# Patient Record
Sex: MALE | Race: BLACK OR AFRICAN AMERICAN | Employment: UNEMPLOYED | URBAN - METROPOLITAN AREA
[De-identification: names, ages, dates, MRNs, and addresses within clinical notes are randomized per-mention and may not be internally consistent; named-entity substitution may affect disease eponyms.]

---

## 2020-05-04 ENCOUNTER — VIRTUAL VISIT (OUTPATIENT)
Dept: PRIMARY CARE CLINIC | Age: 60
End: 2020-05-04
Payer: COMMERCIAL

## 2020-05-04 VITALS
DIASTOLIC BLOOD PRESSURE: 72 MMHG | SYSTOLIC BLOOD PRESSURE: 140 MMHG | WEIGHT: 148 LBS | BODY MASS INDEX: 23.23 KG/M2 | HEIGHT: 67 IN

## 2020-05-04 PROCEDURE — 99203 OFFICE O/P NEW LOW 30 MIN: CPT | Performed by: FAMILY MEDICINE

## 2020-05-04 RX ORDER — ASPIRIN 81 MG/1
81 TABLET, CHEWABLE ORAL DAILY
Qty: 100 TABLET | Refills: 0 | Status: SHIPPED | OUTPATIENT
Start: 2020-05-04 | End: 2020-06-02

## 2020-05-04 RX ORDER — ATORVASTATIN CALCIUM 80 MG/1
80 TABLET, FILM COATED ORAL DAILY
Qty: 90 TABLET | Refills: 1 | Status: SHIPPED | OUTPATIENT
Start: 2020-05-04 | End: 2020-12-28 | Stop reason: SDUPTHER

## 2020-05-04 RX ORDER — NIFEDIPINE 60 MG/1
60 TABLET, EXTENDED RELEASE ORAL DAILY
Qty: 90 TABLET | Refills: 1 | Status: SHIPPED | OUTPATIENT
Start: 2020-05-04 | End: 2020-12-28 | Stop reason: SDUPTHER

## 2020-05-04 RX ORDER — LISINOPRIL 10 MG/1
10 TABLET ORAL DAILY
Qty: 90 TABLET | Refills: 1 | Status: SHIPPED | OUTPATIENT
Start: 2020-05-04 | End: 2020-12-28 | Stop reason: SDUPTHER

## 2020-05-04 RX ORDER — ATORVASTATIN CALCIUM 20 MG/1
20 TABLET, FILM COATED ORAL DAILY
Qty: 90 TABLET | Refills: 1 | Status: SHIPPED | OUTPATIENT
Start: 2020-05-04 | End: 2020-05-04 | Stop reason: CLARIF

## 2020-05-04 RX ORDER — TADALAFIL 5 MG/1
5 TABLET ORAL PRN
Qty: 1 TABLET | Refills: 0 | Status: SHIPPED | OUTPATIENT
Start: 2020-05-04 | End: 2020-05-05 | Stop reason: SDUPTHER

## 2020-05-04 RX ORDER — NIFEDIPINE 60 MG/1
60 TABLET, EXTENDED RELEASE ORAL DAILY
COMMUNITY
End: 2020-05-04 | Stop reason: SDUPTHER

## 2020-05-04 ASSESSMENT — ENCOUNTER SYMPTOMS
SHORTNESS OF BREATH: 0
ORTHOPNEA: 0
BLURRED VISION: 0

## 2020-05-04 NOTE — PROGRESS NOTES
Subjective:      Patient ID: Kj Tolliver is a 62 y.o. male. .This is a audio video visit  . I am at my office  Patient is at home  I  performed this clinical encounter by Trupti Schmidt a real-time telehealth video connection between my Office and the patient's tennille. The patient's location was confirmed during this visit. I obtained verbal consent from he patient to perform this clinical encounter utilizing video and prepared the patient by answering any questions they had about the telehealth interaction. Approximately 30    minutes  were spent with the patient of which more than 50% of the time was spent in counseling and/or coordinating care. The  Patient understands and agrees with the plan of care. Hypertension   This is a chronic problem. The current episode started more than 1 year ago. The problem is controlled. Pertinent negatives include no anxiety, blurred vision, chest pain, headaches, malaise/fatigue, neck pain, orthopnea, palpitations, peripheral edema, PND, shortness of breath or sweats. There are no associated agents to hypertension. Risk factors for coronary artery disease include male gender and dyslipidemia. Past treatments include ACE inhibitors and calcium channel blockers. The current treatment provides significant improvement. Hypertensive end-organ damage includes PVD. There is no history of angina, kidney disease, CAD/MI, CVA or retinopathy. There is no history of chronic renal disease, coarctation of the aorta, hyperaldosteronism, hypercortisolism, hyperparathyroidism, a hypertension causing med, pheochromocytoma, renovascular disease, sleep apnea or a thyroid problem. Hyperlipidemia   This is a chronic problem. The current episode started more than 1 year ago. Recent lipid tests were reviewed and are normal. He has no history of chronic renal disease, diabetes, hypothyroidism, liver disease, obesity or nephrotic syndrome.  Pertinent negatives include no chest pain, focal weakness, leg normal.         Thought Content: Thought content normal.         Judgment: Judgment normal.         Assessment:      1. Essential hypertension  BP is at goal <140/90. Will continue current medication and low salt diet. Has had recent renal function testing  At [facility]  - lisinopril (PRINIVIL;ZESTRIL) 10 MG tablet; Take 1 tablet by mouth daily  Dispense: 90 tablet; Refill: 1  - CBC; Future  - LIPID PANEL; Future  - URINALYSIS  - atorvastatin (LIPITOR) 80 MG tablet; Take 1 tablet by mouth daily Cancel atorvastatin 20 mg a day  Dispense: 90 tablet; Refill: 1  - NIFEdipine (PROCARDIA XL) 60 MG extended release tablet; Take 1 tablet by mouth daily  Dispense: 90 tablet; Refill: 1    2. Pure hypercholesterolemia  Recent liver test at [facility]  - CBC; Future  - LIPID PANEL; Future  - atorvastatin (LIPITOR) 80 MG tablet; Take 1 tablet by mouth daily Cancel atorvastatin 20 mg a day  Dispense: 90 tablet; Refill: 1    3. PVD (peripheral vascular disease) ([facility] [address]  S/p fem posterior tibia bypass  He gets refill NOAC elsewhere according to pt, cont asa,statin  - apixaban (ELIQUIS) 5 MG TABS tablet; Take 1 tablet by mouth 2 times daily  Dispense: 1 tablet; Refill: 0  - atorvastatin (LIPITOR) 80 MG tablet; Take 1 tablet by mouth daily Cancel atorvastatin 20 mg a day  Dispense: 90 tablet; Refill: 1  - aspirin (ASPIRIN CHILDRENS) 81 MG chewable tablet; Take 1 tablet by mouth daily  Dispense: 100 tablet; Refill: 0    4. Iron deficiency anemia, unspecified iron deficiency anemia type  Most likely related to bypass fem posterior tibia  2/2020  HGB 12.1 to most recent 10.8 3/2020  CBC today    5. H/O colonoscopy with polypectomy  2017  For dysplastic polyp  Did have partial bowel removed    6. Erectile dysfunction, unspecified erectile dysfunction type  Stable  Takes this occasionally  refill  - tadalafil (CIALIS) 5 MG tablet; Take 1 tablet by mouth as needed for Erectile Dysfunction  Dispense: 1 tablet; Refill: 0    7.  Prostate cancer

## 2020-05-04 NOTE — PATIENT INSTRUCTIONS
We did discuss your medical problems  Updated medication list  You mentioned you were no longer on carvedilol , if this is different  Let me know and I can send a refill on this  You noted you get eliquis from another md  If you need me to refill let me know  Your dose of atorvastatin is 80 mg a day    See me in 3 mos  Be sure I get copy of colonsocopy

## 2020-05-05 ENCOUNTER — TELEPHONE (OUTPATIENT)
Dept: PRIMARY CARE CLINIC | Age: 60
End: 2020-05-05

## 2020-05-05 RX ORDER — TADALAFIL 5 MG/1
5 TABLET ORAL PRN
Qty: 30 TABLET | Refills: 0 | Status: SHIPPED | OUTPATIENT
Start: 2020-05-05 | End: 2020-06-02

## 2020-07-27 RX ORDER — APIXABAN 5 MG/1
TABLET, FILM COATED ORAL
Qty: 60 TABLET | Refills: 3 | Status: SHIPPED | OUTPATIENT
Start: 2020-07-27 | End: 2021-03-20 | Stop reason: SDUPTHER

## 2020-12-13 LAB
AVERAGE GLUCOSE: NORMAL
HBA1C MFR BLD: 6.4 %

## 2021-02-10 ENCOUNTER — NURSE ONLY (OUTPATIENT)
Dept: PRIMARY CARE CLINIC | Age: 61
End: 2021-02-10
Payer: COMMERCIAL

## 2021-02-10 DIAGNOSIS — Z01.818 PREOP EXAMINATION: Primary | ICD-10-CM

## 2021-02-10 PROCEDURE — 99211 OFF/OP EST MAY X REQ PHY/QHP: CPT | Performed by: NURSE PRACTITIONER

## 2021-02-11 LAB — SARS-COV-2, PCR: NOT DETECTED

## 2021-02-12 ENCOUNTER — OFFICE VISIT (OUTPATIENT)
Dept: PRIMARY CARE CLINIC | Age: 61
End: 2021-02-12
Payer: COMMERCIAL

## 2021-02-12 VITALS
HEART RATE: 80 BPM | DIASTOLIC BLOOD PRESSURE: 65 MMHG | BODY MASS INDEX: 27.12 KG/M2 | WEIGHT: 181 LBS | TEMPERATURE: 97 F | OXYGEN SATURATION: 97 % | SYSTOLIC BLOOD PRESSURE: 106 MMHG

## 2021-02-12 DIAGNOSIS — E11.621 CONTROLLED TYPE 2 DIABETES MELLITUS WITH FOOT ULCER, WITHOUT LONG-TERM CURRENT USE OF INSULIN (HCC): ICD-10-CM

## 2021-02-12 DIAGNOSIS — Z01.818 PRE-OP EXAM: ICD-10-CM

## 2021-02-12 DIAGNOSIS — I10 ESSENTIAL HYPERTENSION: ICD-10-CM

## 2021-02-12 DIAGNOSIS — Z01.818 PRE-OP EXAM: Primary | ICD-10-CM

## 2021-02-12 DIAGNOSIS — E78.00 PURE HYPERCHOLESTEROLEMIA: ICD-10-CM

## 2021-02-12 DIAGNOSIS — L97.509 CONTROLLED TYPE 2 DIABETES MELLITUS WITH FOOT ULCER, WITHOUT LONG-TERM CURRENT USE OF INSULIN (HCC): ICD-10-CM

## 2021-02-12 DIAGNOSIS — L97.519 ULCER OF RIGHT FOOT, UNSPECIFIED ULCER STAGE (HCC): ICD-10-CM

## 2021-02-12 DIAGNOSIS — I73.9 PAD (PERIPHERAL ARTERY DISEASE) (HCC): ICD-10-CM

## 2021-02-12 LAB
ANION GAP SERPL CALCULATED.3IONS-SCNC: 10 MMOL/L (ref 3–16)
BUN BLDV-MCNC: 20 MG/DL (ref 7–20)
CALCIUM SERPL-MCNC: 10.2 MG/DL (ref 8.3–10.6)
CHLORIDE BLD-SCNC: 104 MMOL/L (ref 99–110)
CO2: 23 MMOL/L (ref 21–32)
CREAT SERPL-MCNC: 0.9 MG/DL (ref 0.8–1.3)
GFR AFRICAN AMERICAN: >60
GFR NON-AFRICAN AMERICAN: >60
GLUCOSE BLD-MCNC: 112 MG/DL (ref 70–99)
HCT VFR BLD CALC: 36.6 % (ref 40.5–52.5)
HEMOGLOBIN: 12 G/DL (ref 13.5–17.5)
MCH RBC QN AUTO: 29.4 PG (ref 26–34)
MCHC RBC AUTO-ENTMCNC: 32.7 G/DL (ref 31–36)
MCV RBC AUTO: 90.1 FL (ref 80–100)
PDW BLD-RTO: 16 % (ref 12.4–15.4)
PLATELET # BLD: 371 K/UL (ref 135–450)
PMV BLD AUTO: 6.9 FL (ref 5–10.5)
POTASSIUM SERPL-SCNC: 5.9 MMOL/L (ref 3.5–5.1)
RBC # BLD: 4.06 M/UL (ref 4.2–5.9)
SODIUM BLD-SCNC: 137 MMOL/L (ref 136–145)
WBC # BLD: 4.6 K/UL (ref 4–11)

## 2021-02-12 PROCEDURE — 99242 OFF/OP CONSLTJ NEW/EST SF 20: CPT | Performed by: FAMILY MEDICINE

## 2021-02-12 RX ORDER — MULTIVITAMIN,THER AND MINERALS
1 TABLET ORAL DAILY
COMMUNITY

## 2021-02-12 RX ORDER — BECAPLERMIN 0.01 %
1 GEL (GRAM) TOPICAL DAILY
COMMUNITY
Start: 2020-06-03 | End: 2022-02-18

## 2021-02-12 RX ORDER — CARVEDILOL 25 MG/1
25 TABLET ORAL 2 TIMES DAILY WITH MEALS
COMMUNITY
Start: 2021-01-20 | End: 2022-01-10 | Stop reason: SDUPTHER

## 2021-02-12 SDOH — ECONOMIC STABILITY: FOOD INSECURITY: WITHIN THE PAST 12 MONTHS, THE FOOD YOU BOUGHT JUST DIDN'T LAST AND YOU DIDN'T HAVE MONEY TO GET MORE.: NEVER TRUE

## 2021-02-12 SDOH — ECONOMIC STABILITY: TRANSPORTATION INSECURITY
IN THE PAST 12 MONTHS, HAS LACK OF TRANSPORTATION KEPT YOU FROM MEETINGS, WORK, OR FROM GETTING THINGS NEEDED FOR DAILY LIVING?: NO

## 2021-02-12 ASSESSMENT — ENCOUNTER SYMPTOMS
APNEA: 0
NAUSEA: 0
BLOOD IN STOOL: 0
ANAL BLEEDING: 0
VOICE CHANGE: 0
CONSTIPATION: 0
EYE DISCHARGE: 0
PHOTOPHOBIA: 0
RECTAL PAIN: 0
FACIAL SWELLING: 0
SHORTNESS OF BREATH: 0
EYE ITCHING: 0
ABDOMINAL PAIN: 0
EYE PAIN: 0
SINUS PRESSURE: 0
COLOR CHANGE: 0
EYE REDNESS: 0
SORE THROAT: 0
CHOKING: 0
COUGH: 0
VOMITING: 0
CHEST TIGHTNESS: 0
WHEEZING: 0
BACK PAIN: 0
TROUBLE SWALLOWING: 0

## 2021-02-12 ASSESSMENT — PATIENT HEALTH QUESTIONNAIRE - PHQ9
SUM OF ALL RESPONSES TO PHQ QUESTIONS 1-9: 0
2. FEELING DOWN, DEPRESSED OR HOPELESS: 0
SUM OF ALL RESPONSES TO PHQ9 QUESTIONS 1 & 2: 0

## 2021-02-12 NOTE — PROGRESS NOTES
02/12/2020  63 y/o male known hypertension, hyperlipidemia,diabetices type 2, and  PAD with non healing ulcer of the R foot/ankle area. He is scheduled  For skin graft to the ulcer site 02/15/2021 per Dr Panchito Panda. He is s/p Fem Pop bypass 2000 left leg  He is s/p Fem Pop bypass  12/2020 right leg    Echo 11/2020 normal EF  60-65%      Diabetes type 2  Reportedly last AIC 6.4  No polyuria, polydipsia, weight changes or blurred vision    Hypertension  No chest pain, headache, sob, leg edema, stroke, kidney disease     No current claudication sxs    He denies CP or SOB  No stroke , no kidney disease,     No issues with general anesthesia  No bleeding issues in patient or family  No Known Allergies    Past Medical History:   Diagnosis Date    Diabetes mellitus ([facility] [address]     Fibromyalgia     Hyperlipidemia     Hypertension      Social History     Socioeconomic History    Marital status:      Spouse name: Not on file    Number of children: Not on file    Years of education: Not on file    Highest education level: Not on file   Occupational History    Not on file   Social Needs    Financial resource strain: Not hard at all   Polimax-Liza insecurity     Worry: Never true     Inability: Never true    Transportation needs     Medical: No     Non-medical: No   Tobacco Use    Smoking status: Current Every Day Smoker     Packs/day: 0.50     Years: 20.00     Pack years: 10.00     Types: Cigarettes     Start date: 12/28/1977    Smokeless tobacco: Never Used   Substance and Sexual Activity    Alcohol use:  Yes     Alcohol/week: 6.0 standard drinks     Types: 6 Cans of beer per week    Drug use: Never    Sexual activity: Yes     Partners: Female   Lifestyle    Physical activity     Days per week: Not on file     Minutes per session: Not on file    Stress: Not on file   Relationships    Social connections     Talks on phone: Not on file     Gets together: Not on file Negative for arthralgias, back pain, gait problem, joint swelling, myalgias, neck pain and neck stiffness. Skin: Negative for color change, pallor, rash and wound. Neurological: Negative for dizziness, tremors, seizures, syncope, facial asymmetry, speech difficulty, weakness, light-headedness, numbness and headaches. Hematological: Negative for adenopathy. Does not bruise/bleed easily. Psychiatric/Behavioral: Negative for agitation, behavioral problems, confusion, decreased concentration, dysphoric mood, hallucinations, self-injury, sleep disturbance and suicidal ideas. The patient is not nervous/anxious and is not hyperactive. Physical Exam  Constitutional:       General: He is not in acute distress. Appearance: He is well-developed. He is not diaphoretic. HENT:      Head: Normocephalic and atraumatic. Right Ear: External ear normal.      Left Ear: External ear normal.      Nose: Nose normal.      Mouth/Throat:      Pharynx: No oropharyngeal exudate. Eyes:      General: No scleral icterus. Right eye: No discharge. Left eye: No discharge. Conjunctiva/sclera: Conjunctivae normal.      Pupils: Pupils are equal, round, and reactive to light. Neck:      Musculoskeletal: Normal range of motion and neck supple. Thyroid: No thyromegaly. Vascular: No JVD. Trachea: No tracheal deviation. Cardiovascular:      Rate and Rhythm: Normal rate and regular rhythm. Pulses:           Carotid pulses are 2+ on the right side and 2+ on the left side. Radial pulses are 2+ on the right side and 2+ on the left side. Femoral pulses are 2+ on the right side and 2+ on the left side. Popliteal pulses are 2+ on the right side and 2+ on the left side. Dorsalis pedis pulses are 2+ on the right side and 2+ on the left side. Posterior tibial pulses are 2+ on the right side and 2+ on the left side. Heart sounds: Normal heart sounds. No murmur.  No friction rub. Pulmonary:      Effort: Pulmonary effort is normal. No respiratory distress. Breath sounds: Normal breath sounds. No stridor. No wheezing or rales. Chest:      Chest wall: No tenderness. Abdominal:      General: Bowel sounds are normal. There is no distension. Palpations: Abdomen is soft. There is no mass. Tenderness: There is no abdominal tenderness. There is no guarding or rebound. Musculoskeletal: Normal range of motion. General: No tenderness. Comments: Dressing of the right foot/anklee    Minimal stasis derm right leg   Lymphadenopathy:      Cervical: No cervical adenopathy. Skin:     General: Skin is warm and dry. Coloration: Skin is not pale. Findings: No rash. Neurological:      Mental Status: He is oriented to person, place, and time. Cranial Nerves: No cranial nerve deficit. Motor: No abnormal muscle tone. Coordination: Coordination normal.      Deep Tendon Reflexes: Reflexes normal.   Psychiatric:         Behavior: Behavior normal.         Thought Content: Thought content normal.         Judgment: Judgment normal.         1. Pre-op exam  Medically clear for surgery,will review labs  - CBC  - Basic Metabolic Panel; Future    2. Ulcer of right foot, unspecified ulcer stage ([address]      3. PAD (peripheral artery disease) ([address]      4. Essential hypertension  BP is at goal <140/90. Will continue current medication and low salt diet. Has not  had recent renal function testing    5.  Controlled type 2 diabetes mellitus with foot ulcer, without long-term current use of insulin ([address]  poct glucsoe    Consult note sent to surgeon    Copy EKG done at [facility]        02/12/21  5:00 pm  I did review labs    Elevated K 5.9, maybe related to hemolysis   Renal otherwise normal    Medically clear for surgery

## 2021-02-18 NOTE — PROGRESS NOTES
Ciara Mtzutz    Age 62 y.o.    male    1960    N [id number]    2/26/2021  Arrival Time_____________  OR Time____________[address]     Procedure(s):  DEBRIDEMENT OF RIGHT FOOT WOUND WITH GRAFT APPLICATION                      Monitor Anesthesia Care    Surgeon(s):  Samara Abraham, DPM       Phone [phone] ([place])     [address]  Cell Work  _________________________________________________________________  _________________________________________________________________  _________________________________________________________________  _________________________________________________________________  _________________________________________________________________      PCP _____________________________ Phone_________________       H&P__________________Bringing    Chart            Epic  DOS     Called_______  EKG__________________Bringing    Chart            Epic  DOS     Called_______  LAB__________________ Bringing    Chart            Epic  DOS     Called_______  Cardiac Clearance_______Bringing    Chart            Epic      DOS       Called_______    Cardiologist________________________ Phone___________________________      ? Gnosticism concerns / Waiver on Chart            PAT Communications_____________  ? Pre-op Instructions Given [place]          ______________________________  ? Directions to Surgery Center                          ______________________________  ? Transportation Home_______________      _______________________________  ?  Crutches/Walker__________________        _______________________________      ________Pre-op Orders   _______Transcribed    _______Wt.  ________Pharmacy          _______SCD  ______VTE     ______[employer]  ________Consent             ________TED [place] [place]

## 2021-02-19 NOTE — PROGRESS NOTES
Preoperative Screening for Elective Surgery/Invasive Procedures While COVID-19 present in the community     Have you had any of the following symptoms? No  o Fever, chills  o Cough  o Shortness of breath  o Muscle aches/pain  o Diarrhea  o Abdominal pain, nausea, vomiting  o Loss or decrease in taste and / or smell   Risk of Exposure  o Have you recently been hospitalized for COVID-19 or flu-like illness, if so when? No  o Recently diagnosed with COVID-19, if so when? No  o Recently tested for COVID-19, if so when? FEb 2021  o Have you been in close contact with a person or family member who currently has or recently had COVID-23? If yes, when and in what context? No  o Do you live with anybody who in the last 14 days has had fever, chills, shortness of breath, muscle aches, flu-like illness? No  o Do you have any close contacts or family members who are currently in the hospital for COVID-19 or flu-like illness? No  If yes, assess recent close contact with this person. Indicate if the patient has a positive screen by answering yes to one or more of the above questions. Patients who test positive or screen positive prior to surgery or on the day of surgery should be evaluated in conjunction with the surgeon/proceduralist/anesthesiologist to determine the urgency of the procedure.

## 2021-02-19 NOTE — PROGRESS NOTES
Obstructive Sleep Apnea (FRANCI) Screening     Patient:  Kat Cr    YOB: 1960      Medical Record #:  [id number]                     Date:  2/19/2021     1. Are you a loud and/or regular snorer? []  Yes       [x] No    2. Have you been observed to gasp or stop breathing during sleep? []  Yes       [x] No    3. Do you feel tired or groggy upon awakening or do you awaken with a headache?           []  Yes       [] No    4. Are you often tired or fatigued during the wake time hours? []  Yes       [] No    5. Do you fall asleep sitting, reading, watching TV or driving? []  Yes       [] No    6. Do you often have problems with memory or concentration? []  Yes       [] No    **If patient's score is ? 3 they are considered high risk for FRANCI. An Anesthesia provider will evaluate the patient and develop a plan of care the day of surgery. Note:  If the patient's BMI is more than 35 kg m¯² , has neck circumference > 40 cm, and/or high blood pressure the risk is greater (© American Sleep Apnea Association, 2006).

## 2021-02-23 ENCOUNTER — TELEPHONE (OUTPATIENT)
Dept: PRIMARY CARE CLINIC | Age: 61
End: 2021-02-23

## 2021-02-23 DIAGNOSIS — J01.90 ACUTE SINUSITIS, RECURRENCE NOT SPECIFIED, UNSPECIFIED LOCATION: Primary | ICD-10-CM

## 2021-02-23 RX ORDER — AZITHROMYCIN 250 MG/1
TABLET, FILM COATED ORAL
Qty: 1 PACKET | Refills: 0 | Status: SHIPPED | OUTPATIENT
Start: 2021-02-23 | End: 2021-05-10

## 2021-02-23 NOTE — TELEPHONE ENCOUNTER
Pt states he is having nasal congestion in one nostril (feels like burning sensation in the sinus area)  Once in a while it causes him to sneeze. He does not have fever or chills. No cough. Does have a runny nose on one side. No body aches. No headache. He said it feels better today than it did the last couple of days. He is having a skin graft done on his right foot on Friday. He did say he will call the surgeon to get their advice as well. Please advise.

## 2021-02-23 NOTE — TELEPHONE ENCOUNTER
----- Message from Mike Cabrera sent at 2/23/2021  9:06 AM EST -----  Subject: Message to Provider    QUESTIONS  Information for Provider? Pt has had chest cough for 2-3 days----wants to   know what to do about upcoming procedure? Please call back to advise. PT   does not want appt but just advice. ---------------------------------------------------------------------------  --------------  Juliet KAM  What is the best way for the office to contact you? OK to leave message on   voicemail  Preferred Call Back Phone Number? [phone]  ---------------------------------------------------------------------------  --------------  SCRIPT ANSWERS  Relationship to Patient?  Self

## 2021-02-23 NOTE — TELEPHONE ENCOUNTER
Left message on machine per [employer]IP[employer]  Need more info. Any other symptoms fever? Did he contact the provider doing the upcoming procedure?

## 2021-02-23 NOTE — TELEPHONE ENCOUNTER
Scheduled for skin graft surgery   02/26/21, covid testing 02/24/21  Runny nose, congestion, no fever or sob  Cont.  Zyrtec  Add ZPAX  Spoke with patient

## 2021-02-24 ENCOUNTER — OFFICE VISIT (OUTPATIENT)
Dept: PRIMARY CARE CLINIC | Age: 61
End: 2021-02-24
Payer: COMMERCIAL

## 2021-02-24 DIAGNOSIS — Z20.828 EXPOSURE TO SARS-ASSOCIATED CORONAVIRUS: Primary | ICD-10-CM

## 2021-02-24 PROCEDURE — 99211 OFF/OP EST MAY X REQ PHY/QHP: CPT | Performed by: NURSE PRACTITIONER

## 2021-02-24 NOTE — PROGRESS NOTES
Ciara Haddad received a viral test for COVID-19. They were educated on isolation and quarantine as appropriate. For any symptoms, they were directed to seek care from their PCP, given contact information to establish with a doctor, directed to an urgent care or the emergency room.

## 2021-02-25 ENCOUNTER — ANESTHESIA EVENT (OUTPATIENT)
Dept: OPERATING ROOM | Age: 61
End: 2021-02-25
Payer: COMMERCIAL

## 2021-02-25 LAB — SARS-COV-2: NOT DETECTED

## 2021-02-25 NOTE — ANESTHESIA PRE PROCEDURE
Department of Anesthesiology  Preprocedure Note       Name:  Anita Najera   Age:  62 y.o.  :  1960                                          MRN:  [id number]         Date:  2021      Surgeon: Malini Morgan):  Jack Pang DPM    Procedure: Procedure(s):  DEBRIDEMENT OF RIGHT FOOT WOUND WITH GRAFT APPLICATION    Medications prior to admission:   Prior to Admission medications    Medication Sig Start Date End Date Taking? Authorizing Provider   azithromycin (ZITHROMAX) 250 MG tablet Take 2 tabs (500 mg) on Day 1, and take 1 tab (250 mg) on days 2 through 5. 21   Terrance Collins MD   carvedilol (COREG) 25 MG tablet Take 25 mg by mouth 2 times daily (with meals) 21   Historical Provider, MD   becaplermin (REGRANEX) 0.01 % gel Apply 1 Applicatorful topically daily 6/3/20   Historical Provider, MD   Vitamins/Minerals TABS Take 1 tablet by mouth daily    Historical Provider, MD   atorvastatin (LIPITOR) 80 MG tablet Take 1 tablet by mouth daily Cancel atorvastatin 20 mg a day 20   Terrance Collins MD   aspirin 81 MG chewable tablet CHEW AND SWALLOW 1 TABLET BY MOUTH ONE TIME A DAY 20   Terrance Collins MD   lisinopril (PRINIVIL;ZESTRIL) 10 MG tablet Take 1 tablet by mouth daily 20   Terrance Collins MD   NIFEdipine (PROCARDIA XL) 60 MG extended release tablet Take 1 tablet by mouth daily 20   Terrance Collins MD   tadalafil (CIALIS) 5 MG tablet TAKE 1 TABLET BY MOUTH ONE TIME A DAY AS NEEDED FOR ERECTILE DYSFUNCTION 20   Terrance Collins MD   metFORMIN (GLUCOPHAGE) 500 MG tablet Take 1 tablet by mouth 2 times daily (with meals) 20   eTrrance Collins MD   ferrous sulfate (IRON 325) 325 (65 Fe) MG tablet Take 1 tablet by mouth 2 times daily 20   Terrance Collins MD   ELIQUIS 5 MG TABS tablet TAKE 1 TABLET BY MOUTH TWO TIMES A DAY  20   Terrance Collins MD       Current medications:    No current facility-administered medications for this encounter.       Current Outpatient Medications   Medication Sig Dispense Refill    azithromycin (ZITHROMAX) 250 MG tablet Take 2 tabs (500 mg) on Day 1, and take 1 tab (250 mg) on days 2 through 5. 1 packet 0    carvedilol (COREG) 25 MG tablet Take 25 mg by mouth 2 times daily (with meals)      becaplermin (REGRANEX) 0.01 % gel Apply 1 Applicatorful topically daily      Vitamins/Minerals TABS Take 1 tablet by mouth daily      atorvastatin (LIPITOR) 80 MG tablet Take 1 tablet by mouth daily Cancel atorvastatin 20 mg a day 90 tablet 1    aspirin 81 MG chewable tablet CHEW AND SWALLOW 1 TABLET BY MOUTH ONE TIME A  tablet 5    lisinopril (PRINIVIL;ZESTRIL) 10 MG tablet Take 1 tablet by mouth daily 90 tablet 1    NIFEdipine (PROCARDIA XL) 60 MG extended release tablet Take 1 tablet by mouth daily 90 tablet 1    tadalafil (CIALIS) 5 MG tablet TAKE 1 TABLET BY MOUTH ONE TIME A DAY AS NEEDED FOR ERECTILE DYSFUNCTION 30 tablet 5    metFORMIN (GLUCOPHAGE) 500 MG tablet Take 1 tablet by mouth 2 times daily (with meals) 180 tablet 1    ferrous sulfate (IRON 325) 325 (65 Fe) MG tablet Take 1 tablet by mouth 2 times daily 180 tablet 1    ELIQUIS 5 MG TABS tablet TAKE 1 TABLET BY MOUTH TWO TIMES A DAY  60 tablet 3       Allergies:  No Known Allergies    Problem List:  There is no problem list on file for this patient.       Past Medical History:        Diagnosis Date    Diabetes mellitus ([facility] [address]     Fibromyalgia     Hyperlipidemia     Hypertension        Past Surgical History:        Procedure Laterality Date    APPENDECTOMY  2005    COLONOSCOPY  2016    FEMORAL BYPASS Left 02/26/2020    femoral posterior tibial bypass    FEMORAL-TIBIAL BYPASS GRAFT Right 12/11/2020    with femoral endarterectomy and patch angioplasty    FOOT DEBRIDEMENT Left 09/03/2020    OTHER SURGICAL HISTORY Right 12/08/2020    stent leg for PVD       Social History:    Social History     Tobacco Use    Smoking status: Current Every Day Smoker Packs/day: 0.50     Years: 20.00     Pack years: 10.00     Types: Cigarettes     Start date: 12/28/1977    Smokeless tobacco: Never Used   Substance Use Topics    Alcohol use: Yes     Alcohol/week: 6.0 standard drinks     Types: 6 Cans of beer per week                                Ready to quit: Not Answered  Counseling given: Not Answered      Vital Signs (Current):   Vitals:    02/19/21 1113   Weight: 181 lb (82.1 kg)   Height: 5' 8.5\" (1.74 m)                                              BP Readings from Last 3 Encounters:   02/12/21 106/65   12/28/20 132/60   05/04/20 (!) 140/72       NPO Status:                                                                                 BMI:   Wt Readings from Last 3 Encounters:   02/12/21 181 lb (82.1 kg)   12/28/20 182 lb (82.6 kg)   05/04/20 148 lb (67.1 kg)     Body mass index is 27.12 kg/m². CBC:   Lab Results   Component Value Date    WBC 4.6 02/12/2021    RBC 4.06 02/12/2021    HGB 12.0 02/12/2021    HCT 36.6 02/12/2021    MCV 90.1 02/12/2021    RDW 16.0 02/12/2021     02/12/2021       CMP:   Lab Results   Component Value Date     02/12/2021    K 5.9 02/12/2021     02/12/2021    CO2 23 02/12/2021    BUN 20 02/12/2021    CREATININE 0.9 02/12/2021    GFRAA >60 02/12/2021    GFRAA >60 03/29/2010    AGRATIO 1.8 03/29/2010    LABGLOM >60 02/12/2021    GLUCOSE 112 02/12/2021    PROT 8.0 03/29/2010    CALCIUM 10.2 02/12/2021    BILITOT 0.70 03/29/2010    ALKPHOS 69 03/29/2010    AST 28 03/29/2010    ALT 28 03/29/2010       POC Tests: No results for input(s): POCGLU, POCNA, POCK, POCCL, POCBUN, POCHEMO, POCHCT in the last 72 hours.     Coags: No results found for: PROTIME, INR, APTT    HCG (If Applicable): No results found for: PREGTESTUR, PREGSERUM, HCG, HCGQUANT     ABGs: No results found for: PHART, PO2ART, QZN5UWI, EVR5IZV, BEART, C5XQQYUF     Type & Screen (If Applicable):  No results found for: LABABO, LABRH    Drug/Infectious Status (If Applicable):  No results found for: HIV, HEPCAB    COVID-19 Screening (If Applicable):   Lab Results   Component Value Date    COVID19 Not Detected 02/24/2021    COVID19 Not Detected 02/10/2021         Anesthesia Evaluation  Patient summary reviewed and Nursing notes reviewed no history of anesthetic complications:   Airway: Mallampati: II     Neck ROM: full   Dental:          Pulmonary:Negative Pulmonary ROS and normal exam                               Cardiovascular:Negative CV ROS    (+) hypertension:, hyperlipidemia                  Neuro/Psych:   Negative Neuro/Psych ROS  (+) neuromuscular disease (FM):,             GI/Hepatic/Renal: Neg GI/Hepatic/Renal ROS       (-) hiatal hernia and GERD       Endo/Other: Negative Endo/Other ROS   (+) Diabetes, . Abdominal:           Vascular:                                        Anesthesia Plan      general     ASA 3     (I discussed with the patient the risks and benefits of PIV, general anesthesia, IV Narcotics, PACU. All questions were answered the patient agrees with the plan and wishes to proceed.  )  Induction: intravenous. Pre-Operative Diagnosis: Peripheral arterial disease ([facility] [address] [I73.9]; Ulcer of right foot, unspecified ulcer stage ([facility] [address] [L97.519]    62 y.o.   BMI:  Body mass index is 27.12 kg/m². Vitals:    02/19/21 1113 02/26/21 0628   BP:  119/73   Pulse:  93   Resp:  20   Temp:  97.5 °F (36.4 °C)   TempSrc:  Temporal   SpO2:  97%   Weight: 181 lb (82.1 kg) 181 lb (82.1 kg)   Height: 5' 8.5\" (1.74 m) 5' 8.5\" (1.74 m)       No Known Allergies    Social History     Tobacco Use    Smoking status: Current Every Day Smoker     Packs/day: 0.50     Years: 20.00     Pack years: 10.00     Types: Cigarettes     Start date: 12/28/1977    Smokeless tobacco: Never Used    Tobacco comment: 3 cigs a day/ quitting    Substance Use Topics    Alcohol use:  Yes     Alcohol/week: 6.0 standard drinks     Types: 6 Cans of beer per week LABS:    CBC  Lab Results   Component Value Date/Time    WBC 4.6 02/12/2021 11:35 AM    HGB 12.0 (L) 02/12/2021 11:35 AM    HCT 36.6 (L) 02/12/2021 11:35 AM     02/12/2021 11:35 AM     RENAL  Lab Results   Component Value Date/Time     02/12/2021 12:17 PM    K 5.9 (H) 02/12/2021 12:17 PM     02/12/2021 12:17 PM    CO2 23 02/12/2021 12:17 PM    BUN 20 02/12/2021 12:17 PM    CREATININE 0.9 02/12/2021 12:17 PM    GLUCOSE 112 (H) 02/12/2021 12:17 PM     COAGS  No results found for: PROTIME, INR, APTT    Mariola Comer MD   2/25/2021

## 2021-02-26 ENCOUNTER — HOSPITAL ENCOUNTER (OUTPATIENT)
Age: 61
Setting detail: OUTPATIENT SURGERY
Discharge: HOME | End: 2021-02-26
Attending: PODIATRIST | Admitting: PODIATRIST
Payer: COMMERCIAL

## 2021-02-26 ENCOUNTER — ANESTHESIA (OUTPATIENT)
Dept: OPERATING ROOM | Age: 61
End: 2021-02-26
Payer: COMMERCIAL

## 2021-02-26 VITALS
HEART RATE: 77 BPM | SYSTOLIC BLOOD PRESSURE: 115 MMHG | OXYGEN SATURATION: 96 % | WEIGHT: 181 LBS | TEMPERATURE: 97.8 F | HEIGHT: 69 IN | RESPIRATION RATE: 14 BRPM | BODY MASS INDEX: 26.81 KG/M2 | DIASTOLIC BLOOD PRESSURE: 66 MMHG

## 2021-02-26 VITALS
TEMPERATURE: 98.6 F | SYSTOLIC BLOOD PRESSURE: 87 MMHG | OXYGEN SATURATION: 96 % | DIASTOLIC BLOOD PRESSURE: 48 MMHG | RESPIRATION RATE: 23 BRPM

## 2021-02-26 LAB
GLUCOSE BLD-MCNC: 94 MG/DL (ref 70–99)
GLUCOSE BLD-MCNC: 99 MG/DL (ref 70–99)
PERFORMED ON: NORMAL
PERFORMED ON: NORMAL

## 2021-02-26 PROCEDURE — 2709999900 HC NON-CHARGEABLE SUPPLY: Performed by: PODIATRIST

## 2021-02-26 PROCEDURE — 7100000011 HC PHASE II RECOVERY - ADDTL 15 MIN: Performed by: PODIATRIST

## 2021-02-26 PROCEDURE — 2580000003 HC RX 258: Performed by: PODIATRIST

## 2021-02-26 PROCEDURE — 2500000003 HC RX 250 WO HCPCS

## 2021-02-26 PROCEDURE — 3700000001 HC ADD 15 MINUTES (ANESTHESIA): Performed by: PODIATRIST

## 2021-02-26 PROCEDURE — 2500000003 HC RX 250 WO HCPCS: Performed by: PODIATRIST

## 2021-02-26 PROCEDURE — 3600000004 HC SURGERY LEVEL 4 BASE: Performed by: PODIATRIST

## 2021-02-26 PROCEDURE — 7100000010 HC PHASE II RECOVERY - FIRST 15 MIN: Performed by: PODIATRIST

## 2021-02-26 PROCEDURE — 3600000014 HC SURGERY LEVEL 4 ADDTL 15MIN: Performed by: PODIATRIST

## 2021-02-26 PROCEDURE — 7100000001 HC PACU RECOVERY - ADDTL 15 MIN: Performed by: PODIATRIST

## 2021-02-26 PROCEDURE — 6360000002 HC RX W HCPCS: Performed by: PODIATRIST

## 2021-02-26 PROCEDURE — 6360000002 HC RX W HCPCS

## 2021-02-26 PROCEDURE — 7100000000 HC PACU RECOVERY - FIRST 15 MIN: Performed by: PODIATRIST

## 2021-02-26 PROCEDURE — 3700000000 HC ANESTHESIA ATTENDED CARE: Performed by: PODIATRIST

## 2021-02-26 PROCEDURE — 2580000003 HC RX 258: Performed by: ANESTHESIOLOGY

## 2021-02-26 DEVICE — DRESSING WND MICRONIZED PARTIC 1000 MG MATRISTEM MICROMATRIX: Type: IMPLANTABLE DEVICE | Status: FUNCTIONAL

## 2021-02-26 DEVICE — INTEGRA® MESHED BILAYER WOUND MATRIX 4 IN*5 IN (10 CM*12.5 CM)
Type: IMPLANTABLE DEVICE | Status: FUNCTIONAL
Brand: INTEGRA®

## 2021-02-26 RX ORDER — PROPOFOL 10 MG/ML
INJECTION, EMULSION INTRAVENOUS PRN
Status: DISCONTINUED | OUTPATIENT
Start: 2021-02-26 | End: 2021-02-26 | Stop reason: SDUPTHER

## 2021-02-26 RX ORDER — LABETALOL HYDROCHLORIDE 5 MG/ML
5 INJECTION, SOLUTION INTRAVENOUS EVERY 10 MIN PRN
Status: DISCONTINUED | OUTPATIENT
Start: 2021-02-26 | End: 2021-02-26 | Stop reason: HOSPADM

## 2021-02-26 RX ORDER — OXYCODONE HYDROCHLORIDE AND ACETAMINOPHEN 5; 325 MG/1; MG/1
1 TABLET ORAL PRN
Status: DISCONTINUED | OUTPATIENT
Start: 2021-02-26 | End: 2021-02-26 | Stop reason: HOSPADM

## 2021-02-26 RX ORDER — MAGNESIUM HYDROXIDE 1200 MG/15ML
LIQUID ORAL CONTINUOUS PRN
Status: COMPLETED | OUTPATIENT
Start: 2021-02-26 | End: 2021-02-26

## 2021-02-26 RX ORDER — LIDOCAINE HYDROCHLORIDE 20 MG/ML
INJECTION, SOLUTION INFILTRATION; PERINEURAL PRN
Status: DISCONTINUED | OUTPATIENT
Start: 2021-02-26 | End: 2021-02-26 | Stop reason: SDUPTHER

## 2021-02-26 RX ORDER — ONDANSETRON 2 MG/ML
INJECTION INTRAMUSCULAR; INTRAVENOUS PRN
Status: DISCONTINUED | OUTPATIENT
Start: 2021-02-26 | End: 2021-02-26 | Stop reason: SDUPTHER

## 2021-02-26 RX ORDER — OXYCODONE HYDROCHLORIDE AND ACETAMINOPHEN 5; 325 MG/1; MG/1
2 TABLET ORAL PRN
Status: DISCONTINUED | OUTPATIENT
Start: 2021-02-26 | End: 2021-02-26 | Stop reason: HOSPADM

## 2021-02-26 RX ORDER — MORPHINE SULFATE 2 MG/ML
2 INJECTION, SOLUTION INTRAMUSCULAR; INTRAVENOUS EVERY 5 MIN PRN
Status: DISCONTINUED | OUTPATIENT
Start: 2021-02-26 | End: 2021-02-26 | Stop reason: HOSPADM

## 2021-02-26 RX ORDER — ONDANSETRON 2 MG/ML
4 INJECTION INTRAMUSCULAR; INTRAVENOUS PRN
Status: DISCONTINUED | OUTPATIENT
Start: 2021-02-26 | End: 2021-02-26 | Stop reason: HOSPADM

## 2021-02-26 RX ORDER — MORPHINE SULFATE 2 MG/ML
1 INJECTION, SOLUTION INTRAMUSCULAR; INTRAVENOUS EVERY 5 MIN PRN
Status: DISCONTINUED | OUTPATIENT
Start: 2021-02-26 | End: 2021-02-26 | Stop reason: HOSPADM

## 2021-02-26 RX ORDER — MEPERIDINE HYDROCHLORIDE 50 MG/ML
12.5 INJECTION INTRAMUSCULAR; INTRAVENOUS; SUBCUTANEOUS EVERY 5 MIN PRN
Status: DISCONTINUED | OUTPATIENT
Start: 2021-02-26 | End: 2021-02-26 | Stop reason: HOSPADM

## 2021-02-26 RX ORDER — PHENYLEPHRINE HCL IN 0.9% NACL 1 MG/10 ML
SYRINGE (ML) INTRAVENOUS PRN
Status: DISCONTINUED | OUTPATIENT
Start: 2021-02-26 | End: 2021-02-26 | Stop reason: SDUPTHER

## 2021-02-26 RX ORDER — PROMETHAZINE HYDROCHLORIDE 25 MG/ML
6.25 INJECTION, SOLUTION INTRAMUSCULAR; INTRAVENOUS
Status: DISCONTINUED | OUTPATIENT
Start: 2021-02-26 | End: 2021-02-26 | Stop reason: HOSPADM

## 2021-02-26 RX ORDER — FENTANYL CITRATE 50 UG/ML
INJECTION, SOLUTION INTRAMUSCULAR; INTRAVENOUS PRN
Status: DISCONTINUED | OUTPATIENT
Start: 2021-02-26 | End: 2021-02-26 | Stop reason: SDUPTHER

## 2021-02-26 RX ORDER — BUPIVACAINE HYDROCHLORIDE 5 MG/ML
INJECTION, SOLUTION EPIDURAL; INTRACAUDAL PRN
Status: DISCONTINUED | OUTPATIENT
Start: 2021-02-26 | End: 2021-02-26 | Stop reason: ALTCHOICE

## 2021-02-26 RX ORDER — DIPHENHYDRAMINE HYDROCHLORIDE 50 MG/ML
12.5 INJECTION INTRAMUSCULAR; INTRAVENOUS
Status: DISCONTINUED | OUTPATIENT
Start: 2021-02-26 | End: 2021-02-26 | Stop reason: HOSPADM

## 2021-02-26 RX ORDER — SODIUM CHLORIDE, SODIUM LACTATE, POTASSIUM CHLORIDE, CALCIUM CHLORIDE 600; 310; 30; 20 MG/100ML; MG/100ML; MG/100ML; MG/100ML
INJECTION, SOLUTION INTRAVENOUS CONTINUOUS
Status: DISCONTINUED | OUTPATIENT
Start: 2021-02-26 | End: 2021-02-26 | Stop reason: HOSPADM

## 2021-02-26 RX ORDER — HYDRALAZINE HYDROCHLORIDE 20 MG/ML
5 INJECTION INTRAMUSCULAR; INTRAVENOUS EVERY 10 MIN PRN
Status: DISCONTINUED | OUTPATIENT
Start: 2021-02-26 | End: 2021-02-26 | Stop reason: HOSPADM

## 2021-02-26 RX ADMIN — Medication 100 MCG: at 07:51

## 2021-02-26 RX ADMIN — LIDOCAINE HYDROCHLORIDE 100 MG: 20 INJECTION, SOLUTION INFILTRATION; PERINEURAL at 07:23

## 2021-02-26 RX ADMIN — Medication 50 MCG: at 07:40

## 2021-02-26 RX ADMIN — CEFAZOLIN SODIUM 2 G: 10 INJECTION, POWDER, FOR SOLUTION INTRAVENOUS at 07:23

## 2021-02-26 RX ADMIN — ONDANSETRON 4 MG: 2 INJECTION INTRAMUSCULAR; INTRAVENOUS at 07:23

## 2021-02-26 RX ADMIN — SODIUM CHLORIDE, POTASSIUM CHLORIDE, SODIUM LACTATE AND CALCIUM CHLORIDE: 600; 310; 30; 20 INJECTION, SOLUTION INTRAVENOUS at 06:36

## 2021-02-26 RX ADMIN — Medication 50 MCG: at 07:33

## 2021-02-26 RX ADMIN — PROPOFOL 200 MG: 10 INJECTION, EMULSION INTRAVENOUS at 07:23

## 2021-02-26 RX ADMIN — FENTANYL CITRATE 50 MCG: 50 INJECTION INTRAMUSCULAR; INTRAVENOUS at 07:33

## 2021-02-26 RX ADMIN — FENTANYL CITRATE 50 MCG: 50 INJECTION INTRAMUSCULAR; INTRAVENOUS at 07:28

## 2021-02-26 RX ADMIN — Medication 100 MCG: at 07:57

## 2021-02-26 RX ADMIN — Medication 100 MCG: at 07:44

## 2021-02-26 ASSESSMENT — PAIN - FUNCTIONAL ASSESSMENT: PAIN_FUNCTIONAL_ASSESSMENT: 0-10

## 2021-02-26 ASSESSMENT — PULMONARY FUNCTION TESTS
PIF_VALUE: 11
PIF_VALUE: 15
PIF_VALUE: 12
PIF_VALUE: 15
PIF_VALUE: 2
PIF_VALUE: 11
PIF_VALUE: 2
PIF_VALUE: 15
PIF_VALUE: 24
PIF_VALUE: 8
PIF_VALUE: 12
PIF_VALUE: 2
PIF_VALUE: 11
PIF_VALUE: 15
PIF_VALUE: 16
PIF_VALUE: 0
PIF_VALUE: 2
PIF_VALUE: 1

## 2021-02-26 ASSESSMENT — PAIN SCALES - GENERAL: PAINLEVEL_OUTOF10: 0

## 2021-02-26 NOTE — H&P
I have reviewed the patient's H&P. I agree with the findings. Will proceed with surgery.     Jaswinder Dueñas, [address], [address], [address]  Office: [phone]  Mobile: [phone]

## 2021-02-26 NOTE — BRIEF OP NOTE
Brief Postoperative Note      Patient: Harshad Vigil  YOB: 1960  MRN: [id number]    Date of Procedure: 2/26/2021    Pre-Op Diagnosis: ULCER RIGHT LOWER EXTREMITY, PERIPHERAL ARTERIAL DISEASE    Post-Op Diagnosis: Same       Procedure(s):  DEBRIDEMENT OF RIGHT FOOT WOUND WITH GRAFT APPLICATION    Surgeon(s):  Kavya Jimenez DPM    Assistant:  Surgical Assistant: Petra Corrales    Anesthesia: Monitor Anesthesia Care    Estimated Blood Loss (mL): less than 50     Complications: None    Specimens:   * No specimens in log *    Implants:  Implant Name Type Inv.  Item Serial No.  Lot No. LRB No. Used Action   DRESSING WND MICRONIZED PARTIC 1000 MG MATRISTEM MICROMATRIX - [id number]  DRESSING WND MICRONIZED PARTIC 1000 MG MATRISTEM MICROMATRIX [id number] [address] [place] [address] [id number] Right 1 Implanted   DRESSING BIO [id number] BOV TEND CLLGN GLYCOSAMINOGLYCAN  DRESSING BIO [id number] BOV TEND CLLGN GLYCOSAMINOGLYCAN  [employer] [facility] [id number] Right 1 Implanted         Drains: * No LDAs found *    Findings:  Consistent with diagnosis    Electronically signed by Kavya Jimenez DPM on 2/26/2021 at 8:04 AM

## 2021-02-26 NOTE — ANESTHESIA POSTPROCEDURE EVALUATION
Department of Anesthesiology  Postprocedure Note    Patient: Laura Banerjee  MRN: [id number]  YOB: 1960  Date of evaluation: 2/26/2021  Time:  12:17 PM     Procedure Summary     Date: 02/26/21 Room / Location: [address] [place] [address]    Anesthesia Start: 7201 Anesthesia Stop: 0804    Procedure: DEBRIDEMENT OF RIGHT FOOT WOUND WITH GRAFT APPLICATION (Right Foot) Diagnosis:       Peripheral arterial disease ([address]      Ulcer of right foot, unspecified ulcer stage ([address]      (ULCER RIGHT LOWER EXTREMITY, PERIPHERAL ARTERIAL DISEASE)    Surgeons: Lilibeth Bush DPM Responsible Provider: Sean Barnett MD    Anesthesia Type: general ASA Status: 3          Anesthesia Type: general    Andrew Phase I: Andrew Score: 9    Andrew Phase II: Andrew Score: 10    Last vitals: Reviewed and per EMR flowsheets.        Anesthesia Post Evaluation    Comments: Postoperative Anesthesia Note    Name:    Laura Banerjee  MRN:      [id number]    Patient Vitals in the past 12 hrs:  02/26/21 0930, BP:115/66, Pulse:77, Resp:14, SpO2:96 %  02/26/21 0915, BP:115/60, Pulse:78, Resp:13, SpO2:96 %  02/26/21 0900, BP:107/61, Pulse:77, Resp:22, SpO2:94 %  02/26/21 0845, BP:106/65, Temp:97.8 °F (36.6 °C), Temp src:Temporal, Pulse:77, Resp:27, SpO2:96 %  02/26/21 0830, BP:(!) 99/56, Pulse:76, Resp:28, SpO2:93 %  02/26/21 0825, BP:101/60, Pulse:78, Resp:17, SpO2:95 %  02/26/21 0820, BP:(!) 90/53, Pulse:84, Resp:27, SpO2:94 %  02/26/21 0815, BP:(!) 103/42, Pulse:79, Resp:30, SpO2:92 %  02/26/21 0810, BP:(!) 102/51, Pulse:81, Resp:25, SpO2:90 %  02/26/21 0809, BP:(!) 102/51, Temp:97.1 °F (36.2 °C), Temp src:Temporal, Pulse:80, Resp:23, SpO2:90 %  02/26/21 0628, BP:119/73, Temp:97.5 °F (36.4 °C), Temp src:Temporal, Pulse:93, Resp:20, SpO2:97 %, Height:5' 8.5\" (1.74 m), Weight:181 lb (82.1 kg)     LABS:    CBC  Lab Results       Component                Value               Date/Time                  WBC 4.6                 02/12/2021 11:35 AM        HGB                      12.0 (L)            02/12/2021 11:35 AM        HCT                      36.6 (L)            02/12/2021 11:35 AM        PLT                      371                 02/12/2021 11:35 AM   RENAL  Lab Results       Component                Value               Date/Time                  NA                       137                 02/12/2021 12:17 PM        K                        5.9 (H)             02/12/2021 12:17 PM        CL                       104                 02/12/2021 12:17 PM        CO2                      23                  02/12/2021 12:17 PM        BUN                      20                  02/12/2021 12:17 PM        CREATININE               0.9                 02/12/2021 12:17 PM        GLUCOSE                  112 (H)             02/12/2021 12:17 PM   COAGS  No results found for: PROTIME, INR, APTT    Intake & Output:  @22GSOU@    Nausea & Vomiting:  No    Level of Consciousness:  Awake    Pain Assessment:  Adequate analgesia    Anesthesia Complications:  No apparent anesthetic complications    SUMMARY      Vital signs stable  OK to discharge from Stage I post anesthesia care.   Care transferred from Anesthesiology department on discharge from perioperative area

## 2021-02-26 NOTE — PROGRESS NOTES
POCT      Blood Glucose:  94      (Normal Range 70-99)    PT:      (Normal Range 9.8 - 13)    INR:      (Normal Range 0.86-1.14)

## 2021-02-26 NOTE — PROGRESS NOTES
Discharge instructions reviewed with patient/responsible adult and understanding verbalized. Discharge instructions signed and copies given. Patient to be discharged home with belongings. Reviewed with wife on phone. HOB elevated 45 degrees and taking ice chips.

## 2021-02-26 NOTE — PROGRESS NOTES
POCT      Blood Glucose: 99      (Normal Range 70-99)    PT:      (Normal Range 9.8 - 13)    INR:      (Normal Range 0.86-1.14)

## 2021-03-03 NOTE — OP NOTE
[address] [address]                                OPERATIVE REPORT    PATIENT NAME: Rhonda Jefferson                    :        1960  MED REC NO:   [id number]                          ROOM:  ACCOUNT NO:   [de-identified]                           ADMIT DATE: 2021  PROVIDER:     Asia Galindo DPM    DATE OF PROCEDURE:  2021    OUTPATIENT OPERATIVE NOTE    LOCATION:  [place] [address]. PREOPERATIVE DIAGNOSES:  1. Ulcer to fat, right foot. 2.  Diabetes with ulceration. 3.  Peripheral arterial disease. POSTOPERATIVE DIAGNOSES:  1. Ulcer to fat, right foot. 2.  Diabetes with ulceration. 3.  Peripheral arterial disease. PROCEDURE:  Debridement of ulceration with application of allogeneic  skin grafts. SURGEON:  Asia Galindo DPM    ASSISTANT:  .    ANESTHESIA:  MAC and local.  Local was 10 mL of 1:1 0.5% Marcaine plain  and 2.0% lidocaine plain. HEMOSTASIS:  Surgical dissection. ESTIMATED BLOOD LOSS:  Less than 50 mL. MATERIALS:  1.  ACell MicroMatrix. 2.  Integra collagen graft. 3.  4-0 nylon. INJECTABLES:  None. PATHOLOGY:  None. COMPLICATIONS:  None. INDICATIONS FOR THE PROCEDURE:  The patient had signs and symptoms  consistent with the above-mentioned preoperative diagnoses. Due to the  severity of his large wound, a decision was made to bring the patient to  the operating room for application of an allogeneic skin graft. Prior  to the scheduling of surgery, the potential risks, benefits, and  complications were discussed with the patient. His questions were  answered and the consent form was signed out for the procedure which was  debridement of ulceration to fat with application of allogeneic skin  graft.     OPERATIVE PROCEDURE:  The patient was brought from the preoperative area and placed on the operating room table in the supine position. A local  anesthetic block consisting of 10 mL of 1:1 0.5% Marcaine plain and 2.0%  lidocaine plain was injected proximal to the surgical site. The right  lower extremity was then scrubbed, prepped, and draped in the usual  sterile fashion. Attention was directed towards the large full-thickness ulceration at  the dorsal and anterior right foot and ankle. Using a #15 blade, this  ulceration was debrided. The debridement was excisional in nature down  to and including the subcutaneous tissue. Good bleeding was noted. The  site was then irrigated with copious amounts of sterile saline. Following debridement of the ulceration, it measured 8 cm x 10 cm x 0.2  cm. The base was granular. I then applied an ACell MicroMatrix. Over that, I applied an Integra  collagen graft. I sutured that collagen graft in with 4-0 nylon. I  applied a sterile bandage of Xeroform, 4 x 4's, Kerlix, and an Ace wrap. The patient tolerated the procedure and the anesthesia well. He was  transported from the operating room to the PACU with vital signs stable  and vascular status intact to the right foot. Following a period of postoperative monitoring, he will be discharged  home with written and oral instructions per myself.         Allyssa Olivier DPM    D: 03/03/2021 7:21:00       T: 03/03/2021 11:23:15     [email or web address]  Job#: [id number]     Doc#: [id number]    CC:

## 2021-03-08 ENCOUNTER — OFFICE VISIT (OUTPATIENT)
Dept: PRIMARY CARE CLINIC | Age: 61
End: 2021-03-08
Payer: COMMERCIAL

## 2021-03-08 VITALS
SYSTOLIC BLOOD PRESSURE: 104 MMHG | HEART RATE: 73 BPM | BODY MASS INDEX: 27.27 KG/M2 | OXYGEN SATURATION: 93 % | TEMPERATURE: 95.9 F | DIASTOLIC BLOOD PRESSURE: 63 MMHG | WEIGHT: 182 LBS

## 2021-03-08 DIAGNOSIS — Z11.59 NEED FOR HEPATITIS C SCREENING TEST: ICD-10-CM

## 2021-03-08 DIAGNOSIS — Z12.5 PROSTATE CANCER SCREENING: ICD-10-CM

## 2021-03-08 DIAGNOSIS — J30.9 ALLERGIC RHINITIS, UNSPECIFIED SEASONALITY, UNSPECIFIED TRIGGER: ICD-10-CM

## 2021-03-08 DIAGNOSIS — I10 ESSENTIAL HYPERTENSION: ICD-10-CM

## 2021-03-08 DIAGNOSIS — Z11.4 ENCOUNTER FOR SCREENING FOR HIV: ICD-10-CM

## 2021-03-08 DIAGNOSIS — R73.03 PRE-DIABETES: Primary | ICD-10-CM

## 2021-03-08 DIAGNOSIS — Z12.5 SCREENING FOR PROSTATE CANCER: ICD-10-CM

## 2021-03-08 DIAGNOSIS — E78.00 PURE HYPERCHOLESTEROLEMIA: ICD-10-CM

## 2021-03-08 DIAGNOSIS — E78.1 PURE HYPERTRIGLYCERIDEMIA: ICD-10-CM

## 2021-03-08 DIAGNOSIS — M25.50 ARTHRALGIA, UNSPECIFIED JOINT: ICD-10-CM

## 2021-03-08 DIAGNOSIS — Z72.0 TOBACCO ABUSE DISORDER: ICD-10-CM

## 2021-03-08 DIAGNOSIS — R73.03 PRE-DIABETES: ICD-10-CM

## 2021-03-08 LAB
ALT SERPL-CCNC: 11 U/L (ref 10–40)
ANION GAP SERPL CALCULATED.3IONS-SCNC: 12 MMOL/L (ref 3–16)
AST SERPL-CCNC: 15 U/L (ref 15–37)
BUN BLDV-MCNC: 16 MG/DL (ref 7–20)
CALCIUM SERPL-MCNC: 10.3 MG/DL (ref 8.3–10.6)
CHLORIDE BLD-SCNC: 101 MMOL/L (ref 99–110)
CO2: 23 MMOL/L (ref 21–32)
CREAT SERPL-MCNC: 0.9 MG/DL (ref 0.8–1.3)
GFR AFRICAN AMERICAN: >60
GFR NON-AFRICAN AMERICAN: >60
GLUCOSE BLD-MCNC: 105 MG/DL (ref 70–99)
HEPATITIS C ANTIBODY INTERPRETATION: NORMAL
POTASSIUM SERPL-SCNC: 5.5 MMOL/L (ref 3.5–5.1)
PROSTATE SPECIFIC ANTIGEN: 0.33 NG/ML (ref 0–4)
SODIUM BLD-SCNC: 136 MMOL/L (ref 136–145)

## 2021-03-08 PROCEDURE — 99214 OFFICE O/P EST MOD 30 MIN: CPT | Performed by: FAMILY MEDICINE

## 2021-03-08 RX ORDER — NICOTINE 21 MG/24HR
1 PATCH, TRANSDERMAL 24 HOURS TRANSDERMAL EVERY 24 HOURS
Qty: 30 PATCH | Refills: 3 | Status: ON HOLD | OUTPATIENT
Start: 2021-03-08 | End: 2022-01-04 | Stop reason: SDUPTHER

## 2021-03-08 RX ORDER — ACETAMINOPHEN 500 MG
500 TABLET ORAL 4 TIMES DAILY PRN
Qty: 360 TABLET | Refills: 1 | Status: SHIPPED | OUTPATIENT
Start: 2021-03-08 | End: 2021-03-20 | Stop reason: SDUPTHER

## 2021-03-08 RX ORDER — FLUTICASONE PROPIONATE 50 MCG
1 SPRAY, SUSPENSION (ML) NASAL DAILY
Qty: 1 BOTTLE | Refills: 3 | Status: SHIPPED | OUTPATIENT
Start: 2021-03-08 | End: 2022-01-06 | Stop reason: SDUPTHER

## 2021-03-08 NOTE — PROGRESS NOTES
61 y/o male known hypertension, hyperlipidemia,diabetices type 2, and  PAD with non healing ulcer of the R foot/ankle area. Since his last visit  He has had   skin graft to the ulcer site 02/15/2021 per Dr Nick Mccurdy. He is pre diabetic on metformin  No polyuria, polydipsia, weight changes or blurred vision      He is smoker cigarettes  22 pack years  Cannot tolerate chantix due to side effects  Hallucinations, bad dreams    Hypertension  No chest pain, headache, sob, leg edema, stroke, kidney disease     Hyperlipidemia  No muscle aches or muscle weakness or cramps since last visit. Allergic Rhinitis: Lesvia Garibay is here for evaluation of possible allergic rhinitis and conjunctivitis. Patient's symptoms include clear rhinorrhea, cough, itchy nose, itchy palate, sneezing and watery eyes. These symptoms are perennial with seasonal exacerbation. Current triggers include exposure to no known precipitant. The patient has been suffering from these symptoms for approximately 3 years. The patient has tried prescription nasal sprays with good relief of symptoms. Immunotherapy has never been tried. The patient has never had nasal polyps. The patient has no history of asthma. The patient does not suffer from frequent sinopulmonary infections. The patient has not had sinus surgery in the past. The patient has no history of eczema.       Physical Exam  Constitutional:       General: He is not in acute distress. Appearance: He is well-developed. He is not diaphoretic. HENT:      Head: Normocephalic and atraumatic. Right Ear: External ear normal.      Left Ear: External ear normal.      Nose: Nose normal.      Mouth/Throat:      Pharynx: No oropharyngeal exudate. Eyes:      General: No scleral icterus. Right eye: No discharge. Left eye: No discharge.       Conjunctiva/sclera: Conjunctivae normal.      Pupils: Pupils are equal, round, and reactive to Tobacco abuse disorder  The patient is currently a smoker. Risks of smoking and second hand smoking risks discussed  With patient. Products available for smoking cessation have been discussed with patient. Cannot tolerate chantix  Trial of  - nicotine (NICODERM CQ) 21 MG/24HR; Place 1 patch onto the skin every 24 hours  Dispense: 30 patch; Refill: 3    3. Pure hypertriglyceridemia  On statin  Goal LDL < 100  HDL > 45  ck  - Lipid, Fasting; Future  - AST; Future  - ALT; Future    4. Essential hypertension  bp is at goal  Ck labs  - Lipid, Fasting; Future  - Basic Metabolic Panel; Future    5. Screening for prostate cancer    - PSA screening; Future    6. Encounter for screening for HIV  - HIV Screen; Future    7. Need for hepatitis C screening test    - HEPATITIS C ANTIBODY;  Future

## 2021-03-09 LAB
CHOLESTEROL, TOTAL: 134 MG/DL (ref 0–199)
HDLC SERPL-MCNC: 46 MG/DL (ref 40–60)
HIV AG/AB: NORMAL
HIV ANTIGEN: NORMAL
HIV-1 ANTIBODY: NORMAL
HIV-2 AB: NORMAL
LDL CHOLESTEROL CALCULATED: 65 MG/DL
TRIGL SERPL-MCNC: 117 MG/DL (ref 0–150)
VLDLC SERPL CALC-MCNC: 23 MG/DL

## 2021-03-15 ENCOUNTER — IMMUNIZATION (OUTPATIENT)
Dept: PRIMARY CARE CLINIC | Age: 61
End: 2021-03-15
Payer: COMMERCIAL

## 2021-03-15 PROCEDURE — 91300 COVID-19, PFIZER VACCINE 30MCG/0.3ML DOSE: CPT | Performed by: FAMILY MEDICINE

## 2021-03-15 PROCEDURE — 0001A COVID-19, PFIZER VACCINE 30MCG/0.3ML DOSE: CPT | Performed by: FAMILY MEDICINE

## 2021-03-20 DIAGNOSIS — E61.1 IRON DEFICIENCY: ICD-10-CM

## 2021-03-20 DIAGNOSIS — E78.00 PURE HYPERCHOLESTEROLEMIA: ICD-10-CM

## 2021-03-20 DIAGNOSIS — I73.9 PVD (PERIPHERAL VASCULAR DISEASE) (HCC): ICD-10-CM

## 2021-03-20 DIAGNOSIS — M25.50 ARTHRALGIA, UNSPECIFIED JOINT: ICD-10-CM

## 2021-03-20 DIAGNOSIS — I10 ESSENTIAL HYPERTENSION: ICD-10-CM

## 2021-03-22 RX ORDER — ATORVASTATIN CALCIUM 80 MG/1
80 TABLET, FILM COATED ORAL DAILY
Qty: 90 TABLET | Refills: 1 | Status: SHIPPED | OUTPATIENT
Start: 2021-03-22 | End: 2021-08-15 | Stop reason: SDUPTHER

## 2021-03-22 RX ORDER — ACETAMINOPHEN 500 MG
500 TABLET ORAL 4 TIMES DAILY PRN
Qty: 360 TABLET | Refills: 1 | Status: SHIPPED | OUTPATIENT
Start: 2021-03-22 | End: 2022-01-06 | Stop reason: SDUPTHER

## 2021-03-22 RX ORDER — FERROUS SULFATE 325(65) MG
325 TABLET ORAL 2 TIMES DAILY
Qty: 180 TABLET | Refills: 1 | Status: SHIPPED | OUTPATIENT
Start: 2021-03-22 | End: 2021-08-15 | Stop reason: SDUPTHER

## 2021-03-22 RX ORDER — LISINOPRIL 10 MG/1
10 TABLET ORAL DAILY
Qty: 90 TABLET | Refills: 1 | Status: SHIPPED | OUTPATIENT
Start: 2021-03-22 | End: 2021-08-15 | Stop reason: SDUPTHER

## 2021-03-22 NOTE — TELEPHONE ENCOUNTER
Medication:   Requested Prescriptions     Pending Prescriptions Disp Refills    apixaban (ELIQUIS) 5 MG TABS tablet 60 tablet 3    atorvastatin (LIPITOR) 80 MG tablet 90 tablet 1     Sig: Take 1 tablet by mouth daily Cancel atorvastatin 20 mg a day    lisinopril (PRINIVIL;ZESTRIL) 10 MG tablet 90 tablet 1     Sig: Take 1 tablet by mouth daily    ferrous sulfate (IRON 325) 325 (65 Fe) MG tablet 180 tablet 1     Sig: Take 1 tablet by mouth 2 times daily    acetaminophen (TYLENOL) 500 MG tablet 360 tablet 1     Sig: Take 1 tablet by mouth 4 times daily as needed for Pain        Last Filled:      Patient Phone Number: [phone] (home)     Last appt: 3/8/2021   Next appt: Visit date not found    Last OARRS: No flowsheet data found.

## 2021-04-12 ENCOUNTER — IMMUNIZATION (OUTPATIENT)
Dept: PRIMARY CARE CLINIC | Age: 61
End: 2021-04-12
Payer: COMMERCIAL

## 2021-04-12 PROCEDURE — 91300 COVID-19, PFIZER VACCINE 30MCG/0.3ML DOSE: CPT | Performed by: FAMILY MEDICINE

## 2021-04-12 PROCEDURE — 0002A COVID-19, PFIZER VACCINE 30MCG/0.3ML DOSE: CPT | Performed by: FAMILY MEDICINE

## 2021-04-15 ENCOUNTER — TELEPHONE (OUTPATIENT)
Dept: PRIMARY CARE CLINIC | Age: 61
End: 2021-04-15

## 2021-04-15 NOTE — TELEPHONE ENCOUNTER
appointment Needs pre op physical for surgery  04/21/2021 (skin graft to R. ankle at [facility] with Dr. Gavino Hurtado).

## 2021-04-16 ENCOUNTER — OFFICE VISIT (OUTPATIENT)
Dept: PRIMARY CARE CLINIC | Age: 61
End: 2021-04-16
Payer: COMMERCIAL

## 2021-04-16 VITALS
BODY MASS INDEX: 26.67 KG/M2 | TEMPERATURE: 97.2 F | WEIGHT: 176 LBS | HEIGHT: 68 IN | HEART RATE: 73 BPM | SYSTOLIC BLOOD PRESSURE: 102 MMHG | DIASTOLIC BLOOD PRESSURE: 64 MMHG

## 2021-04-16 DIAGNOSIS — Z01.818 PRE-OP EXAM: ICD-10-CM

## 2021-04-16 DIAGNOSIS — L97.519 ULCER OF RIGHT FOOT, UNSPECIFIED ULCER STAGE (HCC): Primary | ICD-10-CM

## 2021-04-16 PROCEDURE — 99214 OFFICE O/P EST MOD 30 MIN: CPT | Performed by: NURSE PRACTITIONER

## 2021-04-16 NOTE — PROGRESS NOTES
Preoperative Consultation      Monaca Hema  YOB: 1960    Date of Service:  4/16/2021    Vitals:    04/16/21 1445   BP: 102/64   Pulse: 73   Temp: 97.2 °F (36.2 °C)   TempSrc: Temporal   Weight: 176 lb (79.8 kg)   Height: 5' 8\" (1.727 m)      Wt Readings from Last 2 Encounters:   04/16/21 176 lb (79.8 kg)   03/08/21 182 lb (82.6 kg)     BP Readings from Last 3 Encounters:   04/16/21 102/64   03/08/21 104/63   02/26/21 (!) 87/48        Chief Complaint   Patient presents with   Sonai Zepeda Pre-op Exam     DEBRIDEMENT OF RIGHT FOOT WOUND WITH APPLICATION OF INTEGRA GRAFT WITH DR. Dino Painter ON 4.21.21 AT [address] [place] [address]. [phone], [phone]     Allergies   Allergen Reactions    Chantix [Varenicline]      Hallucinations       Outpatient Medications Marked as Taking for the 4/16/21 encounter (Office Visit) with HOLGER Crabtree   Medication Sig Dispense Refill    NIFEdipine (ADALAT CC) 60 MG extended release tablet Take 1 tablet by mouth daily 90 tablet 2    apixaban (ELIQUIS) 5 MG TABS tablet Take 1 tablet by mouth 2 times daily TAKE 1 TABLET BY MOUTH TWO TIMES A DAY 60 tablet 5    atorvastatin (LIPITOR) 80 MG tablet Take 1 tablet by mouth daily Cancel atorvastatin 20 mg a day 90 tablet 1    lisinopril (PRINIVIL;ZESTRIL) 10 MG tablet Take 1 tablet by mouth daily 90 tablet 1    ferrous sulfate (IRON 325) 325 (65 Fe) MG tablet Take 1 tablet by mouth 2 times daily 180 tablet 1    acetaminophen (TYLENOL) 500 MG tablet Take 1 tablet by mouth 4 times daily as needed for Pain 360 tablet 1    nicotine (NICODERM CQ) 21 MG/24HR Place 1 patch onto the skin every 24 hours 30 patch 3    fluticasone (FLONASE) 50 MCG/ACT nasal spray 1 spray by Nasal route daily 1 Bottle 3    azithromycin (ZITHROMAX) 250 MG tablet Take 2 tabs (500 mg) on Day 1, and take 1 tab (250 mg) on days 2 through 5. 1 packet 0    carvedilol (COREG) 25 MG tablet Take 25 mg by mouth 2 times daily (with meals)      becaplermin (REGRANEX) 0.01 % gel Apply 1 Applicatorful topically daily      Vitamins/Minerals TABS Take 1 tablet by mouth daily      aspirin 81 MG chewable tablet CHEW AND SWALLOW 1 TABLET BY MOUTH ONE TIME A  tablet 5    tadalafil (CIALIS) 5 MG tablet TAKE 1 TABLET BY MOUTH ONE TIME A DAY AS NEEDED FOR ERECTILE DYSFUNCTION 30 tablet 5    metFORMIN (GLUCOPHAGE) 500 MG tablet Take 1 tablet by mouth 2 times daily (with meals) 180 tablet 1       This patient presents to the office today for a preoperative consultation at the request of surgeon, Dr. Eli Beatty, who plans on performing DEBRIDEMENT OF RIGHT FOOT WOUND WITH APPLICATION OF INTEGRA GRAFT on April 21 at [facility]. Has h/o PAD with non heeling right foot wound. He had debridement 2/2021 as well. Planned anesthesia: MAC   Known anesthesia problems: None   Bleeding risk: No recent or remote history of abnormal bleeding  Patient objection to receiving blood products: No    S/p Fem Pop bypass 2000 left leg and Fem Pop bypass  12/2020 right leg    K 5.5 on 3/8, outside labs from 4/9 showed K 4.7. There is no problem list on file for this patient.       Past Medical History:   Diagnosis Date    Diabetes mellitus ([facility] [address]     Fibromyalgia     Hyperlipidemia     Hypertension      Past Surgical History:   Procedure Laterality Date    APPENDECTOMY  2005    COLONOSCOPY  2016    FEMORAL BYPASS Left 02/26/2020    femoral posterior tibial bypass    FEMORAL-TIBIAL BYPASS GRAFT Right 12/11/2020    with femoral endarterectomy and patch angioplasty    FOOT DEBRIDEMENT Left 09/03/2020    FOOT DEBRIDEMENT Right 2/26/2021    DEBRIDEMENT OF RIGHT FOOT WOUND WITH GRAFT APPLICATION performed by Abena Amanda DPM at [facility] Right 12/08/2020    stent leg for PVD     Family History   Problem Relation Age of Onset    Cancer Mother     Stroke Father      Social History     Socioeconomic History    Marital status:      Spouse name: Not on file    Number of children: Not on file    Years of education: Not on file    Highest education level: Not on file   Occupational History    Not on file   Social Needs    Financial resource strain: Not hard at all    Food insecurity     Worry: Never true     Inability: Never true    Transportation needs     Medical: No     Non-medical: No   Tobacco Use    Smoking status: Current Every Day Smoker     Packs/day: 0.50     Years: 20.00     Pack years: 10.00     Types: Cigarettes     Start date: 12/28/1977    Smokeless tobacco: Never Used    Tobacco comment: 3 cigs a day/ quitting    Substance and Sexual Activity    Alcohol use: Yes     Alcohol/week: 6.0 standard drinks     Types: 6 Cans of beer per week    Drug use: Never    Sexual activity: Yes     Partners: Female   Lifestyle    Physical activity     Days per week: Not on file     Minutes per session: Not on file    Stress: Not on file   Relationships    Social connections     Talks on phone: Not on file     Gets together: Not on file     Attends Hinduism service: Not on file     Active member of club or organization: Not on file     Attends meetings of clubs or organizations: Not on file     Relationship status: Not on file    Intimate partner violence     Fear of current or ex partner: Not on file     Emotionally abused: Not on file     Physically abused: Not on file     Forced sexual activity: Not on file   Other Topics Concern    Not on file   Social History Narrative    Not on file       Review of Systems  A comprehensive review of systems was negative except for: right foot wound, pain  Denies SOB, chest pain. Physical Exam   Constitutional: He is oriented to person, place, and time. He appears well-developed and well-nourished. No distress. HENT:   Head: Normocephalic and atraumatic. Eyes: Conjunctivae and EOM are normal. Pupils are equal, round, and reactive to light. Neck: Trachea normal and normal range of motion.  Neck supple. No JVD present. Carotid bruit is not present. No mass and no thyromegaly present. Cardiovascular: Normal rate, regular rhythm, normal heart sounds and intact distal pulses. Exam reveals no gallop and no friction rub. No murmur heard. Pulmonary/Chest: Effort normal and breath sounds normal. No respiratory distress. He has no wheezes. He has no rales. Abdominal: Soft. bowel sounds are normal. He exhibits no distension and no mass. Musculoskeletal:Right foot orthopedic shoe, dressing in place  Neurological: He is alert and oriented to person, place, and time. He has normal strength. No cranial nerve deficit or sensory deficit. Coordination and gait normal.   Skin: Skin is warm and dry. No rash noted. No erythema. Psychiatric: He has a normal mood and affect. His behavior is normal.     EKG Interpretation:  Had EKG 2/19/2021 at outside hospital without acute changes. Lab Review: outside labs from 4/9 including renal and CBC reviewed. Assessment:       62 y.o. patient with planned surgery as above. Known risk factors for perioperative complications: Peripheral vascular disease- PAD, tobacco abuse, HTN, diabetes  Current medications which may produce withdrawal symptoms if withheld perioperatively: none      Plan:     1. Preoperative workup as follows: reviewed labs - renal and CBC from outside hospital 4/9  2. Change in medication regimen before surgery: stop ASA and Eliquis 2 days pre op as directed by surgeon. No lisinopril day off surgery - last dose 24 hours before surgery.    3. Prophylaxis for cardiac events with perioperative beta-blockers: Currently taking  carvedilol  ACC/AHA indications for pre-operative beta-blocker use:    · Vascular surgery with history of postitive stress test  · Intermediate or high risk surgery with history of CAD   · Intermediate or high risk surgery with multiple clinical predictors of CAD- 2 of the following: history of compensated or prior heart failure, history of cerebrovascular disease, DM, or renal insufficiency    Routine administration of higher-dose, long-acting metoprolol in beta-blocker-naïve patients on the day of surgery, and in the absence of dose titration is associated with an overall increase in mortality. Beta-blockers should be started days to weeks prior to surgery and titrated to pulse < 70.  4. Deep vein thrombosis prophylaxis: regimen to be chosen by surgical team  5. No contraindications to planned surgery    Pre-Operative Risk assessment using 2014 ACC/AHA guidelines     Emergent procedure No  Active Cardiac Condition No (decompensated HF, Arrhythmia, MI <3 weeks, severe valve disease)  Risk Level of Procedure Intermediate Risk (intraperitoneal, intrathoracic, HENT, orthopedic, or carotid endarterectomy, etc.)  Revised Cardiac Risk Index Risk factors: None  Measurement of Exercise Tolerance before Surgery >4 Yes    According to the 2014 ACC/AHA pre-operative risk assessment guidelines Nam Perales is a low risk for major cardiac complications during a intermediate risk procedure and may continue as planned. Specific medication recommendations are listed below. Medications recommended to continue should be taken with a sip of water even when NPO.      Further recommendations from consultants: None

## 2021-04-16 NOTE — PATIENT INSTRUCTIONS
stop aspirin and Eliquis 2 days pre op as directed by surgeon. No lisinopril day off surgery - last dose 24 hours before surgery.    Make sure to take carvedilol day of surgery

## 2021-05-04 ENCOUNTER — TELEPHONE (OUTPATIENT)
Dept: PRIMARY CARE CLINIC | Age: 61
End: 2021-05-04

## 2021-05-05 ENCOUNTER — TELEPHONE (OUTPATIENT)
Dept: PRIMARY CARE CLINIC | Age: 61
End: 2021-05-05

## 2021-05-06 ENCOUNTER — TELEPHONE (OUTPATIENT)
Dept: PRIMARY CARE CLINIC | Age: 61
End: 2021-05-06

## 2021-05-06 NOTE — TELEPHONE ENCOUNTER
Patient would like a medication sent to pharmacy for warfarin instead of Eliquis send to [facility]'s pharmacy

## 2021-05-10 ENCOUNTER — TELEPHONE (OUTPATIENT)
Dept: PRIMARY CARE CLINIC | Age: 61
End: 2021-05-10

## 2021-05-10 DIAGNOSIS — I73.9 PAD (PERIPHERAL ARTERY DISEASE) (HCC): ICD-10-CM

## 2021-05-10 DIAGNOSIS — I73.9 PAD (PERIPHERAL ARTERY DISEASE) (HCC): Primary | ICD-10-CM

## 2021-05-10 LAB
ANION GAP SERPL CALCULATED.3IONS-SCNC: 14 MMOL/L (ref 3–16)
BUN BLDV-MCNC: 21 MG/DL (ref 7–20)
CALCIUM SERPL-MCNC: 9.4 MG/DL (ref 8.3–10.6)
CHLORIDE BLD-SCNC: 104 MMOL/L (ref 99–110)
CO2: 24 MMOL/L (ref 21–32)
CREAT SERPL-MCNC: 1.1 MG/DL (ref 0.8–1.3)
GFR AFRICAN AMERICAN: >60
GFR NON-AFRICAN AMERICAN: >60
GLUCOSE BLD-MCNC: 88 MG/DL (ref 70–99)
HCT VFR BLD CALC: 37.1 % (ref 40.5–52.5)
HEMOGLOBIN: 12.4 G/DL (ref 13.5–17.5)
INR BLD: 1.32 (ref 0.86–1.14)
MCH RBC QN AUTO: 29.2 PG (ref 26–34)
MCHC RBC AUTO-ENTMCNC: 33.4 G/DL (ref 31–36)
MCV RBC AUTO: 87.4 FL (ref 80–100)
PDW BLD-RTO: 16.5 % (ref 12.4–15.4)
PLATELET # BLD: 322 K/UL (ref 135–450)
PMV BLD AUTO: 7.4 FL (ref 5–10.5)
POTASSIUM SERPL-SCNC: 4.6 MMOL/L (ref 3.5–5.1)
PROTHROMBIN TIME: 15.3 SEC (ref 10–13.2)
RBC # BLD: 4.25 M/UL (ref 4.2–5.9)
SODIUM BLD-SCNC: 142 MMOL/L (ref 136–145)
WBC # BLD: 5.3 K/UL (ref 4–11)

## 2021-05-10 NOTE — TELEPHONE ENCOUNTER
Switched from eliquis to warfarin    Tell pt he needs to come to lab every 5 days time 4 after initial visit today    Should make appointment to see me in office also.   He will get call from anticogulation clinic    Who will then follow him

## 2021-05-10 NOTE — TELEPHONE ENCOUNTER
Has pad  Needs to switch  From eliquis to coumadin  Due to cost  Ref antico clinic  Start for coumadin    Monitor inr

## 2021-05-11 DIAGNOSIS — I73.9 PVD (PERIPHERAL VASCULAR DISEASE) (HCC): ICD-10-CM

## 2021-05-11 RX ORDER — WARFARIN SODIUM 5 MG/1
5 TABLET ORAL DAILY
Qty: 30 TABLET | Refills: 3 | Status: SHIPPED | OUTPATIENT
Start: 2021-05-11 | End: 2021-05-28 | Stop reason: SDUPTHER

## 2021-05-17 ENCOUNTER — TELEPHONE (OUTPATIENT)
Dept: PHARMACY | Age: 61
End: 2021-05-17

## 2021-05-17 PROBLEM — I73.9 PERIPHERAL ARTERY DISEASE (HCC): Status: ACTIVE | Noted: 2021-05-17

## 2021-05-17 NOTE — TELEPHONE ENCOUNTER
Spoke with Jasson William and scheduled his initial visit for warfarin management for 5/19/21. He reports he started warfarin 5/15/21. New start to warfarin, transitioning from Eliquis.     Ashanti Patterson, PharmD, [address] [place] [address]  Anticoagulation Service  [phone]

## 2021-05-20 ENCOUNTER — ANTI-COAG VISIT (OUTPATIENT)
Dept: PHARMACY | Age: 61
End: 2021-05-20
Payer: COMMERCIAL

## 2021-05-20 DIAGNOSIS — I73.9 PERIPHERAL ARTERY DISEASE (HCC): Primary | ICD-10-CM

## 2021-05-20 LAB — INTERNATIONAL NORMALIZATION RATIO, POC: 1.3

## 2021-05-20 PROCEDURE — 99211 OFF/OP EST MAY X REQ PHY/QHP: CPT

## 2021-05-20 PROCEDURE — 85610 PROTHROMBIN TIME: CPT

## 2021-05-20 NOTE — PROGRESS NOTES
Mr. Radha Waters is a 62 y.o.  male. Mr. Radha Waters had an INR test today. Results were reviewed and appropriate warfarin management was completed. THIS VISIT WAS PERFORMED AS: AN IN PERSON VISIT. PROTOCOLS WERE FOLLOWED WITH PRECAUTIONS TO REDUCE THE SPREAD OF COVID-19. Patient verifies current dosing regimen: Yes     Warfarin medication reviewed and updated on the patient 's home medication list: Yes   All other medications reviewed and updated on the patient 's home medication list: Yes     Lab Results   Component Value Date    INR 1.3 2021    INR 1.32 (H) 05/10/2021           Anticoagulation Summary  As of 2021    INR goal:  2.0-3.0   TTR:  --   INR used for dosin.3 (2021)   Warfarin maintenance plan:  7.5 mg (5 mg x 1.5) every day   Weekly warfarin total:  52.5 mg   Plan last modified:  Sharyl Paget, [facility] (2021)   Next INR check:     Target end date: Indefinite    Indications    Peripheral artery disease ([facility] [address] [I73.9]             Anticoagulation Episode Summary     INR check location:      Preferred lab:      Send INR reminders to:  [facility]    Comments:  EPIC      Anticoagulation Care Providers     Provider Role Specialty Phone number    Ale Blanco MD Referring Family Medicine [phone]          Warfarin assessment / plan:   Last dose of eliquis was 3 days ago. will increase daily dose by 2.5mg  Qd and see in 4 days    Description    Take 10mg today then  NEW DOSE: 7.5mg warfarin daily. Patient has no eliquis left    Call [phone] with signs or symptoms of bleeding or ANY medication changes (including over-the-counter medications or herbal supplements). If significant bleeding occurs please seek immediate medical attention. Keep the number of servings of vitamin K containing foods (dark green, leafy vegetables) the same each week. Please call if this changes. Limit alcohol intake. Please call if this changes. Immunization History   Administered Date(s) Administered    COVID-19, Pfizer, PF, 30mcg/0.3mL 03/15/2021, 04/12/2021         Reviewed AVS with patient / caregiver.       CLINICAL PHARMACY CONSULT: MED RECONCILIATION/REVIEW ADDENDUM    For Pharmacy [id number] Tracking Only     Intervention Detail: Dose Adjustment: 3: reason: Therapy Optimization   Total # of Interventions Recommended: 3   Total # of Interventions Accepted: 3   Time Spent (min): 15

## 2021-05-24 ENCOUNTER — APPOINTMENT (OUTPATIENT)
Dept: PHARMACY | Age: 61
End: 2021-05-24
Payer: COMMERCIAL

## 2021-05-24 ENCOUNTER — ANTI-COAG VISIT (OUTPATIENT)
Dept: PHARMACY | Age: 61
End: 2021-05-24
Payer: COMMERCIAL

## 2021-05-24 DIAGNOSIS — I73.9 PERIPHERAL ARTERY DISEASE (HCC): Primary | ICD-10-CM

## 2021-05-24 LAB — INTERNATIONAL NORMALIZATION RATIO, POC: 2.1

## 2021-05-24 PROCEDURE — 99211 OFF/OP EST MAY X REQ PHY/QHP: CPT

## 2021-05-24 PROCEDURE — 85610 PROTHROMBIN TIME: CPT

## 2021-05-24 NOTE — PROGRESS NOTES
Mr. Néstor Rabago is a 62 y.o.  male. Mr. Néstor Rabago had an INR test today. Results were reviewed and appropriate warfarin management was completed. THIS VISIT WAS PERFORMED AS: AN IN PERSON VISIT. PROTOCOLS WERE FOLLOWED WITH PRECAUTIONS TO REDUCE THE SPREAD OF COVID-19. Patient verifies current dosing regimen: Yes     Warfarin medication reviewed and updated on the patient 's home medication list: Yes   All other medications reviewed and updated on the patient 's home medication list: No changes reported     Lab Results   Component Value Date    INR 2.1 2021    INR 1.3 2021    INR 1.32 (H) 05/10/2021       Patient Findings     Negatives:  Signs/symptoms of thrombosis, Signs/symptoms of bleeding, Change in health, Missed doses, Extra doses, Change in medications, Change in diet/appetite, Bruising          Anticoagulation Summary  As of 2021    INR goal:  2.0-3.0   TTR:  --   INR used for dosin.1 (2021)   Warfarin maintenance plan:  5 mg (5 mg x 1) every Tue, Thu; 7.5 mg (5 mg x 1.5) all other days   Weekly warfarin total:  47.5 mg   Plan last modified:  Merced Patrick [facility] (2021)   Next INR check:  2021   Target end date: Indefinite    Indications    Peripheral artery disease ([facility] [address] [I73.9]             Anticoagulation Episode Summary     INR check location:      Preferred lab:      Send INR reminders to:  [facility]    Comments:  EPIC      Anticoagulation Care Providers     Provider Role Specialty Phone number    Laurel Boo MD Referring Family Medicine [phone]          Warfarin assessment / plan:   Patient's INR was in range today at 2.1. He just started warfarin 5/15/21 and we are still adjusting his dose. Over the past 7 days he received 47.5mg. We will decrease the weekly warfarin dose from our plan at his last visit to equal this 47.5mg per week. I expect his INR to continue to climb. Will follow up in 4 days.

## 2021-05-28 ENCOUNTER — ANTI-COAG VISIT (OUTPATIENT)
Dept: PHARMACY | Age: 61
End: 2021-05-28
Payer: COMMERCIAL

## 2021-05-28 DIAGNOSIS — I73.9 PERIPHERAL ARTERY DISEASE (HCC): Primary | ICD-10-CM

## 2021-05-28 LAB — INTERNATIONAL NORMALIZATION RATIO, POC: 2

## 2021-05-28 PROCEDURE — 99211 OFF/OP EST MAY X REQ PHY/QHP: CPT

## 2021-05-28 PROCEDURE — 85610 PROTHROMBIN TIME: CPT

## 2021-05-28 RX ORDER — WARFARIN SODIUM 5 MG/1
7.5 TABLET ORAL DAILY
Qty: 45 TABLET | Refills: 1 | Status: SHIPPED | OUTPATIENT
Start: 2021-05-28 | End: 2021-08-08

## 2021-05-28 NOTE — PROGRESS NOTES
Mr. Ghislaine Winslow is a 62 y.o.  male. Mr. Ghislaine Winslow had an INR test today. Results were reviewed and appropriate warfarin management was completed. THIS VISIT WAS PERFORMED AS: An in person visit. Protocols were followed with precautions to reduce the spread of COVID-19. Patient verifies current dosing regimen: Yes     Warfarin medication reviewed and updated on the patient 's home medication list: Yes   All other medications reviewed and updated on the patient 's home medication list: No changes reported     Lab Results   Component Value Date    INR 2.0 2021    INR 2.1 2021    INR 1.3 2021       Patient Findings     Negatives:  Signs/symptoms of thrombosis, Signs/symptoms of bleeding, Change in health, Missed doses, Change in medications, Change in diet/appetite, Bruising          Anticoagulation Summary  As of 2021    INR goal:  2.0-3.0   TTR:  100.0 % (1 d)   INR used for dosin.0 (2021)   Warfarin maintenance plan:  7.5 mg (5 mg x 1.5) every day   Weekly warfarin total:  52.5 mg   Plan last modified:  Lucian Miller RPH (2021)   Next INR check:  2021   Target end date: Indefinite    Indications    [facility] [I73.9]             Anticoagulation Episode Summary     INR check location:      Preferred lab:      Send INR reminders to:  [facility]    Comments:  EPIC      Anticoagulation Care Providers     Provider Role Specialty Phone number    Courtney Ladd MD Referring Family Medicine [phone]          Warfarin assessment / plan: This is Mr. Gibson's 3rd visit. He just started warfarin 5/15/21. At his last visit his INR was 2.1 at a weekly dose of 47.5mg. We kept him at that same weekly dose and I expected his INR to climb slightly. His INR pretty much stayed the same. We will increased the warfarin dose by 10.5%. Patient reports no changes. Will follow up in 1 week.     Description    NEW DOSE: Take Warfarin 7.5 mg (1 &1/2 tablets) daily     Call [phone] with signs or symptoms of bleeding or ANY medication changes (including over-the-counter medications or herbal supplements). If significant bleeding occurs please seek immediate medical attention. Keep the number of servings of vitamin K containing foods (dark green, leafy vegetables) the same each week. Please call if this changes. Limit alcohol intake. Please call if this changes. Immunization History   Administered Date(s) Administered    COVID-19, Pfizer, PF, 30mcg/0.3mL 03/15/2021, 04/12/2021         Reviewed AVS with patient / caregiver.       CLINICAL PHARMACY CONSULT: MED RECONCILIATION/REVIEW ADDENDUM    For Pharmacy Admin Tracking Only     Intervention Detail: Dose Adjustment: 1: reason: Therapy Optimization and Refill(s) Provided   Total # of Interventions Recommended: 1   Total # of Interventions Accepted: 1   Time Spent (min): 15

## 2021-06-04 ENCOUNTER — ANTI-COAG VISIT (OUTPATIENT)
Dept: PHARMACY | Age: 61
End: 2021-06-04

## 2021-06-04 DIAGNOSIS — I73.9 PERIPHERAL ARTERY DISEASE (HCC): Primary | ICD-10-CM

## 2021-06-04 LAB — INTERNATIONAL NORMALIZATION RATIO, POC: 2.7

## 2021-06-04 PROCEDURE — 85610 PROTHROMBIN TIME: CPT

## 2021-06-04 PROCEDURE — 99211 OFF/OP EST MAY X REQ PHY/QHP: CPT

## 2021-06-04 NOTE — PROGRESS NOTES
bleeding or ANY medication changes (including over-the-counter medications or herbal supplements). If significant bleeding occurs please seek immediate medical attention. Keep the number of servings of vitamin K containing foods (dark green, leafy vegetables) the same each week. Please call if this changes. Limit alcohol intake. Please call if this changes. Immunization History   Administered Date(s) Administered    COVID-19, Pfizer, PF, 30mcg/0.3mL 03/15/2021, 04/12/2021         Reviewed AVS with patient / caregiver.       CLINICAL PHARMACY CONSULT: MED RECONCILIATION/REVIEW ADDENDUM    For Pharmacy Admin Tracking Only     Intervention Detail:    Total # of Interventions Recommended: 0   Total # of Interventions Accepted: 0   Time Spent (min): 15

## 2021-06-25 ENCOUNTER — ANTI-COAG VISIT (OUTPATIENT)
Dept: PHARMACY | Age: 61
End: 2021-06-25

## 2021-06-25 DIAGNOSIS — I73.9 PERIPHERAL ARTERY DISEASE (HCC): Primary | ICD-10-CM

## 2021-06-25 LAB — INTERNATIONAL NORMALIZATION RATIO, POC: 3.2

## 2021-06-25 PROCEDURE — 85610 PROTHROMBIN TIME: CPT

## 2021-06-25 PROCEDURE — 99211 OFF/OP EST MAY X REQ PHY/QHP: CPT

## 2021-06-25 NOTE — PROGRESS NOTES
Mr. Ciara Epstein is a 62 y.o.  male. Mr. Ciara Epstein had an INR test today. Results were reviewed and appropriate warfarin management was completed. THIS VISIT WAS PERFORMED AS: An in person visit. Protocols were followed with precautions to reduce the spread of COVID-19. Patient verifies current dosing regimen: Yes     Warfarin medication reviewed and updated on the patient 's home medication list: Yes   All other medications reviewed and updated on the patient 's home medication list: No: no changes     Lab Results   Component Value Date    INR 3.2 06/25/2021    INR 2.7 06/04/2021    INR 2.0 05/28/2021       Patient Findings     Negatives:  Signs/symptoms of bleeding, Change in medications, Change in diet/appetite, Bruising          Anticoagulation Summary  As of 6/25/2021    INR goal:  2.0-3.0   TTR:  71.5 % (4.1 wk)   INR used for dosing:  3.2 (6/25/2021)   Warfarin maintenance plan:  7.5 mg (5 mg x 1.5) every day   Weekly warfarin total:  52.5 mg   Plan last modified:  Leopoldo Wheatley RPH (5/28/2021)   Next INR check:  7/23/2021   Priority:  Maintenance   Target end date: Indefinite    Indications    Peripheral artery disease ([facility] [address] [I73.9]             Anticoagulation Episode Summary     INR check location:  Anticoagulation Clinic    Preferred lab:      Send INR reminders to:  [facility]AFF    Comments:  EPIC      Anticoagulation Care Providers     Provider Role Specialty Phone number    Tayla Kumar MD Referring Family Medicine [phone]          Warfarin assessment / plan:     Supratherapeutic INR assessment:   Denies medication changes including antibiotics, steroids or OTCs  Denies decrease in vitamin K intake such as dietary intake or stopping a multi vitamin, ENSURE, or BOOST. He does report that his diet may change from time to time, but generally the same.    Denies signs and symptoms of bleeding/bruising      Description    Take 2.5mg (half tablet) tomorrow ONLY  THEN CONTINUE: Take Warfarin 7.5 mg (1 &1/2 tablets) daily     Call [phone] with signs or symptoms of bleeding or ANY medication changes (including over-the-counter medications or herbal supplements). If significant bleeding occurs please seek immediate medical attention. Keep the number of servings of vitamin K containing foods (dark green, leafy vegetables) the same each week. Dark green vegetable 2-3 times a week and a mixed green salad ~ 3 times a week. Please call if this changes. Limit alcohol intake. Please call if this changes. Immunization History   Administered Date(s) Administered    COVID-19, Pfizer, PF, 30mcg/0.3mL 03/15/2021, 04/12/2021         Reviewed AVS with patient / caregiver.       CLINICAL PHARMACY CONSULT: MED RECONCILIATION/REVIEW ADDENDUM    For Pharmacy Admin Tracking Only     Intervention Detail: Dose Adjustment: 1, reason: Therapy Optimization   Total # of Interventions Recommended: 1   Total # of Interventions Accepted: 1   Time Spent (min): 15

## 2021-07-23 ENCOUNTER — APPOINTMENT (OUTPATIENT)
Dept: PHARMACY | Age: 61
End: 2021-07-23

## 2021-07-26 ENCOUNTER — ANTI-COAG VISIT (OUTPATIENT)
Dept: PHARMACY | Age: 61
End: 2021-07-26

## 2021-07-26 DIAGNOSIS — I73.9 PERIPHERAL ARTERY DISEASE (HCC): Primary | ICD-10-CM

## 2021-07-26 LAB — INTERNATIONAL NORMALIZATION RATIO, POC: 1.7

## 2021-07-26 PROCEDURE — 99211 OFF/OP EST MAY X REQ PHY/QHP: CPT

## 2021-07-26 PROCEDURE — 85610 PROTHROMBIN TIME: CPT

## 2021-08-08 RX ORDER — WARFARIN SODIUM 5 MG/1
TABLET ORAL
Qty: 45 TABLET | Refills: 0 | Status: SHIPPED | OUTPATIENT
Start: 2021-08-08 | End: 2021-08-15 | Stop reason: SDUPTHER

## 2021-08-15 DIAGNOSIS — I73.9 PVD (PERIPHERAL VASCULAR DISEASE) (HCC): ICD-10-CM

## 2021-08-15 DIAGNOSIS — E78.00 PURE HYPERCHOLESTEROLEMIA: ICD-10-CM

## 2021-08-15 DIAGNOSIS — I10 ESSENTIAL HYPERTENSION: ICD-10-CM

## 2021-08-15 DIAGNOSIS — E11.9 TYPE 2 DIABETES MELLITUS WITHOUT COMPLICATION, WITHOUT LONG-TERM CURRENT USE OF INSULIN (HCC): ICD-10-CM

## 2021-08-15 DIAGNOSIS — N52.9 ERECTILE DYSFUNCTION, UNSPECIFIED ERECTILE DYSFUNCTION TYPE: ICD-10-CM

## 2021-08-15 DIAGNOSIS — E61.1 IRON DEFICIENCY: ICD-10-CM

## 2021-08-16 RX ORDER — ATORVASTATIN CALCIUM 80 MG/1
80 TABLET, FILM COATED ORAL DAILY
Qty: 90 TABLET | Refills: 1 | Status: SHIPPED | OUTPATIENT
Start: 2021-08-16 | End: 2021-09-05 | Stop reason: SDUPTHER

## 2021-08-16 RX ORDER — TADALAFIL 5 MG/1
TABLET ORAL
Qty: 30 TABLET | Refills: 5 | Status: ON HOLD | OUTPATIENT
Start: 2021-08-16 | End: 2022-03-24

## 2021-08-16 RX ORDER — NIFEDIPINE 60 MG/1
60 TABLET, FILM COATED, EXTENDED RELEASE ORAL DAILY
Qty: 90 TABLET | Refills: 2 | Status: SHIPPED | OUTPATIENT
Start: 2021-08-16 | End: 2021-09-05 | Stop reason: SDUPTHER

## 2021-08-16 RX ORDER — LISINOPRIL 10 MG/1
10 TABLET ORAL DAILY
Qty: 90 TABLET | Refills: 1 | Status: SHIPPED | OUTPATIENT
Start: 2021-08-16 | End: 2021-09-05 | Stop reason: SDUPTHER

## 2021-08-16 RX ORDER — FERROUS SULFATE 325(65) MG
325 TABLET ORAL 2 TIMES DAILY
Qty: 180 TABLET | Refills: 1 | Status: SHIPPED | OUTPATIENT
Start: 2021-08-16 | End: 2021-09-05 | Stop reason: SDUPTHER

## 2021-08-16 RX ORDER — WARFARIN SODIUM 5 MG/1
TABLET ORAL
Qty: 45 TABLET | Refills: 0 | Status: SHIPPED | OUTPATIENT
Start: 2021-08-16 | End: 2021-09-05 | Stop reason: SDUPTHER

## 2021-08-27 ENCOUNTER — ANTI-COAG VISIT (OUTPATIENT)
Dept: PHARMACY | Age: 61
End: 2021-08-27

## 2021-08-27 DIAGNOSIS — I73.9 PERIPHERAL ARTERY DISEASE (HCC): Primary | ICD-10-CM

## 2021-08-27 LAB — INR BLD: 2.6

## 2021-08-27 PROCEDURE — 99211 OFF/OP EST MAY X REQ PHY/QHP: CPT

## 2021-08-27 PROCEDURE — 85610 PROTHROMBIN TIME: CPT

## 2021-08-27 NOTE — PROGRESS NOTES
Mr. Paulette Terry is a 62 y.o.  male. Mr. Paulette Terry had an INR test today. Results were reviewed and appropriate warfarin management was completed. This visit was performed as: An in person visit. Protocols were followed with precautions to reduce the spread of COVID-19. Patient verifies current dosing regimen: Yes     Warfarin medication reviewed and updated on the patient 's home medication list: Yes   All other medications reviewed and updated on the patient 's home medication list: No: reviewed medication changes     Lab Results   Component Value Date    INR 2.60 2021    INR 1.7 2021    INR 3.2 2021       Patient Findings     Positives:  Missed doses, Change in medications    Negatives:  Signs/symptoms of bleeding, Change in diet/appetite    Comments:  Started taking iron twice a day - no interaction with warfarin, missed a dose on Saturday - took 10 mg on  to make up for missed dose          Anticoagulation Summary  As of 2021    INR goal:  2.0-3.0   TTR:  68.2 % (3.1 mo)   INR used for dosin.60 (2021)   Warfarin maintenance plan:  7.5 mg (5 mg x 1.5) every day   Weekly warfarin total:  52.5 mg   Plan last modified:  Shae Kaiser, [address] [facility] (2021)   Next INR check:  2021   Priority:  Maintenance   Target end date: Indefinite    Indications    Peripheral artery disease ([facility] [address] [I73.9]             Anticoagulation Episode Summary     INR check location:  Anticoagulation Clinic    Preferred lab:      Send INR reminders to:  [facility]    Comments:  EPIC      Anticoagulation Care Providers     Provider Role Specialty Phone number    Lay Padilla MD Referring Family Medicine [phone]          Warfarin assessment / plan:     Appears well. No changes   No acute findings     No change to warfarin therapy today. Plan to recheck INR in 4 weeks.       Description    CONTINUE: take warfarin 7.5 mg (1 & 1/2 tablets) daily     Call [phone] with signs or symptoms of bleeding or ANY medication changes (including over-the-counter medications or herbal supplements). If significant bleeding occurs please seek immediate medical attention. Keep the number of servings of vitamin K containing foods (dark green, leafy vegetables) the same each week. Dark green vegetable 2-3 times a week and a mixed green salad ~ 3 times a week. Please call if this changes. Limit alcohol intake. Please call if this changes. Immunization History   Administered Date(s) Administered    COVID-19, Pfizer, MIRZA, 30mcg/0.3mL 03/15/2021, 04/12/2021         Reviewed AVS with patient / caregiver.       CLINICAL PHARMACY CONSULT: MED RECONCILIATION/REVIEW ADDENDUM    For Pharmacy Admin Tracking Only     Intervention Detail:    Total # of Interventions Recommended: 0   Total # of Interventions Accepted: 0   Time Spent (min): 15

## 2021-10-01 ENCOUNTER — ANTI-COAG VISIT (OUTPATIENT)
Dept: PHARMACY | Age: 61
End: 2021-10-01

## 2021-10-01 DIAGNOSIS — I73.9 PERIPHERAL ARTERY DISEASE (HCC): Primary | ICD-10-CM

## 2021-10-01 LAB — INTERNATIONAL NORMALIZATION RATIO, POC: 1.4

## 2021-10-01 PROCEDURE — 85610 PROTHROMBIN TIME: CPT

## 2021-10-01 PROCEDURE — 99211 OFF/OP EST MAY X REQ PHY/QHP: CPT

## 2021-10-01 NOTE — PROGRESS NOTES
Mr. Spike Quinn is a 62 y.o.  male. Mr. Spike Quinn had an INR test today. Results were reviewed and appropriate warfarin management was completed. This visit was performed as: An in person visit. Protocols were followed with precautions to reduce the spread of COVID-19. Patient verifies current dosing regimen: Yes     Warfarin medication reviewed and updated on the patient 's home medication list: Yes   All other medications reviewed and updated on the patient 's home medication list: No: no changes     Lab Results   Component Value Date    INR 1.4 10/01/2021    INR 2.60 2021    INR 1.7 2021       Patient Findings     Positives:  Missed doses, Extra doses, Change in diet/appetite    Negatives:  Signs/symptoms of bleeding, Change in medications, Bruising    Comments:  Missed  7.5mg dose  Took 10mg  and  (vs 7.5mg doses)  Ate cooked mixed greens last evening which is unusual for him. Anticoagulation Summary  As of 10/1/2021    INR goal:  2.0-3.0   TTR:  63.2 % (4.2 mo)   INR used for dosin.4 (10/1/2021)   Warfarin maintenance plan:  7.5 mg (5 mg x 1.5) every day   Weekly warfarin total:  52.5 mg   Plan last modified:  [place], [facility] (2021)   Next INR check:  10/15/2021   Priority:  Maintenance   Target end date: Indefinite    Indications    Peripheral artery disease ([facility] [address] [I73.9]             Anticoagulation Episode Summary     INR check location:  Anticoagulation Clinic    Preferred lab:      Send INR reminders to:  [facility]    Comments:  EPIC      Anticoagulation Care Providers     Provider Role Specialty Phone number    Aracelis Obrien MD Referring Family Medicine [phone]          Warfarin assessment / plan:     Appears well. No complaints. He missed a dose as noted and made up for part of that dose on his own. He also had greens high in vitamin K last evening.  These are likely contributing to the subtherapeutic INR.     I will have him take a higher dose for 2 days and then resume his regular dose which usually works well for him. He agrees to hold off on foods high in vitamin K over the weekend. Description    Warfarin 10mg today and tomorrow  THEN CONTINUE: Warfarin 7.5 mg (1 & 1/2 tablets) daily     Call [phone] with signs or symptoms of bleeding or ANY medication changes (including over-the-counter medications or herbal supplements). If significant bleeding occurs please seek immediate medical attention. Keep the number of servings of vitamin K containing foods (dark green, leafy vegetables) the same each week. Dark green vegetable 2-3 times a week and a mixed green salad ~ 3 times a week. Please call if this changes. Limit alcohol intake. Please call if this changes. Immunization History   Administered Date(s) Administered    COVID-19, Pfizer, PF, 30mcg/0.3mL 03/15/2021, 04/12/2021         Reviewed AVS with patient / caregiver.       CLINICAL PHARMACY CONSULT: MED RECONCILIATION/REVIEW ADDENDUM    For Pharmacy Admin Tracking Only     Intervention Detail: Dose Adjustment: 1, reason: Therapy Optimization   Total # of Interventions Recommended: 1   Total # of Interventions Accepted: 1   Time Spent (min): 15

## 2021-10-15 ENCOUNTER — APPOINTMENT (OUTPATIENT)
Dept: PHARMACY | Age: 61
End: 2021-10-15

## 2021-10-18 ENCOUNTER — ANTI-COAG VISIT (OUTPATIENT)
Dept: PHARMACY | Age: 61
End: 2021-10-18

## 2021-10-18 ENCOUNTER — TELEPHONE (OUTPATIENT)
Dept: PHARMACY | Age: 61
End: 2021-10-18

## 2021-10-18 DIAGNOSIS — I73.9 PERIPHERAL ARTERY DISEASE (HCC): Primary | ICD-10-CM

## 2021-10-18 LAB — INTERNATIONAL NORMALIZATION RATIO, POC: 1.6

## 2021-10-18 PROCEDURE — 99211 OFF/OP EST MAY X REQ PHY/QHP: CPT

## 2021-10-18 PROCEDURE — 85610 PROTHROMBIN TIME: CPT

## 2021-10-18 NOTE — PROGRESS NOTES
Mr. Julian Wild is a 62 y.o.  male. Mr. Julian Wild had an INR test today. Results were reviewed and appropriate warfarin management was completed. This visit was performed as: An in person visit. Protocols were followed with precautions to reduce the spread of COVID-19. Patient verifies current dosing regimen: Yes     Warfarin medication reviewed and updated on the patient 's home medication list: Yes   All other medications reviewed and updated on the patient 's home medication list: Yes     Lab Results   Component Value Date    INR 1.6 10/18/2021    INR 1.4 10/01/2021    INR 2.60 2021           Anticoagulation Summary  As of 10/18/2021    INR goal:  2.0-3.0   TTR:  55.8 % (4.8 mo)   INR used for dosin.6 (10/18/2021)   Warfarin maintenance plan:  10 mg (5 mg x 2) every Mon, Wed, Fri; 7.5 mg (5 mg x 1.5) all other days   Weekly warfarin total:  60 mg   Plan last modified:  Tracy Herrera, [address] [place] [address] (10/18/2021)   Next INR check:  2021   Priority:  Maintenance   Target end date: Indefinite    Indications    Per[facility] ([facility] [I73.9]             Anticoagulation Episode Summary     INR check location:  Anticoagulation Clinic    Preferred lab:      Send INR reminders to:  [facility]AFF    Comments:  EPIC      Anticoagulation Care Providers     Provider Role Specialty Phone number    Mahesh Munguia MD Referring Family Medicine [phone]          Warfarin assessment / plan:     Patient diet has changed past month, he now eats a soup with kale,collards spinach and other greens 1-2x per week, he also drinks V8 3 x per week. I will increase his dose since he wants to continue with this. Stressed importance of not stopping since his dose was raised.   Will see again in 2.5 weeks      Description    Take 15mg today then  NEW DOSE: Warfarin 7.5 mg (1 & 1/2 tablets) daily except 10mg [facility]    Call [phone] with signs or symptoms of bleeding or ANY medication changes (including over-the-counter medications or herbal supplements). If significant bleeding occurs please seek immediate medical attention. Keep the number of servings of vitamin K containing foods (dark green, leafy vegetables) the same each week. Dark green vegetable 2-3 times a week and a mixed green salad ~ 3 times a week. Please call if this changes. Limit alcohol intake. Please call if this changes. Immunization History   Administered Date(s) Administered    COVID-19, Pfizer, PF, 30mcg/0.3mL 03/15/2021, 04/12/2021             Reviewed AVS with patient / caregiver.       CLINICAL PHARMACY CONSULT: MED RECONCILIATION/REVIEW ADDENDUM    For Pharmacy Admin Tracking Only     Intervention Detail: Dose Adjustment: 3, reason: Therapy Optimization   Total # of Interventions Recommended: 3   Total # of Interventions Accepted: 2   Time Spent (min): 15

## 2021-11-05 ENCOUNTER — ANTI-COAG VISIT (OUTPATIENT)
Dept: PHARMACY | Age: 61
End: 2021-11-05

## 2021-11-05 DIAGNOSIS — I73.9 PERIPHERAL ARTERY DISEASE (HCC): Primary | ICD-10-CM

## 2021-11-05 LAB — INTERNATIONAL NORMALIZATION RATIO, POC: 4.3

## 2021-11-05 PROCEDURE — 85610 PROTHROMBIN TIME: CPT

## 2021-11-05 PROCEDURE — 99211 OFF/OP EST MAY X REQ PHY/QHP: CPT

## 2021-11-05 NOTE — PROGRESS NOTES
Mr. Preston Mackenzie is a 62 y.o.  male. Mr. Preston Mackenzie had an INR test today. Results were reviewed and appropriate warfarin management was completed. This visit was performed as: An in person visit. Protocols were followed with precautions to reduce the spread of COVID-19. Patient verifies current dosing regimen: Yes     Warfarin medication reviewed and updated on the patient 's home medication list: Yes   All other medications reviewed and updated on the patient 's home medication list: No: no changes     Lab Results   Component Value Date    INR 4.3 2021    INR 1.6 10/18/2021    INR 1.4 10/01/2021       Patient Findings     Positives:  Change in diet/appetite    Negatives:  Signs/symptoms of bleeding, Missed doses, Change in medications, Bruising    Comments:  Cut back on jaswant greens/kale/spinach since last visit  Also was drinking V8 juices every day, now only a few times per week          Anticoagulation Summary  As of 2021    INR goal:  2.0-3.0   TTR:  53.7 % (5.4 mo)   INR used for dosin.3 (2021)   Warfarin maintenance plan:  10 mg (5 mg x 2) every Wed; 7.5 mg (5 mg x 1.5) all other days   Weekly warfarin total:  55 mg   Plan last modified:  Merced Patrick RPH (2021)   Next INR check:  2021   Priority:  Maintenance   Target end date: Indefinite    Indications    [facility] [I73.9]             Anticoagulation Episode Summary     INR check location:  Anticoagulation Clinic    Preferred lab:      Send INR reminders to:  [facility]AFF    Comments:  EPIC      Anticoagulation Care Providers     Provider Role Specialty Phone number    Trev Benítez MD Referring Family Medicine [phone]          Warfarin assessment / plan:     Denies medication changes   Denies extra warfarin doses  Denies change in appetite  Decrease in vitamin K intake    He has significantly cut down in Vitamin K intake since his last visit.  He was previously eating a mixture of jaswant greens, spinach, and kale multiple times per week, he has now cut this down to once or twice per week since last visit. He was also drinking V8 juices every day and cut that back to three times per week. He plans to continue with his current intake rather than increasing it again, therefore decreasing his weekly dose by ~10%. Recheck INR in 2 weeks. Still needs flu shot, but will discuss with his wife because he thinks she schedule them appointments to get theirs. Also is interested in getting his COVID vaccine, but wants to discuss that with his wife as well. Description    TOMORROW ONLY: do not take warfarin then,  NEW DOSE: Warfarin 7.5 mg (1 & 1/2 tablets) daily except 10mg (2 tablets) every Wednesday    Call [phone] with signs or symptoms of bleeding or ANY medication changes (including over-the-counter medications or herbal supplements). If significant bleeding occurs please seek immediate medical attention. Keep the number of servings of vitamin K containing foods (dark green, leafy vegetables) the same each week. Dark green vegetable 2-3 times a week and a mixed green salad ~ 3 times a week. Please call if this changes. Limit alcohol intake. Please call if this changes. Immunization History   Administered Date(s) Administered    COVID-19, Pfizer, PF, 30mcg/0.3mL 03/15/2021, 04/12/2021             Reviewed AVS with patient / caregiver.       CLINICAL PHARMACY CONSULT: MED RECONCILIATION/REVIEW ADDENDUM    For Pharmacy Admin Tracking Only     Intervention Detail: Dose Adjustment: 1, reason: Therapy Optimization and Vaccine Recommended/Administered   Total # of Interventions Recommended: 2   Total # of Interventions Accepted: 1   Time Spent (min): 15

## 2021-11-09 ENCOUNTER — TELEPHONE (OUTPATIENT)
Dept: PRIMARY CARE CLINIC | Age: 61
End: 2021-11-09

## 2021-11-09 NOTE — TELEPHONE ENCOUNTER
----- Message from Candice Carter sent at 11/9/2021 12:14 PM EST -----  Subject: Message to Provider    QUESTIONS  Information for Provider? Patient would like to have the blood orders done   early so the results will be ready on day of appointment please put in lab   orders and call when ready. ---------------------------------------------------------------------------  --------------  Yumi Chavez INFO  What is the best way for the office to contact you? OK to leave message on   voicemail  Preferred Call Back Phone Number? [phone]  ---------------------------------------------------------------------------  --------------  SCRIPT ANSWERS  Relationship to Patient? Other  Representative Name? Esa Morrison  Is the Representative on the appropriate HIPAA document in Epic?  Yes

## 2021-11-29 ENCOUNTER — OFFICE VISIT (OUTPATIENT)
Dept: PRIMARY CARE CLINIC | Age: 61
End: 2021-11-29
Payer: COMMERCIAL

## 2021-11-29 VITALS
OXYGEN SATURATION: 96 % | SYSTOLIC BLOOD PRESSURE: 120 MMHG | BODY MASS INDEX: 26.37 KG/M2 | WEIGHT: 174 LBS | HEART RATE: 100 BPM | DIASTOLIC BLOOD PRESSURE: 84 MMHG | TEMPERATURE: 97.5 F | HEIGHT: 68 IN

## 2021-11-29 DIAGNOSIS — R06.02 SOB (SHORTNESS OF BREATH): Primary | ICD-10-CM

## 2021-11-29 DIAGNOSIS — R06.2 WHEEZES: ICD-10-CM

## 2021-11-29 DIAGNOSIS — I10 ESSENTIAL HYPERTENSION: ICD-10-CM

## 2021-11-29 DIAGNOSIS — I73.9 PVD (PERIPHERAL VASCULAR DISEASE) (HCC): ICD-10-CM

## 2021-11-29 DIAGNOSIS — E11.9 TYPE 2 DIABETES MELLITUS WITHOUT COMPLICATION, WITHOUT LONG-TERM CURRENT USE OF INSULIN (HCC): ICD-10-CM

## 2021-11-29 DIAGNOSIS — Z72.0 TOBACCO ABUSE DISORDER: ICD-10-CM

## 2021-11-29 DIAGNOSIS — I73.9 PAD (PERIPHERAL ARTERY DISEASE) (HCC): ICD-10-CM

## 2021-11-29 DIAGNOSIS — E78.00 PURE HYPERCHOLESTEROLEMIA: ICD-10-CM

## 2021-11-29 LAB — HBA1C MFR BLD: 6 %

## 2021-11-29 PROCEDURE — 93000 ELECTROCARDIOGRAM COMPLETE: CPT | Performed by: FAMILY MEDICINE

## 2021-11-29 PROCEDURE — 99212 OFFICE O/P EST SF 10 MIN: CPT | Performed by: FAMILY MEDICINE

## 2021-11-29 PROCEDURE — 83036 HEMOGLOBIN GLYCOSYLATED A1C: CPT | Performed by: FAMILY MEDICINE

## 2021-11-29 RX ORDER — NIFEDIPINE 60 MG/1
60 TABLET, FILM COATED, EXTENDED RELEASE ORAL DAILY
Qty: 90 TABLET | Refills: 0 | Status: CANCELLED | OUTPATIENT
Start: 2021-11-29

## 2021-11-29 RX ORDER — NIFEDIPINE 60 MG/1
60 TABLET, FILM COATED, EXTENDED RELEASE ORAL DAILY
Qty: 90 TABLET | Refills: 0 | Status: SHIPPED | OUTPATIENT
Start: 2021-11-29 | End: 2022-01-06 | Stop reason: SDUPTHER

## 2021-11-29 RX ORDER — ATORVASTATIN CALCIUM 80 MG/1
80 TABLET, FILM COATED ORAL DAILY
Qty: 90 TABLET | Refills: 0 | Status: SHIPPED | OUTPATIENT
Start: 2021-11-29 | End: 2022-01-06 | Stop reason: SDUPTHER

## 2021-11-29 RX ORDER — CARVEDILOL 25 MG/1
25 TABLET ORAL 2 TIMES DAILY WITH MEALS
Qty: 60 TABLET | Status: CANCELLED | OUTPATIENT
Start: 2021-11-29

## 2021-11-29 RX ORDER — ALBUTEROL SULFATE 90 UG/1
2 AEROSOL, METERED RESPIRATORY (INHALATION) 4 TIMES DAILY PRN
Qty: 54 G | Refills: 1 | Status: SHIPPED | OUTPATIENT
Start: 2021-11-29 | End: 2022-01-31 | Stop reason: SDUPTHER

## 2021-11-29 RX ORDER — FLUTICASONE FUROATE, UMECLIDINIUM BROMIDE AND VILANTEROL TRIFENATATE 100; 62.5; 25 UG/1; UG/1; UG/1
1 POWDER RESPIRATORY (INHALATION) DAILY
Qty: 3 EACH | Refills: 0 | Status: ON HOLD | OUTPATIENT
Start: 2021-11-29 | End: 2022-01-04 | Stop reason: HOSPADM

## 2021-11-29 NOTE — PROGRESS NOTES
CC fu for diabetes  Also some sob  At hs  Some wheezes  Down to 15 cigarettes a day  He has smoked for 39 year      HPI  63 y/o male known htn, T2DM, Tobacco abuse and PAD    He is mainly concerned today with SOB  And some wheezes more so hs. No chest pain. No leg edema. He has been of coreq for several months  Last Echo showed normal EF    He has hypertension  No chest pain, headache, sob, leg edema, stroke, kidney disease       He is a smoker  No smokeless tobacco   Cannot take, nicotine patches, chantix, etc    Down from 2 packs a day to 15 cigs a day    Past Medical History:   Diagnosis Date    Diabetes mellitus ([facility] [address]     Fibromyalgia     Hyperlipidemia     Hypertension        Physical Exam  Constitutional:       General: He is not in acute distress. Appearance: He is well-developed. He is not diaphoretic. HENT:      Head: Normocephalic and atraumatic. Right Ear: External ear normal.      Left Ear: External ear normal.      Nose: Nose normal.      Mouth/Throat:      Pharynx: No oropharyngeal exudate. Eyes:      General: No scleral icterus. Right eye: No discharge. Left eye: No discharge. Conjunctiva/sclera: Conjunctivae normal.      Pupils: Pupils are equal, round, and reactive to light. Neck:      Thyroid: No thyromegaly. Vascular: No JVD. Trachea: No tracheal deviation. Cardiovascular:      Rate and Rhythm: Normal rate and regular rhythm. Pulses:           Carotid pulses are 2+ on the right side and 2+ on the left side. Radial pulses are 2+ on the right side and 2+ on the left side. Femoral pulses are 2+ on the right side and 2+ on the left side. Popliteal pulses are 2+ on the right side and 2+ on the left side. Dorsalis pedis pulses are 2+ on the right side and 2+ on the left side. Posterior tibial pulses are 2+ on the right side and 2+ on the left side. Heart sounds: Normal heart sounds. No murmur heard.   No daily as needed for Wheezing  Dispense: 54 g; Refill: 1  - Full PFT Study With Bronchodilator; Future  - EKG 12 Lead; Future  - fluticasone-umeclidin-vilant (TRELEGY ELLIPTA) 100-62.5-25 MCG/INH AEPB; Inhale 1 puff into the lungs daily  Dispense: 3 each; Refill: 0  - Brain Natriuretic Peptide; Future    3. Essential hypertension  bp at goal on recheck, meds refill  - EKG 12 Lead; Future  - atorvastatin (LIPITOR) 80 MG tablet; Take 1 tablet by mouth daily Cancel atorvastatin 20 mg a day  Dispense: 90 tablet; Refill: 0  - NIFEdipine (ADALAT CC) 60 MG extended release tablet; Take 1 tablet by mouth daily  Dispense: 90 tablet; Refill: 0    4. Tobacco abuse disorder    - XR CHEST STANDARD (2 VW); Future  - albuterol sulfate HFA (VENTOLIN HFA) 108 (90 Base) MCG/ACT inhaler; Inhale 2 puffs into the lungs 4 times daily as needed for Wheezing  Dispense: 54 g; Refill: 1  - Full PFT Study With Bronchodilator; Future  - fluticasone-umeclidin-vilant ([place]) 100-62.5-25 MCG/INH AEPB; Inhale 1 puff into the lungs daily  Dispense: 3 each; Refill: 0    5. Type 2 diabetes mellitus without complication, without long-term current use of insulin (HCC)  AIC 6.0  At goal < 7  - POCT glycosylated hemoglobin (Hb A1C)  - metFORMIN (GLUCOPHAGE) 500 MG tablet; Take 1 tablet by mouth 2 times daily (with meals)  Dispense: 180 tablet; Refill: 0    6. PAD (peripheral artery disease) ([facility] [address]      7. Pure hypercholesterolemia    - atorvastatin (LIPITOR) 80 MG tablet; Take 1 tablet by mouth daily Cancel atorvastatin 20 mg a day  Dispense: 90 tablet; Refill: 0    8. PVD (peripheral vascular disease) ([facility] [address]  RLE arteria findings: Patent graft from the common femoral artero to PT. Elevated velocity in istal anastomosis. Mod plaque thuout anterior tibial and peroneal artter    LLE arterial fingings: patent jean claude from common femoral artery o the PT artery. Extensive plague noted throut anterior tibia artery.  No flow noted in the prox peroneal Artery. Severe plaque in distal peroneal artery. Impression mod occlusive disease of lower R extremity  Occlusion of the R popliteal artery  Occlusion of the riht superficial femoral artery  Occlusion of the left supericial femoral artery,occlusion of th left peroneal artery, patent bilateral lower extremity bypass patch  - atorvastatin (LIPITOR) 80 MG tablet; Take 1 tablet by mouth daily Cancel atorvastatin 20 mg a day  Dispense: 90 tablet;  Refill: 0  He remains on warfarin thur anticoagulation clinic      Social  He does not have INS  Will see us again in January when INS will be inforce

## 2021-11-30 ENCOUNTER — APPOINTMENT (OUTPATIENT)
Dept: PHARMACY | Age: 61
End: 2021-11-30

## 2021-12-02 ENCOUNTER — ANTI-COAG VISIT (OUTPATIENT)
Dept: PHARMACY | Age: 61
End: 2021-12-02

## 2021-12-02 DIAGNOSIS — I73.9 PERIPHERAL ARTERY DISEASE (HCC): Primary | ICD-10-CM

## 2021-12-02 LAB — INTERNATIONAL NORMALIZATION RATIO, POC: 1.8

## 2021-12-02 PROCEDURE — 99211 OFF/OP EST MAY X REQ PHY/QHP: CPT

## 2021-12-02 PROCEDURE — 85610 PROTHROMBIN TIME: CPT

## 2021-12-02 NOTE — PROGRESS NOTES
Fahad Maynard is a 62 y.o. here for warfarin management. Diana Wheeler had an INR test today. Results were reviewed and appropriate warfarin management was completed. This visit was performed as: An in person visit. Protocols were followed with precautions to reduce the spread of COVID-19. Patient verifies current dosing regimen: Yes     Warfarin medication reviewed and updated on the patient 's home medication list: Yes   All other medications reviewed and updated on the patient 's home medication list: No: No changes. Patient reported a new Rosaleen Fabrice but this had already been updated on our list and does not interact with warfarin. Lab Results   Component Value Date    INR 1.8 2021    INR 4.3 2021    INR 1.6 10/18/2021       Patient Findings     Negatives:  Signs/symptoms of bleeding, Missed doses, Change in medications, Change in diet/appetite, Bruising          Anticoagulation Summary  As of 2021    INR goal:  2.0-3.0   TTR:  51.8 % (6.3 mo)   INR used for dosin.8 (2021)   Warfarin maintenance plan:  10 mg (5 mg x 2) every Wed, Sat; 7.5 mg (5 mg x 1.5) all other days   Weekly warfarin total:  57.5 mg   Plan last modified:  Merced Patrick RP (2021)   Next INR check:  2021   Priority:  Maintenance   Target end date: Indefinite    Indications    Peripheral artery disease ([facility] [address] [I73.9]             Anticoagulation Episode Summary     INR check location:  Anticoagulation Clinic    Preferred lab:      Send INR reminders to:  [facility] STAFF    Comments:  EPIC      Anticoagulation Care Providers     Provider Role Specialty Phone number    Gianna Dyson MD Referring Family Medicine [phone]          Warfarin assessment / plan:     Denies missed doses  Denies increased vitamin K intake  Denies medication changes     Patient looks and feels well. He reports he is still following the diet changes reported during his previous visit.  INR is subtherapeutic today and dose was increased to warfarin 7.5 mg (1 and 1 half tablets) daily except 10 mg (2 tablets) Wednesday and Saturday. Next INR check on 12/16/21    Patient was offered COVID booster and flu vaccine but he would like to wait at this time. Description    NEW DOSE: Warfarin 7.5 mg (1 & 1/2 tablets) daily except 10mg (2 tablets) every Wednesday and Saturday    Call [phone] with signs or symptoms of bleeding or ANY medication changes (including over-the-counter medications or herbal supplements). If significant bleeding occurs please seek immediate medical attention. Keep the number of servings of vitamin K containing foods (dark green, leafy vegetables) the same each week. Dark green vegetable 2-3 times a week and a mixed green salad ~ 3 times a week. Please call if this changes. Limit alcohol intake. Please call if this changes. Immunization History   Administered Date(s) Administered    COVID-19, Pfizer, PF, 30mcg/0.3mL 03/15/2021, 04/12/2021             Reviewed AVS with patient / caregiver.       CLINICAL PHARMACY CONSULT: MED RECONCILIATION/REVIEW ADDENDUM    For Pharmacy Admin Tracking Only     Intervention Detail: Dose Adjustment: 1, reason: Therapy Optimization and Vaccine Recommended/Administered   Total # of Interventions Recommended: 2   Total # of Interventions Accepted: 1   Time Spent (min): 15

## 2021-12-16 ENCOUNTER — ANTI-COAG VISIT (OUTPATIENT)
Dept: PHARMACY | Age: 61
End: 2021-12-16

## 2021-12-16 DIAGNOSIS — I73.9 PERIPHERAL ARTERY DISEASE (HCC): Primary | ICD-10-CM

## 2021-12-16 LAB — INTERNATIONAL NORMALIZATION RATIO, POC: 2.8

## 2021-12-16 PROCEDURE — 99211 OFF/OP EST MAY X REQ PHY/QHP: CPT

## 2021-12-16 PROCEDURE — 85610 PROTHROMBIN TIME: CPT

## 2021-12-16 NOTE — PROGRESS NOTES
Viviana Leavitt is a 62 y.o. here for warfarin management. Germaine Masterson had an INR test today. Results were reviewed and appropriate warfarin management was completed. This visit was performed as: An in person visit. Protocols were followed with precautions to reduce the spread of COVID-19. Patient verifies current dosing regimen: Yes     Warfarin medication reviewed and updated on the patient 's home medication list: Yes   All other medications reviewed and updated on the patient 's home medication list: No: no changes     Lab Results   Component Value Date    INR 2.8 2021    INR 1.8 2021    INR 4.3 2021       Patient Findings     Positives:  Signs/symptoms of bleeding, Change in medications    Negatives:  Change in diet/appetite, Bruising    Comments:  Small amount of blood when he blew his nose - resolved right away. Flonase - not expected to affect his INR. Anticoagulation Summary  As of 2021    INR goal:  2.0-3.0   TTR:  53.7 % (6.8 mo)   INR used for dosin.8 (2021)   Warfarin maintenance plan:  10 mg (5 mg x 2) every Wed, Sat; 7.5 mg (5 mg x 1.5) all other days   Weekly warfarin total:  57.5 mg   Plan last modified:  [facility] (2021)   Next INR check:  2021   Priority:  Maintenance   Target end date: Indefinite    Indications    Peripheral artery disease ([facility] [address] [I73.9]             Anticoagulation Episode Summary     INR check location:  Anticoagulation Clinic    Preferred lab:      Send INR reminders to:  [facility]    Comments:  EPIC      Anticoagulation Care Providers     Provider Role Specialty Phone number    Ewelina White MD Referring Family Medicine [phone]          Warfarin assessment / plan:     Appears well. No changes      No change to warfarin therapy today. He did have a little blood in his tissue when he blew his nose this morning, but resolved immediately.  He believes it is due to the dry air.     Description    Warfarin 7.5 mg (1 & 1/2 tablets) daily except 10mg (2 tablets) every Wednesday and Saturday    Call [phone] with signs or symptoms of bleeding or ANY medication changes (including over-the-counter medications or herbal supplements). If significant bleeding occurs please seek immediate medical attention. Keep the number of servings of vitamin K containing foods (dark green, leafy vegetables) the same each week. Dark green vegetable 2-3 times a week and a mixed green salad ~ 3 times a week. Please call if this changes. Limit alcohol intake. Please call if this changes. Immunization History   Administered Date(s) Administered    COVID-19, Pfizer, PF, 30mcg/0.3mL 03/15/2021, 04/12/2021             Reviewed AVS with patient / caregiver.       CLINICAL PHARMACY CONSULT: MED RECONCILIATION/REVIEW ADDENDUM    For Pharmacy Admin Tracking Only     Intervention Detail:    Total # of Interventions Recommended: 0   Total # of Interventions Accepted: 0   Time Spent (min): 15

## 2021-12-23 ENCOUNTER — TELEPHONE (OUTPATIENT)
Dept: PRIMARY CARE CLINIC | Age: 61
End: 2021-12-23

## 2021-12-23 DIAGNOSIS — Z01.812 ENCOUNTER FOR PREPROCEDURE SCREENING LABORATORY TESTING FOR COVID-19: Primary | ICD-10-CM

## 2021-12-23 DIAGNOSIS — Z20.822 ENCOUNTER FOR PREPROCEDURE SCREENING LABORATORY TESTING FOR COVID-19: Primary | ICD-10-CM

## 2021-12-23 NOTE — TELEPHONE ENCOUNTER
----- Message from nAai Berrios sent at 12/23/2021 10:23 AM EST -----  Subject: Message to Provider    QUESTIONS  Information for Provider? Patient needs an order for a covid 19 test   please, he needs this prior to scheduling PFT. Please let him know when   the orders are in, the testing is being preformed at [facility]  ---------------------------------------------------------------------------  --------------  [address]  What is the best way for the office to contact you? OK to leave message on   voicemail  Preferred Call Back Phone Number? [phone]  ---------------------------------------------------------------------------  --------------  SCRIPT ANSWERS  Relationship to Patient?  Self

## 2021-12-28 ENCOUNTER — HOSPITAL ENCOUNTER (OUTPATIENT)
Dept: GENERAL RADIOLOGY | Age: 61
Discharge: HOME | End: 2021-12-28

## 2021-12-28 ENCOUNTER — HOSPITAL ENCOUNTER (OUTPATIENT)
Age: 61
Discharge: HOME | End: 2021-12-28

## 2021-12-28 DIAGNOSIS — Z01.812 ENCOUNTER FOR PREPROCEDURE SCREENING LABORATORY TESTING FOR COVID-19: ICD-10-CM

## 2021-12-28 DIAGNOSIS — Z72.0 TOBACCO ABUSE DISORDER: ICD-10-CM

## 2021-12-28 DIAGNOSIS — R06.2 WHEEZES: ICD-10-CM

## 2021-12-28 DIAGNOSIS — R06.02 SOB (SHORTNESS OF BREATH): ICD-10-CM

## 2021-12-28 DIAGNOSIS — Z20.822 ENCOUNTER FOR PREPROCEDURE SCREENING LABORATORY TESTING FOR COVID-19: ICD-10-CM

## 2021-12-28 LAB
PRO-BNP: 425 PG/ML (ref 0–124)
SARS-COV-2, NAAT: NOT DETECTED

## 2021-12-28 PROCEDURE — 87635 SARS-COV-2 COVID-19 AMP PRB: CPT

## 2021-12-28 PROCEDURE — 36415 COLL VENOUS BLD VENIPUNCTURE: CPT

## 2021-12-28 PROCEDURE — 71046 X-RAY EXAM CHEST 2 VIEWS: CPT

## 2021-12-28 PROCEDURE — 83880 ASSAY OF NATRIURETIC PEPTIDE: CPT

## 2021-12-31 ENCOUNTER — APPOINTMENT (OUTPATIENT)
Dept: CT IMAGING | Age: 61
DRG: 208 | End: 2021-12-31
Payer: COMMERCIAL

## 2021-12-31 ENCOUNTER — ANESTHESIA (OUTPATIENT)
Dept: EMERGENCY DEPT | Age: 61
DRG: 208 | End: 2021-12-31
Payer: COMMERCIAL

## 2021-12-31 ENCOUNTER — ANESTHESIA EVENT (OUTPATIENT)
Dept: EMERGENCY DEPT | Age: 61
DRG: 208 | End: 2021-12-31
Payer: COMMERCIAL

## 2021-12-31 ENCOUNTER — APPOINTMENT (OUTPATIENT)
Dept: GENERAL RADIOLOGY | Age: 61
DRG: 208 | End: 2021-12-31
Payer: COMMERCIAL

## 2021-12-31 ENCOUNTER — HOSPITAL ENCOUNTER (INPATIENT)
Age: 61
LOS: 4 days | Discharge: HOME | DRG: 208 | End: 2022-01-04
Attending: EMERGENCY MEDICINE | Admitting: INTERNAL MEDICINE
Payer: COMMERCIAL

## 2021-12-31 DIAGNOSIS — Z72.0 TOBACCO ABUSE DISORDER: ICD-10-CM

## 2021-12-31 DIAGNOSIS — J96.01 ACUTE RESPIRATORY FAILURE WITH HYPOXIA (HCC): Primary | ICD-10-CM

## 2021-12-31 PROBLEM — J96.00 ACUTE RESPIRATORY FAILURE (HCC): Status: ACTIVE | Noted: 2021-12-31

## 2021-12-31 LAB
A/G RATIO: 1 (ref 1.1–2.2)
ALBUMIN SERPL-MCNC: 4 G/DL (ref 3.4–5)
ALP BLD-CCNC: 121 U/L (ref 40–129)
ALT SERPL-CCNC: 12 U/L (ref 10–40)
ANION GAP SERPL CALCULATED.3IONS-SCNC: 8 MMOL/L (ref 3–16)
AST SERPL-CCNC: 18 U/L (ref 15–37)
BASE EXCESS ARTERIAL: -2.1 MMOL/L (ref -3–3)
BASE EXCESS VENOUS: -6 MMOL/L (ref -3–3)
BASOPHILS ABSOLUTE: 0.1 K/UL (ref 0–0.2)
BASOPHILS RELATIVE PERCENT: 0.8 %
BILIRUB SERPL-MCNC: <0.2 MG/DL (ref 0–1)
BUN BLDV-MCNC: 17 MG/DL (ref 7–20)
CALCIUM SERPL-MCNC: 9.5 MG/DL (ref 8.3–10.6)
CARBOXYHEMOGLOBIN ARTERIAL: 2.2 % (ref 0–1.5)
CARBOXYHEMOGLOBIN: 7.7 % (ref 0–1.5)
CHLORIDE BLD-SCNC: 105 MMOL/L (ref 99–110)
CO2: 29 MMOL/L (ref 21–32)
CREAT SERPL-MCNC: 0.9 MG/DL (ref 0.8–1.3)
D DIMER: 880 NG/ML DDU (ref 0–229)
EKG ATRIAL RATE: 169 BPM
EKG DIAGNOSIS: NORMAL
EKG P-R INTERVAL: 104 MS
EKG Q-T INTERVAL: 278 MS
EKG QRS DURATION: 100 MS
EKG QTC CALCULATION (BAZETT): 466 MS
EKG R AXIS: 42 DEGREES
EKG T AXIS: 49 DEGREES
EKG VENTRICULAR RATE: 169 BPM
EOSINOPHILS ABSOLUTE: 0.1 K/UL (ref 0–0.6)
EOSINOPHILS RELATIVE PERCENT: 1 %
GFR AFRICAN AMERICAN: >60
GFR NON-AFRICAN AMERICAN: >60
GLUCOSE BLD-MCNC: 149 MG/DL (ref 70–99)
HCO3 ARTERIAL: 27.1 MMOL/L (ref 21–29)
HCO3 VENOUS: 24.5 MMOL/L (ref 23–29)
HCT VFR BLD CALC: 43.7 % (ref 40.5–52.5)
HEMOGLOBIN, ART, EXTENDED: 13.3 G/DL (ref 13.5–17.5)
HEMOGLOBIN: 13.5 G/DL (ref 13.5–17.5)
INR BLD: 2.99 (ref 0.88–1.12)
LACTIC ACID, SEPSIS: 1.5 MMOL/L (ref 0.4–1.9)
LACTIC ACID, SEPSIS: 2 MMOL/L (ref 0.4–1.9)
LYMPHOCYTES ABSOLUTE: 1.6 K/UL (ref 1–5.1)
LYMPHOCYTES RELATIVE PERCENT: 16.4 %
MCH RBC QN AUTO: 28.5 PG (ref 26–34)
MCHC RBC AUTO-ENTMCNC: 31 G/DL (ref 31–36)
MCV RBC AUTO: 91.8 FL (ref 80–100)
METHEMOGLOBIN ARTERIAL: 0.4 %
METHEMOGLOBIN VENOUS: 0.7 %
MONOCYTES ABSOLUTE: 0.6 K/UL (ref 0–1.3)
MONOCYTES RELATIVE PERCENT: 6.2 %
NEUTROPHILS ABSOLUTE: 7.4 K/UL (ref 1.7–7.7)
NEUTROPHILS RELATIVE PERCENT: 75.6 %
O2 CONTENT, VEN: 18 VOL %
O2 SAT, ARTERIAL: 95.7 %
O2 SAT, VEN: 98 %
O2 THERAPY: ABNORMAL
O2 THERAPY: ABNORMAL
PCO2 ARTERIAL: 67.3 MMHG (ref 35–45)
PCO2, VEN: 71.5 MMHG (ref 40–50)
PDW BLD-RTO: 15.1 % (ref 12.4–15.4)
PH ARTERIAL: 7.21 (ref 7.35–7.45)
PH VENOUS: 7.14 (ref 7.35–7.45)
PLATELET # BLD: 340 K/UL (ref 135–450)
PMV BLD AUTO: 7.1 FL (ref 5–10.5)
PO2 ARTERIAL: 83.8 MMHG (ref 75–108)
PO2, VEN: 126 MMHG (ref 25–40)
POTASSIUM REFLEX MAGNESIUM: 5.6 MMOL/L (ref 3.5–5.1)
PRO-BNP: 1926 PG/ML (ref 0–124)
PROTHROMBIN TIME: 35.4 SEC (ref 9.9–12.7)
RAPID INFLUENZA  B AGN: NEGATIVE
RAPID INFLUENZA A AGN: NEGATIVE
RBC # BLD: 4.76 M/UL (ref 4.2–5.9)
SARS-COV-2: NOT DETECTED
SODIUM BLD-SCNC: 142 MMOL/L (ref 136–145)
TCO2 ARTERIAL: 65.4 MMOL/L
TCO2 CALC VENOUS: 60 MMOL/L
TOTAL PROTEIN: 8.1 G/DL (ref 6.4–8.2)
TRIGL SERPL-MCNC: 131 MG/DL (ref 0–150)
TROPONIN: <0.01 NG/ML
WBC # BLD: 9.8 K/UL (ref 4–11)

## 2021-12-31 PROCEDURE — 0BH17EZ INSERTION OF ENDOTRACHEAL AIRWAY INTO TRACHEA, VIA NATURAL OR ARTIFICIAL OPENING: ICD-10-PCS | Performed by: EMERGENCY MEDICINE

## 2021-12-31 PROCEDURE — 85610 PROTHROMBIN TIME: CPT

## 2021-12-31 PROCEDURE — C9113 INJ PANTOPRAZOLE SODIUM, VIA: HCPCS | Performed by: INTERNAL MEDICINE

## 2021-12-31 PROCEDURE — 6370000000 HC RX 637 (ALT 250 FOR IP): Performed by: INTERNAL MEDICINE

## 2021-12-31 PROCEDURE — 93005 ELECTROCARDIOGRAM TRACING: CPT | Performed by: EMERGENCY MEDICINE

## 2021-12-31 PROCEDURE — 2000000000 HC ICU R&B

## 2021-12-31 PROCEDURE — 6370000000 HC RX 637 (ALT 250 FOR IP): Performed by: EMERGENCY MEDICINE

## 2021-12-31 PROCEDURE — 6360000002 HC RX W HCPCS: Performed by: INTERNAL MEDICINE

## 2021-12-31 PROCEDURE — 99291 CRITICAL CARE FIRST HOUR: CPT | Performed by: INTERNAL MEDICINE

## 2021-12-31 PROCEDURE — 83880 ASSAY OF NATRIURETIC PEPTIDE: CPT

## 2021-12-31 PROCEDURE — 94761 N-INVAS EAR/PLS OXIMETRY MLT: CPT

## 2021-12-31 PROCEDURE — 6360000002 HC RX W HCPCS: Performed by: EMERGENCY MEDICINE

## 2021-12-31 PROCEDURE — 31500 INSERT EMERGENCY AIRWAY: CPT

## 2021-12-31 PROCEDURE — 87040 BLOOD CULTURE FOR BACTERIA: CPT

## 2021-12-31 PROCEDURE — 87070 CULTURE OTHR SPECIMN AEROBIC: CPT

## 2021-12-31 PROCEDURE — 94002 VENT MGMT INPAT INIT DAY: CPT

## 2021-12-31 PROCEDURE — 87449 NOS EACH ORGANISM AG IA: CPT

## 2021-12-31 PROCEDURE — 71260 CT THORAX DX C+: CPT

## 2021-12-31 PROCEDURE — 2580000003 HC RX 258: Performed by: EMERGENCY MEDICINE

## 2021-12-31 PROCEDURE — 2700000000 HC OXYGEN THERAPY PER DAY

## 2021-12-31 PROCEDURE — U0005 INFEC AGEN DETEC AMPLI PROBE: HCPCS

## 2021-12-31 PROCEDURE — 31500 INSERT EMERGENCY AIRWAY: CPT | Performed by: NURSE ANESTHETIST, CERTIFIED REGISTERED

## 2021-12-31 PROCEDURE — 87804 INFLUENZA ASSAY W/OPTIC: CPT

## 2021-12-31 PROCEDURE — 84478 ASSAY OF TRIGLYCERIDES: CPT

## 2021-12-31 PROCEDURE — 36415 COLL VENOUS BLD VENIPUNCTURE: CPT

## 2021-12-31 PROCEDURE — 87186 SC STD MICRODIL/AGAR DIL: CPT

## 2021-12-31 PROCEDURE — 70450 CT HEAD/BRAIN W/O DYE: CPT

## 2021-12-31 PROCEDURE — 2580000003 HC RX 258: Performed by: INTERNAL MEDICINE

## 2021-12-31 PROCEDURE — 6360000004 HC RX CONTRAST MEDICATION: Performed by: EMERGENCY MEDICINE

## 2021-12-31 PROCEDURE — 93010 ELECTROCARDIOGRAM REPORT: CPT | Performed by: INTERNAL MEDICINE

## 2021-12-31 PROCEDURE — 96375 TX/PRO/DX INJ NEW DRUG ADDON: CPT

## 2021-12-31 PROCEDURE — 82803 BLOOD GASES ANY COMBINATION: CPT

## 2021-12-31 PROCEDURE — 80053 COMPREHEN METABOLIC PANEL: CPT

## 2021-12-31 PROCEDURE — 87205 SMEAR GRAM STAIN: CPT

## 2021-12-31 PROCEDURE — 83605 ASSAY OF LACTIC ACID: CPT

## 2021-12-31 PROCEDURE — 71045 X-RAY EXAM CHEST 1 VIEW: CPT

## 2021-12-31 PROCEDURE — 87077 CULTURE AEROBIC IDENTIFY: CPT

## 2021-12-31 PROCEDURE — 96367 TX/PROPH/DG ADDL SEQ IV INF: CPT

## 2021-12-31 PROCEDURE — 85379 FIBRIN DEGRADATION QUANT: CPT

## 2021-12-31 PROCEDURE — 96365 THER/PROPH/DIAG IV INF INIT: CPT

## 2021-12-31 PROCEDURE — 99291 CRITICAL CARE FIRST HOUR: CPT

## 2021-12-31 PROCEDURE — 5A1945Z RESPIRATORY VENTILATION, 24-96 CONSECUTIVE HOURS: ICD-10-PCS | Performed by: EMERGENCY MEDICINE

## 2021-12-31 PROCEDURE — 6360000002 HC RX W HCPCS

## 2021-12-31 PROCEDURE — U0003 INFECTIOUS AGENT DETECTION BY NUCLEIC ACID (DNA OR RNA); SEVERE ACUTE RESPIRATORY SYNDROME CORONAVIRUS 2 (SARS-COV-2) (CORONAVIRUS DISEASE [COVID-19]), AMPLIFIED PROBE TECHNIQUE, MAKING USE OF HIGH THROUGHPUT TECHNOLOGIES AS DESCRIBED BY CMS-2020-01-R: HCPCS

## 2021-12-31 PROCEDURE — 94640 AIRWAY INHALATION TREATMENT: CPT

## 2021-12-31 PROCEDURE — 84484 ASSAY OF TROPONIN QUANT: CPT

## 2021-12-31 PROCEDURE — 2500000003 HC RX 250 WO HCPCS

## 2021-12-31 PROCEDURE — 1200000000 HC SEMI PRIVATE

## 2021-12-31 PROCEDURE — 85025 COMPLETE CBC W/AUTO DIFF WBC: CPT

## 2021-12-31 RX ORDER — SODIUM CHLORIDE 0.9 % (FLUSH) 0.9 %
5-40 SYRINGE (ML) INJECTION PRN
Status: DISCONTINUED | OUTPATIENT
Start: 2021-12-31 | End: 2022-01-04 | Stop reason: HOSPADM

## 2021-12-31 RX ORDER — PROPOFOL 10 MG/ML
5-50 INJECTION, EMULSION INTRAVENOUS
Status: DISCONTINUED | OUTPATIENT
Start: 2021-12-31 | End: 2022-01-04

## 2021-12-31 RX ORDER — METHYLPREDNISOLONE SODIUM SUCCINATE 40 MG/ML
40 INJECTION, POWDER, LYOPHILIZED, FOR SOLUTION INTRAMUSCULAR; INTRAVENOUS EVERY 12 HOURS
Status: DISCONTINUED | OUTPATIENT
Start: 2021-12-31 | End: 2022-01-03 | Stop reason: DRUGHIGH

## 2021-12-31 RX ORDER — POLYETHYLENE GLYCOL 3350 17 G/17G
17 POWDER, FOR SOLUTION ORAL DAILY PRN
Status: DISCONTINUED | OUTPATIENT
Start: 2021-12-31 | End: 2022-01-04 | Stop reason: HOSPADM

## 2021-12-31 RX ORDER — MAGNESIUM SULFATE 1 G/100ML
1000 INJECTION INTRAVENOUS ONCE
Status: COMPLETED | OUTPATIENT
Start: 2021-12-31 | End: 2021-12-31

## 2021-12-31 RX ORDER — PROPOFOL 10 MG/ML
5-50 INJECTION, EMULSION INTRAVENOUS
Status: DISCONTINUED | OUTPATIENT
Start: 2021-12-31 | End: 2021-12-31 | Stop reason: ALTCHOICE

## 2021-12-31 RX ORDER — AZITHROMYCIN 250 MG/1
250 TABLET, FILM COATED ORAL DAILY
Status: DISCONTINUED | OUTPATIENT
Start: 2021-12-31 | End: 2022-01-02

## 2021-12-31 RX ORDER — SODIUM CHLORIDE 9 MG/ML
25 INJECTION, SOLUTION INTRAVENOUS PRN
Status: DISCONTINUED | OUTPATIENT
Start: 2021-12-31 | End: 2022-01-04 | Stop reason: HOSPADM

## 2021-12-31 RX ORDER — IPRATROPIUM BROMIDE AND ALBUTEROL SULFATE 2.5; .5 MG/3ML; MG/3ML
1 SOLUTION RESPIRATORY (INHALATION) 4 TIMES DAILY
Status: DISCONTINUED | OUTPATIENT
Start: 2021-12-31 | End: 2021-12-31

## 2021-12-31 RX ORDER — SODIUM CHLORIDE 0.9 % (FLUSH) 0.9 %
5-40 SYRINGE (ML) INJECTION EVERY 12 HOURS SCHEDULED
Status: DISCONTINUED | OUTPATIENT
Start: 2021-12-31 | End: 2022-01-04 | Stop reason: HOSPADM

## 2021-12-31 RX ORDER — PANTOPRAZOLE SODIUM 40 MG/10ML
40 INJECTION, POWDER, LYOPHILIZED, FOR SOLUTION INTRAVENOUS DAILY
Status: DISCONTINUED | OUTPATIENT
Start: 2021-12-31 | End: 2022-01-04 | Stop reason: HOSPADM

## 2021-12-31 RX ORDER — HYDROMORPHONE HYDROCHLORIDE 1 MG/ML
1 INJECTION, SOLUTION INTRAMUSCULAR; INTRAVENOUS; SUBCUTANEOUS ONCE
Status: COMPLETED | OUTPATIENT
Start: 2021-12-31 | End: 2021-12-31

## 2021-12-31 RX ORDER — ONDANSETRON 2 MG/ML
4 INJECTION INTRAMUSCULAR; INTRAVENOUS EVERY 6 HOURS PRN
Status: DISCONTINUED | OUTPATIENT
Start: 2021-12-31 | End: 2022-01-04 | Stop reason: HOSPADM

## 2021-12-31 RX ORDER — ACETAMINOPHEN 325 MG/1
650 TABLET ORAL EVERY 6 HOURS PRN
Status: DISCONTINUED | OUTPATIENT
Start: 2021-12-31 | End: 2022-01-04 | Stop reason: HOSPADM

## 2021-12-31 RX ORDER — ONDANSETRON 4 MG/1
4 TABLET, ORALLY DISINTEGRATING ORAL EVERY 8 HOURS PRN
Status: DISCONTINUED | OUTPATIENT
Start: 2021-12-31 | End: 2022-01-04 | Stop reason: HOSPADM

## 2021-12-31 RX ORDER — SODIUM CHLORIDE 0.9 % (FLUSH) 0.9 %
5-40 SYRINGE (ML) INJECTION PRN
Status: DISCONTINUED | OUTPATIENT
Start: 2021-12-31 | End: 2021-12-31 | Stop reason: ALTCHOICE

## 2021-12-31 RX ORDER — METOPROLOL TARTRATE 5 MG/5ML
INJECTION INTRAVENOUS
Status: COMPLETED
Start: 2021-12-31 | End: 2021-12-31

## 2021-12-31 RX ORDER — METHYLPREDNISOLONE SODIUM SUCCINATE 125 MG/2ML
125 INJECTION, POWDER, LYOPHILIZED, FOR SOLUTION INTRAMUSCULAR; INTRAVENOUS ONCE
Status: COMPLETED | OUTPATIENT
Start: 2021-12-31 | End: 2021-12-31

## 2021-12-31 RX ORDER — ASPIRIN 81 MG/1
81 TABLET, CHEWABLE ORAL DAILY
Status: DISCONTINUED | OUTPATIENT
Start: 2022-01-01 | End: 2022-01-04 | Stop reason: HOSPADM

## 2021-12-31 RX ORDER — PROPOFOL 10 MG/ML
INJECTION, EMULSION INTRAVENOUS
Status: DISPENSED
Start: 2021-12-31 | End: 2022-01-01

## 2021-12-31 RX ORDER — ACETAMINOPHEN 650 MG/1
650 SUPPOSITORY RECTAL EVERY 6 HOURS PRN
Status: DISCONTINUED | OUTPATIENT
Start: 2021-12-31 | End: 2022-01-04 | Stop reason: HOSPADM

## 2021-12-31 RX ORDER — LEVOFLOXACIN 5 MG/ML
500 INJECTION, SOLUTION INTRAVENOUS EVERY 24 HOURS
Status: DISCONTINUED | OUTPATIENT
Start: 2021-12-31 | End: 2022-01-01

## 2021-12-31 RX ORDER — IPRATROPIUM BROMIDE AND ALBUTEROL SULFATE 2.5; .5 MG/3ML; MG/3ML
1 SOLUTION RESPIRATORY (INHALATION) EVERY 4 HOURS
Status: DISCONTINUED | OUTPATIENT
Start: 2021-12-31 | End: 2022-01-01

## 2021-12-31 RX ORDER — ALBUTEROL SULFATE 2.5 MG/3ML
2.5 SOLUTION RESPIRATORY (INHALATION) 4 TIMES DAILY
Status: DISCONTINUED | OUTPATIENT
Start: 2021-12-31 | End: 2021-12-31

## 2021-12-31 RX ORDER — IPRATROPIUM BROMIDE AND ALBUTEROL SULFATE 2.5; .5 MG/3ML; MG/3ML
3 SOLUTION RESPIRATORY (INHALATION) ONCE
Status: COMPLETED | OUTPATIENT
Start: 2021-12-31 | End: 2021-12-31

## 2021-12-31 RX ORDER — ATORVASTATIN CALCIUM 80 MG/1
80 TABLET, FILM COATED ORAL DAILY
Status: DISCONTINUED | OUTPATIENT
Start: 2022-01-01 | End: 2022-01-04 | Stop reason: HOSPADM

## 2021-12-31 RX ORDER — SODIUM CHLORIDE 0.9 % (FLUSH) 0.9 %
5-40 SYRINGE (ML) INJECTION EVERY 12 HOURS SCHEDULED
Status: DISCONTINUED | OUTPATIENT
Start: 2021-12-31 | End: 2021-12-31 | Stop reason: ALTCHOICE

## 2021-12-31 RX ORDER — METOPROLOL TARTRATE 5 MG/5ML
5 INJECTION INTRAVENOUS ONCE
Status: COMPLETED | OUTPATIENT
Start: 2021-12-31 | End: 2021-12-31

## 2021-12-31 RX ORDER — SODIUM CHLORIDE 9 MG/ML
25 INJECTION, SOLUTION INTRAVENOUS PRN
Status: DISCONTINUED | OUTPATIENT
Start: 2021-12-31 | End: 2021-12-31 | Stop reason: ALTCHOICE

## 2021-12-31 RX ORDER — CARVEDILOL 25 MG/1
25 TABLET ORAL 2 TIMES DAILY WITH MEALS
Status: DISCONTINUED | OUTPATIENT
Start: 2022-01-01 | End: 2022-01-04 | Stop reason: HOSPADM

## 2021-12-31 RX ADMIN — Medication 100 MCG/HR: at 21:34

## 2021-12-31 RX ADMIN — IPRATROPIUM BROMIDE AND ALBUTEROL SULFATE 1 AMPULE: .5; 3 SOLUTION RESPIRATORY (INHALATION) at 21:00

## 2021-12-31 RX ADMIN — METHYLPREDNISOLONE SODIUM SUCCINATE 125 MG: 125 INJECTION, POWDER, FOR SOLUTION INTRAMUSCULAR; INTRAVENOUS at 09:26

## 2021-12-31 RX ADMIN — PROPOFOL 45 MCG/KG/MIN: 10 INJECTION, EMULSION INTRAVENOUS at 19:36

## 2021-12-31 RX ADMIN — AZITHROMYCIN MONOHYDRATE 250 MG: 250 TABLET ORAL at 15:31

## 2021-12-31 RX ADMIN — SODIUM CHLORIDE, PRESERVATIVE FREE 10 ML: 5 INJECTION INTRAVENOUS at 09:32

## 2021-12-31 RX ADMIN — IPRATROPIUM BROMIDE AND ALBUTEROL SULFATE 1 AMPULE: .5; 3 SOLUTION RESPIRATORY (INHALATION) at 23:23

## 2021-12-31 RX ADMIN — HYDROMORPHONE HYDROCHLORIDE 1 MG: 1 INJECTION, SOLUTION INTRAMUSCULAR; INTRAVENOUS; SUBCUTANEOUS at 08:46

## 2021-12-31 RX ADMIN — IOPAMIDOL 75 ML: 755 INJECTION, SOLUTION INTRAVENOUS at 11:27

## 2021-12-31 RX ADMIN — IPRATROPIUM BROMIDE AND ALBUTEROL SULFATE 3 AMPULE: .5; 3 SOLUTION RESPIRATORY (INHALATION) at 10:57

## 2021-12-31 RX ADMIN — METOPROLOL TARTRATE 5 MG: 1 INJECTION, SOLUTION INTRAVENOUS at 09:25

## 2021-12-31 RX ADMIN — PROPOFOL 30 MCG/KG/MIN: 10 INJECTION, EMULSION INTRAVENOUS at 09:31

## 2021-12-31 RX ADMIN — Medication 25 MCG/HR: at 09:22

## 2021-12-31 RX ADMIN — LEVOFLOXACIN 500 MG: 5 INJECTION, SOLUTION INTRAVENOUS at 18:38

## 2021-12-31 RX ADMIN — SODIUM CHLORIDE 1000 ML: 9 INJECTION, SOLUTION INTRAVENOUS at 18:36

## 2021-12-31 RX ADMIN — PROPOFOL 40 MCG/KG/MIN: 10 INJECTION, EMULSION INTRAVENOUS at 18:30

## 2021-12-31 RX ADMIN — IPRATROPIUM BROMIDE AND ALBUTEROL SULFATE 1 AMPULE: .5; 3 SOLUTION RESPIRATORY (INHALATION) at 17:30

## 2021-12-31 RX ADMIN — PROPOFOL 40 MCG/KG/MIN: 10 INJECTION, EMULSION INTRAVENOUS at 15:26

## 2021-12-31 RX ADMIN — CEFEPIME HYDROCHLORIDE 2000 MG: 2 INJECTION, POWDER, FOR SOLUTION INTRAVENOUS at 09:55

## 2021-12-31 RX ADMIN — METOPROLOL TARTRATE 5 MG: 5 INJECTION INTRAVENOUS at 09:25

## 2021-12-31 RX ADMIN — METHYLPREDNISOLONE SODIUM SUCCINATE 40 MG: 40 INJECTION, POWDER, FOR SOLUTION INTRAMUSCULAR; INTRAVENOUS at 20:06

## 2021-12-31 RX ADMIN — Medication 1000 MG: at 15:31

## 2021-12-31 RX ADMIN — Medication 10 ML: at 20:11

## 2021-12-31 RX ADMIN — Medication 75 MCG/HR: at 18:30

## 2021-12-31 RX ADMIN — VANCOMYCIN HYDROCHLORIDE 1250 MG: 10 INJECTION, POWDER, LYOPHILIZED, FOR SOLUTION INTRAVENOUS at 10:30

## 2021-12-31 RX ADMIN — Medication 75 MCG/HR: at 15:27

## 2021-12-31 RX ADMIN — PANTOPRAZOLE SODIUM 40 MG: 40 INJECTION, POWDER, FOR SOLUTION INTRAVENOUS at 18:38

## 2021-12-31 RX ADMIN — MAGNESIUM SULFATE HEPTAHYDRATE 1000 MG: 1 INJECTION, SOLUTION INTRAVENOUS at 09:41

## 2021-12-31 ASSESSMENT — PAIN SCALES - GENERAL
PAINLEVEL_OUTOF10: 0

## 2021-12-31 ASSESSMENT — PULMONARY FUNCTION TESTS
PIF_VALUE: 28
PIF_VALUE: 18
PIF_VALUE: 17
PIF_VALUE: 20

## 2021-12-31 NOTE — ANESTHESIA PROCEDURE NOTES
Airway  Date/Time: 12/31/2021 8:30 AM  Urgency: emergent    Difficult airway    General Information and Staff    Patient location during procedure: ED    Consent for Airway (if performed for an anesthetic, see related documentation for consents)  Patient identity confirmed: per hospital policy  Consent: The procedure was performed in an emergent situation. Verbal consent not obtained. Written consent not obtained.   Risks and benefits: risks, benefits and alternatives were not discussed      Code status verified:yes  Indications and Patient Condition  Indications for airway management: respiratory failure  Spontaneous Ventilation: absent  Sedation level: deep  Preoxygenated: yes  Patient position: sniffing  MILS maintained throughout  Mask difficulty assessment: vent by bag mask    Final Airway Details  Final airway type: endotracheal airway      Successful airway: ETT  Cuffed: yes   Successful intubation technique: video laryngoscopy  Facilitating devices/methods: Bougie  Endotracheal tube insertion site: oral  Blade: Terry  Blade size: #4  ETT size (mm): 7.5  Cormack-Lehane Classification: grade IIb - view of arytenoids or posterior of glottis only  Placement verified by: chest auscultation and capnometry   Measured from: teeth  ETT to teeth (cm): 24  Number of attempts at approach: 1

## 2021-12-31 NOTE — ED NOTES
7.5 ET tube place by Chano Phan CRNA Anesthesia, 25 at the lip, positive color change, bilateral rise/fall and bilateral lung sounds.       Shawn Barkley, RN  12/31/21 7762

## 2021-12-31 NOTE — ED PROVIDER NOTES
[address] PROVIDER NOTE    Patient Identification  Pt Name: Gorge Jaimes  MRN: [id number]  Marco Atrongfpj 1960  Date of evaluation: 12/31/2021  Provider: Lenka Fletcher MD  PCP: Morris Myers MD    Chief Complaint  Respiratory Distress (Pt in [address] reporting pt was dropped off at [employer] by daughter after he passed out in the car on the way to the hospital today. EMS placed pt on CPAP SpO2 70% still)      HPI  History provided by EMS  This is a 62 y.o. male who presents to the ED for dyspnea. Patient unable to give any history. Per EMS, was saturating 50% on room air and recently diagnosed with COPD.  -------  Per his wife, started becoming confused today. For the past month he has had shortness of breath without any chest discomfort. He has had nasal congestion and cough during this time. ROS  Unable to obtain.     I have reviewed the following nursing documentation:  Allergies: Chantix [varenicline]    Past medical history:   Past Medical History:   Diagnosis Date    Diabetes mellitus ([address]     Fibromyalgia     Hyperlipidemia     Hypertension      Past surgical history:   Past Surgical History:   Procedure Laterality Date    APPENDECTOMY  2005    COLONOSCOPY  2016    FEMORAL BYPASS Left 02/26/2020    femoral posterior tibial bypass    FEMORAL-TIBIAL BYPASS GRAFT Right 12/11/2020    with femoral endarterectomy and patch angioplasty    FOOT DEBRIDEMENT Left 09/03/2020    FOOT DEBRIDEMENT Right 2/26/2021    DEBRIDEMENT OF RIGHT FOOT WOUND WITH GRAFT APPLICATION performed by Abena Amadna DPM at [facility] Right 12/08/2020    stent leg for PVD       Home medications:   Previous Medications    ACETAMINOPHEN (TYLENOL) 500 MG TABLET    Take 1 tablet by mouth 4 times daily as needed for Pain    ALBUTEROL SULFATE HFA (VENTOLIN HFA) 108 (90 BASE) MCG/ACT INHALER    Inhale 2 puffs into the lungs 4 times daily as needed for Wheezing    ASPIRIN 81 MG CHEWABLE TABLET    CHEW AND SWALLOW 1 TABLET BY MOUTH ONE TIME A DAY    ATORVASTATIN (LIPITOR) 80 MG TABLET    Take 1 tablet by mouth daily Cancel atorvastatin 20 mg a day    BECAPLERMIN (REGRANEX) 0.01 % GEL    Apply 1 Applicatorful topically daily    CARVEDILOL (COREG) 25 MG TABLET    Take 25 mg by mouth 2 times daily (with meals)     FERROUS SULFATE (IRON 325) 325 (65 FE) MG TABLET    Take 1 tablet by mouth 2 times daily    FLUTICASONE (FLONASE) 50 MCG/ACT NASAL SPRAY    1 spray by Nasal route daily    FLUTICASONE-UMECLIDIN-VILANT (TRELEGY ELLIPTA) 100-62.5-25 MCG/INH AEPB    Inhale 1 puff into the lungs daily    LISINOPRIL (PRINIVIL;ZESTRIL) 10 MG TABLET    Take 1 tablet by mouth daily    METFORMIN (GLUCOPHAGE) 500 MG TABLET    Take 1 tablet by mouth 2 times daily (with meals)    NICOTINE (NICODERM CQ) 21 MG/24HR    Place 1 patch onto the skin every 24 hours    NIFEDIPINE (ADALAT CC) 60 MG EXTENDED RELEASE TABLET    Take 1 tablet by mouth daily    TADALAFIL (CIALIS) 5 MG TABLET    TAKE 1 TABLET BY MOUTH ONE TIME A DAY AS NEEDED FOR ERECTILE DYSFUNCTION    VITAMINS/MINERALS TABS    Take 1 tablet by mouth daily    WARFARIN (JANTOVEN) 5 MG TABLET    TAKE 1 AND 1/2 TABLETS BY MOUTH ONE TIME A DAY OR AS DIRECTED BY [facility] ([phone])       Social history:  reports that he has been smoking cigarettes. He started smoking about 44 years ago. He has a 10.00 pack-year smoking history. He has never used smokeless tobacco. He reports current alcohol use of about 6.0 standard drinks of alcohol per week. He reports that he does not use drugs.     Family history:    Family History   Problem Relation Age of Onset    Cancer Mother     Stroke Father          Exam  ED Triage Vitals   BP Temp Temp src Pulse Resp SpO2 Height Weight   12/31/21 0820 -- -- 12/31/21 0820 12/31/21 0820 12/31/21 0820 12/31/21 0836 12/31/21 0836   131/78   168 18 (!) 87 % 5' 8\" (1.727 m) 174 lb (78.9 kg) Nursing note and vitals reviewed. Constitutional: ill appearing  HENT:      Head: Normocephalic      Ears: External ears normal.      Nose: Nose normal.     Mouth: Membrane mucosa moist   Eyes: No discharge. Neck: Supple. Trachea midline. Cardiovascular: tachy rate. Warm extremities  Pulmonary/Chest: Effort increased, bilateral wheezes  Abdominal: Soft. No distension. Nontender  : Deferred  Rectal: Deferred   Musculoskeletal: Moves all extremities. No gross deformity. Neurological: Opens eyes to pain. Attempted placing BiPAP and started trying to tear the mask off. Does not open eyes to name. Moving all extremities. Skin: Warm and dry. Psychiatric: cant assess    Procedures      EKG  The Ekg interpreted by me in the absence of a cardiologist shows. Tachy sinus rhythm. P waves present  No specific ST changes appreciated.   No significant change from prior EKG dated other than tachycardia        Radiology  CT CHEST PULMONARY EMBOLISM W CONTRAST   Final Result   Head:      No acute intracranial abnormality. There is minimal atrophy and small-vessel   ischemic change      Paranasal sinus disease with secretions in the nasopharynx. Chest:      No central pulmonary embolus identified. .  Tiny peripheral branches are not   well evaluated secondary to motion. There is mild coronary artery   calcification      Bilateral lower lobe lung consolidation, suspicious for pneumonia. .  There is   moderate underlying emphysema. No significant pleural fluid      RECOMMENDATIONS:   Unavailable         CT HEAD WO CONTRAST   Final Result   Head:      No acute intracranial abnormality. There is minimal atrophy and small-vessel   ischemic change      Paranasal sinus disease with secretions in the nasopharynx. Chest:      No central pulmonary embolus identified. .  Tiny peripheral branches are not   well evaluated secondary to motion.   There is mild coronary artery   calcification      Bilateral lower lobe lung consolidation, suspicious for pneumonia. .  There is   moderate underlying emphysema. No significant pleural fluid      RECOMMENDATIONS:   Unavailable         XR CHEST PORTABLE   Preliminary Result   ET tube and enteric tube. New patchy interstitial and alveolar subtle opacities in both lungs. This   can be seen with COVID-19 pneumonia in the proper clinical setting.    Multifocal pneumonia and other atypical pneumonias are also in the   differential.             Labs  Results for orders placed or performed during the hospital encounter of 12/31/21   Rapid influenza A/B antigens    Specimen: Nasopharyngeal   Result Value Ref Range    Rapid Influenza A Ag Negative Negative    Rapid Influenza B Ag Negative Negative   CBC Auto Differential   Result Value Ref Range    WBC 9.8 4.0 - 11.0 K/uL    RBC 4.76 4.20 - 5.90 M/uL    Hemoglobin 13.5 13.5 - 17.5 g/dL    Hematocrit 43.7 40.5 - 52.5 %    MCV 91.8 80.0 - 100.0 fL    MCH 28.5 26.0 - 34.0 pg    MCHC 31.0 31.0 - 36.0 g/dL    RDW 15.1 12.4 - 15.4 %    Platelets 689 543 - 877 K/uL    MPV 7.1 5.0 - 10.5 fL    Neutrophils % 75.6 %    Lymphocytes % 16.4 %    Monocytes % 6.2 %    Eosinophils % 1.0 %    Basophils % 0.8 %    Neutrophils Absolute 7.4 1.7 - 7.7 K/uL    Lymphocytes Absolute 1.6 1.0 - 5.1 K/uL    Monocytes Absolute 0.6 0.0 - 1.3 K/uL    Eosinophils Absolute 0.1 0.0 - 0.6 K/uL    Basophils Absolute 0.1 0.0 - 0.2 K/uL   Comprehensive Metabolic Panel w/ Reflex to MG   Result Value Ref Range    Sodium 142 136 - 145 mmol/L    Potassium reflex Magnesium 5.6 (H) 3.5 - 5.1 mmol/L    Chloride 105 99 - 110 mmol/L    CO2 29 21 - 32 mmol/L    Anion Gap 8 3 - 16    Glucose 149 (H) 70 - 99 mg/dL    BUN 17 7 - 20 mg/dL    CREATININE 0.9 0.8 - 1.3 mg/dL    GFR Non-African American >60 >60    GFR African American >60 >60    Calcium 9.5 8.3 - 10.6 mg/dL    Total Protein 8.1 6.4 - 8.2 g/dL    Albumin 4.0 3.4 - 5.0 g/dL    Albumin/Globulin Ratio 1.0 (L) 1.1 - 2.2 intubate. Anesthesia was emergently called to bedside and was able to place a 7-1/2 tube. May be secondary to COPD. Giving duo nebs, Solu-Medrol, magnesium. Heart failure, pulmonary embolism, ACS also in differential as well as COVID-19 and pneumonia. Intubation    Date/Time: 12/31/2021 8:41 AM  Performed by: Carmen Goldsmith MD  Authorized by: Carmen Goldsmith MD     Consent:     Consent obtained:  Emergent situation  Pre-procedure details:     Patient status:  Unresponsive    Paralytics:  Rocuronium  Procedure details:     Preoxygenation:  BiPAP    CPR in progress: no      Intubation method:  Oral    Oral intubation technique:  Video-assisted    Tube size (mm):  7.5    Tube type:  Cuffed    Number of attempts:  3 or more    Ventilation between attempts: yes      Cricoid pressure: yes    Comments:      I was unable to intubate the patient despite 5 attempts. By this time, anesthesia was at bedside was able to pass a 7-1/2 tube assisted by bougie. The total critical care time spent while evaluating and treating this patient was at least 32 minutes. This excludes time spent doing separately billable procedures. This includes time at the bedside, data interpretation, medication management, obtaining critical history from collateral sources if the patient is unable to provide it directly, and physician consultation. Specifics of interventions taken and potentially life-threatening diagnostic considerations are listed above in the medical decision making. [unfilled]    Final Impression  1. Acute respiratory failure with hypoxia (HCC)        Blood pressure 104/76, pulse 110, temperature 100.1 °F (37.8 °C), temperature source Rectal, resp. rate 16, height 5' 8\" (1.727 m), weight 174 lb (78.9 kg), SpO2 95 %. Disposition:  DISPOSITION Admitted 12/31/2021 12:36:16 PM      Patient Referrals:  No follow-up provider specified.     Discharge Medications:  New Prescriptions    No medications on file       Discontinued Medications:  Discontinued Medications    No medications on file       This chart was generated using the [employer] [facility] dictation system. I created this record but it may contain dictation errors given the limitations of this technology.         Flacoheather Perez MD  12/31/21 8359

## 2021-12-31 NOTE — RT PROTOCOL NOTE
RT Inhaler-Nebulizer Bronchodilator Protocol Note    There is a bronchodilator order in the chart from a provider indicating to follow the RT Bronchodilator Protocol and there is an Initiate RT Inhaler-Nebulizer Bronchodilator Protocol order as well (see protocol at bottom of note). CXR Findings:  XR CHEST PORTABLE    Result Date: 12/31/2021  ET tube and enteric tube. New patchy interstitial and alveolar subtle opacities in both lungs. This can be seen with COVID-19 pneumonia in the proper clinical setting. Multifocal pneumonia and other atypical pneumonias are also in the differential.       The findings from the last RT Protocol Assessment were as follows:   History Pulmonary Disease: Chronic pulmonary disease  Respiratory Pattern: Dyspnea on exertion or RR 21-25 bpm  Breath Sounds: Inspiratory and expiratory or bilateral wheezing and/or rhonchi  Cough: Unable to generate effective cough  Indication for Bronchodilator Therapy: Decreased or absent breath sounds  Bronchodilator Assessment Score: 14    Aerosolized bronchodilator medication orders have been revised according to the RT Inhaler-Nebulizer Bronchodilator Protocol below. Respiratory Therapist to perform RT Therapy Protocol Assessment initially then follow the protocol. Repeat RT Therapy Protocol Assessment PRN for score 0-3 or on second treatment, BID, and PRN for scores above 3. No Indications - adjust the frequency to every 6 hours PRN wheezing or bronchospasm, if no treatments needed after 48 hours then discontinue using Per Protocol order mode. If indication present, adjust the RT bronchodilator orders based on the Bronchodilator Assessment Score as indicated below.   Use Inhaler orders unless patient has one or more of the following: on home nebulizer, not able to hold breath for 10 seconds, is not alert and oriented, cannot activate and use MDI correctly, or respiratory rate 25 breaths per minute or more, then use the equivalent nebulizer order(s) with same Frequency and PRN reasons based on the score. If a patient is on this medication at home then do not decrease Frequency below that used at home. 0-3 - enter or revise RT bronchodilator order(s) to equivalent RT Bronchodilator order with Frequency of every 4 hours PRN for wheezing or increased work of breathing using Per Protocol order mode. 4-6 - enter or revise RT Bronchodilator order(s) to two equivalent RT bronchodilator orders with one order with BID Frequency and one order with Frequency of every 4 hours PRN wheezing or increased work of breathing using Per Protocol order mode. 7-10 - enter or revise RT Bronchodilator order(s) to two equivalent RT bronchodilator orders with one order with TID Frequency and one order with Frequency of every 4 hours PRN wheezing or increased work of breathing using Per Protocol order mode. 11-13 - enter or revise RT Bronchodilator order(s) to one equivalent RT bronchodilator order with QID Frequency and an Albuterol order with Frequency of every 4 hours PRN wheezing or increased work of breathing using Per Protocol order mode. Greater than 13 - enter or revise RT Bronchodilator order(s) to one equivalent RT bronchodilator order with every 4 hours Frequency and an Albuterol order with Frequency of every 2 hours PRN wheezing or increased work of breathing using Per Protocol order mode. RT to enter RT Home Evaluation for COPD & MDI Assessment order using Per Protocol order mode.     Electronically signed by Stephanie Alvarado RCP on 12/31/2021 at 6:15 PM

## 2021-12-31 NOTE — ED NOTES
Bed: 01  Expected date:   Expected time:   Means of arrival:   Comments:  Bala Davis RN  12/31/21 1662

## 2021-12-31 NOTE — ED NOTES
Report given to Darrion Rae RN. No further questions at this time. Pt transported upstairs with RT and on lifepack.       Kortney Foster RN  12/31/21 6246

## 2021-12-31 NOTE — ED NOTES
7.5 ET tube placement attempt by Dr. Jaki Ellis, but unsuccessful.      Elmo Stein RN  12/31/21 2949

## 2021-12-31 NOTE — PROGRESS NOTES
Admission assessment complete. Patient intubated and sedated. ETT size # 7.5 secured at 25 cm to the lower lip. Vent settings: AC/VC. Rate 16. . PEEP 5. FiO2 60%. OG secured at 65 cm to lower lip to LIS. Peripheral IV dressings unremarkable. Echols patent and draining. Abdomen soft and non-distended. Bowel sounds auscultated in all quadrants. Pedal and radial pulses palpated. Wounds noted to BLE. Dressed with wet to dry saline dressings. Repositioned. Medications administered per MAR. Wife Shabbir Ethan updated regarding plan of care. Education provided. All questions answered.

## 2021-12-31 NOTE — ED NOTES
Pt given Etomidate 20mg IV PUSH, then Rocuronium 100mg IV PUSH by Dr. Reyes Dimas.       Sami Suazo RN  12/31/21 9005

## 2021-12-31 NOTE — CONSULTS
PULMONARY AND CRITICAL CARE MEDICINE CONSULT NOTE      Name: Hayley Monsalev  Sex: male  : 1960  MRN: [id number]  Admission Date: 2021  Admission Diagnosis: Acute respiratory failure ([address] [J96.00]  Date of ICU admission: 2021    Reason for ICU admission: Acute hypercapnic and hypoxic respiratory failure     HPI: Patient is a 62 y.o. male with past medical history significant for COPD, DM, HTN, HLD who presented to ER with with respiratory failure. Pt brought in by [facility] for respiratory distress. Per squad SpO2 upon arrival to the [facility] was in 50's. He failed CPAP. Brought to ER and was unresponsive with agonal breathing. Intubated in ER. Difficult intubation, pt intubated by anesthesia. Now sedated with propofol and fentanyl. CT chest showed b/l LL consolidation and collapse. Rapid flu negative. Mild elevation of LA. Initial ABG showed hypoxic and hypercapnic respiratory failure. 14 point ROS could not be obtained due to patient factors.        Past Medical History:   Diagnosis Date    Diabetes mellitus ([address]     Fibromyalgia     Hyperlipidemia     Hypertension      Past Surgical History:   Procedure Laterality Date    APPENDECTOMY  2005    COLONOSCOPY  2016    FEMORAL BYPASS Left 2020    femoral posterior tibial bypass    FEMORAL-TIBIAL BYPASS GRAFT Right 2020    with femoral endarterectomy and patch angioplasty    FOOT DEBRIDEMENT Left 2020    FOOT DEBRIDEMENT Right 2021    DEBRIDEMENT OF RIGHT FOOT WOUND WITH GRAFT APPLICATION performed by Luis Rolle DPM at [facility] Right 2020    stent leg for PVD     Social History     Socioeconomic History    Marital status:      Spouse name: Not on file    Number of children: Not on file    Years of education: Not on file    Highest education level: Not on file   Occupational History    Not on file   Tobacco Use    Smoking status: Current Every Day Smoker     Packs/day: 0.50     Years: 20.00     Pack years: 10.00     Types: Cigarettes     Start date: 12/28/1977    Smokeless tobacco: Never Used    Tobacco comment: 3 cigs a day/ quitting    Vaping Use    Vaping Use: Never used   Substance and Sexual Activity    Alcohol use: Yes     Alcohol/week: 6.0 standard drinks     Types: 6 Cans of beer per week    Drug use: Never    Sexual activity: Yes     Partners: Female   Other Topics Concern    Not on file   Social History Narrative    Not on file     Social Determinants of Health     Financial Resource Strain: Low Risk     Difficulty of Paying Living Expenses: Not hard at all   Food Insecurity: No Food Insecurity    Worried About [address] in the Last Year: Never true    [facility] [employer] in the Last Year: Never true   Transportation Needs: No Transportation Needs    Lack of Transportation (Medical): No    Lack of Transportation (Non-Medical):  No   Physical Activity:     Days of Exercise per Week: Not on file    Minutes of Exercise per Session: Not on file   Stress:     Feeling of Stress : Not on file   Social Connections:     Frequency of Communication with Friends and Family: Not on file    Frequency of Social Gatherings with Friends and Family: Not on file    Attends [employer] Services: Not on file    Active Member of [address] [place] [address] [employer] or Organizations: Not on file    Attends Club or Organization Meetings: Not on file    Marital Status: Not on file   Intimate Partner Violence:     Fear of Current or Ex-Partner: Not on file    Emotionally Abused: Not on file    Physically Abused: Not on file    Sexually Abused: Not on file   Housing Stability:     Unable to Pay for Housing in the Last Year: Not on file    Number of [place] in the Last Year: Not on file    Unstable Housing in the Last Year: Not on file     Family History   Problem Relation Age of Onset    Cancer Mother     Stroke Father      Allergies   Allergen Reactions    Chantix [Varenicline]      Hallucinations         MEDICATIONS:     Scheduled Meds:     sodium chloride flush  5-40 mL IntraVENous 2 times per day    sodium chloride flush  5-40 mL IntraVENous 2 times per day    enoxaparin  40 mg SubCUTAneous Daily    albuterol  2.5 mg Nebulization 4x daily    ipratropium-albuterol  1 ampule Inhalation 4x daily     Current Infusions:    sodium chloride      propofol 30 mcg/kg/min (12/31/21 0931)    fentaNYL 75 mcg/hr (12/31/21 0951)    sodium chloride         PRN meds:  sodium chloride flush, sodium chloride, sodium chloride flush, sodium chloride, ondansetron **OR** ondansetron, polyethylene glycol, acetaminophen **OR** acetaminophen    PHYSICAL EXAM:    /76   Pulse 110   Temp 100.1 °F (37.8 °C) (Rectal)   Resp 16   Ht 5' 8\" (1.727 m)   Wt 174 lb (78.9 kg)   SpO2 95%   BMI 26.46 kg/m²  No intake/output data recorded. No intake/output data recorded. No intake or output data in the 24 hours ending 12/31/21 1452      CONSTITUTIONAL: He is a 62y.o.-year-old who appears his stated age. He is in no acute distress. CARDIOVASCULAR: S1 S2 RRR. Without murmer  RESPIRATORY & CHEST: Lungs are clear to auscultation and percussion. No wheezing, no crackles. Good air movement  GASTROINTESTINAL & ABDOMEN: Soft, nontender, positive bowel sounds in all quadrants, non-distended, without hepatosplenomegaly. GENITOURINARY: Deferred. MUSCULOSKELETAL: No tenderness to palpation of the axial skeleton. There is no clubbing. No cyanosis. No edema of the lower extremities. SKIN OF BODY: No rash or jaundice. PSYCHIATRIC EVALUATION: could not be assessed. HEMATOLOGIC/LYMPHATIC/ IMMUNOLOGIC: No palpable lymphadenopathy. NEUROLOGIC: Sedated. Groslly non-focal. Motor strength is 5+/5 in all muscle groups. The patient has a normal sensorium globally.      LABS:    Recent Labs     12/31/21  0900   WBC 9.8   RBC 4.76   HGB 13.5   HCT 43.7   MCH 28.5   MCHC 31.0   RDW 15.1    MPV 7.1   NEUTOPHILPCT 75.6   MONOPCT 6.2   BASOPCT 0.8     Recent Labs     12/31/21  0900      K 5.6*      ANIONGAP 8   CO2 29   BUN 17   CREATININE 0.9   CALCIUM 9.5   PROT 8.1   BILITOT <0.2   ALKPHOS 121   ALT 12   AST 18   GLUCOSE 149*       Recent Labs     12/31/21  0900   INR 2.99*       Recent Labs     12/31/21  0900   TROPONINI <0.01       IMAGING:  I reviewed the CT chest and my interpretation is as follows: B/L LL consolidation with collapse. B/L emphysema      IMPRESSION:  Acute hypoxic and hypercapnic respiratory failure  COPD unknown severity  B/L LL pneumonia  COVID 19 r/o  Current every day smoker      PLAN:  Patient is sedated with propofol and fentanyl. Titrate for RASS of -1 to +1    Acute hypoxic and hypercapnic respiratory failure due to b/l LL pneumonia and COPD exacerbation. Continue mechanical ventilation at the current settings. Repeat ABG. Continue duonebs q4. Start solumedrol 40 mg iv q12. Low grade fever with elevated LA. Start rocephin and azithromycin for b/l LL pneumonia. Patient may need bronchoscopy pending covid status. Fully vaccinated with booster dose on 12/22/21 for covid. Obtain sputum cultures, legionella urine Ag, streptococcal Ag. Keep NPO for now. Lovenox for DVT PPx. Protonix fpr stress ulcer PPx. Critical Care Time: 39 Minutes  Total critical care time caring for this patient with life threatening illness, including direct patient contact, management of life support systems, review of data including imaging and labs, discussions with other team members and physicians excluding procedures. Electronically signed by Kacy Ibarra MD on 12/31/21 at 2:52 PM EST     This note was completed using dragon medical speech recognition software. Grammatical errors, random word insertions, pronoun errors and incomplete sentences are occasional consequences of this technology due to software limitations.  If there are questions or concerns about the content of this note of information contained within the body of this dictation, they should be addressed with the provider for clarification.

## 2021-12-31 NOTE — ED NOTES
Open wound noted on left foot. Wife states it used to be \"black\" sees wound care for wound. Open to air at this time. Hospitalist still to evaluate.       Freddy Christine RN  12/31/21 6072

## 2021-12-31 NOTE — PROGRESS NOTES
12/31/21 1814   RT Protocol   History Pulmonary Disease 2   Respiratory pattern 2   Breath sounds 6   Cough 4   Indications for Bronchodilator Therapy Decreased or absent breath sounds   Bronchodilator Assessment Score 14

## 2021-12-31 NOTE — H&P
Hospital Medicine History & Physical      PCP: Tank Cota MD    Date of Admission: 12/31/2021    Date of Service: Pt seen/examined on 12/31/2021and Admitted to Inpatient     Chief Complaint:  SOB/ respiratory distress. History Of Present Illness:  Pt brought in by [facility] for respiratory distress. Per squad SpO2 upon arrival to the [facility] was \"40-50% on RA. \" Pt then placed on CPAP, SpO2 per squad improved to 75%, Duoneb tx given en route. Upon pt arrival, pt agonal breathing and unresponsive to sternal rub. Dr. Carli Brand RN, Froilan Sanchez RN, gita RN, and Irene Jimenez RT at pt bedside. He was actually being driven to the hospital in a car when he passed out and they pulled into the fire station.      Pt squad patient recently dx'd with COPD, and per squad squad pt's wife reports pt, Radha Chadwick not been feeling well over the past few days. \"    He was a difficult airway and was intubated in the ED with the help of Anesthesia. Past Medical History:        Diagnosis Date    Diabetes mellitus ([facility]     Fibromyalgia     Hyperlipidemia     Hypertension        Past Surgical History:        Procedure Laterality Date    APPENDECTOMY  2005    COLONOSCOPY  2016    FEMORAL BYPASS Left 02/26/2020    femoral posterior tibial bypass    FEMORAL-TIBIAL BYPASS GRAFT Right 12/11/2020    with femoral endarterectomy and patch angioplasty    FOOT DEBRIDEMENT Left 09/03/2020    FOOT DEBRIDEMENT Right 2/26/2021    DEBRIDEMENT OF RIGHT FOOT WOUND WITH GRAFT APPLICATION performed by Keely Cristobal DPM at [place] Right 12/08/2020    stent leg for PVD       Medications Prior to Admission:    Prior to Admission medications    Medication Sig Start Date End Date Taking?  Authorizing Provider   albuterol sulfate HFA (VENTOLIN HFA) 108 (90 Base) MCG/ACT inhaler Inhale 2 puffs into the lungs 4 times daily as needed for Wheezing 11/29/21   Jewels León MD   fluticasone-umeclidin-vilant (TRELEGY ELLIPTA) 158-84.2-77 MCG/INH AEPB Inhale 1 puff into the lungs daily 11/29/21   Jewels León MD   atorvastatin (LIPITOR) 80 MG tablet Take 1 tablet by mouth daily Cancel atorvastatin 20 mg a day 11/29/21   Jewels León MD   NIFEdipine (ADALAT CC) 60 MG extended release tablet Take 1 tablet by mouth daily 11/29/21   Jewels León MD   metFORMIN (GLUCOPHAGE) 500 MG tablet Take 1 tablet by mouth 2 times daily (with meals) 11/29/21   Jewels León MD   lisinopril (PRINIVIL;ZESTRIL) 10 MG tablet Take 1 tablet by mouth daily 9/7/21   Jewels León MD   ferrous sulfate (IRON 325) 325 (65 Fe) MG tablet Take 1 tablet by mouth 2 times daily 9/7/21   Jewels León MD   warfarin (JANTOVEN) 5 MG tablet TAKE 1 AND 1/2 TABLETS BY MOUTH ONE TIME A DAY OR AS DIRECTED BY [facility] ([phone])  Patient taking differently: Take 7.5 mg by mouth daily Except 10mg every Wednesday and Saturday or as directed by [facility] [phone] 9/7/21   Jewels León MD   tadalafil (CIALIS) 5 MG tablet TAKE 1 TABLET BY MOUTH ONE TIME A DAY AS NEEDED FOR ERECTILE DYSFUNCTION 8/16/21   Jewels León MD   acetaminophen (TYLENOL) 500 MG tablet Take 1 tablet by mouth 4 times daily as needed for Pain 3/22/21   Jewels León MD   nicotine (NICODERM CQ) 21 MG/24HR Place 1 patch onto the skin every 24 hours 3/8/21   Jewels León MD   fluticasone Sueanne [place]) 50 MCG/ACT nasal spray 1 spray by Nasal route daily 3/8/21 3/8/22  Jewels León MD   carvedilol (COREG) 25 MG tablet Take 25 mg by mouth 2 times daily (with meals)  1/20/21   Historical Provider, MD   becaplermin (REGRANEX) 0.01 % gel Apply 1 Applicatorful topically daily  Patient not taking: Reported on 11/29/2021 6/3/20   Historical Provider, MD   Vitamins/Minerals TABS Take 1 tablet by mouth daily Historical Provider, MD   aspirin 81 MG chewable tablet CHEW AND SWALLOW 1 TABLET BY MOUTH ONE TIME A DAY 12/28/20   Ania Araya MD       Allergies:  Chantix [varenicline]    Social History:  The patient currently lives at home   TOBACCO:   reports that he has been smoking cigarettes. He started smoking about 44 years ago. He has a 10.00 pack-year smoking history. He has never used smokeless tobacco.  ETOH:   reports current alcohol use of about 6.0 standard drinks of alcohol per week. Family History:  Reviewed in detail and negative for DM, Early CAD, Cancer, CVA. Positive as follows:        Problem Relation Age of Onset    Cancer Mother     Stroke Father        REVIEW OF SYSTEMS:    Unable to obtain as he is intubated and sedated. Virginie Sidmagdalena PHYSICAL EXAM:    /77   Pulse (P) 107   Temp 98.7 °F (37.1 °C) (Temporal)   Resp 16   Ht 5' 8\" (1.727 m)   Wt 166 lb (75.3 kg)   SpO2 92%   BMI 25.24 kg/m²     General appearance: No apparent distress appears stated age and cooperative. HEENT Normal cephalic, atraumatic without obvious deformity. Pupils equal, round, and reactive to light. Extra ocular muscles intact. Conjunctivae/corneas clear. Neck: Supple, No jugular venous distention/bruits. Trachea midline without thyromegaly or adenopathy with full range of motion. Lungs: Clear to auscultation, bilaterally without Rales/Wheezes/Rhonchi with good respiratory effort. Heart: Regular rate and rhythm with Normal S1/S2 without murmurs, rubs or gallops, point of maximum impulse non-displaced  Abdomen: Soft, non-tender or non-distended without rigidity or guarding and positive bowel sounds all four quadrants. Extremities: No clubbing, cyanosis, or edema bilaterally. Full range of motion without deformity and normal gait intact. Skin: Skin color, texture, turgor normal.  No rashes or lesions.   Neurologic: Alert and oriented X 3, neurovascularly intact with sensory/motor intact upper extremities/lower extremities, bilaterally. Cranial nerves: II-XII intact, grossly non-focal.  Mental status: Alert, oriented, thought content appropriate. Capillary Refill: Acceptable  < 3 seconds  Peripheral Pulses: +3 Easily felt, not easily obliterated with pressure      CXR:  I have reviewed the CXR with the following interpretation:   New patchy interstitial and alveolar subtle opacities in both lungs.  This   can be seen with COVID-19 pneumonia in the proper clinical setting. Multifocal pneumonia and other atypical pneumonias are also in the   differential.         Ct     No central pulmonary embolus identified. Benay Fryer peripheral branches are not   well evaluated secondary to motion.  There is mild coronary artery   calcification       Bilateral lower lobe lung consolidation, suspicious for pneumonia. Kelsie Fossa is   moderate underlying emphysema.  No significant pleural fluid             EKG:  I have reviewed the EKG with the following interpretation: Supraventricular tachycardiaInferior infarct , age undetermined    CBC   Recent Labs     12/31/21  0900   WBC 9.8   HGB 13.5   HCT 43.7         RENAL  Recent Labs     12/31/21 0900      K 5.6*      CO2 29   BUN 17   CREATININE 0.9     LFT'S  Recent Labs     12/31/21  0900   AST 18   ALT 12   BILITOT <0.2   ALKPHOS 121     COAG  Recent Labs     12/31/21 0900   INR 2.99*     CARDIAC ENZYMES  Recent Labs     12/31/21 0900   TROPONINI <0.01       U/A:  No results found for: NITRITE, COLORU, WBCUA, RBCUA, MUCUS, BACTERIA, CLARITYU, SPECGRAV, LEUKOCYTESUR, BLOODU, GLUCOSEU, AMORPHOUS    ABG    Lab Results   Component Value Date    DMM0VQN 27.1 12/31/2021    BEART -2.1 12/31/2021    G1UMKITD 95.7 12/31/2021    PHART 7.214 12/31/2021    TRR6XVJ 67.3 12/31/2021    PO2ART 83.8 12/31/2021    FQI6KCW 65.4 12/31/2021           Active Hospital Problems    Diagnosis Date Noted    Acute respiratory failure ([facility] [address] [J96.00] 12/31/2021         PHYSICIANS CERTIFICATION:    I certify that Delphine Mireles is expected to be hospitalized for greater than 2 midnights based on the following assessment and plan:      ASSESSMENT/PLAN:    Acute respiratory failure hypoxic and hypercapnic  - intubated and sedated  - pulm consulted for vent management   -COVID swab to rule out. -repeat gas    Bilat pneumonia of both lower lobes  - covered with rocephin and Azithromycin  I had ordered levaquin and they did get a dose of this as well. Also go Maxipime in the ED, as well as a dose of vanc. Continue abx    COPD  - acute exacerbation  - no wheeze at the time of my exam  - patient will be on solumedrol 40 mg IV BID. DVT Prophylaxis: therapeutic INR from warfarin   Diet: Diet NPO  Code Status: Full Code  PT/OT Eval Status: when appropriate    Dispo - patient is in acute respiratory failure and was intubated in the ED. He has bilat pna and is on abx. He remains vented and sedated. He is admitted to the critical care COVID unit. Total critical care time was 35 minutes, excluding separately reportable procedures. Services included in critical care time were chart data review, documentation time, obtaining information from patient, review of nursing notes, and vital sign assessment. There was a high probability of clinically significant life-threatening deterioration in the patient's condition, which required my urgent intervention. Rodney Yadav MD    Thank you Mandeep Miner MD for the opportunity to be involved in this patient's care. If you have any questions or concerns please feel free to contact me at [phone].

## 2022-01-01 ENCOUNTER — APPOINTMENT (OUTPATIENT)
Dept: GENERAL RADIOLOGY | Age: 62
DRG: 208 | End: 2022-01-01
Payer: COMMERCIAL

## 2022-01-01 LAB
ANION GAP SERPL CALCULATED.3IONS-SCNC: 13 MMOL/L (ref 3–16)
ANION GAP SERPL CALCULATED.3IONS-SCNC: 15 MMOL/L (ref 3–16)
BASE EXCESS ARTERIAL: 4 (ref -3–3)
BASOPHILS ABSOLUTE: 0 K/UL (ref 0–0.2)
BASOPHILS RELATIVE PERCENT: 0.5 %
BUN BLDV-MCNC: 21 MG/DL (ref 7–20)
BUN BLDV-MCNC: 31 MG/DL (ref 7–20)
CALCIUM IONIZED: 1.23 MMOL/L (ref 1.12–1.32)
CALCIUM SERPL-MCNC: 9.3 MG/DL (ref 8.3–10.6)
CALCIUM SERPL-MCNC: 9.5 MG/DL (ref 8.3–10.6)
CHLORIDE BLD-SCNC: 100 MMOL/L (ref 99–110)
CHLORIDE BLD-SCNC: 102 MMOL/L (ref 99–110)
CO2: 20 MMOL/L (ref 21–32)
CO2: 24 MMOL/L (ref 21–32)
CREAT SERPL-MCNC: 0.9 MG/DL (ref 0.8–1.3)
CREAT SERPL-MCNC: 1 MG/DL (ref 0.8–1.3)
EOSINOPHILS ABSOLUTE: 0 K/UL (ref 0–0.6)
EOSINOPHILS RELATIVE PERCENT: 0 %
GFR AFRICAN AMERICAN: >60
GFR AFRICAN AMERICAN: >60
GFR NON-AFRICAN AMERICAN: >60
GFR NON-AFRICAN AMERICAN: >60
GLUCOSE BLD-MCNC: 120 MG/DL (ref 70–99)
GLUCOSE BLD-MCNC: 152 MG/DL (ref 70–99)
GLUCOSE BLD-MCNC: 157 MG/DL (ref 70–99)
HCO3 ARTERIAL: 31.3 MMOL/L (ref 21–29)
HCT VFR BLD CALC: 43.5 % (ref 40.5–52.5)
HEMOGLOBIN: 13.5 G/DL (ref 13.5–17.5)
HEMOGLOBIN: 16.1 GM/DL (ref 13.5–17.5)
INR BLD: 2.09 (ref 0.88–1.12)
L. PNEUMOPHILA SEROGP 1 UR AG: NORMAL
LACTATE: 1.15 MMOL/L (ref 0.4–2)
LYMPHOCYTES ABSOLUTE: 0.6 K/UL (ref 1–5.1)
LYMPHOCYTES RELATIVE PERCENT: 12.5 %
MAGNESIUM: 2.3 MG/DL (ref 1.8–2.4)
MCH RBC QN AUTO: 28.4 PG (ref 26–34)
MCHC RBC AUTO-ENTMCNC: 31.1 G/DL (ref 31–36)
MCV RBC AUTO: 91.3 FL (ref 80–100)
MONOCYTES ABSOLUTE: 0.2 K/UL (ref 0–1.3)
MONOCYTES RELATIVE PERCENT: 4.5 %
NEUTROPHILS ABSOLUTE: 3.9 K/UL (ref 1.7–7.7)
NEUTROPHILS RELATIVE PERCENT: 82.5 %
O2 SAT, ARTERIAL: 90 % (ref 93–100)
PCO2 ARTERIAL: 68.6 MM HG (ref 35–45)
PDW BLD-RTO: 15 % (ref 12.4–15.4)
PERFORMED ON: ABNORMAL
PH ARTERIAL: 7.27 (ref 7.35–7.45)
PLATELET # BLD: 262 K/UL (ref 135–450)
PMV BLD AUTO: 7.2 FL (ref 5–10.5)
PO2 ARTERIAL: 69.6 MM HG (ref 75–108)
POC HEMATOCRIT: 47 % (ref 40.5–52.5)
POC POTASSIUM: 5.1 MMOL/L (ref 3.5–5.1)
POC SAMPLE TYPE: ABNORMAL
POC SODIUM: 142 MMOL/L (ref 136–145)
POTASSIUM REFLEX MAGNESIUM: 5.7 MMOL/L (ref 3.5–5.1)
POTASSIUM SERPL-SCNC: 6 MMOL/L (ref 3.5–5.1)
PROCALCITONIN: 0.64 NG/ML (ref 0–0.15)
PROTHROMBIN TIME: 24.4 SEC (ref 9.9–12.7)
RBC # BLD: 4.76 M/UL (ref 4.2–5.9)
SODIUM BLD-SCNC: 137 MMOL/L (ref 136–145)
SODIUM BLD-SCNC: 137 MMOL/L (ref 136–145)
STREP PNEUMONIAE ANTIGEN, URINE: NORMAL
TCO2 ARTERIAL: 33 MMOL/L
TROPONIN: <0.01 NG/ML
WBC # BLD: 4.7 K/UL (ref 4–11)

## 2022-01-01 PROCEDURE — C9113 INJ PANTOPRAZOLE SODIUM, VIA: HCPCS | Performed by: INTERNAL MEDICINE

## 2022-01-01 PROCEDURE — 99291 CRITICAL CARE FIRST HOUR: CPT | Performed by: INTERNAL MEDICINE

## 2022-01-01 PROCEDURE — 2580000003 HC RX 258: Performed by: INTERNAL MEDICINE

## 2022-01-01 PROCEDURE — 83735 ASSAY OF MAGNESIUM: CPT

## 2022-01-01 PROCEDURE — 2700000000 HC OXYGEN THERAPY PER DAY

## 2022-01-01 PROCEDURE — 94761 N-INVAS EAR/PLS OXIMETRY MLT: CPT

## 2022-01-01 PROCEDURE — 82330 ASSAY OF CALCIUM: CPT

## 2022-01-01 PROCEDURE — 0B938ZX DRAINAGE OF RIGHT MAIN BRONCHUS, VIA NATURAL OR ARTIFICIAL OPENING ENDOSCOPIC, DIAGNOSTIC: ICD-10-PCS | Performed by: INTERNAL MEDICINE

## 2022-01-01 PROCEDURE — 6360000002 HC RX W HCPCS: Performed by: EMERGENCY MEDICINE

## 2022-01-01 PROCEDURE — 36415 COLL VENOUS BLD VENIPUNCTURE: CPT

## 2022-01-01 PROCEDURE — 2500000003 HC RX 250 WO HCPCS

## 2022-01-01 PROCEDURE — 6360000002 HC RX W HCPCS: Performed by: INTERNAL MEDICINE

## 2022-01-01 PROCEDURE — 93005 ELECTROCARDIOGRAM TRACING: CPT | Performed by: INTERNAL MEDICINE

## 2022-01-01 PROCEDURE — 2500000003 HC RX 250 WO HCPCS: Performed by: INTERNAL MEDICINE

## 2022-01-01 PROCEDURE — 6370000000 HC RX 637 (ALT 250 FOR IP): Performed by: INTERNAL MEDICINE

## 2022-01-01 PROCEDURE — 2000000000 HC ICU R&B

## 2022-01-01 PROCEDURE — 84478 ASSAY OF TRIGLYCERIDES: CPT

## 2022-01-01 PROCEDURE — 84484 ASSAY OF TROPONIN QUANT: CPT

## 2022-01-01 PROCEDURE — 84132 ASSAY OF SERUM POTASSIUM: CPT

## 2022-01-01 PROCEDURE — 87205 SMEAR GRAM STAIN: CPT

## 2022-01-01 PROCEDURE — 82803 BLOOD GASES ANY COMBINATION: CPT

## 2022-01-01 PROCEDURE — 85610 PROTHROMBIN TIME: CPT

## 2022-01-01 PROCEDURE — 85025 COMPLETE CBC W/AUTO DIFF WBC: CPT

## 2022-01-01 PROCEDURE — 87070 CULTURE OTHR SPECIMN AEROBIC: CPT

## 2022-01-01 PROCEDURE — 85014 HEMATOCRIT: CPT

## 2022-01-01 PROCEDURE — 82947 ASSAY GLUCOSE BLOOD QUANT: CPT

## 2022-01-01 PROCEDURE — 84145 PROCALCITONIN (PCT): CPT

## 2022-01-01 PROCEDURE — 71045 X-RAY EXAM CHEST 1 VIEW: CPT

## 2022-01-01 PROCEDURE — 94640 AIRWAY INHALATION TREATMENT: CPT

## 2022-01-01 PROCEDURE — 84295 ASSAY OF SERUM SODIUM: CPT

## 2022-01-01 PROCEDURE — 31624 DX BRONCHOSCOPE/LAVAGE: CPT | Performed by: INTERNAL MEDICINE

## 2022-01-01 PROCEDURE — 80048 BASIC METABOLIC PNL TOTAL CA: CPT

## 2022-01-01 PROCEDURE — 83605 ASSAY OF LACTIC ACID: CPT

## 2022-01-01 PROCEDURE — 94003 VENT MGMT INPAT SUBQ DAY: CPT

## 2022-01-01 RX ORDER — WARFARIN SODIUM 5 MG/1
5 TABLET ORAL DAILY
Status: DISCONTINUED | OUTPATIENT
Start: 2022-01-01 | End: 2022-01-01

## 2022-01-01 RX ORDER — SCOLOPAMINE TRANSDERMAL SYSTEM 1 MG/1
1 PATCH, EXTENDED RELEASE TRANSDERMAL
Status: DISCONTINUED | OUTPATIENT
Start: 2022-01-01 | End: 2022-01-04 | Stop reason: HOSPADM

## 2022-01-01 RX ORDER — DEXMEDETOMIDINE HYDROCHLORIDE 4 UG/ML
.2-1.4 INJECTION, SOLUTION INTRAVENOUS CONTINUOUS
Status: DISCONTINUED | OUTPATIENT
Start: 2022-01-01 | End: 2022-01-04

## 2022-01-01 RX ORDER — LIDOCAINE HYDROCHLORIDE 10 MG/ML
INJECTION, SOLUTION EPIDURAL; INFILTRATION; INTRACAUDAL; PERINEURAL
Status: COMPLETED
Start: 2022-01-01 | End: 2022-01-01

## 2022-01-01 RX ORDER — SCOLOPAMINE TRANSDERMAL SYSTEM 1 MG/1
1 PATCH, EXTENDED RELEASE TRANSDERMAL
Status: DISCONTINUED | OUTPATIENT
Start: 2022-01-01 | End: 2022-01-01

## 2022-01-01 RX ORDER — NOREPINEPHRINE BIT/0.9 % NACL 16MG/250ML
2-100 INFUSION BOTTLE (ML) INTRAVENOUS CONTINUOUS
Status: DISCONTINUED | OUTPATIENT
Start: 2022-01-01 | End: 2022-01-01

## 2022-01-01 RX ORDER — IPRATROPIUM BROMIDE AND ALBUTEROL SULFATE 2.5; .5 MG/3ML; MG/3ML
1 SOLUTION RESPIRATORY (INHALATION) 4 TIMES DAILY
Status: DISCONTINUED | OUTPATIENT
Start: 2022-01-01 | End: 2022-01-03

## 2022-01-01 RX ADMIN — DEXMEDETOMIDINE HYDROCHLORIDE 0.2 MCG/KG/HR: 4 INJECTION, SOLUTION INTRAVENOUS at 13:18

## 2022-01-01 RX ADMIN — IPRATROPIUM BROMIDE AND ALBUTEROL SULFATE 1 AMPULE: .5; 3 SOLUTION RESPIRATORY (INHALATION) at 13:00

## 2022-01-01 RX ADMIN — Medication 75 MCG/HR: at 17:00

## 2022-01-01 RX ADMIN — Medication 100 MCG/HR: at 12:32

## 2022-01-01 RX ADMIN — METHYLPREDNISOLONE SODIUM SUCCINATE 40 MG: 40 INJECTION, POWDER, FOR SOLUTION INTRAMUSCULAR; INTRAVENOUS at 07:36

## 2022-01-01 RX ADMIN — PROPOFOL 45 MCG/KG/MIN: 10 INJECTION, EMULSION INTRAVENOUS at 17:00

## 2022-01-01 RX ADMIN — IPRATROPIUM BROMIDE AND ALBUTEROL SULFATE 1 AMPULE: .5; 3 SOLUTION RESPIRATORY (INHALATION) at 15:49

## 2022-01-01 RX ADMIN — ASPIRIN 81 MG 81 MG: 81 TABLET ORAL at 07:36

## 2022-01-01 RX ADMIN — Medication 100 MCG/HR: at 03:21

## 2022-01-01 RX ADMIN — WARFARIN SODIUM 7.5 MG: 2.5 TABLET ORAL at 17:33

## 2022-01-01 RX ADMIN — PANTOPRAZOLE SODIUM 40 MG: 40 INJECTION, POWDER, FOR SOLUTION INTRAVENOUS at 07:35

## 2022-01-01 RX ADMIN — PROPOFOL 45 MCG/KG/MIN: 10 INJECTION, EMULSION INTRAVENOUS at 07:25

## 2022-01-01 RX ADMIN — IPRATROPIUM BROMIDE AND ALBUTEROL SULFATE 1 AMPULE: .5; 3 SOLUTION RESPIRATORY (INHALATION) at 04:57

## 2022-01-01 RX ADMIN — AZITHROMYCIN MONOHYDRATE 250 MG: 250 TABLET ORAL at 07:37

## 2022-01-01 RX ADMIN — LIDOCAINE HYDROCHLORIDE: 10 INJECTION, SOLUTION EPIDURAL; INFILTRATION; INTRACAUDAL; PERINEURAL at 11:29

## 2022-01-01 RX ADMIN — Medication 1000 MG: at 16:00

## 2022-01-01 RX ADMIN — Medication 2 MCG/MIN: at 17:56

## 2022-01-01 RX ADMIN — PHENYLEPHRINE HYDROCHLORIDE 30 MCG/MIN: 10 INJECTION INTRAVENOUS at 18:45

## 2022-01-01 RX ADMIN — IPRATROPIUM BROMIDE AND ALBUTEROL SULFATE 1 AMPULE: .5; 3 SOLUTION RESPIRATORY (INHALATION) at 19:49

## 2022-01-01 RX ADMIN — ATORVASTATIN CALCIUM 80 MG: 80 TABLET, FILM COATED ORAL at 07:36

## 2022-01-01 RX ADMIN — PROPOFOL 45 MCG/KG/MIN: 10 INJECTION, EMULSION INTRAVENOUS at 17:45

## 2022-01-01 RX ADMIN — PROPOFOL 45 MCG/KG/MIN: 10 INJECTION, EMULSION INTRAVENOUS at 04:56

## 2022-01-01 RX ADMIN — Medication 10 ML: at 21:00

## 2022-01-01 RX ADMIN — PROPOFOL 45 MCG/KG/MIN: 10 INJECTION, EMULSION INTRAVENOUS at 01:26

## 2022-01-01 RX ADMIN — PROPOFOL 35 MCG/KG/MIN: 10 INJECTION, EMULSION INTRAVENOUS at 20:31

## 2022-01-01 RX ADMIN — Medication 10 ML: at 07:37

## 2022-01-01 RX ADMIN — CARVEDILOL 25 MG: 25 TABLET, FILM COATED ORAL at 07:35

## 2022-01-01 RX ADMIN — METHYLPREDNISOLONE SODIUM SUCCINATE 40 MG: 40 INJECTION, POWDER, FOR SOLUTION INTRAMUSCULAR; INTRAVENOUS at 20:27

## 2022-01-01 ASSESSMENT — PULMONARY FUNCTION TESTS
PIF_VALUE: 19
PIF_VALUE: 25
PIF_VALUE: 22
PIF_VALUE: 19

## 2022-01-01 ASSESSMENT — PAIN SCALES - GENERAL
PAINLEVEL_OUTOF10: 0
PAINLEVEL_OUTOF10: 5
PAINLEVEL_OUTOF10: 0

## 2022-01-01 NOTE — PROGRESS NOTES
Bronchoscopy completed at bedside by Dr. Criss Odonnell. Bronchial washings collected and brought to lab. Patient agitated during procedure. Sedation increased, otherwise patient tolerated well.

## 2022-01-01 NOTE — RT PROTOCOL NOTE
RT Inhaler-Nebulizer Bronchodilator Protocol Note    There is a bronchodilator order in the chart from a provider indicating to follow the RT Bronchodilator Protocol and there is an Initiate RT Inhaler-Nebulizer Bronchodilator Protocol order as well (see protocol at bottom of note). CXR Findings:  XR CHEST PORTABLE    Result Date: 1/1/2022  ET tube and enteric tube. New patchy interstitial and alveolar subtle opacities in both lungs. This can be seen with COVID-19 pneumonia in the proper clinical setting. Multifocal pneumonia and other atypical pneumonias are also in the differential.     XR CHEST PORTABLE    Result Date: 1/1/2022  Stable chest.       The findings from the last RT Protocol Assessment were as follows:   History Pulmonary Disease: Chronic pulmonary disease  Respiratory Pattern: Regular pattern and RR 12-20 bpm  Breath Sounds: Clear breath sounds  Cough: Strong, productive  Indication for Bronchodilator Therapy: Decreased or absent breath sounds  Bronchodilator Assessment Score: 3    Aerosolized bronchodilator medication orders have been revised according to the RT Inhaler-Nebulizer Bronchodilator Protocol below. Respiratory Therapist to perform RT Therapy Protocol Assessment initially then follow the protocol. Repeat RT Therapy Protocol Assessment PRN for score 0-3 or on second treatment, BID, and PRN for scores above 3. No Indications - adjust the frequency to every 6 hours PRN wheezing or bronchospasm, if no treatments needed after 48 hours then discontinue using Per Protocol order mode. If indication present, adjust the RT bronchodilator orders based on the Bronchodilator Assessment Score as indicated below.   Use Inhaler orders unless patient has one or more of the following: on home nebulizer, not able to hold breath for 10 seconds, is not alert and oriented, cannot activate and use MDI correctly, or respiratory rate 25 breaths per minute or more, then use the equivalent nebulizer order(s) with same Frequency and PRN reasons based on the score. If a patient is on this medication at home then do not decrease Frequency below that used at home. 0-3 - enter or revise RT bronchodilator order(s) to equivalent RT Bronchodilator order with Frequency of every 4 hours PRN for wheezing or increased work of breathing using Per Protocol order mode. 4-6 - enter or revise RT Bronchodilator order(s) to two equivalent RT bronchodilator orders with one order with BID Frequency and one order with Frequency of every 4 hours PRN wheezing or increased work of breathing using Per Protocol order mode. 7-10 - enter or revise RT Bronchodilator order(s) to two equivalent RT bronchodilator orders with one order with TID Frequency and one order with Frequency of every 4 hours PRN wheezing or increased work of breathing using Per Protocol order mode. 11-13 - enter or revise RT Bronchodilator order(s) to one equivalent RT bronchodilator order with QID Frequency and an Albuterol order with Frequency of every 4 hours PRN wheezing or increased work of breathing using Per Protocol order mode. Greater than 13 - enter or revise RT Bronchodilator order(s) to one equivalent RT bronchodilator order with every 4 hours Frequency and an Albuterol order with Frequency of every 2 hours PRN wheezing or increased work of breathing using Per Protocol order mode. RT to enter RT Home Evaluation for COPD & MDI Assessment order using Per Protocol order mode.     Electronically signed by Zully Bertrand RCP on 1/1/2022 at 4:20 PM

## 2022-01-01 NOTE — PROCEDURES
Bronchoscopy procedure note    Indications for procedure: Bilateral lower lobe pneumonia/atelectasis  Preprocedure diagnosis: Same  Postprocedure diagnosis: Bilateral lower lobe pneumonia  Anesthesia: Propofol and fentanyl  Procedure:   1. Bronchoscopy with bronchial washing    Complications: None were apparent. Blood loss: <10 mL    Description of procedure: After obtaining informed consent from the patient's family he was set up for this procedure as an inpatient. Bronchoscope was advanced through the existing endotracheal tube. Lidocaine 1% was used to anesthetize airways. Mucoid secretions were noted bilaterally which cleared with suctioning. No purulent or mucous plugs noted. Bronchial washing was obtained from right main bronchus and sent for culture. Patient tolerated procedure without any complications. Trinidad Lundborg, MD  Pulmonary Critical Care and Sleep Medicine  1/1/2022, 12:37 PM    This note was completed using dragon medical speech recognition software. Grammatical errors, random word insertions, pronoun errors and incomplete sentences are occasional consequences of this technology due to software limitations. If there are questions or concerns about the content of this note of information contained within the body of this dictation they should be addressed with the provider for clarification.

## 2022-01-01 NOTE — PROGRESS NOTES
Comprehensive Nutrition Assessment    Type and Reason for Visit:  Initial (RD triggered d/t pt on vent)    Nutrition Recommendations/Plan:   1. OK for EN support to start s/p bronchoscopy per Dr. Camilo Rabago  2. Recommend Jevity 1.5 (standard formula with fiber) at goal rate 55 mL per hour to provide 1320 mL total volume, 1980 calories, 84 grams protein, 1003 mL free water. 3. Recommend water bolus 125 mL q 6 hours. 4. Ensure head of bed is 30 - 45 degrees during continuous or bolus gastric feeding and for one hour after bolus. Turn off the feeding if head of bed is lowered less than 30 degrees. 5. Monitor for tolerance (bowel habits, N/V, cramping, abdominal exam findings: distended, firm, tense, guarded, discomfort). Nutrition Assessment:  Pt seen during IPOC critical care rounds, intubated and sedated. Propofol infusing at 11.8 mL per hour to provide 312 calories from fat daily. OK to start EN s/p bronchoscopy today. Will monitor for tolerance. Malnutrition Assessment:  Malnutrition Status:  Insufficient data      Estimated Daily Nutrient Needs:  Energy (kcal):  9895-4264; Weight Used for Energy Requirements:  Current (75 kg)     Protein (g):   grams; Weight Used for Protein Requirements:  Current (75 kg; 1.2-2 grams per kg)        Fluid (ml/day):   ; Method Used for Fluid Requirements:  1 ml/kcal      Nutrition Related Findings:  No edema noted. Wounds:  None       Current Nutrition Therapies:    Diet NPO    Anthropometric Measures:  · Height: 5' 8\" (172.7 cm)  · Current Body Weight: 166 lb 0.1 oz (75.3 kg)   · Admission Body Weight: 166 lb 0.1 oz (75.3 kg)    · Ideal Body Weight: 154 lbs; % Ideal Body Weight 107.8 %   · BMI: 25.2  · BMI Categories: Overweight (BMI 25.0-29. 9)       Nutrition Diagnosis:   · Inadequate oral intake related to impaired respiratory function as evidenced by intubation      Nutrition Interventions:   Food and/or Nutrient Delivery:  Start Tube Feeding  Nutrition

## 2022-01-01 NOTE — PROGRESS NOTES
PULMONARY AND CRITICAL CARE MEDICINE PROGRESS NOTE    Subjective: No acute events overnight. I performed bedside bronchoscopy that did not show any mucous plugging and showed bilateral mucoid secretions that cleared with suctioning. He is sedated with propofol and fentanyl. ABG from this morning continues to show acute hypercapnia with hypoxia. ROS could not be obtained due to patient factors. MEDICATIONS:     Scheduled Meds:   warfarin  7.5 mg Oral Daily    aspirin  81 mg Oral Daily    atorvastatin  80 mg Oral Daily    carvedilol  25 mg Oral BID WC    sodium chloride flush  5-40 mL IntraVENous 2 times per day    cefTRIAXone (ROCEPHIN) IV  1,000 mg IntraVENous Q24H    azithromycin  250 mg Oral Daily    methylPREDNISolone  40 mg IntraVENous Q12H    pantoprazole  40 mg IntraVENous Daily    ipratropium-albuterol  1 ampule Inhalation Q4H       Current Infusions:    fentaNYL 100 mcg/hr (01/01/22 1232)    sodium chloride 1,000 mL (12/31/21 1836)    propofol 30 mcg/kg/min (01/01/22 1130)       PRN meds:  sodium chloride flush, sodium chloride, ondansetron **OR** ondansetron, polyethylene glycol, acetaminophen **OR** acetaminophen    PHYSICAL EXAM:  /78   Pulse 104   Temp 98.7 °F (37.1 °C) (Temporal)   Resp 20   Ht 5' 8\" (1.727 m)   Wt 166 lb (75.3 kg)   SpO2 95%   BMI 25.24 kg/m²  I/O last 3 completed shifts: In: 1021.8 [I.V.:634.8; IV Piggyback:387]  Out: 850 [Urine:850] No intake/output data recorded. Intake/Output Summary (Last 24 hours) at 1/1/2022 1232  Last data filed at 1/1/2022 0600  Gross per 24 hour   Intake 1021.77 ml   Output 850 ml   Net 171.77 ml       CONSTITUTIONAL: He is a 62y.o.-year-old who appears his stated age. He is in no acute distress. CARDIOVASCULAR: S1 S2 RRR. Without murmer  RESPIRATORY & CHEST: Lungs are clear to auscultation and percussion. No wheezing, no crackles.  Good air movement  GASTROINTESTINAL & ABDOMEN: Soft, nontender, positive bowel sounds in all quadrants, non-distended, without hepatosplenomegaly. GENITOURINARY: Deferred. MUSCULOSKELETAL: No tenderness to palpation of the axial skeleton. There is no clubbing. No cyanosis. No edema of the lower extremities. SKIN OF BODY: No rash or jaundice. PSYCHIATRIC EVALUATION: Could not be assessed. HEMATOLOGIC/LYMPHATIC/ IMMUNOLOGIC: No palpable lymphadenopathy. NEUROLOGIC: Sedated. Groslly non-focal. Motor strength is 5+/5 in all muscle groups. The patient has a normal sensorium globally. LABS:    Recent Labs     12/31/21  0900 01/01/22  0501 01/01/22  0754   WBC 9.8 4.7  --    RBC 4.76 4.76  --    HGB 13.5 13.5 16.1   HCT 43.7 43.5  --    MCH 28.5 28.4  --    MCHC 31.0 31.1  --    RDW 15.1 15.0  --     262  --    MPV 7.1 7.2  --    NEUTOPHILPCT 75.6 82.5  --    MONOPCT 6.2 4.5  --    BASOPCT 0.8 0.5  --      Recent Labs     12/31/21  0900 01/01/22  0501 01/01/22  0744    137  --    K 5.6*  --  6.0*    102  --    ANIONGAP 8 15  --    CO2 29 20*  --    BUN 17 21*  --    CREATININE 0.9 0.9  --    CALCIUM 9.5 9.3  --    PROT 8.1  --   --    BILITOT <0.2  --   --    ALKPHOS 121  --   --    ALT 12  --   --    AST 18  --   --    GLUCOSE 149* 120*  --      Recent Labs     12/31/21  1400 01/01/22  0754   PHART 7.214* 7.267*   WER9VYM 67.3* 68.6*   PO2ART 83.8 69.6*   GPN4SMZ 27.1 31.3*   H1EXMGZV 95.7 90*   BEART -2.1 4*          IMAGING:  I reviewed the CT chest and my interpretation is as follows: B/L LL consolidation with collapse. B/L emphysema        IMPRESSION:  Acute hypoxic and hypercapnic respiratory failure  COPD unknown severity  B/L LL pneumonia  COVID 19 r/o  Current every day smoker        PLAN:  Patient is sedated with propofol and fentanyl. Titrate for RASS of -1 to +1     Acute hypoxic and hypercapnic respiratory failure due to b/l LL pneumonia and COPD exacerbation. ABG from this morning is suggestive of acute hypercapnia and hypoxia.   I have adjusted the ventilator settings. Continue duonebs q4. Continue solumedrol 40 mg iv q12.      Continue rocephin and azithromycin for b/l LL pneumonia. Bedside bronchoscopy did not show any mucous plugging in the lower lobes. Bilateral mucoid secretions were noted which cleared with suctioning. Bronchial washing was sent from right main bronchus. Cultures pending. Fully vaccinated with booster dose on 12/22/21 for covid. Legionella urine Ag, streptococcal Ag pending.      Start tube feeding     Protonix fpr stress ulcer PPx. Patient is on Coumadin. Critical Care Time: 39 Minutes  Total critical care time caring for this patient with life threatening illness, including direct patient contact, management of life support systems, review of data including imaging and labs, discussions with other team members and physicians excluding procedures. Marcela Pruitt MD  Pulmonary Critical Care and Sleep Medicine  1/1/2022, 12:32 PM    This note was completed using dragon medical speech recognition software. Grammatical errors, random word insertions, pronoun errors and incomplete sentences are occasional consequences of this technology due to software limitations. If there are questions or concerns about the content of this note of information contained within the body of this dictation they should be addressed with the provider for clarification.

## 2022-01-01 NOTE — PROGRESS NOTES
Assessment complete. VSS. Follows commands. Responsive to voice. Impulsive. Remains intubated and sedated. ETT size #7.5 secured at 25 cm to the lower lip Current vent settings: AC/VC. Rate 16. . PEEP 5. FiO2 40%. Lung sounds diminished. OG secured at 65 cm to the lower lip to LIWS. SR per monitor. Pedal and radial pulses palpated. Skin warm, dry and intact. Peripheral IV's unremarkable. Abdomen soft and non-distended. Echols patent and draining. Repositioned. Remains in bilateral wrist restraints. Wife updated regarding plan of care and covid negative status.

## 2022-01-01 NOTE — SIGNIFICANT EVENT
Rapid response called for bradycardia. Patient with HR in 30s and 40s. Patient is sedated on vent with Fentanyl and Propofol gtt. Currently on Levophed gtt. EKG appears to be sinus bradycardia, cannot exclude peaked T waves in V4-v6 and TWI in V1-V3. Will change Levophed to Neosynephrine gtt. Will check BMP, Mg, Troponin. Respiratory status appears stable on vent at this time. Will update Dr Gil Blanco who is night MD today. Asked ICU staff to keep Atropine IV ready in case bradycardia is persistent below 30s.     Yolette Tilley MD

## 2022-01-01 NOTE — PROGRESS NOTES
Hospitalist Progress Note      PCP: Roger Painter MD    Date of Admission: 12/31/2021    Chief Complaint: SOB/ respiratory distress. Hospital Course: Pt brought in by [facility] for respiratory distress. Per squad SpO2 upon arrival to the [facility] was \"40-50% on RA. \" Pt then placed on CPAP, SpO2 per squad improved to 75%, Duoneb tx given en route. Upon pt arrival, pt agonal breathing and unresponsive to sternal rub. Dr. Darien Nelson RN, Michelle Ramsey RN, gita RN, and [address] RT at pt bedside. He was actually being driven to the hospital in a car when he passed out and they pulled into the fire station.      Pt squad patient recently dx'd with COPD, and per squad squad pt's wife reports pt, Marcelino Berumenling not been feeling well over the past few days. \"     He was a difficult airway and was intubated in the ED with the help of Anesthesia.        Subjective:  bronched today  Not able to provide as intubated and sedated.       Medications:  Reviewed    Infusion Medications    dexmedetomidine 0.5 mcg/kg/hr (01/01/22 1431)    fentaNYL 100 mcg/hr (01/01/22 1431)    sodium chloride Stopped (01/01/22 0125)    propofol 45 mcg/kg/min (01/01/22 1431)     Scheduled Medications    warfarin  7.5 mg Oral Daily    scopolamine  1 patch TransDERmal Q72H    ipratropium-albuterol  1 ampule Inhalation 4x daily    aspirin  81 mg Oral Daily    atorvastatin  80 mg Oral Daily    carvedilol  25 mg Oral BID WC    sodium chloride flush  5-40 mL IntraVENous 2 times per day    cefTRIAXone (ROCEPHIN) IV  1,000 mg IntraVENous Q24H    azithromycin  250 mg Oral Daily    methylPREDNISolone  40 mg IntraVENous Q12H    pantoprazole  40 mg IntraVENous Daily     PRN Meds: sodium chloride flush, sodium chloride, ondansetron **OR** ondansetron, polyethylene glycol, acetaminophen **OR** acetaminophen      Intake/Output Summary (Last 24 hours) at 1/1/2022 1626  Last data filed at 1/1/2022 1431  Gross per 24 hour   Intake 1292.71 ml   Output 1200 ml   Net 92.71 ml       Physical Exam Performed:    BP 98/69   Pulse 56   Temp 97 °F (36.1 °C) (Bladder)   Resp 20   Ht 5' 8\" (1.727 m)   Wt 166 lb (75.3 kg)   SpO2 97%   BMI 25.24 kg/m²     General appearance:sedated appears comfortable  HEENT: Pupils equal, round, and reactive to light. Conjunctivae/corneas clear. Neck: Supple, with full range of motion. No jugular venous distention. Trachea midline. Respiratory:  Normal respiratory effort. Clear to auscultation, bilaterally without Rales/Wheezes/Rhonchi. Cardiovascular: Regular rate and rhythm with normal S1/S2 without murmurs, rubs or gallops. Abdomen: Soft, non-tender, non-distended with normal bowel sounds. Musculoskeletal: No clubbing, cyanosis or edema bilaterally. Full range of motion without deformity. Skin: Skin color, texture, turgor normal.  No rashes or lesions. Neurologic:  Grossly intact. Psychiatric: sedated  Capillary Refill: Brisk,3 seconds, normal   Peripheral Pulses: +2 palpable, equal bilaterally       Labs:   Recent Labs     12/31/21  0900 01/01/22  0501 01/01/22  0754   WBC 9.8 4.7  --    HGB 13.5 13.5 16.1   HCT 43.7 43.5  --     262  --      Recent Labs     12/31/21  0900 01/01/22  0501 01/01/22  0744    137  --    K 5.6*  --  6.0*    102  --    CO2 29 20*  --    BUN 17 21*  --    CREATININE 0.9 0.9  --    CALCIUM 9.5 9.3  --      Recent Labs     12/31/21 0900   AST 18   ALT 12   BILITOT <0.2   ALKPHOS 121     Recent Labs     12/31/21  0900 01/01/22  0744   INR 2.99* 2.09*     Recent Labs     12/31/21 0900   TROPONINI <0.01       Urinalysis:    No results found for: Lennice Josemanuel, BACTERIA, RBCUA, BLOODU, SPECGRAV, GLUCOSEU    Radiology:  XR CHEST PORTABLE   Final Result   Stable chest.         CT CHEST PULMONARY EMBOLISM W CONTRAST   Final Result   Head:      No acute intracranial abnormality.   There is minimal atrophy and small-vessel   ischemic change      Paranasal sinus disease with secretions in the nasopharynx. Chest:      No central pulmonary embolus identified. .  Tiny peripheral branches are not   well evaluated secondary to motion. There is mild coronary artery   calcification      Bilateral lower lobe lung consolidation, suspicious for pneumonia. .  There is   moderate underlying emphysema. No significant pleural fluid      RECOMMENDATIONS:   Unavailable         CT HEAD WO CONTRAST   Final Result   Head:      No acute intracranial abnormality. There is minimal atrophy and small-vessel   ischemic change      Paranasal sinus disease with secretions in the nasopharynx. Chest:      No central pulmonary embolus identified. .  Tiny peripheral branches are not   well evaluated secondary to motion. There is mild coronary artery   calcification      Bilateral lower lobe lung consolidation, suspicious for pneumonia. .  There is   moderate underlying emphysema. No significant pleural fluid      RECOMMENDATIONS:   Unavailable         XR CHEST PORTABLE   Final Result   ET tube and enteric tube. New patchy interstitial and alveolar subtle opacities in both lungs. This   can be seen with COVID-19 pneumonia in the proper clinical setting. Multifocal pneumonia and other atypical pneumonias are also in the   differential.                 Assessment/Plan:    Active Hospital Problems    Diagnosis     Centrilobular emphysema ([address] [J43.2]     Pneumonia of both lower lobes due to infectious organism [J18.9]     Current every day smoker [F17.200]     Acute respiratory failure ([address] [J96.00]      Acute respiratory failure hypoxic and hypercapnic  - intubated and sedated  - pulm consulted for vent management   -COVID swab was negative   -repeat gas     Bilat pneumonia of both lower lobes  - covered with rocephin and Azithromycin  I had ordered levaquin and they did get a dose of this as well. Also go Maxipime in the ED, as well as a dose of vanc.   Continue abx  He is growing serratia on ET aspirate culture. On abx     COPD  - acute exacerbation  - no wheeze at the time of my exam  - patient will be on solumedrol 40 mg IV BID.     DVT Prophylaxis: therapeutic INR from warfarin   Diet: Diet NPO  Code Status: Full Code  PT/OT Eval Status: when appropriate     Dispo - patient is in acute respiratory failure and was intubated in the ED. He has bilat pna and is on abx. He remains vented and sedated. He is admitted to the critical care COVID unit. DVT Prophylaxis: coumadin  Diet: Diet NPO  ADULT TUBE FEEDING; Orogastric; Standard with Fiber; Continuous; 20; Yes; 25; Q 4 hours; 55; 125; Q 6 hours  Code Status: Full Code    PT/OT Eval Status: eval and treat when appropriate     Dispo - he had bronchoscopy today. He remains vented. Growing serratia     Total critical care time was 35  minutes, excluding separately reportable procedures. Services included in critical care time were chart data review, documentation time, obtaining information from patient, review of nursing notes, and vital sign assessment. There was a high probability of clinically significant life-threatening deterioration in the patient's condition, which required my urgent intervention.          Faith Moore MD

## 2022-01-02 LAB
ANION GAP SERPL CALCULATED.3IONS-SCNC: 11 MMOL/L (ref 3–16)
BASE EXCESS ARTERIAL: 4.6 MMOL/L (ref -3–3)
BASOPHILS ABSOLUTE: 0 K/UL (ref 0–0.2)
BASOPHILS RELATIVE PERCENT: 0.5 %
BUN BLDV-MCNC: 33 MG/DL (ref 7–20)
CALCIUM SERPL-MCNC: 10 MG/DL (ref 8.3–10.6)
CARBOXYHEMOGLOBIN ARTERIAL: 1.5 % (ref 0–1.5)
CHLORIDE BLD-SCNC: 101 MMOL/L (ref 99–110)
CO2: 29 MMOL/L (ref 21–32)
CREAT SERPL-MCNC: 1 MG/DL (ref 0.8–1.3)
CULTURE, RESPIRATORY: ABNORMAL
CULTURE, RESPIRATORY: ABNORMAL
EKG ATRIAL RATE: 40 BPM
EKG DIAGNOSIS: NORMAL
EKG P AXIS: 61 DEGREES
EKG P-R INTERVAL: 166 MS
EKG Q-T INTERVAL: 510 MS
EKG QRS DURATION: 88 MS
EKG QTC CALCULATION (BAZETT): 415 MS
EKG R AXIS: 53 DEGREES
EKG T AXIS: 36 DEGREES
EKG VENTRICULAR RATE: 40 BPM
EOSINOPHILS ABSOLUTE: 0 K/UL (ref 0–0.6)
EOSINOPHILS RELATIVE PERCENT: 0 %
GFR AFRICAN AMERICAN: >60
GFR NON-AFRICAN AMERICAN: >60
GLUCOSE BLD-MCNC: 116 MG/DL (ref 70–99)
GRAM STAIN RESULT: ABNORMAL
HCO3 ARTERIAL: 30.1 MMOL/L (ref 21–29)
HCT VFR BLD CALC: 40.3 % (ref 40.5–52.5)
HEMOGLOBIN, ART, EXTENDED: 13 G/DL (ref 13.5–17.5)
HEMOGLOBIN: 12.9 G/DL (ref 13.5–17.5)
INR BLD: 1.85 (ref 0.88–1.12)
LYMPHOCYTES ABSOLUTE: 0.6 K/UL (ref 1–5.1)
LYMPHOCYTES RELATIVE PERCENT: 6.1 %
MCH RBC QN AUTO: 28.3 PG (ref 26–34)
MCHC RBC AUTO-ENTMCNC: 32.1 G/DL (ref 31–36)
MCV RBC AUTO: 88.4 FL (ref 80–100)
METHEMOGLOBIN ARTERIAL: 0 %
MONOCYTES ABSOLUTE: 0.2 K/UL (ref 0–1.3)
MONOCYTES RELATIVE PERCENT: 1.8 %
NEUTROPHILS ABSOLUTE: 8.7 K/UL (ref 1.7–7.7)
NEUTROPHILS RELATIVE PERCENT: 91.6 %
O2 SAT, ARTERIAL: 97 %
O2 THERAPY: ABNORMAL
ORGANISM: ABNORMAL
PCO2 ARTERIAL: 47.2 MMHG (ref 35–45)
PDW BLD-RTO: 14.8 % (ref 12.4–15.4)
PH ARTERIAL: 7.41 (ref 7.35–7.45)
PLATELET # BLD: 408 K/UL (ref 135–450)
PMV BLD AUTO: 7.2 FL (ref 5–10.5)
PO2 ARTERIAL: 85.7 MMHG (ref 75–108)
POTASSIUM REFLEX MAGNESIUM: 5 MMOL/L (ref 3.5–5.1)
PROTHROMBIN TIME: 21.5 SEC (ref 9.9–12.7)
RBC # BLD: 4.56 M/UL (ref 4.2–5.9)
SODIUM BLD-SCNC: 141 MMOL/L (ref 136–145)
TCO2 ARTERIAL: 70.7 MMOL/L
TRIGL SERPL-MCNC: 167 MG/DL (ref 0–150)
WBC # BLD: 9.5 K/UL (ref 4–11)

## 2022-01-02 PROCEDURE — 2580000003 HC RX 258: Performed by: INTERNAL MEDICINE

## 2022-01-02 PROCEDURE — 6360000002 HC RX W HCPCS: Performed by: INTERNAL MEDICINE

## 2022-01-02 PROCEDURE — 94003 VENT MGMT INPAT SUBQ DAY: CPT

## 2022-01-02 PROCEDURE — 94640 AIRWAY INHALATION TREATMENT: CPT

## 2022-01-02 PROCEDURE — 6370000000 HC RX 637 (ALT 250 FOR IP): Performed by: INTERNAL MEDICINE

## 2022-01-02 PROCEDURE — 82803 BLOOD GASES ANY COMBINATION: CPT

## 2022-01-02 PROCEDURE — 99291 CRITICAL CARE FIRST HOUR: CPT | Performed by: INTERNAL MEDICINE

## 2022-01-02 PROCEDURE — 94761 N-INVAS EAR/PLS OXIMETRY MLT: CPT

## 2022-01-02 PROCEDURE — 85025 COMPLETE CBC W/AUTO DIFF WBC: CPT

## 2022-01-02 PROCEDURE — 36415 COLL VENOUS BLD VENIPUNCTURE: CPT

## 2022-01-02 PROCEDURE — 94660 CPAP INITIATION&MGMT: CPT

## 2022-01-02 PROCEDURE — 80048 BASIC METABOLIC PNL TOTAL CA: CPT

## 2022-01-02 PROCEDURE — 2000000000 HC ICU R&B

## 2022-01-02 PROCEDURE — 85610 PROTHROMBIN TIME: CPT

## 2022-01-02 PROCEDURE — C9113 INJ PANTOPRAZOLE SODIUM, VIA: HCPCS | Performed by: INTERNAL MEDICINE

## 2022-01-02 PROCEDURE — 93010 ELECTROCARDIOGRAM REPORT: CPT | Performed by: INTERNAL MEDICINE

## 2022-01-02 PROCEDURE — 2700000000 HC OXYGEN THERAPY PER DAY

## 2022-01-02 RX ADMIN — IPRATROPIUM BROMIDE AND ALBUTEROL SULFATE 1 AMPULE: .5; 3 SOLUTION RESPIRATORY (INHALATION) at 12:38

## 2022-01-02 RX ADMIN — Medication 10 ML: at 08:50

## 2022-01-02 RX ADMIN — ATORVASTATIN CALCIUM 80 MG: 80 TABLET, FILM COATED ORAL at 08:50

## 2022-01-02 RX ADMIN — WARFARIN SODIUM 7.5 MG: 2.5 TABLET ORAL at 18:01

## 2022-01-02 RX ADMIN — AZITHROMYCIN MONOHYDRATE 250 MG: 250 TABLET ORAL at 08:50

## 2022-01-02 RX ADMIN — PANTOPRAZOLE SODIUM 40 MG: 40 INJECTION, POWDER, FOR SOLUTION INTRAVENOUS at 08:49

## 2022-01-02 RX ADMIN — IPRATROPIUM BROMIDE AND ALBUTEROL SULFATE 1 AMPULE: .5; 3 SOLUTION RESPIRATORY (INHALATION) at 08:19

## 2022-01-02 RX ADMIN — PROPOFOL 45 MCG/KG/MIN: 10 INJECTION, EMULSION INTRAVENOUS at 08:48

## 2022-01-02 RX ADMIN — METHYLPREDNISOLONE SODIUM SUCCINATE 40 MG: 40 INJECTION, POWDER, FOR SOLUTION INTRAMUSCULAR; INTRAVENOUS at 20:10

## 2022-01-02 RX ADMIN — ASPIRIN 81 MG 81 MG: 81 TABLET ORAL at 08:50

## 2022-01-02 RX ADMIN — Medication 10 ML: at 20:11

## 2022-01-02 RX ADMIN — CARVEDILOL 25 MG: 25 TABLET, FILM COATED ORAL at 08:49

## 2022-01-02 RX ADMIN — PROPOFOL 40 MCG/KG/MIN: 10 INJECTION, EMULSION INTRAVENOUS at 00:13

## 2022-01-02 RX ADMIN — Medication 1000 MG: at 18:01

## 2022-01-02 RX ADMIN — CARVEDILOL 25 MG: 25 TABLET, FILM COATED ORAL at 18:01

## 2022-01-02 RX ADMIN — PROPOFOL 45 MCG/KG/MIN: 10 INJECTION, EMULSION INTRAVENOUS at 05:01

## 2022-01-02 RX ADMIN — METHYLPREDNISOLONE SODIUM SUCCINATE 40 MG: 40 INJECTION, POWDER, FOR SOLUTION INTRAMUSCULAR; INTRAVENOUS at 08:49

## 2022-01-02 RX ADMIN — IPRATROPIUM BROMIDE AND ALBUTEROL SULFATE 1 AMPULE: .5; 3 SOLUTION RESPIRATORY (INHALATION) at 22:07

## 2022-01-02 ASSESSMENT — PAIN SCALES - GENERAL
PAINLEVEL_OUTOF10: 0

## 2022-01-02 ASSESSMENT — PULMONARY FUNCTION TESTS
PIF_VALUE: 19
PIF_VALUE: 19
PIF_VALUE: 22

## 2022-01-02 NOTE — PROGRESS NOTES
Pharmacy to Dose Warfarin    Pharmacy consulted to dose warfarin. INR Goal: 2-3    INR today: pending    Assessment/Plan:  - INR pending for today  - Will continue dose of 7.5 mg tonight  - Daily INR pending    Pharmacy will continue to follow.     Miguel Angel Olea, PharmD, [facility]  Clinical Pharmacist  [id number]

## 2022-01-02 NOTE — PROGRESS NOTES
Patient sitting straight up in bed biting tube, coughing, and trying to pull against restraints.  Sedation increased

## 2022-01-02 NOTE — PROGRESS NOTES
PULMONARY AND CRITICAL CARE MEDICINE PROGRESS NOTE    Subjective: Patient had episode of bradycardia overnight due to oversedation. He was had to be initiated on low-dose Levophed. This has been weaned off. He gets very agitated when sedation is weaned down. ROS could not be obtained due to patient factors. MEDICATIONS:     Scheduled Meds:   warfarin  7.5 mg Oral Daily    scopolamine  1 patch TransDERmal Q72H    ipratropium-albuterol  1 ampule Inhalation 4x daily    aspirin  81 mg Oral Daily    atorvastatin  80 mg Oral Daily    carvedilol  25 mg Oral BID WC    sodium chloride flush  5-40 mL IntraVENous 2 times per day    cefTRIAXone (ROCEPHIN) IV  1,000 mg IntraVENous Q24H    methylPREDNISolone  40 mg IntraVENous Q12H    pantoprazole  40 mg IntraVENous Daily       Current Infusions:    dexmedetomidine Stopped (01/01/22 1800)    phenylephrine (JAQUI-SYNEPHRINE) 50mg/250mL infusion Stopped (01/01/22 1851)    fentaNYL Stopped (01/01/22 1842)    sodium chloride Stopped (01/01/22 0125)    propofol Stopped (01/02/22 0920)       PRN meds:  sodium chloride flush, sodium chloride, ondansetron **OR** ondansetron, polyethylene glycol, acetaminophen **OR** acetaminophen    PHYSICAL EXAM:  BP (!) 148/93   Pulse 79   Temp 96.8 °F (36 °C) (Temporal)   Resp 17   Ht 5' 8\" (1.727 m)   Wt 166 lb (75.3 kg)   SpO2 94%   BMI 25.24 kg/m²  I/O last 3 completed shifts: In: 583.6 [I.V.:583.6]  Out: 675 [Urine:675] No intake/output data recorded. Intake/Output Summary (Last 24 hours) at 1/2/2022 1314  Last data filed at 1/2/2022 0600  Gross per 24 hour   Intake 583.55 ml   Output 675 ml   Net -91.45 ml       CONSTITUTIONAL: He is a 62y.o.-year-old who appears his stated age. He is in no acute distress. CARDIOVASCULAR: S1 S2 RRR. Without murmer  RESPIRATORY & CHEST: Lungs are clear to auscultation and percussion. No wheezing, no crackles.  Good air movement  GASTROINTESTINAL & ABDOMEN: Soft, nontender, positive bowel sounds in all quadrants, non-distended, without hepatosplenomegaly. GENITOURINARY: Deferred. MUSCULOSKELETAL: No tenderness to palpation of the axial skeleton. There is no clubbing. No cyanosis. No edema of the lower extremities. SKIN OF BODY: No rash or jaundice. PSYCHIATRIC EVALUATION: Could not be assessed  HEMATOLOGIC/LYMPHATIC/ IMMUNOLOGIC: No palpable lymphadenopathy. NEUROLOGIC: Sedated. Groslly non-focal. Motor strength is 5+/5 in all muscle groups. The patient has a normal sensorium globally. LABS:    Recent Labs     12/31/21  0900 01/01/22  0501 01/01/22  0754   WBC 9.8 4.7  --    RBC 4.76 4.76  --    HGB 13.5 13.5 16.1   HCT 43.7 43.5  --    MCH 28.5 28.4  --    MCHC 31.0 31.1  --    RDW 15.1 15.0  --     262  --    MPV 7.1 7.2  --    NEUTOPHILPCT 75.6 82.5  --    MONOPCT 6.2 4.5  --    BASOPCT 0.8 0.5  --      Recent Labs     12/31/21  0900 01/01/22  0501 01/01/22  0744 01/01/22  1840    137  --  137   K 5.6*  --  6.0* 5.7*    102  --  100   ANIONGAP 8 15  --  13   CO2 29 20*  --  24   BUN 17 21*  --  31*   CREATININE 0.9 0.9  --  1.0   CALCIUM 9.5 9.3  --  9.5   PROT 8.1  --   --   --    BILITOT <0.2  --   --   --    ALKPHOS 121  --   --   --    ALT 12  --   --   --    AST 18  --   --   --    GLUCOSE 149* 120*  --  157*     Recent Labs     12/31/21  1400 01/01/22  0754 01/02/22  0500   PHART 7.214* 7.267* 7.413   NIB5BOH 67.3* 68.6* 47.2*   PO2ART 83.8 69.6* 85.7   GEO9PZM 27.1 31.3* 30.1*   E6VAAKOO 95.7 90* 97.0   BEART -2.1 4* 4.6*          IMAGING:  I reviewed the CT chest and my interpretation is as follows: B/L LL consolidation with collapse. B/L emphysema        IMPRESSION:  Acute hypoxic and hypercapnic respiratory failure  COPD unknown severity  B/L LL pneumonia  COVID 19 r/o  Current every day smoker        PLAN:  Patient is sedated with propofol and fentanyl.   Plan is to discontinue all sedation for vent wean.     Acute hypoxic and hypercapnic respiratory failure due to b/l LL pneumonia and COPD exacerbation. ABG with adequate gas exchange. I have switched him to pressure support ventilation and patient will be extubated to BiPAP as tolerated.       Continue duonebs q4. Continue solumedrol 40 mg iv q12.      Tracheal aspirate growing Serratia. Continue Rocephin. Discontinue Zithromax. Bedside bronchoscopy from 1/1/2022 did not show any mucous plugging in the lower lobes. Bilateral mucoid secretions were noted which cleared with suctioning. Bronchial washing was sent from right main bronchus. Fully vaccinated with booster dose on 12/22/21 for covid.       Speech evaluation post extubation.     Protonix fpr stress ulcer PPx.   Patient is on Coumadin.       Critical Care Time: 39 Minutes  Total critical care time caring for this patient with life threatening illness, including direct patient contact, management of life support systems, review of data including imaging and labs, discussions with other team members and physicians excluding procedures. Gonsalo Vickers MD  Pulmonary Critical Care and Sleep Medicine  1/2/2022, 1:14 PM    This note was completed using dragon medical speech recognition software. Grammatical errors, random word insertions, pronoun errors and incomplete sentences are occasional consequences of this technology due to software limitations. If there are questions or concerns about the content of this note of information contained within the body of this dictation they should be addressed with the provider for clarification.

## 2022-01-02 NOTE — PROGRESS NOTES
01/01/22 1943   [facility] Patient Data   Static Compliance 49.4 mL/cmH2O   Dynamic Compliance 35.29 mL/cmH2O

## 2022-01-02 NOTE — PROGRESS NOTES
Hospitalist Progress Note        PCP: Carito Ye MD     Date of Admission: 12/31/2021     Chief Complaint: SOB/ respiratory distress.     Hospital Course: Pt brought in by [facility] for respiratory distress. Per squad SpO2 upon arrival to the [facility] was \"40-50% on RA. \" Pt then placed on CPAP, SpO2 per squad improved to 75%, Duoneb tx given en route. Upon pt arrival, pt agonal breathing and unresponsive to sternal rub. Dr. Alfonso Garrido RN, Chava Hall RN, gita RN, and Lawyer Ramirez RT at pt bedside.   He was actually being driven to the hospital in a car when he passed out and they pulled into the fire station.      Pt squad patient recently dx'd with COPD, and per squad squad pt's wife reports pt, Lisset Thurman not been feeling well over the past few days. \"     He was a difficult airway and was intubated in the ED with the help of Anesthesia.         Subjective:  bronched today  Not able to provide as intubated and sedated.        Medications:  Reviewed     Infusion Medications   Infusions Meds    dexmedetomidine 0.5 mcg/kg/hr (01/01/22 1431)    fentaNYL 100 mcg/hr (01/01/22 1431)    sodium chloride Stopped (01/01/22 0125)    propofol 45 mcg/kg/min (01/01/22 1431)         Scheduled Medications   Scheduled Medications    warfarin  7.5 mg Oral Daily    scopolamine  1 patch TransDERmal Q72H    ipratropium-albuterol  1 ampule Inhalation 4x daily    aspirin  81 mg Oral Daily    atorvastatin  80 mg Oral Daily    carvedilol  25 mg Oral BID WC    sodium chloride flush  5-40 mL IntraVENous 2 times per day    cefTRIAXone (ROCEPHIN) IV  1,000 mg IntraVENous Q24H    azithromycin  250 mg Oral Daily    methylPREDNISolone  40 mg IntraVENous Q12H    pantoprazole  40 mg IntraVENous Daily         PRN Meds:   PRN Medications   sodium chloride flush, sodium chloride, ondansetron **OR** ondansetron, polyethylene glycol, acetaminophen **OR** acetaminophen           Intake/Output Summary (Last 24 hours) at 1/1/2022 [address] [place] [address] filed at 1/1/2022 1431      Gross per 24 hour   Intake 1292.71 ml   Output 1200 ml   Net 92.71 ml         Physical Exam Performed:     BP 98/69   Pulse 56   Temp 97 °F (36.1 °C) (Bladder)   Resp 20   Ht 5' 8\" (1.727 m)   Wt 166 lb (75.3 kg)   SpO2 97%   BMI 25.24 kg/m²      General appearance:sedated appears comfortable  HEENT: Pupils equal, round, and reactive to light. Conjunctivae/corneas clear. Neck: Supple, with full range of motion. No jugular venous distention. Trachea midline. Respiratory:  Normal respiratory effort. Clear to auscultation, bilaterally without Rales/Wheezes/Rhonchi. Cardiovascular: Regular rate and rhythm with normal S1/S2 without murmurs, rubs or gallops. Abdomen: Soft, non-tender, non-distended with normal bowel sounds. Musculoskeletal: No clubbing, cyanosis or edema bilaterally. Full range of motion without deformity. Skin: Skin color, texture, turgor normal.  No rashes or lesions. Neurologic:  Grossly intact. Psychiatric: sedated  Capillary Refill: Brisk,3 seconds, normal   Peripheral Pulses: +2 palpable, equal bilaterally         Labs:         Recent Labs     12/31/21  0900 01/01/22  0501 01/01/22  0754   WBC 9.8 4.7  --    HGB 13.5 13.5 16.1   HCT 43.7 43.5  --     262  --             Recent Labs     12/31/21  0900 01/01/22  0501 01/01/22  0744    137  --    K 5.6*  --  6.0*    102  --    CO2 29 20*  --    BUN 17 21*  --    CREATININE 0.9 0.9  --    CALCIUM 9.5 9.3  --           Recent Labs     12/31/21  0900   AST 18   ALT 12   BILITOT <0.2   ALKPHOS 121           Recent Labs     12/31/21  0900 01/01/22  0744   INR 2.99* 2.09*          Recent Labs     12/31/21  0900   TROPONINI <0.01         Urinalysis:    No results found for: NITRU, WBCUA, BACTERIA, RBCUA, BLOODU, SPECGRAV, GLUCOSEU     Radiology:  XR CHEST PORTABLE   Final Result   Stable chest.                      Chest:       No central pulmonary embolus identified. Mami Chun peripheral branches are not   well evaluated secondary to motion. There is mild coronary artery   calcification       Bilateral lower lobe lung consolidation, suspicious for pneumonia. .  There is   moderate underlying emphysema. No significant pleural fluid       RECOMMENDATIONS:   Unavailable           XR CHEST PORTABLE   Final Result   ET tube and enteric tube.       New patchy interstitial and alveolar subtle opacities in both lungs. This   can be seen with COVID-19 pneumonia in the proper clinical setting. Multifocal pneumonia and other atypical pneumonias are also in the   differential.                       Assessment/Plan:          Active Hospital Problems     Diagnosis      Centrilobular emphysema (HCC) [J43.2]      Pneumonia of both lower lobes due to infectious organism [J18.9]      Current every day smoker [F17.200]      Acute respiratory failure (HCC) [J96.00]        Acute respiratory failure hypoxic and hypercapnic  - intubated and sedated  - pulm consulted for vent management   -COVID swab was negative   -repeat gas  - possible extubation later today.     Bilat pneumonia of both lower lobes  - covered with rocephin and Azithromycin  I had ordered levaquin and they did get a dose of this as well. Also go Maxipime in the ED, as well as a dose of vanc. Continue abx  He is growing serratia on ET aspirate culture. On abx     COPD  - acute exacerbation  - no wheeze at the time of my exam  - patient will be on solumedrol 40 mg IV BID.     DVT Prophylaxis: therapeutic INR from warfarin   Diet: Diet NPO  Code Status: Full Code  PT/OT Eval Status: when appropriate     Dispo - patient is in acute respiratory failure and was intubated in the ED. He has bilat pna and is on abx. He remains vented and sedated.    He is admitted to the critical care COVID unit.      DVT Prophylaxis: coumadin  Diet: Diet NPO  ADULT TUBE FEEDING; Orogastric; Standard with Fiber; Continuous; 20; Yes; 25; Q 4 hours; 55; 125; Q 6 hours  Code Status: Full Code     PT/OT Eval Status: eval and treat when appropriate      Dispo - he had bronchoscopy today. He remains vented. Growing serratia      Total critical care time was 35  minutes, excluding separately reportable procedures. Services included in critical care time were chart data review, documentation time, obtaining information from patient, review of nursing notes, and vital sign assessment.  There was a high probability of clinically significant life-threatening deterioration in the patient's condition, which required my urgent intervention.           Gilles Retana MD

## 2022-01-03 LAB
ANION GAP SERPL CALCULATED.3IONS-SCNC: 9 MMOL/L (ref 3–16)
BASOPHILS ABSOLUTE: 0 K/UL (ref 0–0.2)
BASOPHILS RELATIVE PERCENT: 0.7 %
BUN BLDV-MCNC: 35 MG/DL (ref 7–20)
CALCIUM SERPL-MCNC: 9.5 MG/DL (ref 8.3–10.6)
CHLORIDE BLD-SCNC: 102 MMOL/L (ref 99–110)
CO2: 26 MMOL/L (ref 21–32)
CREAT SERPL-MCNC: 1.1 MG/DL (ref 0.8–1.3)
CULTURE, RESPIRATORY: NORMAL
EOSINOPHILS ABSOLUTE: 0.1 K/UL (ref 0–0.6)
EOSINOPHILS RELATIVE PERCENT: 0.9 %
GFR AFRICAN AMERICAN: >60
GFR NON-AFRICAN AMERICAN: >60
GLUCOSE BLD-MCNC: 133 MG/DL (ref 70–99)
GRAM STAIN RESULT: NORMAL
HCT VFR BLD CALC: 38.2 % (ref 40.5–52.5)
HEMOGLOBIN: 12.2 G/DL (ref 13.5–17.5)
INR BLD: 1.96 (ref 0.88–1.12)
LYMPHOCYTES ABSOLUTE: 0.7 K/UL (ref 1–5.1)
LYMPHOCYTES RELATIVE PERCENT: 9.9 %
MCH RBC QN AUTO: 28.1 PG (ref 26–34)
MCHC RBC AUTO-ENTMCNC: 32 G/DL (ref 31–36)
MCV RBC AUTO: 87.9 FL (ref 80–100)
MONOCYTES ABSOLUTE: 0.1 K/UL (ref 0–1.3)
MONOCYTES RELATIVE PERCENT: 1.9 %
NEUTROPHILS ABSOLUTE: 6.1 K/UL (ref 1.7–7.7)
NEUTROPHILS RELATIVE PERCENT: 86.6 %
PDW BLD-RTO: 14.6 % (ref 12.4–15.4)
PLATELET # BLD: 326 K/UL (ref 135–450)
PMV BLD AUTO: 7.2 FL (ref 5–10.5)
POTASSIUM REFLEX MAGNESIUM: 4.6 MMOL/L (ref 3.5–5.1)
PROTHROMBIN TIME: 22.8 SEC (ref 9.9–12.7)
RBC # BLD: 4.34 M/UL (ref 4.2–5.9)
SODIUM BLD-SCNC: 137 MMOL/L (ref 136–145)
WBC # BLD: 7.1 K/UL (ref 4–11)

## 2022-01-03 PROCEDURE — 6360000002 HC RX W HCPCS: Performed by: INTERNAL MEDICINE

## 2022-01-03 PROCEDURE — 2700000000 HC OXYGEN THERAPY PER DAY

## 2022-01-03 PROCEDURE — 36415 COLL VENOUS BLD VENIPUNCTURE: CPT

## 2022-01-03 PROCEDURE — 85610 PROTHROMBIN TIME: CPT

## 2022-01-03 PROCEDURE — 6370000000 HC RX 637 (ALT 250 FOR IP): Performed by: INTERNAL MEDICINE

## 2022-01-03 PROCEDURE — 1200000000 HC SEMI PRIVATE

## 2022-01-03 PROCEDURE — 99407 BEHAV CHNG SMOKING > 10 MIN: CPT | Performed by: INTERNAL MEDICINE

## 2022-01-03 PROCEDURE — 99233 SBSQ HOSP IP/OBS HIGH 50: CPT | Performed by: INTERNAL MEDICINE

## 2022-01-03 PROCEDURE — 80048 BASIC METABOLIC PNL TOTAL CA: CPT

## 2022-01-03 PROCEDURE — 94640 AIRWAY INHALATION TREATMENT: CPT

## 2022-01-03 PROCEDURE — 94761 N-INVAS EAR/PLS OXIMETRY MLT: CPT

## 2022-01-03 PROCEDURE — C9113 INJ PANTOPRAZOLE SODIUM, VIA: HCPCS | Performed by: INTERNAL MEDICINE

## 2022-01-03 PROCEDURE — 85025 COMPLETE CBC W/AUTO DIFF WBC: CPT

## 2022-01-03 PROCEDURE — 2580000003 HC RX 258: Performed by: INTERNAL MEDICINE

## 2022-01-03 RX ORDER — BUDESONIDE AND FORMOTEROL FUMARATE DIHYDRATE 160; 4.5 UG/1; UG/1
2 AEROSOL RESPIRATORY (INHALATION) 2 TIMES DAILY
Status: DISCONTINUED | OUTPATIENT
Start: 2022-01-03 | End: 2022-01-04 | Stop reason: HOSPADM

## 2022-01-03 RX ORDER — WARFARIN SODIUM 5 MG/1
10 TABLET ORAL
Status: DISCONTINUED | OUTPATIENT
Start: 2022-01-05 | End: 2022-01-04 | Stop reason: HOSPADM

## 2022-01-03 RX ORDER — PREDNISONE 20 MG/1
40 TABLET ORAL DAILY
Status: DISCONTINUED | OUTPATIENT
Start: 2022-01-04 | End: 2022-01-04 | Stop reason: HOSPADM

## 2022-01-03 RX ORDER — IPRATROPIUM BROMIDE AND ALBUTEROL SULFATE 2.5; .5 MG/3ML; MG/3ML
1 SOLUTION RESPIRATORY (INHALATION) EVERY 6 HOURS PRN
Status: DISCONTINUED | OUTPATIENT
Start: 2022-01-03 | End: 2022-01-04 | Stop reason: HOSPADM

## 2022-01-03 RX ADMIN — ATORVASTATIN CALCIUM 80 MG: 80 TABLET, FILM COATED ORAL at 08:53

## 2022-01-03 RX ADMIN — METHYLPREDNISOLONE SODIUM SUCCINATE 40 MG: 40 INJECTION, POWDER, FOR SOLUTION INTRAMUSCULAR; INTRAVENOUS at 08:54

## 2022-01-03 RX ADMIN — CARVEDILOL 25 MG: 25 TABLET, FILM COATED ORAL at 08:53

## 2022-01-03 RX ADMIN — ASPIRIN 81 MG 81 MG: 81 TABLET ORAL at 08:53

## 2022-01-03 RX ADMIN — Medication 2 PUFF: at 20:18

## 2022-01-03 RX ADMIN — Medication 1000 MG: at 17:09

## 2022-01-03 RX ADMIN — CARVEDILOL 25 MG: 25 TABLET, FILM COATED ORAL at 17:24

## 2022-01-03 RX ADMIN — Medication 10 ML: at 19:46

## 2022-01-03 RX ADMIN — IPRATROPIUM BROMIDE AND ALBUTEROL SULFATE 1 AMPULE: .5; 3 SOLUTION RESPIRATORY (INHALATION) at 09:12

## 2022-01-03 RX ADMIN — WARFARIN SODIUM 7.5 MG: 2.5 TABLET ORAL at 17:23

## 2022-01-03 RX ADMIN — PANTOPRAZOLE SODIUM 40 MG: 40 INJECTION, POWDER, FOR SOLUTION INTRAVENOUS at 08:53

## 2022-01-03 RX ADMIN — Medication 10 ML: at 08:54

## 2022-01-03 RX ADMIN — TIOTROPIUM BROMIDE INHALATION SPRAY 2 PUFF: 3.12 SPRAY, METERED RESPIRATORY (INHALATION) at 12:36

## 2022-01-03 RX ADMIN — Medication 2 PUFF: at 12:36

## 2022-01-03 ASSESSMENT — PAIN SCALES - GENERAL
PAINLEVEL_OUTOF10: 0

## 2022-01-03 NOTE — PROGRESS NOTES
PULMONARY AND CRITICAL CARE MEDICINE PROGRESS NOTE    Subjective: Patient sitting in bed in no apparent respiratory distress. Oxygen requirements down to 4 L/min. Patient reports that his breathing has improved. Hemodynamically stable and off of vasopressor support. Patient is concerned about his bilateral lower extremity wounds. ROS:  8 point review of system is negative except that mentioned in the subjective portion. MEDICATIONS:     Scheduled Meds:   [START ON 1/4/2022] predniSONE  40 mg Oral Daily    [START ON 1/5/2022] warfarin  10 mg Oral Once per day on Wed Sat    And    warfarin  7.5 mg Oral Once per day on Sun Mon Tue Thu Fri    budesonide-formoterol  2 puff Inhalation BID    tiotropium  2 puff Inhalation Daily    scopolamine  1 patch TransDERmal Q72H    aspirin  81 mg Oral Daily    atorvastatin  80 mg Oral Daily    carvedilol  25 mg Oral BID WC    sodium chloride flush  5-40 mL IntraVENous 2 times per day    cefTRIAXone (ROCEPHIN) IV  1,000 mg IntraVENous Q24H    pantoprazole  40 mg IntraVENous Daily       Current Infusions:    dexmedetomidine Stopped (01/01/22 1800)    phenylephrine (JAQUI-SYNEPHRINE) 50mg/250mL infusion Stopped (01/01/22 1851)    fentaNYL Stopped (01/01/22 1842)    sodium chloride Stopped (01/01/22 0125)    propofol Stopped (01/02/22 0920)       PRN meds:  ipratropium-albuterol, sodium chloride flush, sodium chloride, ondansetron **OR** ondansetron, polyethylene glycol, acetaminophen **OR** acetaminophen    PHYSICAL EXAM:  /73   Pulse 82   Temp 97.6 °F (36.4 °C) (Temporal)   Resp 19   Ht 5' 8\" (1.727 m)   Wt 165 lb (74.8 kg)   SpO2 94%   BMI 25.09 kg/m²  I/O last 3 completed shifts:  In: -   Out: 450 [Urine:450] No intake/output data recorded.       Intake/Output Summary (Last 24 hours) at 1/3/2022 1251  Last data filed at 1/2/2022 1500  Gross per 24 hour   Intake --   Output 450 ml   Net -450 ml       CONSTITUTIONAL: He is a 62y.o.-year-old who appears his stated age. He is in no acute distress. CARDIOVASCULAR: S1 S2 RRR. Without murmer  RESPIRATORY & CHEST: Lungs are clear to auscultation and percussion. Bilateral scattered crackles heard. No wheezing heard. GASTROINTESTINAL & ABDOMEN: Soft, nontender, positive bowel sounds in all quadrants, non-distended, without hepatosplenomegaly. GENITOURINARY: Deferred. MUSCULOSKELETAL: No tenderness to palpation of the axial skeleton. There is no clubbing. No cyanosis. No edema of the lower extremities. SKIN OF BODY: No rash or jaundice. PSYCHIATRIC EVALUATION: Could not be assessed  HEMATOLOGIC/LYMPHATIC/ IMMUNOLOGIC: No palpable lymphadenopathy. NEUROLOGIC: Sedated. Groslly non-focal. Motor strength is 5+/5 in all muscle groups. The patient has a normal sensorium globally. LABS:    Recent Labs     01/01/22  0501 01/01/22  0501 01/01/22  0754 01/02/22  1542 01/03/22  0253   WBC 4.7  --   --  9.5 7.1   RBC 4.76  --   --  4.56 4.34   HGB 13.5   < > 16.1 12.9* 12.2*   HCT 43.5  --   --  40.3* 38.2*   MCH 28.4  --   --  28.3 28.1   MCHC 31.1  --   --  32.1 32.0   RDW 15.0  --   --  14.8 14.6     --   --  408 326   MPV 7.2  --   --  7.2 7.2   NEUTOPHILPCT 82.5  --   --  91.6 86.6   MONOPCT 4.5  --   --  1.8 1.9   BASOPCT 0.5  --   --  0.5 0.7    < > = values in this interval not displayed. Recent Labs     01/01/22  1840 01/02/22  1542 01/03/22  0253    141 137   K 5.7* 5.0 4.6    101 102   ANIONGAP 13 11 9   CO2 24 29 26   BUN 31* 33* 35*   CREATININE 1.0 1.0 1.1   CALCIUM 9.5 10.0 9.5   GLUCOSE 157* 116* 133*     Recent Labs     12/31/21  1400 01/01/22  0754 01/02/22  0500   PHART 7.214* 7.267* 7.413   MUL2DTM 67.3* 68.6* 47.2*   PO2ART 83.8 69.6* 85.7   BPQ5TFA 27.1 31.3* 30.1*   E2UAAGZV 95.7 90* 97.0   BEART -2.1 4* 4.6*          IMAGING:    Narrative   EXAMINATION:   ONE XRAY VIEW OF THE CHEST       1/1/2022 8:00 am       FINDINGS:   Cardiomediastinal silhouette is stable.  Similar position of devices. Bibasilar atelectasis.  No pleural effusion or pneumothorax.  No gross bony   abnormality.           Impression   Stable chest.           IMPRESSION:  Acute hypoxic and hypercapnic respiratory failure  COPD unknown severity  B/L LL pneumonia  Current every day smoker        PLAN:  -Patient's respiratory status continues to stabilize.  -Extubated yesterday. Tolerated well. Currently on 4 L/min of oxygen supplementation saturating in mid 90s. -Continue to titrate the flow to maintain SPO2 between 88 to 92%. -Decreased wheezing today. Reduce Solu-Medrol to once a day today. But tomorrow this can be changed to prednisone 40 mg p.o. daily and weaned over the next 7 to 10 days.  -We will add Symbicort 160/4.5 mcg 2 puff twice daily as well as Spiriva Respimat 2.5 mg 2 puff daily to the regimen.  -Can continue to get DuoNeb's as needed. -Tracheal aspirate growing Serratia. Continue Rocephin. Day 4. We will give 1 more day and then stop.  -Bedside bronchoscopy from 1/1/2022 did not show any mucous plugging in the lower lobes. Bilateral mucoid secretions were noted which cleared with suctioning. Bronchial washing was sent from right main bronchus.    -Patient counselled on the dangers of tobacco use and urged to quit. Also discussed the importance of a supportive environment and helped identify them. Discussed possibility of various Nicotine replacement therapies if experiencing prolonged craving or withdrawal symptoms. Discussed the possibility of negative mood or depression after quitting. Reassured that some weight gain after smoking is common and dietary, exercise, or lifestyle changes will be able to control it. Time spent counseling was >10mins.   -Fully vaccinated with booster dose on 12/22/21 for covid. - Protonix fpr stress ulcer PPx.   Patient is on Coumadin.  -Get wound care involved. -Patient can be moved out of ICU today.   -Nothing further to add from critical care standpoint. We will sign off. Augustus Cristobal MD   Pulmonary Critical Care and Sleep Medicine  [facility]   [address] [employer], [address] [place] [address]  12/31/2021, 12:55 PM        This note was completed using dragon medical speech recognition software. Grammatical errors, random word insertions, pronoun errors and incomplete sentences are occasional consequences of this technology due to software limitations. If there are questions or concerns about the content of this note of information contained within the body of this dictation they should be addressed with the provider for clarification.

## 2022-01-03 NOTE — CARE COORDINATION
Discharge Planning Assessment    RN/SW discharge planner met with patient/ (and family member) to discuss reason for admission, current living situation, and potential needs at the time of discharge    Demographics/Insurance verified Yes    Current type of dwelling: house    Patient from ECF/SW confirmed with:  N/A    Living arrangements: With wife    Level of function/Support: Totally independent    PCP:  Dr. Kathryn Beyer    Last Visit to PCP:     11/29/21    DME:  None    Active with any community resources/agencies/skilled home care:   YES - seen at [address] 1x a monthy for open wound on left foot    Medication compliance issues:  NO    Financial issues that could impact healthcare:   NO    Tentative discharge plan:  Home no needs    Discussed and provided facilities of choice if transition to a skilled nursing facility is required at the time of discharge      Discussed with patient and/or family that on the day of discharge home tentative time of discharge will be between 10 AM and noon. Transportation at the time of discharge:   Wife    Spoke with wife Mando Neri on telephone to confirm all information. Currently requiring 4 L O2. Case management will follow progress and assist with discharge planning needs.     Wolfgang De La Rosa MSN, BSN, RN  Case Management   [phone]

## 2022-01-03 NOTE — PROGRESS NOTES
Pharmacy to Dose Warfarin    Pharmacy consulted to dose warfarin for hx DVT. INR Goal: 2-3    INR today: 1.96    Assessment/Plan:  - INR slightly subtherapeutic  - Will continue with home dose tonight - should receive 7.5 mg   - Daily INR ordered    Pharmacy will continue to follow.     Roshan Duron, PharmD, BCPS  Clinical Pharmacist  [id number]

## 2022-01-03 NOTE — PROGRESS NOTES
Hospitalist Progress Note        PCP: Hawa Shaffer MD     Date of Admission: 12/31/2021     Chief Complaint: SOB/ respiratory distress.     Hospital Course: Pt brought in by [facility] for respiratory distress. Per squad SpO2 upon arrival to the [facility] was \"40-50% on RA. \" Pt then placed on CPAP, SpO2 per squad improved to 75%, Duoneb tx given en route. Upon pt arrival, pt agonal breathing and unresponsive to sternal rub. Dr. Jackie Wayne RN, Manny Rodríguez RN, gita RN, and Sneha Jacome RT at pt bedside.   He was actually being driven to the hospital in a car when he passed out and they pulled into the fire station.      Pt squad patient recently dx'd with COPD, and per squad squad pt's wife reports pt, Birgit Mancia not been feeling well over the past few days. \"     He was a difficult airway and was intubated in the ED with the help of Anesthesia.     He was extubated on 1/2/2022 and has tolerated this well.        Subjective:    Is doing well and is on approximately 4 L of nasal cannula oxygen. Tested Covid negative   he does not normally wear oxygen at home.   We are weaning his oxygen and treating him for COPD exacerbation.        Medications:  Reviewed     Infusion Medications   Infusions Meds    dexmedetomidine 0.5 mcg/kg/hr (01/01/22 1431)    fentaNYL 100 mcg/hr (01/01/22 1431)    sodium chloride Stopped (01/01/22 0125)    propofol 45 mcg/kg/min (01/01/22 1431)         Scheduled Medications   Scheduled Medications    warfarin  7.5 mg Oral Daily    scopolamine  1 patch TransDERmal Q72H    ipratropium-albuterol  1 ampule Inhalation 4x daily    aspirin  81 mg Oral Daily    atorvastatin  80 mg Oral Daily    carvedilol  25 mg Oral BID WC    sodium chloride flush  5-40 mL IntraVENous 2 times per day    cefTRIAXone (ROCEPHIN) IV  1,000 mg IntraVENous Q24H    azithromycin  250 mg Oral Daily    methylPREDNISolone  40 mg IntraVENous Q12H    pantoprazole  40 mg IntraVENous Daily         PRN Meds:   PRN Medications   sodium chloride flush, sodium chloride, ondansetron **OR** ondansetron, polyethylene glycol, acetaminophen **OR** acetaminophen           Intake/Output Summary (Last 24 hours) at 1/1/2022 1626  Last data filed at 1/1/2022 1431      Gross per 24 hour   Intake 1292.71 ml   Output 1200 ml   Net 92.71 ml         Physical Exam Performed:     BP 98/69   Pulse 56   Temp 97 °F (36.1 °C) (Bladder)   Resp 20   Ht 5' 8\" (1.727 m)   Wt 166 lb (75.3 kg)   SpO2 97%   BMI 25.24 kg/m²      General appearance: Awake and alert and appears comfortable on nasal cannula oxygen  HEENT: Pupils equal, round, and reactive to light. Conjunctivae/corneas clear. Neck: Supple, with full range of motion. No jugular venous distention. Trachea midline. Respiratory:  Normal respiratory effort. Clear to auscultation, bilaterally without Rales/Wheezes/Rhonchi. Cardiovascular: Regular rate and rhythm with normal S1/S2 without murmurs, rubs or gallops. Abdomen: Soft, non-tender, non-distended with normal bowel sounds. Musculoskeletal: No clubbing, cyanosis or edema bilaterally. Full range of motion without deformity. Skin: Skin color, texture, turgor normal.  No rashes or lesions. Neurologic:  Grossly intact.    Psychiatric:  Awake alert and oriented x3 and very pleasant  Capillary Refill: Brisk,3 seconds, normal   Peripheral Pulses: +2 palpable, equal bilaterally         Labs:         Recent Labs     12/31/21 0900 01/01/22  0501 01/01/22  0754   WBC 9.8 4.7  --    HGB 13.5 13.5 16.1   HCT 43.7 43.5  --     262  --             Recent Labs     12/31/21  0900 01/01/22  0501 01/01/22  0744    137  --    K 5.6*  --  6.0*    102  --    CO2 29 20*  --    BUN 17 21*  --    CREATININE 0.9 0.9  --    CALCIUM 9.5 9.3  --           Recent Labs     12/31/21  0900   AST 18   ALT 12   BILITOT <0.2   ALKPHOS 121           Recent Labs     12/31/21  0900 01/01/22  0744   INR 2.99* 2.09*          Recent Labs 12/31/21  0900   TROPONINI <0.01         Urinalysis:    No results found for: NITRU, WBCUA, BACTERIA, RBCUA, BLOODU, SPECGRAV, GLUCOSEU     Radiology:  XR CHEST PORTABLE   Final Result   Stable chest.                      Chest:       No central pulmonary embolus identified. .  Tiny peripheral branches are not   well evaluated secondary to motion. There is mild coronary artery   calcification       Bilateral lower lobe lung consolidation, suspicious for pneumonia. .  There is   moderate underlying emphysema. No significant pleural fluid       RECOMMENDATIONS:   Unavailable           XR CHEST PORTABLE   Final Result   ET tube and enteric tube.       New patchy interstitial and alveolar subtle opacities in both lungs. This   can be seen with COVID-19 pneumonia in the proper clinical setting. Multifocal pneumonia and other atypical pneumonias are also in the   differential.                       Assessment/Plan:          Active Hospital Problems     Diagnosis      Centrilobular emphysema (HCC) [J43.2]      Pneumonia of both lower lobes due to infectious organism [J18.9]      Current every day smoker [F17.200]      Acute respiratory failure (HCC) [J96.00]        Acute respiratory failure hypoxic and hypercapnic  - intubated and sedated  - pulm consulted for vent management   -COVID swab was negative   -He has since been extubated and is tolerating this well currently on 4 L of nasal cannula oxygen which will be weaned as tolerated.     Bilat pneumonia of both lower lobes  - covered with rocephin and Azithromycin  I had ordered levaquin and they did get a dose of this as well. Also go Maxipime in the ED, as well as a dose of vanc. Continue abx  He is growing serratia on ET aspirate culture. On abx, today is day 4.   Is been on Rocephin and initially did receive a dose of Maxipime also azithromycin.     COPD  - acute exacerbation  - no wheeze at the time of my exam  - patient will be on solumedrol 40 mg IV BID.  -Can take his steroids down to daily and transition to oral prednisone.     DVT Prophylaxis: therapeutic INR from warfarin   Diet: Regular diet  Code Status: Full Code  PT/OT Eval Status:      Dispo -he is doing well and is okay to be downgraded from ICU level of care.   He can likely be discharged home tomorrow on oral steroids he may possibly need some home oxygen.        Tameka Blanco MD

## 2022-01-04 VITALS
DIASTOLIC BLOOD PRESSURE: 89 MMHG | RESPIRATION RATE: 15 BRPM | TEMPERATURE: 96.9 F | BODY MASS INDEX: 24.74 KG/M2 | OXYGEN SATURATION: 92 % | WEIGHT: 163.2 LBS | HEART RATE: 82 BPM | HEIGHT: 68 IN | SYSTOLIC BLOOD PRESSURE: 139 MMHG

## 2022-01-04 LAB
ANION GAP SERPL CALCULATED.3IONS-SCNC: 10 MMOL/L (ref 3–16)
BASOPHILS ABSOLUTE: 0 K/UL (ref 0–0.2)
BASOPHILS RELATIVE PERCENT: 0.2 %
BLOOD CULTURE, ROUTINE: NORMAL
BUN BLDV-MCNC: 22 MG/DL (ref 7–20)
CALCIUM SERPL-MCNC: 9.2 MG/DL (ref 8.3–10.6)
CHLORIDE BLD-SCNC: 102 MMOL/L (ref 99–110)
CO2: 28 MMOL/L (ref 21–32)
CREAT SERPL-MCNC: 0.7 MG/DL (ref 0.8–1.3)
CULTURE, BLOOD 2: NORMAL
EOSINOPHILS ABSOLUTE: 0 K/UL (ref 0–0.6)
EOSINOPHILS RELATIVE PERCENT: 0.1 %
GFR AFRICAN AMERICAN: >60
GFR NON-AFRICAN AMERICAN: >60
GLUCOSE BLD-MCNC: 119 MG/DL (ref 70–99)
HCT VFR BLD CALC: 37.9 % (ref 40.5–52.5)
HEMOGLOBIN: 12.1 G/DL (ref 13.5–17.5)
INR BLD: 2.22 (ref 0.88–1.12)
LYMPHOCYTES ABSOLUTE: 2 K/UL (ref 1–5.1)
LYMPHOCYTES RELATIVE PERCENT: 33.4 %
MCH RBC QN AUTO: 28.3 PG (ref 26–34)
MCHC RBC AUTO-ENTMCNC: 31.9 G/DL (ref 31–36)
MCV RBC AUTO: 88.5 FL (ref 80–100)
MONOCYTES ABSOLUTE: 0.4 K/UL (ref 0–1.3)
MONOCYTES RELATIVE PERCENT: 7.6 %
NEUTROPHILS ABSOLUTE: 3.4 K/UL (ref 1.7–7.7)
NEUTROPHILS RELATIVE PERCENT: 58.7 %
PDW BLD-RTO: 14.8 % (ref 12.4–15.4)
PLATELET # BLD: 321 K/UL (ref 135–450)
PMV BLD AUTO: 7.1 FL (ref 5–10.5)
POTASSIUM REFLEX MAGNESIUM: 4.3 MMOL/L (ref 3.5–5.1)
PROTHROMBIN TIME: 25.9 SEC (ref 9.9–12.7)
RBC # BLD: 4.28 M/UL (ref 4.2–5.9)
SODIUM BLD-SCNC: 140 MMOL/L (ref 136–145)
WBC # BLD: 5.9 K/UL (ref 4–11)

## 2022-01-04 PROCEDURE — 6360000002 HC RX W HCPCS: Performed by: INTERNAL MEDICINE

## 2022-01-04 PROCEDURE — 97530 THERAPEUTIC ACTIVITIES: CPT

## 2022-01-04 PROCEDURE — 94640 AIRWAY INHALATION TREATMENT: CPT

## 2022-01-04 PROCEDURE — C9113 INJ PANTOPRAZOLE SODIUM, VIA: HCPCS | Performed by: INTERNAL MEDICINE

## 2022-01-04 PROCEDURE — 97165 OT EVAL LOW COMPLEX 30 MIN: CPT

## 2022-01-04 PROCEDURE — 94680 O2 UPTK RST&XERS DIR SIMPLE: CPT

## 2022-01-04 PROCEDURE — 2580000003 HC RX 258: Performed by: INTERNAL MEDICINE

## 2022-01-04 PROCEDURE — 6370000000 HC RX 637 (ALT 250 FOR IP): Performed by: INTERNAL MEDICINE

## 2022-01-04 PROCEDURE — 97162 PT EVAL MOD COMPLEX 30 MIN: CPT

## 2022-01-04 PROCEDURE — 85025 COMPLETE CBC W/AUTO DIFF WBC: CPT

## 2022-01-04 PROCEDURE — 2700000000 HC OXYGEN THERAPY PER DAY

## 2022-01-04 PROCEDURE — 85610 PROTHROMBIN TIME: CPT

## 2022-01-04 PROCEDURE — 97535 SELF CARE MNGMENT TRAINING: CPT

## 2022-01-04 PROCEDURE — 36415 COLL VENOUS BLD VENIPUNCTURE: CPT

## 2022-01-04 PROCEDURE — 80048 BASIC METABOLIC PNL TOTAL CA: CPT

## 2022-01-04 PROCEDURE — 94761 N-INVAS EAR/PLS OXIMETRY MLT: CPT

## 2022-01-04 RX ORDER — GLUCOSAMINE HCL/CHONDROITIN SU 500-400 MG
CAPSULE ORAL
Qty: 100 STRIP | Refills: 3 | Status: SHIPPED | OUTPATIENT
Start: 2022-01-04 | End: 2022-05-05 | Stop reason: ALTCHOICE

## 2022-01-04 RX ORDER — PREDNISONE 20 MG/1
40 TABLET ORAL DAILY
Qty: 20 TABLET | Refills: 0 | Status: SHIPPED | OUTPATIENT
Start: 2022-01-05 | End: 2022-01-15

## 2022-01-04 RX ORDER — LANCETS 30 GAUGE
1 EACH MISCELLANEOUS DAILY
Qty: 100 EACH | Refills: 3 | Status: SHIPPED | OUTPATIENT
Start: 2022-01-04 | End: 2022-05-05 | Stop reason: ALTCHOICE

## 2022-01-04 RX ORDER — BUDESONIDE AND FORMOTEROL FUMARATE DIHYDRATE 160; 4.5 UG/1; UG/1
2 AEROSOL RESPIRATORY (INHALATION) 2 TIMES DAILY
Qty: 10.2 G | Refills: 3 | Status: SHIPPED | OUTPATIENT
Start: 2022-01-04 | End: 2022-01-07

## 2022-01-04 RX ORDER — NICOTINE 21 MG/24HR
1 PATCH, TRANSDERMAL 24 HOURS TRANSDERMAL EVERY 24 HOURS
Qty: 30 PATCH | Refills: 2 | Status: ON HOLD | OUTPATIENT
Start: 2022-01-04 | End: 2022-03-13 | Stop reason: HOSPADM

## 2022-01-04 RX ADMIN — ASPIRIN 81 MG 81 MG: 81 TABLET ORAL at 08:38

## 2022-01-04 RX ADMIN — CARVEDILOL 25 MG: 25 TABLET, FILM COATED ORAL at 08:38

## 2022-01-04 RX ADMIN — Medication 2 PUFF: at 10:41

## 2022-01-04 RX ADMIN — WARFARIN SODIUM 7.5 MG: 2.5 TABLET ORAL at 18:06

## 2022-01-04 RX ADMIN — PREDNISONE 40 MG: 20 TABLET ORAL at 08:38

## 2022-01-04 RX ADMIN — PANTOPRAZOLE SODIUM 40 MG: 40 INJECTION, POWDER, FOR SOLUTION INTRAVENOUS at 08:38

## 2022-01-04 RX ADMIN — CARVEDILOL 25 MG: 25 TABLET, FILM COATED ORAL at 18:05

## 2022-01-04 RX ADMIN — Medication 10 ML: at 08:39

## 2022-01-04 RX ADMIN — ATORVASTATIN CALCIUM 80 MG: 80 TABLET, FILM COATED ORAL at 08:38

## 2022-01-04 ASSESSMENT — PAIN SCALES - GENERAL
PAINLEVEL_OUTOF10: 0

## 2022-01-04 NOTE — RT PROTOCOL NOTE
RT Inhaler-Nebulizer Bronchodilator Protocol Note    There is a bronchodilator order in the chart from a provider indicating to follow the RT Bronchodilator Protocol and there is an Initiate RT Inhaler-Nebulizer Bronchodilator Protocol order as well (see protocol at bottom of note). CXR Findings:  No results found. The findings from the last RT Protocol Assessment were as follows:   History Pulmonary Disease: Chronic pulmonary disease  Respiratory Pattern: Regular pattern and RR 12-20 bpm  Breath Sounds: Clear breath sounds  Cough: Strong, spontaneous, non-productive  Indication for Bronchodilator Therapy: Decreased or absent breath sounds  Bronchodilator Assessment Score: 2    Aerosolized bronchodilator medication orders have been revised according to the RT Inhaler-Nebulizer Bronchodilator Protocol below. Respiratory Therapist to perform RT Therapy Protocol Assessment initially then follow the protocol. Repeat RT Therapy Protocol Assessment PRN for score 0-3 or on second treatment, BID, and PRN for scores above 3. No Indications - adjust the frequency to every 6 hours PRN wheezing or bronchospasm, if no treatments needed after 48 hours then discontinue using Per Protocol order mode. If indication present, adjust the RT bronchodilator orders based on the Bronchodilator Assessment Score as indicated below. Use Inhaler orders unless patient has one or more of the following: on home nebulizer, not able to hold breath for 10 seconds, is not alert and oriented, cannot activate and use MDI correctly, or respiratory rate 25 breaths per minute or more, then use the equivalent nebulizer order(s) with same Frequency and PRN reasons based on the score. If a patient is on this medication at home then do not decrease Frequency below that used at home.     0-3 - enter or revise RT bronchodilator order(s) to equivalent RT Bronchodilator order with Frequency of every 4 hours PRN for wheezing or increased work of breathing using Per Protocol order mode. 4-6 - enter or revise RT Bronchodilator order(s) to two equivalent RT bronchodilator orders with one order with BID Frequency and one order with Frequency of every 4 hours PRN wheezing or increased work of breathing using Per Protocol order mode. 7-10 - enter or revise RT Bronchodilator order(s) to two equivalent RT bronchodilator orders with one order with TID Frequency and one order with Frequency of every 4 hours PRN wheezing or increased work of breathing using Per Protocol order mode. 11-13 - enter or revise RT Bronchodilator order(s) to one equivalent RT bronchodilator order with QID Frequency and an Albuterol order with Frequency of every 4 hours PRN wheezing or increased work of breathing using Per Protocol order mode. Greater than 13 - enter or revise RT Bronchodilator order(s) to one equivalent RT bronchodilator order with every 4 hours Frequency and an Albuterol order with Frequency of every 2 hours PRN wheezing or increased work of breathing using Per Protocol order mode. RT to enter RT Home Evaluation for COPD & MDI Assessment order using Per Protocol order mode.     Electronically signed by Araceli Warren RCP on 1/4/2022 at 11:12 AM

## 2022-01-04 NOTE — PROGRESS NOTES
Informed that the patient is pre-diabetic, is on metformin @ home, is prescribed prednisone at discharge, has non-healing wounds and does NOT have blood sugar testing supplies at home. Prescriptions for blood glucose testing supplies obtained per telephone order & sent to the patient's home pharmacy. Paulette Yepez RN, BSN, OMS.

## 2022-01-04 NOTE — PROGRESS NOTES
01/04/22 1101   Resting (Room Air)   SpO2 92   During Walk (Room Air)   SpO2 90   Walk/Assistance Device Walker   After Walk   Does the Patient Qualify for Home O2 No   Does the Patient Need Portable Oxygen Tanks No

## 2022-01-04 NOTE — PLAN OF CARE
Problem: Falls - Risk of:  Goal: Will remain free from falls  Description: Will remain free from falls  1/4/2022 0428 by Latoya Muñoz RN  Outcome: Ongoing     Problem: Falls - Risk of:  Goal: Absence of physical injury  Description: Absence of physical injury  1/4/2022 0428 by Latoya Muñoz RN  Outcome: Ongoing  Patient educated on the importance of calling out before exiting the bed and always waiting for assistance. Bed in lowest position with wheels locked. 3/4 side rails up. Non-skid socks on. Call light within reach. Hourly checks. All requested needs provided. Problem: Skin Integrity:  Goal: Will show no infection signs and symptoms  Description: Will show no infection signs and symptoms  1/4/2022 0428 by Latoya Muñoz RN  Outcome: Ongoing     Problem: Skin Integrity:  Goal: Absence of new skin breakdown  Description: Absence of new skin breakdown  1/4/2022 0428 by Latoya Muñoz RN  Outcome: Ongoing  Able to turn self in bed. Educated on the importance of frequent reposition for the prevention of pressure ulcer formation. Problem: Nutrition  Goal: Optimal nutrition therapy  1/4/2022 0428 by Latoya Muñoz RN  Outcome: Ongoing  Adequate appetite. Problem: Non-Violent Restraints  Goal: Removal from restraints as soon as assessed to be safe  1/4/2022 0428 by Latoya Muñoz RN  Outcome: Completed     Problem: Non-Violent Restraints  Goal: No harm/injury to patient while restraints in use  1/4/2022 0428 by Latoya Muñoz RN  Outcome: Completed     Problem: Non-Violent Restraints  Goal: Patient's dignity will be maintained  1/4/2022 0428 by Latoya Muñoz RN  Outcome: Completed  Restraints removed/no longer necessary.

## 2022-01-04 NOTE — PLAN OF CARE
Problem: Nutrition  Goal: Optimal nutrition therapy  Outcome: Completed     Problem: Skin Integrity:  Goal: Will show no infection signs and symptoms  Description: Will show no infection signs and symptoms  Outcome: Completed  Goal: Absence of new skin breakdown  Description: Absence of new skin breakdown  Outcome: Completed     Problem: Falls - Risk of:  Goal: Will remain free from falls  Description: Will remain free from falls  Outcome: Completed  Goal: Absence of physical injury  Description: Absence of physical injury  Outcome: Completed

## 2022-01-04 NOTE — PROGRESS NOTES
Occupational Therapy   Occupational Therapy Initial Assessment/Discharge Summary  Date: 2022   Patient Name: Murphy Garcia  MRN: [id number]     : 1960    Date of Service: 2022    Discharge Recommendations: Murphy Garcia scored a 22/24 on the AM-PAC ADL Inpatient form. At this time, no further OT is recommended upon discharge due to patient near baseline level of function. Recommend patient returns to prior setting with prior services. OT Equipment Recommendations  Equipment Needed: No    Assessment   Assessment: Patient presents near baseline level of function. Anticipate safe to discharge home with no further OT needs  Prognosis: Good  Decision Making: Low Complexity  Exam: as above  OT Education: OT Role;Plan of Care  Patient Education: eval, discharge--pt v/u  REQUIRES OT FOLLOW UP: No  Activity Tolerance  Activity Tolerance: Patient Tolerated treatment well  Activity Tolerance: On RA throughout, remains in ~90s for SpO2--limited accuracy d/t inconsistent readings from pulse ox. Safety Devices  Safety Devices in place: Yes  Type of devices: All fall risk precautions in place;Nurse notified;Call light within reach; Left in chair;Gait belt (no alarm engaged upon arrival)           Patient Diagnosis(es): The primary encounter diagnosis was Acute respiratory failure with hypoxia ([address] A diagnosis of Tobacco abuse disorder was also pertinent to this visit. has a past medical history of Diabetes mellitus ([address] Fibromyalgia, Hyperlipidemia, and Hypertension. has a past surgical history that includes Colonoscopy (); Appendectomy (); other surgical history (Right, 2020); femoral bypass (Left, 2020); Femoral-tibial Bypass Graft (Right, 2020); Foot Debridement (Left, 2020); and Foot Debridement (Right, 2021).            Restrictions  Restrictions/Precautions  Restrictions/Precautions: Fall Risk (high fall risk)  Required Braces or Orthoses?: No  Position Activity Restriction  Other position/activity restrictions: Patient is a 62 y.o. male with past medical history significant for COPD, DM, HTN, HLD who presented to ER with with respiratory failure. Pt brought in by [facility] for respiratory distress. Per squad SpO2 upon arrival to the [facility] was in 50's. He failed CPAP. Brought to ER and was unresponsive with agonal breathing. Intubated in ER. Difficult intubation, pt intubated by anesthesia. Now sedated with propofol and fentanyl. CT chest showed b/l LL consolidation and collapse. Rapid flu negative. Mild elevation of LA. Initial ABG showed hypoxic and hypercapnic respiratory failure. Subjective   General  Chart Reviewed: Yes  Patient assessed for rehabilitation services?: Yes  Diagnosis: Acute respiratory failure  Subjective  Subjective: Patient supine in bed upon arrival, agreeable to evalution.  Denies pain  Patient Currently in Pain: Denies  Pre Treatment Pain Screening  Intervention List: Patient able to continue with treatment;Nurse/Physician notified  Vital Signs  Patient Currently in Pain: Denies     Social/Functional History  Social/Functional History  Lives With: Spouse  Type of Home: House  Home Layout: Two level,Bed/Bath upstairs (5 to basement, 6 to upper level)  Home Access: Stairs to enter with rails  Entrance Stairs - Number of Steps: 4  Entrance Stairs - Rails: Both  Bathroom Shower/Tub: Tub/Shower unit  Home Equipment: Rolling walker,Cane  ADL Assistance: Independent  Homemaking Assistance: Independent  Ambulation Assistance: Independent (uses SPC)  Transfer Assistance: Independent  Active : Yes  Mode of Transportation: Car  Occupation: Full time employment  Type of occupation:   Leisure & Hobbies: spending time with grandchildren       Objective   Vision: Impaired  Vision Exceptions: Wears glasses for reading  Hearing: Within functional limits    Orientation  Overall Orientation Status: Within Functional Limits     Balance  Sitting Balance: Independent  Standing Balance: Modified independent   Standing Balance  Time: 3 min  Activity: stance at sink for grooming  Functional Mobility  Functional - Mobility Device: Rolling Walker  Activity: Other  Assist Level: Modified independent   Functional Mobility Comments: over 300' in hallway, to/from bathroom. supervision for stair negotiation  ADL  Grooming: Stand by assistance (stance at sink to brush teeth, wash face)  Tone RUE  RUE Tone: Normotonic  Tone LUE  LUE Tone: Normotonic  Coordination  Movements Are Fluid And Coordinated: Yes     Bed mobility  Supine to Sit: Independent  Scooting: Independent  Transfers  Sit to stand: Independent  Stand to sit:  Independent     Cognition  Overall Cognitive Status: WFL  Perception  Overall Perceptual Status: WFL     Sensation  Overall Sensation Status: Impaired (tingling in BLE due to neuropathy)        LUE AROM (degrees)  LUE AROM : WFL  RUE AROM (degrees)  RUE AROM : WFL                      Plan   Plan  Times per week: eval/dc    G-Code     OutComes Score                                                  AM-PAC Score        AM-[facility] Inpatient Daily Activity Raw Score: 22 (01/04/22 1556)  AM-PAC Inpatient ADL T-Scale Score : 47.1 (01/04/22 1556)  ADL Inpatient CMS 0-100% Score: 25.8 (01/04/22 1556)  ADL Inpatient CMS G-Code Modifier : CJ (01/04/22 1556)    Goals  Short term goals  Time Frame for Short term goals: eval/dc       Therapy Time   Individual Concurrent Group Co-treatment   Time In 1331         Time Out 1412         Minutes 41            Timed Code Treatment Minutes:   26 minutes    Total Treatment Minutes:  41 minutes    SHANE Evans OTR/L [id number]    Abi Vega OT

## 2022-01-04 NOTE — CARE COORDINATION
[facility] Continence Nurse  Consult Note       NAME:  Hari [facility] RECORD NUMBER:  [id number]  AGE: 62 y.o. GENDER: male  : 1960  TODAY'S DATE:  2022    Subjective   Reason for WOCN Evaluation and Assessment: wounds to feet      Amna Allen is a 62 y.o. male referred by:   [x] Physician  [] Nursing  [] Other:     Wound Identification:  Wound Type: venous, arterial and diabetic  Contributing Factors: venous stasis, diabetes and arterial insufficiency    Wound History: present on admission  Current Wound Care Treatment:  Saline moistened gauze, kerlix. Patient Goal of Care:  [x] Wound Healing  [] Odor Control  [] Palliative Care  [] Pain Control   [] Other:         PAST MEDICAL HISTORY        Diagnosis Date    Diabetes mellitus ([facility]     Fibromyalgia     Hyperlipidemia     Hypertension        PAST SURGICAL HISTORY    Past Surgical History:   Procedure Laterality Date    APPENDECTOMY      COLONOSCOPY      FEMORAL BYPASS Left 2020    femoral posterior tibial bypass    FEMORAL-TIBIAL BYPASS GRAFT Right 2020    with femoral endarterectomy and patch angioplasty    FOOT DEBRIDEMENT Left 2020    FOOT DEBRIDEMENT Right 2021    DEBRIDEMENT OF RIGHT FOOT WOUND WITH GRAFT APPLICATION performed by Toya Martinez DPM at [facility] Right 2020    stent leg for PVD       FAMILY HISTORY    Family History   Problem Relation Age of Onset    Cancer Mother     Stroke Father        SOCIAL HISTORY    Social History     Tobacco Use    Smoking status: Current Every Day Smoker     Packs/day: 0.50     Years: 20.00     Pack years: 10.00     Types: Cigarettes     Start date: 1977    Smokeless tobacco: Never Used    Tobacco comment: 3 cigs a day/ quitting    Vaping Use    Vaping Use: Never used   Substance Use Topics    Alcohol use:  Yes     Alcohol/week: 6.0 standard drinks     Types: 6 Cans of beer per week  Drug use: Never       ALLERGIES    Allergies   Allergen Reactions    Chantix [Varenicline]      Hallucinations         MEDICATIONS    No current facility-administered medications on file prior to encounter.      Current Outpatient Medications on File Prior to Encounter   Medication Sig Dispense Refill    albuterol sulfate HFA (VENTOLIN HFA) 108 (90 Base) MCG/ACT inhaler Inhale 2 puffs into the lungs 4 times daily as needed for Wheezing 54 g 1    atorvastatin (LIPITOR) 80 MG tablet Take 1 tablet by mouth daily Cancel atorvastatin 20 mg a day 90 tablet 0    NIFEdipine (ADALAT CC) 60 MG extended release tablet Take 1 tablet by mouth daily 90 tablet 0    metFORMIN (GLUCOPHAGE) 500 MG tablet Take 1 tablet by mouth 2 times daily (with meals) 180 tablet 0    lisinopril (PRINIVIL;ZESTRIL) 10 MG tablet Take 1 tablet by mouth daily 90 tablet 0    ferrous sulfate (IRON 325) 325 (65 Fe) MG tablet Take 1 tablet by mouth 2 times daily 180 tablet 0    warfarin (JANTOVEN) 5 MG tablet TAKE 1 AND 1/2 TABLETS BY MOUTH ONE TIME A DAY OR AS DIRECTED BY [facility] ([phone]) (Patient taking differently: Take 7.5 mg by mouth daily Except 10mg every Wednesday and Saturday or as directed by [facility] [phone]) 45 tablet 0    tadalafil (CIALIS) 5 MG tablet TAKE 1 TABLET BY MOUTH ONE TIME A DAY AS NEEDED FOR ERECTILE DYSFUNCTION 30 tablet 5    acetaminophen (TYLENOL) 500 MG tablet Take 1 tablet by mouth 4 times daily as needed for Pain 360 tablet 1    fluticasone (FLONASE) 50 MCG/ACT nasal spray 1 spray by Nasal route daily 1 Bottle 3    carvedilol (COREG) 25 MG tablet Take 25 mg by mouth 2 times daily (with meals)       becaplermin (REGRANEX) 0.01 % gel Apply 1 Applicatorful topically daily (Patient not taking: Reported on 11/29/2021)      Vitamins/Minerals TABS Take 1 tablet by mouth daily      aspirin 81 MG chewable tablet CHEW AND SWALLOW 1 TABLET BY MOUTH ONE TIME A  tablet 5       Objective    BP (!) 145/90   Pulse 72   Temp 96.9 °F (36.1 °C) (Temporal)   Resp 17   Ht 5' 8\" (1.727 m)   Wt 163 lb 3.2 oz (74 kg)   SpO2 91%   BMI 24.81 kg/m²     LABS:  WBC:    Lab Results   Component Value Date    WBC 5.9 01/04/2022     H/H:    Lab Results   Component Value Date    HGB 12.1 01/04/2022    HCT 37.9 01/04/2022     PTT:  No results found for: APTT, PTT[APTT}  PT/INR:    Lab Results   Component Value Date    PROTIME 25.9 01/04/2022    INR 2.22 01/04/2022     HgBA1c:    Lab Results   Component Value Date    LABA1C 6.0 11/29/2021       Assessment   Harshil Risk Score: Harshil Scale Score: 18    Patient Active Problem List   Diagnosis Code    Peripheral artery disease (HCC) I73.9    Acute respiratory failure ([facility] J96.00    Centrilobular emphysema ([facility] J43.2    Pneumonia of both lower lobes due to infectious organism J18.9    Current every day smoker F17.200       Measurements:  Wound 12/31/21 Right;Dorsal diabetic ulcer, 6cm x 3cm x 0.2 cm (Active)   Wound Image   01/04/22 1646   Wound Etiology Diabetic 01/04/22 1646   Dressing Status New dressing applied 01/04/22 1646   Wound Cleansed Cleansed with saline 01/04/22 1646   Dressing/Treatment Ace wrap;Gauze dressing/dressing sponge; Hydrofiber Ag;Roll gauze 01/04/22 1646   Dressing Change Due 01/07/22 01/04/22 1646   Wound Length (cm) 6 cm 01/04/22 1646   Wound Width (cm) 3 cm 01/04/22 1646   Wound Depth (cm) 0.2 cm 01/04/22 1646   Wound Surface Area (cm^2) 18 cm^2 01/04/22 1646   Wound Volume (cm^3) 3.6 cm^3 01/04/22 1646   Wound Assessment Pink/red;Devitalized tissue 01/04/22 1646   Drainage Amount None 01/04/22 1646   Jamila-wound Assessment Dry/flaky;Edematous; Hyperkeratosis (callous); Hyperpigmented 01/04/22 1646   Margins Defined edges 01/04/22 1646   Wound Thickness Description not for Pressure Injury Full thickness 01/04/22 1646   Number of days: 4       Wound 12/31/21 Ankle Medial;Left diabetic ulcer, 3 cm x 2.5cm x 0.2 (Active) Wound Image   01/04/22 1646   Wound Etiology Diabetic 01/04/22 1646   Dressing Status New dressing applied 01/04/22 1646   Wound Cleansed Irrigated with saline 01/04/22 1646   Dressing/Treatment Ace wrap;Gauze dressing/dressing sponge; Hydrofiber Ag;Roll gauze 01/04/22 1646   Wound Length (cm) 3 cm 01/04/22 1646   Wound Width (cm) 2.5 cm 01/04/22 1646   Wound Depth (cm) 0.2 cm 01/04/22 1646   Wound Surface Area (cm^2) 7.5 cm^2 01/04/22 1646   Wound Volume (cm^3) 1.5 cm^3 01/04/22 1646   Wound Assessment Pink/red 01/04/22 1646   Drainage Amount None 01/04/22 1646   Smita-wound Assessment Cool;Dry/flaky; Hyperkeratosis (callous); Hyperpigmented 01/04/22 1646   Number of days: 4     Patient seen for wounds to bilateral feet. Patient is diabetic and has had arterial stents to both legs. Patient has not seen podiatry since October 2020. He has been doing wound care from last podiatry visit. At home he does a daily dressing with antibiotic ointment, adaptic and kerlix. Patient still works and his on his feet a lot. Patient agreeable to visit. Old dressings removed. Wounds to both ankles pink, moist, with calloused edges. Also smita wound is dry and flaky. Right leg/ foot is edematous. Discussed wound care with patient. He is interested in coming to our wound center. Spoke to nurse aKty Pryor, who contacted Dr Irma Montelongo for a referral.  Will cleanse wound with saline, apply hydrofiber with silver to wound, wrap with kerliz and lightly wrap with ace bandage. Will also use dermaphor ointment to dry flaky skin on bilateral legs and feet. Patient agreeable to plan. Patient is scheduled to be discharged today. Left foot ulcer    Right leg dry, flaky    Dry flaky skin  Left leg    Response to treatment:  Well tolerated by patient.      Pain Assessment:  Severity:  0 / 10  Quality of pain: N/A  Wound Pain Timing/Severity: none  Premedicated: No    Plan   Plan of Care: Wound 12/31/21 Right;Dorsal diabetic ulcer, 6cm x 3cm x 0.2 cm-Dressing/Treatment: Ace wrap,Gauze dressing/dressing sponge,Hydrofiber Ag,Roll gauze  Wound 12/31/21 Ankle Medial;Left diabetic ulcer, 3 cm x 2.5cm x 0.2-Dressing/Treatment: Ace wrap,Gauze dressing/dressing sponge,Hydrofiber Ag,Roll gauze    Specialty Bed Required : No   [] Low Air Loss   [] Pressure Redistribution  [] Fluid Immersion  [] Bariatric  [] Total Pressure Relief  [] Other:     Current Diet: ADULT DIET;  Regular  Dietician consult:  No    Discharge Plan:  Placement for patient upon discharge: home with support    Patient appropriate for Outpatient [address]: Yes    Referrals:  [x]   [] [address] [facility]  [] Supplies  [] Other    Patient/Caregiver Teaching:  Level of patient/caregiver understanding able to:   [x] Indicates understanding       [x] Needs reinforcement  [] Unsuccessful      [] Verbal Understanding  [] Demonstrated understanding       [] No evidence of learning  [] Refused teaching         [] N/A       Electronically signed by KAREEN Hyman, RN  Wound/Ostomy Care  on 1/4/2022 at 4:57 PM

## 2022-01-04 NOTE — PROGRESS NOTES
Tolerance  Activity Tolerance: Patient Tolerated treatment well  Activity Tolerance: SPO2 above 90% throughout duration of mobility on RA       Patient Diagnosis(es): The primary encounter diagnosis was Acute respiratory failure with hypoxia ([facility] A diagnosis of Tobacco abuse disorder was also pertinent to this visit. has a past medical history of Diabetes mellitus ([facility] [address] Fibromyalgia, Hyperlipidemia, and Hypertension. has a past surgical history that includes Colonoscopy (2016); Appendectomy (2005); other surgical history (Right, 12/08/2020); femoral bypass (Left, 02/26/2020); Femoral-tibial Bypass Graft (Right, 12/11/2020); Foot Debridement (Left, 09/03/2020); and Foot Debridement (Right, 2/26/2021). Restrictions  Restrictions/Precautions  Restrictions/Precautions: Fall Risk (high fall risk)  Required Braces or Orthoses?: No  Position Activity Restriction  Other position/activity restrictions: Patient is a 62 y.o. male with past medical history significant for COPD, DM, HTN, HLD who presented to ER with with respiratory failure. Pt brought in by [facility] [place] for respiratory distress. Per squad SpO2 upon arrival to the [facility] was in 50's. He failed CPAP. Brought to ER and was unresponsive with agonal breathing. Intubated in ER. Difficult intubation, pt intubated by anesthesia. Now sedated with propofol and fentanyl. CT chest showed b/l LL consolidation and collapse. Rapid flu negative. Mild elevation of LA. Initial ABG showed hypoxic and hypercapnic respiratory failure.   Vision/Hearing  Vision: Impaired  Vision Exceptions: Wears glasses for reading  Hearing: Within functional limits     Subjective  General  Chart Reviewed: Yes  Patient assessed for rehabilitation services?: Yes  Response To Previous Treatment: Not applicable  Family / Caregiver Present: No  Diagnosis: Acute Respiratory Failure  Follows Commands: Within Functional Limits  General Comment  Comments: Pt supine in bed upon PT/OT arrival, denies pain and is agreeable to skilled therapy session.   Pain Screening  Patient Currently in Pain: Denies  Vital Signs  Patient Currently in Pain: Denies       Orientation  Orientation  Overall Orientation Status: Within Normal Limits  Social/Functional History  Social/Functional History  Lives With: Spouse  Type of Home: House  Home Layout: Two level,Bed/Bath upstairs (5 to basement, 6 to upper level)  Home Access: Stairs to enter with rails  Entrance Stairs - Number of Steps: 4  Entrance Stairs - Rails: Both  Bathroom Shower/Tub: Tub/Shower unit  Home Equipment: Rolling walker,Cane  ADL Assistance: Independent  Homemaking Assistance: Independent  Ambulation Assistance: Independent (uses SPC)  Transfer Assistance: Independent  Active : Yes  Mode of Transportation: Car  Occupation: Full time employment  Type of occupation:   Leisure & Hobbies: spending time with grandchildren  Cognition        Objective             Strength RLE  Strength RLE: GroupSwim [facility]  Strength LLE  Strength LLE: WFL     Sensation  Overall Sensation Status: Impaired (tingling in BLE due to neuropathy)  Bed mobility  Supine to Sit: Independent  Scooting: Independent  Transfers  Sit to Stand: Modified independent (up to RW)  Stand to sit: Modified independent  Bed to Chair: Modified independent (via stand step with use of RW)  Ambulation  Ambulation?: Yes  Ambulation 1  Surface: level tile  Device: Rolling Walker  Assistance: Modified Independent  Quality of Gait: wide MEGHAN, decreased WB on RLE, knee flexed during stance phase, bilateral decreased toe off  Gait Deviations: Decreased step height;Decreased step length  Distance: 270'  Stairs/Curb  Stairs?: Yes  Stairs  # Steps : 5  Stairs Height: 6\"  Rails: Bilateral  Device: No Device  Assistance: Supervision  Comment: reciprocal pattern     Balance  Posture: Good  Sitting - Static: Good  Sitting - Dynamic: Good  Standing - Static: Good  Standing - Dynamic: Good        Plan Plan  Times per week: 1-2  Times per day: Daily  Current Treatment Recommendations: Strengthening,Transfer Training,Endurance Training,Gait Training,Balance Training,Stair training,Functional Mobility Training,Safety Education & Training  Safety Devices  Type of devices: Call light within reach,Left in chair,Nurse notified (no alarm needed, pt independent)  Restraints  Initially in place: No    AM-PAC Score  AM-PAC Inpatient Mobility Raw Score : 23 (01/04/22 1556)  AM-PAC Inpatient T-Scale Score : 56.93 (01/04/22 1556)  Mobility Inpatient CMS 0-100% Score: 11.2 (01/04/22 1556)  Mobility Inpatient CMS G-Code Modifier : CI (01/04/22 1556)          Goals  Short term goals  Time Frame for Short term goals: to be met by d/c  Short term goal 1: pt will ascend/descend 12 stairs with mod I  Short term goal 2: pt will tolerate formal balance assessment  Short term goal 3: pt will demonstrate appropriate monitoring of SPO2 with pulse oximeter with minimal VC  Patient Goals   Patient goals : to go home       Therapy Time   Individual Concurrent Group Co-treatment   Time In       1331   Time Out       1412   Minutes       41         Timed Code Treatment Minutes:  26 minutes    Total Treatment Minutes:  41 minutes    [address] [address], PT     Bala Ruelas DPT [id number]

## 2022-01-04 NOTE — DISCHARGE INSTR - COC
Continuity of Care Form    Patient Name: Gulshan Alexander   :  1960  MRN:  [id number]    Admit date:  2021  Discharge date:  ***    Code Status Order: Full Code   Advance Directives:      Admitting Physician:  Octavia Savage MD  PCP: Lissette Bledsoe MD    Discharging Nurse: [facility] Unit/Room#: A4H-9639/0571-23  Discharging Unit Phone Number: ***    Emergency Contact:   Extended Emergency Contact Information  Primary Emergency Contact: ArthurAmerica  Address: [id number] [place] [address] Rickey Soares Do [address] Phone: [phone]  Mobile Phone: [phone]  Relation: Spouse    Past Surgical History:  Past Surgical History:   Procedure Laterality Date    APPENDECTOMY      COLONOSCOPY  2016    FEMORAL BYPASS Left 2020    femoral posterior tibial bypass    FEMORAL-TIBIAL BYPASS GRAFT Right 2020    with femoral endarterectomy and patch angioplasty    FOOT DEBRIDEMENT Left 2020    FOOT DEBRIDEMENT Right 2021    DEBRIDEMENT OF RIGHT FOOT WOUND WITH GRAFT APPLICATION performed by Shellie Alvares DPM at [facility] Right 2020    stent leg for PVD       Immunization History:   Immunization History   Administered Date(s) Administered    COVID-19, MIRZA Boyd, 30mcg/0.3mL 03/15/2021, 2021, 2021       Active Problems:  Patient Active Problem List   Diagnosis Code    Peripheral artery disease ([facility] [address] I73.9    Acute respiratory failure ([address] J96.00    Centrilobular emphysema ([address] J43.2    Pneumonia of both lower lobes due to infectious organism J18.9    Current every day smoker F17.200       Isolation/Infection:   Isolation            Airborne, Droplet and Contact           Patient Infection Status       Infection Onset Added Last Indicated Last Indicated By Review Planned Expiration Resolved Resolved By    None active    Resolved    COVID-19 (Rule Out) 21 COVID-19 (Ordered)   21 Rule-Out Test Resulted    COVID-19 (Rule Out) 21 COVID-19, Rapid (Ordered)   21 Rule-Out Test Resulted            Nurse Assessment:  Last Vital Signs: BP (!) 145/90   Pulse 72   Temp 96.9 °F (36.1 °C) (Temporal)   Resp 17   Ht 5' 8\" (1.727 m)   Wt 163 lb 3.2 oz (74 kg)   SpO2 91%   BMI 24.81 kg/m²     Last documented pain score (0-10 scale): Pain Level: 0  Last Weight:   Wt Readings from Last 1 Encounters:   22 163 lb 3.2 oz (74 kg)     Mental Status:  {IP PT MENTAL STATUS:}    IV Access:  { MARNIE IV ACCESS:633747038}    Nursing Mobility/ADLs:  Walking   {CHP DME SRZX:474347544}  Transfer  {CHP DME CLUQ:036336823}  Bathing  {CHP DME EPFK:039743475}  Dressing  {CHP DME TXM}  Toileting  {CHP DME ERZC:227585258}  Feeding  {CHP DME MGIT:773739546}  Med Admin  {CHP DME JSUK:306031432}  Med Delivery   { MARNIE MED Delivery:113238039}    Wound Care Documentation and Therapy:    Diabetic foot ulcers, cleanse with saline, apply hydrofiber with silver to wound bed. Cover with dry dressing, kerlix, secure with ace bandage. Change hydrofiber dressing every 2 days. Dry flaky skin to bilateral legs/ feet. After cleaning legs/ feet, pat skin dry apply dermaphor or Eucerin cream, cover legs with socks or ace wraps. Elimination:  Continence: Bowel: {YES / BY:84624}  Bladder: {YES / F}  Urinary Catheter: {Urinary Catheter:044470899}   Colostomy/Ileostomy/Ileal Conduit: {YES / FH:64668}       Date of Last BM: ***    Intake/Output Summary (Last 24 hours) at 2022 1229  Last data filed at 2022 0839  Gross per 24 hour   Intake 90.94 ml   Output --   Net 90.94 ml     I/O last 3 completed shifts:   In: 80.9 [I.V.:80.9]  Out: -     Safety Concerns:     [address] Safety Concerns:457985624}    Impairments/Disabilities:      [address] Impairments/Disabilities:991276147}    Nutrition Therapy:  Current Nutrition Therapy:   [address] Diet OYC}    Routes of Feeding: {CHP DME Other Feedings:748240576}  Liquids: {Slp liquid thickness:57452}  Daily Fluid Restriction: {CHP DME Yes amt example:778011652}  Last Modified Barium Swallow with Video (Video Swallowing Test): {Done Not Done WBYZ:687065816}    Treatments at the Time of Hospital Discharge:   Respiratory Treatments: ***  Oxygen Therapy:  {Therapy; copd oxygen:77165}  Ventilator:    {MH CC Vent YJQZ:451933943}    Rehab Therapies: {THERAPEUTIC INTERVENTION:6649633423}  Weight Bearing Status/Restrictions: [facility] CC Weight Bearin}  Other Medical Equipment (for information only, NOT a DME order):  {EQUIPMENT:972618982}  Other Treatments: ***    Patient's personal belongings (please select all that are sent with patient):  {CHP DME Belongings:543240021}    RN SIGNATURE:  {Esignature:580800795}    CASE MANAGEMENT/SOCIAL WORK SECTION    Inpatient Status Date: 21    Readmission Risk Assessment Score:  Readmission Risk              Risk of Unplanned Readmission:  19           Discharging to Facility/ Agency   Name:  [facility]  Address:  [address] [place] [address]  Phone:  [phone]      / signature: Electronically signed by MATTHIAS Guzman, REINALDOW on 22 at 3:13 PM EST    PHYSICIAN SECTION    Prognosis: Good    Condition at Discharge: Stable    Rehab Potential (if transferring to Rehab): {Prognosis:4823966992}    Recommended Labs or Other Treatments After Discharge: Follow-up with PCP within 1 week of discharge, continue taking prednisone, follow-up with outpatient lab for INR, follow-up with pulmonary and with patient    Physician Certification: I certify the above information and transfer of Rosanne Martines  is necessary for the continuing treatment of the diagnosis listed and that he requires {Admit to Appropriate Level of Care:99611} for {GREATER/LESS:125801718} 30 days.      Update Admission H&P: No change in H&P    PHYSICIAN SIGNATURE:  Electronically signed by Tere Doan MD on 1/4/22 at 12:30 PM EST

## 2022-01-04 NOTE — PROGRESS NOTES
Pharmacy to Dose Warfarin    Pharmacy consulted to dose warfarin for hx DVT. INR Goal: 2-3    INR today: 2.22    Assessment/Plan:  - Continue warfarin at current dose - 7.5 mg tonight. Pharmacy will continue to follow.     Elidia Saul, PharmD, [facility]

## 2022-01-04 NOTE — DISCHARGE SUMMARY
Hospital Medicine Discharge Summary    Patient ID: Kun Simon      Patient's PCP: Roger Painter MD    Admit Date: 12/31/2021     Discharge Date:   1/4/22    Admitting Provider: Madeline Romero MD     Discharge Provider: Roberto Villarreal MD     Discharge Diagnoses: Active Hospital Problems    Diagnosis     Centrilobular emphysema (HCC) [J43.2]     Pneumonia of both lower lobes due to infectious organism [J18.9]     Current every day smoker [F17.200]     Acute respiratory failure ([address] [J96.00]        The patient was seen and examined on day of discharge and this discharge summary is in conjunction with any daily progress note from day of discharge. Hospital Course:     Pt brought in by [facility] for respiratory distress. Per squad SpO2 upon arrival to the [facility] was \"40-50% on RA. \" Pt then placed on CPAP, SpO2 per squad improved to 75%, Duoneb tx given en route. Upon pt arrival, pt agonal breathing and unresponsive to sternal rub. Dr. Darien Nelson RN, Michelle Ramsey RN, gita RN, and Fabrizio Davis RT at pt bedside.   He was actually being driven to the hospital in a car when he passed out and they pulled into the fire station.      Pt squad patient recently dx'd with COPD, and per squad squad pt's wife reports pt, Marcelino Berumenling not been feeling well over the past few days. \"        Acute respiratory failure hypoxic and hypercapnic   extubated  Has been seen by [facility]   Covid testing negative  -He has since been extubated and is tolerating this well currently on 4 L of nasal cannula oxygen which will be weaned as tolerated.      Bilat pneumonia of both lower lobes  covered with rocephin and Azithromycin  I had ordered levaquin and they did get a dose of this as well. Also go Maxipime in the ED, as well as a dose of vanc. Continue abx  He is growing serratia on ET aspirate culture. On abx, today is day 4.   Is been on Rocephin and initially did receive a dose of Maxipime also azithromycin.     COPD, resolved  acute exacerbation  no wheeze at the time of my exam  patient will be on solumedrol 40 mg IV BID. Can take his steroids down to daily and transition to oral prednisone. Has been discharged on steroids p.o. Physical Exam Performed:     BP (!) 145/90   Pulse 72   Temp 96.9 °F (36.1 °C) (Temporal)   Resp 17   Ht 5' 8\" (1.727 m)   Wt 163 lb 3.2 oz (74 kg)   SpO2 91%   BMI 24.81 kg/m²       General appearance:  No apparent distress, appears stated age and cooperative. HEENT:  Normal cephalic, atraumatic without obvious deformity. Pupils equal, round, and reactive to light. Extra ocular muscles intact. Conjunctivae/corneas clear. Neck: Supple, with full range of motion. No jugular venous distention. Trachea midline. Respiratory:  Normal respiratory effort. Clear to auscultation, bilaterally without Rales/Wheezes/Rhonchi. Cardiovascular:  Regular rate and rhythm with normal S1/S2 without murmurs, rubs or gallops. Abdomen: Soft, non-tender, non-distended with normal bowel sounds. Musculoskeletal:  No clubbing, cyanosis or edema bilaterally. Full range of motion without deformity. Skin: Skin color, texture, turgor normal.  No rashes or lesions. Neurologic:  Neurovascularly intact without any focal sensory/motor deficits. Cranial nerves: II-XII intact, grossly non-focal.  Psychiatric:  Alert and oriented, thought content appropriate, normal insight  Capillary Refill: Brisk,< 3 seconds   Peripheral Pulses: +2 palpable, equal bilaterally       Labs:  For convenience and continuity at follow-up the following most recent labs are provided:      CBC:    Lab Results   Component Value Date    WBC 5.9 01/04/2022    HGB 12.1 01/04/2022    HCT 37.9 01/04/2022     01/04/2022       Renal:    Lab Results   Component Value Date     01/04/2022    K 4.3 01/04/2022     01/04/2022    CO2 28 01/04/2022    BUN 22 01/04/2022    CREATININE 0.7 01/04/2022    CALCIUM 9.2 01/04/2022 Significant Diagnostic Studies    Radiology:   XR CHEST PORTABLE   Final Result   Stable chest.         CT CHEST PULMONARY EMBOLISM W CONTRAST   Final Result   Head:      No acute intracranial abnormality. There is minimal atrophy and small-vessel   ischemic change      Paranasal sinus disease with secretions in the nasopharynx. Chest:      No central pulmonary embolus identified. .  Tiny peripheral branches are not   well evaluated secondary to motion. There is mild coronary artery   calcification      Bilateral lower lobe lung consolidation, suspicious for pneumonia. .  There is   moderate underlying emphysema. No significant pleural fluid      RECOMMENDATIONS:   Unavailable         CT HEAD WO CONTRAST   Final Result   Head:      No acute intracranial abnormality. There is minimal atrophy and small-vessel   ischemic change      Paranasal sinus disease with secretions in the nasopharynx. Chest:      No central pulmonary embolus identified. .  Tiny peripheral branches are not   well evaluated secondary to motion. There is mild coronary artery   calcification      Bilateral lower lobe lung consolidation, suspicious for pneumonia. .  There is   moderate underlying emphysema. No significant pleural fluid      RECOMMENDATIONS:   Unavailable         XR CHEST PORTABLE   Final Result   ET tube and enteric tube. New patchy interstitial and alveolar subtle opacities in both lungs. This   can be seen with COVID-19 pneumonia in the proper clinical setting.    Multifocal pneumonia and other atypical pneumonias are also in the   differential.                Consults:     IP CONSULT TO PULMONOLOGY  IP CONSULT TO PHARMACY    Disposition: Home    Condition at Discharge: Stable    Discharge Instructions/Follow-up: Follow-up with PCP within 1 week of discharge, continue treatment prednisone, patient has been evaluated for home oxygen, did not require any home oxygen upon discharge, follow-up with pulmonary outpatient, counseled for smoking cessation, has been started on nicotine patch, follow-up with INR outpatient PCP, continue Coumadin    Code Status:  Full Code    Activity: activity as tolerated    Diet: cardiac diet      Discharge Medications:     Current Discharge Medication List           Details   tiotropium (SPIRIVA RESPIMAT) 2.5 MCG/ACT AERS inhaler Inhale 2 puffs into the lungs daily  Qty: 1 each, Refills: 0      predniSONE (DELTASONE) 20 MG tablet Take 2 tablets by mouth daily for 10 days  Qty: 20 tablet, Refills: 0      budesonide-formoterol (SYMBICORT) 160-4.5 MCG/ACT AERO Inhale 2 puffs into the lungs 2 times daily  Qty: 10.2 g, Refills: 3              Details   nicotine (NICODERM CQ) 21 MG/24HR Place 1 patch onto the skin every 24 hours  Qty: 30 patch, Refills: 2    Associated Diagnoses: Tobacco abuse disorder              Details   albuterol sulfate HFA (VENTOLIN HFA) 108 (90 Base) MCG/ACT inhaler Inhale 2 puffs into the lungs 4 times daily as needed for Wheezing  Qty: 54 g, Refills: 1    Associated Diagnoses: SOB (shortness of breath); Wheezes;  Tobacco abuse disorder      atorvastatin (LIPITOR) 80 MG tablet Take 1 tablet by mouth daily Cancel atorvastatin 20 mg a day  Qty: 90 tablet, Refills: 0    Associated Diagnoses: Essential hypertension; Pure hypercholesterolemia; PAD (peripheral artery disease) ([facility])      NIFEdipine (ADALAT CC) 60 MG extended release tablet Take 1 tablet by mouth daily  Qty: 90 tablet, Refills: 0    Associated Diagnoses: Essential hypertension      metFORMIN (GLUCOPHAGE) 500 MG tablet Take 1 tablet by mouth 2 times daily (with meals)  Qty: 180 tablet, Refills: 0    Associated Diagnoses: Type 2 diabetes mellitus without complication, without long-term current use of insulin ([facility])      lisinopril (PRINIVIL;ZESTRIL) 10 MG tablet Take 1 tablet by mouth daily  Qty: 90 tablet, Refills: 0    Associated Diagnoses: Essential hypertension      ferrous sulfate (IRON 325) 325 (65 Fe) MG tablet Take 1 tablet by mouth 2 times daily  Qty: 180 tablet, Refills: 0    Associated Diagnoses: Iron deficiency      warfarin (JANTOVEN) 5 MG tablet TAKE 1 AND 1/2 TABLETS BY MOUTH ONE TIME A DAY OR AS DIRECTED BY [facility] ([phone])  Qty: 45 tablet, Refills: 0      tadalafil (CIALIS) 5 MG tablet TAKE 1 TABLET BY MOUTH ONE TIME A DAY AS NEEDED FOR ERECTILE DYSFUNCTION  Qty: 30 tablet, Refills: 5    Associated Diagnoses: Erectile dysfunction, unspecified erectile dysfunction type      acetaminophen (TYLENOL) 500 MG tablet Take 1 tablet by mouth 4 times daily as needed for Pain  Qty: 360 tablet, Refills: 1    Associated Diagnoses: Arthralgia, unspecified joint      fluticasone (FLONASE) 50 MCG/ACT nasal spray 1 spray by Nasal route daily  Qty: 1 Bottle, Refills: 3    Associated Diagnoses: Allergic rhinitis, unspecified seasonality, unspecified trigger      carvedilol (COREG) 25 MG tablet Take 25 mg by mouth 2 times daily (with meals)       becaplermin (REGRANEX) 0.01 % gel Apply 1 Applicatorful topically daily      Vitamins/Minerals TABS Take 1 tablet by mouth daily      aspirin 81 MG chewable tablet CHEW AND SWALLOW 1 TABLET BY MOUTH ONE TIME A DAY  Qty: 100 tablet, Refills: 5    Associated Diagnoses: PVD (peripheral vascular disease) ([facility]             Time Spent on discharge is more than 30 minutes in the examination, evaluation, counseling and review of medications and discharge plan. Signed:    Katia Barnett MD   1/4/2022      Thank you Tyronne Klinefelter, MD for the opportunity to be involved in this patient's care. If you have any questions or concerns please feel free to contact me at [phone].

## 2022-01-04 NOTE — PROGRESS NOTES
Discharge instructions gone over with patient all questions asked and answered, pt verbalized understanding, all pt belongings with pt at time of discharge to son.

## 2022-01-04 NOTE — CARE COORDINATION
SW informed that patient was seen by PT & OT today. PT recommended outpatient physical therapy and OT had no recommendations. No DME equipment recommended. Met with pt who was agreeable to outpatient therapy but reported could be difficulty with his job as pt plans to return to work. Pt requested outpatient therapy's phone number and will contact them to work it out. SW provided the number. No other needs were identified for pt's discharge. Discharge Plan:  Home w/outpatient physical therapy.     Electronically signed by MATTHIAS Dodd LSW on 1/4/2022 at 3:23 PM

## 2022-01-05 ENCOUNTER — TELEPHONE (OUTPATIENT)
Dept: PHARMACY | Age: 62
End: 2022-01-05

## 2022-01-05 ENCOUNTER — TELEPHONE (OUTPATIENT)
Dept: PRIMARY CARE CLINIC | Age: 62
End: 2022-01-05

## 2022-01-05 NOTE — TELEPHONE ENCOUNTER
Recent admission. Discharged w prednisone for 10 days. Significant interaction. Advised to take warfarin 7.5mg today and tomorrow and scheduled for 1/7/21.     Haily Chacon, PharmD,  [facility]  Anticoagulation Service  [phone]

## 2022-01-05 NOTE — TELEPHONE ENCOUNTER
Pt has a follow up scheduled with you on 1-17. He has some questions regarding his Trelegy medication. He was at the hospital and they d/c trelegy. He wants to talk to you prior to this ov. Can you see him sooner or advise if he should be off the trelegy. Thank you!    [place]: [phone]

## 2022-01-06 DIAGNOSIS — I10 ESSENTIAL HYPERTENSION: ICD-10-CM

## 2022-01-06 DIAGNOSIS — M25.50 ARTHRALGIA, UNSPECIFIED JOINT: ICD-10-CM

## 2022-01-06 DIAGNOSIS — I73.9 PAD (PERIPHERAL ARTERY DISEASE) (HCC): ICD-10-CM

## 2022-01-06 DIAGNOSIS — E11.9 TYPE 2 DIABETES MELLITUS WITHOUT COMPLICATION, WITHOUT LONG-TERM CURRENT USE OF INSULIN (HCC): ICD-10-CM

## 2022-01-06 DIAGNOSIS — J30.9 ALLERGIC RHINITIS, UNSPECIFIED SEASONALITY, UNSPECIFIED TRIGGER: ICD-10-CM

## 2022-01-06 DIAGNOSIS — E78.00 PURE HYPERCHOLESTEROLEMIA: ICD-10-CM

## 2022-01-06 RX ORDER — ACETAMINOPHEN 500 MG
500 TABLET ORAL 4 TIMES DAILY PRN
Qty: 360 TABLET | Refills: 1 | Status: SHIPPED | OUTPATIENT
Start: 2022-01-06 | End: 2022-01-08 | Stop reason: SDUPTHER

## 2022-01-06 RX ORDER — FLUTICASONE PROPIONATE 50 MCG
1 SPRAY, SUSPENSION (ML) NASAL DAILY
Qty: 9.9 G | Refills: 5 | Status: SHIPPED | OUTPATIENT
Start: 2022-01-06 | End: 2023-01-06

## 2022-01-06 RX ORDER — NIFEDIPINE 60 MG/1
60 TABLET, FILM COATED, EXTENDED RELEASE ORAL DAILY
Qty: 90 TABLET | Refills: 0 | Status: SHIPPED | OUTPATIENT
Start: 2022-01-06 | End: 2022-01-08 | Stop reason: SDUPTHER

## 2022-01-06 RX ORDER — LISINOPRIL 10 MG/1
10 TABLET ORAL DAILY
Qty: 90 TABLET | Refills: 0 | Status: SHIPPED | OUTPATIENT
Start: 2022-01-06 | End: 2022-01-10 | Stop reason: SDUPTHER

## 2022-01-06 RX ORDER — WARFARIN SODIUM 5 MG/1
TABLET ORAL
Qty: 45 TABLET | Refills: 0 | Status: SHIPPED | OUTPATIENT
Start: 2022-01-06 | End: 2022-01-08 | Stop reason: SDUPTHER

## 2022-01-06 RX ORDER — ATORVASTATIN CALCIUM 80 MG/1
80 TABLET, FILM COATED ORAL DAILY
Qty: 90 TABLET | Refills: 0 | Status: SHIPPED | OUTPATIENT
Start: 2022-01-06 | End: 2022-01-08 | Stop reason: SDUPTHER

## 2022-01-07 ENCOUNTER — HOSPITAL ENCOUNTER (OUTPATIENT)
Dept: WOUND CARE | Age: 62
Discharge: HOME | End: 2022-01-07
Payer: COMMERCIAL

## 2022-01-07 ENCOUNTER — TELEPHONE (OUTPATIENT)
Dept: PHARMACY | Age: 62
End: 2022-01-07

## 2022-01-07 VITALS
DIASTOLIC BLOOD PRESSURE: 97 MMHG | SYSTOLIC BLOOD PRESSURE: 158 MMHG | RESPIRATION RATE: 18 BRPM | HEART RATE: 87 BPM | TEMPERATURE: 97.1 F

## 2022-01-07 DIAGNOSIS — I73.9 PERIPHERAL ARTERY DISEASE (HCC): Primary | ICD-10-CM

## 2022-01-07 PROCEDURE — 11045 DBRDMT SUBQ TISS EACH ADDL: CPT

## 2022-01-07 PROCEDURE — 99213 OFFICE O/P EST LOW 20 MIN: CPT

## 2022-01-07 PROCEDURE — 11042 DBRDMT SUBQ TIS 1ST 20SQCM/<: CPT

## 2022-01-07 PROCEDURE — 11043 DBRDMT MUSC&/FSCA 1ST 20/<: CPT

## 2022-01-07 ASSESSMENT — PAIN - FUNCTIONAL ASSESSMENT: PAIN_FUNCTIONAL_ASSESSMENT: PREVENTS OR INTERFERES SOME ACTIVE ACTIVITIES AND ADLS

## 2022-01-07 ASSESSMENT — PAIN DESCRIPTION - PAIN TYPE: TYPE: CHRONIC PAIN

## 2022-01-07 ASSESSMENT — PAIN DESCRIPTION - ONSET: ONSET: ON-GOING

## 2022-01-07 ASSESSMENT — PAIN DESCRIPTION - DESCRIPTORS: DESCRIPTORS: ACHING;CONSTANT;RADIATING

## 2022-01-07 ASSESSMENT — PAIN DESCRIPTION - FREQUENCY: FREQUENCY: CONTINUOUS

## 2022-01-07 ASSESSMENT — PAIN DESCRIPTION - PROGRESSION: CLINICAL_PROGRESSION: GRADUALLY IMPROVING

## 2022-01-07 ASSESSMENT — PAIN DESCRIPTION - ORIENTATION: ORIENTATION: RIGHT;LEFT

## 2022-01-07 ASSESSMENT — PAIN SCALES - GENERAL: PAINLEVEL_OUTOF10: 6

## 2022-01-07 ASSESSMENT — PAIN DESCRIPTION - LOCATION: LOCATION: FOOT;ANKLE

## 2022-01-07 NOTE — TELEPHONE ENCOUNTER
Canceled his appt because of getting out of another appt late. Reschedule for 1/10/22.     [place], PharmD, [address] [facility]  [phone]

## 2022-01-07 NOTE — PROGRESS NOTES
Iris [facility]  Progress Note and Procedure Note      Lazara Allen  AGE: 62 y.o. GENDER: male  : 1960  TODAY'S DATE:  2022    Subjective:     Chief Complaint   Patient presents with    Wound Check     bilateral lower extremities         HISTORY of PRESENT ILLNESS HPI     Lazara Allen is a 62 y.o. male who presents today for wound evaluation. History of Wound: Admits to having chronic wounds for at least a year on the left leg and 8 to 9 months on the right leg. He states that he has had arterial bypasses on both of his legs in the past.  He was seeing previous wound care and podiatry for these wounds. He states that his insurance had some issues and he has not been seeing anyone for 4 to 6 months. He has been treating the wounds himself with PSO and bandages. 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 He denies current nausea, vomiting, fever, chills, shortness of breath or chest pain. He admits to being recently admitted to the hospital for an acute bronchial issue that required intubation. He admits to being a well-controlled diabetic with an A1c less than 7.   Wound Pain:  intermittent  Severity:  3  10   Wound Type:  venous, arterial and diabetic  Modifying Factors:  edema, venous stasis, lymphedema, diabetes, decreased mobility, arterial insufficiency and decreased tissue oxygenation  Associated Signs/Symptoms:  drainage, numbness and odor        PAST MEDICAL HISTORY        Diagnosis Date    Diabetes mellitus ([address]     Fibromyalgia     Hyperlipidemia     Hypertension        PAST SURGICAL HISTORY    Past Surgical History:   Procedure Laterality Date    APPENDECTOMY  2005    COLONOSCOPY  2016    FEMORAL BYPASS Left 2020    femoral posterior tibial bypass    FEMORAL-TIBIAL BYPASS GRAFT Right 2020    with femoral endarterectomy and patch angioplasty    FOOT DEBRIDEMENT Left 2020    FOOT DEBRIDEMENT Right 2021    DEBRIDEMENT OF RIGHT FOOT WOUND WITH GRAFT APPLICATION performed by Radha Harkins DPM at [facility] Right 2020    stent leg for PVD       FAMILY HISTORY    Family History   Problem Relation Age of Onset    Cancer Mother     Stroke Father        SOCIAL HISTORY    Social History     Tobacco Use    Smoking status: Former Smoker     Packs/day: 0.50     Years: 20.00     Pack years: 10.00     Types: Cigarettes     Start date: 1977     Quit date: 2021     Years since quittin.0    Smokeless tobacco: Never Used   Vaping Use    Vaping Use: Never used   Substance Use Topics    Alcohol use:  Yes     Alcohol/week: 6.0 standard drinks     Types: 6 Cans of beer per week    Drug use: Never       ALLERGIES    Allergies   Allergen Reactions    Chantix [Varenicline]      Hallucinations         MEDICATIONS    Current Outpatient Medications on File Prior to Encounter   Medication Sig Dispense Refill    fluticasone (FLONASE) 50 MCG/ACT nasal spray 1 spray by Nasal route daily 9.9 g 5    acetaminophen (TYLENOL) 500 MG tablet Take 1 tablet by mouth 4 times daily as needed for Pain 360 tablet 1    lisinopril (PRINIVIL;ZESTRIL) 10 MG tablet Take 1 tablet by mouth daily 90 tablet 0    warfarin (JANTOVEN) 5 MG tablet TAKE 1 AND 1/2 TABLETS BY MOUTH ONE TIME A DAY OR AS DIRECTED BY [facility] ([phone]) (Patient taking differently: 7 mg TAKE 1 AND 1/2 TABLETS BY MOUTH ONE TIME A DAY OR AS DIRECTED BY [facility] ([phone])) 45 tablet 0    atorvastatin (LIPITOR) 80 MG tablet Take 1 tablet by mouth daily Cancel atorvastatin 20 mg a day 90 tablet 0    NIFEdipine (ADALAT CC) 60 MG extended release tablet Take 1 tablet by mouth daily 90 tablet 0    metFORMIN (GLUCOPHAGE) 500 MG tablet Take 1 tablet by mouth 2 times daily (with meals) 180 tablet 0    tiotropium (SPIRIVA RESPIMAT) 2.5 MCG/ACT AERS inhaler Inhale 2 puffs into the lungs daily 1 each 0    predniSONE (DELTASONE) 20 MG tablet Take 2 tablets by mouth daily for 10 days 20 tablet 0    nicotine (NICODERM CQ) 21 MG/24HR Place 1 patch onto the skin every 24 hours 30 patch 2    blood glucose monitor kit and supplies Dispense sufficient amount for indicated testing frequency plus additional to accommodate PRN testing needs. Dispense all needed supplies to include: monitor, strips, lancing device, lancets, control solutions, alcohol swabs. 1 kit 0    Lancets MISC 1 each by Does not apply route daily Test 2 times daily while on PREDNISONE then 1 time daily & as needed for symptoms of irregular blood sugar 100 each 3    blood glucose monitor strips Test 2 times a day while on PREDNISONE, then 1 time daily & as needed for symptoms of irregular blood glucose. Dispense sufficient amount for indicated testing frequency plus additional to accommodate PRN testing needs. 100 strip 3    albuterol sulfate HFA (VENTOLIN HFA) 108 (90 Base) MCG/ACT inhaler Inhale 2 puffs into the lungs 4 times daily as needed for Wheezing 54 g 1    ferrous sulfate (IRON 325) 325 (65 Fe) MG tablet Take 1 tablet by mouth 2 times daily 180 tablet 0    tadalafil (CIALIS) 5 MG tablet TAKE 1 TABLET BY MOUTH ONE TIME A DAY AS NEEDED FOR ERECTILE DYSFUNCTION 30 tablet 5    carvedilol (COREG) 25 MG tablet Take 25 mg by mouth 2 times daily (with meals)       becaplermin (REGRANEX) 0.01 % gel Apply 1 Applicatorful topically daily (Patient not taking: Reported on 11/29/2021)      Vitamins/Minerals TABS Take 1 tablet by mouth daily      aspirin 81 MG chewable tablet CHEW AND SWALLOW 1 TABLET BY MOUTH ONE TIME A DAY (Patient taking differently: CHEW AND SWALLOW 1 TABLET BY MOUTH ONE TIME A DAY  1/7/22 NOT TAKING) 100 tablet 5     No current facility-administered medications on file prior to encounter. REVIEW OF SYSTEMS    Pertinent items are noted in HPI.       Objective:      BP (!) 158/97 Pulse 87   Temp 97.1 °F (36.2 °C) (Infrared)   Resp 18     PHYSICAL EXAM    Vascular: Vascular status Impaired  palpable pedal pulses, right DP0/4 and PT1/4, left DP0/4 and PT1/4. CFT 3 seconds digits 1 to 5 bilateral.  Hair growthAbsent  both lower extremities and feet. Skin temperature is warm to warm from pretibial area to distal digits bilateral.  Exam is negative for rubor, pallor, cyanosis or signs of acute vascular compromise bilaterally. Exam is positive for edema bilateral lower extremity. Varicosities Present bilateral lower extremity. Neuro: Neurologic status diminished bilateral with epicritic Absent  , proprioceptive Absent , vibratory sensation Absent  and protopathicPresent. DTRs Absent  bilateral Achilles. There were no reproducible neuritic symptoms on exam bilateral feet/ankles. Derm: Ulceration to bilateral ankles. Ecchymosis Absent  bilateral feet/foot. Musculoskeletal: No pain with debridement of wounds 5/5 muscle strength in/eversion and dorsi/plantarflexion bilateral feet. No gross instability noted. Assessment:     Problem List Items Addressed This Visit     Peripheral artery disease ([facility] [address] - Primary    Relevant Orders    External Referral To Vascular Surgery    VL DUP LOWER EXTREMITY ARTERIES BILATERAL          Procedure Note    Performed by: Gin Min DPM    Consent obtained: Yes    Time out taken:  Yes    Pain Control: Anesthetic  Anesthetic: 4% Lidocaine Cream     Debridement:Excisional Debridement    Using curette the wound was sharply debrided    down through and including the removal of epidermis, dermis, subcutaneous tissue and muscle/fascia.         Devitalized Tissue Debrided:  fibrin, biofilm, slough, necrotic/eschar and exudate    Pre Debridement Measurements:  Are located in the Wound Documentation Flow Sheet    Wound #: 1     Post  Debridement Measurements:  Wound 12/31/21 Ankle Right;Medial #1 (Active)   Wound Image   01/07/22 0834   Wound Etiology Diabetic 01/07/22 0834   Dressing Status New dressing applied 01/04/22 1646   Wound Cleansed Cleansed with saline 01/07/22 0834   Dressing/Treatment Ace wrap;Gauze dressing/dressing sponge; Hydrofiber Ag;Roll gauze 01/04/22 1646   Offloading for Diabetic Foot Ulcers Post op shoe; No offloading required 01/07/22 0834   Dressing Change Due 01/06/22 01/04/22 1646   Wound Length (cm) 7.8 cm 01/07/22 0834   Wound Width (cm) 3.5 cm 01/07/22 0834   Wound Depth (cm) 0.2 cm 01/07/22 0834   Wound Surface Area (cm^2) 27.3 cm^2 01/07/22 0834   Change in Wound Size % (l*w) -51.67 01/07/22 0834   Wound Volume (cm^3) 5.46 cm^3 01/07/22 0834   Wound Healing % -52 01/07/22 0834   Post-Procedure Length (cm) 7.8 cm 01/07/22 0838   Post-Procedure Width (cm) 3.5 cm 01/07/22 0838   Post-Procedure Depth (cm) 0.2 cm 01/07/22 0838   Post-Procedure Surface Area (cm^2) 27.3 cm^2 01/07/22 0838   Post-Procedure Volume (cm^3) 5.46 cm^3 01/07/22 0838   Wound Assessment Bleeding 01/07/22 0838   Drainage Amount Moderate 01/07/22 0834   Drainage Description Serosanguinous 01/07/22 0834   Odor Mild 01/07/22 0834   Jamila-wound Assessment Dry/flaky; Hyperkeratosis (callous) 01/07/22 0834   Margins Defined edges 01/07/22 0834   Wound Thickness Description not for Pressure Injury Full thickness 01/04/22 1646   Number of days: 6       Wound 12/31/21 Ankle Medial;Left #2 (Active)   Wound Image   01/07/22 0838   Wound Etiology Diabetic 01/07/22 0838   Dressing Status New dressing applied 01/04/22 1646   Wound Cleansed Cleansed with saline 01/07/22 6466   Dressing/Treatment Ace wrap;Gauze dressing/dressing sponge; Hydrofiber Ag;Roll gauze 01/04/22 1646   Offloading for Diabetic Foot Ulcers No offloading required 01/07/22 0838   Dressing Change Due 01/06/22 01/04/22 1646   Wound Length (cm) 4.3 cm 01/07/22 0838   Wound Width (cm) 2.2 cm 01/07/22 0838   Wound Depth (cm) 0.2 cm 01/07/22 0838   Wound Surface Area (cm^2) 9.46 cm^2 01/07/22 0838   Change in Wound Size % (l*w) -26.13 01/07/22 0838   Wound Volume (cm^3) 1.892 cm^3 01/07/22 0838   Wound Healing % -26 01/07/22 0838   Post-Procedure Length (cm) 4.3 cm 01/07/22 0906   Post-Procedure Width (cm) 2.2 cm 01/07/22 0906   Post-Procedure Depth (cm) 0.2 cm 01/07/22 0906   Post-Procedure Surface Area (cm^2) 9.46 cm^2 01/07/22 0906   Post-Procedure Volume (cm^3) 1.892 cm^3 01/07/22 0906   Wound Assessment Blood filled blister 01/07/22 0906   Drainage Amount Moderate 01/07/22 0838   Drainage Description Serosanguinous 01/07/22 0838   Odor None 01/07/22 0838   Jamila-wound Assessment Dry/flaky; Hyperkeratosis (callous) 01/07/22 0838   Margins Defined edges 01/07/22 0838   Wound Thickness Description not for Pressure Injury Full thickness 01/07/22 0838   Number of days: 6           Total Surface Area Debrided: 9.46 cm² left leg into the deep fascia and sq cm 27.3 cm² into the subcutaneous tissue of the right foot    Percentage of wound debrided 100%    Bleeding:  Minimal    Hemostasis Achieved:  by pressure    Procedural Pain:  0  / 10     Post Procedural Pain:  0 / 10     Response to treatment:  Well tolerated by patient. Plan:   Patient examined and evaluated   Wounds debrided without incident   Multilayer compression wrap with triad and gentamicin cream   Leave dressings in place for 1 week   Keep legs elevated at all times when not active   the nature of the patient's condition was explained in depth.  The patient was informed that their compliance to the treatment plan is paramount to successful healing and prevention of further ulceration and/or infection     Discharge Treatment  Follow-up 1 week    Written Patient Discharge Instructions Given            Electronically signed by Becky Escalante DPM on 1/7/2022 at 10:30 AM

## 2022-01-07 NOTE — PLAN OF CARE
Multilayer Compression Wrap   Below the Knee    NAME:  Gorge Jaimes  YOB: 1960  MEDICAL RECORD NUMBER:  [id number]  DATE:  1/7/2022    Multilayer compression wrap: Applied moisturizing agent to dry skin as needed. Applied primary and secondary dressing as ordered. Applied multilayered dressing below the knee to right lower leg. Applied multilayered dressing below the knee to left lower leg. Instructed patient/caregiver not to remove dressing and to keep it clean and dry. Instructed patient/caregiver on complications to report to provider, such as pain, numbness in toes, heavy drainage, and slippage of dressing. Instructed patient on purpose of compression dressing and on activity and exercise recommendations.      Applied  2 layer wrap from toes to knee overlapping each time    Electronically signed by Tramaine Cruz RN on 1/7/2022 at 10:08 AM

## 2022-01-08 DIAGNOSIS — I73.9 PAD (PERIPHERAL ARTERY DISEASE) (HCC): ICD-10-CM

## 2022-01-08 DIAGNOSIS — M25.50 ARTHRALGIA, UNSPECIFIED JOINT: ICD-10-CM

## 2022-01-08 DIAGNOSIS — E78.00 PURE HYPERCHOLESTEROLEMIA: ICD-10-CM

## 2022-01-08 DIAGNOSIS — I10 ESSENTIAL HYPERTENSION: ICD-10-CM

## 2022-01-08 DIAGNOSIS — E11.9 TYPE 2 DIABETES MELLITUS WITHOUT COMPLICATION, WITHOUT LONG-TERM CURRENT USE OF INSULIN (HCC): ICD-10-CM

## 2022-01-08 RX ORDER — LISINOPRIL 10 MG/1
10 TABLET ORAL DAILY
Qty: 90 TABLET | Refills: 0 | Status: CANCELLED | OUTPATIENT
Start: 2022-01-08

## 2022-01-10 ENCOUNTER — ANTI-COAG VISIT (OUTPATIENT)
Dept: PHARMACY | Age: 62
End: 2022-01-10
Payer: COMMERCIAL

## 2022-01-10 ENCOUNTER — OFFICE VISIT (OUTPATIENT)
Dept: PRIMARY CARE CLINIC | Age: 62
End: 2022-01-10
Payer: COMMERCIAL

## 2022-01-10 VITALS
HEIGHT: 68 IN | DIASTOLIC BLOOD PRESSURE: 73 MMHG | WEIGHT: 171 LBS | OXYGEN SATURATION: 96 % | SYSTOLIC BLOOD PRESSURE: 114 MMHG | TEMPERATURE: 97.5 F | BODY MASS INDEX: 25.91 KG/M2 | HEART RATE: 93 BPM

## 2022-01-10 DIAGNOSIS — I73.9 PERIPHERAL ARTERY DISEASE (HCC): Primary | ICD-10-CM

## 2022-01-10 DIAGNOSIS — J43.2 CENTRILOBULAR EMPHYSEMA (HCC): Primary | ICD-10-CM

## 2022-01-10 DIAGNOSIS — E11.9 TYPE 2 DIABETES MELLITUS WITHOUT COMPLICATION, WITHOUT LONG-TERM CURRENT USE OF INSULIN (HCC): ICD-10-CM

## 2022-01-10 DIAGNOSIS — Z98.890 S/P COLONOSCOPY: ICD-10-CM

## 2022-01-10 DIAGNOSIS — I10 ESSENTIAL HYPERTENSION: ICD-10-CM

## 2022-01-10 DIAGNOSIS — J96.01 ACUTE RESPIRATORY FAILURE WITH HYPOXIA AND HYPERCAPNIA (HCC): ICD-10-CM

## 2022-01-10 DIAGNOSIS — Z20.822 ENCOUNTER FOR PREOPERATIVE SCREENING LABORATORY TESTING FOR COVID-19 VIRUS: ICD-10-CM

## 2022-01-10 DIAGNOSIS — Z12.5 SCREENING FOR PROSTATE CANCER: ICD-10-CM

## 2022-01-10 DIAGNOSIS — Z90.49 S/P LAPAROSCOPIC-ASSISTED SIGMOIDECTOMY: ICD-10-CM

## 2022-01-10 DIAGNOSIS — J96.02 ACUTE RESPIRATORY FAILURE WITH HYPOXIA AND HYPERCAPNIA (HCC): ICD-10-CM

## 2022-01-10 DIAGNOSIS — Z01.812 ENCOUNTER FOR PREOPERATIVE SCREENING LABORATORY TESTING FOR COVID-19 VIRUS: ICD-10-CM

## 2022-01-10 DIAGNOSIS — Z86.010 HISTORY OF COLON POLYPS: ICD-10-CM

## 2022-01-10 LAB — INTERNATIONAL NORMALIZATION RATIO, POC: 2

## 2022-01-10 PROCEDURE — 1111F DSCHRG MED/CURRENT MED MERGE: CPT | Performed by: FAMILY MEDICINE

## 2022-01-10 PROCEDURE — 99214 OFFICE O/P EST MOD 30 MIN: CPT | Performed by: FAMILY MEDICINE

## 2022-01-10 PROCEDURE — 85610 PROTHROMBIN TIME: CPT

## 2022-01-10 PROCEDURE — 99211 OFF/OP EST MAY X REQ PHY/QHP: CPT

## 2022-01-10 RX ORDER — LISINOPRIL 10 MG/1
10 TABLET ORAL DAILY
Qty: 90 TABLET | Refills: 0 | Status: ON HOLD | OUTPATIENT
Start: 2022-01-10 | End: 2022-02-05 | Stop reason: HOSPADM

## 2022-01-10 RX ORDER — NIFEDIPINE 60 MG/1
60 TABLET, FILM COATED, EXTENDED RELEASE ORAL DAILY
Qty: 90 TABLET | Refills: 0 | Status: SHIPPED | OUTPATIENT
Start: 2022-01-10 | End: 2022-05-05 | Stop reason: ALTCHOICE

## 2022-01-10 RX ORDER — CARVEDILOL 25 MG/1
25 TABLET ORAL 2 TIMES DAILY WITH MEALS
Qty: 60 TABLET | Refills: 3 | Status: SHIPPED | OUTPATIENT
Start: 2022-01-10 | End: 2022-05-05

## 2022-01-10 RX ORDER — ATORVASTATIN CALCIUM 80 MG/1
80 TABLET, FILM COATED ORAL DAILY
Qty: 90 TABLET | Refills: 0 | Status: SHIPPED | OUTPATIENT
Start: 2022-01-10 | End: 2022-02-18 | Stop reason: SDUPTHER

## 2022-01-10 RX ORDER — ACETAMINOPHEN 500 MG
500 TABLET ORAL 4 TIMES DAILY PRN
Qty: 360 TABLET | Refills: 1 | Status: SHIPPED | OUTPATIENT
Start: 2022-01-10 | End: 2022-09-14 | Stop reason: SDUPTHER

## 2022-01-10 RX ORDER — WARFARIN SODIUM 5 MG/1
TABLET ORAL
Qty: 45 TABLET | Refills: 0 | Status: SHIPPED | OUTPATIENT
Start: 2022-01-10 | End: 2022-05-05 | Stop reason: ALTCHOICE

## 2022-01-10 ASSESSMENT — ENCOUNTER SYMPTOMS
SORE THROAT: 0
EYE PAIN: 0
CHEST TIGHTNESS: 0
BLOOD IN STOOL: 0
ABDOMINAL PAIN: 0
SHORTNESS OF BREATH: 0
VOMITING: 0
COLOR CHANGE: 0
RECTAL PAIN: 0
CONSTIPATION: 0
EYE ITCHING: 0
FACIAL SWELLING: 0
ANAL BLEEDING: 0
TROUBLE SWALLOWING: 0
BACK PAIN: 0
WHEEZING: 0
PHOTOPHOBIA: 0
VOICE CHANGE: 0
APNEA: 0
CHOKING: 0
EYE REDNESS: 0
EYE DISCHARGE: 0
NAUSEA: 0
COUGH: 0
SINUS PRESSURE: 0

## 2022-01-10 NOTE — LETTER
[facility]  [id number]  Phone: [phone]  Fax: [phone]    Javed Herrera MD         January 10, 2022     Patient: Amna Allen   YOB: 1960   Date of Visit: 1/10/2022       To Whom It May Concern: It is my medical opinion that Naseem Reilly requires a disability parking placard for the following reasons:  He cannot walk 200 feet without stopping to rest.  Duration of need: 5 years    If you have any questions or concerns, please don't hesitate to call.     Sincerely,        Javed Herrera MD

## 2022-01-10 NOTE — PROGRESS NOTES
Post-Discharge Transitional Care Management Services or Hospital Follow Up      Di Perl   YOB: 1960    Date of Office Visit:  1/10/2022  Date of Hospital Admission: 12/31/21  Date of Hospital Discharge: 1/4/22  Readmission Risk Score(high >=14%. Medium >=10%):Readmission Risk Score: 14.5 ( )      Care management risk score Rising risk (score 2-5) and Complex Care (Scores >=6): 2     Non face to face  following discharge, date last encounter closed (first attempt may have been earlier): *No documented post hospital discharge outreach found in the last 14 days *No documented post hospital discharge outreach found in the last 14 days    Call initiated 2 business days of discharge: *No response recorded in the last 14 days     Patient Active Problem List   Diagnosis    Peripheral artery disease ([facility]    Acute respiratory failure ([address]    Centrilobular emphysema ([facility]    Pneumonia of both lower lobes due to infectious organism    Current every day smoker       Allergies   Allergen Reactions    Chantix [Varenicline]      Hallucinations         Medications listed as ordered at the time of discharge from hospital     Medication List          Accurate as of January 10, 2022  5:08 PM. If you have any questions, ask your nurse or doctor. CHANGE how you take these medications    aspirin 81 MG chewable tablet  CHEW AND SWALLOW 1 TABLET BY MOUTH ONE TIME A DAY  What changed: additional instructions     warfarin 5 MG tablet  Commonly known as: Danyelle Blissdon  Take as directed by the anticoagulation clinic. If you are unsure how to take this medication, talk to your nurse or doctor.   Original instructions: TAKE 1 AND 1/2 TABLETS BY MOUTH ONE TIME A DAY OR AS DIRECTED BY [facility] ([phone])  What changed:   · how much to take  · additional instructions        CONTINUE taking these medications    acetaminophen 500 MG tablet  Commonly known as: TYLENOL  Take 1 tablet by mouth 4 times daily as needed for Pain     albuterol sulfate  (90 Base) MCG/ACT inhaler  Commonly known as: Ventolin HFA  Inhale 2 puffs into the lungs 4 times daily as needed for Wheezing     atorvastatin 80 MG tablet  Commonly known as: LIPITOR  Take 1 tablet by mouth daily Cancel atorvastatin 20 mg a day     blood glucose monitor kit and supplies  Dispense sufficient amount for indicated testing frequency plus additional to accommodate PRN testing needs. Dispense all needed supplies to include: monitor, strips, lancing device, lancets, control solutions, alcohol swabs. blood glucose test strips  Test 2 times a day while on PREDNISONE, then 1 time daily & as needed for symptoms of irregular blood glucose. Dispense sufficient amount for indicated testing frequency plus additional to accommodate PRN testing needs.      carvedilol 25 MG tablet  Commonly known as: COREG  Take 1 tablet by mouth 2 times daily (with meals)     ferrous sulfate 325 (65 Fe) MG tablet  Commonly known as: IRON 325  Take 1 tablet by mouth 2 times daily     fluticasone 50 MCG/ACT nasal spray  Commonly known as: Flonase  1 spray by Nasal route daily     Lancets Misc  1 each by Does not apply route daily Test 2 times daily while on PREDNISONE then 1 time daily & as needed for symptoms of irregular blood sugar     lisinopril 10 MG tablet  Commonly known as: PRINIVIL;ZESTRIL  Take 1 tablet by mouth daily     metFORMIN 500 MG tablet  Commonly known as: GLUCOPHAGE  Take 1 tablet by mouth 2 times daily (with meals)     nicotine 21 MG/24HR  Commonly known as: Nicoderm CQ  Place 1 patch onto the skin every 24 hours     NIFEdipine 60 MG extended release tablet  Commonly known as: ADALAT CC  Take 1 tablet by mouth daily     predniSONE 20 MG tablet  Commonly known as: DELTASONE  Take 2 tablets by mouth daily for 10 days     Regranex 0.01 % gel  Generic drug: becaplermin     tadalafil 5 MG tablet  Commonly known as: CIALIS  TAKE 1 TABLET BY MOUTH ONE TIME A DAY AS NEEDED FOR ERECTILE DYSFUNCTION     tiotropium 2.5 MCG/ACT Aers inhaler  Commonly known as: SPIRIVA RESPIMAT  Inhale 2 puffs into the lungs daily     Vitamins/Minerals Tabs           Where to Get Your Medications      These medications were sent to [address] [facility], [address]  Yomaira Metz [phone] Yasmin Finnegan [phone]  [address] [place] [address]    Phone: [phone]   · carvedilol 25 MG tablet  · lisinopril 10 MG tablet  · metFORMIN 500 MG tablet           Medications marked \"taking\" at this time  Outpatient Medications Marked as Taking for the 1/10/22 encounter (Office Visit) with Darnell Cabrera MD   Medication Sig Dispense Refill    carvedilol (COREG) 25 MG tablet Take 1 tablet by mouth 2 times daily (with meals) 60 tablet 3    metFORMIN (GLUCOPHAGE) 500 MG tablet Take 1 tablet by mouth 2 times daily (with meals) 180 tablet 0    lisinopril (PRINIVIL;ZESTRIL) 10 MG tablet Take 1 tablet by mouth daily 90 tablet 0    fluticasone (FLONASE) 50 MCG/ACT nasal spray 1 spray by Nasal route daily 9.9 g 5    acetaminophen (TYLENOL) 500 MG tablet Take 1 tablet by mouth 4 times daily as needed for Pain 360 tablet 1    warfarin (JANTOVEN) 5 MG tablet TAKE 1 AND 1/2 TABLETS BY MOUTH ONE TIME A DAY OR AS DIRECTED BY [facility] ([phone]) (Patient taking differently: 7.5 mg TAKE 1 AND 1/2 TABLETS BY MOUTH two TIME A DAY OR AS DIRECTED BY [facility] ([phone])) 45 tablet 0    atorvastatin (LIPITOR) 80 MG tablet Take 1 tablet by mouth daily Cancel atorvastatin 20 mg a day 90 tablet 0    NIFEdipine (ADALAT CC) 60 MG extended release tablet Take 1 tablet by mouth daily 90 tablet 0    tiotropium (SPIRIVA RESPIMAT) 2.5 MCG/ACT AERS inhaler Inhale 2 puffs into the lungs daily 1 each 0    predniSONE (DELTASONE) 20 MG tablet Take 2 tablets by mouth daily for 10 days 20 tablet 0    nicotine (NICODERM CQ) 21 MG/24HR Place 1 patch onto the skin every 24 hours 30 patch 2    blood glucose monitor kit and supplies Dispense sufficient amount for indicated testing frequency plus additional to accommodate PRN testing needs. Dispense all needed supplies to include: monitor, strips, lancing device, lancets, control solutions, alcohol swabs. 1 kit 0    Lancets MISC 1 each by Does not apply route daily Test 2 times daily while on PREDNISONE then 1 time daily & as needed for symptoms of irregular blood sugar 100 each 3    blood glucose monitor strips Test 2 times a day while on PREDNISONE, then 1 time daily & as needed for symptoms of irregular blood glucose. Dispense sufficient amount for indicated testing frequency plus additional to accommodate PRN testing needs. 100 strip 3    albuterol sulfate HFA (VENTOLIN HFA) 108 (90 Base) MCG/ACT inhaler Inhale 2 puffs into the lungs 4 times daily as needed for Wheezing 54 g 1    ferrous sulfate (IRON 325) 325 (65 Fe) MG tablet Take 1 tablet by mouth 2 times daily 180 tablet 0    tadalafil (CIALIS) 5 MG tablet TAKE 1 TABLET BY MOUTH ONE TIME A DAY AS NEEDED FOR ERECTILE DYSFUNCTION 30 tablet 5    Vitamins/Minerals TABS Take 1 tablet by mouth daily      aspirin 81 MG chewable tablet CHEW AND SWALLOW 1 TABLET BY MOUTH ONE TIME A DAY (Patient taking differently: CHEW AND SWALLOW 1 TABLET BY MOUTH ONE TIME A DAY  1/7/22 NOT TAKING) 100 tablet 5        Medications patient taking as of now reconciled against medications ordered at time of hospital discharge: Yes    Chief Complaint   Patient presents with    Follow-Up from Hospital       HPI 63 y/o male hosp with sob/anoxia acute resp failure / pneumonia  He did require intubation  His cxr confirmed pneumonia but   Test for covid was negative  O2  sats  Improved, extubated to 4 LPM which he tolerated, off 02 at d/c    Received  Azithromycin, rocephin, Maxipime.  Did grow  Serratia on ET aspirate culture    He has copd  Has stopped smoking since hosp  Tapered steroid po on d/c  On albuterol inhaler and spiriva inhalers  Inpatient course: Discharge summary reviewed- see chart. Interval history/Current status: he is feeling  Better, no real cough or sob at this pain  Off 0x since hosp      Review of Systems   Constitutional: Negative for activity change, appetite change, chills, diaphoresis, fatigue, fever and unexpected weight change. HENT: Negative for congestion, dental problem, drooling, ear discharge, ear pain, facial swelling, hearing loss, mouth sores, nosebleeds, postnasal drip, sinus pressure, sneezing, sore throat, tinnitus, trouble swallowing and voice change. Eyes: Negative for photophobia, pain, discharge, redness, itching and visual disturbance. Respiratory: Negative for apnea, cough, choking, chest tightness, shortness of breath and wheezing. Cardiovascular: Negative for chest pain, palpitations and leg swelling. Gastrointestinal: Negative for abdominal pain, anal bleeding, blood in stool, constipation, nausea, rectal pain and vomiting. Genitourinary: Negative for decreased urine volume, difficulty urinating, dysuria, enuresis, flank pain, frequency, hematuria, penile discharge, penile swelling, scrotal swelling, testicular pain and urgency. Musculoskeletal: Negative for arthralgias, back pain, gait problem, joint swelling, myalgias, neck pain and neck stiffness. Skin: Negative for color change, pallor, rash and wound. Neurological: Negative for dizziness, tremors, seizures, syncope, facial asymmetry, speech difficulty, weakness, light-headedness, numbness and headaches. Hematological: Negative for adenopathy. Does not bruise/bleed easily. Psychiatric/Behavioral: Negative for agitation, behavioral problems, confusion, decreased concentration, dysphoric mood, hallucinations, self-injury, sleep disturbance and suicidal ideas. The patient is not nervous/anxious and is not hyperactive.         Vitals:    01/10/22 1446   BP: 114/73 Pulse: 93   Temp: 97.5 °F (36.4 °C)   TempSrc: Temporal   SpO2: 96%   Weight: 171 lb (77.6 kg)   Height: 5' 8\" (1.727 m)     Body mass index is 26 kg/m². Wt Readings from Last 3 Encounters:   01/10/22 171 lb (77.6 kg)   01/04/22 163 lb 3.2 oz (74 kg)   11/29/21 174 lb (78.9 kg)     BP Readings from Last 3 Encounters:   01/10/22 114/73   01/07/22 (!) 158/97   01/04/22 139/89       Physical Exam  Constitutional:       General: He is not in acute distress. Appearance: He is well-developed. He is not diaphoretic. HENT:      Head: Normocephalic and atraumatic. Right Ear: External ear normal.      Left Ear: External ear normal.      Nose: Nose normal.      Mouth/Throat:      Pharynx: No oropharyngeal exudate. Eyes:      General: No scleral icterus. Right eye: No discharge. Left eye: No discharge. Conjunctiva/sclera: Conjunctivae normal.      Pupils: Pupils are equal, round, and reactive to light. Neck:      Thyroid: No thyromegaly. Vascular: No JVD. Trachea: No tracheal deviation. Cardiovascular:      Rate and Rhythm: Normal rate and regular rhythm. Pulses:           Carotid pulses are 2+ on the right side and 2+ on the left side. Radial pulses are 2+ on the right side and 2+ on the left side. Femoral pulses are 2+ on the right side and 2+ on the left side. Popliteal pulses are 2+ on the right side and 2+ on the left side. Dorsalis pedis pulses are 2+ on the right side and 2+ on the left side. Posterior tibial pulses are 2+ on the right side and 2+ on the left side. Heart sounds: Normal heart sounds. No murmur heard. No friction rub. Pulmonary:      Effort: Pulmonary effort is normal. No respiratory distress. Breath sounds: Normal breath sounds. No stridor. No wheezing or rales. Chest:      Chest wall: No tenderness. Abdominal:      General: Bowel sounds are normal. There is no distension.       Palpations: Abdomen is soft. There is no mass. Tenderness: There is no abdominal tenderness. There is no guarding or rebound. Musculoskeletal:         General: No tenderness. Normal range of motion. Cervical back: Normal range of motion and neck supple. Lymphadenopathy:      Cervical: No cervical adenopathy. Skin:     General: Skin is warm and dry. Coloration: Skin is not pale. Findings: No rash. Neurological:      Mental Status: He is oriented to person, place, and time. Cranial Nerves: No cranial nerve deficit. Motor: No abnormal muscle tone. Coordination: Coordination normal.      Deep Tendon Reflexes: Reflexes normal.   Psychiatric:         Behavior: Behavior normal.         Thought Content: Thought content normal.         Judgment: Judgment normal.             Assessment/Plan:  1. Acute respiratory failure with hypoxia and hypercapnia ([facility])  Pulse OX ok  Breathing ok    2. Centrilobular emphysema ([address]  He has stopped smoking  He is on spriva inhaler, albuterol inhaler  Tapered prednisone  pft's pending  1/18/2022  3. Type 2 diabetes mellitus without complication, without long-term current use of insulin ([facility])  His glucose has been running good at home  - metFORMIN (GLUCOPHAGE) 500 MG tablet; Take 1 tablet by mouth 2 times daily (with meals)  Dispense: 180 tablet; Refill: 0  -  DIABETES FOOT EXAM  - MICROALBUMIN / CREATININE URINE RATIO; Future    4. Essential hypertension  bp is at goal  Recent renal  - carvedilol (COREG) 25 MG tablet; Take 1 tablet by mouth 2 times daily (with meals)  Dispense: 60 tablet; Refill: 3  - lisinopril (PRINIVIL;ZESTRIL) 10 MG tablet; Take 1 tablet by mouth daily  Dispense: 90 tablet; Refill: 0    5. History of colon polyps  2017  Will see gi for follow up    6. S/P laparoscopic-assisted sigmoidectomy  Polyps removal at [place] [id number]    7. S/P colonoscopy      - [facility] - Phil Cheng MD, Gastroenterology, [facility]    8.  Screening for prostate cancer    - PSA screening;  Future          Medical Decision Making: moderate complexity

## 2022-01-10 NOTE — PROGRESS NOTES
Rachid Painter is a 62 y.o. here for warfarin management. Cortney Taylor had an INR test today. Results were reviewed and appropriate warfarin management was completed. This visit was performed as: An in person visit. Protocols were followed with precautions to reduce the spread of COVID-19. Patient verifies current dosing regimen: Yes     Warfarin medication reviewed and updated on the patient 's home medication list: Yes   All other medications reviewed and updated on the patient 's home medication list: Yes     Lab Results   Component Value Date    INR 2 01/10/2022    INR 2.22 (H) 2022    INR 1.96 (H) 2022       Patient Findings     Positives:  Change in medications    Comments:  Prednisone 40mg x 10 days          Anticoagulation Summary  As of 1/10/2022    INR goal:  2.0-3.0   TTR:  56.8 % (7.3 mo)   INR used for dosin (1/10/2022)   Warfarin maintenance plan:  10 mg (5 mg x 2) every Wed, Sat; 7.5 mg (5 mg x 1.5) all other days   Weekly warfarin total:  57.5 mg   Plan last modified:  [place] Patrick NORI (2021)   Next INR check:  2022   Priority:  Maintenance   Target end date: Indefinite    Indications    Peripheral artery disease ([facility] [I73.9]             Anticoagulation Episode Summary     INR check location:  Anticoagulation Clinic    Preferred lab:      Send INR reminders to:  [facility]    Comments:  EPIC      Anticoagulation Care Providers     Provider Role Specialty Phone number    Dennis Liu MD Referring Family Medicine [phone]          Warfarin assessment / plan:       Patient had dose reduced after hospitalization to 7.5mg qd since was sent home on steroids.   Will increase back to previous dose and see in 3 weeks    Description    Resume Warfarin 7.5 mg (1 & 1/2 tablets) daily except 10mg (2 tablets) every Wednesday and Saturday    Call [phone] with signs or symptoms of bleeding or ANY medication changes (including over-the-counter medications or herbal supplements). If significant bleeding occurs please seek immediate medical attention. Keep the number of servings of vitamin K containing foods (dark green, leafy vegetables) the same each week. Dark green vegetable 2-3 times a week and a mixed green salad ~ 3 times a week. Please call if this changes. Limit alcohol intake. Please call if this changes. Immunization History   Administered Date(s) Administered    COVID-19, Pfizer, PF, 30mcg/0.3mL 03/15/2021, 04/12/2021, 12/22/2021             Reviewed AVS with patient / caregiver.       CLINICAL PHARMACY CONSULT: MED RECONCILIATION/REVIEW ADDENDUM    For Pharmacy Admin Tracking Only     Intervention Detail:    Total # of Interventions Recommended: 0   Total # of Interventions Accepted: 0   Time Spent (min): 15

## 2022-01-14 ENCOUNTER — HOSPITAL ENCOUNTER (OUTPATIENT)
Dept: WOUND CARE | Age: 62
Discharge: HOME | End: 2022-01-14
Payer: COMMERCIAL

## 2022-01-14 VITALS
DIASTOLIC BLOOD PRESSURE: 75 MMHG | TEMPERATURE: 97 F | HEART RATE: 103 BPM | RESPIRATION RATE: 18 BRPM | SYSTOLIC BLOOD PRESSURE: 127 MMHG

## 2022-01-14 PROCEDURE — 11045 DBRDMT SUBQ TISS EACH ADDL: CPT

## 2022-01-14 PROCEDURE — 11043 DBRDMT MUSC&/FSCA 1ST 20/<: CPT

## 2022-01-14 PROCEDURE — 11042 DBRDMT SUBQ TIS 1ST 20SQCM/<: CPT

## 2022-01-14 NOTE — PLAN OF CARE
Discharge instructions given. Patient verbalized understanding. Return to [address] [place] [address],[address] in 1 week(s).   Pt to schedule vasc studies

## 2022-01-14 NOTE — PROGRESS NOTES
Iris Rivera  Progress Note and Procedure Note      Randal Anna  AGE: 62 y.o. GENDER: male  : 1960  TODAY'S DATE:  2022    Subjective:     Chief Complaint   Patient presents with    Wound Check     BILATERAL LOWER EXTREMITIES         HISTORY of PRESENT ILLNESS HPI     Randal Anna is a 62 y.o. male who presents today for wound evaluation. History of Wound: Admits to having chronic wounds for at least a year on the left leg and 8 to 9 months on the right leg. He states that he has had arterial bypasses on both of his legs in the past.  He was seeing previous wound care and podiatry for these wounds. He states that his insurance had some issues and he has not been seeing anyone since 2021 he has been treating the wounds himself with PSO and bandages. 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 He has left the dressing intact since his last visit. He denies current nausea, vomiting, fever, chills, shortness of breath or chest pain. He states his blood sugars have been well controlled. He is trying to keep his leg elevated more at work.     Wound Pain:  intermittent  Severity:  3 / 10   Wound Type:  venous, arterial and diabetic  Modifying Factors:  edema, venous stasis, lymphedema, diabetes, decreased mobility, arterial insufficiency and decreased tissue oxygenation  Associated Signs/Symptoms:  drainage, numbness and odor        PAST MEDICAL HISTORY        Diagnosis Date    Diabetes mellitus ([facility] [address]     Fibromyalgia     Hyperlipidemia     Hypertension        PAST SURGICAL HISTORY    Past Surgical History:   Procedure Laterality Date    APPENDECTOMY  2005    COLONOSCOPY  2016    FEMORAL BYPASS Left 2020    femoral posterior tibial bypass    FEMORAL-TIBIAL BYPASS GRAFT Right 2020    with femoral endarterectomy and patch angioplasty    FOOT DEBRIDEMENT Left 2020    FOOT DEBRIDEMENT Right 2021    DEBRIDEMENT OF RIGHT FOOT WOUND WITH GRAFT APPLICATION performed by Lennie Almendarez DPM at [facility] Right 2020    stent leg for PVD       FAMILY HISTORY    Family History   Problem Relation Age of Onset    Cancer Mother     Stroke Father        SOCIAL HISTORY    Social History     Tobacco Use    Smoking status: Former Smoker     Packs/day: 0.50     Years: 20.00     Pack years: 10.00     Types: Cigarettes     Start date: 1977     Quit date: 2021     Years since quittin.0    Smokeless tobacco: Never Used   Vaping Use    Vaping Use: Never used   Substance Use Topics    Alcohol use:  Yes     Alcohol/week: 6.0 standard drinks     Types: 6 Cans of beer per week    Drug use: Never       ALLERGIES    Allergies   Allergen Reactions    Chantix [Varenicline]      Hallucinations         MEDICATIONS    Current Outpatient Medications on File Prior to Encounter   Medication Sig Dispense Refill    acetaminophen (TYLENOL) 500 MG tablet Take 1 tablet by mouth 4 times daily as needed for Pain 360 tablet 1    warfarin (JANTOVEN) 5 MG tablet TAKE 1 AND 1/2 TABLETS BY MOUTH ONE TIME A DAY OR AS DIRECTED BY [facility] ([phone]) 45 tablet 0    atorvastatin (LIPITOR) 80 MG tablet Take 1 tablet by mouth daily Cancel atorvastatin 20 mg a day 90 tablet 0    NIFEdipine (ADALAT CC) 60 MG extended release tablet Take 1 tablet by mouth daily 90 tablet 0    carvedilol (COREG) 25 MG tablet Take 1 tablet by mouth 2 times daily (with meals) 60 tablet 3    metFORMIN (GLUCOPHAGE) 500 MG tablet Take 1 tablet by mouth 2 times daily (with meals) 180 tablet 0    lisinopril (PRINIVIL;ZESTRIL) 10 MG tablet Take 1 tablet by mouth daily 90 tablet 0    fluticasone (FLONASE) 50 MCG/ACT nasal spray 1 spray by Nasal route daily 9.9 g 5    tiotropium (SPIRIVA RESPIMAT) 2.5 MCG/ACT AERS inhaler Inhale 2 puffs into the lungs daily 1 each 0    predniSONE (DELTASONE) 20 MG tablet Take 2 tablets by mouth daily for 10 days 20 tablet 0    nicotine (NICODERM CQ) 21 MG/24HR Place 1 patch onto the skin every 24 hours 30 patch 2    blood glucose monitor kit and supplies Dispense sufficient amount for indicated testing frequency plus additional to accommodate PRN testing needs. Dispense all needed supplies to include: monitor, strips, lancing device, lancets, control solutions, alcohol swabs. 1 kit 0    Lancets MISC 1 each by Does not apply route daily Test 2 times daily while on PREDNISONE then 1 time daily & as needed for symptoms of irregular blood sugar 100 each 3    blood glucose monitor strips Test 2 times a day while on PREDNISONE, then 1 time daily & as needed for symptoms of irregular blood glucose. Dispense sufficient amount for indicated testing frequency plus additional to accommodate PRN testing needs. 100 strip 3    albuterol sulfate HFA (VENTOLIN HFA) 108 (90 Base) MCG/ACT inhaler Inhale 2 puffs into the lungs 4 times daily as needed for Wheezing 54 g 1    ferrous sulfate (IRON 325) 325 (65 Fe) MG tablet Take 1 tablet by mouth 2 times daily 180 tablet 0    tadalafil (CIALIS) 5 MG tablet TAKE 1 TABLET BY MOUTH ONE TIME A DAY AS NEEDED FOR ERECTILE DYSFUNCTION 30 tablet 5    becaplermin (REGRANEX) 0.01 % gel Apply 1 Applicatorful topically daily (Patient not taking: Reported on 11/29/2021)      Vitamins/Minerals TABS Take 1 tablet by mouth daily      aspirin 81 MG chewable tablet CHEW AND SWALLOW 1 TABLET BY MOUTH ONE TIME A DAY (Patient taking differently: CHEW AND SWALLOW 1 TABLET BY MOUTH ONE TIME A DAY  1/7/22 NOT TAKING) 100 tablet 5     No current facility-administered medications on file prior to encounter. REVIEW OF SYSTEMS    Pertinent items are noted in HPI.       Objective:      /75   Pulse 103   Temp 97 °F (36.1 °C) (Infrared)   Resp 18     PHYSICAL EXAM    Vascular: Vascular status Impaired  palpable pedal pulses, right DP0/4 and PT1/4, left DP0/4 and PT1/4. CFT 3 seconds digits 1 to 5 bilateral.  Hair growthAbsent  both lower extremities and feet. Skin temperature is warm to warm from pretibial area to distal digits bilateral.  Exam is negative for rubor, pallor, cyanosis or signs of acute vascular compromise bilaterally. Exam is positive for edema bilateral lower extremity. Varicosities Present bilateral lower extremity. Neuro: Neurologic status diminished bilateral with epicritic Absent  , proprioceptive Absent , vibratory sensation Absent  and protopathicPresent. DTRs Absent  bilateral Achilles. There were no reproducible neuritic symptoms on exam bilateral feet/ankles. Derm: Ulceration to bilateral ankles. Ecchymosis Absent  bilateral feet/foot. Musculoskeletal: No pain with debridement of wounds 5/5 muscle strength in/eversion and dorsi/plantarflexion bilateral feet. No gross instability noted. Assessment:     Problem List Items Addressed This Visit     None          Procedure Note    Performed by: Rashaun Stone DPM    Consent obtained: Yes    Time out taken:  Yes    Pain Control: Anesthetic  Anesthetic: 4% Lidocaine Cream     Debridement:Excisional Debridement    Using curette the wound was sharply debrided    down through and including the removal of epidermis, dermis, subcutaneous tissue and muscle/fascia. Devitalized Tissue Debrided:  fibrin, biofilm, slough, necrotic/eschar and exudate    Pre Debridement Measurements:  Are located in the Wound Documentation Flow Sheet    Wound #: 1     Wound Care Documentation:  Wound 12/31/21 Ankle Right;Medial #1 (Active)   Wound Image   01/07/22 0834   Wound Etiology Diabetic 01/14/22 0801   Dressing Status New dressing applied 01/04/22 1646   Wound Cleansed Cleansed with saline 01/14/22 0801   Dressing/Treatment Ace wrap;Gauze dressing/dressing sponge; Hydrofiber Ag;Roll gauze 01/04/22 1646   Offloading for Diabetic Foot Ulcers Post op shoe; No offloading required 01/14/22 0801   Dressing Change Due 01/06/22 01/04/22 1646   Wound Length (cm) 7.7 cm 01/14/22 0801   Wound Width (cm) 3.2 cm 01/14/22 0801   Wound Depth (cm) 0.2 cm 01/14/22 0801   Wound Surface Area (cm^2) 24.64 cm^2 01/14/22 0801   Change in Wound Size % (l*w) -36.89 01/14/22 0801   Wound Volume (cm^3) 4.928 cm^3 01/14/22 0801   Wound Healing % -37 01/14/22 0801   Post-Procedure Length (cm) 7.7 cm 01/14/22 0828   Post-Procedure Width (cm) 3.2 cm 01/14/22 0828   Post-Procedure Depth (cm) 0.2 cm 01/14/22 0828   Post-Procedure Surface Area (cm^2) 24.64 cm^2 01/14/22 0828   Post-Procedure Volume (cm^3) 4.928 cm^3 01/14/22 0828   Wound Assessment Bleeding 01/14/22 0828   Drainage Amount Large 01/14/22 0801   Drainage Description Serosanguinous;Purulent;Yellow 01/14/22 0801   Odor Mild 01/14/22 0801   Jamila-wound Assessment Dry/flaky;Fragile 01/14/22 0801   Margins Defined edges 01/14/22 0801   Wound Thickness Description not for Pressure Injury Full thickness 01/04/22 1646   Number of days: 13       Wound 12/31/21 Ankle Medial;Left #2 (Active)   Wound Image   01/07/22 0838   Wound Etiology Diabetic 01/14/22 0801   Dressing Status New dressing applied 01/04/22 1646   Wound Cleansed Cleansed with saline 01/14/22 0801   Dressing/Treatment Ace wrap;Gauze dressing/dressing sponge; Hydrofiber Ag;Roll gauze 01/04/22 1646   Offloading for Diabetic Foot Ulcers No offloading required 01/14/22 0801   Dressing Change Due 01/06/22 01/04/22 1646   Wound Length (cm) 4.3 cm 01/14/22 0801   Wound Width (cm) 2.2 cm 01/14/22 0801   Wound Depth (cm) 0.2 cm 01/14/22 0801   Wound Surface Area (cm^2) 9.46 cm^2 01/14/22 0801   Change in Wound Size % (l*w) -26.13 01/14/22 0801   Wound Volume (cm^3) 1.892 cm^3 01/14/22 0801   Wound Healing % -26 01/14/22 0801   Post-Procedure Length (cm) 4.3 cm 01/14/22 0828   Post-Procedure Width (cm) 2.2 cm 01/14/22 0828   Post-Procedure Depth (cm) 0.2 cm 01/14/22 0828   Post-Procedure Surface Area (cm^2) 9.46 cm^2 01/14/22 0828   Post-Procedure Volume (cm^3) 1.892 cm^3 01/14/22 0828   Wound Assessment Bleeding 01/14/22 0828   Drainage Amount Moderate 01/14/22 0801   Drainage Description Serosanguinous 01/14/22 0801   Odor Mild 01/14/22 0801   Jamila-wound Assessment Dry/flaky;Fragile 01/14/22 0801   Margins Defined edges 01/14/22 0801   Wound Thickness Description not for Pressure Injury Full thickness 01/07/22 0838   Number of days: 13         Total Surface Area Debrided: 9.46 cm² left leg into the deep fascia and sq cm 24.64 cm² into the subcutaneous tissue of the right foot    Percentage of wound debrided 100%    Bleeding:  Minimal    Hemostasis Achieved:  by pressure    Procedural Pain:  0  / 10     Post Procedural Pain:  0 / 10     Response to treatment:  Well tolerated by patient. Plan:   Patient examined and evaluated   Wounds debrided without incident   Multilayer compression wrap with triad and gentamicin cream and [place]  Leave dressings in place for 1 week   Keep legs elevated at all times when not active   Vascular studies need to be scheduled  The nature of the patient's condition was explained in depth. The patient was informed that their compliance to the treatment Plan is paramount to successful healing and prevention of further ulceration and/or infection.   Discharge Treatment  Follow-up 1 week    Written Patient Discharge Instructions Given            Electronically signed by Radha Khan DPM on 1/14/2022 at 8:45 AM

## 2022-01-14 NOTE — PROGRESS NOTES
Multilayer Compression Wrap   Below the Knee    NAME:  [address]:  1960  MEDICAL RECORD NUMBER:  [id number]  DATE:  1/14/2022    Multilayer compression wrap: Removed old Multilayer wrap if indicated and wash leg with mild soap/water. Applied moisturizing agent to dry skin as needed. Applied primary and secondary dressing as ordered. Applied multilayered dressing below the knee to right lower leg. Applied multilayered dressing below the knee to left lower leg. Instructed patient/caregiver not to remove dressing and to keep it clean and dry. Instructed patient/caregiver on complications to report to provider, such as pain, numbness in toes, heavy drainage, and slippage of dressing. Instructed patient on purpose of compression dressing and on activity and exercise recommendations.      Applied  [place] wrap from toes to knee overlapping each time  Applied Triad, Gentamicin, drawtex to wounds xtra cast padding around ankle  Electronically signed by Leyda Arrieta RN on 1/14/2022 at 11:58 AM

## 2022-01-21 ENCOUNTER — HOSPITAL ENCOUNTER (OUTPATIENT)
Age: 62
Discharge: HOME | End: 2022-01-21
Payer: COMMERCIAL

## 2022-01-21 ENCOUNTER — HOSPITAL ENCOUNTER (OUTPATIENT)
Dept: WOUND CARE | Age: 62
Discharge: HOME | End: 2022-01-21
Payer: COMMERCIAL

## 2022-01-21 VITALS
SYSTOLIC BLOOD PRESSURE: 108 MMHG | RESPIRATION RATE: 16 BRPM | HEART RATE: 76 BPM | TEMPERATURE: 97.3 F | DIASTOLIC BLOOD PRESSURE: 69 MMHG

## 2022-01-21 PROCEDURE — U0003 INFECTIOUS AGENT DETECTION BY NUCLEIC ACID (DNA OR RNA); SEVERE ACUTE RESPIRATORY SYNDROME CORONAVIRUS 2 (SARS-COV-2) (CORONAVIRUS DISEASE [COVID-19]), AMPLIFIED PROBE TECHNIQUE, MAKING USE OF HIGH THROUGHPUT TECHNOLOGIES AS DESCRIBED BY CMS-2020-01-R: HCPCS

## 2022-01-21 PROCEDURE — 11043 DBRDMT MUSC&/FSCA 1ST 20/<: CPT

## 2022-01-21 PROCEDURE — U0005 INFEC AGEN DETEC AMPLI PROBE: HCPCS

## 2022-01-21 PROCEDURE — 11042 DBRDMT SUBQ TIS 1ST 20SQCM/<: CPT

## 2022-01-21 PROCEDURE — 11045 DBRDMT SUBQ TISS EACH ADDL: CPT

## 2022-01-21 NOTE — PROGRESS NOTES
Iris Quiroz  Progress Note and Procedure Note      Murphy Garcia  AGE: 62 y.o. GENDER: male  : 1960  TODAY'S DATE:  2022    Subjective:     Chief Complaint   Patient presents with    Wound Check     lower extremities         HISTORY of PRESENT ILLNESS HPI     Murphy Garcia is a 62 y.o. male who presents today for wound evaluation. History of Wound: Admits to having chronic wounds for at least a year on the left leg and 8 to 9 months on the right leg. He states that he has had arterial bypasses on both of his legs in the past.  He was seeing previous wound care and podiatry for these wounds. He states that his insurance had some issues and he has not been seeing anyone since 2021 he has been treating the wounds himself with PSO and bandages. 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 He had to rewrap the dressing himself since his last visit. He admits that he still awaiting his vascular studies but they are scheduled. He denies current nausea, vomiting, fever, chills, shortness of breath or chest pain. He states his blood sugars have been well controlled. He is trying to keep his leg elevated at work.     Wound Pain:  intermittent  Severity:  3 / 10   Wound Type:  venous, arterial and diabetic  Modifying Factors:  edema, venous stasis, lymphedema, diabetes, decreased mobility, arterial insufficiency and decreased tissue oxygenation  Associated Signs/Symptoms:  drainage, numbness and odor        PAST MEDICAL HISTORY        Diagnosis Date    Diabetes mellitus ([address]     Fibromyalgia     Hyperlipidemia     Hypertension        PAST SURGICAL HISTORY    Past Surgical History:   Procedure Laterality Date    APPENDECTOMY  2005    COLONOSCOPY  2016    FEMORAL BYPASS Left 2020    femoral posterior tibial bypass    FEMORAL-TIBIAL BYPASS GRAFT Right 2020    with femoral endarterectomy and patch angioplasty  FOOT DEBRIDEMENT Left 2020    FOOT DEBRIDEMENT Right 2021    DEBRIDEMENT OF RIGHT FOOT WOUND WITH GRAFT APPLICATION performed by Loan Hedrick DPM at [facility] Right 2020    stent leg for PVD       FAMILY HISTORY    Family History   Problem Relation Age of Onset    Cancer Mother     Stroke Father        SOCIAL HISTORY    Social History     Tobacco Use    Smoking status: Former Smoker     Packs/day: 0.50     Years: 20.00     Pack years: 10.00     Types: Cigarettes     Start date: 1977     Quit date: 2021     Years since quittin.0    Smokeless tobacco: Never Used   Vaping Use    Vaping Use: Never used   Substance Use Topics    Alcohol use:  Yes     Alcohol/week: 6.0 standard drinks     Types: 6 Cans of beer per week    Drug use: Never       ALLERGIES    Allergies   Allergen Reactions    Chantix [Varenicline]      Hallucinations         MEDICATIONS    Current Outpatient Medications on File Prior to Encounter   Medication Sig Dispense Refill    acetaminophen (TYLENOL) 500 MG tablet Take 1 tablet by mouth 4 times daily as needed for Pain 360 tablet 1    warfarin (JANTOVEN) 5 MG tablet TAKE 1 AND 1/2 TABLETS BY MOUTH ONE TIME A DAY OR AS DIRECTED BY [facility] ([phone]) 45 tablet 0    atorvastatin (LIPITOR) 80 MG tablet Take 1 tablet by mouth daily Cancel atorvastatin 20 mg a day 90 tablet 0    NIFEdipine (ADALAT CC) 60 MG extended release tablet Take 1 tablet by mouth daily 90 tablet 0    carvedilol (COREG) 25 MG tablet Take 1 tablet by mouth 2 times daily (with meals) 60 tablet 3    metFORMIN (GLUCOPHAGE) 500 MG tablet Take 1 tablet by mouth 2 times daily (with meals) 180 tablet 0    lisinopril (PRINIVIL;ZESTRIL) 10 MG tablet Take 1 tablet by mouth daily 90 tablet 0    fluticasone (FLONASE) 50 MCG/ACT nasal spray 1 spray by Nasal route daily 9.9 g 5    tiotropium (SPIRIVA RESPIMAT) 2.5 MCG/ACT AERS inhaler Inhale 2 puffs into the lungs daily 1 each 0    nicotine (NICODERM CQ) 21 MG/24HR Place 1 patch onto the skin every 24 hours 30 patch 2    blood glucose monitor kit and supplies Dispense sufficient amount for indicated testing frequency plus additional to accommodate PRN testing needs. Dispense all needed supplies to include: monitor, strips, lancing device, lancets, control solutions, alcohol swabs. 1 kit 0    Lancets MISC 1 each by Does not apply route daily Test 2 times daily while on PREDNISONE then 1 time daily & as needed for symptoms of irregular blood sugar 100 each 3    blood glucose monitor strips Test 2 times a day while on PREDNISONE, then 1 time daily & as needed for symptoms of irregular blood glucose. Dispense sufficient amount for indicated testing frequency plus additional to accommodate PRN testing needs. 100 strip 3    albuterol sulfate HFA (VENTOLIN HFA) 108 (90 Base) MCG/ACT inhaler Inhale 2 puffs into the lungs 4 times daily as needed for Wheezing 54 g 1    ferrous sulfate (IRON 325) 325 (65 Fe) MG tablet Take 1 tablet by mouth 2 times daily 180 tablet 0    tadalafil (CIALIS) 5 MG tablet TAKE 1 TABLET BY MOUTH ONE TIME A DAY AS NEEDED FOR ERECTILE DYSFUNCTION 30 tablet 5    becaplermin (REGRANEX) 0.01 % gel Apply 1 Applicatorful topically daily (Patient not taking: Reported on 11/29/2021)      Vitamins/Minerals TABS Take 1 tablet by mouth daily      aspirin 81 MG chewable tablet CHEW AND SWALLOW 1 TABLET BY MOUTH ONE TIME A DAY (Patient taking differently: CHEW AND SWALLOW 1 TABLET BY MOUTH ONE TIME A DAY  1/7/22 NOT TAKING) 100 tablet 5     No current facility-administered medications on file prior to encounter. REVIEW OF SYSTEMS    Pertinent items are noted in HPI. Objective:      /69   Pulse 76   Temp 97.3 °F (36.3 °C) (Infrared)   Resp 16     PHYSICAL EXAM    Vascular: Vascular status Impaired  palpable pedal pulses, right DP0/4 and PT1/4, left DP0/4 and PT1/4. CFT 3 seconds digits 1 to 5 bilateral.  Hair growthAbsent  both lower extremities and feet. Skin temperature is warm to warm from pretibial area to distal digits bilateral.  Exam is negative for rubor, pallor, cyanosis or signs of acute vascular compromise bilaterally. Exam is positive for edema bilateral lower extremity. Varicosities Present bilateral lower extremity. Neuro: Neurologic status diminished bilateral with epicritic Absent  , proprioceptive Absent , vibratory sensation Absent  and protopathicPresent. DTRs Absent  bilateral Achilles. There were no reproducible neuritic symptoms on exam bilateral feet/ankles. Derm: Ulceration to bilateral ankles. Ecchymosis Absent  bilateral feet/foot. Musculoskeletal: No pain with debridement of wounds 5/5 muscle strength in/eversion and dorsi/plantarflexion bilateral feet. No gross instability noted. Assessment:     Problem List Items Addressed This Visit     None          Procedure Note    Performed by: Sherri Shone, DPM    Consent obtained: Yes    Time out taken:  Yes    Pain Control: Anesthetic  Anesthetic: 4% Lidocaine Cream     Debridement:Excisional Debridement    Using curette the wound was sharply debrided    down through and including the removal of epidermis, dermis, subcutaneous tissue and muscle/fascia.         Devitalized Tissue Debrided:  fibrin, biofilm, slough, necrotic/eschar and exudate    Pre Debridement Measurements:  Are located in the Wound Documentation Flow Sheet    Wound #: 1     Wound Care Documentation:  Wound 12/31/21 Ankle Right;Medial #1 (Active)   Wound Image   01/07/22 0834   Wound Etiology Diabetic 01/21/22 0759   Dressing Status New dressing applied 01/04/22 1646   Wound Cleansed Cleansed with saline 01/21/22 0759   Dressing/Treatment Antibacterial ointment;Dry dressing;Triad hydro/zinc oxide-based hydrophilic paste 16/81/57 4934   Offloading for Diabetic Foot Ulcers Post op shoe 01/21/22 0759   Dressing Change Due 01/06/22 01/04/22 1646   Wound Length (cm) 7.6 cm 01/21/22 0759   Wound Width (cm) 3.2 cm 01/21/22 0759   Wound Depth (cm) 0.2 cm 01/21/22 0759   Wound Surface Area (cm^2) 24.32 cm^2 01/21/22 0759   Change in Wound Size % (l*w) -35.11 01/21/22 0759   Wound Volume (cm^3) 4.864 cm^3 01/21/22 0759   Wound Healing % -35 01/21/22 0759   Post-Procedure Length (cm) 7.6 cm 01/21/22 0831   Post-Procedure Width (cm) 3.2 cm 01/21/22 0831   Post-Procedure Depth (cm) 0.2 cm 01/21/22 0831   Post-Procedure Surface Area (cm^2) 24.32 cm^2 01/21/22 0831   Post-Procedure Volume (cm^3) 4.864 cm^3 01/21/22 0831   Wound Assessment Bleeding 01/21/22 0831   Drainage Amount Moderate 01/21/22 0759   Drainage Description Serosanguinous;Purulent 01/21/22 0759   Odor Mild 01/21/22 0759   Jamila-wound Assessment Dry/flaky;Fragile 01/21/22 0759   Margins Defined edges 01/21/22 0759   Wound Thickness Description not for Pressure Injury Full thickness 01/04/22 1646   Number of days: 20       Wound 12/31/21 Ankle Medial;Left #2 (Active)   Wound Image   01/07/22 0838   Wound Etiology Diabetic 01/21/22 0759   Dressing Status New dressing applied 01/04/22 1646   Wound Cleansed Cleansed with saline 01/21/22 0759   Dressing/Treatment Antibacterial ointment;Dry dressing;Triad hydro/zinc oxide-based hydrophilic paste 14/59/12 5641   Offloading for Diabetic Foot Ulcers No offloading required 01/21/22 0831   Dressing Change Due 01/06/22 01/04/22 1646   Wound Length (cm) 4.2 cm 01/21/22 0759   Wound Width (cm) 2.2 cm 01/21/22 0759   Wound Depth (cm) 0.2 cm 01/21/22 0759   Wound Surface Area (cm^2) 9.24 cm^2 01/21/22 0759   Change in Wound Size % (l*w) -23.2 01/21/22 0759   Wound Volume (cm^3) 1.848 cm^3 01/21/22 0759   Wound Healing % -23 01/21/22 0759   Post-Procedure Length (cm) 4.2 cm 01/21/22 0831   Post-Procedure Width (cm) 2.2 cm 01/21/22 0831   Post-Procedure Depth (cm) 0.2 cm 01/21/22 0831   Post-Procedure Surface Area (cm^2) 9.24 cm^2

## 2022-01-21 NOTE — PROGRESS NOTES
Multilayer Compression Wrap   Below the Knee    NAME:  [address]:  1960  MEDICAL RECORD NUMBER:  [id number]  DATE:  1/21/2022    Multilayer compression wrap: Removed old Multilayer wrap if indicated and wash leg with mild soap/water. Applied moisturizing agent to dry skin as needed. Applied primary and secondary dressing as ordered. Applied multilayered dressing below the knee to right lower leg. Applied multilayered dressing below the knee to left lower leg. Instructed patient/caregiver not to remove dressing and to keep it clean and dry. Instructed patient/caregiver on complications to report to provider, such as pain, numbness in toes, heavy drainage, and slippage of dressing. Instructed patient on purpose of compression dressing and on activity and exercise recommendations.      Applied  2 layer wrap from toes to knee overlapping each time    Electronically signed by KATHY BOROKS LPN on 8/74/1874 at 0:94 AM

## 2022-01-21 NOTE — PLAN OF CARE
Discharge instructions given. Patient verbalized understanding. Return to [address] [place] [address],[address] in 1 week(s).   Appt is made with Dr Sumaya Butler

## 2022-01-22 LAB — SARS-COV-2: NOT DETECTED

## 2022-01-25 ENCOUNTER — HOSPITAL ENCOUNTER (OUTPATIENT)
Dept: PULMONOLOGY | Age: 62
Discharge: HOME | End: 2022-01-25
Payer: COMMERCIAL

## 2022-01-25 DIAGNOSIS — R06.2 WHEEZES: ICD-10-CM

## 2022-01-25 DIAGNOSIS — R06.02 SOB (SHORTNESS OF BREATH): ICD-10-CM

## 2022-01-25 DIAGNOSIS — Z72.0 TOBACCO ABUSE DISORDER: ICD-10-CM

## 2022-01-25 LAB
DLCO %PRED: 37 %
DLCO PRED: NORMAL
DLCO/VA %PRED: NORMAL
DLCO/VA PRED: NORMAL
DLCO/VA: NORMAL
DLCO: NORMAL
EXPIRATORY TIME-POST: NORMAL
EXPIRATORY TIME: NORMAL
FEF 25-75% %CHNG: NORMAL
FEF 25-75% %PRED-POST: NORMAL
FEF 25-75% %PRED-PRE: NORMAL
FEF 25-75% PRED: NORMAL
FEF 25-75%-POST: NORMAL
FEF 25-75%-PRE: NORMAL
FEV1 %PRED-POST: 63 %
FEV1 %PRED-PRE: 63 %
FEV1 PRED: NORMAL
FEV1-POST: NORMAL
FEV1-PRE: NORMAL
FEV1/FVC %PRED-POST: NORMAL
FEV1/FVC %PRED-PRE: NORMAL
FEV1/FVC PRED: NORMAL
FEV1/FVC-POST: 62 %
FEV1/FVC-PRE: 60 %
FVC %PRED-POST: NORMAL
FVC %PRED-PRE: NORMAL
FVC PRED: NORMAL
FVC-POST: NORMAL
FVC-PRE: NORMAL
GAW %PRED: NORMAL
GAW PRED: NORMAL
GAW: NORMAL
IC %PRED: NORMAL
IC PRED: NORMAL
IC: NORMAL
MEP: NORMAL
MIP: NORMAL
MVV %PRED-PRE: NORMAL
MVV PRED: NORMAL
MVV-PRE: NORMAL
PEF %PRED-POST: NORMAL
PEF %PRED-PRE: NORMAL
PEF PRED: NORMAL
PEF%CHNG: NORMAL
PEF-POST: NORMAL
PEF-PRE: NORMAL
RAW %PRED: NORMAL
RAW PRED: NORMAL
RAW: NORMAL
RV %PRED: NORMAL
RV PRED: NORMAL
RV: NORMAL
SVC %PRED: NORMAL
SVC PRED: NORMAL
SVC: NORMAL
TLC %PRED: 83 %
TLC PRED: NORMAL
TLC: NORMAL
VA %PRED: NORMAL
VA PRED: NORMAL
VA: NORMAL
VTG %PRED: NORMAL
VTG PRED: NORMAL
VTG: NORMAL

## 2022-01-25 PROCEDURE — 94200 LUNG FUNCTION TEST (MBC/MVV): CPT

## 2022-01-25 PROCEDURE — 94729 DIFFUSING CAPACITY: CPT

## 2022-01-25 PROCEDURE — 6370000000 HC RX 637 (ALT 250 FOR IP): Performed by: FAMILY MEDICINE

## 2022-01-25 PROCEDURE — 94060 EVALUATION OF WHEEZING: CPT

## 2022-01-25 PROCEDURE — 94760 N-INVAS EAR/PLS OXIMETRY 1: CPT

## 2022-01-25 PROCEDURE — 94726 PLETHYSMOGRAPHY LUNG VOLUMES: CPT

## 2022-01-25 RX ORDER — ALBUTEROL SULFATE 90 UG/1
2 AEROSOL, METERED RESPIRATORY (INHALATION) ONCE
Status: COMPLETED | OUTPATIENT
Start: 2022-01-25 | End: 2022-01-25

## 2022-01-25 RX ADMIN — Medication 2 PUFF: at 15:28

## 2022-01-25 ASSESSMENT — PULMONARY FUNCTION TESTS
FEV1_PERCENT_PREDICTED_POST: 63
FEV1/FVC_POST: 62
FEV1_PERCENT_PREDICTED_PRE: 63
FEV1/FVC_PRE: 60

## 2022-01-28 ENCOUNTER — HOSPITAL ENCOUNTER (OUTPATIENT)
Dept: WOUND CARE | Age: 62
Discharge: HOME | End: 2022-01-28
Payer: COMMERCIAL

## 2022-01-28 VITALS — TEMPERATURE: 97.3 F | SYSTOLIC BLOOD PRESSURE: 114 MMHG | HEART RATE: 76 BPM | DIASTOLIC BLOOD PRESSURE: 72 MMHG

## 2022-01-28 PROCEDURE — 11045 DBRDMT SUBQ TISS EACH ADDL: CPT

## 2022-01-28 PROCEDURE — 11042 DBRDMT SUBQ TIS 1ST 20SQCM/<: CPT

## 2022-01-28 PROCEDURE — 11043 DBRDMT MUSC&/FSCA 1ST 20/<: CPT

## 2022-01-28 RX ORDER — BACITRACIN ZINC AND POLYMYXIN B SULFATE 500; 1000 [USP'U]/G; [USP'U]/G
OINTMENT TOPICAL ONCE
Status: CANCELLED | OUTPATIENT
Start: 2022-01-28 | End: 2022-01-28

## 2022-01-28 RX ORDER — GINSENG 100 MG
CAPSULE ORAL ONCE
Status: CANCELLED | OUTPATIENT
Start: 2022-01-28 | End: 2022-01-28

## 2022-01-28 RX ORDER — LIDOCAINE 40 MG/G
CREAM TOPICAL ONCE
Status: CANCELLED | OUTPATIENT
Start: 2022-01-28 | End: 2022-01-28

## 2022-01-28 RX ORDER — LIDOCAINE 50 MG/G
OINTMENT TOPICAL ONCE
Status: CANCELLED | OUTPATIENT
Start: 2022-01-28 | End: 2022-01-28

## 2022-01-28 RX ORDER — GENTAMICIN SULFATE 1 MG/G
OINTMENT TOPICAL ONCE
Status: CANCELLED | OUTPATIENT
Start: 2022-01-28 | End: 2022-01-28

## 2022-01-28 RX ORDER — LIDOCAINE HYDROCHLORIDE 20 MG/ML
JELLY TOPICAL ONCE
Status: CANCELLED | OUTPATIENT
Start: 2022-01-28 | End: 2022-01-28

## 2022-01-28 RX ORDER — BETAMETHASONE DIPROPIONATE 0.05 %
OINTMENT (GRAM) TOPICAL ONCE
Status: CANCELLED | OUTPATIENT
Start: 2022-01-28 | End: 2022-01-28

## 2022-01-28 RX ORDER — LIDOCAINE HYDROCHLORIDE 40 MG/ML
SOLUTION TOPICAL ONCE
Status: CANCELLED | OUTPATIENT
Start: 2022-01-28 | End: 2022-01-28

## 2022-01-28 RX ORDER — BACITRACIN, NEOMYCIN, POLYMYXIN B 400; 3.5; 5 [USP'U]/G; MG/G; [USP'U]/G
OINTMENT TOPICAL ONCE
Status: CANCELLED | OUTPATIENT
Start: 2022-01-28 | End: 2022-01-28

## 2022-01-28 RX ORDER — CLOBETASOL PROPIONATE 0.5 MG/G
OINTMENT TOPICAL ONCE
Status: CANCELLED | OUTPATIENT
Start: 2022-01-28 | End: 2022-01-28

## 2022-01-28 ASSESSMENT — PAIN DESCRIPTION - PAIN TYPE: TYPE: CHRONIC PAIN

## 2022-01-28 ASSESSMENT — PAIN DESCRIPTION - LOCATION: LOCATION: LEG

## 2022-01-28 ASSESSMENT — PAIN - FUNCTIONAL ASSESSMENT: PAIN_FUNCTIONAL_ASSESSMENT: PREVENTS OR INTERFERES SOME ACTIVE ACTIVITIES AND ADLS

## 2022-01-28 ASSESSMENT — PAIN DESCRIPTION - DIRECTION: RADIATING_TOWARDS: LOWER LEGS

## 2022-01-28 ASSESSMENT — PAIN DESCRIPTION - FREQUENCY: FREQUENCY: INTERMITTENT

## 2022-01-28 ASSESSMENT — PAIN DESCRIPTION - DESCRIPTORS: DESCRIPTORS: ACHING;DISCOMFORT

## 2022-01-28 ASSESSMENT — PAIN DESCRIPTION - ORIENTATION: ORIENTATION: RIGHT;LEFT

## 2022-01-28 ASSESSMENT — PAIN DESCRIPTION - PROGRESSION: CLINICAL_PROGRESSION: NOT CHANGED

## 2022-01-28 ASSESSMENT — PAIN SCALES - GENERAL: PAINLEVEL_OUTOF10: 6

## 2022-01-28 ASSESSMENT — PAIN DESCRIPTION - ONSET: ONSET: ON-GOING

## 2022-01-28 NOTE — PROCEDURES
Pulmonary Function Testing      Patient name:  Amy Valle     [facility] Unit #:   [id number]   Date of test:  1/25/2022  Date of interpretation:   1/27/2022    Mr. Amy Valle is a 62y.o. year-old non smoker. The spirometry data were acceptable and reproducible. Spirometry:  Flow volume loops were obstructed. The FEV-1/FVC ratio was decreased. The FEV-1 was 1.83 liters (63% of predicted), which was moderately decreased. The FVC was 3.07 liters (82% of predicted), which was normal. Response to inhaled bronchodilators (albuterol) was not significant. Lung volumes:  Lung volumes were tested by plethysmography. The total lung capacity was 5.11 liters (83% of predicted), which was normal. The residual volume was 2.04 liters (91% of predicted), which was normal. The ratio of residual volume to total lung capacity (RV/TLC) was 40, which was normal.     Diffusion capacity was found to be 37% which is Severely decreased. Interpretation:  Moderate obstructive airway disease with no significant bronchodilator reversibility.     Comments:

## 2022-01-28 NOTE — PROGRESS NOTES
[facility]  Progress Note and Procedure Note      Vasquez Burdick  AGE: 62 y.o. GENDER: male  : 1960  TODAY'S DATE:  2022    Subjective:     Chief Complaint   Patient presents with    Wound Check     lower extremities         HISTORY of PRESENT ILLNESS HPI     Vasquez Burdick is a 62 y.o. male who presents today for wound evaluation. History of Wound: Admits to having chronic wounds for at least a year on the left leg and 8 to 9 months on the right leg. He states that he has had arterial bypasses on both of his legs in the past.  He was seeing previous wound care and podiatry for these wounds. He states that his insurance had some issues and he has not been seeing anyone since 2021 he has been treating the wounds himself with PSO and bandages. 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 States he is trying to keep the legs elevated. Admits to it since a day has wraps become discomforting around his ankle. Despite some discomfort he is happy that the wounds are improving.     Wound Pain:  intermittent  Severity:  3 / 10   Wound Type:  venous, arterial and diabetic  Modifying Factors:  edema, venous stasis, lymphedema, diabetes, decreased mobility, arterial insufficiency and decreased tissue oxygenation  Associated Signs/Symptoms:  drainage, numbness and odor        PAST MEDICAL HISTORY        Diagnosis Date    Diabetes mellitus ([address]     Fibromyalgia     Hyperlipidemia     Hypertension        PAST SURGICAL HISTORY    Past Surgical History:   Procedure Laterality Date    APPENDECTOMY      COLONOSCOPY  2016    FEMORAL BYPASS Left 2020    femoral posterior tibial bypass    FEMORAL-TIBIAL BYPASS GRAFT Right 2020    with femoral endarterectomy and patch angioplasty    FOOT DEBRIDEMENT Left 2020    FOOT DEBRIDEMENT Right 2021    DEBRIDEMENT OF RIGHT FOOT WOUND WITH GRAFT APPLICATION performed by Bridget Nascimento DPM at [facility] 2020    stent leg for PVD       FAMILY HISTORY    Family History   Problem Relation Age of Onset    Cancer Mother     Stroke Father        SOCIAL HISTORY    Social History     Tobacco Use    Smoking status: Former Smoker     Packs/day: 0.50     Years: 20.00     Pack years: 10.00     Types: Cigarettes     Start date: 1977     Quit date: 2021     Years since quittin.0    Smokeless tobacco: Never Used   Vaping Use    Vaping Use: Never used   Substance Use Topics    Alcohol use:  Yes     Alcohol/week: 6.0 standard drinks     Types: 6 Cans of beer per week    Drug use: Never       ALLERGIES    Allergies   Allergen Reactions    Chantix [Varenicline]      Hallucinations         MEDICATIONS    Current Outpatient Medications on File Prior to Encounter   Medication Sig Dispense Refill    acetaminophen (TYLENOL) 500 MG tablet Take 1 tablet by mouth 4 times daily as needed for Pain 360 tablet 1    warfarin (JANTOVEN) 5 MG tablet TAKE 1 AND 1/2 TABLETS BY MOUTH ONE TIME A DAY OR AS DIRECTED BY [facility] ([phone]) 45 tablet 0    atorvastatin (LIPITOR) 80 MG tablet Take 1 tablet by mouth daily Cancel atorvastatin 20 mg a day 90 tablet 0    NIFEdipine (ADALAT CC) 60 MG extended release tablet Take 1 tablet by mouth daily 90 tablet 0    carvedilol (COREG) 25 MG tablet Take 1 tablet by mouth 2 times daily (with meals) 60 tablet 3    metFORMIN (GLUCOPHAGE) 500 MG tablet Take 1 tablet by mouth 2 times daily (with meals) 180 tablet 0    lisinopril (PRINIVIL;ZESTRIL) 10 MG tablet Take 1 tablet by mouth daily 90 tablet 0    fluticasone (FLONASE) 50 MCG/ACT nasal spray 1 spray by Nasal route daily 9.9 g 5    tiotropium (SPIRIVA RESPIMAT) 2.5 MCG/ACT AERS inhaler Inhale 2 puffs into the lungs daily 1 each 0    nicotine (NICODERM CQ) 21 MG/24HR Place 1 patch onto the skin every 24 hours 30 patch 2    blood glucose monitor kit and supplies Dispense sufficient amount for indicated testing frequency plus additional to accommodate PRN testing needs. Dispense all needed supplies to include: monitor, strips, lancing device, lancets, control solutions, alcohol swabs. 1 kit 0    Lancets MISC 1 each by Does not apply route daily Test 2 times daily while on PREDNISONE then 1 time daily & as needed for symptoms of irregular blood sugar 100 each 3    blood glucose monitor strips Test 2 times a day while on PREDNISONE, then 1 time daily & as needed for symptoms of irregular blood glucose. Dispense sufficient amount for indicated testing frequency plus additional to accommodate PRN testing needs. 100 strip 3    albuterol sulfate HFA (VENTOLIN HFA) 108 (90 Base) MCG/ACT inhaler Inhale 2 puffs into the lungs 4 times daily as needed for Wheezing 54 g 1    ferrous sulfate (IRON 325) 325 (65 Fe) MG tablet Take 1 tablet by mouth 2 times daily 180 tablet 0    tadalafil (CIALIS) 5 MG tablet TAKE 1 TABLET BY MOUTH ONE TIME A DAY AS NEEDED FOR ERECTILE DYSFUNCTION 30 tablet 5    becaplermin (REGRANEX) 0.01 % gel Apply 1 Applicatorful topically daily (Patient not taking: Reported on 11/29/2021)      Vitamins/Minerals TABS Take 1 tablet by mouth daily      aspirin 81 MG chewable tablet CHEW AND SWALLOW 1 TABLET BY MOUTH ONE TIME A DAY (Patient taking differently: CHEW AND SWALLOW 1 TABLET BY MOUTH ONE TIME A DAY  1/7/22 NOT TAKING) 100 tablet 5     No current facility-administered medications on file prior to encounter. REVIEW OF SYSTEMS    Pertinent items are noted in HPI. Objective:      /72   Pulse 76   Temp 97.3 °F (36.3 °C) (Infrared)     PHYSICAL EXAM    Vascular: Vascular status Impaired  palpable pedal pulses, right DP0/4 and PT1/4, left DP0/4 and PT1/4. CFT 3 seconds digits 1 to 5 bilateral.  Hair growthAbsent  both lower extremities and feet.   Skin temperature is warm to warm from pretibial area to distal digits bilateral.  Exam is negative for rubor, pallor, cyanosis or signs of acute vascular compromise bilaterally. Exam is positive for edema bilateral lower extremity. Varicosities Present bilateral lower extremity. Neuro: Neurologic status diminished bilateral with epicritic Absent  , proprioceptive Absent , vibratory sensation Absent  and protopathicPresent. DTRs Absent  bilateral Achilles. There were no reproducible neuritic symptoms on exam bilateral feet/ankles. Derm: Ulceration to bilateral ankles. Ecchymosis Absent  bilateral feet/foot. Musculoskeletal: No pain with debridement of wounds 5/5 muscle strength in/eversion and dorsi/plantarflexion bilateral feet. No gross instability noted. Assessment:     Problem List Items Addressed This Visit     None          Procedure Note    Performed by: Herlinda Shirley DPM    Consent obtained: Yes    Time out taken:  Yes    Pain Control: Anesthetic  Anesthetic: 4% Lidocaine Cream     Debridement:Excisional Debridement    Using curette the wound was sharply debrided    down through and including the removal of epidermis, dermis, subcutaneous tissue and muscle/fascia.         Devitalized Tissue Debrided:  fibrin, biofilm, slough, necrotic/eschar and exudate    Pre Debridement Measurements:  Are located in the Wound Documentation Flow Sheet    Wound #: 1     Wound Care Documentation:  Wound 12/31/21 Ankle Right;Medial #1 (Active)   Wound Image   01/07/22 0834   Wound Etiology Diabetic 01/28/22 0810   Dressing Status New dressing applied 01/04/22 1646   Wound Cleansed Wound cleanser 01/28/22 0810   Dressing/Treatment Antibacterial ointment;Dry dressing;Triad hydro/zinc oxide-based hydrophilic paste 96/97/46 9999   Offloading for Diabetic Foot Ulcers Post op shoe 01/28/22 0810   Dressing Change Due 01/06/22 01/04/22 1646   Wound Length (cm) 7.5 cm 01/28/22 0810   Wound Width (cm) 2.9 cm 01/28/22 0810   Wound Depth (cm) 0.1 cm 01/28/22 0810   Wound Surface Area (cm^2) 21.75 cm^2 01/28/22 0810   Change in Wound Size % (l*w) -20.83 01/28/22 0810   Wound Volume (cm^3) 2.175 cm^3 01/28/22 0810   Wound Healing % 40 01/28/22 0810   Post-Procedure Length (cm) 7.5 cm 01/28/22 0839   Post-Procedure Width (cm) 2.9 cm 01/28/22 0839   Post-Procedure Depth (cm) 0.1 cm 01/28/22 0839   Post-Procedure Surface Area (cm^2) 21.75 cm^2 01/28/22 0839   Post-Procedure Volume (cm^3) 2.175 cm^3 01/28/22 0839   Wound Assessment Bleeding 01/28/22 0839   Drainage Amount Moderate 01/28/22 0810   Drainage Description Serosanguinous 01/28/22 0810   Odor Mild 01/28/22 0810   Jamila-wound Assessment Dry/flaky;Edematous 01/28/22 0810   Margins Defined edges 01/28/22 0810   Wound Thickness Description not for Pressure Injury Full thickness 01/04/22 1646   Number of days: 27       Wound 12/31/21 Ankle Medial;Left #2 (Active)   Wound Image   01/07/22 0838   Wound Etiology Diabetic 01/28/22 0810   Dressing Status New dressing applied 01/04/22 1646   Wound Cleansed Cleansed with saline 01/21/22 0759   Dressing/Treatment Antibacterial ointment;Dry dressing;Triad hydro/zinc oxide-based hydrophilic paste 91/42/20 7643   Offloading for Diabetic Foot Ulcers Post op shoe 01/28/22 0810   Dressing Change Due 01/06/22 01/04/22 1646   Wound Length (cm) 3.7 cm 01/28/22 0810   Wound Width (cm) 2.2 cm 01/28/22 0810   Wound Depth (cm) 0.2 cm 01/28/22 0810   Wound Surface Area (cm^2) 8.14 cm^2 01/28/22 0810   Change in Wound Size % (l*w) -8.53 01/28/22 0810   Wound Volume (cm^3) 1.628 cm^3 01/28/22 0810   Wound Healing % -9 01/28/22 0810   Post-Procedure Length (cm) 3.7 cm 01/28/22 0839   Post-Procedure Width (cm) 2.2 cm 01/28/22 0839   Post-Procedure Depth (cm) 0.2 cm 01/28/22 0839   Post-Procedure Surface Area (cm^2) 8.14 cm^2 01/28/22 0839   Post-Procedure Volume (cm^3) 1.628 cm^3 01/28/22 0839   Wound Assessment Bleeding 01/28/22 0839   Drainage Amount Moderate 01/28/22 0810   Drainage Description Serosanguinous;Purulent 01/28/22 0810   Odor Mild 01/28/22 0810   Jamila-wound Assessment Dry/flaky;Edematous 01/28/22 0810   Margins Defined edges 01/28/22 0810   Wound Thickness Description not for Pressure Injury Full thickness 01/07/22 0838   Number of days: 27         Total Surface Area Debrided: 8.14 cm² left leg into the deep fascia and sq cm 21.75 cm² into the subcutaneous tissue of the right foot    Percentage of wound debrided 100%    Bleeding:  Minimal    Hemostasis Achieved:  by pressure    Procedural Pain:  0  / 10     Post Procedural Pain:  0 / 10     Response to treatment:  Well tolerated by patient. Plan:   Patient examined and evaluated   Wounds debrided without incident   Multilayer compression wrap with triad and gentamicin cream left leg and gentamicin cream with collagen right leg and Lac-Hydrin  Leave dressings in place for 1 week   Keep legs elevated at all times when not active   Awaiting vascular studies  The nature of the patient's condition was explained in depth. The patient was informed that their compliance to the treatment Plan is paramount to successful healing and prevention of further ulceration and/or infection.   Discharge Treatment  Follow-up 1 week    Written Patient Discharge Instructions Given            Electronically signed by Sheldon Chapin DPM on 1/28/2022 at 9:02 AM

## 2022-01-28 NOTE — PROGRESS NOTES
Multilayer Compression Wrap   Below the Knee    NAME:  Devroa [address]:  1960  MEDICAL RECORD NUMBER:  [id number]  DATE:  1/28/2022    Multilayer compression wrap: Applied primary and secondary dressing as ordered. Applied multilayered dressing below the knee to right lower leg. Applied multilayered dressing below the knee to left lower leg. Instructed patient/caregiver not to remove dressing and to keep it clean and dry. Instructed patient/caregiver on complications to report to provider, such as pain, numbness in toes, heavy drainage, and slippage of dressing. Instructed patient on purpose of compression dressing and on activity and exercise recommendations.      Applied  2 layer wrap from toes to knee overlapping each time    Electronically signed by Carmina Moss RN on 1/28/2022 at 9:06 AM

## 2022-01-28 NOTE — PLAN OF CARE
Discharge instructions given. Patient verbalized understanding. Return to [address] [place] [address],[address] in 1 week(s).

## 2022-01-31 ENCOUNTER — TELEPHONE (OUTPATIENT)
Dept: PRIMARY CARE CLINIC | Age: 62
End: 2022-01-31

## 2022-01-31 DIAGNOSIS — R06.02 SOB (SHORTNESS OF BREATH): ICD-10-CM

## 2022-01-31 DIAGNOSIS — J44.9 CHRONIC OBSTRUCTIVE PULMONARY DISEASE, UNSPECIFIED COPD TYPE (HCC): Primary | ICD-10-CM

## 2022-01-31 DIAGNOSIS — R06.2 WHEEZES: ICD-10-CM

## 2022-01-31 DIAGNOSIS — Z72.0 TOBACCO ABUSE DISORDER: ICD-10-CM

## 2022-01-31 RX ORDER — ALBUTEROL SULFATE 90 UG/1
2 AEROSOL, METERED RESPIRATORY (INHALATION) 4 TIMES DAILY PRN
Qty: 54 G | Refills: 1 | Status: ON HOLD | OUTPATIENT
Start: 2022-01-31 | End: 2022-03-24

## 2022-01-31 NOTE — TELEPHONE ENCOUNTER
Discussed pul function tests with patient   He is having some congest in throat according to pt and wife    He is out of albuterol  But continues with his spiriva inhaler    I am referring him to a pulmonologist for further evaluation

## 2022-01-31 NOTE — TELEPHONE ENCOUNTER
----- Message from Mike Rehman sent at 1/31/2022  2:16 PM EST -----  Subject: Message to Provider    QUESTIONS  Information for Provider? Patient is calling about his PFT (Pulmonary   Function test). Patient would like to have Dr. Graeme Begum or his nurse to   call him back to explain the results  ---------------------------------------------------------------------------  --------------  [address]  What is the best way for the office to contact you? OK to leave message on   voicemail  Preferred Call Back Phone Number? [phone]  ---------------------------------------------------------------------------  --------------  SCRIPT ANSWERS  Relationship to Patient?  Self

## 2022-02-01 ENCOUNTER — HOSPITAL ENCOUNTER (INPATIENT)
Age: 62
LOS: 4 days | Discharge: HOME | DRG: 189 | End: 2022-02-05
Attending: EMERGENCY MEDICINE | Admitting: INTERNAL MEDICINE
Payer: COMMERCIAL

## 2022-02-01 ENCOUNTER — APPOINTMENT (OUTPATIENT)
Dept: GENERAL RADIOLOGY | Age: 62
DRG: 189 | End: 2022-02-01
Payer: COMMERCIAL

## 2022-02-01 DIAGNOSIS — J44.1 CHRONIC OBSTRUCTIVE PULMONARY DISEASE WITH ACUTE EXACERBATION (HCC): ICD-10-CM

## 2022-02-01 DIAGNOSIS — J96.01 ACUTE RESPIRATORY FAILURE WITH HYPOXEMIA (HCC): Primary | ICD-10-CM

## 2022-02-01 DIAGNOSIS — J44.1 COPD EXACERBATION (HCC): ICD-10-CM

## 2022-02-01 DIAGNOSIS — N17.9 AKI (ACUTE KIDNEY INJURY) (HCC): ICD-10-CM

## 2022-02-01 PROBLEM — J44.9 COPD (CHRONIC OBSTRUCTIVE PULMONARY DISEASE) (HCC): Status: ACTIVE | Noted: 2022-02-01

## 2022-02-01 LAB
A/G RATIO: 1.1 (ref 1.1–2.2)
ALBUMIN SERPL-MCNC: 3.9 G/DL (ref 3.4–5)
ALP BLD-CCNC: 113 U/L (ref 40–129)
ALT SERPL-CCNC: 14 U/L (ref 10–40)
ANION GAP SERPL CALCULATED.3IONS-SCNC: 18 MMOL/L (ref 3–16)
APTT: 73.1 SEC (ref 26.2–38.6)
AST SERPL-CCNC: 16 U/L (ref 15–37)
BASE EXCESS VENOUS: -1.5 MMOL/L (ref -3–3)
BASOPHILS ABSOLUTE: 0 K/UL (ref 0–0.2)
BASOPHILS RELATIVE PERCENT: 0.6 %
BILIRUB SERPL-MCNC: <0.2 MG/DL (ref 0–1)
BUN BLDV-MCNC: 47 MG/DL (ref 7–20)
CALCIUM SERPL-MCNC: 9.6 MG/DL (ref 8.3–10.6)
CARBOXYHEMOGLOBIN: 3.3 % (ref 0–1.5)
CHLORIDE BLD-SCNC: 96 MMOL/L (ref 99–110)
CO2: 22 MMOL/L (ref 21–32)
CREAT SERPL-MCNC: 1.8 MG/DL (ref 0.8–1.3)
EOSINOPHILS ABSOLUTE: 0.1 K/UL (ref 0–0.6)
EOSINOPHILS RELATIVE PERCENT: 1 %
GFR AFRICAN AMERICAN: 47
GFR NON-AFRICAN AMERICAN: 39
GLUCOSE BLD-MCNC: 109 MG/DL (ref 70–99)
HCO3 VENOUS: 26.2 MMOL/L (ref 23–29)
HCT VFR BLD CALC: 38.8 % (ref 40.5–52.5)
HEMOGLOBIN, VEN, REDUCED: 3 %
HEMOGLOBIN: 12.6 G/DL (ref 13.5–17.5)
INR BLD: 4.06 (ref 0.88–1.12)
LYMPHOCYTES ABSOLUTE: 1.7 K/UL (ref 1–5.1)
LYMPHOCYTES RELATIVE PERCENT: 31.2 %
MCH RBC QN AUTO: 28.4 PG (ref 26–34)
MCHC RBC AUTO-ENTMCNC: 32.4 G/DL (ref 31–36)
MCV RBC AUTO: 87.6 FL (ref 80–100)
METHEMOGLOBIN VENOUS: 0 %
MONOCYTES ABSOLUTE: 0.7 K/UL (ref 0–1.3)
MONOCYTES RELATIVE PERCENT: 13 %
NEUTROPHILS ABSOLUTE: 2.9 K/UL (ref 1.7–7.7)
NEUTROPHILS RELATIVE PERCENT: 54.2 %
O2 CONTENT, VEN: 17 VOL %
O2 SAT, VEN: 97 %
O2 THERAPY: ABNORMAL
PCO2, VEN: 55.7 MMHG (ref 40–50)
PDW BLD-RTO: 15.5 % (ref 12.4–15.4)
PH VENOUS: 7.28 (ref 7.35–7.45)
PLATELET # BLD: 265 K/UL (ref 135–450)
PMV BLD AUTO: 7.6 FL (ref 5–10.5)
PO2, VEN: 97.1 MMHG (ref 25–40)
POTASSIUM SERPL-SCNC: 4.8 MMOL/L (ref 3.5–5.1)
PRO-BNP: 217 PG/ML (ref 0–124)
PROCALCITONIN: 0.18 NG/ML (ref 0–0.15)
PROTHROMBIN TIME: 48.6 SEC (ref 9.9–12.7)
RBC # BLD: 4.43 M/UL (ref 4.2–5.9)
SODIUM BLD-SCNC: 136 MMOL/L (ref 136–145)
TCO2 CALC VENOUS: 63 MMOL/L
TOTAL PROTEIN: 7.3 G/DL (ref 6.4–8.2)
TROPONIN: <0.01 NG/ML
WBC # BLD: 5.4 K/UL (ref 4–11)

## 2022-02-01 PROCEDURE — 36415 COLL VENOUS BLD VENIPUNCTURE: CPT

## 2022-02-01 PROCEDURE — 82550 ASSAY OF CK (CPK): CPT

## 2022-02-01 PROCEDURE — 85025 COMPLETE CBC W/AUTO DIFF WBC: CPT

## 2022-02-01 PROCEDURE — 2060000000 HC ICU INTERMEDIATE R&B

## 2022-02-01 PROCEDURE — 84145 PROCALCITONIN (PCT): CPT

## 2022-02-01 PROCEDURE — 83880 ASSAY OF NATRIURETIC PEPTIDE: CPT

## 2022-02-01 PROCEDURE — 94640 AIRWAY INHALATION TREATMENT: CPT

## 2022-02-01 PROCEDURE — 6360000002 HC RX W HCPCS: Performed by: EMERGENCY MEDICINE

## 2022-02-01 PROCEDURE — 99284 EMERGENCY DEPT VISIT MOD MDM: CPT

## 2022-02-01 PROCEDURE — 82803 BLOOD GASES ANY COMBINATION: CPT

## 2022-02-01 PROCEDURE — 2000000000 HC ICU R&B

## 2022-02-01 PROCEDURE — 71045 X-RAY EXAM CHEST 1 VIEW: CPT

## 2022-02-01 PROCEDURE — 96374 THER/PROPH/DIAG INJ IV PUSH: CPT

## 2022-02-01 PROCEDURE — 80053 COMPREHEN METABOLIC PANEL: CPT

## 2022-02-01 PROCEDURE — 85610 PROTHROMBIN TIME: CPT

## 2022-02-01 PROCEDURE — 84484 ASSAY OF TROPONIN QUANT: CPT

## 2022-02-01 PROCEDURE — 85730 THROMBOPLASTIN TIME PARTIAL: CPT

## 2022-02-01 PROCEDURE — 93005 ELECTROCARDIOGRAM TRACING: CPT | Performed by: EMERGENCY MEDICINE

## 2022-02-01 PROCEDURE — 6360000002 HC RX W HCPCS: Performed by: PHYSICIAN ASSISTANT

## 2022-02-01 PROCEDURE — 2580000003 HC RX 258: Performed by: PHYSICIAN ASSISTANT

## 2022-02-01 PROCEDURE — 6370000000 HC RX 637 (ALT 250 FOR IP): Performed by: PHYSICIAN ASSISTANT

## 2022-02-01 RX ORDER — METHYLPREDNISOLONE SODIUM SUCCINATE 125 MG/2ML
125 INJECTION, POWDER, LYOPHILIZED, FOR SOLUTION INTRAMUSCULAR; INTRAVENOUS ONCE
Status: COMPLETED | OUTPATIENT
Start: 2022-02-01 | End: 2022-02-01

## 2022-02-01 RX ORDER — ALBUTEROL SULFATE 2.5 MG/3ML
5 SOLUTION RESPIRATORY (INHALATION) ONCE
Status: DISCONTINUED | OUTPATIENT
Start: 2022-02-01 | End: 2022-02-01

## 2022-02-01 RX ORDER — IPRATROPIUM BROMIDE AND ALBUTEROL SULFATE 2.5; .5 MG/3ML; MG/3ML
1 SOLUTION RESPIRATORY (INHALATION) ONCE
Status: COMPLETED | OUTPATIENT
Start: 2022-02-01 | End: 2022-02-01

## 2022-02-01 RX ORDER — MAGNESIUM SULFATE IN WATER 40 MG/ML
2000 INJECTION, SOLUTION INTRAVENOUS ONCE
Status: COMPLETED | OUTPATIENT
Start: 2022-02-01 | End: 2022-02-02

## 2022-02-01 RX ORDER — 0.9 % SODIUM CHLORIDE 0.9 %
1000 INTRAVENOUS SOLUTION INTRAVENOUS ONCE
Status: COMPLETED | OUTPATIENT
Start: 2022-02-01 | End: 2022-02-02

## 2022-02-01 RX ADMIN — SODIUM CHLORIDE 1000 ML: 9 INJECTION, SOLUTION INTRAVENOUS at 23:09

## 2022-02-01 RX ADMIN — METHYLPREDNISOLONE SODIUM SUCCINATE 125 MG: 125 INJECTION, POWDER, FOR SOLUTION INTRAMUSCULAR; INTRAVENOUS at 23:09

## 2022-02-01 RX ADMIN — ALBUTEROL SULFATE 5 MG: 2.5 SOLUTION RESPIRATORY (INHALATION) at 23:13

## 2022-02-01 RX ADMIN — IPRATROPIUM BROMIDE AND ALBUTEROL SULFATE 1 AMPULE: .5; 3 SOLUTION RESPIRATORY (INHALATION) at 23:13

## 2022-02-01 ASSESSMENT — ENCOUNTER SYMPTOMS
RHINORRHEA: 0
VOMITING: 0
COUGH: 1
DIARRHEA: 0
NAUSEA: 0
SHORTNESS OF BREATH: 1
ABDOMINAL PAIN: 0

## 2022-02-02 LAB
A/G RATIO: 1.6 (ref 1.1–2.2)
ALBUMIN SERPL-MCNC: 4.1 G/DL (ref 3.4–5)
ALBUMIN SERPL-MCNC: 4.2 G/DL (ref 3.4–5)
ALP BLD-CCNC: 136 U/L (ref 40–129)
ALT SERPL-CCNC: 17 U/L (ref 10–40)
ANION GAP SERPL CALCULATED.3IONS-SCNC: 10 MMOL/L (ref 3–16)
ANION GAP SERPL CALCULATED.3IONS-SCNC: 15 MMOL/L (ref 3–16)
ANION GAP SERPL CALCULATED.3IONS-SCNC: 8 MMOL/L (ref 3–16)
AST SERPL-CCNC: 23 U/L (ref 15–37)
BASOPHILS ABSOLUTE: 0 K/UL (ref 0–0.2)
BASOPHILS RELATIVE PERCENT: 0.6 %
BILIRUB SERPL-MCNC: <0.2 MG/DL (ref 0–1)
BILIRUBIN URINE: NEGATIVE
BLOOD, URINE: NEGATIVE
BUN BLDV-MCNC: 26 MG/DL (ref 7–20)
BUN BLDV-MCNC: 28 MG/DL (ref 7–20)
BUN BLDV-MCNC: 38 MG/DL (ref 7–20)
CALCIUM SERPL-MCNC: 9 MG/DL (ref 8.3–10.6)
CALCIUM SERPL-MCNC: 9.2 MG/DL (ref 8.3–10.6)
CALCIUM SERPL-MCNC: 9.3 MG/DL (ref 8.3–10.6)
CHLORIDE BLD-SCNC: 100 MMOL/L (ref 99–110)
CHLORIDE BLD-SCNC: 102 MMOL/L (ref 99–110)
CHLORIDE BLD-SCNC: 103 MMOL/L (ref 99–110)
CLARITY: ABNORMAL
CO2: 21 MMOL/L (ref 21–32)
CO2: 29 MMOL/L (ref 21–32)
CO2: 29 MMOL/L (ref 21–32)
COLOR: YELLOW
CREAT SERPL-MCNC: 0.8 MG/DL (ref 0.8–1.3)
CREAT SERPL-MCNC: 0.8 MG/DL (ref 0.8–1.3)
CREAT SERPL-MCNC: 1 MG/DL (ref 0.8–1.3)
EOSINOPHILS ABSOLUTE: 0 K/UL (ref 0–0.6)
EOSINOPHILS RELATIVE PERCENT: 0.2 %
EPITHELIAL CELLS, UA: 0 /HPF (ref 0–5)
GFR AFRICAN AMERICAN: >60
GFR NON-AFRICAN AMERICAN: >60
GLUCOSE BLD-MCNC: 131 MG/DL (ref 70–99)
GLUCOSE BLD-MCNC: 147 MG/DL (ref 70–99)
GLUCOSE BLD-MCNC: 152 MG/DL (ref 70–99)
GLUCOSE BLD-MCNC: 158 MG/DL (ref 70–99)
GLUCOSE BLD-MCNC: 170 MG/DL (ref 70–99)
GLUCOSE BLD-MCNC: 176 MG/DL (ref 70–99)
GLUCOSE BLD-MCNC: 182 MG/DL (ref 70–99)
GLUCOSE BLD-MCNC: 184 MG/DL (ref 70–99)
GLUCOSE BLD-MCNC: 209 MG/DL (ref 70–99)
GLUCOSE URINE: NEGATIVE MG/DL
HCT VFR BLD CALC: 40.6 % (ref 40.5–52.5)
HEMOGLOBIN: 13.1 G/DL (ref 13.5–17.5)
HYALINE CASTS: 6 /LPF (ref 0–8)
INR BLD: 4.12 (ref 0.88–1.12)
KETONES, URINE: NEGATIVE MG/DL
LEUKOCYTE ESTERASE, URINE: NEGATIVE
LYMPHOCYTES ABSOLUTE: 0.5 K/UL (ref 1–5.1)
LYMPHOCYTES RELATIVE PERCENT: 11.7 %
MAGNESIUM: 2.4 MG/DL (ref 1.8–2.4)
MCH RBC QN AUTO: 28.4 PG (ref 26–34)
MCHC RBC AUTO-ENTMCNC: 32.4 G/DL (ref 31–36)
MCV RBC AUTO: 87.6 FL (ref 80–100)
MICROSCOPIC EXAMINATION: YES
MONOCYTES ABSOLUTE: 0 K/UL (ref 0–1.3)
MONOCYTES RELATIVE PERCENT: 1.1 %
NEUTROPHILS ABSOLUTE: 3.6 K/UL (ref 1.7–7.7)
NEUTROPHILS RELATIVE PERCENT: 86.4 %
NITRITE, URINE: NEGATIVE
PDW BLD-RTO: 15.2 % (ref 12.4–15.4)
PERFORMED ON: ABNORMAL
PH UA: 5.5 (ref 5–8)
PHOSPHORUS: 4 MG/DL (ref 2.5–4.9)
PHOSPHORUS: 5 MG/DL (ref 2.5–4.9)
PLATELET # BLD: 258 K/UL (ref 135–450)
PMV BLD AUTO: 7.5 FL (ref 5–10.5)
POTASSIUM SERPL-SCNC: 5.6 MMOL/L (ref 3.5–5.1)
POTASSIUM SERPL-SCNC: 6.2 MMOL/L (ref 3.5–5.1)
POTASSIUM SERPL-SCNC: 6.2 MMOL/L (ref 3.5–5.1)
PROTEIN UA: 30 MG/DL
PROTHROMBIN TIME: 49.4 SEC (ref 9.9–12.7)
RAPID INFLUENZA  B AGN: NEGATIVE
RAPID INFLUENZA A AGN: NEGATIVE
RBC # BLD: 4.63 M/UL (ref 4.2–5.9)
RBC UA: 1 /HPF (ref 0–4)
SODIUM BLD-SCNC: 137 MMOL/L (ref 136–145)
SODIUM BLD-SCNC: 138 MMOL/L (ref 136–145)
SODIUM BLD-SCNC: 142 MMOL/L (ref 136–145)
SPECIFIC GRAVITY UA: 1.02 (ref 1–1.03)
TOTAL CK: 81 U/L (ref 39–308)
TOTAL PROTEIN: 6.9 G/DL (ref 6.4–8.2)
URINE TYPE: ABNORMAL
UROBILINOGEN, URINE: 0.2 E.U./DL
WBC # BLD: 4.2 K/UL (ref 4–11)
WBC UA: 1 /HPF (ref 0–5)

## 2022-02-02 PROCEDURE — 85610 PROTHROMBIN TIME: CPT

## 2022-02-02 PROCEDURE — 83735 ASSAY OF MAGNESIUM: CPT

## 2022-02-02 PROCEDURE — 6360000002 HC RX W HCPCS: Performed by: EMERGENCY MEDICINE

## 2022-02-02 PROCEDURE — 6370000000 HC RX 637 (ALT 250 FOR IP): Performed by: NURSE PRACTITIONER

## 2022-02-02 PROCEDURE — 97116 GAIT TRAINING THERAPY: CPT

## 2022-02-02 PROCEDURE — 94640 AIRWAY INHALATION TREATMENT: CPT

## 2022-02-02 PROCEDURE — 2700000000 HC OXYGEN THERAPY PER DAY

## 2022-02-02 PROCEDURE — 6370000000 HC RX 637 (ALT 250 FOR IP): Performed by: INTERNAL MEDICINE

## 2022-02-02 PROCEDURE — 2580000003 HC RX 258: Performed by: INTERNAL MEDICINE

## 2022-02-02 PROCEDURE — 97161 PT EVAL LOW COMPLEX 20 MIN: CPT

## 2022-02-02 PROCEDURE — 97535 SELF CARE MNGMENT TRAINING: CPT

## 2022-02-02 PROCEDURE — 83036 HEMOGLOBIN GLYCOSYLATED A1C: CPT

## 2022-02-02 PROCEDURE — 97530 THERAPEUTIC ACTIVITIES: CPT

## 2022-02-02 PROCEDURE — 2500000003 HC RX 250 WO HCPCS: Performed by: INTERNAL MEDICINE

## 2022-02-02 PROCEDURE — 6360000002 HC RX W HCPCS: Performed by: INTERNAL MEDICINE

## 2022-02-02 PROCEDURE — U0003 INFECTIOUS AGENT DETECTION BY NUCLEIC ACID (DNA OR RNA); SEVERE ACUTE RESPIRATORY SYNDROME CORONAVIRUS 2 (SARS-COV-2) (CORONAVIRUS DISEASE [COVID-19]), AMPLIFIED PROBE TECHNIQUE, MAKING USE OF HIGH THROUGHPUT TECHNOLOGIES AS DESCRIBED BY CMS-2020-01-R: HCPCS

## 2022-02-02 PROCEDURE — 94761 N-INVAS EAR/PLS OXIMETRY MLT: CPT

## 2022-02-02 PROCEDURE — U0005 INFEC AGEN DETEC AMPLI PROBE: HCPCS

## 2022-02-02 PROCEDURE — 84100 ASSAY OF PHOSPHORUS: CPT

## 2022-02-02 PROCEDURE — 80053 COMPREHEN METABOLIC PANEL: CPT

## 2022-02-02 PROCEDURE — 2060000000 HC ICU INTERMEDIATE R&B

## 2022-02-02 PROCEDURE — 97165 OT EVAL LOW COMPLEX 30 MIN: CPT

## 2022-02-02 PROCEDURE — 6360000002 HC RX W HCPCS: Performed by: NURSE PRACTITIONER

## 2022-02-02 PROCEDURE — 85025 COMPLETE CBC W/AUTO DIFF WBC: CPT

## 2022-02-02 PROCEDURE — 81001 URINALYSIS AUTO W/SCOPE: CPT

## 2022-02-02 PROCEDURE — 87804 INFLUENZA ASSAY W/OPTIC: CPT

## 2022-02-02 PROCEDURE — 99223 1ST HOSP IP/OBS HIGH 75: CPT | Performed by: INTERNAL MEDICINE

## 2022-02-02 PROCEDURE — 36415 COLL VENOUS BLD VENIPUNCTURE: CPT

## 2022-02-02 RX ORDER — INSULIN LISPRO 100 [IU]/ML
0-3 INJECTION, SOLUTION INTRAVENOUS; SUBCUTANEOUS NIGHTLY
Status: DISCONTINUED | OUTPATIENT
Start: 2022-02-02 | End: 2022-02-02

## 2022-02-02 RX ORDER — ASPIRIN 81 MG/1
81 TABLET, CHEWABLE ORAL DAILY
Status: DISCONTINUED | OUTPATIENT
Start: 2022-02-02 | End: 2022-02-02 | Stop reason: ALTCHOICE

## 2022-02-02 RX ORDER — ARFORMOTEROL TARTRATE 15 UG/2ML
15 SOLUTION RESPIRATORY (INHALATION) 2 TIMES DAILY
Status: DISCONTINUED | OUTPATIENT
Start: 2022-02-02 | End: 2022-02-05 | Stop reason: HOSPADM

## 2022-02-02 RX ORDER — INSULIN LISPRO 100 [IU]/ML
0-6 INJECTION, SOLUTION INTRAVENOUS; SUBCUTANEOUS
Status: DISCONTINUED | OUTPATIENT
Start: 2022-02-02 | End: 2022-02-02

## 2022-02-02 RX ORDER — IPRATROPIUM BROMIDE AND ALBUTEROL SULFATE 2.5; .5 MG/3ML; MG/3ML
1 SOLUTION RESPIRATORY (INHALATION) EVERY 4 HOURS
Status: DISCONTINUED | OUTPATIENT
Start: 2022-02-02 | End: 2022-02-03

## 2022-02-02 RX ORDER — ACETAMINOPHEN 650 MG/1
650 SUPPOSITORY RECTAL EVERY 6 HOURS PRN
Status: DISCONTINUED | OUTPATIENT
Start: 2022-02-02 | End: 2022-02-05 | Stop reason: HOSPADM

## 2022-02-02 RX ORDER — GUAIFENESIN/DEXTROMETHORPHAN 100-10MG/5
5 SYRUP ORAL EVERY 4 HOURS PRN
Status: DISCONTINUED | OUTPATIENT
Start: 2022-02-02 | End: 2022-02-02 | Stop reason: ALTCHOICE

## 2022-02-02 RX ORDER — SODIUM CHLORIDE 9 MG/ML
25 INJECTION, SOLUTION INTRAVENOUS PRN
Status: DISCONTINUED | OUTPATIENT
Start: 2022-02-02 | End: 2022-02-05 | Stop reason: HOSPADM

## 2022-02-02 RX ORDER — IPRATROPIUM BROMIDE AND ALBUTEROL SULFATE 2.5; .5 MG/3ML; MG/3ML
1 SOLUTION RESPIRATORY (INHALATION)
Status: DISCONTINUED | OUTPATIENT
Start: 2022-02-02 | End: 2022-02-02

## 2022-02-02 RX ORDER — NICOTINE POLACRILEX 4 MG
15 LOZENGE BUCCAL PRN
Status: DISCONTINUED | OUTPATIENT
Start: 2022-02-02 | End: 2022-02-05 | Stop reason: HOSPADM

## 2022-02-02 RX ORDER — DEXTROSE MONOHYDRATE 25 G/50ML
12.5 INJECTION, SOLUTION INTRAVENOUS PRN
Status: DISCONTINUED | OUTPATIENT
Start: 2022-02-02 | End: 2022-02-05 | Stop reason: HOSPADM

## 2022-02-02 RX ORDER — DEXTROSE MONOHYDRATE 25 G/50ML
12.5 INJECTION, SOLUTION INTRAVENOUS PRN
Status: DISCONTINUED | OUTPATIENT
Start: 2022-02-02 | End: 2022-02-02

## 2022-02-02 RX ORDER — POLYETHYLENE GLYCOL 3350 17 G/17G
17 POWDER, FOR SOLUTION ORAL DAILY PRN
Status: DISCONTINUED | OUTPATIENT
Start: 2022-02-02 | End: 2022-02-05 | Stop reason: HOSPADM

## 2022-02-02 RX ORDER — ALBUTEROL SULFATE 90 UG/1
2 AEROSOL, METERED RESPIRATORY (INHALATION) EVERY 4 HOURS PRN
Status: DISCONTINUED | OUTPATIENT
Start: 2022-02-02 | End: 2022-02-05 | Stop reason: HOSPADM

## 2022-02-02 RX ORDER — ACETAMINOPHEN 325 MG/1
650 TABLET ORAL EVERY 6 HOURS PRN
Status: DISCONTINUED | OUTPATIENT
Start: 2022-02-02 | End: 2022-02-05 | Stop reason: HOSPADM

## 2022-02-02 RX ORDER — NICOTINE POLACRILEX 4 MG
15 LOZENGE BUCCAL PRN
Status: DISCONTINUED | OUTPATIENT
Start: 2022-02-02 | End: 2022-02-02

## 2022-02-02 RX ORDER — INSULIN LISPRO 100 [IU]/ML
0-3 INJECTION, SOLUTION INTRAVENOUS; SUBCUTANEOUS NIGHTLY
Status: DISCONTINUED | OUTPATIENT
Start: 2022-02-02 | End: 2022-02-05 | Stop reason: HOSPADM

## 2022-02-02 RX ORDER — DEXTROSE MONOHYDRATE 50 MG/ML
100 INJECTION, SOLUTION INTRAVENOUS PRN
Status: DISCONTINUED | OUTPATIENT
Start: 2022-02-02 | End: 2022-02-05 | Stop reason: HOSPADM

## 2022-02-02 RX ORDER — GENTAMICIN SULFATE 1 MG/G
CREAM TOPICAL PRN
Status: DISCONTINUED | OUTPATIENT
Start: 2022-02-02 | End: 2022-02-05 | Stop reason: HOSPADM

## 2022-02-02 RX ORDER — SODIUM CHLORIDE 9 MG/ML
INJECTION, SOLUTION INTRAVENOUS CONTINUOUS
Status: ACTIVE | OUTPATIENT
Start: 2022-02-02 | End: 2022-02-03

## 2022-02-02 RX ORDER — CODEINE PHOSPHATE AND GUAIFENESIN 10; 100 MG/5ML; MG/5ML
5 SOLUTION ORAL EVERY 4 HOURS PRN
Status: DISCONTINUED | OUTPATIENT
Start: 2022-02-02 | End: 2022-02-05 | Stop reason: HOSPADM

## 2022-02-02 RX ORDER — FLUTICASONE PROPIONATE 50 MCG
1 SPRAY, SUSPENSION (ML) NASAL DAILY
Status: DISCONTINUED | OUTPATIENT
Start: 2022-02-02 | End: 2022-02-05 | Stop reason: HOSPADM

## 2022-02-02 RX ORDER — AMMONIUM LACTATE 12 G/100G
CREAM TOPICAL PRN
Status: DISCONTINUED | OUTPATIENT
Start: 2022-02-02 | End: 2022-02-05 | Stop reason: HOSPADM

## 2022-02-02 RX ORDER — DEXTROSE MONOHYDRATE 25 G/50ML
25 INJECTION, SOLUTION INTRAVENOUS ONCE
Status: COMPLETED | OUTPATIENT
Start: 2022-02-02 | End: 2022-02-02

## 2022-02-02 RX ORDER — CARVEDILOL 25 MG/1
25 TABLET ORAL 2 TIMES DAILY WITH MEALS
Status: DISCONTINUED | OUTPATIENT
Start: 2022-02-02 | End: 2022-02-05 | Stop reason: HOSPADM

## 2022-02-02 RX ORDER — INSULIN LISPRO 100 [IU]/ML
0-6 INJECTION, SOLUTION INTRAVENOUS; SUBCUTANEOUS
Status: DISCONTINUED | OUTPATIENT
Start: 2022-02-02 | End: 2022-02-05 | Stop reason: HOSPADM

## 2022-02-02 RX ORDER — SODIUM CHLORIDE 0.9 % (FLUSH) 0.9 %
5-40 SYRINGE (ML) INJECTION EVERY 12 HOURS SCHEDULED
Status: DISCONTINUED | OUTPATIENT
Start: 2022-02-02 | End: 2022-02-05 | Stop reason: HOSPADM

## 2022-02-02 RX ORDER — ATORVASTATIN CALCIUM 80 MG/1
80 TABLET, FILM COATED ORAL DAILY
Status: DISCONTINUED | OUTPATIENT
Start: 2022-02-02 | End: 2022-02-05 | Stop reason: HOSPADM

## 2022-02-02 RX ORDER — METHYLPREDNISOLONE SODIUM SUCCINATE 40 MG/ML
40 INJECTION, POWDER, LYOPHILIZED, FOR SOLUTION INTRAMUSCULAR; INTRAVENOUS EVERY 8 HOURS
Status: DISCONTINUED | OUTPATIENT
Start: 2022-02-02 | End: 2022-02-04

## 2022-02-02 RX ORDER — NIFEDIPINE 30 MG/1
60 TABLET, EXTENDED RELEASE ORAL DAILY
Status: DISCONTINUED | OUTPATIENT
Start: 2022-02-02 | End: 2022-02-05 | Stop reason: HOSPADM

## 2022-02-02 RX ORDER — ALBUTEROL SULFATE 2.5 MG/3ML
2.5 SOLUTION RESPIRATORY (INHALATION) EVERY 4 HOURS PRN
Status: DISCONTINUED | OUTPATIENT
Start: 2022-02-02 | End: 2022-02-05 | Stop reason: HOSPADM

## 2022-02-02 RX ORDER — BUDESONIDE 0.5 MG/2ML
0.5 INHALANT ORAL 2 TIMES DAILY
Status: DISCONTINUED | OUTPATIENT
Start: 2022-02-02 | End: 2022-02-05 | Stop reason: HOSPADM

## 2022-02-02 RX ORDER — SODIUM CHLORIDE 0.9 % (FLUSH) 0.9 %
5-40 SYRINGE (ML) INJECTION PRN
Status: DISCONTINUED | OUTPATIENT
Start: 2022-02-02 | End: 2022-02-05 | Stop reason: HOSPADM

## 2022-02-02 RX ORDER — DEXTROSE MONOHYDRATE 50 MG/ML
100 INJECTION, SOLUTION INTRAVENOUS PRN
Status: DISCONTINUED | OUTPATIENT
Start: 2022-02-02 | End: 2022-02-02

## 2022-02-02 RX ORDER — FERROUS SULFATE 325(65) MG
325 TABLET ORAL 2 TIMES DAILY
Status: DISCONTINUED | OUTPATIENT
Start: 2022-02-02 | End: 2022-02-02 | Stop reason: ALTCHOICE

## 2022-02-02 RX ADMIN — MAGNESIUM SULFATE HEPTAHYDRATE 2000 MG: 40 INJECTION, SOLUTION INTRAVENOUS at 00:24

## 2022-02-02 RX ADMIN — Medication 10 ML: at 10:41

## 2022-02-02 RX ADMIN — ARFORMOTEROL TARTRATE 15 MCG: 15 SOLUTION RESPIRATORY (INHALATION) at 22:04

## 2022-02-02 RX ADMIN — GUAIFENESIN AND CODEINE PHOSPHATE 5 ML: 100; 10 SOLUTION ORAL at 18:44

## 2022-02-02 RX ADMIN — Medication 10 ML: at 20:59

## 2022-02-02 RX ADMIN — BUDESONIDE 500 MCG: 0.5 SUSPENSION RESPIRATORY (INHALATION) at 22:04

## 2022-02-02 RX ADMIN — IPRATROPIUM BROMIDE AND ALBUTEROL SULFATE 1 AMPULE: .5; 3 SOLUTION RESPIRATORY (INHALATION) at 22:04

## 2022-02-02 RX ADMIN — DEXTROSE MONOHYDRATE 25 G: 25 INJECTION, SOLUTION INTRAVENOUS at 14:33

## 2022-02-02 RX ADMIN — ATORVASTATIN CALCIUM 80 MG: 80 TABLET, FILM COATED ORAL at 10:37

## 2022-02-02 RX ADMIN — CARVEDILOL 25 MG: 25 TABLET, FILM COATED ORAL at 17:23

## 2022-02-02 RX ADMIN — SODIUM ZIRCONIUM CYCLOSILICATE 5 G: 5 POWDER, FOR SUSPENSION ORAL at 14:32

## 2022-02-02 RX ADMIN — SODIUM CHLORIDE: 9 INJECTION, SOLUTION INTRAVENOUS at 02:35

## 2022-02-02 RX ADMIN — FLUTICASONE PROPIONATE 1 SPRAY: 50 SPRAY, METERED NASAL at 14:29

## 2022-02-02 RX ADMIN — CARVEDILOL 25 MG: 25 TABLET, FILM COATED ORAL at 10:37

## 2022-02-02 RX ADMIN — NIFEDIPINE 60 MG: 30 TABLET, FILM COATED, EXTENDED RELEASE ORAL at 10:37

## 2022-02-02 RX ADMIN — IPRATROPIUM BROMIDE AND ALBUTEROL SULFATE 1 AMPULE: .5; 3 SOLUTION RESPIRATORY (INHALATION) at 09:35

## 2022-02-02 RX ADMIN — METHYLPREDNISOLONE SODIUM SUCCINATE 40 MG: 40 INJECTION, POWDER, FOR SOLUTION INTRAMUSCULAR; INTRAVENOUS at 14:30

## 2022-02-02 RX ADMIN — IPRATROPIUM BROMIDE AND ALBUTEROL SULFATE 1 AMPULE: .5; 3 SOLUTION RESPIRATORY (INHALATION) at 12:51

## 2022-02-02 RX ADMIN — INSULIN HUMAN 10 UNITS: 100 INJECTION, SOLUTION PARENTERAL at 14:36

## 2022-02-02 RX ADMIN — INSULIN LISPRO 1 UNITS: 100 INJECTION, SOLUTION INTRAVENOUS; SUBCUTANEOUS at 21:11

## 2022-02-02 RX ADMIN — IPRATROPIUM BROMIDE AND ALBUTEROL SULFATE 1 AMPULE: .5; 3 SOLUTION RESPIRATORY (INHALATION) at 16:41

## 2022-02-02 RX ADMIN — METHYLPREDNISOLONE SODIUM SUCCINATE 40 MG: 40 INJECTION, POWDER, FOR SOLUTION INTRAMUSCULAR; INTRAVENOUS at 20:58

## 2022-02-02 RX ADMIN — SODIUM ZIRCONIUM CYCLOSILICATE 5 G: 5 POWDER, FOR SUSPENSION ORAL at 20:58

## 2022-02-02 ASSESSMENT — PAIN SCALES - GENERAL
PAINLEVEL_OUTOF10: 0

## 2022-02-02 NOTE — PROGRESS NOTES
Clinical Pharmacy Note: Pharmacy to Dose Warfarin    Pharmacy consulted by Dr. Pavithra Whitehead to dose warfarin. Mark Bailey is a 65 y.o. male  is receiving warfarin for indication: Peripheral artery disease. INR Goal Range: 2.0-3.0  Prior to admission warfarin dosing regimen: 10 mg every Wed, Sat; 7.5 mg all other days  INR today:   Lab Results   Component Value Date    INR 4.06 02/01/2022       Assessment/Plan:  INR is supratherapeutic on prior to admission dosing regimen. Based on today's assessment, HOLD warfarin until INR is back within goal range. Daily INR is ordered. Pharmacy will continue to monitor and make adjustments to regimen as necessary.      Thank you for the consult,     Heather Rosales PharmD

## 2022-02-02 NOTE — RT PROTOCOL NOTE
RT Inhaler-Nebulizer Bronchodilator Protocol Note    There is a bronchodilator order in the chart from a provider indicating to follow the RT Bronchodilator Protocol and there is an Initiate RT Inhaler-Nebulizer Bronchodilator Protocol order as well (see protocol at bottom of note). CXR Findings:  XR CHEST PORTABLE    Result Date: 2/1/2022  Chronic pulmonary change without acute cardiopulmonary process. The findings from the last RT Protocol Assessment were as follows:   History Pulmonary Disease: Chronic pulmonary disease  Respiratory Pattern: Mild dyspnea at rest, irregular pattern, or RR 21-25 bpm  Breath Sounds: Inspiratory and expiratory or bilateral wheezing and/or rhonchi  Cough: Strong, productive  Indication for Bronchodilator Therapy: Wheezing associated with pulm disorder  Bronchodilator Assessment Score: 13    Aerosolized bronchodilator medication orders have been revised according to the RT Inhaler-Nebulizer Bronchodilator Protocol below. Respiratory Therapist to perform RT Therapy Protocol Assessment initially then follow the protocol. Repeat RT Therapy Protocol Assessment PRN for score 0-3 or on second treatment, BID, and PRN for scores above 3. No Indications - adjust the frequency to every 6 hours PRN wheezing or bronchospasm, if no treatments needed after 48 hours then discontinue using Per Protocol order mode. If indication present, adjust the RT bronchodilator orders based on the Bronchodilator Assessment Score as indicated below. Use Inhaler orders unless patient has one or more of the following: on home nebulizer, not able to hold breath for 10 seconds, is not alert and oriented, cannot activate and use MDI correctly, or respiratory rate 25 breaths per minute or more, then use the equivalent nebulizer order(s) with same Frequency and PRN reasons based on the score. If a patient is on this medication at home then do not decrease Frequency below that used at home.     0-3 - enter or revise RT bronchodilator order(s) to equivalent RT Bronchodilator order with Frequency of every 4 hours PRN for wheezing or increased work of breathing using Per Protocol order mode. 4-6 - enter or revise RT Bronchodilator order(s) to two equivalent RT bronchodilator orders with one order with BID Frequency and one order with Frequency of every 4 hours PRN wheezing or increased work of breathing using Per Protocol order mode. 7-10 - enter or revise RT Bronchodilator order(s) to two equivalent RT bronchodilator orders with one order with TID Frequency and one order with Frequency of every 4 hours PRN wheezing or increased work of breathing using Per Protocol order mode. 11-13 - enter or revise RT Bronchodilator order(s) to one equivalent RT bronchodilator order with QID Frequency and an Albuterol order with Frequency of every 4 hours PRN wheezing or increased work of breathing using Per Protocol order mode. Greater than 13 - enter or revise RT Bronchodilator order(s) to one equivalent RT bronchodilator order with every 4 hours Frequency and an Albuterol order with Frequency of every 2 hours PRN wheezing or increased work of breathing using Per Protocol order mode. RT to enter RT Home Evaluation for COPD & MDI Assessment order using Per Protocol order mode.     Electronically signed by HARSHAD ROBERSON RCP on 2/2/2022 at 4:32 AM

## 2022-02-02 NOTE — PROGRESS NOTES
During breathing treatment patient started to cough blood. During coughing patient took off O2 and his SPo2 dropped to 82%. Patient calming down now Spo2 94% on 5lpm O2. RN notified.

## 2022-02-02 NOTE — ED PROVIDER NOTES
nausea, vomiting or diarrhea. He denies any urinary symptoms. He states he is currently on Coumadin due to a known DVT in the right leg. Nursing Notes were all reviewed and agreed with or any disagreements were addressed in the HPI. REVIEW OF SYSTEMS    (2-9 systems for level 4, 10 or more for level 5)     Review of Systems   Constitutional: Negative for activity change, appetite change, chills and fever. HENT: Negative for congestion and rhinorrhea. Respiratory: Positive for cough and shortness of breath. Cardiovascular: Negative for chest pain. Gastrointestinal: Negative for abdominal pain, diarrhea, nausea and vomiting. Genitourinary: Negative for difficulty urinating, dysuria and hematuria. Positives and Pertinent negatives as per HPI. Except as noted above in the ROS, all other systems were reviewed and negative.        PAST MEDICAL HISTORY     Past Medical History:   Diagnosis Date    Diabetes mellitus ([facility] [address]     Fibromyalgia     Hyperlipidemia     Hypertension          SURGICAL HISTORY     Past Surgical History:   Procedure Laterality Date    APPENDECTOMY  2005    COLONOSCOPY  2016    FEMORAL BYPASS Left 02/26/2020    femoral posterior tibial bypass    FEMORAL-TIBIAL BYPASS GRAFT Right 12/11/2020    with femoral endarterectomy and patch angioplasty    FOOT DEBRIDEMENT Left 09/03/2020    FOOT DEBRIDEMENT Right 2/26/2021    DEBRIDEMENT OF RIGHT FOOT WOUND WITH GRAFT APPLICATION performed by Omega England DPM at [facility] Right 12/08/2020    stent leg for PVD         CURRENTMEDICATIONS       Previous Medications    ACETAMINOPHEN (TYLENOL) 500 MG TABLET    Take 1 tablet by mouth 4 times daily as needed for Pain    ALBUTEROL SULFATE HFA (VENTOLIN HFA) 108 (90 BASE) MCG/ACT INHALER    Inhale 2 puffs into the lungs 4 times daily as needed for Wheezing    ASPIRIN 81 MG CHEWABLE TABLET    CHEW AND SWALLOW 1 TABLET BY MOUTH ONE TIME A DAY ATORVASTATIN (LIPITOR) 80 MG TABLET    Take 1 tablet by mouth daily Cancel atorvastatin 20 mg a day    BECAPLERMIN (REGRANEX) 0.01 % GEL    Apply 1 Applicatorful topically daily    BLOOD GLUCOSE MONITOR KIT AND SUPPLIES    Dispense sufficient amount for indicated testing frequency plus additional to accommodate PRN testing needs. Dispense all needed supplies to include: monitor, strips, lancing device, lancets, control solutions, alcohol swabs. BLOOD GLUCOSE MONITOR STRIPS    Test 2 times a day while on PREDNISONE, then 1 time daily & as needed for symptoms of irregular blood glucose. Dispense sufficient amount for indicated testing frequency plus additional to accommodate PRN testing needs.     CARVEDILOL (COREG) 25 MG TABLET    Take 1 tablet by mouth 2 times daily (with meals)    FERROUS SULFATE (IRON 325) 325 (65 FE) MG TABLET    Take 1 tablet by mouth 2 times daily    FLUTICASONE (FLONASE) 50 MCG/ACT NASAL SPRAY    1 spray by Nasal route daily    LANCETS MISC    1 each by Does not apply route daily Test 2 times daily while on PREDNISONE then 1 time daily & as needed for symptoms of irregular blood sugar    LISINOPRIL (PRINIVIL;ZESTRIL) 10 MG TABLET    Take 1 tablet by mouth daily    METFORMIN (GLUCOPHAGE) 500 MG TABLET    Take 1 tablet by mouth 2 times daily (with meals)    NICOTINE (NICODERM CQ) 21 MG/24HR    Place 1 patch onto the skin every 24 hours    NIFEDIPINE (ADALAT CC) 60 MG EXTENDED RELEASE TABLET    Take 1 tablet by mouth daily    TADALAFIL (CIALIS) 5 MG TABLET    TAKE 1 TABLET BY MOUTH ONE TIME A DAY AS NEEDED FOR ERECTILE DYSFUNCTION    TIOTROPIUM (SPIRIVA RESPIMAT) 2.5 MCG/ACT AERS INHALER    Inhale 2 puffs into the lungs daily    VITAMINS/MINERALS TABS    Take 1 tablet by mouth daily    WARFARIN (JANTOVEN) 5 MG TABLET    TAKE 1 AND 1/2 TABLETS BY MOUTH ONE TIME A DAY OR AS DIRECTED BY [facility] ([phone])         ALLERGIES     Chantix [varenicline]    FAMILYHISTORY Family History   Problem Relation Age of Onset    Cancer Mother     Stroke Father           SOCIAL HISTORY       Social History     Tobacco Use    Smoking status: Former Smoker     Packs/day: 0.50     Years: 20.00     Pack years: 10.00     Types: Cigarettes     Start date: 1977     Quit date: 2021     Years since quittin.0    Smokeless tobacco: Never Used   Vaping Use    Vaping Use: Never used   Substance Use Topics    Alcohol use: Yes     Alcohol/week: 6.0 standard drinks     Types: 6 Cans of beer per week    Drug use: Never       SCREENINGS             PHYSICAL EXAM    (up to 7 for level 4, 8 or more for level 5)     ED Triage Vitals [22 2223]   BP Temp Temp Source Pulse Resp SpO2 Height Weight   112/67 98.6 °F (37 °C) Oral 114 22 97 % 5' 8\" (1.727 m) 183 lb (83 kg)       Physical Exam  Vitals and nursing note reviewed. Constitutional:       Appearance: He is well-developed. He is ill-appearing. He is not diaphoretic. HENT:      Head: Normocephalic and atraumatic. Right Ear: External ear normal.      Left Ear: External ear normal.      Nose: Nose normal.   Eyes:      General:         Right eye: No discharge. Left eye: No discharge. Neck:      Trachea: No tracheal deviation. Cardiovascular:      Rate and Rhythm: Regular rhythm. Tachycardia present. Heart sounds: No murmur heard. Pulmonary:      Effort: Pulmonary effort is normal. Tachypnea present. No respiratory distress. Breath sounds: Decreased breath sounds and wheezing present. Comments: Dyspnea with conversation. Diminished aeration throughout lung fields with scattered wheezing  Abdominal:      General: Bowel sounds are normal. There is no distension. Palpations: Abdomen is soft. Tenderness: There is no abdominal tenderness. There is no guarding. Musculoskeletal:         General: Normal range of motion. Cervical back: Normal range of motion and neck supple.    Skin: General: Skin is warm and dry. Neurological:      Mental Status: He is alert and oriented to person, place, and time.    Psychiatric:         Behavior: Behavior normal.         DIAGNOSTIC RESULTS   LABS:    Labs Reviewed   CBC WITH AUTO DIFFERENTIAL - Abnormal; Notable for the following components:       Result Value    Hemoglobin 12.6 (*)     Hematocrit 38.8 (*)     RDW 15.5 (*)     All other components within normal limits    Narrative:     Performed at:  [facility] [address]   Phone [phone]   COMPREHENSIVE METABOLIC PANEL - Abnormal; Notable for the following components:    Chloride 96 (*)     Anion Gap 18 (*)     Glucose 109 (*)     BUN 47 (*)     CREATININE 1.8 (*)     GFR Non- 39 (*)     GFR  47 (*)     All other components within normal limits    Narrative:     Performed at:  [facility] [address]   Phone [phone]   APTT - Abnormal; Notable for the following components:    aPTT 73.1 (*)     All other components within normal limits    Narrative:     Performed at:  [facility] [address]   Phone [phone]   PROTIME-INR - Abnormal; Notable for the following components:    Protime 48.6 (*)     INR 4.06 (*)     All other components within normal limits    Narrative:     Performed at:  [facility] [address]   Phone 21  - Abnormal; Notable for the following components:    Pro- (*)     All other components within normal limits    Narrative:     Performed at:  [facility] [address]   Phone [phone]   PROCALCITONIN - Abnormal; Notable for the following components:    Procalcitonin 0.18 (*)     All other components within normal limits    Narrative:     Performed at:  [facility]  [address]  [place], [address]   Phone [phone]   BLOOD GAS, VENOUS - Abnormal; Notable for the following components:    pH, Mele 7.280 (*)     pCO2, Mele 55.7 (*)     pO2, Mele 97.1 (*)     Carboxyhemoglobin 3.3 (*)     All other components within normal limits    Narrative:     Performed at:  [facility]  [address] [facility],  [place], [address] [place]"   Phone [phone]   TROPONIN    Narrative:     Performed at:  [facility]  [address] [facility]  [place], [address]   Phone [phone]   URINE RT REFLEX TO CULTURE       When ordered only abnormal lab results are displayed. All other labs were within normal range or not returned as of this dictation. EKG: When ordered, EKG's are interpreted by the Emergency Department Physician in the absence of a cardiologist.  Please see their note for interpretation of EKG. RADIOLOGY:   Non-plain film images such as CT, Ultrasound and MRI are read by the radiologist. Plain radiographic images are visualized and preliminarily interpreted by the ED Provider with the below findings:        Interpretation per the Radiologist below, if available at the time of this note:    XR CHEST PORTABLE   Final Result   Chronic pulmonary change without acute cardiopulmonary process. XR CHEST PORTABLE    Result Date: 2/1/2022  EXAMINATION: ONE XRAY VIEW OF THE CHEST 2/1/2022 10:48 pm COMPARISON: Chest radiograph 01/2022. HISTORY: ORDERING SYSTEM PROVIDED HISTORY: sob TECHNOLOGIST PROVIDED HISTORY: Reason for exam:->sob Reason for Exam: sob FINDINGS: There is chronic pulmonary change. There is no acute consolidation effusion. There is no pneumothorax. The mediastinal structures are unremarkable. The upper abdomen unremarkable. The extrathoracic soft tissues are unremarkable.      Chronic pulmonary change without acute cardiopulmonary process. PROCEDURES   Unless otherwise noted below, none     Procedures    CRITICAL CARE TIME   Critical Care  There was a high probability of life-threatening clinical deterioration in the patient's condition requiring my urgent intervention. Total critical care time with the patient was 45 minutes excluding separately reportable procedures. Critical care required due to patients acute respiratory failure with hypoxemia requiring multiple breathing treatments, supplemental oxygen, and admission      CONSULTS:  None      EMERGENCY DEPARTMENT COURSE and DIFFERENTIAL DIAGNOSIS/MDM:   Vitals:    Vitals:    02/01/22 2223 02/01/22 2330 02/01/22 2345   BP: 112/67 124/73 122/74   Pulse: 114 104 105   Resp: 22 20 24   Temp: 98.6 °F (37 °C)     TempSrc: Oral     SpO2: 97% 99% 98%   Weight: 183 lb (83 kg)     Height: 5' 8\" (1.727 m)         Patient was given the following medications:  Medications   0.9 % sodium chloride bolus (1,000 mLs IntraVENous New Bag 2/1/22 2309)   magnesium sulfate 2000 mg in 50 mL IVPB premix (has no administration in time range)   ipratropium-albuterol (DUONEB) nebulizer solution 1 ampule (1 ampule Inhalation Given 2/1/22 2313)   methylPREDNISolone sodium (SOLU-MEDROL) injection 125 mg (125 mg IntraVENous Given 2/1/22 2309)   albuterol (PROVENTIL) nebulizer solution 5 mg (5 mg Nebulization Given 2/1/22 2313)           Briefly, this is a 60-year-old male who presents emergency department today for evaluation for shortness of breath. Patient does have a history of COPD, was actually admitted and intubated for this on 12/31/2021. Was discharged on 1/4/2021 but continues to feel short of breath and cough. Worse over the past week    The patient has the ED, he is 65% on room air, oxygen will be placed. Patient is ill-appearing he is tachycardic, tachypneic, and dyspneic with conversation.   He has diminished aeration throughout all lung fields, with wheezing. Patient will be given breathing treatments, as well as Solu-Medrol. Patient continued to have wheezing although seems to be minimally improving we will add on magnesium. KG will be documented by my attending, please see his note for further details. CBC shows no evidence of leukocytosis, there is a mild anemia. Stable. CMP does show a new DIEGO with a BUN of 47, creatinine of 1.8, fluids will be given. Troponin is negative. Patient is on Coumadin, INR is 4.06. Procalcitonin is normal.    Chest x-ray shows no acute process      To the patient's COPD exacerbation and hypoxemia, I feel that he will need to be admitted for further care and evaluation, hospitalist has been paged for admission, patient and family are updated and agreeable with plan. Stable for admission    FINAL IMPRESSION      1. Acute respiratory failure with hypoxemia (HCC)    2. COPD exacerbation ([address]    3. DIEGO (acute kidney injury) [facility])          DISPOSITION/PLAN   DISPOSITION Decision To Admit 02/01/2022 11:51:08 PM      PATIENT REFERRED TO:  No follow-up provider specified.     DISCHARGE MEDICATIONS:  New Prescriptions    No medications on file       DISCONTINUED MEDICATIONS:  Discontinued Medications    No medications on file              (Please note that portions of this note were completed with a voice recognition program.  Efforts were made to edit the dictations but occasionally words are mis-transcribed.)    Christine Shelton PA-C (electronically signed)              Christine Shelton PA-C  02/01/22 0022

## 2022-02-02 NOTE — PROGRESS NOTES
Physical Therapy    Facility/Department: [facility] ICU  Initial Assessment    NAME: Radha Waters  : 1960  MRN: [id number]    Date of Service: 2022    Discharge Recommendations:      PT Equipment Recommendations  Equipment Needed: No  Other: Needs met at this time (potential need for grab bar in bathroom - will continue to assess)     Radha Waters scored a 18/24 on the AM-PAC short mobility form. At this time, no further PT is recommended upon discharge due to pt functioning near baseline and will have support from family at discharge. Recommend patient returns to prior setting with prior services. If pt discharges before next session this will serve as the discharge summary. Assessment   Body structures, Functions, Activity limitations: Decreased balance;Decreased endurance  Assessment: Pt is a 66 yo male admitted to [facility] for COPD. At this time the patient requires increased supplemental O2 to maintain SpO2 >90% with activity and at rest. The patient requires SBA for safe mobility out of bed and would benefit from continued skilled PT while in the acute setting to safely progress tolerance to activity and indepence with mobility back to baseline. Treatment Diagnosis: Decreased tolerance to activity  Prognosis: Good  Decision Making: Low Complexity  Exam: MMT, ROM, balance, funcitonal mobility  Clinical Presentation: Stable  PT Education: Goals;PT Role;Plan of Care  Patient Education: D/c recs - pt verbalized understanding  Barriers to Learning: None  REQUIRES PT FOLLOW UP: Yes  Activity Tolerance  Activity Tolerance: Patient Tolerated treatment well  Activity Tolerance: Vitals sitting EOB: /83,. SpO2 93% on 4L,  bpm. Maintains SpO2 >90% on 4L with activity, HR in the 110s. Pt's SpO2 did drop to 85% when he removed the NC for 45 sec to wash his face, recovered steadily to >90% once NC replaced.        Patient Diagnosis(es): The primary encounter diagnosis was Acute respiratory failure with hypoxemia ([facility] [address] Diagnoses of COPD exacerbation ([facility] [address] and DIEGO (acute kidney injury) ([facility] [address] were also pertinent to this visit. has a past medical history of Diabetes mellitus ([address] Fibromyalgia, History of DVT (deep vein thrombosis), Hyperlipidemia, and Hypertension. has a past surgical history that includes Colonoscopy (2016); Appendectomy (2005); other surgical history (Right, 12/08/2020); femoral bypass (Left, 02/26/2020); Femoral-tibial Bypass Graft (Right, 12/11/2020); Foot Debridement (Left, 09/03/2020); and Foot Debridement (Right, 2/26/2021). Restrictions  Restrictions/Precautions  Restrictions/Precautions: Fall Risk,Isolation (Medium fall risk; Droplet Plus (COVID r/o))  Position Activity Restriction  Other position/activity restrictions: Per H&P on 2/1 \"62 y.o. male with history of well-controlled diabetes type 2 (most recent hemoglobin A1c was 6.0 in November 2021), fibromyalgia, hyperlipidemia, essential hypertension, COPD, who was recently admitted for management of COPD exacerbation, required endotracheal intubation, who presents to the emergency room with complaints of worsening shortness of breath, cough, ongoing for the past 4 days. He does not have fever or chills, although reports that patient has had occasions where he felt too hot while sleeping at night and had to take the duvet off. Chest x-ray obtained in the emergency room was unremarkable for acute pathology. Patient was requiring 5 L/min supplemental oxygen due to hypoxia the emergency room, with lowest oxygen saturation of 63% on room air during triage. \"     Vision/Hearing  Vision: Impaired  Vision Exceptions: Wears glasses for reading  Hearing: Within functional limits       Subjective  General  Chart Reviewed: Yes  Patient assessed for rehabilitation services?: Yes  Family / Caregiver Present: No  Diagnosis: COPD  Follows Commands: Within Functional Limits  General Comment  Comments: Pt semi-reclined in bed upon therapist arrival.  Subjective  Subjective: Pt asleep initially and difficult to rouse initially, takes a long time to wake up. Pt agreeable to PT/OT eval. Rails heard with breathing. Pt denies pain but endorses lethargy from medications. Pain Screening  Patient Currently in Pain: Denies  Vital Signs  Patient Currently in Pain: No       Orientation  Orientation  Overall Orientation Status: Within Normal Limits     Social/Functional History  Social/Functional History  Lives With: Spouse  Type of Home: House (Bi-level)  Home Layout: Two level,Bed/Bath upstairs (5 to basement, 6 to upper level - unilateral rail for both)  Home Access: Stairs to enter with rails (garage enters to basement then the 5+6 steps up)  Bathroom Shower/Tub: Tub/Shower unit  Bathroom Toilet: Standard  Home Equipment: Cane,Rolling walker  ADL Assistance: Independent (Has been sponge bathing since last hospital admission due to LE wraps)  Homemaking Assistance: Independent  Ambulation Assistance: Independent  Transfer Assistance: Independent  Active : Yes  Mode of Transportation: Car  Occupation: Full time employment  Type of occupation:   Leisure & Hobbies: spending time with grandchildren  IADL Comments: Sleeps in flat bed  Additional Comments: No falls in last 6 months. 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 Cognition   WFL    Objective  AROM RLE (degrees)  RLE AROM: WFL  RLE General AROM: Ankle DF to neutral only due to Ace Wrap  AROM LLE (degrees)  LLE AROM : WFL  LLE General AROM: Ankle DF to neutral only due to Ace Wrap  Strength RLE  Strength RLE: WFL  Comment: Grossly at least 3/5 as observed through functional mobility as most MMTs deferred due to LE wraps and tenderness. 5/5 hip flexion. Strength LLE  Strength LLE: WFL  Comment: Grossly at least 3/5 as observed through functional mobility as most MMTs deferred due to LE wraps and tenderness.  5/5 hip flexion. Sensation  Overall Sensation Status: WNL     Bed mobility  Supine to Sit: Supervision  Scooting: Supervision  Comment: HOB elevated. Pt up in chair at end of session. Transfers  Sit to Stand: Stand by assistance (From EOB x2 trials and toilet with use of grab bar)  Stand to sit: Stand by assistance (To EOB x2 trials and toilet)     Ambulation  Ambulation?: Yes  Ambulation 1  Surface: level tile  Device: No Device  Other Apparatus: O2 (4L)  Assistance: Stand by assistance  Quality of Gait: Decreased anterior tibial translation in stance phase due to Ace wraps (B), decreased step length (B), forward flexed posture at hips (B)  to compensate for decreased ankle DF, decreased knee flexion (B), increased medial/lateral sway, decresaed trunk rotation  Distance: 15 + 15 ft  Comments: Pt reports this feels like his baseline gait since he has been wearing the Ace Wraps. No LOB. Balance  Posture: Good (sitting)  Sitting - Static: Good  Sitting - Dynamic: Good;-  Standing - Static: Fair;+  Standing - Dynamic: Fair;+  Comments: Indep for static sitting at EOB. Supervision for dynamic sitting at EOB with lower body dressing. SBA for static standing x2 minutes for using urinal without UE support, SBA x3 minutes for pericare with unilateral UE support. Other activity  Pt used urinal and commode to void bladder/bowel. See OT note for assist with toileting and lower body dressing. RN notified of pt voiding. Plan   Plan  Times per week: 2-3x  Current Treatment Recommendations: Transfer Training,Endurance Training,Patient/Caregiver Education & Training,Equipment Evaluation, Education, & procurement,Home Exercise Program,Safety Education & Training,Stair training,Gait Training,Balance Training,Functional Mobility Training  Safety Devices  Type of devices:  All fall risk precautions in place,Gait belt,Nurse notified,Call light within reach,Left in chair    AM-PAC Score  AM-PAC Inpatient Mobility Raw Score : 18 (02/02/22 1620)  AM-PAC Inpatient T-Scale Score : 43.63 (02/02/22 1620)  Mobility Inpatient CMS 0-100% Score: 46.58 (02/02/22 1620)  Mobility Inpatient CMS G-Code Modifier : CK (02/02/22 1620)          Goals  Short term goals  Time Frame for Short term goals: Before discharge  Short term goal 1: Pt will complete bed mobility indep  Short term goal 2: Pt will complete sit<>stand from multiple surfaces mod I  Short term goal 3: Pt will ambulate 50 ft without AD indep  Short term goal 4: Pt will ascend/descend 5 + 6 steps with unilateral rail Mod I  Patient Goals   Patient goals : Go home       Therapy Time   Individual Concurrent Group Co-treatment   Time In 2059         Time Out 1631         Minutes 49         Timed Code Treatment Minutes: [address] Edmar Soares, PT, DPT #[id number]

## 2022-02-02 NOTE — H&P
Hospital Medicine History and Physical    2/1/2022    Date of Admission: 2/1/2022    Date of Service: Pt seen/examined on 2/1/2022 and admitted to inpatient. Assessment/plan:  1. COPD exacerbation. Patient has been started on steroid from the emergency room; will continue steroid, breathing treatments. 2. Acute bronchitis. Likely viral etiology. Check viral respiratory panel, screen for COVID-19.  3. Acute respiratory failure with hypoxia and hypercapnia. Likely secondary to COPD exacerbation. Continue supplemental oxygen with plan to wean as tolerated. Monitor pulse oximeter closely. 4. Acute kidney injury. Likely prerenal azotemia in the setting of dehydration. Hold ACE inhibitor. Start gentle intravenous fluid. Monitor renal function closely. 5. History of DVT on chronic anticoagulation with Coumadin. Currently holding Coumadin due to supratherapeutic INR. 6. Supratherapeutic INR. Currently holding Coumadin. Pharmacy consulted to assist with Coumadin dosing. Monitor INR closely. 7. Other comorbidities: history of well-controlled diabetes type 2 (most recent hemoglobin A1c was 6.0 in November 2021), fibromyalgia, hyperlipidemia, essential hypertension, COPD. Activities: Up with assist  Prophylaxis: On Coumadin with supratherapeutic INR  Code status: Full code    ==========================================================  Chief complaint:  Chief Complaint   Patient presents with    Shortness of Breath     Patient in from home, states he has pneumonia, has been feeling SOB for the past couple of days. Pateint was 63% on RA at time of triage, on 5L NC at this time with o2 sat of 96%. History of Presenting Illness:   This is a pleasant 62 y.o. male with history of well-controlled diabetes type 2 (most recent hemoglobin A1c was 6.0 in November 2021), fibromyalgia, hyperlipidemia, essential hypertension, COPD, who was recently admitted for management of COPD exacerbation, required endotracheal intubation, who presents to the emergency room with complaints of worsening shortness of breath, cough, ongoing for the past 4 days. He does not have fever or chills, although reports that patient has had occasions where he felt too hot while sleeping at night and had to take the duvet off. Chest x-ray obtained in the emergency room was unremarkable for acute pathology. Patient was requiring 5 L/min supplemental oxygen due to hypoxia the emergency room, with lowest oxygen saturation of 63% on room air during triage. Past Medical History:      Diagnosis Date    Diabetes mellitus ([address]     Fibromyalgia     Hyperlipidemia     Hypertension        Past Surgical History:      Procedure Laterality Date    APPENDECTOMY  2005    COLONOSCOPY  2016    FEMORAL BYPASS Left 02/26/2020    femoral posterior tibial bypass    FEMORAL-TIBIAL BYPASS GRAFT Right 12/11/2020    with femoral endarterectomy and patch angioplasty    FOOT DEBRIDEMENT Left 09/03/2020    FOOT DEBRIDEMENT Right 2/26/2021    DEBRIDEMENT OF RIGHT FOOT WOUND WITH GRAFT APPLICATION performed by Kellee Beauchamp DPM at [facility] Right 12/08/2020    stent leg for PVD       Medications (prior to admission):  Prior to Admission medications    Medication Sig Start Date End Date Taking?  Authorizing Provider   albuterol sulfate HFA (VENTOLIN HFA) 108 (90 Base) MCG/ACT inhaler Inhale 2 puffs into the lungs 4 times daily as needed for Wheezing 1/31/22   Ciara Mcfadden MD   acetaminophen (TYLENOL) 500 MG tablet Take 1 tablet by mouth 4 times daily as needed for Pain 1/10/22   Ciara Mcfadden MD   warfarin (JANTOVEN) 5 MG tablet TAKE 1 AND 1/2 TABLETS BY MOUTH ONE TIME A DAY OR AS DIRECTED BY [facility] ([phone]) 1/10/22   Ciara Mcfadden MD   atorvastatin (LIPITOR) 80 MG tablet Take 1 tablet by mouth daily Cancel atorvastatin 20 mg a day 1/10/22   Ciara Mcfadden MD   NIFEdipine (ADALAT CC) 60 MG extended release tablet Take 1 tablet by mouth daily 1/10/22   Dileep Carr MD   carvedilol (COREG) 25 MG tablet Take 1 tablet by mouth 2 times daily (with meals) 1/10/22   Dileep Carr MD   metFORMIN (GLUCOPHAGE) 500 MG tablet Take 1 tablet by mouth 2 times daily (with meals) 1/10/22   Dileep Carr MD   lisinopril (PRINIVIL;ZESTRIL) 10 MG tablet Take 1 tablet by mouth daily 1/10/22   Dileep Carr MD   fluticasone [facility]) 50 MCG/ACT nasal spray 1 spray by Nasal route daily 1/6/22 1/6/23  Dileep Carr MD   tiotropium (SPIRIVA RESPIMAT) 2.5 MCG/ACT AERS inhaler Inhale 2 puffs into the lungs daily 1/5/22   Man Petit MD   nicotine (NICODERM CQ) 21 MG/24HR Place 1 patch onto the skin every 24 hours 1/4/22   Man Petit MD   blood glucose monitor kit and supplies Dispense sufficient amount for indicated testing frequency plus additional to accommodate PRN testing needs. Dispense all needed supplies to include: monitor, strips, lancing device, lancets, control solutions, alcohol swabs. 1/4/22   Man Petit MD   Lancets MISC 1 each by Does not apply route daily Test 2 times daily while on PREDNISONE then 1 time daily & as needed for symptoms of irregular blood sugar 1/4/22   Man Petit MD   blood glucose monitor strips Test 2 times a day while on PREDNISONE, then 1 time daily & as needed for symptoms of irregular blood glucose. Dispense sufficient amount for indicated testing frequency plus additional to accommodate PRN testing needs.  1/4/22   Man Petit MD   ferrous sulfate (IRON 325) 325 (65 Fe) MG tablet Take 1 tablet by mouth 2 times daily 9/7/21   Dileep Carr MD   tadalafil (CIALIS) 5 MG tablet TAKE 1 TABLET BY MOUTH ONE TIME A DAY AS NEEDED FOR ERECTILE DYSFUNCTION 8/16/21   Dileep Carr MD   becaplermin (REGRANEX) 0.01 % gel Apply 1 Applicatorful topically daily  Patient not taking: Reported on 11/29/2021 6/3/20   Historical Provider, MD   Vitamins/Minerals TABS Take 1 tablet by mouth daily    Historical Provider, MD   aspirin 81 MG chewable tablet CHEW AND SWALLOW 1 TABLET BY MOUTH ONE TIME A DAY  Patient taking differently: CHEW AND SWALLOW 1 TABLET BY MOUTH ONE TIME A DAY  1/7/22 NOT TAKING 12/28/20   Dennis iLu MD       Allergy(ies):  Chantix [varenicline]    Social History:  TOBACCO:  reports that he quit smoking about 5 weeks ago. His smoking use included cigarettes. He started smoking about 44 years ago. He has a 10.00 pack-year smoking history. He has never used smokeless tobacco.  ETOH:  reports current alcohol use of about 6.0 standard drinks of alcohol per week. Family History:      Problem Relation Age of Onset    Cancer Mother     Stroke Father        Review of Systems:  Pertinent positives are listed in HPI. At least 10-point ROS reviewed and were negative. Vitals and physical examination:  /74   Pulse 105   Temp 98.6 °F (37 °C) (Oral)   Resp 24   Ht 5' 8\" (1.727 m)   Wt 183 lb (83 kg)   SpO2 98%   BMI 27.83 kg/m²   Gen/overall appearance: Not in acute distress. Alert. Oriented X3  Head: Normocephalic, atraumatic  Eyes: EOMI, good acuity  ENT: Oral mucosa moist  Neck: No JVD, thyromegaly  CVS: Nml S1S2, no MRG, RRR  Pulm: Expiratory wheezes present. Slightly tachypneic. No accessory muscle use. Gastrointestinal: Soft, NT/ND, +BS  Musculoskeletal: Bilateral lower extremity lymphedema. Warm  Neuro: No focal deficit. Moves extremity spontaneously. Psychiatry: Appropriate affect. Not agitated. Skin: Warm, dry with normal turgor.  No rash  Capillary refill: Brisk,< 3 seconds   Peripheral Pulses: +2 palpable, equal bilaterally       Labs/imaging/EKG:  CBC:   Recent Labs     02/01/22 2259   WBC 5.4   HGB 12.6*        BMP:    Recent Labs     02/01/22 2259      K 4.8   CL 96*   CO2 22   BUN 47*   CREATININE 1.8*   GLUCOSE 109*     Hepatic:   Recent Labs     02/01/22 2259   AST 16   ALT 14   BILITOT <0.2   ALKPHOS 113       XR CHEST PORTABLE    Result Date: 2/1/2022  EXAMINATION: ONE XRAY VIEW OF THE CHEST 2/1/2022 10:48 pm COMPARISON: Chest radiograph 01/2022. HISTORY: ORDERING SYSTEM PROVIDED HISTORY: sob TECHNOLOGIST PROVIDED HISTORY: Reason for exam:->sob Reason for Exam: sob FINDINGS: There is chronic pulmonary change. There is no acute consolidation effusion. There is no pneumothorax. The mediastinal structures are unremarkable. The upper abdomen unremarkable. The extrathoracic soft tissues are unremarkable. Chronic pulmonary change without acute cardiopulmonary process. EKG: Sinus tachycardia, rate 115 beats per minutes. No acute ST/T changes. Incomplete right bundle branch block, left anterior fascicular block, present. QTc 434. I reviewed EKG. Discussed with ER ISAURO.       Thank you Dave Young MD for the opportunity to be involved in this patient's care.    -----------------------------  Bard Radha MD  [facility]

## 2022-02-02 NOTE — ED NOTES
Bed: 08  Expected date:   Expected time:   Means of arrival:   Comments:  Aixa Galindo, ANTOINETTE  02/01/22 6175

## 2022-02-02 NOTE — ED PROVIDER NOTES
[facility]    Physician Attestation    Pt Name: Divine Brown  MRN: [id number]  Fernando 1960  Date of evaluation: 2/1/22        Physician Note:    I havepersonally performed and/or participated in the history, exam and medical decision making and agree with all pertinent clinical information. I have also reviewed and agree with the past medical, family and social historyunless otherwise noted. I have personally performed a face to face diagnostic evaluation onthis patient. I have reviewed the mid-levels findings and agree. History: Patient was intubated for pneumonia in December and on January the fourth he has had increasing shortness of breath over the past 4 days slightly productive cough with mostly clear no fever chills get more short of breath tonight presented with a sat in the sixties that improved with 5 L states he had Covid pneumonia on a ventilator back in December but states he is gotten worse over the last 3 to 4 days      REVIEW OF SYSTEMS    Constitutional:  Denies fever, chills, or weakness   Eyes:  Denies vision changes  HENT:  Denies sore throat or neck pain   Respiratory:   cough  shortness of breath   Cardiovascular:  Denies chest pain  GI:  Denies abdominal pain, nausea, vomiting, or diarrhea   Musculoskeletal:  Denies back pain   Skin: no rash or vesicles   Neurologic:  no headache weakness focal    Lymphatic:  no swollen  nodes   Psychiatric: no si or hs thoughts     All systems negative except as marked. General Appearance:  Alert, cooperative, no distress, appears stated age. Head:  Normocephalic, without obvious abnormality, atraumatic. Eyes:  conjunctiva/corneas clear, EOM's intact. Sclera anicteric. ENT: Mucous membranes moist.   Neck: Supple, symmetrical, trachea midline, no adenopathy. No jugular venous distention. Lungs:   No Respiratory Distress. no rales some rhonchi rub   Chest Wall:  Nontender  no deformity   Heart:   Tachycardia no murmer gallop    Abdomen:   Soft nontender no organomegally    Extremities:  Full range of motion. no deformity   Pulses: Equal  upper and lower    Skin:  No rashes or lesions to exposed skin. Neurologic: Alert and oriented X 3. Motor grossly normal.  Speech clear. Cr n 2-12 intact   EKG Interpretation    Interpreted by emergency department physician    Rhythm: sinus tachycardia  Rate: 110-120  Axis: normal  Ectopy: none  Conduction: right bundle branch block (incomple left anterior fascicular block  ST Segments: no acute change  T Waves: no acute change  Q Waves: none    Clinical Impression: Sinus tachycardia    Chelsea Smyth MD    Patient requires breathing treatments steroids magnesium supplemental oxygen will be hospitalized for further care    1. Acute respiratory failure with hypoxemia (HCC)    2. COPD exacerbation ([facility] [address]    3. DIEGO (acute kidney injury) ([facility]          DISPOSITION/PLAN  PATIENT REFERRED TO:  No follow-up provider specified.   DISCHARGE MEDICATIONS:  New Prescriptions    No medications on file         MD Chelsea Mclean MD  02/01/22 5974

## 2022-02-02 NOTE — PLAN OF CARE
Nutrition Problem #1: Increased nutrient needs  Intervention: Food and/or Nutrient Delivery: Continue Current Diet  Nutritional Goals: Pt will consume at least 50% of meals

## 2022-02-02 NOTE — PROGRESS NOTES
02/02/22 0431   RT Protocol   History Pulmonary Disease 2   Respiratory pattern 4   Breath sounds 6   Cough 1   Indications for Bronchodilator Therapy Wheezing associated with pulm disorder   Bronchodilator Assessment Score 13

## 2022-02-02 NOTE — PROGRESS NOTES
Pt sitting up in chair. No complaints at this time. Informed him that his cough medicine had been changed. Will continue to monitor.

## 2022-02-02 NOTE — PROGRESS NOTES
Occupational Therapy   Occupational Therapy Initial Assessment  Date: 2022   Patient Name: Myra Caro  MRN: [id number]     : 1960    Date of Service: 2022    Discharge Recommendations:  Myra Caro scored a 21/ on the  [address] form. At this time, no further OT is recommended upon discharge due to pt is expected to be at his baseline level of occupational function by discharge. Recommend patient returns to prior setting independently. If patient discharges prior to next session this note will serve as a discharge summary. Please see below for the latest assessment towards goals. OT Equipment Recommendations  Equipment Needed: Yes (potential need for toilet grab bar; will continue to assess)    Assessment   Performance deficits / Impairments: Decreased functional mobility ; Decreased ADL status; Decreased endurance;Decreased high-level IADLs  Assessment: Pt is below his baseline level of occupational function, based on the above deficits associated wtih COPD. Pt would benefit from continued skilled acute OT services to address these deficits. Treatment Diagnosis: Decreased ADL/IADL status, functional mobility, and endurance associated with COPD  Prognosis: Good  Decision Making: Low Complexity  History: Pt lives w/wife, bilevel home, I PTA, drives, works FT  Exam: As above  Assistance / Modification: SBA for standing ADLs, functional mobility  OT Education: OT Role;Plan of Care  Patient Education: D/C recommendaton. Pt independently verbalized and demonstrated understanding. Barriers to Learning: None  REQUIRES OT FOLLOW UP: Yes  Activity Tolerance  Activity Tolerance: Patient Tolerated treatment well  Activity Tolerance: HR in 110s with activity, SPO2 in 90s on 4L O2. Not on O2 at [facility]. Safety Devices  Safety Devices in place: Yes  Type of devices: All fall risk precautions in place; Left in chair;Call light within reach;Gait belt           Patient Diagnosis(es): The primary encounter diagnosis was Acute respiratory failure with hypoxemia ([facility] [address] Diagnoses of COPD exacerbation ([facility] [address] and DIEGO (acute kidney injury) ([facility] [address] were also pertinent to this visit. has a past medical history of Diabetes mellitus ([facility] [address] Fibromyalgia, History of DVT (deep vein thrombosis), Hyperlipidemia, and Hypertension. has a past surgical history that includes Colonoscopy (2016); Appendectomy (2005); other surgical history (Right, 12/08/2020); femoral bypass (Left, 02/26/2020); Femoral-tibial Bypass Graft (Right, 12/11/2020); Foot Debridement (Left, 09/03/2020); and Foot Debridement (Right, 2/26/2021). Treatment Diagnosis: Decreased ADL/IADL status, functional mobility, and endurance associated with COPD      Restrictions  Restrictions/Precautions  Restrictions/Precautions: Fall Risk,Isolation (Medium fall risk; Droplet Plus (COVID r/o))  Position Activity Restriction  Other position/activity restrictions: Per H&P on 2/1 \"62 y.o. male with history of well-controlled diabetes type 2 (most recent hemoglobin A1c was 6.0 in November 2021), fibromyalgia, hyperlipidemia, essential hypertension, COPD, who was recently admitted for management of COPD exacerbation, required endotracheal intubation, who presents to the emergency room with complaints of worsening shortness of breath, cough, ongoing for the past 4 days. He does not have fever or chills, although reports that patient has had occasions where he felt too hot while sleeping at night and had to take the duvet off. Chest x-ray obtained in the emergency room was unremarkable for acute pathology. Patient was requiring 5 L/min supplemental oxygen due to hypoxia the emergency room, with lowest oxygen saturation of 63% on room air during triage. \"    Subjective   General  Chart Reviewed: Yes  Patient assessed for rehabilitation services?: Yes  Family / Caregiver Present: No  Diagnosis: COPD  Subjective  Subjective: Pt supine in bed, pleasant and agreeable to OT eval. Pt denies pain. Patient Currently in Pain: Denies  Pre Treatment Pain Screening  Intervention List: Patient able to continue with treatment  Vital Signs  Resp: 17  Patient Currently in Pain: Denies  Oxygen Therapy  SpO2: 99 %  Pulse Oximeter Device Mode: Continuous  Pulse Oximeter Device Location: Finger  O2 Device: Nasal cannula  O2 Flow Rate (L/min): 4 L/min  Social/Functional History  Social/Functional History  Lives With: Spouse  Type of Home: House (Bi-level)  Home Layout: Two level,Bed/Bath upstairs (5 to basement, 6 to upper level - unilateral rail for both)  Home Access: Stairs to enter with rails (garage enters to basement then the 5+6 steps up)  Bathroom Shower/Tub: Tub/Shower unit  Bathroom Toilet: Standard  Home Equipment: Cane,Rolling walker  ADL Assistance: Independent (Has been sponge bathing since last hospital admission due to LE wraps)  Homemaking Assistance: Independent  Ambulation Assistance: Independent  Transfer Assistance: Independent  Active : Yes  Mode of Transportation: Car  Occupation: Full time employment  Type of occupation:   Leisure & Hobbies: spending time with grandchildren  IADL Comments: Sleeps in flat bed  Additional Comments: No falls in last 6 months. 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 Objective   Vision: Impaired  Vision Exceptions: Wears glasses for reading  Hearing: Within functional limits    Orientation  Overall Orientation Status: Within Normal Limits     Balance  Sitting Balance: Supervision  Standing Balance: Stand by assistance (no AD)  Standing Balance  Time: 1  min X 2; 3 min  Activity: standing urination; functional mobility to bathroom for toilet transfer; hygiene, clothing mgt, washing hands at sink, functional mobility to recliner  Functional Mobility  Functional - Mobility Device: No device  Activity: To/from bathroom; Other  Assist Level: Stand by assistance  Toilet Transfers  Toilet - Technique: Ambulating  Equipment Used: Standard toilet  Toilet Transfer: Stand by assistance  Toilet Transfers Comments: SBA sit to stand w/grab bar  ADL  Grooming: Supervision (in stance at sink to wash hands, s/u wash face seated on EOB)  LE Dressing: Independent (socks)  Toileting: Stand by assistance (use of urinal in stance; SBA in bathroom for BM)  Tone RUE  RUE Tone: Normotonic  Tone LUE  LUE Tone: Normotonic  Coordination  Movements Are Fluid And Coordinated: Yes     Bed mobility  Supine to Sit: Supervision  Scooting: Supervision  Comment: HOB elevated. Pt up in chair at end of session.   Transfers  Stand Step Transfers: Stand by assistance  Sit to stand: Stand by assistance  Stand to sit: Stand by assistance     Cognition  Overall Cognitive Status: WNL        Sensation  Overall Sensation Status: WNL        LUE AROM (degrees)  LUE AROM : WNL  Left Hand AROM (degrees)  Left Hand AROM: WNL  RUE AROM (degrees)  RUE AROM : WNL  Right Hand AROM (degrees)  Right Hand AROM: WNL  LUE Strength  Gross LUE Strength: WNL (5/5 elbow, shoulder)  L Hand General: 5/5  RUE Strength  Gross RUE Strength: WNL (5/5 elbow, shoulder)  R Hand General: 5/5                   Plan   Plan  Times per week: 3-5  Times per day: Daily  Current Treatment Recommendations: Self-Care / ADL,Endurance Training,Functional Mobility Training,Home Management Training    -[facility] Score        -[facility] Inpatient Daily Activity Raw Score: 21 (02/02/22 1643)  AM-PAC Inpatient ADL T-Scale Score : 44.27 (02/02/22 1643)  ADL Inpatient CMS 0-100% Score: 32.79 (02/02/22 1643)  ADL Inpatient CMS G-Code Modifier : CJ (02/02/22 1643)    Goals  Short term goals  Time Frame for Short term goals: Discharge  Short term goal 1: I for UB ADLs  Short term goal 2: I for LB ADLs  Short term goal 3: Mod I for toileting  Short term goal 4: Mod I for functional transfers to ADL surfaces  Short term goal 5: Pt to tolerate standing 5-10 min for ADL activity/functional mobility       Therapy Time   Individual Concurrent Group Co-treatment   Time In 1547         Time Out 1628         Minutes 41               Timed Code Treatment Minutes:  26 min    Total Treatment Minutes: 41 min     [address], [address], Elisabeth Petit., OTR/L, [id number]

## 2022-02-02 NOTE — PROGRESS NOTES
Comprehensive Nutrition Assessment    Type and Reason for Visit:  Positive Nutrition Screen    Nutrition Recommendations/Plan:   Monitor for adequate PO intake and add oral nutrition supplements as needed    Nutrition Assessment:  Pt is in droplet plus isolation being ruled out for Covid-19. Unable to call into ICU rooms. All information obtained from chart review. Pt triggered positive nursing nutrition screen for pressure ulcer/wounds. Pt with diabetic ulcers to R&L ankles. Pt had just received breakfast tray; unable to assess PO intake at this point. Weight has been stable over the past month. Will monitor for adequate PO intake and add oral nutrition supplements to promote wound healing as needed. Malnutrition Assessment:  Malnutrition Status:  Insufficient data      Estimated Daily Nutrient Needs:  Energy (kcal):  4810-5037; Weight Used for Energy Requirements:  Current (73 kg)     Protein (g):  102-117 grams; Weight Used for Protein Requirements:  Current (73 kg; 1.4-1.6 grams per kg)        Fluid (ml/day):   ; Method Used for Fluid Requirements:  1 ml/kcal      Nutrition Related Findings:  No edema noted. NS at 100 mL/hr. K+ 6.2. Phos 5.0. Wounds:  Multiple,Diabetic Ulcer       Current Nutrition Therapies:    ADULT DIET; Regular; 4 carb choices (60 gm/meal)    Anthropometric Measures:  · Height: 5' 8\" (172.7 cm)  · Current Body Weight: 160 lb 0.9 oz (72.6 kg)   · Ideal Body Weight: 154 lbs; % Ideal Body Weight 103.9 %   · BMI: 24.3  · BMI Categories: Normal Weight (BMI 18.5-24. 9)       Nutrition Diagnosis:   · Increased nutrient needs related to increase demand for energy/nutrients as evidenced by wounds      Nutrition Interventions:   Food and/or Nutrient Delivery:  Continue Current Diet  Nutrition Education/Counseling:  Education not indicated   Coordination of Nutrition Care:  Continue to monitor while inpatient    Goals:  Pt will consume at least 50% of meals       Nutrition Monitoring and Evaluation:   Behavioral-Environmental Outcomes:  None Identified   Food/Nutrient Intake Outcomes:  Food and Nutrient Intake  Physical Signs/Symptoms Outcomes:  Skin,Biochemical Data     Discharge Planning:     Too soon to determine     Electronically signed by Dionicio Lott RD, LD on 2/2/22 at 11:11 AM EST    Contact: [phone]

## 2022-02-02 NOTE — PROGRESS NOTES
Gweneth Latch Gweneth Latch Gweneth Latch ....... 4 Eyes Skin Assessment     NAME:  [address] OF BIRTH:  1960  MEDICAL RECORD NUMBER:  [id number]    The patient is being assess for  Admission    I agree that 2 RN's have performed a thorough Head to Toe Skin Assessment on the patient. ALL assessment sites listed below have been assessed. Areas assessed by both nurses:    Head, Face, Ears, Shoulders, Back, Chest, Arms, Elbows, Hands, Sacrum. Buttock, Coccyx, Ischium and Legs. Feet and Heels        Does the Patient have a Wound? Yes wound(s) were present on assessment. LDA wound assessment was Initiated and completed      Wound on L medial ankle  Wound on R medical ankle were present on admission. His legs are wrapped and pt. refused to let his RN assess. States he gets seen by Dr. Mariela Lopez every Friday.        Harshil Prevention initiated:  Yes   Wound Care Orders initiated:  Yes    Pressure Injury (Stage 3,4, Unstageable, DTI, NWPT, and Complex wounds) if present place consult order under [de-identified] No    New and Established Ostomies if present place consult order under : NA      Nurse 1 eSignature: Electronically signed by Dave Quinteros RN on 2/2/22 at 2:57 AM EST    **SHARE this note so that the co-signing nurse is able to place an eSignature**    Nurse 2 eSignature: Electronically signed by Taylor Lozoya RN on 2/2/22 at 2:59 AM EST

## 2022-02-02 NOTE — CONSULTS
PULMONARY AND CRITICAL CARE MEDICINE CONSULTATION NOTE    CONSULTING PHYSICIAN:  Estrada Allen MD    ADMISSION DATE: 2/1/2022  ADMISSION DIAGNOSIS: COPD (chronic obstructive pulmonary disease) ([facility]) [J44.9]  COPD exacerbation ([facility] [J44.1]  DIEGO (acute kidney injury) ([facility] [N17.9]  Acute respiratory failure with hypoxemia ([facility] [J96.01]    REASON FOR CONSULT:   Chief Complaint   Patient presents with    Shortness of Breath     Patient in from home, states he has pneumonia, has been feeling SOB for the past couple of days. Pateint was 63% on RA at time of triage, on 5L NC at this time with o2 sat of 96%. DATE OF CONSULT: 2/1/2022    HISTORY OF PRESENT ILLNESS: 65y.o. year old male with a past medical history significant for COPD, chronic tobacco abuse, hypertension, hyperlipidemia, history of DVT on chronic anticoagulation, diabetes presented to the hospital with worsening shortness of breath, cough ongoing for the last 4 days. Patient reports that he is having sore throat with hoarseness of voice. He denied any fevers, chills, nausea, vomiting. Has had some night sweats. In the ER patient was found to have oxygen levels in 60s at room air. Started on 5 L/min of oxygen supplementation with which oxygen saturations improved. At the time of interview patient sitting in bed eating his breakfast.  Reports that his breathing is slowly improving. Still continues to have sore throat as well as postnasal drip. Has quit smoking for the last few weeks. Was not using oxygen at home. Was only taking Spiriva Respimat and albuterol at home. Denies any sick contacts or recent travel. REVIEW OF SYSTEMS:     CONSTITUTIONAL SYMPTOMS: The patient denies fever, fatigue, night sweats, weight loss or weight gain. HEENT: No vision changes. No tinnitus, Denies sinus pain. No hoarseness, or dysphagia. NECK: Patient denies swelling in the neck.    CARDIOVASCULAR: Denies chest pain, palpitation, syncope. RESPIRATORY: As per HPI. GASTROINTESTINAL: Denies nausea, abdominal pain or change in bowel function. GENITOURINARY: Denies obstructive symptoms. No history of incontinence. BREASTS: No masses or lumps in the breasts. SKIN: No rashes or itching. MUSCULOSKELETAL: Denies weakness or bone pain. NEUROLOGICAL: No headaches or seizures. PSYCHIATRIC: Denies mood swings or depression. ENDOCRINE: Denies heat or cold intolerance or excessive thirst.  HEMATOLOGIC/LYMPHATIC: Denies easy bruising or lymph node swelling. ALLERGIC/IMMUNOLOGIC: No environmental allergies. PAST MEDICAL HISTORY:   Past Medical History:   Diagnosis Date    Diabetes mellitus ([facility]     Fibromyalgia     History of DVT (deep vein thrombosis)     Hyperlipidemia     Hypertension        PAST SURGICAL HISTORY:   Past Surgical History:   Procedure Laterality Date    APPENDECTOMY      COLONOSCOPY  2016    FEMORAL BYPASS Left 2020    femoral posterior tibial bypass    FEMORAL-TIBIAL BYPASS GRAFT Right 2020    with femoral endarterectomy and patch angioplasty    FOOT DEBRIDEMENT Left 2020    FOOT DEBRIDEMENT Right 2021    DEBRIDEMENT OF RIGHT FOOT WOUND WITH GRAFT APPLICATION performed by Hawa Quach DPM at [id number] Right 2020    stent leg for PVD       SOCIAL HISTORY:   Social History     Tobacco Use    Smoking status: Former Smoker     Packs/day: 0.50     Years: 20.00     Pack years: 10.00     Types: Cigarettes     Start date: 1977     Quit date: 2021     Years since quittin.1    Smokeless tobacco: Never Used   Vaping Use    Vaping Use: Never used   Substance Use Topics    Alcohol use:  Yes     Alcohol/week: 6.0 standard drinks     Types: 6 Cans of beer per week    Drug use: Never       FAMILY HISTORY:   Family History   Problem Relation Age of Onset    Cancer Mother     Stroke Father        MEDICATIONS:     No current facility-administered medications on file prior to encounter. Current Outpatient Medications on File Prior to Encounter   Medication Sig Dispense Refill    albuterol sulfate HFA (VENTOLIN HFA) 108 (90 Base) MCG/ACT inhaler Inhale 2 puffs into the lungs 4 times daily as needed for Wheezing 54 g 1    acetaminophen (TYLENOL) 500 MG tablet Take 1 tablet by mouth 4 times daily as needed for Pain 360 tablet 1    warfarin (JANTOVEN) 5 MG tablet TAKE 1 AND 1/2 TABLETS BY MOUTH ONE TIME A DAY OR AS DIRECTED BY [facility] ([phone]) 45 tablet 0    atorvastatin (LIPITOR) 80 MG tablet Take 1 tablet by mouth daily Cancel atorvastatin 20 mg a day 90 tablet 0    NIFEdipine (ADALAT CC) 60 MG extended release tablet Take 1 tablet by mouth daily 90 tablet 0    carvedilol (COREG) 25 MG tablet Take 1 tablet by mouth 2 times daily (with meals) 60 tablet 3    metFORMIN (GLUCOPHAGE) 500 MG tablet Take 1 tablet by mouth 2 times daily (with meals) 180 tablet 0    lisinopril (PRINIVIL;ZESTRIL) 10 MG tablet Take 1 tablet by mouth daily 90 tablet 0    fluticasone (FLONASE) 50 MCG/ACT nasal spray 1 spray by Nasal route daily 9.9 g 5    tiotropium (SPIRIVA RESPIMAT) 2.5 MCG/ACT AERS inhaler Inhale 2 puffs into the lungs daily 1 each 0    nicotine (NICODERM CQ) 21 MG/24HR Place 1 patch onto the skin every 24 hours 30 patch 2    blood glucose monitor kit and supplies Dispense sufficient amount for indicated testing frequency plus additional to accommodate PRN testing needs. Dispense all needed supplies to include: monitor, strips, lancing device, lancets, control solutions, alcohol swabs.  1 kit 0    Lancets MISC 1 each by Does not apply route daily Test 2 times daily while on PREDNISONE then 1 time daily & as needed for symptoms of irregular blood sugar 100 each 3    tadalafil (CIALIS) 5 MG tablet TAKE 1 TABLET BY MOUTH ONE TIME A DAY AS NEEDED FOR ERECTILE DYSFUNCTION 30 tablet 5    Vitamins/Minerals TABS Take 1 tablet by mouth daily      blood glucose monitor strips Test 2 times a day while on PREDNISONE, then 1 time daily & as needed for symptoms of irregular blood glucose. Dispense sufficient amount for indicated testing frequency plus additional to accommodate PRN testing needs. 100 strip 3    ferrous sulfate (IRON 325) 325 (65 Fe) MG tablet Take 1 tablet by mouth 2 times daily 180 tablet 0    becaplermin (REGRANEX) 0.01 % gel Apply 1 Applicatorful topically daily (Patient not taking: Reported on 11/29/2021)      aspirin 81 MG chewable tablet CHEW AND SWALLOW 1 TABLET BY MOUTH ONE TIME A DAY (Patient taking differently: CHEW AND SWALLOW 1 TABLET BY MOUTH ONE TIME A DAY  1/7/22 NOT TAKING) 100 tablet 5        atorvastatin  80 mg Oral Daily    carvedilol  25 mg Oral BID WC    fluticasone  1 spray Nasal Daily    NIFEdipine  60 mg Oral Daily    sodium chloride flush  5-40 mL IntraVENous 2 times per day    warfarin placeholder: dosing by pharmacy   Other RX Placeholder    ipratropium-albuterol  1 ampule Inhalation Q4H    methylPREDNISolone  40 mg IntraVENous Q8H    insulin lispro  0-6 Units SubCUTAneous TID WC    insulin lispro  0-3 Units SubCUTAneous Nightly    sodium zirconium cyclosilicate  5 g Oral TID    dextrose  25 g IntraVENous Once    insulin regular  10 Units IntraVENous Once      sodium chloride 100 mL/hr at 02/02/22 0235    sodium chloride      dextrose       albuterol sulfate HFA, sodium chloride flush, sodium chloride, polyethylene glycol, acetaminophen **OR** acetaminophen, albuterol, glucose, dextrose, glucagon (rDNA), dextrose, guaiFENesin-dextromethorphan      ALLERGIES:   Allergies as of 02/01/2022 - Fully Reviewed 02/01/2022   Allergen Reaction Noted    Chantix [varenicline]  03/08/2021      OBJECTIVE:   height is 5' 8\" (1.727 m) and weight is 160 lb 1.6 oz (72.6 kg). His temporal temperature is 97.6 °F (36.4 °C). His blood pressure is 121/72 and his pulse is 117.  His respiration is 26 and oxygen saturation is 96%. No intake/output data recorded. PHYSICAL EXAM:    CONSTITUTIONAL: He is a 65y.o.-year-old who appears his stated age. He is alert and oriented x 3 and in no acute distress. HEENT: PERRLA, EOMI. No scleral icterus. No thrush, atraumatic, normocephalic. Oropharyngeal erythema seen. NECK: Supple, without cervical or supraclavicular lymphadenopathy:  CARDIOVASCULAR: S1 S2 RRR. Without murmer  RESPIRATORY & CHEST: Bilateral scattered wheezing heard. GASTROINTESTINAL & ABDOMEN: Soft, nontender, positive bowel sounds in all quadrants, non-distended, without hepatosplenomegaly. GENITOURINARY: Deferred. MUSCULOSKELETAL: No tenderness to palpation of the axial skeleton. There is no clubbing. No cyanosis. No edema of the lower extremities. SKIN OF BODY: No rash or jaundice. PSYCHIATRIC EVALUATION: Normal affect. Patient answers questions appropriately. HEMATOLOGIC/LYMPHATIC/ IMMUNOLOGIC: No palpable lymphadenopathy. NEUROLOGIC: Alert and oriented x 3. Groslly non-focal. Motor strength is 5+/5 in all muscle groups. The patient has a normal sensorium globally. LABS:  Recent Labs     02/01/22 2259 02/02/22  0436   WBC 5.4 4.2   HGB 12.6* 13.1*   HCT 38.8* 40.6    258   ALT 14 17   AST 16 23    138   K 4.8 6.2*   CL 96* 102   CREATININE 1.8* 1.0   BUN 47* 38*   CO2 22 21   INR 4.06* 4.12*       Recent Labs     02/01/22 2259 02/02/22  0436   GLUCOSE 109* 147*   CALCIUM 9.6 9.0    138   K 4.8 6.2*   CO2 22 21   CL 96* 102   BUN 47* 38*   CREATININE 1.8* 1.0       No results for input(s): PHART, PCP0ZGX, PO2ART, ELR0KGS, F5WPCMJK, BEART, I9FDMYED in the last 72 hours.     Lab Results   Component Value Date    INR 4.12 (H) 02/02/2022    INR 4.06 (H) 02/01/2022    INR 2 01/10/2022    PROTIME 49.4 (H) 02/02/2022    PROTIME 48.6 (H) 02/01/2022    PROTIME 25.9 (H) 01/04/2022     No results found for: AMYLASE   Lab Results   Component Value Date LABA1C 6.0 11/29/2021     No results found for: EAG  Lab Results   Component Value Date    TSH 2.45 03/29/2010     Lab Results   Component Value Date    CKTOTAL 81 02/01/2022    TROPONINI <0.01 02/01/2022      No results found for: CRP   No results found for: BNP   Lab Results   Component Value Date    DDIMER 880 (H) 12/31/2021      No results found for: FERRITIN   No results found for: LACTA        IMAGING:    Chest x-ray portable 1 view done on 2/1/2022 was personally viewed by me and my interpretation is: Bilateral lung fields appear within normal limits. No focal consolidation, pleural effusions or pneumothorax seen. Cardiac silhouette is within normal limits. IMPRESSION:     1. Acute COPD exacerbation  2. COPD of unknown severity  3. COVID-19 rule out  4. Previous history of extensive tobacco abuse  5. Acute kidney injury with hyperkalemia  6. Acute hypoxic respiratory failure    RECOMMENDATION:     1. Patient has presented to the hospital again with predominantly upper respiratory tract symptoms. Also reporting increased shortness of breath and found to be hypoxic in the ER. 2. Chest imaging is within normal limits and does not show any acute focal consolidation. 3. I will start him on Pulmicort as well as Brovana verbalization. 4. Continue with DuoNeb nebulization around-the-clock. 5. For his upper respiratory tract symptoms continue with Flonase nasal spray. I will add Robitussin-DM cough syrup. 6. Continue with Solu-Medrol at same dosage and frequency for now. 7. COVID-19 RT-PCR pending. Patient is fully vaccinated and boosted against COVID-19.  8. We will give him 3 doses of Vonzell La as well as insulin with D50 in order to treat his hyperkalemia. Acute kidney injury most likely due to ACE inhibitor's that patient was taking. 9. Titrate oxygen supplementation to maintain SPO2 between 88 to 92%. 10. Incentive spirometry. 11. Out of bed into chair. Thank you for your consultation.  We will continue to follow the patient. Kerwin Colin MD  Pulmonary Critical Care and Sleep Medicine  2/1/2022, 11:47 AM    This note was completed using dragon medical speech recognition software. Grammatical errors, random word insertions, pronoun errors and incomplete sentences are occasional consequences of this technology due to software limitations. If there are questions or concerns about the content of this note of information contained within the body of this dictation they should be addressed with the provider for clarification.

## 2022-02-02 NOTE — CARE COORDINATION
[address] Continence Nurse  Consult Note       NAME:  [facility] RECORD NUMBER:  [id number]  AGE: 65 y.o. GENDER: male  : 1960  TODAY'S DATE:  2022    Subjective   Reason for WOCN Evaluation and Assessment: ble wounds       Myra Caro is a 65 y.o. male referred by:   [x] Physician  [x] Nursing  [] Other:     Wound Identification:  Wound Type: venous  Contributing Factors: venous stasis    Wound History: present on admission. Last seen in wound clinic 22  Current Wound Care Treatment:  Multilayer compression wraps    Patient Goal of Care:  [x] Wound Healing  [] Odor Control  [] Palliative Care  [] Pain Control   [] Other:         PAST MEDICAL HISTORY        Diagnosis Date    Diabetes mellitus ([address]     Fibromyalgia     History of DVT (deep vein thrombosis)     Hyperlipidemia     Hypertension        PAST SURGICAL HISTORY    Past Surgical History:   Procedure Laterality Date    APPENDECTOMY      COLONOSCOPY      FEMORAL BYPASS Left 2020    femoral posterior tibial bypass    FEMORAL-TIBIAL BYPASS GRAFT Right 2020    with femoral endarterectomy and patch angioplasty    FOOT DEBRIDEMENT Left 2020    FOOT DEBRIDEMENT Right 2021    DEBRIDEMENT OF RIGHT FOOT WOUND WITH GRAFT APPLICATION performed by Constantino Rockwell DPM at [facility] Right 2020    stent leg for PVD       FAMILY HISTORY    Family History   Problem Relation Age of Onset    Cancer Mother     Stroke Father        SOCIAL HISTORY    Social History     Tobacco Use    Smoking status: Former Smoker     Packs/day: 0.50     Years: 20.00     Pack years: 10.00     Types: Cigarettes     Start date: 1977     Quit date: 2021     Years since quittin.1    Smokeless tobacco: Never Used   Vaping Use    Vaping Use: Never used   Substance Use Topics    Alcohol use:  Yes     Alcohol/week: 6.0 standard drinks     Types: 6 Cans of Image   01/07/22 0834   Wound Etiology Diabetic 01/28/22 0810   Dressing Status New dressing applied 01/04/22 1646   Wound Cleansed Wound cleanser 01/28/22 0810   Dressing/Treatment Antibacterial ointment;Dry dressing;Triad hydro/zinc oxide-based hydrophilic paste 95/35/07 3664   Offloading for Diabetic Foot Ulcers Post op shoe 01/28/22 0810   Dressing Change Due 01/06/22 01/04/22 1646   Wound Length (cm) 7.5 cm 01/28/22 0810   Wound Width (cm) 2.9 cm 01/28/22 0810   Wound Depth (cm) 0.1 cm 01/28/22 0810   Wound Surface Area (cm^2) 21.75 cm^2 01/28/22 0810   Change in Wound Size % (l*w) -20.83 01/28/22 0810   Wound Volume (cm^3) 2.175 cm^3 01/28/22 0810   Wound Healing % 40 01/28/22 0810   Post-Procedure Length (cm) 7.5 cm 01/28/22 0839   Post-Procedure Width (cm) 2.9 cm 01/28/22 0839   Post-Procedure Depth (cm) 0.1 cm 01/28/22 0839   Post-Procedure Surface Area (cm^2) 21.75 cm^2 01/28/22 0839   Post-Procedure Volume (cm^3) 2.175 cm^3 01/28/22 0839   Wound Assessment Other (Comment) 02/02/22 0307   Drainage Amount Moderate 01/28/22 0810   Drainage Description Serosanguinous 01/28/22 0810   Odor Mild 01/28/22 0810   Jamila-wound Assessment Dry/flaky;Edematous 01/28/22 0810   Margins Defined edges 01/28/22 0810   Wound Thickness Description not for Pressure Injury Full thickness 01/04/22 1646   Number of days: 32       Wound 12/31/21 Ankle Medial;Left #2 (Active)   Wound Image   01/07/22 0838   Wound Etiology Diabetic 01/28/22 0810   Dressing Status New dressing applied 01/04/22 1646   Wound Cleansed Cleansed with saline 01/21/22 0759   Dressing/Treatment Antibacterial ointment;Dry dressing;Triad hydro/zinc oxide-based hydrophilic paste 45/95/35 5384   Offloading for Diabetic Foot Ulcers Post op shoe 01/28/22 0810   Dressing Change Due 01/06/22 01/04/22 1646   Wound Length (cm) 3.7 cm 01/28/22 0810   Wound Width (cm) 2.2 cm 01/28/22 0810   Wound Depth (cm) 0.2 cm 01/28/22 0810   Wound Surface Area (cm^2) 8.14 cm^2 01/28/22 0810   Change in Wound Size % (l*w) -8.53 01/28/22 0810   Wound Volume (cm^3) 1.628 cm^3 01/28/22 0810   Wound Healing % -9 01/28/22 0810   Post-Procedure Length (cm) 3.7 cm 01/28/22 0839   Post-Procedure Width (cm) 2.2 cm 01/28/22 0839   Post-Procedure Depth (cm) 0.2 cm 01/28/22 0839   Post-Procedure Surface Area (cm^2) 8.14 cm^2 01/28/22 0839   Post-Procedure Volume (cm^3) 1.628 cm^3 01/28/22 0839   Wound Assessment Other (Comment) 02/02/22 0307   Drainage Amount Moderate 01/28/22 0810   Drainage Description Serosanguinous;Purulent 01/28/22 0810   Odor Mild 01/28/22 0810   Jamila-wound Assessment Dry/flaky;Edematous 01/28/22 0810   Margins Defined edges 01/28/22 0810   Wound Thickness Description not for Pressure Injury Full thickness 01/07/22 0838   Number of days: 32     Incision 02/26/21 Anterior;Right (Active)   Number of days: 341     Patient in with copd exacerbation and rule out for covid. Patient has venous ulcers to bilateral legs. Patient seen in wound clinic. Dr Gill Buchanan placed multilayer compression wraps on 1.28.22. Dressings look good. Patient has appointment for dressing changes on 2.4.22. Will remove dressings on Friday, 2.4.22  if patient is still here. hospitalist to order gentamicin cream and ammonium lactate cream for legs. Response to treatment:  Well tolerated by patient. Pain Assessment:  Severity:  0 / 10  Quality of pain: N/A  Wound Pain Timing/Severity: none  Premedicated: No    Plan   Plan of Care:    Multilayer compression wrap with triad and gentamicin cream left leg and gentamicin cream with collagen right leg and Lac-Hydrin. Specialty Bed Required : No   [] Low Air Loss   [] Pressure Redistribution  [] Fluid Immersion  [] Bariatric  [] Total Pressure Relief  [] Other:     Current Diet: ADULT DIET;  Regular; 4 carb choices (60 gm/meal)  Dietician consult:  No    Discharge Plan:  Placement for patient upon discharge: home with support    Patient appropriate for Outpatient [address]: Yes    Referrals:  []   [] [facility]  [] Supplies  [] Other    Patient/Caregiver Teaching:  Level of patient/caregiver understanding able to:   [x] Indicates understanding       [x] Needs reinforcement  [] Unsuccessful      [] Verbal Understanding  [] Demonstrated understanding       [] No evidence of learning  [] Refused teaching         [] N/A       Electronically signed by KAREEN Fay, RN  Wound/Ostomy Care on 2/2/2022 at 2:24 PM

## 2022-02-03 LAB
A/G RATIO: 1.5 (ref 1.1–2.2)
ALBUMIN SERPL-MCNC: 4 G/DL (ref 3.4–5)
ALP BLD-CCNC: 95 U/L (ref 40–129)
ALT SERPL-CCNC: 14 U/L (ref 10–40)
ANION GAP SERPL CALCULATED.3IONS-SCNC: 8 MMOL/L (ref 3–16)
AST SERPL-CCNC: 16 U/L (ref 15–37)
BASE EXCESS ARTERIAL: 8 (ref -3–3)
BASOPHILS ABSOLUTE: 0 K/UL (ref 0–0.2)
BASOPHILS RELATIVE PERCENT: 0.4 %
BILIRUB SERPL-MCNC: 0.3 MG/DL (ref 0–1)
BUN BLDV-MCNC: 14 MG/DL (ref 7–20)
CALCIUM SERPL-MCNC: 9 MG/DL (ref 8.3–10.6)
CHLORIDE BLD-SCNC: 101 MMOL/L (ref 99–110)
CO2: 29 MMOL/L (ref 21–32)
CREAT SERPL-MCNC: 0.6 MG/DL (ref 0.8–1.3)
EKG ATRIAL RATE: 115 BPM
EKG DIAGNOSIS: NORMAL
EKG P AXIS: 79 DEGREES
EKG P-R INTERVAL: 164 MS
EKG Q-T INTERVAL: 314 MS
EKG QRS DURATION: 94 MS
EKG QTC CALCULATION (BAZETT): 434 MS
EKG R AXIS: -80 DEGREES
EKG T AXIS: 66 DEGREES
EKG VENTRICULAR RATE: 115 BPM
EOSINOPHILS ABSOLUTE: 0 K/UL (ref 0–0.6)
EOSINOPHILS RELATIVE PERCENT: 0 %
ESTIMATED AVERAGE GLUCOSE: 139.9 MG/DL
GFR AFRICAN AMERICAN: >60
GFR NON-AFRICAN AMERICAN: >60
GLUCOSE BLD-MCNC: 127 MG/DL (ref 70–99)
GLUCOSE BLD-MCNC: 138 MG/DL (ref 70–99)
GLUCOSE BLD-MCNC: 146 MG/DL (ref 70–99)
GLUCOSE BLD-MCNC: 153 MG/DL (ref 70–99)
GLUCOSE BLD-MCNC: 175 MG/DL (ref 70–99)
GLUCOSE BLD-MCNC: 219 MG/DL (ref 70–99)
HBA1C MFR BLD: 6.5 %
HCO3 ARTERIAL: 34.4 MMOL/L (ref 21–29)
HCT VFR BLD CALC: 38.8 % (ref 40.5–52.5)
HEMOGLOBIN: 12.4 G/DL (ref 13.5–17.5)
INR BLD: 2.14 (ref 0.88–1.12)
LYMPHOCYTES ABSOLUTE: 0.6 K/UL (ref 1–5.1)
LYMPHOCYTES RELATIVE PERCENT: 11 %
MAGNESIUM: 1.9 MG/DL (ref 1.8–2.4)
MCH RBC QN AUTO: 28.3 PG (ref 26–34)
MCHC RBC AUTO-ENTMCNC: 32 G/DL (ref 31–36)
MCV RBC AUTO: 88.4 FL (ref 80–100)
MONOCYTES ABSOLUTE: 0.1 K/UL (ref 0–1.3)
MONOCYTES RELATIVE PERCENT: 2.1 %
NEUTROPHILS ABSOLUTE: 4.6 K/UL (ref 1.7–7.7)
NEUTROPHILS RELATIVE PERCENT: 86.5 %
O2 SAT, ARTERIAL: 84 % (ref 93–100)
PCO2 ARTERIAL: 73 MM HG (ref 35–45)
PDW BLD-RTO: 15.2 % (ref 12.4–15.4)
PERFORMED ON: ABNORMAL
PH ARTERIAL: 7.28 (ref 7.35–7.45)
PHOSPHORUS: 3.7 MG/DL (ref 2.5–4.9)
PLATELET # BLD: 283 K/UL (ref 135–450)
PMV BLD AUTO: 6.6 FL (ref 5–10.5)
PO2 ARTERIAL: 57.7 MM HG (ref 75–108)
POC SAMPLE TYPE: ABNORMAL
POTASSIUM SERPL-SCNC: 4.9 MMOL/L (ref 3.5–5.1)
POTASSIUM SERPL-SCNC: 5.8 MMOL/L (ref 3.5–5.1)
PROTHROMBIN TIME: 25 SEC (ref 9.9–12.7)
RBC # BLD: 4.38 M/UL (ref 4.2–5.9)
SARS-COV-2: NOT DETECTED
SODIUM BLD-SCNC: 138 MMOL/L (ref 136–145)
TCO2 ARTERIAL: 37 MMOL/L
TOTAL PROTEIN: 6.7 G/DL (ref 6.4–8.2)
WBC # BLD: 5.3 K/UL (ref 4–11)

## 2022-02-03 PROCEDURE — 6370000000 HC RX 637 (ALT 250 FOR IP): Performed by: INTERNAL MEDICINE

## 2022-02-03 PROCEDURE — 6360000002 HC RX W HCPCS: Performed by: INTERNAL MEDICINE

## 2022-02-03 PROCEDURE — 85025 COMPLETE CBC W/AUTO DIFF WBC: CPT

## 2022-02-03 PROCEDURE — 94761 N-INVAS EAR/PLS OXIMETRY MLT: CPT

## 2022-02-03 PROCEDURE — 99291 CRITICAL CARE FIRST HOUR: CPT | Performed by: INTERNAL MEDICINE

## 2022-02-03 PROCEDURE — 94660 CPAP INITIATION&MGMT: CPT

## 2022-02-03 PROCEDURE — 6360000002 HC RX W HCPCS: Performed by: NURSE PRACTITIONER

## 2022-02-03 PROCEDURE — 6370000000 HC RX 637 (ALT 250 FOR IP): Performed by: NURSE PRACTITIONER

## 2022-02-03 PROCEDURE — 2700000000 HC OXYGEN THERAPY PER DAY

## 2022-02-03 PROCEDURE — 80053 COMPREHEN METABOLIC PANEL: CPT

## 2022-02-03 PROCEDURE — 85610 PROTHROMBIN TIME: CPT

## 2022-02-03 PROCEDURE — 82803 BLOOD GASES ANY COMBINATION: CPT

## 2022-02-03 PROCEDURE — 93010 ELECTROCARDIOGRAM REPORT: CPT | Performed by: INTERNAL MEDICINE

## 2022-02-03 PROCEDURE — 2060000000 HC ICU INTERMEDIATE R&B

## 2022-02-03 PROCEDURE — 83735 ASSAY OF MAGNESIUM: CPT

## 2022-02-03 PROCEDURE — 94640 AIRWAY INHALATION TREATMENT: CPT

## 2022-02-03 PROCEDURE — 84132 ASSAY OF SERUM POTASSIUM: CPT

## 2022-02-03 PROCEDURE — 84100 ASSAY OF PHOSPHORUS: CPT

## 2022-02-03 PROCEDURE — 2580000003 HC RX 258: Performed by: INTERNAL MEDICINE

## 2022-02-03 PROCEDURE — 36415 COLL VENOUS BLD VENIPUNCTURE: CPT

## 2022-02-03 RX ORDER — IPRATROPIUM BROMIDE AND ALBUTEROL SULFATE 2.5; .5 MG/3ML; MG/3ML
1 SOLUTION RESPIRATORY (INHALATION) 3 TIMES DAILY
Status: DISCONTINUED | OUTPATIENT
Start: 2022-02-03 | End: 2022-02-05 | Stop reason: HOSPADM

## 2022-02-03 RX ORDER — WARFARIN SODIUM 5 MG/1
10 TABLET ORAL
Status: DISCONTINUED | OUTPATIENT
Start: 2022-02-05 | End: 2022-02-05 | Stop reason: HOSPADM

## 2022-02-03 RX ORDER — LEVOFLOXACIN 500 MG/1
750 TABLET, FILM COATED ORAL DAILY
Status: DISCONTINUED | OUTPATIENT
Start: 2022-02-03 | End: 2022-02-05 | Stop reason: HOSPADM

## 2022-02-03 RX ADMIN — IPRATROPIUM BROMIDE AND ALBUTEROL SULFATE 1 AMPULE: .5; 3 SOLUTION RESPIRATORY (INHALATION) at 08:50

## 2022-02-03 RX ADMIN — CARVEDILOL 25 MG: 25 TABLET, FILM COATED ORAL at 09:00

## 2022-02-03 RX ADMIN — Medication 10 ML: at 19:54

## 2022-02-03 RX ADMIN — IPRATROPIUM BROMIDE AND ALBUTEROL SULFATE 1 AMPULE: .5; 3 SOLUTION RESPIRATORY (INHALATION) at 01:28

## 2022-02-03 RX ADMIN — LEVOFLOXACIN 750 MG: 500 TABLET, FILM COATED ORAL at 12:21

## 2022-02-03 RX ADMIN — SODIUM ZIRCONIUM CYCLOSILICATE 10 G: 5 POWDER, FOR SUSPENSION ORAL at 09:00

## 2022-02-03 RX ADMIN — INSULIN LISPRO 1 UNITS: 100 INJECTION, SOLUTION INTRAVENOUS; SUBCUTANEOUS at 09:00

## 2022-02-03 RX ADMIN — NIFEDIPINE 60 MG: 30 TABLET, FILM COATED, EXTENDED RELEASE ORAL at 09:00

## 2022-02-03 RX ADMIN — ARFORMOTEROL TARTRATE 15 MCG: 15 SOLUTION RESPIRATORY (INHALATION) at 08:50

## 2022-02-03 RX ADMIN — BUDESONIDE 500 MCG: 0.5 SUSPENSION RESPIRATORY (INHALATION) at 20:00

## 2022-02-03 RX ADMIN — IPRATROPIUM BROMIDE AND ALBUTEROL SULFATE 1 AMPULE: .5; 3 SOLUTION RESPIRATORY (INHALATION) at 19:52

## 2022-02-03 RX ADMIN — BUDESONIDE 500 MCG: 0.5 SUSPENSION RESPIRATORY (INHALATION) at 08:50

## 2022-02-03 RX ADMIN — WARFARIN SODIUM 7.5 MG: 5 TABLET ORAL at 17:47

## 2022-02-03 RX ADMIN — METHYLPREDNISOLONE SODIUM SUCCINATE 40 MG: 40 INJECTION, POWDER, FOR SOLUTION INTRAMUSCULAR; INTRAVENOUS at 12:20

## 2022-02-03 RX ADMIN — METHYLPREDNISOLONE SODIUM SUCCINATE 40 MG: 40 INJECTION, POWDER, FOR SOLUTION INTRAMUSCULAR; INTRAVENOUS at 19:54

## 2022-02-03 RX ADMIN — ATORVASTATIN CALCIUM 80 MG: 80 TABLET, FILM COATED ORAL at 08:59

## 2022-02-03 RX ADMIN — IPRATROPIUM BROMIDE AND ALBUTEROL SULFATE 1 AMPULE: .5; 3 SOLUTION RESPIRATORY (INHALATION) at 16:03

## 2022-02-03 RX ADMIN — IPRATROPIUM BROMIDE AND ALBUTEROL SULFATE 1 AMPULE: .5; 3 SOLUTION RESPIRATORY (INHALATION) at 05:09

## 2022-02-03 RX ADMIN — CARVEDILOL 25 MG: 25 TABLET, FILM COATED ORAL at 17:47

## 2022-02-03 RX ADMIN — METHYLPREDNISOLONE SODIUM SUCCINATE 40 MG: 40 INJECTION, POWDER, FOR SOLUTION INTRAMUSCULAR; INTRAVENOUS at 04:27

## 2022-02-03 RX ADMIN — POLYETHYLENE GLYCOL 3350 17 G: 17 POWDER, FOR SOLUTION ORAL at 20:06

## 2022-02-03 RX ADMIN — FLUTICASONE PROPIONATE 1 SPRAY: 50 SPRAY, METERED NASAL at 09:00

## 2022-02-03 RX ADMIN — ARFORMOTEROL TARTRATE 15 MCG: 15 SOLUTION RESPIRATORY (INHALATION) at 20:05

## 2022-02-03 RX ADMIN — INSULIN LISPRO 1 UNITS: 100 INJECTION, SOLUTION INTRAVENOUS; SUBCUTANEOUS at 19:56

## 2022-02-03 RX ADMIN — Medication 10 ML: at 12:20

## 2022-02-03 ASSESSMENT — PAIN SCALES - GENERAL
PAINLEVEL_OUTOF10: 0
PAINLEVEL_OUTOF10: 0

## 2022-02-03 NOTE — PROGRESS NOTES
Assessment completed and documented. Switched from BiPAP to NC. Denies needs at this time. Will continue to monitor.

## 2022-02-03 NOTE — PROGRESS NOTES
[facility] PROGRESS NOTE    2/3/2022 8:10 AM        Name: Lazara Allen . Admitted: 2/1/2022  Primary Care Provider: Vicente Greene MD (Tel: [phone])      Subjective:  . Had increased lethargy this am and was noted to be hypercapnic with PCO2 73  Was placed on bipap with good results    Seen this am with RN at bedside.   Now fully alert and pleasant  Sats 96 % on 2 liters  Feels better today     Recent hospitalization required intubation      Smoke free for 1 month      Reviewed interval ancillary notes    Current Medications  albuterol sulfate  (90 Base) MCG/ACT inhaler 2 puff, Q4H PRN  atorvastatin (LIPITOR) tablet 80 mg, Daily  carvedilol (COREG) tablet 25 mg, BID WC  fluticasone (FLONASE) 50 MCG/ACT nasal spray 1 spray, Daily  NIFEdipine (PROCARDIA XL) extended release tablet 60 mg, Daily  sodium chloride flush 0.9 % injection 5-40 mL, 2 times per day  sodium chloride flush 0.9 % injection 5-40 mL, PRN  0.9 % sodium chloride infusion, PRN  polyethylene glycol (GLYCOLAX) packet 17 g, Daily PRN  acetaminophen (TYLENOL) tablet 650 mg, Q6H PRN   Or  acetaminophen (TYLENOL) suppository 650 mg, Q6H PRN  albuterol (PROVENTIL) nebulizer solution 2.5 mg, Q4H PRN  warfarin placeholder: dosing by pharmacy, RX Placeholder  ipratropium-albuterol (DUONEB) nebulizer solution 1 ampule, Q4H  methylPREDNISolone sodium (SOLU-MEDROL) injection 40 mg, Q8H  glucose (GLUTOSE) 40 % oral gel 15 g, PRN  dextrose 50 % IV solution, PRN  glucagon (rDNA) injection 1 mg, PRN  dextrose 5 % solution, PRN  insulin lispro (1 Unit Dial) 0-6 Units, TID WC  insulin lispro (1 Unit Dial) 0-3 Units, Nightly  sodium zirconium cyclosilicate (LOKELMA) oral suspension 5 g, TID  Arformoterol Tartrate (BROVANA) nebulizer solution 15 mcg, BID  budesonide (PULMICORT) nebulizer suspension 500 mcg, BID  gentamicin (GARAMYCIN) 0.1 % cream, PRN  ammonium lactate (AMLACTIN) 12 % cream, PRN  guaiFENesin-codeine (GUAIFENESIN AC) 100-10 MG/5ML liquid 5 mL, Q4H PRN        Objective:  BP (!) 140/83   Pulse 96   Temp 98.1 °F (36.7 °C) (Temporal)   Resp 15   Ht 5' 8\" (1.727 m)   Wt 159 lb 9.8 oz (72.4 kg)   SpO2 99%   BMI 24.27 kg/m²     Intake/Output Summary (Last 24 hours) at 2/3/2022 0810  Last data filed at 2/3/2022 0400  Gross per 24 hour   Intake 1950 ml   Output 2290 ml   Net -340 ml      Wt Readings from Last 3 Encounters:   02/03/22 159 lb 9.8 oz (72.4 kg)   01/10/22 171 lb (77.6 kg)   01/04/22 163 lb 3.2 oz (74 kg)       General appearance:  Appears comfortable in the bed. Raspy voice noted but seems better today   Eyes: Sclera clear. Pupils equal.  ENT: Moist oral mucosa. Trachea midline, no adenopathy. Cardiovascular: Regular rhythm, normal S1, S2. No murmur. No edema in lower extremities  Respiratory: Not using accessory muscles. Lungs sound improved today with rare scattered wheezes    GI: Abdomen soft, no tenderness, not distended, normal bowel sounds  Musculoskeletal: No cyanosis in digits, neck supple  Neurology: CN 2-12 grossly intact. No speech or motor deficits  Psych: Normal affect. Alert and oriented in time, place and person  Skin: Warm, dry, normal turgor    Labs and Tests:  CBC:   Recent Labs     02/01/22 2259 02/02/22 0436 02/03/22 0437   WBC 5.4 4.2 5.3   HGB 12.6* 13.1* 12.4*    258 283     BMP:    Recent Labs     02/02/22  1100 02/02/22  1336 02/03/22 0437    142 138   K 6.2* 5.6* 5.8*    103 101   CO2 29 29 29   BUN 28* 26* 14   CREATININE 0.8 0.8 0.6*   GLUCOSE 182* 152* 175*     Hepatic:   Recent Labs     02/01/22 2259 02/02/22  0436 02/03/22 0437   AST 16 23 16   ALT 14 17 14   BILITOT <0.2 <0.2 0.3   ALKPHOS 113 136* 95     INR 2.1     Recent AIC 6%      Chronic pulmonary change without acute cardiopulmonary process.     Problem List  Active Problems:    COPD (chronic obstructive pulmonary disease) ([facility] [address]  Resolved Problems:    * No resolved hospital problems. *       Assessment & Plan:   1. Acute hypoxic with hypercapnia due to COPD exacerbation: Continue currently inhaled therapy and IV solu medrol. Will attempt to wean off oxygen to keep sats 92% or > .   Needs outpatient PFT\"S. Plan discussed with Pulmonary   2. DIEGO:  Resolved with IV hydration  3. Hyperkalemia:  give lokelma and recheck later today  4. Chronic AC with coumadin due to DVT:  INR at 2.1 ,  Will resume   5. Steroid induced hyperglycemia:  Recent AIC 6%,  Add humalog correction and follow closely       Diet: ADULT DIET;  Regular; 4 carb choices (60 gm/meal)  Code:Full Code  DVT PPX      HOLGER Narvaez CNP   2/3/2022 8:10 AM

## 2022-02-03 NOTE — PROGRESS NOTES
Called to room by primary RN for acute change in LOC. Patient stuporous, arousable by sternal rub only, able to state his name but not sustaining arousal. ABG obtained per L radial artery, reveals pH 7.28 pCO2 73 pO2 57 HCO3 34.4. Hospitalist notified, order obtained for BiPap 12/5. CHIEF COMPLAINT: GIB    Interval Events: Received 1u pRBCs for dropping Hg. Started on Precedex gtt as pt overbreathing vent with weaning of ketamine.     REVIEW OF SYSTEMS:  [ x ] Unable to assess ROS because intubation/sedation     OBJECTIVE:  ICU Vital Signs Last 24 Hrs  T(C): 36.9 (24 Jun 2021 06:00), Max: 37.6 (23 Jun 2021 10:00)  T(F): 98.4 (24 Jun 2021 06:00), Max: 99.7 (23 Jun 2021 10:00)  HR: 71 (24 Jun 2021 08:00) (68 - 83)  BP: 97/58 (24 Jun 2021 08:00) (83/54 - 149/81)  BP(mean): 71 (24 Jun 2021 08:00) (63 - 107)  ABP: 106/61 (24 Jun 2021 08:00) (91/54 - 141/69)  ABP(mean): 74 (24 Jun 2021 08:00) (64 - 91)  RR: 35 (24 Jun 2021 08:00) (29 - 44)  SpO2: 98% (24 Jun 2021 08:00) (96% - 100%)    Mode: AC/ CMV (Assist Control/ Continuous Mandatory Ventilation), RR (machine): 25, TV (machine): 500, FiO2: 30, PEEP: 5, ITime: 1, MAP: 11, PIP: 27    06-23 @ 07:01 - 06-24 @ 07:00  --------------------------------------------------------  IN: 4114.8 mL / OUT: 4337 mL / NET: -222.2 mL    06-24 @ 07:01  -  06-24 @ 08:11  --------------------------------------------------------  IN: 66.5 mL / OUT: 129 mL / NET: -62.5 mL      CAPILLARY BLOOD GLUCOSE  88 (24 Jun 2021 05:45)      POCT Blood Glucose.: 88 mg/dL (24 Jun 2021 05:43)      PHYSICAL EXAM:  General: intubated, sedated   HEENT: NC/AT; clear conjunctiva. sluggishly reactive pupils  Respiratory: Rhonchi b/l   Cardiovascular: +S1/S2; RRR  Abdomen: +J tube. soft, NT/ND; hypoactive bowels  Extremities: edema in all extremities  Skin: normal color and turgor; no rash  Neurological: heavily sedated     LINES: L fem evaristo, MARTI vela, [facility] MEDICATIONS:  MEDICATIONS  (STANDING):  chlorhexidine 0.12% Liquid 15 milliLiter(s) Oral Mucosa every 12 hours  chlorhexidine 4% Liquid 1 Application(s) Topical <User Schedule>  CRRT Treatment    <Continuous>  dexMEDEtomidine Infusion 0.5 MICROgram(s)/kG/Hr (11.3 mL/Hr) IV Continuous <Continuous>  insulin lispro (ADMELOG) corrective regimen sliding scale   SubCutaneous every 6 hours  ketamine Infusion. 0.25 mG/kG/Hr (2.25 mL/Hr) IV Continuous <Continuous>  norepinephrine Infusion 0.05 MICROgram(s)/kG/Min (8.45 mL/Hr) IV Continuous <Continuous>  pantoprazole  Injectable 40 milliGRAM(s) IV Push every 12 hours  Phoxillum Filtration BK 4 / 2.5 5000 milliLiter(s) (1200 mL/Hr) CRRT <Continuous>  PrismaSOL Filtration BGK 4 / 2.5 5000 milliLiter(s) (1000 mL/Hr) CRRT <Continuous>  PrismaSOL Filtration BGK 4 / 2.5 5000 milliLiter(s) (200 mL/Hr) CRRT <Continuous>    MEDICATIONS  (PRN):  sodium chloride 0.9% lock flush 10 milliLiter(s) IV Push every 1 hour PRN Pre/post blood products, medications, blood draw, and to maintain line patency      LABS:                        8.2    17.03 )-----------( 41       ( 24 Jun 2021 05:55 )             23.5     06-24    141  |  107  |  22  ----------------------------<  86  3.6   |  15<L>  |  1.49<H>    Ca    7.1<L>      24 Jun 2021 05:55  Phos  2.6     06-24  Mg     2.1     06-24    TPro  4.6<L>  /  Alb  2.0<L>  /  TBili  0.6  /  DBili  x   /  AST  31  /  ALT  14  /  AlkPhos  176<H>  06-24    PT/INR - ( 24 Jun 2021 01:18 )   PT: 15.5 sec;   INR: 1.31 ratio         PTT - ( 24 Jun 2021 01:18 )  PTT:35.3 sec    Arterial Blood Gas:  06-24 @ 05:50  7.49/26/87/20/98/-2.7  ABG lactate: --  Arterial Blood Gas:  06-24 @ 01:13  7.50/26/86/20/98/-2.2  ABG lactate: --  Arterial Blood Gas:  06-23 @ 19:31  7.49/29/107/22/99/-1.0  ABG lactate: --  Arterial Blood Gas:  06-23 @ 08:14  7.47/29/101/21/98/-1.4  ABG lactate: --  Arterial Blood Gas:  06-22 @ 23:47  7.48/28/93/21/98/-2.1  ABG lactate: --    Venous Blood Gas:  06-23 @ 19:03  7.41/37/30/23/57  VBG Lactate: 1.4  Venous Blood Gas:  06-23 @ 14:56  7.40/32/32/20/63  VBG Lactate: 1.4      MICROBIOLOGY:

## 2022-02-03 NOTE — PROGRESS NOTES
02/03/22 1100   RT Protocol   History Pulmonary Disease 2   Respiratory pattern 0   Breath sounds 4   Cough 2   Bronchodilator Assessment Score 8

## 2022-02-03 NOTE — PROGRESS NOTES
Pharmacy to Dose Warfarin    Pharmacy consulted to dose warfarin for PAD. INR Goal: 2-3    INR today: 2.14    Assessment/Plan:  - Resume warfarin today at home dose. - Home dose is 10 mg Wed/Sat, 7.5 mg all other days.   - Pulm initiated Levaquin today. Will monitor and decrease warfarin if needed. Pharmacy will continue to follow.     Azul Farah, PharmD, [facility]

## 2022-02-03 NOTE — PROGRESS NOTES
PULMONARY AND CRITICAL CARE MEDICINE PROGRESS NOTE    Subjective: Patient sitting in bed in no apparent respiratory distress. Reports that his breathing is stable. This morning had an episode of extreme lethargy and found to have acute hypercapnic respiratory failure. Required BiPAP therapy for a few hours after which his clinical status improved. Continues to have wheezing. Not producing discolored expectoration. Remains afebrile. REVIEW OF SYSTEMS:   8 point review of system is negative except that mentioned in the subjective portion. PAST MEDICAL HISTORY:   Past Medical History:   Diagnosis Date    Diabetes mellitus ([address]     Fibromyalgia     History of DVT (deep vein thrombosis)     Hyperlipidemia     Hypertension        PAST SURGICAL HISTORY:   Past Surgical History:   Procedure Laterality Date    APPENDECTOMY  2005    COLONOSCOPY  2016    FEMORAL BYPASS Left 2020    femoral posterior tibial bypass    FEMORAL-TIBIAL BYPASS GRAFT Right 2020    with femoral endarterectomy and patch angioplasty    FOOT DEBRIDEMENT Left 2020    FOOT DEBRIDEMENT Right 2021    DEBRIDEMENT OF RIGHT FOOT WOUND WITH GRAFT APPLICATION performed by Kellee Beauchamp DPM at [facility] Right 2020    stent leg for PVD       SOCIAL HISTORY:   Social History     Tobacco Use    Smoking status: Former Smoker     Packs/day: 0.50     Years: 20.00     Pack years: 10.00     Types: Cigarettes     Start date: 1977     Quit date: 2021     Years since quittin.1    Smokeless tobacco: Never Used   Vaping Use    Vaping Use: Never used   Substance Use Topics    Alcohol use:  Yes     Alcohol/week: 6.0 standard drinks     Types: 6 Cans of beer per week    Drug use: Never       FAMILY HISTORY:   Family History   Problem Relation Age of Onset    Cancer Mother     Stroke Father        MEDICATIONS:     No current facility-administered medications on file prior to encounter. Current Outpatient Medications on File Prior to Encounter   Medication Sig Dispense Refill    albuterol sulfate HFA (VENTOLIN HFA) 108 (90 Base) MCG/ACT inhaler Inhale 2 puffs into the lungs 4 times daily as needed for Wheezing 54 g 1    acetaminophen (TYLENOL) 500 MG tablet Take 1 tablet by mouth 4 times daily as needed for Pain 360 tablet 1    warfarin (JANTOVEN) 5 MG tablet TAKE 1 AND 1/2 TABLETS BY MOUTH ONE TIME A DAY OR AS DIRECTED BY [facility] ([phone]) 45 tablet 0    atorvastatin (LIPITOR) 80 MG tablet Take 1 tablet by mouth daily Cancel atorvastatin 20 mg a day 90 tablet 0    NIFEdipine (ADALAT CC) 60 MG extended release tablet Take 1 tablet by mouth daily 90 tablet 0    carvedilol (COREG) 25 MG tablet Take 1 tablet by mouth 2 times daily (with meals) 60 tablet 3    metFORMIN (GLUCOPHAGE) 500 MG tablet Take 1 tablet by mouth 2 times daily (with meals) 180 tablet 0    lisinopril (PRINIVIL;ZESTRIL) 10 MG tablet Take 1 tablet by mouth daily 90 tablet 0    fluticasone (FLONASE) 50 MCG/ACT nasal spray 1 spray by Nasal route daily 9.9 g 5    tiotropium (SPIRIVA RESPIMAT) 2.5 MCG/ACT AERS inhaler Inhale 2 puffs into the lungs daily 1 each 0    nicotine (NICODERM CQ) 21 MG/24HR Place 1 patch onto the skin every 24 hours 30 patch 2    blood glucose monitor kit and supplies Dispense sufficient amount for indicated testing frequency plus additional to accommodate PRN testing needs. Dispense all needed supplies to include: monitor, strips, lancing device, lancets, control solutions, alcohol swabs.  1 kit 0    Lancets MISC 1 each by Does not apply route daily Test 2 times daily while on PREDNISONE then 1 time daily & as needed for symptoms of irregular blood sugar 100 each 3    tadalafil (CIALIS) 5 MG tablet TAKE 1 TABLET BY MOUTH ONE TIME A DAY AS NEEDED FOR ERECTILE DYSFUNCTION 30 tablet 5    Vitamins/Minerals TABS Take 1 tablet by mouth daily      blood glucose monitor strips Test 2 times a day while on PREDNISONE, then 1 time daily & as needed for symptoms of irregular blood glucose. Dispense sufficient amount for indicated testing frequency plus additional to accommodate PRN testing needs. 100 strip 3    ferrous sulfate (IRON 325) 325 (65 Fe) MG tablet Take 1 tablet by mouth 2 times daily 180 tablet 0    becaplermin (REGRANEX) 0.01 % gel Apply 1 Applicatorful topically daily (Patient not taking: Reported on 11/29/2021)      aspirin 81 MG chewable tablet CHEW AND SWALLOW 1 TABLET BY MOUTH ONE TIME A DAY (Patient taking differently: CHEW AND SWALLOW 1 TABLET BY MOUTH ONE TIME A DAY  1/7/22 NOT TAKING) 100 tablet 5        sodium zirconium cyclosilicate  10 g Oral Daily    [START ON 2/5/2022] warfarin  10 mg Oral Once per day on Wed Sat    And    warfarin  7.5 mg Oral Once per day on Sun Mon Tue Thu Fri    levoFLOXacin  750 mg Oral Daily    ipratropium-albuterol  1 ampule Inhalation TID    atorvastatin  80 mg Oral Daily    carvedilol  25 mg Oral BID WC    fluticasone  1 spray Nasal Daily    NIFEdipine  60 mg Oral Daily    sodium chloride flush  5-40 mL IntraVENous 2 times per day    methylPREDNISolone  40 mg IntraVENous Q8H    insulin lispro  0-6 Units SubCUTAneous TID WC    insulin lispro  0-3 Units SubCUTAneous Nightly    Arformoterol Tartrate  15 mcg Nebulization BID    budesonide  0.5 mg Nebulization BID      sodium chloride      dextrose       albuterol sulfate HFA, sodium chloride flush, sodium chloride, polyethylene glycol, acetaminophen **OR** acetaminophen, albuterol, glucose, dextrose, glucagon (rDNA), dextrose, gentamicin, ammonium lactate, guaiFENesin-codeine      ALLERGIES:   Allergies as of 02/01/2022 - Fully Reviewed 02/01/2022   Allergen Reaction Noted    Chantix [varenicline]  03/08/2021      OBJECTIVE:   height is 5' 8\" (1.727 m) and weight is 159 lb 9.8 oz (72.4 kg). His temporal temperature is 97.5 °F (36.4 °C).  His blood pressure is 154/91 (abnormal) and his pulse is 91. His respiration is 12 and oxygen saturation is 95%. I/O this shift:  In: -   Out: 450 [Urine:450]     PHYSICAL EXAM:    CONSTITUTIONAL: He is a 65y.o.-year-old who appears his stated age. He is alert and oriented x 3 and in no acute distress. HEENT: PERRLA, EOMI. No scleral icterus. No thrush, atraumatic, normocephalic. Oropharyngeal erythema seen. NECK: Supple, without cervical or supraclavicular lymphadenopathy:  CARDIOVASCULAR: S1 S2 RRR. Without murmer  RESPIRATORY & CHEST: Bilateral scattered wheezing heard. GASTROINTESTINAL & ABDOMEN: Soft, nontender, positive bowel sounds in all quadrants, non-distended, without hepatosplenomegaly. GENITOURINARY: Deferred. MUSCULOSKELETAL: No tenderness to palpation of the axial skeleton. There is no clubbing. No cyanosis. No edema of the lower extremities. SKIN OF BODY: No rash or jaundice. PSYCHIATRIC EVALUATION: Normal affect. Patient answers questions appropriately. HEMATOLOGIC/LYMPHATIC/ IMMUNOLOGIC: No palpable lymphadenopathy. NEUROLOGIC: Alert and oriented x 3. Groslly non-focal. Motor strength is 5+/5 in all muscle groups. The patient has a normal sensorium globally.       LABS:  Recent Labs     02/01/22  2259 02/01/22  2259 02/02/22  0436 02/02/22  0436 02/02/22  1100 02/02/22  1336 02/03/22  0437 02/03/22  0438   WBC 5.4  --  4.2  --   --   --  5.3  --    HGB 12.6*  --  13.1*  --   --   --  12.4*  --    HCT 38.8*  --  40.6  --   --   --  38.8*  --      --  258  --   --   --  283  --    ALT 14  --  17  --   --   --  14  --    AST 16  --  23  --   --   --  16  --       < > 138   < > 137 142 138  --    K 4.8   < > 6.2*   < > 6.2* 5.6* 5.8*  --    CL 96*   < > 102   < > 100 103 101  --    CREATININE 1.8*   < > 1.0   < > 0.8 0.8 0.6*  --    BUN 47*   < > 38*   < > 28* 26* 14  --    CO2 22   < > 21   < > 29 29 29  --    INR 4.06*  --  4.12*  --   --   --   --  2.14*    < > = values in this interval not displayed. Recent Labs     02/02/22  1100 02/02/22  1336 02/03/22  0437   GLUCOSE 182* 152* 175*   CALCIUM 9.2 9.3 9.0    142 138   K 6.2* 5.6* 5.8*   CO2 29 29 29    103 101   BUN 28* 26* 14   CREATININE 0.8 0.8 0.6*       Recent Labs     02/03/22  0443   PHART 7.282*   ROZ9GBA 73.0*   PO2ART 57.7*   QTW4KIH 34.4*   P1DABHZA 84*   BEART 8*       Lab Results   Component Value Date    INR 2.14 (H) 02/03/2022    INR 4.12 (H) 02/02/2022    INR 4.06 (H) 02/01/2022    PROTIME 25.0 (H) 02/03/2022    PROTIME 49.4 (H) 02/02/2022    PROTIME 48.6 (H) 02/01/2022     No results found for: AMYLASE   Lab Results   Component Value Date    LABA1C 6.5 02/02/2022     Lab Results   Component Value Date    .9 02/02/2022     Lab Results   Component Value Date    TSH 2.45 03/29/2010     Lab Results   Component Value Date    CKTOTAL 81 02/01/2022    TROPONINI <0.01 02/01/2022      No results found for: CRP   No results found for: BNP   Lab Results   Component Value Date    DDIMER 880 (H) 12/31/2021      No results found for: FERRITIN   No results found for: LACTA        IMAGING:    Chest x-ray portable 1 view done on 2/1/2022 was personally viewed by me and my interpretation is: Bilateral lung fields appear within normal limits. No focal consolidation, pleural effusions or pneumothorax seen. Cardiac silhouette is within normal limits. IMPRESSION:     1. Acute COPD exacerbation  2. COPD of unknown severity  3. Previous history of extensive tobacco abuse  4. Acute kidney injury with hyperkalemia  5. Acute hypoxic respiratory failure    RECOMMENDATION:     1. Patient has presented to the hospital again with predominantly upper respiratory tract symptoms. Also reporting increased shortness of breath and found to be hypoxic in the ER. 2. Chest imaging is within normal limits and does not show any acute focal consolidation. 3. This morning had an episode of acute hypercapnic respiratory failure. Required BiPAP therapy for a few hours after which his clinical status improved. 4. -I will recommend to provide him with BiPAP therapy nightly at IPAP: 12, EPAP: 6 cm H2O with FiO2 of 40%. 5. Patient has several blood gases showing PCO2 of 52 mmHg. This is suggestive of chronic hypercapnic respiratory failure. Patient will benefit from BiPAP therapy nightly. Without this therapy there will be a risk of further worsening of respiratory failure and frequent hospitalizations. 6. Continue with Pulmicort as well as Brovana nebulization and DuoNeb nebulization around-the-clock. 7. For his upper respiratory tract symptoms continue with Flonase nasal spray. Started on guaifenesin codeine cough syrup. 8. Continue with Solu-Medrol at same dosage and frequency for now. 9. Mild blood streaking of mucus is most likely due to nasal dryness. If this does not subside patient may need a CT chest.  10. Also having discolored expectoration so I will give him a 5-day course of levofloxacin 750 mg p.o. daily. 11. COVID-19 RT-PCR is negative. 12.  Acute kidney injury most likely due to ACE inhibitor's that patient was taking. Hyperkalemia is resolving. Serum creatinine within normal limits. 13. Titrate oxygen supplementation to maintain SPO2 between 88 to 92%. Patient may need home O2 evaluation prior to hospital discharge. 14. Incentive spirometry. 15. Out of bed into chair. Total critical care time caring for this patient with life threatening illness, including direct patient contact, management of life support systems, review of data including imaging and labs, discussions with other team members and physicians is at least 32 minutes so far today, excluding procedures. Jim Turner MD   Pulmonary Critical Care and Sleep Medicine  [address] [facility],[address]   [address] [place] [address]  2/1/2022, 1:09 PM      This note was completed using dragon medical speech recognition software.  Grammatical errors, random word insertions, pronoun errors and incomplete sentences are occasional consequences of this technology due to software limitations. If there are questions or concerns about the content of this note of information contained within the body of this dictation they should be addressed with the provider for clarification.

## 2022-02-04 ENCOUNTER — APPOINTMENT (OUTPATIENT)
Dept: PHARMACY | Age: 62
End: 2022-02-04
Payer: COMMERCIAL

## 2022-02-04 LAB
A/G RATIO: 1.6 (ref 1.1–2.2)
ALBUMIN SERPL-MCNC: 3.9 G/DL (ref 3.4–5)
ALP BLD-CCNC: 89 U/L (ref 40–129)
ALT SERPL-CCNC: 20 U/L (ref 10–40)
ANION GAP SERPL CALCULATED.3IONS-SCNC: 10 MMOL/L (ref 3–16)
AST SERPL-CCNC: 19 U/L (ref 15–37)
BASOPHILS ABSOLUTE: 0 K/UL (ref 0–0.2)
BASOPHILS RELATIVE PERCENT: 0.3 %
BILIRUB SERPL-MCNC: 0.3 MG/DL (ref 0–1)
BUN BLDV-MCNC: 18 MG/DL (ref 7–20)
CALCIUM SERPL-MCNC: 9.4 MG/DL (ref 8.3–10.6)
CHLORIDE BLD-SCNC: 97 MMOL/L (ref 99–110)
CO2: 30 MMOL/L (ref 21–32)
CREAT SERPL-MCNC: 0.7 MG/DL (ref 0.8–1.3)
EOSINOPHILS ABSOLUTE: 0 K/UL (ref 0–0.6)
EOSINOPHILS RELATIVE PERCENT: 0 %
GFR AFRICAN AMERICAN: >60
GFR NON-AFRICAN AMERICAN: >60
GLUCOSE BLD-MCNC: 125 MG/DL (ref 70–99)
GLUCOSE BLD-MCNC: 141 MG/DL (ref 70–99)
GLUCOSE BLD-MCNC: 149 MG/DL (ref 70–99)
GLUCOSE BLD-MCNC: 153 MG/DL (ref 70–99)
GLUCOSE BLD-MCNC: 189 MG/DL (ref 70–99)
HCT VFR BLD CALC: 37.3 % (ref 40.5–52.5)
HEMOGLOBIN: 12.1 G/DL (ref 13.5–17.5)
INR BLD: 1.49 (ref 0.88–1.12)
LYMPHOCYTES ABSOLUTE: 0.9 K/UL (ref 1–5.1)
LYMPHOCYTES RELATIVE PERCENT: 17.4 %
MAGNESIUM: 1.8 MG/DL (ref 1.8–2.4)
MCH RBC QN AUTO: 28.4 PG (ref 26–34)
MCHC RBC AUTO-ENTMCNC: 32.3 G/DL (ref 31–36)
MCV RBC AUTO: 87.9 FL (ref 80–100)
MONOCYTES ABSOLUTE: 0.2 K/UL (ref 0–1.3)
MONOCYTES RELATIVE PERCENT: 3.3 %
NEUTROPHILS ABSOLUTE: 3.9 K/UL (ref 1.7–7.7)
NEUTROPHILS RELATIVE PERCENT: 79 %
PDW BLD-RTO: 15.3 % (ref 12.4–15.4)
PERFORMED ON: ABNORMAL
PHOSPHORUS: 2.6 MG/DL (ref 2.5–4.9)
PLATELET # BLD: 276 K/UL (ref 135–450)
PMV BLD AUTO: 7.4 FL (ref 5–10.5)
POTASSIUM SERPL-SCNC: 4.9 MMOL/L (ref 3.5–5.1)
PROTHROMBIN TIME: 17.1 SEC (ref 9.9–12.7)
RBC # BLD: 4.25 M/UL (ref 4.2–5.9)
SODIUM BLD-SCNC: 137 MMOL/L (ref 136–145)
TOTAL PROTEIN: 6.4 G/DL (ref 6.4–8.2)
WBC # BLD: 5 K/UL (ref 4–11)

## 2022-02-04 PROCEDURE — 80053 COMPREHEN METABOLIC PANEL: CPT

## 2022-02-04 PROCEDURE — 99233 SBSQ HOSP IP/OBS HIGH 50: CPT | Performed by: INTERNAL MEDICINE

## 2022-02-04 PROCEDURE — 94150 VITAL CAPACITY TEST: CPT

## 2022-02-04 PROCEDURE — 85025 COMPLETE CBC W/AUTO DIFF WBC: CPT

## 2022-02-04 PROCEDURE — 6370000000 HC RX 637 (ALT 250 FOR IP): Performed by: NURSE PRACTITIONER

## 2022-02-04 PROCEDURE — 6360000002 HC RX W HCPCS: Performed by: NURSE PRACTITIONER

## 2022-02-04 PROCEDURE — 2580000003 HC RX 258: Performed by: INTERNAL MEDICINE

## 2022-02-04 PROCEDURE — 6370000000 HC RX 637 (ALT 250 FOR IP): Performed by: INTERNAL MEDICINE

## 2022-02-04 PROCEDURE — 84100 ASSAY OF PHOSPHORUS: CPT

## 2022-02-04 PROCEDURE — 97530 THERAPEUTIC ACTIVITIES: CPT

## 2022-02-04 PROCEDURE — 94761 N-INVAS EAR/PLS OXIMETRY MLT: CPT

## 2022-02-04 PROCEDURE — 83735 ASSAY OF MAGNESIUM: CPT

## 2022-02-04 PROCEDURE — 94669 MECHANICAL CHEST WALL OSCILL: CPT

## 2022-02-04 PROCEDURE — 97116 GAIT TRAINING THERAPY: CPT

## 2022-02-04 PROCEDURE — 2700000000 HC OXYGEN THERAPY PER DAY

## 2022-02-04 PROCEDURE — 2060000000 HC ICU INTERMEDIATE R&B

## 2022-02-04 PROCEDURE — 85610 PROTHROMBIN TIME: CPT

## 2022-02-04 PROCEDURE — 6360000002 HC RX W HCPCS: Performed by: INTERNAL MEDICINE

## 2022-02-04 PROCEDURE — 94640 AIRWAY INHALATION TREATMENT: CPT

## 2022-02-04 RX ORDER — PREDNISONE 20 MG/1
40 TABLET ORAL DAILY
Status: DISCONTINUED | OUTPATIENT
Start: 2022-02-05 | End: 2022-02-05 | Stop reason: HOSPADM

## 2022-02-04 RX ADMIN — BUDESONIDE 500 MCG: 0.5 SUSPENSION RESPIRATORY (INHALATION) at 09:32

## 2022-02-04 RX ADMIN — INSULIN LISPRO 1 UNITS: 100 INJECTION, SOLUTION INTRAVENOUS; SUBCUTANEOUS at 12:20

## 2022-02-04 RX ADMIN — Medication 10 ML: at 08:48

## 2022-02-04 RX ADMIN — SODIUM ZIRCONIUM CYCLOSILICATE 10 G: 5 POWDER, FOR SUSPENSION ORAL at 08:48

## 2022-02-04 RX ADMIN — WARFARIN SODIUM 7.5 MG: 5 TABLET ORAL at 17:47

## 2022-02-04 RX ADMIN — IPRATROPIUM BROMIDE AND ALBUTEROL SULFATE 1 AMPULE: .5; 3 SOLUTION RESPIRATORY (INHALATION) at 16:26

## 2022-02-04 RX ADMIN — CARVEDILOL 25 MG: 25 TABLET, FILM COATED ORAL at 08:48

## 2022-02-04 RX ADMIN — LEVOFLOXACIN 750 MG: 500 TABLET, FILM COATED ORAL at 08:47

## 2022-02-04 RX ADMIN — Medication 10 ML: at 21:27

## 2022-02-04 RX ADMIN — Medication: at 12:30

## 2022-02-04 RX ADMIN — ARFORMOTEROL TARTRATE 15 MCG: 15 SOLUTION RESPIRATORY (INHALATION) at 09:32

## 2022-02-04 RX ADMIN — ARFORMOTEROL TARTRATE 15 MCG: 15 SOLUTION RESPIRATORY (INHALATION) at 19:50

## 2022-02-04 RX ADMIN — INSULIN LISPRO 1 UNITS: 100 INJECTION, SOLUTION INTRAVENOUS; SUBCUTANEOUS at 21:28

## 2022-02-04 RX ADMIN — IPRATROPIUM BROMIDE AND ALBUTEROL SULFATE 1 AMPULE: .5; 3 SOLUTION RESPIRATORY (INHALATION) at 19:50

## 2022-02-04 RX ADMIN — ATORVASTATIN CALCIUM 80 MG: 80 TABLET, FILM COATED ORAL at 08:48

## 2022-02-04 RX ADMIN — NIFEDIPINE 60 MG: 30 TABLET, FILM COATED, EXTENDED RELEASE ORAL at 08:48

## 2022-02-04 RX ADMIN — CARVEDILOL 25 MG: 25 TABLET, FILM COATED ORAL at 17:48

## 2022-02-04 RX ADMIN — IPRATROPIUM BROMIDE AND ALBUTEROL SULFATE 1 AMPULE: .5; 3 SOLUTION RESPIRATORY (INHALATION) at 09:32

## 2022-02-04 RX ADMIN — INSULIN LISPRO 1 UNITS: 100 INJECTION, SOLUTION INTRAVENOUS; SUBCUTANEOUS at 17:47

## 2022-02-04 RX ADMIN — BUDESONIDE 500 MCG: 0.5 SUSPENSION RESPIRATORY (INHALATION) at 19:50

## 2022-02-04 RX ADMIN — METHYLPREDNISOLONE SODIUM SUCCINATE 40 MG: 40 INJECTION, POWDER, FOR SOLUTION INTRAMUSCULAR; INTRAVENOUS at 12:22

## 2022-02-04 RX ADMIN — GENTAMICIN SULFATE: 1 CREAM TOPICAL at 12:29

## 2022-02-04 RX ADMIN — METHYLPREDNISOLONE SODIUM SUCCINATE 40 MG: 40 INJECTION, POWDER, FOR SOLUTION INTRAMUSCULAR; INTRAVENOUS at 04:28

## 2022-02-04 ASSESSMENT — PAIN SCALES - GENERAL
PAINLEVEL_OUTOF10: 0

## 2022-02-04 NOTE — PROGRESS NOTES
[facility]    2/4/2022 7:45 AM        Name: Amy Valle . Admitted: 2/1/2022  Primary Care Provider: Sil Interiano MD (Tel: [phone])      Subjective:  .     Seen this am   Feels better  sats 92 - 94 at rest on RA     I spoke with his wife Don on the phone and gave detailed information   Needs BIPAP at hs set up prior to his d/c     Recent hospitalization required intubation      Smoke free for 1 month      Reviewed interval ancillary notes    Current Medications  sodium zirconium cyclosilicate (LOKELMA) oral suspension 10 g, Daily  [START ON 2/5/2022] warfarin (COUMADIN) tablet 10 mg, Once per day on Wed Sat   And  warfarin (COUMADIN) tablet 7.5 mg, Once per day on Sun Mon Tue Thu Fri  levoFLOXacin (LEVAQUIN) tablet 750 mg, Daily  ipratropium-albuterol (DUONEB) nebulizer solution 1 ampule, TID  albuterol sulfate  (90 Base) MCG/ACT inhaler 2 puff, Q4H PRN  atorvastatin (LIPITOR) tablet 80 mg, Daily  carvedilol (COREG) tablet 25 mg, BID WC  fluticasone (FLONASE) 50 MCG/ACT nasal spray 1 spray, Daily  NIFEdipine (PROCARDIA XL) extended release tablet 60 mg, Daily  sodium chloride flush 0.9 % injection 5-40 mL, 2 times per day  sodium chloride flush 0.9 % injection 5-40 mL, PRN  0.9 % sodium chloride infusion, PRN  polyethylene glycol (GLYCOLAX) packet 17 g, Daily PRN  acetaminophen (TYLENOL) tablet 650 mg, Q6H PRN   Or  acetaminophen (TYLENOL) suppository 650 mg, Q6H PRN  albuterol (PROVENTIL) nebulizer solution 2.5 mg, Q4H PRN  methylPREDNISolone sodium (SOLU-MEDROL) injection 40 mg, Q8H  glucose (GLUTOSE) 40 % oral gel 15 g, PRN  dextrose 50 % IV solution, PRN  glucagon (rDNA) injection 1 mg, PRN  dextrose 5 % solution, PRN  insulin lispro (1 Unit Dial) 0-6 Units, TID WC  insulin lispro (1 Unit Dial) 0-3 Units, Nightly  Arformoterol Tartrate (BROVANA) nebulizer solution 15 mcg, BID  budesonide (PULMICORT) nebulizer suspension 500 mcg, BID  gentamicin (GARAMYCIN) 0.1 % cream, PRN  ammonium lactate (AMLACTIN) 12 % cream, PRN  guaiFENesin-codeine (GUAIFENESIN AC) 100-10 MG/5ML liquid 5 mL, Q4H PRN        Objective:  BP (!) 150/88   Pulse 77   Temp 97.5 °F (36.4 °C) (Temporal)   Resp 20   Ht 5' 8\" (1.727 m)   Wt 161 lb 6.4 oz (73.2 kg)   SpO2 96%   BMI 24.54 kg/m²     Intake/Output Summary (Last 24 hours) at 2/4/2022 0745  Last data filed at 2/4/2022 0555  Gross per 24 hour   Intake --   Output 1100 ml   Net -1100 ml      Wt Readings from Last 3 Encounters:   02/04/22 161 lb 6.4 oz (73.2 kg)   01/10/22 171 lb (77.6 kg)   01/04/22 163 lb 3.2 oz (74 kg)       General appearance:  Appears comfortable in the bed. Raspy voice noted but seems better today , alert and pleasant  Eyes: Sclera clear. Pupils equal.  ENT: Moist oral mucosa. Trachea midline, no adenopathy. Cardiovascular: Regular rhythm, normal S1, S2. No murmur. No edema in lower extremities  Respiratory: Not using accessory muscles. No current wheezes   GI: Abdomen soft, no tenderness, not distended, normal bowel sounds  Musculoskeletal: No cyanosis in digits, neck supple  Neurology: CN 2-12 grossly intact. No speech or motor deficits  Psych: Normal affect. Alert and oriented in time, place and person  Skin: Warm, dry, normal turgor    Labs and Tests:  CBC:   Recent Labs     02/02/22  0436 02/03/22  0437 02/04/22  0330   WBC 4.2 5.3 5.0   HGB 13.1* 12.4* 12.1*    283 276     BMP:    Recent Labs     02/02/22  1336 02/02/22  1336 02/03/22  0437 02/03/22  1440 02/04/22  0330     --  138  --  137   K 5.6*   < > 5.8* 4.9 4.9     --  101  --  97*   CO2 29  --  29  --  30   BUN 26*  --  14  --  18   CREATININE 0.8  --  0.6*  --  0.7*   GLUCOSE 152*  --  175*  --  149*    < > = values in this interval not displayed.      Hepatic:   Recent Labs     02/02/22  0436 02/03/22  0437 02/04/22  0330   AST 23 16 19   ALT 17 14 20   BILITOT <0.2 0.3 0.3   ALKPHOS 136* 95 89     INR 2.1     Recent AIC 6%      Chronic pulmonary change without acute cardiopulmonary process. Problem List  Active Problems:    COPD (chronic obstructive pulmonary disease) ([address]  Resolved Problems:    * No resolved hospital problems. *       Assessment & Plan:   1. Acute hypoxic with hypercapnia due to COPD exacerbation: Continue currently inhaled therapy, oral levaquin. I have stopped the IV solu medrol, and started daily prednisone. Plan discussed with Pulmonary . Needs BIPAP set up prior his d/c home   2. DIEGO:  Resolved with IV hydration  3. Hyperkalemia: resolved  4. Chronic AC with coumadin due to DVT:  INR at 1.49,  On daily coumadin , follows at [facility]   5. Steroid induced hyperglycemia:  Recent AIC 6%,  Add humalog correction and follow closely     Likely ready for d/c home soon when ok from pulmonary. Needs outpatient follow up with pulmonary       Diet: ADULT DIET;  Regular; 4 carb choices (60 gm/meal)  Code:Full Code  DVT PPX      HOLGER Ames - CNP   2/4/2022 7:45 AM

## 2022-02-04 NOTE — PROGRESS NOTES
Physical Therapy  Facility/Department: [facility] ICU  Daily Treatment Note  NAME: Lazara Allen  : 1960  MRN: [id number]    Date of Service: 2022    Discharge Recommendations:Julio C Eaton scored a 23/24 on the AM-PAC short mobility form. At this time, no further PT is recommended upon discharge due to nearing his baseline function, safe with mobility. Recommend patient returns to prior setting with prior services. PT Equipment Recommendations  Equipment Needed: No    Assessment   Body structures, Functions, Activity limitations: Decreased endurance  Assessment: Pt mod I bed mob, supervision transfers and gait with no AD. O2 sats in acceptible range with activity per resp therapy and does not require suplimental O2 at home. Continue skilled PT services while in hospital.  PT Education: Goals;PT Role;Plan of Care; Functional Mobility Training  Patient Education: D/c recs - pt verbalized understanding  Barriers to Learning: none  REQUIRES PT FOLLOW UP: Yes  Activity Tolerance  Activity Tolerance: Patient Tolerated treatment well  Activity Tolerance: O2 sats dropping with activity but rebounding quickly with seated or standing rest. Per resp therapy, pt remaining in acceptible range and does not need home O2. Patient Diagnosis(es): The primary encounter diagnosis was Acute respiratory failure with hypoxemia ([address] Diagnoses of COPD exacerbation ([address] and DIEGO (acute kidney injury) ([address] were also pertinent to this visit. has a past medical history of Diabetes mellitus ([address] Fibromyalgia, History of DVT (deep vein thrombosis), Hyperlipidemia, and Hypertension. has a past surgical history that includes Colonoscopy (); Appendectomy (); other surgical history (Right, 2020); femoral bypass (Left, 2020); Femoral-tibial Bypass Graft (Right, 2020);  Foot Debridement (Left, 2020); and Foot Debridement (Right, 2/26/2021). Restrictions  Restrictions/Precautions  Restrictions/Precautions: Fall Risk (Medium fall risk)  Position Activity Restriction  Other position/activity restrictions: Per H&P on 2/1 \"62 y.o. male with history of well-controlled diabetes type 2 (most recent hemoglobin A1c was 6.0 in November 2021), fibromyalgia, hyperlipidemia, essential hypertension, COPD, who was recently admitted for management of COPD exacerbation, required endotracheal intubation, who presents to the emergency room with complaints of worsening shortness of breath, cough, ongoing for the past 4 days. He does not have fever or chills, although reports that patient has had occasions where he felt too hot while sleeping at night and had to take the duvet off. Chest x-ray obtained in the emergency room was unremarkable for acute pathology. Patient was requiring 5 L/min supplemental oxygen due to hypoxia the emergency room, with lowest oxygen saturation of 63% on room air during triage. \"  Subjective   General  Chart Reviewed: Yes  Family / Caregiver Present: Yes (nursing at beginning of treatment.)  Subjective  Subjective: Pt denies pain. Agreeable to working with PT. Wanting to get cleaned up. General Comment  Comments: Pt supine in bed upon arrival. Just finishing LE dressing with wound care. Pain Screening  Patient Currently in Pain: Denies  Vital Signs  Patient Currently in Pain: Denies       Orientation  Orientation  Overall Orientation Status: Within Normal Limits  Cognition      Objective   Bed mobility  Supine to Sit: Modified independent  Scooting: Modified independent  Comment: Pt on RA, O2 sats 93% or higher seated EOB.   Transfers  Sit to Stand: Supervision (transfers from EOB, chair 2 times, toilet.)  Stand to sit: Supervision  Ambulation  Ambulation?: Yes  Ambulation 1  Surface: level tile  Device: No Device  Assistance: Supervision  Quality of Gait: slight forward lean of trunk  Gait Deviations: None  Distance: Pt amb with no AD and supervision for 270 ft x 2, then 15 ft x 2. Comments: Pt needing monitoring of O2 sats after having returned to RA. Pt amb in hallway with 1 standing rest break, O2 sats dropping to 86% and rebounded back to 90s with standing rest. Pt sat to rest and back up to 95%. Pt amb again and remained 91% throughout walk. Pt rested then amb to bathroom where he cleaned himself with wipes, assisted with gown change and stood at sink for ADLs. Pt tolerated all activity well and returned to sit in chair. Stairs/Curb  Stairs?: No     Balance  Posture: Good  Sitting - Static: Good  Sitting - Dynamic: Good  Standing - Static: Good  Standing - Dynamic: Good            Comment: close monitoring of O2 sats, bathing, dressing. G-Code     OutComes Score                                                     AM-PAC Score  AM-PAC Inpatient Mobility Raw Score : 23 (02/04/22 1325)  AM-PAC Inpatient T-Scale Score : 56.93 (02/04/22 1325)  Mobility Inpatient CMS 0-100% Score: 11.2 (02/04/22 1325)  Mobility Inpatient CMS G-Code Modifier : CI (02/04/22 1325)          Goals  Short term goals  Time Frame for Short term goals: Before discharge (no goals met in full this date)  Short term goal 1: Pt will complete bed mobility indep  Short term goal 2: Pt will complete sit<>stand from multiple surfaces mod I  Short term goal 3: Pt will ambulate 50 ft without AD indep  Short term goal 4: Pt will ascend/descend 5 + 6 steps with unilateral rail Mod I  Patient Goals   Patient goals : Go home    Plan    Plan  Times per week: 2-3x  Current Treatment Recommendations: Transfer Training,Endurance Training,Patient/Caregiver Education & Training,Equipment Evaluation, Education, & procurement,Home Exercise Program,Safety Education & Training,Stair training,Gait Training,Balance Training,Functional Mobility Training  Safety Devices  Type of devices:  All fall risk precautions in place,Gait belt,Nurse notified,Call light within reach,Left in chair  Restraints  Initially in place: No     Therapy Time   Individual Concurrent Group Co-treatment   Time In 1214         Time Out 1259         Minutes 45         Timed Code Treatment Minutes: [address], [id number]

## 2022-02-04 NOTE — PROGRESS NOTES
PULMONARY AND CRITICAL CARE MEDICINE PROGRESS NOTE    Subjective: Patient sitting in bed in no apparent respiratory distress. Currently off of oxygen while sitting. Reports that his breathing is improved. Cough is also improving. Remains afebrile. REVIEW OF SYSTEMS:   8 point review of system is negative except that mentioned in the subjective portion. PAST MEDICAL HISTORY:   Past Medical History:   Diagnosis Date    Diabetes mellitus ([address]     Fibromyalgia     History of DVT (deep vein thrombosis)     Hyperlipidemia     Hypertension        PAST SURGICAL HISTORY:   Past Surgical History:   Procedure Laterality Date    APPENDECTOMY  2005    COLONOSCOPY  2016    FEMORAL BYPASS Left 2020    femoral posterior tibial bypass    FEMORAL-TIBIAL BYPASS GRAFT Right 2020    with femoral endarterectomy and patch angioplasty    FOOT DEBRIDEMENT Left 2020    FOOT DEBRIDEMENT Right 2021    DEBRIDEMENT OF RIGHT FOOT WOUND WITH GRAFT APPLICATION performed by Aebna Amanda DPM at [facility] Right 2020    stent leg for PVD       SOCIAL HISTORY:   Social History     Tobacco Use    Smoking status: Former Smoker     Packs/day: 0.50     Years: 20.00     Pack years: 10.00     Types: Cigarettes     Start date: 1977     Quit date: 2021     Years since quittin.1    Smokeless tobacco: Never Used   Vaping Use    Vaping Use: Never used   Substance Use Topics    Alcohol use: Yes     Alcohol/week: 6.0 standard drinks     Types: 6 Cans of beer per week    Drug use: Never       FAMILY HISTORY:   Family History   Problem Relation Age of Onset    Cancer Mother     Stroke Father        MEDICATIONS:     No current facility-administered medications on file prior to encounter.      Current Outpatient Medications on File Prior to Encounter   Medication Sig Dispense Refill    albuterol sulfate HFA (VENTOLIN HFA) 108 (90 Base) MCG/ACT inhaler Inhale 2 puffs into the lungs 4 times daily as needed for Wheezing 54 g 1    acetaminophen (TYLENOL) 500 MG tablet Take 1 tablet by mouth 4 times daily as needed for Pain 360 tablet 1    warfarin (JANTOVEN) 5 MG tablet TAKE 1 AND 1/2 TABLETS BY MOUTH ONE TIME A DAY OR AS DIRECTED BY [facility] ([phone]) 45 tablet 0    atorvastatin (LIPITOR) 80 MG tablet Take 1 tablet by mouth daily Cancel atorvastatin 20 mg a day 90 tablet 0    NIFEdipine (ADALAT CC) 60 MG extended release tablet Take 1 tablet by mouth daily 90 tablet 0    carvedilol (COREG) 25 MG tablet Take 1 tablet by mouth 2 times daily (with meals) 60 tablet 3    metFORMIN (GLUCOPHAGE) 500 MG tablet Take 1 tablet by mouth 2 times daily (with meals) 180 tablet 0    lisinopril (PRINIVIL;ZESTRIL) 10 MG tablet Take 1 tablet by mouth daily 90 tablet 0    fluticasone (FLONASE) 50 MCG/ACT nasal spray 1 spray by Nasal route daily 9.9 g 5    tiotropium (SPIRIVA RESPIMAT) 2.5 MCG/ACT AERS inhaler Inhale 2 puffs into the lungs daily 1 each 0    nicotine (NICODERM CQ) 21 MG/24HR Place 1 patch onto the skin every 24 hours 30 patch 2    blood glucose monitor kit and supplies Dispense sufficient amount for indicated testing frequency plus additional to accommodate PRN testing needs. Dispense all needed supplies to include: monitor, strips, lancing device, lancets, control solutions, alcohol swabs. 1 kit 0    Lancets MISC 1 each by Does not apply route daily Test 2 times daily while on PREDNISONE then 1 time daily & as needed for symptoms of irregular blood sugar 100 each 3    tadalafil (CIALIS) 5 MG tablet TAKE 1 TABLET BY MOUTH ONE TIME A DAY AS NEEDED FOR ERECTILE DYSFUNCTION 30 tablet 5    Vitamins/Minerals TABS Take 1 tablet by mouth daily      blood glucose monitor strips Test 2 times a day while on PREDNISONE, then 1 time daily & as needed for symptoms of irregular blood glucose.  Dispense sufficient amount for indicated testing frequency plus additional to accommodate PRN testing needs. 100 strip 3    ferrous sulfate (IRON 325) 325 (65 Fe) MG tablet Take 1 tablet by mouth 2 times daily 180 tablet 0    becaplermin (REGRANEX) 0.01 % gel Apply 1 Applicatorful topically daily (Patient not taking: Reported on 11/29/2021)      aspirin 81 MG chewable tablet CHEW AND SWALLOW 1 TABLET BY MOUTH ONE TIME A DAY (Patient taking differently: CHEW AND SWALLOW 1 TABLET BY MOUTH ONE TIME A DAY  1/7/22 NOT TAKING) 100 tablet 5        [START ON 2/5/2022] predniSONE  40 mg Oral Daily    sodium zirconium cyclosilicate  10 g Oral Daily    [START ON 2/5/2022] warfarin  10 mg Oral Once per day on Wed Sat    And    warfarin  7.5 mg Oral Once per day on Sun Mon Tue Thu Fri    levoFLOXacin  750 mg Oral Daily    ipratropium-albuterol  1 ampule Inhalation TID    atorvastatin  80 mg Oral Daily    carvedilol  25 mg Oral BID WC    fluticasone  1 spray Nasal Daily    NIFEdipine  60 mg Oral Daily    sodium chloride flush  5-40 mL IntraVENous 2 times per day    insulin lispro  0-6 Units SubCUTAneous TID WC    insulin lispro  0-3 Units SubCUTAneous Nightly    Arformoterol Tartrate  15 mcg Nebulization BID    budesonide  0.5 mg Nebulization BID      sodium chloride      dextrose       albuterol sulfate HFA, sodium chloride flush, sodium chloride, polyethylene glycol, acetaminophen **OR** acetaminophen, albuterol, glucose, dextrose, glucagon (rDNA), dextrose, gentamicin, ammonium lactate, guaiFENesin-codeine      ALLERGIES:   Allergies as of 02/01/2022 - Fully Reviewed 02/01/2022   Allergen Reaction Noted    Chantix [varenicline]  03/08/2021      OBJECTIVE:   height is 5' 8\" (1.727 m) and weight is 161 lb 6.4 oz (73.2 kg). His temporal temperature is 97.6 °F (36.4 °C). His blood pressure is 154/94 (abnormal) and his pulse is 83. His respiration is 22 and oxygen saturation is 91%. No intake/output data recorded.      PHYSICAL EXAM:    CONSTITUTIONAL: He is a 65y.o.-year-old who appears his stated age. He is alert and oriented x 3 and in no acute distress. HEENT: PERRLA, EOMI. No scleral icterus. No thrush, atraumatic, normocephalic. Oropharyngeal erythema seen. NECK: Supple, without cervical or supraclavicular lymphadenopathy:  CARDIOVASCULAR: S1 S2 RRR. Without murmer  RESPIRATORY & CHEST: Bilateral scattered wheezing still heard but has improved since yesterday. GASTROINTESTINAL & ABDOMEN: Soft, nontender, positive bowel sounds in all quadrants, non-distended, without hepatosplenomegaly. GENITOURINARY: Deferred. MUSCULOSKELETAL: No tenderness to palpation of the axial skeleton. There is no clubbing. No cyanosis. No edema of the lower extremities. SKIN OF BODY: No rash or jaundice. PSYCHIATRIC EVALUATION: Normal affect. Patient answers questions appropriately. HEMATOLOGIC/LYMPHATIC/ IMMUNOLOGIC: No palpable lymphadenopathy. NEUROLOGIC: Alert and oriented x 3. Groslly non-focal. Motor strength is 5+/5 in all muscle groups. The patient has a normal sensorium globally. LABS:  Recent Labs     02/02/22  0436 02/02/22  1100 02/02/22  1336 02/02/22  1336 02/03/22  0437 02/03/22  0438 02/03/22  1440 02/04/22  0330   WBC 4.2  --   --   --  5.3  --   --  5.0   HGB 13.1*  --   --   --  12.4*  --   --  12.1*   HCT 40.6  --   --   --  38.8*  --   --  37.3*     --   --   --  283  --   --  276   ALT 17  --   --   --  14  --   --  20   AST 23  --   --   --  16  --   --  19      < > 142  --  138  --   --  137   K 6.2*   < > 5.6*   < > 5.8*  --  4.9 4.9      < > 103  --  101  --   --  97*   CREATININE 1.0   < > 0.8  --  0.6*  --   --  0.7*   BUN 38*   < > 26*  --  14  --   --  18   CO2 21   < > 29  --  29  --   --  30   INR 4.12*  --   --   --   --  2.14*  --  1.49*    < > = values in this interval not displayed.        Recent Labs     02/02/22  1336 02/02/22  1336 02/03/22  0437 02/03/22  1440 02/04/22  0330   GLUCOSE 152*  --  175*  --  149* CALCIUM 9.3  --  9.0  --  9.4     --  138  --  137   K 5.6*   < > 5.8* 4.9 4.9   CO2 29  --  29  --  30     --  101  --  97*   BUN 26*  --  14  --  18   CREATININE 0.8  --  0.6*  --  0.7*    < > = values in this interval not displayed. Recent Labs     02/03/22  0443   PHART 7.282*   BAW5CAS 73.0*   PO2ART 57.7*   JRO9AVE 34.4*   E2EUVOJE 84*   BEART 8*       Lab Results   Component Value Date    INR 1.49 (H) 02/04/2022    INR 2.14 (H) 02/03/2022    INR 4.12 (H) 02/02/2022    PROTIME 17.1 (H) 02/04/2022    PROTIME 25.0 (H) 02/03/2022    PROTIME 49.4 (H) 02/02/2022     No results found for: AMYLASE   Lab Results   Component Value Date    LABA1C 6.5 02/02/2022     Lab Results   Component Value Date    .9 02/02/2022     Lab Results   Component Value Date    TSH 2.45 03/29/2010     Lab Results   Component Value Date    CKTOTAL 81 02/01/2022    TROPONINI <0.01 02/01/2022      No results found for: CRP   No results found for: BNP   Lab Results   Component Value Date    DDIMER 880 (H) 12/31/2021      No results found for: FERRITIN   No results found for: LACTA        IMAGING:    Chest x-ray portable 1 view done on 2/1/2022 was personally viewed by me and my interpretation is: Bilateral lung fields appear within normal limits. No focal consolidation, pleural effusions or pneumothorax seen. Cardiac silhouette is within normal limits. Pulmonary Function Testing       Patient name:  Noemi Stratton     [facility] Unit #:   [id number]   Date of test:  1/25/2022  Date of interpretation:   1/27/2022     Mr. Noemi Stratton is a 62y.o. year-old non smoker. The spirometry data were acceptable and reproducible.      Spirometry:  Flow volume loops were obstructed. The FEV-1/FVC ratio was decreased. The FEV-1 was 1.83 liters (63% of predicted), which was moderately decreased.  The FVC was 3.07 liters (82% of predicted), which was normal. Response to inhaled bronchodilators (albuterol) was not significant.     Lung volumes:  Lung volumes were tested by plethysmography. The total lung capacity was 5.11 liters (83% of predicted), which was normal. The residual volume was 2.04 liters (91% of predicted), which was normal. The ratio of residual volume to total lung capacity (RV/TLC) was 40, which was normal.      Diffusion capacity was found to be 37% which is Severely decreased.       Interpretation:  Moderate obstructive airway disease with no significant bronchodilator reversibility.       IMPRESSION:     1. Acute COPD exacerbation, resolving  2. Moderate COPD  3. Previous history of extensive tobacco abuse  4. Acute kidney injury with hyperkalemia  5. Acute hypoxic respiratory failure    RECOMMENDATION:     1. Patient's respiratory status continues to slowly stabilize. Wheezing has decreased. 2. PFT done on 1/27/2022 showed FEV1 of 63% with an insignificant bronchodilator response. 3. My recommendation would be to discharge the patient on Trelegy Ellipta 1 puff twice daily and albuterol as needed. 4. He should get a tapering course of prednisone for a total of 5 to 7 days. 5. Levofloxacin can be continued for a total of 5 days. 6. Patient has chronic hypercapnic respiratory failure. BiPAP therapy has been considered and ruled out. Patient will benefit from noninvasive ventilator to help improve his minute ventilation whenever he is sleeping/napping. Without the use of noninvasive ventilator patient has a potential for further worsening of his respiratory failure, frequent hospitalization and possibly death. 7. COVID-19 RT-PCR is negative. 8.  Acute kidney injury most likely due to ACE inhibitor's that patient was taking. Hyperkalemia is resolving. Serum creatinine within normal limits. 9. Titrate oxygen supplementation to maintain SPO2 between 88 to 92%. Patient may need home O2 evaluation prior to hospital discharge.   10. From pulmonary standpoint patient can be discharged back home today. We will sign off. Jayda Brito MD   Pulmonary Critical Care and Sleep Medicine  General acute hospital   [facility], [place], [address]  2/1/2022, 4:39 PM      This note was completed using dragon medical speech recognition software. Grammatical errors, random word insertions, pronoun errors and incomplete sentences are occasional consequences of this technology due to software limitations. If there are questions or concerns about the content of this note of information contained within the body of this dictation they should be addressed with the provider for clarification.

## 2022-02-04 NOTE — PROGRESS NOTES
This note also relates to the following rows which could not be included:  Resp - Cannot attach notes to unvalidated device data  SpO2 - Cannot attach notes to unvalidated device data       02/04/22 0002   Oxygen Therapy/Pulse Ox   O2 Therapy Oxygen humidified   O2 Device Nasal cannula  (pt. refused bipap)   O2 Flow Rate (L/min) 3 L/min  (decreased to 2 lpm nc)   Pulse Oximeter Device Mode Continuous   Pulse Oximeter Device Location Finger

## 2022-02-04 NOTE — PROGRESS NOTES
02/04/22 0934   Incentive Spirometry Tx   Treatment Tolerance Well   Incentive Spirometry Goal (mL) 737 mL   Incentive Spirometry Achieved (mL) 2000 mL

## 2022-02-04 NOTE — CARE COORDINATION
[facility] Continence Nurse  Consult Note       NAME:  [facility] RECORD NUMBER:  [id number]  AGE: 65 y.o. GENDER: male  : 1960  TODAY'S DATE:  2022    Subjective   Reason for WOCN Evaluation and Assessment: dressing change      Kun Simon is a 65 y.o. male referred by:   [x] Physician  [x] Nursing  [] Other:     Wound Identification:  Wound Type: venous, arterial and non-healing/non-surgical  Contributing Factors: venous stasis and arterial insufficiency    Wound History: present on admisson, patient follows in the wound clinic.    Current Wound Care Treatment:  Unna boot dressing, collagen, gentamicin, lac-hydrin lotion    Patient Goal of Care:  [x] Wound Healing  [] Odor Control  [] Palliative Care  [] Pain Control   [] Other:         PAST MEDICAL HISTORY        Diagnosis Date    Diabetes mellitus ([facility] [address]     Fibromyalgia     History of DVT (deep vein thrombosis)     Hyperlipidemia     Hypertension        PAST SURGICAL HISTORY    Past Surgical History:   Procedure Laterality Date    APPENDECTOMY      COLONOSCOPY  2016    FEMORAL BYPASS Left 2020    femoral posterior tibial bypass    FEMORAL-TIBIAL BYPASS GRAFT Right 2020    with femoral endarterectomy and patch angioplasty    FOOT DEBRIDEMENT Left 2020    FOOT DEBRIDEMENT Right 2021    DEBRIDEMENT OF RIGHT FOOT WOUND WITH GRAFT APPLICATION performed by Bebeto Walters DPM at [facility] Right 2020    stent leg for PVD       FAMILY HISTORY    Family History   Problem Relation Age of Onset    Cancer Mother     Stroke Father        SOCIAL HISTORY    Social History     Tobacco Use    Smoking status: Former Smoker     Packs/day: 0.50     Years: 20.00     Pack years: 10.00     Types: Cigarettes     Start date: 1977     Quit date: 2021     Years since quittin.1    Smokeless tobacco: Never Used   Vaping Use    Vaping Use: Never used Substance Use Topics    Alcohol use: Yes     Alcohol/week: 6.0 standard drinks     Types: 6 Cans of beer per week    Drug use: Never       ALLERGIES    Allergies   Allergen Reactions    Chantix [Varenicline]      Hallucinations         MEDICATIONS    No current facility-administered medications on file prior to encounter. Current Outpatient Medications on File Prior to Encounter   Medication Sig Dispense Refill    albuterol sulfate HFA (VENTOLIN HFA) 108 (90 Base) MCG/ACT inhaler Inhale 2 puffs into the lungs 4 times daily as needed for Wheezing 54 g 1    acetaminophen (TYLENOL) 500 MG tablet Take 1 tablet by mouth 4 times daily as needed for Pain 360 tablet 1    warfarin (JANTOVEN) 5 MG tablet TAKE 1 AND 1/2 TABLETS BY MOUTH ONE TIME A DAY OR AS DIRECTED BY [facility] ([phone]) 45 tablet 0    atorvastatin (LIPITOR) 80 MG tablet Take 1 tablet by mouth daily Cancel atorvastatin 20 mg a day 90 tablet 0    NIFEdipine (ADALAT CC) 60 MG extended release tablet Take 1 tablet by mouth daily 90 tablet 0    carvedilol (COREG) 25 MG tablet Take 1 tablet by mouth 2 times daily (with meals) 60 tablet 3    metFORMIN (GLUCOPHAGE) 500 MG tablet Take 1 tablet by mouth 2 times daily (with meals) 180 tablet 0    lisinopril (PRINIVIL;ZESTRIL) 10 MG tablet Take 1 tablet by mouth daily 90 tablet 0    fluticasone (FLONASE) 50 MCG/ACT nasal spray 1 spray by Nasal route daily 9.9 g 5    tiotropium (SPIRIVA RESPIMAT) 2.5 MCG/ACT AERS inhaler Inhale 2 puffs into the lungs daily 1 each 0    nicotine (NICODERM CQ) 21 MG/24HR Place 1 patch onto the skin every 24 hours 30 patch 2    blood glucose monitor kit and supplies Dispense sufficient amount for indicated testing frequency plus additional to accommodate PRN testing needs. Dispense all needed supplies to include: monitor, strips, lancing device, lancets, control solutions, alcohol swabs.  1 kit 0    Lancets MISC 1 each by Does not apply route daily Test 2 times daily while on PREDNISONE then 1 time daily & as needed for symptoms of irregular blood sugar 100 each 3    tadalafil (CIALIS) 5 MG tablet TAKE 1 TABLET BY MOUTH ONE TIME A DAY AS NEEDED FOR ERECTILE DYSFUNCTION 30 tablet 5    Vitamins/Minerals TABS Take 1 tablet by mouth daily      blood glucose monitor strips Test 2 times a day while on PREDNISONE, then 1 time daily & as needed for symptoms of irregular blood glucose. Dispense sufficient amount for indicated testing frequency plus additional to accommodate PRN testing needs.  100 strip 3    ferrous sulfate (IRON 325) 325 (65 Fe) MG tablet Take 1 tablet by mouth 2 times daily 180 tablet 0    becaplermin (REGRANEX) 0.01 % gel Apply 1 Applicatorful topically daily (Patient not taking: Reported on 11/29/2021)      aspirin 81 MG chewable tablet CHEW AND SWALLOW 1 TABLET BY MOUTH ONE TIME A DAY (Patient taking differently: CHEW AND SWALLOW 1 TABLET BY MOUTH ONE TIME A DAY  1/7/22 NOT TAKING) 100 tablet 5       Objective    BP (!) 147/88   Pulse 89   Temp 97.3 °F (36.3 °C) (Temporal)   Resp 13   Ht 5' 8\" (1.727 m)   Wt 161 lb 6.4 oz (73.2 kg)   SpO2 100%   BMI 24.54 kg/m²     LABS:  WBC:    Lab Results   Component Value Date    WBC 5.0 02/04/2022     H/H:    Lab Results   Component Value Date    HGB 12.1 02/04/2022    HCT 37.3 02/04/2022     PTT:    Lab Results   Component Value Date    APTT 73.1 02/01/2022   [APTT}  PT/INR:    Lab Results   Component Value Date    PROTIME 17.1 02/04/2022    INR 1.49 02/04/2022     HgBA1c:    Lab Results   Component Value Date    LABA1C 6.5 02/02/2022       Assessment   Harshil Risk Score: Harshil Scale Score: 18    Patient Active Problem List   Diagnosis Code    Peripheral artery disease (HCC) I73.9    Acute respiratory failure (HCC) J96.00    Centrilobular emphysema ([facility] [address] J43.2    Pneumonia of both lower lobes due to infectious organism J18.9    Current every day smoker F17.200    COPD (chronic obstructive [facility] disease) ([facility] J44.9       Measurements:  Wound 12/31/21 Ankle Right;Medial #1 (Active)   Wound Image    02/04/22 1224   Wound Etiology Arterial 02/04/22 1224   Dressing Status New dressing applied 02/04/22 1224   Wound Cleansed Other (Comment) 02/04/22 1224   Dressing/Treatment Coban/self-adherent bandages; Other (comment) 02/04/22 1224   Offloading for Diabetic Foot Ulcers Post op shoe 01/28/22 0810   Dressing Change Due 02/11/22 02/04/22 1224   Wound Length (cm) 3.5 cm 02/04/22 1224   Wound Width (cm) 3 cm 02/04/22 1224   Wound Depth (cm) 0.1 cm 02/04/22 1224   Wound Surface Area (cm^2) 10.5 cm^2 02/04/22 1224   Change in Wound Size % (l*w) 41.67 02/04/22 1224   Wound Volume (cm^3) 1.05 cm^3 02/04/22 1224   Wound Healing % 71 02/04/22 1224   Post-Procedure Length (cm) 7.5 cm 01/28/22 0839   Post-Procedure Width (cm) 2.9 cm 01/28/22 0839   Post-Procedure Depth (cm) 0.1 cm 01/28/22 0839   Post-Procedure Surface Area (cm^2) 21.75 cm^2 01/28/22 0839   Post-Procedure Volume (cm^3) 2.175 cm^3 01/28/22 0839   Wound Assessment Pale granulation tissue;Granulation tissue 02/04/22 1224   Drainage Amount None 02/04/22 1224   Drainage Description Serosanguinous 01/28/22 0810   Odor Mild 01/28/22 0810   Jamila-wound Assessment Dry/flaky; Intact;Fragile 02/04/22 1224   Margins Defined edges 02/04/22 1224   Wound Thickness Description not for Pressure Injury Partial thickness 02/04/22 1224   Number of days: 34       Wound 12/31/21 Ankle Medial;Left #2 (Active)   Wound Image   02/04/22 1224   Wound Etiology Arterial 02/04/22 1224   Dressing Status New dressing applied 02/04/22 1224   Wound Cleansed Other (Comment) 02/04/22 1224   Dressing/Treatment Coban/self-adherent bandages; Other (comment) 02/04/22 1224   Offloading for Diabetic Foot Ulcers Post op shoe 01/28/22 0810   Dressing Change Due 01/06/22 01/04/22 1646   Wound Length (cm) 2.9 cm 02/04/22 1224   Wound Width (cm) 4.2 cm 02/04/22 1224   Wound Depth (cm) 0.4 cm 02/04/22 1224   Wound Surface Area (cm^2) 12.18 cm^2 02/04/22 1224   Change in Wound Size % (l*w) -62.4 02/04/22 1224   Wound Volume (cm^3) 4.872 cm^3 02/04/22 1224   Wound Healing % -225 02/04/22 1224   Post-Procedure Length (cm) 3.7 cm 01/28/22 0839   Post-Procedure Width (cm) 2.2 cm 01/28/22 0839   Post-Procedure Depth (cm) 0.2 cm 01/28/22 0839   Post-Procedure Surface Area (cm^2) 8.14 cm^2 01/28/22 0839   Post-Procedure Volume (cm^3) 1.628 cm^3 01/28/22 0839   Wound Assessment Pink/red;Pale granulation tissue 02/04/22 1224   Drainage Amount Moderate 01/28/22 0810   Drainage Description Serosanguinous;Purulent 01/28/22 0810   Odor Mild 01/28/22 0810   Smita-wound Assessment Dry/flaky; Intact; Hyperpigmented 02/04/22 1224   Margins Defined edges;Epibole (rolled edges) 02/04/22 1224   Wound Thickness Description not for Pressure Injury Partial thickness 02/04/22 1224   Number of days: 34     Incision 02/26/21 Anterior;Right (Active)   Number of days: 343     Patient seen for dressing changes. Unna boot dressings were placed last Friday. Patient was then admitted earlier this week for copd exacerbation. Wound clinic contacted,  Spoke to Dr Radha Crowe nurse. It was ok to change dressing if patient was still here on 2/4/22. Hospitalist ordered gentamicin and ammonium lactate cream.  Patient agreeable to visit. Old dressings removed. Wounds measured and assessed. Skin on legs and feet intact with dry scaly areas. Wound to left ankle pink/ moist  with rolled edges. Wound to right ankle is pink/ red moist. Area to medial ankle also had granulation tissue. The smita-wound on the right ankle is pink new skin. There is still a shallow open area to the dorsum right foot that is pink and moist. Epithelization noted on edges. Legs and feet cleansed with bath wipes, some of the dead skin was loosened and came off easily. Gentamicin and collagen dressings placed to all open wounds. Lac-hydrin cream applied to legs and feet.

## 2022-02-04 NOTE — PROGRESS NOTES
Pharmacy to Dose Warfarin    Pharmacy consulted to dose warfarin for PAD. INR Goal: 2-3    INR today: 1.49    Assessment/Plan:  - INR dropped from yesterday as expected after holding warfarin on admission.   - Will continue patient with home dose of 7.5 mg tonight. - Plan for Levaquin x5 days per pulm. Pharmacy will continue to follow.     Janelle Gaffney, PharmD, [facility]

## 2022-02-04 NOTE — PROGRESS NOTES
Pt awake in bed and VSS. Assessment complete and medications given. Pt denies any needs. Call light in reach and pt refusing bed alarm .

## 2022-02-04 NOTE — CARE COORDINATION
[employer] sent to Dr. Kenyatta Jones with need to sign DME forms x2 ([facility]/NIV) that have been placed on chart.  will return completed form to [place]. Electronically signed by Michelle Infante RN on 2/4/2022 at 2:59 PM      UPDATE:  Forms completed and emailed back to Elissa at [facility]. Originals remain in chart.     Electronically signed by Michelle Infante RN on 2/4/2022 at 4:33 PM

## 2022-02-05 VITALS
HEART RATE: 97 BPM | OXYGEN SATURATION: 92 % | SYSTOLIC BLOOD PRESSURE: 132 MMHG | HEIGHT: 68 IN | BODY MASS INDEX: 24.52 KG/M2 | TEMPERATURE: 97.2 F | DIASTOLIC BLOOD PRESSURE: 85 MMHG | RESPIRATION RATE: 13 BRPM | WEIGHT: 161.82 LBS

## 2022-02-05 LAB
A/G RATIO: 1.6 (ref 1.1–2.2)
ALBUMIN SERPL-MCNC: 3.8 G/DL (ref 3.4–5)
ALP BLD-CCNC: 81 U/L (ref 40–129)
ALT SERPL-CCNC: 21 U/L (ref 10–40)
ANION GAP SERPL CALCULATED.3IONS-SCNC: 8 MMOL/L (ref 3–16)
AST SERPL-CCNC: 16 U/L (ref 15–37)
BASOPHILS ABSOLUTE: 0 K/UL (ref 0–0.2)
BASOPHILS RELATIVE PERCENT: 0.2 %
BILIRUB SERPL-MCNC: 0.4 MG/DL (ref 0–1)
BUN BLDV-MCNC: 19 MG/DL (ref 7–20)
CALCIUM SERPL-MCNC: 9.3 MG/DL (ref 8.3–10.6)
CHLORIDE BLD-SCNC: 101 MMOL/L (ref 99–110)
CO2: 30 MMOL/L (ref 21–32)
CREAT SERPL-MCNC: 0.7 MG/DL (ref 0.8–1.3)
EOSINOPHILS ABSOLUTE: 0 K/UL (ref 0–0.6)
EOSINOPHILS RELATIVE PERCENT: 0 %
GFR AFRICAN AMERICAN: >60
GFR NON-AFRICAN AMERICAN: >60
GLUCOSE BLD-MCNC: 104 MG/DL (ref 70–99)
GLUCOSE BLD-MCNC: 128 MG/DL (ref 70–99)
GLUCOSE BLD-MCNC: 131 MG/DL (ref 70–99)
GLUCOSE BLD-MCNC: 131 MG/DL (ref 70–99)
HCT VFR BLD CALC: 38.8 % (ref 40.5–52.5)
HEMOGLOBIN: 12.5 G/DL (ref 13.5–17.5)
INR BLD: 1.23 (ref 0.88–1.12)
LYMPHOCYTES ABSOLUTE: 1.9 K/UL (ref 1–5.1)
LYMPHOCYTES RELATIVE PERCENT: 32.2 %
MAGNESIUM: 1.7 MG/DL (ref 1.8–2.4)
MCH RBC QN AUTO: 28.3 PG (ref 26–34)
MCHC RBC AUTO-ENTMCNC: 32.1 G/DL (ref 31–36)
MCV RBC AUTO: 88.1 FL (ref 80–100)
MONOCYTES ABSOLUTE: 0.5 K/UL (ref 0–1.3)
MONOCYTES RELATIVE PERCENT: 8.7 %
NEUTROPHILS ABSOLUTE: 3.4 K/UL (ref 1.7–7.7)
NEUTROPHILS RELATIVE PERCENT: 58.9 %
PDW BLD-RTO: 15.1 % (ref 12.4–15.4)
PERFORMED ON: ABNORMAL
PHOSPHORUS: 4.2 MG/DL (ref 2.5–4.9)
PLATELET # BLD: 287 K/UL (ref 135–450)
PMV BLD AUTO: 7.2 FL (ref 5–10.5)
POTASSIUM SERPL-SCNC: 4.4 MMOL/L (ref 3.5–5.1)
PROTHROMBIN TIME: 14 SEC (ref 9.9–12.7)
RBC # BLD: 4.4 M/UL (ref 4.2–5.9)
SODIUM BLD-SCNC: 139 MMOL/L (ref 136–145)
TOTAL PROTEIN: 6.2 G/DL (ref 6.4–8.2)
WBC # BLD: 5.8 K/UL (ref 4–11)

## 2022-02-05 PROCEDURE — 80053 COMPREHEN METABOLIC PANEL: CPT

## 2022-02-05 PROCEDURE — 6370000000 HC RX 637 (ALT 250 FOR IP): Performed by: INTERNAL MEDICINE

## 2022-02-05 PROCEDURE — 6360000002 HC RX W HCPCS: Performed by: NURSE PRACTITIONER

## 2022-02-05 PROCEDURE — 83735 ASSAY OF MAGNESIUM: CPT

## 2022-02-05 PROCEDURE — 85610 PROTHROMBIN TIME: CPT

## 2022-02-05 PROCEDURE — 84100 ASSAY OF PHOSPHORUS: CPT

## 2022-02-05 PROCEDURE — 94640 AIRWAY INHALATION TREATMENT: CPT

## 2022-02-05 PROCEDURE — 2580000003 HC RX 258: Performed by: INTERNAL MEDICINE

## 2022-02-05 PROCEDURE — 6360000002 HC RX W HCPCS: Performed by: INTERNAL MEDICINE

## 2022-02-05 PROCEDURE — 6370000000 HC RX 637 (ALT 250 FOR IP): Performed by: NURSE PRACTITIONER

## 2022-02-05 PROCEDURE — 85025 COMPLETE CBC W/AUTO DIFF WBC: CPT

## 2022-02-05 RX ORDER — FLUTICASONE FUROATE, UMECLIDINIUM BROMIDE AND VILANTEROL TRIFENATATE 200; 62.5; 25 UG/1; UG/1; UG/1
1 POWDER RESPIRATORY (INHALATION) 2 TIMES DAILY
Qty: 1 EACH | Refills: 2 | Status: SHIPPED | OUTPATIENT
Start: 2022-02-05 | End: 2022-02-18 | Stop reason: SDUPTHER

## 2022-02-05 RX ORDER — MAGNESIUM SULFATE IN WATER 40 MG/ML
2000 INJECTION, SOLUTION INTRAVENOUS ONCE
Status: COMPLETED | OUTPATIENT
Start: 2022-02-05 | End: 2022-02-05

## 2022-02-05 RX ORDER — PREDNISONE 20 MG/1
40 TABLET ORAL DAILY
Qty: 7 TABLET | Refills: 0 | Status: SHIPPED | OUTPATIENT
Start: 2022-02-05 | End: 2022-02-12

## 2022-02-05 RX ORDER — LEVOFLOXACIN 750 MG/1
750 TABLET ORAL DAILY
Qty: 2 TABLET | Refills: 0 | Status: SHIPPED | OUTPATIENT
Start: 2022-02-06 | End: 2022-02-08

## 2022-02-05 RX ORDER — HYDROCHLOROTHIAZIDE 12.5 MG/1
12.5 TABLET ORAL EVERY MORNING
Qty: 30 TABLET | Refills: 1 | Status: ON HOLD | OUTPATIENT
Start: 2022-02-05 | End: 2022-03-07 | Stop reason: HOSPADM

## 2022-02-05 RX ADMIN — LEVOFLOXACIN 750 MG: 500 TABLET, FILM COATED ORAL at 09:08

## 2022-02-05 RX ADMIN — ARFORMOTEROL TARTRATE 15 MCG: 15 SOLUTION RESPIRATORY (INHALATION) at 09:04

## 2022-02-05 RX ADMIN — FLUTICASONE PROPIONATE 1 SPRAY: 50 SPRAY, METERED NASAL at 09:18

## 2022-02-05 RX ADMIN — CARVEDILOL 25 MG: 25 TABLET, FILM COATED ORAL at 09:09

## 2022-02-05 RX ADMIN — SODIUM ZIRCONIUM CYCLOSILICATE 10 G: 5 POWDER, FOR SUSPENSION ORAL at 09:10

## 2022-02-05 RX ADMIN — CARVEDILOL 25 MG: 25 TABLET, FILM COATED ORAL at 17:04

## 2022-02-05 RX ADMIN — MAGNESIUM SULFATE HEPTAHYDRATE 2000 MG: 40 INJECTION, SOLUTION INTRAVENOUS at 12:42

## 2022-02-05 RX ADMIN — IPRATROPIUM BROMIDE AND ALBUTEROL SULFATE 1 AMPULE: .5; 3 SOLUTION RESPIRATORY (INHALATION) at 15:48

## 2022-02-05 RX ADMIN — NIFEDIPINE 60 MG: 30 TABLET, FILM COATED, EXTENDED RELEASE ORAL at 09:09

## 2022-02-05 RX ADMIN — WARFARIN SODIUM 10 MG: 5 TABLET ORAL at 17:04

## 2022-02-05 RX ADMIN — PREDNISONE 40 MG: 20 TABLET ORAL at 09:10

## 2022-02-05 RX ADMIN — ATORVASTATIN CALCIUM 80 MG: 80 TABLET, FILM COATED ORAL at 09:10

## 2022-02-05 RX ADMIN — IPRATROPIUM BROMIDE AND ALBUTEROL SULFATE 1 AMPULE: .5; 3 SOLUTION RESPIRATORY (INHALATION) at 09:04

## 2022-02-05 RX ADMIN — Medication 5 ML: at 09:13

## 2022-02-05 ASSESSMENT — PAIN SCALES - GENERAL
PAINLEVEL_OUTOF10: 0
PAINLEVEL_OUTOF10: 0

## 2022-02-05 NOTE — PROGRESS NOTES
Pharmacy to Dose Warfarin    Pharmacy consulted to dose warfarin for PAD. INR Goal: 2-3    INR today: 1.23    Assessment/Plan:  - INR subtherapeutic and continues to trend down   - Will continue warfarin home dose - to receive 10 mg tonight. - Will consider boosting tomorrow if INR doesn't start to trend up. Patient remains on levaquin day 3/5 which can interact with warfarin to increase INR  - Daily INR ordered    Pharmacy will continue to follow.     Eloy Thompson, PharmD, BCPS  Clinical Pharmacist  [id number]

## 2022-02-05 NOTE — CARE COORDINATION
CM attempted to contact Arthur/Marie to get status update on NIV. CM called the weekend/afterhours number with continued ringing, no answer no VM option. CM will call back at later time.      [phone] opt 2    Electronically signed by Brad Tellez RN on 2/5/2022 at 9:10 AM

## 2022-02-05 NOTE — CARE COORDINATION
Discharge Planning:  I have reviewed the patient's medical history in detail and updated the computerized patient record. Patient independent prior to admission. CM is working with [facility] to obtain NIV, required for dc.      Electronically signed by Hansa Lopez RN on 2/5/2022 at 3:16 PM

## 2022-02-05 NOTE — DISCHARGE SUMMARY
[facility]    [facility] Demographics    Patient. Vernon Dempsey  Date of Birth. 1960  MRN. [id number]     Primary care provider. Michelle Weiss MD  (Tel: [phone])    Admit date: 2/1/2022    Discharge date 2/5/2022  Note Date: 2/5/2022     Reason for Hospitalization. Chief Complaint   Patient presents with    Shortness of Breath     Patient in from home, states he has pneumonia, has been feeling SOB for the past couple of days. Pateint was 63% on RA at time of triage, on 5L NC at this time with o2 sat of 96%. Significant Findings. Active Problems:    COPD (chronic obstructive pulmonary disease) ([facility])  Resolved Problems:    * No resolved hospital problems. *       Problems and results from this hospitalization that need follow up. COPD with hypercapnia:  Needs BIPAP at hs. Needs follow up with pulmonary this week . (BIPAP was ordered with [facility], however there is national shortage)     Significant test results and incidental findings. Chest xray   Chronic pulmonary change without acute cardiopulmonary process. Spirometry:  Flow volume loops were obstructed. The FEV-1/FVC ratio was decreased. The FEV-1 was 1.83 liters (63% of predicted), which was moderately decreased. The FVC was 3.07 liters (82% of predicted), which was normal. Response to inhaled bronchodilators (albuterol) was not significant. Lung volumes:  Lung volumes were tested by plethysmography. The total lung capacity was 5.11 liters (83% of predicted), which was normal. The residual volume was 2.04 liters (91% of predicted), which was normal. The ratio of residual volume to total lung capacity (RV/TLC) was 40, which was normal.      Diffusion capacity was found to be 37% which is Severely decreased.        Interpretation:  Moderate obstructive airway disease with no significant bronchodilator reversibility. Invasive procedures and treatments. [facility] Course. The patient sought care for increased shortness of breath. He had recently been d/c from the hospital for COPD exacerbation that required intubation. He was admitted to the ICU and was followed closely by pulmonary. He was treated for the followin. Acute hypoxic respiratory failure due to copd exacerbation:  He was treated with nebs, levaquin, and steroids. He initially required 5 liters of oxygen , however he was easily wean off to RA and did not require any supplemental oxygen at d/c   2. Hypercapnia was noted when pt was lethargic and pCO2 was in the 70's he responded well to a few hours of BIPAP which he needs to continue at d/c.  3.  DIEGO with hyperkalemia was noted upon admission and his ACE was d/c. DIEGO resolved quickly with hydration  4. HTN:  Low dose HCTZ was added at d/c since ACE was not resumed. He needs renal panel in 1 week and follow up with PCP  5. DM:  aic 6%  6. Chronic AC with coumadin for hx of DVT:  INR was elevated upon admission at 4 and dose was held for 48 hours. INR was low at d/c. He will follow up in the coumadin clinic at [facility] understands the need for BIPAP at hs, however he declined to stay in the hospital until this could be arranged. He is aware of the risks. Consults. IP CONSULT TO PHARMACY  IP CONSULT TO PULMONOLOGY  IP CONSULT TO CASE MANAGEMENT  IP CONSULT TO PULMONOLOGY    Physical examination on discharge day. BP (!) 161/80   Pulse 75   Temp 97.9 °F (36.6 °C) (Temporal)   Resp 21   Ht 5' 8\" (1.727 m)   Wt 161 lb 13.1 oz (73.4 kg)   SpO2 98%   BMI 24.60 kg/m²   General appearance. Alert. Looks comfortable. Alert and pleasant raspy voice noted   HEENT. Sclera clear. Moist mucus membranes. Cardiovascular. Regular rate and rhythm, normal S1, S2. No murmur. Respiratory. Not using accessory muscles. Clear to auscultation bilaterally, few scattered wheezes noted   Gastrointestinal. Abdomen soft, non-tender, not distended, normal bowel sounds  Neurology. Facial symmetry. No speech deficits. Moving all extremities equally. Extremities. No edema in lower extremities. Skin. Warm, dry, normal turgor    Condition at time of discharge stable     Medication instructions provided to patient at discharge. Medication List        START taking these medications      hydroCHLOROthiazide 12.5 MG tablet  Commonly known as: HYDRODIURIL  Take 1 tablet by mouth every morning     levoFLOXacin 750 MG tablet  Commonly known as: LEVAQUIN  Take 1 tablet by mouth daily for 2 doses  Start taking on: February 6, 2022     predniSONE 20 MG tablet  Commonly known as: DELTASONE  Take 2 tablets by mouth daily for 7 days     Trelegy Ellipta 200-62.5-25 MCG/INH Aepb  Generic drug: Fluticasone-Umeclidin-Vilant  Inhale 1 puff into the lungs 2 times daily            CHANGE how you take these medications      aspirin 81 MG chewable tablet  CHEW AND SWALLOW 1 TABLET BY MOUTH ONE TIME A DAY  What changed: additional instructions            CONTINUE taking these medications      acetaminophen 500 MG tablet  Commonly known as: TYLENOL  Take 1 tablet by mouth 4 times daily as needed for Pain     albuterol sulfate  (90 Base) MCG/ACT inhaler  Commonly known as: Ventolin HFA  Inhale 2 puffs into the lungs 4 times daily as needed for Wheezing     atorvastatin 80 MG tablet  Commonly known as: LIPITOR  Take 1 tablet by mouth daily Cancel atorvastatin 20 mg a day     blood glucose monitor kit and supplies  Dispense sufficient amount for indicated testing frequency plus additional to accommodate PRN testing needs. Dispense all needed supplies to include: monitor, strips, lancing device, lancets, control solutions, alcohol swabs. blood glucose test strips  Test 2 times a day while on PREDNISONE, then 1 time daily & as needed for symptoms of irregular blood glucose.  Dispense sufficient amount for indicated testing frequency plus additional to accommodate PRN testing needs. carvedilol 25 MG tablet  Commonly known as: COREG  Take 1 tablet by mouth 2 times daily (with meals)     ferrous sulfate 325 (65 Fe) MG tablet  Commonly known as: IRON 325  Take 1 tablet by mouth 2 times daily     fluticasone 50 MCG/ACT nasal spray  Commonly known as: Flonase  1 spray by Nasal route daily     Lancets Misc  1 each by Does not apply route daily Test 2 times daily while on PREDNISONE then 1 time daily & as needed for symptoms of irregular blood sugar     metFORMIN 500 MG tablet  Commonly known as: GLUCOPHAGE  Take 1 tablet by mouth 2 times daily (with meals)     nicotine 21 MG/24HR  Commonly known as: Nicoderm CQ  Place 1 patch onto the skin every 24 hours     NIFEdipine 60 MG extended release tablet  Commonly known as: ADALAT CC  Take 1 tablet by mouth daily     Regranex 0.01 % gel  Generic drug: becaplermin     tadalafil 5 MG tablet  Commonly known as: CIALIS  TAKE 1 TABLET BY MOUTH ONE TIME A DAY AS NEEDED FOR ERECTILE DYSFUNCTION     tiotropium 2.5 MCG/ACT Aers inhaler  Commonly known as: SPIRIVA RESPIMAT  Inhale 2 puffs into the lungs daily     Vitamins/Minerals Tabs     warfarin 5 MG tablet  Commonly known as: Jantoven  Take as directed. If you are unsure how to take this medication, talk to your nurse or doctor. Original instructions: TAKE 1 AND 1/2 TABLETS BY MOUTH ONE TIME A DAY OR AS DIRECTED BY [facility] ([phone])            STOP taking these medications      lisinopril 10 MG tablet  Commonly known as: PRINIVIL;ZESTRIL               Where to Get Your Medications        These medications were sent to [address] [facility], [address] [facility] [place] [phone] Rita Benavides [phone]  [address] [place] [id number]      Phone: [phone]   hydroCHLOROthiazide 12.5 MG tablet  levoFLOXacin 750 MG tablet  predniSONE 20 MG tablet  Trelegy Ellipta 051-83.0-63 MCG/INH Aepb         Discharge recommendations given to patient. Follow Up. in 1 week   Disposition. Home   Activity. As tolerated   Diet: ADULT DIET; Regular; 4 carb choices (60 gm/meal)      Spent > 30  minutes in discharge process. Signed:  HOLGER Encarnacion CNP     2/5/2022 8:48 AM     I spoke with wife Gregor Isaacs  prior to his discharge.   Nebulizer with meds was requested and scripts sent to [facility]

## 2022-02-05 NOTE — PROGRESS NOTES
02/05/22 1228   Resting (Room Air)   SpO2 96   HR 87   During Walk (Room Air)   SpO2 91      Walk/Assistance Device Ambulation   Rate of Dyspnea 0   During Walk (On O2)   Need Additional O2 Flow Rate Rows No   After Walk   SpO2 94   HR 89   Rate of Dyspnea 0   Does the Patient Qualify for Home O2 No   Does the Patient Need Portable Oxygen Tanks No

## 2022-02-06 NOTE — CARE COORDINATION
JUAN ALBERTO spoke to [facility] at [facility] regarding NIV and pt discharge home w/out. DEANA will reach out to Cherylene Reins to facilitate the NIV delivery to home.      Electronically signed by Radha Sr RN on 2/6/2022 at 9:38 AM

## 2022-02-07 ENCOUNTER — TELEPHONE (OUTPATIENT)
Dept: PHARMACY | Age: 62
End: 2022-02-07

## 2022-02-07 ENCOUNTER — TELEPHONE (OUTPATIENT)
Dept: PRIMARY CARE CLINIC | Age: 62
End: 2022-02-07

## 2022-02-07 ENCOUNTER — HOSPITAL ENCOUNTER (OUTPATIENT)
Dept: VASCULAR LAB | Age: 62
Discharge: HOME | End: 2022-02-07
Payer: COMMERCIAL

## 2022-02-07 DIAGNOSIS — I73.9 PERIPHERAL ARTERY DISEASE (HCC): ICD-10-CM

## 2022-02-07 PROCEDURE — 93925 LOWER EXTREMITY STUDY: CPT

## 2022-02-07 NOTE — TELEPHONE ENCOUNTER
Please transfer call to office [address] Transitions Initial Follow Up Call    Outreach made within 2 business days of discharge: Yes    Patient: Samantha Mitchell Patient : 1960   MRN: [id number]  Reason for Admission: There are no discharge diagnoses documented for the most recent discharge.   Discharge Date: 22       Spoke with: Left message on machine per HIPPA    Discharge department/facility: [facility]    Scheduled appointment with PCP within 7-14 days    Follow Up  Future Appointments   Date Time Provider Checo Mathias   2022  2:30 PM SCHEDULE, [facility] VASCULAR ROOM 2 [facility]   2022  9:00 AM [address]   2022  2:45 PM Sreedhar Jimenez MD  VASC/ENDO MMA   2022  8:00 AM Radhika Evans DPM FZ WOUND [facility]   2022  3:15 PM Nitin Jama MD [facility] [place] [facility]   3/11/2022  1:00 PM MD Gail Hobson RD PC [place] - [facility]       Winda Flakes, [place]

## 2022-02-07 NOTE — TELEPHONE ENCOUNTER
Recent hospitalization 2/1-2/5. Canceled 2/4/22 ronan in our center. Discharged with Levaquin for 2 days and prednisone for 3 days. Lab Results   Component Value Date    INR 1.23 (H) 02/05/2022    INR 1.49 (H) 02/04/2022    INR 2.14 (H) 02/03/2022       Warfarin during his stay     2/1 None  2/2 None   2/3 None  2/4 7.5mg  2/5 10mg    No home care. Called and spoke with Joshua Meneses and scheduled for 2/8/22.     Benita Garcia, PharmD, [address] [facility]  Anticoagulation Service  [phone]

## 2022-02-08 ENCOUNTER — ANTI-COAG VISIT (OUTPATIENT)
Dept: PHARMACY | Age: 62
End: 2022-02-08
Payer: COMMERCIAL

## 2022-02-08 ENCOUNTER — TELEPHONE (OUTPATIENT)
Dept: PRIMARY CARE CLINIC | Age: 62
End: 2022-02-08

## 2022-02-08 ENCOUNTER — OFFICE VISIT (OUTPATIENT)
Dept: VASCULAR SURGERY | Age: 62
End: 2022-02-08
Payer: COMMERCIAL

## 2022-02-08 ENCOUNTER — PREP FOR PROCEDURE (OUTPATIENT)
Dept: VASCULAR SURGERY | Age: 62
End: 2022-02-08

## 2022-02-08 VITALS
WEIGHT: 167 LBS | DIASTOLIC BLOOD PRESSURE: 66 MMHG | HEIGHT: 68 IN | SYSTOLIC BLOOD PRESSURE: 118 MMHG | BODY MASS INDEX: 25.31 KG/M2

## 2022-02-08 DIAGNOSIS — I73.9 PERIPHERAL ARTERY DISEASE (HCC): Primary | ICD-10-CM

## 2022-02-08 LAB — INR BLD: 1.9

## 2022-02-08 PROCEDURE — 99211 OFF/OP EST MAY X REQ PHY/QHP: CPT

## 2022-02-08 PROCEDURE — 1036F TOBACCO NON-USER: CPT | Performed by: SURGERY

## 2022-02-08 PROCEDURE — 1111F DSCHRG MED/CURRENT MED MERGE: CPT | Performed by: SURGERY

## 2022-02-08 PROCEDURE — 3017F COLORECTAL CA SCREEN DOC REV: CPT | Performed by: SURGERY

## 2022-02-08 PROCEDURE — G8419 CALC BMI OUT NRM PARAM NOF/U: HCPCS | Performed by: SURGERY

## 2022-02-08 PROCEDURE — G8427 DOCREV CUR MEDS BY ELIG CLIN: HCPCS | Performed by: SURGERY

## 2022-02-08 PROCEDURE — G8484 FLU IMMUNIZE NO ADMIN: HCPCS | Performed by: SURGERY

## 2022-02-08 PROCEDURE — 85610 PROTHROMBIN TIME: CPT

## 2022-02-08 PROCEDURE — 99204 OFFICE O/P NEW MOD 45 MIN: CPT | Performed by: SURGERY

## 2022-02-08 NOTE — TELEPHONE ENCOUNTER
[address] Transitions Initial Follow Up Call    Outreach made within 2 business days of discharge: Yes    Patient: Lonnie Rock Patient : 1960   MRN: [id number]  Reason for Admission: There are no discharge diagnoses documented for the most recent discharge. Discharge Date: 22       Spoke with: HARSH for pt' to call back    Discharge department/facility: [facility]    TCM Interactive Patient Contact:  Was patient able to fill all prescriptions:   Was patient instructed to bring all medications to the follow-up visit:   Is patient taking all medications as directed in the discharge summary?    Does patient understand their discharge instructions:   Does patient have questions or concerns that need addressed prior to 7-14 day follow up office visit:     Scheduled appointment with PCP within 7-14 days    Follow Up  Future Appointments   Date Time Provider Checo Mathias   2022  8:00 AM Corby Soto, Riccardo Bhatti    2022 12:00 PM Wan  CATH LAB [facility] CATH [facility]   2022  2:30 PM Stephanie West MD [facility] [place] [facility]   2022  3:00 PM Jarad Carolina MD PULM & CC MMA   3/4/2022  3:40 PM [facility] MGMT CLINIC [facility] Graham Slater HOD   3/11/2022  1:00 PM MD Familia Schmid RD PC [place] - DYD       Fatoumata Oswaldo, [place]

## 2022-02-08 NOTE — PROGRESS NOTES
Cheryle Garcia is a 65 y.o. here for warfarin management. Shana Arriaga had an INR test today. Results were reviewed and appropriate warfarin management was completed. This visit was performed as: An in person visit. Protocols were followed with precautions to reduce the spread of COVID-19. Patient verifies current dosing regimen: Yes     Warfarin medication reviewed and updated on the patient 's home medication list: Yes   All other medications reviewed and updated on the patient 's home medication list: Yes     Lab Results   Component Value Date    INR 1.90 2022    INR 1.23 (H) 2022    INR 1.49 (H) 2022       Patient Findings     Positives:  Missed doses, Change in medications, Hospital admission    Negatives:  Signs/symptoms of bleeding, Change in diet/appetite, Bruising    Comments:  Hospital admission -. Warfarin held for 2 days while inpatient. Levaquin for 2 days, prednisone for 7 days at discharge  - significant interaction with warfarin  Started Trelegy and hydrochlorothiazide - no interaction with warfarin          Anticoagulation Summary  As of 2022    INR goal:  2.0-3.0   TTR:  56.1 % (8 mo)   INR used for dosin.90 (2022)   Warfarin maintenance plan:  10 mg (5 mg x 2) every Wed, Sat; 7.5 mg (5 mg x 1.5) all other days   Weekly warfarin total:  57.5 mg   Plan last modified:  [facility] (2021)   Next INR check:  3/4/2022   Priority:  Maintenance   Target end date:   Indefinite    Indications    Peripheral artery disease ([facility] [place] [I73.9]             Anticoagulation Episode Summary     INR check location:  Anticoagulation Clinic    Preferred lab:      Send INR reminders to:  [facility]    Comments:  EPIC      Anticoagulation Care Providers     Provider Role Specialty Phone number    Miriam Díaz MD Referring Family Medicine [phone]          Warfarin assessment / plan:     Cheryle Garcia was recently admitted to [facility]. He reports feeling better. I reviewed the chart for warfarin dosing during the stay as well as discharge medication changes. His INR is subtherapeutic today. Likely due to recent hospital stay with doses being held. Denies increased vitamin K intake  Missed dose(s) - warfarin held 2 days during hospitalization due to elevated INR  Medication changes - as noted above    His INR was was just 1.23 2/5 at discharge. So, his INR has come up nicely to 1.9. Has 4 more days of prednisone. Therefore, will do a lower dose of warfarin from his regular dosing schedule until  finished with the prednisone, then resume regular dose, which has worked well for him most recently. Description    Take warfarin 7.5mg (1.5 tablets) DAILY until finished with prednisone  THEN Resume Warfarin 7.5 mg (1 & 1/2 tablets) daily except 10mg (2 tablets) every Wednesday and Saturday    Call [phone] with signs or symptoms of bleeding or ANY medication changes (including over-the-counter medications or herbal supplements). If significant bleeding occurs please seek immediate medical attention. Keep the number of servings of vitamin K containing foods (dark green, leafy vegetables) the same each week. Dark green vegetable 2-3 times a week and a mixed green salad ~ 3 times a week. Please call if this changes. Limit alcohol intake. Please call if this changes. Immunization History   Administered Date(s) Administered    COVID-19, Pfizer Purple top, DILUTE for use, 12+ yrs, 30mcg/0.3mL dose 03/15/2021, 04/12/2021, 12/22/2021             Reviewed AVS with patient / caregiver.       CLINICAL PHARMACY CONSULT: MED RECONCILIATION/REVIEW ADDENDUM    For Pharmacy Admin Tracking Only     Intervention Detail: Dose Adjustment: 1, reason: Therapy Optimization   Total # of Interventions Recommended: 1   Total # of Interventions Accepted: 1   Time Spent (min): 20

## 2022-02-08 NOTE — PROGRESS NOTES
[facility]  Consultation Note    Chief Complaint / Reason for Consultation  PVD    History of Present Illness  Patient is a 65 y.o. male with history of bilateral femoral to posterior tibial artery bypass. His left common femoral artery endarterectomy with bovine patch angioplasty and left femoral to posterior tibial with PTFE was performed on February 29, 2020 .he also has history of right external iliac artery stent. He is referred today from Dr. Glez Officer at the wound clinic with an occluded left lower extremity bypass graft. He has had an ulceration of both feet for the last 2 years. His recent duplex shows occlusion of his left femoral to posterior tibial artery PTFE graft    Review of Systems     Denies fevers, chills, chest pain, shortness of breath, nausea, vomiting, hematemesis, diarrhea, constipation, melena, hematochezia, wt changes, vision problems, blindness, hearing problems, facial droop, slurred speech, extremity weakness, extremity numbness, dysuria.     Past Medical History:   Diagnosis Date    Diabetes mellitus ([facility] [place] [address]     Fibromyalgia     History of DVT (deep vein thrombosis)     Hyperlipidemia     Hypertension        Past Surgical History:   Procedure Laterality Date    APPENDECTOMY  2005    COLONOSCOPY  2016    FEMORAL BYPASS Left 02/26/2020    femoral posterior tibial bypass    FEMORAL-TIBIAL BYPASS GRAFT Right 12/11/2020    with femoral endarterectomy and patch angioplasty    FOOT DEBRIDEMENT Left 09/03/2020    FOOT DEBRIDEMENT Right 2/26/2021    DEBRIDEMENT OF RIGHT FOOT WOUND WITH GRAFT APPLICATION performed by Clara Nick DPM at [address] [facility] Right 12/08/2020    stent leg for PVD       Allergies   Allergen Reactions    Chantix [Varenicline]      Hallucinations         Social History     Socioeconomic History    Marital status:      Spouse name: Not on file    Number of children: Not on file    Years of education: Not on file    Highest education level: Not on file   Occupational History    Not on file   Tobacco Use    Smoking status: Former Smoker     Packs/day: 0.50     Years: 20.00     Pack years: 10.00     Types: Cigarettes     Start date: 1977     Quit date: 2021     Years since quittin.1    Smokeless tobacco: Never Used   Vaping Use    Vaping Use: Never used   Substance and Sexual Activity    Alcohol use: Yes     Alcohol/week: 6.0 standard drinks     Types: 6 Cans of beer per week    Drug use: Never    Sexual activity: Yes     Partners: Female   Other Topics Concern    Not on file   Social History Narrative    Not on file     Social Determinants of Health     Financial Resource Strain: Low Risk     Difficulty of Paying Living Expenses: Not hard at all   Food Insecurity: No Food Insecurity    Worried About [address] in the Last Year: Never true    [facility] in the Last Year: Never true   Transportation Needs: No Transportation Needs    Lack of Transportation (Medical): No    Lack of Transportation (Non-Medical):  No   Physical Activity:     Days of Exercise per Week: Not on file    Minutes of Exercise per Session: Not on file   Stress:     Feeling of Stress : Not on file   Social Connections:     Frequency of Communication with Friends and Family: Not on file    Frequency of Social Gatherings with Friends and Family: Not on file    Attends [employer] Services: Not on file    Active Member of Clubs or Organizations: Not on file    Attends Club or Organization Meetings: Not on file    Marital Status: Not on file   Intimate Partner Violence:     Fear of Current or Ex-Partner: Not on file    Emotionally Abused: Not on file    Physically Abused: Not on file    Sexually Abused: Not on file   Housing Stability:     Unable to Pay for Housing in the Last Year: Not on file    Number of [place] in the Last Year: Not on file    Unstable Housing in the Last Year: Not on file       Family History   Problem Relation Age of Onset    Cancer Mother     Stroke Father      - No history of bleeding or clotting disorders    Vital Signs  There were no vitals filed for this visit. Physical Examination  General:  no apparent distress  Psychiatric: affect appropriate  Head/Eyes/Ears/Nose/Throat:  Atraumatic, vision and hearing intact, face symmetric  Neck:  supple  Chest/Lungs: clear to auscultation bilaterally  Cardiac:  Regular rate and rhythm  Abdomen: soft, nontender  Extremities: Bilateral foot dressings intact. Monophasic distal signals bilaterally    Labs  Lab Results   Component Value Date    WBC 5.8 02/05/2022    HGB 12.5 02/05/2022    HCT 38.8 02/05/2022    MCV 88.1 02/05/2022     02/05/2022     Lab Results   Component Value Date     02/05/2022    K 4.4 02/05/2022    K 4.3 01/04/2022     02/05/2022    CO2 30 02/05/2022    PHOS 4.2 02/05/2022    BUN 19 02/05/2022    CREATININE 0.7 02/05/2022      No results found for: GLU    Imaging:        Right Impression   Right RAN was not available due to wound location. Right distal Common Femoral to Posterior Tibial artery bypass graft appears   patent. Known Superficial Femoral and Popliteal artery occlusion. Elevated velocity of the Profunda artery suggests a greater than 50% stenosis. Peroneal artery was not able to be visualized. Monophasic flow demonstrated in the calf arteries. Left Impression   Left RAN was not available due to inadequate pulses. Left distal Common Femoral to Posterior Tibial artery bypass graft appears   occluded. Known occlusion of the Superficial Femoral artery. Low flow was demonstrated in the proximal Popliteal artery and the Peroneal   artery. Posterior Tibial artery appears occluded. Anterior Tibial artery was not able to be visualized.          Assessment:   PAD      Plan:  76-year-old male with bilateral lower extremity ulcerations and occlusion of his

## 2022-02-09 RX ORDER — SODIUM CHLORIDE 9 MG/ML
25 INJECTION, SOLUTION INTRAVENOUS PRN
Status: CANCELLED | OUTPATIENT
Start: 2022-02-09

## 2022-02-09 RX ORDER — SODIUM CHLORIDE 9 MG/ML
INJECTION, SOLUTION INTRAVENOUS CONTINUOUS
Status: CANCELLED | OUTPATIENT
Start: 2022-02-09

## 2022-02-09 RX ORDER — SODIUM CHLORIDE 0.9 % (FLUSH) 0.9 %
5-40 SYRINGE (ML) INJECTION EVERY 12 HOURS SCHEDULED
Status: CANCELLED | OUTPATIENT
Start: 2022-02-09

## 2022-02-09 RX ORDER — SODIUM CHLORIDE 0.9 % (FLUSH) 0.9 %
5-40 SYRINGE (ML) INJECTION PRN
Status: CANCELLED | OUTPATIENT
Start: 2022-02-09

## 2022-02-11 ENCOUNTER — HOSPITAL ENCOUNTER (OUTPATIENT)
Dept: WOUND CARE | Age: 62
Discharge: HOME | End: 2022-02-11
Payer: COMMERCIAL

## 2022-02-11 VITALS — DIASTOLIC BLOOD PRESSURE: 76 MMHG | RESPIRATION RATE: 16 BRPM | SYSTOLIC BLOOD PRESSURE: 129 MMHG | HEART RATE: 78 BPM

## 2022-02-11 DIAGNOSIS — I73.9 PERIPHERAL ARTERY DISEASE (HCC): Primary | ICD-10-CM

## 2022-02-11 PROCEDURE — 11042 DBRDMT SUBQ TIS 1ST 20SQCM/<: CPT

## 2022-02-11 PROCEDURE — 11043 DBRDMT MUSC&/FSCA 1ST 20/<: CPT

## 2022-02-11 RX ORDER — GENTAMICIN SULFATE 1 MG/G
OINTMENT TOPICAL ONCE
Status: CANCELLED | OUTPATIENT
Start: 2022-02-11 | End: 2022-02-11

## 2022-02-11 RX ORDER — LIDOCAINE 40 MG/G
CREAM TOPICAL ONCE
Status: CANCELLED | OUTPATIENT
Start: 2022-02-11 | End: 2022-02-11

## 2022-02-11 RX ORDER — LIDOCAINE HYDROCHLORIDE 20 MG/ML
JELLY TOPICAL ONCE
Status: CANCELLED | OUTPATIENT
Start: 2022-02-11 | End: 2022-02-11

## 2022-02-11 RX ORDER — CLOBETASOL PROPIONATE 0.5 MG/G
OINTMENT TOPICAL ONCE
Status: CANCELLED | OUTPATIENT
Start: 2022-02-11 | End: 2022-02-11

## 2022-02-11 RX ORDER — BETAMETHASONE DIPROPIONATE 0.05 %
OINTMENT (GRAM) TOPICAL ONCE
Status: CANCELLED | OUTPATIENT
Start: 2022-02-11 | End: 2022-02-11

## 2022-02-11 RX ORDER — BACITRACIN, NEOMYCIN, POLYMYXIN B 400; 3.5; 5 [USP'U]/G; MG/G; [USP'U]/G
OINTMENT TOPICAL ONCE
Status: CANCELLED | OUTPATIENT
Start: 2022-02-11 | End: 2022-02-11

## 2022-02-11 RX ORDER — LIDOCAINE 50 MG/G
OINTMENT TOPICAL ONCE
Status: CANCELLED | OUTPATIENT
Start: 2022-02-11 | End: 2022-02-11

## 2022-02-11 RX ORDER — BACITRACIN ZINC AND POLYMYXIN B SULFATE 500; 1000 [USP'U]/G; [USP'U]/G
OINTMENT TOPICAL ONCE
Status: CANCELLED | OUTPATIENT
Start: 2022-02-11 | End: 2022-02-11

## 2022-02-11 RX ORDER — LIDOCAINE HYDROCHLORIDE 40 MG/ML
SOLUTION TOPICAL ONCE
Status: CANCELLED | OUTPATIENT
Start: 2022-02-11 | End: 2022-02-11

## 2022-02-11 RX ORDER — GINSENG 100 MG
CAPSULE ORAL ONCE
Status: CANCELLED | OUTPATIENT
Start: 2022-02-11 | End: 2022-02-11

## 2022-02-11 RX ORDER — LIDOCAINE 40 MG/G
CREAM TOPICAL ONCE
Status: DISCONTINUED | OUTPATIENT
Start: 2022-02-11 | End: 2022-02-12 | Stop reason: HOSPADM

## 2022-02-11 RX ORDER — GENTAMICIN SULFATE 1 MG/G
CREAM TOPICAL
Qty: 30 G | Refills: 1 | Status: SHIPPED | OUTPATIENT
Start: 2022-02-11 | End: 2022-05-18

## 2022-02-11 ASSESSMENT — PAIN DESCRIPTION - DESCRIPTORS: DESCRIPTORS: RADIATING;DISCOMFORT;ACHING

## 2022-02-11 ASSESSMENT — PAIN DESCRIPTION - ONSET: ONSET: ON-GOING

## 2022-02-11 ASSESSMENT — PAIN DESCRIPTION - PROGRESSION: CLINICAL_PROGRESSION: NOT CHANGED

## 2022-02-11 ASSESSMENT — PAIN DESCRIPTION - LOCATION: LOCATION: ANKLE

## 2022-02-11 ASSESSMENT — PAIN DESCRIPTION - PAIN TYPE: TYPE: CHRONIC PAIN

## 2022-02-11 ASSESSMENT — PAIN DESCRIPTION - FREQUENCY: FREQUENCY: CONTINUOUS

## 2022-02-11 ASSESSMENT — PAIN DESCRIPTION - ORIENTATION: ORIENTATION: LEFT;RIGHT

## 2022-02-11 ASSESSMENT — PAIN - FUNCTIONAL ASSESSMENT: PAIN_FUNCTIONAL_ASSESSMENT: PREVENTS OR INTERFERES SOME ACTIVE ACTIVITIES AND ADLS

## 2022-02-11 ASSESSMENT — PAIN SCALES - GENERAL: PAINLEVEL_OUTOF10: 5

## 2022-02-11 NOTE — PLAN OF CARE
Discharge instructions given. Patient verbalized understanding. Return to [facility] in 1 week(s).   Angiogram next Monday with Dr Shon Fournier

## 2022-02-11 NOTE — PLAN OF CARE
Multilayer Compression Wrap   Below the Knee    NAME:  [address]:  1960  MEDICAL RECORD NUMBER:  [id number]  DATE:  2/11/2022    Multilayer compression wrap: Removed old Multilayer wrap if indicated and wash leg with mild soap/water. Applied moisturizing agent to dry skin as needed. Applied primary and secondary dressing as ordered. Applied multilayered dressing below the knee to right lower leg. Instructed patient/caregiver not to remove dressing and to keep it clean and dry. Instructed patient/caregiver on complications to report to provider, such as pain, numbness in toes, heavy drainage, and slippage of dressing. Instructed patient on purpose of compression dressing and on activity and exercise recommendations.      Applied  2 layer wrap from toes to knee overlapping each time    Electronically signed by Mayank Peña RN on 2/11/2022 at 8:57 AM

## 2022-02-11 NOTE — PROGRESS NOTES
[facility]  Progress Note and Procedure Note      Lazara Allen  AGE: 65 y.o. GENDER: male  : 1960  TODAY'S DATE:  2022    Subjective:     Chief Complaint   Patient presents with    Wound Check     lower extremities         HISTORY of PRESENT ILLNESS HPI     Lazara Allen is a 65 y.o. male who presents today for wound evaluation. History of Wound: Admits to having chronic wounds for at least a year on the left leg and 8 to 9 months on the right leg. He states that he has had arterial bypasses on both of his legs in the past.  He was seeing previous wound care and podiatry for these wounds. He states that his insurance had some issues and he has not been seeing anyone since 2021 he has been treating the wounds himself with PSO and bandages. 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 He has been changing the bandage since the wraps were removed for his vascular studies. Followed up with vascular surgery and has an angiogram scheduled for Monday due to occluded bypass graft. He has no other new complaints.     Wound Pain:  intermittent  Severity:  3 / 10   Wound Type:  venous, arterial and diabetic  Modifying Factors:  edema, venous stasis, lymphedema, diabetes, decreased mobility, arterial insufficiency and decreased tissue oxygenation  Associated Signs/Symptoms:  drainage, numbness and odor        PAST MEDICAL HISTORY        Diagnosis Date    Diabetes mellitus ([address]     Fibromyalgia     History of DVT (deep vein thrombosis)     Hyperlipidemia     Hypertension        PAST SURGICAL HISTORY    Past Surgical History:   Procedure Laterality Date    APPENDECTOMY  2005    COLONOSCOPY  2016    FEMORAL BYPASS Left 2020    femoral posterior tibial bypass    FEMORAL-TIBIAL BYPASS GRAFT Right 2020    with femoral endarterectomy and patch angioplasty    FOOT DEBRIDEMENT Left 2020    FOOT DEBRIDEMENT Right 2021    DEBRIDEMENT OF RIGHT FOOT WOUND WITH GRAFT APPLICATION performed by Abena Amanda DPM at [address] [facility] Right 2020    stent leg for PVD       FAMILY HISTORY    Family History   Problem Relation Age of Onset    Cancer Mother     Stroke Father        SOCIAL HISTORY    Social History     Tobacco Use    Smoking status: Former Smoker     Packs/day: 0.50     Years: 20.00     Pack years: 10.00     Types: Cigarettes     Start date: 1977     Quit date: 2021     Years since quittin.1    Smokeless tobacco: Never Used   Vaping Use    Vaping Use: Never used   Substance Use Topics    Alcohol use:  Yes     Alcohol/week: 6.0 standard drinks     Types: 6 Cans of beer per week    Drug use: Never       ALLERGIES    Allergies   Allergen Reactions    Chantix [Varenicline]      Hallucinations         MEDICATIONS    Current Outpatient Medications on File Prior to Encounter   Medication Sig Dispense Refill    predniSONE (DELTASONE) 20 MG tablet Take 2 tablets by mouth daily for 7 days 7 tablet 0    Fluticasone-Umeclidin-Vilant (TRELEGY ELLIPTA) 200-62.5-25 MCG/INH AEPB Inhale 1 puff into the lungs 2 times daily 1 each 2    hydroCHLOROthiazide (HYDRODIURIL) 12.5 MG tablet Take 1 tablet by mouth every morning 30 tablet 1    albuterol sulfate HFA (VENTOLIN HFA) 108 (90 Base) MCG/ACT inhaler Inhale 2 puffs into the lungs 4 times daily as needed for Wheezing 54 g 1    acetaminophen (TYLENOL) 500 MG tablet Take 1 tablet by mouth 4 times daily as needed for Pain 360 tablet 1    warfarin (JANTOVEN) 5 MG tablet TAKE 1 AND 1/2 TABLETS BY MOUTH ONE TIME A DAY OR AS DIRECTED BY [facility] ([phone]) (Patient taking differently: Take 7.5 mg by mouth daily Except 10mg every Wednesday and Saturday OR AS DIRECTED BY [facility] ([phone])) 45 tablet 0    atorvastatin (LIPITOR) 80 MG tablet Take 1 tablet by mouth daily Cancel atorvastatin 20 mg a day 90 tablet 0    NIFEdipine (ADALAT CC) 60 MG extended release tablet Take 1 tablet by mouth daily 90 tablet 0    carvedilol (COREG) 25 MG tablet Take 1 tablet by mouth 2 times daily (with meals) 60 tablet 3    metFORMIN (GLUCOPHAGE) 500 MG tablet Take 1 tablet by mouth 2 times daily (with meals) 180 tablet 0    fluticasone (FLONASE) 50 MCG/ACT nasal spray 1 spray by Nasal route daily 9.9 g 5    nicotine (NICODERM CQ) 21 MG/24HR Place 1 patch onto the skin every 24 hours 30 patch 2    blood glucose monitor kit and supplies Dispense sufficient amount for indicated testing frequency plus additional to accommodate PRN testing needs. Dispense all needed supplies to include: monitor, strips, lancing device, lancets, control solutions, alcohol swabs. 1 kit 0    Lancets MISC 1 each by Does not apply route daily Test 2 times daily while on PREDNISONE then 1 time daily & as needed for symptoms of irregular blood sugar 100 each 3    blood glucose monitor strips Test 2 times a day while on PREDNISONE, then 1 time daily & as needed for symptoms of irregular blood glucose. Dispense sufficient amount for indicated testing frequency plus additional to accommodate PRN testing needs. 100 strip 3    ferrous sulfate (IRON 325) 325 (65 Fe) MG tablet Take 1 tablet by mouth 2 times daily 180 tablet 0    tadalafil (CIALIS) 5 MG tablet TAKE 1 TABLET BY MOUTH ONE TIME A DAY AS NEEDED FOR ERECTILE DYSFUNCTION 30 tablet 5    becaplermin (REGRANEX) 0.01 % gel Apply 1 Applicatorful topically daily       Vitamins/Minerals TABS Take 1 tablet by mouth daily      aspirin 81 MG chewable tablet CHEW AND SWALLOW 1 TABLET BY MOUTH ONE TIME A DAY (Patient taking differently: CHEW AND SWALLOW 1 TABLET BY MOUTH ONE TIME A DAY  1/7/22 NOT TAKING) 100 tablet 5     No current facility-administered medications on file prior to encounter. REVIEW OF SYSTEMS    Pertinent items are noted in HPI.       Objective:      BP 129/76   Pulse 78   Resp 16     PHYSICAL EXAM    Vascular: Vascular status Impaired  palpable pedal pulses, right DP0/4 and PT1/4, left DP0/4 and PT1/4. CFT 3 seconds digits 1 to 5 bilateral.  Hair growthAbsent  both lower extremities and feet. Skin temperature is warm to warm from pretibial area to distal digits bilateral.  Exam is negative for rubor, pallor, cyanosis or signs of acute vascular compromise bilaterally. Exam is positive for edema bilateral lower extremity. Varicosities Present bilateral lower extremity. Neuro: Neurologic status diminished bilateral with epicritic Absent  , proprioceptive Absent , vibratory sensation Absent  and protopathicPresent. DTRs Absent  bilateral Achilles. There were no reproducible neuritic symptoms on exam bilateral feet/ankles. Derm: Ulceration to bilateral ankles. Ecchymosis Absent  bilateral feet/foot. Musculoskeletal: No pain with debridement of wounds 5/5 muscle strength in/eversion and dorsi/plantarflexion bilateral feet. No gross instability noted. Assessment:     Problem List Items Addressed This Visit     Peripheral artery disease ([facility] [place] [address] - Primary    Relevant Medications    lidocaine (LMX) 4 % cream    Other Relevant Orders    Initiate Outpatient Wound Care Protocol          Procedure Note    Performed by: Noah Pike DPM    Consent obtained: Yes    Time out taken:  Yes    Pain Control: Anesthetic  Anesthetic: 4% Lidocaine Cream     Debridement:Excisional Debridement    Using curette the wound was sharply debrided    down through and including the removal of epidermis, dermis, subcutaneous tissue and muscle/fascia.         Devitalized Tissue Debrided:  fibrin, biofilm, slough, necrotic/eschar and exudate    Pre Debridement Measurements:  Are located in the Wound Documentation Flow Sheet    Wound #: 1     Wound Care Documentation:  Wound 12/31/21 Ankle Right;Medial #1 (Active)   Wound Image    02/04/22 1224   Wound Etiology Diabetic 02/11/22 0803   Dressing Status Clean;Dry; Intact 02/05/22 0909   Wound Cleansed Cleansed with saline; Wound cleanser 02/11/22 0803   Dressing/Treatment Antibacterial ointment;Dry dressing 02/11/22 0822   Offloading for Diabetic Foot Ulcers Post op shoe 01/28/22 0810   Dressing Change Due 02/11/22 02/05/22 0909   Wound Length (cm) 2 cm 02/11/22 0803   Wound Width (cm) 7.2 cm 02/11/22 0803   Wound Depth (cm) 0.1 cm 02/11/22 0803   Wound Surface Area (cm^2) 14.4 cm^2 02/11/22 0803   Change in Wound Size % (l*w) 20 02/11/22 0803   Wound Volume (cm^3) 1.44 cm^3 02/11/22 0803   Wound Healing % 60 02/11/22 0803   Post-Procedure Length (cm) 2 cm 02/11/22 0822   Post-Procedure Width (cm) 7.2 cm 02/11/22 0822   Post-Procedure Depth (cm) 0.1 cm 02/11/22 0822   Post-Procedure Surface Area (cm^2) 14.4 cm^2 02/11/22 0822   Post-Procedure Volume (cm^3) 1.44 cm^3 02/11/22 0822   Wound Assessment Bleeding 02/11/22 0822   Drainage Amount Moderate 02/11/22 0803   Drainage Description Serosanguinous; Yellow 02/11/22 0803   Odor None 02/11/22 0803   Jamila-wound Assessment Intact;Fragile 02/11/22 0803   Margins Undefined edges 02/11/22 0803   Wound Thickness Description not for Pressure Injury Partial thickness 02/04/22 1224   Number of days: 41       Wound 12/31/21 Ankle Medial;Left #2 (Active)   Wound Image   02/04/22 1224   Wound Etiology Diabetic 02/11/22 0803   Dressing Status Clean;Dry; Intact 02/05/22 0909   Wound Cleansed Wound cleanser;Cleansed with saline 02/11/22 0803   Dressing/Treatment Antibacterial ointment;Dry dressing;Triad hydro/zinc oxide-based hydrophilic paste; Ace wrap 02/11/22 0822   Offloading for Diabetic Foot Ulcers Post op shoe 01/28/22 0810   Wound Length (cm) 2 cm 02/11/22 0803   Wound Width (cm) 3.6 cm 02/11/22 0803   Wound Depth (cm) 0.2 cm 02/11/22 0803   Wound Surface Area (cm^2) 7.2 cm^2 02/11/22 0803   Change in Wound Size % (l*w) 4 02/11/22 0803   Wound Volume (cm^3) 1.44 cm^3 02/11/22 0803   Wound Healing % 4 02/11/22 0803   Post-Procedure Length (cm) 2 cm 02/11/22 0822   Post-Procedure Width (cm) 3.6 cm 02/11/22 0822   Post-Procedure Depth (cm) 0.2 cm 02/11/22 0822   Post-Procedure Surface Area (cm^2) 7.2 cm^2 02/11/22 0822   Post-Procedure Volume (cm^3) 1.44 cm^3 02/11/22 0822   Wound Assessment Bleeding 02/11/22 0822   Drainage Amount Moderate 02/11/22 0803   Drainage Description Serosanguinous; Yellow 02/11/22 0803   Odor None 02/11/22 0803   Jamila-wound Assessment Intact 02/11/22 0803   Margins Defined edges 02/11/22 0803   Wound Thickness Description not for Pressure Injury Partial thickness 02/04/22 1224   Number of days: 41         Total Surface Area Debrided: 7.2 cm² left leg into the deep fascia and sq cm 14.4 cm² into the subcutaneous tissue of the right foot    Percentage of wound debrided 100%    Bleeding:  Minimal    Hemostasis Achieved:  by pressure    Procedural Pain:  0  / 10     Post Procedural Pain:  0 / 10     Response to treatment:  Well tolerated by patient. Plan:   Patient examined and evaluated   Wounds debrided without incident   Multilayer compression wrap with triad and gentamicin cream left leg and gentamicin cream with collagen right leg and Lac-Hydrin  Leave dressings in place for 1 week   Keep legs elevated at all times when not active   Continue follow-up with vascular surgery  The nature of the patient's condition was explained in depth. The patient was informed that their compliance to the treatment Plan is paramount to successful healing and prevention of further ulceration and/or infection.   Discharge Treatment Wound 12/31/21 Ankle Right;Medial #1-Dressing/Treatment: Antibacterial ointment,Dry dressing (Epiona)  Wound 12/31/21 Ankle Medial;Left #2-Dressing/Treatment: Antibacterial ointment,Dry dressing,Triad hydro/zinc oxide-based hydrophilic paste,Ace wrapFollow-up 1 week    Written Patient Discharge Instructions Given            Electronically signed by Marco Rangel SHILPA on 2/11/2022 at 9:19 AM

## 2022-02-14 ENCOUNTER — HOSPITAL ENCOUNTER (OUTPATIENT)
Dept: CARDIAC CATH/INVASIVE PROCEDURES | Age: 62
Discharge: HOME | End: 2022-02-14
Attending: SURGERY | Admitting: SURGERY
Payer: COMMERCIAL

## 2022-02-14 VITALS
HEART RATE: 75 BPM | OXYGEN SATURATION: 98 % | HEIGHT: 68 IN | DIASTOLIC BLOOD PRESSURE: 78 MMHG | RESPIRATION RATE: 16 BRPM | BODY MASS INDEX: 25.31 KG/M2 | SYSTOLIC BLOOD PRESSURE: 130 MMHG | WEIGHT: 167 LBS

## 2022-02-14 PROCEDURE — 2500000003 HC RX 250 WO HCPCS

## 2022-02-14 PROCEDURE — 99152 MOD SED SAME PHYS/QHP 5/>YRS: CPT | Performed by: SURGERY

## 2022-02-14 PROCEDURE — 75716 ARTERY X-RAYS ARMS/LEGS: CPT | Performed by: SURGERY

## 2022-02-14 PROCEDURE — 36200 PLACE CATHETER IN AORTA: CPT

## 2022-02-14 PROCEDURE — 6360000002 HC RX W HCPCS

## 2022-02-14 PROCEDURE — 99152 MOD SED SAME PHYS/QHP 5/>YRS: CPT

## 2022-02-14 PROCEDURE — 75625 CONTRAST EXAM ABDOMINL AORTA: CPT | Performed by: SURGERY

## 2022-02-14 PROCEDURE — 2709999900 HC NON-CHARGEABLE SUPPLY

## 2022-02-14 PROCEDURE — 76937 US GUIDE VASCULAR ACCESS: CPT | Performed by: SURGERY

## 2022-02-14 PROCEDURE — C1894 INTRO/SHEATH, NON-LASER: HCPCS

## 2022-02-14 PROCEDURE — 85610 PROTHROMBIN TIME: CPT

## 2022-02-14 PROCEDURE — 75630 X-RAY AORTA LEG ARTERIES: CPT

## 2022-02-14 PROCEDURE — 99153 MOD SED SAME PHYS/QHP EA: CPT

## 2022-02-14 PROCEDURE — 36200 PLACE CATHETER IN AORTA: CPT | Performed by: SURGERY

## 2022-02-14 PROCEDURE — 6360000004 HC RX CONTRAST MEDICATION: Performed by: SURGERY

## 2022-02-14 PROCEDURE — C1769 GUIDE WIRE: HCPCS

## 2022-02-14 PROCEDURE — C1887 CATHETER, GUIDING: HCPCS

## 2022-02-14 RX ORDER — IODIXANOL 320 MG/ML
83 INJECTION, SOLUTION INTRAVASCULAR
Status: COMPLETED | OUTPATIENT
Start: 2022-02-14 | End: 2022-02-14

## 2022-02-14 RX ORDER — SODIUM CHLORIDE 0.9 % (FLUSH) 0.9 %
5-40 SYRINGE (ML) INJECTION PRN
Status: DISCONTINUED | OUTPATIENT
Start: 2022-02-14 | End: 2022-02-14 | Stop reason: HOSPADM

## 2022-02-14 RX ORDER — SODIUM CHLORIDE 9 MG/ML
INJECTION, SOLUTION INTRAVENOUS CONTINUOUS
Status: DISCONTINUED | OUTPATIENT
Start: 2022-02-14 | End: 2022-02-14 | Stop reason: HOSPADM

## 2022-02-14 RX ORDER — SODIUM CHLORIDE 0.9 % (FLUSH) 0.9 %
5-40 SYRINGE (ML) INJECTION EVERY 12 HOURS SCHEDULED
Status: DISCONTINUED | OUTPATIENT
Start: 2022-02-14 | End: 2022-02-14 | Stop reason: HOSPADM

## 2022-02-14 RX ORDER — SODIUM CHLORIDE 9 MG/ML
25 INJECTION, SOLUTION INTRAVENOUS PRN
Status: DISCONTINUED | OUTPATIENT
Start: 2022-02-14 | End: 2022-02-14 | Stop reason: HOSPADM

## 2022-02-14 RX ADMIN — IODIXANOL 83 ML: 320 INJECTION, SOLUTION INTRAVASCULAR at 12:48

## 2022-02-14 NOTE — H&P
H&P Update    I have reviewed the history and physical and examined the patient and find no relevant changes. I have reviewed with the patient and/or family the risks, benefits, and alternatives to the procedure. Pre-sedation Assessment    Patient:  Delphine Mireles   :   1960  Intended Procedure:  Abdominal aortogram with lower extremity evaluation and possible intervention. Carnella Earnest nurses notes reviewed and agreed. Medications reviewed  Allergies:    Allergies   Allergen Reactions    Chantix [Varenicline]      Hallucinations           Pre-Procedure Assessment/Plan:  ASA 3 - Patient with moderate systemic disease with functional limitations  Malampati 3    Level of Sedation Plan:Mild sedation    Post Procedure plan: Return to same level of care      Signed:  Cheli Cox MD, 2022, 12:14 PM

## 2022-02-14 NOTE — OP NOTE
Talked with pt and wife. Ulcer on medial ankle. His PT reconstitutes at the ankle and could consider repeat bypass however close to ankle and would have high risk for infection.    They will discuss and contact me with any additional questions

## 2022-02-14 NOTE — OP NOTE
[address]                     [facility], [address] [facility]                                OPERATIVE REPORT    PATIENT NAME: Faotumata Rosado                    :        1960  MED REC NO:   [id number]                          ROOM:  ACCOUNT NO:   [de-identified]                           ADMIT DATE: 2022  PROVIDER:     Crow Garcia MD    DATE OF PROCEDURE:  2022    PREPROCEDURE DIAGNOSIS:  Left lower extremity ulceration. POSTPROCEDURE DIAGNOSIS:  Left lower extremity ulceration. PROCEDURE:  1. Ultrasound-guided right common femoral artery access. 2.  Abdominal aortogram with bilateral lower extremity runoff. SURGEON:  Crow Garcia MD    ANESTHESIA:  Local/IV. ESTIMATED BLOOD LOSS:  Minimal.    COMPLICATIONS:  None. INDICATIONS:  The patient is a 61-year-old male with a history of  bilateral femoral to tibial artery bypass. He has developed ulceration  of his left foot and presents today for further angiographic evaluation  and possible intervention. All risks, benefits, and alternatives were  discussed in detail. All questions were answered. PROCEDURE:  After witnessed inform consent was obtained, the patient was  brought to cath lab where under my supervision Versed and fentanyl were  administered intravenously for moderate sedation. Pulse oximetry, heart  rate and blood pressure were monitored by an independent trained  observer that was present. I spent 28 minutes of face-to-face sedation  time with the patient. His right groin was carefully prepped and  draped. Ultrasound was used to identify the right common femoral artery  which was patent. An image was saved to the patient's permanent medical  record. Under direct visualization, it was accessed with a 4-Bulgarian  micropuncture needle. Bentson wire was introduced and a 5-Bulgarian sheath  was placed.   An Omni Flush catheter was inserted to the level of renal  arteries and an abdominal aortogram was performed. It was pulled back  to the level of the aortic bifurcation. Oblique iliac views and a  bilateral lower extremity runoff was performed. With this then done,  the procedure was terminated, given the extensive nature of the arterial  disease in the left lower extremity. Manual pressure was held. He  tolerated the procedure well and was transferred to the recovery room in  stable condition. FINDINGS:  1. Abdominal aortogram:  Right and left renal patent. Infrarenal  abdominal aorta is patent. Common iliac, external and internal iliac  arteries are patent. There is a patent stent in the right common iliac  artery. 2.  Right lower extremity runoff:  Right common femoral and profunda are  patent. SFA is occluded as well as the popliteal.  There is a bypass  from the right femoral to the mid calf posterior tibial artery with a  single-vessel runoff that is widely patent into the right foot. 3.  Left lower extremity runoff:  Left common femoral has evidence of  previous endarterectomy which is patent. Profunda is widely patent. The SFA and popliteal are completely occluded. There is no visible  bypass graft. There does appear to be reconstitution of the posterior  tibial just proximal to the ankle. The anterior tibial is also seen;  however, there is limited contrast evaluation, but does appear to runoff  into the dorsalis pedis into the left foot. PLAN:  The patient with an occluded left femoral to tibial bypass. The  patient currently has an ulceration to the ankle on the right as well as  a left medial ankle ulceration. We will have discussions with the  patient regarding possible anterior tibial exploration and if patent  with intraoperative angiogram, would consider left femoral to anterior  tibial bypass. His posterior tibial does reconstitute but is near his ulceration. It would be the most direct inline flow.   Could consider posterior tibial exploration in hopes of left femoral to PT bypass. This was discussed with the patient and his wife. They will contact me with their plans.       Yee Monteiro MD    D: 02/14/2022 12:51:00       T: 02/14/2022 12:53:18     [email or web address]  Job#: [id number]     Doc#: [id number]    CC:

## 2022-02-16 LAB — INR BLD: 1.2 (ref 0.86–1.14)

## 2022-02-18 ENCOUNTER — OFFICE VISIT (OUTPATIENT)
Dept: INTERNAL MEDICINE CLINIC | Age: 62
End: 2022-02-18
Payer: COMMERCIAL

## 2022-02-18 ENCOUNTER — HOSPITAL ENCOUNTER (OUTPATIENT)
Dept: WOUND CARE | Age: 62
Discharge: HOME | End: 2022-02-18
Payer: COMMERCIAL

## 2022-02-18 VITALS
SYSTOLIC BLOOD PRESSURE: 132 MMHG | OXYGEN SATURATION: 97 % | WEIGHT: 165 LBS | DIASTOLIC BLOOD PRESSURE: 70 MMHG | HEART RATE: 84 BPM | HEIGHT: 68 IN | BODY MASS INDEX: 25.01 KG/M2

## 2022-02-18 VITALS
RESPIRATION RATE: 18 BRPM | SYSTOLIC BLOOD PRESSURE: 124 MMHG | TEMPERATURE: 96.9 F | DIASTOLIC BLOOD PRESSURE: 77 MMHG | HEART RATE: 82 BPM

## 2022-02-18 DIAGNOSIS — D64.9 ANEMIA, NORMOCYTIC NORMOCHROMIC: ICD-10-CM

## 2022-02-18 DIAGNOSIS — E78.00 PURE HYPERCHOLESTEROLEMIA: ICD-10-CM

## 2022-02-18 DIAGNOSIS — E11.59 TYPE 2 DIABETES MELLITUS WITH OTHER CIRCULATORY COMPLICATION, WITHOUT LONG-TERM CURRENT USE OF INSULIN (HCC): ICD-10-CM

## 2022-02-18 DIAGNOSIS — I73.9 PERIPHERAL ARTERY DISEASE (HCC): ICD-10-CM

## 2022-02-18 DIAGNOSIS — I10 ESSENTIAL HYPERTENSION: ICD-10-CM

## 2022-02-18 DIAGNOSIS — I73.9 PERIPHERAL ARTERY DISEASE (HCC): Primary | ICD-10-CM

## 2022-02-18 DIAGNOSIS — I73.9 PAD (PERIPHERAL ARTERY DISEASE) (HCC): ICD-10-CM

## 2022-02-18 DIAGNOSIS — I10 ESSENTIAL HYPERTENSION: Primary | ICD-10-CM

## 2022-02-18 DIAGNOSIS — E55.9 VITAMIN D DEFICIENCY: ICD-10-CM

## 2022-02-18 DIAGNOSIS — Z12.11 COLON CANCER SCREENING: ICD-10-CM

## 2022-02-18 DIAGNOSIS — J43.1 PANLOBULAR EMPHYSEMA (HCC): ICD-10-CM

## 2022-02-18 DIAGNOSIS — I82.412 ACUTE DEEP VEIN THROMBOSIS (DVT) OF FEMORAL VEIN OF LEFT LOWER EXTREMITY (HCC): ICD-10-CM

## 2022-02-18 PROBLEM — I83.009 VENOUS ULCER (HCC): Status: ACTIVE | Noted: 2019-12-03

## 2022-02-18 PROBLEM — I87.2 VENOUS INSUFFICIENCY: Status: ACTIVE | Noted: 2019-12-03

## 2022-02-18 PROBLEM — L97.329 NON-HEALING ULCER OF LEFT ANKLE (HCC): Status: ACTIVE | Noted: 2022-02-18

## 2022-02-18 PROBLEM — K63.5 COLON POLYP: Status: ACTIVE | Noted: 2017-01-20

## 2022-02-18 PROBLEM — L97.909 VENOUS ULCER (HCC): Status: ACTIVE | Noted: 2019-12-03

## 2022-02-18 PROBLEM — N52.9 IMPOTENCE: Status: ACTIVE | Noted: 2022-02-18

## 2022-02-18 PROBLEM — J96.00 ACUTE RESPIRATORY FAILURE (HCC): Status: RESOLVED | Noted: 2021-12-31 | Resolved: 2022-02-18

## 2022-02-18 LAB
A/G RATIO: 1.7 (ref 1.1–2.2)
ALBUMIN SERPL-MCNC: 4.3 G/DL (ref 3.4–5)
ALP BLD-CCNC: 96 U/L (ref 40–129)
ALT SERPL-CCNC: 32 U/L (ref 10–40)
ANION GAP SERPL CALCULATED.3IONS-SCNC: 16 MMOL/L (ref 3–16)
AST SERPL-CCNC: 22 U/L (ref 15–37)
BASOPHILS ABSOLUTE: 0 K/UL (ref 0–0.2)
BASOPHILS RELATIVE PERCENT: 0.9 %
BILIRUB SERPL-MCNC: 0.3 MG/DL (ref 0–1)
BUN BLDV-MCNC: 14 MG/DL (ref 7–20)
CALCIUM SERPL-MCNC: 9.4 MG/DL (ref 8.3–10.6)
CHLORIDE BLD-SCNC: 98 MMOL/L (ref 99–110)
CHOLESTEROL, TOTAL: 140 MG/DL (ref 0–199)
CO2: 27 MMOL/L (ref 21–32)
CREAT SERPL-MCNC: 0.7 MG/DL (ref 0.8–1.3)
EOSINOPHILS ABSOLUTE: 0.1 K/UL (ref 0–0.6)
EOSINOPHILS RELATIVE PERCENT: 1.4 %
FERRITIN: 141.3 NG/ML (ref 30–400)
FOLATE: 11.99 NG/ML (ref 4.78–24.2)
GFR AFRICAN AMERICAN: >60
GFR NON-AFRICAN AMERICAN: >60
GLUCOSE BLD-MCNC: 83 MG/DL (ref 70–99)
HCT VFR BLD CALC: 40.2 % (ref 40.5–52.5)
HDLC SERPL-MCNC: 71 MG/DL (ref 40–60)
HEMOGLOBIN: 12.9 G/DL (ref 13.5–17.5)
IMMATURE RETIC FRACT: 0.34 (ref 0.21–0.37)
IRON SATURATION: 23 % (ref 20–50)
IRON: 77 UG/DL (ref 59–158)
LDL CHOLESTEROL CALCULATED: 48 MG/DL
LYMPHOCYTES ABSOLUTE: 1.5 K/UL (ref 1–5.1)
LYMPHOCYTES RELATIVE PERCENT: 30.9 %
MCH RBC QN AUTO: 28.3 PG (ref 26–34)
MCHC RBC AUTO-ENTMCNC: 32.2 G/DL (ref 31–36)
MCV RBC AUTO: 88.1 FL (ref 80–100)
MONOCYTES ABSOLUTE: 0.5 K/UL (ref 0–1.3)
MONOCYTES RELATIVE PERCENT: 9.7 %
NEUTROPHILS ABSOLUTE: 2.8 K/UL (ref 1.7–7.7)
NEUTROPHILS RELATIVE PERCENT: 57.1 %
PDW BLD-RTO: 15.7 % (ref 12.4–15.4)
PLATELET # BLD: 231 K/UL (ref 135–450)
PMV BLD AUTO: 7.8 FL (ref 5–10.5)
POTASSIUM SERPL-SCNC: 3.8 MMOL/L (ref 3.5–5.1)
RBC # BLD: 4.56 M/UL (ref 4.2–5.9)
RETICULOCYTE ABSOLUTE COUNT: 0.06 M/UL
RETICULOCYTE COUNT PCT: 1.34 % (ref 0.5–2.18)
SODIUM BLD-SCNC: 141 MMOL/L (ref 136–145)
TOTAL IRON BINDING CAPACITY: 340 UG/DL (ref 260–445)
TOTAL PROTEIN: 6.9 G/DL (ref 6.4–8.2)
TRIGL SERPL-MCNC: 105 MG/DL (ref 0–150)
VITAMIN B-12: 660 PG/ML (ref 211–911)
VITAMIN D 25-HYDROXY: 11 NG/ML
VLDLC SERPL CALC-MCNC: 21 MG/DL
WBC # BLD: 4.8 K/UL (ref 4–11)

## 2022-02-18 PROCEDURE — 1036F TOBACCO NON-USER: CPT | Performed by: INTERNAL MEDICINE

## 2022-02-18 PROCEDURE — 3023F SPIROM DOC REV: CPT | Performed by: INTERNAL MEDICINE

## 2022-02-18 PROCEDURE — 2022F DILAT RTA XM EVC RTNOPTHY: CPT | Performed by: INTERNAL MEDICINE

## 2022-02-18 PROCEDURE — 3017F COLORECTAL CA SCREEN DOC REV: CPT | Performed by: INTERNAL MEDICINE

## 2022-02-18 PROCEDURE — 11043 DBRDMT MUSC&/FSCA 1ST 20/<: CPT

## 2022-02-18 PROCEDURE — G8427 DOCREV CUR MEDS BY ELIG CLIN: HCPCS | Performed by: INTERNAL MEDICINE

## 2022-02-18 PROCEDURE — 1111F DSCHRG MED/CURRENT MED MERGE: CPT | Performed by: INTERNAL MEDICINE

## 2022-02-18 PROCEDURE — G8484 FLU IMMUNIZE NO ADMIN: HCPCS | Performed by: INTERNAL MEDICINE

## 2022-02-18 PROCEDURE — G8419 CALC BMI OUT NRM PARAM NOF/U: HCPCS | Performed by: INTERNAL MEDICINE

## 2022-02-18 PROCEDURE — 3044F HG A1C LEVEL LT 7.0%: CPT | Performed by: INTERNAL MEDICINE

## 2022-02-18 PROCEDURE — 99214 OFFICE O/P EST MOD 30 MIN: CPT | Performed by: INTERNAL MEDICINE

## 2022-02-18 PROCEDURE — 11042 DBRDMT SUBQ TIS 1ST 20SQCM/<: CPT

## 2022-02-18 RX ORDER — CLOBETASOL PROPIONATE 0.5 MG/G
OINTMENT TOPICAL ONCE
Status: CANCELLED | OUTPATIENT
Start: 2022-02-18 | End: 2022-02-18

## 2022-02-18 RX ORDER — BACITRACIN, NEOMYCIN, POLYMYXIN B 400; 3.5; 5 [USP'U]/G; MG/G; [USP'U]/G
OINTMENT TOPICAL ONCE
Status: CANCELLED | OUTPATIENT
Start: 2022-02-18 | End: 2022-02-18

## 2022-02-18 RX ORDER — BETAMETHASONE DIPROPIONATE 0.05 %
OINTMENT (GRAM) TOPICAL ONCE
Status: CANCELLED | OUTPATIENT
Start: 2022-02-18 | End: 2022-02-18

## 2022-02-18 RX ORDER — LIDOCAINE HYDROCHLORIDE 20 MG/ML
JELLY TOPICAL ONCE
Status: CANCELLED | OUTPATIENT
Start: 2022-02-18 | End: 2022-02-18

## 2022-02-18 RX ORDER — LIDOCAINE 40 MG/G
CREAM TOPICAL ONCE
Status: CANCELLED | OUTPATIENT
Start: 2022-02-18 | End: 2022-02-18

## 2022-02-18 RX ORDER — BACITRACIN ZINC AND POLYMYXIN B SULFATE 500; 1000 [USP'U]/G; [USP'U]/G
OINTMENT TOPICAL ONCE
Status: CANCELLED | OUTPATIENT
Start: 2022-02-18 | End: 2022-02-18

## 2022-02-18 RX ORDER — GENTAMICIN SULFATE 1 MG/G
OINTMENT TOPICAL ONCE
Status: CANCELLED | OUTPATIENT
Start: 2022-02-18 | End: 2022-02-18

## 2022-02-18 RX ORDER — FLUTICASONE FUROATE, UMECLIDINIUM BROMIDE AND VILANTEROL TRIFENATATE 200; 62.5; 25 UG/1; UG/1; UG/1
1 POWDER RESPIRATORY (INHALATION) 2 TIMES DAILY
Qty: 1 EACH | Refills: 2 | Status: SHIPPED | OUTPATIENT
Start: 2022-02-18 | End: 2022-07-20

## 2022-02-18 RX ORDER — GINSENG 100 MG
CAPSULE ORAL ONCE
Status: CANCELLED | OUTPATIENT
Start: 2022-02-18 | End: 2022-02-18

## 2022-02-18 RX ORDER — LIDOCAINE 40 MG/G
CREAM TOPICAL ONCE
Status: DISCONTINUED | OUTPATIENT
Start: 2022-02-18 | End: 2022-02-19 | Stop reason: HOSPADM

## 2022-02-18 RX ORDER — LIDOCAINE HYDROCHLORIDE 40 MG/ML
SOLUTION TOPICAL ONCE
Status: CANCELLED | OUTPATIENT
Start: 2022-02-18 | End: 2022-02-18

## 2022-02-18 RX ORDER — ATORVASTATIN CALCIUM 80 MG/1
80 TABLET, FILM COATED ORAL DAILY
Qty: 90 TABLET | Refills: 0 | Status: SHIPPED | OUTPATIENT
Start: 2022-02-18 | End: 2022-06-13 | Stop reason: SDUPTHER

## 2022-02-18 RX ORDER — LIDOCAINE 50 MG/G
OINTMENT TOPICAL ONCE
Status: CANCELLED | OUTPATIENT
Start: 2022-02-18 | End: 2022-02-18

## 2022-02-18 SDOH — ECONOMIC STABILITY: FOOD INSECURITY: WITHIN THE PAST 12 MONTHS, THE FOOD YOU BOUGHT JUST DIDN'T LAST AND YOU DIDN'T HAVE MONEY TO GET MORE.: NEVER TRUE

## 2022-02-18 SDOH — ECONOMIC STABILITY: FOOD INSECURITY: WITHIN THE PAST 12 MONTHS, YOU WORRIED THAT YOUR FOOD WOULD RUN OUT BEFORE YOU GOT MONEY TO BUY MORE.: NEVER TRUE

## 2022-02-18 ASSESSMENT — PATIENT HEALTH QUESTIONNAIRE - PHQ9
SUM OF ALL RESPONSES TO PHQ QUESTIONS 1-9: 0
SUM OF ALL RESPONSES TO PHQ QUESTIONS 1-9: 0
1. LITTLE INTEREST OR PLEASURE IN DOING THINGS: 0
SUM OF ALL RESPONSES TO PHQ9 QUESTIONS 1 & 2: 0
SUM OF ALL RESPONSES TO PHQ QUESTIONS 1-9: 0
2. FEELING DOWN, DEPRESSED OR HOPELESS: 0
SUM OF ALL RESPONSES TO PHQ QUESTIONS 1-9: 0

## 2022-02-18 ASSESSMENT — PAIN SCALES - GENERAL: PAINLEVEL_OUTOF10: 5

## 2022-02-18 ASSESSMENT — PAIN DESCRIPTION - PAIN TYPE: TYPE: CHRONIC PAIN

## 2022-02-18 ASSESSMENT — PAIN DESCRIPTION - LOCATION: LOCATION: ANKLE

## 2022-02-18 ASSESSMENT — PAIN DESCRIPTION - ORIENTATION: ORIENTATION: RIGHT;LEFT

## 2022-02-18 ASSESSMENT — SOCIAL DETERMINANTS OF HEALTH (SDOH): HOW HARD IS IT FOR YOU TO PAY FOR THE VERY BASICS LIKE FOOD, HOUSING, MEDICAL CARE, AND HEATING?: NOT HARD AT ALL

## 2022-02-18 NOTE — PROGRESS NOTES
[facility]   Progress Note and Procedure Note      Linsey Moreno  AGE: 65 y.o. GENDER: male  : 1960  TODAY'S DATE:  2022    Subjective:     Chief Complaint   Patient presents with    Wound Check     Right lower leg, Left lower leg         HISTORY of PRESENT ILLNESS HPI     Linsey Moreno is a 65 y.o. male who presents today for wound evaluation. History of Wound: Admits to having chronic wounds for at least a year on the left leg and 8 to 9 months on the right leg. He states that he has had arterial bypasses on both of his legs in the past.  He was seeing previous wound care and podiatry for these wounds. He states that his insurance had some issues and he has not been seeing anyone since 2021 he has been treating the wounds himself with PSO and bandages. 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 He had his angiogram since his last visit. He states that Dr. Blane Ramos was unable to get any more blood flow to the left leg. And that he needs to plan for up bypass procedure to provide blood flow to the area of the wound. He had no problems with the dressings last week. He would like to continue the same dressings. He has no other complaints at this time.     Wound Pain:  intermittent  Severity:  3 / 10   Wound Type:  venous, arterial and diabetic  Modifying Factors:  edema, venous stasis, lymphedema, diabetes, decreased mobility, arterial insufficiency and decreased tissue oxygenation  Associated Signs/Symptoms:  drainage, numbness and odor        PAST MEDICAL HISTORY        Diagnosis Date    Diabetes mellitus ([address]     Fibromyalgia     History of DVT (deep vein thrombosis)     Hyperlipidemia     Hypertension        PAST SURGICAL HISTORY    Past Surgical History:   Procedure Laterality Date    APPENDECTOMY  2005    COLONOSCOPY  2016    FEMORAL BYPASS Left 2020    femoral posterior tibial bypass    FEMORAL-TIBIAL BYPASS GRAFT Right 2020    with femoral endarterectomy and patch angioplasty    FOOT DEBRIDEMENT Left 2020    FOOT DEBRIDEMENT Right 2021    DEBRIDEMENT OF RIGHT FOOT WOUND WITH GRAFT APPLICATION performed by Osito Lee DPM at [facility] Right 2020    stent leg for PVD       FAMILY HISTORY    Family History   Problem Relation Age of Onset    Cancer Mother     Stroke Father        SOCIAL HISTORY    Social History     Tobacco Use    Smoking status: Former Smoker     Packs/day: 0.50     Years: 20.00     Pack years: 10.00     Types: Cigarettes     Start date: 1977     Quit date: 2021     Years since quittin.1    Smokeless tobacco: Never Used   Vaping Use    Vaping Use: Never used   Substance Use Topics    Alcohol use: Yes     Alcohol/week: 6.0 standard drinks     Types: 6 Cans of beer per week    Drug use: Never       ALLERGIES    Allergies   Allergen Reactions    Chantix [Varenicline]      Hallucinations         MEDICATIONS    Current Outpatient Medications on File Prior to Encounter   Medication Sig Dispense Refill    gentamicin (GARAMYCIN) 0.1 % cream Apply topically   daily.  30 g 1    Fluticasone-Umeclidin-Vilant (TRELEGY ELLIPTA) 200-62.5-25 MCG/INH AEPB Inhale 1 puff into the lungs 2 times daily 1 each 2    hydroCHLOROthiazide (HYDRODIURIL) 12.5 MG tablet Take 1 tablet by mouth every morning 30 tablet 1    albuterol sulfate HFA (VENTOLIN HFA) 108 (90 Base) MCG/ACT inhaler Inhale 2 puffs into the lungs 4 times daily as needed for Wheezing 54 g 1    acetaminophen (TYLENOL) 500 MG tablet Take 1 tablet by mouth 4 times daily as needed for Pain 360 tablet 1    warfarin (JANTOVEN) 5 MG tablet TAKE 1 AND 1/2 TABLETS BY MOUTH ONE TIME A DAY OR AS DIRECTED BY [facility] ([phone]) (Patient taking differently: Take 7.5 mg by mouth daily Except 10mg every Wednesday and Saturday OR AS DIRECTED BY [facility] ([phone])) 45 tablet 0    atorvastatin (LIPITOR) 80 MG tablet Take 1 tablet by mouth daily Cancel atorvastatin 20 mg a day 90 tablet 0    NIFEdipine (ADALAT CC) 60 MG extended release tablet Take 1 tablet by mouth daily 90 tablet 0    carvedilol (COREG) 25 MG tablet Take 1 tablet by mouth 2 times daily (with meals) 60 tablet 3    metFORMIN (GLUCOPHAGE) 500 MG tablet Take 1 tablet by mouth 2 times daily (with meals) 180 tablet 0    fluticasone (FLONASE) 50 MCG/ACT nasal spray 1 spray by Nasal route daily 9.9 g 5    nicotine (NICODERM CQ) 21 MG/24HR Place 1 patch onto the skin every 24 hours 30 patch 2    blood glucose monitor kit and supplies Dispense sufficient amount for indicated testing frequency plus additional to accommodate PRN testing needs. Dispense all needed supplies to include: monitor, strips, lancing device, lancets, control solutions, alcohol swabs. 1 kit 0    Lancets MISC 1 each by Does not apply route daily Test 2 times daily while on PREDNISONE then 1 time daily & as needed for symptoms of irregular blood sugar 100 each 3    blood glucose monitor strips Test 2 times a day while on PREDNISONE, then 1 time daily & as needed for symptoms of irregular blood glucose. Dispense sufficient amount for indicated testing frequency plus additional to accommodate PRN testing needs.  100 strip 3    ferrous sulfate (IRON 325) 325 (65 Fe) MG tablet Take 1 tablet by mouth 2 times daily 180 tablet 0    tadalafil (CIALIS) 5 MG tablet TAKE 1 TABLET BY MOUTH ONE TIME A DAY AS NEEDED FOR ERECTILE DYSFUNCTION 30 tablet 5    becaplermin (REGRANEX) 0.01 % gel Apply 1 Applicatorful topically daily       Vitamins/Minerals TABS Take 1 tablet by mouth daily      aspirin 81 MG chewable tablet CHEW AND SWALLOW 1 TABLET BY MOUTH ONE TIME A DAY (Patient taking differently: CHEW AND SWALLOW 1 TABLET BY MOUTH ONE TIME A DAY  1/7/22 NOT TAKING) 100 tablet 5     No current facility-administered medications on file prior to encounter. REVIEW OF SYSTEMS    Pertinent items are noted in HPI. Objective:      /77   Pulse 82   Temp 96.9 °F (36.1 °C) (Infrared)   Resp 18     PHYSICAL EXAM    Vascular: Vascular status Impaired  palpable pedal pulses, right DP0/4 and PT1/4, left DP0/4 and PT1/4. CFT 3 seconds digits 1 to 5 bilateral.  Hair growthAbsent  both lower extremities and feet. Skin temperature is warm to warm from pretibial area to distal digits bilateral.  Exam is negative for rubor, pallor, cyanosis or signs of acute vascular compromise bilaterally. Exam is positive for edema bilateral lower extremity. Varicosities Present bilateral lower extremity. Neuro: Neurologic status diminished bilateral with epicritic Absent  , proprioceptive Absent , vibratory sensation Absent  and protopathicPresent. DTRs Absent  bilateral Achilles. There were no reproducible neuritic symptoms on exam bilateral feet/ankles. Derm: Ulceration to bilateral ankles. Ecchymosis Absent  bilateral feet/foot. Musculoskeletal: No pain with debridement of wounds 5/5 muscle strength in/eversion and dorsi/plantarflexion bilateral feet. No gross instability noted. Assessment:     Problem List Items Addressed This Visit     Peripheral artery disease ([facility] [address] - Primary    Relevant Medications    lidocaine (LMX) 4 % cream    Other Relevant Orders    Initiate Outpatient Wound Care Protocol          Procedure Note    Performed by: Torito Sandhu DPM    Consent obtained: Yes    Time out taken:  Yes    Pain Control: Anesthetic  Anesthetic: 4% Lidocaine Cream     Debridement:Excisional Debridement    Using curette the wound was sharply debrided    down through and including the removal of epidermis, dermis, subcutaneous tissue and muscle/fascia.         Devitalized Tissue Debrided:  fibrin, biofilm, slough, necrotic/eschar and exudate    Pre Debridement Measurements:  Are located in the Wound Documentation Flow Sheet    Wound #: 1     Wound Care Documentation:  Wound 12/31/21 Ankle Right;Medial #1 (Active)   Wound Image    02/04/22 1224   Wound Etiology Diabetic 02/18/22 0808   Dressing Status Clean;Dry; Intact 02/05/22 0909   Wound Cleansed Wound cleanser 02/18/22 0808   Dressing/Treatment Antibacterial ointment;Dry dressing 02/11/22 0822   Offloading for Diabetic Foot Ulcers Offloading not required 02/18/22 0808   Dressing Change Due 02/11/22 02/05/22 0909   Wound Length (cm) 1 cm 02/18/22 0808   Wound Width (cm) 1.3 cm 02/18/22 0808   Wound Depth (cm) 0.1 cm 02/18/22 0808   Wound Surface Area (cm^2) 1.3 cm^2 02/18/22 0808   Change in Wound Size % (l*w) 92.78 02/18/22 0808   Wound Volume (cm^3) 0.13 cm^3 02/18/22 0808   Wound Healing % 96 02/18/22 0808   Post-Procedure Length (cm) 1 cm 02/18/22 0811   Post-Procedure Width (cm) 1.3 cm 02/18/22 0811   Post-Procedure Depth (cm) 0.1 cm 02/18/22 0811   Post-Procedure Surface Area (cm^2) 1.3 cm^2 02/18/22 0811   Post-Procedure Volume (cm^3) 0.13 cm^3 02/18/22 0811   Wound Assessment Bleeding 02/18/22 0811   Drainage Amount Small 02/18/22 0808   Drainage Description Green 02/18/22 0808   Odor None 02/18/22 0808   Jamila-wound Assessment Dry/flaky 02/18/22 0808   Margins Attached edges 02/18/22 0808   Wound Thickness Description not for Pressure Injury Partial thickness 02/04/22 1224   Number of days: 48       Wound 12/31/21 Ankle Medial;Left #2 (Active)   Wound Image   02/04/22 1224   Wound Etiology Diabetic 02/18/22 0811   Dressing Status Clean;Dry; Intact 02/05/22 0909   Wound Cleansed Cleansed with saline 02/18/22 8065   Dressing/Treatment Antibacterial ointment;Dry dressing;Triad hydro/zinc oxide-based hydrophilic paste; Ace wrap 02/11/22 0822   Offloading for Diabetic Foot Ulcers Offloading not required 02/18/22 0811   Wound Length (cm) 2 cm 02/18/22 0811   Wound Width (cm) 3.6 cm 02/18/22 0811   Wound Depth (cm) 0.2 cm 02/18/22 0811   Wound Surface Area (cm^2) 7.2 cm^2 02/18/22 0811   Change in Wound Size % (l*w) 4 02/18/22 0811   Wound Volume (cm^3) 1.44 cm^3 02/18/22 0811   Wound Healing % 4 02/18/22 0811   Post-Procedure Length (cm) 2 cm 02/18/22 0839   Post-Procedure Width (cm) 3.6 cm 02/18/22 0839   Post-Procedure Depth (cm) 0.2 cm 02/18/22 0839   Post-Procedure Surface Area (cm^2) 7.2 cm^2 02/18/22 0839   Post-Procedure Volume (cm^3) 1.44 cm^3 02/18/22 0839   Wound Assessment Bleeding 02/18/22 0839   Drainage Amount Moderate 02/18/22 0811   Drainage Description Yellow 02/18/22 0811   Odor None 02/18/22 0811   Jamila-wound Assessment Intact 02/18/22 0811   Margins Attached edges 02/18/22 0811   Wound Thickness Description not for Pressure Injury Partial thickness 02/04/22 1224   Number of days: 48         Total Surface Area Debrided: 7.2 cm² left leg into the deep fascia and sq cm 1.3 cm² into the subcutaneous tissue of the right foot    Percentage of wound debrided 100%    Bleeding:  Minimal    Hemostasis Achieved:  by pressure    Procedural Pain:  0  / 10     Post Procedural Pain:  0 / 10     Response to treatment:  Well tolerated by patient. Plan:   Patient examined and evaluated   Wounds debrided without incident   Multilayer compression wrap with triad and gentamicin cream left leg and gentamicin cream with collagen right leg and Lac-Hydrin  Leave dressings in place for 1 week   Keep legs elevated at all times when not active   Continue follow-up with vascular surgery for decision on wounds for improving the blood flow to the distal left leg. The nature of the patient's condition was explained in depth. The patient was informed that their compliance to the treatment Plan is paramount to successful healing and prevention of further ulceration and/or infection.   Discharge Treatment  Follow-up 1 week    Written Patient Discharge Instructions Given            Electronically signed by Rock Sierra DPM on 2/18/2022 at 10:28 AM

## 2022-02-18 NOTE — PLAN OF CARE
Discharge instructions given. Patient verbalized understanding. Return to [address] [place] [address],[address] in 1 week(s).   Reviewed angio

## 2022-02-18 NOTE — PROGRESS NOTES
Multilayer Compression Wrap   Below the Knee    NAME:  Devora [address]:  1960  MEDICAL RECORD NUMBER:  [id number]  DATE:  2/18/2022    Multilayer compression wrap: Removed old Multilayer wrap if indicated and wash leg with mild soap/water. Applied primary and secondary dressing as ordered. Applied multilayered dressing below the knee to right lower leg. Instructed patient/caregiver not to remove dressing and to keep it clean and dry. Instructed patient/caregiver on complications to report to provider, such as pain, numbness in toes, heavy drainage, and slippage of dressing. Instructed patient on purpose of compression dressing and on activity and exercise recommendations.      Applied  2 layer wrap from toes to knee overlapping each time    Electronically signed by Frank Cranker, RN on 2/18/2022 at 9:01 AM

## 2022-02-18 NOTE — PROGRESS NOTES
baseline labs and monitor clinically    Patient also had a 4 cm polyp that had removed with a hemicolectomy it was 5 years ago and I feel the patient will need to go back to GI to get a colonoscopy redone    Patient has a normocytic anemia which is chronic again check his markers and make sure that is staying stable    Patient diabetes is fairly controlled again check his A1c and monitor clinically    Patient is following with the Coumadin clinic for his PT/INR he understand that he needs to be between 2 and 3    Patient has peripheral vascular disease and venous insufficiency is following up with vascular surgery for potential bypass surgery    Chronic ulceration is being followed by wound clinic and he is improving progressively    Reviewed with him his imaging and testing  Return in about 3 months (around 5/18/2022).          Subjective   SUBJECTIVE/OBJECTIVE:    Lab Review   Lab Results   Component Value Date     02/05/2022     02/04/2022     02/03/2022    K 4.4 02/05/2022    K 4.9 02/04/2022    K 4.9 02/03/2022    K 4.3 01/04/2022    K 4.6 01/03/2022    K 5.0 01/02/2022    CO2 30 02/05/2022    CO2 30 02/04/2022    CO2 29 02/03/2022    BUN 19 02/05/2022    BUN 18 02/04/2022    BUN 14 02/03/2022    CREATININE 0.7 02/05/2022    CREATININE 0.7 02/04/2022    CREATININE 0.6 02/03/2022    GLUCOSE 131 02/05/2022    GLUCOSE 149 02/04/2022    GLUCOSE 175 02/03/2022    CALCIUM 9.3 02/05/2022    CALCIUM 9.4 02/04/2022    CALCIUM 9.0 02/03/2022     Lab Results   Component Value Date    WBC 5.8 02/05/2022    WBC 5.0 02/04/2022    WBC 5.3 02/03/2022    HGB 12.5 02/05/2022    HGB 12.1 02/04/2022    HGB 12.4 02/03/2022    HCT 38.8 02/05/2022    HCT 37.3 02/04/2022    HCT 38.8 02/03/2022    MCV 88.1 02/05/2022    MCV 87.9 02/04/2022    MCV 88.4 02/03/2022     02/05/2022     02/04/2022     02/03/2022     Lab Results   Component Value Date    CHOL 134 03/08/2021    CHOL 255 03/29/2010    TRIG 167 01/01/2022    TRIG 131 12/31/2021    TRIG 117 03/08/2021    HDL 46 03/08/2021    HDL 60 03/29/2010       Vitals 2/18/2022 2/18/2022 3/18/9680   SYSTOLIC 832 359 548   DIASTOLIC 70 80 82   Site Right Upper Arm - -   Position Sitting - -   Pulse - - 84   Temp - - -   Resp - - -   SpO2 - - 97   Weight - - 165 lb   Height - - 5' 8\"   Body mass index - - 25.09 kg/m2   Pain Level - - -   Some recent data might be hidden       Diabetes  He presents for his follow-up diabetic visit. He has type 2 diabetes mellitus. His disease course has been stable. Hypertension  This is a chronic problem. The problem is unchanged. Review of Systems       Objective   Physical Exam  Constitutional:       General: He is not in acute distress. Appearance: Normal appearance. HENT:      Head: Normocephalic and atraumatic. Right Ear: Tympanic membrane normal.      Left Ear: Tympanic membrane normal.      Nose: Nose normal.   Eyes:      Extraocular Movements: Extraocular movements intact. Conjunctiva/sclera: Conjunctivae normal.      Pupils: Pupils are equal, round, and reactive to light. Neck:      Vascular: No carotid bruit. Cardiovascular:      Rate and Rhythm: Normal rate and regular rhythm. Pulses: Normal pulses. Heart sounds: No murmur heard. Pulmonary:      Effort: Pulmonary effort is normal. No respiratory distress. Breath sounds: Normal breath sounds. Abdominal:      General: Abdomen is flat. Bowel sounds are normal. There is no distension. Palpations: Abdomen is soft. Tenderness: There is no abdominal tenderness. Musculoskeletal:         General: No swelling, tenderness or deformity. Cervical back: Normal range of motion and neck supple. No rigidity or tenderness. Right lower leg: No edema. Left lower leg: No edema. Lymphadenopathy:      Cervical: No cervical adenopathy. Skin:     Coloration: Skin is not jaundiced.       Findings: No bruising, erythema

## 2022-02-19 LAB
ESTIMATED AVERAGE GLUCOSE: 134.1 MG/DL
HBA1C MFR BLD: 6.3 %

## 2022-02-21 ENCOUNTER — TELEPHONE (OUTPATIENT)
Dept: INTERNAL MEDICINE CLINIC | Age: 62
End: 2022-02-21

## 2022-02-21 NOTE — TELEPHONE ENCOUNTER
What should the patient do in the mean time? 1 inhaler only has 30 puffs and pt.  Takes 2 puffs daily so it only last for 15 days

## 2022-02-21 NOTE — TELEPHONE ENCOUNTER
I think the problem is he was prescribed it at a higher dose probably because of his severe lung disease, the standard dose is 1 inhalation a day that is why it has 30    For the time being what we can do switch him over to [facility] which is the more important components of the inhaler and see if the insurance will pay for it for now and then try to get the Trelegy approved by his insurance at the higher dose    I have already sent the prescription of Anoro to the pharmacy

## 2022-02-21 NOTE — TELEPHONE ENCOUNTER
----- Message from Audelia Morales sent at 2/21/2022 10:42 AM EST -----  Subject: Message to Provider    QUESTIONS  Information for Provider? Pt is taking Fluticasone-Umeclidin-Vilant   (William Singh) 601-24.8-89 MCG/INH AEPB. He is going to be out tomorrow   and his insurance will not refill until March 3. He is asking if you have   any samples. He also is wanting to see if his Lipator has been refilled. Please call pt to advise.   ---------------------------------------------------------------------------  --------------  CALL BACK INFO  What is the best way for the office to contact you? OK to leave message on   voicemail  Preferred Call Back Phone Number? [phone]  ---------------------------------------------------------------------------  --------------  SCRIPT ANSWERS  Relationship to Patient?  Self

## 2022-02-21 NOTE — TELEPHONE ENCOUNTER
Please let the patient his Lipitor has been sent and unfortunately we do not have samples of the inhaler

## 2022-02-22 ENCOUNTER — TELEPHONE (OUTPATIENT)
Dept: INTERNAL MEDICINE CLINIC | Age: 62
End: 2022-02-22

## 2022-02-22 NOTE — TELEPHONE ENCOUNTER
Patient states Fluticasone-Umeclidin-Vilant (Don Patricia) 259-41.3-19 MCG/INH AEPB needs a prior authorization. He states phone number to call for PA is #[phone]. Patient asked if office can do right away. He will be out of medication tomorrow.

## 2022-02-23 NOTE — TELEPHONE ENCOUNTER
Submitted PA for Arnie Rodríguez 200-62.5-25MCG/INH aerosol powder. Key: Connie Bellamy. Via CMM STATUS: PA not needed. Will scan letter when received. If this requires a response please respond to the pool ( P [facility] [address]). Thank you please advise patient.

## 2022-02-24 ENCOUNTER — OFFICE VISIT (OUTPATIENT)
Dept: PULMONOLOGY | Age: 62
End: 2022-02-24
Payer: COMMERCIAL

## 2022-02-24 VITALS
HEART RATE: 100 BPM | SYSTOLIC BLOOD PRESSURE: 158 MMHG | WEIGHT: 163 LBS | OXYGEN SATURATION: 95 % | HEIGHT: 68 IN | BODY MASS INDEX: 24.71 KG/M2 | DIASTOLIC BLOOD PRESSURE: 72 MMHG

## 2022-02-24 DIAGNOSIS — J43.1 PANLOBULAR EMPHYSEMA (HCC): Primary | ICD-10-CM

## 2022-02-24 DIAGNOSIS — J96.11 CHRONIC RESPIRATORY FAILURE WITH HYPOXIA AND HYPERCAPNIA (HCC): ICD-10-CM

## 2022-02-24 DIAGNOSIS — Z87.891 HISTORY OF TOBACCO ABUSE: ICD-10-CM

## 2022-02-24 DIAGNOSIS — J96.12 CHRONIC RESPIRATORY FAILURE WITH HYPOXIA AND HYPERCAPNIA (HCC): ICD-10-CM

## 2022-02-24 PROCEDURE — G8420 CALC BMI NORM PARAMETERS: HCPCS | Performed by: INTERNAL MEDICINE

## 2022-02-24 PROCEDURE — 94664 DEMO&/EVAL PT USE INHALER: CPT | Performed by: INTERNAL MEDICINE

## 2022-02-24 PROCEDURE — G8428 CUR MEDS NOT DOCUMENT: HCPCS | Performed by: INTERNAL MEDICINE

## 2022-02-24 PROCEDURE — 1036F TOBACCO NON-USER: CPT | Performed by: INTERNAL MEDICINE

## 2022-02-24 PROCEDURE — 3023F SPIROM DOC REV: CPT | Performed by: INTERNAL MEDICINE

## 2022-02-24 PROCEDURE — 3017F COLORECTAL CA SCREEN DOC REV: CPT | Performed by: INTERNAL MEDICINE

## 2022-02-24 PROCEDURE — 99214 OFFICE O/P EST MOD 30 MIN: CPT | Performed by: INTERNAL MEDICINE

## 2022-02-24 PROCEDURE — G8484 FLU IMMUNIZE NO ADMIN: HCPCS | Performed by: INTERNAL MEDICINE

## 2022-02-24 PROCEDURE — 1111F DSCHRG MED/CURRENT MED MERGE: CPT | Performed by: INTERNAL MEDICINE

## 2022-02-24 RX ORDER — PREDNISONE 20 MG/1
40 TABLET ORAL DAILY
Qty: 14 TABLET | Refills: 0 | Status: SHIPPED | OUTPATIENT
Start: 2022-02-24 | End: 2022-03-03

## 2022-02-24 RX ORDER — GUAIFENESIN 600 MG/1
1200 TABLET, EXTENDED RELEASE ORAL 2 TIMES DAILY
Qty: 28 TABLET | Refills: 0 | Status: SHIPPED | OUTPATIENT
Start: 2022-02-24 | End: 2022-03-03

## 2022-02-24 NOTE — PROGRESS NOTES
PULMONARY OFFICE FOLLOW UP NOTE    REASON FOR VISIT:   Chief Complaint   Patient presents with    Follow-Up from Hospital     copd exacerbation       DATE OF VISIT: 2/24/2022     HISTORY OF PRESENT ILLNESS: 65y.o. year old male comes into the pulmonary office for follow-up. Patient reports that his breathing is stable for the most part. Still having dyspnea on exertion. Ran out of Trelegy Ellipta because mistakingly he was told that he has to take it twice a day. Unable to get a refill from the pharmacy. Using only albuterol. Continues to have chest congestion. Using his noninvasive ventilator regularly every night. Has given up smoking. Not needing oxygen during the daytime. Denies any fever, night sweats or discolored expectoration. No hemoptysis, anorexia or weight loss. REVIEW OF SYSTEMS: 14 point ROS is negative beside mentioned in [address]      PAST MEDICAL HISTORY:   Past Medical History:   Diagnosis Date    Diabetes mellitus ([address]     Fibromyalgia     History of DVT (deep vein thrombosis)     Hyperlipidemia     Hypertension     Type 2 diabetes mellitus with circulatory disorder, without long-term current use of insulin ([facility] [address] 2/18/2022       PAST SURGICAL HISTORY:   Past Surgical History:   Procedure Laterality Date    APPENDECTOMY  2005    COLONOSCOPY  2016    FEMORAL BYPASS Left 02/26/2020    femoral posterior tibial bypass    FEMORAL-TIBIAL BYPASS GRAFT Right 12/11/2020    with femoral endarterectomy and patch angioplasty    FOOT DEBRIDEMENT Left 09/03/2020    FOOT DEBRIDEMENT Right 2/26/2021    DEBRIDEMENT OF RIGHT FOOT WOUND WITH GRAFT APPLICATION performed by Shellie Alvares DPM at [facility] Right 12/08/2020    stent leg for PVD       SOCIAL HISTORY:   Social History     Tobacco Use    Smoking status: Former Smoker     Packs/day: 0.50     Years: 20.00     Pack years: 10.00     Types: Cigarettes     Start date: 12/28/1977     Quit date: 2021     Years since quittin.1    Smokeless tobacco: Never Used   Vaping Use    Vaping Use: Never used   Substance Use Topics    Alcohol use: Yes     Alcohol/week: 6.0 standard drinks     Types: 6 Cans of beer per week    Drug use: Never       FAMILY HISTORY:   Family History   Problem Relation Age of Onset    Cancer Mother     Lung Cancer Mother     Diabetes Father     Stroke Father        MEDICATIONS:     Current Outpatient Medications on File Prior to Visit   Medication Sig Dispense Refill    collagenase (SANTYL) 250 UNIT/GM ointment Apply topically daily. Wound size 2x3.6 sq cm. Supply for 30 days 60 g 1    atorvastatin (LIPITOR) 80 MG tablet Take 1 tablet by mouth daily Cancel atorvastatin 20 mg a day 90 tablet 0    gentamicin (GARAMYCIN) 0.1 % cream Apply topically   daily.  30 g 1    hydroCHLOROthiazide (HYDRODIURIL) 12.5 MG tablet Take 1 tablet by mouth every morning 30 tablet 1    albuterol sulfate HFA (VENTOLIN HFA) 108 (90 Base) MCG/ACT inhaler Inhale 2 puffs into the lungs 4 times daily as needed for Wheezing 54 g 1    acetaminophen (TYLENOL) 500 MG tablet Take 1 tablet by mouth 4 times daily as needed for Pain 360 tablet 1    warfarin (JANTOVEN) 5 MG tablet TAKE 1 AND 1/2 TABLETS BY MOUTH ONE TIME A DAY OR AS DIRECTED BY [facility] ([phone]) (Patient taking differently: Take 7.5 mg by mouth daily Except 10mg every Wednesday and Saturday OR AS DIRECTED BY [facility] ([phone])) 45 tablet 0    NIFEdipine (ADALAT CC) 60 MG extended release tablet Take 1 tablet by mouth daily 90 tablet 0    carvedilol (COREG) 25 MG tablet Take 1 tablet by mouth 2 times daily (with meals) 60 tablet 3    metFORMIN (GLUCOPHAGE) 500 MG tablet Take 1 tablet by mouth 2 times daily (with meals) 180 tablet 0    fluticasone (FLONASE) 50 MCG/ACT nasal spray 1 spray by Nasal route daily 9.9 g 5    nicotine (NICODERM CQ) 21 MG/24HR Place 1 patch onto the skin every 24 hours 30 patch 2    blood glucose monitor kit and supplies Dispense sufficient amount for indicated testing frequency plus additional to accommodate PRN testing needs. Dispense all needed supplies to include: monitor, strips, lancing device, lancets, control solutions, alcohol swabs. 1 kit 0    Lancets MISC 1 each by Does not apply route daily Test 2 times daily while on PREDNISONE then 1 time daily & as needed for symptoms of irregular blood sugar 100 each 3    blood glucose monitor strips Test 2 times a day while on PREDNISONE, then 1 time daily & as needed for symptoms of irregular blood glucose. Dispense sufficient amount for indicated testing frequency plus additional to accommodate PRN testing needs. 100 strip 3    ferrous sulfate (IRON 325) 325 (65 Fe) MG tablet Take 1 tablet by mouth 2 times daily 180 tablet 0    tadalafil (CIALIS) 5 MG tablet TAKE 1 TABLET BY MOUTH ONE TIME A DAY AS NEEDED FOR ERECTILE DYSFUNCTION 30 tablet 5    Vitamins/Minerals TABS Take 1 tablet by mouth daily      aspirin 81 MG chewable tablet CHEW AND SWALLOW 1 TABLET BY MOUTH ONE TIME A DAY (Patient taking differently: CHEW AND SWALLOW 1 TABLET BY MOUTH ONE TIME A DAY  1/7/22 NOT TAKING) 100 tablet 5    umeclidinium-vilanterol (ANORO ELLIPTA) 62.5-25 MCG/INH AEPB inhaler Inhale 1 puff into the lungs daily (Patient not taking: Reported on 2/24/2022) 1 each 0    Fluticasone-Umeclidin-Vilant (TRELEGY ELLIPTA) 200-62.5-25 MCG/INH AEPB Inhale 1 puff into the lungs 2 times daily (Patient not taking: Reported on 2/24/2022) 1 each 2     No current facility-administered medications on file prior to visit. ALLERGIES:   Allergies as of 02/24/2022 - Fully Reviewed 02/24/2022   Allergen Reaction Noted    Chantix [varenicline]  03/08/2021      OBJECTIVE:   height is 5' 8\" (1.727 m) and weight is 163 lb (73.9 kg). His blood pressure is 158/72 (abnormal) and his pulse is 100. His oxygen saturation is 95%.        PHYSICAL EXAM:    CONSTITUTIONAL: He is a 65y.o.-year-old who appears his stated age. He is alert and oriented x 3 and in no acute distress. HEENT: PERRL. No scleral icterus. No thrush, atraumatic, normocephalic. NECK: Supple, without cervical or supraclavicular lymphadenopathy:  CARDIOVASCULAR: S1 S2 RRR. Without murmer  RESPIRATORY & CHEST: Bilateral scattered wheezing heard. GASTROINTESTINAL & ABDOMEN: Soft, nontender, positive bowel sounds in all quadrants, non-distended, without hepatosplenomegaly. GENITOURINARY: Deferred. MUSCULOSKELETAL: No tenderness to palpation of the axial skeleton. There is no clubbing. No cyanosis. No edema of the lower extremities. SKIN OF BODY: No rash or jaundice. PSYCHIATRIC EVALUATION: Normal affect. Patient answers questions appropriately. HEMATOLOGIC/LYMPHATIC/ IMMUNOLOGIC: No palpable lymphadenopathy. NEUROLOGIC: Alert and oriented x 3. Groslly non-focal. Motor strength is 5+/5 in all muscle groups. The patient has a normal sensorium globally. Labs:    Lab Summary Latest Ref Rng & Units 2/18/2022 2/5/2022 2/4/2022   WBC 4.0 - 11.0 K/uL 4.8 5.8 5.0   Hgb 13.5 - 17.5 g/dL 12. 9(L) 12. 5(L) 12. 1(L)   Hct 40.5 - 52.5 % 40. 2(L) 38. 8(L) 37. 3(L)   Platelets 537 - 201 K/uL 231 287 276   Sodium 136 - 145 mmol/L 141 139 137   Potassium 3.5 - 5.1 mmol/L 3.8 4.4 4.9   BUN 7 - 20 mg/dL 14 19 18   Creatinine 0.8 - 1.3 mg/dL 0.7(L) 0.7(L) 0.7(L)   Glucose 70 - 99 mg/dL 83 131(H) 149(H)   Calcium 8.3 - 10.6 mg/dL 9.4 9.3 9.4   Magnesium 1.80 - 2.40 mg/dL - 1.70(L) 1.80   Phosphorus 2.5 - 4.9 mg/dL - 4.2 2.6   Alk Phos 40 - 129 U/L 96 81 89   Albumin 3.4 - 5.0 g/dL 4.3 3.8 3.9   Bilirubin 0.0 - 1.0 mg/dL 0.3 0.4 0.3   AST 15 - 37 U/L 22 16 19   ALT 10 - 40 U/L 32 21 20   HDL cholesterol 40 - 60 mg/dL 71(H) - -   Triglycerides 0 - 150 mg/dL 105 - -   LDL calc <100 mg/dL 48 - -   VLDL calc Not Established mg/dL 21 - -   Some recent data might be hidden       All labs were personally reviewed by me any my interpretation is: CBC is anemia. Chem 8 is dyslipidemia. IMAGING:    Narrative   EXAMINATION:   ONE XRAY VIEW OF THE CHEST       2/1/2022 10:48 pm           FINDINGS:   There is chronic pulmonary change.  There is no acute consolidation effusion. There is no pneumothorax.  The mediastinal structures are unremarkable.  The   upper abdomen unremarkable.  The extrathoracic soft tissues are unremarkable.           Impression   Chronic pulmonary change without acute cardiopulmonary process.               Pulmonary Functions Testing Results:    Pulmonary Function Testing       Patient name:  Julio C Gibson     [facility] Unit #:   [id number]   Date of test:  1/25/2022  Date of interpretation:   1/27/2022     Mr. Julio C Gibson is a 62y.o. year-old non smoker. The spirometry data were acceptable and reproducible.      Spirometry:  Flow volume loops were obstructed. The FEV-1/FVC ratio was decreased. The FEV-1 was 1.83 liters (63% of predicted), which was moderately decreased. The FVC was 3.07 liters (82% of predicted), which was normal. Response to inhaled bronchodilators (albuterol) was not significant.     Lung volumes:  Lung volumes were tested by plethysmography. The total lung capacity was 5.11 liters (83% of predicted), which was normal. The residual volume was 2.04 liters (91% of predicted), which was normal. The ratio of residual volume to total lung capacity (RV/TLC) was 40, which was normal.      Diffusion capacity was found to be 37% which is Severely decreased.       Interpretation:  Moderate obstructive airway disease with no significant bronchodilator reversibility    IMPRESSION AND RECOMMENDATIONS:     1. Panlobular emphysema ([address]  -Patient has moderate COPD which frequently exacerbates. -Currently having chest congestion most likely due to running out of Trelegy Ellipta.   -I will give him samples of Trelegy Ellipta to help him tide over till he can get a refill.  -Again explained the correct way of taking the Trelegy Ellipta. Patient was instructed in the use of DPI Demonstration Device. Patient understood instructions and is aware to call the office if they have any problems or questions. Time spent was 5 mins. -I will give him a 7-day course of prednisone 40 mg p.o. daily.  -Additionally he will also take Mucinex 600 mg twice a day for next 7 days. 2. History of tobacco abuse  -Strongly urged the patient to stay quit from smoking. 3. Chronic respiratory failure with hypoxia and hypercapnia (HCC)  -Patient is now using noninvasive ventilator every night.  -His compliance is good. -Usage of noninvasive ventilator will prevent further worsening of his chronic hypercapnic respiratory failure.  -Patient planning to file for disability. Return in about 6 months (around 8/24/2022) for COPD. Masoud Lozano MD  Pulmonary Critical Care and Sleep Medicine  2/24/2022, 3:26 PM    This note was completed using dragon medical speech recognition software. Grammatical errors, random word insertions, pronoun errors and incomplete sentences are occasional consequences of this technology due to software limitations. If there are questions or concerns about the content of this note of information contained within the body of this dictation they should be addressed with the provider for clarification.

## 2022-02-25 ENCOUNTER — HOSPITAL ENCOUNTER (OUTPATIENT)
Dept: WOUND CARE | Age: 62
Discharge: HOME | End: 2022-02-25
Payer: COMMERCIAL

## 2022-02-25 VITALS — SYSTOLIC BLOOD PRESSURE: 104 MMHG | DIASTOLIC BLOOD PRESSURE: 71 MMHG | HEART RATE: 92 BPM

## 2022-02-25 DIAGNOSIS — I73.9 PERIPHERAL ARTERY DISEASE (HCC): Primary | ICD-10-CM

## 2022-02-25 PROCEDURE — 11043 DBRDMT MUSC&/FSCA 1ST 20/<: CPT

## 2022-02-25 PROCEDURE — 11042 DBRDMT SUBQ TIS 1ST 20SQCM/<: CPT

## 2022-02-25 RX ORDER — LIDOCAINE 40 MG/G
CREAM TOPICAL ONCE
Status: DISCONTINUED | OUTPATIENT
Start: 2022-02-25 | End: 2022-02-26 | Stop reason: HOSPADM

## 2022-02-25 RX ORDER — GENTAMICIN SULFATE 1 MG/G
OINTMENT TOPICAL ONCE
Status: CANCELLED | OUTPATIENT
Start: 2022-02-25 | End: 2022-02-25

## 2022-02-25 RX ORDER — BACITRACIN, NEOMYCIN, POLYMYXIN B 400; 3.5; 5 [USP'U]/G; MG/G; [USP'U]/G
OINTMENT TOPICAL ONCE
Status: CANCELLED | OUTPATIENT
Start: 2022-02-25 | End: 2022-02-25

## 2022-02-25 RX ORDER — CLOBETASOL PROPIONATE 0.5 MG/G
OINTMENT TOPICAL ONCE
Status: CANCELLED | OUTPATIENT
Start: 2022-02-25 | End: 2022-02-25

## 2022-02-25 RX ORDER — LIDOCAINE HYDROCHLORIDE 40 MG/ML
SOLUTION TOPICAL ONCE
Status: CANCELLED | OUTPATIENT
Start: 2022-02-25 | End: 2022-02-25

## 2022-02-25 RX ORDER — LIDOCAINE 50 MG/G
OINTMENT TOPICAL ONCE
Status: CANCELLED | OUTPATIENT
Start: 2022-02-25 | End: 2022-02-25

## 2022-02-25 RX ORDER — BACITRACIN ZINC AND POLYMYXIN B SULFATE 500; 1000 [USP'U]/G; [USP'U]/G
OINTMENT TOPICAL ONCE
Status: CANCELLED | OUTPATIENT
Start: 2022-02-25 | End: 2022-02-25

## 2022-02-25 RX ORDER — LIDOCAINE HYDROCHLORIDE 20 MG/ML
JELLY TOPICAL ONCE
Status: CANCELLED | OUTPATIENT
Start: 2022-02-25 | End: 2022-02-25

## 2022-02-25 RX ORDER — LIDOCAINE 40 MG/G
CREAM TOPICAL ONCE
Status: CANCELLED | OUTPATIENT
Start: 2022-02-25 | End: 2022-02-25

## 2022-02-25 RX ORDER — BETAMETHASONE DIPROPIONATE 0.05 %
OINTMENT (GRAM) TOPICAL ONCE
Status: CANCELLED | OUTPATIENT
Start: 2022-02-25 | End: 2022-02-25

## 2022-02-25 RX ORDER — GINSENG 100 MG
CAPSULE ORAL ONCE
Status: CANCELLED | OUTPATIENT
Start: 2022-02-25 | End: 2022-02-25

## 2022-02-25 ASSESSMENT — PAIN DESCRIPTION - FREQUENCY: FREQUENCY: CONTINUOUS

## 2022-02-25 ASSESSMENT — PAIN - FUNCTIONAL ASSESSMENT: PAIN_FUNCTIONAL_ASSESSMENT: ACTIVITIES ARE NOT PREVENTED

## 2022-02-25 ASSESSMENT — PAIN DESCRIPTION - DESCRIPTORS: DESCRIPTORS: THROBBING

## 2022-02-25 ASSESSMENT — PAIN DESCRIPTION - ORIENTATION: ORIENTATION: LEFT

## 2022-02-25 ASSESSMENT — PAIN DESCRIPTION - LOCATION: LOCATION: LEG

## 2022-02-25 ASSESSMENT — PAIN SCALES - GENERAL: PAINLEVEL_OUTOF10: 5

## 2022-02-25 ASSESSMENT — PAIN DESCRIPTION - PROGRESSION: CLINICAL_PROGRESSION: NOT CHANGED

## 2022-02-25 ASSESSMENT — PAIN DESCRIPTION - PAIN TYPE: TYPE: CHRONIC PAIN

## 2022-02-25 ASSESSMENT — PAIN DESCRIPTION - ONSET: ONSET: ON-GOING

## 2022-02-25 NOTE — PLAN OF CARE
Discharge instructions given. Patient verbalized understanding. Return to [facility] in 1 week(s). Awaiting decision on Bypass OR.  Pt filing for diability

## 2022-02-25 NOTE — PROGRESS NOTES
Multilayer Compression Wrap   Below the Knee    NAME:  Jj Walters OF BIRTH:  1960  MEDICAL RECORD NUMBER:  [id number]  DATE:  2/25/2022    Multilayer compression wrap: Applied moisturizing agent to dry skin as needed. Applied primary and secondary dressing as ordered. Applied multilayered dressing below the knee to right lower leg. Instructed patient/caregiver not to remove dressing and to keep it clean and dry. Instructed patient/caregiver on complications to report to provider, such as pain, numbness in toes, heavy drainage, and slippage of dressing. Instructed patient on purpose of compression dressing and on activity and exercise recommendations.      Applied  2 layer wrap from toes to knee overlapping each time    Electronically signed by Jen Craig RN on 2/25/2022 at 8:47 AM

## 2022-02-25 NOTE — PROGRESS NOTES
Iris Quiroz  Progress Note and Procedure Note      Tiarra Cruz  AGE: 65 y.o. GENDER: male  : 1960  TODAY'S DATE:  2022    Subjective:     Chief Complaint   Patient presents with    Wound Check     both legs F/U         HISTORY of PRESENT ILLNESS HPI     Tiarra Cruz is a 65 y.o. male who presents today for wound evaluation. History of Wound: Admits to having chronic wounds for at least a year on the left leg and 8 to 9 months on the right leg. He states that he has had arterial bypasses on both of his legs in the past.  He was seeing previous wound care and podiatry for these wounds. He states that his insurance had some issues and he has not been seeing anyone since 2021 he has been treating the wounds himself with PSO and bandages. 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 He has not decided what he wants to do for his vascular arterial treatment due to the occluded bypass. He is currently trying to get on disability due to his multiple medical conditions. He is getting the bandage change as directed on the left side and left had a bandage intact on the right. He is currently happy with his treatment. He admits to swelling in his leg at work especially on the left.   Wound Pain:  intermittent  Severity:  3 / 10   Wound Type:  venous, arterial and diabetic  Modifying Factors:  edema, venous stasis, lymphedema, diabetes, decreased mobility, arterial insufficiency and decreased tissue oxygenation  Associated Signs/Symptoms:  drainage, numbness and odor        PAST MEDICAL HISTORY        Diagnosis Date    Diabetes mellitus ([address]     Fibromyalgia     History of DVT (deep vein thrombosis)     Hyperlipidemia     Hypertension     Type 2 diabetes mellitus with circulatory disorder, without long-term current use of insulin ([address] 2022       PAST SURGICAL HISTORY    Past Surgical History:   Procedure Laterality Date    APPENDECTOMY  2005    COLONOSCOPY  2016    FEMORAL BYPASS Left 2020    femoral posterior tibial bypass    FEMORAL-TIBIAL BYPASS GRAFT Right 2020    with femoral endarterectomy and patch angioplasty    FOOT DEBRIDEMENT Left 2020    FOOT DEBRIDEMENT Right 2021    DEBRIDEMENT OF RIGHT FOOT WOUND WITH GRAFT APPLICATION performed by Lisa Bailey DPM at [facility] Right 2020    stent leg for PVD       FAMILY HISTORY    Family History   Problem Relation Age of Onset    Cancer Mother     Lung Cancer Mother     Diabetes Father     Stroke Father        SOCIAL HISTORY    Social History     Tobacco Use    Smoking status: Former Smoker     Packs/day: 0.50     Years: 20.00     Pack years: 10.00     Types: Cigarettes     Start date: 1977     Quit date: 2021     Years since quittin.1    Smokeless tobacco: Never Used   Vaping Use    Vaping Use: Never used   Substance Use Topics    Alcohol use: Yes     Alcohol/week: 6.0 standard drinks     Types: 6 Cans of beer per week    Drug use: Never       ALLERGIES    Allergies   Allergen Reactions    Chantix [Varenicline]      Hallucinations         MEDICATIONS    Current Outpatient Medications on File Prior to Encounter   Medication Sig Dispense Refill    predniSONE (DELTASONE) 20 MG tablet Take 2 tablets by mouth daily for 7 days 14 tablet 0    guaiFENesin (MUCINEX) 600 MG extended release tablet Take 2 tablets by mouth 2 times daily for 7 days 28 tablet 0    umeclidinium-vilanterol (ANORO ELLIPTA) 62.5-25 MCG/INH AEPB inhaler Inhale 1 puff into the lungs daily (Patient not taking: Reported on 2022) 1 each 0    collagenase (SANTYL) 250 UNIT/GM ointment Apply topically daily. Wound size 2x3.6 sq cm.  Supply for 30 days 60 g 1    atorvastatin (LIPITOR) 80 MG tablet Take 1 tablet by mouth daily Cancel atorvastatin 20 mg a day 90 tablet 0    Fluticasone-Umeclidin-Vilant (TRELEGY ELLIPTA) 200-62.5-25 MCG/INH AEPB Inhale 1 puff into the lungs 2 times daily (Patient not taking: Reported on 2/24/2022) 1 each 2    gentamicin (GARAMYCIN) 0.1 % cream Apply topically   daily. 30 g 1    hydroCHLOROthiazide (HYDRODIURIL) 12.5 MG tablet Take 1 tablet by mouth every morning 30 tablet 1    albuterol sulfate HFA (VENTOLIN HFA) 108 (90 Base) MCG/ACT inhaler Inhale 2 puffs into the lungs 4 times daily as needed for Wheezing 54 g 1    acetaminophen (TYLENOL) 500 MG tablet Take 1 tablet by mouth 4 times daily as needed for Pain 360 tablet 1    warfarin (JANTOVEN) 5 MG tablet TAKE 1 AND 1/2 TABLETS BY MOUTH ONE TIME A DAY OR AS DIRECTED BY [facility] ([phone]) (Patient taking differently: Take 7.5 mg by mouth daily Except 10mg every Wednesday and Saturday OR AS DIRECTED BY [facility] ([phone])) 45 tablet 0    NIFEdipine (ADALAT CC) 60 MG extended release tablet Take 1 tablet by mouth daily 90 tablet 0    carvedilol (COREG) 25 MG tablet Take 1 tablet by mouth 2 times daily (with meals) 60 tablet 3    metFORMIN (GLUCOPHAGE) 500 MG tablet Take 1 tablet by mouth 2 times daily (with meals) 180 tablet 0    fluticasone (FLONASE) 50 MCG/ACT nasal spray 1 spray by Nasal route daily 9.9 g 5    nicotine (NICODERM CQ) 21 MG/24HR Place 1 patch onto the skin every 24 hours 30 patch 2    blood glucose monitor kit and supplies Dispense sufficient amount for indicated testing frequency plus additional to accommodate PRN testing needs. Dispense all needed supplies to include: monitor, strips, lancing device, lancets, control solutions, alcohol swabs.  1 kit 0    Lancets MISC 1 each by Does not apply route daily Test 2 times daily while on PREDNISONE then 1 time daily & as needed for symptoms of irregular blood sugar 100 each 3    blood glucose monitor strips Test 2 times a day while on PREDNISONE, then 1 time daily & as needed for symptoms of irregular blood glucose. Dispense sufficient amount for indicated testing frequency plus additional to accommodate PRN testing needs. 100 strip 3    ferrous sulfate (IRON 325) 325 (65 Fe) MG tablet Take 1 tablet by mouth 2 times daily 180 tablet 0    tadalafil (CIALIS) 5 MG tablet TAKE 1 TABLET BY MOUTH ONE TIME A DAY AS NEEDED FOR ERECTILE DYSFUNCTION 30 tablet 5    Vitamins/Minerals TABS Take 1 tablet by mouth daily      aspirin 81 MG chewable tablet CHEW AND SWALLOW 1 TABLET BY MOUTH ONE TIME A DAY (Patient taking differently: CHEW AND SWALLOW 1 TABLET BY MOUTH ONE TIME A DAY  1/7/22 NOT TAKING) 100 tablet 5     No current facility-administered medications on file prior to encounter. REVIEW OF SYSTEMS    Pertinent items are noted in HPI. Objective:      /71   Pulse 92     PHYSICAL EXAM    Vascular: Vascular status Impaired  palpable pedal pulses, right DP0/4 and PT1/4, left DP0/4 and PT1/4. CFT 3 seconds digits 1 to 5 bilateral.  Hair growthAbsent  both lower extremities and feet. Skin temperature is warm to warm from pretibial area to distal digits bilateral.  Exam is negative for rubor, pallor, cyanosis or signs of acute vascular compromise bilaterally. Exam is positive for edema bilateral lower extremity. Varicosities Present bilateral lower extremity. Neuro: Neurologic status diminished bilateral with epicritic Absent  , proprioceptive Absent , vibratory sensation Absent  and protopathicPresent. DTRs Absent  bilateral Achilles. There were no reproducible neuritic symptoms on exam bilateral feet/ankles. Derm: Ulceration to bilateral ankles. Ecchymosis Absent  bilateral feet/foot. Musculoskeletal: No pain with debridement of wounds 5/5 muscle strength in/eversion and dorsi/plantarflexion bilateral feet. No gross instability noted.         Assessment:     Problem List Items Addressed This Visit     Peripheral artery disease ([facility] - Primary    Relevant Medications    lidocaine (LMX) 4 % cream Other Relevant Orders    Initiate Outpatient Wound Care Protocol          Procedure Note    Performed by: Prachi Estrella DPM    Consent obtained: Yes    Time out taken:  Yes    Pain Control: Anesthetic  Anesthetic: 4% Lidocaine Cream     Debridement:Excisional Debridement    Using curette the wound was sharply debrided    down through and including the removal of epidermis, dermis, subcutaneous tissue and muscle/fascia. Devitalized Tissue Debrided:  fibrin, biofilm, slough, necrotic/eschar and exudate    Pre Debridement Measurements:  Are located in the Wound Documentation Flow Sheet    Wound #: 1     Wound Care Documentation:  Wound 12/31/21 Ankle Right;Medial #1 (Active)   Wound Image    02/04/22 1224   Wound Etiology Diabetic 02/25/22 0807   Dressing Status Clean;Dry; Intact 02/05/22 0909   Wound Cleansed Cleansed with saline 02/25/22 0807   Dressing/Treatment Antibacterial ointment;Dry dressing 02/11/22 0822   Offloading for Diabetic Foot Ulcers Offloading not required 02/25/22 0807   Dressing Change Due 02/11/22 02/05/22 0909   Wound Length (cm) 0.8 cm 02/25/22 0807   Wound Width (cm) 1.2 cm 02/25/22 0807   Wound Depth (cm) 0.1 cm 02/25/22 0807   Wound Surface Area (cm^2) 0.96 cm^2 02/25/22 0807   Change in Wound Size % (l*w) 94.67 02/25/22 0807   Wound Volume (cm^3) 0.096 cm^3 02/25/22 0807   Wound Healing % 97 02/25/22 0807   Post-Procedure Length (cm) 0.8 cm 02/25/22 0824   Post-Procedure Width (cm) 1.2 cm 02/25/22 0824   Post-Procedure Depth (cm) 0.1 cm 02/25/22 0824   Post-Procedure Surface Area (cm^2) 0.96 cm^2 02/25/22 0824   Post-Procedure Volume (cm^3) 0.096 cm^3 02/25/22 0824   Wound Assessment Bleeding 02/25/22 0824   Drainage Amount Small 02/25/22 0807   Drainage Description Serosanguinous 02/25/22 0807   Odor None 02/25/22 0807   Jamila-wound Assessment Dry/flaky 02/25/22 0807   Margins Attached edges 02/25/22 0807   Wound Thickness Description not for Pressure Injury Partial thickness 02/04/22 1224   Number of days: 55       Wound 12/31/21 Ankle Medial;Left #2 (Active)   Wound Image   02/04/22 1224   Wound Etiology Arterial 02/25/22 0807   Dressing Status Clean;Dry; Intact 02/05/22 0909   Wound Cleansed Wound cleanser 02/25/22 0807   Dressing/Treatment Antibacterial ointment;Dry dressing;Triad hydro/zinc oxide-based hydrophilic paste; Ace wrap 02/11/22 0822   Offloading for Diabetic Foot Ulcers Offloading not required 02/25/22 0807   Wound Length (cm) 1.8 cm 02/25/22 0807   Wound Width (cm) 3.6 cm 02/25/22 0807   Wound Depth (cm) 0.2 cm 02/25/22 0807   Wound Surface Area (cm^2) 6.48 cm^2 02/25/22 0807   Change in Wound Size % (l*w) 13.6 02/25/22 0807   Wound Volume (cm^3) 1.296 cm^3 02/25/22 0807   Wound Healing % 14 02/25/22 0807   Post-Procedure Length (cm) 1.8 cm 02/25/22 0824   Post-Procedure Width (cm) 3.6 cm 02/25/22 0824   Post-Procedure Depth (cm) 0.2 cm 02/25/22 0824   Post-Procedure Surface Area (cm^2) 6.48 cm^2 02/25/22 0824   Post-Procedure Volume (cm^3) 1.296 cm^3 02/25/22 0824   Wound Assessment Bleeding 02/25/22 0824   Drainage Amount Moderate 02/25/22 0807   Drainage Description Serosanguinous 02/25/22 0807   Odor None 02/25/22 0807   Jamila-wound Assessment Intact 02/25/22 0807   Margins Attached edges 02/25/22 0807   Wound Thickness Description not for Pressure Injury Partial thickness 02/04/22 1224   Number of days: 55         Total Surface Area Debrided: 6.48 cm² left leg into the deep fascia and sq cm 0.96 cm² into the subcutaneous tissue of the right foot    Percentage of wound debrided 100%    Bleeding:  Minimal    Hemostasis Achieved:  by pressure    Procedural Pain:  0  / 10     Post Procedural Pain:  0 / 10     Response to treatment:  Well tolerated by patient.          Plan:   Patient examined and evaluated   Wounds debrided without incident   Multilayer compression wrap with triad and gentamicin cream left leg and gentamicin cream with collagen right leg and Lac-Hydrin  Leave dressings in place for 1 week   Keep legs elevated at all times when not active   Continue follow-up with vascular surgery for decision on wounds for improving the blood flow to the distal left leg. He has not made his decision on what he would like to do. Patient would benefit from disability due to his multiple comorbidities including his chronic peripheral arterial disease with high risk for limb loss. The nature of the patient's condition was explained in depth. The patient was informed that their compliance to the treatment Plan is paramount to successful healing and prevention of further ulceration and/or infection.   Discharge Treatment  Follow-up 1 week    Written Patient Discharge Instructions Given            Electronically signed by Aleda Epley, DPM on 2/25/2022 at 9:08 AM

## 2022-03-04 ENCOUNTER — APPOINTMENT (OUTPATIENT)
Dept: PHARMACY | Age: 62
End: 2022-03-04
Payer: COMMERCIAL

## 2022-03-04 ENCOUNTER — HOSPITAL ENCOUNTER (OUTPATIENT)
Dept: WOUND CARE | Age: 62
Discharge: HOME | End: 2022-03-04
Payer: COMMERCIAL

## 2022-03-04 VITALS
SYSTOLIC BLOOD PRESSURE: 159 MMHG | TEMPERATURE: 96.9 F | HEART RATE: 75 BPM | RESPIRATION RATE: 18 BRPM | DIASTOLIC BLOOD PRESSURE: 95 MMHG

## 2022-03-04 DIAGNOSIS — I73.9 PERIPHERAL ARTERY DISEASE (HCC): Primary | ICD-10-CM

## 2022-03-04 PROCEDURE — 11042 DBRDMT SUBQ TIS 1ST 20SQCM/<: CPT

## 2022-03-04 PROCEDURE — 11043 DBRDMT MUSC&/FSCA 1ST 20/<: CPT

## 2022-03-04 RX ORDER — LIDOCAINE 40 MG/G
CREAM TOPICAL ONCE
Status: CANCELLED | OUTPATIENT
Start: 2022-03-04 | End: 2022-03-04

## 2022-03-04 RX ORDER — BACITRACIN, NEOMYCIN, POLYMYXIN B 400; 3.5; 5 [USP'U]/G; MG/G; [USP'U]/G
OINTMENT TOPICAL ONCE
Status: CANCELLED | OUTPATIENT
Start: 2022-03-04 | End: 2022-03-04

## 2022-03-04 RX ORDER — LIDOCAINE 50 MG/G
OINTMENT TOPICAL ONCE
Status: CANCELLED | OUTPATIENT
Start: 2022-03-04 | End: 2022-03-04

## 2022-03-04 RX ORDER — LIDOCAINE 40 MG/G
CREAM TOPICAL ONCE
Status: DISCONTINUED | OUTPATIENT
Start: 2022-03-04 | End: 2022-03-05 | Stop reason: HOSPADM

## 2022-03-04 RX ORDER — GENTAMICIN SULFATE 1 MG/G
OINTMENT TOPICAL ONCE
Status: CANCELLED | OUTPATIENT
Start: 2022-03-04 | End: 2022-03-04

## 2022-03-04 RX ORDER — LIDOCAINE HYDROCHLORIDE 20 MG/ML
JELLY TOPICAL ONCE
Status: CANCELLED | OUTPATIENT
Start: 2022-03-04 | End: 2022-03-04

## 2022-03-04 RX ORDER — GINSENG 100 MG
CAPSULE ORAL ONCE
Status: CANCELLED | OUTPATIENT
Start: 2022-03-04 | End: 2022-03-04

## 2022-03-04 RX ORDER — BETAMETHASONE DIPROPIONATE 0.05 %
OINTMENT (GRAM) TOPICAL ONCE
Status: CANCELLED | OUTPATIENT
Start: 2022-03-04 | End: 2022-03-04

## 2022-03-04 RX ORDER — GUAIFENESIN 600 MG/1
1200 TABLET, EXTENDED RELEASE ORAL 2 TIMES DAILY
COMMUNITY
End: 2022-05-05 | Stop reason: ALTCHOICE

## 2022-03-04 RX ORDER — BACITRACIN ZINC AND POLYMYXIN B SULFATE 500; 1000 [USP'U]/G; [USP'U]/G
OINTMENT TOPICAL ONCE
Status: CANCELLED | OUTPATIENT
Start: 2022-03-04 | End: 2022-03-04

## 2022-03-04 RX ORDER — LIDOCAINE HYDROCHLORIDE 40 MG/ML
SOLUTION TOPICAL ONCE
Status: CANCELLED | OUTPATIENT
Start: 2022-03-04 | End: 2022-03-04

## 2022-03-04 RX ORDER — CLOBETASOL PROPIONATE 0.5 MG/G
OINTMENT TOPICAL ONCE
Status: CANCELLED | OUTPATIENT
Start: 2022-03-04 | End: 2022-03-04

## 2022-03-04 ASSESSMENT — PAIN DESCRIPTION - ORIENTATION: ORIENTATION: LEFT

## 2022-03-04 ASSESSMENT — PAIN DESCRIPTION - DESCRIPTORS: DESCRIPTORS: THROBBING

## 2022-03-04 ASSESSMENT — PAIN SCALES - GENERAL: PAINLEVEL_OUTOF10: 4

## 2022-03-04 ASSESSMENT — PAIN DESCRIPTION - LOCATION: LOCATION: LEG

## 2022-03-04 ASSESSMENT — PAIN DESCRIPTION - PAIN TYPE: TYPE: CHRONIC PAIN

## 2022-03-04 NOTE — PROGRESS NOTES
Iris Rivera  Progress Note and Procedure Note      Kun Simon  AGE: 65 y.o. GENDER: male  : 1960  TODAY'S DATE:  3/4/2022    Subjective:     Chief Complaint   Patient presents with    Wound Check     Right medial ankle, Left medial ankle         HISTORY of PRESENT ILLNESS HPI     Kun Simon is a 65 y.o. male who presents today for wound evaluation. History of Wound: Admits to having chronic wounds for at least a year on the left leg and 8 to 9 months on the right leg. He states that he has had arterial bypasses on both of his legs in the past.  He was seeing previous wound care and podiatry for these wounds. He states that his insurance had some issues and he has not been seeing anyone since 2021 he has been treating the wounds himself with PSO and bandages. 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 He states he is here to meet with vascular surgery to plan his bypass. He admits to some pain in the wounds. He states the right leg is looking better.       Wound Pain:  intermittent  Severity:  3 / 10   Wound Type:  venous, arterial and diabetic  Modifying Factors:  edema, venous stasis, lymphedema, diabetes, decreased mobility, arterial insufficiency and decreased tissue oxygenation  Associated Signs/Symptoms:  drainage, numbness and odor        PAST MEDICAL HISTORY        Diagnosis Date    Diabetes mellitus ([address]     Fibromyalgia     History of DVT (deep vein thrombosis)     Hyperlipidemia     Hypertension     Type 2 diabetes mellitus with circulatory disorder, without long-term current use of insulin ([address] 2022       PAST SURGICAL HISTORY    Past Surgical History:   Procedure Laterality Date    APPENDECTOMY  2005    COLONOSCOPY  2016    FEMORAL BYPASS Left 2020    femoral posterior tibial bypass    FEMORAL-TIBIAL BYPASS GRAFT Right 2020    with femoral endarterectomy and patch angioplasty    FOOT DEBRIDEMENT Left 2020    FOOT DEBRIDEMENT Right 2021    DEBRIDEMENT OF RIGHT FOOT WOUND WITH GRAFT APPLICATION performed by Bridget Nascimento DPM at [facility] Right 2020    stent leg for PVD       FAMILY HISTORY    Family History   Problem Relation Age of Onset    Cancer Mother     Lung Cancer Mother     Diabetes Father     Stroke Father        SOCIAL HISTORY    Social History     Tobacco Use    Smoking status: Former Smoker     Packs/day: 0.50     Years: 20.00     Pack years: 10.00     Types: Cigarettes     Start date: 1977     Quit date: 2021     Years since quittin.1    Smokeless tobacco: Never Used   Vaping Use    Vaping Use: Never used   Substance Use Topics    Alcohol use: Yes     Alcohol/week: 6.0 standard drinks     Types: 6 Cans of beer per week    Drug use: Never       ALLERGIES    Allergies   Allergen Reactions    Chantix [Varenicline]      Hallucinations         MEDICATIONS    Current Outpatient Medications on File Prior to Encounter   Medication Sig Dispense Refill    guaiFENesin (MUCINEX) 600 MG extended release tablet Take 1,200 mg by mouth 2 times daily      umeclidinium-vilanterol (ANORO ELLIPTA) 62.5-25 MCG/INH AEPB inhaler Inhale 1 puff into the lungs daily (Patient not taking: Reported on 2022) 1 each 0    atorvastatin (LIPITOR) 80 MG tablet Take 1 tablet by mouth daily Cancel atorvastatin 20 mg a day 90 tablet 0    Fluticasone-Umeclidin-Vilant (TRELEGY ELLIPTA) 200-62.5-25 MCG/INH AEPB Inhale 1 puff into the lungs 2 times daily (Patient not taking: Reported on 2022) 1 each 2    gentamicin (GARAMYCIN) 0.1 % cream Apply topically   daily.  30 g 1    hydroCHLOROthiazide (HYDRODIURIL) 12.5 MG tablet Take 1 tablet by mouth every morning 30 tablet 1    albuterol sulfate HFA (VENTOLIN HFA) 108 (90 Base) MCG/ACT inhaler Inhale 2 puffs into the lungs 4 times daily as needed for Wheezing 54 g 1    acetaminophen (TYLENOL) 500 MG tablet Take 1 tablet by mouth 4 times daily as needed for Pain 360 tablet 1    warfarin (JANTOVEN) 5 MG tablet TAKE 1 AND 1/2 TABLETS BY MOUTH ONE TIME A DAY OR AS DIRECTED BY [facility] ([phone]) (Patient taking differently: Take 7.5 mg by mouth daily Except 10mg every Wednesday and Saturday OR AS DIRECTED BY [facility] ([phone])) 45 tablet 0    NIFEdipine (ADALAT CC) 60 MG extended release tablet Take 1 tablet by mouth daily 90 tablet 0    carvedilol (COREG) 25 MG tablet Take 1 tablet by mouth 2 times daily (with meals) 60 tablet 3    metFORMIN (GLUCOPHAGE) 500 MG tablet Take 1 tablet by mouth 2 times daily (with meals) 180 tablet 0    fluticasone (FLONASE) 50 MCG/ACT nasal spray 1 spray by Nasal route daily 9.9 g 5    nicotine (NICODERM CQ) 21 MG/24HR Place 1 patch onto the skin every 24 hours 30 patch 2    blood glucose monitor kit and supplies Dispense sufficient amount for indicated testing frequency plus additional to accommodate PRN testing needs. Dispense all needed supplies to include: monitor, strips, lancing device, lancets, control solutions, alcohol swabs. 1 kit 0    Lancets MISC 1 each by Does not apply route daily Test 2 times daily while on PREDNISONE then 1 time daily & as needed for symptoms of irregular blood sugar 100 each 3    blood glucose monitor strips Test 2 times a day while on PREDNISONE, then 1 time daily & as needed for symptoms of irregular blood glucose. Dispense sufficient amount for indicated testing frequency plus additional to accommodate PRN testing needs.  100 strip 3    ferrous sulfate (IRON 325) 325 (65 Fe) MG tablet Take 1 tablet by mouth 2 times daily 180 tablet 0    tadalafil (CIALIS) 5 MG tablet TAKE 1 TABLET BY MOUTH ONE TIME A DAY AS NEEDED FOR ERECTILE DYSFUNCTION 30 tablet 5    Vitamins/Minerals TABS Take 1 tablet by mouth daily      aspirin 81 MG chewable tablet CHEW AND SWALLOW 1 TABLET BY MOUTH ONE TIME A DAY (Patient taking differently: CHEW AND SWALLOW 1 TABLET BY MOUTH ONE TIME A DAY  1/7/22 NOT TAKING) 100 tablet 5     No current facility-administered medications on file prior to encounter. REVIEW OF SYSTEMS    Pertinent items are noted in HPI. Objective:      BP (!) 159/95   Pulse 75   Temp 96.9 °F (36.1 °C) (Infrared)   Resp 18     PHYSICAL EXAM    Vascular: Vascular status Impaired  palpable pedal pulses, right DP0/4 and PT1/4, left DP0/4 and PT1/4. CFT 3 seconds digits 1 to 5 bilateral.  Hair growthAbsent  both lower extremities and feet. Skin temperature is warm to warm from pretibial area to distal digits bilateral.  Exam is negative for rubor, pallor, cyanosis or signs of acute vascular compromise bilaterally. Exam is positive for edema bilateral lower extremity. Varicosities Present bilateral lower extremity. Neuro: Neurologic status diminished bilateral with epicritic Absent  , proprioceptive Absent , vibratory sensation Absent  and protopathicPresent. DTRs Absent  bilateral Achilles. There were no reproducible neuritic symptoms on exam bilateral feet/ankles. Derm: Ulceration to bilateral ankles. Ecchymosis Absent  bilateral feet/foot. Musculoskeletal: No pain with debridement of wounds 5/5 muscle strength in/eversion and dorsi/plantarflexion bilateral feet. No gross instability noted.         Assessment:     Problem List Items Addressed This Visit     Peripheral artery disease ([facility] - Primary    Relevant Medications    lidocaine (LMX) 4 % cream    Other Relevant Orders    Initiate Outpatient Wound Care Protocol          Procedure Note    Performed by: Noah Pike DPM    Consent obtained: Yes    Time out taken:  Yes    Pain Control: Anesthetic  Anesthetic: 4% Lidocaine Cream     Debridement:Excisional Debridement    Using curette the wound was sharply debrided    down through and including the removal of epidermis, dermis, subcutaneous tissue and muscle/fascia. Devitalized Tissue Debrided:  fibrin, biofilm, slough, necrotic/eschar and exudate    Pre Debridement Measurements:  Are located in the Wound Documentation Flow Sheet    Wound #: 1     Wound Care Documentation:  Wound 12/31/21 Ankle Right;Medial #1 (Active)   Wound Image    02/04/22 1224   Wound Etiology Diabetic 03/04/22 0818   Dressing Status Clean;Dry; Intact 02/05/22 0909   Wound Cleansed Wound cleanser 03/04/22 0818   Dressing/Treatment Antibacterial ointment;Collagen;Dry dressing;Moisturizing cream;Cutimed/Sorbact; Foam 03/04/22 0844   Offloading for Diabetic Foot Ulcers Offloading not required 03/04/22 0818   Dressing Change Due 02/11/22 02/05/22 0909   Wound Length (cm) 0.2 cm 03/04/22 0818   Wound Width (cm) 0.3 cm 03/04/22 0818   Wound Depth (cm) 0.1 cm 03/04/22 0818   Wound Surface Area (cm^2) 0.06 cm^2 03/04/22 0818   Change in Wound Size % (l*w) 99.67 03/04/22 0818   Wound Volume (cm^3) 0.006 cm^3 03/04/22 0818   Wound Healing % 100 03/04/22 0818   Post-Procedure Length (cm) 0.4 cm 03/04/22 0844   Post-Procedure Width (cm) 1 cm 03/04/22 0844   Post-Procedure Depth (cm) 0.1 cm 03/04/22 0844   Post-Procedure Surface Area (cm^2) 0.4 cm^2 03/04/22 0844   Post-Procedure Volume (cm^3) 0.04 cm^3 03/04/22 0844   Wound Assessment Bleeding 03/04/22 0844   Drainage Amount Small 03/04/22 0818   Drainage Description Serosanguinous 03/04/22 0818   Odor None 03/04/22 0818   Jamila-wound Assessment Dry/flaky 03/04/22 0818   Margins Attached edges 03/04/22 0818   Wound Thickness Description not for Pressure Injury Partial thickness 02/04/22 1224   Number of days: 62       Wound 12/31/21 Ankle Medial;Left #2 (Active)   Wound Image   02/04/22 1224   Wound Etiology Arterial 03/04/22 0818   Dressing Status Clean;Dry; Intact 02/05/22 0909   Wound Cleansed Cleansed with saline 03/04/22 0818   Dressing/Treatment Antibacterial ointment;Dry dressing; Ace wrap 03/04/22 0844   Offloading for Diabetic Foot Ulcers Offloading not required 03/04/22 0818   Wound Length (cm) 1.7 cm 03/04/22 0818   Wound Width (cm) 3.3 cm 03/04/22 0818   Wound Depth (cm) 0.2 cm 03/04/22 0818   Wound Surface Area (cm^2) 5.61 cm^2 03/04/22 0818   Change in Wound Size % (l*w) 25.2 03/04/22 0818   Wound Volume (cm^3) 1.122 cm^3 03/04/22 0818   Wound Healing % 25 03/04/22 0818   Post-Procedure Length (cm) 1.7 cm 03/04/22 0844   Post-Procedure Width (cm) 3.3 cm 03/04/22 0844   Post-Procedure Depth (cm) 0.2 cm 03/04/22 0844   Post-Procedure Surface Area (cm^2) 5.61 cm^2 03/04/22 0844   Post-Procedure Volume (cm^3) 1.122 cm^3 03/04/22 0844   Wound Assessment Bleeding 03/04/22 0844   Drainage Amount Small 03/04/22 0818   Drainage Description Serosanguinous 03/04/22 0818   Odor None 03/04/22 0818   Jamila-wound Assessment Intact 03/04/22 0818   Margins Attached edges 03/04/22 0818   Wound Thickness Description not for Pressure Injury Partial thickness 02/04/22 1224   Number of days: 62           Total Surface Area Debrided: 5.61 cm² left leg into the deep fascia and sq cm 0.4 cm² into the subcutaneous tissue of the right foot    Percentage of wound debrided 100%    Bleeding:  Minimal    Hemostasis Achieved:  by pressure    Procedural Pain:  0  / 10     Post Procedural Pain:  0 / 10     Response to treatment:  Well tolerated by patient. Plan:   Patient examined and evaluated   Wounds debrided without incident   Multilayer compression wrap with triad and gentamicin cream left leg and gentamicin cream with collagen right leg and Lac-Hydrin  Leave dressings in place for 1 week   Keep legs elevated at all times when not active   Continue follow-up with vascular surgery for decision on wounds for improving the blood flow to the distal left leg. He has not made his decision on what he would like to do.   Patient would benefit from disability due to his multiple comorbidities including his chronic peripheral arterial disease with high risk for limb loss. The nature of the patient's condition was explained in depth. The patient was informed that their compliance to the treatment Plan is paramount to successful healing and prevention of further ulceration and/or infection.   Discharge Treatment Wound 12/31/21 Ankle Medial;Left #2-Dressing/Treatment: Antibacterial ointment,Dry dressing,Ace wrap (Santyl)  Wound 12/31/21 Ankle Right;Medial #1-Dressing/Treatment: Antibacterial ointment,Collagen,Dry dressing,Moisturizing cream,Cutimed/Sorbact,FoamFollow-up 1 week    Written Patient Discharge Instructions Given            Electronically signed by Carmen Haines DPM on 3/4/2022 at 10:02 AM

## 2022-03-04 NOTE — PLAN OF CARE
[address] [facility]:      [address] f: [phone] f: [phone] p: [phone] [email or web address]     Ordering Center: [facility] Deshpande [address] [place] [address]  [phone]  Dept: [phone]   Fax# [phone]    Patient Information:      Renzo Murguia   [phone]   : 1960  AGE: 65 y.o. GENDER: male   TODAYS DATE:  3/4/2022    Insurance:      PRIMARY INSURANCE:  Plan: [employer] JOLIE  Coverage: [employer] JOLIE  Effective Date: 2022  Group Number: [unfilled]  Subscriber Number: [id number] - (Commercial)    Payor/Plan Subscr  Sex Relation Sub. Ins. ID Effective Group Num   1.  [facility] E 1960 Male Self [id number]                                    [address] [id number]         Patient Wound Information:     Additional ICD-10 Codes:     Patient Active Problem List   Diagnosis Code    Peripheral artery disease (HCC) I73.9    Centrilobular emphysema ([address] J43.2    COPD (chronic obstructive pulmonary disease) (HCC) J44.9    Atherosclerosis of native arteries of right leg with ulceration of other part of foot ([address] I70.235    Impotence N52.9    Acute deep vein thrombosis (DVT) of femoral vein of left lower extremity ([facility]) I82.412    Colon polyp K63.5    Hyperlipidemia E78.5    Non-healing ulcer of left ankle (HCC) L97.329    Venous insufficiency I87.2    Venous ulcer ([address] I83.009, L97.909    Essential hypertension I10    Peripheral vascular disease (HCC) I73.9    Type 2 diabetes mellitus with circulatory disorder, without long-term current use of insulin ([address] E11.59       WOUNDS REQUIRING DRESSING SUPPLIES:     Wound 21 Ankle Right;Medial #1 (Active)   Wound Image    22 1224   Wound Etiology Diabetic 22 0818   Dressing Status Clean;Dry; Intact 22 0909   Wound Cleansed Wound cleanser 03/04/22 0818   Dressing/Treatment Antibacterial ointment;Dry dressing 02/11/22 0822   Offloading for Diabetic Foot Ulcers Offloading not required 03/04/22 0818   Dressing Change Due 02/11/22 02/05/22 0909   Wound Length (cm) 0.2 cm 03/04/22 0818   Wound Width (cm) 0.3 cm 03/04/22 0818   Wound Depth (cm) 0.1 cm 03/04/22 0818   Wound Surface Area (cm^2) 0.06 cm^2 03/04/22 0818   Change in Wound Size % (l*w) 99.67 03/04/22 0818   Wound Volume (cm^3) 0.006 cm^3 03/04/22 0818   Wound Healing % 100 03/04/22 0818   Post-Procedure Length (cm) 0.4 cm 03/04/22 0844   Post-Procedure Width (cm) 1 cm 03/04/22 0844   Post-Procedure Depth (cm) 0.1 cm 03/04/22 0844   Post-Procedure Surface Area (cm^2) 0.4 cm^2 03/04/22 0844   Post-Procedure Volume (cm^3) 0.04 cm^3 03/04/22 0844   Wound Assessment Bleeding 03/04/22 0844   Drainage Amount Small 03/04/22 0818   Drainage Description Serosanguinous 03/04/22 0818   Odor None 03/04/22 0818   Jamila-wound Assessment Dry/flaky 03/04/22 0818   Margins Attached edges 03/04/22 0818   Wound Thickness Description not for Pressure Injury Partial thickness 02/04/22 1224   Number of days: 62       Wound 12/31/21 Ankle Medial;Left #2 (Active)   Wound Image   02/04/22 1224   Wound Etiology Arterial 03/04/22 0818   Dressing Status Clean;Dry; Intact 02/05/22 0909   Wound Cleansed Cleansed with saline 03/04/22 0818   Dressing/Treatment Antibacterial ointment;Dry dressing;Triad hydro/zinc oxide-based hydrophilic paste; Ace wrap 02/11/22 0822   Offloading for Diabetic Foot Ulcers Offloading not required 03/04/22 0818   Wound Length (cm) 1.7 cm 03/04/22 0818   Wound Width (cm) 3.3 cm 03/04/22 0818   Wound Depth (cm) 0.2 cm 03/04/22 0818   Wound Surface Area (cm^2) 5.61 cm^2 03/04/22 0818   Change in Wound Size % (l*w) 25.2 03/04/22 0818   Wound Volume (cm^3) 1.122 cm^3 03/04/22 0818   Wound Healing % 25 03/04/22 0818   Post-Procedure Length (cm) 1.7 cm 03/04/22 0844   Post-Procedure Width (cm) 3.3 cm 03/04/22 1370   Post-Procedure Depth (cm) 0.2 cm 03/04/22 0844   Post-Procedure Surface Area (cm^2) 5.61 cm^2 03/04/22 0844   Post-Procedure Volume (cm^3) 1.122 cm^3 03/04/22 0844   Wound Assessment Bleeding 03/04/22 0844   Drainage Amount Small 03/04/22 0818   Drainage Description Serosanguinous 03/04/22 0818   Odor None 03/04/22 0818   Jamila-wound Assessment Intact 03/04/22 0818   Margins Attached edges 03/04/22 0818   Wound Thickness Description not for Pressure Injury Partial thickness 02/04/22 1224   Number of days: 62     Incision 02/26/21 Anterior;Right (Active)   Number of days: 371       Supplies Requested :                      WOUND #: 2   PRIMARY DRESSING:    None   Cover and Secure with: 4X4 gauze pad, Bulky roll gauze     FREQUENCY OF DRESSING CHANGES:  Daily    Wound Thickness [x] Full   []Partial                     Patient Wound(s) Debrided: [x] Yes   [] No    Debridement Date: 3/4/2022    Debribement Type: Excisional/Sharp    ADDITIONAL ITEMS:  [] Gloves Small  [x] Gloves Medium [] Gloves Large [] Gloves [facility]  [] Paper Tape 1\" [] Paper Tape 2\" [] Paper Tape 3\"  [] Medipore Tape 3\"  [] Saline  [] Skin Prep   [] Adhesive Remover   [] Cotton Tip Applicators  [] Tubular Stocking   [] Size E  [] Size G  [] Other:    Patient currently being seen by Home Health: [] Yes   [x] No    Duration for needed supplies:  []15  [x]30  []60  []90 Days    Provider Information:      PROVIDER'S NAME/NPI  Annel Wayne DPM  [id number]    I give permission to coordinate the care for this patient

## 2022-03-04 NOTE — PLAN OF CARE
Multilayer Compression Wrap   Below the Knee    NAME:  Amy Valle  YOB: 1960  MEDICAL RECORD NUMBER:  [id number]  DATE:  3/4/2022    Multilayer compression wrap: Removed old Multilayer wrap if indicated and wash leg with mild soap/water. Applied moisturizing agent to dry skin as needed. Applied primary and secondary dressing as ordered. Applied multilayered dressing below the knee to right lower leg. Instructed patient/caregiver not to remove dressing and to keep it clean and dry. Instructed patient/caregiver on complications to report to provider, such as pain, numbness in toes, heavy drainage, and slippage of dressing. Instructed patient on purpose of compression dressing and on activity and exercise recommendations.      Applied  2 layer wrap from toes to knee overlapping each time    Electronically signed by Shannan Talbot RN on 3/4/2022 at 9:23 AM

## 2022-03-04 NOTE — PLAN OF CARE
Discharge instructions given. Patient verbalized understanding. Return to [address] [place] [address],[address] in 1 week(s).

## 2022-03-05 ENCOUNTER — APPOINTMENT (OUTPATIENT)
Dept: GENERAL RADIOLOGY | Age: 62
DRG: 190 | End: 2022-03-05

## 2022-03-05 ENCOUNTER — HOSPITAL ENCOUNTER (INPATIENT)
Age: 62
LOS: 1 days | Discharge: HOME | DRG: 190 | End: 2022-03-07
Attending: INTERNAL MEDICINE | Admitting: INTERNAL MEDICINE
Payer: COMMERCIAL

## 2022-03-05 DIAGNOSIS — R09.89 PULMONARY CONGESTION: ICD-10-CM

## 2022-03-05 DIAGNOSIS — J96.01 ACUTE RESPIRATORY FAILURE WITH HYPOXEMIA (HCC): Primary | ICD-10-CM

## 2022-03-05 DIAGNOSIS — J44.1 COPD EXACERBATION (HCC): ICD-10-CM

## 2022-03-05 PROCEDURE — 96375 TX/PRO/DX INJ NEW DRUG ADDON: CPT

## 2022-03-05 PROCEDURE — 99285 EMERGENCY DEPT VISIT HI MDM: CPT

## 2022-03-05 PROCEDURE — 93005 ELECTROCARDIOGRAM TRACING: CPT | Performed by: EMERGENCY MEDICINE

## 2022-03-05 PROCEDURE — 96361 HYDRATE IV INFUSION ADD-ON: CPT

## 2022-03-05 PROCEDURE — 96374 THER/PROPH/DIAG INJ IV PUSH: CPT

## 2022-03-05 PROCEDURE — 71045 X-RAY EXAM CHEST 1 VIEW: CPT

## 2022-03-05 RX ORDER — ALBUTEROL SULFATE 2.5 MG/3ML
2.5 SOLUTION RESPIRATORY (INHALATION) ONCE
Status: COMPLETED | OUTPATIENT
Start: 2022-03-05 | End: 2022-03-06

## 2022-03-05 RX ORDER — METHYLPREDNISOLONE SODIUM SUCCINATE 125 MG/2ML
80 INJECTION, POWDER, LYOPHILIZED, FOR SOLUTION INTRAMUSCULAR; INTRAVENOUS ONCE
Status: COMPLETED | OUTPATIENT
Start: 2022-03-05 | End: 2022-03-06

## 2022-03-05 RX ORDER — 0.9 % SODIUM CHLORIDE 0.9 %
1000 INTRAVENOUS SOLUTION INTRAVENOUS ONCE
Status: COMPLETED | OUTPATIENT
Start: 2022-03-05 | End: 2022-03-06

## 2022-03-05 RX ORDER — IPRATROPIUM BROMIDE AND ALBUTEROL SULFATE 2.5; .5 MG/3ML; MG/3ML
1 SOLUTION RESPIRATORY (INHALATION) ONCE
Status: COMPLETED | OUTPATIENT
Start: 2022-03-05 | End: 2022-03-06

## 2022-03-05 ASSESSMENT — ENCOUNTER SYMPTOMS
NAUSEA: 0
SHORTNESS OF BREATH: 1
COUGH: 0
ABDOMINAL PAIN: 0
RHINORRHEA: 0
DIARRHEA: 0
VOMITING: 0

## 2022-03-06 PROBLEM — J44.1 COPD EXACERBATION (HCC): Status: ACTIVE | Noted: 2022-03-06

## 2022-03-06 LAB
A/G RATIO: 1.4 (ref 1.1–2.2)
A/G RATIO: 1.5 (ref 1.1–2.2)
ALBUMIN SERPL-MCNC: 3.9 G/DL (ref 3.4–5)
ALBUMIN SERPL-MCNC: 4.3 G/DL (ref 3.4–5)
ALP BLD-CCNC: 104 U/L (ref 40–129)
ALP BLD-CCNC: 112 U/L (ref 40–129)
ALT SERPL-CCNC: 24 U/L (ref 10–40)
ALT SERPL-CCNC: 26 U/L (ref 10–40)
ANION GAP SERPL CALCULATED.3IONS-SCNC: 11 MMOL/L (ref 3–16)
ANION GAP SERPL CALCULATED.3IONS-SCNC: 12 MMOL/L (ref 3–16)
AST SERPL-CCNC: 19 U/L (ref 15–37)
AST SERPL-CCNC: 20 U/L (ref 15–37)
BASE EXCESS VENOUS: 6.3 MMOL/L (ref -3–3)
BASOPHILS ABSOLUTE: 0 K/UL (ref 0–0.2)
BASOPHILS ABSOLUTE: 0.1 K/UL (ref 0–0.2)
BASOPHILS RELATIVE PERCENT: 0.3 %
BASOPHILS RELATIVE PERCENT: 0.9 %
BILIRUB SERPL-MCNC: <0.2 MG/DL (ref 0–1)
BILIRUB SERPL-MCNC: <0.2 MG/DL (ref 0–1)
BUN BLDV-MCNC: 16 MG/DL (ref 7–20)
BUN BLDV-MCNC: 16 MG/DL (ref 7–20)
CALCIUM SERPL-MCNC: 9.7 MG/DL (ref 8.3–10.6)
CALCIUM SERPL-MCNC: 9.8 MG/DL (ref 8.3–10.6)
CARBOXYHEMOGLOBIN: 2.4 % (ref 0–1.5)
CHLORIDE BLD-SCNC: 96 MMOL/L (ref 99–110)
CHLORIDE BLD-SCNC: 98 MMOL/L (ref 99–110)
CO2: 29 MMOL/L (ref 21–32)
CO2: 31 MMOL/L (ref 21–32)
CREAT SERPL-MCNC: 0.8 MG/DL (ref 0.8–1.3)
CREAT SERPL-MCNC: 0.9 MG/DL (ref 0.8–1.3)
EKG ATRIAL RATE: 115 BPM
EKG DIAGNOSIS: NORMAL
EKG P AXIS: 77 DEGREES
EKG P-R INTERVAL: 146 MS
EKG Q-T INTERVAL: 332 MS
EKG QRS DURATION: 92 MS
EKG QTC CALCULATION (BAZETT): 459 MS
EKG R AXIS: -88 DEGREES
EKG T AXIS: 55 DEGREES
EKG VENTRICULAR RATE: 115 BPM
EOSINOPHILS ABSOLUTE: 0 K/UL (ref 0–0.6)
EOSINOPHILS ABSOLUTE: 0.1 K/UL (ref 0–0.6)
EOSINOPHILS RELATIVE PERCENT: 0.2 %
EOSINOPHILS RELATIVE PERCENT: 0.9 %
ESTIMATED AVERAGE GLUCOSE: 139.9 MG/DL
GFR AFRICAN AMERICAN: >60
GFR AFRICAN AMERICAN: >60
GFR NON-AFRICAN AMERICAN: >60
GFR NON-AFRICAN AMERICAN: >60
GLUCOSE BLD-MCNC: 130 MG/DL (ref 70–99)
GLUCOSE BLD-MCNC: 153 MG/DL (ref 70–99)
GLUCOSE BLD-MCNC: 159 MG/DL (ref 70–99)
GLUCOSE BLD-MCNC: 168 MG/DL (ref 70–99)
GLUCOSE BLD-MCNC: 189 MG/DL (ref 70–99)
GLUCOSE BLD-MCNC: 196 MG/DL (ref 70–99)
HBA1C MFR BLD: 6.5 %
HCO3 VENOUS: 34.3 MMOL/L (ref 23–29)
HCT VFR BLD CALC: 40.4 % (ref 40.5–52.5)
HCT VFR BLD CALC: 42.6 % (ref 40.5–52.5)
HEMOGLOBIN, VEN, REDUCED: 3 %
HEMOGLOBIN: 13.1 G/DL (ref 13.5–17.5)
HEMOGLOBIN: 13.6 G/DL (ref 13.5–17.5)
INR BLD: 2.96 (ref 0.88–1.12)
INR BLD: 3.16 (ref 0.88–1.12)
LACTIC ACID, SEPSIS: 1.4 MMOL/L (ref 0.4–1.9)
LYMPHOCYTES ABSOLUTE: 0.6 K/UL (ref 1–5.1)
LYMPHOCYTES ABSOLUTE: 1.8 K/UL (ref 1–5.1)
LYMPHOCYTES RELATIVE PERCENT: 28.9 %
LYMPHOCYTES RELATIVE PERCENT: 6.6 %
MAGNESIUM: 1.7 MG/DL (ref 1.8–2.4)
MCH RBC QN AUTO: 27.8 PG (ref 26–34)
MCH RBC QN AUTO: 28 PG (ref 26–34)
MCHC RBC AUTO-ENTMCNC: 31.8 G/DL (ref 31–36)
MCHC RBC AUTO-ENTMCNC: 32.4 G/DL (ref 31–36)
MCV RBC AUTO: 86.6 FL (ref 80–100)
MCV RBC AUTO: 87.5 FL (ref 80–100)
METHEMOGLOBIN VENOUS: 0.2 %
MONOCYTES ABSOLUTE: 0.1 K/UL (ref 0–1.3)
MONOCYTES ABSOLUTE: 0.4 K/UL (ref 0–1.3)
MONOCYTES RELATIVE PERCENT: 0.8 %
MONOCYTES RELATIVE PERCENT: 6.8 %
NEUTROPHILS ABSOLUTE: 3.9 K/UL (ref 1.7–7.7)
NEUTROPHILS ABSOLUTE: 8.6 K/UL (ref 1.7–7.7)
NEUTROPHILS RELATIVE PERCENT: 62.5 %
NEUTROPHILS RELATIVE PERCENT: 92.1 %
O2 CONTENT, VEN: 18 VOL %
O2 SAT, VEN: 97 %
O2 THERAPY: ABNORMAL
PCO2, VEN: 63.8 MMHG (ref 40–50)
PDW BLD-RTO: 15.8 % (ref 12.4–15.4)
PDW BLD-RTO: 16.2 % (ref 12.4–15.4)
PERFORMED ON: ABNORMAL
PH VENOUS: 7.34 (ref 7.35–7.45)
PHOSPHORUS: 5.4 MG/DL (ref 2.5–4.9)
PLATELET # BLD: 325 K/UL (ref 135–450)
PLATELET # BLD: 327 K/UL (ref 135–450)
PMV BLD AUTO: 7.5 FL (ref 5–10.5)
PMV BLD AUTO: 7.6 FL (ref 5–10.5)
PO2, VEN: 95.1 MMHG (ref 25–40)
POTASSIUM SERPL-SCNC: 4.5 MMOL/L (ref 3.5–5.1)
POTASSIUM SERPL-SCNC: 4.5 MMOL/L (ref 3.5–5.1)
PRO-BNP: 1058 PG/ML (ref 0–124)
PROCALCITONIN: 0.06 NG/ML (ref 0–0.15)
PROTHROMBIN TIME: 35 SEC (ref 9.9–12.7)
PROTHROMBIN TIME: 37.4 SEC (ref 9.9–12.7)
RBC # BLD: 4.67 M/UL (ref 4.2–5.9)
RBC # BLD: 4.87 M/UL (ref 4.2–5.9)
SODIUM BLD-SCNC: 137 MMOL/L (ref 136–145)
SODIUM BLD-SCNC: 140 MMOL/L (ref 136–145)
TCO2 CALC VENOUS: 81 MMOL/L
TOTAL PROTEIN: 6.7 G/DL (ref 6.4–8.2)
TOTAL PROTEIN: 7.1 G/DL (ref 6.4–8.2)
TROPONIN: <0.01 NG/ML
WBC # BLD: 6.2 K/UL (ref 4–11)
WBC # BLD: 9.4 K/UL (ref 4–11)

## 2022-03-06 PROCEDURE — 2060000000 HC ICU INTERMEDIATE R&B

## 2022-03-06 PROCEDURE — 6360000002 HC RX W HCPCS: Performed by: PHYSICIAN ASSISTANT

## 2022-03-06 PROCEDURE — 84100 ASSAY OF PHOSPHORUS: CPT

## 2022-03-06 PROCEDURE — 84484 ASSAY OF TROPONIN QUANT: CPT

## 2022-03-06 PROCEDURE — 83735 ASSAY OF MAGNESIUM: CPT

## 2022-03-06 PROCEDURE — 36415 COLL VENOUS BLD VENIPUNCTURE: CPT

## 2022-03-06 PROCEDURE — 99254 IP/OBS CNSLTJ NEW/EST MOD 60: CPT | Performed by: INTERNAL MEDICINE

## 2022-03-06 PROCEDURE — 6370000000 HC RX 637 (ALT 250 FOR IP): Performed by: INTERNAL MEDICINE

## 2022-03-06 PROCEDURE — 83036 HEMOGLOBIN GLYCOSYLATED A1C: CPT

## 2022-03-06 PROCEDURE — 2700000000 HC OXYGEN THERAPY PER DAY

## 2022-03-06 PROCEDURE — 83880 ASSAY OF NATRIURETIC PEPTIDE: CPT

## 2022-03-06 PROCEDURE — 94640 AIRWAY INHALATION TREATMENT: CPT

## 2022-03-06 PROCEDURE — 94660 CPAP INITIATION&MGMT: CPT

## 2022-03-06 PROCEDURE — 94761 N-INVAS EAR/PLS OXIMETRY MLT: CPT

## 2022-03-06 PROCEDURE — 85610 PROTHROMBIN TIME: CPT

## 2022-03-06 PROCEDURE — 82803 BLOOD GASES ANY COMBINATION: CPT

## 2022-03-06 PROCEDURE — 83605 ASSAY OF LACTIC ACID: CPT

## 2022-03-06 PROCEDURE — 2580000003 HC RX 258: Performed by: INTERNAL MEDICINE

## 2022-03-06 PROCEDURE — 6360000002 HC RX W HCPCS: Performed by: INTERNAL MEDICINE

## 2022-03-06 PROCEDURE — 6370000000 HC RX 637 (ALT 250 FOR IP): Performed by: NURSE PRACTITIONER

## 2022-03-06 PROCEDURE — 94150 VITAL CAPACITY TEST: CPT

## 2022-03-06 PROCEDURE — 80053 COMPREHEN METABOLIC PANEL: CPT

## 2022-03-06 PROCEDURE — 6370000000 HC RX 637 (ALT 250 FOR IP): Performed by: PHYSICIAN ASSISTANT

## 2022-03-06 PROCEDURE — 2580000003 HC RX 258: Performed by: PHYSICIAN ASSISTANT

## 2022-03-06 PROCEDURE — 84145 PROCALCITONIN (PCT): CPT

## 2022-03-06 PROCEDURE — 85025 COMPLETE CBC W/AUTO DIFF WBC: CPT

## 2022-03-06 RX ORDER — GUAIFENESIN 600 MG/1
1200 TABLET, EXTENDED RELEASE ORAL 2 TIMES DAILY
Status: DISCONTINUED | OUTPATIENT
Start: 2022-03-06 | End: 2022-03-07 | Stop reason: HOSPADM

## 2022-03-06 RX ORDER — IPRATROPIUM BROMIDE AND ALBUTEROL SULFATE 2.5; .5 MG/3ML; MG/3ML
1 SOLUTION RESPIRATORY (INHALATION) 4 TIMES DAILY
Status: DISCONTINUED | OUTPATIENT
Start: 2022-03-06 | End: 2022-03-06

## 2022-03-06 RX ORDER — ALBUTEROL SULFATE 2.5 MG/3ML
2.5 SOLUTION RESPIRATORY (INHALATION) EVERY 4 HOURS PRN
Status: DISCONTINUED | OUTPATIENT
Start: 2022-03-06 | End: 2022-03-07 | Stop reason: HOSPADM

## 2022-03-06 RX ORDER — CARVEDILOL 25 MG/1
25 TABLET ORAL 2 TIMES DAILY WITH MEALS
Status: DISCONTINUED | OUTPATIENT
Start: 2022-03-06 | End: 2022-03-07 | Stop reason: HOSPADM

## 2022-03-06 RX ORDER — DEXTROSE MONOHYDRATE 25 G/50ML
12.5 INJECTION, SOLUTION INTRAVENOUS PRN
Status: DISCONTINUED | OUTPATIENT
Start: 2022-03-06 | End: 2022-03-07 | Stop reason: HOSPADM

## 2022-03-06 RX ORDER — ACETAMINOPHEN 325 MG/1
650 TABLET ORAL EVERY 6 HOURS PRN
Status: DISCONTINUED | OUTPATIENT
Start: 2022-03-06 | End: 2022-03-07 | Stop reason: HOSPADM

## 2022-03-06 RX ORDER — SODIUM CHLORIDE 0.9 % (FLUSH) 0.9 %
5-40 SYRINGE (ML) INJECTION PRN
Status: DISCONTINUED | OUTPATIENT
Start: 2022-03-06 | End: 2022-03-07 | Stop reason: HOSPADM

## 2022-03-06 RX ORDER — HYDROCHLOROTHIAZIDE 25 MG/1
12.5 TABLET ORAL EVERY MORNING
Status: CANCELLED | OUTPATIENT
Start: 2022-03-06

## 2022-03-06 RX ORDER — ONDANSETRON 2 MG/ML
4 INJECTION INTRAMUSCULAR; INTRAVENOUS EVERY 6 HOURS PRN
Status: DISCONTINUED | OUTPATIENT
Start: 2022-03-06 | End: 2022-03-07 | Stop reason: HOSPADM

## 2022-03-06 RX ORDER — FUROSEMIDE 10 MG/ML
40 INJECTION INTRAMUSCULAR; INTRAVENOUS ONCE
Status: COMPLETED | OUTPATIENT
Start: 2022-03-06 | End: 2022-03-06

## 2022-03-06 RX ORDER — WARFARIN SODIUM 5 MG/1
10 TABLET ORAL
Status: DISCONTINUED | OUTPATIENT
Start: 2022-03-09 | End: 2022-03-07 | Stop reason: HOSPADM

## 2022-03-06 RX ORDER — IPRATROPIUM BROMIDE AND ALBUTEROL SULFATE 2.5; .5 MG/3ML; MG/3ML
1 SOLUTION RESPIRATORY (INHALATION)
Status: DISCONTINUED | OUTPATIENT
Start: 2022-03-06 | End: 2022-03-06

## 2022-03-06 RX ORDER — IPRATROPIUM BROMIDE AND ALBUTEROL SULFATE 2.5; .5 MG/3ML; MG/3ML
1 SOLUTION RESPIRATORY (INHALATION) EVERY 4 HOURS
Status: DISCONTINUED | OUTPATIENT
Start: 2022-03-06 | End: 2022-03-07 | Stop reason: HOSPADM

## 2022-03-06 RX ORDER — ASPIRIN 81 MG/1
81 TABLET, CHEWABLE ORAL DAILY
Status: DISCONTINUED | OUTPATIENT
Start: 2022-03-06 | End: 2022-03-06

## 2022-03-06 RX ORDER — METHYLPREDNISOLONE SODIUM SUCCINATE 40 MG/ML
40 INJECTION, POWDER, LYOPHILIZED, FOR SOLUTION INTRAMUSCULAR; INTRAVENOUS EVERY 12 HOURS
Status: DISCONTINUED | OUTPATIENT
Start: 2022-03-06 | End: 2022-03-06

## 2022-03-06 RX ORDER — ONDANSETRON 4 MG/1
4 TABLET, ORALLY DISINTEGRATING ORAL EVERY 8 HOURS PRN
Status: DISCONTINUED | OUTPATIENT
Start: 2022-03-06 | End: 2022-03-07 | Stop reason: HOSPADM

## 2022-03-06 RX ORDER — ACETAMINOPHEN 650 MG/1
650 SUPPOSITORY RECTAL EVERY 6 HOURS PRN
Status: DISCONTINUED | OUTPATIENT
Start: 2022-03-06 | End: 2022-03-07 | Stop reason: HOSPADM

## 2022-03-06 RX ORDER — FLUTICASONE PROPIONATE 50 MCG
1 SPRAY, SUSPENSION (ML) NASAL DAILY
Status: DISCONTINUED | OUTPATIENT
Start: 2022-03-06 | End: 2022-03-07 | Stop reason: HOSPADM

## 2022-03-06 RX ORDER — WARFARIN SODIUM 7.5 MG/1
7.5 TABLET ORAL
Status: DISCONTINUED | OUTPATIENT
Start: 2022-03-06 | End: 2022-03-07 | Stop reason: HOSPADM

## 2022-03-06 RX ORDER — ATORVASTATIN CALCIUM 80 MG/1
80 TABLET, FILM COATED ORAL DAILY
Status: DISCONTINUED | OUTPATIENT
Start: 2022-03-06 | End: 2022-03-07 | Stop reason: HOSPADM

## 2022-03-06 RX ORDER — GENTAMICIN SULFATE 1 MG/G
CREAM TOPICAL DAILY
Status: DISCONTINUED | OUTPATIENT
Start: 2022-03-06 | End: 2022-03-07 | Stop reason: HOSPADM

## 2022-03-06 RX ORDER — FUROSEMIDE 10 MG/ML
40 INJECTION INTRAMUSCULAR; INTRAVENOUS 2 TIMES DAILY
Status: DISCONTINUED | OUTPATIENT
Start: 2022-03-06 | End: 2022-03-07 | Stop reason: HOSPADM

## 2022-03-06 RX ORDER — M-VIT,TX,IRON,MINS/CALC/FOLIC 27MG-0.4MG
1 TABLET ORAL DAILY
Status: DISCONTINUED | OUTPATIENT
Start: 2022-03-06 | End: 2022-03-07 | Stop reason: HOSPADM

## 2022-03-06 RX ORDER — PREDNISONE 20 MG/1
40 TABLET ORAL DAILY
Status: DISCONTINUED | OUTPATIENT
Start: 2022-03-06 | End: 2022-03-06

## 2022-03-06 RX ORDER — DEXTROSE MONOHYDRATE 50 MG/ML
100 INJECTION, SOLUTION INTRAVENOUS PRN
Status: DISCONTINUED | OUTPATIENT
Start: 2022-03-06 | End: 2022-03-07 | Stop reason: HOSPADM

## 2022-03-06 RX ORDER — FERROUS SULFATE 325(65) MG
325 TABLET ORAL 2 TIMES DAILY
Status: DISCONTINUED | OUTPATIENT
Start: 2022-03-06 | End: 2022-03-07 | Stop reason: HOSPADM

## 2022-03-06 RX ORDER — NICOTINE POLACRILEX 4 MG
15 LOZENGE BUCCAL PRN
Status: DISCONTINUED | OUTPATIENT
Start: 2022-03-06 | End: 2022-03-07 | Stop reason: HOSPADM

## 2022-03-06 RX ORDER — ALBUTEROL SULFATE 90 UG/1
2 AEROSOL, METERED RESPIRATORY (INHALATION) 4 TIMES DAILY PRN
Status: DISCONTINUED | OUTPATIENT
Start: 2022-03-06 | End: 2022-03-07 | Stop reason: HOSPADM

## 2022-03-06 RX ORDER — INSULIN LISPRO 100 [IU]/ML
0-6 INJECTION, SOLUTION INTRAVENOUS; SUBCUTANEOUS
Status: DISCONTINUED | OUTPATIENT
Start: 2022-03-06 | End: 2022-03-07 | Stop reason: HOSPADM

## 2022-03-06 RX ORDER — SODIUM CHLORIDE 9 MG/ML
25 INJECTION, SOLUTION INTRAVENOUS PRN
Status: DISCONTINUED | OUTPATIENT
Start: 2022-03-06 | End: 2022-03-07 | Stop reason: HOSPADM

## 2022-03-06 RX ORDER — POLYETHYLENE GLYCOL 3350 17 G/17G
17 POWDER, FOR SOLUTION ORAL DAILY PRN
Status: DISCONTINUED | OUTPATIENT
Start: 2022-03-06 | End: 2022-03-07 | Stop reason: HOSPADM

## 2022-03-06 RX ORDER — NIFEDIPINE 30 MG/1
60 TABLET, EXTENDED RELEASE ORAL DAILY
Status: DISCONTINUED | OUTPATIENT
Start: 2022-03-06 | End: 2022-03-07 | Stop reason: HOSPADM

## 2022-03-06 RX ORDER — INSULIN LISPRO 100 [IU]/ML
0-3 INJECTION, SOLUTION INTRAVENOUS; SUBCUTANEOUS NIGHTLY
Status: DISCONTINUED | OUTPATIENT
Start: 2022-03-06 | End: 2022-03-07 | Stop reason: HOSPADM

## 2022-03-06 RX ORDER — SODIUM CHLORIDE 0.9 % (FLUSH) 0.9 %
5-40 SYRINGE (ML) INJECTION EVERY 12 HOURS SCHEDULED
Status: DISCONTINUED | OUTPATIENT
Start: 2022-03-06 | End: 2022-03-07 | Stop reason: HOSPADM

## 2022-03-06 RX ADMIN — INSULIN LISPRO 1 UNITS: 100 INJECTION, SOLUTION INTRAVENOUS; SUBCUTANEOUS at 11:44

## 2022-03-06 RX ADMIN — ALBUTEROL SULFATE 2.5 MG: 2.5 SOLUTION RESPIRATORY (INHALATION) at 00:10

## 2022-03-06 RX ADMIN — IPRATROPIUM BROMIDE AND ALBUTEROL SULFATE 1 AMPULE: .5; 3 SOLUTION RESPIRATORY (INHALATION) at 16:44

## 2022-03-06 RX ADMIN — GUAIFENESIN 1200 MG: 600 TABLET, EXTENDED RELEASE ORAL at 21:28

## 2022-03-06 RX ADMIN — INSULIN LISPRO 1 UNITS: 100 INJECTION, SOLUTION INTRAVENOUS; SUBCUTANEOUS at 21:28

## 2022-03-06 RX ADMIN — ATORVASTATIN CALCIUM 80 MG: 80 TABLET, FILM COATED ORAL at 08:16

## 2022-03-06 RX ADMIN — WARFARIN SODIUM 7.5 MG: 7.5 TABLET ORAL at 17:09

## 2022-03-06 RX ADMIN — SODIUM CHLORIDE 1000 ML: 9 INJECTION, SOLUTION INTRAVENOUS at 00:19

## 2022-03-06 RX ADMIN — IPRATROPIUM BROMIDE AND ALBUTEROL SULFATE 1 AMPULE: .5; 3 SOLUTION RESPIRATORY (INHALATION) at 00:10

## 2022-03-06 RX ADMIN — PREDNISONE 40 MG: 20 TABLET ORAL at 08:16

## 2022-03-06 RX ADMIN — Medication 10 ML: at 08:27

## 2022-03-06 RX ADMIN — GENTAMICIN SULFATE: 1 CREAM TOPICAL at 13:38

## 2022-03-06 RX ADMIN — GUAIFENESIN 1200 MG: 600 TABLET, EXTENDED RELEASE ORAL at 08:16

## 2022-03-06 RX ADMIN — FUROSEMIDE 40 MG: 10 INJECTION, SOLUTION INTRAMUSCULAR; INTRAVENOUS at 02:25

## 2022-03-06 RX ADMIN — CARVEDILOL 25 MG: 25 TABLET, FILM COATED ORAL at 08:16

## 2022-03-06 RX ADMIN — NIFEDIPINE 60 MG: 30 TABLET, FILM COATED, EXTENDED RELEASE ORAL at 08:16

## 2022-03-06 RX ADMIN — IPRATROPIUM BROMIDE AND ALBUTEROL SULFATE 1 AMPULE: .5; 3 SOLUTION RESPIRATORY (INHALATION) at 09:20

## 2022-03-06 RX ADMIN — INSULIN LISPRO 1 UNITS: 100 INJECTION, SOLUTION INTRAVENOUS; SUBCUTANEOUS at 17:09

## 2022-03-06 RX ADMIN — IPRATROPIUM BROMIDE AND ALBUTEROL SULFATE 1 AMPULE: .5; 3 SOLUTION RESPIRATORY (INHALATION) at 12:23

## 2022-03-06 RX ADMIN — FERROUS SULFATE TAB 325 MG (65 MG ELEMENTAL FE) 325 MG: 325 (65 FE) TAB at 21:28

## 2022-03-06 RX ADMIN — COLLAGENASE SANTYL: 250 OINTMENT TOPICAL at 13:38

## 2022-03-06 RX ADMIN — FUROSEMIDE 40 MG: 10 INJECTION, SOLUTION INTRAMUSCULAR; INTRAVENOUS at 17:09

## 2022-03-06 RX ADMIN — Medication 1 TABLET: at 08:16

## 2022-03-06 RX ADMIN — METHYLPREDNISOLONE SODIUM SUCCINATE 80 MG: 125 INJECTION, POWDER, FOR SOLUTION INTRAMUSCULAR; INTRAVENOUS at 00:19

## 2022-03-06 RX ADMIN — FERROUS SULFATE TAB 325 MG (65 MG ELEMENTAL FE) 325 MG: 325 (65 FE) TAB at 08:16

## 2022-03-06 RX ADMIN — FLUTICASONE PROPIONATE 1 SPRAY: 50 SPRAY, METERED NASAL at 08:16

## 2022-03-06 RX ADMIN — Medication 10 ML: at 21:34

## 2022-03-06 RX ADMIN — CARVEDILOL 25 MG: 25 TABLET, FILM COATED ORAL at 17:08

## 2022-03-06 RX ADMIN — FUROSEMIDE 40 MG: 10 INJECTION, SOLUTION INTRAMUSCULAR; INTRAVENOUS at 08:16

## 2022-03-06 RX ADMIN — IPRATROPIUM BROMIDE AND ALBUTEROL SULFATE 1 AMPULE: .5; 3 SOLUTION RESPIRATORY (INHALATION) at 20:33

## 2022-03-06 ASSESSMENT — PAIN SCALES - GENERAL
PAINLEVEL_OUTOF10: 0

## 2022-03-06 NOTE — CARE COORDINATION
Discharge Planning Assessment  Readmission risk score 20%  RN discharge planner met with patient/ (and family member) to discuss reason for admission, current living situation, and potential needs at the time of discharge    Demographics/Insurance verified Yes    Current type of dwelling: split level home,  6 stairs between each level, 5 entry stairs from driveway to front door    Patient from ECF/SW confirmed with:n/a    Living arrangements:with wife    Level of function/Support: independent    PCP:[facility]    Last Visit to PCP: 2/18/2022    DME:cane use regularly, walker use PRN, BiPAP at hs states uses regularly from [employer], does not have a nebulizer    Active with any community resources/agencies/skilled home care: no skilled services at this time    Medication compliance issues: uses [address] [employer] issues that could impact healthcare:  Not noted      Tentative discharge plan:home    Discussed and provided facilities of choice if transition to a skilled nursing facility is required at the time of discharge      Discussed with patient and/or family that on the day of discharge home tentative time of discharge will be between 10 AM and noon.     Transportation at the time of discharge: FLAKO SteeleN, CCM, RN  [facility]  [phone]

## 2022-03-06 NOTE — RT PROTOCOL NOTE
RT Nebulizer Bronchodilator Protocol Note    There is a bronchodilator order in the chart from a provider indicating to follow the RT Bronchodilator Protocol and there is an Initiate RT Bronchodilator Protocol order as well (see protocol at bottom of note). CXR Findings:  XR CHEST PORTABLE    Result Date: 3/6/2022  Mild cardiomegaly and minimal central pulmonary congestion or pulmonary artery hypertension which is less prominent Mild discoid atelectasis or scarring along the lung bases which is less prominent. The findings from the last RT Protocol Assessment were as follows:  Smoking: Chronic pulmonary disease  Respiratory Pattern: Mild dyspnea at rest, irregular pattern, or RR 21-25 bpm  Breath Sounds: Inspiratory and expiratory or bilateral wheezing and/or rhonchi  Cough: Strong, spontaneous, non-productive  Indication for Bronchodilator Therapy: Decreased or absent breath sounds,Wheezing associated with pulm disorder,On home bronchodilators  Bronchodilator Assessment Score: 12    Aerosolized bronchodilator medication orders have been revised according to the RT Nebulizer Bronchodilator Protocol below. Respiratory Therapist to perform RT Therapy Protocol Assessment initially then follow the protocol. Repeat RT Therapy Protocol Assessment PRN for score 0-3 or on second treatment, BID, and PRN for scores above 3. No Indications - adjust the frequency to every 6 hours PRN wheezing or bronchospasm, if no treatments needed after 48 hours then discontinue using Per Protocol order mode. If indication present, adjust the RT bronchodilator orders based on the Bronchodilator Assessment Score as indicated below. If a patient is on this medication at home then do not decrease Frequency below that used at home.     0-3 - enter or revise RT bronchodilator order(s) to equivalent RT Bronchodilator order with Frequency of every 4 hours PRN for wheezing or increased work of breathing using Per Protocol order mode.       4-6 - enter or revise RT Bronchodilator order(s) to two equivalent RT bronchodilator orders with one order with BID Frequency and one order with Frequency of every 4 hours PRN wheezing or increased work of breathing using Per Protocol order mode. 7-10 - enter or revise RT Bronchodilator order(s) to two equivalent RT bronchodilator orders with one order with TID Frequency and one order with Frequency of every 4 hours PRN wheezing or increased work of breathing using Per Protocol order mode. 11-13 - enter or revise RT Bronchodilator order(s) to one equivalent RT bronchodilator order with QID Frequency and an Albuterol order with Frequency of every 4 hours PRN wheezing or increased work of breathing using Per Protocol order mode. Greater than 13 - enter or revise RT Bronchodilator order(s) to one equivalent RT bronchodilator order with every 4 hours Frequency and an Albuterol order with Frequency of every 2 hours PRN wheezing or increased work of breathing using Per Protocol order mode. RT to enter RT Home Evaluation for COPD & MDI Assessment order using Per Protocol order mode. Respiratory distress protocol, q4 times 24 hrs. Then reassess.   Electronically signed by Radha Núñez RCP on 3/6/2022 at 6:03 AM

## 2022-03-06 NOTE — PROGRESS NOTES
Clinical Pharmacy Note: Pharmacy to Dose Warfarin    Pharmacy consulted by Dr. Miguelina Deshpande to dose warfarin. Samantha Mitchell is a 65 y.o. male  is receiving warfarin for indication: DVT. INR Goal Range: 2.0-3.0  Prior to admission warfarin dosing regimen: 10 mg  and ; 7.5 mg all   INR today:   Lab Results   Component Value Date    INR 2.96 2022       Assessment/Plan:  INR is therapeutic on prior to admission dosing regimen. Based on today's assessment, dose warfarin per outpatient regimen above. Daily INR is ordered. Pharmacy will continue to monitor and make adjustments to regimen as necessary.      Thank you for the consult,     Billie Garcia, PharmD, [facility]

## 2022-03-06 NOTE — PROGRESS NOTES
[facility]    3/6/2022 7:44 AM        Name: Jc Cheng . Admitted: 3/5/2022  Primary Care Provider: Carla Valdez MD (Tel: [phone])      Subjective:  . Admitted this am with COPD exacerbation   Was recently hospitalized in February for COPD with hypercapnia,  He required frequent BIPAP use . Currently on 4 liters. Will try and wean off.   ( he get hypercapnic with higher flow oxygen)  Has ulceration on his feet, now followed at the wound clinic       Reviewed interval ancillary notes    Current Medications  albuterol sulfate  (90 Base) MCG/ACT inhaler 2 puff, 4x Daily PRN  atorvastatin (LIPITOR) tablet 80 mg, Daily  carvedilol (COREG) tablet 25 mg, BID WC  ferrous sulfate (IRON 325) tablet 325 mg, BID  fluticasone (FLONASE) 50 MCG/ACT nasal spray 1 spray, Daily  guaiFENesin (MUCINEX) extended release tablet 1,200 mg, BID  NIFEdipine (PROCARDIA XL) extended release tablet 60 mg, Daily  therapeutic multivitamin-minerals 1 tablet, Daily  insulin lispro (1 Unit Dial) 0-6 Units, TID WC  insulin lispro (1 Unit Dial) 0-3 Units, Nightly  glucose (GLUTOSE) 40 % oral gel 15 g, PRN  dextrose 50 % IV solution, PRN  glucagon (rDNA) injection 1 mg, PRN  dextrose 5 % solution, PRN  sodium chloride flush 0.9 % injection 5-40 mL, 2 times per day  sodium chloride flush 0.9 % injection 5-40 mL, PRN  0.9 % sodium chloride infusion, PRN  ondansetron (ZOFRAN-ODT) disintegrating tablet 4 mg, Q8H PRN   Or  ondansetron (ZOFRAN) injection 4 mg, Q6H PRN  polyethylene glycol (GLYCOLAX) packet 17 g, Daily PRN  acetaminophen (TYLENOL) tablet 650 mg, Q6H PRN   Or  acetaminophen (TYLENOL) suppository 650 mg, Q6H PRN  albuterol (PROVENTIL) nebulizer solution 2.5 mg, Q4H PRN  predniSONE (DELTASONE) tablet 40 mg, Daily  furosemide (LASIX) injection 40 mg, BID  [START ON 3/9/2022] warfarin (COUMADIN) tablet 10 mg, Once per day on Wed Sat  warfarin (COUMADIN) tablet 7.5 mg, Once per day on Sun Mon Tue Thu Fri  ipratropium-albuterol (DUONEB) nebulizer solution 1 ampule, Q4H        Objective:  /73   Pulse 107   Temp 98.3 °F (36.8 °C) (Oral)   Resp 22   Ht 5' 8\" (1.727 m)   Wt 163 lb 12.8 oz (74.3 kg)   SpO2 92%   BMI 24.91 kg/m²     Intake/Output Summary (Last 24 hours) at 3/6/2022 0744  Last data filed at 3/6/2022 0128  Gross per 24 hour   Intake 1000 ml   Output --   Net 1000 ml      Wt Readings from Last 3 Encounters:   03/06/22 163 lb 12.8 oz (74.3 kg)   02/24/22 163 lb (73.9 kg)   02/18/22 165 lb (74.8 kg)       General appearance:  Appears comfortable at rest.    Eyes: Sclera clear. Pupils equal.  ENT: Moist oral mucosa. Trachea midline, no adenopathy. Cardiovascular: Regular rhythm, normal S1, S2. No murmur. No edema in lower extremities  Respiratory:lungs with scattered wheezes upper lobes. Decreased in bases   GI: Abdomen soft, no tenderness, not distended, normal bowel sounds  Musculoskeletal: No cyanosis in digits, neck supple  Neurology: CN 2-12 grossly intact. No speech or motor deficits  Psych: Normal affect. Alert and oriented in time, place and person  Skin: Warm, dry, normal turgor, wraps and compression stockings in place to lower legs     Labs and Tests:  CBC:   Recent Labs     03/06/22  0010 03/06/22 0452   WBC 6.2 9.4   HGB 13.1* 13.6    325     BMP:    Recent Labs     03/06/22  0010 03/06/22 0452    137   K 4.5 4.5   CL 98* 96*   CO2 31 29   BUN 16 16   CREATININE 0.9 0.8   GLUCOSE 130* 159*     Hepatic:   Recent Labs     03/06/22  0010 03/06/22 0452   AST 19 20   ALT 24 26   BILITOT <0.2 <0.2   ALKPHOS 104 112     INR 2.96    AIC 6.5 %    Chest xray:  Mild cardiomegaly and minimal central pulmonary congestion or pulmonary   artery hypertension which is less prominent     Mild discoid atelectasis or scarring along the lung bases which is less   prominent.      February   Summary      RAN    Right indeterminate due to wound at ankle regipn    Left indeterminate due to absent doppler signals at the ankle        DUPLEX SCAN    Right    Patent Femoral/Posterior tibial artery pass graft    Chronic total occlusion of the superficial femoral artery and of the    popliteal artery    Deep Femoral artery stenosis        Left    Occluded common femoral artery to posterior tibial artery bypass graft    Chronic occlusion of the superficial femoral and popliteal arteries    Nonvisualization of the tibial arteries in the left calf region. Occluded PT    and AT arteries. There may be a peroneal artery patent in the calf and this    has low flow           Problem List  Principal Problem:    COPD exacerbation ([address]  Resolved Problems:    * No resolved hospital problems. *       Assessment & Plan:   1. Acute hypoxic respiratory failure due to COPD exacerbation and pulmonary edema: Will resume IV solu medrol. Continue with nebs and wean oxygen as tolerated to keep sats 92% or greater  2. Pulmonary edema:  He has been started on IV lasix. Echo ordered   3. Severe FRANCI: bipap at hs  4. T2DM:  Will add humalog correction as needed  5. On chronic AC with coumadin due to DVT. INR in range   6. Bilateral lower leg ulcers for PVD/ PAD:  Follows weekly with Dr Lucian Oliver at the wound clinic. RN at bedside, will do daily dressing changes in the left leg only ,  Follows q Friday at [address]: ADULT DIET; Regular; 4 carb choices (60 gm/meal);  No Added Salt (3-4 gm); 2000 ml  Code:Full Code  DVT PPX      HOLGER Young CNP   3/6/2022 7:44 AM

## 2022-03-06 NOTE — H&P
Hospital Medicine History and Physical    3/6/2022    Date of Admission: 3/6/2022    Date of Service: Pt seen/examined on 3/6/2022 and admitted to inpatient. Assessment/plan:  1. COPD exacerbation. Continue scheduled and as needed breathing treatments, steroids. Admit to ICU stepdown for close monitoring. 2. Acute pulmonary edema. Rule out CHF - IV lasix 40 mg X1 in ER; continue IV lasix 40 mg BID. Will obtain ECHO. Maintain on fluid restrictions. 3. Acute respiratory failure with hypoxia and hypercapnia. Secondary to #1 and #2. Monitor pulse oximeter closely. Continue supplemental oxygen (via BiPAP) with plan to wean as tolerated. 4. History of diabetes type 2. Check hemoglobin A1c to assess control status. Start sliding scale insulin and monitor Accu-Chek closely, adjust insulin dose as needed. Currently holding home dose of oral diabetes medicine. 5. Other comorbidities: history of diabetes type 2, DVT (on chronic anticoagulation with Coumadin), essential hypertension, hyperlipidemia, fibromyalgia, COPD. Activities: Up with assist  Prophylaxis: SC lovenox  Code status: Full code    ==========================================================  Chief complaint:  Chief Complaint   Patient presents with    Shortness of Breath     Patient states shortness of breath begal an hour and a half ago, patient states he was recently discharged for pneumonia. 76 on room air at time of triage. History of Presenting Illness: This is a pleasant 60-year-old male with history of diabetes type 2, DVT (on chronic anticoagulation with Coumadin), essential hypertension, hyperlipidemia, fibromyalgia, COPD, who recently quit tobacco use earlier this year, who presents to the emergency room with complaints of acute onset of shortness of breath that started while he was laying down with his BiPAP on. He did not have any chest pain or cough or fever or chills.   Paramedics were called and upon arrival at home, patient was found to be hypoxic with oxygen saturation of 70% on room air. He was brought to the emergency room requiring supplemental oxygen, had to be placed on BiPAP due to respiratory distress in the emergency room. Past Medical History:      Diagnosis Date    Diabetes mellitus ([facility]     Fibromyalgia     History of DVT (deep vein thrombosis)     Hyperlipidemia     Hypertension     Type 2 diabetes mellitus with circulatory disorder, without long-term current use of insulin ([facility] [address] [facility] 2/18/2022       Past Surgical History:      Procedure Laterality Date    APPENDECTOMY  2005    COLONOSCOPY  2016    FEMORAL BYPASS Left 02/26/2020    femoral posterior tibial bypass    FEMORAL-TIBIAL BYPASS GRAFT Right 12/11/2020    with femoral endarterectomy and patch angioplasty    FOOT DEBRIDEMENT Left 09/03/2020    FOOT DEBRIDEMENT Right 2/26/2021    DEBRIDEMENT OF RIGHT FOOT WOUND WITH GRAFT APPLICATION performed by Bridget Nascimento DPM at [facility] Right 12/08/2020    stent leg for PVD       Medications (prior to admission):  Prior to Admission medications    Medication Sig Start Date End Date Taking? Authorizing Provider   guaiFENesin (MUCINEX) 600 MG extended release tablet Take 1,200 mg by mouth 2 times daily    Historical Provider, MD   umeclidinium-vilanterol (ANORO ELLIPTA) 62.5-25 MCG/INH AEPB inhaler Inhale 1 puff into the lungs daily  Patient not taking: Reported on 2/24/2022 2/21/22   Nitin Jama MD   atorvastatin (LIPITOR) 80 MG tablet Take 1 tablet by mouth daily Cancel atorvastatin 20 mg a day 2/18/22   Nitin Jama MD   Fluticasone-Umeclidin-Vilant (TRELEGY ELLIPTA) 200-62.5-25 MCG/INH AEPB Inhale 1 puff into the lungs 2 times daily  Patient not taking: Reported on 2/24/2022 2/18/22   Nitin Jama MD   gentamicin (GARAMYCIN) 0.1 % cream Apply topically   daily.  2/11/22   Radhika Evans DPM   hydroCHLOROthiazide (HYDRODIURIL) 12.5 MG tablet Take 1 tablet by mouth every morning 2/5/22 3/7/22  Linh Canseco, APRN - CNP   albuterol sulfate HFA (VENTOLIN HFA) 108 (90 Base) MCG/ACT inhaler Inhale 2 puffs into the lungs 4 times daily as needed for Wheezing 1/31/22   Anastasia Carlos MD   acetaminophen (TYLENOL) 500 MG tablet Take 1 tablet by mouth 4 times daily as needed for Pain 1/10/22   Anastasia Carlos MD   warfarin (JANTOVEN) 5 MG tablet TAKE 1 AND 1/2 TABLETS BY MOUTH ONE TIME A DAY OR AS DIRECTED BY [facility] ([phone])  Patient taking differently: Take 7.5 mg by mouth daily Except 10mg every Wednesday and Saturday OR AS DIRECTED BY [facility] ([phone]) 1/10/22   Anastasia Carlos MD   NIFEdipine (ADALAT CC) 60 MG extended release tablet Take 1 tablet by mouth daily 1/10/22   Anastasia Carlos MD   carvedilol (COREG) 25 MG tablet Take 1 tablet by mouth 2 times daily (with meals) 1/10/22   Anastasia Carlos MD   metFORMIN (GLUCOPHAGE) 500 MG tablet Take 1 tablet by mouth 2 times daily (with meals) 1/10/22   Anastasia Carlos MD   fluticasone Sanjana Ouch) 50 MCG/ACT nasal spray 1 spray by Nasal route daily 1/6/22 1/6/23  Anastasia Carlos MD   nicotine (NICODERM CQ) 21 MG/24HR Place 1 patch onto the skin every 24 hours 1/4/22   Maci Baeza MD   blood glucose monitor kit and supplies Dispense sufficient amount for indicated testing frequency plus additional to accommodate PRN testing needs. Dispense all needed supplies to include: monitor, strips, lancing device, lancets, control solutions, alcohol swabs. 1/4/22   Maci Baeza MD   Lancets MISC 1 each by Does not apply route daily Test 2 times daily while on PREDNISONE then 1 time daily & as needed for symptoms of irregular blood sugar 1/4/22   Maci Baeza MD   blood glucose monitor strips Test 2 times a day while on PREDNISONE, then 1 time daily & as needed for symptoms of irregular blood glucose.  Dispense sufficient amount for indicated testing frequency plus additional to accommodate PRN testing needs. 1/4/22   Iain Garcia MD   ferrous sulfate (IRON 325) 325 (65 Fe) MG tablet Take 1 tablet by mouth 2 times daily 9/7/21   Ania Araya MD   tadalafil (CIALIS) 5 MG tablet TAKE 1 TABLET BY MOUTH ONE TIME A DAY AS NEEDED FOR ERECTILE DYSFUNCTION 8/16/21   Ania Araya MD   Vitamins/Minerals TABS Take 1 tablet by mouth daily    Historical Provider, MD   aspirin 81 MG chewable tablet CHEW AND SWALLOW 1 TABLET BY MOUTH ONE TIME A DAY  Patient taking differently: CHEW AND SWALLOW 1 TABLET BY MOUTH ONE TIME A DAY  1/7/22 NOT TAKING 12/28/20   Ania Araya MD       Allergy(ies):  Chantix [varenicline]    Social History:  TOBACCO:  reports that he quit smoking about 2 months ago. His smoking use included cigarettes. He started smoking about 44 years ago. He has a 10.00 pack-year smoking history. He has never used smokeless tobacco.  ETOH:  reports current alcohol use of about 6.0 standard drinks of alcohol per week. Family History:      Problem Relation Age of Onset    Cancer Mother     Lung Cancer Mother     Diabetes Father     Stroke Father        Review of Systems:  Pertinent positives are listed in HPI. At least 10-point ROS reviewed and were negative. Vitals and physical examination:  BP (!) 136/94   Pulse 91   Temp 99.1 °F (37.3 °C) (Oral)   Resp 24   Ht 5' 8\" (1.727 m)   Wt 170 lb (77.1 kg)   SpO2 93%   BMI 25.85 kg/m²   Gen/overall appearance: Not in acute distress. Alert. Oriented X3. Head: Normocephalic, atraumatic  Eyes: EOMI, good acuity  ENT: Oral mucosa moist  Neck: No JVD, thyromegaly  CVS: Nml S1S2, no MRG, RRR  Pulm: Faint inspiratory and expiratory wheezes  Gastrointestinal: Soft, NT/ND, +BS  Musculoskeletal: Trivial bilateral lower extremity edema. Warm  Neuro: No focal deficit. Moves extremity spontaneously. Psychiatry: Appropriate affect. Not agitated. Skin: Warm, dry with normal turgor.  No rash  Capillary refill: Brisk,< 3 seconds   Peripheral Pulses: +2 palpable, equal bilaterally       Labs/imaging/EKG:  CBC:   Recent Labs     03/06/22  0010   WBC 6.2   HGB 13.1*        BMP:    Recent Labs     03/06/22  0010      K 4.5   CL 98*   CO2 31   BUN 16   CREATININE 0.9   GLUCOSE 130*     Hepatic:   Recent Labs     03/06/22  0010   AST 19   ALT 24   BILITOT <0.2   ALKPHOS 104       XR CHEST PORTABLE    Result Date: 3/6/2022  EXAMINATION: ONE XRAY VIEW OF THE CHEST 3/5/2022 11:41 pm COMPARISON: 02/01/2022. HISTORY: ORDERING SYSTEM PROVIDED HISTORY: sob TECHNOLOGIST PROVIDED HISTORY: Reason for exam:->sob FINDINGS: The heart is mildly enlarged and more prominent. The pulmonary vessels are top normal and unchanged. The lungs are hyperinflated with mild linear densities along the lung bases which is less prominent. No effusion or consolidation is seen. Mild cardiomegaly and minimal central pulmonary congestion or pulmonary artery hypertension which is less prominent Mild discoid atelectasis or scarring along the lung bases which is less prominent. EKG: Sinus tachycardia with fusion complexes, rate 115 beats per minutes. PVCs present. Incomplete right bundle branch block present. No acute ST/T changes. . I reviewed EKG. Discussed with ER ISAURO.       Thank you Warren Burdick MD for the opportunity to be involved in this patient's care.    -----------------------------  Francisco Monsalve MD  [facility] hospitalist

## 2022-03-06 NOTE — PROGRESS NOTES
Incentive Spirometry education and demonstration completed by Respiratory Therapy Yes      Response to education: Excellent     Teaching Time: 5 minutes    Minimum Predicted Vital Capacity - 684 mL. Patient's Actual Vital Capacity - 800 mL. Turning over to Nursing for routine follow-up Yes.     Comments: IS goal met IS turned to nursing    Electronically signed by Cary Quintanilla on 3/6/2022 at 9:25 AM

## 2022-03-06 NOTE — CONSULTS
[facility]   Consult Note      Reason for Consult: COPD exacerbation  Requesting Physician: Warren Montalvo    Subjective:   CHIEF COMPLAINT: Shortness of breath     HPI: Patient states that he had sudden onset of shortness of breath yesterday while wearing his BiPAP. He denies any significant cough or phlegm. No fevers or chest pain. O2 sats noted to be in the 70s-presented to the ER for further evaluation. He was treated with nebulizer, Solu-Medrol and was placed on BiPAP overnight. States that he feels better this AM.    Recent hospitalization between 2/1 and 2/5 for COPD exacerbation    Diagnosed with moderate COPD based on PFT from January 2022, FEV1 of 1.83 L [63% predicted]. Smoker with 40-pack-year history, states that he quit smoking in January. Currently on Trelegy inhaler and albuterol. Uses BiPAP at nighttime for hypercapnic respiratory failure. History of peripheral vascular disease-has previously had vascular surgery, has lower extremity ulcers. Also has history of type 2 diabetes. Prior history of DVT on chronic anticoagulation with Coumadin.        The patient is a 65 y.o. male with significant past medical history of:      Diagnosis Date    Diabetes mellitus ([facility] [address]     Fibromyalgia     History of DVT (deep vein thrombosis)     Hyperlipidemia     Hypertension     Type 2 diabetes mellitus with circulatory disorder, without long-term current use of insulin ([facility] [address] 2/18/2022        Past Surgical History:        Procedure Laterality Date    APPENDECTOMY  2005    COLONOSCOPY  2016    FEMORAL BYPASS Left 02/26/2020    femoral posterior tibial bypass    FEMORAL-TIBIAL BYPASS GRAFT Right 12/11/2020    with femoral endarterectomy and patch angioplasty    FOOT DEBRIDEMENT Left 09/03/2020    FOOT DEBRIDEMENT Right 2/26/2021    DEBRIDEMENT OF RIGHT FOOT WOUND WITH GRAFT APPLICATION performed by Lisa Bailey DPM at [facility] Right 12/08/2020    stent leg for PVD     Current Medications:    Current Facility-Administered Medications: albuterol sulfate  (90 Base) MCG/ACT inhaler 2 puff, 2 puff, Inhalation, 4x Daily PRN  atorvastatin (LIPITOR) tablet 80 mg, 80 mg, Oral, Daily  carvedilol (COREG) tablet 25 mg, 25 mg, Oral, BID WC  ferrous sulfate (IRON 325) tablet 325 mg, 325 mg, Oral, BID  fluticasone (FLONASE) 50 MCG/ACT nasal spray 1 spray, 1 spray, Nasal, Daily  guaiFENesin (MUCINEX) extended release tablet 1,200 mg, 1,200 mg, Oral, BID  NIFEdipine (PROCARDIA XL) extended release tablet 60 mg, 60 mg, Oral, Daily  therapeutic multivitamin-minerals 1 tablet, 1 tablet, Oral, Daily  insulin lispro (1 Unit Dial) 0-6 Units, 0-6 Units, SubCUTAneous, TID WC  insulin lispro (1 Unit Dial) 0-3 Units, 0-3 Units, SubCUTAneous, Nightly  glucose (GLUTOSE) 40 % oral gel 15 g, 15 g, Oral, PRN  dextrose 50 % IV solution, 12.5 g, IntraVENous, PRN  glucagon (rDNA) injection 1 mg, 1 mg, IntraMUSCular, PRN  dextrose 5 % solution, 100 mL/hr, IntraVENous, PRN  sodium chloride flush 0.9 % injection 5-40 mL, 5-40 mL, IntraVENous, 2 times per day  sodium chloride flush 0.9 % injection 5-40 mL, 5-40 mL, IntraVENous, PRN  0.9 % sodium chloride infusion, 25 mL, IntraVENous, PRN  ondansetron (ZOFRAN-ODT) disintegrating tablet 4 mg, 4 mg, Oral, Q8H PRN **OR** ondansetron (ZOFRAN) injection 4 mg, 4 mg, IntraVENous, Q6H PRN  polyethylene glycol (GLYCOLAX) packet 17 g, 17 g, Oral, Daily PRN  acetaminophen (TYLENOL) tablet 650 mg, 650 mg, Oral, Q6H PRN **OR** acetaminophen (TYLENOL) suppository 650 mg, 650 mg, Rectal, Q6H PRN  albuterol (PROVENTIL) nebulizer solution 2.5 mg, 2.5 mg, Nebulization, Q4H PRN  furosemide (LASIX) injection 40 mg, 40 mg, IntraVENous, BID  [START ON 3/9/2022] warfarin (COUMADIN) tablet 10 mg, 10 mg, Oral, Once per day on Wed Sat  warfarin (COUMADIN) tablet 7.5 mg, 7.5 mg, Oral, Once per day on Sun Mon Tue Thu Fri  ipratropium-albuterol (Altagracia Rye) nebulizer solution 1 ampule, 1 ampule, Inhalation, Q4H  gentamicin (GARAMYCIN) 0.1 % cream, , Topical, Daily  collagenase ointment, , Topical, Daily  methylPREDNISolone sodium (SOLU-MEDROL) injection 40 mg, 40 mg, IntraVENous, Q12H    Allergies   Allergen Reactions    Chantix [Varenicline]      Hallucinations         Social History:    TOBACCO:   reports that he quit smoking about 2 months ago. His smoking use included cigarettes. He started smoking about 44 years ago. He has a 10.00 pack-year smoking history. He has never used smokeless tobacco.  ETOH:   reports current alcohol use of about 6.0 standard drinks of alcohol per week.   Patient currently lives independently    Family History:       Problem Relation Age of Onset    Cancer Mother     Lung Cancer Mother     Diabetes Father     Stroke Father        REVIEW OF SYSTEMS:    Constitutional: negative for fatigue, fevers, malaise and weight loss  Ears, nose, mouth, throat: negative for ear drainage, epistaxis, hoarseness, nasal congestion, sore throat and voice change  Respiratory: negative except for shortness of breath  Cardiovascular: negative for chest pain, chest pressure/discomfort, irregular heart beat, lower extremity edema and palpitations  Gastrointestinal: negative for abdominal pain, constipation, diarrhea, jaundice, melena, odynophagia, reflux symptoms and vomiting  Hematologic/lymphatic: negative for bleeding, easy bruising, lymphadenopathy and petechiae  Musculoskeletal:negative for arthralgias, bone pain, muscle weakness, neck pain and stiff joints  Neurological: negative for dizziness, gait problems, headaches, seizures, speech problems, tremors and weakness  Behavioral/Psych: negative for anxiety, behavior problems, depression, fatigue and sleep disturbance  Endocrine: negative for diabetic symptoms including none, neuropathy, polyphagia, polyuria, polydipsia, vomiting and diarrhea and temperature intolerance  Allergic/Immunologic: negative for anaphylaxis, angioedema, hay fever and urticaria      Objective:     Patient Vitals for the past 8 hrs:   BP Temp Temp src Pulse Resp SpO2 Weight   03/06/22 1223 -- -- -- -- -- 94 % --   03/06/22 1145 125/77 98.4 °F (36.9 °C) Oral 100 22 94 % --   03/06/22 0920 -- -- -- -- 20 93 % --   03/06/22 0741 -- -- -- -- -- -- 163 lb 12.8 oz (74.3 kg)   03/06/22 0700 134/73 98.3 °F (36.8 °C) Oral 107 22 92 % --     I/O last 3 completed shifts:   In: 1000 [IV Piggyback:1000]  Out: -   I/O this shift:  In: -   Out: 700 [Urine:700]    Physical Exam:  General Appearance: alert and oriented to person, place and time, well developed and well- nourished, in no acute distress  Skin: warm and dry, no rash or erythema  Head: normocephalic and atraumatic  Eyes: pupils equal, round, and reactive to light, extraocular eye movements intact, conjunctivae normal  ENT: external ear and ear canal normal bilaterally, nose without deformity, nasal mucosa and turbinates normal  Neck: supple and non-tender without mass, no cervical lymphadenopathy  Pulmonary/Chest: clear to auscultation bilaterally- no wheezes, rales or rhonchi, normal air movement, no respiratory distress  Cardiovascular: normal rate, regular rhythm,  no murmurs, rubs, distal pulses intact, no carotid bruits  Abdomen: soft, non-tender, non-distended, normal bowel sounds, no masses or organomegaly  Lymph Nodes: Cervical, supraclavicular normal  Extremities: no cyanosis, clubbing or edema  Musculoskeletal: normal range of motion, no joint swelling, deformity or tenderness  Neurologic: alert, no focal neurologic deficits    Data Review:  CBC:   Lab Results   Component Value Date    WBC 9.4 03/06/2022    RBC 4.87 03/06/2022     BMP:   Lab Results   Component Value Date    GLUCOSE 159 03/06/2022    CO2 29 03/06/2022    BUN 16 03/06/2022    CREATININE 0.8 03/06/2022    CALCIUM 9.8 03/06/2022     ABG:   Lab Results   Component Value Date    IPJ7TLS 34.4 02/03/2022    BEART 8 02/03/2022    N8TPFJUI 84 02/03/2022    PHART 7.282 02/03/2022    LIH2UEH 73.0 02/03/2022    PO2ART 57.7 02/03/2022    VIV9KPU 37 02/03/2022       Radiology: All pertinent images / reports were reviewed as a part of this visit. Narrative   EXAMINATION:   ONE XRAY VIEW OF THE CHEST       3/5/2022 11:41 pm       COMPARISON:   02/01/2022.       HISTORY:   ORDERING SYSTEM PROVIDED HISTORY: sob   TECHNOLOGIST PROVIDED HISTORY:   Reason for exam:->sob       FINDINGS:   The heart is mildly enlarged and more prominent.  The pulmonary vessels are   top normal and unchanged.  The lungs are hyperinflated with mild linear   densities along the lung bases which is less prominent.  No effusion or   consolidation is seen.           Impression   Mild cardiomegaly and minimal central pulmonary congestion or pulmonary   artery hypertension which is less prominent       Mild discoid atelectasis or scarring along the lung bases which is less   prominent. Problem List:   Probable CHF decompensation  COPD, moderate  Chronic hypercapnic respiratory failure requiring BiPAP at nighttime  Assessment/Plan:     Sudden shortness of breath, with orthopnea despite use of BiPAP. proBNP elevated at 1000. Negative troponin. EKG shows incomplete right bundle branch block, no significant ST changes. 2D echo is currently awaited. Chest x-ray reveals pulmonary vascular congestion. Patient's symptoms may very well be related to CHF decompensation. No prior 2D echocardiogram, currently awaited. Continue with IV Lasix. Will discontinue IV Solu-Medrol, since he is currently not wheezing. Already on Coumadin with therapeutic INR on presentation, makes PE unlikely. Currently on 4 L O2, could possibly will be weaned down. History of bilateral pneumonia/acute exacerbation of COPD recently. Has moderate COPD, already on Trelegy and albuterol. Continue to use BiPAP at nighttime for chronic hypercapnia.     Pulmonary will follow    Eugenio Onofre MD

## 2022-03-06 NOTE — ED PROVIDER NOTES
[facility]        Pt Name: Kingsley Puentes  MRN: [id number]  [place] 1960  Date of evaluation: 3/5/2022  Provider: Lolly Burntet PA-C  PCP: Dimple Arriaza MD  Note Started: 11:45 PM EST       NINA. I have evaluated this patient. My supervising physician was available for consultation. CHIEF COMPLAINT       Chief Complaint   Patient presents with    Shortness of Breath     Patient states shortness of breath begal an hour and a half ago, patient states he was recently discharged for pneumonia. 76 on room air at time of triage. HISTORY OF PRESENT ILLNESS   (Location, Timing/Onset, Context/Setting, Quality, Duration, Modifying Factors, Severity, Associated Signs and Symptoms)  Note limiting factors. Chief Complaint: Shortness of breath    Kingsley Puentes is a 65 y.o. male who presents s to the emergency department today for evaluation for shortness of breath. The patient has a history of diabetes, history of COPD, hypertension as well as a history of DVT. The patient is anticoagulated on Coumadin. The patient states that approximately 1 hour before arriving to the ED he states that he had sudden onset shortness of breath while lying down and wearing his BiPAP. Patient states that he called EMS, and when he arrived to the ED he was 70% on room air. The patient states that he is feeling better since being on the oxygen. The patient denies any chest pain or chest tightness. He denies any recent illnesses including fever, cough, vomiting or diarrhea. Patient denies any recent travels, immobilizations or surgeries. The patient has been admitted in the past for COPD. He states that he quit smoking earlier this year. The patient otherwise has no complaints at this time. Nursing Notes were all reviewed and agreed with or any disagreements were addressed in the HPI.     REVIEW OF SYSTEMS    (2-9 systems for level 4, 10 or more for level 5)     Review of Systems   Constitutional: Negative for activity change, appetite change, chills and fever. HENT: Negative for congestion and rhinorrhea. Respiratory: Positive for shortness of breath. Negative for cough. Cardiovascular: Negative for chest pain. Gastrointestinal: Negative for abdominal pain, diarrhea, nausea and vomiting. Genitourinary: Negative for difficulty urinating, dysuria and hematuria. Positives and Pertinent negatives as per HPI. Except as noted above in the ROS, all other systems were reviewed and negative.        PAST MEDICAL HISTORY     Past Medical History:   Diagnosis Date    Diabetes mellitus ([facility] [address]     Fibromyalgia     History of DVT (deep vein thrombosis)     Hyperlipidemia     Hypertension     Type 2 diabetes mellitus with circulatory disorder, without long-term current use of insulin ([facility] [address] 2/18/2022         SURGICAL HISTORY     Past Surgical History:   Procedure Laterality Date    APPENDECTOMY  2005    COLONOSCOPY  2016    FEMORAL BYPASS Left 02/26/2020    femoral posterior tibial bypass    FEMORAL-TIBIAL BYPASS GRAFT Right 12/11/2020    with femoral endarterectomy and patch angioplasty    FOOT DEBRIDEMENT Left 09/03/2020    FOOT DEBRIDEMENT Right 2/26/2021    DEBRIDEMENT OF RIGHT FOOT WOUND WITH GRAFT APPLICATION performed by May Gutiérrez DPM at [facility] Right 12/08/2020    stent leg for PVD         CURRENTMEDICATIONS       Previous Medications    ACETAMINOPHEN (TYLENOL) 500 MG TABLET    Take 1 tablet by mouth 4 times daily as needed for Pain    ALBUTEROL SULFATE HFA (VENTOLIN HFA) 108 (90 BASE) MCG/ACT INHALER    Inhale 2 puffs into the lungs 4 times daily as needed for Wheezing    ASPIRIN 81 MG CHEWABLE TABLET    CHEW AND SWALLOW 1 TABLET BY MOUTH ONE TIME A DAY    ATORVASTATIN (LIPITOR) 80 MG TABLET    Take 1 tablet by mouth daily Cancel atorvastatin 20 mg a day    BLOOD GLUCOSE MONITOR KIT AND SUPPLIES    Dispense sufficient amount for indicated testing frequency plus additional to accommodate PRN testing needs. Dispense all needed supplies to include: monitor, strips, lancing device, lancets, control solutions, alcohol swabs. BLOOD GLUCOSE MONITOR STRIPS    Test 2 times a day while on PREDNISONE, then 1 time daily & as needed for symptoms of irregular blood glucose. Dispense sufficient amount for indicated testing frequency plus additional to accommodate PRN testing needs. CARVEDILOL (COREG) 25 MG TABLET    Take 1 tablet by mouth 2 times daily (with meals)    FERROUS SULFATE (IRON 325) 325 (65 FE) MG TABLET    Take 1 tablet by mouth 2 times daily    FLUTICASONE (FLONASE) 50 MCG/ACT NASAL SPRAY    1 spray by Nasal route daily    FLUTICASONE-UMECLIDIN-VILANT (TRELEGY ELLIPTA) 200-62.5-25 MCG/INH AEPB    Inhale 1 puff into the lungs 2 times daily    GENTAMICIN (GARAMYCIN) 0.1 % CREAM    Apply topically   daily.     GUAIFENESIN (MUCINEX) 600 MG EXTENDED RELEASE TABLET    Take 1,200 mg by mouth 2 times daily    HYDROCHLOROTHIAZIDE (HYDRODIURIL) 12.5 MG TABLET    Take 1 tablet by mouth every morning    LANCETS MISC    1 each by Does not apply route daily Test 2 times daily while on PREDNISONE then 1 time daily & as needed for symptoms of irregular blood sugar    METFORMIN (GLUCOPHAGE) 500 MG TABLET    Take 1 tablet by mouth 2 times daily (with meals)    NICOTINE (NICODERM CQ) 21 MG/24HR    Place 1 patch onto the skin every 24 hours    NIFEDIPINE (ADALAT CC) 60 MG EXTENDED RELEASE TABLET    Take 1 tablet by mouth daily    TADALAFIL (CIALIS) 5 MG TABLET    TAKE 1 TABLET BY MOUTH ONE TIME A DAY AS NEEDED FOR ERECTILE DYSFUNCTION    UMECLIDINIUM-VILANTEROL (ANORO ELLIPTA) 62.5-25 MCG/INH AEPB INHALER    Inhale 1 puff into the lungs daily    VITAMINS/MINERALS TABS    Take 1 tablet by mouth daily    WARFARIN (JANTOVEN) 5 MG TABLET    TAKE 1 AND 1/2 TABLETS BY MOUTH ONE TIME A DAY OR AS DIRECTED BY [facility] ([phone])         ALLERGIES     Chantix [varenicline]    FAMILYHISTORY       Family History   Problem Relation Age of Onset    Cancer Mother     Lung Cancer Mother     Diabetes Father     Stroke Father           SOCIAL HISTORY       Social History     Tobacco Use    Smoking status: Former Smoker     Packs/day: 0.50     Years: 20.00     Pack years: 10.00     Types: Cigarettes     Start date: 1977     Quit date: 2021     Years since quittin.1    Smokeless tobacco: Never Used   Vaping Use    Vaping Use: Never used   Substance Use Topics    Alcohol use: Yes     Alcohol/week: 6.0 standard drinks     Types: 6 Cans of beer per week    Drug use: Never       SCREENINGS    Darci Coma Scale  Eye Opening: Spontaneous  Best Verbal Response: Oriented  Best Motor Response: Obeys commands  [place] Coma Scale Score: 15        PHYSICAL EXAM    (up to 7 for level 4, 8 or more for level 5)     ED Triage Vitals [22 2325]   BP Temp Temp Source Pulse Resp SpO2 Height Weight   (!) 160/81 99.1 °F (37.3 °C) Oral 115 20 97 % 5' 8\" (1.727 m) 170 lb (77.1 kg)       Physical Exam  Vitals and nursing note reviewed. Constitutional:       General: He is in acute distress. Appearance: He is well-developed. He is not diaphoretic. HENT:      Head: Normocephalic and atraumatic. Right Ear: External ear normal.      Left Ear: External ear normal.      Nose: Nose normal.   Eyes:      General:         Right eye: No discharge. Left eye: No discharge. Neck:      Trachea: No tracheal deviation. Cardiovascular:      Rate and Rhythm: Regular rhythm. Tachycardia present. Heart sounds: No murmur heard. Pulmonary:      Effort: Tachypnea, accessory muscle usage and respiratory distress present. Breath sounds: Decreased breath sounds and wheezing present. Comments: Dyspneic with conversation. Patient is tachypneic, with audible wheezing noted.   Diminished aeration and wheezing throughout all lung fields. Accessory muscle usage. Abdominal:      General: Bowel sounds are normal. There is no distension. Palpations: Abdomen is soft. Tenderness: There is no abdominal tenderness. There is no guarding. Musculoskeletal:         General: Normal range of motion. Cervical back: Normal range of motion and neck supple. Skin:     General: Skin is warm and dry. Neurological:      Mental Status: He is alert and oriented to person, place, and time. Psychiatric:         Behavior: Behavior normal.         DIAGNOSTIC RESULTS   LABS:    Labs Reviewed   CBC WITH AUTO DIFFERENTIAL - Abnormal; Notable for the following components:       Result Value    Hemoglobin 13.1 (*)     Hematocrit 40.4 (*)     RDW 15.8 (*)     All other components within normal limits   COMPREHENSIVE METABOLIC PANEL - Abnormal; Notable for the following components:    Chloride 98 (*)     Glucose 130 (*)     All other components within normal limits   BRAIN NATRIURETIC PEPTIDE - Abnormal; Notable for the following components:    Pro-BNP 1,058 (*)     All other components within normal limits   PROTIME-INR - Abnormal; Notable for the following components:    Protime 37.4 (*)     INR 3.16 (*)     All other components within normal limits   BLOOD GAS, VENOUS - Abnormal; Notable for the following components:    pH, Mele 7.339 (*)     pCO2, Mele 63.8 (*)     pO2, Mele 95.1 (*)     HCO3, Venous 34.3 (*)     Base Excess, Mele 6.3 (*)     Carboxyhemoglobin 2.4 (*)     All other components within normal limits   TROPONIN   PROCALCITONIN   LACTATE, SEPSIS       When ordered only abnormal lab results are displayed. All other labs were within normal range or not returned as of this dictation. EKG: When ordered, EKG's are interpreted by the Emergency Department Physician in the absence of a cardiologist.  Please see their note for interpretation of EKG.     RADIOLOGY:   Non-plain film images such as CT, Ultrasound and MRI are read by the radiologist. Plain radiographic images are visualized and preliminarily interpreted by the ED Provider with the below findings:        Interpretation per the Radiologist below, if available at the time of this note:    XR CHEST PORTABLE   Final Result   Mild cardiomegaly and minimal central pulmonary congestion or pulmonary   artery hypertension which is less prominent      Mild discoid atelectasis or scarring along the lung bases which is less   prominent. No results found. PROCEDURES   Unless otherwise noted below, none     Procedures    CRITICAL CARE TIME   Critical Care  There was a high probability of life-threatening clinical deterioration in the patient's condition requiring my urgent intervention. Total critical care time with the patient was 45 minutes excluding separately reportable procedures.   Critical care required due to patients acute respiratory failure with hypoxemia, requiring BiCAP and multiple breathing treatments, Solu-Medrol, and admission      CONSULTS:  IP CONSULT TO PHARMACY  IP CONSULT TO PULMONOLOGY  IP CONSULT TO CASE MANAGEMENT      EMERGENCY DEPARTMENT COURSE and DIFFERENTIAL DIAGNOSIS/MDM:   Vitals:    Vitals:    03/06/22 0224 03/06/22 0225 03/06/22 0226 03/06/22 0234   BP:       Pulse: 105 109 107 106   Resp: 24 21 22 25   Temp:       TempSrc:       SpO2: 94% 93% 94% 94%   Weight:       Height:           Patient was given the following medications:  Medications   ipratropium-albuterol (DUONEB) nebulizer solution 1 ampule (1 ampule Inhalation Given 3/6/22 0010)   albuterol (PROVENTIL) nebulizer solution 2.5 mg (2.5 mg Nebulization Given 3/6/22 0010)   0.9 % sodium chloride bolus (0 mLs IntraVENous Stopped 3/6/22 0128)   methylPREDNISolone sodium (SOLU-MEDROL) injection 80 mg (80 mg IntraVENous Given 3/6/22 0019)   furosemide (LASIX) injection 40 mg (40 mg IntraVENous Given 3/6/22 0225)           Briefly, this is a 18-year-old male who presents to the emergency department today for evaluation for shortness of breath. The patient does have history of COPD and actually quit smoking earlier this year. The patient states that he was lying down on his BiPAP machine, and he states that he felt that he could not breathe. When the patient had to the ED is 70% on room air he appears to be in acute distress he is tachycardic, tachypneic, dyspneic with conversation, with audible wheezing and accessory muscle usage noted. EKG will be documented by my attending, please see their note for further details. We will be placed on BiPAP, breathing treatments and Solu-Medrol have been ordered    EKG will be documented by my attending, please see their note for further details. CBC shows no evidence of leukocytosis, there is a mild anemia. pH does show a mild acidosis of 7.34. CMP is unremarkable. Troponin is negative. Procalcitonin is normal.  Lactic acid is normal.  His INR 3.16 chest x-ray does show mild central pulmonary congestion    , And with an elevated BNP of over 1000, will add on Lasix. After breathing treatments, BiPAP, Solu-Medrol, patient is overall improving, however will require admission for further care and evaluation. Patient is updated, agreeable plan. Stable for admission    FINAL IMPRESSION      1. Acute respiratory failure with hypoxemia (HCC)    2. COPD exacerbation ([facility] [address]    3. Pulmonary congestion          DISPOSITION/PLAN   DISPOSITION Decision To Admit 03/06/2022 01:55:20 AM      PATIENT REFERRED TO:  No follow-up provider specified.     DISCHARGE MEDICATIONS:  New Prescriptions    No medications on file       DISCONTINUED MEDICATIONS:  Discontinued Medications    No medications on file              (Please note that portions of this note were completed with a voice recognition program.  Efforts were made to edit the dictations but occasionally words are mis-transcribed.)    Oneil Poe PA-C (electronically signed)            Denise Blackman Marielos Vieira PA-C  03/06/22 0571

## 2022-03-06 NOTE — PLAN OF CARE
Problem: Falls - Risk of:  Goal: Will remain free from falls  Description: Will remain free from falls  Outcome: Ongoing  Goal: Absence of physical injury  Description: Absence of physical injury  Outcome: Ongoing   Patient has remained free of falls. Call light within reach, bed in lowest position, wheels locked, and non slip socks on.  02 sat remains in low 90s on 4L per NC.  RA @ baseline.

## 2022-03-07 VITALS
SYSTOLIC BLOOD PRESSURE: 148 MMHG | DIASTOLIC BLOOD PRESSURE: 82 MMHG | HEIGHT: 68 IN | RESPIRATION RATE: 22 BRPM | TEMPERATURE: 98.4 F | BODY MASS INDEX: 24.56 KG/M2 | WEIGHT: 162.04 LBS | HEART RATE: 86 BPM | OXYGEN SATURATION: 90 %

## 2022-03-07 LAB
A/G RATIO: 1.6 (ref 1.1–2.2)
ALBUMIN SERPL-MCNC: 4.3 G/DL (ref 3.4–5)
ALP BLD-CCNC: 92 U/L (ref 40–129)
ALT SERPL-CCNC: 22 U/L (ref 10–40)
ANION GAP SERPL CALCULATED.3IONS-SCNC: 10 MMOL/L (ref 3–16)
AST SERPL-CCNC: 19 U/L (ref 15–37)
BASOPHILS ABSOLUTE: 0 K/UL (ref 0–0.2)
BASOPHILS RELATIVE PERCENT: 0.6 %
BILIRUB SERPL-MCNC: <0.2 MG/DL (ref 0–1)
BUN BLDV-MCNC: 18 MG/DL (ref 7–20)
CALCIUM SERPL-MCNC: 10.4 MG/DL (ref 8.3–10.6)
CHLORIDE BLD-SCNC: 96 MMOL/L (ref 99–110)
CO2: 36 MMOL/L (ref 21–32)
CREAT SERPL-MCNC: 0.6 MG/DL (ref 0.8–1.3)
EOSINOPHILS ABSOLUTE: 0 K/UL (ref 0–0.6)
EOSINOPHILS RELATIVE PERCENT: 0.2 %
GFR AFRICAN AMERICAN: >60
GFR NON-AFRICAN AMERICAN: >60
GLUCOSE BLD-MCNC: 130 MG/DL (ref 70–99)
GLUCOSE BLD-MCNC: 131 MG/DL (ref 70–99)
GLUCOSE BLD-MCNC: 140 MG/DL (ref 70–99)
GLUCOSE BLD-MCNC: 152 MG/DL (ref 70–99)
HCT VFR BLD CALC: 44.4 % (ref 40.5–52.5)
HEMOGLOBIN: 14.2 G/DL (ref 13.5–17.5)
INR BLD: 2.93 (ref 0.88–1.12)
LV EF: 60 %
LVEF MODALITY: NORMAL
LYMPHOCYTES ABSOLUTE: 1.7 K/UL (ref 1–5.1)
LYMPHOCYTES RELATIVE PERCENT: 27.9 %
MAGNESIUM: 2 MG/DL (ref 1.8–2.4)
MCH RBC QN AUTO: 28.2 PG (ref 26–34)
MCHC RBC AUTO-ENTMCNC: 32 G/DL (ref 31–36)
MCV RBC AUTO: 88.1 FL (ref 80–100)
MONOCYTES ABSOLUTE: 0.4 K/UL (ref 0–1.3)
MONOCYTES RELATIVE PERCENT: 6 %
NEUTROPHILS ABSOLUTE: 4 K/UL (ref 1.7–7.7)
NEUTROPHILS RELATIVE PERCENT: 65.3 %
PDW BLD-RTO: 15.6 % (ref 12.4–15.4)
PERFORMED ON: ABNORMAL
PHOSPHORUS: 2.9 MG/DL (ref 2.5–4.9)
PLATELET # BLD: 319 K/UL (ref 135–450)
PMV BLD AUTO: 7.4 FL (ref 5–10.5)
POTASSIUM SERPL-SCNC: 4.4 MMOL/L (ref 3.5–5.1)
PROTHROMBIN TIME: 34.6 SEC (ref 9.9–12.7)
RBC # BLD: 5.04 M/UL (ref 4.2–5.9)
SODIUM BLD-SCNC: 142 MMOL/L (ref 136–145)
TOTAL PROTEIN: 7 G/DL (ref 6.4–8.2)
WBC # BLD: 6.1 K/UL (ref 4–11)

## 2022-03-07 PROCEDURE — 6370000000 HC RX 637 (ALT 250 FOR IP): Performed by: INTERNAL MEDICINE

## 2022-03-07 PROCEDURE — 85610 PROTHROMBIN TIME: CPT

## 2022-03-07 PROCEDURE — 37799 UNLISTED PX VASCULAR SURGERY: CPT

## 2022-03-07 PROCEDURE — 84100 ASSAY OF PHOSPHORUS: CPT

## 2022-03-07 PROCEDURE — 2700000000 HC OXYGEN THERAPY PER DAY

## 2022-03-07 PROCEDURE — 80053 COMPREHEN METABOLIC PANEL: CPT

## 2022-03-07 PROCEDURE — 83735 ASSAY OF MAGNESIUM: CPT

## 2022-03-07 PROCEDURE — 85025 COMPLETE CBC W/AUTO DIFF WBC: CPT

## 2022-03-07 PROCEDURE — 94761 N-INVAS EAR/PLS OXIMETRY MLT: CPT

## 2022-03-07 PROCEDURE — 97162 PT EVAL MOD COMPLEX 30 MIN: CPT

## 2022-03-07 PROCEDURE — 97530 THERAPEUTIC ACTIVITIES: CPT

## 2022-03-07 PROCEDURE — 99232 SBSQ HOSP IP/OBS MODERATE 35: CPT | Performed by: INTERNAL MEDICINE

## 2022-03-07 PROCEDURE — 6360000002 HC RX W HCPCS: Performed by: INTERNAL MEDICINE

## 2022-03-07 PROCEDURE — 2580000003 HC RX 258: Performed by: INTERNAL MEDICINE

## 2022-03-07 PROCEDURE — 94680 O2 UPTK RST&XERS DIR SIMPLE: CPT

## 2022-03-07 PROCEDURE — 94640 AIRWAY INHALATION TREATMENT: CPT

## 2022-03-07 PROCEDURE — 36415 COLL VENOUS BLD VENIPUNCTURE: CPT

## 2022-03-07 PROCEDURE — 93306 TTE W/DOPPLER COMPLETE: CPT

## 2022-03-07 PROCEDURE — 97165 OT EVAL LOW COMPLEX 30 MIN: CPT

## 2022-03-07 RX ORDER — FUROSEMIDE 20 MG/1
20 TABLET ORAL DAILY
Qty: 60 TABLET | Refills: 3 | Status: SHIPPED | OUTPATIENT
Start: 2022-03-07 | End: 2022-05-05 | Stop reason: ALTCHOICE

## 2022-03-07 RX ORDER — IPRATROPIUM BROMIDE AND ALBUTEROL SULFATE 2.5; .5 MG/3ML; MG/3ML
3 SOLUTION RESPIRATORY (INHALATION) 4 TIMES DAILY
Qty: 360 ML | Refills: 3 | Status: SHIPPED | OUTPATIENT
Start: 2022-03-07

## 2022-03-07 RX ADMIN — INSULIN LISPRO 1 UNITS: 100 INJECTION, SOLUTION INTRAVENOUS; SUBCUTANEOUS at 11:54

## 2022-03-07 RX ADMIN — CARVEDILOL 25 MG: 25 TABLET, FILM COATED ORAL at 09:38

## 2022-03-07 RX ADMIN — ATORVASTATIN CALCIUM 80 MG: 80 TABLET, FILM COATED ORAL at 09:39

## 2022-03-07 RX ADMIN — SODIUM CHLORIDE, PRESERVATIVE FREE 10 ML: 5 INJECTION INTRAVENOUS at 17:25

## 2022-03-07 RX ADMIN — IPRATROPIUM BROMIDE AND ALBUTEROL SULFATE 1 AMPULE: .5; 3 SOLUTION RESPIRATORY (INHALATION) at 16:56

## 2022-03-07 RX ADMIN — FLUTICASONE PROPIONATE 1 SPRAY: 50 SPRAY, METERED NASAL at 09:35

## 2022-03-07 RX ADMIN — Medication 10 ML: at 09:39

## 2022-03-07 RX ADMIN — IPRATROPIUM BROMIDE AND ALBUTEROL SULFATE 1 AMPULE: .5; 3 SOLUTION RESPIRATORY (INHALATION) at 03:59

## 2022-03-07 RX ADMIN — GUAIFENESIN 1200 MG: 600 TABLET, EXTENDED RELEASE ORAL at 09:39

## 2022-03-07 RX ADMIN — CARVEDILOL 25 MG: 25 TABLET, FILM COATED ORAL at 16:46

## 2022-03-07 RX ADMIN — COLLAGENASE SANTYL: 250 OINTMENT TOPICAL at 15:57

## 2022-03-07 RX ADMIN — FERROUS SULFATE TAB 325 MG (65 MG ELEMENTAL FE) 325 MG: 325 (65 FE) TAB at 09:39

## 2022-03-07 RX ADMIN — IPRATROPIUM BROMIDE AND ALBUTEROL SULFATE 1 AMPULE: .5; 3 SOLUTION RESPIRATORY (INHALATION) at 00:00

## 2022-03-07 RX ADMIN — IPRATROPIUM BROMIDE AND ALBUTEROL SULFATE 1 AMPULE: .5; 3 SOLUTION RESPIRATORY (INHALATION) at 13:05

## 2022-03-07 RX ADMIN — WARFARIN SODIUM 7.5 MG: 7.5 TABLET ORAL at 17:20

## 2022-03-07 RX ADMIN — FUROSEMIDE 40 MG: 10 INJECTION, SOLUTION INTRAMUSCULAR; INTRAVENOUS at 09:39

## 2022-03-07 RX ADMIN — Medication 1 TABLET: at 09:39

## 2022-03-07 RX ADMIN — NIFEDIPINE 60 MG: 30 TABLET, FILM COATED, EXTENDED RELEASE ORAL at 09:38

## 2022-03-07 RX ADMIN — GENTAMICIN SULFATE: 1 CREAM TOPICAL at 15:56

## 2022-03-07 RX ADMIN — FUROSEMIDE 40 MG: 10 INJECTION, SOLUTION INTRAMUSCULAR; INTRAVENOUS at 17:21

## 2022-03-07 ASSESSMENT — PAIN SCALES - GENERAL
PAINLEVEL_OUTOF10: 0

## 2022-03-07 NOTE — CARE COORDINATION
Gordo received a referral to this patient for a nebulizer and home 02 @ 2 lpm rest, 3 lpm w exertion. Pull behind portable concentrator has been delivered to the patients room for discharge. Nebulizer compressor has been delivered to the patients room. Neb med Rx has been sent to [facility] to determine whether they are in network with patients insurance. Thank you for the referral.  Electronically signed by Maria Isabel Evangelista on 3/7/2022 at 5:18 PM  Cell ph# [phone]    NOTE: After 5:00 pm, Weekends, Holidays: Call Marie[facility] at [phone] to coordinate delivery of home medical equipment.

## 2022-03-07 NOTE — PROGRESS NOTES
Patient's O2 sats ranging from 89-91% on RA. Denies any SOB on RA. Will continue to monitor. 1800: Discharge instructions reviewed with patient and all questions answered. 1852:Patient discharged via w/c with all his personal belongings.

## 2022-03-07 NOTE — DISCHARGE INSTR - COC
Continuity of Care Form    Patient Name: Honey Padron   :  1960  MRN:  [id number]    Admit date:  3/5/2022  Discharge date:  ***    Code Status Order: Full Code   Advance Directives:      Admitting Physician:  Vipul Case MD  PCP: Bob Cardenas MD    Discharging Nurse: [facility] Unit/Room#: [id number]-2424/7817-81  Discharging Unit Phone Number: ***    Emergency Contact:   Extended Emergency Contact Information  Primary Emergency Contact: America Gibson  Address: [address] [place] [address] Rickey Soares [address] Phone: [phone]  Mobile Phone: [phone]  Relation: Spouse    Past Surgical History:  Past Surgical History:   Procedure Laterality Date    APPENDECTOMY  2005    COLONOSCOPY  2016    FEMORAL BYPASS Left 2020    femoral posterior tibial bypass    FEMORAL-TIBIAL BYPASS GRAFT Right 2020    with femoral endarterectomy and patch angioplasty    FOOT DEBRIDEMENT Left 2020    FOOT DEBRIDEMENT Right 2021    DEBRIDEMENT OF RIGHT FOOT WOUND WITH GRAFT APPLICATION performed by Kimberly Broussard DPM at [facility] Right 2020    stent leg for PVD       Immunization History:   Immunization History   Administered Date(s) Administered    COVID-19, Pfizer Purple top, DILUTE for use, 12+ yrs, 30mcg/0.3mL dose 03/15/2021, 2021, 2021    Influenza Virus Vaccine 2017, 2017    Influenza, Levi Carls, IM, PF (6 mo and older Fluzone, Flulaval, Fluarix, and 3 yrs and older Afluria) 10/31/2020    Tdap (Boostrix, Adacel) 2018    Zoster Recombinant (Shingrix) 2018       Active Problems:  Patient Active Problem List   Diagnosis Code    Peripheral artery disease ([address] I73.9    Centrilobular emphysema ([address] J43.2    COPD (chronic obstructive pulmonary disease) ([address] J44.9    Atherosclerosis of native arteries of right leg with ulceration of other part of foot ([address] I70.235    Impotence N52.9 Acute deep vein thrombosis (DVT) of femoral vein of left lower extremity (HCC) I82.412    Colon polyp K63.5    Hyperlipidemia E78.5    Non-healing ulcer of left ankle (HCC) L97.329    Venous insufficiency I87.2    Venous ulcer (HCC) I83.009, L97.909    Essential hypertension I10    Peripheral vascular disease (HCC) I73.9    Type 2 diabetes mellitus with circulatory disorder, without long-term current use of insulin (HCC) E11.59    COPD exacerbation ([facility]) J44.1       Isolation/Infection:   Isolation            No Isolation          Patient Infection Status       Infection Onset Added Last Indicated Last Indicated By Review Planned Expiration Resolved Resolved By    None active    Resolved    COVID-19 (Rule Out) 22 COVID-19 (Ordered)   22 Rule-Out Test Resulted    COVID-19 (Rule Out) 22 COVID-19 (Ordered)   22 Rule-Out Test Resulted    COVID-19 (Rule Out) 21 COVID-19 (Ordered)   21 Rule-Out Test Resulted    COVID-19 (Rule Out) 21 COVID-19, Rapid (Ordered)   21 Rule-Out Test Resulted            Nurse Assessment:  Last Vital Signs: /67   Pulse 82   Temp 97.6 °F (36.4 °C) (Axillary)   Resp 22   Ht 5' 8\" (1.727 m)   Wt 162 lb 0.6 oz (73.5 kg)   SpO2 90%   BMI 24.64 kg/m²     Last documented pain score (0-10 scale): Pain Level: 0  Last Weight:   Wt Readings from Last 1 Encounters:   22 162 lb 0.6 oz (73.5 kg)     Mental Status:  {IP PT MENTAL STATUS:82243}    IV Access:  {[facility] [place] ACCESS:358907458}    Nursing Mobility/ADLs:  Walking   {CHP DME PDPX:550898185}  Transfer  {CHP DME MCE}  Bathing  {CHP DME KAOU:777884487}  Dressing  {CHP DME KJNU:984232250}  Toileting  {CHP DME SSWP:628088981}  Feeding  {[facility] DME NWAX:844272110}  Med Admin  {[facility] QKQA:759729616}  Med Delivery   {[facility] MED Delivery:719571250}    Wound Care Documentation and Therapy:  Wound 21 Ankle Right;Medial #1 (Active)   Wound Image    02/04/22 1224   Wound Etiology Diabetic 03/06/22 1913   Dressing Status Clean;Dry; Intact 03/06/22 1913   Wound Cleansed Wound cleanser 03/04/22 0818   Dressing/Treatment Antibacterial ointment;Collagen;Dry dressing;Moisturizing cream;Cutimed/Sorbact; Foam 03/04/22 0844   Offloading for Diabetic Foot Ulcers Offloading not required 03/04/22 0818   Dressing Change Due 02/11/22 02/05/22 0909   Wound Length (cm) 0.2 cm 03/04/22 0818   Wound Width (cm) 0.3 cm 03/04/22 0818   Wound Depth (cm) 0.1 cm 03/04/22 0818   Wound Surface Area (cm^2) 0.06 cm^2 03/04/22 0818   Change in Wound Size % (l*w) 99.67 03/04/22 0818   Wound Volume (cm^3) 0.006 cm^3 03/04/22 0818   Wound Healing % 100 03/04/22 0818   Post-Procedure Length (cm) 0.4 cm 03/04/22 0844   Post-Procedure Width (cm) 1 cm 03/04/22 0844   Post-Procedure Depth (cm) 0.1 cm 03/04/22 0844   Post-Procedure Surface Area (cm^2) 0.4 cm^2 03/04/22 0844   Post-Procedure Volume (cm^3) 0.04 cm^3 03/04/22 0844   Wound Assessment Bleeding 03/04/22 0844   Drainage Amount Small 03/04/22 0818   Drainage Description Serosanguinous 03/04/22 0818   Odor None 03/04/22 0818   Jamila-wound Assessment Dry/flaky 03/04/22 0818   Margins Attached edges 03/04/22 0818   Wound Thickness Description not for Pressure Injury Partial thickness 02/04/22 1224   Number of days: 66       Wound 12/31/21 Ankle Medial;Left #2 (Active)   Wound Image   02/04/22 1224   Wound Etiology Arterial 03/04/22 0818   Dressing Status New dressing applied;Clean;Dry; Intact 03/07/22 1606   Wound Cleansed Cleansed with saline 03/07/22 1606   Dressing/Treatment Ace wrap;Gauze dressing/dressing sponge; Other (comment) 03/07/22 1606   Offloading for Diabetic Foot Ulcers Offloading not required 03/04/22 0818   Dressing Change Due 03/08/22 03/07/22 1606   Wound Length (cm) 1.7 cm 03/04/22 0818   Wound Width (cm) 3.3 cm 03/04/22 0818   Wound Depth (cm) 0.2 cm 03/04/22 0818   Wound Surface Area (cm^2) 5.61 cm^2 03/04/22 0818   Change in Wound Size % (l*w) 25.2 03/04/22 0818   Wound Volume (cm^3) 1.122 cm^3 03/04/22 0818   Wound Healing % 25 03/04/22 0818   Post-Procedure Length (cm) 1.7 cm 03/04/22 0844   Post-Procedure Width (cm) 3.3 cm 03/04/22 0844   Post-Procedure Depth (cm) 0.2 cm 03/04/22 0844   Post-Procedure Surface Area (cm^2) 5.61 cm^2 03/04/22 0844   Post-Procedure Volume (cm^3) 1.122 cm^3 03/04/22 0844   Wound Assessment Granulation tissue;Pink/red 03/07/22 1606   Drainage Amount None 03/07/22 1606   Drainage Description Serosanguinous 03/04/22 0818   Odor None 03/07/22 1606   Jamila-wound Assessment Intact;Fragile 03/07/22 1606   Margins Defined edges 03/07/22 1606   Wound Thickness Description not for Pressure Injury Partial thickness 02/04/22 1224   Number of days: 66        Elimination:  Continence: Bowel: {YES / YM:75208}  Bladder: {YES / OK:22461}  Urinary Catheter: {Urinary Catheter:800091256}   Colostomy/Ileostomy/Ileal Conduit: {YES / MT:16335}       Date of Last BM: ***    Intake/Output Summary (Last 24 hours) at 3/7/2022 1637  Last data filed at 3/7/2022 1606  Gross per 24 hour   Intake 370 ml   Output 2060 ml   Net -1690 ml     I/O last 3 completed shifts:   In: 1000 [IV Piggyback:1000]  Out: 1500 [Urine:1500]    Safety Concerns:     [employer] Safety Concerns:843038069}    Impairments/Disabilities:      [employer] Impairments/Disabilities:267425446}    Nutrition Therapy:  Current Nutrition Therapy:   [employer] Diet List:477223198}    Routes of Feeding: {CHP DME Other Feedings:356522859}  Liquids: {Slp liquid thickness:81451}  Daily Fluid Restriction: {CHP DME Yes amt example:334120067}  Last Modified Barium Swallow with Video (Video Swallowing Test): {Done Not Done IBJE:401991656}    Treatments at the Time of Hospital Discharge:   Respiratory Treatments: ***  Oxygen Therapy:  {Therapy; copd oxygen:33472}  Ventilator:    {MH CC Vent VCNW:869995834}    Rehab Therapies: {THERAPEUTIC INTERVENTION:4439306094}  Weight Bearing Status/Restrictions: [address] [employer] Weight Bearin}  Other Medical Equipment (for information only, NOT a DME order):  {EQUIPMENT:972571732}  Other Treatments: ***    Patient's personal belongings (please select all that are sent with patient):  {CHP DME Belongings:256381847}    RN SIGNATURE:  {Esignature:156688752}    CASE MANAGEMENT/SOCIAL WORK SECTION    Inpatient Status Date: ***    Readmission Risk Assessment Score:  Readmission Risk              Risk of Unplanned Readmission:  23           Discharging to Facility/ Agency   Name:   Address:  Phone:  Fax:    Dialysis Facility (if applicable)   Name:  Address:  Dialysis Schedule:  Phone:  Fax:    / signature: {Esignature:840196659}    PHYSICIAN SECTION    Prognosis: Fair    Condition at Discharge: Stable    Rehab Potential (if transferring to Rehab):     Recommended Labs or Other Treatments After Discharge: home oxygen at 2 liters at rest, 3 liters with activity     Physician Certification: I certify the above information and transfer of Radha Waters  is necessary for the continuing treatment of the diagnosis listed and that he requires [address] for greater than 30 days.      Update Admission H&P: No change in H&P    PHYSICIAN SIGNATURE:  Electronically signed by HOLGER Encarnacion CNP on 3/7/22 at 4:40 PM EST

## 2022-03-07 NOTE — PROGRESS NOTES
Patient was evaluated today for the diagnosis of COPD. I entered a DME order for home oxygen because the diagnosis and testing requires the patient to have supplemental oxygen. Condition will improve or be benefited by oxygen use. The patient is  able to perform good mobility in a home setting and therefore does require the use of a portable oxygen system.   The need for this equipment was discussed with the patient and he understands and is in agreement.                  03/07/22 1615   Resting (Room Air)   SpO2 85   Resting (On O2)   SpO2 2   O2 Device Nasal cannula   O2 Flow Rate (l/min) 2 l/min   During Walk (Room Air)   SpO2 85   Walk/Assistance Device Walker   Rate of Dyspnea 0   During Walk (On O2)   SpO2 93   O2 Device Nasal cannula   O2 Flow Rate (l/min) 3 l/min   O2 Flow Rate (l/min) 1 l/min   O2 Saturation 87   O2 Flow Rate (l/min) 2 l/min   O2 Saturation 89   Walk/Assistance Device Walker   Rate of Dyspnea 0   After Walk   Does the Patient Qualify for Home O2 Yes   Liter Flow at Rest 2   Liter Flow on Exertion 3   Does the Patient Need Portable Oxygen Tanks Yes

## 2022-03-07 NOTE — PROGRESS NOTES
Physical Therapy    Facility/Department: [address]  Initial Assessment    NAME: Rosanne Martines  : 1960  MRN: [id number]    Date of Service: 3/7/2022    Discharge Recommendations: Rosanne Martines scored a 19/24 on the AM-PAC short mobility form. Current research shows that an AM-PAC score of 18 or greater is typically associated with a discharge to the patient's home setting. Based on the patient's AM-PAC score and their current functional mobility deficits, it is recommended that the patient have 2-3 sessions per week of Physical Therapy at d/c to increase the patient's independence. At this time, this patient demonstrates the endurance and safety to discharge home with HHPT (and a follow up treatment frequency of 2-3x/wk. Please see assessment section for further patient specific details. If patient discharges prior to next session this note will serve as a discharge summary. Please see below for the latest assessment towards goals. HOME HEALTH CARE: LEVEL 1 STANDARD    - Initial home health evaluation to occur within 24-48 hours, in patient home   - Therapy to evaluate with goal of regaining prior level of functioning   - Therapy to evaluate if patient has [address] needs for personal care      PT Equipment Recommendations  Equipment Needed: No    Assessment   Body structures, Functions, Activity limitations: Decreased functional mobility ; Decreased posture;Decreased endurance;Decreased ROM; Decreased sensation;Decreased strength;Decreased balance  Assessment: Pt is a 66 y/o male presenting with pulmonary congestion. Pt presents with decreased functional mobility, balance, strength, endurance, and activity tolerance limiting his independence. Pt would benefit from skilled PT to improve his functional mobility, strength, endurance and activity tolerance in order to facilitate safe discharge.   Treatment Diagnosis: Impaired functional mobility  Prognosis: Good  Decision Making: Medium Complexity  PT Education: Goals;Transfer Training;Functional Mobility Training;Plan of Care;General Safety;Gait Training  Patient Education: Pt verbalized understanding, needs reinforcement. Barriers to Learning: None  REQUIRES PT FOLLOW UP: Yes  Activity Tolerance  Activity Tolerance: Patient Tolerated treatment well;Patient limited by endurance       Patient Diagnosis(es): The primary encounter diagnosis was Acute respiratory failure with hypoxemia ([facility] Diagnoses of COPD exacerbation ([facility] and Pulmonary congestion were also pertinent to this visit. has a past medical history of Diabetes mellitus ([facility] Fibromyalgia, History of DVT (deep vein thrombosis), Hyperlipidemia, Hypertension, and Type 2 diabetes mellitus with circulatory disorder, without long-term current use of insulin ([facility] has a past surgical history that includes Colonoscopy (2016); Appendectomy (2005); other surgical history (Right, 12/08/2020); femoral bypass (Left, 02/26/2020); Femoral-tibial Bypass Graft (Right, 12/11/2020); Foot Debridement (Left, 09/03/2020); and Foot Debridement (Right, 2/26/2021). Restrictions  Restrictions/Precautions  Restrictions/Precautions: Fall Risk,Up as Tolerated (medium fall risk)  Required Braces or Orthoses?: No     Vision/Hearing  Vision: Impaired  Vision Exceptions: Wears glasses for reading  Hearing: Within functional limits       Subjective  General  Chart Reviewed: Yes  Patient assessed for rehabilitation services?: Yes  Family / Caregiver Present: No  Diagnosis: Pulmonary congestion  Follows Commands: Within Functional Limits  General Comment  Comments: Pt supine in bed on arrival, agreeable to participate in PT initial evaluation. Pt removed BiPAP on arrival and was placed on 4L O2 via nasal cannula. Subjective  Subjective: Pt denied any pain.   Pain Screening  Patient Currently in Pain: Denies     Orientation  Orientation  Overall Orientation Status: Within Functional Limits Social/Functional History  Social/Functional History  Lives With: Spouse  Type of Home: House  Home Layout: Two level (Split level, 6 steps inside home)  Home Access: Stairs to enter with rails  Entrance Stairs - Number of Steps: 5  Entrance Stairs - Rails: Right  Bathroom Shower/Tub: Tub/Shower unit  Bathroom Toilet: Standard  Home Equipment: Cane,Rolling walker (Uses the cane most of the time, walker occasionally)  ADL Assistance: [address] [place] [address] Avenue: Independent  Homemaking Responsibilities: No  Ambulation Assistance: Independent  Transfer Assistance: Independent  Active : Yes  Mode of Transportation: Car  Occupation: Full time employment  Type of occupation: Office job  Leisure & Hobbies: Playing with [employer]  Additional Comments: No falls in the past 6 months     Cognition   Cognition  Overall Cognitive Status: WFL  Cognition Comment: Good direction following for multistep directions, good active recall, good carryover of cues    Objective  Observation/Palpation  Posture: Fair (Forward head, rounded shoulders, and increased thoracic kyphosis in sitting.)  Observation: Pt supine in bed on arrival with B ace wraps on LE's. Pt BP before mobility 142/81 mmHg. SpO2 while talking 89%, SpO2 following donning socks 83%. During desaturations, pt verbally cued to take slow, deep breaths which improved SpO2 to >90% within 30 seconds.     PROM RLE (degrees)  RLE General PROM: Grossly WFL except lacking ~ 10 deg ankle DF  AROM RLE (degrees)  RLE General AROM: Grossly WFL except lacking ~ 10 deg ankle DF  PROM LLE (degrees)  LLE General PROM: Grossly WFL except lacking ~ 10 deg ankle DF  AROM LLE (degrees)  LLE General AROM: Grossly WFL except lacking ~ 10 deg ankle DF  Strength RLE  Strength RLE: WFL  Comment: Grossly 4+/5  Strength LLE  Strength LLE: WFL  Comment: Grossly 4+/5     Sensation  Overall Sensation Status: Impaired (L medial ankle, R dorsal foot numbness and tingling)  Bed mobility  Supine to Sit: Supervision  Sit to Supine:  (not observed; left pt sitting in chair at EOS)  Scooting: Supervision  Comment: Cues for hand placement, HOB elevated, use of R handrail  Transfers  Sit to Stand: Stand by assistance  Stand to sit: Stand by assistance  Ambulation  Ambulation?: Yes  Ambulation 1  Surface: level tile  Device: Rolling Walker  Assistance: Stand by assistance;Supervision (SBA initially, progressing to supervision)  Quality of Gait: Decreased del, lack of ankle DF during stance phase  Gait Deviations: Slow Del  Distance: 20 ft within the room with two 180 deg turns     Balance  Sitting - Static: Good (Pt sat EOB with supervision for ~ 5 minutes)  Sitting - Dynamic: Good (Pt sat EOB and donned socks as well as participated in UE and LE MMT and AROM assessments with supervision)  Standing - Dynamic: Fair (Pt amb within the room and was able to make two 180 deg turns with supervision)      Plan   Plan  Times per week: 3-5  Times per day: Daily  Current Treatment Recommendations: [facility] Re-education,Patient/Caregiver Education & Training,ROM,Balance Training,Gait Training,Functional Mobility Training,Stair training,Safety Education & Training,Positioning  Safety Devices  Type of devices:  All fall risk precautions in place,Call light within reach,Chair alarm in place,Gait belt,Nurse notified,Left in chair,Patient at risk for falls  Restraints  Initially in place: No    AM-PAC Score  AM-PAC Inpatient Mobility Raw Score : 19 (03/07/22 1114)  AM-PAC Inpatient T-Scale Score : 45.44 (03/07/22 1114)  Mobility Inpatient CMS 0-100% Score: 41.77 (03/07/22 1114)  Mobility Inpatient CMS G-Code Modifier : CK (03/07/22 1114)     Goals  Short term goals  Time Frame for Short term goals: Upon discharge  Short term goal 1: Pt will perform supine to/from sit transfer independently  Short term goal 2: Pt will transfer from sit to/from stand independently  Short term goal 3: Pt will amb 100 ft independently with RW  Short term goal 4: Pt will asc/dec 5 steps with handrail independently  Patient Goals   Patient goals :  To return to PLOF     Therapy Time   Individual Concurrent Group Co-treatment   Time In 0839         Time Out 0912         Minutes 33         Timed Code Treatment Minutes: [facility]     Ruth Jamaing, [facility]  Jazmin Wilson, PT    This evaluation/treatment was observed and supervised by Jazmin Wilson, [facility]

## 2022-03-07 NOTE — DISCHARGE SUMMARY
[facility]    Patient Demographics    Patient. Guerita Reece  Date of Birth. 1960  MRN. [id number]     Primary care provider. Anthony George MD  (Tel: [phone])    Admit date: 3/5/2022    Discharge date 3/7/2022  Note Date: 3/7/2022     Reason for Hospitalization. Chief Complaint   Patient presents with    Shortness of Breath     Patient states shortness of breath begal an hour and a half ago, patient states he was recently discharged for pneumonia. 76 on room air at time of triage. Significant Findings. Principal Problem:    COPD exacerbation ([address]  Resolved Problems:    * No resolved hospital problems. *       Problems and results from this hospitalization that need follow up. 1. COPD:  Now requires home oxygen. Follow up with pulmonary in 2 weeks  2. PAD:  Follow up with Dr Sherice Encarnacion  3. Leg ulcers: Follow up in wound clinic w Dr Ernestina Gifford test results and incidental findings. INR 2.96     AIC 6.5 %     Chest xray:  Mild cardiomegaly and minimal central pulmonary congestion or pulmonary   artery hypertension which is less prominent     Mild discoid atelectasis or scarring along the lung bases which is less   prominent.      February   Summary        RAN    Right indeterminate due to wound at ankle regipn    Left indeterminate due to absent doppler signals at the ankle        DUPLEX SCAN    Right    Patent Femoral/Posterior tibial artery pass graft    Chronic total occlusion of the superficial femoral artery and of the    popliteal artery    Deep Femoral artery stenosis        Left    Occluded common femoral artery to posterior tibial artery bypass graft    Chronic occlusion of the superficial femoral and popliteal arteries    Nonvisualization of the tibial arteries in the left calf region. Occluded PT    and AT arteries.  There may be a peroneal artery patent in the calf and this    has low flow                 Invasive procedures and treatments. [facility]. The patient sought care in the ED due to hypoxia and increased dyspnea. He was admitted and followed by  Pulmonary. He was treated for the following conditions:      1. Acute hypoxic respiratory failure due to COPD exacerbation and pulmonary edema:  He received IV lasix , nebs  And supplemental oxygen. He was unable to be weaned off oxygen therapy. He will require 2 liters at rest and 3 liters with activity. Steroids were d/c by pulmonary  2. Pulmonary edema:  Echo showed normal EF. He was d/c on low dose daily lasix at 20 mg. He will need close follow up  3. Severe FRANCI: bipap at hs  4. T2DM:  Will add humalog correction as needed  5. On chronic AC with coumadin due to DVT. INR in range   6. Bilateral lower leg ulcers for PVD/ PAD:  Follows weekly with Dr Marta Reece at the wound clinic. He also follows with Dr Seven Stovall for possible re vascularization. 7. The patient politely demanded to be d/c so that he may return to work in the next 24 hours or else \" I will be fired\"       Consults. IP CONSULT TO PHARMACY  IP CONSULT TO PULMONOLOGY  IP CONSULT TO CASE MANAGEMENT    Physical examination on discharge day. /73   Pulse 78   Temp 97.6 °F (36.4 °C) (Axillary)   Resp 17   Ht 5' 8\" (1.727 m)   Wt 162 lb 0.6 oz (73.5 kg)   SpO2 97%   BMI 24.64 kg/m²   General appearance. Alert. Looks comfortable, but chronically ill  HEENT. Sclera clear. Moist mucus membranes. Cardiovascular. Regular rate and rhythm, normal S1, S2. No murmur. Respiratory. Not using accessory muscles. decreased in bases. No wheezing   Gastrointestinal. Abdomen soft, non-tender, not distended, normal bowel sounds  Neurology. Facial symmetry. No speech deficits. Moving all extremities equally. Extremities. Occlusive dressings  Intact to bilat lower legs   Skin.  Warm, dry, normal turgor    Condition at time of discharge stable     Medication instructions provided to patient at discharge. Medication List      START taking these medications    furosemide 20 MG tablet  Commonly known as: Lasix  Take 1 tablet by mouth daily     ipratropium-albuterol 0.5-2.5 (3) MG/3ML Soln nebulizer solution  Commonly known as: DUONEB  Inhale 3 mLs into the lungs 4 times daily        CHANGE how you take these medications    Trelegy Ellipta 200-62.5-25 MCG/INH Aepb  Generic drug: Fluticasone-Umeclidin-Vilant  Inhale 1 puff into the lungs 2 times daily  What changed: when to take this     warfarin 5 MG tablet  Commonly known as: Jantoven  Take as directed. If you are unsure how to take this medication, talk to your nurse or doctor. Original instructions: TAKE 1 AND 1/2 TABLETS BY MOUTH ONE TIME A DAY OR AS DIRECTED BY [facility] ([phone])  What changed:   · how much to take  · how to take this  · when to take this  · additional instructions        CONTINUE taking these medications    acetaminophen 500 MG tablet  Commonly known as: TYLENOL  Take 1 tablet by mouth 4 times daily as needed for Pain     albuterol sulfate  (90 Base) MCG/ACT inhaler  Commonly known as: Ventolin HFA  Inhale 2 puffs into the lungs 4 times daily as needed for Wheezing     atorvastatin 80 MG tablet  Commonly known as: LIPITOR  Take 1 tablet by mouth daily Cancel atorvastatin 20 mg a day     blood glucose monitor kit and supplies  Dispense sufficient amount for indicated testing frequency plus additional to accommodate PRN testing needs. Dispense all needed supplies to include: monitor, strips, lancing device, lancets, control solutions, alcohol swabs. blood glucose test strips  Test 2 times a day while on PREDNISONE, then 1 time daily & as needed for symptoms of irregular blood glucose. Dispense sufficient amount for indicated testing frequency plus additional to accommodate PRN testing needs.      carvedilol 25 MG tablet  Commonly known as: COREG  Take 1 tablet by mouth 2 times daily (with meals)     ferrous sulfate 325 (65 Fe) MG tablet  Commonly known as: IRON 325  Take 1 tablet by mouth 2 times daily     fluticasone 50 MCG/ACT nasal spray  Commonly known as: Flonase  1 spray by Nasal route daily     gentamicin 0.1 % cream  Commonly known as: GARAMYCIN  Apply topically   daily. guaiFENesin 600 MG extended release tablet  Commonly known as: MUCINEX     Lancets Misc  1 each by Does not apply route daily Test 2 times daily while on PREDNISONE then 1 time daily & as needed for symptoms of irregular blood sugar     metFORMIN 500 MG tablet  Commonly known as: GLUCOPHAGE  Take 1 tablet by mouth 2 times daily (with meals)     nicotine 21 MG/24HR  Commonly known as: Nicoderm CQ  Place 1 patch onto the skin every 24 hours     NIFEdipine 60 MG extended release tablet  Commonly known as: ADALAT CC  Take 1 tablet by mouth daily     tadalafil 5 MG tablet  Commonly known as: CIALIS  TAKE 1 TABLET BY MOUTH ONE TIME A DAY AS NEEDED FOR ERECTILE DYSFUNCTION     Vitamins/Minerals Tabs        STOP taking these medications    hydroCHLOROthiazide 12.5 MG tablet  Commonly known as: HYDRODIURIL           Where to Get Your Medications      These medications were sent to [address] [address]    Hours: 24-hours Phone: [phone]   · furosemide 20 MG tablet     You can get these medications from any pharmacy    Bring a paper prescription for each of these medications  · ipratropium-albuterol 0.5-2.5 (3) MG/3ML Soln nebulizer solution         Discharge recommendations given to patient. Follow Up. in 1 week   Disposition. As tolerated   Activity. As tolerated  Diet: ADULT DIET; Regular; 4 carb choices (60 gm/meal); No Added Salt (3-4 gm); 2000 ml      Spent > 30 minutes in discharge process.     Signed:  Marcela Abbasi

## 2022-03-07 NOTE — PROGRESS NOTES
Interventions:   Food and/or Nutrient Delivery:  Continue Current Diet,Start Oral Nutrition Supplement  Nutrition Education/Counseling:  Education declined (patient familiar with guidelines)   Coordination of Nutrition Care:  Continue to monitor while inpatient    Goals:  Patient will eat 50% or greater of protein containing meals and nutrition supplements. Nutrition Monitoring and Evaluation:   Behavioral-Environmental Outcomes:      Food/Nutrient Intake Outcomes:  Supplement Intake,Food and Nutrient Intake  Physical Signs/Symptoms Outcomes:  Biochemical Data,Nutrition Focused Physical Findings     Discharge Planning:     Too soon to determine     Electronically signed by Mireille Rodrigues RD, LD on 3/7/22 at 2:21 PM EST    Contact: [phone]

## 2022-03-07 NOTE — PROGRESS NOTES
Kingsley Dom was evaluated today and a DME order was entered for a nebulizer compressor in order to administer Albuterol and Ipratropium due to the diagnosis of COPD The need for this equipment and treatment was discussed with the patient and he understands and is in agreement.

## 2022-03-07 NOTE — PLAN OF CARE
Problem: Falls - Risk of:  Goal: Will remain free from falls  Description: Will remain free from falls  Outcome: Ongoing  Goal: Absence of physical injury  Description: Absence of physical injury  Outcome: Ongoing     Problem: Nutrition  Goal: Optimal nutrition therapy  3/7/2022 1516 by Nayla Brennan RN  Outcome: Ongoing  3/7/2022 1424 by Niurka Lopez RD, LD  Outcome: Ongoing

## 2022-03-07 NOTE — PROGRESS NOTES
Pharmacy to Dose Warfarin    Pharmacy consulted to dose warfarin for DVT. INR Goal: 2-3    INR today: 2.93    Assessment/Plan:  - Continue warfarin at home dose. Pharmacy will continue to follow.     Miguel Ruiz, PharmD, [facility]

## 2022-03-07 NOTE — PROGRESS NOTES
[facility]    3/7/2022 8:26 AM        Name: Red Alarcon . Admitted: 3/5/2022  Primary Care Provider: Amanda Burger MD (Tel: [phone])      Subjective:  . Admitted this am with COPD exacerbation   Was recently hospitalized in February for COPD with hypercapnia,  He required frequent BIPAP use . Currently on 3 liters. Will try and wean off.   ( he get hypercapnic with higher flow oxygen)  Pt asking if he can be d/c today  Needs echo     Has ulceration on his feet, now followed at the wound clinic       Reviewed interval ancillary notes    Current Medications  albuterol sulfate  (90 Base) MCG/ACT inhaler 2 puff, 4x Daily PRN  atorvastatin (LIPITOR) tablet 80 mg, Daily  carvedilol (COREG) tablet 25 mg, BID WC  ferrous sulfate (IRON 325) tablet 325 mg, BID  fluticasone (FLONASE) 50 MCG/ACT nasal spray 1 spray, Daily  guaiFENesin (MUCINEX) extended release tablet 1,200 mg, BID  NIFEdipine (PROCARDIA XL) extended release tablet 60 mg, Daily  therapeutic multivitamin-minerals 1 tablet, Daily  insulin lispro (1 Unit Dial) 0-6 Units, TID WC  insulin lispro (1 Unit Dial) 0-3 Units, Nightly  glucose (GLUTOSE) 40 % oral gel 15 g, PRN  dextrose 50 % IV solution, PRN  glucagon (rDNA) injection 1 mg, PRN  dextrose 5 % solution, PRN  sodium chloride flush 0.9 % injection 5-40 mL, 2 times per day  sodium chloride flush 0.9 % injection 5-40 mL, PRN  0.9 % sodium chloride infusion, PRN  ondansetron (ZOFRAN-ODT) disintegrating tablet 4 mg, Q8H PRN   Or  ondansetron (ZOFRAN) injection 4 mg, Q6H PRN  polyethylene glycol (GLYCOLAX) packet 17 g, Daily PRN  acetaminophen (TYLENOL) tablet 650 mg, Q6H PRN   Or  acetaminophen (TYLENOL) suppository 650 mg, Q6H PRN  albuterol (PROVENTIL) nebulizer solution 2.5 mg, Q4H PRN  furosemide (LASIX) injection 40 mg, BID  [START ON 3/9/2022] warfarin (COUMADIN) tablet 10 mg, Once per day on Wed Sat  warfarin (COUMADIN) tablet 7.5 mg, Once per day on Sun Mon Tue Thu Fri  ipratropium-albuterol (DUONEB) nebulizer solution 1 ampule, Q4H  gentamicin (GARAMYCIN) 0.1 % cream, Daily  collagenase ointment, Daily        Objective:  /72   Pulse 70   Temp 97.6 °F (36.4 °C) (Axillary)   Resp 17   Ht 5' 8\" (1.727 m)   Wt 162 lb 0.6 oz (73.5 kg)   SpO2 96%   BMI 24.64 kg/m²     Intake/Output Summary (Last 24 hours) at 3/7/2022 0826  Last data filed at 3/6/2022 2033  Gross per 24 hour   Intake --   Output 1500 ml   Net -1500 ml      Wt Readings from Last 3 Encounters:   03/07/22 162 lb 0.6 oz (73.5 kg)   02/24/22 163 lb (73.9 kg)   02/18/22 165 lb (74.8 kg)       General appearance:  Appears comfortable at rest.    Eyes: Sclera clear. Pupils equal.  ENT: Moist oral mucosa. Trachea midline, no adenopathy. Cardiovascular: Regular rhythm, normal S1, S2. No murmur. No edema in lower extremities  Respiratory:lungs with scattered wheezes upper lobes. Decreased in bases , no wheezing   GI: Abdomen soft, no tenderness, not distended, normal bowel sounds  Musculoskeletal: No cyanosis in digits, neck supple  Neurology: CN 2-12 grossly intact. No speech or motor deficits  Psych: Normal affect.  Alert and oriented in time, place and person  Skin: Warm, dry, normal turgor, wraps and compression stockings in place to lower legs     Labs and Tests:  CBC:   Recent Labs     03/06/22  0010 03/06/22  0452 03/07/22  1023   WBC 6.2 9.4 6.1   HGB 13.1* 13.6 14.2    325 319     BMP:    Recent Labs     03/06/22  0010 03/06/22  0452 03/07/22  1023    137 142   K 4.5 4.5 4.4   CL 98* 96* 96*   CO2 31 29 36*   BUN 16 16 18   CREATININE 0.9 0.8 0.6*   GLUCOSE 130* 159* 140*     Hepatic:   Recent Labs     03/06/22  0010 03/06/22  0452 03/07/22  1023   AST 19 20 19   ALT 24 26 22   BILITOT <0.2 <0.2 <0.2   ALKPHOS 104 112 92     INR 2.96    AIC 6.5 %    Chest xray:  Mild cardiomegaly and minimal central pulmonary congestion or pulmonary   artery hypertension which is less prominent     Mild discoid atelectasis or scarring along the lung bases which is less   prominent. February   Summary        RAN    Right indeterminate due to wound at ankle regipn    Left indeterminate due to absent doppler signals at the ankle        DUPLEX SCAN    Right    Patent Femoral/Posterior tibial artery pass graft    Chronic total occlusion of the superficial femoral artery and of the    popliteal artery    Deep Femoral artery stenosis        Left    Occluded common femoral artery to posterior tibial artery bypass graft    Chronic occlusion of the superficial femoral and popliteal arteries    Nonvisualization of the tibial arteries in the left calf region. Occluded PT    and AT arteries. There may be a peroneal artery patent in the calf and this    has low flow           Problem List  Principal Problem:    COPD exacerbation ([facility] [address]  Resolved Problems:    * No resolved hospital problems. *       Assessment & Plan:   1. Acute hypoxic respiratory failure due to COPD exacerbation and pulmonary edema: clinically he looks better, pulmonary has stopped the steroids. Continue with nebs and wean oxygen as tolerated to keep sats 92% or greater  2. Pulmonary edema:  He has been started on IV lasix. Echo ordered   3. Severe FRANCI: bipap at hs  4. T2DM:  Will add humalog correction as needed  5. On chronic AC with coumadin due to DVT. INR in range   6. Bilateral lower leg ulcers for PVD/ PAD:  Follows weekly with Dr Mickie Acuna at the wound clinic. RN at bedside, will do daily dressing changes in the left leg only ,  Follows q Friday at [address] [facility],[address]  7. PAD:  Follows with Dr Maurice Mir     Consider d/c home later today if echo complete. Bertrand Oakley I ordered a nebulizer per pt request       Diet: ADULT DIET; Regular; 4 carb choices (60 gm/meal);  No Added Salt (3-4 gm); 2000 ml  Code:Full Code  DVT PPX      Rere Harris, HOLGER - CNP   3/7/2022 8:26 AM

## 2022-03-07 NOTE — PROGRESS NOTES
[facility]/CCM Progress note      Admit Date: 3/5/2022    Chief Complaint: Shortness of breath    Subjective: Interval History: No new overnight events, currently on 4 L O2. Denies any significant cough or phlegm. No chest pain. Leg edema is better.     Scheduled Meds:   atorvastatin  80 mg Oral Daily    carvedilol  25 mg Oral BID     ferrous sulfate  325 mg Oral BID    fluticasone  1 spray Nasal Daily    guaiFENesin  1,200 mg Oral BID    NIFEdipine  60 mg Oral Daily    therapeutic multivitamin-minerals  1 tablet Oral Daily    insulin lispro  0-6 Units SubCUTAneous TID     insulin lispro  0-3 Units SubCUTAneous Nightly    sodium chloride flush  5-40 mL IntraVENous 2 times per day    furosemide  40 mg IntraVENous BID    [START ON 3/9/2022] warfarin  10 mg Oral Once per day on Wed Sat    warfarin  7.5 mg Oral Once per day on Sun Mon Tue Thu Fri    ipratropium-albuterol  1 ampule Inhalation Q4H    gentamicin   Topical Daily    collagenase   Topical Daily     Continuous Infusions:   dextrose      sodium chloride       PRN Meds:albuterol sulfate HFA, glucose, dextrose, glucagon (rDNA), dextrose, sodium chloride flush, sodium chloride, ondansetron **OR** ondansetron, polyethylene glycol, acetaminophen **OR** acetaminophen, albuterol    Review of Systems  Constitutional: negative for fatigue, fevers, malaise and weight loss  Ears, nose, mouth, throat: negative for ear drainage, epistaxis, hoarseness, nasal congestion, sore throat and voice change  Respiratory: negative except for shortness of breath  Cardiovascular: negative for chest pain, chest pressure/discomfort, irregular heart beat, lower extremity edema and palpitations  Gastrointestinal: negative for abdominal pain, constipation, diarrhea, jaundice, melena, odynophagia, reflux symptoms and vomiting  Hematologic/lymphatic: negative for bleeding, easy bruising, lymphadenopathy and petechiae  Musculoskeletal:negative for arthralgias, bone pain, muscle weakness, neck pain and stiff joints  Neurological: negative for dizziness, gait problems, headaches, seizures, speech problems, tremors and weakness  Behavioral/Psych: negative for anxiety, behavior problems, depression, fatigue and sleep disturbance  Endocrine: negative for diabetic symptoms including none, neuropathy, polyphagia, polyuria, polydipsia, vomiting and diarrhea and temperature intolerance  Allergic/Immunologic: negative for anaphylaxis, angioedema, hay fever and urticaria    Objective:     Patient Vitals for the past 8 hrs:   BP Temp Temp src Pulse Resp SpO2 Weight   03/07/22 0947 -- -- -- -- -- 97 % --   03/07/22 0938 116/73 -- -- 78 -- -- --   03/07/22 0730 117/72 97.6 °F (36.4 °C) Axillary 70 17 96 % --   03/07/22 0725 -- -- -- -- -- -- 162 lb 0.6 oz (73.5 kg)   03/07/22 0408 -- -- -- -- 17 94 % --   03/07/22 0341 118/76 97.5 °F (36.4 °C) Oral 71 18 99 % --     I/O last 3 completed shifts: In: 1000 [IV Piggyback:1000]  Out: 1500 [Urine:1500]  I/O this shift:  In: 250 [P.O.:240;  I.V.:10]  Out: 440 [Urine:440]    General Appearance: alert and oriented to person, place and time, well developed and well- nourished, in no acute distress  Skin: warm and dry, no rash or erythema  Head: normocephalic and atraumatic  Eyes: pupils equal, round, and reactive to light, extraocular eye movements intact, conjunctivae normal  ENT: external ear and ear canal normal bilaterally, nose without deformity, nasal mucosa and turbinates normal  Neck: supple and non-tender without mass, no cervical lymphadenopathy  Pulmonary/Chest: clear to auscultation bilaterally- no wheezes, rales or rhonchi, normal air movement, no respiratory distress  Cardiovascular: normal rate, regular rhythm,  no murmurs, rubs, distal pulses intact, no carotid bruits  Abdomen: soft, non-tender, non-distended, normal bowel sounds, no masses or organomegaly  Lymph Nodes: Cervical, supraclavicular normal  Extremities: no cyanosis, clubbing or edema  Musculoskeletal: normal range of motion, no joint swelling, deformity or tenderness  Neurologic: alert, no focal neurologic deficits    Data Review:  CBC:   Lab Results   Component Value Date    WBC 6.1 03/07/2022    RBC 5.04 03/07/2022     BMP:   Lab Results   Component Value Date    GLUCOSE 140 03/07/2022    CO2 36 03/07/2022    BUN 18 03/07/2022    CREATININE 0.6 03/07/2022    CALCIUM 10.4 03/07/2022     ABG:   Lab Results   Component Value Date    ISQ4BFI 34.4 02/03/2022    BEART 8 02/03/2022    U9XXAMJD 84 02/03/2022    PHART 7.282 02/03/2022    CXJ4VXQ 73.0 02/03/2022    PO2ART 57.7 02/03/2022    BYI2NPO 37 02/03/2022       Radiology: All pertinent images / reports were reviewed as a part of this visit. Narrative   EXAMINATION:   ONE XRAY VIEW OF THE CHEST       3/5/2022 11:41 pm       COMPARISON:   02/01/2022.       HISTORY:   ORDERING SYSTEM PROVIDED HISTORY: sob   TECHNOLOGIST PROVIDED HISTORY:   Reason for exam:->sob       FINDINGS:   The heart is mildly enlarged and more prominent.  The pulmonary vessels are   top normal and unchanged.  The lungs are hyperinflated with mild linear   densities along the lung bases which is less prominent.  No effusion or   consolidation is seen.           Impression   Mild cardiomegaly and minimal central pulmonary congestion or pulmonary   artery hypertension which is less prominent       Mild discoid atelectasis or scarring along the lung bases which is less   prominent. Problem List:     Probable CHF decompensation  COPD, moderate  Chronic hypercapnic respiratory failure requiring BiPAP at nighttime    Assessment/Plan:     Shortness of breath possibly related to orthopnea, elevated proBNP with pulmonary vascular congestion noted on chest x-ray. Awaiting 2D echocardiogram.  On IV Lasix 40 mg twice daily, with improvement in leg edema and shortness of breath. History of moderate COPD, already on Trelegy and albuterol.   Continue to use BiPAP at nighttime for chronic hypercapnia.     Pulmonary will follow for 1 day    Mag Quintanilla MD

## 2022-03-07 NOTE — PROGRESS NOTES
Occupational Therapy   Occupational Therapy Initial Assessment  Date: 3/7/2022   Patient Name: Jg Machado  MRN: [id number]     : 1960    Date of Service: 3/7/2022     Discharge Recommendations: Jg Machado scored a 19/24 on the AM-PAC ADL Inpatient form. Current research shows that an AM-PAC score of 18 or greater is typically associated with a discharge to the patient's home setting. Based on the patient's AM-PAC score, and their current ADL deficits, it is recommended that the patient have 2-3 sessions per week of Occupational Therapy at d/c to increase the patient's independence. At this time, this patient demonstrates the endurance and safety to discharge home with [facility] and a follow up treatment frequency of 2-3x/wk. Please see assessment section for further patient specific details. If patient discharges prior to next session this note will serve as a discharge summary. Please see below for the latest assessment towards goals. HOME HEALTH CARE: LEVEL 1 STANDARD    - Initial home health evaluation to occur within 24-48 hours, in patient home   - Therapy to evaluate with goal of regaining prior level of functioning   - Therapy to evaluate if patient has [address] needs for personal care      S Level 1,Home with Home health OT,Home with assist PRN  OT Equipment Recommendations  Equipment Needed:  (has walker and cane - could benefit from shower chair, continue to monitor pending progress)    Assessment   Performance deficits / Impairments: Decreased functional mobility ; Decreased endurance;Decreased high-level IADLs;Decreased ADL status  Assessment: Pt demonstrates deficits in the above listed areas. He is independent at baseline and requires SBA for functional mobility/functional transfers, as well as supervision for bed mobility. Pt fatigues quickly and has low endurance. He would benefit from continued skilled occupational therapy services at this time.   Treatment Diagnosis: low endurance, decr adl completion/functional mobility  Prognosis: Good  Decision Making: Low Complexity  History: DM, COPD, HTN  OT Education: OT Role;Plan of Care;ADL Adaptive Strategies;Transfer Training;Energy Conservation;IADL Safety  Patient Education: pt was educated on above, verb understanding  Barriers to Learning: none  REQUIRES OT FOLLOW UP: Yes  Activity Tolerance  Activity Tolerance: Patient limited by fatigue  Activity Tolerance: pt with SOB and fatigue; low endurance with O2 to 83%-95% with ambulation  Safety Devices  Safety Devices in place: Yes  Type of devices: Left in chair; All fall risk precautions in place;Gait belt;Call light within reach; Chair alarm in place  Restraints  Initially in place: No           Patient Diagnosis(es): The primary encounter diagnosis was Acute respiratory failure with hypoxemia ([facility] Diagnoses of COPD exacerbation ([address] and Pulmonary congestion were also pertinent to this visit. has a past medical history of Diabetes mellitus ([address] Fibromyalgia, History of DVT (deep vein thrombosis), Hyperlipidemia, Hypertension, and Type 2 diabetes mellitus with circulatory disorder, without long-term current use of insulin ([address] has a past surgical history that includes Colonoscopy (2016); Appendectomy (2005); other surgical history (Right, 12/08/2020); femoral bypass (Left, 02/26/2020); Femoral-tibial Bypass Graft (Right, 12/11/2020); Foot Debridement (Left, 09/03/2020); and Foot Debridement (Right, 2/26/2021). Treatment Diagnosis: low endurance, decr adl completion/functional mobility      Restrictions  Restrictions/Precautions  Restrictions/Precautions: Fall Risk,Up as Tolerated (medium fall risk)  Required Braces or Orthoses?: No    Subjective   General  Chart Reviewed: Yes  Patient assessed for rehabilitation services?: Yes  Family / Caregiver Present: No  Subjective  Subjective: Patient was supine in bed upon arrival to therapy session.  Pleasant and agreeable to therapy eval. Left seated in chair at EOS with all needs met. Patient Currently in Pain: Denies  Pain Assessment  Pain Assessment: 0-10  Pain Level: 0  Vital Signs  Pulse: 78  BP: 116/73  Patient Currently in Pain: Denies  Oxygen Therapy  SpO2: 97 %  Pulse Oximeter Device Mode: Continuous  Pulse Oximeter Device Location: Finger  O2 Device: Nasal cannula  O2 Flow Rate (L/min): 3 L/min (Changed per MD)  Social/Functional History  Social/Functional History  Lives With: Spouse  Type of Home: House  Home Layout: Two level (Split level, 6 steps inside home)  Home Access: Stairs to enter with rails  Entrance Stairs - Number of Steps: 5  Entrance Stairs - Rails: Right  Bathroom Shower/Tub: Tub/Shower unit  Bathroom Toilet: Standard  Home Equipment: Cane,Rolling walker (Uses the cane most of the time, walker occasionally)  ADL Assistance: [address] [place] [address] Avenue: Independent  Homemaking Responsibilities: No  Ambulation Assistance: Independent  Transfer Assistance: Independent  Active : Yes  Mode of Transportation: Car  Occupation: Full time employment  Type of occupation: Office job  Leisure & Hobbies: Playing with [employer]  Additional Comments: No falls in the past 6 months       Objective   Vision: Impaired  Vision Exceptions: Wears glasses for reading  Hearing: Within functional limits    Orientation  Overall Orientation Status: Within Functional Limits  Observation/Palpation  Posture: Fair (Forward head, rounded shoulders, and increased thoracic kyphosis in sitting.)  Observation: Pt supine in bed on arrival with B ace wraps on LE's. Pt BP before mobility 142/81 mmHg. SpO2 while talking 89%, SpO2 following donning socks 83%. During desaturations, pt verbally cued to take slow, deep breaths which improved SpO2 to >90% within 30 seconds.   Balance  Sitting Balance: Supervision  Standing Balance: Stand by assistance  Standing Balance  Time: ~10 minutes total  Activity: functional mobility, functional transfers  Comment: use of RW  Functional Mobility  Functional - Mobility Device: Rolling Walker  Activity: Other  Assist Level: Stand by assistance  Functional Mobility Comments: pt completed functional mobiltity ~20 feet in hospital room with SBA and use of RW; no LOB and good safety awareness of navigation of environment  ADL  Feeding: Setup (eating breakfast at end of session with no difficulty)  LE Dressing: Supervision (donning socks sitting EOB)  Additional Comments: declines further adls at this time  Tone RUE  RUE Tone: Normotonic  Tone LUE  LUE Tone: Normotonic     Bed mobility  Supine to Sit: Supervision  Sit to Supine:  (not observed; left pt sitting in chair at EOS)  Scooting: Supervision  Comment: cues for hand placement; HOB elevated, use of R hand rail  Transfers  Sit to stand: Stand by assistance  Stand to sit: Stand by assistance  Transfer Comments: cues for hand placement     Cognition  Overall Cognitive Status: WFL  Cognition Comment: good direction following for multistep directions, good active recall, good carryover of cues  Perception  Overall Perceptual Status: WFL     Sensation  Overall Sensation Status: Impaired (L medial ankle, R dorsal foot numbness and tingling)        LUE AROM (degrees)  LUE AROM : WFL  RUE AROM (degrees)  RUE AROM : WFL  LUE Strength  Gross LUE Strength: WFL  RUE Strength  Gross RUE Strength: WFL      Plan   Plan  Times per week: 1-2x  Current Treatment Recommendations: Strengthening,Patient/Caregiver Education & Training,Endurance Training,Self-Care / ADL    AM-[facility] Score        AM-[facility] Inpatient Daily Activity Raw Score: 19 (03/07/22 1054)  AM-PAC Inpatient ADL T-Scale Score : 40.22 (03/07/22 1054)  ADL Inpatient CMS 0-100% Score: 42.8 (03/07/22 1054)  ADL Inpatient CMS G-Code Modifier : CK (03/07/22 1054)    Goals  Short term goals  Time Frame for Short term goals: by discharge  Short term goal 1: Pt will complete toilet transfer with mod I  Short term goal 2: Pt will complete bed mobility with mod I  Short term goal 3: Pt will complete UB/LB dressing with mod I  Short term goal 4: Pt will complete functional mobility a community distance with mod I  Short term goal 5: Pt will complete x20 reps of UE therex with no c/o fatigue  Long term goals  Time Frame for Long term goals : LTG = STG  Patient Goals   Patient goals : to get back to work       Therapy Time   Individual Concurrent Group Co-treatment   Time In       0839   Time Out       0912   Minutes       33   Timed Code Treatment Minutes: 18 Minutes (15 mins eval)        Red Salinas, OTR/L -- [id number]

## 2022-03-07 NOTE — PROGRESS NOTES
03/07/22 1615   Resting (Room Air)   SpO2 85   Resting (On O2)   SpO2 2   O2 Device Nasal cannula   O2 Flow Rate (l/min) 2 l/min   During Walk (Room Air)   SpO2 85   Walk/Assistance Device Walker   Rate of Dyspnea 0   During Walk (On O2)   SpO2 93   O2 Device Nasal cannula   O2 Flow Rate (l/min) 3 l/min   O2 Flow Rate (l/min) 1 l/min   O2 Saturation 87   O2 Flow Rate (l/min) 2 l/min   O2 Saturation 89   Walk/Assistance Device Walker   Rate of Dyspnea 0   After Walk   Does the Patient Qualify for Home O2 Yes   Liter Flow at Rest 2   Liter Flow on Exertion 3   Does the Patient Need Portable Oxygen Tanks Yes

## 2022-03-07 NOTE — CARE COORDINATION
Referral made to [address] [place] [address], [phone] or [phone]  for home oxygen. Will need an order with qualifying diagnosis, & Resp 02 SATs, within 24 hours of Discharge. The following is how the SATs should read:   RA at rest_____%   RA with Ambulation ____. ..

## 2022-03-07 NOTE — PLAN OF CARE
Problem: Falls - Risk of:  Goal: Will remain free from falls  Description: Will remain free from falls  3/7/2022 1707 by Kathya Page RN  Outcome: Completed  3/7/2022 1516 by Kathya Page RN  Outcome: Ongoing  Goal: Absence of physical injury  Description: Absence of physical injury  3/7/2022 1707 by Kathya Page RN  Outcome: Completed  3/7/2022 1516 by Kathya Page RN  Outcome: Ongoing     Problem: Nutrition  Goal: Optimal nutrition therapy  3/7/2022 1707 by Kathya Page RN  Outcome: Completed  3/7/2022 1516 by Kathya Page RN  Outcome: Ongoing  3/7/2022 1424 by Sarah Alvarez RD, LD  Outcome: Ongoing

## 2022-03-07 NOTE — PLAN OF CARE
Nutrition Problem #1: Increased nutrient needs  Intervention: Food and/or Nutrient Delivery: Continue Current Diet,Start Oral Nutrition Supplement  Nutritional Goals: Patient will eat 50% or greater of protein containing meals and nutrition supplements.

## 2022-03-08 ENCOUNTER — TELEPHONE (OUTPATIENT)
Dept: PHARMACY | Age: 62
End: 2022-03-08

## 2022-03-08 NOTE — TELEPHONE ENCOUNTER
Bria Zuniga was recently admitted to [facility] with COPD exacerbation. New medications at discharge include:  Furosemide  Duoneb  Stopped hydrochlorothiazide. Do not expect these to affect his INR.   Fluid retention could contribute to an elevated INR, but expect this is controlled,    Lab Results   Component Value Date    INR 2.93 (H) 03/07/2022    INR 2.96 (H) 03/06/2022    INR 3.16 (H) 03/06/2022     I called and scheduled an INR check for warfarin management for 3/14/22    Yobany Hall, VilmaD, [address] [place] [address]  Anticoagulation Service  [phone]

## 2022-03-09 ENCOUNTER — TELEPHONE (OUTPATIENT)
Dept: VASCULAR SURGERY | Age: 62
End: 2022-03-09

## 2022-03-09 NOTE — TELEPHONE ENCOUNTER
Patient thought Dr. Chris Ramirez wanted some vein mapping or to schedule surgery. He is confused about his plan of treatment. Please call patient at .

## 2022-03-09 NOTE — TELEPHONE ENCOUNTER
Patient would like more details of actual surgery you are planning to perform. Can you call him at . He would like to schedule surgery beginning of April.

## 2022-03-10 ENCOUNTER — APPOINTMENT (OUTPATIENT)
Dept: GENERAL RADIOLOGY | Age: 62
DRG: 189 | End: 2022-03-10
Payer: COMMERCIAL

## 2022-03-10 ENCOUNTER — HOSPITAL ENCOUNTER (INPATIENT)
Age: 62
LOS: 3 days | Discharge: HOME HEALTH | DRG: 189 | End: 2022-03-13
Attending: EMERGENCY MEDICINE | Admitting: HOSPITALIST
Payer: COMMERCIAL

## 2022-03-10 DIAGNOSIS — J96.01 ACUTE RESPIRATORY FAILURE WITH HYPOXIA (HCC): ICD-10-CM

## 2022-03-10 DIAGNOSIS — J44.1 COPD EXACERBATION (HCC): Primary | ICD-10-CM

## 2022-03-10 PROBLEM — J96.02 ACUTE HYPERCAPNIC RESPIRATORY FAILURE (HCC): Status: ACTIVE | Noted: 2022-03-10

## 2022-03-10 LAB
A/G RATIO: 1.5 (ref 1.1–2.2)
ALBUMIN SERPL-MCNC: 4.1 G/DL (ref 3.4–5)
ALP BLD-CCNC: 93 U/L (ref 40–129)
ALT SERPL-CCNC: 22 U/L (ref 10–40)
ANION GAP SERPL CALCULATED.3IONS-SCNC: 10 MMOL/L (ref 3–16)
AST SERPL-CCNC: 23 U/L (ref 15–37)
BASE EXCESS ARTERIAL: 10 (ref -3–3)
BASE EXCESS ARTERIAL: 11 (ref -3–3)
BASE EXCESS ARTERIAL: 12 (ref -3–3)
BASE EXCESS VENOUS: 5.2 MMOL/L (ref -3–3)
BASOPHILS ABSOLUTE: 0 K/UL (ref 0–0.2)
BASOPHILS RELATIVE PERCENT: 0.7 %
BILIRUB SERPL-MCNC: <0.2 MG/DL (ref 0–1)
BUN BLDV-MCNC: 17 MG/DL (ref 7–20)
CALCIUM IONIZED: 1.23 MMOL/L (ref 1.12–1.32)
CALCIUM SERPL-MCNC: 9.7 MG/DL (ref 8.3–10.6)
CARBOXYHEMOGLOBIN: 3.7 % (ref 0–1.5)
CHLORIDE BLD-SCNC: 95 MMOL/L (ref 99–110)
CO2: 31 MMOL/L (ref 21–32)
CREAT SERPL-MCNC: 0.8 MG/DL (ref 0.8–1.3)
EKG ATRIAL RATE: 110 BPM
EKG DIAGNOSIS: NORMAL
EKG P AXIS: 76 DEGREES
EKG P-R INTERVAL: 150 MS
EKG Q-T INTERVAL: 334 MS
EKG QRS DURATION: 96 MS
EKG QTC CALCULATION (BAZETT): 452 MS
EKG R AXIS: 269 DEGREES
EKG T AXIS: 57 DEGREES
EKG VENTRICULAR RATE: 110 BPM
EOSINOPHILS ABSOLUTE: 0 K/UL (ref 0–0.6)
EOSINOPHILS RELATIVE PERCENT: 0.3 %
ESTIMATED AVERAGE GLUCOSE: 139.9 MG/DL
GFR AFRICAN AMERICAN: >60
GFR NON-AFRICAN AMERICAN: >60
GLUCOSE BLD-MCNC: 126 MG/DL (ref 70–99)
GLUCOSE BLD-MCNC: 138 MG/DL (ref 70–99)
GLUCOSE BLD-MCNC: 152 MG/DL (ref 70–99)
GLUCOSE BLD-MCNC: 154 MG/DL (ref 70–99)
GLUCOSE BLD-MCNC: 167 MG/DL (ref 70–99)
GLUCOSE BLD-MCNC: 185 MG/DL (ref 70–99)
GLUCOSE BLD-MCNC: 220 MG/DL (ref 70–99)
GLUCOSE BLD-MCNC: 277 MG/DL (ref 70–99)
GLUCOSE BLD-MCNC: 289 MG/DL (ref 70–99)
GLUCOSE BLD-MCNC: 309 MG/DL (ref 70–99)
HBA1C MFR BLD: 6.5 %
HCO3 ARTERIAL: 37 MMOL/L (ref 21–29)
HCO3 ARTERIAL: 37.9 MMOL/L (ref 21–29)
HCO3 ARTERIAL: 39.7 MMOL/L (ref 21–29)
HCO3 VENOUS: 33.4 MMOL/L (ref 23–29)
HCT VFR BLD CALC: 43.1 % (ref 40.5–52.5)
HEMOGLOBIN, VEN, REDUCED: 18 %
HEMOGLOBIN: 13.8 G/DL (ref 13.5–17.5)
HEMOGLOBIN: 18.4 GM/DL (ref 13.5–17.5)
INR BLD: 3.33 (ref 0.88–1.12)
LACTATE: 0.6 MMOL/L (ref 0.4–2)
LYMPHOCYTES ABSOLUTE: 1.6 K/UL (ref 1–5.1)
LYMPHOCYTES RELATIVE PERCENT: 20.9 %
MCH RBC QN AUTO: 28.1 PG (ref 26–34)
MCHC RBC AUTO-ENTMCNC: 32 G/DL (ref 31–36)
MCV RBC AUTO: 87.9 FL (ref 80–100)
METHEMOGLOBIN VENOUS: 0 %
MONOCYTES ABSOLUTE: 0.7 K/UL (ref 0–1.3)
MONOCYTES RELATIVE PERCENT: 9.9 %
NEUTROPHILS ABSOLUTE: 5.1 K/UL (ref 1.7–7.7)
NEUTROPHILS RELATIVE PERCENT: 68.2 %
O2 CONTENT, VEN: 16 VOL %
O2 SAT, ARTERIAL: 60 % (ref 93–100)
O2 SAT, ARTERIAL: 95 % (ref 93–100)
O2 SAT, ARTERIAL: 98 % (ref 93–100)
O2 SAT, VEN: 81 %
O2 THERAPY: ABNORMAL
PCO2 ARTERIAL: 102.8 MM HG (ref 35–45)
PCO2 ARTERIAL: 75 MM HG (ref 35–45)
PCO2 ARTERIAL: 90.4 MM HG (ref 35–45)
PCO2, VEN: 63.7 MMHG (ref 40–50)
PDW BLD-RTO: 15.6 % (ref 12.4–15.4)
PERFORMED ON: ABNORMAL
PH ARTERIAL: 7.2 (ref 7.35–7.45)
PH ARTERIAL: 7.23 (ref 7.35–7.45)
PH ARTERIAL: 7.3 (ref 7.35–7.45)
PH VENOUS: 7.33 (ref 7.35–7.45)
PLATELET # BLD: 303 K/UL (ref 135–450)
PMV BLD AUTO: 7.6 FL (ref 5–10.5)
PO2 ARTERIAL: 100 MM HG (ref 75–108)
PO2 ARTERIAL: 115.3 MM HG (ref 75–108)
PO2 ARTERIAL: 39 MM HG (ref 75–108)
PO2, VEN: 49 MMHG (ref 25–40)
POC HEMATOCRIT: 54 % (ref 40.5–52.5)
POC POTASSIUM: 5.2 MMOL/L (ref 3.5–5.1)
POC SAMPLE TYPE: ABNORMAL
POC SODIUM: 139 MMOL/L (ref 136–145)
POTASSIUM REFLEX MAGNESIUM: 4.7 MMOL/L (ref 3.5–5.1)
PRO-BNP: 739 PG/ML (ref 0–124)
PROTHROMBIN TIME: 39.6 SEC (ref 9.9–12.7)
RBC # BLD: 4.9 M/UL (ref 4.2–5.9)
SODIUM BLD-SCNC: 136 MMOL/L (ref 136–145)
TCO2 ARTERIAL: 39 MMOL/L
TCO2 ARTERIAL: 41 MMOL/L
TCO2 ARTERIAL: 43 MMOL/L
TCO2 CALC VENOUS: 79 MMOL/L
TOTAL PROTEIN: 6.8 G/DL (ref 6.4–8.2)
TROPONIN: <0.01 NG/ML
WBC # BLD: 7.5 K/UL (ref 4–11)

## 2022-03-10 PROCEDURE — 6360000002 HC RX W HCPCS

## 2022-03-10 PROCEDURE — 6370000000 HC RX 637 (ALT 250 FOR IP): Performed by: HOSPITALIST

## 2022-03-10 PROCEDURE — 36415 COLL VENOUS BLD VENIPUNCTURE: CPT

## 2022-03-10 PROCEDURE — 6360000002 HC RX W HCPCS: Performed by: EMERGENCY MEDICINE

## 2022-03-10 PROCEDURE — 94660 CPAP INITIATION&MGMT: CPT

## 2022-03-10 PROCEDURE — 6360000002 HC RX W HCPCS: Performed by: HOSPITALIST

## 2022-03-10 PROCEDURE — 6360000002 HC RX W HCPCS: Performed by: INTERNAL MEDICINE

## 2022-03-10 PROCEDURE — 99283 EMERGENCY DEPT VISIT LOW MDM: CPT

## 2022-03-10 PROCEDURE — 71045 X-RAY EXAM CHEST 1 VIEW: CPT

## 2022-03-10 PROCEDURE — 82947 ASSAY GLUCOSE BLOOD QUANT: CPT

## 2022-03-10 PROCEDURE — 93010 ELECTROCARDIOGRAM REPORT: CPT | Performed by: INTERNAL MEDICINE

## 2022-03-10 PROCEDURE — 6370000000 HC RX 637 (ALT 250 FOR IP): Performed by: EMERGENCY MEDICINE

## 2022-03-10 PROCEDURE — 85014 HEMATOCRIT: CPT

## 2022-03-10 PROCEDURE — 1200000000 HC SEMI PRIVATE

## 2022-03-10 PROCEDURE — 94761 N-INVAS EAR/PLS OXIMETRY MLT: CPT

## 2022-03-10 PROCEDURE — 84484 ASSAY OF TROPONIN QUANT: CPT

## 2022-03-10 PROCEDURE — 85025 COMPLETE CBC W/AUTO DIFF WBC: CPT

## 2022-03-10 PROCEDURE — 84295 ASSAY OF SERUM SODIUM: CPT

## 2022-03-10 PROCEDURE — 96374 THER/PROPH/DIAG INJ IV PUSH: CPT

## 2022-03-10 PROCEDURE — 80053 COMPREHEN METABOLIC PANEL: CPT

## 2022-03-10 PROCEDURE — 84132 ASSAY OF SERUM POTASSIUM: CPT

## 2022-03-10 PROCEDURE — 94640 AIRWAY INHALATION TREATMENT: CPT

## 2022-03-10 PROCEDURE — 71046 X-RAY EXAM CHEST 2 VIEWS: CPT

## 2022-03-10 PROCEDURE — 2700000000 HC OXYGEN THERAPY PER DAY

## 2022-03-10 PROCEDURE — 85610 PROTHROMBIN TIME: CPT

## 2022-03-10 PROCEDURE — 83605 ASSAY OF LACTIC ACID: CPT

## 2022-03-10 PROCEDURE — 82803 BLOOD GASES ANY COMBINATION: CPT

## 2022-03-10 PROCEDURE — 82330 ASSAY OF CALCIUM: CPT

## 2022-03-10 PROCEDURE — 93005 ELECTROCARDIOGRAM TRACING: CPT | Performed by: EMERGENCY MEDICINE

## 2022-03-10 PROCEDURE — 83880 ASSAY OF NATRIURETIC PEPTIDE: CPT

## 2022-03-10 PROCEDURE — 2580000003 HC RX 258: Performed by: HOSPITALIST

## 2022-03-10 PROCEDURE — 2000000000 HC ICU R&B

## 2022-03-10 PROCEDURE — 36600 WITHDRAWAL OF ARTERIAL BLOOD: CPT

## 2022-03-10 PROCEDURE — 99291 CRITICAL CARE FIRST HOUR: CPT | Performed by: INTERNAL MEDICINE

## 2022-03-10 PROCEDURE — 83036 HEMOGLOBIN GLYCOSYLATED A1C: CPT

## 2022-03-10 RX ORDER — NIFEDIPINE 30 MG/1
60 TABLET, EXTENDED RELEASE ORAL DAILY
Status: DISCONTINUED | OUTPATIENT
Start: 2022-03-10 | End: 2022-03-13 | Stop reason: HOSPADM

## 2022-03-10 RX ORDER — SODIUM CHLORIDE 9 MG/ML
25 INJECTION, SOLUTION INTRAVENOUS PRN
Status: DISCONTINUED | OUTPATIENT
Start: 2022-03-10 | End: 2022-03-13 | Stop reason: HOSPADM

## 2022-03-10 RX ORDER — POLYETHYLENE GLYCOL 3350 17 G/17G
17 POWDER, FOR SOLUTION ORAL DAILY PRN
Status: DISCONTINUED | OUTPATIENT
Start: 2022-03-10 | End: 2022-03-13 | Stop reason: HOSPADM

## 2022-03-10 RX ORDER — ACETAMINOPHEN 650 MG/1
650 SUPPOSITORY RECTAL EVERY 6 HOURS PRN
Status: DISCONTINUED | OUTPATIENT
Start: 2022-03-10 | End: 2022-03-13 | Stop reason: HOSPADM

## 2022-03-10 RX ORDER — ATORVASTATIN CALCIUM 80 MG/1
80 TABLET, FILM COATED ORAL DAILY
Status: DISCONTINUED | OUTPATIENT
Start: 2022-03-10 | End: 2022-03-13 | Stop reason: HOSPADM

## 2022-03-10 RX ORDER — IPRATROPIUM BROMIDE AND ALBUTEROL SULFATE 2.5; .5 MG/3ML; MG/3ML
1 SOLUTION RESPIRATORY (INHALATION) ONCE
Status: COMPLETED | OUTPATIENT
Start: 2022-03-10 | End: 2022-03-10

## 2022-03-10 RX ORDER — SODIUM CHLORIDE 0.9 % (FLUSH) 0.9 %
5-40 SYRINGE (ML) INJECTION PRN
Status: DISCONTINUED | OUTPATIENT
Start: 2022-03-10 | End: 2022-03-13 | Stop reason: HOSPADM

## 2022-03-10 RX ORDER — FUROSEMIDE 10 MG/ML
40 INJECTION INTRAMUSCULAR; INTRAVENOUS DAILY
Status: DISCONTINUED | OUTPATIENT
Start: 2022-03-10 | End: 2022-03-13 | Stop reason: HOSPADM

## 2022-03-10 RX ORDER — PREDNISONE 20 MG/1
40 TABLET ORAL DAILY
Status: DISCONTINUED | OUTPATIENT
Start: 2022-03-12 | End: 2022-03-11

## 2022-03-10 RX ORDER — DEXTROSE MONOHYDRATE 50 MG/ML
100 INJECTION, SOLUTION INTRAVENOUS PRN
Status: DISCONTINUED | OUTPATIENT
Start: 2022-03-10 | End: 2022-03-13 | Stop reason: HOSPADM

## 2022-03-10 RX ORDER — LEVALBUTEROL INHALATION SOLUTION 1.25 MG/3ML
1.25 SOLUTION RESPIRATORY (INHALATION) EVERY 4 HOURS PRN
Status: DISCONTINUED | OUTPATIENT
Start: 2022-03-10 | End: 2022-03-13 | Stop reason: HOSPADM

## 2022-03-10 RX ORDER — IPRATROPIUM BROMIDE AND ALBUTEROL SULFATE 2.5; .5 MG/3ML; MG/3ML
1 SOLUTION RESPIRATORY (INHALATION)
Status: DISCONTINUED | OUTPATIENT
Start: 2022-03-10 | End: 2022-03-10

## 2022-03-10 RX ORDER — ETOMIDATE 2 MG/ML
INJECTION INTRAVENOUS
Status: DISCONTINUED
Start: 2022-03-10 | End: 2022-03-10 | Stop reason: WASHOUT

## 2022-03-10 RX ORDER — FERROUS SULFATE 325(65) MG
325 TABLET ORAL 2 TIMES DAILY
Status: DISCONTINUED | OUTPATIENT
Start: 2022-03-10 | End: 2022-03-13 | Stop reason: HOSPADM

## 2022-03-10 RX ORDER — ACETAMINOPHEN 325 MG/1
650 TABLET ORAL EVERY 6 HOURS PRN
Status: DISCONTINUED | OUTPATIENT
Start: 2022-03-10 | End: 2022-03-13 | Stop reason: HOSPADM

## 2022-03-10 RX ORDER — METHYLPREDNISOLONE SODIUM SUCCINATE 40 MG/ML
40 INJECTION, POWDER, LYOPHILIZED, FOR SOLUTION INTRAMUSCULAR; INTRAVENOUS EVERY 6 HOURS
Status: DISCONTINUED | OUTPATIENT
Start: 2022-03-10 | End: 2022-03-11

## 2022-03-10 RX ORDER — ONDANSETRON 4 MG/1
4 TABLET, ORALLY DISINTEGRATING ORAL EVERY 8 HOURS PRN
Status: DISCONTINUED | OUTPATIENT
Start: 2022-03-10 | End: 2022-03-13 | Stop reason: HOSPADM

## 2022-03-10 RX ORDER — INSULIN LISPRO 100 [IU]/ML
0-6 INJECTION, SOLUTION INTRAVENOUS; SUBCUTANEOUS NIGHTLY
Status: DISCONTINUED | OUTPATIENT
Start: 2022-03-10 | End: 2022-03-13 | Stop reason: HOSPADM

## 2022-03-10 RX ORDER — NICOTINE POLACRILEX 4 MG
15 LOZENGE BUCCAL PRN
Status: DISCONTINUED | OUTPATIENT
Start: 2022-03-10 | End: 2022-03-13 | Stop reason: HOSPADM

## 2022-03-10 RX ORDER — LEVALBUTEROL INHALATION SOLUTION 1.25 MG/3ML
1.25 SOLUTION RESPIRATORY (INHALATION) 4 TIMES DAILY
Status: DISCONTINUED | OUTPATIENT
Start: 2022-03-10 | End: 2022-03-13 | Stop reason: HOSPADM

## 2022-03-10 RX ORDER — PROPOFOL 10 MG/ML
INJECTION, EMULSION INTRAVENOUS
Status: DISCONTINUED
Start: 2022-03-10 | End: 2022-03-10 | Stop reason: WASHOUT

## 2022-03-10 RX ORDER — NICOTINE 21 MG/24HR
1 PATCH, TRANSDERMAL 24 HOURS TRANSDERMAL DAILY
Status: DISCONTINUED | OUTPATIENT
Start: 2022-03-10 | End: 2022-03-13 | Stop reason: HOSPADM

## 2022-03-10 RX ORDER — MAGNESIUM HYDROXIDE/ALUMINUM HYDROXICE/SIMETHICONE 120; 1200; 1200 MG/30ML; MG/30ML; MG/30ML
30 SUSPENSION ORAL EVERY 6 HOURS PRN
Status: DISCONTINUED | OUTPATIENT
Start: 2022-03-10 | End: 2022-03-13 | Stop reason: HOSPADM

## 2022-03-10 RX ORDER — ALBUTEROL SULFATE 2.5 MG/3ML
2.5 SOLUTION RESPIRATORY (INHALATION)
Status: DISCONTINUED | OUTPATIENT
Start: 2022-03-10 | End: 2022-03-13 | Stop reason: HOSPADM

## 2022-03-10 RX ORDER — SODIUM CHLORIDE 0.9 % (FLUSH) 0.9 %
5-40 SYRINGE (ML) INJECTION EVERY 12 HOURS SCHEDULED
Status: DISCONTINUED | OUTPATIENT
Start: 2022-03-10 | End: 2022-03-13 | Stop reason: HOSPADM

## 2022-03-10 RX ORDER — CARVEDILOL 25 MG/1
25 TABLET ORAL 2 TIMES DAILY WITH MEALS
Status: DISCONTINUED | OUTPATIENT
Start: 2022-03-10 | End: 2022-03-13 | Stop reason: HOSPADM

## 2022-03-10 RX ORDER — ONDANSETRON 2 MG/ML
4 INJECTION INTRAMUSCULAR; INTRAVENOUS EVERY 6 HOURS PRN
Status: DISCONTINUED | OUTPATIENT
Start: 2022-03-10 | End: 2022-03-13 | Stop reason: HOSPADM

## 2022-03-10 RX ORDER — INSULIN LISPRO 100 [IU]/ML
0-12 INJECTION, SOLUTION INTRAVENOUS; SUBCUTANEOUS
Status: DISCONTINUED | OUTPATIENT
Start: 2022-03-10 | End: 2022-03-13 | Stop reason: HOSPADM

## 2022-03-10 RX ORDER — METHYLPREDNISOLONE SODIUM SUCCINATE 125 MG/2ML
125 INJECTION, POWDER, LYOPHILIZED, FOR SOLUTION INTRAMUSCULAR; INTRAVENOUS ONCE
Status: COMPLETED | OUTPATIENT
Start: 2022-03-10 | End: 2022-03-10

## 2022-03-10 RX ORDER — DEXTROSE MONOHYDRATE 25 G/50ML
12.5 INJECTION, SOLUTION INTRAVENOUS PRN
Status: DISCONTINUED | OUTPATIENT
Start: 2022-03-10 | End: 2022-03-13 | Stop reason: HOSPADM

## 2022-03-10 RX ORDER — MIDAZOLAM HYDROCHLORIDE 1 MG/ML
INJECTION INTRAMUSCULAR; INTRAVENOUS
Status: DISCONTINUED
Start: 2022-03-10 | End: 2022-03-10 | Stop reason: WASHOUT

## 2022-03-10 RX ORDER — ROCURONIUM BROMIDE 10 MG/ML
INJECTION, SOLUTION INTRAVENOUS
Status: DISCONTINUED
Start: 2022-03-10 | End: 2022-03-10 | Stop reason: WASHOUT

## 2022-03-10 RX ADMIN — FERROUS SULFATE TAB 325 MG (65 MG ELEMENTAL FE) 325 MG: 325 (65 FE) TAB at 10:00

## 2022-03-10 RX ADMIN — METHYLPREDNISOLONE SODIUM SUCCINATE 40 MG: 40 INJECTION, POWDER, FOR SOLUTION INTRAMUSCULAR; INTRAVENOUS at 15:33

## 2022-03-10 RX ADMIN — NIFEDIPINE 60 MG: 30 TABLET, FILM COATED, EXTENDED RELEASE ORAL at 09:59

## 2022-03-10 RX ADMIN — CARVEDILOL 25 MG: 25 TABLET, FILM COATED ORAL at 18:08

## 2022-03-10 RX ADMIN — IPRATROPIUM BROMIDE 1 MG: 0.5 SOLUTION RESPIRATORY (INHALATION) at 10:49

## 2022-03-10 RX ADMIN — LEVALBUTEROL HYDROCHLORIDE 1.25 MG: 1.25 SOLUTION RESPIRATORY (INHALATION) at 19:41

## 2022-03-10 RX ADMIN — IPRATROPIUM BROMIDE 0.5 MG: 0.5 SOLUTION RESPIRATORY (INHALATION) at 19:40

## 2022-03-10 RX ADMIN — FUROSEMIDE 40 MG: 10 INJECTION, SOLUTION INTRAMUSCULAR; INTRAVENOUS at 07:36

## 2022-03-10 RX ADMIN — LEVALBUTEROL HYDROCHLORIDE 1.25 MG: 1.25 SOLUTION RESPIRATORY (INHALATION) at 15:43

## 2022-03-10 RX ADMIN — INSULIN LISPRO 2 UNITS: 100 INJECTION, SOLUTION INTRAVENOUS; SUBCUTANEOUS at 10:19

## 2022-03-10 RX ADMIN — INSULIN GLARGINE 18 UNITS: 100 INJECTION, SOLUTION SUBCUTANEOUS at 21:52

## 2022-03-10 RX ADMIN — METHYLPREDNISOLONE SODIUM SUCCINATE 125 MG: 125 INJECTION, POWDER, FOR SOLUTION INTRAMUSCULAR; INTRAVENOUS at 03:17

## 2022-03-10 RX ADMIN — METHYLPREDNISOLONE SODIUM SUCCINATE 40 MG: 40 INJECTION, POWDER, FOR SOLUTION INTRAMUSCULAR; INTRAVENOUS at 20:39

## 2022-03-10 RX ADMIN — IPRATROPIUM BROMIDE 0.5 MG: 0.5 SOLUTION RESPIRATORY (INHALATION) at 15:43

## 2022-03-10 RX ADMIN — IPRATROPIUM BROMIDE AND ALBUTEROL SULFATE 1 AMPULE: .5; 3 SOLUTION RESPIRATORY (INHALATION) at 03:42

## 2022-03-10 RX ADMIN — ATORVASTATIN CALCIUM 80 MG: 80 TABLET, FILM COATED ORAL at 09:59

## 2022-03-10 RX ADMIN — SODIUM CHLORIDE, PRESERVATIVE FREE 10 ML: 5 INJECTION INTRAVENOUS at 10:00

## 2022-03-10 RX ADMIN — LEVALBUTEROL HYDROCHLORIDE 1.25 MG: 1.25 SOLUTION RESPIRATORY (INHALATION) at 10:50

## 2022-03-10 RX ADMIN — METHYLPREDNISOLONE SODIUM SUCCINATE 40 MG: 40 INJECTION, POWDER, FOR SOLUTION INTRAMUSCULAR; INTRAVENOUS at 08:09

## 2022-03-10 RX ADMIN — CARVEDILOL 25 MG: 25 TABLET, FILM COATED ORAL at 09:59

## 2022-03-10 RX ADMIN — IPRATROPIUM BROMIDE 0.5 MG: 0.5 SOLUTION RESPIRATORY (INHALATION) at 10:49

## 2022-03-10 RX ADMIN — SODIUM CHLORIDE, PRESERVATIVE FREE 10 ML: 5 INJECTION INTRAVENOUS at 20:41

## 2022-03-10 RX ADMIN — INSULIN LISPRO 2 UNITS: 100 INJECTION, SOLUTION INTRAVENOUS; SUBCUTANEOUS at 21:52

## 2022-03-10 RX ADMIN — IPRATROPIUM BROMIDE AND ALBUTEROL SULFATE 1 AMPULE: .5; 3 SOLUTION RESPIRATORY (INHALATION) at 08:04

## 2022-03-10 RX ADMIN — POLYETHYLENE GLYCOL 3350 17 G: 17 POWDER, FOR SOLUTION ORAL at 21:34

## 2022-03-10 RX ADMIN — FERROUS SULFATE TAB 325 MG (65 MG ELEMENTAL FE) 325 MG: 325 (65 FE) TAB at 20:40

## 2022-03-10 ASSESSMENT — ENCOUNTER SYMPTOMS
NAUSEA: 0
COUGH: 0
SHORTNESS OF BREATH: 1
ABDOMINAL PAIN: 0
VOMITING: 0
COLOR CHANGE: 0

## 2022-03-10 ASSESSMENT — PAIN DESCRIPTION - FREQUENCY: FREQUENCY: CONTINUOUS

## 2022-03-10 ASSESSMENT — PAIN DESCRIPTION - ORIENTATION: ORIENTATION: LEFT

## 2022-03-10 ASSESSMENT — PAIN SCALES - GENERAL
PAINLEVEL_OUTOF10: 0

## 2022-03-10 ASSESSMENT — PAIN DESCRIPTION - PAIN TYPE: TYPE: CHRONIC PAIN

## 2022-03-10 ASSESSMENT — PAIN DESCRIPTION - PROGRESSION
CLINICAL_PROGRESSION: NOT CHANGED
CLINICAL_PROGRESSION: NOT CHANGED

## 2022-03-10 NOTE — PROGRESS NOTES
Rapid response called for increased lethargy  Pt on BIPAP at 65 FiO2 and 97%. Unable to follow commands or answer questions. . Very diaphoretic. Transferred to [phone]. Wife called, voicemail left.

## 2022-03-10 NOTE — CARE COORDINATION
Discharge Planning Assessment    RN discharge planner met with patient/ (and family member) to discuss reason for admission, current living situation, and potential needs at the time of discharge    Demographics/Insurance verified Yes    Current type of dwelling: split level home with 6 steps between each level, 5 entry stairs    Patient from ECF/SW confirmed with:n/a    Living arrangements:with wife    Level of function/Support:independent    PCP:[facility]    Last Visit to PCP: 2/18/2022    DME: BiPAP, oxygen, nebulizer from [employer]    Active with any community resources/agencies/skilled home care: no skilled or non skilled services    Medication compliance issues: uses [employer] Insurance issues that could impact healthcare: Ramez Bacon    Tentative discharge plan: home    *Discussed and provided facilities of choice if transition to a skilled nursing facility is required at the time of discharge      Discussed with patient and/or family that on the day of discharge home tentative time of discharge will be between 10 AM and noon.     Transportation at the time of discharge: FLAKO SilverN, CCM, RN  [facility]  [phone]

## 2022-03-10 NOTE — ED NOTES
Pt removing BiPap and states he is uncomfortable. Respiratory paged. Importance of BiPap education provided.      Claudette Lopez RN  03/10/22 7657

## 2022-03-10 NOTE — PLAN OF CARE
Problem: Falls - Risk of:  Goal: Will remain free from falls  Description: Will remain free from falls  Outcome: Ongoing  Goal: Absence of physical injury  Description: Absence of physical injury  Outcome: Ongoing     Problem: Discharge Planning:  Goal: Discharged to appropriate level of care  Description: Discharged to appropriate level of care  Outcome: Ongoing     Problem: Airway Clearance - Ineffective:  Goal: Ability to maintain a clear airway will improve  Description: Ability to maintain a clear airway will improve  Outcome: Ongoing     Problem: Breathing Pattern - Ineffective:  Goal: Ability to achieve and maintain a regular respiratory rate will improve  Description: Ability to achieve and maintain a regular respiratory rate will improve  Outcome: Ongoing     Problem: Gas Exchange - Impaired:  Goal: Levels of oxygenation will improve  Description: Levels of oxygenation will improve  Outcome: Ongoing

## 2022-03-10 NOTE — H&P
_    [facility] HOSPITALIST HISTORY AND PHYSICAL/CONSULT    3/10/2022 5:03 AM    Patient Information:  Gregg Astudillo is a 65 y.o. male [id number]    PCP:  Ebenezer Ndiaye MD (Tel: [phone] )    Date of Service:   Pt seen/examined on 3/10/2022   Placed in Observation. Chief complaint:    Chief Complaint   Patient presents with    Shortness of Breath     Pt started wheezing about 1 hour ago. Pt has used albuterol inhaler twice        History of Present Illness:  Gulshan Alexander is a 65 y.o. male who presented with   · SOB  @ Home X 1-2 days . He was just recently d/earle from [facility] when he was admitted for COPD and ? CHF and was getting RX for same . His wife provides most of Hx . Patient starting feeling worse again yesterday and has been using his RX @ Home but not feeling well . He is on Home O2 as well . He does use BIPAP @ Home as well. · D/t SOB @ Home , EMS called . He was BIBA to ED . Noted to be hypoxemic and having Resp distress on ED arrival .       History and pertinent information obtained from   · ED provider   · Prior Chart    · Patient        Notable/Significant ED Findings/VItals :   · HTN   · Hypoxemia +   · Tachy HR   · Tachypnea +        While in ED  · Patient care Given. · Appropriate Monitoring done. · Pertinent Labs done. See [facility] for details   · Pertinent Acute issues identified and managed . See Epic for details  · Pertinent consults called and case d/w them by ED Provider . See Epic for details. · Imaging done in ED noted and reviewed. While in ED, Indicated/Pertinent Medications/Care given as below      · ED Chart reviewed. · Medications reviewed w/c were give in ED . See [facility] for details on medications and orders  · IV steroids   · Nebs   · BIPAP started in ED for inc WOB and REsp distress       · Imaging/Labs/Work up done in ED reviewed and their interpretation/active and acute issues, as mentioned below in Assessment and Plan.        Currently, on my HFA) 108 (90 Base) MCG/ACT inhaler Inhale 2 puffs into the lungs 4 times daily as needed for Wheezing 54 g 1    acetaminophen (TYLENOL) 500 MG tablet Take 1 tablet by mouth 4 times daily as needed for Pain 360 tablet 1    warfarin (JANTOVEN) 5 MG tablet TAKE 1 AND 1/2 TABLETS BY MOUTH ONE TIME A DAY OR AS DIRECTED BY [facility] ([phone]) (Patient taking differently: Take 7.5 mg by mouth daily Except 10mg every Wednesday and Saturday OR AS DIRECTED BY [facility] ([phone])) 45 tablet 0    NIFEdipine (ADALAT CC) 60 MG extended release tablet Take 1 tablet by mouth daily 90 tablet 0    carvedilol (COREG) 25 MG tablet Take 1 tablet by mouth 2 times daily (with meals) 60 tablet 3    metFORMIN (GLUCOPHAGE) 500 MG tablet Take 1 tablet by mouth 2 times daily (with meals) 180 tablet 0    fluticasone (FLONASE) 50 MCG/ACT nasal spray 1 spray by Nasal route daily 9.9 g 5    nicotine (NICODERM CQ) 21 MG/24HR Place 1 patch onto the skin every 24 hours 30 patch 2    blood glucose monitor kit and supplies Dispense sufficient amount for indicated testing frequency plus additional to accommodate PRN testing needs. Dispense all needed supplies to include: monitor, strips, lancing device, lancets, control solutions, alcohol swabs. 1 kit 0    Lancets MISC 1 each by Does not apply route daily Test 2 times daily while on PREDNISONE then 1 time daily & as needed for symptoms of irregular blood sugar 100 each 3    blood glucose monitor strips Test 2 times a day while on PREDNISONE, then 1 time daily & as needed for symptoms of irregular blood glucose. Dispense sufficient amount for indicated testing frequency plus additional to accommodate PRN testing needs.  100 strip 3    ferrous sulfate (IRON 325) 325 (65 Fe) MG tablet Take 1 tablet by mouth 2 times daily 180 tablet 0    tadalafil (CIALIS) 5 MG tablet TAKE 1 TABLET BY MOUTH ONE TIME A DAY AS NEEDED FOR ERECTILE DYSFUNCTION 30 tablet 5  Vitamins/Minerals TABS Take 1 tablet by mouth daily           Allergies: Allergies   Allergen Reactions    Chantix [Varenicline]      Hallucinations            Social History:   reports that he quit smoking about 2 months ago. His smoking use included cigarettes. He started smoking about 44 years ago. He has a 10.00 pack-year smoking history. He has never used smokeless tobacco. He reports current alcohol use of about 6.0 standard drinks of alcohol per week. He reports that he does not use drugs. Family History:            Problem Relation Age of Onset    Cancer Mother     Lung Cancer Mother     Diabetes Father     Stroke Father            Physical Exam:  BP (!) 176/88   Pulse 104   Temp 97.8 °F (36.6 °C) (Oral)   Resp 29   SpO2 94%     General appearance:  Ill appearing . Weak    Eyes:  Sclera clear, pupils equal  ENT:  Dry  mucus membranes, Trachea midline. Cardiovascular: Regular rhythm, normal S1, S2.  LE edema in lower extremities   Respiratory:  Noted Dec AE B/ L . Gastrointestinal:  Abdomen soft,  non-tender,  not distended,   Musculoskeletal:  No cyanosis in digits, neck supple  Neurology:  Cranial nerves grossly intact. Alert and oriented in time, place and person. No speech or motor deficits   Psychiatry:  Appropriate affect.  Not agitated  Skin:  Warm, dry, normal turgor, no rash       Labs:     Recent Labs     03/07/22  1023 03/10/22  0312   WBC 6.1 7.5   HGB 14.2 13.8   HCT 44.4 43.1    303     Recent Labs     03/07/22  1023 03/10/22  0312    136   K 4.4 4.7   CL 96* 95*   CO2 36* 31   BUN 18 17   CREATININE 0.6* 0.8   CALCIUM 10.4 9.7   PHOS 2.9  --      Recent Labs     03/07/22  1023 03/10/22  0312   AST 19 23   ALT 22 22   BILITOT <0.2 <0.2   ALKPHOS 92 93     Recent Labs     03/07/22  1023 03/10/22  0312   INR 2.93* 3.33*     Recent Labs     03/10/22  0312   TROPONINI <0.01         Urinalysis:      Lab Results   Component Value Date    NITRU Negative 02/02/2022 WBCUA 1 02/02/2022    RBCUA 1 02/02/2022    BLOODU Negative 02/02/2022    SPECGRAV 1.021 02/02/2022    GLUCOSEU Negative 02/02/2022         Radiology:     CXR:   I have reviewed the CXR  Cardiomegaly and PUlm congestion     EKG/Telemonitor :    I have reviewed the EKG  ST   PACs   Incomplete RBBB     IMAGING :     XR CHEST PORTABLE   Final Result   Cardiomegaly with central vascular congestion and mild perihilar edema. PROBLEM LIST [ ACTIVE + CHRONIC ]     Active Hospital Problems    Diagnosis Date Noted    COPD exacerbation ([facility] [J44.1] 03/06/2022    Type 2 diabetes mellitus with circulatory disorder, without long-term current use of insulin ([facility] [E11.59] 02/18/2022    COPD (chronic obstructive pulmonary disease) ([facility] [J44.9] 02/01/2022    Centrilobular emphysema ([facility] [J43.2]     Peripheral artery disease ([facility] [I73.9] 05/17/2021    Peripheral vascular disease ([facility] [I73.9] 01/09/2017    Essential hypertension [I10] 03/09/2012           ACUTE & CHRONIC MEDICAL ISSUES, POA/PRESENT ON ADMISSION      · SOB 2/2 AE COPD POA   · SOB + Cardiomegaly and Pulmonary edema 2/2 CHFpEF exacerbation   · Acute on chronic hypoxemic and Hypercapnic  Resp failure 2.2 above   · HTN Hx   · DVT Hx   · PAD/PVD hx   · DM Hx   · LE wounds . He Follows weekly with Dr Thierry Lopez at the wound clinic. He also follows with Dr David Cantrell for possible re vascularization. PERTINENT PLAN/ORDERS FOR ACTIVE MEDICAL ISSUES     · Medication received in ED and workup in ED so far Noted and reviewed as above. · Home medications for chronic medical problems Reviewed and Held/Resumed/Changes made, as clinically warranted/Indicated. · Is Started on Systemic steroids for COPD Rx   · Nebs PRN/JULIANA while in patient. · Encourage IS.  Accapella PRN  · Lasix IV QD for LE edema and diuresis needs   · I/Os in house   · Daily weights in house   · Insulin Rx for DM Mgt in house   · PULM c/s planned in AM              · Please See EPIC

## 2022-03-10 NOTE — ED NOTES
Pt placed on 5L NC. PT usually on 3L at home and presented to ER with no oxygen on.  Respiratory on the way to room      Romana Gilding, RN  03/10/22 1583

## 2022-03-10 NOTE — ED PROVIDER NOTES
Emergency Department Provider Note  Location: [address]  3/10/2022     Patient Identification  Honey Padron is a 65 y.o. male    Chief Complaint  Shortness of Breath (Pt started wheezing about 1 hour ago. Pt has used albuterol inhaler twice )      Mode of Arrival  private car    HPI  (History provided by patient and spouse/SO)  This is a 65 y.o. male with a PMH significant for COPD, DM, HTN presented today for SOB that started 1 hour ago. Patient was admitted on March 5 for COPD exacerbation, acute respiratory failure. He was discharged on March 7. Patient said he was doing fine until about an hour ago when he started having severe shortness of breath. He denies fever, cough, sinus pressure, nasal congestion. He also denies chest pain. He has chronic lower leg edema secondary to peripheral vascular disease. He states the leg edema is not worse. Patient has both inhaler and nebulizer at home. He states has used both without relief of his shortness of breath. ROS  Review of Systems   Constitutional: Negative for chills and fever. HENT: Negative for congestion. Eyes: Negative for visual disturbance. Respiratory: Positive for shortness of breath. Negative for cough. Cardiovascular: Positive for leg swelling (chronic). Negative for chest pain. Gastrointestinal: Negative for abdominal pain, nausea and vomiting. Musculoskeletal: Negative for neck stiffness. Skin: Negative for color change. Neurological: Negative for syncope and headaches. Hematological: Bruises/bleeds easily (due to coumadin). I have reviewed the following nursing documentation:  Allergies:    Allergies   Allergen Reactions    Chantix [Varenicline]      Hallucinations         Past medical history:  has a past medical history of Diabetes mellitus ([address] Fibromyalgia, History of DVT (deep vein thrombosis), Hyperlipidemia, Hypertension, and Type 2 diabetes mellitus with circulatory disorder, without long-term current use of insulin ([facility] [address] (2/18/2022). COPD    Past surgical history:  has a past surgical history that includes Colonoscopy (2016); Appendectomy (2005); other surgical history (Right, 12/08/2020); femoral bypass (Left, 02/26/2020); Femoral-tibial Bypass Graft (Right, 12/11/2020); Foot Debridement (Left, 09/03/2020); and Foot Debridement (Right, 2/26/2021). Home medications:   Prior to Admission medications    Medication Sig Start Date End Date Taking? Authorizing Provider   ipratropium-albuterol (DUONEB) 0.5-2.5 (3) MG/3ML SOLN nebulizer solution Inhale 3 mLs into the lungs 4 times daily 3/7/22   HOLGER Chu CNP   furosemide (LASIX) 20 MG tablet Take 1 tablet by mouth daily 3/7/22   HOLGER Chu CNP   guaiFENesin (MUCINEX) 600 MG extended release tablet Take 1,200 mg by mouth 2 times daily    Historical Provider, MD   atorvastatin (LIPITOR) 80 MG tablet Take 1 tablet by mouth daily Cancel atorvastatin 20 mg a day 2/18/22   Ata Almazan MD   Fluticasone-Umeclidin-Vilant (TRELEGY ELLIPTA) 200-62.5-25 MCG/INH AEPB Inhale 1 puff into the lungs 2 times daily  Patient taking differently: Inhale 1 puff into the lungs daily  2/18/22   Ata Almazan MD   gentamicin (GARAMYCIN) 0.1 % cream Apply topically   daily.  2/11/22   Emiliana Wilkinson DPM   albuterol sulfate HFA (VENTOLIN HFA) 108 (90 Base) MCG/ACT inhaler Inhale 2 puffs into the lungs 4 times daily as needed for Wheezing 1/31/22   Anastasia Carlos MD   acetaminophen (TYLENOL) 500 MG tablet Take 1 tablet by mouth 4 times daily as needed for Pain 1/10/22   Anastasia Carlos MD   warfarin (JANTOVEN) 5 MG tablet TAKE 1 AND 1/2 TABLETS BY MOUTH ONE TIME A DAY OR AS DIRECTED BY [facility] ([phone])  Patient taking differently: Take 7.5 mg by mouth daily Except 10mg every Wednesday and Saturday OR AS DIRECTED BY [facility] ([phone]) 1/10/22   Anastasia Carlos MD NIFEdipine (ADALAT CC) 60 MG extended release tablet Take 1 tablet by mouth daily 1/10/22   Dennis Liu MD   carvedilol (COREG) 25 MG tablet Take 1 tablet by mouth 2 times daily (with meals) 1/10/22   Dennis iLu MD   metFORMIN (GLUCOPHAGE) 500 MG tablet Take 1 tablet by mouth 2 times daily (with meals) 1/10/22   Dennis Liu MD   fluticasone [facility]) 50 MCG/ACT nasal spray 1 spray by Nasal route daily 1/6/22 1/6/23  Dennis Liu MD   nicotine (NICODERM CQ) 21 MG/24HR Place 1 patch onto the skin every 24 hours 1/4/22   Saturnino Leblanc MD   blood glucose monitor kit and supplies Dispense sufficient amount for indicated testing frequency plus additional to accommodate PRN testing needs. Dispense all needed supplies to include: monitor, strips, lancing device, lancets, control solutions, alcohol swabs. 1/4/22   Saturnino Leblanc MD   Lancets MISC 1 each by Does not apply route daily Test 2 times daily while on PREDNISONE then 1 time daily & as needed for symptoms of irregular blood sugar 1/4/22   Saturnino Leblanc MD   blood glucose monitor strips Test 2 times a day while on PREDNISONE, then 1 time daily & as needed for symptoms of irregular blood glucose. Dispense sufficient amount for indicated testing frequency plus additional to accommodate PRN testing needs. 1/4/22   Saturnino Leblanc MD   ferrous sulfate (IRON 325) 325 (65 Fe) MG tablet Take 1 tablet by mouth 2 times daily 9/7/21   Dennis Liu MD   tadalafil (CIALIS) 5 MG tablet TAKE 1 TABLET BY MOUTH ONE TIME A DAY AS NEEDED FOR ERECTILE DYSFUNCTION 8/16/21   Dennis Liu MD   Vitamins/Minerals TABS Take 1 tablet by mouth daily    Historical Provider, MD       Social history:  reports that he quit smoking about 2 months ago. His smoking use included cigarettes. He started smoking about 44 years ago. He has a 10.00 pack-year smoking history. He has never used smokeless tobacco. He reports current alcohol use of about 6.0 standard drinks of alcohol per week.  He reports that he does not use drugs. Family history:    Family History   Problem Relation Age of Onset    Cancer Mother     Lung Cancer Mother     Diabetes Father     Stroke Father        Exam  ED Triage Vitals [03/10/22 0302]   BP Temp Temp Source Pulse Resp SpO2 Height Weight   (!) 153/95 97.8 °F (36.6 °C) Oral 73 30 (!) 88 % -- --   Physical Exam  Vitals and nursing note reviewed. Constitutional:       General: He is in acute distress. Appearance: He is well-developed. He is not diaphoretic. HENT:      Head: Normocephalic and atraumatic. Eyes:      General: No scleral icterus. Right eye: No discharge. Left eye: No discharge. Conjunctiva/sclera: Conjunctivae normal.   Neck:      Trachea: No tracheal deviation. Cardiovascular:      Rate and Rhythm: Normal rate and regular rhythm. Heart sounds: Normal heart sounds. No murmur heard. Pulmonary:      Effort: Tachypnea, accessory muscle usage and respiratory distress present. Breath sounds: No stridor. Wheezing present. Abdominal:      General: There is no distension. Palpations: Abdomen is soft. Tenderness: There is no abdominal tenderness. There is no guarding or rebound. Musculoskeletal:         General: No deformity. Cervical back: Neck supple. Right lower leg: Edema present. Left lower leg: Edema present. Skin:     General: Skin is warm and dry. Findings: No erythema or rash. Neurological:      Mental Status: He is alert and oriented to person, place, and time. Cranial Nerves: No dysarthria or facial asymmetry. Motor: Motor function is intact. No tremor. Psychiatric:         Mood and Affect: Mood normal.         Behavior: Behavior is cooperative.          MDM/ED Course  ED Medication Orders (From admission, onward)    Start Ordered     Status Ordering Provider    03/10/22 0345 03/10/22 0330  ipratropium-albuterol (DUONEB) nebulizer solution 1 ampule  ONCE Question:  Initiate RT Bronchodilator Protocol  Answer:  Yes    Last MAR action: Given - by HARSHAD ROBERSON on 03/10/22 at [facility], Diane Singh M    03/10/22 0315 03/10/22 0311  methylPREDNISolone sodium (SOLU-MEDROL) injection 125 mg  ONCE         Last MAR action: Given - by Juan Pablo Blanco on 03/10/22 at [address]           EKG  The Ekg interpreted by me in the absence of a cardiologist shows. sinus tachycardia, [id number]   Frequent PVC noted  Axis is   Left axis deviation  QTc is  within an acceptable range  Intervals and Durations are unremarkable. No specific ST-T wave changes appreciated. No evidence of acute ischemia. No significant change from prior EKG dated 3/5/22 (patient was tachycardic with )      Radiology  XR CHEST PORTABLE    Result Date: 3/10/2022  EXAMINATION: ONE XRAY VIEW OF THE CHEST 3/10/2022 3:38 am COMPARISON: 03/05/2022 HISTORY: ORDERING SYSTEM PROVIDED HISTORY: SOB TECHNOLOGIST PROVIDED HISTORY: Reason for exam:->SOB Reason for Exam: Shortness of Breath Relevant Medical/Surgical History: previous emphysema,COPD, hyperlipidemia and hypertension FINDINGS: Cardiomegaly. Vascular congestion. Mild increased interstitial change seen. No pneumothorax is identified. No acute osseous abnormality is identified. Cardiomegaly with central vascular congestion and mild perihilar edema.          Labs  Results for orders placed or performed during the hospital encounter of 03/10/22   CBC with Auto Differential   Result Value Ref Range    WBC 7.5 4.0 - 11.0 K/uL    RBC 4.90 4.20 - 5.90 M/uL    Hemoglobin 13.8 13.5 - 17.5 g/dL    Hematocrit 43.1 40.5 - 52.5 %    MCV 87.9 80.0 - 100.0 fL    MCH 28.1 26.0 - 34.0 pg    MCHC 32.0 31.0 - 36.0 g/dL    RDW 15.6 (H) 12.4 - 15.4 %    Platelets 107 173 - 962 K/uL    MPV 7.6 5.0 - 10.5 fL    Neutrophils % 68.2 %    Lymphocytes % 20.9 %    Monocytes % 9.9 %    Eosinophils % 0.3 %    Basophils % 0.7 %    Neutrophils Absolute 5.1 1.7 - 7.7 K/uL Lymphocytes Absolute 1.6 1.0 - 5.1 K/uL    Monocytes Absolute 0.7 0.0 - 1.3 K/uL    Eosinophils Absolute 0.0 0.0 - 0.6 K/uL    Basophils Absolute 0.0 0.0 - 0.2 K/uL   Comprehensive Metabolic Panel w/ Reflex to MG   Result Value Ref Range    Sodium 136 136 - 145 mmol/L    Potassium reflex Magnesium 4.7 3.5 - 5.1 mmol/L    Chloride 95 (L) 99 - 110 mmol/L    CO2 31 21 - 32 mmol/L    Anion Gap 10 3 - 16    Glucose 152 (H) 70 - 99 mg/dL    BUN 17 7 - 20 mg/dL    CREATININE 0.8 0.8 - 1.3 mg/dL    GFR Non-African American >60 >60    GFR African American >60 >60    Calcium 9.7 8.3 - 10.6 mg/dL    Total Protein 6.8 6.4 - 8.2 g/dL    Albumin 4.1 3.4 - 5.0 g/dL    Albumin/Globulin Ratio 1.5 1.1 - 2.2    Total Bilirubin <0.2 0.0 - 1.0 mg/dL    Alkaline Phosphatase 93 40 - 129 U/L    ALT 22 10 - 40 U/L    AST 23 15 - 37 U/L   Troponin   Result Value Ref Range    Troponin <0.01 <0.01 ng/mL   Blood gas, venous   Result Value Ref Range    pH, Mele 7.327 (L) 7.350 - 7.450    pCO2, Mele 63.7 (H) 40.0 - 50.0 mmHg    pO2, Mele 49.0 (H) 25.0 - 40.0 mmHg    HCO3, Venous 33.4 (H) 23.0 - 29.0 mmol/L    Base Excess, Mele 5.2 (H) -3.0 - 3.0 mmol/L    O2 Sat, Mele 81 Not Established %    Carboxyhemoglobin 3.7 (H) 0.0 - 1.5 %    MetHgb, Mele 0.0 <1.5 %    TC02 (Calc), Mele 79 Not Established mmol/L    O2 Content, Mele 16 Not Established VOL %    O2 Therapy Unknown     Hemoglobin, Mele, Reduced 18 %   Protime-INR   Result Value Ref Range    Protime 39.6 (H) 9.9 - 12.7 sec    INR 3.33 (H) 0.88 - 1.12   Brain Natriuretic Peptide   Result Value Ref Range    Pro- (H) 0 - 124 pg/mL       - Patient seen and evaluated in room 2.  65 y.o. male presented for SOB  - Exam showed 65 y.o. M in severe respiratory distress with accessory muscle use and tripoding. Due to severe work of breathing and that was afraid patient would go into respiratory failure from muscle fatigue, I order BiPAP upfront.   Patient's work of breathing improved after an hour BiPAP and no more accessory muscle use on repeat exam.  However, he was still requiring the BiPAP to help maintain O2 sat. When he took it off briefly, he O2 sat dropped. - Patient was placed on telemetry during his/her ED stay and no malignant dysrhythmia observed. - Pertinent old records reviewed. Patient had echocardiogram done last week and it showed normal EF. He was discharged with 20 mg Lasix daily due to pulmonary edema seen on his checks x-ray during last admission.  - Patient was given solumedrol and duoneb in the ED.     - Diagnostic studies reviewed. - CXR showed cardiomegaly with vascular congestion.   - Lab significant for VBG showed respiratory acidosis. BNP improved compare to prior. Patient is therapeutic with his INR. No acute renal failure or significant electrolyte derangement. No elevated WBC or anemia. - I discussed the results with patient and spouse/SO. We agreed to admission due to acute respiratory failure. I spoke with Dr. Christie Zuniga, hospitalist. We thoroughly discussed the history, physical exam, laboratory and imaging studies, as well as, emergency department course. Based upon that discussion, we've decided to admit Rosanne Martines for further observation and evaluation of Kim Gibson's dyspnea. As I have deemed necessary from their history, physical, and studies, I have considered and evaluated Rosanne Martines for the following diagnoses:  ACUTE CORONARY SYNDROME, CHRONIC OBSTRUCTIVE PULMONARY DISEASE, CONGESTIVE HEART FAILURE, PERICARDIAL TAMPONADE, PNEUMONIA, PNEUMOTHORAX, PULMONARY EMBOLISM, SEPSIS, and THORACIC DISSECTION. Clinical Impression:  1. COPD exacerbation ([address]    2. Acute respiratory failure with hypoxia (HCC)          Disposition:  Admit to telemetry in stable condition. Blood pressure (!) 156/84, pulse 104, temperature 97.5 °F (36.4 °C), temperature source Oral, resp. rate 20, height 5' 8\" (1.727 m), SpO2 97 %.     Total critical care time is 35-40 minutes, which excludes separately billable procedures and updating family. Time spent is specifically for management of the presenting complaint and symptoms initially, direct bedside care, reevaluation, review of records, and consultation. There was a high probability of clinically significant life-threatening deterioration in the patient's condition, which required my urgent intervention. This chart was generated in part by using Dragon Dictation system and may contain errors related to that system including errors in grammar, punctuation, and spelling, as well as words and phrases that may be inappropriate. If there are any questions or concerns please feel free to contact the dictating provider for clarification.      Justyna Cabrera MD  [facility] Adam Jensen MD  03/10/22 [id number]

## 2022-03-10 NOTE — CONSULTS
Clinical Pharmacy Note: Pharmacy to Dose Warfarin    Pharmacy consulted by Dr. Kj Owen to dose warfarin. Samantha Mitchell is a 65 y.o. male  is receiving warfarin for indication: history of dvt and PAD    INR Goal Range: 2-3    Prior to admission warfarin dosing regimen: by [facility] at [facility].  Warfarin 10mg every Wed & Sat and Warfarin 7.5 mg all other days. Lab Results   Component Value Date    INR 3.33 03/10/2022     INR (no units)   Date Value   03/10/2022 3.33 (H)   03/07/2022 2.93 (H)   03/06/2022 2.96 (H)       Assessment/Plan:   INR is supra therapeutic.  Based on today's assessment, HOLD warfarin.  Warfarin placeholder in Epic.  Daily INR is ordered. Pharmacy will continue to monitor and make adjustments to regimen as necessary. Thank you for allowing pharmacy to participate in the care of this patient.   Vicky Moreno, PharmD

## 2022-03-10 NOTE — CONSULTS
COLONOSCOPY  2016    FEMORAL BYPASS Left 2020    femoral posterior tibial bypass    FEMORAL-TIBIAL BYPASS GRAFT Right 2020    with femoral endarterectomy and patch angioplasty    FOOT DEBRIDEMENT Left 2020    FOOT DEBRIDEMENT Right 2021    DEBRIDEMENT OF RIGHT FOOT WOUND WITH GRAFT APPLICATION performed by Lennie Almendarez DPM at [facility] Right 2020    stent leg for PVD       SOCIAL HISTORY:   Social History     Tobacco Use    Smoking status: Former Smoker     Packs/day: 0.50     Years: 20.00     Pack years: 10.00     Types: Cigarettes     Start date: 1977     Quit date: 2021     Years since quittin.2    Smokeless tobacco: Never Used   Vaping Use    Vaping Use: Never used   Substance Use Topics    Alcohol use: Yes     Alcohol/week: 6.0 standard drinks     Types: 6 Cans of beer per week    Drug use: Never       FAMILY HISTORY:   Family History   Problem Relation Age of Onset    Cancer Mother     Lung Cancer Mother     Diabetes Father     Stroke Father        MEDICATIONS:     No current facility-administered medications on file prior to encounter. Current Outpatient Medications on File Prior to Encounter   Medication Sig Dispense Refill    furosemide (LASIX) 20 MG tablet Take 1 tablet by mouth daily 60 tablet 3    guaiFENesin (MUCINEX) 600 MG extended release tablet Take 1,200 mg by mouth 2 times daily      atorvastatin (LIPITOR) 80 MG tablet Take 1 tablet by mouth daily Cancel atorvastatin 20 mg a day 90 tablet 0    Fluticasone-Umeclidin-Vilant (TRELEGY ELLIPTA) 200-62.5-25 MCG/INH AEPB Inhale 1 puff into the lungs 2 times daily (Patient taking differently: Inhale 1 puff into the lungs daily ) 1 each 2    gentamicin (GARAMYCIN) 0.1 % cream Apply topically   daily.  30 g 1    albuterol sulfate HFA (VENTOLIN HFA) 108 (90 Base) MCG/ACT inhaler Inhale 2 puffs into the lungs 4 times daily as needed for Wheezing 54 g 1    acetaminophen (TYLENOL) 500 MG tablet Take 1 tablet by mouth 4 times daily as needed for Pain 360 tablet 1    warfarin (JANTOVEN) 5 MG tablet TAKE 1 AND 1/2 TABLETS BY MOUTH ONE TIME A DAY OR AS DIRECTED BY [facility] ([phone]) (Patient taking differently: Take 7.5 mg by mouth daily Except 10mg every Wednesday and Saturday OR AS DIRECTED BY [facility] ([phone])) 45 tablet 0    NIFEdipine (ADALAT CC) 60 MG extended release tablet Take 1 tablet by mouth daily 90 tablet 0    carvedilol (COREG) 25 MG tablet Take 1 tablet by mouth 2 times daily (with meals) 60 tablet 3    metFORMIN (GLUCOPHAGE) 500 MG tablet Take 1 tablet by mouth 2 times daily (with meals) 180 tablet 0    fluticasone (FLONASE) 50 MCG/ACT nasal spray 1 spray by Nasal route daily 9.9 g 5    nicotine (NICODERM CQ) 21 MG/24HR Place 1 patch onto the skin every 24 hours 30 patch 2    ferrous sulfate (IRON 325) 325 (65 Fe) MG tablet Take 1 tablet by mouth 2 times daily 180 tablet 0    tadalafil (CIALIS) 5 MG tablet TAKE 1 TABLET BY MOUTH ONE TIME A DAY AS NEEDED FOR ERECTILE DYSFUNCTION 30 tablet 5    Vitamins/Minerals TABS Take 1 tablet by mouth daily      ipratropium-albuterol (DUONEB) 0.5-2.5 (3) MG/3ML SOLN nebulizer solution Inhale 3 mLs into the lungs 4 times daily 360 mL 3    blood glucose monitor kit and supplies Dispense sufficient amount for indicated testing frequency plus additional to accommodate PRN testing needs. Dispense all needed supplies to include: monitor, strips, lancing device, lancets, control solutions, alcohol swabs. 1 kit 0    Lancets MISC 1 each by Does not apply route daily Test 2 times daily while on PREDNISONE then 1 time daily & as needed for symptoms of irregular blood sugar 100 each 3    blood glucose monitor strips Test 2 times a day while on PREDNISONE, then 1 time daily & as needed for symptoms of irregular blood glucose.  Dispense sufficient amount for indicated testing frequency plus additional to accommodate PRN testing needs. 100 strip 3        atorvastatin  80 mg Oral Daily    carvedilol  25 mg Oral BID WC    ferrous sulfate  325 mg Oral BID    NIFEdipine  60 mg Oral Daily    sodium chloride flush  5-40 mL IntraVENous 2 times per day    nicotine  1 patch TransDERmal Daily    methylPREDNISolone  40 mg IntraVENous Q6H    Followed by   Rhina Alarcon ON 3/12/2022] predniSONE  40 mg Oral Daily    furosemide  40 mg IntraVENous Daily    insulin glargine  0.25 Units/kg SubCUTAneous Nightly    insulin lispro  0-12 Units SubCUTAneous TID     insulin lispro  0-6 Units SubCUTAneous Nightly    warfarin placeholder: dosing by pharmacy   Other RX Placeholder    levalbuterol  1.25 mg Nebulization 4x daily    ipratropium  0.5 mg Nebulization 4x daily      sodium chloride      dextrose       sodium chloride flush, sodium chloride, ondansetron **OR** ondansetron, polyethylene glycol, acetaminophen **OR** acetaminophen, aluminum & magnesium hydroxide-simethicone, albuterol, glucose, dextrose, glucagon (rDNA), dextrose, levalbuterol      ALLERGIES:   Allergies as of 03/10/2022 - Fully Reviewed 03/10/2022   Allergen Reaction Noted    Chantix [varenicline] Other (See Comments) 03/08/2021      OBJECTIVE:   height is 5' 8\" (1.727 m) and weight is 155 lb 3.3 oz (70.4 kg). His temporal temperature is 97.4 °F (36.3 °C). His blood pressure is 121/78 and his pulse is 98. His respiration is 22 and oxygen saturation is 100%. I/O this shift:  In: -   Out: 500 [Urine:500]     PHYSICAL EXAM:    CONSTITUTIONAL: He is a 65y.o.-year-old who appears his stated age. He is somnolent but arousable with verbal stimuli. HEENT: PERRLA, EOMI. No scleral icterus. No thrush, atraumatic, normocephalic. Oropharyngeal erythema seen. NECK: Supple, without cervical or supraclavicular lymphadenopathy:  CARDIOVASCULAR: S1 S2 RRR.  Without murmer  RESPIRATORY & CHEST: Bilateral scattered wheezing heard.  GASTROINTESTINAL & ABDOMEN: Soft, nontender, positive bowel sounds in all quadrants, non-distended, without hepatosplenomegaly. GENITOURINARY: Deferred. MUSCULOSKELETAL: No tenderness to palpation of the axial skeleton. There is no clubbing. No cyanosis. No edema of the lower extremities. SKIN OF BODY: No rash or jaundice. PSYCHIATRIC EVALUATION: Normal affect. Patient answers questions appropriately. HEMATOLOGIC/LYMPHATIC/ IMMUNOLOGIC: No palpable lymphadenopathy. NEUROLOGIC: Alert and oriented x 3. Groslly non-focal. Motor strength is 5+/5 in all muscle groups. The patient has a normal sensorium globally.       LABS:  Recent Labs     03/10/22  0312 03/10/22  0824   WBC 7.5  --    HGB 13.8 18.4*   HCT 43.1  --      --    ALT 22  --    AST 23  --      --    K 4.7  --    CL 95*  --    CREATININE 0.8  --    BUN 17  --    CO2 31  --    INR 3.33*  --        Recent Labs     03/10/22  0312   GLUCOSE 152*   CALCIUM 9.7      K 4.7   CO2 31   CL 95*   BUN 17   CREATININE 0.8       Recent Labs     03/10/22  0824 03/10/22  0937   PHART 7.195* 7.230*   GAJ2CPC 102.8* 90.4*   PO2ART 100.0 39.0*   UZK5XAH 39.7* 37.9*   X5LXIVIA 95 60*   BEART 12* 10*       Lab Results   Component Value Date    INR 3.33 (H) 03/10/2022    INR 2.93 (H) 03/07/2022    INR 2.96 (H) 03/06/2022    PROTIME 39.6 (H) 03/10/2022    PROTIME 34.6 (H) 03/07/2022    PROTIME 35.0 (H) 03/06/2022     No results found for: AMYLASE   Lab Results   Component Value Date    LABA1C 6.5 03/06/2022     Lab Results   Component Value Date    .9 03/06/2022     Lab Results   Component Value Date    TSH 2.45 03/29/2010     Lab Results   Component Value Date    CKTOTAL 81 02/01/2022    TROPONINI <0.01 03/10/2022      No results found for: CRP   No results found for: BNP   Lab Results   Component Value Date    DDIMER 880 (H) 12/31/2021      Lab Results   Component Value Date    FERRITIN 141.3 02/18/2022      No results found for: [address], Edyth Mcardle [place], [address]  3/10/2022, 1:09 PM      This note was completed using dragon medical speech recognition software. Grammatical errors, random word insertions, pronoun errors and incomplete sentences are occasional consequences of this technology due to software limitations. If there are questions or concerns about the content of this note of information contained within the body of this dictation they should be addressed with the provider for clarification.

## 2022-03-10 NOTE — PROGRESS NOTES
Rapid Response Quick Summary    Room: [address] [place] [address] [id number]    Assessment of concern / patient:  Less responsive     Physician involved: Dr Tono Stevenson     Interventions: Monitoring of vitals and oxygen levels, ABGs done with the epoc, bipap settings adjusted     Disposition: transferred to icu 5164

## 2022-03-10 NOTE — SIGNIFICANT EVENT
Called to rapid response as patient was less responsive. He was admitted with COPD exacerbation and was on BiPAP for several hours. Nursing staff attempted to arouse him and he was confused and responding less than when he arrived. Rapid response was called and upon arrival the patient was sitting up on BiPAP but poorly responsive. Blood gas was checked and his PCO2 came back at 102. Decision to move the patient to ICU was made with the thought of intubation. Shortly after the arrival at the rapid response the patient was able to follow commands better and was tolerating the BiPAP he began to pull larger volumes. We will move the patient to ICU  is his pulmonologist and has changed his BiPAP settings to AVAPS. We will repeat a gas in 1 hour if his PCO2 is still extremely elevated then we may have to intubate him. Holding off on intubation at this time. He was transitioned to the ICU where he was significantly improved in regard to his mental status. Blood gas however did show only slight improvement in his blood gas in regard to the PCO2. Seen and evaluated by pulmonary critical care who is also his outside pulmonologist.  Per pulmonary the patient is able to tolerate high levels of PCO2 and despite only slight improvement we have decided to continue him on noninvasive ventilation/AVAPS. Repeat ABG done later this afternoon showed an improvement in the PCO2 to approximately 75. Patient continues to have appropriate mentation and is not showing any signs of lethargy. We will continue the patient on current ventilation settings      HEENT is normocephalic and atraumatic extraocular movements are intact pupils equal round reactive light   Neck is supple trach is midline  Lungs are decreased air movement throughout there is some slight wheeze but no rales or rhonchi auscultated.   Cardiovascular exam is tachycardic there is no murmurs rubs or gallops auscultated  Abdomen is soft nontender nondistended  Extremities revealed no evidence of clubbing cyanosis or edema  Neurologically the patient is grossly intact and cranial nerves II through XII and his mentation has improved significantly since my prior evaluation this morning  Patient has good peripheral pulses palpable  He has appropriate capillary refill    Blood pressure that was initially in the 150s has come down to 123/87  Respiratory rate remains between 16 and 24  Patient has been able to come off of BiPAP this afternoon is currently on nasal cannula oxygen  Will need to wear BiPAP when he sleeps    In my different evaluations throughout the day approximately 40 minutes of critical care time was spent

## 2022-03-11 LAB
A/G RATIO: 1.5 (ref 1.1–2.2)
ALBUMIN SERPL-MCNC: 4 G/DL (ref 3.4–5)
ALP BLD-CCNC: 81 U/L (ref 40–129)
ALT SERPL-CCNC: 19 U/L (ref 10–40)
ANION GAP SERPL CALCULATED.3IONS-SCNC: 9 MMOL/L (ref 3–16)
AST SERPL-CCNC: 15 U/L (ref 15–37)
BASOPHILS ABSOLUTE: 0 K/UL (ref 0–0.2)
BASOPHILS RELATIVE PERCENT: 0.1 %
BILIRUB SERPL-MCNC: <0.2 MG/DL (ref 0–1)
BUN BLDV-MCNC: 24 MG/DL (ref 7–20)
CALCIUM SERPL-MCNC: 9.2 MG/DL (ref 8.3–10.6)
CHLORIDE BLD-SCNC: 96 MMOL/L (ref 99–110)
CO2: 34 MMOL/L (ref 21–32)
CREAT SERPL-MCNC: 0.7 MG/DL (ref 0.8–1.3)
EOSINOPHILS ABSOLUTE: 0 K/UL (ref 0–0.6)
EOSINOPHILS RELATIVE PERCENT: 0 %
GFR AFRICAN AMERICAN: >60
GFR NON-AFRICAN AMERICAN: >60
GLUCOSE BLD-MCNC: 114 MG/DL (ref 70–99)
GLUCOSE BLD-MCNC: 155 MG/DL (ref 70–99)
GLUCOSE BLD-MCNC: 170 MG/DL (ref 70–99)
GLUCOSE BLD-MCNC: 181 MG/DL (ref 70–99)
GLUCOSE BLD-MCNC: 185 MG/DL (ref 70–99)
HCT VFR BLD CALC: 41.1 % (ref 40.5–52.5)
HEMOGLOBIN: 13 G/DL (ref 13.5–17.5)
INR BLD: 2.68 (ref 0.88–1.12)
LYMPHOCYTES ABSOLUTE: 0.6 K/UL (ref 1–5.1)
LYMPHOCYTES RELATIVE PERCENT: 14 %
MCH RBC QN AUTO: 28.1 PG (ref 26–34)
MCHC RBC AUTO-ENTMCNC: 31.7 G/DL (ref 31–36)
MCV RBC AUTO: 88.8 FL (ref 80–100)
MONOCYTES ABSOLUTE: 0.1 K/UL (ref 0–1.3)
MONOCYTES RELATIVE PERCENT: 1.8 %
NEUTROPHILS ABSOLUTE: 3.8 K/UL (ref 1.7–7.7)
NEUTROPHILS RELATIVE PERCENT: 84.1 %
PDW BLD-RTO: 15.8 % (ref 12.4–15.4)
PERFORMED ON: ABNORMAL
PLATELET # BLD: 281 K/UL (ref 135–450)
PMV BLD AUTO: 7.5 FL (ref 5–10.5)
POTASSIUM REFLEX MAGNESIUM: 5 MMOL/L (ref 3.5–5.1)
PROTHROMBIN TIME: 31.5 SEC (ref 9.9–12.7)
RBC # BLD: 4.62 M/UL (ref 4.2–5.9)
SODIUM BLD-SCNC: 139 MMOL/L (ref 136–145)
TOTAL PROTEIN: 6.7 G/DL (ref 6.4–8.2)
WBC # BLD: 4.5 K/UL (ref 4–11)

## 2022-03-11 PROCEDURE — 85025 COMPLETE CBC W/AUTO DIFF WBC: CPT

## 2022-03-11 PROCEDURE — 6370000000 HC RX 637 (ALT 250 FOR IP): Performed by: INTERNAL MEDICINE

## 2022-03-11 PROCEDURE — 2700000000 HC OXYGEN THERAPY PER DAY

## 2022-03-11 PROCEDURE — 94660 CPAP INITIATION&MGMT: CPT

## 2022-03-11 PROCEDURE — 94761 N-INVAS EAR/PLS OXIMETRY MLT: CPT

## 2022-03-11 PROCEDURE — 6370000000 HC RX 637 (ALT 250 FOR IP): Performed by: HOSPITALIST

## 2022-03-11 PROCEDURE — 36415 COLL VENOUS BLD VENIPUNCTURE: CPT

## 2022-03-11 PROCEDURE — 6360000002 HC RX W HCPCS: Performed by: INTERNAL MEDICINE

## 2022-03-11 PROCEDURE — 6360000002 HC RX W HCPCS: Performed by: HOSPITALIST

## 2022-03-11 PROCEDURE — 85610 PROTHROMBIN TIME: CPT

## 2022-03-11 PROCEDURE — 94640 AIRWAY INHALATION TREATMENT: CPT

## 2022-03-11 PROCEDURE — 1200000000 HC SEMI PRIVATE

## 2022-03-11 PROCEDURE — 80053 COMPREHEN METABOLIC PANEL: CPT

## 2022-03-11 PROCEDURE — 99291 CRITICAL CARE FIRST HOUR: CPT | Performed by: INTERNAL MEDICINE

## 2022-03-11 PROCEDURE — 2580000003 HC RX 258: Performed by: HOSPITALIST

## 2022-03-11 RX ORDER — BUDESONIDE 0.5 MG/2ML
0.5 INHALANT ORAL 2 TIMES DAILY
Status: DISCONTINUED | OUTPATIENT
Start: 2022-03-11 | End: 2022-03-13 | Stop reason: HOSPADM

## 2022-03-11 RX ORDER — WARFARIN SODIUM 5 MG/1
5 TABLET ORAL
Status: COMPLETED | OUTPATIENT
Start: 2022-03-11 | End: 2022-03-11

## 2022-03-11 RX ORDER — GENTAMICIN SULFATE 1 MG/G
CREAM TOPICAL 3 TIMES DAILY
Status: DISCONTINUED | OUTPATIENT
Start: 2022-03-11 | End: 2022-03-13 | Stop reason: HOSPADM

## 2022-03-11 RX ORDER — PREDNISONE 20 MG/1
40 TABLET ORAL DAILY
Status: DISCONTINUED | OUTPATIENT
Start: 2022-03-12 | End: 2022-03-13 | Stop reason: HOSPADM

## 2022-03-11 RX ORDER — METHYLPREDNISOLONE SODIUM SUCCINATE 40 MG/ML
40 INJECTION, POWDER, LYOPHILIZED, FOR SOLUTION INTRAMUSCULAR; INTRAVENOUS EVERY 12 HOURS
Status: COMPLETED | OUTPATIENT
Start: 2022-03-11 | End: 2022-03-11

## 2022-03-11 RX ADMIN — CARVEDILOL 25 MG: 25 TABLET, FILM COATED ORAL at 18:05

## 2022-03-11 RX ADMIN — GENTAMICIN SULFATE: 1 CREAM TOPICAL at 15:00

## 2022-03-11 RX ADMIN — INSULIN LISPRO 1 UNITS: 100 INJECTION, SOLUTION INTRAVENOUS; SUBCUTANEOUS at 21:11

## 2022-03-11 RX ADMIN — GENTAMICIN SULFATE: 1 CREAM TOPICAL at 21:09

## 2022-03-11 RX ADMIN — METHYLPREDNISOLONE SODIUM SUCCINATE 40 MG: 40 INJECTION, POWDER, FOR SOLUTION INTRAMUSCULAR; INTRAVENOUS at 21:08

## 2022-03-11 RX ADMIN — INSULIN LISPRO 2 UNITS: 100 INJECTION, SOLUTION INTRAVENOUS; SUBCUTANEOUS at 12:15

## 2022-03-11 RX ADMIN — LEVALBUTEROL HYDROCHLORIDE 1.25 MG: 1.25 SOLUTION RESPIRATORY (INHALATION) at 16:51

## 2022-03-11 RX ADMIN — FERROUS SULFATE TAB 325 MG (65 MG ELEMENTAL FE) 325 MG: 325 (65 FE) TAB at 21:08

## 2022-03-11 RX ADMIN — CARVEDILOL 25 MG: 25 TABLET, FILM COATED ORAL at 08:32

## 2022-03-11 RX ADMIN — INSULIN GLARGINE 18 UNITS: 100 INJECTION, SOLUTION SUBCUTANEOUS at 21:10

## 2022-03-11 RX ADMIN — IPRATROPIUM BROMIDE 0.5 MG: 0.5 SOLUTION RESPIRATORY (INHALATION) at 08:41

## 2022-03-11 RX ADMIN — LEVALBUTEROL HYDROCHLORIDE 1.25 MG: 1.25 SOLUTION RESPIRATORY (INHALATION) at 20:15

## 2022-03-11 RX ADMIN — IPRATROPIUM BROMIDE 0.5 MG: 0.5 SOLUTION RESPIRATORY (INHALATION) at 12:50

## 2022-03-11 RX ADMIN — BUDESONIDE 500 MCG: 0.5 SUSPENSION RESPIRATORY (INHALATION) at 20:09

## 2022-03-11 RX ADMIN — LEVALBUTEROL HYDROCHLORIDE 1.25 MG: 1.25 SOLUTION RESPIRATORY (INHALATION) at 12:50

## 2022-03-11 RX ADMIN — NIFEDIPINE 60 MG: 30 TABLET, FILM COATED, EXTENDED RELEASE ORAL at 08:31

## 2022-03-11 RX ADMIN — IPRATROPIUM BROMIDE 0.5 MG: 0.5 SOLUTION RESPIRATORY (INHALATION) at 20:09

## 2022-03-11 RX ADMIN — FUROSEMIDE 40 MG: 10 INJECTION, SOLUTION INTRAMUSCULAR; INTRAVENOUS at 08:32

## 2022-03-11 RX ADMIN — INSULIN LISPRO 2 UNITS: 100 INJECTION, SOLUTION INTRAVENOUS; SUBCUTANEOUS at 08:41

## 2022-03-11 RX ADMIN — SODIUM CHLORIDE, PRESERVATIVE FREE 10 ML: 5 INJECTION INTRAVENOUS at 08:32

## 2022-03-11 RX ADMIN — FERROUS SULFATE TAB 325 MG (65 MG ELEMENTAL FE) 325 MG: 325 (65 FE) TAB at 08:31

## 2022-03-11 RX ADMIN — ATORVASTATIN CALCIUM 80 MG: 80 TABLET, FILM COATED ORAL at 08:32

## 2022-03-11 RX ADMIN — METHYLPREDNISOLONE SODIUM SUCCINATE 40 MG: 40 INJECTION, POWDER, FOR SOLUTION INTRAMUSCULAR; INTRAVENOUS at 08:32

## 2022-03-11 RX ADMIN — SODIUM CHLORIDE, PRESERVATIVE FREE 10 ML: 5 INJECTION INTRAVENOUS at 21:10

## 2022-03-11 RX ADMIN — POLYETHYLENE GLYCOL 3350 17 G: 17 POWDER, FOR SOLUTION ORAL at 21:37

## 2022-03-11 RX ADMIN — LEVALBUTEROL HYDROCHLORIDE 1.25 MG: 1.25 SOLUTION RESPIRATORY (INHALATION) at 08:41

## 2022-03-11 RX ADMIN — IPRATROPIUM BROMIDE 0.5 MG: 0.5 SOLUTION RESPIRATORY (INHALATION) at 16:51

## 2022-03-11 RX ADMIN — WARFARIN SODIUM 5 MG: 5 TABLET ORAL at 18:06

## 2022-03-11 RX ADMIN — TIOTROPIUM BROMIDE AND OLODATEROL 2 PUFF: 3.124; 2.736 SPRAY, METERED RESPIRATORY (INHALATION) at 08:41

## 2022-03-11 RX ADMIN — METHYLPREDNISOLONE SODIUM SUCCINATE 40 MG: 40 INJECTION, POWDER, FOR SOLUTION INTRAMUSCULAR; INTRAVENOUS at 04:07

## 2022-03-11 ASSESSMENT — PAIN SCALES - GENERAL
PAINLEVEL_OUTOF10: 0

## 2022-03-11 ASSESSMENT — PAIN DESCRIPTION - PROGRESSION
CLINICAL_PROGRESSION: NOT CHANGED

## 2022-03-11 NOTE — PROGRESS NOTES
PULMONARY AND CRITICAL CARE MEDICINE PROGRESS NOTE    Subjective: Patient sitting in bed in no apparent respiratory distress. Reports that his breathing is improved. Still continues to have wheezing and occasional cough. Mentation has significantly improved. Denies any discolored expectoration, fevers, chest pain or chest tightness. REVIEW OF SYSTEMS:   8 point review of system is negative except that mentioned in the subjective portion. PAST MEDICAL HISTORY:   Past Medical History:   Diagnosis Date    Diabetes mellitus ([facility] [address]     Fibromyalgia     History of DVT (deep vein thrombosis)     Hyperlipidemia     Hypertension     Type 2 diabetes mellitus with circulatory disorder, without long-term current use of insulin ([facility] [address] 2022       PAST SURGICAL HISTORY:   Past Surgical History:   Procedure Laterality Date    APPENDECTOMY      COLONOSCOPY  2016    FEMORAL BYPASS Left 2020    femoral posterior tibial bypass    FEMORAL-TIBIAL BYPASS GRAFT Right 2020    with femoral endarterectomy and patch angioplasty    FOOT DEBRIDEMENT Left 2020    FOOT DEBRIDEMENT Right 2021    DEBRIDEMENT OF RIGHT FOOT WOUND WITH GRAFT APPLICATION performed by Ratna Renee DPM at [facility] Right 2020    stent leg for PVD       SOCIAL HISTORY:   Social History     Tobacco Use    Smoking status: Former Smoker     Packs/day: 0.50     Years: 20.00     Pack years: 10.00     Types: Cigarettes     Start date: 1977     Quit date: 2021     Years since quittin.2    Smokeless tobacco: Never Used   Vaping Use    Vaping Use: Never used   Substance Use Topics    Alcohol use:  Yes     Alcohol/week: 6.0 standard drinks     Types: 6 Cans of beer per week    Drug use: Never       FAMILY HISTORY:   Family History   Problem Relation Age of Onset    Cancer Mother     Lung Cancer Mother     Diabetes Father     Stroke Father        MEDICATIONS: No current facility-administered medications on file prior to encounter. Current Outpatient Medications on File Prior to Encounter   Medication Sig Dispense Refill    ipratropium-albuterol (DUONEB) 0.5-2.5 (3) MG/3ML SOLN nebulizer solution Inhale 3 mLs into the lungs 4 times daily 360 mL 3    furosemide (LASIX) 20 MG tablet Take 1 tablet by mouth daily 60 tablet 3    guaiFENesin (MUCINEX) 600 MG extended release tablet Take 1,200 mg by mouth 2 times daily      atorvastatin (LIPITOR) 80 MG tablet Take 1 tablet by mouth daily Cancel atorvastatin 20 mg a day 90 tablet 0    Fluticasone-Umeclidin-Vilant (TRELEGY ELLIPTA) 200-62.5-25 MCG/INH AEPB Inhale 1 puff into the lungs 2 times daily (Patient taking differently: Inhale 1 puff into the lungs daily ) 1 each 2    gentamicin (GARAMYCIN) 0.1 % cream Apply topically   daily.  30 g 1    albuterol sulfate HFA (VENTOLIN HFA) 108 (90 Base) MCG/ACT inhaler Inhale 2 puffs into the lungs 4 times daily as needed for Wheezing 54 g 1    acetaminophen (TYLENOL) 500 MG tablet Take 1 tablet by mouth 4 times daily as needed for Pain 360 tablet 1    warfarin (JANTOVEN) 5 MG tablet TAKE 1 AND 1/2 TABLETS BY MOUTH ONE TIME A DAY OR AS DIRECTED BY [facility] ([phone]) (Patient taking differently: Take 7.5 mg by mouth daily Except 10mg every Wednesday and Saturday OR AS DIRECTED BY [facility] ([phone])) 45 tablet 0    NIFEdipine (ADALAT CC) 60 MG extended release tablet Take 1 tablet by mouth daily 90 tablet 0    carvedilol (COREG) 25 MG tablet Take 1 tablet by mouth 2 times daily (with meals) 60 tablet 3    metFORMIN (GLUCOPHAGE) 500 MG tablet Take 1 tablet by mouth 2 times daily (with meals) 180 tablet 0    fluticasone (FLONASE) 50 MCG/ACT nasal spray 1 spray by Nasal route daily 9.9 g 5    nicotine (NICODERM CQ) 21 MG/24HR Place 1 patch onto the skin every 24 hours 30 patch 2    ferrous sulfate (IRON 325) 325 (65 Fe) MG tablet Take 1 tablet by mouth 2 times daily 180 tablet 0    tadalafil (CIALIS) 5 MG tablet TAKE 1 TABLET BY MOUTH ONE TIME A DAY AS NEEDED FOR ERECTILE DYSFUNCTION 30 tablet 5    Vitamins/Minerals TABS Take 1 tablet by mouth daily      blood glucose monitor kit and supplies Dispense sufficient amount for indicated testing frequency plus additional to accommodate PRN testing needs. Dispense all needed supplies to include: monitor, strips, lancing device, lancets, control solutions, alcohol swabs. 1 kit 0    Lancets MISC 1 each by Does not apply route daily Test 2 times daily while on PREDNISONE then 1 time daily & as needed for symptoms of irregular blood sugar 100 each 3    blood glucose monitor strips Test 2 times a day while on PREDNISONE, then 1 time daily & as needed for symptoms of irregular blood glucose. Dispense sufficient amount for indicated testing frequency plus additional to accommodate PRN testing needs.  100 strip 3        budesonide  0.5 mg Nebulization BID    methylPREDNISolone  40 mg IntraVENous Q12H    Followed by   Antwan Marin ON 3/12/2022] predniSONE  40 mg Oral Daily    atorvastatin  80 mg Oral Daily    carvedilol  25 mg Oral BID WC    ferrous sulfate  325 mg Oral BID    NIFEdipine  60 mg Oral Daily    sodium chloride flush  5-40 mL IntraVENous 2 times per day    nicotine  1 patch TransDERmal Daily    furosemide  40 mg IntraVENous Daily    insulin glargine  0.25 Units/kg SubCUTAneous Nightly    insulin lispro  0-12 Units SubCUTAneous TID     insulin lispro  0-6 Units SubCUTAneous Nightly    warfarin placeholder: dosing by pharmacy   Other RX Placeholder    levalbuterol  1.25 mg Nebulization 4x daily    ipratropium  0.5 mg Nebulization 4x daily    tiotropium-olodaterol  2 puff Inhalation Daily      sodium chloride      dextrose       sodium chloride flush, sodium chloride, ondansetron **OR** ondansetron, polyethylene glycol, acetaminophen **OR** acetaminophen, aluminum & magnesium hydroxide-simethicone, albuterol, glucose, dextrose, glucagon (rDNA), dextrose, levalbuterol      ALLERGIES:   Allergies as of 03/10/2022 - Fully Reviewed 03/10/2022   Allergen Reaction Noted    Chantix [varenicline] Other (See Comments) 03/08/2021      OBJECTIVE:   height is 5' 8\" (1.727 m) and weight is 156 lb 12 oz (71.1 kg). His temporal temperature is 96.6 °F (35.9 °C). His blood pressure is 141/54 (abnormal) and his pulse is 84. His respiration is 24 and oxygen saturation is 100%. No intake/output data recorded. PHYSICAL EXAM:    CONSTITUTIONAL: He is a 65y.o.-year-old who appears his stated age. He is somnolent but arousable with verbal stimuli. HEENT: PERRLA, EOMI. No scleral icterus. No thrush, atraumatic, normocephalic. Oropharyngeal erythema seen. NECK: Supple, without cervical or supraclavicular lymphadenopathy:  CARDIOVASCULAR: S1 S2 RRR. Without murmer  RESPIRATORY & CHEST: Bilateral scattered wheezing heard. GASTROINTESTINAL & ABDOMEN: Soft, nontender, positive bowel sounds in all quadrants, non-distended, without hepatosplenomegaly. GENITOURINARY: Deferred. MUSCULOSKELETAL: No tenderness to palpation of the axial skeleton. There is no clubbing. No cyanosis. No edema of the lower extremities. SKIN OF BODY: No rash or jaundice. PSYCHIATRIC EVALUATION: Normal affect. Patient answers questions appropriately. HEMATOLOGIC/LYMPHATIC/ IMMUNOLOGIC: No palpable lymphadenopathy. NEUROLOGIC: Alert and oriented x 3. Groslly non-focal. Motor strength is 5+/5 in all muscle groups. The patient has a normal sensorium globally.       LABS:  Recent Labs     03/10/22  0312 03/10/22  0824 03/11/22  0359   WBC 7.5  --  4.5   HGB 13.8 18.4* 13.0*   HCT 43.1  --  41.1     --  281   ALT 22  --  19   AST 23  --  15     --  139   K 4.7  --  5.0   CL 95*  --  96*   CREATININE 0.8  --  0.7*   BUN 17  --  24*   CO2 31  --  34*   INR 3.33*  --  2.68*       Recent Labs     03/10/22  5029 03/11/22  0359   GLUCOSE 152* 181*   CALCIUM 9.7 9.2    139   K 4.7 5.0   CO2 31 34*   CL 95* 96*   BUN 17 24*   CREATININE 0.8 0.7*       Recent Labs     03/10/22  0824 03/10/22  0937 03/10/22  1407   PHART 7.195* 7.230* 7.301*   BVY8MGR 102.8* 90.4* 75.0*   PO2ART 100.0 39.0* 115.3*   VWQ6APO 39.7* 37.9* 37.0*   C0TJBKKM 95 60* 98   BEART 12* 10* 11*       Lab Results   Component Value Date    INR 2.68 (H) 03/11/2022    INR 3.33 (H) 03/10/2022    INR 2.93 (H) 03/07/2022    PROTIME 31.5 (H) 03/11/2022    PROTIME 39.6 (H) 03/10/2022    PROTIME 34.6 (H) 03/07/2022     No results found for: AMYLASE   Lab Results   Component Value Date    LABA1C 6.5 03/10/2022     Lab Results   Component Value Date    .9 03/10/2022     Lab Results   Component Value Date    TSH 2.45 03/29/2010     Lab Results   Component Value Date    CKTOTAL 81 02/01/2022    TROPONINI <0.01 03/10/2022      No results found for: CRP   No results found for: BNP   Lab Results   Component Value Date    DDIMER 880 (H) 12/31/2021      Lab Results   Component Value Date    FERRITIN 141.3 02/18/2022      No results found for: LACTA        IMAGING:    Narrative   EXAMINATION:   ONE XRAY VIEW OF THE CHEST       3/10/2022 9:16 am           FINDINGS:   The cardiac silhouette, mediastinal hilar contours are normal.  There are   linear opacities in the lung bases.  There is minimal vascular prominence. No pleural effusion or pneumothorax is identified.           Impression   Questionable for mild vascular congestion.       Mild bibasilar atelectasis.       Otherwise, no acute cardiopulmonary disease.             IMPRESSION:     1. Recurrent COPD exacerbation  2. Moderate COPD in exacerbation  3. Previous history of extensive tobacco abuse  4. Acute on chronic hypercapnic respiratory failure  5. Heart failure with preserved ejection fraction    RECOMMENDATION:     1.  Patient has presented to the hospital again with COPD exacerbation and acute on chronic hypercapnic respiratory failure. 2. Chest imaging shows mild vascular congestion without any new focal consolidation. 3. Yesterday he was transitioned to AVAPS therapy with which his hypercapnia resolved. 4. Continue to use AVAPS therapy whenever he is sleeping/napping. He does have a noninvasive ventilator set up for him at home. 5. He will benefit from using oxygen whenever he is ambulating. This can be checked prior to discharge. 6. I will request the [employer] to get him a portable oxygen concentrator. 7. Continue with DuoNeb around-the-clock. 8. Continue with IV Solu-Medrol. We will decrease the dosage to 40 mg IV every 12 hours today. But tomorrow this can be changed to prednisone 40 mg p.o. daily. 9. No indications for antibiotics. 10. On Coreg and Lasix for heart failure with preserved ejection fraction. proBNP levels are downtrending. Total critical care time caring for this patient with life threatening illness, including direct patient contact, management of life support systems, review of data including imaging and labs, discussions with other team members and physicians is at least 35 minutes so far today, excluding procedures. Tiki Carvajal MD   Pulmonary Critical Care and Sleep Medicine  [facility], Arthur Zhu, [address]  3/10/2022, 12:07 PM      This note was completed using dragon medical speech recognition software. Grammatical errors, random word insertions, pronoun errors and incomplete sentences are occasional consequences of this technology due to software limitations. If there are questions or concerns about the content of this note of information contained within the body of this dictation they should be addressed with the provider for clarification.

## 2022-03-11 NOTE — PROGRESS NOTES
Pt assessment completed see flowsheets for full details. Pt alert and oriented, VSS, on NC 3L, was on AVAPS last night. A.m med given, pt tolerating diet well, all needs within reach, will continue to monitor.

## 2022-03-11 NOTE — CARE COORDINATION
[address] [address] Continence Nurse  Consult Note       NAME:  [facility] RECORD NUMBER:  [id number]  AGE: 65 y.o.    GENDER: male  : 1960  TODAY'S DATE:  3/11/2022    Subjective   Reason for WOCN Evaluation and Assessment: wounds to left ankle and right foot      Iris Azevedo is a 65 y.o. male referred by:   [x] Physician  [x] Nursing  [] Other:     Wound Identification:  Wound Type: arterial and diabetic  Contributing Factors: diabetes and arterial insufficiency    Wound History: present on admission, patient sees Dr Becca Painting in the wound clinic, last seen 3/4/22  Current Wound Care Treatment:  Gentamicin, collagen dressing, dry dressing, kerlix and ace bandages    Patient Goal of Care:  [x] Wound Healing  [] Odor Control  [] Palliative Care  [] Pain Control   [] Other:         PAST MEDICAL HISTORY        Diagnosis Date    Diabetes mellitus ([facility] [address]     Fibromyalgia     History of DVT (deep vein thrombosis)     Hyperlipidemia     Hypertension     Type 2 diabetes mellitus with circulatory disorder, without long-term current use of insulin ([facility] 2022       PAST SURGICAL HISTORY    Past Surgical History:   Procedure Laterality Date    APPENDECTOMY  2005    COLONOSCOPY  2016    FEMORAL BYPASS Left 2020    femoral posterior tibial bypass    FEMORAL-TIBIAL BYPASS GRAFT Right 2020    with femoral endarterectomy and patch angioplasty    FOOT DEBRIDEMENT Left 2020    FOOT DEBRIDEMENT Right 2021    DEBRIDEMENT OF RIGHT FOOT WOUND WITH GRAFT APPLICATION performed by Gibran Lau DPM at [facility] Right 2020    stent leg for PVD       FAMILY HISTORY    Family History   Problem Relation Age of Onset    Cancer Mother     Lung Cancer Mother     Diabetes Father     Stroke Father        SOCIAL HISTORY    Social History     Tobacco Use    Smoking status: Former Smoker     Packs/day: 0.50     Years: 20.00     Pack years: 10. 00     Types: Cigarettes     Start date: 1977     Quit date: 2021     Years since quittin.2    Smokeless tobacco: Never Used   Vaping Use    Vaping Use: Never used   Substance Use Topics    Alcohol use: Yes     Alcohol/week: 6.0 standard drinks     Types: 6 Cans of beer per week    Drug use: Never       ALLERGIES    Allergies   Allergen Reactions    Chantix [Varenicline] Other (See Comments)     Hallucinations         MEDICATIONS    No current facility-administered medications on file prior to encounter. Current Outpatient Medications on File Prior to Encounter   Medication Sig Dispense Refill    ipratropium-albuterol (DUONEB) 0.5-2.5 (3) MG/3ML SOLN nebulizer solution Inhale 3 mLs into the lungs 4 times daily 360 mL 3    furosemide (LASIX) 20 MG tablet Take 1 tablet by mouth daily 60 tablet 3    guaiFENesin (MUCINEX) 600 MG extended release tablet Take 1,200 mg by mouth 2 times daily      atorvastatin (LIPITOR) 80 MG tablet Take 1 tablet by mouth daily Cancel atorvastatin 20 mg a day 90 tablet 0    Fluticasone-Umeclidin-Vilant (TRELEGY ELLIPTA) 200-62.5-25 MCG/INH AEPB Inhale 1 puff into the lungs 2 times daily (Patient taking differently: Inhale 1 puff into the lungs daily ) 1 each 2    gentamicin (GARAMYCIN) 0.1 % cream Apply topically   daily.  30 g 1    albuterol sulfate HFA (VENTOLIN HFA) 108 (90 Base) MCG/ACT inhaler Inhale 2 puffs into the lungs 4 times daily as needed for Wheezing 54 g 1    acetaminophen (TYLENOL) 500 MG tablet Take 1 tablet by mouth 4 times daily as needed for Pain 360 tablet 1    warfarin (JANTOVEN) 5 MG tablet TAKE 1 AND 1/2 TABLETS BY MOUTH ONE TIME A DAY OR AS DIRECTED BY [facility] ([phone]) (Patient taking differently: Take 7.5 mg by mouth daily Except 10mg every Wednesday and Saturday OR AS DIRECTED BY [facility] ([phone])) 45 tablet 0    NIFEdipine (ADALAT CC) 60 MG extended release tablet Take 1 tablet by mouth daily 90 tablet 0    carvedilol (COREG) 25 MG tablet Take 1 tablet by mouth 2 times daily (with meals) 60 tablet 3    metFORMIN (GLUCOPHAGE) 500 MG tablet Take 1 tablet by mouth 2 times daily (with meals) 180 tablet 0    fluticasone (FLONASE) 50 MCG/ACT nasal spray 1 spray by Nasal route daily 9.9 g 5    nicotine (NICODERM CQ) 21 MG/24HR Place 1 patch onto the skin every 24 hours 30 patch 2    ferrous sulfate (IRON 325) 325 (65 Fe) MG tablet Take 1 tablet by mouth 2 times daily 180 tablet 0    tadalafil (CIALIS) 5 MG tablet TAKE 1 TABLET BY MOUTH ONE TIME A DAY AS NEEDED FOR ERECTILE DYSFUNCTION 30 tablet 5    Vitamins/Minerals TABS Take 1 tablet by mouth daily      blood glucose monitor kit and supplies Dispense sufficient amount for indicated testing frequency plus additional to accommodate PRN testing needs. Dispense all needed supplies to include: monitor, strips, lancing device, lancets, control solutions, alcohol swabs. 1 kit 0    Lancets MISC 1 each by Does not apply route daily Test 2 times daily while on PREDNISONE then 1 time daily & as needed for symptoms of irregular blood sugar 100 each 3    blood glucose monitor strips Test 2 times a day while on PREDNISONE, then 1 time daily & as needed for symptoms of irregular blood glucose. Dispense sufficient amount for indicated testing frequency plus additional to accommodate PRN testing needs.  100 strip 3       Objective    BP (!) 141/54   Pulse 84   Temp 96.9 °F (36.1 °C)   Resp 18   Ht 5' 8\" (1.727 m)   Wt 156 lb 12 oz (71.1 kg)   SpO2 98%   BMI 23.83 kg/m²     LABS:  WBC:    Lab Results   Component Value Date    WBC 4.5 03/11/2022     H/H:    Lab Results   Component Value Date    HGB 13.0 03/11/2022    HCT 41.1 03/11/2022     PTT:    Lab Results   Component Value Date    APTT 73.1 02/01/2022   [APTT}  PT/INR:    Lab Results   Component Value Date    PROTIME 31.5 03/11/2022    INR 2.68 03/11/2022     HgBA1c:    Lab Results Component Value Date    LABA1C 6.5 03/10/2022       Assessment   Harshil Risk Score: Harshil Scale Score: 20    Patient Active Problem List   Diagnosis Code    Peripheral artery disease ([facility]) I73.9    Centrilobular emphysema (HCC) J43.2    COPD (chronic obstructive pulmonary disease) ([facility]) J44.9    Atherosclerosis of native arteries of right leg with ulceration of other part of foot ([facility] [address] I70.235    Impotence N52.9    Acute deep vein thrombosis (DVT) of femoral vein of left lower extremity ([facility]) I82.412    Colon polyp K63.5    Hyperlipidemia E78.5    Non-healing ulcer of left ankle ([facility]) L97.329    Venous insufficiency I87.2    Venous ulcer ([facility]) I83.009, L97.909    Essential hypertension I10    Peripheral vascular disease ([facility]) I73.9    Type 2 diabetes mellitus with circulatory disorder, without long-term current use of insulin ([facility]) E11.59    COPD exacerbation ([facility]) J44.1    Acute hypercapnic respiratory failure ([facility]) J96.02       Measurements:  Wound 12/31/21 Ankle Right;Medial #1 (Active)   Wound Image   03/11/22 1533   Wound Etiology Diabetic 03/11/22 1533   Dressing Status Clean;Dry; Intact 03/11/22 1200   Wound Cleansed Cleansed with saline 03/11/22 1533   Dressing/Treatment Ace wrap;Collagen with Ag;Gauze dressing/dressing sponge;Non adherent; Pharmaceutical agent (see MAR) 03/11/22 1533   Offloading for Diabetic Foot Ulcers Offloading not required 03/10/22 2100   Wound Length (cm) 0.4 cm 03/11/22 1533   Wound Width (cm) 0.9 cm 03/11/22 1533   Wound Depth (cm) 0.1 cm 03/04/22 0818   Wound Surface Area (cm^2) 0.36 cm^2 03/11/22 1533   Change in Wound Size % (l*w) 98 03/11/22 1533   Wound Volume (cm^3) 0.006 cm^3 03/04/22 0818   Wound Healing % 100 03/04/22 0818   Post-Procedure Length (cm) 0.4 cm 03/04/22 0844   Post-Procedure Width (cm) 1 cm 03/04/22 0844   Post-Procedure Depth (cm) 0.1 cm 03/04/22 0844   Post-Procedure Surface Area (cm^2) 0.4 cm^2 03/04/22 0844   Post-Procedure Volume (cm^3) 0.04 cm^3 03/04/22 0844   Wound Assessment Dry;Pink/red 03/11/22 1533   Drainage Amount Small 03/04/22 0818   Drainage Description Serosanguinous 03/04/22 0818   Odor None 03/11/22 1200   Jamila-wound Assessment Dry/flaky; Hyperpigmented 03/11/22 1533   Margins Attached edges; Defined edges 03/11/22 1533   Wound Thickness Description not for Pressure Injury Partial thickness 03/11/22 1533   Number of days: 70       Wound 12/31/21 Ankle Medial;Left #2 (Active)   Wound Image   03/11/22 1533   Wound Etiology Arterial 03/11/22 1200   Dressing Status New dressing applied 03/11/22 1533   Wound Cleansed Cleansed with saline 03/11/22 1533   Dressing/Treatment Ace wrap;Collagen with Ag;Gauze dressing/dressing sponge;Non adherent; Pharmaceutical agent (see MAR); Roll gauze 03/11/22 1533   Offloading for Diabetic Foot Ulcers Offloading not required 03/10/22 2100   Dressing Change Due 03/08/22 03/07/22 1606   Wound Length (cm) 1.7 cm 03/04/22 0818   Wound Width (cm) 3 cm 03/11/22 1533   Wound Depth (cm) 1.3 cm 03/11/22 1533   Wound Surface Area (cm^2) 5.61 cm^2 03/04/22 0818   Change in Wound Size % (l*w) 25.2 03/04/22 0818   Wound Volume (cm^3) 1.122 cm^3 03/04/22 0818   Wound Healing % 25 03/04/22 0818   Post-Procedure Length (cm) 1.7 cm 03/04/22 0844   Post-Procedure Width (cm) 3.3 cm 03/04/22 0844   Post-Procedure Depth (cm) 0.2 cm 03/04/22 0844   Post-Procedure Surface Area (cm^2) 5.61 cm^2 03/04/22 0844   Post-Procedure Volume (cm^3) 1.122 cm^3 03/04/22 0844   Wound Assessment Dusky;Pink/red 03/11/22 1533   Drainage Amount None 03/11/22 1200   Drainage Description Serosanguinous 03/04/22 0818   Odor None 03/11/22 0800   Jamila-wound Assessment Dry/flaky 03/11/22 1533   Margins Defined edges; Unattached edges 03/11/22 1533   Wound Thickness Description not for Pressure Injury Full thickness 03/11/22 1533   Number of days: 70     Incision 02/26/21 Anterior;Right (Active)   Number of days: 378   Patient seen for wounds to lower extremities to have dressing change. Instructed by Wound center for Dr Mark Mohs to change dressings daily while admitted. Patient expects to be discharged Sunday, March 13th. He has an appointment on Friday March, 18th. Patient agreeable to visit. Old dressings were removed. Wound to left ankle is pink, no drainage. Wound to right dorsal foot is smaller, pink, no drainage. Legs and feet cleansed with saline. Dermaphor ointment applied to dry skin on legs and feet. Gentamicin cream applied to wound bed. Wounds were covered with collagen dressings and a dry non adherent dressing. Both lower extremities were wrapped with kerlix and ace bandage. Will continue daily dressing changes while admitted. Patient to resume home instructions from Wound center after discharge. Response to treatment:  Well tolerated by patient. Pain Assessment:  Severity:  0 / 10  Quality of pain: N/A  Wound Pain Timing/Severity: none  Premedicated: No    Plan   Plan of Care: Wound 12/31/21 Ankle Right;Medial #1-Dressing/Treatment: Ace wrap,Collagen with Ag,Gauze dressing/dressing sponge,Non adherent,Pharmaceutical agent (see MAR) (gentamicin cream)  Wound 12/31/21 Ankle Medial;Left #2-Dressing/Treatment: Ace wrap,Collagen with Ag,Gauze dressing/dressing sponge,Non adherent,Pharmaceutical agent (see MAR),Roll gauze (gentamicin cream)    Specialty Bed Required : No   [] Low Air Loss   [] Pressure Redistribution  [] Fluid Immersion  [] Bariatric  [] Total Pressure Relief  [] Other:     Current Diet: ADULT DIET; Regular; 3 carb choices (45 gm/meal);  Low Fat/Low Chol/High Fiber/SARKIS  Dietician consult:  No    Discharge Plan:  Placement for patient upon discharge: home with support    Patient appropriate for Outpatient [address]: Yes    Referrals:  []   [] [address] [employer] [employer] [facility]  [] Supplies  [] Other    Patient/Caregiver Teaching:  Level of patient/caregiver understanding able to:   [x] Indicates understanding       [] Needs reinforcement  [] Unsuccessful      [] Verbal Understanding  [] Demonstrated understanding       [] No evidence of learning  [] Refused teaching         [] N/A       Electronically signed by KAREEN Mckeon, RN, Inpatient  Wound/Ostomy Care on 3/11/2022 at 3:37 PM

## 2022-03-11 NOTE — PROGRESS NOTES
Pharmacy to Dose Warfarin    Pharmacy consulted to dose warfarin for dvt/PAD. INR Goal: 2-3    INR today: 2.68    Assessment/Plan:  - Give reduced of 5 mg tonight since patient on steroid therapy. Pharmacy will continue to follow.     Tigist Pham, PharmD  Pharmacy Resident   [id number]

## 2022-03-11 NOTE — PLAN OF CARE
Problem: Falls - Risk of:  Goal: Will remain free from falls  Description: Will remain free from falls  3/10/2022 2330 by Kimberly Urias RN  Outcome: Ongoing  3/10/2022 1607 by Bird Mcgrath RN  Outcome: Ongoing  Goal: Absence of physical injury  Description: Absence of physical injury  3/10/2022 2330 by Kimberly Urias RN  Outcome: Ongoing  3/10/2022 1607 by Bird Mcgrath RN  Outcome: Ongoing

## 2022-03-11 NOTE — PROGRESS NOTES
Hospitalist Progress Note      PCP: Francina Snellen, MD    Date of Admission: 3/10/2022    Chief Complaint: Dyspnea    Hospital Course: 80-year-old male with past medical history of severe COPD presented with 1 to 2 days of progressive dyspnea. Patient with hypoxemia and respiratory distress at the ED. Placed on BiPAP. Morning of admission patient was not very responsive and diaphoretic. Rapid response was called to the room and patient was assessed by ICU hospitalist.  He was transferred to ICU for close monitoring. ABG was serially assessed for improvement in ABG with initial PCO2 of 102. Patient with steady improvement throughout the day so ETT was avoided. Subjective: Patient seen and examined at bedside. He is awake and oriented today. Tolerating nasal cannula. Still has difficulty speaking in full sentences.       Medications:  Reviewed    Infusion Medications    sodium chloride      dextrose       Scheduled Medications    budesonide  0.5 mg Nebulization BID    methylPREDNISolone  40 mg IntraVENous Q12H    Followed by   [facility] ON 3/12/2022] predniSONE  40 mg Oral Daily    gentamicin   Topical TID    warfarin  5 mg Oral Once    atorvastatin  80 mg Oral Daily    carvedilol  25 mg Oral BID WC    ferrous sulfate  325 mg Oral BID    NIFEdipine  60 mg Oral Daily    sodium chloride flush  5-40 mL IntraVENous 2 times per day    nicotine  1 patch TransDERmal Daily    furosemide  40 mg IntraVENous Daily    insulin glargine  0.25 Units/kg SubCUTAneous Nightly    insulin lispro  0-12 Units SubCUTAneous TID     insulin lispro  0-6 Units SubCUTAneous Nightly    warfarin placeholder: dosing by pharmacy   Other RX Placeholder    levalbuterol  1.25 mg Nebulization 4x daily    ipratropium  0.5 mg Nebulization 4x daily    tiotropium-olodaterol  2 puff Inhalation Daily     PRN Meds: sodium chloride flush, sodium chloride, ondansetron **OR** ondansetron, polyethylene glycol, acetaminophen **OR** acetaminophen, aluminum & magnesium hydroxide-simethicone, albuterol, glucose, dextrose, glucagon (rDNA), dextrose, levalbuterol      Intake/Output Summary (Last 24 hours) at 3/11/2022 1409  Last data filed at 3/11/2022 1225  Gross per 24 hour   Intake 460 ml   Output 950 ml   Net -490 ml       Physical Exam Performed:    BP (!) 141/54   Pulse 84   Temp 96.9 °F (36.1 °C)   Resp 18   Ht 5' 8\" (1.727 m)   Wt 156 lb 12 oz (71.1 kg)   SpO2 98%   BMI 23.83 kg/m²     General appearance: No apparent distress, appears stated age and cooperative. HEENT: Pupils equal, round, and reactive to light. Conjunctivae/corneas clear. Neck: Supple, with full range of motion. No jugular venous distention. Trachea midline. Respiratory: Diffuse wheezing   cardiovascular: Regular rate and rhythm with normal S1/S2 without murmurs, rubs or gallops. Abdomen: Soft, non-tender, non-distended with normal bowel sounds. Musculoskeletal: No clubbing, cyanosis or edema bilaterally. Full range of motion without deformity. Skin: Skin color, texture, turgor normal.  No rashes or lesions. Neurologic:  Neurovascularly intact without any focal sensory/motor deficits.  Cranial nerves: II-XII intact, grossly non-focal.  Psychiatric: Alert and oriented, thought content appropriate, normal insight  Capillary Refill: Brisk,< 3 seconds   Peripheral Pulses: +2 palpable, equal bilaterally       Labs:   Recent Labs     03/10/22  0312 03/10/22  0824 03/11/22  0359   WBC 7.5  --  4.5   HGB 13.8 18.4* 13.0*   HCT 43.1  --  41.1     --  281     Recent Labs     03/10/22  0312 03/11/22  0359    139   K 4.7 5.0   CL 95* 96*   CO2 31 34*   BUN 17 24*   CREATININE 0.8 0.7*   CALCIUM 9.7 9.2     Recent Labs     03/10/22  0312 03/11/22  0359   AST 23 15   ALT 22 19   BILITOT <0.2 <0.2   ALKPHOS 93 81     Recent Labs     03/10/22  0312 03/11/22  0359   INR 3.33* 2.68*     Recent Labs     03/10/22  0312   TROPONINI <0.01       Urinalysis: Lab Results   Component Value Date    NITRU Negative 02/02/2022    WBCUA 1 02/02/2022    RBCUA 1 02/02/2022    BLOODU Negative 02/02/2022    SPECGRAV 1.021 02/02/2022    GLUCOSEU Negative 02/02/2022       Radiology:  XR CHEST PORTABLE   Final Result   Questionable for mild vascular congestion. Mild bibasilar atelectasis. Otherwise, no acute cardiopulmonary disease. XR CHEST PORTABLE   Final Result   Cardiomegaly with central vascular congestion and mild perihilar edema. Assessment/Plan:    Active Hospital Problems    Diagnosis     Acute hypercapnic respiratory failure ([address] [J96.02]     COPD exacerbation ([facility]) [J44.1]     Type 2 diabetes mellitus with circulatory disorder, without long-term current use of insulin ([facility]) [E11.59]     COPD (chronic obstructive pulmonary disease) ([address] [J44.9]     Centrilobular emphysema ([facility] [address] [J43.2]     Peripheral artery disease ([address] [I73.9]     Peripheral vascular disease ([address] [I73.9]     Essential hypertension [I10]      Acute hypoxemic and hypercapnic respiratory failure  COPD exacerbation  Severe  Continue supplemental oxygen for SPO2 9092%  Continue at bedtime BiPAP as patient is on BiPAP at home  Pulmonology input appreciated    Peripheral arterial disease with bilateral lower extremity wounds  Patient follows with vascular surgery outpatient  And there is plan for left leg bypass in the future  Wounds care consulted for dressing changes    Acute metabolic encephalopathy  Secondary to hypercapnic respiratory failure  Improved    History of DVT  On Coumadin  INR therapeutic    DVT Prophylaxis: Coumadin  Diet: ADULT DIET; Regular; 3 carb choices (45 gm/meal);  Low Fat/Low Chol/High Fiber/SARKIS  Code Status: Full Code    Electronically signed by Pam Brown MD on 3/11/2022 at 2:09 PM

## 2022-03-12 LAB
GLUCOSE BLD-MCNC: 102 MG/DL (ref 70–99)
GLUCOSE BLD-MCNC: 108 MG/DL (ref 70–99)
GLUCOSE BLD-MCNC: 161 MG/DL (ref 70–99)
GLUCOSE BLD-MCNC: 177 MG/DL (ref 70–99)
INR BLD: 1.72 (ref 0.88–1.12)
PERFORMED ON: ABNORMAL
PROTHROMBIN TIME: 19.9 SEC (ref 9.9–12.7)

## 2022-03-12 PROCEDURE — 6360000002 HC RX W HCPCS: Performed by: INTERNAL MEDICINE

## 2022-03-12 PROCEDURE — 6370000000 HC RX 637 (ALT 250 FOR IP): Performed by: INTERNAL MEDICINE

## 2022-03-12 PROCEDURE — 99233 SBSQ HOSP IP/OBS HIGH 50: CPT | Performed by: INTERNAL MEDICINE

## 2022-03-12 PROCEDURE — 1200000000 HC SEMI PRIVATE

## 2022-03-12 PROCEDURE — 2580000003 HC RX 258: Performed by: HOSPITALIST

## 2022-03-12 PROCEDURE — 6360000002 HC RX W HCPCS: Performed by: HOSPITALIST

## 2022-03-12 PROCEDURE — 2700000000 HC OXYGEN THERAPY PER DAY

## 2022-03-12 PROCEDURE — 94640 AIRWAY INHALATION TREATMENT: CPT

## 2022-03-12 PROCEDURE — 85610 PROTHROMBIN TIME: CPT

## 2022-03-12 PROCEDURE — 94660 CPAP INITIATION&MGMT: CPT

## 2022-03-12 PROCEDURE — 6370000000 HC RX 637 (ALT 250 FOR IP): Performed by: HOSPITALIST

## 2022-03-12 PROCEDURE — 94761 N-INVAS EAR/PLS OXIMETRY MLT: CPT

## 2022-03-12 RX ORDER — WARFARIN SODIUM 5 MG/1
10 TABLET ORAL
Status: DISCONTINUED | OUTPATIENT
Start: 2022-03-12 | End: 2022-03-13

## 2022-03-12 RX ADMIN — PREDNISONE 40 MG: 20 TABLET ORAL at 09:19

## 2022-03-12 RX ADMIN — LEVALBUTEROL HYDROCHLORIDE 1.25 MG: 1.25 SOLUTION RESPIRATORY (INHALATION) at 07:47

## 2022-03-12 RX ADMIN — TIOTROPIUM BROMIDE AND OLODATEROL 2 PUFF: 3.124; 2.736 SPRAY, METERED RESPIRATORY (INHALATION) at 07:47

## 2022-03-12 RX ADMIN — IPRATROPIUM BROMIDE 0.5 MG: 0.5 SOLUTION RESPIRATORY (INHALATION) at 17:00

## 2022-03-12 RX ADMIN — CARVEDILOL 25 MG: 25 TABLET, FILM COATED ORAL at 16:13

## 2022-03-12 RX ADMIN — ATORVASTATIN CALCIUM 80 MG: 80 TABLET, FILM COATED ORAL at 09:19

## 2022-03-12 RX ADMIN — GENTAMICIN SULFATE: 1 CREAM TOPICAL at 09:27

## 2022-03-12 RX ADMIN — SODIUM CHLORIDE, PRESERVATIVE FREE 10 ML: 5 INJECTION INTRAVENOUS at 09:30

## 2022-03-12 RX ADMIN — LEVALBUTEROL HYDROCHLORIDE 1.25 MG: 1.25 SOLUTION RESPIRATORY (INHALATION) at 19:42

## 2022-03-12 RX ADMIN — LEVALBUTEROL HYDROCHLORIDE 1.25 MG: 1.25 SOLUTION RESPIRATORY (INHALATION) at 17:00

## 2022-03-12 RX ADMIN — NIFEDIPINE 60 MG: 30 TABLET, FILM COATED, EXTENDED RELEASE ORAL at 09:20

## 2022-03-12 RX ADMIN — FERROUS SULFATE TAB 325 MG (65 MG ELEMENTAL FE) 325 MG: 325 (65 FE) TAB at 09:19

## 2022-03-12 RX ADMIN — IPRATROPIUM BROMIDE 0.5 MG: 0.5 SOLUTION RESPIRATORY (INHALATION) at 11:43

## 2022-03-12 RX ADMIN — CARVEDILOL 25 MG: 25 TABLET, FILM COATED ORAL at 09:28

## 2022-03-12 RX ADMIN — GENTAMICIN SULFATE: 1 CREAM TOPICAL at 15:00

## 2022-03-12 RX ADMIN — BUDESONIDE 500 MCG: 0.5 SUSPENSION RESPIRATORY (INHALATION) at 07:46

## 2022-03-12 RX ADMIN — SODIUM CHLORIDE, PRESERVATIVE FREE 10 ML: 5 INJECTION INTRAVENOUS at 20:48

## 2022-03-12 RX ADMIN — IPRATROPIUM BROMIDE 0.5 MG: 0.5 SOLUTION RESPIRATORY (INHALATION) at 19:42

## 2022-03-12 RX ADMIN — BUDESONIDE 500 MCG: 0.5 SUSPENSION RESPIRATORY (INHALATION) at 19:42

## 2022-03-12 RX ADMIN — INSULIN LISPRO 1 UNITS: 100 INJECTION, SOLUTION INTRAVENOUS; SUBCUTANEOUS at 20:49

## 2022-03-12 RX ADMIN — WARFARIN SODIUM 10 MG: 5 TABLET ORAL at 17:33

## 2022-03-12 RX ADMIN — INSULIN GLARGINE 18 UNITS: 100 INJECTION, SOLUTION SUBCUTANEOUS at 20:48

## 2022-03-12 RX ADMIN — FUROSEMIDE 40 MG: 10 INJECTION, SOLUTION INTRAMUSCULAR; INTRAVENOUS at 09:19

## 2022-03-12 RX ADMIN — IPRATROPIUM BROMIDE 0.5 MG: 0.5 SOLUTION RESPIRATORY (INHALATION) at 07:47

## 2022-03-12 RX ADMIN — FERROUS SULFATE TAB 325 MG (65 MG ELEMENTAL FE) 325 MG: 325 (65 FE) TAB at 20:47

## 2022-03-12 RX ADMIN — GENTAMICIN SULFATE: 1 CREAM TOPICAL at 20:54

## 2022-03-12 RX ADMIN — LEVALBUTEROL HYDROCHLORIDE 1.25 MG: 1.25 SOLUTION RESPIRATORY (INHALATION) at 11:43

## 2022-03-12 ASSESSMENT — PAIN SCALES - GENERAL
PAINLEVEL_OUTOF10: 0

## 2022-03-12 NOTE — PROGRESS NOTES
Pulmonology to sign off with anticipated d/c tomorrow a.m. and following recommendations: Continue AVAPS when sleeping or napping; Oxygen when ambulating; continue arounf clock HHNs; and will restart home med Trelegy after discharge. pulm requested DME company pr,ovide pt w/portable O2 concentrator.

## 2022-03-12 NOTE — PLAN OF CARE
Problem: Falls - Risk of:  Goal: Will remain free from falls  Description: Will remain free from falls  Outcome: Met This Shift  Goal: Absence of physical injury  Description: Absence of physical injury  Outcome: Met This Shift     Problem: Airway Clearance - Ineffective:  Goal: Ability to maintain a clear airway will improve  Description: Ability to maintain a clear airway will improve  Outcome: Met This Shift     Problem: Breathing Pattern - Ineffective:  Goal: Ability to achieve and maintain a regular respiratory rate will improve  Description: Ability to achieve and maintain a regular respiratory rate will improve  Outcome: Met This Shift     Problem: Gas Exchange - Impaired:  Goal: Levels of oxygenation will improve  Description: Levels of oxygenation will improve  Outcome: Met This Shift

## 2022-03-12 NOTE — PROGRESS NOTES
PULMONARY AND CRITICAL CARE MEDICINE PROGRESS NOTE    Subjective: Patient sitting in bed in no apparent respiratory distress. Reports that his breathing is improved. Wheezing less today. Oxygen requirement down to 2 L/min. Mentation has significantly improved. Denies any discolored expectoration, fevers, chest pain or chest tightness. REVIEW OF SYSTEMS:   8 point review of system is negative except that mentioned in the subjective portion. PAST MEDICAL HISTORY:   Past Medical History:   Diagnosis Date    Diabetes mellitus ([facility] [address]     Fibromyalgia     History of DVT (deep vein thrombosis)     Hyperlipidemia     Hypertension     Type 2 diabetes mellitus with circulatory disorder, without long-term current use of insulin ([facility] [address] 2022       PAST SURGICAL HISTORY:   Past Surgical History:   Procedure Laterality Date    APPENDECTOMY      COLONOSCOPY  2016    FEMORAL BYPASS Left 2020    femoral posterior tibial bypass    FEMORAL-TIBIAL BYPASS GRAFT Right 2020    with femoral endarterectomy and patch angioplasty    FOOT DEBRIDEMENT Left 2020    FOOT DEBRIDEMENT Right 2021    DEBRIDEMENT OF RIGHT FOOT WOUND WITH GRAFT APPLICATION performed by Oliverio Mary DPM at [facility] Right 2020    stent leg for PVD       SOCIAL HISTORY:   Social History     Tobacco Use    Smoking status: Former Smoker     Packs/day: 0.50     Years: 20.00     Pack years: 10.00     Types: Cigarettes     Start date: 1977     Quit date: 2021     Years since quittin.2    Smokeless tobacco: Never Used   Vaping Use    Vaping Use: Never used   Substance Use Topics    Alcohol use:  Yes     Alcohol/week: 6.0 standard drinks     Types: 6 Cans of beer per week    Drug use: Never       FAMILY HISTORY:   Family History   Problem Relation Age of Onset    Cancer Mother     Lung Cancer Mother     Diabetes Father     Stroke Father MEDICATIONS:     No current facility-administered medications on file prior to encounter. Current Outpatient Medications on File Prior to Encounter   Medication Sig Dispense Refill    ipratropium-albuterol (DUONEB) 0.5-2.5 (3) MG/3ML SOLN nebulizer solution Inhale 3 mLs into the lungs 4 times daily 360 mL 3    furosemide (LASIX) 20 MG tablet Take 1 tablet by mouth daily 60 tablet 3    guaiFENesin (MUCINEX) 600 MG extended release tablet Take 1,200 mg by mouth 2 times daily      atorvastatin (LIPITOR) 80 MG tablet Take 1 tablet by mouth daily Cancel atorvastatin 20 mg a day 90 tablet 0    Fluticasone-Umeclidin-Vilant (TRELEGY ELLIPTA) 200-62.5-25 MCG/INH AEPB Inhale 1 puff into the lungs 2 times daily (Patient taking differently: Inhale 1 puff into the lungs daily ) 1 each 2    gentamicin (GARAMYCIN) 0.1 % cream Apply topically   daily.  30 g 1    albuterol sulfate HFA (VENTOLIN HFA) 108 (90 Base) MCG/ACT inhaler Inhale 2 puffs into the lungs 4 times daily as needed for Wheezing 54 g 1    acetaminophen (TYLENOL) 500 MG tablet Take 1 tablet by mouth 4 times daily as needed for Pain 360 tablet 1    warfarin (JANTOVEN) 5 MG tablet TAKE 1 AND 1/2 TABLETS BY MOUTH ONE TIME A DAY OR AS DIRECTED BY [facility] ([phone]) (Patient taking differently: Take 7.5 mg by mouth daily Except 10mg every Wednesday and Saturday OR AS DIRECTED BY [facility] ([phone])) 45 tablet 0    NIFEdipine (ADALAT CC) 60 MG extended release tablet Take 1 tablet by mouth daily 90 tablet 0    carvedilol (COREG) 25 MG tablet Take 1 tablet by mouth 2 times daily (with meals) 60 tablet 3    metFORMIN (GLUCOPHAGE) 500 MG tablet Take 1 tablet by mouth 2 times daily (with meals) 180 tablet 0    fluticasone (FLONASE) 50 MCG/ACT nasal spray 1 spray by Nasal route daily 9.9 g 5    nicotine (NICODERM CQ) 21 MG/24HR Place 1 patch onto the skin every 24 hours 30 patch 2    ferrous sulfate (IRON 325) 325 (65 Fe) MG tablet Take 1 tablet by mouth 2 times daily 180 tablet 0    tadalafil (CIALIS) 5 MG tablet TAKE 1 TABLET BY MOUTH ONE TIME A DAY AS NEEDED FOR ERECTILE DYSFUNCTION 30 tablet 5    Vitamins/Minerals TABS Take 1 tablet by mouth daily      blood glucose monitor kit and supplies Dispense sufficient amount for indicated testing frequency plus additional to accommodate PRN testing needs. Dispense all needed supplies to include: monitor, strips, lancing device, lancets, control solutions, alcohol swabs. 1 kit 0    Lancets MISC 1 each by Does not apply route daily Test 2 times daily while on PREDNISONE then 1 time daily & as needed for symptoms of irregular blood sugar 100 each 3    blood glucose monitor strips Test 2 times a day while on PREDNISONE, then 1 time daily & as needed for symptoms of irregular blood glucose. Dispense sufficient amount for indicated testing frequency plus additional to accommodate PRN testing needs.  100 strip 3        [START ON 3/13/2022] warfarin  7.5 mg Oral Once per day on Sun Mon Tue Thu Fri    And    warfarin  10 mg Oral Once per day on Wed Sat    budesonide  0.5 mg Nebulization BID    predniSONE  40 mg Oral Daily    gentamicin   Topical TID    atorvastatin  80 mg Oral Daily    carvedilol  25 mg Oral BID WC    ferrous sulfate  325 mg Oral BID    NIFEdipine  60 mg Oral Daily    sodium chloride flush  5-40 mL IntraVENous 2 times per day    nicotine  1 patch TransDERmal Daily    furosemide  40 mg IntraVENous Daily    insulin glargine  0.25 Units/kg SubCUTAneous Nightly    insulin lispro  0-12 Units SubCUTAneous TID WC    insulin lispro  0-6 Units SubCUTAneous Nightly    levalbuterol  1.25 mg Nebulization 4x daily    ipratropium  0.5 mg Nebulization 4x daily    tiotropium-olodaterol  2 puff Inhalation Daily      sodium chloride      dextrose       sodium chloride flush, sodium chloride, ondansetron **OR** ondansetron, polyethylene glycol, acetaminophen **OR** acetaminophen, aluminum & magnesium hydroxide-simethicone, albuterol, glucose, dextrose, glucagon (rDNA), dextrose, levalbuterol      ALLERGIES:   Allergies as of 03/10/2022 - Fully Reviewed 03/10/2022   Allergen Reaction Noted    Chantix [varenicline] Other (See Comments) 03/08/2021      OBJECTIVE:   height is 5' 8\" (1.727 m) and weight is 156 lb 4.9 oz (70.9 kg). His temporal temperature is 97 °F (36.1 °C). His blood pressure is 138/81 and his pulse is 70. His respiration is 18 and oxygen saturation is 100%. I/O this shift:  In: -   Out: 285 [Urine:285]     PHYSICAL EXAM:    CONSTITUTIONAL: He is a 65y.o.-year-old who appears his stated age. He is somnolent but arousable with verbal stimuli. HEENT: PERRLA, EOMI. No scleral icterus. No thrush, atraumatic, normocephalic. NECK: Supple, without cervical or supraclavicular lymphadenopathy:  CARDIOVASCULAR: S1 S2 RRR. Without murmer  RESPIRATORY & CHEST: Bilateral air entry is equal.  Very minimal bibasilar wheezing heard. No crackles heard. GASTROINTESTINAL & ABDOMEN: Soft, nontender, positive bowel sounds in all quadrants, non-distended, without hepatosplenomegaly. GENITOURINARY: Deferred. MUSCULOSKELETAL: No tenderness to palpation of the axial skeleton. There is no clubbing. No cyanosis. No edema of the lower extremities. SKIN OF BODY: No rash or jaundice. PSYCHIATRIC EVALUATION: Normal affect. Patient answers questions appropriately. HEMATOLOGIC/LYMPHATIC/ IMMUNOLOGIC: No palpable lymphadenopathy. NEUROLOGIC: Alert and oriented x 3. Groslly non-focal. Motor strength is 5+/5 in all muscle groups. The patient has a normal sensorium globally.       LABS:  Recent Labs     03/10/22  0312 03/10/22  0824 03/11/22  0359 03/12/22  0430   WBC 7.5  --  4.5  --    HGB 13.8 18.4* 13.0*  --    HCT 43.1  --  41.1  --      --  281  --    ALT 22  --  19  --    AST 23  --  15  --      --  139  --    K 4.7  --  5.0  --    CL 95*  --  96*  -- CREATININE 0.8  --  0.7*  --    BUN 17  --  24*  --    CO2 31  --  34*  --    INR 3.33*  --  2.68* 1.72*       Recent Labs     03/10/22  0312 03/11/22  0359   GLUCOSE 152* 181*   CALCIUM 9.7 9.2    139   K 4.7 5.0   CO2 31 34*   CL 95* 96*   BUN 17 24*   CREATININE 0.8 0.7*       Recent Labs     03/10/22  0824 03/10/22  0937 03/10/22  1407   PHART 7.195* 7.230* 7.301*   HZO3UUK 102.8* 90.4* 75.0*   PO2ART 100.0 39.0* 115.3*   PUR3ZKZ 39.7* 37.9* 37.0*   F1MFAQIN 95 60* 98   BEART 12* 10* 11*       Lab Results   Component Value Date    INR 1.72 (H) 03/12/2022    INR 2.68 (H) 03/11/2022    INR 3.33 (H) 03/10/2022    PROTIME 19.9 (H) 03/12/2022    PROTIME 31.5 (H) 03/11/2022    PROTIME 39.6 (H) 03/10/2022     No results found for: AMYLASE   Lab Results   Component Value Date    LABA1C 6.5 03/10/2022     Lab Results   Component Value Date    .9 03/10/2022     Lab Results   Component Value Date    TSH 2.45 03/29/2010     Lab Results   Component Value Date    CKTOTAL 81 02/01/2022    TROPONINI <0.01 03/10/2022      No results found for: CRP   No results found for: BNP   Lab Results   Component Value Date    DDIMER 880 (H) 12/31/2021      Lab Results   Component Value Date    FERRITIN 141.3 02/18/2022      No results found for: LACTA        IMAGING:    Narrative   EXAMINATION:   ONE XRAY VIEW OF THE CHEST       3/10/2022 9:16 am           FINDINGS:   The cardiac silhouette, mediastinal hilar contours are normal.  There are   linear opacities in the lung bases.  There is minimal vascular prominence. No pleural effusion or pneumothorax is identified.           Impression   Questionable for mild vascular congestion.       Mild bibasilar atelectasis.       Otherwise, no acute cardiopulmonary disease.             IMPRESSION:     1. Recurrent COPD exacerbation  2. Moderate COPD in exacerbation, improving  3. Previous history of extensive tobacco abuse  4.  Acute on chronic hypercapnic respiratory failure, stable  5. Heart failure with preserved ejection fraction    RECOMMENDATION:     1. Patient has presented to the hospital again with COPD exacerbation and acute on chronic hypercapnic respiratory failure. 2. His respiratory status is steadily improving. Wheezing is decreased. 3. Continue to use AVAPS therapy whenever he is sleeping/napping. He does have a noninvasive ventilator set up for him at home. 4. He will benefit from using oxygen whenever he is ambulating. 5. I have requested his [employer] company to get him a portable oxygen concentrator. This will be arranged early next week. 6. Continue with DuoNeb around-the-clock. At the time of discharge patient will restart on Trelegy Ellipta inhaler which is his home inhaler therapy. 7. Change to prednisone 40 mg p.o. daily today. This can be weaned off next 7 to 10 days. .  8. No indications for antibiotics. 9. On Coreg and Lasix for heart failure with preserved ejection fraction. proBNP levels are downtrending. 10. Anticipate discharge back home by tomorrow morning. Nothing further to add from pulmonary standpoint. We will sign off. Masoud Lozano MD   Pulmonary Critical Care and Sleep Medicine  [facility]   [facility], [place], [facility] [address]  3/10/2022, 11:07 AM      This note was completed using dragon medical speech recognition software. Grammatical errors, random word insertions, pronoun errors and incomplete sentences are occasional consequences of this technology due to software limitations. If there are questions or concerns about the content of this note of information contained within the body of this dictation they should be addressed with the provider for clarification.

## 2022-03-12 NOTE — PROGRESS NOTES
Hospitalist Progress Note      PCP: Warren Burdick MD    Date of Admission: 3/10/2022    Chief Complaint: Dyspnea    Hospital Course: 67-year-old male with past medical history of severe COPD presented with 1 to 2 days of progressive dyspnea. Patient with hypoxemia and respiratory distress at the ED. Placed on BiPAP. Morning of admission patient was not very responsive and diaphoretic. Rapid response was called to the room and patient was assessed by ICU hospitalist.  He was transferred to ICU for close monitoring. ABG was serially assessed for improvement in ABG with initial PCO2 of 102. Patient with steady improvement throughout the day so ETT was avoided. Subjective: Patient seen and examined at bedside. He is feeling better today.       Medications:  Reviewed    Infusion Medications    sodium chloride      dextrose       Scheduled Medications    [START ON 3/13/2022] warfarin  7.5 mg Oral Once per day on Sun Mon Tue Thu Fri    And    warfarin  10 mg Oral Once per day on Wed Sat    budesonide  0.5 mg Nebulization BID    predniSONE  40 mg Oral Daily    gentamicin   Topical TID    atorvastatin  80 mg Oral Daily    carvedilol  25 mg Oral BID WC    ferrous sulfate  325 mg Oral BID    NIFEdipine  60 mg Oral Daily    sodium chloride flush  5-40 mL IntraVENous 2 times per day    nicotine  1 patch TransDERmal Daily    furosemide  40 mg IntraVENous Daily    insulin glargine  0.25 Units/kg SubCUTAneous Nightly    insulin lispro  0-12 Units SubCUTAneous TID     insulin lispro  0-6 Units SubCUTAneous Nightly    levalbuterol  1.25 mg Nebulization 4x daily    ipratropium  0.5 mg Nebulization 4x daily    tiotropium-olodaterol  2 puff Inhalation Daily     PRN Meds: sodium chloride flush, sodium chloride, ondansetron **OR** ondansetron, polyethylene glycol, acetaminophen **OR** acetaminophen, aluminum & magnesium hydroxide-simethicone, albuterol, glucose, dextrose, glucagon (rDNA), dextrose, levalbuterol      Intake/Output Summary (Last 24 hours) at 3/12/2022 1340  Last data filed at 3/12/2022 1210  Gross per 24 hour   Intake 575 ml   Output 1345 ml   Net -770 ml       Physical Exam Performed:    /81   Pulse 70   Temp 97 °F (36.1 °C) (Temporal)   Resp 18   Ht 5' 8\" (1.727 m)   Wt 156 lb 4.9 oz (70.9 kg)   SpO2 100%   BMI 23.77 kg/m²     General appearance: No apparent distress, appears stated age and cooperative. HEENT: Pupils equal, round, and reactive to light. Conjunctivae/corneas clear. Neck: Supple, with full range of motion. No jugular venous distention. Trachea midline. Respiratory: Diffuse wheezing   cardiovascular: Regular rate and rhythm with normal S1/S2 without murmurs, rubs or gallops. Abdomen: Soft, non-tender, non-distended with normal bowel sounds. Musculoskeletal: No clubbing, cyanosis or edema bilaterally. Full range of motion without deformity. Skin: Skin color, texture, turgor normal.  No rashes or lesions. Neurologic:  Neurovascularly intact without any focal sensory/motor deficits.  Cranial nerves: II-XII intact, grossly non-focal.  Psychiatric: Alert and oriented, thought content appropriate, normal insight  Capillary Refill: Brisk,< 3 seconds   Peripheral Pulses: +2 palpable, equal bilaterally       Labs:   Recent Labs     03/10/22  0312 03/10/22  0824 03/11/22  0359   WBC 7.5  --  4.5   HGB 13.8 18.4* 13.0*   HCT 43.1  --  41.1     --  281     Recent Labs     03/10/22  0312 03/11/22  0359    139   K 4.7 5.0   CL 95* 96*   CO2 31 34*   BUN 17 24*   CREATININE 0.8 0.7*   CALCIUM 9.7 9.2     Recent Labs     03/10/22  0312 03/11/22  0359   AST 23 15   ALT 22 19   BILITOT <0.2 <0.2   ALKPHOS 93 81     Recent Labs     03/10/22  0312 03/11/22  0359 03/12/22  0430   INR 3.33* 2.68* 1.72*     Recent Labs     03/10/22  0312   TROPONINI <0.01       Urinalysis:      Lab Results   Component Value Date    NITRU Negative 02/02/2022    WBCUA 1 02/02/2022 RBCUA 1 02/02/2022    BLOODU Negative 02/02/2022    SPECGRAV 1.021 02/02/2022    GLUCOSEU Negative 02/02/2022       Radiology:  XR CHEST PORTABLE   Final Result   Questionable for mild vascular congestion. Mild bibasilar atelectasis. Otherwise, no acute cardiopulmonary disease. XR CHEST PORTABLE   Final Result   Cardiomegaly with central vascular congestion and mild perihilar edema. Assessment/Plan:    Active Hospital Problems    Diagnosis     Acute hypercapnic respiratory failure ([address] [J96.02]     COPD exacerbation ([facility]) [J44.1]     Type 2 diabetes mellitus with circulatory disorder, without long-term current use of insulin ([facility]) [E11.59]     COPD (chronic obstructive pulmonary disease) ([address] [J44.9]     Centrilobular emphysema ([address] [J43.2]     Peripheral artery disease ([address] [I73.9]     Peripheral vascular disease ([address] [I73.9]     Essential hypertension [I10]      Acute hypoxemic and hypercapnic respiratory failure  COPD exacerbation  Severe  Continue supplemental oxygen for SPO2 9092%  Continue at bedtime BiPAP as patient is on BiPAP at home  Pulmonology input appreciated    Peripheral arterial disease with bilateral lower extremity wounds  Patient follows with vascular surgery outpatient  And there is plan for left leg bypass in the future  Wounds care consulted for dressing changes    Acute metabolic encephalopathy  Secondary to hypercapnic respiratory failure  Improved    History of DVT  On Coumadin  INR therapeutic    DVT Prophylaxis: Coumadin  Diet: ADULT DIET;  Regular; 5 carb choices (75 gm/meal)  Code Status: Full Code    Electronically signed by Carlos Alberto Epstein MD on 3/12/2022 at 1:40 PM

## 2022-03-12 NOTE — PROGRESS NOTES
Pharmacy to Dose Warfarin    Pharmacy consulted to dose warfarin for DVT/PAD. INR Goal: 2-3    INR today: 1.72    Assessment/Plan:  - INR low after holding warfarin and lower dose given. - Resume home warfarin.   - Give 10 mg tonight. Pharmacy will continue to follow.     Laquita Jaffe, PharmD  Pharmacy Resident   [id number]

## 2022-03-13 VITALS
BODY MASS INDEX: 24.12 KG/M2 | HEART RATE: 99 BPM | SYSTOLIC BLOOD PRESSURE: 118 MMHG | OXYGEN SATURATION: 96 % | RESPIRATION RATE: 27 BRPM | TEMPERATURE: 97.7 F | HEIGHT: 68 IN | DIASTOLIC BLOOD PRESSURE: 69 MMHG | WEIGHT: 159.17 LBS

## 2022-03-13 LAB
ANION GAP SERPL CALCULATED.3IONS-SCNC: 9 MMOL/L (ref 3–16)
BASOPHILS ABSOLUTE: 0 K/UL (ref 0–0.2)
BASOPHILS RELATIVE PERCENT: 0.3 %
BUN BLDV-MCNC: 20 MG/DL (ref 7–20)
CALCIUM SERPL-MCNC: 9.5 MG/DL (ref 8.3–10.6)
CHLORIDE BLD-SCNC: 96 MMOL/L (ref 99–110)
CO2: 33 MMOL/L (ref 21–32)
CREAT SERPL-MCNC: 0.5 MG/DL (ref 0.8–1.3)
EOSINOPHILS ABSOLUTE: 0 K/UL (ref 0–0.6)
EOSINOPHILS RELATIVE PERCENT: 0 %
GFR AFRICAN AMERICAN: >60
GFR NON-AFRICAN AMERICAN: >60
GLUCOSE BLD-MCNC: 117 MG/DL (ref 70–99)
GLUCOSE BLD-MCNC: 128 MG/DL (ref 70–99)
GLUCOSE BLD-MCNC: 93 MG/DL (ref 70–99)
HCT VFR BLD CALC: 38.9 % (ref 40.5–52.5)
HEMOGLOBIN: 12.3 G/DL (ref 13.5–17.5)
INR BLD: 1.42 (ref 0.88–1.12)
LYMPHOCYTES ABSOLUTE: 1.7 K/UL (ref 1–5.1)
LYMPHOCYTES RELATIVE PERCENT: 26.3 %
MAGNESIUM: 2.2 MG/DL (ref 1.8–2.4)
MCH RBC QN AUTO: 28 PG (ref 26–34)
MCHC RBC AUTO-ENTMCNC: 31.7 G/DL (ref 31–36)
MCV RBC AUTO: 88.2 FL (ref 80–100)
MONOCYTES ABSOLUTE: 0.5 K/UL (ref 0–1.3)
MONOCYTES RELATIVE PERCENT: 8 %
NEUTROPHILS ABSOLUTE: 4.3 K/UL (ref 1.7–7.7)
NEUTROPHILS RELATIVE PERCENT: 65.4 %
PDW BLD-RTO: 15.7 % (ref 12.4–15.4)
PERFORMED ON: ABNORMAL
PERFORMED ON: NORMAL
PHOSPHORUS: 3.1 MG/DL (ref 2.5–4.9)
PLATELET # BLD: 243 K/UL (ref 135–450)
PMV BLD AUTO: 7.3 FL (ref 5–10.5)
POTASSIUM SERPL-SCNC: 4.3 MMOL/L (ref 3.5–5.1)
PROTHROMBIN TIME: 16.3 SEC (ref 9.9–12.7)
RBC # BLD: 4.41 M/UL (ref 4.2–5.9)
SODIUM BLD-SCNC: 138 MMOL/L (ref 136–145)
WBC # BLD: 6.6 K/UL (ref 4–11)

## 2022-03-13 PROCEDURE — 97161 PT EVAL LOW COMPLEX 20 MIN: CPT

## 2022-03-13 PROCEDURE — 97530 THERAPEUTIC ACTIVITIES: CPT

## 2022-03-13 PROCEDURE — 97116 GAIT TRAINING THERAPY: CPT

## 2022-03-13 PROCEDURE — 83735 ASSAY OF MAGNESIUM: CPT

## 2022-03-13 PROCEDURE — 97165 OT EVAL LOW COMPLEX 30 MIN: CPT

## 2022-03-13 PROCEDURE — 6360000002 HC RX W HCPCS: Performed by: HOSPITALIST

## 2022-03-13 PROCEDURE — 6370000000 HC RX 637 (ALT 250 FOR IP): Performed by: INTERNAL MEDICINE

## 2022-03-13 PROCEDURE — 94761 N-INVAS EAR/PLS OXIMETRY MLT: CPT

## 2022-03-13 PROCEDURE — 84100 ASSAY OF PHOSPHORUS: CPT

## 2022-03-13 PROCEDURE — 2580000003 HC RX 258: Performed by: HOSPITALIST

## 2022-03-13 PROCEDURE — 80048 BASIC METABOLIC PNL TOTAL CA: CPT

## 2022-03-13 PROCEDURE — 85610 PROTHROMBIN TIME: CPT

## 2022-03-13 PROCEDURE — 94640 AIRWAY INHALATION TREATMENT: CPT

## 2022-03-13 PROCEDURE — 6370000000 HC RX 637 (ALT 250 FOR IP): Performed by: HOSPITALIST

## 2022-03-13 PROCEDURE — 6360000002 HC RX W HCPCS: Performed by: INTERNAL MEDICINE

## 2022-03-13 PROCEDURE — 2700000000 HC OXYGEN THERAPY PER DAY

## 2022-03-13 PROCEDURE — 85025 COMPLETE CBC W/AUTO DIFF WBC: CPT

## 2022-03-13 RX ORDER — WARFARIN SODIUM 5 MG/1
10 TABLET ORAL
Status: DISCONTINUED | OUTPATIENT
Start: 2022-03-16 | End: 2022-03-13 | Stop reason: HOSPADM

## 2022-03-13 RX ORDER — WARFARIN SODIUM 5 MG/1
10 TABLET ORAL
Status: DISCONTINUED | OUTPATIENT
Start: 2022-03-13 | End: 2022-03-13 | Stop reason: HOSPADM

## 2022-03-13 RX ORDER — NICOTINE 21 MG/24HR
1 PATCH, TRANSDERMAL 24 HOURS TRANSDERMAL DAILY
Qty: 14 PATCH | Refills: 0 | Status: SHIPPED | OUTPATIENT
Start: 2022-03-14 | End: 2022-05-05

## 2022-03-13 RX ORDER — PREDNISONE 20 MG/1
TABLET ORAL
Qty: 12 TABLET | Refills: 0 | Status: SHIPPED | OUTPATIENT
Start: 2022-03-14 | End: 2022-03-22

## 2022-03-13 RX ADMIN — FERROUS SULFATE TAB 325 MG (65 MG ELEMENTAL FE) 325 MG: 325 (65 FE) TAB at 07:58

## 2022-03-13 RX ADMIN — BUDESONIDE 250 MCG: 0.5 SUSPENSION RESPIRATORY (INHALATION) at 08:19

## 2022-03-13 RX ADMIN — IPRATROPIUM BROMIDE 0.5 MG: 0.5 SOLUTION RESPIRATORY (INHALATION) at 08:23

## 2022-03-13 RX ADMIN — IPRATROPIUM BROMIDE 0.5 MG: 0.5 SOLUTION RESPIRATORY (INHALATION) at 12:16

## 2022-03-13 RX ADMIN — TIOTROPIUM BROMIDE AND OLODATEROL 2 PUFF: 3.124; 2.736 SPRAY, METERED RESPIRATORY (INHALATION) at 08:21

## 2022-03-13 RX ADMIN — GENTAMICIN SULFATE: 1 CREAM TOPICAL at 07:58

## 2022-03-13 RX ADMIN — LEVALBUTEROL HYDROCHLORIDE 1.25 MG: 1.25 SOLUTION RESPIRATORY (INHALATION) at 08:23

## 2022-03-13 RX ADMIN — LEVALBUTEROL HYDROCHLORIDE 1.25 MG: 1.25 SOLUTION RESPIRATORY (INHALATION) at 12:16

## 2022-03-13 RX ADMIN — SODIUM CHLORIDE, PRESERVATIVE FREE 10 ML: 5 INJECTION INTRAVENOUS at 07:59

## 2022-03-13 RX ADMIN — FUROSEMIDE 40 MG: 10 INJECTION, SOLUTION INTRAMUSCULAR; INTRAVENOUS at 07:58

## 2022-03-13 RX ADMIN — ATORVASTATIN CALCIUM 80 MG: 80 TABLET, FILM COATED ORAL at 07:58

## 2022-03-13 RX ADMIN — CARVEDILOL 25 MG: 25 TABLET, FILM COATED ORAL at 07:58

## 2022-03-13 RX ADMIN — NIFEDIPINE 60 MG: 30 TABLET, FILM COATED, EXTENDED RELEASE ORAL at 07:59

## 2022-03-13 RX ADMIN — PREDNISONE 40 MG: 20 TABLET ORAL at 07:58

## 2022-03-13 ASSESSMENT — PAIN SCALES - GENERAL: PAINLEVEL_OUTOF10: 0

## 2022-03-13 NOTE — CARE COORDINATION
SW aware of pt's discharge order for today. Patient was evaluated by PT/OT who had no recommendations. Patient is active with [employer] [facility] home oxygen. Called RN and informed to try and have pt's spouse or family bring a tank from home if needed at discharge. No other needs identified. Discharge Plan:  Home w/continuing oxygen from [facility]).     Electronically signed by MATTHIAS Yancey, LAZARA on 3/13/2022 at 11:46 AM

## 2022-03-13 NOTE — DISCHARGE INSTR - COC
Continuity of Care Form    Patient Name: Lazara Allen   :  1960  MRN:  [id number]    Admit date:  3/10/2022  Discharge date:  ***    Code Status Order: Full Code   Advance Directives:      Admitting Physician:  Brent Bales MD  PCP: David Stein MD    Discharging Nurse: [facility] Unit/Room#: ETW-2078/0566-47  Discharging Unit Phone Number: ***    Emergency Contact:   Extended Emergency Contact Information  Primary Emergency Contact: America Gibson  Address: [address] [place] [address] Rickey Soares [address] Phone: [phone]  Mobile Phone: [phone]  Relation: Spouse    Past Surgical History:  Past Surgical History:   Procedure Laterality Date    APPENDECTOMY  2005    COLONOSCOPY  2016    FEMORAL BYPASS Left 2020    femoral posterior tibial bypass    FEMORAL-TIBIAL BYPASS GRAFT Right 2020    with femoral endarterectomy and patch angioplasty    FOOT DEBRIDEMENT Left 2020    FOOT DEBRIDEMENT Right 2021    DEBRIDEMENT OF RIGHT FOOT WOUND WITH GRAFT APPLICATION performed by Juliet Girard DPM at [facility] Right 2020    stent leg for PVD       Immunization History:   Immunization History   Administered Date(s) Administered    COVID-19, Pfizer Purple top, DILUTE for use, 12+ yrs, 30mcg/0.3mL dose 03/15/2021, 2021, 2021    Influenza Virus Vaccine 2017, 2017    Influenza, Nehal Copas, IM, PF (6 mo and older Fluzone, Flulaval, Fluarix, and 3 yrs and older Afluria) 10/31/2020    Tdap (Boostrix, Adacel) 2018    Zoster Recombinant (Shingrix) 2018       Active Problems:  Patient Active Problem List   Diagnosis Code    Peripheral artery disease ([address] I73.9    Centrilobular emphysema ([address] J43.2    COPD (chronic obstructive pulmonary disease) ([address] J44.9    Atherosclerosis of native arteries of right leg with ulceration of other part of foot ([address] I70.235    Impotence N52.9    Acute deep vein thrombosis (DVT) of femoral vein of left lower extremity (HCC) I82.412    Colon polyp K63.5    Hyperlipidemia E78.5    Non-healing ulcer of left ankle (HCC) L97.329    Venous insufficiency I87.2    Venous ulcer (HCC) I83.009, L97.909    Essential hypertension I10    Peripheral vascular disease (HCC) I73.9    Type 2 diabetes mellitus with circulatory disorder, without long-term current use of insulin (HCC) E11.59    COPD exacerbation (HCC) J44.1    Acute hypercapnic respiratory failure ([facility]) J96.02       Isolation/Infection:   Isolation            No Isolation          Patient Infection Status       Infection Onset Added Last Indicated Last Indicated By Review Planned Expiration Resolved Resolved By    None active    Resolved    COVID-19 (Rule Out) 02/02/22 02/02/22 02/02/22 COVID-19 (Ordered)   02/03/22 Rule-Out Test Resulted    COVID-19 (Rule Out) 01/21/22 01/21/22 01/21/22 COVID-19 (Ordered)   01/22/22 Rule-Out Test Resulted    COVID-19 (Rule Out) 12/31/21 12/31/21 12/31/21 COVID-19 (Ordered)   12/31/21 Rule-Out Test Resulted    COVID-19 (Rule Out) 12/28/21 12/28/21 12/28/21 COVID-19, Rapid (Ordered)   12/28/21 Rule-Out Test Resulted            Nurse Assessment:  Last Vital Signs: /69   Pulse 99   Temp 97.7 °F (36.5 °C) (Temporal)   Resp 27   Ht 5' 8\" (1.727 m)   Wt 159 lb 2.8 oz (72.2 kg)   SpO2 100%   BMI 24.20 kg/m²     Last documented pain score (0-10 scale): Pain Level: 0  Last Weight:   Wt Readings from Last 1 Encounters:   03/13/22 159 lb 2.8 oz (72.2 kg)     Mental Status:  {IP PT MENTAL STATUS:20030}    IV Access:  { MARNIE IV ACCESS:596221593}    Nursing Mobility/ADLs:  Walking   {CHP DME JWUO:159869318}  Transfer  {CHP DME TRBO:105967935}  Bathing  {CHP DME WCFT:318897614}  Dressing  {CHP DME JDMM:422437767}  Toileting  {[facility] MOHINI FIQY:348787939}  Feeding  {[facility] MOHINI RLHO:860397778}  Med Admin  {[facility] MOIHNI WMKD:418150675}  Med Delivery   {[facility] MED RSPJCBNI:513218899}    Wound Care Documentation and Therapy:  Wound 12/31/21 Ankle Right;Medial #1 (Active)   Wound Image   03/11/22 1533   Wound Etiology Diabetic 03/13/22 0802   Dressing Status Clean;Dry; Intact 03/13/22 0802   Wound Cleansed Cleansed with saline 03/12/22 1500   Dressing/Treatment Ace wrap;Collagen with Ag;Gauze dressing/dressing sponge;Non adherent; Pharmaceutical agent (see MAR) 03/13/22 0802   Offloading for Diabetic Foot Ulcers Offloading not required 03/10/22 2100   Dressing Change Due 03/13/22 03/13/22 0802   Wound Length (cm) 0.4 cm 03/11/22 1533   Wound Width (cm) 0.9 cm 03/11/22 1533   Wound Depth (cm) 0.1 cm 03/04/22 0818   Wound Surface Area (cm^2) 0.36 cm^2 03/11/22 1533   Change in Wound Size % (l*w) 98 03/11/22 1533   Wound Volume (cm^3) 0.006 cm^3 03/04/22 0818   Wound Healing % 100 03/04/22 0818   Post-Procedure Length (cm) 0.4 cm 03/04/22 0844   Post-Procedure Width (cm) 1 cm 03/04/22 0844   Post-Procedure Depth (cm) 0.1 cm 03/04/22 0844   Post-Procedure Surface Area (cm^2) 0.4 cm^2 03/04/22 0844   Post-Procedure Volume (cm^3) 0.04 cm^3 03/04/22 0844   Wound Assessment Dry;Pink/red 03/12/22 0831   Drainage Amount Small 03/12/22 0831   Drainage Description Serosanguinous 03/12/22 0831   Odor None 03/13/22 0802   Jamila-wound Assessment Dry/flaky 03/12/22 0831   Margins Attached edges; Defined edges 03/11/22 1533   Wound Thickness Description not for Pressure Injury Partial thickness 03/11/22 1533   Number of days: 71       Wound 12/31/21 Ankle Medial;Left #2 (Active)   Wound Image   03/11/22 1533   Wound Etiology Diabetic 03/13/22 0802   Dressing Status Clean;Dry; Intact 03/13/22 0802   Wound Cleansed Cleansed with saline 03/12/22 1500   Dressing/Treatment Ace wrap;Alginate with Ag;Non adherent; Pharmaceutical agent (see MAR) 03/12/22 1500   Offloading for Diabetic Foot Ulcers Offloading not required 03/10/22 2100   Dressing Change Due 03/13/22 03/13/22 0802   Wound Length (cm) 1.7 cm 03/04/22 0818   Wound Width (cm) 3 cm 03/11/22 1533   Wound Depth (cm) 1.3 cm 03/11/22 1533   Wound Surface Area (cm^2) 5.61 cm^2 03/04/22 0818   Change in Wound Size % (l*w) 25.2 03/04/22 0818   Wound Volume (cm^3) 1.122 cm^3 03/04/22 0818   Wound Healing % 25 03/04/22 0818   Post-Procedure Length (cm) 1.7 cm 03/04/22 0844   Post-Procedure Width (cm) 3.3 cm 03/04/22 0844   Post-Procedure Depth (cm) 0.2 cm 03/04/22 0844   Post-Procedure Surface Area (cm^2) 5.61 cm^2 03/04/22 0844   Post-Procedure Volume (cm^3) 1.122 cm^3 03/04/22 0844   Wound Assessment Other (Comment) 03/13/22 0802   Drainage Amount None 03/12/22 0831   Drainage Description Serosanguinous 03/12/22 0831   Odor None 03/12/22 0831   Jamila-wound Assessment Dry/flaky 03/12/22 0831   Margins Defined edges; Unattached edges 03/11/22 1533   Wound Thickness Description not for Pressure Injury Full thickness 03/11/22 1533   Number of days: 71        Elimination:  Continence: Bowel: {YES / UP:83512}  Bladder: {YES / UQ:33979}  Urinary Catheter: {Urinary Catheter:219074775}   Colostomy/Ileostomy/Ileal Conduit: {YES / ZK:22575}       Date of Last BM: ***    Intake/Output Summary (Last 24 hours) at 3/13/2022 1051  Last data filed at 3/13/2022 0400  Gross per 24 hour   Intake 1460 ml   Output 1050 ml   Net 410 ml     I/O last 3 completed shifts:   In: 0824 [P.O.:1585]  Out: 1945 [LIKXU:8287]    Safety Concerns:     [address] Safety Concerns:152208281}    Impairments/Disabilities:      [address] Impairments/Disabilities:304417199}    Nutrition Therapy:  Current Nutrition Therapy:   [address] Diet List:501017934}    Routes of Feeding: {CHP DME Other Feedings:935739372}  Liquids: {Slp liquid thickness:05196}  Daily Fluid Restriction: {CHP DME Yes amt example:614610294}  Last Modified Barium Swallow with Video (Video Swallowing Test): {Done Not Done FMST:329779761}    Treatments at the Time of Hospital Discharge:   Respiratory Treatments: ***  Oxygen Therapy:  {Therapy; copd oxygen:08902}  Ventilator:    {MH CC Vent ZXXM:856724333}    Rehab Therapies: {THERAPEUTIC INTERVENTION:8952607581}  Weight Bearing Status/Restrictions: 50Alfredo BARAJAS Weight Bearin}  Other Medical Equipment (for information only, NOT a DME order):  {EQUIPMENT:685071418}  Other Treatments: ***    Patient's personal belongings (please select all that are sent with patient):  {CHP DME Belongings:264596821}    RN SIGNATURE:  {Esignature:356719105}    CASE MANAGEMENT/SOCIAL WORK SECTION    Inpatient Status Date: ***    Readmission Risk Assessment Score:  Readmission Risk              Risk of Unplanned Readmission:  31           Discharging to Facility/ Agency   Name:   Address:  Phone:  Fax:    Dialysis Facility (if applicable)   Name:  Address:  Dialysis Schedule:  Phone:  Fax:    / signature: {Esignature:050633484}    PHYSICIAN SECTION    Prognosis: {Prognosis:6080668677}    Condition at Discharge: 50Alfredo Romero Patient Condition:094039014}    Rehab Potential (if transferring to Rehab): {Prognosis:2109809706}    Recommended Labs or Other Treatments After Discharge: ***    Physician Certification: I certify the above information and transfer of Sagrario Bhatia  is necessary for the continuing treatment of the diagnosis listed and that he requires {Admit to Appropriate Level of Care:27006} for {GREATER/LESS:735658456} 30 days.      Update Admission H&P: {CHP DME Changes in GLVFM:811470524}    PHYSICIAN SIGNATURE:  {Esignature:298344713}

## 2022-03-13 NOTE — PROGRESS NOTES
Occupational Therapy   Occupational Therapy Initial Assessment  Date: 3/13/2022   Patient Name: Vernon Dempsey  MRN: [id number]     : 1960    Date of Service: 3/13/2022    Discharge Recommendations: Vernon Dempsey scored a 19/21 on the  [address] form. At this time, no further OT is recommended upon discharge due to independence in ADLs and functional mobility. Recommend patient returns to prior setting with prior services. OT Equipment Recommendations  Equipment Needed: No    Assessment   Assessment: Pt is independent in ADLs and functional mobility. No skilled OT services indicated at this time. Prognosis: Good  Decision Making: Low Complexity  OT Education: OT Role;Plan of Care;Energy Conservation  Patient Education: pt verbalized understanding  Barriers to Learning: none  REQUIRES OT FOLLOW UP: No  Activity Tolerance  Activity Tolerance: Patient Tolerated treatment well  Safety Devices  Safety Devices in place: Yes  Type of devices: Left in bed;Nurse notified;Call light within reach  Restraints  Initially in place: No           Patient Diagnosis(es): The primary encounter diagnosis was COPD exacerbation ([facility] A diagnosis of Acute respiratory failure with hypoxia (HCC) was also pertinent to this visit. has a past medical history of Diabetes mellitus ([address] Fibromyalgia, History of DVT (deep vein thrombosis), Hyperlipidemia, Hypertension, and Type 2 diabetes mellitus with circulatory disorder, without long-term current use of insulin ([address] has a past surgical history that includes Colonoscopy (); Appendectomy (); other surgical history (Right, 2020); femoral bypass (Left, 2020); Femoral-tibial Bypass Graft (Right, 2020); Foot Debridement (Left, 2020); and Foot Debridement (Right, 2021).            Restrictions  Restrictions/Precautions  Restrictions/Precautions: Fall Risk (high fall risk)  Required Braces or Orthoses?: No  Position Activity Restriction  Other position/activity restrictions: This is a 65 y.o. male with a PMH significant for COPD, DM, HTN presented today for SOB that started 1 hour ago. Patient was admitted on March 5 for COPD exacerbation, acute respiratory failure. He was discharged on March 7. Patient said he was doing fine until about an hour ago when he started having severe shortness of breath. He denies fever, cough, sinus pressure, nasal congestion. He also denies chest pain. He has chronic lower leg edema secondary to peripheral vascular disease. He states the leg edema is not worse. Patient has both inhaler and nebulizer at home. He states has used both without relief of his shortness of breath. Subjective   General  Chart Reviewed: Yes  Patient assessed for rehabilitation services?: Yes (Simultaneous filing. User may not have seen previous data.)  Family / Caregiver Present: No  Diagnosis: COPD exacerbation  Subjective  Subjective: Pt seated EOB upon arrival; pleasant and agreeable to eval.  Patient Currently in Pain: Denies (Simultaneous filing. User may not have seen previous data.)  Vital Signs  Temp: 97.7 °F (36.5 °C)  Temp Source: Temporal  Heart Rate Source: Monitor  BP Location: Right upper arm  Patient Position: Sitting  Level of Consciousness: Alert (0)  Patient Currently in Pain: Denies (Simultaneous filing.  User may not have seen previous data.)  Oxygen Therapy  Pulse Oximeter Device Mode: Continuous  Pulse Oximeter Device Location: Finger  O2 Device: Nasal cannula  O2 Flow Rate (L/min): 3 L/min (titrate to 2 liters)  Social/Functional History  Social/Functional History  Lives With: Spouse  Type of Home: House  Home Layout: Two level ((Split level, 6 steps inside home))  Home Access: Stairs to enter with rails  Entrance Stairs - Number of Steps: 5  Entrance Stairs - Rails: Right  Bathroom Shower/Tub: Tub/Shower unit  Bathroom Toilet: Standard  Home Equipment: Rolling walker,Cane ((Uses the cane most of the time, walker occasionally))  ADL Assistance: [address]: Independent  Homemaking Responsibilities: No  Ambulation Assistance: Independent  Transfer Assistance: Independent  Active : Yes  Mode of Transportation: Car  Occupation: Full time employment  Type of occupation: office job  Leisure & Hobbies: playing with grandchildren  Additional Comments: No falls in the past 6 months       Objective   Vision: Impaired  Vision Exceptions: Wears glasses for reading  Hearing: Within functional limits    Orientation  Overall Orientation Status: Within Functional Limits     Balance  Sitting Balance: Independent  Standing Balance: Independent  Standing Balance  Time: ~5 min  Activity: functional mobility, transfers  Comment: cane  Functional Mobility  Functional - Mobility Device: Cane  Activity: Other (~300 ft in room and ICU [place])  Assist Level: Independent  Functional Mobility Comments: no LOB, no SOB, remained at 97% SpO2 on 2 L O2  ADL  Feeding: Independent (managed utensils, packaging, and beverage)  LE Dressing: Independent (retrieved and donned socks)  Additional Comments: Pt declined additional ADLs, reporting prior completion  Tone RUE  RUE Tone: Normotonic  Tone LUE  LUE Tone: Normotonic  Coordination  Movements Are Fluid And Coordinated: Yes     Bed mobility  Comment: Not observed. Presumed to be independent as patient sitting up on his own and reporting he has been ambulating in room independently. Transfers  Stand Step Transfers: Independent  Sit to stand: Independent  Stand to sit:  Independent  Transfer Comments: to/from EOB     Cognition  Overall Cognitive Status: WNL  Perception  Overall Perceptual Status: WFL     Sensation  Overall Sensation Status: Impaired (L medial ankle, R dorsal foot numbness and tingling)        LUE AROM (degrees)  LUE AROM : WFL  Left Hand AROM (degrees)  Left Hand AROM: WFL  RUE AROM (degrees)  RUE AROM : WFL  Right Hand AROM (degrees)  Right Hand AROM: WFL  LUE Strength  Gross LUE Strength: WFL  RUE Strength  Gross RUE Strength: [facility]                   Plan   Plan  Times per week: eval and d/c 3/13/22    G-Code     OutComes Score                                                  AM-PAC Score        AM-PAC Inpatient Daily Activity Raw Score: 24 (03/13/22 0930)  AM-PAC Inpatient ADL T-Scale Score : 57.54 (03/13/22 0930)  ADL Inpatient CMS 0-100% Score: 0 (03/13/22 0930)  ADL Inpatient CMS G-Code Modifier : [address] (03/13/22 0930)    Goals  Short term goals  Time Frame for Short term goals: No acute OT goals indicated at this time  Patient Goals   Patient goals : return home       Therapy Time   Individual Concurrent Group Co-treatment   Time In 0848         Time Out 0915         Minutes 27         Timed Code Treatment Minutes: [address], [place] [address]

## 2022-03-13 NOTE — PLAN OF CARE
Problem: Falls - Risk of:  Goal: Will remain free from falls  Description: Will remain free from falls  3/13/2022 0111 by Suleman Modi RN  Outcome: Ongoing  3/12/2022 1411 by Grupo Huerta RN  Outcome: Met This Shift  Goal: Absence of physical injury  Description: Absence of physical injury  3/13/2022 0111 by Suleman Modi RN  Outcome: Ongoing  3/12/2022 1411 by Grupo Huerta RN  Outcome: Met This Shift     Problem: Discharge Planning:  Goal: Discharged to appropriate level of care  Description: Discharged to appropriate level of care  3/13/2022 0111 by Suleman Modi RN  Outcome: Ongoing  3/12/2022 1411 by Grupo Huerta RN  Outcome: Ongoing     Problem: Airway Clearance - Ineffective:  Goal: Ability to maintain a clear airway will improve  Description: Ability to maintain a clear airway will improve  3/13/2022 0111 by Suleman Modi RN  Outcome: Ongoing  3/12/2022 1411 by Grupo Huerta RN  Outcome: Met This Shift     Problem: Breathing Pattern - Ineffective:  Goal: Ability to achieve and maintain a regular respiratory rate will improve  Description: Ability to achieve and maintain a regular respiratory rate will improve  3/13/2022 0111 by Suleman Modi RN  Outcome: Ongoing  3/12/2022 1411 by Grupo Huerta RN  Outcome: Met This Shift     Problem: Gas Exchange - Impaired:  Goal: Levels of oxygenation will improve  Description: Levels of oxygenation will improve  3/13/2022 0111 by Suleman Modi RN  Outcome: Ongoing  3/12/2022 1411 by Grupo Huerta RN  Outcome: Met This Shift

## 2022-03-13 NOTE — PROGRESS NOTES
Physical Therapy    Facility/Department: [facility] ICU  Initial Assessment/Discharge Summary    NAME: Iris Azevedo  : 1960  MRN: [id number]    Date of Service: 3/13/2022    Discharge Recommendations:  Iris Azevedo scored a 24/24 on the AM-PAC short mobility form. At this time, no further PT is recommended upon discharge due to patient being functionally independent. Recommend patient returns to prior setting with prior services. Home with assist PRN   PT Equipment Recommendations  Equipment Needed: No  Other: Patient has RW and cane at home. Assessment   Assessment: Patient is functionally independent. No current therapy needs at this time. Will sign off. Recommend d/c home post acute stay. Prognosis: Excellent  Decision Making: Low Complexity  History: See chart. Exam: functional mobility assessment  Clinical Presentation: Stable. PT Education: Terri Alvarenga  Trevin  Patient Education: Patient verbalized understanding. Barriers to Learning: None evident. REQUIRES PT FOLLOW UP: No  Activity Tolerance  Activity Tolerance: Patient Tolerated treatment well       Patient Diagnosis(es): The primary encounter diagnosis was COPD exacerbation ([address] A diagnosis of Acute respiratory failure with hypoxia (HCC) was also pertinent to this visit. has a past medical history of Diabetes mellitus ([address] Fibromyalgia, History of DVT (deep vein thrombosis), Hyperlipidemia, Hypertension, and Type 2 diabetes mellitus with circulatory disorder, without long-term current use of insulin ([address] has a past surgical history that includes Colonoscopy (); Appendectomy (); other surgical history (Right, 2020); femoral bypass (Left, 2020); Femoral-tibial Bypass Graft (Right, 2020); Foot Debridement (Left, 2020); and Foot Debridement (Right, 2021).     Restrictions  Restrictions/Precautions  Restrictions/Precautions: Fall Risk (high fall risk)  Required Braces or Orthoses?: No  Position Activity Restriction  Other position/activity restrictions: This is a 65 y.o. male with a PMH significant for COPD, DM, HTN presented today for SOB that started 1 hour ago. Patient was admitted on March 5 for COPD exacerbation, acute respiratory failure. He was discharged on March 7. Patient said he was doing fine until about an hour ago when he started having severe shortness of breath. He denies fever, cough, sinus pressure, nasal congestion. He also denies chest pain. He has chronic lower leg edema secondary to peripheral vascular disease. He states the leg edema is not worse. Patient has both inhaler and nebulizer at home. He states has used both without relief of his shortness of breath. Vision/Hearing  Vision: Impaired  Vision Exceptions: Wears glasses for reading  Hearing: Within functional limits       Subjective  General  Chart Reviewed: Yes  Patient assessed for rehabilitation services?: Yes (Simultaneous filing. User may not have seen previous data.)  Response To Previous Treatment: Not applicable  Family / Caregiver Present: No  Diagnosis: acute respiratory failure, COPD exacerbation  Follows Commands: Within Functional Limits  General Comment  Comments: Patient sitting up EOB. Subjective  Subjective: Patient reports he feels well, hopefully going home today. Pain Screening  Patient Currently in Pain: Denies (Simultaneous filing.  User may not have seen previous data.)          Orientation  Orientation  Overall Orientation Status: Within Normal Limits     Social/Functional History  Social/Functional History  Lives With: Spouse  Type of Home: House  Home Layout: Two level ((Split level, 6 steps inside home))  Home Access: Stairs to enter with rails  Entrance Stairs - Number of Steps: 5  Entrance Stairs - Rails: Right  Bathroom Shower/Tub: Tub/Shower unit  Bathroom Toilet: Standard  Home Equipment: Rolling walker,Cane ((Uses the cane most of the time, walker occasionally))  ADL Assistance: Independent  Homemaking Assistance: Independent  Homemaking Responsibilities: No  Ambulation Assistance: Independent  Transfer Assistance: Independent  Active : Yes  Mode of Transportation: Car  Occupation: Full time employment  Type of occupation: office job  Leisure & Hobbies: playing with grandchildren  Additional Comments: No falls in the past 6 months       Objective  AROM RLE (degrees)  RLE AROM: WNL  AROM LLE (degrees)  LLE AROM : WNL  Strength RLE  Strength RLE: WFL  Strength LLE  Strength LLE: WFL        Bed mobility  Comment: Not observed. Presumed to be independent as patient sitting up on his own and reporting he has been ambulating in room independently.   Transfers  Sit to Stand: Modified independent  Stand to sit: Modified independent  Bed to Chair: Modified independent  Stand Pivot Transfers: Modified independent  Ambulation  Ambulation?: Yes  More Ambulation?: No  Ambulation 1  Surface: level tile  Device: Single point cane  Assistance: Modified Independent  Gait Deviations: Slow [place];Decreased step length;Decreased step height  Distance: 270'  Stairs/Curb  Stairs?: No     Balance  Posture: Good  Sitting - Static: Good  Sitting - Dynamic: Good  Standing - Static: Good  Standing - Dynamic: Good        Plan   Plan  Plan Comment: D/C acute PT  Safety Devices  Type of devices: Call light within reach,Left in bed,Nurse notified  Restraints  Initially in place: No           AM-PAC Score  AM-PAC Inpatient Mobility Raw Score : 24 (03/13/22 0918)  AM-PAC Inpatient T-Scale Score : 61.14 (03/13/22 0918)  Mobility Inpatient CMS 0-100% Score: 0 (03/13/22 0918)  Mobility Inpatient CMS G-Code Modifier : [facility] (03/13/22 3445)       Therapy Time   Individual Concurrent Group Co-treatment   Time In 0848         Time Out 0915         Minutes 27         Timed Code Treatment Minutes: Carina Orozco, DPT, ATC-R [id number]

## 2022-03-13 NOTE — DISCHARGE SUMMARY
Hospital Medicine Discharge Summary    Patient ID: Allegra Martinez      Patient's PCP: Griselda Eisenmenger, MD    Admit Date: 3/10/2022     Discharge Date:   3/13/2022    Admitting Physician: Hernandez Tellez MD     Discharge Physician: Carlos Alberto Epstein MD     Discharge Diagnoses: Active Hospital Problems    Diagnosis     Acute hypercapnic respiratory failure ([address] [J96.02]     COPD exacerbation (HCC) [J44.1]     Type 2 diabetes mellitus with circulatory disorder, without long-term current use of insulin (HCC) [E11.59]     COPD (chronic obstructive pulmonary disease) ([address] [J44.9]     Centrilobular emphysema ([address] [J43.2]     Peripheral artery disease ([address] [I73.9]     Peripheral vascular disease ([address] [I73.9]     Essential hypertension [I10]        The patient was seen and examined on day of discharge and this discharge summary is in conjunction with any daily progress note from day of discharge. Hospital Course:     71-year-old male with past medical history of severe COPD presented with 1 to 2 days of progressive dyspnea. Patient with hypoxemia and respiratory distress at the ED. Placed on BiPAP. Morning of admission patient was not very responsive and diaphoretic. Rapid response was called to the room and patient was assessed by ICU hospitalist.  He was transferred to ICU for close monitoring. ABG was serially assessed for improvement in ABG with initial PCO2 of 102. Patient with steady improvement throughout the day so ETT was avoided. Patient continued to improve with his treatment for COPD. Physical Exam Performed:     /69   Pulse 99   Temp 97.7 °F (36.5 °C) (Temporal)   Resp 27   Ht 5' 8\" (1.727 m)   Wt 159 lb 2.8 oz (72.2 kg)   SpO2 96%   BMI 24.20 kg/m²       General appearance:  No apparent distress, appears stated age and cooperative. HEENT:  Normal cephalic, atraumatic without obvious deformity. Pupils equal, round, and reactive to light.   Extra ocular muscles intact. Conjunctivae/corneas clear. Neck: Supple, with full range of motion. No jugular venous distention. Trachea midline. Respiratory:  Normal respiratory effort. Clear to auscultation, bilaterally without Rales/Wheezes/Rhonchi. Cardiovascular:  Regular rate and rhythm with normal S1/S2 without murmurs, rubs or gallops. Abdomen: Soft, non-tender, non-distended with normal bowel sounds. Musculoskeletal:  No clubbing, cyanosis or edema bilaterally. Full range of motion without deformity. Skin: Skin color, texture, turgor normal.  No rashes or lesions. Neurologic:  Neurovascularly intact without any focal sensory/motor deficits. Cranial nerves: II-XII intact, grossly non-focal.  Psychiatric:  Alert and oriented, thought content appropriate, normal insight  Capillary Refill: Brisk,< 3 seconds   Peripheral Pulses: +2 palpable, equal bilaterally       Labs: For convenience and continuity at follow-up the following most recent labs are provided:      CBC:    Lab Results   Component Value Date    WBC 6.6 03/13/2022    HGB 12.3 03/13/2022    HCT 38.9 03/13/2022     03/13/2022       Renal:    Lab Results   Component Value Date     03/13/2022    K 4.3 03/13/2022    K 5.0 03/11/2022    CL 96 03/13/2022    CO2 33 03/13/2022    BUN 20 03/13/2022    CREATININE 0.5 03/13/2022    CALCIUM 9.5 03/13/2022    PHOS 3.1 03/13/2022         Significant Diagnostic Studies    Radiology:   XR CHEST PORTABLE   Final Result   Questionable for mild vascular congestion. Mild bibasilar atelectasis. Otherwise, no acute cardiopulmonary disease. XR CHEST PORTABLE   Final Result   Cardiomegaly with central vascular congestion and mild perihilar edema.                 Consults:     IP CONSULT TO HOSPITALIST  IP CONSULT TO PHARMACY  IP CONSULT TO PULMONOLOGY    Disposition: Home    Condition at Discharge: Stable    Discharge Instructions/Follow-up: PCP  Pulmonology  Vascular surgery    Code Status:  Full Code     Activity: activity as tolerated    Diet: cardiac diet and diabetic diet      Discharge Medications:     Current Discharge Medication List           Details   predniSONE (DELTASONE) 20 MG tablet Take 2 tablets by mouth daily for 4 days, THEN 1 tablet daily for 4 days. Qty: 12 tablet, Refills: 0      nicotine (NICODERM CQ) 14 MG/24HR Place 1 patch onto the skin daily for 14 days  Qty: 14 patch, Refills: 0              Details   ipratropium-albuterol (DUONEB) 0.5-2.5 (3) MG/3ML SOLN nebulizer solution Inhale 3 mLs into the lungs 4 times daily  Qty: 360 mL, Refills: 3      furosemide (LASIX) 20 MG tablet Take 1 tablet by mouth daily  Qty: 60 tablet, Refills: 3      guaiFENesin (MUCINEX) 600 MG extended release tablet Take 1,200 mg by mouth 2 times daily      atorvastatin (LIPITOR) 80 MG tablet Take 1 tablet by mouth daily Cancel atorvastatin 20 mg a day  Qty: 90 tablet, Refills: 0    Associated Diagnoses: Essential hypertension; Pure hypercholesterolemia; PAD (peripheral artery disease) (HCC)      Fluticasone-Umeclidin-Vilant (TRELEGY ELLIPTA) 200-62.5-25 MCG/INH AEPB Inhale 1 puff into the lungs 2 times daily  Qty: 1 each, Refills: 2    Associated Diagnoses: Panlobular emphysema (HCC)      gentamicin (GARAMYCIN) 0.1 % cream Apply topically   daily. Qty: 30 g, Refills: 1      albuterol sulfate HFA (VENTOLIN HFA) 108 (90 Base) MCG/ACT inhaler Inhale 2 puffs into the lungs 4 times daily as needed for Wheezing  Qty: 54 g, Refills: 1    Associated Diagnoses: SOB (shortness of breath); Wheezes;  Tobacco abuse disorder      acetaminophen (TYLENOL) 500 MG tablet Take 1 tablet by mouth 4 times daily as needed for Pain  Qty: 360 tablet, Refills: 1    Associated Diagnoses: Arthralgia, unspecified joint      warfarin (JANTOVEN) 5 MG tablet TAKE 1 AND 1/2 TABLETS BY MOUTH ONE TIME A DAY OR AS DIRECTED BY [facility] ([phone])  Qty: 45 tablet, Refills: 0      NIFEdipine (ADALAT CC) 60 MG extended release tablet Take 1 tablet by mouth daily  Qty: 90 tablet, Refills: 0    Associated Diagnoses: Essential hypertension      carvedilol (COREG) 25 MG tablet Take 1 tablet by mouth 2 times daily (with meals)  Qty: 60 tablet, Refills: 3    Associated Diagnoses: Essential hypertension      metFORMIN (GLUCOPHAGE) 500 MG tablet Take 1 tablet by mouth 2 times daily (with meals)  Qty: 180 tablet, Refills: 0    Associated Diagnoses: Type 2 diabetes mellitus without complication, without long-term current use of insulin (HCC)      fluticasone (FLONASE) 50 MCG/ACT nasal spray 1 spray by Nasal route daily  Qty: 9.9 g, Refills: 5    Associated Diagnoses: Allergic rhinitis, unspecified seasonality, unspecified trigger      ferrous sulfate (IRON 325) 325 (65 Fe) MG tablet Take 1 tablet by mouth 2 times daily  Qty: 180 tablet, Refills: 0    Associated Diagnoses: Iron deficiency      tadalafil (CIALIS) 5 MG tablet TAKE 1 TABLET BY MOUTH ONE TIME A DAY AS NEEDED FOR ERECTILE DYSFUNCTION  Qty: 30 tablet, Refills: 5    Associated Diagnoses: Erectile dysfunction, unspecified erectile dysfunction type      Vitamins/Minerals TABS Take 1 tablet by mouth daily      blood glucose monitor kit and supplies Dispense sufficient amount for indicated testing frequency plus additional to accommodate PRN testing needs. Dispense all needed supplies to include: monitor, strips, lancing device, lancets, control solutions, alcohol swabs. Qty: 1 kit, Refills: 0    Comments: Brand per patient preference. May round up to next available package size. Lancets MISC 1 each by Does not apply route daily Test 2 times daily while on PREDNISONE then 1 time daily & as needed for symptoms of irregular blood sugar  Qty: 100 each, Refills: 3      blood glucose monitor strips Test 2 times a day while on PREDNISONE, then 1 time daily & as needed for symptoms of irregular blood glucose.  Dispense sufficient amount for indicated testing frequency plus additional to accommodate PRN testing needs. Qty: 100 strip, Refills: 3    Comments: Brand per patient preference. May round up to next available package size. Time Spent on discharge is more than 30 minutes in the examination, evaluation, counseling and review of medications and discharge plan.       Signed:    Electronically signed by Estevan Rehman MD on 3/13/2022 at 1:59 PM

## 2022-03-13 NOTE — PLAN OF CARE
Problem: Falls - Risk of:  Goal: Will remain free from falls  Description: Will remain free from falls  3/13/2022 0834 by Taty Castro RN  Outcome: Ongoing  3/13/2022 0111 by Lesli Mckenzie RN  Outcome: Ongoing  Goal: Absence of physical injury  Description: Absence of physical injury  3/13/2022 0834 by Taty Castro RN  Outcome: Ongoing  3/13/2022 0111 by Lesli Mckenzie RN  Outcome: Ongoing     Problem: Discharge Planning:  Goal: Discharged to appropriate level of care  Description: Discharged to appropriate level of care  3/13/2022 0834 by Taty Castro RN  Outcome: Ongoing  3/13/2022 0111 by Lesli Mckenzie RN  Outcome: Ongoing     Problem: Airway Clearance - Ineffective:  Goal: Ability to maintain a clear airway will improve  Description: Ability to maintain a clear airway will improve  3/13/2022 0834 by Taty Castro RN  Outcome: Ongoing  3/13/2022 0111 by Lesli Mckenzie RN  Outcome: Ongoing     Problem: Breathing Pattern - Ineffective:  Goal: Ability to achieve and maintain a regular respiratory rate will improve  Description: Ability to achieve and maintain a regular respiratory rate will improve  3/13/2022 0834 by Taty Castro RN  Outcome: Ongoing  3/13/2022 0111 by Lesli Mckenzie RN  Outcome: Ongoing     Problem: Gas Exchange - Impaired:  Goal: Levels of oxygenation will improve  Description: Levels of oxygenation will improve  3/13/2022 0834 by Taty Castro RN  Outcome: Ongoing  3/13/2022 0111 by Lesli Mckenzie RN  Outcome: Ongoing

## 2022-03-13 NOTE — PROGRESS NOTES
Pharmacy to Dose Warfarin    Pharmacy consulted to dose warfarin for DVT/PAD. INR Goal: 2-3    INR today: 1.42    Assessment/Plan:  - Give 10 mg tonight and resume home dose tomorrow. Pharmacy will continue to follow.     Kristie Ramirez, PharmD  Pharmacy Resident   [id number]

## 2022-03-14 ENCOUNTER — ANTI-COAG VISIT (OUTPATIENT)
Dept: PHARMACY | Age: 62
End: 2022-03-14
Payer: COMMERCIAL

## 2022-03-14 DIAGNOSIS — I73.9 PERIPHERAL ARTERY DISEASE (HCC): Primary | ICD-10-CM

## 2022-03-14 LAB — INTERNATIONAL NORMALIZATION RATIO, POC: 1.4

## 2022-03-14 PROCEDURE — 99211 OFF/OP EST MAY X REQ PHY/QHP: CPT

## 2022-03-14 PROCEDURE — 85610 PROTHROMBIN TIME: CPT

## 2022-03-14 NOTE — PROGRESS NOTES
Deeann Gilford is a 65 y.o. here for warfarin management. Danny Meza had an INR test today. Results were reviewed and appropriate warfarin management was completed. This visit was performed as: An in person visit. Protocols were followed with precautions to reduce the spread of COVID-19. Patient verifies current dosing regimen: Yes     Warfarin medication reviewed and updated on the patient 's home medication list: Yes   All other medications reviewed and updated on the patient 's home medication list: Yes     Lab Results   Component Value Date    INR 1.4 2022    INR 1.42 (H) 2022    INR 1.72 (H) 2022       Patient Findings     Positives:  Change in medications, Hospital admission, Other complaints    Negatives:  Signs/symptoms of bleeding, Missed doses, Change in diet/appetite, Bruising    Comments:  Admitted 3/10-3/13 - COPD exacerbation  Prednisone at discharge for 8 days - significant interaction w warfarin. Labored breathing during his visit. Anticoagulation Summary  As of 3/14/2022    INR goal:  2.0-3.0   TTR:  54.4 % (8.9 mo)   INR used for dosin.4 (3/14/2022)   Warfarin maintenance plan:  10 mg (5 mg x 2) every Wed, Sat; 7.5 mg (5 mg x 1.5) all other days   Weekly warfarin total:  57.5 mg   Plan last modified:  [facility] (2021)   Next INR check:  3/21/2022   Priority:  Maintenance   Target end date: Indefinite    Indications    Peripheral artery disease ([facility] [I73.9]             Anticoagulation Episode Summary     INR check location:  Anticoagulation Clinic    Preferred lab:      Send INR reminders to:  [facility]    Comments:  EPIC      Anticoagulation Care Providers     Provider Role Specialty Phone number    Ofelia Nino MD Referring Family Medicine [phone]          Warfarin assessment / plan:     He reports he is doing okay. Labored breathing during his visit. He walked to his visit.  He reports he has oxygen and a portable tank, but left this in the car. He reports they are working to get him a carrier for it. He was just [facility]. INR below goal today. I will give a higher dose for two days. I hesitate to increase his weekly warfarin dose due to the interaction with prednisone. Expect prednisone to increase the INR. He will be on this through 3/20/22. Will resume his regular warfarin dose on Wednesday and recheck INR 3/21/22. Description    Take warfarin 10mg (2 tablets) today and tomorrow  THEN Resume Warfarin 7.5 mg (1 & 1/2 tablets) daily except 10mg (2 tablets) every Wednesday and Saturday    Call [phone] with signs or symptoms of bleeding or ANY medication changes (including over-the-counter medications or herbal supplements). If significant bleeding occurs please seek immediate medical attention. Keep the number of servings of vitamin K containing foods (dark green, leafy vegetables) the same each week. Dark green vegetable 2-3 times a week and a mixed green salad ~ 3 times a week. Please call if this changes. Limit alcohol intake. Please call if this changes. Immunization History   Administered Date(s) Administered    COVID-19, Pfizer Purple top, DILUTE for use, 12+ yrs, 30mcg/0.3mL dose 03/15/2021, 04/12/2021, 12/22/2021    Influenza Virus Vaccine 01/21/2017, 01/21/2017    Influenza, Quadv, IM, PF (6 mo and older Fluzone, Flulaval, Fluarix, and 3 yrs and older Afluria) 10/31/2020    Tdap (Boostrix, Adacel) 03/27/2018    Zoster Recombinant (Shingrix) 03/27/2018           Orders Placed This Encounter   Procedures    POCT INR     This external order was created through the results console. No orders of the defined types were placed in this encounter. Reviewed AVS with patient / caregiver.     Billing Points:  Adjust dosage and/or reconcile meds (fill pill box) </= 5 medications - 2 points       CLINICAL PHARMACY CONSULT: MED RECONCILIATION/REVIEW ADDENDUM    For Pharmacy Admin Tracking Only     Intervention Detail: Dose Adjustment: 1, reason: Therapy Optimization   Total # of Interventions Recommended: 1   Total # of Interventions Accepted: 1   Time Spent (min): 20

## 2022-03-15 ENCOUNTER — PREP FOR PROCEDURE (OUTPATIENT)
Dept: VASCULAR SURGERY | Age: 62
End: 2022-03-15

## 2022-03-15 DIAGNOSIS — I73.9 PERIPHERAL ARTERY DISEASE (HCC): Primary | ICD-10-CM

## 2022-03-15 RX ORDER — SODIUM CHLORIDE 9 MG/ML
25 INJECTION, SOLUTION INTRAVENOUS PRN
Status: CANCELLED | OUTPATIENT
Start: 2022-03-15

## 2022-03-15 RX ORDER — SODIUM CHLORIDE 0.9 % (FLUSH) 0.9 %
10 SYRINGE (ML) INJECTION PRN
Status: CANCELLED | OUTPATIENT
Start: 2022-03-15

## 2022-03-15 RX ORDER — SODIUM CHLORIDE 0.9 % (FLUSH) 0.9 %
10 SYRINGE (ML) INJECTION EVERY 12 HOURS SCHEDULED
Status: CANCELLED | OUTPATIENT
Start: 2022-03-15

## 2022-03-15 RX ORDER — SODIUM CHLORIDE 9 MG/ML
INJECTION, SOLUTION INTRAVENOUS CONTINUOUS
Status: CANCELLED | OUTPATIENT
Start: 2022-03-15

## 2022-03-16 NOTE — PROGRESS NOTES
Physician Progress Note      PATIENT:               Boni Blanchard  CSN #:                  [id number]  :                       1960  ADMIT DATE:       3/5/2022 11:13 PM  [address] DATE:        3/7/2022 6:54 PM  RESPONDING  PROVIDER #:        Julianne Castro CNP          QUERY TEXT:    Pt admitted with COPD exacerbation and has Acute pulmonary edema documented. If possible, please document in progress notes and discharge summary further   specificity regarding the type and acuity of CHF:    The medical record reflects the following:  Risk Factors: No CHF listed in patient History. Clinical Indicators: Presents with SOB and Leg edema, Pro BNP 1,058. .. per   CXR: minimal central pulmonary congestion ; no echo; Per Pulmonary consult on   3/7/2022: \"Probable CHF decompensation\". Treatment: PO Coreg BID, IV Lasix 40 mg BID; discharged on low dose daily po   lasix  Options provided:  -- Acute Systolic CHF/HFrEF  -- Acute Diastolic CHF/HFpEF  -- Acute Systolic and Diastolic CHF  -- Acute on Chronic Systolic CHF/HFrEF  -- Acute on Chronic Diastolic CHF/HFpEF  -- Acute on Chronic Systolic and Diastolic CHF  -- Chronic Systolic CHF/HFrEF  -- Chronic Diastolic CHF/HFpEF  -- Chronic Systolic and Diastolic CHF  -- Other - I will add my own diagnosis  -- Disagree - Not applicable / Not valid  -- Disagree - Clinically unable to determine / Unknown  -- Refer to Clinical Documentation Reviewer    PROVIDER RESPONSE TEXT:    Provider is clinically unable to determine a response to this query.     Query created by: Joesph Jeronimo on 3/8/2022 8:09 AM      Electronically signed by:  Magdiel Castro CNP 3/16/2022 6:00   PM

## 2022-03-17 ENCOUNTER — TELEPHONE (OUTPATIENT)
Dept: INTERNAL MEDICINE CLINIC | Age: 62
End: 2022-03-17

## 2022-03-17 ENCOUNTER — TELEPHONE (OUTPATIENT)
Dept: PULMONOLOGY | Age: 62
End: 2022-03-17

## 2022-03-17 NOTE — LETTER
[facility] Internal Medicine  [address], [place] [address]  Phone: [phone]  Fax: [phone]    Clarence Harman MD        March 22, 2022     Patient: Gorge Jaimes   YOB: 1960   Date of Visit: 3/17/2022       To Whom It May Concern: It is my medical opinion that Otoniel Ty has significant medical problems that prevent him from going back to work and he is advised to start working towards getting disability. If you have any questions or concerns, please don't hesitate to call.     Sincerely,    Electronically signed by Clarence Harman MD on 3/22/2022 at 12:03 PM    Clarence Harman MD

## 2022-03-17 NOTE — TELEPHONE ENCOUNTER
Pt called and said he is applying for disability and said he needs a note from doctor saying he can't go back to work. Said he talked to doctor about this at his last appointment.     Call him to discuss    Ph # [phone]

## 2022-03-17 NOTE — TELEPHONE ENCOUNTER
I had advised him to talk to his family physician about this note and filing for disability. Thanks.

## 2022-03-17 NOTE — TELEPHONE ENCOUNTER
----- Message from Renny Jean-Baptistenahed sent at 3/17/2022  2:34 PM EDT -----  Subject: Message to Provider    QUESTIONS  Information for Provider? Regan Hall sees Dr. Louie Chapa. He had a   conversation with Dr. Ana Maria Emery on 2/18/22 about Regan Hall health conditions and   he agreed that Regan Hall should not return to work and apply for disability. This has been confirmed with the Pulmonologist also. He call the [facility] and he   needs to get a letter stating that he can no longer return to work and   needs to apply for disability. Please let him when the letter is done.   ---------------------------------------------------------------------------  --------------  [employer]  What is the best way for the office to contact you? OK to leave message on   voicemail  Preferred Call Back Phone Number? [phone]  ---------------------------------------------------------------------------  --------------  SCRIPT ANSWERS  Relationship to Patient?  Self

## 2022-03-18 ENCOUNTER — HOSPITAL ENCOUNTER (OUTPATIENT)
Dept: WOUND CARE | Age: 62
Discharge: HOME | End: 2022-03-18

## 2022-03-18 NOTE — PROGRESS NOTES
Physician Progress Note      PATIENT:               Peter Johnson  CSN #:                  [id number]  :                       1960  ADMIT DATE:       3/5/2022 11:13 PM  [address] DATE:        3/7/2022 6:54 PM  RESPONDING  PROVIDER #:        Marcelino Castro CNP          QUERY TEXT:    Pt admitted with COPD exacerbation. Pt noted to have Acute pulmonary edema and   probable chf decompensation. If possible, please document in the progress   notes and discharge summary if you are evaluating and/or treating any of the   following: The medical record reflects the following:  Risk Factors: No CHF listed in patient History. Clinical Indicators: Presents with SOB and Leg edema, Pro BNP 1,058. .. per   CXR: minimal central pulmonary congestion ; no echo; Per Pulmonary consult on   3/7/2022: \"Probable CHF decompensation\". Treatment: PO Coreg BID, IV Lasix 40 mg BID; discharged on low dose daily po   lasix  Options provided:  -- Acute pulmonary edema due to heart disease  -- Probable CHF decompesation  -- Noncardiogenic acute pulmonary edema due to please specify, please specify. -- Other - I will add my own diagnosis  -- Disagree - Not applicable / Not valid  -- Disagree - Clinically unable to determine / Unknown  -- Refer to Clinical Documentation Reviewer    PROVIDER RESPONSE TEXT:    This patient has probable chf decompensation. Query created by:  Idalmis Ricketts on 3/17/2022 11:14 AM      Electronically signed by:  Beka Castro CNP 3/18/2022 7:21   AM

## 2022-03-22 ENCOUNTER — APPOINTMENT (OUTPATIENT)
Dept: GENERAL RADIOLOGY | Age: 62
DRG: 004 | End: 2022-03-22
Payer: COMMERCIAL

## 2022-03-22 ENCOUNTER — ANESTHESIA (OUTPATIENT)
Dept: EMERGENCY DEPT | Age: 62
DRG: 004 | End: 2022-03-22
Payer: COMMERCIAL

## 2022-03-22 ENCOUNTER — HOSPITAL ENCOUNTER (INPATIENT)
Age: 62
LOS: 11 days | Discharge: ANOTHER HOSPITAL | DRG: 004 | End: 2022-04-02
Attending: EMERGENCY MEDICINE | Admitting: INTERNAL MEDICINE
Payer: COMMERCIAL

## 2022-03-22 ENCOUNTER — ANESTHESIA EVENT (OUTPATIENT)
Dept: EMERGENCY DEPT | Age: 62
DRG: 004 | End: 2022-03-22
Payer: COMMERCIAL

## 2022-03-22 ENCOUNTER — APPOINTMENT (OUTPATIENT)
Dept: CT IMAGING | Age: 62
DRG: 004 | End: 2022-03-22
Payer: COMMERCIAL

## 2022-03-22 DIAGNOSIS — J96.02 ACUTE RESPIRATORY FAILURE WITH HYPOXIA AND HYPERCAPNIA (HCC): Primary | ICD-10-CM

## 2022-03-22 DIAGNOSIS — J39.2 MASS OF HYPOPHARYNX: ICD-10-CM

## 2022-03-22 DIAGNOSIS — J96.01 ACUTE RESPIRATORY FAILURE WITH HYPOXIA AND HYPERCAPNIA (HCC): Primary | ICD-10-CM

## 2022-03-22 DIAGNOSIS — J98.8 AIRWAY COMPROMISE: ICD-10-CM

## 2022-03-22 DIAGNOSIS — J44.1 COPD WITH EXACERBATION (HCC): ICD-10-CM

## 2022-03-22 LAB
A/G RATIO: 1.6 (ref 1.1–2.2)
ALBUMIN SERPL-MCNC: 4.1 G/DL (ref 3.4–5)
ALP BLD-CCNC: 89 U/L (ref 40–129)
ALT SERPL-CCNC: 32 U/L (ref 10–40)
ANION GAP SERPL CALCULATED.3IONS-SCNC: 8 MMOL/L (ref 3–16)
APTT: 56.5 SEC (ref 26.2–38.6)
AST SERPL-CCNC: 20 U/L (ref 15–37)
BASE EXCESS ARTERIAL: 6 (ref -3–3)
BASOPHILS ABSOLUTE: 0.1 K/UL (ref 0–0.2)
BASOPHILS RELATIVE PERCENT: 1.3 %
BILIRUB SERPL-MCNC: <0.2 MG/DL (ref 0–1)
BUN BLDV-MCNC: 16 MG/DL (ref 7–20)
CALCIUM SERPL-MCNC: 8.9 MG/DL (ref 8.3–10.6)
CHLORIDE BLD-SCNC: 100 MMOL/L (ref 99–110)
CO2: 32 MMOL/L (ref 21–32)
CREAT SERPL-MCNC: 0.7 MG/DL (ref 0.8–1.3)
EKG ATRIAL RATE: 98 BPM
EKG DIAGNOSIS: NORMAL
EKG P AXIS: 74 DEGREES
EKG P-R INTERVAL: 154 MS
EKG Q-T INTERVAL: 330 MS
EKG QRS DURATION: 90 MS
EKG QTC CALCULATION (BAZETT): 421 MS
EKG R AXIS: -75 DEGREES
EKG T AXIS: 46 DEGREES
EKG VENTRICULAR RATE: 98 BPM
EOSINOPHILS ABSOLUTE: 0.1 K/UL (ref 0–0.6)
EOSINOPHILS RELATIVE PERCENT: 0.9 %
GFR AFRICAN AMERICAN: >60
GFR NON-AFRICAN AMERICAN: >60
GLUCOSE BLD-MCNC: 120 MG/DL (ref 70–99)
GLUCOSE BLD-MCNC: 127 MG/DL (ref 70–99)
HCO3 ARTERIAL: 33 MMOL/L (ref 21–29)
HCT VFR BLD CALC: 43 % (ref 40.5–52.5)
HEMOGLOBIN: 13.8 G/DL (ref 13.5–17.5)
INR BLD: 3 (ref 0.88–1.12)
LYMPHOCYTES ABSOLUTE: 1.5 K/UL (ref 1–5.1)
LYMPHOCYTES RELATIVE PERCENT: 22.5 %
MCH RBC QN AUTO: 28.4 PG (ref 26–34)
MCHC RBC AUTO-ENTMCNC: 32.2 G/DL (ref 31–36)
MCV RBC AUTO: 88.4 FL (ref 80–100)
MONOCYTES ABSOLUTE: 0.4 K/UL (ref 0–1.3)
MONOCYTES RELATIVE PERCENT: 6.1 %
NEUTROPHILS ABSOLUTE: 4.7 K/UL (ref 1.7–7.7)
NEUTROPHILS RELATIVE PERCENT: 69.2 %
O2 SAT, ARTERIAL: 96 % (ref 93–100)
PCO2 ARTERIAL: 72.4 MM HG (ref 35–45)
PDW BLD-RTO: 16.4 % (ref 12.4–15.4)
PERFORMED ON: ABNORMAL
PERFORMED ON: ABNORMAL
PH ARTERIAL: 7.27 (ref 7.35–7.45)
PLATELET # BLD: 266 K/UL (ref 135–450)
PMV BLD AUTO: 7.6 FL (ref 5–10.5)
PO2 ARTERIAL: 97.9 MM HG (ref 75–108)
POC SAMPLE TYPE: ABNORMAL
POTASSIUM REFLEX MAGNESIUM: 4.7 MMOL/L (ref 3.5–5.1)
PRO-BNP: 874 PG/ML (ref 0–124)
PROTHROMBIN TIME: 35.5 SEC (ref 9.9–12.7)
RBC # BLD: 4.86 M/UL (ref 4.2–5.9)
SODIUM BLD-SCNC: 140 MMOL/L (ref 136–145)
TCO2 ARTERIAL: 35 MMOL/L
TOTAL PROTEIN: 6.7 G/DL (ref 6.4–8.2)
TRIGL SERPL-MCNC: 152 MG/DL (ref 0–150)
TROPONIN: <0.01 NG/ML
WBC # BLD: 6.8 K/UL (ref 4–11)

## 2022-03-22 PROCEDURE — A4216 STERILE WATER/SALINE, 10 ML: HCPCS | Performed by: INTERNAL MEDICINE

## 2022-03-22 PROCEDURE — 2700000000 HC OXYGEN THERAPY PER DAY

## 2022-03-22 PROCEDURE — 31500 INSERT EMERGENCY AIRWAY: CPT | Performed by: ANESTHESIOLOGY

## 2022-03-22 PROCEDURE — 94640 AIRWAY INHALATION TREATMENT: CPT

## 2022-03-22 PROCEDURE — 93010 ELECTROCARDIOGRAM REPORT: CPT | Performed by: INTERNAL MEDICINE

## 2022-03-22 PROCEDURE — 6370000000 HC RX 637 (ALT 250 FOR IP): Performed by: INTERNAL MEDICINE

## 2022-03-22 PROCEDURE — 5A1945Z RESPIRATORY VENTILATION, 24-96 CONSECUTIVE HOURS: ICD-10-PCS | Performed by: INTERNAL MEDICINE

## 2022-03-22 PROCEDURE — 6360000004 HC RX CONTRAST MEDICATION: Performed by: EMERGENCY MEDICINE

## 2022-03-22 PROCEDURE — 31575 DIAGNOSTIC LARYNGOSCOPY: CPT | Performed by: STUDENT IN AN ORGANIZED HEALTH CARE EDUCATION/TRAINING PROGRAM

## 2022-03-22 PROCEDURE — 96365 THER/PROPH/DIAG IV INF INIT: CPT

## 2022-03-22 PROCEDURE — 70491 CT SOFT TISSUE NECK W/DYE: CPT

## 2022-03-22 PROCEDURE — 83880 ASSAY OF NATRIURETIC PEPTIDE: CPT

## 2022-03-22 PROCEDURE — 2580000003 HC RX 258: Performed by: EMERGENCY MEDICINE

## 2022-03-22 PROCEDURE — 84478 ASSAY OF TRIGLYCERIDES: CPT

## 2022-03-22 PROCEDURE — 51702 INSERT TEMP BLADDER CATH: CPT

## 2022-03-22 PROCEDURE — 99284 EMERGENCY DEPT VISIT MOD MDM: CPT

## 2022-03-22 PROCEDURE — 2500000003 HC RX 250 WO HCPCS: Performed by: EMERGENCY MEDICINE

## 2022-03-22 PROCEDURE — 99254 IP/OBS CNSLTJ NEW/EST MOD 60: CPT | Performed by: STUDENT IN AN ORGANIZED HEALTH CARE EDUCATION/TRAINING PROGRAM

## 2022-03-22 PROCEDURE — 85610 PROTHROMBIN TIME: CPT

## 2022-03-22 PROCEDURE — 2580000003 HC RX 258: Performed by: INTERNAL MEDICINE

## 2022-03-22 PROCEDURE — 94761 N-INVAS EAR/PLS OXIMETRY MLT: CPT

## 2022-03-22 PROCEDURE — 6360000002 HC RX W HCPCS: Performed by: INTERNAL MEDICINE

## 2022-03-22 PROCEDURE — 82803 BLOOD GASES ANY COMBINATION: CPT

## 2022-03-22 PROCEDURE — 94002 VENT MGMT INPAT INIT DAY: CPT

## 2022-03-22 PROCEDURE — 84484 ASSAY OF TROPONIN QUANT: CPT

## 2022-03-22 PROCEDURE — 96374 THER/PROPH/DIAG INJ IV PUSH: CPT

## 2022-03-22 PROCEDURE — 71045 X-RAY EXAM CHEST 1 VIEW: CPT

## 2022-03-22 PROCEDURE — 36415 COLL VENOUS BLD VENIPUNCTURE: CPT

## 2022-03-22 PROCEDURE — 99291 CRITICAL CARE FIRST HOUR: CPT | Performed by: INTERNAL MEDICINE

## 2022-03-22 PROCEDURE — 93005 ELECTROCARDIOGRAM TRACING: CPT | Performed by: EMERGENCY MEDICINE

## 2022-03-22 PROCEDURE — 6370000000 HC RX 637 (ALT 250 FOR IP): Performed by: EMERGENCY MEDICINE

## 2022-03-22 PROCEDURE — 83036 HEMOGLOBIN GLYCOSYLATED A1C: CPT

## 2022-03-22 PROCEDURE — 2000000000 HC ICU R&B

## 2022-03-22 PROCEDURE — 31500 INSERT EMERGENCY AIRWAY: CPT

## 2022-03-22 PROCEDURE — 6360000002 HC RX W HCPCS: Performed by: EMERGENCY MEDICINE

## 2022-03-22 PROCEDURE — 85025 COMPLETE CBC W/AUTO DIFF WBC: CPT

## 2022-03-22 PROCEDURE — 36600 WITHDRAWAL OF ARTERIAL BLOOD: CPT

## 2022-03-22 PROCEDURE — 2500000003 HC RX 250 WO HCPCS: Performed by: INTERNAL MEDICINE

## 2022-03-22 PROCEDURE — 80053 COMPREHEN METABOLIC PANEL: CPT

## 2022-03-22 PROCEDURE — 85730 THROMBOPLASTIN TIME PARTIAL: CPT

## 2022-03-22 PROCEDURE — 6370000000 HC RX 637 (ALT 250 FOR IP)

## 2022-03-22 PROCEDURE — 96375 TX/PRO/DX INJ NEW DRUG ADDON: CPT

## 2022-03-22 RX ORDER — METHYLPREDNISOLONE SODIUM SUCCINATE 125 MG/2ML
125 INJECTION, POWDER, LYOPHILIZED, FOR SOLUTION INTRAMUSCULAR; INTRAVENOUS ONCE
Status: COMPLETED | OUTPATIENT
Start: 2022-03-22 | End: 2022-03-22

## 2022-03-22 RX ORDER — SODIUM CHLORIDE 0.9 % (FLUSH) 0.9 %
5-40 SYRINGE (ML) INJECTION EVERY 12 HOURS SCHEDULED
Status: DISCONTINUED | OUTPATIENT
Start: 2022-03-22 | End: 2022-04-02 | Stop reason: HOSPADM

## 2022-03-22 RX ORDER — ACETAMINOPHEN 325 MG/1
650 TABLET ORAL EVERY 6 HOURS PRN
Status: DISCONTINUED | OUTPATIENT
Start: 2022-03-22 | End: 2022-04-02 | Stop reason: HOSPADM

## 2022-03-22 RX ORDER — FENTANYL CITRATE-0.9 % NACL/PF 10 MCG/ML
25-200 PLASTIC BAG, INJECTION (ML) INTRAVENOUS CONTINUOUS
Status: DISCONTINUED | OUTPATIENT
Start: 2022-03-22 | End: 2022-03-22

## 2022-03-22 RX ORDER — SODIUM CHLORIDE 9 MG/ML
25 INJECTION, SOLUTION INTRAVENOUS PRN
Status: DISCONTINUED | OUTPATIENT
Start: 2022-03-22 | End: 2022-04-02 | Stop reason: HOSPADM

## 2022-03-22 RX ORDER — IPRATROPIUM BROMIDE AND ALBUTEROL SULFATE 2.5; .5 MG/3ML; MG/3ML
3 SOLUTION RESPIRATORY (INHALATION) 4 TIMES DAILY
Status: DISCONTINUED | OUTPATIENT
Start: 2022-03-22 | End: 2022-03-22

## 2022-03-22 RX ORDER — ACETAMINOPHEN 650 MG/1
650 SUPPOSITORY RECTAL EVERY 6 HOURS PRN
Status: DISCONTINUED | OUTPATIENT
Start: 2022-03-22 | End: 2022-04-02 | Stop reason: HOSPADM

## 2022-03-22 RX ORDER — ARFORMOTEROL TARTRATE 15 UG/2ML
15 SOLUTION RESPIRATORY (INHALATION) 2 TIMES DAILY
Status: DISCONTINUED | OUTPATIENT
Start: 2022-03-22 | End: 2022-04-02 | Stop reason: HOSPADM

## 2022-03-22 RX ORDER — SODIUM CHLORIDE 0.9 % (FLUSH) 0.9 %
5-40 SYRINGE (ML) INJECTION PRN
Status: DISCONTINUED | OUTPATIENT
Start: 2022-03-22 | End: 2022-04-02 | Stop reason: HOSPADM

## 2022-03-22 RX ORDER — IPRATROPIUM BROMIDE AND ALBUTEROL SULFATE 2.5; .5 MG/3ML; MG/3ML
3 SOLUTION RESPIRATORY (INHALATION) EVERY 4 HOURS
Status: DISCONTINUED | OUTPATIENT
Start: 2022-03-22 | End: 2022-03-23

## 2022-03-22 RX ORDER — SCOLOPAMINE TRANSDERMAL SYSTEM 1 MG/1
1 PATCH, EXTENDED RELEASE TRANSDERMAL
Status: DISCONTINUED | OUTPATIENT
Start: 2022-03-22 | End: 2022-04-02 | Stop reason: HOSPADM

## 2022-03-22 RX ORDER — SODIUM CHLORIDE FOR INHALATION 0.9 %
3 VIAL, NEBULIZER (ML) INHALATION EVERY 4 HOURS PRN
Status: DISCONTINUED | OUTPATIENT
Start: 2022-03-22 | End: 2022-04-02 | Stop reason: HOSPADM

## 2022-03-22 RX ORDER — IPRATROPIUM BROMIDE AND ALBUTEROL SULFATE 2.5; .5 MG/3ML; MG/3ML
1 SOLUTION RESPIRATORY (INHALATION) ONCE
Status: COMPLETED | OUTPATIENT
Start: 2022-03-22 | End: 2022-03-22

## 2022-03-22 RX ORDER — INSULIN LISPRO 100 [IU]/ML
0-6 INJECTION, SOLUTION INTRAVENOUS; SUBCUTANEOUS
Status: DISCONTINUED | OUTPATIENT
Start: 2022-03-23 | End: 2022-03-23

## 2022-03-22 RX ORDER — SUCCINYLCHOLINE CHLORIDE 20 MG/ML
140 INJECTION INTRAMUSCULAR; INTRAVENOUS ONCE
Status: DISCONTINUED | OUTPATIENT
Start: 2022-03-22 | End: 2022-04-02 | Stop reason: HOSPADM

## 2022-03-22 RX ORDER — POLYETHYLENE GLYCOL 3350 17 G/17G
17 POWDER, FOR SOLUTION ORAL DAILY PRN
Status: DISCONTINUED | OUTPATIENT
Start: 2022-03-22 | End: 2022-03-23

## 2022-03-22 RX ORDER — PROPOFOL 10 MG/ML
5-90 INJECTION, EMULSION INTRAVENOUS CONTINUOUS
Status: DISCONTINUED | OUTPATIENT
Start: 2022-03-22 | End: 2022-03-23

## 2022-03-22 RX ORDER — DEXTROSE MONOHYDRATE 50 MG/ML
100 INJECTION, SOLUTION INTRAVENOUS PRN
Status: DISCONTINUED | OUTPATIENT
Start: 2022-03-22 | End: 2022-04-02 | Stop reason: HOSPADM

## 2022-03-22 RX ORDER — ETOMIDATE 2 MG/ML
20 INJECTION INTRAVENOUS ONCE
Status: COMPLETED | OUTPATIENT
Start: 2022-03-22 | End: 2022-03-22

## 2022-03-22 RX ORDER — ONDANSETRON 2 MG/ML
4 INJECTION INTRAMUSCULAR; INTRAVENOUS EVERY 6 HOURS PRN
Status: DISCONTINUED | OUTPATIENT
Start: 2022-03-22 | End: 2022-04-02 | Stop reason: HOSPADM

## 2022-03-22 RX ORDER — 0.9 % SODIUM CHLORIDE 0.9 %
1000 INTRAVENOUS SOLUTION INTRAVENOUS ONCE
Status: COMPLETED | OUTPATIENT
Start: 2022-03-23 | End: 2022-03-23

## 2022-03-22 RX ORDER — ALBUTEROL SULFATE 90 UG/1
2 AEROSOL, METERED RESPIRATORY (INHALATION) 4 TIMES DAILY PRN
Status: DISCONTINUED | OUTPATIENT
Start: 2022-03-22 | End: 2022-04-02 | Stop reason: HOSPADM

## 2022-03-22 RX ORDER — IPRATROPIUM BROMIDE AND ALBUTEROL SULFATE 2.5; .5 MG/3ML; MG/3ML
SOLUTION RESPIRATORY (INHALATION)
Status: COMPLETED
Start: 2022-03-22 | End: 2022-03-22

## 2022-03-22 RX ORDER — FENTANYL CITRATE-0.9 % NACL/PF 10 MCG/ML
25-200 PLASTIC BAG, INJECTION (ML) INTRAVENOUS CONTINUOUS
Status: DISCONTINUED | OUTPATIENT
Start: 2022-03-22 | End: 2022-03-22 | Stop reason: SDUPTHER

## 2022-03-22 RX ORDER — PROPOFOL 10 MG/ML
5-50 INJECTION, EMULSION INTRAVENOUS CONTINUOUS
Status: DISCONTINUED | OUTPATIENT
Start: 2022-03-22 | End: 2022-03-22

## 2022-03-22 RX ORDER — INSULIN LISPRO 100 [IU]/ML
0-3 INJECTION, SOLUTION INTRAVENOUS; SUBCUTANEOUS NIGHTLY
Status: DISCONTINUED | OUTPATIENT
Start: 2022-03-22 | End: 2022-03-23

## 2022-03-22 RX ORDER — BUDESONIDE 0.5 MG/2ML
0.5 INHALANT ORAL 2 TIMES DAILY
Status: DISCONTINUED | OUTPATIENT
Start: 2022-03-22 | End: 2022-04-02 | Stop reason: HOSPADM

## 2022-03-22 RX ORDER — SODIUM CHLORIDE 9 MG/ML
INJECTION, SOLUTION INTRAVENOUS CONTINUOUS
Status: DISCONTINUED | OUTPATIENT
Start: 2022-03-23 | End: 2022-03-27

## 2022-03-22 RX ORDER — DEXTROSE MONOHYDRATE 25 G/50ML
12.5 INJECTION, SOLUTION INTRAVENOUS PRN
Status: DISCONTINUED | OUTPATIENT
Start: 2022-03-22 | End: 2022-03-22 | Stop reason: RX

## 2022-03-22 RX ORDER — ONDANSETRON 4 MG/1
4 TABLET, ORALLY DISINTEGRATING ORAL EVERY 8 HOURS PRN
Status: DISCONTINUED | OUTPATIENT
Start: 2022-03-22 | End: 2022-04-02 | Stop reason: HOSPADM

## 2022-03-22 RX ORDER — NICOTINE POLACRILEX 4 MG
15 LOZENGE BUCCAL PRN
Status: DISCONTINUED | OUTPATIENT
Start: 2022-03-22 | End: 2022-04-02 | Stop reason: HOSPADM

## 2022-03-22 RX ADMIN — IPRATROPIUM BROMIDE AND ALBUTEROL SULFATE 3 ML: .5; 2.5 SOLUTION RESPIRATORY (INHALATION) at 15:29

## 2022-03-22 RX ADMIN — PROPOFOL 50 MCG/KG/MIN: 10 INJECTION, EMULSION INTRAVENOUS at 21:37

## 2022-03-22 RX ADMIN — Medication 25 MCG/HR: at 12:05

## 2022-03-22 RX ADMIN — IPRATROPIUM BROMIDE AND ALBUTEROL SULFATE 3 ML: .5; 3 SOLUTION RESPIRATORY (INHALATION) at 12:45

## 2022-03-22 RX ADMIN — IPRATROPIUM BROMIDE AND ALBUTEROL SULFATE 3 ML: .5; 2.5 SOLUTION RESPIRATORY (INHALATION) at 23:38

## 2022-03-22 RX ADMIN — PROPOFOL 90 MCG/KG/MIN: 10 INJECTION, EMULSION INTRAVENOUS at 12:28

## 2022-03-22 RX ADMIN — SODIUM CHLORIDE, PRESERVATIVE FREE 20 MG: 5 INJECTION INTRAVENOUS at 19:50

## 2022-03-22 RX ADMIN — RACEPINEPHRINE HYDROCHLORIDE 11.25 MG: 11.25 SOLUTION RESPIRATORY (INHALATION) at 06:07

## 2022-03-22 RX ADMIN — IPRATROPIUM BROMIDE AND ALBUTEROL SULFATE 1 AMPULE: .5; 3 SOLUTION RESPIRATORY (INHALATION) at 03:14

## 2022-03-22 RX ADMIN — METHYLPREDNISOLONE SODIUM SUCCINATE 125 MG: 125 INJECTION, POWDER, FOR SOLUTION INTRAMUSCULAR; INTRAVENOUS at 05:39

## 2022-03-22 RX ADMIN — ARFORMOTEROL TARTRATE 15 MCG: 15 SOLUTION RESPIRATORY (INHALATION) at 19:30

## 2022-03-22 RX ADMIN — PROPOFOL 45 MCG/KG/MIN: 10 INJECTION, EMULSION INTRAVENOUS at 15:57

## 2022-03-22 RX ADMIN — PROPOFOL 70 MCG/KG/MIN: 10 INJECTION, EMULSION INTRAVENOUS at 10:51

## 2022-03-22 RX ADMIN — IPRATROPIUM BROMIDE AND ALBUTEROL SULFATE 1 AMPULE: .5; 3 SOLUTION RESPIRATORY (INHALATION) at 06:07

## 2022-03-22 RX ADMIN — BUDESONIDE 500 MCG: 0.5 SUSPENSION RESPIRATORY (INHALATION) at 19:30

## 2022-03-22 RX ADMIN — SODIUM CHLORIDE: 9 INJECTION, SOLUTION INTRAVENOUS at 23:54

## 2022-03-22 RX ADMIN — DEXAMETHASONE SODIUM PHOSPHATE 20 MG: 4 INJECTION, SOLUTION INTRA-ARTICULAR; INTRALESIONAL; INTRAMUSCULAR; INTRAVENOUS; SOFT TISSUE at 10:24

## 2022-03-22 RX ADMIN — SODIUM CHLORIDE 1000 ML: 9 INJECTION, SOLUTION INTRAVENOUS at 23:55

## 2022-03-22 RX ADMIN — IPRATROPIUM BROMIDE AND ALBUTEROL SULFATE 3 ML: .5; 2.5 SOLUTION RESPIRATORY (INHALATION) at 19:30

## 2022-03-22 RX ADMIN — SODIUM CHLORIDE, PRESERVATIVE FREE 20 MG: 5 INJECTION INTRAVENOUS at 16:04

## 2022-03-22 RX ADMIN — IOPAMIDOL 75 ML: 755 INJECTION, SOLUTION INTRAVENOUS at 03:51

## 2022-03-22 RX ADMIN — PROPOFOL 75 MCG/KG/MIN: 10 INJECTION, EMULSION INTRAVENOUS at 12:55

## 2022-03-22 RX ADMIN — ETOMIDATE 20 MG: 2 INJECTION, SOLUTION INTRAVENOUS at 10:37

## 2022-03-22 RX ADMIN — IPRATROPIUM BROMIDE AND ALBUTEROL SULFATE 3 ML: 2.5; .5 SOLUTION RESPIRATORY (INHALATION) at 12:45

## 2022-03-22 ASSESSMENT — PULMONARY FUNCTION TESTS
PIF_VALUE: 31
PIF_VALUE: 30
PIF_VALUE: 28
PIF_VALUE: 30
PIF_VALUE: 30

## 2022-03-22 ASSESSMENT — PAIN SCALES - GENERAL
PAINLEVEL_OUTOF10: 0
PAINLEVEL_OUTOF10: 0

## 2022-03-22 NOTE — PROGRESS NOTES
03/22/22 1219   Vent Patient Data   Peak Inspiratory Pressure 30 cmH2O   Mean Airway Pressure 16 cmH20   Rate Measured 16 br/min   Vt Exhaled 535 mL   Minute Volume 8.63 Liters   I:E Ratio :2.4   Plateau Pressure 19 QKS22   Static Compliance 59.44 mL/cmH2O   Dynamic Compliance 26.75 mL/cmH2O   Total PEEP 10 cmH20

## 2022-03-22 NOTE — ED NOTES
PT Oxygen saturation down to 89% on 4L. This RN increased PT to 5L/NC, informed Dr. Moises Hagen. See MAR, RT called.      Christopher Dale RN  03/22/22 0646

## 2022-03-22 NOTE — PROGRESS NOTES
4 Eyes Skin Assessment     NAME:  [address] OF BIRTH:  1960  MEDICAL RECORD NUMBER:  [id number]    The patient is being assess for  Admission    I agree that 2 RN's have performed a thorough Head to Toe Skin Assessment on the patient. ALL assessment sites listed below have been assessed. Areas assessed by both nurses:    Head, Face, Ears, Shoulders, Back, Chest, Arms, Elbows, Hands, Sacrum. Buttock, Coccyx, Ischium and Legs. Feet and Heels        Does the Patient have a Wound? Yes wound(s) were present on assessment.  LDA wound assessment was Initiated and completed        Harshil Prevention initiated:  Yes   Wound Care Orders initiated:  Yes    Pressure Injury (Stage 3,4, Unstageable, DTI, NWPT, and Complex wounds) if present place consult order under [de-identified] Yes    New and Established Ostomies if present place consult order under : No      Nurse 1 eSignature: Electronically signed by Christopher Ortega RN on 3/22/22 at 1:15 PM EDT    **SHARE this note so that the co-signing nurse is able to place an eSignature**    Nurse 2 eSignature: Electronically signed by Dharmesh Gonzales RN on 3/22/22 at 2:59 PM EDT

## 2022-03-22 NOTE — PLAN OF CARE
Problem: Skin Integrity:  Goal: Will show no infection signs and symptoms  Description: Will show no infection signs and symptoms  Outcome: Ongoing  Goal: Absence of new skin breakdown  Description: Absence of new skin breakdown  Outcome: Ongoing   Dressings changed and skin cleansed per protocol. Harshil scale assessed qshift. Wound care consulted for existing wounds. Problem: Non-Violent Restraints  Goal: Removal from restraints as soon as assessed to be safe  Outcome: Ongoing  Goal: No harm/injury to patient while restraints in use  Outcome: Ongoing  Goal: Patient's dignity will be maintained  Outcome: Ongoing   Passive range of motion completed, no signs of injury, lopez in place, NPO. Continues to meet criteria. High risk airway with sedation.

## 2022-03-22 NOTE — ED PROVIDER NOTES
[address] [facility]  eMERGENCY dEPARTMENT eNCOUnter        Pt Name: Cruz Fournier  MRN: [id number]  Armstrongfpj 1960  Date of evaluation: 3/22/2022  Provider: Jayda Benavidez MD  PCP: Jerri Marin MD      [address] [place] [address]       Chief Complaint   Patient presents with    Shortness of Breath     pt. from home arrived by [facility]p brought pt. in.  pt. was fine and when he went to bed he felt his back and chest get tight and then started with SOB. squad gave a duoneb, 125mg solu medrol, AND INCREASED o2 TO 4L. PT.normally wears 2-3 l of )2. HISTORY OFPRESENT ILLNESS   (Location/Symptom, Timing/Onset, Context/Setting, Quality, Duration, Modifying Factors,Severity)  Note limiting factors. Cruz Fournier is a 65 y.o. male has a history of COPD but states tonight he coughed up some blood and then became tight in the chest and more short of breath he now presents with soreness to the throat minimal stridor states he is on 2 L at home requiring 4 L here    Nursing Notes were all reviewed and agreed with or any disagreements were addressed  in the HPI. REVIEW OF SYSTEMS    (2-9 systems for level 4, 10 or more for level 5)       REVIEW OF SYSTEMS    Constitutional:  Denies fever, chills, or weakness   Eyes:  Denies vision changes  HENT:  Denies sore throat or neck pain   Respiratory:   cough  shortness of breath   Cardiovascular:  Denies chest pain  GI:  Denies abdominal pain, nausea, vomiting, or diarrhea   Musculoskeletal:  Denies back pain   Skin: no rash or vesicles   Neurologic:  no headache weakness focal    Lymphatic:  no swollen  nodes   Psychiatric: no si or hs thoughts     All systems negative except as marked. Positives and Pertinent negatives as per HPI. Except as noted above in the ROS, all other systems were reviewed andnegative.        PASTMEDICAL HISTORY     Past Medical History:   Diagnosis Date    Diabetes mellitus (HCC)     Fibromyalgia     History of DVT (deep vein thrombosis)     Hyperlipidemia     Hypertension     Type 2 diabetes mellitus with circulatory disorder, without long-term current use of insulin ([facility] [address] 2/18/2022         SURGICAL HISTORY       Past Surgical History:   Procedure Laterality Date    APPENDECTOMY  2005    COLONOSCOPY  2016    FEMORAL BYPASS Left 02/26/2020    femoral posterior tibial bypass    FEMORAL-TIBIAL BYPASS GRAFT Right 12/11/2020    with femoral endarterectomy and patch angioplasty    FOOT DEBRIDEMENT Left 09/03/2020    FOOT DEBRIDEMENT Right 2/26/2021    DEBRIDEMENT OF RIGHT FOOT WOUND WITH GRAFT APPLICATION performed by Violet Bergman DPM at [facility] Right 12/08/2020    stent leg for PVD         CURRENT MEDICATIONS       Previous Medications    ACETAMINOPHEN (TYLENOL) 500 MG TABLET    Take 1 tablet by mouth 4 times daily as needed for Pain    ALBUTEROL SULFATE HFA (VENTOLIN HFA) 108 (90 BASE) MCG/ACT INHALER    Inhale 2 puffs into the lungs 4 times daily as needed for Wheezing    ATORVASTATIN (LIPITOR) 80 MG TABLET    Take 1 tablet by mouth daily Cancel atorvastatin 20 mg a day    BLOOD GLUCOSE MONITOR KIT AND SUPPLIES    Dispense sufficient amount for indicated testing frequency plus additional to accommodate PRN testing needs. Dispense all needed supplies to include: monitor, strips, lancing device, lancets, control solutions, alcohol swabs. BLOOD GLUCOSE MONITOR STRIPS    Test 2 times a day while on PREDNISONE, then 1 time daily & as needed for symptoms of irregular blood glucose. Dispense sufficient amount for indicated testing frequency plus additional to accommodate PRN testing needs.     CARVEDILOL (COREG) 25 MG TABLET    Take 1 tablet by mouth 2 times daily (with meals)    FERROUS SULFATE (IRON 325) 325 (65 FE) MG TABLET    Take 1 tablet by mouth 2 times daily    FLUTICASONE (FLONASE) 50 MCG/ACT NASAL SPRAY    1 spray by Nasal route daily FLUTICASONE-UMECLIDIN-VILANT (TRELEGY ELLIPTA) 200-62.5-25 MCG/INH AEPB    Inhale 1 puff into the lungs 2 times daily    FUROSEMIDE (LASIX) 20 MG TABLET    Take 1 tablet by mouth daily    GENTAMICIN (GARAMYCIN) 0.1 % CREAM    Apply topically   daily. GUAIFENESIN (MUCINEX) 600 MG EXTENDED RELEASE TABLET    Take 1,200 mg by mouth 2 times daily    IPRATROPIUM-ALBUTEROL (DUONEB) 0.5-2.5 (3) MG/3ML SOLN NEBULIZER SOLUTION    Inhale 3 mLs into the lungs 4 times daily    LANCETS MISC    1 each by Does not apply route daily Test 2 times daily while on PREDNISONE then 1 time daily & as needed for symptoms of irregular blood sugar    METFORMIN (GLUCOPHAGE) 500 MG TABLET    Take 1 tablet by mouth 2 times daily (with meals)    NICOTINE (NICODERM CQ) 14 MG/24HR    Place 1 patch onto the skin daily for 14 days    NIFEDIPINE (ADALAT CC) 60 MG EXTENDED RELEASE TABLET    Take 1 tablet by mouth daily    PREDNISONE (DELTASONE) 20 MG TABLET    Take 2 tablets by mouth daily for 4 days, THEN 1 tablet daily for 4 days.     TADALAFIL (CIALIS) 5 MG TABLET    TAKE 1 TABLET BY MOUTH ONE TIME A DAY AS NEEDED FOR ERECTILE DYSFUNCTION    VITAMINS/MINERALS TABS    Take 1 tablet by mouth daily    WARFARIN (JANTOVEN) 5 MG TABLET    TAKE 1 AND 1/2 TABLETS BY MOUTH ONE TIME A DAY OR AS DIRECTED BY [facility] ([phone])       ALLERGIES     Chantix [varenicline]    FAMILY HISTORY       Family History   Problem Relation Age of Onset    Cancer Mother     Lung Cancer Mother     Diabetes Father     Stroke Father           SOCIAL HISTORY       Social History     Socioeconomic History    Marital status:      Spouse name: None    Number of children: None    Years of education: None    Highest education level: None   Occupational History    None   Tobacco Use    Smoking status: Former Smoker     Packs/day: 0.50     Years: 20.00     Pack years: 10.00     Types: Cigarettes     Start date: 12/28/1977     Quit date: 2021     Years since quittin.2    Smokeless tobacco: Never Used   Vaping Use    Vaping Use: Never used   Substance and Sexual Activity    Alcohol use: Yes     Alcohol/week: 6.0 standard drinks     Types: 6 Cans of beer per week    Drug use: Never    Sexual activity: Yes     Partners: Female   Other Topics Concern    None   Social History Narrative    None     Social Determinants of Health     Financial Resource Strain: Low Risk     Difficulty of Paying Living Expenses: Not hard at all   Food Insecurity: No Food Insecurity    Worried About Running Out of Food in the Last Year: Never true    [address] in the Last Year: Never true   Transportation Needs:     Lack of Transportation (Medical): Not on file    Lack of Transportation (Non-Medical):  Not on file   Physical Activity:     Days of Exercise per Week: Not on file    Minutes of Exercise per Session: Not on file   Stress:     Feeling of Stress : Not on file   Social Connections:     Frequency of Communication with Friends and Family: Not on file    Frequency of Social Gatherings with Friends and Family: Not on file    Attends Druze Services: Not on file    Active Member of [address] [place] [address] or Organizations: Not on file    Attends Club or Organization Meetings: Not on file    Marital Status: Not on file   Intimate Partner Violence:     Fear of Current or Ex-Partner: Not on file    Emotionally Abused: Not on file    Physically Abused: Not on file    Sexually Abused: Not on file   Housing Stability:     Unable to Pay for Housing in the Last Year: Not on file    Number of [place] in the Last Year: Not on file    Unstable Housing in the Last Year: Not on file       SCREENINGS    Erin Coma Scale  Eye Opening: Spontaneous  Best Verbal Response: Oriented  Best Motor Response: Obeys commands  Darci Coma Scale Score: 15        PHYSICAL EXAM    (up to 7 for level 4, 8 or more for level 5)     ED Triage Vitals [22 [id number]]   BP emergency department physician    Rhythm: normal sinus   Rate: normal  Axis: normal  Ectopy: none  Conduction: normal  ST Segments: no acute change  T Waves: no acute change  Q Waves: none    Clinical Impression: Sinus rhythm    Jayda Benavidez MD      RADIOLOGY:   Non-plain film images such as CT, Ultrasound and MRI are read by the radiologist. Mehrdadjose juan Mejia images are visualized and preliminarily interpreted by the  ED Provider with the belowfindings:        Interpretation per the Radiologist below, if available at the time of this note:    CT SOFT TISSUE NECK W CONTRAST   Final Result   Lobulated large mass wrapping the left side of the hypopharynx extending into   supraglottic and glottic portions of the larynx measuring approximately 3.5   cm diameter with a craniocaudad extent of 4.9 cm. The mass appears to   severely narrow the airway and could potentially make for difficult   intubation. Findings compatible with squamous cell carcinoma. Mild to moderate emphysema. XR CHEST PORTABLE   Final Result   Chronic findings in the chest without acute airspace disease identified.                PROCEDURES   Unless otherwise noted below, none     Procedures    CRITICAL CARE TIME   N/A      CONSULTS:  IP CONSULT TO OTOLARYNGOLOGY    EMERGENCY DEPARTMENT COURSE and DIFFERENTIAL DIAGNOSIS/MDM:   Vitals:    Vitals:    03/22/22 0533 03/22/22 0535 03/22/22 0536 03/22/22 0608   BP:       Pulse: 98 105 101    Resp: 27 21 25 26   Temp:       TempSrc:       SpO2: (!) 89% 90% 91% 96%       Patient was given the following medications:  Medications   racepinephrine HCl (VAPONEFPRIN) 2.25 % nebulizer solution NEBU 11.25 mg (11.25 mg Nebulization Given 3/22/22 0607)   sodium chloride nebulizer 0.9 % solution 3 mL (has no administration in time range)   ipratropium-albuterol (DUONEB) nebulizer solution 1 ampule (1 ampule Inhalation Given 3/22/22 0314)   iopamidol (ISOVUE-370) 76 % injection 75 mL (75 mLs IntraVENous Given 3/22/22 0351)   ipratropium-albuterol (DUONEB) nebulizer solution 1 ampule (1 ampule Inhalation Given 3/22/22 0693)   methylPREDNISolone sodium (SOLU-MEDROL) injection 125 mg (125 mg IntraVENous Given 3/22/22 0562)     And has a multilobular mass in the hypopharynx probably squamous cell carcinoma which would explain his stridor he says he has had this since January with the hoarseness ent  was consulted Dr Karon Johnson to the hospitalist service  icu consulted  Admit to icu     The patient tolerated their visit well. Thepatient and / or the family were informed of the results of any tests, a time was given to answer questions. FINAL IMPRESSION      1. COPD with exacerbation ([facility] [address]    2. Mass of hypopharynx        DISPOSITION/PLAN   DISPOSITION Decision To Admit 03/22/2022 06:44:02 AM      PATIENT REFERRED TO:  No follow-up provider specified.     DISCHARGE MEDICATIONS:  New Prescriptions    No medications on file       DISCONTINUED MEDICATIONS:  Discontinued Medications    No medications on file              (Please note that portions of this note were completed with a voice recognition program.  Efforts were made to edit the dictations but occasionally words aremis-transcribed.)    Reji Leal MD (electronically signed)         Reji Leal MD  03/22/22 3818

## 2022-03-22 NOTE — CONSULTS
Lizzy      Patient Name: [facility] Record Number:  [id number]  Primary Care Physician:  Sarah Krueger MD  Date of Consultation: 3/22/2022    Chief Complaint: Shortness of breath    HISTORY OF Shell is a(n) 65 y.o. male who presents as a stat consult for ENT acute respiratory failure and stridor. Patient arrived to the emergency room early this morning. He was speaking without obvious respiratory distress earlier. Approximately 10:30 AM this morning he developed acute respiratory distress and I was emergently paged by the emergency room for stat evaluation. Upon arrival to emergency room patient was with audible inspiratory stridor and increased work of breathing. Patient not conversational secondary to his increased work of breathing. Anesthesia was immediately paged as well to help and assist with airway management. Through chart review patient is on warfarin for history of DVT. Former smoker. No reported history of head or neck surgery in his medical record.       Patient Active Problem List   Diagnosis    Peripheral artery disease ([address]    Centrilobular emphysema (HCC)    COPD (chronic obstructive pulmonary disease) ([address]    Atherosclerosis of native arteries of right leg with ulceration of other part of foot ([address]    Impotence    Acute deep vein thrombosis (DVT) of femoral vein of left lower extremity (HCC)    Colon polyp    Hyperlipidemia    Non-healing ulcer of left ankle ([address]    Venous insufficiency    Venous ulcer ([address]    Essential hypertension    Peripheral vascular disease ([address]    Type 2 diabetes mellitus with circulatory disorder, without long-term current use of insulin (HCC)    COPD exacerbation ([address]    Acute hypercapnic respiratory failure ([address]     Past Surgical History:   Procedure Laterality Date    APPENDECTOMY  2005    COLONOSCOPY  2016    FEMORAL BYPASS Left 2020    femoral posterior tibial bypass    FEMORAL-TIBIAL BYPASS GRAFT Right 2020    with femoral endarterectomy and patch angioplasty    FOOT DEBRIDEMENT Left 2020    FOOT DEBRIDEMENT Right 2021    DEBRIDEMENT OF RIGHT FOOT WOUND WITH GRAFT APPLICATION performed by Jaclyn Avila DPM at [facility] Right 2020    stent leg for PVD     Family History   Problem Relation Age of Onset    Cancer Mother     Lung Cancer Mother     Diabetes Father     Stroke Father      Social History     Socioeconomic History    Marital status:      Spouse name: Not on file    Number of children: Not on file    Years of education: Not on file    Highest education level: Not on file   Occupational History    Not on file   Tobacco Use    Smoking status: Former Smoker     Packs/day: 0.50     Years: 20.00     Pack years: 10.00     Types: Cigarettes     Start date: 1977     Quit date: 2021     Years since quittin.2    Smokeless tobacco: Never Used   Vaping Use    Vaping Use: Never used   Substance and Sexual Activity    Alcohol use: Yes     Alcohol/week: 6.0 standard drinks     Types: 6 Cans of beer per week    Drug use: Never    Sexual activity: Yes     Partners: Female   Other Topics Concern    Not on file   Social History Narrative    Not on file     Social Determinants of Health     Financial Resource Strain: Low Risk     Difficulty of Paying Living Expenses: Not hard at all   Food Insecurity: No Food Insecurity    Worried About [address] in the Last Year: Never true    [facility] in the Last Year: Never true   Transportation Needs:     Lack of Transportation (Medical): Not on file    Lack of Transportation (Non-Medical):  Not on file   Physical Activity:     Days of Exercise per Week: Not on file    Minutes of Exercise per Session: Not on file   Stress:     Feeling of Stress : Not on file   Social Connections:  Frequency of Communication with Friends and Family: Not on file    Frequency of Social Gatherings with Friends and Family: Not on file    Attends [employer] Services: Not on file    Active Member of Clubs or Organizations: Not on file    Attends Club or Organization Meetings: Not on file    Marital Status: Not on file   Intimate Partner Violence:     Fear of Current or Ex-Partner: Not on file    Emotionally Abused: Not on file    Physically Abused: Not on file    Sexually Abused: Not on file   Housing Stability:     Unable to Pay for Housing in the Last Year: Not on file    Number of [place] in the Last Year: Not on file    Unstable Housing in the Last Year: Not on file       DRUG/FOOD ALLERGIES: Chantix [varenicline]    CURRENT MEDICATIONS  Prior to Admission medications    Medication Sig Start Date End Date Taking? Authorizing Provider   predniSONE (DELTASONE) 20 MG tablet Take 2 tablets by mouth daily for 4 days, THEN 1 tablet daily for 4 days.  3/14/22 3/22/22  Brian Ventura MD   nicotine (NICODERM CQ) 14 MG/24HR Place 1 patch onto the skin daily for 14 days  Patient not taking: Reported on 3/22/2022 3/14/22 3/28/22  Brian Ventura MD   ipratropium-albuterol (DUONEB) 0.5-2.5 (3) MG/3ML SOLN nebulizer solution Inhale 3 mLs into the lungs 4 times daily 3/7/22   HOLGER Chu - CNP   furosemide (LASIX) 20 MG tablet Take 1 tablet by mouth daily 3/7/22   HOLGER Chu - CNP   guaiFENesin (MUCINEX) 600 MG extended release tablet Take 1,200 mg by mouth 2 times daily    Historical Provider, MD   atorvastatin (LIPITOR) 80 MG tablet Take 1 tablet by mouth daily Cancel atorvastatin 20 mg a day 2/18/22   Mikayla Aguilar MD   Fluticasone-Umeclidin-Vilant (TRELEGY ELLIPTA) 200-62.5-25 MCG/INH AEPB Inhale 1 puff into the lungs 2 times daily  Patient taking differently: Inhale 1 puff into the lungs daily  2/18/22   Mikayla Aguilar MD   gentamicin (GARAMYCIN) 0.1 % cream Apply topically   daily. 2/11/22   Eliza Bryant DPM   albuterol sulfate HFA (VENTOLIN HFA) 108 (90 Base) MCG/ACT inhaler Inhale 2 puffs into the lungs 4 times daily as needed for Wheezing 1/31/22   Yefri Kendall MD   acetaminophen (TYLENOL) 500 MG tablet Take 1 tablet by mouth 4 times daily as needed for Pain 1/10/22   Yefri Kendall MD   warfarin (JANTOVEN) 5 MG tablet TAKE 1 AND 1/2 TABLETS BY MOUTH ONE TIME A DAY OR AS DIRECTED BY [facility] ([phone])  Patient taking differently: Take 7.5 mg by mouth daily Except 10mg every Wednesday and Saturday OR AS DIRECTED BY [facility] ([phone]) 1/10/22   Yefri Kendall MD   NIFEdipine (ADALAT CC) 60 MG extended release tablet Take 1 tablet by mouth daily 1/10/22   Yefri Kendall MD   carvedilol (COREG) 25 MG tablet Take 1 tablet by mouth 2 times daily (with meals) 1/10/22   Yefri Kendall MD   metFORMIN (GLUCOPHAGE) 500 MG tablet Take 1 tablet by mouth 2 times daily (with meals) 1/10/22   Yefri Kendall MD   fluticasone [facility]) 50 MCG/ACT nasal spray 1 spray by Nasal route daily 1/6/22 1/6/23  Yefri Kendall MD   blood glucose monitor kit and supplies Dispense sufficient amount for indicated testing frequency plus additional to accommodate PRN testing needs. Dispense all needed supplies to include: monitor, strips, lancing device, lancets, control solutions, alcohol swabs. Patient not taking: Reported on 3/22/2022 1/4/22   Shireen Padilla MD   Lancets MISC 1 each by Does not apply route daily Test 2 times daily while on PREDNISONE then 1 time daily & as needed for symptoms of irregular blood sugar  Patient not taking: Reported on 3/22/2022 1/4/22   Shireen Padilla MD   blood glucose monitor strips Test 2 times a day while on PREDNISONE, then 1 time daily & as needed for symptoms of irregular blood glucose. Dispense sufficient amount for indicated testing frequency plus additional to accommodate PRN testing needs.   Patient not taking: Reported on 3/22/2022 1/4/22   Chani Schmidt MD   ferrous sulfate (IRON 325) 325 (65 Fe) MG tablet Take 1 tablet by mouth 2 times daily 9/7/21   Jolly Lopez MD   tadalafil (CIALIS) 5 MG tablet TAKE 1 TABLET BY MOUTH ONE TIME A DAY AS NEEDED FOR ERECTILE DYSFUNCTION  Patient not taking: Reported on 3/22/2022 8/16/21   Jolly Lopez MD   Vitamins/Minerals TABS Take 1 tablet by mouth daily  Patient not taking: Reported on 3/22/2022    Historical Provider, MD       REVIEW OF SYSTEMS  The following systems were reviewed and revealed the following in addition to any already discussed in the HPI:    Review of systems but with unable to performed secondary to minimal patient responsiveness from increased work of breathing and acute respiratory distress. PHYSICAL EXAM  /82   Pulse 93   Temp 97.9 °F (36.6 °C) (Temporal)   Resp 16   Ht 5' 8\" (1.727 m)   Wt 163 lb 2.3 oz (74 kg)   SpO2 99%   BMI 24.81 kg/m²     GENERAL: Patient minimally responsive to questioning. Increased work of breathing with audible inspiratory stridor. EYES: EOMI, Anti-icteric  NOSE: No epistaxis, nasal mucosa within normal limits, no purulent drainage  EARS: Normal external canal appearance, EAC patent bilaterally, no otorrhea  FACE: HB 1/6 bilaterally, symmetric appearing  ORAL CAVITY: No masses or lesions, no evidence of inflammation. Uvula midline, moist mucous membranes. NECK: Palpable bony and cartilaginous landmarks. Normal range of motion, no thyromegaly, trachea is midline, no palpable lymphadenopathy or neck masses, no crepitus  CHEST: Significantly increased work of breathing with audible inspiratory stridor. Initially on 10 L supplemental O2 via nasal cannula with saturations ranging from low 80s to mid 90s.   SKIN: No rashes, normal appearing skin, no evidence of skin lesions/tumors  NEURO: Moves all extremities spontaneously    I have performed a head and neck physical exam personally or was physically present during the key or critical portions of the service. LABS  Lab Results   Component Value Date    WBC 6.8 03/22/2022    HGB 13.8 03/22/2022    HCT 43.0 03/22/2022    MCV 88.4 03/22/2022     03/22/2022     Lab Results   Component Value Date    INR 3.00 (H) 03/22/2022    INR 1.4 03/14/2022    INR 1.42 (H) 03/13/2022    PROTIME 35.5 (H) 03/22/2022    PROTIME 16.3 (H) 03/13/2022    PROTIME 19.9 (H) 03/12/2022         RADIOLOGY  EXAMINATION:   CT OF THE NECK SOFT TISSUE WITH CONTRAST  3/22/2022       TECHNIQUE:   CT of the neck was performed with the administration of intravenous contrast.   Multiplanar reformatted images are provided for review. Dose modulation,   iterative reconstruction, and/or weight based adjustment of the mA/kV was   utilized to reduce the radiation dose to as low as reasonably achievable.       COMPARISON:   None.       HISTORY:   ORDERING SYSTEM PROVIDED HISTORY: Hemoptysis and hoarseness   TECHNOLOGIST PROVIDED HISTORY:   Reason for exam:->Hemoptysis and hoarseness   Decision Support Exception - unselect if not a suspected or confirmed   emergency medical condition->Emergency Medical Condition (MA)   Reason for Exam: Hemoptysis and hoarseness   Relevant Medical/Surgical History: Shortness of Breath (pt. from home arrived   by [address] brought pt. in.  pt. was fine and when he went to bed he   felt his back and chest get tight and then started with SOB. squad gave a   duoneb, 125mg solu medrol, AND INCREASED o2 TO 4L.  PT. normally wears 2-3 l   of )2.  )       FINDINGS:   PHARYNX/LARYNX:  The palatine tonsils are normal in appearance.  The tongue   is normal in appearance.  Involving the vallecula on the left and extending   inferiorly along the aryepiglottic fold and anterior/left anterolateral   hypopharynx is a large lobulated mass wrapping to the left from anterior to   posterior at the supraglottic larynx.  This thickened crescentic area   measures approximately 3.5 cm diameter and has a craniocaudad extent of 4.9   cm.  The mass laterally and posteriorly displaces the true and false cords   and appears to severely narrow the airway.  There is no associated erosion of   the laryngeal cartilages.       SALIVARY GLANDS/THYROID:  The parotid and submandibular glands appear   unremarkable.  The thyroid gland appears unremarkable.       LYMPH NODES:  No cervical or supraclavicular lymphadenopathy is seen.       SOFT TISSUES:  No appreciable soft tissue swelling or mass is seen.       BRAIN/ORBITS/SINUSES:  The visualized portion of the intracranial contents   appear unremarkable.  The visualized portion of the orbits, paranasal sinuses   and mastoid air cells demonstrate no acute abnormality.       LUNG APICES/SUPERIOR MEDIASTINUM:  No focal consolidation is seen within the   visualized lung apices.  There is a background of mild to moderate emphysema. No superior mediastinal lymphadenopathy or mass.  The visualized portion of   the trachea appears unremarkable.       BONES:  No aggressive appearing lytic or blastic bony lesion.           Impression   Lobulated large mass wrapping the left side of the hypopharynx extending into   supraglottic and glottic portions of the larynx measuring approximately 3.5   cm diameter with a craniocaudad extent of 4.9 cm.  The mass appears to   severely narrow the airway and could potentially make for difficult   intubation.  Findings compatible with squamous cell carcinoma.       Mild to moderate emphysema. Images from CT soft tissue neck with contrast performed today were independently reviewed by myself which demonstrates an obstructive upper airway mass lesion seeming to arise from the left lateral pharyngeal wall with extension to the left vallecula and down towards the left true vocal fold. Does not extend past the vocal folds into the subglottis. No definitive thyroid cartilaginous invasion.     PROCEDURE  Procedure: Flexible Laryngoscopy    Pre-op: Acute respiratory failure, anatomic airway compromise, hypopharyngeal mass lesion  Post-op: Same  Procedure : Flexible Nasopharyngolaryngoscopy  Surgeon: Dr. Whitney Leonardo DO  Anesthesia: None  Estimated Blood Loss: None    Description of Procedure: The patient was sat upright in the ED cart. This procedure was performed in emergent basis so informed consent was not able to be adequately obtained in a written fashion before the procedure. Utilizing the flexible video bronchoscope the upper airway structures were visualized after passage through the right nasal passage. Findings: There were bloody mucus secretions within the upper airway structures. The glottic structures were deviated to the right with a mass lesion along the left vallecula and along the left hypopharynx. The true vocal folds themselves were able to be partially visualized around the left-sided upper airway mass lesion. ASSESSMENT/PLAN  Elizabeth Mendez is a very pleasant 65 y.o. male with left hypopharyngeal mass, acute airway obstruction, acute respiratory distress. Hypopharyngeal lesion  Acute airway obstruction  Acute respiratory distress  -Patient evaluated on an emergent basis and emergently intubated successfully by the anesthesiology team.  Transfer to ICU from emergency department for monitoring. CONSIDER ACUTE AIRWAY WITH STRICT AIRWAY PRECAUTIONS. WOULD BE VERY DIFFICULT REINTUBATION. -Vent management per ICU team  -We will ultimately need direct laryngoscopy with biopsy to prove what is most likely an invasive squamous cell carcinoma. Will discuss with family tomorrow.  -Please do not hesitate to call ENT with any questions or concerns. Dr. Whitney Leonardo [place] [address]  Department of Otolaryngology/Head and Neck Surgery      Medical Decision Making:   The following items were considered in medical decision making:  Independent review of images  Review / order clinical lab tests  Review / order radiology tests  Decision to obtain old records      This note was generated completely or in part utilizing Dragon dictation speech recognition software. Occasionally, words are mistranscribed and despite editing, the text may contain inaccuracies due to incorrect word recognition. If further clarification is needed please contact the office at [phone].

## 2022-03-22 NOTE — TELEPHONE ENCOUNTER
I have a letter for the patient recommending that he go sensibility in his chart please fax it to the [place] Right ear hearing screen completed date: 2022  Right ear screen method: EOAE (evoked otoacoustic emission)  Right ear screen result: Passed  Right ear screen comment: N/A    Left ear hearing screen completed date: 2022  Left ear screen method: EOAE (evoked otoacoustic emission)  Left ear screen result: Passed  Left ear screen comments: N/A

## 2022-03-22 NOTE — CONSULTS
[facility] Pulmonary and Critical Care   Consult Note      Reason for Consult: Respiratory failure, neck mass, COPD  Requesting Physician: Dr. Cr Camarillo:   [address] [facility] / HPI:                The patient is a 65 y.o. male with significant past medical history of:      Diagnosis Date    Diabetes mellitus ([facility] [address]     Fibromyalgia     History of DVT (deep vein thrombosis)     Hyperlipidemia     Hypertension     Type 2 diabetes mellitus with circulatory disorder, without long-term current use of insulin ([facility] 2/18/2022     Patient presents to the emergency department with a chief complaint of increasingly severe shortness of breath. He also had some associated hemoptysis and cough. Shortness of breath had been noticed over the last couple of days according to his wife. He was also having some hoarseness. She states he has had hoarseness off and on over the last 2 years he also has a history of several recent hospitalizations related to COPD flareup. He has been hospitalized already twice this month. In the ED he did have some respiratory distress. He was noted on examination that he had a mass in his throat. CT of the neck was performed confirming this. As the day went on, he started to have trouble with drooling and managing his secretions. Shortness of breath worsened and he required urgent intubation in the ED. This was done with the assistance of anesthesia and ENT with concern that he might need tracheostomy. However, he was successfully intubated with a 6.5 mm ETT. He is now sedated on mechanical ventilation in ICU. Details the history obtained from review of the medical record and discussion at the bedside with the patient's wife. He does have a extensive smoking history of up to 2 packs/day. He quit smoking around January 2022.       Past Surgical History:        Procedure Laterality Date    APPENDECTOMY  2005    COLONOSCOPY  2016    FEMORAL BYPASS Left 02/26/2020    femoral posterior tibial bypass    FEMORAL-TIBIAL BYPASS GRAFT Right 12/11/2020    with femoral endarterectomy and patch angioplasty    FOOT DEBRIDEMENT Left 09/03/2020    FOOT DEBRIDEMENT Right 2/26/2021    DEBRIDEMENT OF RIGHT FOOT WOUND WITH GRAFT APPLICATION performed by Jaclyn Avila DPM at [address] [place] [address],[address] Right 12/08/2020    stent leg for PVD     Current Medications:    Current Facility-Administered Medications: racepinephrine HCl (VAPONEFPRIN) 2.25 % nebulizer solution NEBU 11.25 mg, 11.25 mg, Nebulization, Q4H PRN  sodium chloride nebulizer 0.9 % solution 3 mL, 3 mL, Nebulization, Q4H PRN  succinylcholine (ANECTINE) injection 140 mg, 140 mg, IntraVENous, Once  albuterol sulfate  (90 Base) MCG/ACT inhaler 2 puff, 2 puff, Inhalation, 4x Daily PRN  Fluticasone-Umeclidin-Vilant 200-62.5-25 MCG/INH AEPB 1 puff, 1 puff, Inhalation, BID  ipratropium-albuterol (DUONEB) nebulizer solution 3 mL, 3 mL, Inhalation, 4x Daily  sodium chloride flush 0.9 % injection 5-40 mL, 5-40 mL, IntraVENous, 2 times per day  sodium chloride flush 0.9 % injection 5-40 mL, 5-40 mL, IntraVENous, PRN  0.9 % sodium chloride infusion, 25 mL, IntraVENous, PRN  enoxaparin (LOVENOX) injection 40 mg, 40 mg, SubCUTAneous, Daily  ondansetron (ZOFRAN-ODT) disintegrating tablet 4 mg, 4 mg, Oral, Q8H PRN **OR** ondansetron (ZOFRAN) injection 4 mg, 4 mg, IntraVENous, Q6H PRN  polyethylene glycol (GLYCOLAX) packet 17 g, 17 g, Oral, Daily PRN  acetaminophen (TYLENOL) tablet 650 mg, 650 mg, Oral, Q6H PRN **OR** acetaminophen (TYLENOL) suppository 650 mg, 650 mg, Rectal, Q6H PRN  famotidine (PEPCID) 20 mg in sodium chloride (PF) 10 mL injection, 20 mg, IntraVENous, BID  propofol injection, 5-90 mcg/kg/min, IntraVENous, Continuous  fentaNYL 10 mcg/mL infusion,  mcg/hr, IntraVENous, Continuous    Allergies   Allergen Reactions    Chantix [Varenicline] Other (See Comments)     Hallucinations         Social History:    TOBACCO: reports that he quit smoking about 2 months ago. His smoking use included cigarettes. He started smoking about 44 years ago. He has a 10.00 pack-year smoking history. He has never used smokeless tobacco.  ETOH:   reports current alcohol use of about 6.0 standard drinks of alcohol per week. Patient currently lives independently  Environmental/chemical exposure: None known    Family History:       Problem Relation Age of Onset    Cancer Mother     Lung Cancer Mother     Diabetes Father     Stroke Father      REVIEW OF SYSTEMS:    ROS is unobtainable due to his critical illness. Objective:   PHYSICAL EXAM:      VITALS:  BP (!) 134/90   Pulse 110   Temp 97.7 °F (36.5 °C) (Oral)   Resp 16   Ht 5' 8\" (1.727 m)   Wt 163 lb 2.3 oz (74 kg)   SpO2 100%   BMI 24.81 kg/m²      24HR INTAKE/OUTPUT:  No intake or output data in the 24 hours ending 03/22/22 1312  CONSTITUTIONAL: Sedated on mechanical ventilation  NECK:  Supple, symmetrical, trachea midline, no adenopathy, thyroid symmetric, not enlarged and no tenderness, skin normal  LUNGS:  no increased work of breathing and congested to auscultation. No accessory muscle use  CARDIOVASCULAR: S1 and S2, no edema and no JVD  ABDOMEN:  normal bowel sounds, non-distended and no masses palpated, and no tenderness to palpation. No hepatospleenomegaly  LYMPHADENOPATHY:  no axillary or supraclavicular adenopathy. No cervical adnenopathy  PSYCHIATRIC: Sedated on mechanical ventilation. MUSCULOSKELETAL: No obvious misalignment or effusion of the joints. No clubbing, cyanosis of the digits. SKIN:  normal skin color, texture, turgor and no redness, warmth, or swelling.  No palpable nodules    DATA:    Old records have been reviewed    CBC:  Recent Labs     03/22/22  0300   WBC 6.8   RBC 4.86   HGB 13.8   HCT 43.0      MCV 88.4   MCH 28.4   MCHC 32.2   RDW 16.4*      BMP:  Recent Labs     03/22/22  0300      K 4.7      CO2 32   BUN 16   CREATININE 0.7* CALCIUM 8.9   GLUCOSE 127*      ABG:  Recent Labs     03/22/22  1207   PHART 7.267*   YAP3VDN 72.4*   PO2ART 97.9   OBV1BEX 33.0*   N5PFMZQZ 96   BEART 6*     Procalcitonin  No results for input(s): PROCAL in the last 72 hours. No results found for: BNP  Lab Results   Component Value Date    CKTOTAL 81 02/01/2022    TROPONINI <0.01 03/22/2022           Radiology Review:  All pertinent images / reports were reviewed as a part of this visit. Assessment:     1. Acute respiratory failure due to upper airway compromise  2. 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 Severe COPD/centrilobular emphysema      Plan:     Patient's presentation is that of respiratory failure related to upper airway compromise from left neck mass which is likely a squamous cell carcinoma. He was developing stridor and inability to manage secretions. The patient is fortunate to have had a successful intubation without the need for immediate, urgent tracheostomy. He is comfortable on propofol and fentanyl infusions. I reviewed post intubation chest x-ray which reveals good position of the endotracheal tube in the mid trachea. There is no obvious focal infiltrate. I have reviewed CT imaging which reveals evidence of a 3.5 cm left laryngeal mass. This was narrowing the airway and displacing the airway rightward. There is no obvious lymphadenopathy on the available images. ENT is on the case. May need tracheostomy before the patient can be extubated. We will discuss    The patient did have pulmonary function testing 1/26/2022 which revealed FEV1 of 1.83 L which is 63% predicted. FEV1/FVC was decreased and there was no response to inhaled bronchodilators consistent with a diagnosis of moderate obstructive lung disease. There was severe reduction in diffusion capacity which was only 37% predicted.   Continue inhaled therapy  Does not seem to have COPD exacerbation  However, may benefit from steroid therapy for any element of swelling of the laryngeal mass. We will put him on Decadron. I discussed the diagnosis and plan of care with the patient's wife at bedside. Total critical care time caring for this patient with life threatening illness, including direct patient contact, management of life support systems, review of data including imaging and labs, discussions with other team members and physicians is at least 32 minutes so far today, excluding procedures.

## 2022-03-22 NOTE — PROGRESS NOTES
Patient admitted from emergency department via stretcher on monitor. Transferred to  bed. Placed on monitors. Vital signs obtained. Orders reviewed and acknowledged. Admission completed. Spouse present, questions answered. Bed placed in low position.

## 2022-03-22 NOTE — H&P
Hospital Medicine History & Physical      PCP: Karina Bae MD    Date of Admission: 3/22/2022    Date of Service: Pt seen/examined on 3/22/2022 and Admitted to Inpatient     Chief Complaint:  sob    History Of Present Illness:    Demetrius Chung is a 65 y.o. male has a history of COPD but states tonight he coughed up some blood and then became tight in the chest and more short of breath he now presents with soreness to the throat minimal stridor,states he is on 2 L at home requiring 4 L here. Based on the stridor he had a soft tissue ct scan of the neck which showed a laryngeal mass. Later in his ED stay he began to get more obtunded and started drooling. There was concern for the air way to be closing and he was intubated with the help of anesthesia and ENT. At the time if my eval he was on the vent but not very well sedated. I increased the propofol and added fentanyl.     He is now admitted to the ICU.        Past Medical History:        Diagnosis Date    Diabetes mellitus ([address]     Fibromyalgia     History of DVT (deep vein thrombosis)     Hyperlipidemia     Hypertension     Type 2 diabetes mellitus with circulatory disorder, without long-term current use of insulin ([address] 2/18/2022       Past Surgical History:        Procedure Laterality Date    APPENDECTOMY  2005    COLONOSCOPY  2016    FEMORAL BYPASS Left 02/26/2020    femoral posterior tibial bypass    FEMORAL-TIBIAL BYPASS GRAFT Right 12/11/2020    with femoral endarterectomy and patch angioplasty    FOOT DEBRIDEMENT Left 09/03/2020    FOOT DEBRIDEMENT Right 2/26/2021    DEBRIDEMENT OF RIGHT FOOT WOUND WITH GRAFT APPLICATION performed by Irma Barry DPM at [facility] Right 12/08/2020    stent leg for PVD       Medications Prior to Admission:    Prior to Admission medications    Medication Sig Start Date End Date Taking? Authorizing Provider   predniSONE (DELTASONE) 20 MG tablet Take 2 tablets by mouth daily for 4 days, THEN 1 tablet daily for 4 days. 3/14/22 3/22/22  Brian Hollis MD   nicotine (NICODERM CQ) 14 MG/24HR Place 1 patch onto the skin daily for 14 days  Patient not taking: Reported on 3/22/2022 3/14/22 3/28/22  Brian Hollis MD   ipratropium-albuterol (DUONEB) 0.5-2.5 (3) MG/3ML SOLN nebulizer solution Inhale 3 mLs into the lungs 4 times daily 3/7/22   HOLGER Chu CNP   furosemide (LASIX) 20 MG tablet Take 1 tablet by mouth daily 3/7/22   HOLGER Chu CNP   guaiFENesin (MUCINEX) 600 MG extended release tablet Take 1,200 mg by mouth 2 times daily    Historical Provider, MD   atorvastatin (LIPITOR) 80 MG tablet Take 1 tablet by mouth daily Cancel atorvastatin 20 mg a day 2/18/22   Parminder Jackman MD   Fluticasone-Umeclidin-Vilant (TRELEGY ELLIPTA) 200-62.5-25 MCG/INH AEPB Inhale 1 puff into the lungs 2 times daily  Patient taking differently: Inhale 1 puff into the lungs daily  2/18/22   Parminder Jackman MD   gentamicin (GARAMYCIN) 0.1 % cream Apply topically   daily.  2/11/22   Ct Nash DPM   albuterol sulfate HFA (VENTOLIN HFA) 108 (90 Base) MCG/ACT inhaler Inhale 2 puffs into the lungs 4 times daily as needed for Wheezing 1/31/22   Vanda Zuniga MD   acetaminophen (TYLENOL) 500 MG tablet Take 1 tablet by mouth 4 times daily as needed for Pain 1/10/22   Vanda Zuniga MD   warfarin (JANTOVEN) 5 MG tablet TAKE 1 AND 1/2 TABLETS BY MOUTH ONE TIME A DAY OR AS DIRECTED BY [facility] ([phone])  Patient taking differently: Take 7.5 mg by mouth daily Except 10mg every Wednesday and Saturday OR AS DIRECTED BY [facility] ([phone]) 1/10/22   Vanda Zuniga MD   NIFEdipine (ADALAT CC) 60 MG extended release tablet Take 1 tablet by mouth daily 1/10/22   Vanda Zuniga MD carvedilol (COREG) 25 MG tablet Take 1 tablet by mouth 2 times daily (with meals) 1/10/22   Troy German MD   metFORMIN (GLUCOPHAGE) 500 MG tablet Take 1 tablet by mouth 2 times daily (with meals) 1/10/22   Troy German MD   fluticasone [facility]) 50 MCG/ACT nasal spray 1 spray by Nasal route daily 1/6/22 1/6/23  Troy German MD   blood glucose monitor kit and supplies Dispense sufficient amount for indicated testing frequency plus additional to accommodate PRN testing needs. Dispense all needed supplies to include: monitor, strips, lancing device, lancets, control solutions, alcohol swabs. Patient not taking: Reported on 3/22/2022 1/4/22   Mita Ziegler MD   Lancets MISC 1 each by Does not apply route daily Test 2 times daily while on PREDNISONE then 1 time daily & as needed for symptoms of irregular blood sugar  Patient not taking: Reported on 3/22/2022 1/4/22   Mita Ziegler MD   blood glucose monitor strips Test 2 times a day while on PREDNISONE, then 1 time daily & as needed for symptoms of irregular blood glucose. Dispense sufficient amount for indicated testing frequency plus additional to accommodate PRN testing needs. Patient not taking: Reported on 3/22/2022 1/4/22   Mita Ziegler MD   ferrous sulfate (IRON 325) 325 (65 Fe) MG tablet Take 1 tablet by mouth 2 times daily 9/7/21   Troy German MD   tadalafil (CIALIS) 5 MG tablet TAKE 1 TABLET BY MOUTH ONE TIME A DAY AS NEEDED FOR ERECTILE DYSFUNCTION  Patient not taking: Reported on 3/22/2022 8/16/21   Troy German MD   Vitamins/Minerals TABS Take 1 tablet by mouth daily  Patient not taking: Reported on 3/22/2022    Historical Provider, MD       Allergies:  Chantix [varenicline]    Social History:  The patient currently lives at home    TOBACCO:   reports that he quit smoking about 2 months ago. His smoking use included cigarettes. He started smoking about 44 years ago. He has a 10.00 pack-year smoking history.  He has never used smokeless tobacco.  ETOH:   reports current alcohol use of about 6.0 standard drinks of alcohol per week. Family History:  Reviewed in detail and negative for DM, Early CAD, Cancer, CVA. Positive as follows:        Problem Relation Age of Onset    Cancer Mother     Lung Cancer Mother     Diabetes Father     Stroke Father        REVIEW OF SYSTEMS:   Positive for hemoptysis and sob, as w ell as rapid decline leading to intubation to protect the airway, and as noted in the HPI. All other systems reviewed and negative. PHYSICAL EXAM:    /82   Pulse 95   Temp 97.9 °F (36.6 °C) (Temporal)   Resp 17   Ht 5' 8\" (1.727 m)   Wt 163 lb 2.3 oz (74 kg)   SpO2 100%   BMI 24.81 kg/m²     General appearance: No apparent distress appears stated age and cooperative. Intubated and sedated. HEENT Normal cephalic, atraumatic without obvious deformity. Pupils equal, round, and reactive to light. Extra ocular muscles intact. Conjunctivae/corneas clear. Neck: Supple, No jugular venous distention/bruits. Trachea midline without thyromegaly or adenopathy with full range of motion. Lungs: Clear to auscultation, bilaterally without Rales/Wheezes/Rhonchi with good respiratory effort. Heart: Regular rate and rhythm with Normal S1/S2 without murmurs, rubs or gallops, point of maximum impulse non-displaced  Abdomen: Soft, non-tender or non-distended without rigidity or guarding and positive bowel sounds all four quadrants. Extremities: No clubbing, cyanosis, or edema bilaterally. Full range of motion without deformity gait not tested. Skin: Skin color, texture, turgor normal.  No rashes or lesions. Neurologic:   Cranial nerves: II-XII intact, grossly non-focal.  Mental status: sedated. Capillary Refill: Acceptable  < 3 seconds  Peripheral Pulses: +3 Easily felt, not easily obliterated with pressure      CXR:  I have reviewed the CXR with the following interpretation:   1.  Properly placed endotracheal tube.       2.  Mild bibasilar atelectasis           EKG:  I have reviewed the EKG with the following interpretation: Normal sinus rhythmBiatrial enlargementPulmonary disease patternRSR' or QR pattern in V1 suggests right ventricular conduction delayLeft anterior fascicular block     CBC   Recent Labs     03/22/22  0300   WBC 6.8   HGB 13.8   HCT 43.0         RENAL  Recent Labs     03/22/22  0300      K 4.7      CO2 32   BUN 16   CREATININE 0.7*     LFT'S  Recent Labs     03/22/22  0300   AST 20   ALT 32   BILITOT <0.2   ALKPHOS 89     COAG  Recent Labs     03/22/22  0300   INR 3.00*     CARDIAC ENZYMES  Recent Labs     03/22/22  0300   TROPONINI <0.01       U/A:    Lab Results   Component Value Date    COLORU YELLOW 02/02/2022    WBCUA 1 02/02/2022    RBCUA 1 02/02/2022    CLARITYU CLOUDY 02/02/2022    SPECGRAV 1.021 02/02/2022    LEUKOCYTESUR Negative 02/02/2022    BLOODU Negative 02/02/2022    GLUCOSEU Negative 02/02/2022       ABG    Lab Results   Component Value Date    ZOA8NUD 33.0 03/22/2022    BEART 6 03/22/2022    E7JVURES 96 03/22/2022    PHART 7.267 03/22/2022    YKJ2KQQ 72.4 03/22/2022    PO2ART 97.9 03/22/2022    NHB1HSW 35 03/22/2022           Active Hospital Problems    Diagnosis Date Noted    Acute hypercapnic respiratory failure ([facility] [address] [J96.02] 03/10/2022         PHYSICIANS CERTIFICATION:    I certify that Alyssa Torres is expected to be hospitalized for greater  than 2 midnights based on the following assessment and plan:      ASSESSMENT/PLAN:    1. 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 - consult to ENT and to pulmonary critical care. - will need biopsy and definitive dx before tx can be considered. - 3.5 cm left laryngeal mass. 2. Acute hypercapnic respiratory failure  - a combination of COPD and this mass, nearly blocking his airway. - after some time in the vent the ABG showed the PCO2 in the 70s so it was likely higher.  - currently vented.   - no wheeze but may benefit from steroids if there is any swelling in the laryngeal region.  -started on decadron. He has good peripheral access but may need  A central line in the future. DVT Prophylaxis: he is therapeutic on lovenox. Holding for now. Diet: Diet NPO  Code Status: Full Code  PT/OT Eval Status: when appropriate     Dispo - admitted to ICU 32 min of critical care time spent. Chip Martinez MD    Thank you Damaso Bolton MD for the opportunity to be involved in this patient's care.  If you have any questions or concerns please feel free to contact me at [phone]

## 2022-03-22 NOTE — PROGRESS NOTES
03/22/22 1934   Vent Patient Data   Plateau Pressure 19 [id number]   Static Compliance 62 mL/cmH2O   Dynamic Compliance 28.04 mL/cmH2O

## 2022-03-22 NOTE — ED PROVIDER NOTES
Emergency Department Encounter  Location: [address] [facility]    Patient: Lizz Noriega  MRN: [id number]  : 1960  Date of evaluation: 3/22/2022  ED Provider: Caty Martínez MD    10:10 AM  Patient acutely decompensated and I was called to the patient's room to evaluate and manage. Dr. Jannet Feliciano initially evaluated this patient overnight and placed orders for admission. Please see his/her initial documentation for details of the patient's initial ED presentation, physical exam and completed studies.       I have reviewed and interpreted all of the currently available lab results and diagnostics from this visit:    Labs  Results for orders placed or performed during the hospital encounter of 22   CBC with Auto Differential   Result Value Ref Range    WBC 6.8 4.0 - 11.0 K/uL    RBC 4.86 4.20 - 5.90 M/uL    Hemoglobin 13.8 13.5 - 17.5 g/dL    Hematocrit 43.0 40.5 - 52.5 %    MCV 88.4 80.0 - 100.0 fL    MCH 28.4 26.0 - 34.0 pg    MCHC 32.2 31.0 - 36.0 g/dL    RDW 16.4 (H) 12.4 - 15.4 %    Platelets 256 787 - 737 K/uL    MPV 7.6 5.0 - 10.5 fL    Neutrophils % 69.2 %    Lymphocytes % 22.5 %    Monocytes % 6.1 %    Eosinophils % 0.9 %    Basophils % 1.3 %    Neutrophils Absolute 4.7 1.7 - 7.7 K/uL    Lymphocytes Absolute 1.5 1.0 - 5.1 K/uL    Monocytes Absolute 0.4 0.0 - 1.3 K/uL    Eosinophils Absolute 0.1 0.0 - 0.6 K/uL    Basophils Absolute 0.1 0.0 - 0.2 K/uL   Comprehensive Metabolic Panel w/ Reflex to MG   Result Value Ref Range    Sodium 140 136 - 145 mmol/L    Potassium reflex Magnesium 4.7 3.5 - 5.1 mmol/L    Chloride 100 99 - 110 mmol/L    CO2 32 21 - 32 mmol/L    Anion Gap 8 3 - 16    Glucose 127 (H) 70 - 99 mg/dL    BUN 16 7 - 20 mg/dL    CREATININE 0.7 (L) 0.8 - 1.3 mg/dL    GFR Non-African American >60 >60    GFR African American >60 >60    Calcium 8.9 8.3 - 10.6 mg/dL    Total Protein 6.7 6.4 - 8.2 g/dL    Albumin 4.1 3.4 - 5.0 g/dL    Albumin/Globulin Ratio 1.6 1.1 - 2.2 Total Bilirubin <0.2 0.0 - 1.0 mg/dL    Alkaline Phosphatase 89 40 - 129 U/L    ALT 32 10 - 40 U/L    AST 20 15 - 37 U/L   Troponin   Result Value Ref Range    Troponin <0.01 <0.01 ng/mL   Brain Natriuretic Peptide   Result Value Ref Range    Pro- (H) 0 - 124 pg/mL   Protime-INR   Result Value Ref Range    Protime 35.5 (H) 9.9 - 12.7 sec    INR 3.00 (H) 0.88 - 1.12   APTT   Result Value Ref Range    aPTT 56.5 (H) 26.2 - 38.6 sec   EKG 12 Lead   Result Value Ref Range    Ventricular Rate 98 BPM    Atrial Rate 98 BPM    P-R Interval 154 ms    QRS Duration 90 ms    Q-T Interval 330 ms    QTc Calculation (Bazett) 421 ms    P Axis 74 degrees    R Axis -75 degrees    T Axis 46 degrees    Diagnosis       Normal sinus rhythmBiatrial enlargementPulmonary disease patternRSR' or QR pattern in V1 suggests right ventricular conduction delayLeft anterior fascicular blockAbnormal ECG       Imaging  CT SOFT TISSUE NECK W CONTRAST   Final Result   Lobulated large mass wrapping the left side of the hypopharynx extending into   supraglottic and glottic portions of the larynx measuring approximately 3.5   cm diameter with a craniocaudad extent of 4.9 cm. The mass appears to   severely narrow the airway and could potentially make for difficult   intubation. Findings compatible with squamous cell carcinoma. Mild to moderate emphysema. XR CHEST PORTABLE   Final Result   Chronic findings in the chest without acute airspace disease identified. XR CHEST PORTABLE    (Results Pending)       MDM and ED Course  I was called to the room due to sudden acute decompensation. Earlier around 8 AM, the patient had a question, so I provided an update on his admission status and answer questions. I arrived to the room at this time, the patient was exhibiting diminished mental status and acute respiratory stress of stridor. Although he was awake, he had minimal response to verbal stimuli.   His breathing was strained with loud stridor and drooling from his mouth. It was apparent that he was no longer able to handle secretions. I came to the conclusion that the previous known pharyngeal mass had enlarged and moved him to [facility] for resuscitation. I immediately consulted with ENT and ordered dexamethasone 20 mg IV. I set up the glidescope and prepare to intubate using the fiberoptic scope when Dr. Alice Castaneda with ENT arrived. He evaluated the airway using the fiberoptic scope and requested anesthesia come to bedside as well. Dr. José Miguel Freedman responded promptly and assisted Dr. Alice Castaneda and I in the management of the patient. He intubated the patient successfully on the first attempt. Please see his documentation. I then contacted the ICU, speaking to critical care specialist Dr. Soumya Miner, as the patient's decompensation would require a change in bed status and admission location. I also updated the hospitalist service. The total Critical Care time is 35 minutes which excludes separately billable procedures. Final Impression  1. Acute respiratory failure with hypoxia and hypercapnia (HCC)    2. COPD with exacerbation ([address]    3. Mass of hypopharynx    4. Airway compromise    5. Acute respiratory failure with hypoxia (HCC)        Blood pressure (!) 171/103, pulse 104, temperature 98.4 °F (36.9 °C), temperature source Oral, resp. rate 19, SpO2 96 %. Disposition:  DISPOSITION Decision To Admit to ICU at 03/22/2022 11:11:18 AM       [employer]    This chart was generated using the [address] [place] [address] dictation system. I created this record but it may contain dictation errors given the limitations of this technology.        Ata Simpson MD  04/05/22 [id number]

## 2022-03-22 NOTE — ED NOTES
When rounding, this RN observed drooling from patient. Patient is slower to arouse and respond to questions at this time. Dr. Hugh Horne notified.      Chela Martinez RN  03/22/22 5400

## 2022-03-23 ENCOUNTER — APPOINTMENT (OUTPATIENT)
Dept: GENERAL RADIOLOGY | Age: 62
DRG: 004 | End: 2022-03-23
Payer: COMMERCIAL

## 2022-03-23 PROBLEM — J38.7 LARYNGEAL MASS: Status: ACTIVE | Noted: 2022-03-23

## 2022-03-23 LAB
A/G RATIO: 1.4 (ref 1.1–2.2)
ALBUMIN SERPL-MCNC: 3.3 G/DL (ref 3.4–5)
ALP BLD-CCNC: 68 U/L (ref 40–129)
ALT SERPL-CCNC: 19 U/L (ref 10–40)
ANION GAP SERPL CALCULATED.3IONS-SCNC: 11 MMOL/L (ref 3–16)
AST SERPL-CCNC: 11 U/L (ref 15–37)
BASE EXCESS ARTERIAL: 5.2 MMOL/L (ref -3–3)
BASOPHILS ABSOLUTE: 0 K/UL (ref 0–0.2)
BASOPHILS RELATIVE PERCENT: 0.6 %
BILIRUB SERPL-MCNC: 0.3 MG/DL (ref 0–1)
BUN BLDV-MCNC: 17 MG/DL (ref 7–20)
CALCIUM SERPL-MCNC: 9.1 MG/DL (ref 8.3–10.6)
CARBOXYHEMOGLOBIN ARTERIAL: 0.9 % (ref 0–1.5)
CHLORIDE BLD-SCNC: 101 MMOL/L (ref 99–110)
CO2: 26 MMOL/L (ref 21–32)
CREAT SERPL-MCNC: 0.9 MG/DL (ref 0.8–1.3)
EOSINOPHILS ABSOLUTE: 0 K/UL (ref 0–0.6)
EOSINOPHILS RELATIVE PERCENT: 0 %
ESTIMATED AVERAGE GLUCOSE: 139.9 MG/DL
GFR AFRICAN AMERICAN: >60
GFR NON-AFRICAN AMERICAN: >60
GLUCOSE BLD-MCNC: 122 MG/DL (ref 70–99)
GLUCOSE BLD-MCNC: 123 MG/DL (ref 70–99)
GLUCOSE BLD-MCNC: 127 MG/DL (ref 70–99)
GLUCOSE BLD-MCNC: 147 MG/DL (ref 70–99)
GLUCOSE BLD-MCNC: 152 MG/DL (ref 70–99)
HBA1C MFR BLD: 6.5 %
HCO3 ARTERIAL: 30.8 MMOL/L (ref 21–29)
HCT VFR BLD CALC: 39.5 % (ref 40.5–52.5)
HEMOGLOBIN, ART, EXTENDED: 12.8 G/DL (ref 13.5–17.5)
HEMOGLOBIN: 12.6 G/DL (ref 13.5–17.5)
INR BLD: 2.47 (ref 0.88–1.12)
LACTIC ACID: 1.6 MMOL/L (ref 0.4–2)
LYMPHOCYTES ABSOLUTE: 0.9 K/UL (ref 1–5.1)
LYMPHOCYTES RELATIVE PERCENT: 12.3 %
MCH RBC QN AUTO: 27.9 PG (ref 26–34)
MCHC RBC AUTO-ENTMCNC: 31.8 G/DL (ref 31–36)
MCV RBC AUTO: 87.8 FL (ref 80–100)
METHEMOGLOBIN ARTERIAL: 0.6 %
MONOCYTES ABSOLUTE: 0.6 K/UL (ref 0–1.3)
MONOCYTES RELATIVE PERCENT: 8.7 %
NEUTROPHILS ABSOLUTE: 5.6 K/UL (ref 1.7–7.7)
NEUTROPHILS RELATIVE PERCENT: 78.4 %
O2 SAT, ARTERIAL: 98.8 %
O2 THERAPY: ABNORMAL
PCO2 ARTERIAL: 48.2 MMHG (ref 35–45)
PDW BLD-RTO: 16.5 % (ref 12.4–15.4)
PERFORMED ON: ABNORMAL
PH ARTERIAL: 7.41 (ref 7.35–7.45)
PLATELET # BLD: 266 K/UL (ref 135–450)
PMV BLD AUTO: 7.7 FL (ref 5–10.5)
PO2 ARTERIAL: 121 MMHG (ref 75–108)
POTASSIUM REFLEX MAGNESIUM: 4.6 MMOL/L (ref 3.5–5.1)
PROTHROMBIN TIME: 29 SEC (ref 9.9–12.7)
RBC # BLD: 4.5 M/UL (ref 4.2–5.9)
SODIUM BLD-SCNC: 138 MMOL/L (ref 136–145)
TCO2 ARTERIAL: 72.4 MMOL/L
TOTAL PROTEIN: 5.6 G/DL (ref 6.4–8.2)
WBC # BLD: 7.1 K/UL (ref 4–11)

## 2022-03-23 PROCEDURE — 6370000000 HC RX 637 (ALT 250 FOR IP): Performed by: INTERNAL MEDICINE

## 2022-03-23 PROCEDURE — 2000000000 HC ICU R&B

## 2022-03-23 PROCEDURE — 74018 RADEX ABDOMEN 1 VIEW: CPT

## 2022-03-23 PROCEDURE — A4216 STERILE WATER/SALINE, 10 ML: HCPCS | Performed by: INTERNAL MEDICINE

## 2022-03-23 PROCEDURE — 2700000000 HC OXYGEN THERAPY PER DAY

## 2022-03-23 PROCEDURE — 99233 SBSQ HOSP IP/OBS HIGH 50: CPT | Performed by: STUDENT IN AN ORGANIZED HEALTH CARE EDUCATION/TRAINING PROGRAM

## 2022-03-23 PROCEDURE — 2500000003 HC RX 250 WO HCPCS: Performed by: INTERNAL MEDICINE

## 2022-03-23 PROCEDURE — 2580000003 HC RX 258: Performed by: INTERNAL MEDICINE

## 2022-03-23 PROCEDURE — 6360000002 HC RX W HCPCS: Performed by: INTERNAL MEDICINE

## 2022-03-23 PROCEDURE — 80053 COMPREHEN METABOLIC PANEL: CPT

## 2022-03-23 PROCEDURE — 83605 ASSAY OF LACTIC ACID: CPT

## 2022-03-23 PROCEDURE — 99291 CRITICAL CARE FIRST HOUR: CPT | Performed by: INTERNAL MEDICINE

## 2022-03-23 PROCEDURE — 85025 COMPLETE CBC W/AUTO DIFF WBC: CPT

## 2022-03-23 PROCEDURE — 82803 BLOOD GASES ANY COMBINATION: CPT

## 2022-03-23 PROCEDURE — 94640 AIRWAY INHALATION TREATMENT: CPT

## 2022-03-23 PROCEDURE — 94761 N-INVAS EAR/PLS OXIMETRY MLT: CPT

## 2022-03-23 PROCEDURE — 94003 VENT MGMT INPAT SUBQ DAY: CPT

## 2022-03-23 PROCEDURE — 6360000002 HC RX W HCPCS: Performed by: EMERGENCY MEDICINE

## 2022-03-23 PROCEDURE — 36415 COLL VENOUS BLD VENIPUNCTURE: CPT

## 2022-03-23 PROCEDURE — 85610 PROTHROMBIN TIME: CPT

## 2022-03-23 RX ORDER — DEXAMETHASONE SODIUM PHOSPHATE 4 MG/ML
4 INJECTION, SOLUTION INTRA-ARTICULAR; INTRALESIONAL; INTRAMUSCULAR; INTRAVENOUS; SOFT TISSUE EVERY 12 HOURS
Status: DISCONTINUED | OUTPATIENT
Start: 2022-03-23 | End: 2022-03-28

## 2022-03-23 RX ORDER — INSULIN LISPRO 100 [IU]/ML
0-6 INJECTION, SOLUTION INTRAVENOUS; SUBCUTANEOUS EVERY 4 HOURS
Status: DISCONTINUED | OUTPATIENT
Start: 2022-03-24 | End: 2022-04-02 | Stop reason: HOSPADM

## 2022-03-23 RX ORDER — PROPOFOL 10 MG/ML
5-90 INJECTION, EMULSION INTRAVENOUS CONTINUOUS
Status: DISCONTINUED | OUTPATIENT
Start: 2022-03-23 | End: 2022-04-02 | Stop reason: HOSPADM

## 2022-03-23 RX ORDER — IPRATROPIUM BROMIDE AND ALBUTEROL SULFATE 2.5; .5 MG/3ML; MG/3ML
3 SOLUTION RESPIRATORY (INHALATION)
Status: DISCONTINUED | OUTPATIENT
Start: 2022-03-23 | End: 2022-03-26

## 2022-03-23 RX ORDER — POLYETHYLENE GLYCOL 3350 17 G/17G
17 POWDER, FOR SOLUTION ORAL DAILY
Status: DISCONTINUED | OUTPATIENT
Start: 2022-03-23 | End: 2022-04-02 | Stop reason: HOSPADM

## 2022-03-23 RX ORDER — GENTAMICIN SULFATE 1 MG/G
CREAM TOPICAL 3 TIMES DAILY
Status: DISCONTINUED | OUTPATIENT
Start: 2022-03-23 | End: 2022-04-02 | Stop reason: HOSPADM

## 2022-03-23 RX ORDER — SENNA AND DOCUSATE SODIUM 50; 8.6 MG/1; MG/1
2 TABLET, FILM COATED ORAL DAILY
Status: DISCONTINUED | OUTPATIENT
Start: 2022-03-23 | End: 2022-04-01

## 2022-03-23 RX ADMIN — IPRATROPIUM BROMIDE AND ALBUTEROL SULFATE 3 ML: .5; 2.5 SOLUTION RESPIRATORY (INHALATION) at 11:45

## 2022-03-23 RX ADMIN — DEXAMETHASONE SODIUM PHOSPHATE 4 MG: 4 INJECTION, SOLUTION INTRAMUSCULAR; INTRAVENOUS at 11:09

## 2022-03-23 RX ADMIN — PROPOFOL 55 MCG/KG/MIN: 10 INJECTION, EMULSION INTRAVENOUS at 02:39

## 2022-03-23 RX ADMIN — IPRATROPIUM BROMIDE AND ALBUTEROL SULFATE 3 ML: .5; 2.5 SOLUTION RESPIRATORY (INHALATION) at 07:43

## 2022-03-23 RX ADMIN — SODIUM CHLORIDE, PRESERVATIVE FREE 20 MG: 5 INJECTION INTRAVENOUS at 20:13

## 2022-03-23 RX ADMIN — Medication 100 MCG/HR: at 15:59

## 2022-03-23 RX ADMIN — IPRATROPIUM BROMIDE AND ALBUTEROL SULFATE 3 ML: .5; 2.5 SOLUTION RESPIRATORY (INHALATION) at 03:27

## 2022-03-23 RX ADMIN — BUDESONIDE 500 MCG: 0.5 SUSPENSION RESPIRATORY (INHALATION) at 07:42

## 2022-03-23 RX ADMIN — Medication 75 MCG/HR: at 08:08

## 2022-03-23 RX ADMIN — IPRATROPIUM BROMIDE AND ALBUTEROL SULFATE 3 ML: .5; 3 SOLUTION RESPIRATORY (INHALATION) at 20:56

## 2022-03-23 RX ADMIN — PROPOFOL 55 MCG/KG/MIN: 10 INJECTION, EMULSION INTRAVENOUS at 15:38

## 2022-03-23 RX ADMIN — BUDESONIDE 500 MCG: 0.5 SUSPENSION RESPIRATORY (INHALATION) at 20:56

## 2022-03-23 RX ADMIN — ARFORMOTEROL TARTRATE 15 MCG: 15 SOLUTION RESPIRATORY (INHALATION) at 20:56

## 2022-03-23 RX ADMIN — POLYETHYLENE GLYCOL 3350 17 G: 17 POWDER, FOR SOLUTION ORAL at 17:00

## 2022-03-23 RX ADMIN — SODIUM CHLORIDE: 9 INJECTION, SOLUTION INTRAVENOUS at 18:07

## 2022-03-23 RX ADMIN — PROPOFOL 55 MCG/KG/MIN: 10 INJECTION, EMULSION INTRAVENOUS at 23:07

## 2022-03-23 RX ADMIN — ENOXAPARIN SODIUM 70 MG: 100 INJECTION SUBCUTANEOUS at 20:33

## 2022-03-23 RX ADMIN — PROPOFOL 55 MCG/KG/MIN: 10 INJECTION, EMULSION INTRAVENOUS at 10:18

## 2022-03-23 RX ADMIN — SODIUM CHLORIDE: 9 INJECTION, SOLUTION INTRAVENOUS at 10:40

## 2022-03-23 RX ADMIN — GENTAMICIN SULFATE: 1 CREAM TOPICAL at 16:58

## 2022-03-23 RX ADMIN — SENNOSIDES AND DOCUSATE SODIUM 2 TABLET: 50; 8.6 TABLET ORAL at 17:00

## 2022-03-23 RX ADMIN — Medication 100 MCG/HR: at 21:02

## 2022-03-23 RX ADMIN — INSULIN LISPRO 1 UNITS: 100 INJECTION, SOLUTION INTRAVENOUS; SUBCUTANEOUS at 20:28

## 2022-03-23 RX ADMIN — PROPOFOL 55 MCG/KG/MIN: 10 INJECTION, EMULSION INTRAVENOUS at 06:03

## 2022-03-23 RX ADMIN — ARFORMOTEROL TARTRATE 15 MCG: 15 SOLUTION RESPIRATORY (INHALATION) at 07:42

## 2022-03-23 RX ADMIN — PROPOFOL 55 MCG/KG/MIN: 10 INJECTION, EMULSION INTRAVENOUS at 19:00

## 2022-03-23 RX ADMIN — SODIUM CHLORIDE, PRESERVATIVE FREE 20 MG: 5 INJECTION INTRAVENOUS at 09:09

## 2022-03-23 RX ADMIN — IPRATROPIUM BROMIDE AND ALBUTEROL SULFATE 3 ML: .5; 3 SOLUTION RESPIRATORY (INHALATION) at 15:17

## 2022-03-23 RX ADMIN — GENTAMICIN SULFATE: 1 CREAM TOPICAL at 20:13

## 2022-03-23 RX ADMIN — Medication 10 ML: at 20:14

## 2022-03-23 RX ADMIN — DEXAMETHASONE SODIUM PHOSPHATE 4 MG: 4 INJECTION, SOLUTION INTRAMUSCULAR; INTRAVENOUS at 20:13

## 2022-03-23 ASSESSMENT — PULMONARY FUNCTION TESTS
PIF_VALUE: 25
PIF_VALUE: 30
PIF_VALUE: 30
PIF_VALUE: 25

## 2022-03-23 ASSESSMENT — PAIN SCALES - GENERAL
PAINLEVEL_OUTOF10: 0

## 2022-03-23 NOTE — PLAN OF CARE
Problem: Non-Violent Restraints  Goal: No harm/injury to patient while restraints in use  3/22/2022 2250 by Any Moore RN  Outcome: Met This Shift     Problem: Non-Violent Restraints  Goal: Patient's dignity will be maintained  3/22/2022 2250 by Any Moore RN  Outcome: Met This Shift     Problem: Skin Integrity:  Goal: Absence of new skin breakdown  Description: Absence of new skin breakdown  3/22/2022 2250 by Any Moore RN  Outcome: Met This Shift

## 2022-03-23 NOTE — PROGRESS NOTES
Shift assessment. Patient sedated on mechanical ventilation, ETT 6.5 at 25LL, AC/PC  16/20  Peep 10  30%. Propofol and fentanyl for sedation. Patient is a HIGH RISK AIRWAY. Patent lopez in place.

## 2022-03-23 NOTE — CONSULTS
P Pulmonary and Critical Care   Consult Note      Reason for Consult: Respiratory failure, neck mass, COPD  Requesting Physician: Dr. Ehsan Casarez:   [address] / HPI:                The patient is a 65 y.o. male with significant past medical history of:      Diagnosis Date    Diabetes mellitus ([facility] [address]     Fibromyalgia     History of DVT (deep vein thrombosis)     Hyperlipidemia     Hypertension     Type 2 diabetes mellitus with circulatory disorder, without long-term current use of insulin ([facility] [address] 2022     Interval history: Patient remains deeply sedated on mechanical ventilation. Tolerating ventilation well and requiring FiO2 0.3.       Past Surgical History:        Procedure Laterality Date    APPENDECTOMY      COLONOSCOPY  2016    FEMORAL BYPASS Left 2020    femoral posterior tibial bypass    FEMORAL-TIBIAL BYPASS GRAFT Right 2020    with femoral endarterectomy and patch angioplasty    FOOT DEBRIDEMENT Left 2020    FOOT DEBRIDEMENT Right 2021    DEBRIDEMENT OF RIGHT FOOT WOUND WITH GRAFT APPLICATION performed by Cesar Underwood DPM at [facility] Right 2020    stent leg for PVD     Current Medications:    Current Facility-Administered Medications: dexamethasone (DECADRON) injection 4 mg, 4 mg, IntraVENous, Q12H  polyethylene glycol (GLYCOLAX) packet 17 g, 17 g, Oral, Daily  sennosides-docusate sodium (SENOKOT-S) 8.6-50 MG tablet 2 tablet, 2 tablet, Oral, Daily  racepinephrine HCl (VAPONEFPRIN) 2.25 % nebulizer solution NEBU 11.25 mg, 11.25 mg, Nebulization, Q4H PRN  sodium chloride nebulizer 0.9 % solution 3 mL, 3 mL, Nebulization, Q4H PRN  succinylcholine (ANECTINE) injection 140 mg, 140 mg, IntraVENous, Once  albuterol sulfate  (90 Base) MCG/ACT inhaler 2 puff, 2 puff, Inhalation, 4x Daily PRN  sodium chloride flush 0.9 % injection 5-40 mL, 5-40 mL, IntraVENous, 2 times per day  sodium chloride flush 0.9 % injection 5-40 mL, 5-40 mL, IntraVENous, PRN  0.9 % sodium chloride infusion, 25 mL, IntraVENous, PRN  ondansetron (ZOFRAN-ODT) disintegrating tablet 4 mg, 4 mg, Oral, Q8H PRN **OR** ondansetron (ZOFRAN) injection 4 mg, 4 mg, IntraVENous, Q6H PRN  acetaminophen (TYLENOL) tablet 650 mg, 650 mg, Oral, Q6H PRN **OR** acetaminophen (TYLENOL) suppository 650 mg, 650 mg, Rectal, Q6H PRN  famotidine (PEPCID) 20 mg in sodium chloride (PF) 10 mL injection, 20 mg, IntraVENous, BID  propofol injection, 5-90 mcg/kg/min, IntraVENous, Continuous  fentaNYL 10 mcg/mL infusion,  mcg/hr, IntraVENous, Continuous  ipratropium-albuterol (DUONEB) nebulizer solution 3 mL, 3 mL, Inhalation, Q4H  enoxaparin (LOVENOX) injection 70 mg, 1 mg/kg, SubCUTAneous, BID  budesonide (PULMICORT) nebulizer suspension 500 mcg, 0.5 mg, Nebulization, BID  Arformoterol Tartrate (BROVANA) nebulizer solution 15 mcg, 15 mcg, Nebulization, BID  scopolamine (TRANSDERM-SCOP) transdermal patch 1 patch, 1 patch, TransDERmal, Q72H  insulin lispro (1 Unit Dial) 0-6 Units, 0-6 Units, SubCUTAneous, TID WC  insulin lispro (1 Unit Dial) 0-3 Units, 0-3 Units, SubCUTAneous, Nightly  glucose (GLUTOSE) 40 % oral gel 15 g, 15 g, Oral, PRN  glucagon (rDNA) injection 1 mg, 1 mg, IntraMUSCular, PRN  dextrose 5 % solution, 100 mL/hr, IntraVENous, PRN  dextrose bolus (hypoglycemia) 10% 125 mL, 125 mL, IntraVENous, PRN **OR** dextrose bolus (hypoglycemia) 10% 250 mL, 250 mL, IntraVENous, PRN  0.9 % sodium chloride infusion, , IntraVENous, Continuous    Allergies   Allergen Reactions    Chantix [Varenicline] Other (See Comments)     Hallucinations         Social History:    TOBACCO:   reports that he quit smoking about 2 months ago. His smoking use included cigarettes. He started smoking about 44 years ago. He has a 10.00 pack-year smoking history.  He has never used smokeless tobacco.  ETOH:   reports current alcohol use of about 6.0 standard drinks of alcohol per week.  Patient currently lives independently  Environmental/chemical exposure: None known    Family History:       Problem Relation Age of Onset    Cancer Mother     Lung Cancer Mother     Diabetes Father     Stroke Father      REVIEW OF SYSTEMS:    FREDI is unobtainable due to his critical illness. Objective:   PHYSICAL EXAM:      VITALS:  /76   Pulse 71   Temp 98.1 °F (36.7 °C) (Temporal)   Resp 16   Ht 5' 8\" (1.727 m)   Wt 163 lb 12.8 oz (74.3 kg)   SpO2 100%   BMI 24.91 kg/m²      24HR INTAKE/OUTPUT:      Intake/Output Summary (Last 24 hours) at 3/23/2022 1042  Last data filed at 3/23/2022 0506  Gross per 24 hour   Intake 1999.8 ml   Output 850 ml   Net 1149.8 ml     CONSTITUTIONAL: Sedated on mechanical ventilation  NECK:  Supple, symmetrical, trachea midline, no adenopathy, thyroid symmetric, not enlarged and no tenderness, skin normal  LUNGS:  no increased work of breathing and congested to auscultation. No accessory muscle use  CARDIOVASCULAR: S1 and S2, no edema and no JVD  ABDOMEN:  normal bowel sounds, non-distended and no masses palpated, and no tenderness to palpation. No hepatospleenomegaly  LYMPHADENOPATHY:  no axillary or supraclavicular adenopathy. No cervical adnenopathy  PSYCHIATRIC: Sedated on mechanical ventilation. MUSCULOSKELETAL: No obvious misalignment or effusion of the joints. No clubbing, cyanosis of the digits. SKIN:  normal skin color, texture, turgor and no redness, warmth, or swelling.  No palpable nodules    DATA:    Old records have been reviewed    CBC:  Recent Labs     03/22/22  0300 03/23/22  0430   WBC 6.8 7.1   RBC 4.86 4.50   HGB 13.8 12.6*   HCT 43.0 39.5*    266   MCV 88.4 87.8   MCH 28.4 27.9   MCHC 32.2 31.8   RDW 16.4* 16.5*      BMP:  Recent Labs     03/22/22  0300 03/23/22  0430    138   K 4.7 4.6    101   CO2 32 26   BUN 16 17   CREATININE 0.7* 0.9   CALCIUM 8.9 9.1   GLUCOSE 127* 152*      ABG:  Recent Labs     03/22/22  1207 03/23/22  0519   PHART 7.267* 7.414   NDS1ITC 72.4* 48.2*   PO2ART 97.9 121.0*   VLU9ACZ 33.0* 30.8*   T9SMBXDO 96 98.8   BEART 6* 5.2*     Procalcitonin  No results for input(s): PROCAL in the last 72 hours. No results found for: BNP  Lab Results   Component Value Date    CKTOTAL 81 02/01/2022    TROPONINI <0.01 03/22/2022           Radiology Review:  All pertinent images / reports were reviewed as a part of this visit. Assessment:     1. Acute respiratory failure due to upper airway compromise  2. 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 Severe COPD/centrilobular emphysema      Plan:     Patient's presentation is that of respiratory failure related to upper airway compromise from left neck mass which is likely a squamous cell carcinoma. He was developing stridor and inability to manage secretions. The patient is fortunate to have had a successful intubation without the need for immediate, urgent tracheostomy. He is comfortable on propofol and fentanyl infusions. I reviewed post intubation chest x-ray which reveals good position of the endotracheal tube in the mid trachea. There is no obvious focal infiltrate. ABG today is 7 0.4 1/48/121  Decrease PEEP from 10-5. Continue FiO2 0.3  Has 6.5 mm ET tube in place. Has a high risk airway    I have reviewed CT imaging which reveals evidence of a 3.5 cm left laryngeal mass. This was narrowing the airway and displacing the airway rightward. There is no obvious lymphadenopathy on the available images. ENT is on the case. Likely to have biopsy. Likely to need tracheostomy prior to liberation from the ventilator. The patient did have pulmonary function testing 1/26/2022 which revealed FEV1 of 1.83 L which is 63% predicted. FEV1/FVC was decreased and there was no response to inhaled bronchodilators consistent with a diagnosis of moderate obstructive lung disease.   There was severe reduction in diffusion capacity which was only 37% predicted. Continue inhaled therapy  Does not seem to have COPD exacerbation  However, may benefit from steroid therapy for any element of swelling of the laryngeal mass. Decadron 4 mg every 12 hours. We will try to place Dobbhoff feeding tube and start tube feedings. Total critical care time caring for this patient with life threatening illness, including direct patient contact, management of life support systems, review of data including imaging and labs, discussions with other team members and physicians is at least 32 minutes so far today, excluding procedures.

## 2022-03-23 NOTE — PROGRESS NOTES
Hospitalist Progress Note      PCP: Juventino Norwood MD    Date of Admission: 3/22/2022    Chief Complaint: SOB    Hospital Course: Sera Workman a 65 y. o. male has a history of COPD but states tonight he coughed up some blood and then became tight in the chest and more short of breath he now presents with soreness to the throat minimal stridor,states he is on 2 L at home requiring 4 L here. Based on the stridor he had a soft tissue ct scan of the neck which showed a laryngeal mass. Later in his ED stay he began to get more obtunded and started drooling. There was concern for the air way to be closing and he was intubated with the help of anesthesia and ENT. At the time if my eval he was on the vent but not very well sedated. I increased the propofol and added fentanyl.        Subjective: Intubated and sedated.       Medications:  Reviewed    Infusion Medications    propofol 55 mcg/kg/min (03/23/22 1538)    sodium chloride      fentaNYL 100 mcg/hr (03/23/22 1559)    dextrose      sodium chloride 125 mL/hr at 03/23/22 1040     Scheduled Medications    dexamethasone  4 mg IntraVENous Q12H    polyethylene glycol  17 g Oral Daily    sennosides-docusate sodium  2 tablet Oral Daily    ipratropium-albuterol  3 mL Inhalation Q4H WA    gentamicin   Topical TID    succinylcholine  140 mg IntraVENous Once    sodium chloride flush  5-40 mL IntraVENous 2 times per day    famotidine (PEPCID) injection  20 mg IntraVENous BID    enoxaparin  1 mg/kg SubCUTAneous BID    budesonide  0.5 mg Nebulization BID    Arformoterol Tartrate  15 mcg Nebulization BID    scopolamine  1 patch TransDERmal Q72H    insulin lispro  0-6 Units SubCUTAneous TID     insulin lispro  0-3 Units SubCUTAneous Nightly     PRN Meds: racepinephrine HCl, sodium chloride nebulizer, albuterol sulfate HFA, sodium chloride flush, sodium chloride, ondansetron **OR** ondansetron, acetaminophen **OR** acetaminophen, glucose, glucagon (rDNA), dextrose, dextrose bolus (hypoglycemia) **OR** dextrose bolus (hypoglycemia)      Intake/Output Summary (Last 24 hours) at 3/23/2022 1624  Last data filed at 3/23/2022 1458  Gross per 24 hour   Intake 3816.68 ml   Output 775 ml   Net 3041.68 ml       Physical Exam Performed:    /69   Pulse 61   Temp 98 °F (36.7 °C) (Temporal)   Resp 16   Ht 5' 8\" (1.727 m)   Wt 163 lb 12.8 oz (74.3 kg)   SpO2 96%   BMI 24.91 kg/m²     General appearance: No apparent distress appears stated age and cooperative. Intubated and sedated. HEENT Normal cephalic, atraumatic without obvious deformity. Pupils equal, round, and reactive to light. Extra ocular muscles intact. Conjunctivae/corneas clear. Neck: Supple, No jugular venous distention/bruits. Trachea midline without thyromegaly or adenopathy with full range of motion. Lungs: Clear to auscultation, bilaterally without Rales/Wheezes/Rhonchi with good respiratory effort. Heart: Regular rate and rhythm with Normal S1/S2 without murmurs, rubs or gallops, point of maximum impulse non-displaced  Abdomen: Soft, non-tender or non-distended without rigidity or guarding and positive bowel sounds all four quadrants. Extremities: No clubbing, cyanosis, or edema bilaterally. Full range of motion without deformity gait not tested. Skin: Skin color, texture, turgor normal.  No rashes or lesions. Neurologic:   Cranial nerves: II-XII intact, grossly non-focal.  Mental status: sedated.   Capillary Refill: Acceptable  < 3 seconds  Peripheral Pulses: +3 Easily felt, not easily obliterated with pressure    Labs:   Recent Labs     03/22/22 0300 03/23/22  0430   WBC 6.8 7.1   HGB 13.8 12.6*   HCT 43.0 39.5*    266     Recent Labs     03/22/22 0300 03/23/22  0430    138   K 4.7 4.6    101   CO2 32 26   BUN 16 17   CREATININE 0.7* 0.9   CALCIUM 8.9 9.1     Recent Labs     03/22/22 0300 03/23/22  0430   AST 20 11*   ALT 32 19   BILITOT <0.2 0.3   ALKPHOS on decadron.        He has good peripheral access but may need  A central line in the future. DVT Prophylaxis: he is therapeutic on lovenox. Holding for now. Diet: Diet NPO  ADULT TUBE FEEDING; Nasogastric; Standard with Fiber; Continuous; 20; Yes; 25; Q 4 hours; 45; 30; Q 4 hours; Protein; Administer 1 Proteinex P2Go daily. Flush with 30 mL water before and after. Code Status: Full Code    PT/OT Eval Status: when appropriate     Dispo - he remains on the vent and likely is going to need trach, Would be nice to get trach and Biopsy at the same time.     Naveed Wilburn MD

## 2022-03-23 NOTE — PROGRESS NOTES
Reassessment  Urinary output low, hospitalist ordered 1 litre bolus and increased rate to 125 ml/hr.  Sedation increased due to restlessness

## 2022-03-23 NOTE — PROGRESS NOTES
This patient has been on a PEEP of 10cwp but during vent check found patient on a PEEP of 5cwp.  Electronically signed by Noé García RCP on 3/23/2022 at 4:24 PM

## 2022-03-23 NOTE — PROGRESS NOTES
Reassessment completed. No acute changes noted from 0800 assessment. Pt's wife at bedside. Discussed plan of care. All questions answered.

## 2022-03-23 NOTE — CARE COORDINATION
The patients wife informed the SW she would like to complete a Medical POA. The SW notified the nurse to put in a consult for spiritual Services.     Electronically signed by MATTHIAS De La O on 3/23/2022 at 4:24 PM

## 2022-03-23 NOTE — PROGRESS NOTES
[facility]  HEAD AND NECK - ENT  PROGRESS  NOTE    Date of Service: 3/23/2022    ASSESSMENT: Slava Muhammad is a 65 y.o. 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 PLAN/RECOMMENDATIONS:     Hypopharyngeal lesion  Acute airway obstruction  Acute respiratory distress  -ETT in place. CONSIDER ACUTE AIRWAY WITH STRICT AIRWAY PRECAUTIONS. WOULD BE VERY DIFFICULT REINTUBATION. - Holding coumadin. Continue trending INR. Possible tracheostomy and direct laryngoscopy with biopsy once INR normalized. Possibly Friday Afternoon? Spoke with patients wife and obtained consent via phone after long discussion concerning long term care plan. - Will likely need transfer to  head and neck surgery for definitive management. Spoke with Dr. Deion Enriquez ( Head and Neck Surgery) who agrees with tentative plan for tracheostomy and DL with biopsy here at [facility] to secure airway and confirm diagnosis and subsequent transfer to  for definitive surgical treatment. -Vent management per ICU team  -Please do not hesitate to call ENT with any questions or concerns. SUBJECTIVE:   Significant overnight events: None per RN    OBJECTIVE:  Physical Exam:   Temp (24hrs), Av °F (36.7 °C), Min:97.6 °F (36.4 °C), Max:98.1 °F (36.7 °C)    Vitals:    22 1400 22 1458 22 1500 22 1600   BP: 105/66  129/77 112/69   Pulse: 58 64 64 61   Resp: 16 16 16 16   Temp:    98 °F (36.7 °C)   TempSrc:    Temporal   SpO2: 97% 97% 96% 96%   Weight:       Height:         I/O last 3 completed shifts:   In:  [I.V.:.]  Out: 850 [Urine:850]    REVIEW OF SYSTEMS  The following systems were reviewed and revealed the following in addition to any already discussed in the HPI:     Review of systems but with unable to performed secondary to intubated and sedated status.         PHYSICAL EXAM  GENERAL: Intubated and sedated  EYES: EOMI, Anti-icteric  NOSE: On anterior rhinoscopy there is no epistaxis, nasal mucosa within normal limits, no purulent drainage  EARS: Normal external appearance; no otorrhea  FACE: 1/6 House-Brackmann Scale, symmetri  ORAL CAVITY: ETT in place  NECK: Normal range of motion, no thyromegaly, trachea is midline, no lymphadenopathy, no neck masses, no crepitus  CHEST: On vent, equal chest rise  SKIN: No rashes, normal appearing skin, no evidence of skin lesions/tumors  NEURO: Sedated    Lab Studies:  Lab Results   Component Value Date    WBC 7.1 03/23/2022    HGB 12.6 (L) 03/23/2022    HCT 39.5 (L) 03/23/2022    MCV 87.8 03/23/2022     03/23/2022     Lab Results   Component Value Date    GLUCOSE 152 (H) 03/23/2022    BUN 17 03/23/2022    CREATININE 0.9 03/23/2022    K 4.6 03/23/2022     03/23/2022     03/23/2022    CALCIUM 9.1 03/23/2022     Lab Results   Component Value Date    MG 2.20 03/13/2022     Lab Results   Component Value Date    PHOS 3.1 03/13/2022     Lab Results   Component Value Date    ALKPHOS 68 03/23/2022    ALT 19 03/23/2022    AST 11 (L) 03/23/2022    BILITOT 0.3 03/23/2022    PROT 5.6 (L) 03/23/2022     Lab Results   Component Value Date    INR 2.47 (H) 03/23/2022    INR 3.00 (H) 03/22/2022    INR 1.4 03/14/2022    PROTIME 29.0 (H) 03/23/2022    PROTIME 35.5 (H) 03/22/2022    PROTIME 16.3 (H) 03/13/2022

## 2022-03-23 NOTE — PROGRESS NOTES
Reassessment complete. No acute changes noted. Vitals stable. Dressings changes completed to BLE. Dobhoff inserted, placement confirmed at 65cm with CXR. Repositioned for comfort. Jamila care provided.

## 2022-03-23 NOTE — CARE COORDINATION
Discharge Planning Assessment    SW discharge planner met with the patients wife to discuss reason for admission, current living situation, and potential needs at the time of discharge    Demographics/Insurance verified Yes    Current type of dwelling: Per patients wife they live in a bi level home with seven steps to enter the home and five steps to go to the next level inside. Patient from ECF/SW confirmed with: n/a    Living arrangements: The patients wife reports the house hold includes the patient, wife,and son. [place]: The patient wife reports the patient is a  with the [employer]. Level of function/Support: Prior to coming into the hospital the patient was at a independent level of functioning. The patients wife reports  they have three children who are all supportive. PCP: Day Ozuna MD    Last Visit to PCP: 2/18/2022    DME: BiPAP, oxygen, nebulizer from [employer]    Active with any community resources/agencies/skilled home care:none reported. Medication compliance issues: uses [employer] Insurance issues that could impact healthcare: The patient's  wife expressed concerns with paying bill's  home due to the patient receiving his las check from the [place]. The patients wife states the patient started a application for disability through the [place] but as unable to complete it due to the patient being in the hospital. SW provided information about programs to help with finical situation. Tentative discharge plan: The patient is currently on a ventilator. SW will continue to follow for any psychosocial and discharge needs as the patient becomes medically stable. Discussed with patient and/or family that on the day of discharge home tentative time of discharge will be between 10 AM and noon.     Transportation at the time of discharge: Wife

## 2022-03-23 NOTE — PROGRESS NOTES
Nutrition Note    RECOMMENDATIONS  PO Diet: NPO  ONS: NPO  Nutrition Support:   1. Recommend Jevity 1.5 (standard formula with fiber) at goal rate 45 mL per hour to provide 1080 mL total volume, 1620 calories, 69 grams protein, 821 mL free water. 2. Recommend water flush 30 mL q 4 hours for tube maintenance given IV fluids at 125 mL per hour. 3. Recommend 1 Bottle Proteinex P2Go daily via syringe. Flush with 30 mL H20 before and after. Proteinex P2Go should not be mixed directly with the tube feeding formula. This provides an additional 104 calories and 26 grams protein daily. 4. Ensure head of bed is 30 - 45 degrees during continuous or bolus gastric feeding and for one hour after bolus. Turn off the feeding if head of bed is lowered less than 30 degrees. 5. Monitor for tolerance (bowel habits, N/V, cramping, abdominal exam findings: distended, firm, tense, guarded, discomfort). NUTRITION ASSESSMENT   Pt seen during IPOC critical care rounds on mechanical ventilation. Propofol infusing at 24 mL per hour to provide 634 calories from fat daily. NS at 125 mL per hour. Pt found to have neck mass; ENT to see, may need tracheostomy. Pt triggered positive nursing nutrition screen for MST 3 for weight loss and poor appetite; however pt indicated good appetite/PO intake at recent admission and stated he had some weight loss related to following [address] diet. Plan is to place dobhoff for tube feeds today per Dr. Sherren Prayer.  Nutrition Related Findings: No edema noted.    Wounds: None   Nutrition Education:  Education not indicated    Nutrition Goals: Pt will tolerate EN at goal     MALNUTRITION ASSESSMENT   Malnutrition Status: No malnutrition    NUTRITION DIAGNOSIS   · Inadequate oral intake related to impaired respiratory function as evidenced by intubation      CURRENT NUTRITION THERAPIES  Diet NPO     PO Intake: NPO   PO Supplement Intake:NPO    ANTHROPOMETRICS   Current Height: 5' 8\" (172.7 cm)   Current Weight: 163 lb 12.8 oz (74.3 kg)     Admission weight: 163 lb 2.3 oz (74 kg)   Ideal Body Weight (IBW): 154 lbs  (70 kg)        BMI: 24.8    COMPARATIVE STANDARDS  Energy (kcal):  4516-7718     Protein (g):   grams       Fluid (ml/day):  1048-2214 mL    The patient will be monitored per nutrition standards of care. Consult dietitian if additional nutrition interventions are needed prior to RD reassessment.      Wu Berumen, 67 [address],     Contact: [phone]

## 2022-03-23 NOTE — PROGRESS NOTES
03/23/22 1213   RT Protocol   History Pulmonary Disease 2   Respiratory pattern 0   Breath sounds 6   Cough 4   Indications for Bronchodilator Therapy Decreased or absent breath sounds; Wheezing associated with pulm disorder   Bronchodilator Assessment Score 12

## 2022-03-23 NOTE — PLAN OF CARE
Problem: Falls - Risk of:  Goal: Absence of physical injury  Description: Absence of physical injury  3/22/2022 2251 by Keila Webber RN  Outcome: Met This Shift     Problem: Falls - Risk of:  Goal: Will remain free from falls  Description: Will remain free from falls  3/22/2022 2251 by Keila Webber RN  Outcome: Met This Shift

## 2022-03-23 NOTE — PROGRESS NOTES
AM assessment completed. See complex vitals flowsheets for complete assessment. Intubated and sedated for vent synchrony to RASS of -3 with propofol and fentanyl as ordered. Bilateral wrist restraints in place to protect high risk airway. Follows simple commands, opens eyes open to voice but does not track. Gag and cough present. Vital signs are stable. Echols in place and draining and adequate amount of clear yellow urine. Pt repositioned with respiratory at bedside.

## 2022-03-23 NOTE — RT PROTOCOL NOTE
RT Inhaler-Nebulizer Bronchodilator Protocol Note    There is a bronchodilator order in the chart from a provider indicating to follow the RT Bronchodilator Protocol and there is an Initiate RT Inhaler-Nebulizer Bronchodilator Protocol order as well (see protocol at bottom of note). CXR Findings:  XR CHEST PORTABLE    Result Date: 3/22/2022  1. Properly placed endotracheal tube. 2.  Mild bibasilar atelectasis     XR CHEST PORTABLE    Result Date: 3/22/2022  Chronic findings in the chest without acute airspace disease identified. The findings from the last RT Protocol Assessment were as follows:   History Pulmonary Disease: Chronic pulmonary disease  Respiratory Pattern: Regular pattern and RR 12-20 bpm  Breath Sounds: Inspiratory and expiratory or bilateral wheezing and/or rhonchi  Cough: Unable to generate effective cough  Indication for Bronchodilator Therapy: Decreased or absent breath sounds,Wheezing associated with pulm disorder  Bronchodilator Assessment Score: 12    Aerosolized bronchodilator medication orders have been revised according to the RT Inhaler-Nebulizer Bronchodilator Protocol below. Respiratory Therapist to perform RT Therapy Protocol Assessment initially then follow the protocol. Repeat RT Therapy Protocol Assessment PRN for score 0-3 or on second treatment, BID, and PRN for scores above 3. No Indications - adjust the frequency to every 6 hours PRN wheezing or bronchospasm, if no treatments needed after 48 hours then discontinue using Per Protocol order mode. If indication present, adjust the RT bronchodilator orders based on the Bronchodilator Assessment Score as indicated below.   Use Inhaler orders unless patient has one or more of the following: on home nebulizer, not able to hold breath for 10 seconds, is not alert and oriented, cannot activate and use MDI correctly, or respiratory rate 25 breaths per minute or more, then use the equivalent nebulizer order(s) with same

## 2022-03-24 ENCOUNTER — APPOINTMENT (OUTPATIENT)
Dept: GENERAL RADIOLOGY | Age: 62
DRG: 004 | End: 2022-03-24
Payer: COMMERCIAL

## 2022-03-24 ENCOUNTER — ANESTHESIA EVENT (OUTPATIENT)
Dept: OPERATING ROOM | Age: 62
DRG: 004 | End: 2022-03-24
Payer: COMMERCIAL

## 2022-03-24 ENCOUNTER — TELEPHONE (OUTPATIENT)
Dept: VASCULAR SURGERY | Age: 62
End: 2022-03-24

## 2022-03-24 LAB
ANION GAP SERPL CALCULATED.3IONS-SCNC: 11 MMOL/L (ref 3–16)
BUN BLDV-MCNC: 12 MG/DL (ref 7–20)
CALCIUM SERPL-MCNC: 8.5 MG/DL (ref 8.3–10.6)
CHLORIDE BLD-SCNC: 105 MMOL/L (ref 99–110)
CO2: 23 MMOL/L (ref 21–32)
CREAT SERPL-MCNC: 0.6 MG/DL (ref 0.8–1.3)
GFR AFRICAN AMERICAN: >60
GFR NON-AFRICAN AMERICAN: >60
GLUCOSE BLD-MCNC: 114 MG/DL (ref 70–99)
GLUCOSE BLD-MCNC: 131 MG/DL (ref 70–99)
GLUCOSE BLD-MCNC: 137 MG/DL (ref 70–99)
GLUCOSE BLD-MCNC: 156 MG/DL (ref 70–99)
GLUCOSE BLD-MCNC: 160 MG/DL (ref 70–99)
GLUCOSE BLD-MCNC: 161 MG/DL (ref 70–99)
GLUCOSE BLD-MCNC: 175 MG/DL (ref 70–99)
GLUCOSE BLD-MCNC: 184 MG/DL (ref 70–99)
HCT VFR BLD CALC: 36.1 % (ref 40.5–52.5)
HEMOGLOBIN: 11.6 G/DL (ref 13.5–17.5)
INR BLD: 1.68 (ref 0.88–1.12)
MAGNESIUM: 2.1 MG/DL (ref 1.8–2.4)
MCH RBC QN AUTO: 28.5 PG (ref 26–34)
MCHC RBC AUTO-ENTMCNC: 32.1 G/DL (ref 31–36)
MCV RBC AUTO: 88.9 FL (ref 80–100)
PDW BLD-RTO: 16.9 % (ref 12.4–15.4)
PERFORMED ON: ABNORMAL
PLATELET # BLD: 212 K/UL (ref 135–450)
PMV BLD AUTO: 7.7 FL (ref 5–10.5)
POTASSIUM SERPL-SCNC: 4.1 MMOL/L (ref 3.5–5.1)
PROTHROMBIN TIME: 19.4 SEC (ref 9.9–12.7)
RBC # BLD: 4.06 M/UL (ref 4.2–5.9)
SODIUM BLD-SCNC: 139 MMOL/L (ref 136–145)
WBC # BLD: 9.5 K/UL (ref 4–11)

## 2022-03-24 PROCEDURE — 2500000003 HC RX 250 WO HCPCS: Performed by: INTERNAL MEDICINE

## 2022-03-24 PROCEDURE — 94003 VENT MGMT INPAT SUBQ DAY: CPT

## 2022-03-24 PROCEDURE — 6370000000 HC RX 637 (ALT 250 FOR IP): Performed by: INTERNAL MEDICINE

## 2022-03-24 PROCEDURE — 99232 SBSQ HOSP IP/OBS MODERATE 35: CPT | Performed by: STUDENT IN AN ORGANIZED HEALTH CARE EDUCATION/TRAINING PROGRAM

## 2022-03-24 PROCEDURE — 2000000000 HC ICU R&B

## 2022-03-24 PROCEDURE — 94761 N-INVAS EAR/PLS OXIMETRY MLT: CPT

## 2022-03-24 PROCEDURE — APPSS30 APP SPLIT SHARED TIME 16-30 MINUTES: Performed by: NURSE PRACTITIONER

## 2022-03-24 PROCEDURE — 2700000000 HC OXYGEN THERAPY PER DAY

## 2022-03-24 PROCEDURE — 80048 BASIC METABOLIC PNL TOTAL CA: CPT

## 2022-03-24 PROCEDURE — 85610 PROTHROMBIN TIME: CPT

## 2022-03-24 PROCEDURE — 94640 AIRWAY INHALATION TREATMENT: CPT

## 2022-03-24 PROCEDURE — 2580000003 HC RX 258: Performed by: INTERNAL MEDICINE

## 2022-03-24 PROCEDURE — 85027 COMPLETE CBC AUTOMATED: CPT

## 2022-03-24 PROCEDURE — 2500000003 HC RX 250 WO HCPCS

## 2022-03-24 PROCEDURE — 6360000002 HC RX W HCPCS: Performed by: INTERNAL MEDICINE

## 2022-03-24 PROCEDURE — 71045 X-RAY EXAM CHEST 1 VIEW: CPT

## 2022-03-24 PROCEDURE — 36415 COLL VENOUS BLD VENIPUNCTURE: CPT

## 2022-03-24 PROCEDURE — 99291 CRITICAL CARE FIRST HOUR: CPT | Performed by: INTERNAL MEDICINE

## 2022-03-24 PROCEDURE — APPNB30 APP NON BILLABLE TIME 0-30 MINS: Performed by: NURSE PRACTITIONER

## 2022-03-24 PROCEDURE — 83735 ASSAY OF MAGNESIUM: CPT

## 2022-03-24 PROCEDURE — A4216 STERILE WATER/SALINE, 10 ML: HCPCS | Performed by: INTERNAL MEDICINE

## 2022-03-24 RX ORDER — OYSTER SHELL CALCIUM WITH VITAMIN D 500; 200 MG/1; [IU]/1
1 TABLET, FILM COATED ORAL 2 TIMES DAILY
COMMUNITY

## 2022-03-24 RX ORDER — SENNA AND DOCUSATE SODIUM 50; 8.6 MG/1; MG/1
1 TABLET, FILM COATED ORAL 2 TIMES DAILY
COMMUNITY
End: 2022-06-13 | Stop reason: SDUPTHER

## 2022-03-24 RX ORDER — SUCCINYLCHOLINE/SOD CL,ISO/PF 200MG/10ML
SYRINGE (ML) INTRAVENOUS
Status: COMPLETED
Start: 2022-03-24 | End: 2022-03-24

## 2022-03-24 RX ORDER — ROCURONIUM BROMIDE 10 MG/ML
INJECTION, SOLUTION INTRAVENOUS
Status: COMPLETED
Start: 2022-03-24 | End: 2022-03-24

## 2022-03-24 RX ORDER — DIPHENHYDRAMINE HCL 25 MG
25 TABLET ORAL EVERY 6 HOURS PRN
Status: ON HOLD | COMMUNITY
End: 2022-03-24

## 2022-03-24 RX ORDER — OMEPRAZOLE 20 MG/1
20 CAPSULE, DELAYED RELEASE ORAL DAILY
Status: ON HOLD | COMMUNITY
End: 2022-03-24

## 2022-03-24 RX ORDER — FENTANYL CITRATE-0.9 % NACL/PF 10 MCG/ML
25-200 PLASTIC BAG, INJECTION (ML) INTRAVENOUS CONTINUOUS
Status: DISCONTINUED | OUTPATIENT
Start: 2022-03-24 | End: 2022-04-02 | Stop reason: HOSPADM

## 2022-03-24 RX ORDER — GABAPENTIN 100 MG/1
100 CAPSULE ORAL 3 TIMES DAILY
Status: ON HOLD | COMMUNITY
End: 2022-03-24

## 2022-03-24 RX ORDER — ASPIRIN 81 MG/1
81 TABLET, CHEWABLE ORAL DAILY
Status: ON HOLD | COMMUNITY
End: 2022-03-24

## 2022-03-24 RX ORDER — INSULIN GLARGINE 100 [IU]/ML
INJECTION, SOLUTION SUBCUTANEOUS NIGHTLY
Status: ON HOLD | COMMUNITY
End: 2022-03-24

## 2022-03-24 RX ORDER — METOPROLOL SUCCINATE 100 MG/1
100 TABLET, EXTENDED RELEASE ORAL DAILY
Status: ON HOLD | COMMUNITY
End: 2022-03-24

## 2022-03-24 RX ORDER — BUPROPION HYDROCHLORIDE 75 MG/1
150 TABLET ORAL DAILY
Status: ON HOLD | COMMUNITY
End: 2022-03-24

## 2022-03-24 RX ORDER — OXYCODONE HYDROCHLORIDE 5 MG/1
5 TABLET ORAL EVERY 6 HOURS PRN
Status: ON HOLD | COMMUNITY
End: 2022-03-24

## 2022-03-24 RX ORDER — ETOMIDATE 2 MG/ML
INJECTION INTRAVENOUS
Status: COMPLETED
Start: 2022-03-24 | End: 2022-03-24

## 2022-03-24 RX ORDER — METHOCARBAMOL 750 MG/1
750 TABLET, FILM COATED ORAL 2 TIMES DAILY
Status: ON HOLD | COMMUNITY
End: 2022-03-24

## 2022-03-24 RX ADMIN — BUDESONIDE 500 MCG: 0.5 SUSPENSION RESPIRATORY (INHALATION) at 19:21

## 2022-03-24 RX ADMIN — SODIUM CHLORIDE: 9 INJECTION, SOLUTION INTRAVENOUS at 01:24

## 2022-03-24 RX ADMIN — SODIUM CHLORIDE: 9 INJECTION, SOLUTION INTRAVENOUS at 17:43

## 2022-03-24 RX ADMIN — DEXAMETHASONE SODIUM PHOSPHATE 4 MG: 4 INJECTION, SOLUTION INTRAMUSCULAR; INTRAVENOUS at 10:10

## 2022-03-24 RX ADMIN — POLYETHYLENE GLYCOL 3350 17 G: 17 POWDER, FOR SOLUTION ORAL at 10:11

## 2022-03-24 RX ADMIN — IPRATROPIUM BROMIDE AND ALBUTEROL SULFATE 3 ML: .5; 3 SOLUTION RESPIRATORY (INHALATION) at 19:22

## 2022-03-24 RX ADMIN — ARFORMOTEROL TARTRATE 15 MCG: 15 SOLUTION RESPIRATORY (INHALATION) at 19:21

## 2022-03-24 RX ADMIN — Medication 200 MCG/HR: at 19:43

## 2022-03-24 RX ADMIN — Medication 200 MG: at 17:07

## 2022-03-24 RX ADMIN — IPRATROPIUM BROMIDE AND ALBUTEROL SULFATE 3 ML: .5; 3 SOLUTION RESPIRATORY (INHALATION) at 12:20

## 2022-03-24 RX ADMIN — Medication 10 ML: at 20:09

## 2022-03-24 RX ADMIN — PROPOFOL 55 MCG/KG/MIN: 10 INJECTION, EMULSION INTRAVENOUS at 15:59

## 2022-03-24 RX ADMIN — IPRATROPIUM BROMIDE AND ALBUTEROL SULFATE 3 ML: .5; 3 SOLUTION RESPIRATORY (INHALATION) at 07:57

## 2022-03-24 RX ADMIN — SODIUM CHLORIDE, PRESERVATIVE FREE 20 MG: 5 INJECTION INTRAVENOUS at 10:10

## 2022-03-24 RX ADMIN — Medication 100 MCG/HR: at 08:01

## 2022-03-24 RX ADMIN — ROCURONIUM BROMIDE 50 MG: 10 INJECTION, SOLUTION INTRAVENOUS at 17:09

## 2022-03-24 RX ADMIN — BUDESONIDE 500 MCG: 0.5 SUSPENSION RESPIRATORY (INHALATION) at 07:57

## 2022-03-24 RX ADMIN — Medication 200 MCG/HR: at 22:36

## 2022-03-24 RX ADMIN — IPRATROPIUM BROMIDE AND ALBUTEROL SULFATE 3 ML: .5; 3 SOLUTION RESPIRATORY (INHALATION) at 16:39

## 2022-03-24 RX ADMIN — PROPOFOL 55 MCG/KG/MIN: 10 INJECTION, EMULSION INTRAVENOUS at 23:20

## 2022-03-24 RX ADMIN — GENTAMICIN SULFATE: 1 CREAM TOPICAL at 10:09

## 2022-03-24 RX ADMIN — INSULIN LISPRO 1 UNITS: 100 INJECTION, SOLUTION INTRAVENOUS; SUBCUTANEOUS at 00:11

## 2022-03-24 RX ADMIN — PROPOFOL 55 MCG/KG/MIN: 10 INJECTION, EMULSION INTRAVENOUS at 19:51

## 2022-03-24 RX ADMIN — INSULIN LISPRO 1 UNITS: 100 INJECTION, SOLUTION INTRAVENOUS; SUBCUTANEOUS at 08:12

## 2022-03-24 RX ADMIN — INSULIN LISPRO 1 UNITS: 100 INJECTION, SOLUTION INTRAVENOUS; SUBCUTANEOUS at 17:17

## 2022-03-24 RX ADMIN — Medication 100 MCG/HR: at 01:54

## 2022-03-24 RX ADMIN — SENNOSIDES AND DOCUSATE SODIUM 2 TABLET: 50; 8.6 TABLET ORAL at 10:10

## 2022-03-24 RX ADMIN — ARFORMOTEROL TARTRATE 15 MCG: 15 SOLUTION RESPIRATORY (INHALATION) at 07:57

## 2022-03-24 RX ADMIN — Medication 200 MCG/HR: at 17:35

## 2022-03-24 RX ADMIN — GENTAMICIN SULFATE: 1 CREAM TOPICAL at 20:08

## 2022-03-24 RX ADMIN — SODIUM CHLORIDE, PRESERVATIVE FREE 20 MG: 5 INJECTION INTRAVENOUS at 20:08

## 2022-03-24 RX ADMIN — ETOMIDATE 20 MG: 2 INJECTION, SOLUTION INTRAVENOUS at 17:07

## 2022-03-24 RX ADMIN — SODIUM CHLORIDE: 9 INJECTION, SOLUTION INTRAVENOUS at 10:34

## 2022-03-24 RX ADMIN — INSULIN LISPRO 1 UNITS: 100 INJECTION, SOLUTION INTRAVENOUS; SUBCUTANEOUS at 03:50

## 2022-03-24 RX ADMIN — Medication 5 MG/HR: at 18:11

## 2022-03-24 RX ADMIN — PROPOFOL 55 MCG/KG/MIN: 10 INJECTION, EMULSION INTRAVENOUS at 02:15

## 2022-03-24 RX ADMIN — SODIUM CHLORIDE 0.5 MCG/KG/MIN: 9 INJECTION, SOLUTION INTRAVENOUS at 16:59

## 2022-03-24 RX ADMIN — Medication 100 MCG/HR: at 12:35

## 2022-03-24 RX ADMIN — PROPOFOL 55 MCG/KG/MIN: 10 INJECTION, EMULSION INTRAVENOUS at 06:10

## 2022-03-24 RX ADMIN — PROPOFOL 55 MCG/KG/MIN: 10 INJECTION, EMULSION INTRAVENOUS at 10:35

## 2022-03-24 RX ADMIN — NALOXEGOL OXALATE 25 MG: 25 TABLET, FILM COATED ORAL at 14:32

## 2022-03-24 RX ADMIN — Medication 100 MCG/HR: at 06:14

## 2022-03-24 RX ADMIN — DEXAMETHASONE SODIUM PHOSPHATE 4 MG: 4 INJECTION, SOLUTION INTRAMUSCULAR; INTRAVENOUS at 20:08

## 2022-03-24 ASSESSMENT — PAIN SCALES - GENERAL
PAINLEVEL_OUTOF10: 0
PAINLEVEL_OUTOF10: 2
PAINLEVEL_OUTOF10: 4
PAINLEVEL_OUTOF10: 2
PAINLEVEL_OUTOF10: 0

## 2022-03-24 ASSESSMENT — PULMONARY FUNCTION TESTS
PIF_VALUE: 25

## 2022-03-24 NOTE — CARE COORDINATION
[address] [id number] Continence Nurse  Consult Note       NAME:  [facility] RECORD NUMBER:  [id number]  AGE: 65 y.o.    GENDER: male  : 1960  TODAY'S DATE:  3/24/2022    Subjective   Reason for WOCN Evaluation and Assessment: wounds to bilateral lower extremities      Demetrius Chung is a 65 y.o. male referred by:   [x] Physician  [x] Nursing  [] Other:     Wound Identification:  Wound Type: venous and arterial  Contributing Factors: venous stasis and arterial insufficiency    Wound History: present on admission  Current Wound Care Treatment:  Gentamicin cream, foam dressings    Patient Goal of Care:  [x] Wound Healing  [] Odor Control  [] Palliative Care  [] Pain Control   [] Other:         PAST MEDICAL HISTORY        Diagnosis Date    Diabetes mellitus ([facility]     Fibromyalgia     History of DVT (deep vein thrombosis)     Hyperlipidemia     Hypertension     Type 2 diabetes mellitus with circulatory disorder, without long-term current use of insulin ([facility] 2022       PAST SURGICAL HISTORY    Past Surgical History:   Procedure Laterality Date    APPENDECTOMY  2005    COLONOSCOPY  2016    FEMORAL BYPASS Left 2020    femoral posterior tibial bypass    FEMORAL-TIBIAL BYPASS GRAFT Right 2020    with femoral endarterectomy and patch angioplasty    FOOT DEBRIDEMENT Left 2020    FOOT DEBRIDEMENT Right 2021    DEBRIDEMENT OF RIGHT FOOT WOUND WITH GRAFT APPLICATION performed by Irma Barry DPM at [facility] Right 2020    stent leg for PVD       FAMILY HISTORY    Family History   Problem Relation Age of Onset    Cancer Mother     Lung Cancer Mother     Diabetes Father     Stroke Father        SOCIAL HISTORY    Social History     Tobacco Use    Smoking status: Former Smoker     Packs/day: 0.50     Years: 20.00     Pack years: 10.00     Types: Cigarettes     Start date: 1977     Quit date: 2021     Years since quittin.2    Smokeless tobacco: Never Used   Vaping Use    Vaping Use: Never used   Substance Use Topics    Alcohol use: Yes     Alcohol/week: 6.0 standard drinks     Types: 6 Cans of beer per week    Drug use: Never       ALLERGIES    Allergies   Allergen Reactions    Chantix [Varenicline] Other (See Comments)     Hallucinations         MEDICATIONS    No current facility-administered medications on file prior to encounter. Current Outpatient Medications on File Prior to Encounter   Medication Sig Dispense Refill    calcium-vitamin D (OSCAL-500) 500-200 MG-UNIT per tablet Take 1 tablet by mouth 2 times daily      sennosides-docusate sodium (SENOKOT-S) 8.6-50 MG tablet Take 1 tablet by mouth 2 times daily      nicotine (NICODERM CQ) 14 MG/24HR Place 1 patch onto the skin daily for 14 days (Patient not taking: Reported on 3/22/2022) 14 patch 0    ipratropium-albuterol (DUONEB) 0.5-2.5 (3) MG/3ML SOLN nebulizer solution Inhale 3 mLs into the lungs 4 times daily 360 mL 3    furosemide (LASIX) 20 MG tablet Take 1 tablet by mouth daily 60 tablet 3    guaiFENesin (MUCINEX) 600 MG extended release tablet Take 1,200 mg by mouth 2 times daily      atorvastatin (LIPITOR) 80 MG tablet Take 1 tablet by mouth daily Cancel atorvastatin 20 mg a day 90 tablet 0    Fluticasone-Umeclidin-Vilant (TRELEGY ELLIPTA) 200-62.5-25 MCG/INH AEPB Inhale 1 puff into the lungs 2 times daily (Patient taking differently: Inhale 1 puff into the lungs daily ) 1 each 2    gentamicin (GARAMYCIN) 0.1 % cream Apply topically   daily.  30 g 1    acetaminophen (TYLENOL) 500 MG tablet Take 1 tablet by mouth 4 times daily as needed for Pain 360 tablet 1    warfarin (JANTOVEN) 5 MG tablet TAKE 1 AND 1/2 TABLETS BY MOUTH ONE TIME A DAY OR AS DIRECTED BY [facility] ([phone]) (Patient taking differently: Take 7.5 mg by mouth daily Except 10mg every Wednesday and Saturday OR AS DIRECTED BY [facility] ([phone])) 45 tablet 0    NIFEdipine (ADALAT CC) 60 MG extended release tablet Take 1 tablet by mouth daily 90 tablet 0    carvedilol (COREG) 25 MG tablet Take 1 tablet by mouth 2 times daily (with meals) 60 tablet 3    metFORMIN (GLUCOPHAGE) 500 MG tablet Take 1 tablet by mouth 2 times daily (with meals) 180 tablet 0    fluticasone (FLONASE) 50 MCG/ACT nasal spray 1 spray by Nasal route daily 9.9 g 5    blood glucose monitor kit and supplies Dispense sufficient amount for indicated testing frequency plus additional to accommodate PRN testing needs. Dispense all needed supplies to include: monitor, strips, lancing device, lancets, control solutions, alcohol swabs. (Patient not taking: Reported on 3/22/2022) 1 kit 0    Lancets MISC 1 each by Does not apply route daily Test 2 times daily while on PREDNISONE then 1 time daily & as needed for symptoms of irregular blood sugar (Patient not taking: Reported on 3/22/2022) 100 each 3    blood glucose monitor strips Test 2 times a day while on PREDNISONE, then 1 time daily & as needed for symptoms of irregular blood glucose. Dispense sufficient amount for indicated testing frequency plus additional to accommodate PRN testing needs.  (Patient not taking: Reported on 3/22/2022) 100 strip 3    ferrous sulfate (IRON 325) 325 (65 Fe) MG tablet Take 1 tablet by mouth 2 times daily 180 tablet 0    Vitamins/Minerals TABS Take 1 tablet by mouth daily (Patient not taking: Reported on 3/22/2022)         Objective    /68   Pulse 78   Temp 98.2 °F (36.8 °C) (Temporal)   Resp 16   Ht 5' 8\" (1.727 m)   Wt 174 lb 13.2 oz (79.3 kg)   SpO2 (!) 89%   BMI 26.58 kg/m²     LABS:  WBC:    Lab Results   Component Value Date    WBC 9.5 03/24/2022     H/H:    Lab Results   Component Value Date    HGB 11.6 03/24/2022    HCT 36.1 03/24/2022     PTT:    Lab Results   Component Value Date    APTT 56.5 03/22/2022   [APTT}  PT/INR:    Lab Results   Component Value Date PROTIME 19.4 03/24/2022    INR 1.68 03/24/2022     HgBA1c:    Lab Results   Component Value Date    LABA1C 6.5 03/22/2022       Assessment   Harshil Risk Score: Harshil Scale Score: 11    Patient Active Problem List   Diagnosis Code    Peripheral artery disease ([facility]) I73.9    Centrilobular emphysema ([facility]) J43.2    COPD (chronic obstructive pulmonary disease) ([facility]) J44.9    Atherosclerosis of native arteries of right leg with ulceration of other part of foot ([facility] I70.235    Impotence N52.9    Acute deep vein thrombosis (DVT) of femoral vein of left lower extremity ([facility]) I82.412    Colon polyp K63.5    Hyperlipidemia E78.5    Non-healing ulcer of left ankle ([facility]) L97.329    Venous insufficiency I87.2    Venous ulcer ([facility] I83.009, L97.909    Essential hypertension I10    Peripheral vascular disease ([facility]) I73.9    Type 2 diabetes mellitus with circulatory disorder, without long-term current use of insulin ([facility]) E11.59    COPD exacerbation ([facility]) J44.1    Acute hypercapnic respiratory failure ([facility]) J96.02    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       Measurements:  Wound 12/31/21 Ankle Right;Medial #1 (Active)   Wound Image   03/11/22 1533   Wound Etiology Diabetic 03/24/22 0600   Dressing Status Clean;Dry; Intact 03/24/22 0600   Wound Cleansed Cleansed with saline 03/24/22 0600   Dressing/Treatment Silicone pad;Roll gauze 03/24/22 0600   Offloading for Diabetic Foot Ulcers Offloading not required 03/10/22 2100   Post-Procedure Volume (cm^3) 0.04 cm^3 03/04/22 0844   Wound Assessment Dry;Pink/red 03/12/22 0831   Drainage Amount None 03/24/22 0600   Drainage Description Clear 03/24/22 0600   Odor None 03/24/22 0600   Jamila-wound Assessment Dry/flaky 03/24/22 0600   Margins Attached edges; Defined edges 03/11/22 1533   Wound Thickness Description not for Pressure Injury Partial thickness 03/11/22 1533   Number of days: 83       Wound 12/31/21 Ankle Medial;Left #2 (Active)   Wound Image   03/11/22 1533   Wound Etiology Diabetic 03/24/22 0600   Dressing Status Clean;Dry; Intact 03/24/22 0600   Wound Cleansed Cleansed with saline 03/24/22 0600   Dressing/Treatment Silicone pad;Roll gauze 03/24/22 0600   Offloading for Diabetic Foot Ulcers Offloading not required 03/10/22 2100   Wound Assessment Other (Comment) 03/13/22 1145   Drainage Amount None 03/24/22 0600   Drainage Description Yellow 03/24/22 0600   Odor None 03/24/22 0600   Jamila-wound Assessment Dry/flaky 03/24/22 0600   Margins Defined edges; Unattached edges 03/11/22 1533   Wound Thickness Description not for Pressure Injury Full thickness 03/11/22 1533   Number of days: 83     Incision 02/26/21 Anterior;Right (Active)   Number of days: 391     Patient has wound care consult. Patient known to hospital was just discharged on 3/13/22. Patient was readmitted for difficulty breathing and laryngeal mass. Patient was intubated at admission 3/22/22. Patient seen in wound care clinic. Will continue wound care orders. Spoke to nurse BILLY; wounds are pink in healing. Response to treatment:  Well tolerated by patient. Pain Assessment:  Severity:  0 / 10  Quality of pain: N/A  Wound Pain Timing/Severity: none  Premedicated: N/A    Plan   Plan of Care: Wound 12/31/21 Ankle Right;Medial #4-LLILCTXF/[id number]: Silicone pad,Roll gauze  Wound 12/31/21 Ankle Medial;Left #7-YUESVDJG/GEVCTLGXU: Silicone pad,Roll gauze apply gentamicin cream to wounds cover with foam dressing, wrap with roll gauze are loosely with ace wraps, no compression    Specialty Bed Required : Yes   [] Low Air Loss   [] Pressure Redistribution  [] Fluid Immersion  [] Bariatric  [] Total Pressure Relief  [] Other:     Current Diet: Diet NPO  ADULT TUBE FEEDING; Nasogastric; Standard with Fiber; Continuous; 20; Yes; 25; Q 4 hours; 45; 30; Q 4 hours; Protein; Administer 1 Proteinex P2Go daily. Flush with 30 mL water before and after.   Dietician consult:  Yes    Discharge Plan:  Placement for patient upon discharge: home with support    Patient appropriate for Outpatient [address] [facility]: Yes    Referrals:  []   [] [facility]  [] Supplies  [] Other    Patient/Caregiver Teaching:  Level of patient/caregiver understanding able to:   [] Indicates understanding       [] Needs reinforcement  [] Unsuccessful      [] Verbal Understanding  [] Demonstrated understanding       [] No evidence of learning  [] Refused teaching         [x] N/A pt intubated       Electronically signed by  KAREEN Hu, RN, Inpatient  Wound/Ostomy Careon 3/24/2022 at 6:43 PM

## 2022-03-24 NOTE — PLAN OF CARE
Problem: Non-Violent Restraints  Goal: Removal from restraints as soon as assessed to be safe  3/24/2022 1858 by William Ho RN  Outcome: Ongoing  3/24/2022 0459 by Shilpi Kennedy RN  Outcome: Ongoing  Goal: No harm/injury to patient while restraints in use  3/24/2022 1858 by William Ho RN  Outcome: Ongoing  3/24/2022 0459 by Shilpi Kennedy RN  Outcome: Ongoing  Goal: Patient's dignity will be maintained  3/24/2022 1858 by William Ho RN  Outcome: Ongoing  3/24/2022 0459 by Shilpi Kennedy RN  Outcome: Ongoing     Problem: Skin Integrity:  Goal: Will show no infection signs and symptoms  Description: Will show no infection signs and symptoms  3/24/2022 1858 by William Ho RN  Outcome: Ongoing  3/24/2022 0459 by Shilpi Kennedy RN  Outcome: Ongoing  Goal: Absence of new skin breakdown  Description: Absence of new skin breakdown  3/24/2022 1858 by William Ho RN  Outcome: Ongoing  3/24/2022 0459 by Shilpi Kennedy RN  Outcome: Ongoing     Problem: Falls - Risk of:  Goal: Will remain free from falls  Description: Will remain free from falls  3/24/2022 1858 by William Ho RN  Outcome: Ongoing  3/24/2022 0459 by Shilpi Kennedy RN  Outcome: Ongoing  Goal: Absence of physical injury  Description: Absence of physical injury  3/24/2022 1858 by William Ho RN  Outcome: Ongoing  3/24/2022 0459 by Shilpi Kennedy RN  Outcome: Ongoing     Problem: Nutrition  Goal: Optimal nutrition therapy  3/24/2022 1858 by William Ho RN  Outcome: Ongoing  3/24/2022 0459 by Shilpi Kennedy RN  Outcome: Ongoing     Problem: Discharge Planning:  Goal: Participates in care planning  Description: Participates in care planning  3/24/2022 1858 by William Ho RN  Outcome: Ongoing  3/24/2022 0459 by Shilpi Kennedy RN  Outcome: Ongoing  Goal: Discharged to appropriate level of care  Description: Discharged to appropriate level of care  3/24/2022 1858 by William Ho RN  Outcome: Ongoing  3/24/2022 0459 by Shilpi Kennedy RN  Outcome: Ongoing     Problem: Airway Clearance - Ineffective:  Goal: Ability to maintain a clear airway will improve  Description: Ability to maintain a clear airway will improve  3/24/2022 1858 by Karan Pierce RN  Outcome: Ongoing  3/24/2022 0459 by Faizan Valente RN  Outcome: Ongoing     Problem: Anxiety/Stress:  Goal: Level of anxiety will decrease  Description: Level of anxiety will decrease  3/24/2022 1858 by Karan Pierce RN  Outcome: Ongoing  3/24/2022 0459 by Faizan Valente RN  Outcome: Ongoing     Problem: Aspiration:  Goal: Absence of aspiration  Description: Absence of aspiration  3/24/2022 1858 by Karan Pierce RN  Outcome: Ongoing  3/24/2022 0459 by Faizan Valente RN  Outcome: Ongoing     Problem:  Bowel Function - Altered:  Goal: Bowel elimination is within specified parameters  Description: Bowel elimination is within specified parameters  3/24/2022 1858 by Karan Pierce RN  Outcome: Ongoing  3/24/2022 0459 by Faizan Valente RN  Outcome: Ongoing     Problem: Gas Exchange - Impaired:  Goal: Levels of oxygenation will improve  Description: Levels of oxygenation will improve  3/24/2022 1858 by Karan Pierce RN  Outcome: Ongoing  3/24/2022 0459 by Faizan Valente RN  Outcome: Ongoing     Problem: Pain:  Goal: Pain level will decrease  Description: Pain level will decrease  Outcome: Ongoing  Goal: Control of acute pain  Description: Control of acute pain  Outcome: Ongoing  Goal: Control of chronic pain  Description: Control of chronic pain  Outcome: Ongoing

## 2022-03-24 NOTE — TELEPHONE ENCOUNTER
----- Message from HOLGER Raines CNP sent at 3/24/2022 10:16 AM EDT -----  Will plan to cancel patient's vascular surgery scheduled for April 14th and have patient follow up with Dr. Yareli Nation after confirmation of diagnosis and treatment plan in place. Just keep on your radar and maybe call patient's wife in April to see where they stand and can schedule follow up apt with Dr. Yareli Nation to discuss. Also can you make note that he will need bridged with Lovenox if we do reschedule his surgery. Thanks!

## 2022-03-24 NOTE — PROGRESS NOTES
Pt well sedated on the vent and responds to voice but will not follow commands. Pt pulls against restraints intermittently as they are repositioned. Pt bathed and assessed per protocol. Tube feed titrated to goal rate. Family updated on Pt condition. Pt resting without incident.

## 2022-03-24 NOTE — ANESTHESIA PRE PROCEDURE
Department of Anesthesiology  Preprocedure Note       Name:  Gorge Jaimes   Age:  65 y.o.  :  1960                                          MRN:  [id number]         Date:  3/24/2022      Surgeon: Demetrio Beaver):  Naz Garces DO    Procedure: Procedure(s):  TRACHEOTOMY  DIRECT LARYNGOSCOPY WITH BIOPSY AND FROZEN SECTIONS    Medications prior to admission:   Prior to Admission medications    Medication Sig Start Date End Date Taking? Authorizing Provider   calcium-vitamin D (OSCAL-500) 500-200 MG-UNIT per tablet Take 1 tablet by mouth 2 times daily   Yes Historical Provider, MD   sennosides-docusate sodium (SENOKOT-S) 8.6-50 MG tablet Take 1 tablet by mouth 2 times daily   Yes Historical Provider, MD   nicotine (NICODERM CQ) 14 MG/24HR Place 1 patch onto the skin daily for 14 days  Patient not taking: Reported on 3/22/2022 3/14/22 3/28/22  Brian Mabry MD   ipratropium-albuterol (DUONEB) 0.5-2.5 (3) MG/3ML SOLN nebulizer solution Inhale 3 mLs into the lungs 4 times daily 3/7/22   HOLGER Chu CNP   furosemide (LASIX) 20 MG tablet Take 1 tablet by mouth daily 3/7/22   HOLGER Chu - CNP   guaiFENesin (MUCINEX) 600 MG extended release tablet Take 1,200 mg by mouth 2 times daily    Historical Provider, MD   atorvastatin (LIPITOR) 80 MG tablet Take 1 tablet by mouth daily Cancel atorvastatin 20 mg a day 22   Clarence Harman MD   Fluticasone-Umeclidin-Vilant (TRELEGY ELLIPTA) 200-62.5-25 MCG/INH AEPB Inhale 1 puff into the lungs 2 times daily  Patient taking differently: Inhale 1 puff into the lungs daily  22   Clarence Harman MD   gentamicin (GARAMYCIN) 0.1 % cream Apply topically   daily.  22   Bhavana Fields DPM   acetaminophen (TYLENOL) 500 MG tablet Take 1 tablet by mouth 4 times daily as needed for Pain 1/10/22   Joel Romero MD   warfarin (JANTOVEN) 5 MG tablet TAKE 1 AND 1/2 TABLETS BY MOUTH ONE TIME A DAY OR AS DIRECTED BY [facility] ([phone])  Patient taking differently: Take 7.5 mg by mouth daily Except 10mg every Wednesday and Saturday OR AS DIRECTED BY [facility] ([phone]) 1/10/22   Rafia Vick MD   NIFEdipine (ADALAT CC) 60 MG extended release tablet Take 1 tablet by mouth daily 1/10/22   Rafia Vick MD   carvedilol (COREG) 25 MG tablet Take 1 tablet by mouth 2 times daily (with meals) 1/10/22   Rafia Vick MD   metFORMIN (GLUCOPHAGE) 500 MG tablet Take 1 tablet by mouth 2 times daily (with meals) 1/10/22   Rafia Vick MD   fluticasone [facility]) 50 MCG/ACT nasal spray 1 spray by Nasal route daily 1/6/22 1/6/23  Rafia Vick MD   blood glucose monitor kit and supplies Dispense sufficient amount for indicated testing frequency plus additional to accommodate PRN testing needs. Dispense all needed supplies to include: monitor, strips, lancing device, lancets, control solutions, alcohol swabs. Patient not taking: Reported on 3/22/2022 1/4/22   Mercedes Guevara MD   Lancets MISC 1 each by Does not apply route daily Test 2 times daily while on PREDNISONE then 1 time daily & as needed for symptoms of irregular blood sugar  Patient not taking: Reported on 3/22/2022 1/4/22   Mercedes Guevara MD   blood glucose monitor strips Test 2 times a day while on PREDNISONE, then 1 time daily & as needed for symptoms of irregular blood glucose. Dispense sufficient amount for indicated testing frequency plus additional to accommodate PRN testing needs.   Patient not taking: Reported on 3/22/2022 1/4/22   Mercedes Guevara MD   ferrous sulfate (IRON 325) 325 (65 Fe) MG tablet Take 1 tablet by mouth 2 times daily 9/7/21   Rafia Vick MD   Vitamins/Minerals TABS Take 1 tablet by mouth daily  Patient not taking: Reported on 3/22/2022    Historical Provider, MD       Current medications:    Current Facility-Administered Medications   Medication Dose Route Frequency Provider Last Rate Last Admin    naloxegol (MOVANTIK) tablet 25 mg 25 mg Oral QAM Livia Lopez MD        dexamethasone (DECADRON) injection 4 mg  4 mg IntraVENous Q12H Antoni Corona MD   4 mg at 03/23/22 2013    polyethylene glycol (GLYCOLAX) packet 17 g  17 g Oral Daily Antoni Corona MD   17 g at 03/23/22 1700    sennosides-docusate sodium (SENOKOT-S) 8.6-50 MG tablet 2 tablet  2 tablet Oral Daily Antoni Corona MD   2 tablet at 03/23/22 1700    propofol injection  5-90 mcg/kg/min IntraVENous Continuous Livia Lopez MD 24 mL/hr at 03/24/22 0610 55 mcg/kg/min at 03/24/22 0610    ipratropium-albuterol (DUONEB) nebulizer solution 3 mL  3 mL Inhalation Q4H WA Livia Lopez MD   3 mL at 03/24/22 0757    gentamicin (GARAMYCIN) 0.1 % cream   Topical TID Livia Lopez MD   Given at 03/23/22 2013    insulin lispro (1 Unit Dial) 0-6 Units  0-6 Units SubCUTAneous Q4H Tiffany Rosenberg MD   1 Units at 03/24/22 5189    racepinephrine HCl (VAPONEFPRIN) 2.25 % nebulizer solution NEBU 11.25 mg  11.25 mg Nebulization Q4H PRN Marisela Gustafson MD   11.25 mg at 03/22/22 1626    sodium chloride nebulizer 0.9 % solution 3 mL  3 mL Nebulization Q4H PRN Marisela Gustafson MD        succinylcholine (ANECTINE) injection 140 mg  140 mg IntraVENous Once Latoya Muñoz MD        albuterol sulfate  (90 Base) MCG/ACT inhaler 2 puff  2 puff Inhalation 4x Daily PRN Livia Lopez MD        sodium chloride flush 0.9 % injection 5-40 mL  5-40 mL IntraVENous 2 times per day Livia Lopez MD   10 mL at 03/23/22 2014    sodium chloride flush 0.9 % injection 5-40 mL  5-40 mL IntraVENous PRN Livia Lopez MD        0.9 % sodium chloride infusion  25 mL IntraVENous PRN Livia Lopez MD        ondansetron (ZOFRAN-ODT) disintegrating tablet 4 mg  4 mg Oral Q8H PRN Livia Lopez MD        Or    ondansetron [facility]) injection 4 mg  4 mg IntraVENous Q6H PRN Livia Lopez MD        acetaminophen (TYLENOL) tablet 650 mg  650 mg Oral Q6H PRN Silvestre Bender MD        Or    acetaminophen (TYLENOL) suppository 650 mg  650 mg Rectal Q6H PRN Silvestre Bender MD        famotidine (PEPCID) 20 mg in sodium chloride (PF) 10 mL injection  20 mg IntraVENous BID Silvestre Bender MD   20 mg at 03/23/22 2013    fentaNYL 10 mcg/mL infusion   mcg/hr IntraVENous Continuous Silvestre Bender MD 10 mL/hr at 03/24/22 0801 100 mcg/hr at 03/24/22 0801    budesonide (PULMICORT) nebulizer suspension 500 mcg  0.5 mg Nebulization BID Silvestre Bender MD   500 mcg at 03/24/22 0757    Arformoterol Tartrate (BROVANA) nebulizer solution 15 mcg  15 mcg Nebulization BID Silvestre Bender MD   15 mcg at 03/24/22 0757    scopolamine (TRANSDERM-SCOP) transdermal patch 1 patch  1 patch TransDERmal Q72H Marianela Curran MD   1 patch at 03/22/22 2002    glucose (GLUTOSE) 40 % oral gel 15 g  15 g Oral PRN Marianela Curran MD        glucagon (rDNA) injection 1 mg  1 mg IntraMUSCular PRN Marianela Curran MD        dextrose 5 % solution  100 mL/hr IntraVENous PRN Marianela Curran MD        dextrose bolus (hypoglycemia) 10% 125 mL  125 mL IntraVENous PRN Silvestre Bender MD        Or    dextrose bolus (hypoglycemia) 10% 250 mL  250 mL IntraVENous PRN Silvestre Bender MD        0.9 % sodium chloride infusion   IntraVENous Continuous Marianela Curran  mL/hr at 03/24/22 0420 Rate Verify at 03/24/22 0420       Allergies:     Allergies   Allergen Reactions    Chantix [Varenicline] Other (See Comments)     Hallucinations         Problem List:    Patient Active Problem List   Diagnosis Code    Peripheral artery disease ([facility] I73.9    Centrilobular emphysema ([facility] J43.2    COPD (chronic obstructive pulmonary disease) ([facility]) J44.9    Atherosclerosis of native arteries of right leg with ulceration of other part of foot ([facility] I70.235    Impotence N52.9    Acute deep vein thrombosis (DVT) of femoral vein of left lower extremity ([facility]) I82.412    Colon polyp K63.5  Hyperlipidemia E78.5    Non-healing ulcer of left ankle ([facility] L97.329    Venous insufficiency I87.2    Venous ulcer (HCC) I83.009, L97.909    Essential hypertension I10    Peripheral vascular disease (HCC) I73.9    Type 2 diabetes mellitus with circulatory disorder, without long-term current use of insulin (HCC) E11.59    COPD exacerbation ([facility]) J44.1    Acute hypercapnic respiratory failure ([facility]) J96.02    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       Past Medical History:        Diagnosis Date    Diabetes mellitus ([facility]     Fibromyalgia     History of DVT (deep vein thrombosis)     Hyperlipidemia     Hypertension     Type 2 diabetes mellitus with circulatory disorder, without long-term current use of insulin ([facility] 2022       Past Surgical History:        Procedure Laterality Date    APPENDECTOMY      COLONOSCOPY  2016    FEMORAL BYPASS Left 2020    femoral posterior tibial bypass    FEMORAL-TIBIAL BYPASS GRAFT Right 2020    with femoral endarterectomy and patch angioplasty    FOOT DEBRIDEMENT Left 2020    FOOT DEBRIDEMENT Right 2021    DEBRIDEMENT OF RIGHT FOOT WOUND WITH GRAFT APPLICATION performed by Osito Lee DPM at [facility] Right 2020    stent leg for PVD       Social History:    Social History     Tobacco Use    Smoking status: Former Smoker     Packs/day: 0.50     Years: 20.00     Pack years: 10.00     Types: Cigarettes     Start date: 1977     Quit date: 2021     Years since quittin.2    Smokeless tobacco: Never Used   Substance Use Topics    Alcohol use:  Yes     Alcohol/week: 6.0 standard drinks     Types: 6 Cans of beer per week                                Counseling given: Not Answered      Vital Signs (Current):   Vitals:    22 0700 22 0804 22 0805 22 0806   BP: 109/63      Pulse: 81   79   Resp: 21 20 16 17   Temp:       TempSrc:       SpO2: 92% 94% 94% 94%   Weight: Height:                                                  BP Readings from Last 3 Encounters:   03/24/22 109/63   03/12/22 118/69   03/07/22 (!) 148/82       NPO Status:                                                                                 BMI:   Wt Readings from Last 3 Encounters:   03/24/22 174 lb 13.2 oz (79.3 kg)   03/13/22 159 lb 2.8 oz (72.2 kg)   03/07/22 162 lb 0.6 oz (73.5 kg)     Body mass index is 26.58 kg/m².     CBC:   Lab Results   Component Value Date    WBC 9.5 03/24/2022    RBC 4.06 03/24/2022    HGB 11.6 03/24/2022    HCT 36.1 03/24/2022    MCV 88.9 03/24/2022    RDW 16.9 03/24/2022     03/24/2022       CMP:   Lab Results   Component Value Date     03/24/2022    K 4.1 03/24/2022    K 4.6 03/23/2022     03/24/2022    CO2 23 03/24/2022    BUN 12 03/24/2022    CREATININE 0.6 03/24/2022    GFRAA >60 03/24/2022    GFRAA >60 03/29/2010    AGRATIO 1.4 03/23/2022    LABGLOM >60 03/24/2022    GLUCOSE 175 03/24/2022    PROT 5.6 03/23/2022    PROT 8.0 03/29/2010    CALCIUM 8.5 03/24/2022    BILITOT 0.3 03/23/2022    ALKPHOS 68 03/23/2022    AST 11 03/23/2022    ALT 19 03/23/2022       POC Tests:   Recent Labs     03/24/22  0810   POCGLU 160*       Coags:   Lab Results   Component Value Date    PROTIME 19.4 03/24/2022    INR 1.68 03/24/2022    APTT 56.5 03/22/2022       HCG (If Applicable): No results found for: PREGTESTUR, PREGSERUM, HCG, HCGQUANT     ABGs:   Lab Results   Component Value Date    PHART 7.414 03/23/2022    PO2ART 121.0 03/23/2022    MTH2FBQ 48.2 03/23/2022    QLR3MEI 30.8 03/23/2022    BEART 5.2 03/23/2022    O7LZIDRM 98.8 03/23/2022        Type & Screen (If Applicable):  No results found for: LABABO, LABRH    Drug/Infectious Status (If Applicable):  No results found for: HIV, HEPCAB    COVID-19 Screening (If Applicable):   Lab Results   Component Value Date    COVID19 Not Detected 02/02/2022    COVID19 Not Detected 02/10/2021           Anesthesia Evaluation  Patient summary reviewed and Nursing notes reviewed  Airway:        Comment: intubated   Dental:          Pulmonary:   (+) COPD:                            ROS comment: 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 - consult to ENT and to pulmonary critical care. - will need biopsy and definitive dx before tx can be considered. - 3.5 cm left laryngeal mass.    Cardiovascular:    (+) hypertension:, hyperlipidemia      ECG reviewed      Echocardiogram reviewed               ROS comment:     Left Ventricle   Normal left ventricle size, wall thickness, and systolic function with an   estimated ejection fraction of 60%. No regional wall motion abnormalities   are seen. Average E/e': 11.1. Normal diastolic function. Mitral Valve   The mitral valve normal in structure and function. No evidence of mitral regurgitation or stenosis. Left Atrium   The left atrium is normal in size. Aortic Valve   The aortic valve is structurally normal. There is no significant aortic   valve regurgitation or stenosis. Tricuspid aortic valve. Aorta   The aortic root is normal in size. The ascending aorta is normal in size. Right Ventricle   The right ventricle is normal in size and function. TAPSE: 2.55 cm. RV s' velocity: 17.2 cm/s. Tricuspid Valve   The tricuspid valve is normal in structure and function. There is no   significant tricuspid valve regurgitation or stenosis. Right Atrium   The right atrial size is normal.      Pulmonic Valve   The pulmonic valve is not well visualized. There is no evidence of pulmonic valve regurgitation or stenosis. Pericardial Effusion   No pericardial effusion noted. Pleural Effusion   No pleural effusion. Miscellaneous   The inferior vena cava appears normal in size with normal respiratory   variation.      Neuro/Psych:   (+) neuromuscular disease:,             GI/Hepatic/Renal:             Endo/Other:    (+) DiabetesType

## 2022-03-24 NOTE — PROGRESS NOTES
[facility] Pulmonary and Critical Care  Progress note      Reason for Consult: Respiratory failure, neck mass, COPD  Requesting Physician: Dr. Yeh Mater:   [address] [facility]:                The patient is a 65 y.o. male with significant past medical history of:      Diagnosis Date    Diabetes mellitus ([facility]     Fibromyalgia     History of DVT (deep vein thrombosis)     Hyperlipidemia     Hypertension     Type 2 diabetes mellitus with circulatory disorder, without long-term current use of insulin ([facility] 2/18/2022     Interval history: Patient remains sedated on mechanical ventilation. Occasionally arouses some but is pretty comfortable. We are attempting to keep him deeply sedated so as not to risk losing his airway. No family at the bedside this morning.       Past Surgical History:        Procedure Laterality Date    APPENDECTOMY  2005    COLONOSCOPY  2016    FEMORAL BYPASS Left 02/26/2020    femoral posterior tibial bypass    FEMORAL-TIBIAL BYPASS GRAFT Right 12/11/2020    with femoral endarterectomy and patch angioplasty    FOOT DEBRIDEMENT Left 09/03/2020    FOOT DEBRIDEMENT Right 2/26/2021    DEBRIDEMENT OF RIGHT FOOT WOUND WITH GRAFT APPLICATION performed by Arnol Cervantes DPM at [facility] Right 12/08/2020    stent leg for PVD     Current Medications:    Current Facility-Administered Medications: naloxegol (MOVANTIK) tablet 25 mg, 25 mg, Oral, QAM  dexamethasone (DECADRON) injection 4 mg, 4 mg, IntraVENous, Q12H  polyethylene glycol (GLYCOLAX) packet 17 g, 17 g, Oral, Daily  sennosides-docusate sodium (SENOKOT-S) 8.6-50 MG tablet 2 tablet, 2 tablet, Oral, Daily  propofol injection, 5-90 mcg/kg/min, IntraVENous, Continuous  ipratropium-albuterol (DUONEB) nebulizer solution 3 mL, 3 mL, Inhalation, Q4H WA  gentamicin (GARAMYCIN) 0.1 % cream, , Topical, TID  insulin lispro (1 Unit Dial) 0-6 Units, 0-6 Units, SubCUTAneous, Q4H  racepinephrine HCl (VAPONEFPRIN) 2.25 % nebulizer solution NEBU 11.25 mg, 11.25 mg, Nebulization, Q4H PRN  sodium chloride nebulizer 0.9 % solution 3 mL, 3 mL, Nebulization, Q4H PRN  succinylcholine (ANECTINE) injection 140 mg, 140 mg, IntraVENous, Once  albuterol sulfate  (90 Base) MCG/ACT inhaler 2 puff, 2 puff, Inhalation, 4x Daily PRN  sodium chloride flush 0.9 % injection 5-40 mL, 5-40 mL, IntraVENous, 2 times per day  sodium chloride flush 0.9 % injection 5-40 mL, 5-40 mL, IntraVENous, PRN  0.9 % sodium chloride infusion, 25 mL, IntraVENous, PRN  ondansetron (ZOFRAN-ODT) disintegrating tablet 4 mg, 4 mg, Oral, Q8H PRN **OR** ondansetron (ZOFRAN) injection 4 mg, 4 mg, IntraVENous, Q6H PRN  acetaminophen (TYLENOL) tablet 650 mg, 650 mg, Oral, Q6H PRN **OR** acetaminophen (TYLENOL) suppository 650 mg, 650 mg, Rectal, Q6H PRN  famotidine (PEPCID) 20 mg in sodium chloride (PF) 10 mL injection, 20 mg, IntraVENous, BID  fentaNYL 10 mcg/mL infusion,  mcg/hr, IntraVENous, Continuous  budesonide (PULMICORT) nebulizer suspension 500 mcg, 0.5 mg, Nebulization, BID  Arformoterol Tartrate (BROVANA) nebulizer solution 15 mcg, 15 mcg, Nebulization, BID  scopolamine (TRANSDERM-SCOP) transdermal patch 1 patch, 1 patch, TransDERmal, Q72H  glucose (GLUTOSE) 40 % oral gel 15 g, 15 g, Oral, PRN  glucagon (rDNA) injection 1 mg, 1 mg, IntraMUSCular, PRN  dextrose 5 % solution, 100 mL/hr, IntraVENous, PRN  dextrose bolus (hypoglycemia) 10% 125 mL, 125 mL, IntraVENous, PRN **OR** dextrose bolus (hypoglycemia) 10% 250 mL, 250 mL, IntraVENous, PRN  0.9 % sodium chloride infusion, , IntraVENous, Continuous    Allergies   Allergen Reactions    Chantix [Varenicline] Other (See Comments)     Hallucinations         Social History:    TOBACCO:   reports that he quit smoking about 2 months ago. His smoking use included cigarettes. He started smoking about 44 years ago. He has a 10.00 pack-year smoking history.  He has never used smokeless tobacco.  ETOH:   reports current alcohol use of about 6.0 standard drinks of alcohol per week. Patient currently lives independently  Environmental/chemical exposure: None known    Family History:       Problem Relation Age of Onset    Cancer Mother     Lung Cancer Mother     Diabetes Father     Stroke Father      REVIEW OF SYSTEMS:    FREDI is unobtainable due to his critical illness. Objective:   PHYSICAL EXAM:      VITALS:  /63   Pulse 79   Temp 97.7 °F (36.5 °C) (Temporal)   Resp 17   Ht 5' 8\" (1.727 m)   Wt 174 lb 13.2 oz (79.3 kg)   SpO2 94%   BMI 26.58 kg/m²      24HR INTAKE/OUTPUT:      Intake/Output Summary (Last 24 hours) at 3/24/2022 1004  Last data filed at 3/24/2022 0420  Gross per 24 hour   Intake 4424.85 ml   Output 875 ml   Net 3549.85 ml     CONSTITUTIONAL: Sedated on mechanical ventilation  NECK:  Supple, symmetrical, trachea midline, no adenopathy, thyroid symmetric, not enlarged and no tenderness, skin normal  LUNGS:  no increased work of breathing and congested to auscultation. No accessory muscle use  CARDIOVASCULAR: S1 and S2, no edema and no JVD  ABDOMEN:  normal bowel sounds, non-distended and no masses palpated, and no tenderness to palpation. No hepatospleenomegaly  LYMPHADENOPATHY:  no axillary or supraclavicular adenopathy. No cervical adnenopathy  PSYCHIATRIC: Sedated on mechanical ventilation. MUSCULOSKELETAL: No obvious misalignment or effusion of the joints. No clubbing, cyanosis of the digits. SKIN:  normal skin color, texture, turgor and no redness, warmth, or swelling.  No palpable nodules    DATA:    Old records have been reviewed    CBC:  Recent Labs     03/22/22 0300 03/23/22 0430 03/24/22  0835   WBC 6.8 7.1 9.5   RBC 4.86 4.50 4.06*   HGB 13.8 12.6* 11.6*   HCT 43.0 39.5* 36.1*    266 212   MCV 88.4 87.8 88.9   MCH 28.4 27.9 28.5   MCHC 32.2 31.8 32.1   RDW 16.4* 16.5* 16.9*      BMP:  Recent Labs     03/22/22  0300 03/23/22  0430 03/24/22  0835    138 139   K 4.7 4.6 4.1    101 105   CO2 32 26 23   BUN 16 17 12   CREATININE 0.7* 0.9 0.6*   CALCIUM 8.9 9.1 8.5   GLUCOSE 127* 152* 175*      ABG:  Recent Labs     03/22/22  1207 03/23/22  0519   PHART 7.267* 7.414   MZH4KZC 72.4* 48.2*   PO2ART 97.9 121.0*   XPP3EBP 33.0* 30.8*   Z8BRFDUE 96 98.8   BEART 6* 5.2*     Procalcitonin  No results for input(s): PROCAL in the last 72 hours. No results found for: BNP  Lab Results   Component Value Date    CKTOTAL 81 02/01/2022    TROPONINI <0.01 03/22/2022           Radiology Review:  All pertinent images / reports were reviewed as a part of this visit. Assessment:     1. Acute respiratory failure due to upper airway compromise  2. 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 Severe COPD/centrilobular emphysema      Plan:     Patient's presentation is that of respiratory failure related to upper airway compromise from left neck mass which is likely a squamous cell carcinoma. He was developing stridor and inability to manage secretions. The patient is fortunate to have had a successful intubation without the need for immediate, urgent tracheostomy. He is comfortable on propofol and fentanyl infusions. I reviewed post intubation chest x-ray which reveals good position of the endotracheal tube in the mid trachea. There is no obvious focal infiltrate. Continue present ventilator settings with FiO2 0.3 and PEEP 5. No new ABG today  Has 6.5 mm ET tube in place. Has a high risk airway    I have reviewed CT imaging which reveals evidence of a 3.5 cm left laryngeal mass. This was narrowing the airway and displacing the airway rightward. There is no obvious lymphadenopathy on the available images. Biopsy and tracheostomy planned for tomorrow. INR today is down to 1.68  I have stopped his Lovenox. .    The patient did have pulmonary function testing 1/26/2022 which revealed FEV1 of 1.83 L which is 63% predicted.   FEV1/FVC was decreased and there was no response to inhaled bronchodilators consistent with a diagnosis of moderate obstructive lung disease. There was severe reduction in diffusion capacity which was only 37% predicted. Continue inhaled therapy  Does not seem to have COPD exacerbation  However, may benefit from steroid therapy for any element of swelling of the laryngeal mass. Decadron 4 mg every 12 hours. I reviewed imaging revealing good placement of the feeding tube. he is tolerating tube feedings. Total critical care time caring for this patient with life threatening illness, including direct patient contact, management of life support systems, review of data including imaging and labs, discussions with other team members and physicians is at least 32 minutes so far today, excluding procedures.

## 2022-03-24 NOTE — PROGRESS NOTES
Pt turned and RT suctioned large amounts of secreation from ETT. Pt became agitated briefly and fought vent attempting to sit up and extubate himself. Pt calm, now resting without incident.

## 2022-03-24 NOTE — PROGRESS NOTES
Hospitalist Progress Note      PCP: Antonio Yarbrough MD    Date of Admission: 3/22/2022    Chief Complaint: SOB    Hospital Course: Sukhdev Cortes a 65 y. o. male has a history of COPD but states tonight he coughed up some blood and then became tight in the chest and more short of breath he now presents with soreness to the throat minimal stridor,states he is on 2 L at home requiring 4 L here. Based on the stridor he had a soft tissue ct scan of the neck which showed a laryngeal mass. Later in his ED stay he began to get more obtunded and started drooling. There was concern for the air way to be closing and he was intubated with the help of anesthesia and ENT. At the time if my eval he was on the vent but not very well sedated. I increased the propofol and added fentanyl.        Subjective:   Managed to sit up and remove his ET tube !   Was emergently re-intubated by anesthesia        Medications:  Reviewed    Infusion Medications    cisatracurium (NIMBEX) infusion 2 mcg/kg/min (03/24/22 1729)    midazolam      propofol 55 mcg/kg/min (03/24/22 1559)    sodium chloride      fentaNYL 200 mcg/hr (03/24/22 1735)    dextrose      sodium chloride 125 mL/hr at 03/24/22 1743     Scheduled Medications    naloxegol  25 mg Oral QAM    dexamethasone  4 mg IntraVENous Q12H    polyethylene glycol  17 g Oral Daily    sennosides-docusate sodium  2 tablet Oral Daily    ipratropium-albuterol  3 mL Inhalation Q4H WA    gentamicin   Topical TID    insulin lispro  0-6 Units SubCUTAneous Q4H    succinylcholine  140 mg IntraVENous Once    sodium chloride flush  5-40 mL IntraVENous 2 times per day    famotidine (PEPCID) injection  20 mg IntraVENous BID    budesonide  0.5 mg Nebulization BID    Arformoterol Tartrate  15 mcg Nebulization BID    scopolamine  1 patch TransDERmal Q72H     PRN Meds: racepinephrine HCl, sodium chloride nebulizer, albuterol sulfate HFA, sodium chloride flush, sodium chloride, ondansetron **OR** ondansetron, acetaminophen **OR** acetaminophen, glucose, glucagon (rDNA), dextrose, dextrose bolus (hypoglycemia) **OR** dextrose bolus (hypoglycemia)      Intake/Output Summary (Last 24 hours) at 3/24/2022 1749  Last data filed at 3/24/2022 1400  Gross per 24 hour   Intake 4614.98 ml   Output 850 ml   Net 3764.98 ml       Physical Exam Performed:    /71   Pulse 95   Temp 98.2 °F (36.8 °C) (Temporal)   Resp 17   Ht 5' 8\" (1.727 m)   Wt 174 lb 13.2 oz (79.3 kg)   SpO2 98%   BMI 26.58 kg/m²     General appearance: No apparent distress appears stated age and cooperative. Intubated and sedated. HEENT Normal cephalic, atraumatic without obvious deformity. Pupils equal, round, and reactive to light. Extra ocular muscles intact. Conjunctivae/corneas clear. Neck: Supple, No jugular venous distention/bruits. Trachea midline without thyromegaly or adenopathy with full range of motion. Lungs: Clear to auscultation, bilaterally without Rales/Wheezes/Rhonchi with good respiratory effort. Heart: Regular rate and rhythm with Normal S1/S2 without murmurs, rubs or gallops, point of maximum impulse non-displaced  Abdomen: Soft, non-tender or non-distended without rigidity or guarding and positive bowel sounds all four quadrants. Extremities: No clubbing, cyanosis, or edema bilaterally. Full range of motion without deformity gait not tested. Skin: Skin color, texture, turgor normal.  No rashes or lesions. Neurologic:   Cranial nerves: II-XII intact, grossly non-focal.  Mental status: sedated.   Capillary Refill: Acceptable  < 3 seconds  Peripheral Pulses: +3 Easily felt, not easily obliterated with pressure    Labs:   Recent Labs     03/22/22  0300 03/23/22  0430 03/24/22  0835   WBC 6.8 7.1 9.5   HGB 13.8 12.6* 11.6*   HCT 43.0 39.5* 36.1*    266 212     Recent Labs     03/22/22  0300 03/23/22  0430 03/24/22  0835    138 139   K 4.7 4.6 4.1    101 105   CO2 32 26 23 BUN 16 17 12   CREATININE 0.7* 0.9 0.6*   CALCIUM 8.9 9.1 8.5     Recent Labs     03/22/22  0300 03/23/22  0430   AST 20 11*   ALT 32 19   BILITOT <0.2 0.3   ALKPHOS 89 68     Recent Labs     03/22/22  0300 03/23/22  1027 03/24/22  0436   INR 3.00* 2.47* 1.68*     Recent Labs     03/22/22  0300   TROPONINI <0.01       Urinalysis:      Lab Results   Component Value Date    NITRU Negative 02/02/2022    WBCUA 1 02/02/2022    RBCUA 1 02/02/2022    BLOODU Negative 02/02/2022    SPECGRAV 1.021 02/02/2022    GLUCOSEU Negative 02/02/2022       Radiology:  XR CHEST PORTABLE   Final Result   Mild dependent changes in the lower lung fields, atelectasis versus   infiltrate. Satisfactory position of endotracheal tube. XR ABDOMEN FOR NG/OG/NE TUBE PLACEMENT   Final Result   Tip of feeding tube projects in region of gastric fundus         XR CHEST PORTABLE   Final Result   1. Properly placed endotracheal tube. 2.  Mild bibasilar atelectasis         CT SOFT TISSUE NECK W CONTRAST   Final Result   Lobulated large mass wrapping the left side of the hypopharynx extending into   supraglottic and glottic portions of the larynx measuring approximately 3.5   cm diameter with a craniocaudad extent of 4.9 cm. The mass appears to   severely narrow the airway and could potentially make for difficult   intubation. Findings compatible with squamous cell carcinoma. Mild to moderate emphysema. XR CHEST PORTABLE   Final Result   Chronic findings in the chest without acute airspace disease identified. XR CHEST PORTABLE    (Results Pending)           Assessment/Plan:    Active Hospital Problems    Diagnosis     Laryngeal mass [J38.7]     Acute hypercapnic respiratory failure (HCC) [J96.02]     Centrilobular emphysema (HCC) [J43.2]        1. 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 - consult to ENT and to pulmonary critical care.   - will need biopsy and definitive dx before tx can be

## 2022-03-24 NOTE — PLAN OF CARE
Problem: Non-Violent Restraints  Goal: Removal from restraints as soon as assessed to be safe  Outcome: Ongoing  Goal: No harm/injury to patient while restraints in use  Outcome: Ongoing  Goal: Patient's dignity will be maintained  Outcome: Ongoing     Problem: Skin Integrity:  Goal: Will show no infection signs and symptoms  Description: Will show no infection signs and symptoms  Outcome: Ongoing  Goal: Absence of new skin breakdown  Description: Absence of new skin breakdown  Outcome: Ongoing     Problem: Falls - Risk of:  Goal: Will remain free from falls  Description: Will remain free from falls  Outcome: Ongoing  Goal: Absence of physical injury  Description: Absence of physical injury  Outcome: Ongoing     Problem: Nutrition  Goal: Optimal nutrition therapy  Outcome: Ongoing     Problem: Discharge Planning:  Goal: Participates in care planning  Description: Participates in care planning  Outcome: Ongoing  Goal: Discharged to appropriate level of care  Description: Discharged to appropriate level of care  Outcome: Ongoing     Problem: Airway Clearance - Ineffective:  Goal: Ability to maintain a clear airway will improve  Description: Ability to maintain a clear airway will improve  Outcome: Ongoing     Problem: Anxiety/Stress:  Goal: Level of anxiety will decrease  Description: Level of anxiety will decrease  Outcome: Ongoing     Problem: Aspiration:  Goal: Absence of aspiration  Description: Absence of aspiration  Outcome: Ongoing     Problem:  Bowel Function - Altered:  Goal: Bowel elimination is within specified parameters  Description: Bowel elimination is within specified parameters  Outcome: Ongoing     Problem: Gas Exchange - Impaired:  Goal: Levels of oxygenation will improve  Description: Levels of oxygenation will improve  Outcome: Ongoing

## 2022-03-24 NOTE — PROGRESS NOTES
Spoke with spouse over telephone. Informed of self extubation/reintubation.  Discussed POC, all questions ansered

## 2022-03-24 NOTE — PROGRESS NOTES
VASCULAR    Patient known to Dr. Sherwin Gonzalez and has history of bilateral femoral to PT artery bypass. He has history of ulcerations to bilateral feet for the past 2 years and he follows with Dr. Alice Santana. He underwent peripheral angiogram on 2/14/22 that showed occluded left femoral to tibial bypass. He is scheduled for left exploration tibial artery and possible left femoral to tibial artery bypass on April 14th. Patient now intubated and found to have laryngeal mass consistent with squamous cell carcinoma and plans for tracheostomy and DL with biopsy tomorrow then may need transfer to  for definitive surgical treatment. On exam, ptient does have + left DP and AT doppler at the ankle and + right DP/PT doppler. Left ankle ulceration - no malodor or necrosis, foot warm      Right ankle - no open wound or drainage, foot warm        Given the above, will plan to cancel patient's vascular surgery scheduled for April 14th and have patient follow up with Dr. Sherwin Gonzalez after confirmation of diagnosis and treatment plan in place. Discussed with patient's wife and son who are both in agreement with above plan.       Electronically signed by HOLGER Bear CNP on 3/24/2022 at 9:49 AM

## 2022-03-24 NOTE — PROGRESS NOTES
Went into patients room to check the ventilator alarms. Upon entering room found that patient had self extubated. Immediately started bagging patient and called for help. Anesthesia reintubated patient. Size 6.5 ETT secured with a bernice at [email or web address]. Pt placed back on ventilator on previous settings. Will continue to monitor.

## 2022-03-24 NOTE — PROGRESS NOTES
AM assessment completed. See complex vital flowsheet for full assessment. Pt intubated sedated to a RASS of -3 with propofol and fentanyl. VSS, monitor SR with PJCs and PVCs. Respirations easy with mechanical ventilation. No distress present. Lungs clear bilaterally and diminished in lower lobes. Abdomen round and distended with hyperactive bowel sounds. Echols draining QS amounts of yellow urine. Bilateral rist restraints in place for high risk airway. Reposition pt for comfort. Will continue to monitor pt closely.

## 2022-03-24 NOTE — PROGRESS NOTES
[facility]  HEAD AND NECK - ENT  PROGRESS  NOTE    Date of Service: 3/24/2022    ASSESSMENT: Orestes Bunch is a 65 y.o. 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 PLAN/RECOMMENDATIONS:     Hypopharyngeal lesion  Acute airway obstruction  Acute respiratory distress  -ETT in place. CONSIDER ACUTE AIRWAY WITH STRICT AIRWAY PRECAUTIONS. WOULD BE VERY DIFFICULT REINTUBATION. CONSIDER RESTRAINTS. - Holding coumadin. Continue trending INR. Possible tracheostomy and direct laryngoscopy with biopsy once INR normalized. Currently scheduled for Friday 3/25 if INR is normalized. Consent obtained via telephone conversation with patient's wife yesterday. - Will likely need transfer to  head and neck surgery for definitive management. Spoke with Dr. Isabella Leal ( Head and Neck Surgery) who agrees with tentative plan for tracheostomy and DL with biopsy here at [facility] to secure airway and confirm diagnosis and subsequent transfer to  for definitive surgical treatment. -Vent management per ICU team  -Please do not hesitate to call ENT with any questions or concerns. SUBJECTIVE:   Significant overnight events: None per RN    OBJECTIVE:  Physical Exam:   Temp (24hrs), Av.1 °F (36.7 °C), Min:97.7 °F (36.5 °C), Max:98.9 °F (37.2 °C)    Vitals:    22 1220 22 1221 22 1400 22 1640   BP:   108/71    Pulse:  81 73 95   Resp: 16 17 18 17   Temp:       TempSrc:       SpO2: 94% 94% 94% 98%   Weight:       Height:         I/O last 3 completed shifts:   In: 6237.9 [I.V.:5738.9; NG/GT:499]  Out: 12 [Urine:1325]    REVIEW OF SYSTEMS  The following systems were reviewed and revealed the following in addition to any already discussed in the HPI:     Review of systems but with unable to performed secondary to intubated and sedated status.         PHYSICAL EXAM  GENERAL: Intubated and sedated  EYES: EOMI, Anti-icteric  NOSE: On anterior rhinoscopy there is no epistaxis, nasal mucosa within normal limits, no purulent drainage  EARS: Normal external appearance; no otorrhea  FACE: 1/6 House-Brackmann Scale, symmetri  ORAL CAVITY: 6.5 ETT in place  NECK: Normal range of motion, no thyromegaly, trachea is midline, no lymphadenopathy, no neck masses, no crepitus  CHEST: On vent, 50% FiO2, equal chest rise  SKIN: No rashes, normal appearing skin, no evidence of skin lesions/tumors  NEURO: Sedated    Lab Studies:  Lab Results   Component Value Date    WBC 9.5 03/24/2022    HGB 11.6 (L) 03/24/2022    HCT 36.1 (L) 03/24/2022    MCV 88.9 03/24/2022     03/24/2022     Lab Results   Component Value Date    GLUCOSE 175 (H) 03/24/2022    BUN 12 03/24/2022    CREATININE 0.6 (L) 03/24/2022    K 4.1 03/24/2022     03/24/2022     03/24/2022    CALCIUM 8.5 03/24/2022     Lab Results   Component Value Date    MG 2.10 03/24/2022     Lab Results   Component Value Date    PHOS 3.1 03/13/2022     Lab Results   Component Value Date    ALKPHOS 68 03/23/2022    ALT 19 03/23/2022    AST 11 (L) 03/23/2022    BILITOT 0.3 03/23/2022    PROT 5.6 (L) 03/23/2022     Lab Results   Component Value Date    INR 1.68 (H) 03/24/2022    INR 2.47 (H) 03/23/2022    INR 3.00 (H) 03/22/2022    PROTIME 19.4 (H) 03/24/2022    PROTIME 29.0 (H) 03/23/2022    PROTIME 35.5 (H) 03/22/2022

## 2022-03-24 NOTE — PROGRESS NOTES
Spoke with patient's spouse over telephone, update provided on pt's current status. Discussed POC, all questions answered.

## 2022-03-25 ENCOUNTER — ANESTHESIA (OUTPATIENT)
Dept: OPERATING ROOM | Age: 62
DRG: 004 | End: 2022-03-25
Payer: COMMERCIAL

## 2022-03-25 ENCOUNTER — HOSPITAL ENCOUNTER (OUTPATIENT)
Dept: WOUND CARE | Age: 62
Discharge: HOME | End: 2022-03-25

## 2022-03-25 ENCOUNTER — APPOINTMENT (OUTPATIENT)
Dept: GENERAL RADIOLOGY | Age: 62
DRG: 004 | End: 2022-03-25
Payer: COMMERCIAL

## 2022-03-25 VITALS
OXYGEN SATURATION: 100 % | RESPIRATION RATE: 1 BRPM | TEMPERATURE: 72.3 F | SYSTOLIC BLOOD PRESSURE: 101 MMHG | DIASTOLIC BLOOD PRESSURE: 60 MMHG

## 2022-03-25 LAB
ANION GAP SERPL CALCULATED.3IONS-SCNC: 12 MMOL/L (ref 3–16)
BASE EXCESS ARTERIAL: 2.2 MMOL/L (ref -3–3)
BUN BLDV-MCNC: 7 MG/DL (ref 7–20)
CALCIUM SERPL-MCNC: 8.8 MG/DL (ref 8.3–10.6)
CARBOXYHEMOGLOBIN ARTERIAL: 1.1 % (ref 0–1.5)
CHLORIDE BLD-SCNC: 106 MMOL/L (ref 99–110)
CO2: 20 MMOL/L (ref 21–32)
CREAT SERPL-MCNC: <0.5 MG/DL (ref 0.8–1.3)
GFR AFRICAN AMERICAN: >60
GFR NON-AFRICAN AMERICAN: >60
GLUCOSE BLD-MCNC: 116 MG/DL (ref 70–99)
GLUCOSE BLD-MCNC: 117 MG/DL (ref 70–99)
GLUCOSE BLD-MCNC: 120 MG/DL (ref 70–99)
GLUCOSE BLD-MCNC: 128 MG/DL (ref 70–99)
GLUCOSE BLD-MCNC: 133 MG/DL (ref 70–99)
GLUCOSE BLD-MCNC: 133 MG/DL (ref 70–99)
HCO3 ARTERIAL: 25.5 MMOL/L (ref 21–29)
HCT VFR BLD CALC: 38.6 % (ref 40.5–52.5)
HEMOGLOBIN, ART, EXTENDED: 12 G/DL (ref 13.5–17.5)
HEMOGLOBIN: 12.4 G/DL (ref 13.5–17.5)
INR BLD: 1.08 (ref 0.88–1.12)
MCH RBC QN AUTO: 29.1 PG (ref 26–34)
MCHC RBC AUTO-ENTMCNC: 32.2 G/DL (ref 31–36)
MCV RBC AUTO: 90.2 FL (ref 80–100)
METHEMOGLOBIN ARTERIAL: 0.3 %
O2 SAT, ARTERIAL: 98.7 %
O2 THERAPY: ABNORMAL
PCO2 ARTERIAL: 34.2 MMHG (ref 35–45)
PDW BLD-RTO: 17.3 % (ref 12.4–15.4)
PERFORMED ON: ABNORMAL
PH ARTERIAL: 7.48 (ref 7.35–7.45)
PLATELET # BLD: 193 K/UL (ref 135–450)
PMV BLD AUTO: 8.5 FL (ref 5–10.5)
PO2 ARTERIAL: 100 MMHG (ref 75–108)
POTASSIUM SERPL-SCNC: 4.7 MMOL/L (ref 3.5–5.1)
PROTHROMBIN TIME: 12.3 SEC (ref 9.9–12.7)
RBC # BLD: 4.28 M/UL (ref 4.2–5.9)
SODIUM BLD-SCNC: 138 MMOL/L (ref 136–145)
TCO2 ARTERIAL: 59.4 MMOL/L
WBC # BLD: 8.3 K/UL (ref 4–11)

## 2022-03-25 PROCEDURE — 2580000003 HC RX 258: Performed by: INTERNAL MEDICINE

## 2022-03-25 PROCEDURE — 6370000000 HC RX 637 (ALT 250 FOR IP): Performed by: INTERNAL MEDICINE

## 2022-03-25 PROCEDURE — 6360000002 HC RX W HCPCS: Performed by: NURSE ANESTHETIST, CERTIFIED REGISTERED

## 2022-03-25 PROCEDURE — 94003 VENT MGMT INPAT SUBQ DAY: CPT

## 2022-03-25 PROCEDURE — 6360000002 HC RX W HCPCS: Performed by: INTERNAL MEDICINE

## 2022-03-25 PROCEDURE — A4217 STERILE WATER/SALINE, 500 ML: HCPCS | Performed by: STUDENT IN AN ORGANIZED HEALTH CARE EDUCATION/TRAINING PROGRAM

## 2022-03-25 PROCEDURE — 36415 COLL VENOUS BLD VENIPUNCTURE: CPT

## 2022-03-25 PROCEDURE — 74018 RADEX ABDOMEN 1 VIEW: CPT

## 2022-03-25 PROCEDURE — 31535 LARYNGOSCOPY W/BIOPSY: CPT | Performed by: STUDENT IN AN ORGANIZED HEALTH CARE EDUCATION/TRAINING PROGRAM

## 2022-03-25 PROCEDURE — 6360000002 HC RX W HCPCS: Performed by: STUDENT IN AN ORGANIZED HEALTH CARE EDUCATION/TRAINING PROGRAM

## 2022-03-25 PROCEDURE — 0CBM8ZX EXCISION OF PHARYNX, VIA NATURAL OR ARTIFICIAL OPENING ENDOSCOPIC, DIAGNOSTIC: ICD-10-PCS | Performed by: STUDENT IN AN ORGANIZED HEALTH CARE EDUCATION/TRAINING PROGRAM

## 2022-03-25 PROCEDURE — 71045 X-RAY EXAM CHEST 1 VIEW: CPT

## 2022-03-25 PROCEDURE — 82803 BLOOD GASES ANY COMBINATION: CPT

## 2022-03-25 PROCEDURE — 3600000004 HC SURGERY LEVEL 4 BASE: Performed by: STUDENT IN AN ORGANIZED HEALTH CARE EDUCATION/TRAINING PROGRAM

## 2022-03-25 PROCEDURE — 85610 PROTHROMBIN TIME: CPT

## 2022-03-25 PROCEDURE — 99291 CRITICAL CARE FIRST HOUR: CPT | Performed by: INTERNAL MEDICINE

## 2022-03-25 PROCEDURE — 85027 COMPLETE CBC AUTOMATED: CPT

## 2022-03-25 PROCEDURE — 94760 N-INVAS EAR/PLS OXIMETRY 1: CPT

## 2022-03-25 PROCEDURE — 2709999900 HC NON-CHARGEABLE SUPPLY: Performed by: STUDENT IN AN ORGANIZED HEALTH CARE EDUCATION/TRAINING PROGRAM

## 2022-03-25 PROCEDURE — 2580000003 HC RX 258: Performed by: NURSE ANESTHETIST, CERTIFIED REGISTERED

## 2022-03-25 PROCEDURE — 2580000003 HC RX 258: Performed by: STUDENT IN AN ORGANIZED HEALTH CARE EDUCATION/TRAINING PROGRAM

## 2022-03-25 PROCEDURE — 2500000003 HC RX 250 WO HCPCS: Performed by: INTERNAL MEDICINE

## 2022-03-25 PROCEDURE — A4216 STERILE WATER/SALINE, 10 ML: HCPCS | Performed by: INTERNAL MEDICINE

## 2022-03-25 PROCEDURE — 7100000001 HC PACU RECOVERY - ADDTL 15 MIN: Performed by: STUDENT IN AN ORGANIZED HEALTH CARE EDUCATION/TRAINING PROGRAM

## 2022-03-25 PROCEDURE — 2000000000 HC ICU R&B

## 2022-03-25 PROCEDURE — 0B110F4 BYPASS TRACHEA TO CUTANEOUS WITH TRACHEOSTOMY DEVICE, OPEN APPROACH: ICD-10-PCS | Performed by: STUDENT IN AN ORGANIZED HEALTH CARE EDUCATION/TRAINING PROGRAM

## 2022-03-25 PROCEDURE — 2500000003 HC RX 250 WO HCPCS: Performed by: STUDENT IN AN ORGANIZED HEALTH CARE EDUCATION/TRAINING PROGRAM

## 2022-03-25 PROCEDURE — 94761 N-INVAS EAR/PLS OXIMETRY MLT: CPT

## 2022-03-25 PROCEDURE — 88342 IMHCHEM/IMCYTCHM 1ST ANTB: CPT

## 2022-03-25 PROCEDURE — 2700000000 HC OXYGEN THERAPY PER DAY

## 2022-03-25 PROCEDURE — 0BP1XDZ REMOVAL OF INTRALUMINAL DEVICE FROM TRACHEA, EXTERNAL APPROACH: ICD-10-PCS | Performed by: INTERNAL MEDICINE

## 2022-03-25 PROCEDURE — 88341 IMHCHEM/IMCYTCHM EA ADD ANTB: CPT

## 2022-03-25 PROCEDURE — 7100000000 HC PACU RECOVERY - FIRST 15 MIN: Performed by: STUDENT IN AN ORGANIZED HEALTH CARE EDUCATION/TRAINING PROGRAM

## 2022-03-25 PROCEDURE — 3700000001 HC ADD 15 MINUTES (ANESTHESIA): Performed by: STUDENT IN AN ORGANIZED HEALTH CARE EDUCATION/TRAINING PROGRAM

## 2022-03-25 PROCEDURE — 2500000003 HC RX 250 WO HCPCS: Performed by: NURSE ANESTHETIST, CERTIFIED REGISTERED

## 2022-03-25 PROCEDURE — 3600000014 HC SURGERY LEVEL 4 ADDTL 15MIN: Performed by: STUDENT IN AN ORGANIZED HEALTH CARE EDUCATION/TRAINING PROGRAM

## 2022-03-25 PROCEDURE — 94640 AIRWAY INHALATION TREATMENT: CPT

## 2022-03-25 PROCEDURE — 88305 TISSUE EXAM BY PATHOLOGIST: CPT

## 2022-03-25 PROCEDURE — 3700000000 HC ANESTHESIA ATTENDED CARE: Performed by: STUDENT IN AN ORGANIZED HEALTH CARE EDUCATION/TRAINING PROGRAM

## 2022-03-25 PROCEDURE — 88331 PATH CONSLTJ SURG 1 BLK 1SPC: CPT

## 2022-03-25 PROCEDURE — 80048 BASIC METABOLIC PNL TOTAL CA: CPT

## 2022-03-25 PROCEDURE — 31600 PLANNED TRACHEOSTOMY: CPT | Performed by: STUDENT IN AN ORGANIZED HEALTH CARE EDUCATION/TRAINING PROGRAM

## 2022-03-25 RX ORDER — EPINEPHRINE 1 MG/ML
INJECTION PARENTERAL
Status: COMPLETED | OUTPATIENT
Start: 2022-03-25 | End: 2022-03-25

## 2022-03-25 RX ORDER — OXYCODONE HYDROCHLORIDE 5 MG/1
5 TABLET ORAL
Status: DISCONTINUED | OUTPATIENT
Start: 2022-03-25 | End: 2022-03-25 | Stop reason: HOSPADM

## 2022-03-25 RX ORDER — CISATRACURIUM BESYLATE 2 MG/ML
INJECTION, SOLUTION INTRAVENOUS PRN
Status: DISCONTINUED | OUTPATIENT
Start: 2022-03-25 | End: 2022-03-25 | Stop reason: SDUPTHER

## 2022-03-25 RX ORDER — HYDRALAZINE HYDROCHLORIDE 20 MG/ML
10 INJECTION INTRAMUSCULAR; INTRAVENOUS EVERY 6 HOURS PRN
Status: DISCONTINUED | OUTPATIENT
Start: 2022-03-25 | End: 2022-04-02 | Stop reason: HOSPADM

## 2022-03-25 RX ORDER — ONDANSETRON 2 MG/ML
INJECTION INTRAMUSCULAR; INTRAVENOUS PRN
Status: DISCONTINUED | OUTPATIENT
Start: 2022-03-25 | End: 2022-03-25 | Stop reason: SDUPTHER

## 2022-03-25 RX ORDER — SODIUM CHLORIDE 9 MG/ML
INJECTION, SOLUTION INTRAVENOUS CONTINUOUS
Status: DISCONTINUED | OUTPATIENT
Start: 2022-03-25 | End: 2022-03-27

## 2022-03-25 RX ORDER — LIDOCAINE HYDROCHLORIDE 10 MG/ML
1 INJECTION, SOLUTION EPIDURAL; INFILTRATION; INTRACAUDAL; PERINEURAL
Status: CANCELLED | OUTPATIENT
Start: 2022-03-25 | End: 2022-03-25

## 2022-03-25 RX ORDER — HYDROMORPHONE HCL 110MG/55ML
0.5 PATIENT CONTROLLED ANALGESIA SYRINGE INTRAVENOUS EVERY 5 MIN PRN
Status: DISCONTINUED | OUTPATIENT
Start: 2022-03-25 | End: 2022-03-25 | Stop reason: HOSPADM

## 2022-03-25 RX ORDER — SODIUM CHLORIDE 9 MG/ML
INJECTION, SOLUTION INTRAVENOUS CONTINUOUS
Status: CANCELLED | OUTPATIENT
Start: 2022-03-25

## 2022-03-25 RX ORDER — LABETALOL HYDROCHLORIDE 5 MG/ML
5 INJECTION, SOLUTION INTRAVENOUS
Status: DISCONTINUED | OUTPATIENT
Start: 2022-03-25 | End: 2022-03-25 | Stop reason: HOSPADM

## 2022-03-25 RX ORDER — ONDANSETRON 2 MG/ML
4 INJECTION INTRAMUSCULAR; INTRAVENOUS
Status: DISCONTINUED | OUTPATIENT
Start: 2022-03-25 | End: 2022-03-25 | Stop reason: HOSPADM

## 2022-03-25 RX ORDER — LIDOCAINE HYDROCHLORIDE AND EPINEPHRINE 10; 10 MG/ML; UG/ML
INJECTION, SOLUTION INFILTRATION; PERINEURAL
Status: COMPLETED | OUTPATIENT
Start: 2022-03-25 | End: 2022-03-25

## 2022-03-25 RX ORDER — HYDROMORPHONE HCL 110MG/55ML
0.25 PATIENT CONTROLLED ANALGESIA SYRINGE INTRAVENOUS EVERY 5 MIN PRN
Status: DISCONTINUED | OUTPATIENT
Start: 2022-03-25 | End: 2022-03-25 | Stop reason: HOSPADM

## 2022-03-25 RX ORDER — MAGNESIUM HYDROXIDE 1200 MG/15ML
LIQUID ORAL CONTINUOUS PRN
Status: COMPLETED | OUTPATIENT
Start: 2022-03-25 | End: 2022-03-25

## 2022-03-25 RX ADMIN — Medication 10 ML: at 20:56

## 2022-03-25 RX ADMIN — PROPOFOL 55 MCG/KG/MIN: 10 INJECTION, EMULSION INTRAVENOUS at 03:34

## 2022-03-25 RX ADMIN — SODIUM CHLORIDE 2 MCG/KG/MIN: 9 INJECTION, SOLUTION INTRAVENOUS at 15:46

## 2022-03-25 RX ADMIN — GENTAMICIN SULFATE: 1 CREAM TOPICAL at 08:19

## 2022-03-25 RX ADMIN — PROPOFOL 55 MCG/KG/MIN: 10 INJECTION, EMULSION INTRAVENOUS at 10:40

## 2022-03-25 RX ADMIN — IPRATROPIUM BROMIDE AND ALBUTEROL SULFATE 3 ML: .5; 3 SOLUTION RESPIRATORY (INHALATION) at 07:45

## 2022-03-25 RX ADMIN — Medication 200 MCG/HR: at 11:19

## 2022-03-25 RX ADMIN — IPRATROPIUM BROMIDE AND ALBUTEROL SULFATE 3 ML: .5; 3 SOLUTION RESPIRATORY (INHALATION) at 11:06

## 2022-03-25 RX ADMIN — CISATRACURIUM BESYLATE 10 MG: 2 INJECTION INTRAVENOUS at 17:14

## 2022-03-25 RX ADMIN — CISATRACURIUM BESYLATE 10 MG: 2 INJECTION INTRAVENOUS at 16:24

## 2022-03-25 RX ADMIN — BUDESONIDE 500 MCG: 0.5 SUSPENSION RESPIRATORY (INHALATION) at 19:08

## 2022-03-25 RX ADMIN — PROPOFOL 55 MCG/KG/MIN: 10 INJECTION, EMULSION INTRAVENOUS at 19:05

## 2022-03-25 RX ADMIN — SODIUM CHLORIDE 2 MCG/KG/MIN: 9 INJECTION, SOLUTION INTRAVENOUS at 15:11

## 2022-03-25 RX ADMIN — Medication 10 ML: at 08:18

## 2022-03-25 RX ADMIN — BUDESONIDE 500 MCG: 0.5 SUSPENSION RESPIRATORY (INHALATION) at 07:44

## 2022-03-25 RX ADMIN — ENOXAPARIN SODIUM 40 MG: 40 INJECTION SUBCUTANEOUS at 22:36

## 2022-03-25 RX ADMIN — SODIUM CHLORIDE 2 MCG/KG/MIN: 9 INJECTION, SOLUTION INTRAVENOUS at 11:20

## 2022-03-25 RX ADMIN — PROPOFOL 55 MCG/KG/MIN: 10 INJECTION, EMULSION INTRAVENOUS at 14:28

## 2022-03-25 RX ADMIN — IPRATROPIUM BROMIDE AND ALBUTEROL SULFATE 3 ML: .5; 3 SOLUTION RESPIRATORY (INHALATION) at 19:08

## 2022-03-25 RX ADMIN — ARFORMOTEROL TARTRATE 15 MCG: 15 SOLUTION RESPIRATORY (INHALATION) at 07:44

## 2022-03-25 RX ADMIN — PROPOFOL 55 MCG/KG/MIN: 10 INJECTION, EMULSION INTRAVENOUS at 06:57

## 2022-03-25 RX ADMIN — CISATRACURIUM BESYLATE 10 MG: 2 INJECTION INTRAVENOUS at 15:11

## 2022-03-25 RX ADMIN — CISATRACURIUM BESYLATE 10 MG: 2 INJECTION INTRAVENOUS at 15:46

## 2022-03-25 RX ADMIN — SODIUM CHLORIDE: 9 INJECTION, SOLUTION INTRAVENOUS at 10:26

## 2022-03-25 RX ADMIN — ARFORMOTEROL TARTRATE 15 MCG: 15 SOLUTION RESPIRATORY (INHALATION) at 19:08

## 2022-03-25 RX ADMIN — GENTAMICIN SULFATE: 1 CREAM TOPICAL at 12:23

## 2022-03-25 RX ADMIN — SODIUM CHLORIDE, PRESERVATIVE FREE 20 MG: 5 INJECTION INTRAVENOUS at 08:18

## 2022-03-25 RX ADMIN — ONDANSETRON 4 MG: 2 INJECTION INTRAMUSCULAR; INTRAVENOUS at 17:28

## 2022-03-25 RX ADMIN — Medication 5 MG/HR: at 11:20

## 2022-03-25 RX ADMIN — GENTAMICIN SULFATE: 1 CREAM TOPICAL at 21:03

## 2022-03-25 RX ADMIN — DEXAMETHASONE SODIUM PHOSPHATE 4 MG: 4 INJECTION, SOLUTION INTRAMUSCULAR; INTRAVENOUS at 20:53

## 2022-03-25 RX ADMIN — SODIUM CHLORIDE, PRESERVATIVE FREE 20 MG: 5 INJECTION INTRAVENOUS at 20:56

## 2022-03-25 RX ADMIN — HYDRALAZINE HYDROCHLORIDE 10 MG: 20 INJECTION INTRAMUSCULAR; INTRAVENOUS at 14:28

## 2022-03-25 RX ADMIN — Medication 10 ML: at 08:21

## 2022-03-25 RX ADMIN — SODIUM CHLORIDE: 9 INJECTION, SOLUTION INTRAVENOUS at 02:00

## 2022-03-25 RX ADMIN — HYDRALAZINE HYDROCHLORIDE 10 MG: 20 INJECTION INTRAMUSCULAR; INTRAVENOUS at 00:22

## 2022-03-25 RX ADMIN — DEXAMETHASONE SODIUM PHOSPHATE 4 MG: 4 INJECTION, SOLUTION INTRAMUSCULAR; INTRAVENOUS at 10:25

## 2022-03-25 ASSESSMENT — PULMONARY FUNCTION TESTS
PIF_VALUE: 17
PIF_VALUE: 18
PIF_VALUE: 19
PIF_VALUE: 20
PIF_VALUE: 1
PIF_VALUE: 18
PIF_VALUE: 17
PIF_VALUE: 19
PIF_VALUE: 14
PIF_VALUE: 17
PIF_VALUE: 16
PIF_VALUE: 18
PIF_VALUE: 17
PIF_VALUE: 18
PIF_VALUE: 19
PIF_VALUE: 18
PIF_VALUE: 17
PIF_VALUE: 18
PIF_VALUE: 25
PIF_VALUE: 17
PIF_VALUE: 1
PIF_VALUE: 16
PIF_VALUE: 18
PIF_VALUE: 17
PIF_VALUE: 25
PIF_VALUE: 20
PIF_VALUE: 17
PIF_VALUE: 19
PIF_VALUE: 17
PIF_VALUE: 17
PIF_VALUE: 19
PIF_VALUE: 17
PIF_VALUE: 19
PIF_VALUE: 17
PIF_VALUE: 20
PIF_VALUE: 19
PIF_VALUE: 1
PIF_VALUE: 1
PIF_VALUE: 17
PIF_VALUE: 16
PIF_VALUE: 17
PIF_VALUE: 16
PIF_VALUE: 17
PIF_VALUE: 17
PIF_VALUE: 19
PIF_VALUE: 17
PIF_VALUE: 18
PIF_VALUE: 18
PIF_VALUE: 17
PIF_VALUE: 19
PIF_VALUE: 16
PIF_VALUE: 25
PIF_VALUE: 16
PIF_VALUE: 19
PIF_VALUE: 17
PIF_VALUE: 19
PIF_VALUE: 18
PIF_VALUE: 18
PIF_VALUE: 17
PIF_VALUE: 19
PIF_VALUE: 17
PIF_VALUE: 17
PIF_VALUE: 16
PIF_VALUE: 17
PIF_VALUE: 16
PIF_VALUE: 17
PIF_VALUE: 18
PIF_VALUE: 17
PIF_VALUE: 0
PIF_VALUE: 17
PIF_VALUE: 16
PIF_VALUE: 17
PIF_VALUE: 19
PIF_VALUE: 19
PIF_VALUE: 17
PIF_VALUE: 19
PIF_VALUE: 17
PIF_VALUE: 17
PIF_VALUE: 0
PIF_VALUE: 17
PIF_VALUE: 17
PIF_VALUE: 19
PIF_VALUE: 17
PIF_VALUE: 17
PIF_VALUE: 16
PIF_VALUE: 17
PIF_VALUE: 19
PIF_VALUE: 17
PIF_VALUE: 16
PIF_VALUE: 17
PIF_VALUE: 25
PIF_VALUE: 17
PIF_VALUE: 16
PIF_VALUE: 17
PIF_VALUE: 17
PIF_VALUE: 6
PIF_VALUE: 19
PIF_VALUE: 25
PIF_VALUE: 17
PIF_VALUE: 1
PIF_VALUE: 17
PIF_VALUE: 17
PIF_VALUE: 19
PIF_VALUE: 1
PIF_VALUE: 18
PIF_VALUE: 17
PIF_VALUE: 18
PIF_VALUE: 15
PIF_VALUE: 17
PIF_VALUE: 0
PIF_VALUE: 17
PIF_VALUE: 16
PIF_VALUE: 18
PIF_VALUE: 1
PIF_VALUE: 16
PIF_VALUE: 1
PIF_VALUE: 17
PIF_VALUE: 1
PIF_VALUE: 17

## 2022-03-25 ASSESSMENT — PAIN SCALES - GENERAL
PAINLEVEL_OUTOF10: 0

## 2022-03-25 NOTE — OP NOTE
Patient Name: Lizz Noriega  YOB: 1960  Medical Record Number:  [id number]  Billing Number:  [id number]  Date of Procedure: 03/25/22   Time: 1430    Pre Operative Diagnoses:   1) Hypopharyngeal lesion  2) Acute airway obstruction  3) Acute respiratory distress    Post Operative Diagnoses: Same as above. Procedure:  1. Tracheostomy  2. Direct laryngoscopy with biopsy of left sided hypopharyngeal mass    Surgeon: Dr. Carrasco Ar, DO    OR Staff/ Assistant: Circulator: Celia Barba RN  Surgical Assistant: Valencia Davila  Scrub Person First: Armand Hernandez    Anesthesia: General anesthesia    Findings:   1. There was a 8 mm paratracheal lymph node just inferior and superficial to the thyroid gland. This was removed and sent for permanent histopathological analysis  2. Upon direct laryngoscopy there was diffuse amounts of hypopharyngeal supraglottic edema disguising normal supraglottic and laryngeal landmarks. Unable to visualize true vocal cords secondary to edema. There appears to be a left-sided hypopharyngeal mass that was biopsied with straight cup forceps. Frozen section initially demonstrated a benign inflamed mucosa. A secondary biopsy was then performed in the hypopharynx on the left of inflamed tissue which redemonstrated benign inflamed mucosa. A third hypopharyngeal biopsy on the left was then taken and sent for permanent histopathological analysis. Description of procedure:   After verification of informed consent, the patient was brought to the operating room and placed in the supine position on the operating room table. General endotracheal anesthesia was induced. Timeout was performed and agreed upon by the entire the operating room staff. Approximately 4 mL of 1% lidocaine with 1:100,000 epinephrine was injected into the planned tracheostomy site. The wound was prepped and draped in the usual and sterile fashion.  An horizontal incision was made approximately 2 cm above the sternal notch. Dissection was then taken down to the level of the trachea in between the strap muscles. The thyroid was encountered anterior to the superior tracheal rings. The thyroid isthmus was then ligated in the midline with the harmonic scalpel. After this fascia was dissected off of the anterior superior tracheal rings. Next an 11 blade was used to make an incision in between the second and third tracheal rings. Heavy Redi scissors were used to make inferior cuts on the lateral aspect of this incision thus creating a Tito flap. The Tito flap was sutured to the inferior aspect of the skin incision site with two a single 3-0 Vicryl simple interrupted sutures. Next a 6.0 cuffed non-fenestrated Shiley tracheostomy tube was inserted into the tracheostomy site with the use of the obturator. The obturator was then removed after successful insertion and the inner cannula was placed and the tracheostomy balloon was inflated. The tracheostomy was then hooked up to the ventilator and end-tidal CO2 was confirmed. Next the tracheostomy was then secured with four-point sutures using 2-0 silk suture. Next the Posey tracheostomy necktie was used to further secure the tracheostomy tube. The endotracheal tube was removed from the oral cavity by the anesthesia team.      The  Dedo laryngoscope with upper silicone dental guard was used to expose the larynx the supraglottis and glottis as described above. The laryngoscope was then placed on suspension from the [employer] stand. Multiple frozen sections were taken of the left hypopharynx edematous mass with findings noted above. A final biopsy of the left hypopharynx was taken and sent for permanent section. Bleeding at the site of biopsies was then controlled with adrenaline placed on small cottonoid pledgets. All pledgets were removed. The laryngoscope was then taken off of suspension from the [employer] stand and removed carefully from the oral cavity. The upper dental silicone guard was removed from the oral cavity.      This concluded our procedure and the patient was turned over to Anesthesiology for awakening and extubation    Specimens: None    Drains: None    Estimated Blood Loss: 10 ml    Complications: None

## 2022-03-25 NOTE — PROGRESS NOTES
Pt resting without incident. Wife called to check in and has been successfully updated on pt condition, no questions at this time.

## 2022-03-25 NOTE — PROGRESS NOTES
[facility] Pulmonary and Critical Care  Progress note      Reason for Consult: Respiratory failure, neck mass, COPD  Requesting Physician: Dr. Karyle Idol:   [facility] / HPI:                The patient is a 65 y.o. male with significant past medical history of:      Diagnosis Date    [facility] ([facility]     Fibromyalgia     History of DVT (deep vein thrombosis)     Hyperlipidemia     Hypertension     Type 2 diabetes mellitus with circulatory disorder, without long-term current use of insulin ([facility] 2/18/2022     Interval history: Despite deep sedation, the patient became quite agitated yesterday evening and managed to self extubate. Fortunately, anesthesia was able to reestablish the airway. Since then he has been sedated and on cis atracurium. Anticipating biopsy and tracheostomy later today.       Past Surgical History:        Procedure Laterality Date    APPENDECTOMY  2005    COLONOSCOPY  2016    FEMORAL BYPASS Left 02/26/2020    femoral posterior tibial bypass    FEMORAL-TIBIAL BYPASS GRAFT Right 12/11/2020    with femoral endarterectomy and patch angioplasty    FOOT DEBRIDEMENT Left 09/03/2020    FOOT DEBRIDEMENT Right 2/26/2021    DEBRIDEMENT OF RIGHT FOOT WOUND WITH GRAFT APPLICATION performed by Wade Flor DPM at [facility] Right 12/08/2020    stent leg for PVD     Current Medications:    Current Facility-Administered Medications: hydrALAZINE (APRESOLINE) injection 10 mg, 10 mg, IntraVENous, Q6H PRN  enoxaparin (LOVENOX) injection 40 mg, 40 mg, SubCUTAneous, Nightly  HYDROmorphone (DILAUDID) injection 0.25 mg, 0.25 mg, IntraVENous, Q5 Min PRN  HYDROmorphone (DILAUDID) injection 0.5 mg, 0.5 mg, IntraVENous, Q5 Min PRN  oxyCODONE (ROXICODONE) immediate release tablet 5 mg, 5 mg, Oral, Once PRN  ondansetron (ZOFRAN) injection 4 mg, 4 mg, IntraVENous, Once PRN  labetalol (NORMODYNE;TRANDATE) injection 5 mg, 5 mg, IntraVENous, Q15 Min PRN  0.9 % sodium chloride infusion, , IntraVENous, Continuous  naloxegol (MOVANTIK) tablet 25 mg, 25 mg, Oral, QAM  cisatracurium besylate (NIMBEX) 200 mg in sodium chloride 0.9 % 100 mL infusion, 0.5-10 mcg/kg/min, IntraVENous, Continuous  midazolam (VERSED) 1 mg/mL in D5W infusion, 1-10 mg/hr, IntraVENous, Continuous  fentaNYL (SUBLIMAZE) 2500 mcg in sodium chloride 0.9% 250 mL premix,  mcg/hr, IntraVENous, Continuous  dexamethasone (DECADRON) injection 4 mg, 4 mg, IntraVENous, Q12H  polyethylene glycol (GLYCOLAX) packet 17 g, 17 g, Oral, Daily  sennosides-docusate sodium (SENOKOT-S) 8.6-50 MG tablet 2 tablet, 2 tablet, Oral, Daily  propofol injection, 5-90 mcg/kg/min, IntraVENous, Continuous  ipratropium-albuterol (DUONEB) nebulizer solution 3 mL, 3 mL, Inhalation, Q4H WA  gentamicin (GARAMYCIN) 0.1 % cream, , Topical, TID  insulin lispro (1 Unit Dial) 0-6 Units, 0-6 Units, SubCUTAneous, Q4H  racepinephrine HCl (VAPONEFPRIN) 2.25 % nebulizer solution NEBU 11.25 mg, 11.25 mg, Nebulization, Q4H PRN  sodium chloride nebulizer 0.9 % solution 3 mL, 3 mL, Nebulization, Q4H PRN  succinylcholine (ANECTINE) injection 140 mg, 140 mg, IntraVENous, Once  albuterol sulfate  (90 Base) MCG/ACT inhaler 2 puff, 2 puff, Inhalation, 4x Daily PRN  sodium chloride flush 0.9 % injection 5-40 mL, 5-40 mL, IntraVENous, 2 times per day  sodium chloride flush 0.9 % injection 5-40 mL, 5-40 mL, IntraVENous, PRN  0.9 % sodium chloride infusion, 25 mL, IntraVENous, PRN  ondansetron (ZOFRAN-ODT) disintegrating tablet 4 mg, 4 mg, Oral, Q8H PRN **OR** ondansetron (ZOFRAN) injection 4 mg, 4 mg, IntraVENous, Q6H PRN  acetaminophen (TYLENOL) tablet 650 mg, 650 mg, Oral, Q6H PRN **OR** acetaminophen (TYLENOL) suppository 650 mg, 650 mg, Rectal, Q6H PRN  famotidine (PEPCID) 20 mg in sodium chloride (PF) 10 mL injection, 20 mg, IntraVENous, BID  fentaNYL 10 mcg/mL infusion,  mcg/hr, IntraVENous, Continuous  budesonide (PULMICORT) nebulizer suspension 500 mcg, 0.5 mg, Nebulization, BID  Arformoterol Tartrate (BROVANA) nebulizer solution 15 mcg, 15 mcg, Nebulization, BID  scopolamine (TRANSDERM-SCOP) transdermal patch 1 patch, 1 patch, TransDERmal, Q72H  glucose (GLUTOSE) 40 % oral gel 15 g, 15 g, Oral, PRN  glucagon (rDNA) injection 1 mg, 1 mg, IntraMUSCular, PRN  dextrose 5 % solution, 100 mL/hr, IntraVENous, PRN  dextrose bolus (hypoglycemia) 10% 125 mL, 125 mL, IntraVENous, PRN **OR** dextrose bolus (hypoglycemia) 10% 250 mL, 250 mL, IntraVENous, PRN  0.9 % sodium chloride infusion, , IntraVENous, Continuous    Allergies   Allergen Reactions    Chantix [Varenicline] Other (See Comments)     Hallucinations         Social History:    TOBACCO:   reports that he quit smoking about 2 months ago. His smoking use included cigarettes. He started smoking about 44 years ago. He has a 10.00 pack-year smoking history. He has never used smokeless tobacco.  ETOH:   reports current alcohol use of about 6.0 standard drinks of alcohol per week. Patient currently lives independently  Environmental/chemical exposure: None known    Family History:       Problem Relation Age of Onset    Cancer Mother     Lung Cancer Mother     Diabetes Father     Stroke Father      REVIEW OF SYSTEMS:    ROS is unobtainable due to his critical illness.        Objective:   PHYSICAL EXAM:      VITALS:  BP (!) 174/88   Pulse (!) 47   Temp 97.3 °F (36.3 °C) (Temporal)   Resp 16   Ht 5' 8\" (1.727 m)   Wt 176 lb 9.4 oz (80.1 kg)   SpO2 97%   BMI 26.85 kg/m²      24HR INTAKE/OUTPUT:      Intake/Output Summary (Last 24 hours) at 3/25/2022 1452  Last data filed at 3/25/2022 1200  Gross per 24 hour   Intake 3829.21 ml   Output 2300 ml   Net 1529.21 ml     CONSTITUTIONAL: Sedated on mechanical ventilation  NECK:  Supple, symmetrical, trachea midline, no adenopathy, thyroid symmetric, not enlarged and no tenderness, skin normal  LUNGS:  no increased work of breathing and congested to auscultation. No accessory muscle use  CARDIOVASCULAR: S1 and S2, no edema and no JVD  ABDOMEN:  normal bowel sounds, non-distended and no masses palpated, and no tenderness to palpation. No hepatospleenomegaly  LYMPHADENOPATHY:  no axillary or supraclavicular adenopathy. No cervical adnenopathy  PSYCHIATRIC: Sedated on mechanical ventilation. MUSCULOSKELETAL: No obvious misalignment or effusion of the joints. No clubbing, cyanosis of the digits. SKIN:  normal skin color, texture, turgor and no redness, warmth, or swelling. No palpable nodules    DATA:    Old records have been reviewed    CBC:  Recent Labs     03/23/22  0430 03/24/22  0835 03/25/22  0407   WBC 7.1 9.5 8.3   RBC 4.50 4.06* 4.28   HGB 12.6* 11.6* 12.4*   HCT 39.5* 36.1* 38.6*    212 193   MCV 87.8 88.9 90.2   MCH 27.9 28.5 29.1   MCHC 31.8 32.1 32.2   RDW 16.5* 16.9* 17.3*      BMP:  Recent Labs     03/23/22  0430 03/24/22  0835 03/25/22  0407    139 138   K 4.6 4.1 4.7    105 106   CO2 26 23 20*   BUN 17 12 7   CREATININE 0.9 0.6* <0.5*   CALCIUM 9.1 8.5 8.8   GLUCOSE 152* 175* 133*      ABG:  Recent Labs     03/23/22  0519 03/25/22  0208   PHART 7.414 7.480*   [id number] 48.2* 34.2*   PO2ART 121.0* 100.0   [id number] 30.8* 25.5   [id number] 98.8 98.7   BEART 5.2* 2.2     Procalcitonin  No results for input(s): PROCAL in the last 72 hours. No results found for: BNP  Lab Results   Component Value Date    CKTOTAL 81 02/01/2022    TROPONINI <0.01 03/22/2022           Radiology Review:  All pertinent images / reports were reviewed as a part of this visit. Assessment:     1. Acute respiratory failure due to upper airway compromise  2. 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 Severe COPD/centrilobular emphysema      Plan:     Patient's presentation is that of respiratory failure related to upper airway compromise from left neck mass which is likely a squamous cell carcinoma.   He was developing stridor and inability to manage secretions. The patient is fortunate to have had a successful intubation without the need for immediate, urgent tracheostomy. He is comfortable on propofol and fentanyl infusions. I reviewed post intubation chest x-ray which reveals good position of the endotracheal tube in the mid trachea. Chest x-ray today now showing some bibasilar opacities. This could be on the basis of aspiration. Continue present ventilator settings with FiO2 0.3 and PEEP 5. 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 This was narrowing the airway and displacing the airway rightward. There is no obvious lymphadenopathy on the available images. Biopsy and tracheostomy planned for later today  Discussed with Dr Miles Murray  We will plan to keep the patient pretty sedated through the rest of today and tomorrow. This should give the track a chance to mature some. He would remain a high risk airway even after tracheostomy. Ultimately, planning transfer to  for surgery. .    The patient did have pulmonary function testing 1/26/2022 which revealed FEV1 of 1.83 L which is 63% predicted. FEV1/FVC was decreased and there was no response to inhaled bronchodilators consistent with a diagnosis of moderate obstructive lung disease. There was severe reduction in diffusion capacity which was only 37% predicted. Continue inhaled therapy  Does not seem to have COPD exacerbation  However, may benefit from steroid therapy for any element of swelling of the laryngeal mass. Decadron 4 mg every 12 hours. Tube feedings on hold in anticipation of surgery.     Total critical care time caring for this patient with life threatening illness, including direct patient contact, management of life support systems, review of data including imaging and labs, discussions with other team members and physicians is at least 32 minutes so far today, excluding procedures.

## 2022-03-25 NOTE — ANESTHESIA PRE PROCEDURE
Department of Anesthesiology  Preprocedure Note       Name:  Red Alarcon   Age:  65 y.o.  :  1960                                          MRN:  [id number]         Date:  3/25/2022      Surgeon: Aryan Valencia):  Elgin Cunha DO    Procedure: Procedure(s):  TRACHEOTOMY  DIRECT LARYNGOSCOPY WITH BIOPSY AND FROZEN SECTIONS    Medications prior to admission:   Prior to Admission medications    Medication Sig Start Date End Date Taking? Authorizing Provider   calcium-vitamin D (OSCAL-500) 500-200 MG-UNIT per tablet Take 1 tablet by mouth 2 times daily    Historical Provider, MD   sennosides-docusate sodium (SENOKOT-S) 8.6-50 MG tablet Take 1 tablet by mouth 2 times daily    Historical Provider, MD   nicotine (NICODERM CQ) 14 MG/24HR Place 1 patch onto the skin daily for 14 days  Patient not taking: Reported on 3/22/2022 3/14/22 3/28/22  Brianemeterio Keller MD   ipratropium-albuterol (DUONEB) 0.5-2.5 (3) MG/3ML SOLN nebulizer solution Inhale 3 mLs into the lungs 4 times daily 3/7/22   HOLGER Chu - CNP   furosemide (LASIX) 20 MG tablet Take 1 tablet by mouth daily 3/7/22   HOLGER Chu - CNP   guaiFENesin (MUCINEX) 600 MG extended release tablet Take 1,200 mg by mouth 2 times daily    Historical Provider, MD   atorvastatin (LIPITOR) 80 MG tablet Take 1 tablet by mouth daily Cancel atorvastatin 20 mg a day 22   Amanda Burger MD   Fluticasone-Umeclidin-Vilant (TRELEGY ELLIPTA) 200-62.5-25 MCG/INH AEPB Inhale 1 puff into the lungs 2 times daily  Patient taking differently: Inhale 1 puff into the lungs daily  22   Amanda Burger MD   gentamicin (GARAMYCIN) 0.1 % cream Apply topically   daily.  22   Elmarie Bowels, DPM   acetaminophen (TYLENOL) 500 MG tablet Take 1 tablet by mouth 4 times daily as needed for Pain 1/10/22   Charley Bull MD   warfarin (JANTOVEN) 5 MG tablet TAKE 1 AND 1/2 TABLETS BY MOUTH ONE TIME A DAY OR AS DIRECTED BY [facility] ([phone])  Patient taking differently: Take 7.5 mg by mouth daily Except 10mg every Wednesday and Saturday OR AS DIRECTED BY [facility] ([phone]) 1/10/22   Lulu Washington MD   NIFEdipine (ADALAT CC) 60 MG extended release tablet Take 1 tablet by mouth daily 1/10/22   Lulu Washington MD   carvedilol (COREG) 25 MG tablet Take 1 tablet by mouth 2 times daily (with meals) 1/10/22   Lulu Washington MD   metFORMIN (GLUCOPHAGE) 500 MG tablet Take 1 tablet by mouth 2 times daily (with meals) 1/10/22   Lulu Washington MD   fluticasone [facility]) 50 MCG/ACT nasal spray 1 spray by Nasal route daily 1/6/22 1/6/23  Lulu Washington MD   blood glucose monitor kit and supplies Dispense sufficient amount for indicated testing frequency plus additional to accommodate PRN testing needs. Dispense all needed supplies to include: monitor, strips, lancing device, lancets, control solutions, alcohol swabs. Patient not taking: Reported on 3/22/2022 1/4/22   Rosa Mcintosh MD   Lancets MISC 1 each by Does not apply route daily Test 2 times daily while on PREDNISONE then 1 time daily & as needed for symptoms of irregular blood sugar  Patient not taking: Reported on 3/22/2022 1/4/22   Rosa Mcintosh MD   blood glucose monitor strips Test 2 times a day while on PREDNISONE, then 1 time daily & as needed for symptoms of irregular blood glucose. Dispense sufficient amount for indicated testing frequency plus additional to accommodate PRN testing needs. Patient not taking: Reported on 3/22/2022 1/4/22   Rosa Mcintosh MD   ferrous sulfate (IRON 325) 325 (65 Fe) MG tablet Take 1 tablet by mouth 2 times daily 9/7/21   Lulu Washington MD   Vitamins/Minerals TABS Take 1 tablet by mouth daily  Patient not taking: Reported on 3/22/2022    Historical Provider, MD       Current medications:    No current facility-administered medications for this visit. No current outpatient medications on file.      Facility-Administered Medications Ordered in Other Visits   Medication Dose Route Frequency Provider Last Rate Last Admin    hydrALAZINE (APRESOLINE) injection 10 mg  10 mg IntraVENous Q6H PRN Silvano Rodríguez MD   10 mg at 03/25/22 0022    enoxaparin (LOVENOX) injection 40 mg  40 mg SubCUTAneous Nightly Jackey Duverney, MD        HYDROmorphone (DILAUDID) injection 0.25 mg  0.25 mg IntraVENous Q5 Min PRN Berniece Rinne, MD        HYDROmorphone (DILAUDID) injection 0.5 mg  0.5 mg IntraVENous Q5 Min PRN Berniece Rinne, MD        oxyCODONE (ROXICODONE) immediate release tablet 5 mg  5 mg Oral Once PRN Berniece Rinne, MD        ondansetron [facility]) injection 4 mg  4 mg IntraVENous Once PRN Berniece Rinne, MD        labetalol (NORMODYNE;TRANDATE) injection 5 mg  5 mg IntraVENous Q15 Min PRN Berniece Rinne, MD        0.9 % sodium chloride infusion   IntraVENous Continuous Berniece Rinne, MD        naloxegol (MOVANTIK) tablet 25 mg  25 mg Oral QAM Trey Parada MD   25 mg at 03/24/22 1432    cisatracurium besylate (NIMBEX) 200 mg in sodium chloride 0.9 % 100 mL infusion  0.5-10 mcg/kg/min IntraVENous Continuous Trey Parada MD 4.4 mL/hr at 03/25/22 1200 2 mcg/kg/min at 03/25/22 1200    midazolam (VERSED) 1 mg/mL in D5W infusion  1-10 mg/hr IntraVENous Continuous Jackey Duverney, MD 5 mL/hr at 03/25/22 1200 5 mg/hr at 03/25/22 1200    fentaNYL (SUBLIMAZE) 2500 mcg in sodium chloride 0.9% 250 mL premix   mcg/hr IntraVENous Continuous Jackey Duverney, MD 20 mL/hr at 03/25/22 1200 200 mcg/hr at 03/25/22 1200    dexamethasone (DECADRON) injection 4 mg  4 mg IntraVENous Q12H Jackey Duverney, MD   4 mg at 03/25/22 1025    polyethylene glycol (GLYCOLAX) packet 17 g  17 g Oral Daily Jackey Duverney, MD   17 g at 03/24/22 1011    sennosides-docusate sodium (SENOKOT-S) 8.6-50 MG tablet 2 tablet  2 tablet Oral Daily Jackey Duverney, MD   2 tablet at 03/24/22 1010    propofol injection  5-90 mcg/kg/min IntraVENous Continuous Guy Prasad MD 24 mL/hr at 03/25/22 1200 55 mcg/kg/min at 03/25/22 1200    ipratropium-albuterol (DUONEB) nebulizer solution 3 mL  3 mL Inhalation Q4H WA Guy Prasad MD   3 mL at 03/25/22 1106    gentamicin (GARAMYCIN) 0.1 % cream   Topical TID Guy Prasad MD   Given at 03/25/22 1223    insulin lispro (1 Unit Dial) 0-6 Units  0-6 Units SubCUTAneous Q4H Carli Echols MD   1 Units at 03/24/22 1717    racepinephrine HCl (VAPONEFPRIN) 2.25 % nebulizer solution NEBU 11.25 mg  11.25 mg Nebulization Q4H PRN Antonieta Hagen MD   11.25 mg at 03/22/22 4261    sodium chloride nebulizer 0.9 % solution 3 mL  3 mL Nebulization Q4H PRN Antonieta Hagen MD        succinylcholine (ANECTINE) injection 140 mg  140 mg IntraVENous Once Marcos Gutierrez MD        albuterol sulfate  (90 Base) MCG/ACT inhaler 2 puff  2 puff Inhalation 4x Daily PRN Guy Prasad MD        sodium chloride flush 0.9 % injection 5-40 mL  5-40 mL IntraVENous 2 times per day Guy Prasad MD   10 mL at 03/25/22 0821    sodium chloride flush 0.9 % injection 5-40 mL  5-40 mL IntraVENous PRN Guy Prasad MD        0.9 % sodium chloride infusion  25 mL IntraVENous PRN Guy Prasad MD        ondansetron (ZOFRAN-ODT) disintegrating tablet 4 mg  4 mg Oral Q8H PRN Guy Prasad MD        Or    ondansetron [facility]) injection 4 mg  4 mg IntraVENous Q6H PRN Guy Prasad MD        acetaminophen (TYLENOL) tablet 650 mg  650 mg Oral Q6H PRN Guy Prasad MD        Or    acetaminophen (TYLENOL) suppository 650 mg  650 mg Rectal Q6H PRN Guy Prasad MD        famotidine (PEPCID) 20 mg in sodium chloride (PF) 10 mL injection  20 mg IntraVENous BID Guy Prasad MD   20 mg at 03/25/22 0818    fentaNYL 10 mcg/mL infusion   mcg/hr IntraVENous Continuous Guy Prasad MD 20 mL/hr at 03/24/22 1943 200 mcg/hr at 03/24/22 1943    budesonide (PULMICORT) nebulizer suspension 500 mcg  0.5 mg Nebulization BID Efrain Alexander MD   500 mcg at 03/25/22 0744    Arformoterol Tartrate (BROVANA) nebulizer solution 15 mcg  15 mcg Nebulization BID Efrain Alexander MD   15 mcg at 03/25/22 0744    scopolamine (TRANSDERM-SCOP) transdermal patch 1 patch  1 patch TransDERmal Q72H Lavelle Ugalde MD   1 patch at 03/22/22 2002    glucose (GLUTOSE) 40 % oral gel 15 g  15 g Oral PRN Lavelle Ugalde MD        glucagon (rDNA) injection 1 mg  1 mg IntraMUSCular PRN Lavelle Ugalde MD        dextrose 5 % solution  100 mL/hr IntraVENous PRN Lavelle Ugalde MD        dextrose bolus (hypoglycemia) 10% 125 mL  125 mL IntraVENous PRN Efrain Alexander MD        Or    dextrose bolus (hypoglycemia) 10% 250 mL  250 mL IntraVENous PRN Efrain Alexander MD        0.9 % sodium chloride infusion   IntraVENous Continuous Lavelle Ugalde  mL/hr at 03/25/22 1200 Rate Verify at 03/25/22 1200       Allergies:     Allergies   Allergen Reactions    Chantix [Varenicline] Other (See Comments)     Hallucinations         Problem List:    Patient Active Problem List   Diagnosis Code    Peripheral artery disease ([facility] [address] I73.9    Centrilobular emphysema ([address] J43.2    COPD (chronic obstructive pulmonary disease) ([facility]) J44.9    Atherosclerosis of native arteries of right leg with ulceration of other part of foot ([address] I70.235    Impotence N52.9    Acute deep vein thrombosis (DVT) of femoral vein of left lower extremity ([facility]) I82.412    Colon polyp K63.5    Hyperlipidemia E78.5    Non-healing ulcer of left ankle ([address] L97.329    Venous insufficiency I87.2    Venous ulcer ([address] I83.009, L97.909    Essential hypertension I10    Peripheral vascular disease ([facility]) I73.9    Type 2 diabetes mellitus with circulatory disorder, without long-term current use of insulin ([facility]) E11.59    COPD exacerbation ([facility]) J44.1    Acute hypercapnic respiratory failure ([facility]) J96.02    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 Past Medical History:        Diagnosis Date    Diabetes mellitus ([facility]     Fibromyalgia     History of DVT (deep vein thrombosis)     Hyperlipidemia     Hypertension     Type 2 diabetes mellitus with circulatory disorder, without long-term current use of insulin ([facility] [address] 2022       Past Surgical History:        Procedure Laterality Date    APPENDECTOMY  2005    COLONOSCOPY  2016    FEMORAL BYPASS Left 2020    femoral posterior tibial bypass    FEMORAL-TIBIAL BYPASS GRAFT Right 2020    with femoral endarterectomy and patch angioplasty    FOOT DEBRIDEMENT Left 2020    FOOT DEBRIDEMENT Right 2021    DEBRIDEMENT OF RIGHT FOOT WOUND WITH GRAFT APPLICATION performed by Atul Ambriz DPM at [facility] Right 2020    stent leg for PVD       Social History:    Social History     Tobacco Use    Smoking status: Former Smoker     Packs/day: 0.50     Years: 20.00     Pack years: 10.00     Types: Cigarettes     Start date: 1977     Quit date: 2021     Years since quittin.2    Smokeless tobacco: Never Used   Substance Use Topics    Alcohol use: Yes     Alcohol/week: 6.0 standard drinks     Types: 6 Cans of beer per week                                Counseling given: Not Answered      Vital Signs (Current): There were no vitals filed for this visit.                                            BP Readings from Last 3 Encounters:   22 (!) 173/85   22 118/69   22 (!) 148/82       NPO Status:                                                                                 BMI:   Wt Readings from Last 3 Encounters:   22 176 lb 9.4 oz (80.1 kg)   22 159 lb 2.8 oz (72.2 kg)   22 162 lb 0.6 oz (73.5 kg)     There is no height or weight on file to calculate BMI.    CBC:   Lab Results   Component Value Date    WBC 8.3 2022    RBC 4.28 2022    HGB 12.4 2022    HCT 38.6 2022    MCV 90.2 03/25/2022    RDW 17.3 03/25/2022     03/25/2022       CMP:   Lab Results   Component Value Date     03/25/2022    K 4.7 03/25/2022    K 4.6 03/23/2022     03/25/2022    CO2 20 03/25/2022    BUN 7 03/25/2022    CREATININE <0.5 03/25/2022    GFRAA >60 03/25/2022    GFRAA >60 03/29/2010    AGRATIO 1.4 03/23/2022    LABGLOM >60 03/25/2022    GLUCOSE 133 03/25/2022    PROT 5.6 03/23/2022    PROT 8.0 03/29/2010    CALCIUM 8.8 03/25/2022    BILITOT 0.3 03/23/2022    ALKPHOS 68 03/23/2022    AST 11 03/23/2022    ALT 19 03/23/2022       POC Tests:   Recent Labs     03/25/22  1134   POCGLU 116*       Coags:   Lab Results   Component Value Date    PROTIME 12.3 03/25/2022    INR 1.08 03/25/2022    APTT 56.5 03/22/2022       HCG (If Applicable): No results found for: PREGTESTUR, PREGSERUM, HCG, HCGQUANT     ABGs:   Lab Results   Component Value Date    PHART 7.480 03/25/2022    PO2ART 100.0 03/25/2022    HUQ5SGU 34.2 03/25/2022    RLT4QFA 25.5 03/25/2022    BEART 2.2 03/25/2022    W7IUIZRS 98.7 03/25/2022        Type & Screen (If Applicable):  No results found for: LABABO, LABRH    Drug/Infectious Status (If Applicable):  No results found for: HIV, HEPCAB    COVID-19 Screening (If Applicable):   Lab Results   Component Value Date    COVID19 Not Detected 02/02/2022    COVID19 Not Detected 02/10/2021           Anesthesia Evaluation  Patient summary reviewed and Nursing notes reviewed no history of anesthetic complications:   Airway: Mallampati: III       Comment: intubated  Mouth opening: < 3 FB Dental:          Pulmonary:   (+) COPD:      (-) wheezes                          ROS comment: 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 - consult to ENT and to pulmonary critical care. - will need biopsy and definitive dx before tx can be considered.   - 3.5 cm left laryngeal mass.     Currently intubated on vent -sedated    Former smoker   Cardiovascular:    (+) hypertension:, hyperlipidemia    (-) CABG/stent and  angina    ECG reviewed  Rhythm: regular  Rate: normal  Echocardiogram reviewed               ROS comment:     Left Ventricle   Normal left ventricle size, wall thickness, and systolic function with an   estimated ejection fraction of 60%. No regional wall motion abnormalities   are seen. Average E/e': 11.1. Normal diastolic function. Mitral Valve   The mitral valve normal in structure and function. No evidence of mitral regurgitation or stenosis. Left Atrium   The left atrium is normal in size. Aortic Valve   The aortic valve is structurally normal. There is no significant aortic   valve regurgitation or stenosis. Tricuspid aortic valve. Aorta   The aortic root is normal in size. The ascending aorta is normal in size. Right Ventricle   The right ventricle is normal in size and function. TAPSE: 2.55 cm. RV s' velocity: 17.2 cm/s. Tricuspid Valve   The tricuspid valve is normal in structure and function. There is no   significant tricuspid valve regurgitation or stenosis. Right Atrium   The right atrial size is normal.      Pulmonic Valve   The pulmonic valve is not well visualized. There is no evidence of pulmonic valve regurgitation or stenosis. Pericardial Effusion   No pericardial effusion noted. Pleural Effusion   No pleural effusion. Miscellaneous   The inferior vena cava appears normal in size with normal respiratory   variation. Neuro/Psych:   (+) neuromuscular disease:,             GI/Hepatic/Renal:             Endo/Other:    (+) DiabetesType II DM, , .                 Abdominal:             Vascular:   + DVT, . Other Findings:             Anesthesia Plan      general     ASA 4     (Medical history and informed consent taken from wife Hunt Mcburney by phone.)  Induction: intravenous. MIPS: Postoperative opioids intended and Prophylactic antiemetics administered.   Anesthetic plan and risks discussed with spouse. Plan discussed with CRNA.                   Zia Jackson MD   3/25/2022

## 2022-03-25 NOTE — PLAN OF CARE
Leeann Boykin is a 65 y.o. male initially evaluated for acute airway obstruction in setting of obstructive hypopharyngeal mass postop day #0 status post tracheostomy, trach laryngoscopy with biopsy. Plan:  -6.0 cuffed Shiley tracheostomy tube in place. Secured with four-point sutures and Posie neck type. CONSIDER VERY CRITICAL AIRWAY, if accidentally decannulated would be extremely difficult oral intubation. Keep obturator close at bedside. I had a discussion with pulmonologist, Dr. Brennan Arriola, who plans to keep the patient heavily sedated and paralyzed overnight and possibly over the weekend.   Plan for eventual transfer to [facility] head and neck surgery service for possible laryngopharyngectomy.  -Await permanent pathology  -Vent settings per ICU  -Routine trach care  -Please do not hesitate call ENT with any questions or concerns

## 2022-03-25 NOTE — PROGRESS NOTES
Hospitalist Progress Note      PCP: Jerri Marin MD    Date of Admission: 3/22/2022    Chief Complaint: SOB    Hospital Course: Demetrio Johnson a 65 y. o. male has a history of COPD but states tonight he coughed up some blood and then became tight in the chest and more short of breath he now presents with soreness to the throat minimal stridor,states he is on 2 L at home requiring 4 L here. Based on the stridor he had a soft tissue ct scan of the neck which showed a laryngeal mass. Later in his ED stay he began to get more obtunded and started drooling. There was concern for the air way to be closing and he was intubated with the help of anesthesia and ENT. At the time if my eval he was on the vent but not very well sedated. I increased the propofol and added fentanyl.        Subjective:   Managed to sit up and remove his ET tube ! Was emergently re-intubated by anesthesia last evening  He was paralyzed after this and is getting his trach today.         Medications:  Reviewed    Infusion Medications    sodium chloride      cisatracurium (NIMBEX) infusion 2 mcg/kg/min (03/25/22 1511)    midazolam 5 mg/hr (03/25/22 1511)    fentaNYL Citrate-NaCl 200 mcg/hr (03/25/22 1511)    propofol 55 mcg/kg/min (03/25/22 1511)    sodium chloride      fentaNYL 200 mcg/hr (03/25/22 1511)    dextrose      sodium chloride 125 mL/hr at 03/25/22 1511     Scheduled Medications    enoxaparin  40 mg SubCUTAneous Nightly    naloxegol  25 mg Oral QAM    dexamethasone  4 mg IntraVENous Q12H    polyethylene glycol  17 g Oral Daily    sennosides-docusate sodium  2 tablet Oral Daily    ipratropium-albuterol  3 mL Inhalation Q4H WA    gentamicin   Topical TID    insulin lispro  0-6 Units SubCUTAneous Q4H    succinylcholine  140 mg IntraVENous Once    sodium chloride flush  5-40 mL IntraVENous 2 times per day    famotidine (PEPCID) injection  20 mg IntraVENous BID    budesonide  0.5 mg Nebulization BID    Arformoterol Tartrate  15 mcg Nebulization BID    scopolamine  1 patch TransDERmal Q72H     PRN Meds: hydrALAZINE, HYDROmorphone, HYDROmorphone, oxyCODONE, ondansetron, labetalol, racepinephrine HCl, sodium chloride nebulizer, albuterol sulfate HFA, sodium chloride flush, sodium chloride, ondansetron **OR** ondansetron, acetaminophen **OR** acetaminophen, glucose, glucagon (rDNA), dextrose, dextrose bolus (hypoglycemia) **OR** dextrose bolus (hypoglycemia)      Intake/Output Summary (Last 24 hours) at 3/25/2022 1630  Last data filed at 3/25/2022 1200  Gross per 24 hour   Intake 3829.21 ml   Output 2300 ml   Net 1529.21 ml       Physical Exam Performed:    BP (!) 174/88   Pulse (!) 47   Temp 97.3 °F (36.3 °C) (Temporal)   Resp 16   Ht 5' 8\" (1.727 m)   Wt 176 lb 9.4 oz (80.1 kg)   SpO2 97%   BMI 26.85 kg/m²     General appearance: No apparent distress appears stated age and cooperative. Intubated and sedated. HEENT Normal cephalic, atraumatic without obvious deformity. Pupils equal, round, and reactive to light. Extra ocular muscles intact. Conjunctivae/corneas clear. Neck: Supple, No jugular venous distention/bruits. Trachea midline without thyromegaly or adenopathy with full range of motion. Lungs: Clear to auscultation, bilaterally without Rales/Wheezes/Rhonchi with good respiratory effort. Heart: Regular rate and rhythm with Normal S1/S2 without murmurs, rubs or gallops, point of maximum impulse non-displaced  Abdomen: Soft, non-tender or non-distended without rigidity or guarding and positive bowel sounds all four quadrants. Extremities: No clubbing, cyanosis, or edema bilaterally. Full range of motion without deformity gait not tested. Skin: Skin color, texture, turgor normal.  No rashes or lesions. Neurologic:   Cranial nerves: II-XII intact, grossly non-focal.  Mental status: sedated.   Capillary Refill: Acceptable  < 3 seconds  Peripheral Pulses: +3 Easily felt, not easily obliterated with pressure    Labs:   Recent Labs     03/23/22  0430 03/24/22  0835 03/25/22  0407   WBC 7.1 9.5 8.3   HGB 12.6* 11.6* 12.4*   HCT 39.5* 36.1* 38.6*    212 193     Recent Labs     03/23/22  0430 03/24/22  0835 03/25/22  0407    139 138   K 4.6 4.1 4.7    105 106   CO2 26 23 20*   BUN 17 12 7   CREATININE 0.9 0.6* <0.5*   CALCIUM 9.1 8.5 8.8     Recent Labs     03/23/22  0430   AST 11*   ALT 19   BILITOT 0.3   ALKPHOS 68     Recent Labs     03/23/22  1027 03/24/22  0436 03/25/22  0407   INR 2.47* 1.68* 1.08     No results for input(s): Shanique Beat in the last 72 hours. Urinalysis:      Lab Results   Component Value Date    NITRU Negative 02/02/2022    WBCUA 1 02/02/2022    RBCUA 1 02/02/2022    BLOODU Negative 02/02/2022    SPECGRAV 1.021 02/02/2022    GLUCOSEU Negative 02/02/2022       Radiology:  XR CHEST PORTABLE   Final Result   Supportive tubing is in normal position. Bibasilar opacities, atelectasis or airspace disease. There is mild   progression on the right side in the interval.         XR CHEST PORTABLE   Final Result   Mild dependent changes in the lower lung fields, atelectasis versus   infiltrate. Satisfactory position of endotracheal tube. XR ABDOMEN FOR NG/OG/NE TUBE PLACEMENT   Final Result   Tip of feeding tube projects in region of gastric fundus         XR CHEST PORTABLE   Final Result   1. Properly placed endotracheal tube. 2.  Mild bibasilar atelectasis         CT SOFT TISSUE NECK W CONTRAST   Final Result   Lobulated large mass wrapping the left side of the hypopharynx extending into   supraglottic and glottic portions of the larynx measuring approximately 3.5   cm diameter with a craniocaudad extent of 4.9 cm. The mass appears to   severely narrow the airway and could potentially make for difficult   intubation. Findings compatible with squamous cell carcinoma. Mild to moderate emphysema.          XR CHEST PORTABLE   Final Result Chronic findings in the chest without acute airspace disease identified. Assessment/Plan:    Active Hospital Problems    Diagnosis     Laryngeal mass [J38.7]     Acute hypercapnic respiratory failure (HCC) [J96.02]     Centrilobular emphysema (HCC) [J43.2]        1. 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 - consult to ENT and to pulmonary critical care. - will need biopsy and definitive dx before tx can be considered. - 3.5 cm left laryngeal mass.   -plan for biopsy and trach  Tomorrow. - hold any AC     2. Acute hypercapnic respiratory failure  - a combination of COPD and this mass, nearly blocking his airway. - after some time in the vent the ABG showed the PCO2 in the 70s so it was likely higher.  - currently vented. - no wheeze but may benefit from steroids if there is any swelling in the laryngeal region.  -started on decadron.        He has good peripheral access but may need  A central line in the future. DVT Prophylaxis: was on coumadin and did get lovenox. ALL AC NOW ON HOLD FOR PROCEDURE. Diet: Diet NPO  ADULT TUBE FEEDING; Nasogastric; Standard with Fiber; Continuous; 20; Yes; 25; Q 4 hours; 45; 30; Q 4 hours; Protein; Administer 1 Proteinex P2Go daily. Flush with 30 mL water before and after. Code Status: Full Code    PT/OT Eval Status: when appropriate     Dispo -he is getting a trach today and he will require transfer to  for definitive treatment of the mass. This will likely occur early next week. 32 minutes of critical care time spent.      Jo Guerra MD

## 2022-03-25 NOTE — ANESTHESIA POSTPROCEDURE EVALUATION
Department of Anesthesiology  Postprocedure Note    Patient: Lazara Allen  MRN: [id number]  [place]: 1960  Date of evaluation: 3/25/2022  Time:  5:53 PM     Procedure Summary     Date: 03/25/22 Room / Location: [facility] OR [address] [place] [address]    Anesthesia Start: 5942 Anesthesia Stop: 0093    Procedures:       TRACHEOTOMY (N/A Neck)      DIRECT LARYNGOSCOPY WITH BIOPSY AND FROZEN SECTIONS (N/A Throat) Diagnosis: (.)    Surgeons: Tyrel Lr DO Responsible Provider: Francisco Mock MD    Anesthesia Type: general ASA Status: 4          Anesthesia Type: general    Andrew Phase I:      Andrew Phase II:      Last vitals: Reviewed and per EMR flowsheets.        Anesthesia Post Evaluation    Patient location during evaluation: ICU  Patient participation: complete - patient cannot participate  Level of consciousness: sedated and ventilated  Airway patency: patent  Nausea & Vomiting: no vomiting  Complications: no  Cardiovascular status: hemodynamically stable  Respiratory status: acceptable and ventilator  Hydration status: euvolemic  Multimodal analgesia pain management approach

## 2022-03-25 NOTE — PLAN OF CARE
Problem: Skin Integrity:  Goal: Will show no infection signs and symptoms  Description: Will show no infection signs and symptoms  3/25/2022 0048 by Rogerio Willoughby RN  Outcome: Ongoing  3/24/2022 1959 by Rogerio Willoughby RN  Outcome: Ongoing  3/24/2022 1858 by Merced Garcia RN  Outcome: Ongoing  Goal: Absence of new skin breakdown  Description: Absence of new skin breakdown  3/25/2022 0048 by Rogerio Willoughby RN  Outcome: Ongoing  3/24/2022 1959 by Rogerio Willoughby RN  Outcome: Ongoing  3/24/2022 1858 by Merced Garcia RN  Outcome: Ongoing     Problem: Falls - Risk of:  Goal: Will remain free from falls  Description: Will remain free from falls  3/25/2022 0048 by Rogerio Willoughby RN  Outcome: Ongoing  3/24/2022 1959 by Rogerio Willoughby RN  Outcome: Ongoing  3/24/2022 1858 by Merced Garcia RN  Outcome: Ongoing  Goal: Absence of physical injury  Description: Absence of physical injury  3/25/2022 0048 by Rogerio Willoughby RN  Outcome: Ongoing  3/24/2022 1959 by Rogerio Willoughby RN  Outcome: Ongoing  3/24/2022 1858 by Merced Garcia RN  Outcome: Ongoing     Problem: Nutrition  Goal: Optimal nutrition therapy  3/25/2022 0048 by Rogerio Willoughby RN  Outcome: Ongoing  3/24/2022 1959 by Rogerio Willoughby RN  Outcome: Ongoing  3/24/2022 1858 by Merced Garcia RN  Outcome: Ongoing     Problem: Discharge Planning:  Goal: Participates in care planning  Description: Participates in care planning  3/25/2022 0048 by Rogerio Willoughby RN  Outcome: Ongoing  3/24/2022 1959 by Rogerio Willoughby RN  Outcome: Ongoing  3/24/2022 1858 by Merced Garcia RN  Outcome: Ongoing  Goal: Discharged to appropriate level of care  Description: Discharged to appropriate level of care  3/25/2022 0048 by Rogerio Willoughby RN  Outcome: Ongoing  3/24/2022 1959 by Rogerio Willoughby RN  Outcome: Ongoing  3/24/2022 1858 by eMrced Garcia RN  Outcome: Ongoing     Problem: Airway Clearance - Ineffective:  Goal: Ability to maintain a clear airway will improve  Description: Ability to maintain a clear airway will improve  3/25/2022 0048 by Nate Collins RN  Outcome: Ongoing  3/24/2022 1959 by Nate Collins RN  Outcome: Ongoing  3/24/2022 1858 by Chandan Alan RN  Outcome: Ongoing     Problem: Anxiety/Stress:  Goal: Level of anxiety will decrease  Description: Level of anxiety will decrease  3/25/2022 0048 by Nate Collins RN  Outcome: Ongoing  3/24/2022 1959 by Nate Collins RN  Outcome: Ongoing  3/24/2022 1858 by Chandan Alan RN  Outcome: Ongoing     Problem: Aspiration:  Goal: Absence of aspiration  Description: Absence of aspiration  3/25/2022 0048 by Nate Collins RN  Outcome: Ongoing  3/24/2022 1959 by Nate Collins RN  Outcome: Ongoing  3/24/2022 1858 by Chandan Alan RN  Outcome: Ongoing     Problem:  Bowel Function - Altered:  Goal: Bowel elimination is within specified parameters  Description: Bowel elimination is within specified parameters  3/25/2022 0048 by Nate Collins RN  Outcome: Ongoing  3/24/2022 1959 by Nate Collins RN  Outcome: Ongoing  3/24/2022 1858 by Chandan Alan RN  Outcome: Ongoing     Problem: Gas Exchange - Impaired:  Goal: Levels of oxygenation will improve  Description: Levels of oxygenation will improve  3/25/2022 0048 by Nate Collins RN  Outcome: Ongoing  3/24/2022 1959 by Nate Collins RN  Outcome: Ongoing  3/24/2022 1858 by Chandan Alan RN  Outcome: Ongoing     Problem: Pain:  Goal: Pain level will decrease  Description: Pain level will decrease  3/25/2022 0048 by Nate Collins RN  Outcome: Ongoing  3/24/2022 1959 by Nate Collins RN  Outcome: Ongoing  3/24/2022 1858 by Chandan Alan RN  Outcome: Ongoing  Goal: Control of acute pain  Description: Control of acute pain  3/25/2022 0048 by Nate Collins RN  Outcome: Ongoing  3/24/2022 1959 by Nate Collins RN  Outcome: Ongoing  3/24/2022 1858 by Chandan Alan RN  Outcome: Ongoing  Goal: Control of chronic pain  Description: Control of chronic pain  3/25/2022 0048 by Nate Collins RN  Outcome: Ongoing  3/24/2022 1959 by Blake Aranda Ben Hernandez, RN  Outcome: Ongoing  3/24/2022 1858 by Reginald Preciado RN  Outcome: Ongoing

## 2022-03-25 NOTE — PROGRESS NOTES
[facility]  HEAD AND NECK - ENT  PROGRESS  NOTE    Date of Service: 3/25/2022    ASSESSMENT: Yuko Garcia is a 65 y.o. 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 PLAN/RECOMMENDATIONS:     Hypopharyngeal lesion  Acute airway obstruction  Acute respiratory distress  -ETT in place. CONSIDER ACUTE AIRWAY WITH STRICT AIRWAY PRECAUTIONS. WOULD BE VERY DIFFICULT REINTUBATION. KEEP HEAVILY SEDATED AND RESTRAINED. - Self extubated yesterday, able to be reintubated by anesthesia timely although noted to be quite difficult. INR has normalized. Plan for surgical tracheostomy with laryngoscopy and biopsy this afternoon to secure airway and establish tissue diagnosis. - Will eventually need transfer to  head and neck surgery for definitive management. Spoke with Dr. Jacqueline Lundy ( Head and Neck Surgery) who agrees with tentative plan for tracheostomy and DL with biopsy here at [facility] to secure airway and confirm diagnosis and subsequent transfer to  for definitive surgical treatment. -Vent management per ICU team  -Please do not hesitate to call ENT with any questions or concerns. SUBJECTIVE:   Significant overnight events: None per RN    OBJECTIVE:  Physical Exam:   Temp (24hrs), Av.4 °F (36.3 °C), Min:97 °F (36.1 °C), Max:98.2 °F (36.8 °C)    Vitals:    22 0932 22 1000 22 1100 22 1200   BP:  (!) 146/86 (!) 163/81 (!) 173/85   Pulse:  (!) 47 (!) 46 (!) 47   Resp:  16 16 16   Temp:       TempSrc:       SpO2:  100% 100% 97%   Weight:       Height: 5' 8\" (1.727 m)        I/O last 3 completed shifts:   In: 7048.1 [I.V.:6152.1; NG/GT:896]  Out: 46 [Urine:2350]    REVIEW OF SYSTEMS  The following systems were reviewed and revealed the following in addition to any already discussed in the HPI:     Review of systems but with unable to performed secondary to intubated and sedated status.         PHYSICAL EXAM  GENERAL: Intubated and sedated  EYES: EOMI, Anti-icteric  NOSE: On anterior rhinoscopy there is no epistaxis, nasal mucosa within normal limits, no purulent drainage  EARS: Normal external appearance; no otorrhea  FACE: 1/6 House-Brackmann Scale, symmetri  ORAL CAVITY:  ETT in place  NECK: Normal range of motion, no thyromegaly, trachea is midline, no lymphadenopathy, no neck masses, no crepitus  CHEST: On vent, 50% FiO2, equal chest rise  SKIN: No rashes, normal appearing skin, no evidence of skin lesions/tumors  NEURO: Sedated    Lab Studies:  Lab Results   Component Value Date    WBC 8.3 03/25/2022    HGB 12.4 (L) 03/25/2022    HCT 38.6 (L) 03/25/2022    MCV 90.2 03/25/2022     03/25/2022     Lab Results   Component Value Date    GLUCOSE 133 (H) 03/25/2022    BUN 7 03/25/2022    CREATININE <0.5 (L) 03/25/2022    K 4.7 03/25/2022     03/25/2022     03/25/2022    CALCIUM 8.8 03/25/2022     Lab Results   Component Value Date    MG 2.10 03/24/2022     Lab Results   Component Value Date    PHOS 3.1 03/13/2022     Lab Results   Component Value Date    ALKPHOS 68 03/23/2022    ALT 19 03/23/2022    AST 11 (L) 03/23/2022    BILITOT 0.3 03/23/2022    PROT 5.6 (L) 03/23/2022     Lab Results   Component Value Date    INR 1.08 03/25/2022    INR 1.68 (H) 03/24/2022    INR 2.47 (H) 03/23/2022    PROTIME 12.3 03/25/2022    PROTIME 19.4 (H) 03/24/2022    PROTIME 29.0 (H) 03/23/2022

## 2022-03-25 NOTE — PROGRESS NOTES
Nutrition Note    RECOMMENDATIONS  PO Diet: NPO  ONS: NPO  Nutrition Support:   1. Recommend dobhoff replacement with EN to resume s/p tracheostomy if appropriate per ENT. 2. Continue to recommend Jevity 1.5 (standard formula with fiber) at goal rate 45 mL per hour to provide 1080 mL total volume, 1620 calories, 69 grams protein, 821 mL free water. 3. Recommend water flush 30 mL q 4 hours for tube maintenance given IV fluids at 125 mL per hour. 4. Recommend 1 Bottle Proteinex P2Go daily via syringe. Flush with 30 mL H20 before and after. Proteinex P2Go should not be mixed directly with the tube feeding formula. This provides an additional 104 calories and 26 grams protein daily. 5. Ensure head of bed is 30 - 45 degrees during continuous or bolus gastric feeding and for one hour after bolus. Turn off the feeding if head of bed is lowered less than 30 degrees. 6. Monitor for tolerance (bowel habits, N/V, cramping, abdominal exam findings: distended, firm, tense, guarded, discomfort). NUTRITION ASSESSMENT   Pt remains intubated and sedated. Propofol infusing at 24 mL per hour to provide 634 calories from fat daily. On Nimbex. Pt was tolerating Jevity 1.5 at goal rate 45 mL per hour with 1 Proteinex P2Go daily. However, pt self-extubated and was re-intubated yesterday and Dobhoff was lost at that time. NPO for trach today; plan to replace dobhoff and resume EN post-op per Dr. Elda Mejias.  Nutrition Related Findings: +1 non-pitting RLE edema. +2 non-pitting LLE edema. LBM 3/24. +7.4 liters.    Wounds: Diabetic Ulcer (bilateral ankles)   Nutrition Education:  Education not indicated    Nutrition Goals: Pt will tolerate EN at goal     MALNUTRITION ASSESSMENT   Malnutrition Status: No malnutrition    NUTRITION DIAGNOSIS   · Inadequate oral intake related to impaired respiratory function as evidenced by intubation    CURRENT NUTRITION THERAPIES  Diet NPO  ADULT TUBE FEEDING; Nasogastric; Standard with Fiber; Continuous; 20; Yes; 25; Q 4 hours; 45; 30; Q 4 hours; Protein; Administer 1 Proteinex P2Go daily. Flush with 30 mL water before and after. PO Intake: NPO   PO Supplement Intake:NPO    ANTHROPOMETRICS   Current Height: 5' 8\" (172.7 cm)   Current Weight: 176 lb 9.4 oz (80.1 kg)     Admission weight: 163 lb 2.3 oz (74 kg)   Ideal Body Weight (IBW): 154 lbs  (70 kg)        BMI: 26.9      COMPARATIVE STANDARDS  Energy (kcal):  3237-6450     Protein (g):   grams       Fluid (ml/day):  0392-4241 mL    The patient will be monitored per nutrition standards of care. Consult dietitian if additional nutrition interventions are needed prior to RD reassessment.      Jaydon Riley, [address] [place] [address],     Contact: [phone]

## 2022-03-25 NOTE — PLAN OF CARE
Nutrition Problem #1: Inadequate oral intake  Intervention: Food and/or Nutrient Delivery: Continue Current Tube Feeding  Nutritional Goals: Pt will tolerate EN at goal

## 2022-03-25 NOTE — PROGRESS NOTES
MD contacted about increased Bp. This Rn requested input about current sedation settings. MD stated sedation was adequate. Prn hydrolozine ordered and effective.

## 2022-03-25 NOTE — PROGRESS NOTES
Pt paralyzed on the vent. Restraints removed. Pt bathed and assessed per protocol. PT appears stable with a BP that is trending up. Pt tolerated vent/turns well on current sedation.

## 2022-03-25 NOTE — PROGRESS NOTES
Patient transferred straight from the OR back to ICU. New trach in place, secure, VSS. Hand off given from OR team to ICU who resumed care.

## 2022-03-26 ENCOUNTER — APPOINTMENT (OUTPATIENT)
Dept: GENERAL RADIOLOGY | Age: 62
DRG: 004 | End: 2022-03-26
Payer: COMMERCIAL

## 2022-03-26 LAB
ALBUMIN SERPL-MCNC: 2.7 G/DL (ref 3.4–5)
ANION GAP SERPL CALCULATED.3IONS-SCNC: 11 MMOL/L (ref 3–16)
ANION GAP SERPL CALCULATED.3IONS-SCNC: 17 MMOL/L (ref 3–16)
BASE EXCESS ARTERIAL: -2.3 MMOL/L (ref -3–3)
BASOPHILS ABSOLUTE: 0.1 K/UL (ref 0–0.2)
BASOPHILS RELATIVE PERCENT: 0.7 %
BUN BLDV-MCNC: 10 MG/DL (ref 7–20)
BUN BLDV-MCNC: 9 MG/DL (ref 7–20)
CALCIUM SERPL-MCNC: 7.8 MG/DL (ref 8.3–10.6)
CALCIUM SERPL-MCNC: 8.2 MG/DL (ref 8.3–10.6)
CARBOXYHEMOGLOBIN ARTERIAL: 1.2 % (ref 0–1.5)
CHLORIDE BLD-SCNC: 102 MMOL/L (ref 99–110)
CHLORIDE BLD-SCNC: 106 MMOL/L (ref 99–110)
CO2: 14 MMOL/L (ref 21–32)
CO2: 22 MMOL/L (ref 21–32)
CREAT SERPL-MCNC: 0.6 MG/DL (ref 0.8–1.3)
CREAT SERPL-MCNC: 0.6 MG/DL (ref 0.8–1.3)
EOSINOPHILS ABSOLUTE: 0 K/UL (ref 0–0.6)
EOSINOPHILS RELATIVE PERCENT: 0.1 %
GFR AFRICAN AMERICAN: >60
GFR AFRICAN AMERICAN: >60
GFR NON-AFRICAN AMERICAN: >60
GFR NON-AFRICAN AMERICAN: >60
GLUCOSE BLD-MCNC: 109 MG/DL (ref 70–99)
GLUCOSE BLD-MCNC: 125 MG/DL (ref 70–99)
GLUCOSE BLD-MCNC: 140 MG/DL (ref 70–99)
GLUCOSE BLD-MCNC: 146 MG/DL (ref 70–99)
GLUCOSE BLD-MCNC: 184 MG/DL (ref 70–99)
GLUCOSE BLD-MCNC: 79 MG/DL (ref 70–99)
GLUCOSE BLD-MCNC: 89 MG/DL (ref 70–99)
GLUCOSE BLD-MCNC: 98 MG/DL (ref 70–99)
HCO3 ARTERIAL: 22 MMOL/L (ref 21–29)
HCT VFR BLD CALC: 40.2 % (ref 40.5–52.5)
HEMOGLOBIN, ART, EXTENDED: 13.4 G/DL (ref 13.5–17.5)
HEMOGLOBIN: 13.7 G/DL (ref 13.5–17.5)
INR BLD: 1.01 (ref 0.88–1.12)
LYMPHOCYTES ABSOLUTE: 0.7 K/UL (ref 1–5.1)
LYMPHOCYTES RELATIVE PERCENT: 9.8 %
MAGNESIUM: 1.7 MG/DL (ref 1.8–2.4)
MCH RBC QN AUTO: 30.5 PG (ref 26–34)
MCHC RBC AUTO-ENTMCNC: 34 G/DL (ref 31–36)
MCV RBC AUTO: 89.8 FL (ref 80–100)
METHEMOGLOBIN ARTERIAL: 0.3 %
MONOCYTES ABSOLUTE: 0.3 K/UL (ref 0–1.3)
MONOCYTES RELATIVE PERCENT: 3.9 %
NEUTROPHILS ABSOLUTE: 6.4 K/UL (ref 1.7–7.7)
NEUTROPHILS RELATIVE PERCENT: 85.5 %
O2 SAT, ARTERIAL: 88.7 %
O2 THERAPY: ABNORMAL
PCO2 ARTERIAL: 35.5 MMHG (ref 35–45)
PDW BLD-RTO: 17.6 % (ref 12.4–15.4)
PERFORMED ON: ABNORMAL
PERFORMED ON: NORMAL
PH ARTERIAL: 7.4 (ref 7.35–7.45)
PHOSPHORUS: 3.8 MG/DL (ref 2.5–4.9)
PLATELET # BLD: 222 K/UL (ref 135–450)
PMV BLD AUTO: 8.3 FL (ref 5–10.5)
PO2 ARTERIAL: 59.3 MMHG (ref 75–108)
POTASSIUM SERPL-SCNC: 3.8 MMOL/L (ref 3.5–5.1)
POTASSIUM SERPL-SCNC: 4 MMOL/L (ref 3.5–5.1)
PROCALCITONIN: 0.06 NG/ML (ref 0–0.15)
PROCALCITONIN: 0.21 NG/ML (ref 0–0.15)
PROTHROMBIN TIME: 11.4 SEC (ref 9.9–12.7)
RBC # BLD: 4.48 M/UL (ref 4.2–5.9)
SODIUM BLD-SCNC: 133 MMOL/L (ref 136–145)
SODIUM BLD-SCNC: 139 MMOL/L (ref 136–145)
TCO2 ARTERIAL: 51.7 MMOL/L
WBC # BLD: 7.5 K/UL (ref 4–11)

## 2022-03-26 PROCEDURE — 94003 VENT MGMT INPAT SUBQ DAY: CPT

## 2022-03-26 PROCEDURE — 6370000000 HC RX 637 (ALT 250 FOR IP): Performed by: INTERNAL MEDICINE

## 2022-03-26 PROCEDURE — 93005 ELECTROCARDIOGRAM TRACING: CPT | Performed by: INTERNAL MEDICINE

## 2022-03-26 PROCEDURE — 6360000002 HC RX W HCPCS: Performed by: INTERNAL MEDICINE

## 2022-03-26 PROCEDURE — 87040 BLOOD CULTURE FOR BACTERIA: CPT

## 2022-03-26 PROCEDURE — C1751 CATH, INF, PER/CENT/MIDLINE: HCPCS

## 2022-03-26 PROCEDURE — 99291 CRITICAL CARE FIRST HOUR: CPT | Performed by: INTERNAL MEDICINE

## 2022-03-26 PROCEDURE — 94640 AIRWAY INHALATION TREATMENT: CPT

## 2022-03-26 PROCEDURE — 94761 N-INVAS EAR/PLS OXIMETRY MLT: CPT

## 2022-03-26 PROCEDURE — 2500000003 HC RX 250 WO HCPCS: Performed by: INTERNAL MEDICINE

## 2022-03-26 PROCEDURE — 82803 BLOOD GASES ANY COMBINATION: CPT

## 2022-03-26 PROCEDURE — 2580000003 HC RX 258: Performed by: INTERNAL MEDICINE

## 2022-03-26 PROCEDURE — 2000000000 HC ICU R&B

## 2022-03-26 PROCEDURE — 84145 PROCALCITONIN (PCT): CPT

## 2022-03-26 PROCEDURE — 85610 PROTHROMBIN TIME: CPT

## 2022-03-26 PROCEDURE — 02HV33Z INSERTION OF INFUSION DEVICE INTO SUPERIOR VENA CAVA, PERCUTANEOUS APPROACH: ICD-10-PCS | Performed by: RADIOLOGY

## 2022-03-26 PROCEDURE — 71045 X-RAY EXAM CHEST 1 VIEW: CPT

## 2022-03-26 PROCEDURE — 80069 RENAL FUNCTION PANEL: CPT

## 2022-03-26 PROCEDURE — 83735 ASSAY OF MAGNESIUM: CPT

## 2022-03-26 PROCEDURE — 36415 COLL VENOUS BLD VENIPUNCTURE: CPT

## 2022-03-26 PROCEDURE — 2580000003 HC RX 258: Performed by: ANESTHESIOLOGY

## 2022-03-26 PROCEDURE — 85025 COMPLETE CBC W/AUTO DIFF WBC: CPT

## 2022-03-26 PROCEDURE — 36569 INSJ PICC 5 YR+ W/O IMAGING: CPT

## 2022-03-26 PROCEDURE — 2700000000 HC OXYGEN THERAPY PER DAY

## 2022-03-26 RX ORDER — MAGNESIUM SULFATE IN WATER 40 MG/ML
2000 INJECTION, SOLUTION INTRAVENOUS ONCE
Status: COMPLETED | OUTPATIENT
Start: 2022-03-27 | End: 2022-03-27

## 2022-03-26 RX ORDER — NOREPINEPHRINE BIT/0.9 % NACL 16MG/250ML
1-100 INFUSION BOTTLE (ML) INTRAVENOUS CONTINUOUS
Status: DISCONTINUED | OUTPATIENT
Start: 2022-03-26 | End: 2022-04-02 | Stop reason: HOSPADM

## 2022-03-26 RX ORDER — SODIUM CHLORIDE 0.9 % (FLUSH) 0.9 %
5-40 SYRINGE (ML) INJECTION EVERY 12 HOURS SCHEDULED
Status: DISCONTINUED | OUTPATIENT
Start: 2022-03-26 | End: 2022-04-02 | Stop reason: HOSPADM

## 2022-03-26 RX ORDER — SODIUM CHLORIDE 0.9 % (FLUSH) 0.9 %
5-40 SYRINGE (ML) INJECTION PRN
Status: DISCONTINUED | OUTPATIENT
Start: 2022-03-26 | End: 2022-04-02 | Stop reason: HOSPADM

## 2022-03-26 RX ORDER — LIDOCAINE HYDROCHLORIDE 10 MG/ML
5 INJECTION, SOLUTION EPIDURAL; INFILTRATION; INTRACAUDAL; PERINEURAL ONCE
Status: DISCONTINUED | OUTPATIENT
Start: 2022-03-26 | End: 2022-04-02 | Stop reason: HOSPADM

## 2022-03-26 RX ORDER — NOREPINEPHRINE BIT/0.9 % NACL 16MG/250ML
INFUSION BOTTLE (ML) INTRAVENOUS
Status: DISPENSED
Start: 2022-03-26 | End: 2022-03-27

## 2022-03-26 RX ORDER — 0.9 % SODIUM CHLORIDE 0.9 %
500 INTRAVENOUS SOLUTION INTRAVENOUS ONCE
Status: COMPLETED | OUTPATIENT
Start: 2022-03-26 | End: 2022-03-26

## 2022-03-26 RX ORDER — SODIUM CHLORIDE 9 MG/ML
25 INJECTION, SOLUTION INTRAVENOUS PRN
Status: DISCONTINUED | OUTPATIENT
Start: 2022-03-26 | End: 2022-04-02 | Stop reason: HOSPADM

## 2022-03-26 RX ORDER — FUROSEMIDE 10 MG/ML
40 INJECTION INTRAMUSCULAR; INTRAVENOUS ONCE
Status: COMPLETED | OUTPATIENT
Start: 2022-03-26 | End: 2022-03-26

## 2022-03-26 RX ORDER — POTASSIUM CHLORIDE 7.45 MG/ML
10 INJECTION INTRAVENOUS
Status: ACTIVE | OUTPATIENT
Start: 2022-03-27 | End: 2022-03-27

## 2022-03-26 RX ORDER — IPRATROPIUM BROMIDE AND ALBUTEROL SULFATE 2.5; .5 MG/3ML; MG/3ML
3 SOLUTION RESPIRATORY (INHALATION) 3 TIMES DAILY
Status: DISCONTINUED | OUTPATIENT
Start: 2022-03-26 | End: 2022-04-02 | Stop reason: HOSPADM

## 2022-03-26 RX ADMIN — INSULIN LISPRO 1 UNITS: 100 INJECTION, SOLUTION INTRAVENOUS; SUBCUTANEOUS at 19:54

## 2022-03-26 RX ADMIN — GENTAMICIN SULFATE: 1 CREAM TOPICAL at 21:39

## 2022-03-26 RX ADMIN — VANCOMYCIN HYDROCHLORIDE 2000 MG: 1 INJECTION, POWDER, LYOPHILIZED, FOR SOLUTION INTRAVENOUS at 14:27

## 2022-03-26 RX ADMIN — CEFEPIME HYDROCHLORIDE 2000 MG: 2 INJECTION, POWDER, FOR SOLUTION INTRAVENOUS at 17:28

## 2022-03-26 RX ADMIN — SODIUM CHLORIDE 2 MCG/KG/MIN: 9 INJECTION, SOLUTION INTRAVENOUS at 08:05

## 2022-03-26 RX ADMIN — IPRATROPIUM BROMIDE AND ALBUTEROL SULFATE 3 ML: .5; 2.5 SOLUTION RESPIRATORY (INHALATION) at 20:14

## 2022-03-26 RX ADMIN — Medication 200 MCG/HR: at 00:04

## 2022-03-26 RX ADMIN — DEXAMETHASONE SODIUM PHOSPHATE 4 MG: 4 INJECTION, SOLUTION INTRAMUSCULAR; INTRAVENOUS at 21:22

## 2022-03-26 RX ADMIN — Medication 10 ML: at 20:05

## 2022-03-26 RX ADMIN — DEXAMETHASONE SODIUM PHOSPHATE 4 MG: 4 INJECTION, SOLUTION INTRAMUSCULAR; INTRAVENOUS at 11:16

## 2022-03-26 RX ADMIN — Medication 10 ML: at 12:37

## 2022-03-26 RX ADMIN — IPRATROPIUM BROMIDE AND ALBUTEROL SULFATE 3 ML: .5; 3 SOLUTION RESPIRATORY (INHALATION) at 07:18

## 2022-03-26 RX ADMIN — IPRATROPIUM BROMIDE AND ALBUTEROL SULFATE 3 ML: .5; 2.5 SOLUTION RESPIRATORY (INHALATION) at 12:14

## 2022-03-26 RX ADMIN — POLYETHYLENE GLYCOL 3350 17 G: 17 POWDER, FOR SOLUTION ORAL at 12:34

## 2022-03-26 RX ADMIN — BUDESONIDE 500 MCG: 0.5 SUSPENSION RESPIRATORY (INHALATION) at 20:14

## 2022-03-26 RX ADMIN — SODIUM CHLORIDE: 9 INJECTION, SOLUTION INTRAVENOUS at 20:29

## 2022-03-26 RX ADMIN — Medication 10 ML: at 08:07

## 2022-03-26 RX ADMIN — PROPOFOL 55 MCG/KG/MIN: 10 INJECTION, EMULSION INTRAVENOUS at 07:55

## 2022-03-26 RX ADMIN — VANCOMYCIN HYDROCHLORIDE 1250 MG: 10 INJECTION, POWDER, LYOPHILIZED, FOR SOLUTION INTRAVENOUS at 21:24

## 2022-03-26 RX ADMIN — SODIUM CHLORIDE, PRESERVATIVE FREE 20 MG: 5 INJECTION INTRAVENOUS at 08:07

## 2022-03-26 RX ADMIN — Medication 4 MCG/MIN: at 16:37

## 2022-03-26 RX ADMIN — ENOXAPARIN SODIUM 40 MG: 40 INJECTION SUBCUTANEOUS at 20:05

## 2022-03-26 RX ADMIN — SODIUM CHLORIDE: 9 INJECTION, SOLUTION INTRAVENOUS at 11:22

## 2022-03-26 RX ADMIN — ARFORMOTEROL TARTRATE 15 MCG: 15 SOLUTION RESPIRATORY (INHALATION) at 20:14

## 2022-03-26 RX ADMIN — Medication 5 MG/HR: at 08:04

## 2022-03-26 RX ADMIN — SENNOSIDES AND DOCUSATE SODIUM 2 TABLET: 50; 8.6 TABLET ORAL at 12:34

## 2022-03-26 RX ADMIN — FUROSEMIDE 40 MG: 10 INJECTION, SOLUTION INTRAMUSCULAR; INTRAVENOUS at 11:16

## 2022-03-26 RX ADMIN — SODIUM CHLORIDE 500 ML: 9 INJECTION, SOLUTION INTRAVENOUS at 15:49

## 2022-03-26 RX ADMIN — PROPOFOL 55 MCG/KG/MIN: 10 INJECTION, EMULSION INTRAVENOUS at 23:13

## 2022-03-26 RX ADMIN — SODIUM CHLORIDE, PRESERVATIVE FREE 20 MG: 5 INJECTION INTRAVENOUS at 20:05

## 2022-03-26 RX ADMIN — GENTAMICIN SULFATE: 1 CREAM TOPICAL at 08:08

## 2022-03-26 RX ADMIN — ACETAMINOPHEN 325MG 650 MG: 325 TABLET ORAL at 12:38

## 2022-03-26 RX ADMIN — PROPOFOL 55 MCG/KG/MIN: 10 INJECTION, EMULSION INTRAVENOUS at 20:03

## 2022-03-26 RX ADMIN — NALOXEGOL OXALATE 25 MG: 25 TABLET, FILM COATED ORAL at 12:34

## 2022-03-26 RX ADMIN — PROPOFOL 55 MCG/KG/MIN: 10 INJECTION, EMULSION INTRAVENOUS at 00:06

## 2022-03-26 RX ADMIN — PROPOFOL 55 MCG/KG/MIN: 10 INJECTION, EMULSION INTRAVENOUS at 11:21

## 2022-03-26 RX ADMIN — PROPOFOL 55 MCG/KG/MIN: 10 INJECTION, EMULSION INTRAVENOUS at 03:10

## 2022-03-26 RX ADMIN — GENTAMICIN SULFATE: 1 CREAM TOPICAL at 16:20

## 2022-03-26 RX ADMIN — PROPOFOL 55 MCG/KG/MIN: 10 INJECTION, EMULSION INTRAVENOUS at 15:59

## 2022-03-26 RX ADMIN — Medication 200 MCG/HR: at 12:53

## 2022-03-26 RX ADMIN — SODIUM CHLORIDE: 9 INJECTION, SOLUTION INTRAVENOUS at 02:06

## 2022-03-26 RX ADMIN — ARFORMOTEROL TARTRATE 15 MCG: 15 SOLUTION RESPIRATORY (INHALATION) at 07:18

## 2022-03-26 RX ADMIN — BUDESONIDE 500 MCG: 0.5 SUSPENSION RESPIRATORY (INHALATION) at 07:18

## 2022-03-26 ASSESSMENT — PULMONARY FUNCTION TESTS
PIF_VALUE: 25

## 2022-03-26 ASSESSMENT — PAIN SCALES - GENERAL
PAINLEVEL_OUTOF10: 0

## 2022-03-26 NOTE — PROGRESS NOTES
Order for PICC line per Dr. Issa Saavedra MD. Pre procedure and timeout done with pt's RN. Successful insertion of a Triple lumen PICC line into pt's right basilic vein. No issues gaining access or advancing guidewire/introducer/PICC line. PICC tip terminates in the SVC according to [employer] 3CG tip confirmation system. PICC was seen dropping into SVC with tip tracking technology and discernable peaked p waves were noted without negative deflection. A printout will be in pt's chart. PICC is cut at 38 cm and out externally 0 cm. All lumens flush without resistance and draw back brisk blood return. PICC site CDI with hemostasis maintained and a biopatch applied to site. Pt instructed to stay in bed and keep arm flat and still for 30 minutes  to promote hemostasis.      Army Luis M CURRY given handoff report

## 2022-03-26 NOTE — PROGRESS NOTES
Was instructed to decrease Nimbex by 1/2 the current dose by Dr. Sherren Prayer. After around 20 minutes, patient was reaching for ETT mildly aggressive. Heart rate jumped from 70 to 135 bpm, and temperature went from 99.5 to 100.7 degrees F. Dr. Sherren Prayer was contacted, who instructed me to put Nimbex back at previous rate.

## 2022-03-26 NOTE — PROGRESS NOTES
Reassessment complete, vitals obtained. Temp improved, warming blanket off. Fio2 increased to 35 % d/t desatting to mid 80s. Bathed. Repositioned.

## 2022-03-26 NOTE — PROGRESS NOTES
Hospitalist Progress Note      PCP: Kiran Salcedo MD    Date of Admission: 3/22/2022    Chief Complaint: SOB    Hospital Course: Becca Hutchison a 65 y. o. male has a history of COPD but states tonight he coughed up some blood and then became tight in the chest and more short of breath he now presents with soreness to the throat minimal stridor,states he is on 2 L at home requiring 4 L here. Based on the stridor he had a soft tissue ct scan of the neck which showed a laryngeal mass. Later in his ED stay he began to get more obtunded and started drooling. There was concern for the air way to be closing and he was intubated with the help of anesthesia and ENT. At the time if my eval he was on the vent but not very well sedated. I increased the propofol and added fentanyl.        Subjective:   Trach yesterday  Spiking fever today  HR up  Added maxipime. Blood pressure is low.          Medications:  Reviewed    Infusion Medications    sodium chloride      sodium chloride 125 mL/hr at 03/26/22 1122    cisatracurium (NIMBEX) infusion 2 mcg/kg/min (03/26/22 1427)    midazolam 5 mg/hr (03/26/22 1427)    fentaNYL Citrate-NaCl 200 mcg/hr (03/26/22 1253)    propofol 55 mcg/kg/min (03/26/22 1121)    sodium chloride      fentaNYL 200 mcg/hr (03/25/22 1511)    dextrose      sodium chloride 125 mL/hr at 03/26/22 0206     Scheduled Medications    ipratropium-albuterol  3 mL Inhalation TID    lidocaine 1 % injection  5 mL IntraDERmal Once    sodium chloride flush  5-40 mL IntraVENous 2 times per day    vancomycin  2,000 mg IntraVENous Once    cefepime  2,000 mg IntraVENous Q8H    vancomycin  1,250 mg IntraVENous Q12H    norepinephrine-sodium chloride        sodium chloride  500 mL IntraVENous Once    enoxaparin  40 mg SubCUTAneous Nightly    naloxegol  25 mg Oral QAM    dexamethasone  4 mg IntraVENous Q12H    polyethylene glycol  17 g Oral Daily    sennosides-docusate sodium  2 tablet Oral Daily    gentamicin   Topical TID    insulin lispro  0-6 Units SubCUTAneous Q4H    succinylcholine  140 mg IntraVENous Once    sodium chloride flush  5-40 mL IntraVENous 2 times per day    famotidine (PEPCID) injection  20 mg IntraVENous BID    budesonide  0.5 mg Nebulization BID    Arformoterol Tartrate  15 mcg Nebulization BID    scopolamine  1 patch TransDERmal Q72H     PRN Meds: sodium chloride flush, sodium chloride, hydrALAZINE, racepinephrine HCl, sodium chloride nebulizer, albuterol sulfate HFA, sodium chloride flush, sodium chloride, ondansetron **OR** ondansetron, acetaminophen **OR** acetaminophen, glucose, glucagon (rDNA), dextrose, dextrose bolus (hypoglycemia) **OR** dextrose bolus (hypoglycemia)      Intake/Output Summary (Last 24 hours) at 3/26/2022 1555  Last data filed at 3/26/2022 1427  Gross per 24 hour   Intake 4310.53 ml   Output 3800 ml   Net 510.53 ml       Physical Exam Performed:    /73   Pulse 135   Temp 97.6 °F (36.4 °C) (Core)   Resp 16   Ht 5' 8\" (1.727 m)   Wt 176 lb 12.9 oz (80.2 kg)   SpO2 93%   BMI 26.88 kg/m²     General appearance: No apparent distress appears stated age and cooperative. Intubated and sedated. HEENT Normal cephalic, atraumatic without obvious deformity. Pupils equal, round, and reactive to light. Extra ocular muscles intact. Conjunctivae/corneas clear. Neck: Supple, No jugular venous distention/bruits. Trachea midline without thyromegaly or adenopathy with full range of motion. Lungs: Clear to auscultation, bilaterally without Rales/Wheezes/Rhonchi with good respiratory effort. Heart: Regular rate and rhythm with Normal S1/S2 without murmurs, rubs or gallops, point of maximum impulse non-displaced  Abdomen: Soft, non-tender or non-distended without rigidity or guarding and positive bowel sounds all four quadrants. Extremities: No clubbing, cyanosis, or edema bilaterally. Full range of motion without deformity gait not tested.   Skin: Skin color, texture, turgor normal.  No rashes or lesions. Neurologic:   Cranial nerves: II-XII intact, grossly non-focal.  Mental status: sedated. Capillary Refill: Acceptable  < 3 seconds  Peripheral Pulses: +3 Easily felt, not easily obliterated with pressure    Labs:   Recent Labs     03/24/22  0835 03/25/22  0407 03/26/22  0953   WBC 9.5 8.3 7.5   HGB 11.6* 12.4* 13.7   HCT 36.1* 38.6* 40.2*    193 222     Recent Labs     03/24/22  0835 03/25/22  0407 03/26/22  0953    138 133*   K 4.1 4.7 4.0    106 102   CO2 23 20* 14*   BUN 12 7 9   CREATININE 0.6* <0.5* 0.6*   CALCIUM 8.5 8.8 7.8*     No results for input(s): AST, ALT, BILIDIR, BILITOT, ALKPHOS in the last 72 hours. Recent Labs     03/24/22  0436 03/25/22  0407 03/26/22  0324   INR 1.68* 1.08 1.01     No results for input(s): CKTOTAL, TROPONINI in the last 72 hours. Urinalysis:      Lab Results   Component Value Date    NITRU Negative 02/02/2022    WBCUA 1 02/02/2022    RBCUA 1 02/02/2022    BLOODU Negative 02/02/2022    SPECGRAV 1.021 02/02/2022    GLUCOSEU Negative 02/02/2022       Radiology:  XR CHEST PORTABLE   Preliminary Result   1. Stable position of enteric feeding tube. 2. Interval progression of a small right pleural effusion and right basilar   airspace consolidation. XR ABDOMEN FOR NG/OG/NE TUBE PLACEMENT   Final Result   Enteric tube with distal end reaching the gastric body on 1 of 3 images. The   other 2 images demonstrate tip of enteric tube at the level of the   midesophagus. XR CHEST PORTABLE   Final Result   1. Opacity right lower lung compatible with pleural effusion and relaxation   atelectasis versus pneumonia. 2. Pulmonary sequela typical of that seen with smoking, including COPD;   correlate with clinical history. 3. Unchanged tracheostomy tube positioning. Dobbhoff type feeding tube   extends below the left hemidiaphragm, out of field of view.          XR CHEST PORTABLE   Final Result Supportive tubing is in normal position. Bibasilar opacities, atelectasis or airspace disease. There is mild   progression on the right side in the interval.         XR CHEST PORTABLE   Final Result   Mild dependent changes in the lower lung fields, atelectasis versus   infiltrate. Satisfactory position of endotracheal tube. XR ABDOMEN FOR NG/OG/NE TUBE PLACEMENT   Final Result   Tip of feeding tube projects in region of gastric fundus         XR CHEST PORTABLE   Final Result   1. Properly placed endotracheal tube. 2.  Mild bibasilar atelectasis         CT SOFT TISSUE NECK W CONTRAST   Final Result   Lobulated large mass wrapping the left side of the hypopharynx extending into   supraglottic and glottic portions of the larynx measuring approximately 3.5   cm diameter with a craniocaudad extent of 4.9 cm. The mass appears to   severely narrow the airway and could potentially make for difficult   intubation. Findings compatible with squamous cell carcinoma. Mild to moderate emphysema. XR CHEST PORTABLE   Final Result   Chronic findings in the chest without acute airspace disease identified. Assessment/Plan:    Active Hospital Problems    Diagnosis     Laryngeal mass [J38.7]     Acute hypercapnic respiratory failure (HCC) [J96.02]     Centrilobular emphysema (HCC) [J43.2]        1. 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 - consult to ENT and to pulmonary critical care. - will need biopsy and definitive dx before tx can be considered. - 3.5 cm left laryngeal mass. -s/p trach  No bleeding but what looks like pus is coming from the mouth     2. Acute hypercapnic respiratory failure  - a combination of COPD and this mass, nearly blocking his airway. - after some time in the vent the ABG showed the PCO2 in the 70s so it was likely higher.  - currently vented.   - no wheeze but may benefit from steroids if there is any swelling in the laryngeal region.  -started on decadron. S/p trach  Remains vented and sedated.     Spiking temp  - concern for infection  - started on Maxipime  - IVF bolus  - May need pressor if blood pressure remains low.     He has good peripheral access but may need  A central line in the future. DVT Prophylaxis: was on coumadin and did get lovenox. ALL AC NOW ON HOLD FOR PROCEDURE. Diet: Diet NPO  ADULT TUBE FEEDING; Nasogastric; Standard with Fiber; Continuous; 20; Yes; 25; Q 4 hours; 45; 30; Q 4 hours; Protein; Administer 1 Proteinex P2Go daily. Flush with 30 mL water before and after. Code Status: Full Code    PT/OT Eval Status: when appropriate     Dispo -not doing so well today. Blood pressure has dropped. He is looking like sepsis he has been started on Maxipime. Getting bolus. transfer to  for definitive treatment of the mass. This will likely occur early next week. 3 5 minutes of critical care time spent.      Jo Guerra MD

## 2022-03-26 NOTE — PROGRESS NOTES
Assessment complete, vitals obtained. SB upper 40s. Oral temp 95. 2. page to MD for warming blanket. Lopez exchanged for temp sensing lopez via sterile technique & aggie huger placed. Core temp 94. 4. pt sedated / paralyzed on vent. Propofol, Fentanyl, Versed, nimbex and NS infusing per MAR. RASS -5, CPOT 0. New trach, AC 16 / PC 20 / peep 5 / Fio2 30%. Rhonchi. Ab soft, rounded. + bowel sounds. Dobhoff in place Left nare @ 70 cm, awaiting placement confirmation. Skin warm, dry. ulcers to BLE, wrapped. Dressing C/D/I. Lopez draining clear yellow urine. Due meds given, see MAR.

## 2022-03-26 NOTE — PROGRESS NOTES
03/26/22 0720   Vent Patient Data   Plateau Pressure 24 [id number]   Static Compliance 35.5 mL/cmH2O   Dynamic Compliance 34 mL/cmH2O

## 2022-03-26 NOTE — PROGRESS NOTES
Patient had an increase in temperature to 102 degrees F, HR from 70 to 140, and BP became soft with the lowest MAP observed to be 58 mmHg. Of note, patient also had a new onset of brownish-red puss coming out of his mouth. I contacted both Dr. Sherren Prayer and Dr. Josafat Watters. Was instructed to give 500 cc bolus, start levo if needed, and that ENT would be contacted by Dr. Josafat Watters.

## 2022-03-26 NOTE — PROGRESS NOTES
[facility] Pulmonary and Critical Care  Progress note      Reason for Consult: Respiratory failure, neck mass, COPD  Requesting Physician: Dr. Guilherme Wen:   [address] / HPI:                The patient is a 65 y.o. male with significant past medical history of:      Diagnosis Date    Diabetes mellitus ([facility] [address]     Fibromyalgia     History of DVT (deep vein thrombosis)     Hyperlipidemia     Hypertension     Type 2 diabetes mellitus with circulatory disorder, without long-term current use of insulin ([facility] [address] 2/18/2022     Interval history: Patient did have his tracheostomy and biopsy yesterday. Seems to have tolerated the procedure well. Remains on sedation and neuromuscular blockade. Has developed some worsening hypoxemia, however.       Past Surgical History:        Procedure Laterality Date    APPENDECTOMY  2005    COLONOSCOPY  2016    FEMORAL BYPASS Left 02/26/2020    femoral posterior tibial bypass    FEMORAL-TIBIAL BYPASS GRAFT Right 12/11/2020    with femoral endarterectomy and patch angioplasty    FOOT DEBRIDEMENT Left 09/03/2020    FOOT DEBRIDEMENT Right 2/26/2021    DEBRIDEMENT OF RIGHT FOOT WOUND WITH GRAFT APPLICATION performed by Rebecca Stewart DPM at [facility] Right 12/08/2020    stent leg for PVD     Current Medications:    Current Facility-Administered Medications: ipratropium-albuterol (DUONEB) nebulizer solution 3 mL, 3 mL, Inhalation, TID  furosemide (LASIX) injection 40 mg, 40 mg, IntraVENous, Once  lidocaine PF 1 % injection 5 mL, 5 mL, IntraDERmal, Once  sodium chloride flush 0.9 % injection 5-40 mL, 5-40 mL, IntraVENous, 2 times per day  sodium chloride flush 0.9 % injection 5-40 mL, 5-40 mL, IntraVENous, PRN  0.9 % sodium chloride infusion, 25 mL, IntraVENous, PRN  hydrALAZINE (APRESOLINE) injection 10 mg, 10 mg, IntraVENous, Q6H PRN  enoxaparin (LOVENOX) injection 40 mg, 40 mg, SubCUTAneous, Nightly  0.9 % sodium chloride infusion, , IntraVENous, Continuous  naloxegol (MOVANTIK) tablet 25 mg, 25 mg, Oral, QAM  cisatracurium besylate (NIMBEX) 200 mg in sodium chloride 0.9 % 100 mL infusion, 0.5-10 mcg/kg/min, IntraVENous, Continuous  midazolam (VERSED) 1 mg/mL in D5W infusion, 1-10 mg/hr, IntraVENous, Continuous  fentaNYL (SUBLIMAZE) 2500 mcg in sodium chloride 0.9% 250 mL premix,  mcg/hr, IntraVENous, Continuous  dexamethasone (DECADRON) injection 4 mg, 4 mg, IntraVENous, Q12H  polyethylene glycol (GLYCOLAX) packet 17 g, 17 g, Oral, Daily  sennosides-docusate sodium (SENOKOT-S) 8.6-50 MG tablet 2 tablet, 2 tablet, Oral, Daily  propofol injection, 5-90 mcg/kg/min, IntraVENous, Continuous  gentamicin (GARAMYCIN) 0.1 % cream, , Topical, TID  insulin lispro (1 Unit Dial) 0-6 Units, 0-6 Units, SubCUTAneous, Q4H  racepinephrine HCl (VAPONEFPRIN) 2.25 % nebulizer solution NEBU 11.25 mg, 11.25 mg, Nebulization, Q4H PRN  sodium chloride nebulizer 0.9 % solution 3 mL, 3 mL, Nebulization, Q4H PRN  succinylcholine (ANECTINE) injection 140 mg, 140 mg, IntraVENous, Once  albuterol sulfate  (90 Base) MCG/ACT inhaler 2 puff, 2 puff, Inhalation, 4x Daily PRN  sodium chloride flush 0.9 % injection 5-40 mL, 5-40 mL, IntraVENous, 2 times per day  sodium chloride flush 0.9 % injection 5-40 mL, 5-40 mL, IntraVENous, PRN  0.9 % sodium chloride infusion, 25 mL, IntraVENous, PRN  ondansetron (ZOFRAN-ODT) disintegrating tablet 4 mg, 4 mg, Oral, Q8H PRN **OR** ondansetron (ZOFRAN) injection 4 mg, 4 mg, IntraVENous, Q6H PRN  acetaminophen (TYLENOL) tablet 650 mg, 650 mg, Oral, Q6H PRN **OR** acetaminophen (TYLENOL) suppository 650 mg, 650 mg, Rectal, Q6H PRN  famotidine (PEPCID) 20 mg in sodium chloride (PF) 10 mL injection, 20 mg, IntraVENous, BID  fentaNYL 10 mcg/mL infusion,  mcg/hr, IntraVENous, Continuous  budesonide (PULMICORT) nebulizer suspension 500 mcg, 0.5 mg, Nebulization, BID  Arformoterol Tartrate (BROVANA) nebulizer solution 15 mcg, 15 mcg, Nebulization, BID  scopolamine (TRANSDERM-SCOP) transdermal patch 1 patch, 1 patch, TransDERmal, Q72H  glucose (GLUTOSE) 40 % oral gel 15 g, 15 g, Oral, PRN  glucagon (rDNA) injection 1 mg, 1 mg, IntraMUSCular, PRN  dextrose 5 % solution, 100 mL/hr, IntraVENous, PRN  dextrose bolus (hypoglycemia) 10% 125 mL, 125 mL, IntraVENous, PRN **OR** dextrose bolus (hypoglycemia) 10% 250 mL, 250 mL, IntraVENous, PRN  0.9 % sodium chloride infusion, , IntraVENous, Continuous    Allergies   Allergen Reactions    Chantix [Varenicline] Other (See Comments)     Hallucinations         Social History:    TOBACCO:   reports that he quit smoking about 2 months ago. His smoking use included cigarettes. He started smoking about 44 years ago. He has a 10.00 pack-year smoking history. He has never used smokeless tobacco.  ETOH:   reports current alcohol use of about 6.0 standard drinks of alcohol per week. Patient currently lives independently  Environmental/chemical exposure: None known    Family History:       Problem Relation Age of Onset    Cancer Mother     Lung Cancer Mother     Diabetes Father     Stroke Father      REVIEW OF SYSTEMS:    ROS is unobtainable due to his critical illness. Objective:   PHYSICAL EXAM:      VITALS:  BP (!) 147/80   Pulse 69   Temp 97.6 °F (36.4 °C) (Core)   Resp 15   Ht 5' 8\" (1.727 m)   Wt 176 lb 12.9 oz (80.2 kg)   SpO2 94%   BMI 26.88 kg/m²      24HR INTAKE/OUTPUT:      Intake/Output Summary (Last 24 hours) at 3/26/2022 1019  Last data filed at 3/26/2022 0609  Gross per 24 hour   Intake 4023.76 ml   Output 2700 ml   Net 1323.76 ml     CONSTITUTIONAL: Sedated on mechanical ventilation  NECK:  Supple, symmetrical, trachea midline, no adenopathy, thyroid symmetric, not enlarged and no tenderness, skin normal  LUNGS:  no increased work of breathing and congested to auscultation.  No accessory muscle use  CARDIOVASCULAR: S1 and S2, no edema and no JVD  ABDOMEN:  normal bowel sounds, non-distended and no masses palpated, and no tenderness to palpation. No hepatospleenomegaly  LYMPHADENOPATHY:  no axillary or supraclavicular adenopathy. No cervical adnenopathy  PSYCHIATRIC: Sedated on mechanical ventilation. MUSCULOSKELETAL: No obvious misalignment or effusion of the joints. No clubbing, cyanosis of the digits. SKIN:  normal skin color, texture, turgor and no redness, warmth, or swelling. No palpable nodules    DATA:    Old records have been reviewed    CBC:  Recent Labs     03/24/22  0835 03/25/22  0407 03/26/22  0953   WBC 9.5 8.3 7.5   RBC 4.06* 4.28 4.48   HGB 11.6* 12.4* 13.7   HCT 36.1* 38.6* 40.2*    193 222   MCV 88.9 90.2 89.8   MCH 28.5 29.1 30.5   MCHC 32.1 32.2 34.0   RDW 16.9* 17.3* 17.6*      BMP:  Recent Labs     03/24/22  0835 03/25/22  0407    138   K 4.1 4.7    106   CO2 23 20*   BUN 12 7   CREATININE 0.6* <0.5*   CALCIUM 8.5 8.8   GLUCOSE 175* 133*      ABG:  Recent Labs     03/25/22  0208   PHART 7.480*   YXN0PEC 34.2*   PO2ART 100.0   SFG8FFY 25.5   [id number] 98.7   BEART 2.2     Procalcitonin  No results for input(s): PROCAL in the last 72 hours. No results found for: BNP  Lab Results   Component Value Date    CKTOTAL 81 02/01/2022    TROPONINI <0.01 03/22/2022           Radiology Review:  All pertinent images / reports were reviewed as a part of this visit. Assessment:     1. Acute respiratory failure due to upper airway compromise  2. 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 Severe COPD/centrilobular emphysema      Plan:     Patient's presentation is that of respiratory failure related to upper airway compromise from left neck mass which is likely a squamous cell carcinoma. He required intubation in the emergency department. He subsequently self extubated and had to be reintubated in the ICU. He was fortunate that both of these intubations went well. Now has tracheostomy in place.   Planning to keep him pretty sedated through the day today as well. If he tolerates, may try to lighten sedation tomorrow. We will try to wean off neuromuscular blockade slowly through the day today. Ultimate plan is for him to go to  for definitive treatment of his carcinoma. Continue present ventilator settings with FiO2 0.3 and PEEP 5. He is a little more hypoxemic  Repeat ABG and chest x-ray this morning  Give him Lasix 40 mg IV push x1  He has gained about 8 L of fluid since admission. The patient did have pulmonary function testing 1/26/2022 which revealed FEV1 of 1.83 L which is 63% predicted. FEV1/FVC was decreased and there was no response to inhaled bronchodilators consistent with a diagnosis of moderate obstructive lung disease. There was severe reduction in diffusion capacity which was only 37% predicted. Continue inhaled therapy  Does not seem to have COPD exacerbation  However, may benefit from steroid therapy for any element of swelling of the laryngeal mass. Decadron 4 mg every 12 hours. He had feeding tube replaced yesterday but there remains some question about appropriate placement based on a XR. We will advance the tube and repeat imaging. Total critical care time caring for this patient with life threatening illness, including direct patient contact, management of life support systems, review of data including imaging and labs, discussions with other team members and physicians is at least 32 minutes so far today, excluding procedures.

## 2022-03-26 NOTE — RT PROTOCOL NOTE
RT Nebulizer Bronchodilator Protocol Note    There is a bronchodilator order in the chart from a provider indicating to follow the RT Bronchodilator Protocol and there is an Initiate RT Bronchodilator Protocol order as well (see protocol at bottom of note). CXR Findings:  XR CHEST PORTABLE    Result Date: 3/25/2022  1. Opacity right lower lung compatible with pleural effusion and relaxation atelectasis versus pneumonia. 2. Pulmonary sequela typical of that seen with smoking, including COPD; correlate with clinical history. 3. Unchanged tracheostomy tube positioning. Dobbhoff type feeding tube extends below the left hemidiaphragm, out of field of view. XR CHEST PORTABLE    Result Date: 3/25/2022  Supportive tubing is in normal position. Bibasilar opacities, atelectasis or airspace disease. There is mild progression on the right side in the interval.     XR CHEST PORTABLE    Result Date: 3/24/2022  Mild dependent changes in the lower lung fields, atelectasis versus infiltrate. Satisfactory position of endotracheal tube. The findings from the last RT Protocol Assessment were as follows:  Smoking: Chronic pulmonary disease  Respiratory Pattern: Regular pattern and RR 12-20 bpm  Breath Sounds: Slightly diminished and/or crackles  Cough: Unable to generate effective cough  Indication for Bronchodilator Therapy: Decreased or absent breath sounds,Wheezing associated with pulm disorder  Bronchodilator Assessment Score: 8    Aerosolized bronchodilator medication orders have been revised according to the RT Nebulizer Bronchodilator Protocol below. Respiratory Therapist to perform RT Therapy Protocol Assessment initially then follow the protocol. Repeat RT Therapy Protocol Assessment PRN for score 0-3 or on second treatment, BID, and PRN for scores above 3.     No Indications - adjust the frequency to every 6 hours PRN wheezing or bronchospasm, if no treatments needed after 48 hours then discontinue using Per

## 2022-03-27 ENCOUNTER — APPOINTMENT (OUTPATIENT)
Dept: GENERAL RADIOLOGY | Age: 62
DRG: 004 | End: 2022-03-27
Payer: COMMERCIAL

## 2022-03-27 LAB
ANION GAP SERPL CALCULATED.3IONS-SCNC: 11 MMOL/L (ref 3–16)
BASE EXCESS ARTERIAL: -0.4 MMOL/L (ref -3–3)
BUN BLDV-MCNC: 10 MG/DL (ref 7–20)
CALCIUM SERPL-MCNC: 8.3 MG/DL (ref 8.3–10.6)
CARBOXYHEMOGLOBIN ARTERIAL: 1.4 % (ref 0–1.5)
CHLORIDE BLD-SCNC: 102 MMOL/L (ref 99–110)
CO2: 22 MMOL/L (ref 21–32)
CREAT SERPL-MCNC: 0.5 MG/DL (ref 0.8–1.3)
EKG ATRIAL RATE: 85 BPM
EKG DIAGNOSIS: NORMAL
EKG P AXIS: 77 DEGREES
EKG P-R INTERVAL: 160 MS
EKG Q-T INTERVAL: 380 MS
EKG QRS DURATION: 88 MS
EKG QTC CALCULATION (BAZETT): 452 MS
EKG R AXIS: -10 DEGREES
EKG T AXIS: 36 DEGREES
EKG VENTRICULAR RATE: 85 BPM
GFR AFRICAN AMERICAN: >60
GFR NON-AFRICAN AMERICAN: >60
GLUCOSE BLD-MCNC: 113 MG/DL (ref 70–99)
GLUCOSE BLD-MCNC: 132 MG/DL (ref 70–99)
GLUCOSE BLD-MCNC: 147 MG/DL (ref 70–99)
GLUCOSE BLD-MCNC: 154 MG/DL (ref 70–99)
GLUCOSE BLD-MCNC: 154 MG/DL (ref 70–99)
GLUCOSE BLD-MCNC: 165 MG/DL (ref 70–99)
GLUCOSE BLD-MCNC: 170 MG/DL (ref 70–99)
HCO3 ARTERIAL: 23.5 MMOL/L (ref 21–29)
HCT VFR BLD CALC: 34.8 % (ref 40.5–52.5)
HEMOGLOBIN, ART, EXTENDED: 11.3 G/DL (ref 13.5–17.5)
HEMOGLOBIN: 11.2 G/DL (ref 13.5–17.5)
INR BLD: 1.12 (ref 0.88–1.12)
MCH RBC QN AUTO: 27.9 PG (ref 26–34)
MCHC RBC AUTO-ENTMCNC: 32.2 G/DL (ref 31–36)
MCV RBC AUTO: 86.7 FL (ref 80–100)
METHEMOGLOBIN ARTERIAL: 0.3 %
O2 SAT, ARTERIAL: 94.9 %
O2 THERAPY: ABNORMAL
PCO2 ARTERIAL: 34.9 MMHG (ref 35–45)
PDW BLD-RTO: 16.6 % (ref 12.4–15.4)
PERFORMED ON: ABNORMAL
PH ARTERIAL: 7.44 (ref 7.35–7.45)
PLATELET # BLD: 223 K/UL (ref 135–450)
PMV BLD AUTO: 7.6 FL (ref 5–10.5)
PO2 ARTERIAL: 72.1 MMHG (ref 75–108)
POTASSIUM SERPL-SCNC: 3.9 MMOL/L (ref 3.5–5.1)
PROTHROMBIN TIME: 12.7 SEC (ref 9.9–12.7)
RBC # BLD: 4.02 M/UL (ref 4.2–5.9)
SODIUM BLD-SCNC: 135 MMOL/L (ref 136–145)
TCO2 ARTERIAL: 55 MMOL/L
VANCOMYCIN RANDOM: 13.4 UG/ML
WBC # BLD: 8.9 K/UL (ref 4–11)

## 2022-03-27 PROCEDURE — 87205 SMEAR GRAM STAIN: CPT

## 2022-03-27 PROCEDURE — 87077 CULTURE AEROBIC IDENTIFY: CPT

## 2022-03-27 PROCEDURE — 6370000000 HC RX 637 (ALT 250 FOR IP): Performed by: INTERNAL MEDICINE

## 2022-03-27 PROCEDURE — 80202 ASSAY OF VANCOMYCIN: CPT

## 2022-03-27 PROCEDURE — 2000000000 HC ICU R&B

## 2022-03-27 PROCEDURE — 6360000002 HC RX W HCPCS: Performed by: INTERNAL MEDICINE

## 2022-03-27 PROCEDURE — 80048 BASIC METABOLIC PNL TOTAL CA: CPT

## 2022-03-27 PROCEDURE — 36600 WITHDRAWAL OF ARTERIAL BLOOD: CPT

## 2022-03-27 PROCEDURE — 85610 PROTHROMBIN TIME: CPT

## 2022-03-27 PROCEDURE — 94003 VENT MGMT INPAT SUBQ DAY: CPT

## 2022-03-27 PROCEDURE — 2700000000 HC OXYGEN THERAPY PER DAY

## 2022-03-27 PROCEDURE — 87186 SC STD MICRODIL/AGAR DIL: CPT

## 2022-03-27 PROCEDURE — 93010 ELECTROCARDIOGRAM REPORT: CPT | Performed by: INTERNAL MEDICINE

## 2022-03-27 PROCEDURE — 82803 BLOOD GASES ANY COMBINATION: CPT

## 2022-03-27 PROCEDURE — 36592 COLLECT BLOOD FROM PICC: CPT

## 2022-03-27 PROCEDURE — 2500000003 HC RX 250 WO HCPCS: Performed by: INTERNAL MEDICINE

## 2022-03-27 PROCEDURE — 2580000003 HC RX 258: Performed by: INTERNAL MEDICINE

## 2022-03-27 PROCEDURE — 71045 X-RAY EXAM CHEST 1 VIEW: CPT

## 2022-03-27 PROCEDURE — 94761 N-INVAS EAR/PLS OXIMETRY MLT: CPT

## 2022-03-27 PROCEDURE — 85027 COMPLETE CBC AUTOMATED: CPT

## 2022-03-27 PROCEDURE — 86403 PARTICLE AGGLUT ANTBDY SCRN: CPT

## 2022-03-27 PROCEDURE — 87070 CULTURE OTHR SPECIMN AEROBIC: CPT

## 2022-03-27 PROCEDURE — 94640 AIRWAY INHALATION TREATMENT: CPT

## 2022-03-27 PROCEDURE — 99291 CRITICAL CARE FIRST HOUR: CPT | Performed by: INTERNAL MEDICINE

## 2022-03-27 RX ADMIN — DEXAMETHASONE SODIUM PHOSPHATE 4 MG: 4 INJECTION, SOLUTION INTRAMUSCULAR; INTRAVENOUS at 22:07

## 2022-03-27 RX ADMIN — CEFEPIME HYDROCHLORIDE 2000 MG: 2 INJECTION, POWDER, FOR SOLUTION INTRAVENOUS at 16:31

## 2022-03-27 RX ADMIN — BUDESONIDE 500 MCG: 0.5 SUSPENSION RESPIRATORY (INHALATION) at 07:47

## 2022-03-27 RX ADMIN — SODIUM CHLORIDE, PRESERVATIVE FREE 20 MG: 5 INJECTION INTRAVENOUS at 07:49

## 2022-03-27 RX ADMIN — VANCOMYCIN HYDROCHLORIDE 1500 MG: 10 INJECTION, POWDER, LYOPHILIZED, FOR SOLUTION INTRAVENOUS at 21:57

## 2022-03-27 RX ADMIN — Medication 10 ML: at 20:09

## 2022-03-27 RX ADMIN — NALOXEGOL OXALATE 25 MG: 25 TABLET, FILM COATED ORAL at 07:49

## 2022-03-27 RX ADMIN — GENTAMICIN SULFATE: 1 CREAM TOPICAL at 07:51

## 2022-03-27 RX ADMIN — Medication 200 MCG/HR: at 01:32

## 2022-03-27 RX ADMIN — MAGNESIUM SULFATE HEPTAHYDRATE 2000 MG: 40 INJECTION, SOLUTION INTRAVENOUS at 00:30

## 2022-03-27 RX ADMIN — CEFEPIME HYDROCHLORIDE 2000 MG: 2 INJECTION, POWDER, FOR SOLUTION INTRAVENOUS at 08:01

## 2022-03-27 RX ADMIN — PROPOFOL 55 MCG/KG/MIN: 10 INJECTION, EMULSION INTRAVENOUS at 08:34

## 2022-03-27 RX ADMIN — PROPOFOL 55 MCG/KG/MIN: 10 INJECTION, EMULSION INTRAVENOUS at 20:29

## 2022-03-27 RX ADMIN — INSULIN LISPRO 1 UNITS: 100 INJECTION, SOLUTION INTRAVENOUS; SUBCUTANEOUS at 16:33

## 2022-03-27 RX ADMIN — POLYETHYLENE GLYCOL 3350 17 G: 17 POWDER, FOR SOLUTION ORAL at 07:49

## 2022-03-27 RX ADMIN — PROPOFOL 55 MCG/KG/MIN: 10 INJECTION, EMULSION INTRAVENOUS at 12:05

## 2022-03-27 RX ADMIN — IPRATROPIUM BROMIDE AND ALBUTEROL SULFATE 3 ML: .5; 2.5 SOLUTION RESPIRATORY (INHALATION) at 11:13

## 2022-03-27 RX ADMIN — ARFORMOTEROL TARTRATE 15 MCG: 15 SOLUTION RESPIRATORY (INHALATION) at 19:08

## 2022-03-27 RX ADMIN — SODIUM CHLORIDE 2.5 MCG/KG/MIN: 9 INJECTION, SOLUTION INTRAVENOUS at 09:23

## 2022-03-27 RX ADMIN — IPRATROPIUM BROMIDE AND ALBUTEROL SULFATE 3 ML: .5; 2.5 SOLUTION RESPIRATORY (INHALATION) at 14:28

## 2022-03-27 RX ADMIN — GENTAMICIN SULFATE: 1 CREAM TOPICAL at 22:12

## 2022-03-27 RX ADMIN — GENTAMICIN SULFATE: 1 CREAM TOPICAL at 13:51

## 2022-03-27 RX ADMIN — BUDESONIDE 500 MCG: 0.5 SUSPENSION RESPIRATORY (INHALATION) at 19:08

## 2022-03-27 RX ADMIN — VANCOMYCIN HYDROCHLORIDE 1500 MG: 10 INJECTION, POWDER, LYOPHILIZED, FOR SOLUTION INTRAVENOUS at 10:30

## 2022-03-27 RX ADMIN — Medication 5 MG/HR: at 08:14

## 2022-03-27 RX ADMIN — ARFORMOTEROL TARTRATE 15 MCG: 15 SOLUTION RESPIRATORY (INHALATION) at 11:13

## 2022-03-27 RX ADMIN — INSULIN LISPRO 1 UNITS: 100 INJECTION, SOLUTION INTRAVENOUS; SUBCUTANEOUS at 12:06

## 2022-03-27 RX ADMIN — INSULIN LISPRO 1 UNITS: 100 INJECTION, SOLUTION INTRAVENOUS; SUBCUTANEOUS at 20:04

## 2022-03-27 RX ADMIN — PROPOFOL 55 MCG/KG/MIN: 10 INJECTION, EMULSION INTRAVENOUS at 04:04

## 2022-03-27 RX ADMIN — SENNOSIDES AND DOCUSATE SODIUM 2 TABLET: 50; 8.6 TABLET ORAL at 07:49

## 2022-03-27 RX ADMIN — Medication 10 ML: at 07:49

## 2022-03-27 RX ADMIN — INSULIN LISPRO 1 UNITS: 100 INJECTION, SOLUTION INTRAVENOUS; SUBCUTANEOUS at 00:18

## 2022-03-27 RX ADMIN — CEFEPIME HYDROCHLORIDE 2000 MG: 2 INJECTION, POWDER, FOR SOLUTION INTRAVENOUS at 00:33

## 2022-03-27 RX ADMIN — Medication 10 ML: at 07:59

## 2022-03-27 RX ADMIN — Medication 200 MCG/HR: at 12:10

## 2022-03-27 RX ADMIN — PROPOFOL 55 MCG/KG/MIN: 10 INJECTION, EMULSION INTRAVENOUS at 16:32

## 2022-03-27 RX ADMIN — IPRATROPIUM BROMIDE AND ALBUTEROL SULFATE 3 ML: .5; 2.5 SOLUTION RESPIRATORY (INHALATION) at 19:12

## 2022-03-27 RX ADMIN — SODIUM CHLORIDE, PRESERVATIVE FREE 20 MG: 5 INJECTION INTRAVENOUS at 20:08

## 2022-03-27 RX ADMIN — DEXAMETHASONE SODIUM PHOSPHATE 4 MG: 4 INJECTION, SOLUTION INTRAMUSCULAR; INTRAVENOUS at 10:29

## 2022-03-27 RX ADMIN — ENOXAPARIN SODIUM 40 MG: 40 INJECTION SUBCUTANEOUS at 20:08

## 2022-03-27 ASSESSMENT — PAIN SCALES - GENERAL
PAINLEVEL_OUTOF10: 0

## 2022-03-27 ASSESSMENT — PULMONARY FUNCTION TESTS
PIF_VALUE: 28
PIF_VALUE: 25
PIF_VALUE: 28

## 2022-03-27 NOTE — PROGRESS NOTES
Reassessment complete, vitals obtained. Weaning levophed as able, see MAR. Fio2 40%. PVCs / occasional trigeminy on monitor. Page to MD, labs & EKG complete. 2g mag per MD. Pt turned / repositioned. Safety precautions in place.

## 2022-03-27 NOTE — CONSULTS
Clinical Pharmacy Note: Pharmacy to Dose Vancomcyin    Vancomycin Day: 2  Current Dosing Regimen: 1250 mg q 12 hrs  Dosing Method: Bayesian Modeling    Random: 13.4    Recent Labs     03/26/22  2240 03/27/22  0415   BUN 10 10       Recent Labs     03/26/22  2240 03/27/22  0415   CREATININE 0.6* 0.5*       Recent Labs     03/26/22  0953 03/27/22  0415   WBC 7.5 8.9         Intake/Output Summary (Last 24 hours) at 3/27/2022 0818  Last data filed at 3/27/2022 0541  Gross per 24 hour   Intake 5442.61 ml   Output 3050 ml   Net 2392.61 ml         Ht Readings from Last 1 Encounters:   03/25/22 5' 8\" (1.727 m)        Wt Readings from Last 1 Encounters:   03/27/22 182 lb 5.1 oz (82.7 kg)         Body mass index is 27.72 kg/m². Estimated Creatinine Clearance: 163 mL/min (A) (based on SCr of 0.5 mg/dL (L)). Assessment/Plan:  Vancomycin level is subtherapeutic. Level was drawn appropriately in respect to last dose given. Increase vancomycin regimen to 1500 mg  every 12 hours. Bayesian Modeling predicts an AUC of 488 mg/L*hr and trough of 13.9 mg/L. A vancomycin random level has been ordered on 3/28 at 0600 for follow-up. Changes in regimen will be determined based on culture results, renal function, and clinical response. Pharmacy will continue to monitor and adjust regimen as necessary.     Thank you for the consult,    Valery Philip PharmD, BCPS

## 2022-03-27 NOTE — CONSULTS
Clinical Pharmacy Note: Pharmacy to Dose Vancomycin    Henry Muro is a 65 y.o. male started on Vancomycin for Sepsis; consult received from Dr. Janet Dubon to manage therapy. Also receiving the following antibiotics: none    Goal AUC: 500-600 mg/L*hr  Goal Trough Level: 15-20 mcg/mL    Assessment/Plan:  A 2000 mg loading dose was given on 3/26 at 1427  Initiate vancomycin 1250 mg IV every 12 hours. Bayesian modeling predicts an AUC of 512 mg/L*hr and a trough of 17 mcg/mL at steady state concentration. A vancomycin random level has been ordered for 3/27 at 0600  Changes in regimen will be determined based on culture results, renal function, and clinical response. Pharmacy will continue to monitor and adjust regimen as necessary. Allergies:  Chantix [varenicline]     Recent Labs     03/26/22  2240 03/27/22  0415   CREATININE 0.6* 0.5*       Recent Labs     03/26/22  0953 03/27/22  0415   WBC 7.5 8.9       Ht Readings from Last 1 Encounters:   03/25/22 5' 8\" (1.727 m)        Wt Readings from Last 1 Encounters:   03/27/22 182 lb 5.1 oz (82.7 kg)         Estimated Creatinine Clearance: 163 mL/min (A) (based on SCr of 0.5 mg/dL (L)).       Thank you for the consult,    Radha Carrera PharmD, BCPS

## 2022-03-27 NOTE — PROGRESS NOTES
03/27/22 1912   [facility] Patient Data   Static Compliance 59 mL/cmH2O   Dynamic Compliance 41 mL/cmH2O

## 2022-03-27 NOTE — PROGRESS NOTES
Assessment complete, vitals obtained. NSR / Luis Fernando Hood. Levophed infusing for BP support. Pt sedated / paralyzed on vent. Propofol, fentanyl, versed, nimbex and NS infusing per MAR. Unresponsive RASS -5, CPOT 0. Pupils reactive. [place] / Joe Castillolk. AC 16 / PC 20 / peep 5 / Fio2 35%. Lungs clear / diminished. Ab rounded / soft. + bowel sounds. Dobhoff Left nare @ 83cm. TF infusing. Skin warm, dry. Venous ulcers BLE, dressing in place. Echols draining yellow urine. Due meds given, see MAR. Pt turned /  Repositioned. Safety precautions in place.

## 2022-03-27 NOTE — PROGRESS NOTES
03/26/22 2012   [facility] Patient Data   Static Compliance 52 mL/cmH2O   Dynamic Compliance 44.75 mL/cmH2O

## 2022-03-27 NOTE — PROGRESS NOTES
Hospitalist Progress Note      PCP: Hemant Jones MD    Date of Admission: 3/22/2022    Chief Complaint: SOB    Hospital Course: Nikky Blankenship a 65 y. o. male has a history of COPD but states tonight he coughed up some blood and then became tight in the chest and more short of breath he now presents with soreness to the throat minimal stridor,states he is on 2 L at home requiring 4 L here. Based on the stridor he had a soft tissue ct scan of the neck which showed a laryngeal mass. Later in his ED stay he began to get more obtunded and started drooling. There was concern for the air way to be closing and he was intubated with the help of anesthesia and ENT. At the time if my eval he was on the vent but not very well sedated. I increased the propofol and added fentanyl.        Subjective:   Trached   HR up  Added maxipime. Blood pressure is low.          Medications:  Reviewed    Infusion Medications    sodium chloride      norepinephrine Stopped (03/27/22 0401)    cisatracurium (NIMBEX) infusion 1 mcg/kg/min (03/27/22 1008)    midazolam 5 mg/hr (03/27/22 1200)    fentaNYL Citrate-NaCl 200 mcg/hr (03/27/22 1210)    propofol 55 mcg/kg/min (03/27/22 1632)    sodium chloride      fentaNYL 200 mcg/hr (03/25/22 1511)    dextrose       Scheduled Medications    vancomycin  1,500 mg IntraVENous Q12H    ipratropium-albuterol  3 mL Inhalation TID    lidocaine 1 % injection  5 mL IntraDERmal Once    sodium chloride flush  5-40 mL IntraVENous 2 times per day    cefepime  2,000 mg IntraVENous Q8H    enoxaparin  40 mg SubCUTAneous Nightly    naloxegol  25 mg Oral QAM    dexamethasone  4 mg IntraVENous Q12H    polyethylene glycol  17 g Oral Daily    sennosides-docusate sodium  2 tablet Oral Daily    gentamicin   Topical TID    insulin lispro  0-6 Units SubCUTAneous Q4H    succinylcholine  140 mg IntraVENous Once    sodium chloride flush  5-40 mL IntraVENous 2 times per day    famotidine (PEPCID) injection  20 mg IntraVENous BID    budesonide  0.5 mg Nebulization BID    Arformoterol Tartrate  15 mcg Nebulization BID    scopolamine  1 patch TransDERmal Q72H     PRN Meds: sodium chloride flush, sodium chloride, hydrALAZINE, racepinephrine HCl, sodium chloride nebulizer, albuterol sulfate HFA, sodium chloride flush, sodium chloride, ondansetron **OR** ondansetron, acetaminophen **OR** acetaminophen, glucose, glucagon (rDNA), dextrose, dextrose bolus (hypoglycemia) **OR** dextrose bolus (hypoglycemia)      Intake/Output Summary (Last 24 hours) at 3/27/2022 1907  Last data filed at 3/27/2022 1300  Gross per 24 hour   Intake 5499.51 ml   Output 1900 ml   Net 3599.51 ml       Physical Exam Performed:    /63   Pulse 70   Temp 98.7 °F (37.1 °C) (Core)   Resp 16   Ht 5' 8\" (1.727 m)   Wt 182 lb 5.1 oz (82.7 kg)   SpO2 100%   BMI 27.72 kg/m²     General appearance: No apparent distress appears stated age and cooperative. Intubated and sedated. HEENT Normal cephalic, atraumatic without obvious deformity. Pupils equal, round, and reactive to light. Extra ocular muscles intact. Conjunctivae/corneas clear. Neck: Supple, No jugular venous distention/bruits. Trachea midline without thyromegaly or adenopathy with full range of motion. Lungs: Clear to auscultation, bilaterally without Rales/Wheezes/Rhonchi with good respiratory effort. Heart: Regular rate and rhythm with Normal S1/S2 without murmurs, rubs or gallops, point of maximum impulse non-displaced  Abdomen: Soft, non-tender or non-distended without rigidity or guarding and positive bowel sounds all four quadrants. Extremities: No clubbing, cyanosis, or edema bilaterally. Full range of motion without deformity gait not tested. Skin: Skin color, texture, turgor normal.  No rashes or lesions. Neurologic:   Cranial nerves: II-XII intact, grossly non-focal.  Mental status: sedated.   Capillary Refill: Acceptable  < 3 seconds  Peripheral Pulses: +3 Easily felt, not easily obliterated with pressure    Labs:   Recent Labs     03/25/22  0407 03/26/22  0953 03/27/22  0415   WBC 8.3 7.5 8.9   HGB 12.4* 13.7 11.2*   HCT 38.6* 40.2* 34.8*    222 223     Recent Labs     03/26/22  0953 03/26/22  2240 03/27/22  0415   * 139 135*   K 4.0 3.8 3.9    106 102   CO2 14* 22 22   BUN 9 10 10   CREATININE 0.6* 0.6* 0.5*   CALCIUM 7.8* 8.2* 8.3   PHOS  --  3.8  --      No results for input(s): AST, ALT, BILIDIR, BILITOT, ALKPHOS in the last 72 hours. Recent Labs     03/25/22  0407 03/26/22  0324 03/27/22  0415   INR 1.08 1.01 1.12     No results for input(s): Daniel Altama in the last 72 hours. Urinalysis:      Lab Results   Component Value Date    NITRU Negative 02/02/2022    WBCUA 1 02/02/2022    RBCUA 1 02/02/2022    BLOODU Negative 02/02/2022    SPECGRAV 1.021 02/02/2022    GLUCOSEU Negative 02/02/2022       Radiology:  XR CHEST PORTABLE   Final Result   Slight worsening of the right pleural effusion. Placement of a right PICC   line catheter tip in the SVC. Remainder of lines and tubes are stable. XR CHEST PORTABLE   Preliminary Result   1. Stable position of enteric feeding tube. 2. Interval progression of a small right pleural effusion and right basilar   airspace consolidation. XR ABDOMEN FOR NG/OG/NE TUBE PLACEMENT   Final Result   Enteric tube with distal end reaching the gastric body on 1 of 3 images. The   other 2 images demonstrate tip of enteric tube at the level of the   midesophagus. XR CHEST PORTABLE   Final Result   1. Opacity right lower lung compatible with pleural effusion and relaxation   atelectasis versus pneumonia. 2. Pulmonary sequela typical of that seen with smoking, including COPD;   correlate with clinical history. 3. Unchanged tracheostomy tube positioning. Dobbhoff type feeding tube   extends below the left hemidiaphragm, out of field of view.          XR CHEST PORTABLE   Final Result it was likely higher.  - currently vented. - no wheeze but may benefit from steroids if there is any swelling in the laryngeal region.  -started on decadron. S/p trach  Remains vented and sedated.     Spiking temp  - concern for infection  - started on Maxipime and Vanc  - IVF bolus  - May need pressor if blood pressure remains low. - a bit better today,     He has good peripheral access but may need  A central line in the future. DVT Prophylaxis: was on coumadin and did get lovenox. ALL AC NOW ON HOLD FOR PROCEDURE. Diet: Diet NPO  ADULT TUBE FEEDING; Nasogastric; Standard with Fiber; Continuous; 20; Yes; 25; Q 4 hours; 45; 30; Q 4 hours; Protein; Administer 1 Proteinex P2Go daily. Flush with 30 mL water before and after. Code Status: Full Code    PT/OT Eval Status: when appropriate     Dispo -doing better than yesterday today still is critically ill.    transfer to  for definitive treatment of the mass. This will likely occur early next week. 35 minutes of critical care time spent.      Lakeshia Ball MD

## 2022-03-27 NOTE — PROGRESS NOTES
[facility] Pulmonary and Critical Care  Progress note      Reason for Consult: Respiratory failure, neck mass, COPD  Requesting Physician: Dr. Cr Camarillo:   [facility] / HPI:                The patient is a 65 y.o. male with significant past medical history of:      Diagnosis Date    [facility]     Fibromyalgia     History of DVT (deep vein thrombosis)     Hyperlipidemia     Hypertension     Type 2 diabetes mellitus with circulatory disorder, without long-term current use of insulin ([facility] 2/18/2022     Interval history: Patient looks like he was starting to become septic yesterday. Became febrile and tachycardic. Had to go back on the Nimbex drip. Cultures were sent and he was started on broad-spectrum antibiotics. Today fever is down and his pulse is better controlled. He does remain on Nimbex, Versed, propofol and fentanyl. .      Past Surgical History:        Procedure Laterality Date    APPENDECTOMY  2005    COLONOSCOPY  2016    FEMORAL BYPASS Left 02/26/2020    femoral posterior tibial bypass    FEMORAL-TIBIAL BYPASS GRAFT Right 12/11/2020    with femoral endarterectomy and patch angioplasty    FOOT DEBRIDEMENT Left 09/03/2020    FOOT DEBRIDEMENT Right 2/26/2021    DEBRIDEMENT OF RIGHT FOOT WOUND WITH GRAFT APPLICATION performed by Yoanna Gutierrez DPM at [facility] Right 12/08/2020    stent leg for PVD     Current Medications:    Current Facility-Administered Medications: vancomycin (VANCOCIN) 1,500 mg in dextrose 5 % 250 mL IVPB, 1,500 mg, IntraVENous, Q12H  ipratropium-albuterol (DUONEB) nebulizer solution 3 mL, 3 mL, Inhalation, TID  lidocaine PF 1 % injection 5 mL, 5 mL, IntraDERmal, Once  sodium chloride flush 0.9 % injection 5-40 mL, 5-40 mL, IntraVENous, 2 times per day  sodium chloride flush 0.9 % injection 5-40 mL, 5-40 mL, IntraVENous, PRN  0.9 % sodium chloride infusion, 25 mL, IntraVENous, PRN  cefepime (MAXIPIME) 2000 mg IVPB minibag, 2,000 mg, IntraVENous, Q8H  norepinephrine (LEVOPHED) 16 mg in sodium chloride 0.9 % 250 mL infusion (non-weight-based), 1-100 mcg/min, IntraVENous, Continuous  hydrALAZINE (APRESOLINE) injection 10 mg, 10 mg, IntraVENous, Q6H PRN  enoxaparin (LOVENOX) injection 40 mg, 40 mg, SubCUTAneous, Nightly  naloxegol (MOVANTIK) tablet 25 mg, 25 mg, Oral, QAM  cisatracurium besylate (NIMBEX) 200 mg in sodium chloride 0.9 % 100 mL infusion, 0.5-10 mcg/kg/min, IntraVENous, Continuous  midazolam (VERSED) 1 mg/mL in D5W infusion, 1-10 mg/hr, IntraVENous, Continuous  fentaNYL (SUBLIMAZE) 2500 mcg in sodium chloride 0.9% 250 mL premix,  mcg/hr, IntraVENous, Continuous  dexamethasone (DECADRON) injection 4 mg, 4 mg, IntraVENous, Q12H  polyethylene glycol (GLYCOLAX) packet 17 g, 17 g, Oral, Daily  sennosides-docusate sodium (SENOKOT-S) 8.6-50 MG tablet 2 tablet, 2 tablet, Oral, Daily  propofol injection, 5-90 mcg/kg/min, IntraVENous, Continuous  gentamicin (GARAMYCIN) 0.1 % cream, , Topical, TID  insulin lispro (1 Unit Dial) 0-6 Units, 0-6 Units, SubCUTAneous, Q4H  racepinephrine HCl (VAPONEFPRIN) 2.25 % nebulizer solution NEBU 11.25 mg, 11.25 mg, Nebulization, Q4H PRN  sodium chloride nebulizer 0.9 % solution 3 mL, 3 mL, Nebulization, Q4H PRN  succinylcholine (ANECTINE) injection 140 mg, 140 mg, IntraVENous, Once  albuterol sulfate  (90 Base) MCG/ACT inhaler 2 puff, 2 puff, Inhalation, 4x Daily PRN  sodium chloride flush 0.9 % injection 5-40 mL, 5-40 mL, IntraVENous, 2 times per day  sodium chloride flush 0.9 % injection 5-40 mL, 5-40 mL, IntraVENous, PRN  0.9 % sodium chloride infusion, 25 mL, IntraVENous, PRN  ondansetron (ZOFRAN-ODT) disintegrating tablet 4 mg, 4 mg, Oral, Q8H PRN **OR** ondansetron (ZOFRAN) injection 4 mg, 4 mg, IntraVENous, Q6H PRN  acetaminophen (TYLENOL) tablet 650 mg, 650 mg, Oral, Q6H PRN **OR** acetaminophen (TYLENOL) suppository 650 mg, 650 mg, Rectal, Q6H PRN  famotidine (PEPCID) 20 mg in sodium chloride (PF) 10 mL injection, 20 mg, IntraVENous, BID  fentaNYL 10 mcg/mL infusion,  mcg/hr, IntraVENous, Continuous  budesonide (PULMICORT) nebulizer suspension 500 mcg, 0.5 mg, Nebulization, BID  Arformoterol Tartrate (BROVANA) nebulizer solution 15 mcg, 15 mcg, Nebulization, BID  scopolamine (TRANSDERM-SCOP) transdermal patch 1 patch, 1 patch, TransDERmal, Q72H  glucose (GLUTOSE) 40 % oral gel 15 g, 15 g, Oral, PRN  glucagon (rDNA) injection 1 mg, 1 mg, IntraMUSCular, PRN  dextrose 5 % solution, 100 mL/hr, IntraVENous, PRN  dextrose bolus (hypoglycemia) 10% 125 mL, 125 mL, IntraVENous, PRN **OR** dextrose bolus (hypoglycemia) 10% 250 mL, 250 mL, IntraVENous, PRN    Allergies   Allergen Reactions    Chantix [Varenicline] Other (See Comments)     Hallucinations         Social History:    TOBACCO:   reports that he quit smoking about 2 months ago. His smoking use included cigarettes. He started smoking about 44 years ago. He has a 10.00 pack-year smoking history. He has never used smokeless tobacco.  ETOH:   reports current alcohol use of about 6.0 standard drinks of alcohol per week. Patient currently lives independently  Environmental/chemical exposure: None known    Family History:       Problem Relation Age of Onset    Cancer Mother     Lung Cancer Mother     Diabetes Father     Stroke Father      REVIEW OF SYSTEMS:    ROS is unobtainable due to his critical illness. Objective:   PHYSICAL EXAM:      VITALS:  /72   Pulse 85   Temp 98.7 °F (37.1 °C) (Core)   Resp 18   Ht 5' 8\" (1.727 m)   Wt 182 lb 5.1 oz (82.7 kg)   SpO2 (!) 89%   BMI 27.72 kg/m²      24HR INTAKE/OUTPUT:      Intake/Output Summary (Last 24 hours) at 3/27/2022 1107  Last data filed at 3/27/2022 0541  Gross per 24 hour   Intake 5442.61 ml   Output 3050 ml   Net 2392.61 ml     CONSTITUTIONAL: Sedated on mechanical ventilation  NECK: Tracheostomy is in place and looks good.   LUNGS:  no increased work of breathing and congested to auscultation. No accessory muscle use  CARDIOVASCULAR: S1 and S2, no edema and no JVD  ABDOMEN:  normal bowel sounds, non-distended and no masses palpated, and no tenderness to palpation. No hepatospleenomegaly  LYMPHADENOPATHY:  no axillary or supraclavicular adenopathy. No cervical adnenopathy  PSYCHIATRIC: Sedated on mechanical ventilation. MUSCULOSKELETAL: No obvious misalignment or effusion of the joints. No clubbing, cyanosis of the digits. SKIN:  normal skin color, texture, turgor and no redness, warmth, or swelling. No palpable nodules    DATA:    Old records have been reviewed    CBC:  Recent Labs     03/25/22  0407 03/26/22  0953 03/27/22  0415   WBC 8.3 7.5 8.9   RBC 4.28 4.48 4.02*   HGB 12.4* 13.7 11.2*   HCT 38.6* 40.2* 34.8*    222 223   MCV 90.2 89.8 86.7   MCH 29.1 30.5 27.9   MCHC 32.2 34.0 32.2   RDW 17.3* 17.6* 16.6*      BMP:  Recent Labs     03/26/22  0953 03/26/22  2240 03/27/22  0415   * 139 135*   K 4.0 3.8 3.9    106 102   CO2 14* 22 22   BUN 9 10 10   CREATININE 0.6* 0.6* 0.5*   CALCIUM 7.8* 8.2* 8.3   GLUCOSE 146* 184* 154*      ABG:  Recent Labs     03/25/22  0208 03/26/22  1020 03/27/22  0440   PHART 7.480* 7.400 7.437   JCY4WNI 34.2* 35.5 34.9*   PO2ART 100.0 59.3* 72.1*   KII8BMH 25.5 22.0 23.5   B7ALSLWY 98.7 88.7* 94.9   BEART 2.2 -2.3 -0.4     Procalcitonin  Recent Labs     03/26/22  0953 03/26/22  1255   PROCAL 0.06 0.21*       No results found for: BNP  Lab Results   Component Value Date    CKTOTAL 81 02/01/2022    TROPONINI <0.01 03/22/2022           Radiology Review:  All pertinent images / reports were reviewed as a part of this visit. Assessment:     1. Acute respiratory failure due to upper airway compromise  2. 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  Severe COPD/centrilobular emphysema      Plan:     Patient's presentation is that of respiratory failure related to upper airway compromise from left neck mass which is likely a squamous cell carcinoma. He did have biopsy of the lesion and tracheostomy performed on Friday afternoon. Has remained intubated, sedated and paralyzed since. Had to restart paralysis yesterday because he looked like he was becoming septic  Try to wean Nimbex off today  Potential transfer to  is still in the works for more definitive treatment of his carcinoma. He has had worsening hypoxemia  Chest imaging reveals right lower lobe airspace disease likely pneumonia. Tracheostomy tube appears to be in good position  Could have an element of pleural effusion as well  ABG this morning is 7.4 4/35/72  Now AC/PC 20/5 with respiratory rate 15. FiO2 this morning is been increased to 100%. Appeared to be developing sepsis yesterday with fever, tachycardia and worsening hypoxemia  Cultures have been sent  He is on vancomycin and cefepime pending results  Hemodynamically more stable today. Now afebrile as well. The patient did have pulmonary function testing 1/26/2022 which revealed FEV1 of 1.83 L which is 63% predicted. FEV1/FVC was decreased and there was no response to inhaled bronchodilators consistent with a diagnosis of moderate obstructive lung disease. There was severe reduction in diffusion capacity which was only 37% predicted. Continue inhaled therapy    Feeding tube is in place in good position. Was on goal rate feeding but this was cut in half last night because his abdomen started to feel firm. No significant abnormality on exam this morning. Continue tube feeding. Total critical care time caring for this patient with life threatening illness, including direct patient contact, management of life support systems, review of data including imaging and labs, discussions with other team members and physicians is at least 32 minutes so far today, excluding procedures.

## 2022-03-28 ENCOUNTER — APPOINTMENT (OUTPATIENT)
Dept: GENERAL RADIOLOGY | Age: 62
DRG: 004 | End: 2022-03-28
Payer: COMMERCIAL

## 2022-03-28 LAB
ANION GAP SERPL CALCULATED.3IONS-SCNC: 9 MMOL/L (ref 3–16)
BASE EXCESS ARTERIAL: 2 MMOL/L (ref -3–3)
BASE EXCESS ARTERIAL: 2.3 MMOL/L (ref -3–3)
BUN BLDV-MCNC: 12 MG/DL (ref 7–20)
CALCIUM SERPL-MCNC: 8.7 MG/DL (ref 8.3–10.6)
CARBOXYHEMOGLOBIN ARTERIAL: 0.8 % (ref 0–1.5)
CARBOXYHEMOGLOBIN ARTERIAL: 0.9 % (ref 0–1.5)
CHLORIDE BLD-SCNC: 102 MMOL/L (ref 99–110)
CO2: 25 MMOL/L (ref 21–32)
CREAT SERPL-MCNC: <0.5 MG/DL (ref 0.8–1.3)
GFR AFRICAN AMERICAN: >60
GFR NON-AFRICAN AMERICAN: >60
GLUCOSE BLD-MCNC: 150 MG/DL (ref 70–99)
GLUCOSE BLD-MCNC: 157 MG/DL (ref 70–99)
GLUCOSE BLD-MCNC: 164 MG/DL (ref 70–99)
GLUCOSE BLD-MCNC: 188 MG/DL (ref 70–99)
GLUCOSE BLD-MCNC: 189 MG/DL (ref 70–99)
GLUCOSE BLD-MCNC: 189 MG/DL (ref 70–99)
GLUCOSE BLD-MCNC: 206 MG/DL (ref 70–99)
HCO3 ARTERIAL: 25.9 MMOL/L (ref 21–29)
HCO3 ARTERIAL: 27.8 MMOL/L (ref 21–29)
HCT VFR BLD CALC: 33.2 % (ref 40.5–52.5)
HEMOGLOBIN, ART, EXTENDED: 10.8 G/DL (ref 13.5–17.5)
HEMOGLOBIN, ART, EXTENDED: 15.1 G/DL (ref 13.5–17.5)
HEMOGLOBIN: 10.5 G/DL (ref 13.5–17.5)
INR BLD: 1.08 (ref 0.88–1.12)
MCH RBC QN AUTO: 27.8 PG (ref 26–34)
MCHC RBC AUTO-ENTMCNC: 31.7 G/DL (ref 31–36)
MCV RBC AUTO: 87.9 FL (ref 80–100)
METHEMOGLOBIN ARTERIAL: 0.1 %
METHEMOGLOBIN ARTERIAL: 0.3 %
O2 SAT, ARTERIAL: 95.3 %
O2 SAT, ARTERIAL: 97.9 %
O2 THERAPY: ABNORMAL
O2 THERAPY: ABNORMAL
PCO2 ARTERIAL: 35.7 MMHG (ref 35–45)
PCO2 ARTERIAL: 46.9 MMHG (ref 35–45)
PDW BLD-RTO: 17.1 % (ref 12.4–15.4)
PERFORMED ON: ABNORMAL
PH ARTERIAL: 7.38 (ref 7.35–7.45)
PH ARTERIAL: 7.47 (ref 7.35–7.45)
PLATELET # BLD: 195 K/UL (ref 135–450)
PMV BLD AUTO: 8.2 FL (ref 5–10.5)
PO2 ARTERIAL: 82.8 MMHG (ref 75–108)
PO2 ARTERIAL: 94.6 MMHG (ref 75–108)
POTASSIUM SERPL-SCNC: 4.4 MMOL/L (ref 3.5–5.1)
PROTHROMBIN TIME: 12.3 SEC (ref 9.9–12.7)
RBC # BLD: 3.78 M/UL (ref 4.2–5.9)
SODIUM BLD-SCNC: 136 MMOL/L (ref 136–145)
TCO2 ARTERIAL: 60.6 MMOL/L
TCO2 ARTERIAL: 65.6 MMOL/L
VANCOMYCIN RANDOM: 13 UG/ML
WBC # BLD: 6.3 K/UL (ref 4–11)

## 2022-03-28 PROCEDURE — 6370000000 HC RX 637 (ALT 250 FOR IP): Performed by: INTERNAL MEDICINE

## 2022-03-28 PROCEDURE — 6360000002 HC RX W HCPCS: Performed by: INTERNAL MEDICINE

## 2022-03-28 PROCEDURE — 99291 CRITICAL CARE FIRST HOUR: CPT | Performed by: INTERNAL MEDICINE

## 2022-03-28 PROCEDURE — 36600 WITHDRAWAL OF ARTERIAL BLOOD: CPT

## 2022-03-28 PROCEDURE — 85027 COMPLETE CBC AUTOMATED: CPT

## 2022-03-28 PROCEDURE — 2580000003 HC RX 258: Performed by: INTERNAL MEDICINE

## 2022-03-28 PROCEDURE — 82803 BLOOD GASES ANY COMBINATION: CPT

## 2022-03-28 PROCEDURE — 71045 X-RAY EXAM CHEST 1 VIEW: CPT

## 2022-03-28 PROCEDURE — 2500000003 HC RX 250 WO HCPCS: Performed by: INTERNAL MEDICINE

## 2022-03-28 PROCEDURE — 94003 VENT MGMT INPAT SUBQ DAY: CPT

## 2022-03-28 PROCEDURE — 2700000000 HC OXYGEN THERAPY PER DAY

## 2022-03-28 PROCEDURE — 80048 BASIC METABOLIC PNL TOTAL CA: CPT

## 2022-03-28 PROCEDURE — 2000000000 HC ICU R&B

## 2022-03-28 PROCEDURE — 80202 ASSAY OF VANCOMYCIN: CPT

## 2022-03-28 PROCEDURE — 36592 COLLECT BLOOD FROM PICC: CPT

## 2022-03-28 PROCEDURE — 94640 AIRWAY INHALATION TREATMENT: CPT

## 2022-03-28 PROCEDURE — 94761 N-INVAS EAR/PLS OXIMETRY MLT: CPT

## 2022-03-28 PROCEDURE — A4216 STERILE WATER/SALINE, 10 ML: HCPCS | Performed by: INTERNAL MEDICINE

## 2022-03-28 PROCEDURE — 85610 PROTHROMBIN TIME: CPT

## 2022-03-28 RX ADMIN — IPRATROPIUM BROMIDE AND ALBUTEROL SULFATE 3 ML: .5; 2.5 SOLUTION RESPIRATORY (INHALATION) at 15:16

## 2022-03-28 RX ADMIN — VANCOMYCIN HYDROCHLORIDE 1500 MG: 10 INJECTION, POWDER, LYOPHILIZED, FOR SOLUTION INTRAVENOUS at 11:41

## 2022-03-28 RX ADMIN — INSULIN LISPRO 1 UNITS: 100 INJECTION, SOLUTION INTRAVENOUS; SUBCUTANEOUS at 08:20

## 2022-03-28 RX ADMIN — DEXAMETHASONE SODIUM PHOSPHATE 4 MG: 4 INJECTION, SOLUTION INTRAMUSCULAR; INTRAVENOUS at 09:43

## 2022-03-28 RX ADMIN — ARFORMOTEROL TARTRATE 15 MCG: 15 SOLUTION RESPIRATORY (INHALATION) at 07:58

## 2022-03-28 RX ADMIN — Medication 2 MG/HR: at 09:11

## 2022-03-28 RX ADMIN — INSULIN LISPRO 1 UNITS: 100 INJECTION, SOLUTION INTRAVENOUS; SUBCUTANEOUS at 00:20

## 2022-03-28 RX ADMIN — SENNOSIDES AND DOCUSATE SODIUM 2 TABLET: 50; 8.6 TABLET ORAL at 08:23

## 2022-03-28 RX ADMIN — Medication 10 ML: at 08:23

## 2022-03-28 RX ADMIN — PROPOFOL 55 MCG/KG/MIN: 10 INJECTION, EMULSION INTRAVENOUS at 13:02

## 2022-03-28 RX ADMIN — ENOXAPARIN SODIUM 40 MG: 40 INJECTION SUBCUTANEOUS at 20:36

## 2022-03-28 RX ADMIN — NALOXEGOL OXALATE 25 MG: 25 TABLET, FILM COATED ORAL at 08:23

## 2022-03-28 RX ADMIN — PROPOFOL 55 MCG/KG/MIN: 10 INJECTION, EMULSION INTRAVENOUS at 04:26

## 2022-03-28 RX ADMIN — GENTAMICIN SULFATE: 1 CREAM TOPICAL at 20:37

## 2022-03-28 RX ADMIN — PROPOFOL 55 MCG/KG/MIN: 10 INJECTION, EMULSION INTRAVENOUS at 00:19

## 2022-03-28 RX ADMIN — VANCOMYCIN HYDROCHLORIDE 1500 MG: 10 INJECTION, POWDER, LYOPHILIZED, FOR SOLUTION INTRAVENOUS at 22:40

## 2022-03-28 RX ADMIN — BUDESONIDE 500 MCG: 0.5 SUSPENSION RESPIRATORY (INHALATION) at 07:58

## 2022-03-28 RX ADMIN — IPRATROPIUM BROMIDE AND ALBUTEROL SULFATE 3 ML: .5; 2.5 SOLUTION RESPIRATORY (INHALATION) at 19:05

## 2022-03-28 RX ADMIN — GENTAMICIN SULFATE: 1 CREAM TOPICAL at 08:29

## 2022-03-28 RX ADMIN — POLYETHYLENE GLYCOL 3350 17 G: 17 POWDER, FOR SOLUTION ORAL at 08:22

## 2022-03-28 RX ADMIN — SODIUM CHLORIDE, PRESERVATIVE FREE 20 MG: 5 INJECTION INTRAVENOUS at 20:36

## 2022-03-28 RX ADMIN — INSULIN LISPRO 1 UNITS: 100 INJECTION, SOLUTION INTRAVENOUS; SUBCUTANEOUS at 11:44

## 2022-03-28 RX ADMIN — SODIUM CHLORIDE, PRESERVATIVE FREE 20 MG: 5 INJECTION INTRAVENOUS at 08:22

## 2022-03-28 RX ADMIN — GENTAMICIN SULFATE: 1 CREAM TOPICAL at 14:54

## 2022-03-28 RX ADMIN — INSULIN LISPRO 1 UNITS: 100 INJECTION, SOLUTION INTRAVENOUS; SUBCUTANEOUS at 20:30

## 2022-03-28 RX ADMIN — Medication 200 MCG/HR: at 00:55

## 2022-03-28 RX ADMIN — Medication 10 ML: at 20:40

## 2022-03-28 RX ADMIN — PROPOFOL 55 MCG/KG/MIN: 10 INJECTION, EMULSION INTRAVENOUS at 17:42

## 2022-03-28 RX ADMIN — INSULIN LISPRO 2 UNITS: 100 INJECTION, SOLUTION INTRAVENOUS; SUBCUTANEOUS at 17:39

## 2022-03-28 RX ADMIN — BUDESONIDE 500 MCG: 0.5 SUSPENSION RESPIRATORY (INHALATION) at 19:04

## 2022-03-28 RX ADMIN — PROPOFOL 55 MCG/KG/MIN: 10 INJECTION, EMULSION INTRAVENOUS at 09:43

## 2022-03-28 RX ADMIN — PROPOFOL 55 MCG/KG/MIN: 10 INJECTION, EMULSION INTRAVENOUS at 21:12

## 2022-03-28 RX ADMIN — INSULIN LISPRO 1 UNITS: 100 INJECTION, SOLUTION INTRAVENOUS; SUBCUTANEOUS at 04:20

## 2022-03-28 RX ADMIN — CEFEPIME HYDROCHLORIDE 2000 MG: 2 INJECTION, POWDER, FOR SOLUTION INTRAVENOUS at 00:17

## 2022-03-28 RX ADMIN — Medication 200 MCG/HR: at 13:04

## 2022-03-28 RX ADMIN — IPRATROPIUM BROMIDE AND ALBUTEROL SULFATE 3 ML: .5; 2.5 SOLUTION RESPIRATORY (INHALATION) at 08:00

## 2022-03-28 RX ADMIN — CEFEPIME HYDROCHLORIDE 2000 MG: 2 INJECTION, POWDER, FOR SOLUTION INTRAVENOUS at 17:55

## 2022-03-28 RX ADMIN — ARFORMOTEROL TARTRATE 15 MCG: 15 SOLUTION RESPIRATORY (INHALATION) at 19:04

## 2022-03-28 RX ADMIN — CEFEPIME HYDROCHLORIDE 2000 MG: 2 INJECTION, POWDER, FOR SOLUTION INTRAVENOUS at 08:24

## 2022-03-28 ASSESSMENT — PAIN SCALES - GENERAL
PAINLEVEL_OUTOF10: 0

## 2022-03-28 ASSESSMENT — PULMONARY FUNCTION TESTS
PIF_VALUE: 28
PIF_VALUE: 25
PIF_VALUE: 28

## 2022-03-28 NOTE — PROGRESS NOTES
03/28/22 0800   Vent Information   $Ventilation $Subsequent Day   Vent Type 980   Vent Mode AC/PC   Pressure Ordered 20   Rate Set 16 bmp   FiO2  50 %   SpO2 98 %   SpO2/FiO2 ratio 196   Sensitivity 3   PEEP/CPAP 8   I Time/ I Time % 1.1 s   Humidification Source HME   Vent Patient Data   High Peep/I Pressure 20   Peak Inspiratory Pressure 28 cmH2O   Mean Airway Pressure 14 cmH20   Rate Measured 16 br/min   Vt Exhaled 877 mL   Minute Volume 14 Liters   I:E Ratio 1:2.4   Plateau Pressure 24 YUL86   Static Compliance 54.81 mL/cmH2O   Dynamic Compliance 73.08 mL/cmH2O   Total PEEP 7.9 cmH20   Auto PEEP 0 cmH20   Cough/Sputum   Sputum How Obtained Suctioned;Tracheal   Sputum Amount Moderate   Sputum Color Tan   Tenacity Thick   Spontaneous Breathing Trial (SBT) RT Doc   Pulse 55   Breath Sounds   Right Upper Lobe Rhonchi   Right Middle Lobe Rhonchi   Right Lower Lobe Rhonchi   Left Upper Lobe Rhonchi   Left Lower Lobe Rhonchi   Additional Respiratory  Assessments   Resp 16   End Tidal CO2 28 (%)   Position Semi-Barnett's   Oral Care Completed? Yes   Oral Care Mouthwash with chlorhexidine;Mouth swabbed;Mouth suctioned;Suction toothette;Lip moisturizer applied;Mouth moisturizer   Subglottic Suction Done?  Yes   Skin barrier applied Yes   Alarm Settings   High Pressure Alarm 40 cmH2O   Low Minute Volume Alarm 2 L/min   Apnea (secs) 20 secs   High Respiratory Rate 40 br/min   Low Exhaled Vt  200 mL   Patient Observation   Observations vent day 7 # 6 carol MARTÍNEZ

## 2022-03-28 NOTE — PROGRESS NOTES
Dr Nevaeh Vallejo at bedside and speaking with patient's wife regarding plan. All questions answered.

## 2022-03-28 NOTE — PROGRESS NOTES
Hospitalist Progress Note      PCP: Syed Hutchison MD    Date of Admission: 3/22/2022    Chief Complaint: SOB    Hospital Course: Renita Kong a 65 y. o. male has a history of COPD but states tonight he coughed up some blood and then became tight in the chest and more short of breath he now presents with soreness to the throat minimal stridor,states he is on 2 L at home requiring 4 L here. Based on the stridor he had a soft tissue ct scan of the neck which showed a laryngeal mass. Later in his ED stay he began to get more obtunded and started drooling. There was concern for the air way to be closing and he was intubated with the help of anesthesia and ENT. At the time if my eval he was on the vent but not very well sedated. I increased the propofol and added fentanyl.        Subjective:   Trached   HR up  Added maxipime. Blood pressure is low.          Medications:  Reviewed    Infusion Medications    sodium chloride      norepinephrine Stopped (03/27/22 0401)    midazolam 4 mg/hr (03/28/22 0917)    fentaNYL Citrate-NaCl 200 mcg/hr (03/28/22 1304)    propofol 55 mcg/kg/min (03/28/22 8602)    sodium chloride      dextrose       Scheduled Medications    vancomycin  1,500 mg IntraVENous Q12H    ipratropium-albuterol  3 mL Inhalation TID    lidocaine 1 % injection  5 mL IntraDERmal Once    sodium chloride flush  5-40 mL IntraVENous 2 times per day    cefepime  2,000 mg IntraVENous Q8H    enoxaparin  40 mg SubCUTAneous Nightly    naloxegol  25 mg Oral QAM    polyethylene glycol  17 g Oral Daily    sennosides-docusate sodium  2 tablet Oral Daily    gentamicin   Topical TID    insulin lispro  0-6 Units SubCUTAneous Q4H    succinylcholine  140 mg IntraVENous Once    sodium chloride flush  5-40 mL IntraVENous 2 times per day    famotidine (PEPCID) injection  20 mg IntraVENous BID    budesonide  0.5 mg Nebulization BID    Arformoterol Tartrate  15 mcg Nebulization BID    scopolamine  1 patch TransDERmal Q72H     PRN Meds: sodium chloride flush, sodium chloride, hydrALAZINE, racepinephrine HCl, sodium chloride nebulizer, albuterol sulfate HFA, sodium chloride flush, sodium chloride, ondansetron **OR** ondansetron, acetaminophen **OR** acetaminophen, glucose, glucagon (rDNA), dextrose, dextrose bolus (hypoglycemia) **OR** dextrose bolus (hypoglycemia)      Intake/Output Summary (Last 24 hours) at 3/28/2022 1852  Last data filed at 3/28/2022 1529  Gross per 24 hour   Intake 3160.36 ml   Output 4150 ml   Net -989.64 ml       Physical Exam Performed:    /69   Pulse 57   Temp 98.8 °F (37.1 °C) (Bladder)   Resp 16   Ht 5' 8\" (1.727 m)   Wt 179 lb 3.7 oz (81.3 kg)   SpO2 98%   BMI 27.25 kg/m²     General appearance: No apparent distress appears stated age and cooperative. Intubated and sedated. HEENT Normal cephalic, atraumatic without obvious deformity. Pupils equal, round, and reactive to light. Extra ocular muscles intact. Conjunctivae/corneas clear. Neck: Supple, No jugular venous distention/bruits. Trachea midline without thyromegaly or adenopathy with full range of motion. Lungs: Clear to auscultation, bilaterally without Rales/Wheezes/Rhonchi with good respiratory effort. Heart: Regular rate and rhythm with Normal S1/S2 without murmurs, rubs or gallops, point of maximum impulse non-displaced  Abdomen: Soft, non-tender or non-distended without rigidity or guarding and positive bowel sounds all four quadrants. Extremities: No clubbing, cyanosis, or edema bilaterally. Full range of motion without deformity gait not tested. Skin: Skin color, texture, turgor normal.  No rashes or lesions. Neurologic:   Cranial nerves: II-XII intact, grossly non-focal.  Mental status: sedated.   Capillary Refill: Acceptable  < 3 seconds  Peripheral Pulses: +3 Easily felt, not easily obliterated with pressure    Labs:   Recent Labs     03/26/22  0953 03/27/22  0415 03/28/22  0415   WBC 7.5 8.9 6.3 HGB 13.7 11.2* 10.5*   HCT 40.2* 34.8* 33.2*    223 195     Recent Labs     03/26/22  2240 03/27/22  0415 03/28/22  0415    135* 136   K 3.8 3.9 4.4    102 102   CO2 22 22 25   BUN 10 10 12   CREATININE 0.6* 0.5* <0.5*   CALCIUM 8.2* 8.3 8.7   PHOS 3.8  --   --      No results for input(s): AST, ALT, BILIDIR, BILITOT, ALKPHOS in the last 72 hours. Recent Labs     03/26/22  0324 03/27/22  0415 03/28/22  0415   INR 1.01 1.12 1.08     No results for input(s): [place] in the last 72 hours. Urinalysis:      Lab Results   Component Value Date    NITRU Negative 02/02/2022    WBCUA 1 02/02/2022    RBCUA 1 02/02/2022    BLOODU Negative 02/02/2022    SPECGRAV 1.021 02/02/2022    GLUCOSEU Negative 02/02/2022       Radiology:  XR CHEST PORTABLE   Final Result   Decreasing right basilar pleuroparenchymal disease as compared to prior. A portion of the right upper extremity PICC line is looped in the right   supraclavicular region; correlate with catheter function. XR CHEST PORTABLE   Final Result   Slight worsening of the right pleural effusion. Placement of a right PICC   line catheter tip in the SVC. Remainder of lines and tubes are stable. XR CHEST PORTABLE   Preliminary Result   1. Stable position of enteric feeding tube. 2. Interval progression of a small right pleural effusion and right basilar   airspace consolidation. XR ABDOMEN FOR NG/OG/NE TUBE PLACEMENT   Final Result   Enteric tube with distal end reaching the gastric body on 1 of 3 images. The   other 2 images demonstrate tip of enteric tube at the level of the   midesophagus. XR CHEST PORTABLE   Final Result   1. Opacity right lower lung compatible with pleural effusion and relaxation   atelectasis versus pneumonia. 2. Pulmonary sequela typical of that seen with smoking, including COPD;   correlate with clinical history. 3. Unchanged tracheostomy tube positioning.  Dobbhoff type feeding tube   extends below the left hemidiaphragm, out of field of view. XR CHEST PORTABLE   Final Result   Supportive tubing is in normal position. Bibasilar opacities, atelectasis or airspace disease. There is mild   progression on the right side in the interval.         XR CHEST PORTABLE   Final Result   Mild dependent changes in the lower lung fields, atelectasis versus   infiltrate. Satisfactory position of endotracheal tube. XR ABDOMEN FOR NG/OG/NE TUBE PLACEMENT   Final Result   Tip of feeding tube projects in region of gastric fundus         XR CHEST PORTABLE   Final Result   1. Properly placed endotracheal tube. 2.  Mild bibasilar atelectasis         CT SOFT TISSUE NECK W CONTRAST   Final Result   Lobulated large mass wrapping the left side of the hypopharynx extending into   supraglottic and glottic portions of the larynx measuring approximately 3.5   cm diameter with a craniocaudad extent of 4.9 cm. The mass appears to   severely narrow the airway and could potentially make for difficult   intubation. Findings compatible with squamous cell carcinoma. Mild to moderate emphysema. XR CHEST PORTABLE   Final Result   Chronic findings in the chest without acute airspace disease identified. CT SOFT TISSUE NECK W CONTRAST    (Results Pending)           Assessment/Plan:    Active Hospital Problems    Diagnosis     Laryngeal mass [J38.7]     Acute hypercapnic respiratory failure (HCC) [J96.02]     Centrilobular emphysema (HCC) [J43.2]        1. 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 - consult to ENT and to pulmonary critical care. - will need biopsy and definitive dx before tx can be considered. - 3.5 cm left laryngeal mass. -s/p trach  No bleeding but what looks like pus is coming from the mouth  -it seems that this ws likely necrotic tissue from the mass  -HR is better today.     2.  Acute hypercapnic respiratory failure  - a combination of COPD and this mass, nearly blocking his airway. - after some time in the vent the ABG showed the PCO2 in the 70s so it was likely higher.  - currently vented. - no wheeze but may benefit from steroids if there is any swelling in the laryngeal region.  -started on decadron. S/p trach  Remains vented and sedated.       Spiking temp  - concern for infection  - started on Maxipime and Vanc  - IVF bolus  - May need pressor if blood pressure remains low. - a bit better today,     He has good peripheral access but may need  A central line in the future. DVT Prophylaxis: lovenox. Diet: Diet NPO  ADULT TUBE FEEDING; Nasogastric; Standard with Fiber; Continuous; 20; Yes; 25; Q 4 hours; 45; 130; Q 4 hours; Protein; Administer 1 Proteinex P2Go daily. Flush with 30 mL water before and after. Code Status: Full Code    PT/OT Eval Status: when appropriate     Dispo -doing better than yesterday today still is critically ill.    transfer to  for definitive treatment of the mass. This will likely occur early next week, await path results. 35 minutes of critical care time spent.      Guy Petersen MD

## 2022-03-28 NOTE — PROGRESS NOTES
Reassessment completed, see doc flowsheets. [facility]PC 20, FiO2 50% PEEP 5. Lungs diminished. Remains sedated with propofol, fentanyl and versed. Increased Versed rate per Dr. Barakat Shadow request.  SR on monitor. Will continue to assess.

## 2022-03-28 NOTE — PROGRESS NOTES
Assessment complete, vitals obtained. NSR. Pt sedated on vent w/ propofol, fentanyl and versed per MAR. RASS -4. CPOT 0. Pupils equal and reactive. + gag. [place] / vent. AC 16 / PC 20 / peep 8 / Fio2 60%. Lungs clear / diminished. Ab rounded , soft. + bowel sounds. Dobhoff @ 83 cm. Jevity 1.5 @ 20 ml/hr. Skin warm, dry. Ulcers to BLE noted, dry dressing in place. Echols draining yellow urine. Due meds given, see MAR. Pt turned / repositioned. Restraints & safety precautions in place.

## 2022-03-28 NOTE — PROGRESS NOTES
Clinical Pharmacy Note: Pharmacy to Dose Vancomcyin    Vancomycin Day: 3  Current Dosing Regimen: 1500 mg Q12  Dosing Method: Bayesian Modeling    Random: 13    Recent Labs     03/27/22  0415 03/28/22  0415   BUN 10 12       Recent Labs     03/27/22  0415 03/28/22  0415   CREATININE 0.5* <0.5*       Recent Labs     03/27/22  0415 03/28/22  0415   WBC 8.9 6.3         Intake/Output Summary (Last 24 hours) at 3/28/2022 1514  Last data filed at 3/28/2022 1447  Gross per 24 hour   Intake 3030.36 ml   Output 4150 ml   Net -1119.64 ml         Ht Readings from Last 1 Encounters:   03/25/22 5' 8\" (1.727 m)        Wt Readings from Last 1 Encounters:   03/28/22 179 lb 3.7 oz (81.3 kg)         Body mass index is 27.25 kg/m². CrCl cannot be calculated (This lab value cannot be used to calculate CrCl because it is not a number: <0.5). Assessment/Plan:  Vancomycin level is therapeutic. Level was drawn appropriately in respect to last dose given. Continue vancomycin regimen to 1500 mg every 12 hours. Bayesian Modeling predicts an AUC of 405 mg/L*hr and trough of 9.6 mg/L. Changes in regimen will be determined based on culture results, renal function, and clinical response. Pharmacy will continue to monitor and adjust regimen as necessary.     Thank you for the consult,    Elizabeth Rodriguez, PharmD  Pharmacy Resident   [id number]

## 2022-03-28 NOTE — PROGRESS NOTES
[facility] Pulmonary/CCM Progress note      Admit Date: 3/22/2022    Chief Complaint: Respiratory distress    Subjective: Interval History: Patient having tan-colored secretions upon respiratory suction, now growing MRSA/Serratia. Tracheostomy/Shiley size 6, appears comfortable. Deep sedation to prevent self decannulation. Hemodynamically stable.     Scheduled Meds:   vancomycin  1,500 mg IntraVENous Q12H    ipratropium-albuterol  3 mL Inhalation TID    lidocaine 1 % injection  5 mL IntraDERmal Once    sodium chloride flush  5-40 mL IntraVENous 2 times per day    cefepime  2,000 mg IntraVENous Q8H    enoxaparin  40 mg SubCUTAneous Nightly    naloxegol  25 mg Oral QAM    dexamethasone  4 mg IntraVENous Q12H    polyethylene glycol  17 g Oral Daily    sennosides-docusate sodium  2 tablet Oral Daily    gentamicin   Topical TID    insulin lispro  0-6 Units SubCUTAneous Q4H    succinylcholine  140 mg IntraVENous Once    sodium chloride flush  5-40 mL IntraVENous 2 times per day    famotidine (PEPCID) injection  20 mg IntraVENous BID    budesonide  0.5 mg Nebulization BID    Arformoterol Tartrate  15 mcg Nebulization BID    scopolamine  1 patch TransDERmal Q72H     Continuous Infusions:   sodium chloride      norepinephrine Stopped (03/27/22 0401)    midazolam 4 mg/hr (03/28/22 0917)    fentaNYL Citrate-NaCl 200 mcg/hr (03/28/22 1304)    propofol 55 mcg/kg/min (03/28/22 1302)    sodium chloride      dextrose       PRN Meds:sodium chloride flush, sodium chloride, hydrALAZINE, racepinephrine HCl, sodium chloride nebulizer, albuterol sulfate HFA, sodium chloride flush, sodium chloride, ondansetron **OR** ondansetron, acetaminophen **OR** acetaminophen, glucose, glucagon (rDNA), dextrose, dextrose bolus (hypoglycemia) **OR** dextrose bolus (hypoglycemia)    Review of Systems  Unable to obtain since patient is currently intubated and sedated    Objective:     Patient Vitals for the past 8 hrs:   BP Temp Temp src Pulse Resp SpO2   03/28/22 1300 130/68 -- -- 56 16 97 %   03/28/22 1200 117/63 98.8 °F (37.1 °C) Bladder 66 16 96 %   03/28/22 1118 -- -- -- -- 16 97 %   03/28/22 1111 -- -- -- 55 16 97 %   03/28/22 1110 -- -- -- -- 15 98 %   03/28/22 1000 114/63 -- -- 61 16 97 %   03/28/22 0830 122/67 98.7 °F (37.1 °C) Bladder 59 16 97 %   03/28/22 0808 -- -- -- 54 16 97 %   03/28/22 0800 -- -- -- 55 16 98 %   03/28/22 0700 120/69 -- -- 56 16 --     I/O last 3 completed shifts:   In: 7062.2 [I.V.:3873.2; NG/GT:1166; IV Piggyback:2023.1]  Out: 4500 [Urine:4500]  I/O this shift:  In: 170 [I.V.:10; NG/GT:160]  Out: -     General Appearance: Intubated and sedated, in no acute distress  Skin: warm and dry, no rash or erythema  Head: normocephalic and atraumatic  Eyes: pupils equal, round, and reactive to light, extraocular eye movements intact, conjunctivae normal  ENT: external ear and ear canal normal bilaterally, nose without deformity, nasal mucosa and turbinates normal  Neck: supple and non-tender without mass, no cervical lymphadenopathy  Pulmonary/Chest: clear to auscultation bilaterally- no wheezes, rales or rhonchi, normal air movement, no respiratory distress  Cardiovascular: normal rate, regular rhythm,  no murmurs, rubs, distal pulses intact, no carotid bruits  Abdomen: soft, non-tender, non-distended, normal bowel sounds, no masses or organomegaly  Lymph Nodes: Cervical, supraclavicular normal  Extremities: no cyanosis, clubbing or edema  Musculoskeletal: normal range of motion, no joint swelling, deformity or tenderness  Neurologic: Sedated, no focal neurologic deficits    Data Review:  CBC:   Lab Results   Component Value Date    WBC 6.3 03/28/2022    RBC 3.78 03/28/2022     BMP:   Lab Results   Component Value Date    GLUCOSE 164 03/28/2022    CO2 25 03/28/2022    BUN 12 03/28/2022    CREATININE <0.5 03/28/2022    CALCIUM 8.7 03/28/2022     ABG:   Lab Results   Component Value Date    NWK7XPM 27.8 03/28/2022 BEART 2.0 03/28/2022    H7ZXFAEM 95.3 03/28/2022    PHART 7.382 03/28/2022    ACU9HOE 46.9 03/28/2022    PO2ART 82.8 03/28/2022    KAM3PYZ 65.6 03/28/2022       Radiology: All pertinent images / reports were reviewed as a part of this visit. Narrative   EXAMINATION:   CT OF THE NECK SOFT TISSUE WITH CONTRAST  3/22/2022       TECHNIQUE:   CT of the neck was performed with the administration of intravenous contrast.   Multiplanar reformatted images are provided for review. Dose modulation,   iterative reconstruction, and/or weight based adjustment of the mA/kV was   utilized to reduce the radiation dose to as low as reasonably achievable.       COMPARISON:   None.       HISTORY:   ORDERING SYSTEM PROVIDED HISTORY: Hemoptysis and hoarseness   TECHNOLOGIST PROVIDED HISTORY:   Reason for exam:->Hemoptysis and hoarseness   Decision Support Exception - unselect if not a suspected or confirmed   emergency medical condition->Emergency Medical Condition (MA)   Reason for Exam: Hemoptysis and hoarseness   Relevant Medical/Surgical History: Shortness of Breath (pt. from home arrived   by [facility] twp brought pt. in.  pt. was fine and when he went to bed he   felt his back and chest get tight and then started with SOB. squad gave a   duoneb, 125mg solu medrol, AND INCREASED o2 TO 4L.  PT. normally wears 2-3 l   of )2.  )       FINDINGS:   PHARYNX/LARYNX:  The palatine tonsils are normal in appearance.  The tongue   is normal in appearance.  Involving the vallecula on the left and extending   inferiorly along the aryepiglottic fold and anterior/left anterolateral   hypopharynx is a large lobulated mass wrapping to the left from anterior to   posterior at the supraglottic larynx.  This thickened crescentic area   measures approximately 3.5 cm diameter and has a craniocaudad extent of 4.9   cm.  The mass laterally and posteriorly displaces the true and false cords   and appears to severely narrow the airway.  There is no associated erosion of   the laryngeal cartilages.       SALIVARY GLANDS/THYROID:  The parotid and submandibular glands appear   unremarkable.  The thyroid gland appears unremarkable.       LYMPH NODES:  No cervical or supraclavicular lymphadenopathy is seen.       SOFT TISSUES:  No appreciable soft tissue swelling or mass is seen.       BRAIN/ORBITS/SINUSES:  The visualized portion of the intracranial contents   appear unremarkable.  The visualized portion of the orbits, paranasal sinuses   and mastoid air cells demonstrate no acute abnormality.       LUNG APICES/SUPERIOR MEDIASTINUM:  No focal consolidation is seen within the   visualized lung apices.  There is a background of mild to moderate emphysema. No superior mediastinal lymphadenopathy or mass.  The visualized portion of   the trachea appears unremarkable.       BONES:  No aggressive appearing lytic or blastic bony lesion.           Impression   Lobulated large mass wrapping the left side of the hypopharynx extending into   supraglottic and glottic portions of the larynx measuring approximately 3.5   cm diameter with a craniocaudad extent of 4.9 cm.  The mass appears to   severely narrow the airway and could potentially make for difficult   intubation.  Findings compatible with squamous cell carcinoma.       Mild to moderate emphysema. 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 Right lower lobe infiltrate appears to be improving on subsequent chest x-rays. Improved oxygenation, decrease FiO2 to 50%, PEEP to 5. Patient is currently on deep sedation with propofol, Versed and fentanyl. Nimbex drip has been weaned off completely. ABG acceptable. 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   Tan-colored secretions, now growing MRSA/Serratia, continue cefepime and IV vancomycin. Mass might represent abscess versus necrotic tumor. Discussed with Dr. Louis Stein from ENT, will plan to wait for final pathology of the recently performed lymph node biopsy before transfer to . ? Repeat CT neck. WBC 6.3. Low-grade fevers noted yesterday. Also on Decadron 4 mg twice daily-which we will stop    History of moderate COPD based on prior PFT from January 2022, FEV1 of 1.83 L [63% predicted]. Continue with bronchodilators. Does not appear to be in exacerbation. Dobhoff tube feeding to continue. Continue with Lovenox and Pepcid.     Calista Costa MD     Critical care time of 32 minutes excluding procedures

## 2022-03-28 NOTE — PROGRESS NOTES
03/28/22 0800   Vent Patient Data   High Peep/I Pressure 20   Peak Inspiratory Pressure 28 cmH2O   Mean Airway Pressure 14 cmH20   Rate Measured 16 br/min   Vt Exhaled 877 mL   Minute Volume 14 Liters   I:E Ratio 1:2.4   Plateau Pressure 24 BNT86   Static Compliance 54.81 mL/cmH2O   Dynamic Compliance 73.08 mL/cmH2O   Total PEEP 7.9 cmH20   Auto PEEP 0 cmH20

## 2022-03-28 NOTE — PROGRESS NOTES
Shift assessment completed, see doc flowsheets. Patient in bed, sedated on ventilator. Presently on [facility] 16/PC 20, FiO2 60 and PEEP 8. Lungs w/rhonchi, RT stated thick tan sputum from trach suctioning. Foul odor from mouth. Abdomen soft w/bowel sounds. Trace edema to extremities. Dressing to bilateral ankles intact. SB on monitor. Will continue to assess.

## 2022-03-28 NOTE — PROGRESS NOTES
[facility]  HEAD AND NECK - ENT  PROGRESS  NOTE    Date of Service: 3/28/2022    ASSESSMENT: Alyssa Torres is a 65 y.o. male consulted to the ENT service for acute airway obstruction in setting of obstructive hypopharyngeal mass status post tracheostomy with direct laryngoscopy and biopsy on 3/25/2022. PLAN/RECOMMENDATIONS:     Hypopharyngeal lesion  Acute airway obstruction  Acute respiratory distress  -6.0 cuffed tracheostomy tube in place. Secured in place with four-point sutures and Posey necktie. -Hypopharyngeal biopsies taken during direct laryngoscopy noted benign finding on multiple frozen sections. Awaiting paratracheal neck mass and permanent hypopharyngeal biopsy results. Noted to become acutely septic over the weekend. 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 Respiratory cultures positive for MRSA, blood cultures with no growth to date, continue empiric antibiotic treatment for MRSA pneumonia. Will place order for repeat CT neck with contrast.   -Vent management per ICU team  -Please do not hesitate to call ENT with any questions or concerns. SUBJECTIVE:   Significant overnight events: None per RN overnight    Weekend RN reported some purulence noted from oral cavity on Saturday. OBJECTIVE:  Physical Exam:   Temp (24hrs), Av.1 °F (37.3 °C), Min:98.7 °F (37.1 °C), Max:99.7 °F (37.6 °C)    Vitals:    22 1400 22 1500 22 1516 22 1529   BP: 126/72 128/73     Pulse: 55 53 55 57   Resp: 16 18 16 16   Temp:       TempSrc:       SpO2: 96% 98% 97% 97%   Weight:       Height:         I/O last 3 completed shifts:   In: 7062.2 [I.V.:3873.2; NG/GT:1166; IV Piggyback:.]  Out: 4500 [Urine:4500]    REVIEW OF SYSTEMS  The following systems were reviewed and revealed the following in addition to any already discussed in the HPI:     Review of systems but with unable to performed secondary to intubated and sedated status.         PHYSICAL EXAM  GENERAL: Intubated and sedated  EYES: EOMI, Anti-icteric  NOSE: On anterior rhinoscopy there is no epistaxis, nasal mucosa within normal limits, no purulent drainage  EARS: Normal external appearance; no otorrhea  FACE: 1/6 House-Brackmann Scale, symmetric  ORAL CAVITY: No oropharyngeal secretions. NECK: 6.0 cuffed tracheostomy tube in place secured with 4 point silk sutures and neck tie. No surrounding secretions or dried blood.    CHEST: On vent, 50% FiO2, equal chest rise  SKIN: No rashes, normal appearing skin, no evidence of skin lesions/tumors  NEURO: Sedated    Lab Studies:  Lab Results   Component Value Date    WBC 6.3 03/28/2022    HGB 10.5 (L) 03/28/2022    HCT 33.2 (L) 03/28/2022    MCV 87.9 03/28/2022     03/28/2022     Lab Results   Component Value Date    GLUCOSE 164 (H) 03/28/2022    BUN 12 03/28/2022    CREATININE <0.5 (L) 03/28/2022    K 4.4 03/28/2022     03/28/2022     03/28/2022    CALCIUM 8.7 03/28/2022     Lab Results   Component Value Date    MG 1.70 (L) 03/26/2022     Lab Results   Component Value Date    PHOS 3.8 03/26/2022     Lab Results   Component Value Date    ALKPHOS 68 03/23/2022    ALT 19 03/23/2022    AST 11 (L) 03/23/2022    BILITOT 0.3 03/23/2022    PROT 5.6 (L) 03/23/2022     Lab Results   Component Value Date    INR 1.08 03/28/2022    INR 1.12 03/27/2022    INR 1.01 03/26/2022    PROTIME 12.3 03/28/2022    PROTIME 12.7 03/27/2022    PROTIME 11.4 03/26/2022

## 2022-03-28 NOTE — PLAN OF CARE
Problem: Skin Integrity:  Goal: Will show no infection signs and symptoms  Description: Will show no infection signs and symptoms  Outcome: Ongoing  Goal: Absence of new skin breakdown  Description: Absence of new skin breakdown  Outcome: Ongoing     Problem: Falls - Risk of:  Goal: Will remain free from falls  Description: Will remain free from falls  Outcome: Ongoing  Goal: Absence of physical injury  Description: Absence of physical injury  Outcome: Ongoing     Problem: Nutrition  Goal: Optimal nutrition therapy  Outcome: Ongoing     Problem: Discharge Planning:  Goal: Participates in care planning  Description: Participates in care planning  Outcome: Ongoing  Goal: Discharged to appropriate level of care  Description: Discharged to appropriate level of care  Outcome: Ongoing     Problem: Airway Clearance - Ineffective:  Goal: Ability to maintain a clear airway will improve  Description: Ability to maintain a clear airway will improve  Outcome: Ongoing     Problem: Anxiety/Stress:  Goal: Level of anxiety will decrease  Description: Level of anxiety will decrease  Outcome: Ongoing     Problem: Aspiration:  Goal: Absence of aspiration  Description: Absence of aspiration  Outcome: Ongoing     Problem:  Bowel Function - Altered:  Goal: Bowel elimination is within specified parameters  Description: Bowel elimination is within specified parameters  Outcome: Ongoing     Problem: Gas Exchange - Impaired:  Goal: Levels of oxygenation will improve  Description: Levels of oxygenation will improve  Outcome: Ongoing     Problem: Pain:  Goal: Pain level will decrease  Description: Pain level will decrease  Outcome: Ongoing  Goal: Control of acute pain  Description: Control of acute pain  Outcome: Ongoing  Goal: Control of chronic pain  Description: Control of chronic pain  Outcome: Ongoing

## 2022-03-29 ENCOUNTER — APPOINTMENT (OUTPATIENT)
Dept: CT IMAGING | Age: 62
DRG: 004 | End: 2022-03-29
Payer: COMMERCIAL

## 2022-03-29 ENCOUNTER — APPOINTMENT (OUTPATIENT)
Dept: GENERAL RADIOLOGY | Age: 62
DRG: 004 | End: 2022-03-29
Payer: COMMERCIAL

## 2022-03-29 LAB
A/G RATIO: 0.9 (ref 1.1–2.2)
ALBUMIN SERPL-MCNC: 2.5 G/DL (ref 3.4–5)
ALP BLD-CCNC: 93 U/L (ref 40–129)
ALT SERPL-CCNC: 58 U/L (ref 10–40)
ANION GAP SERPL CALCULATED.3IONS-SCNC: 9 MMOL/L (ref 3–16)
AST SERPL-CCNC: 31 U/L (ref 15–37)
BASOPHILS ABSOLUTE: 0 K/UL (ref 0–0.2)
BASOPHILS RELATIVE PERCENT: 0.4 %
BILIRUB SERPL-MCNC: <0.2 MG/DL (ref 0–1)
BUN BLDV-MCNC: 15 MG/DL (ref 7–20)
CALCIUM SERPL-MCNC: 9 MG/DL (ref 8.3–10.6)
CHLORIDE BLD-SCNC: 103 MMOL/L (ref 99–110)
CO2: 28 MMOL/L (ref 21–32)
CREAT SERPL-MCNC: <0.5 MG/DL (ref 0.8–1.3)
EOSINOPHILS ABSOLUTE: 0 K/UL (ref 0–0.6)
EOSINOPHILS RELATIVE PERCENT: 0.8 %
GFR AFRICAN AMERICAN: >60
GFR NON-AFRICAN AMERICAN: >60
GLUCOSE BLD-MCNC: 129 MG/DL (ref 70–99)
GLUCOSE BLD-MCNC: 130 MG/DL (ref 70–99)
GLUCOSE BLD-MCNC: 145 MG/DL (ref 70–99)
GLUCOSE BLD-MCNC: 169 MG/DL (ref 70–99)
GLUCOSE BLD-MCNC: 176 MG/DL (ref 70–99)
GLUCOSE BLD-MCNC: 187 MG/DL (ref 70–99)
GLUCOSE BLD-MCNC: 205 MG/DL (ref 70–99)
GLUCOSE BLD-MCNC: 98 MG/DL (ref 70–99)
HCT VFR BLD CALC: 32.2 % (ref 40.5–52.5)
HEMOGLOBIN: 10.5 G/DL (ref 13.5–17.5)
INR BLD: 1.05 (ref 0.88–1.12)
LYMPHOCYTES ABSOLUTE: 0.9 K/UL (ref 1–5.1)
LYMPHOCYTES RELATIVE PERCENT: 14.8 %
MCH RBC QN AUTO: 28.4 PG (ref 26–34)
MCHC RBC AUTO-ENTMCNC: 32.4 G/DL (ref 31–36)
MCV RBC AUTO: 87.5 FL (ref 80–100)
MONOCYTES ABSOLUTE: 0.4 K/UL (ref 0–1.3)
MONOCYTES RELATIVE PERCENT: 5.8 %
NEUTROPHILS ABSOLUTE: 4.8 K/UL (ref 1.7–7.7)
NEUTROPHILS RELATIVE PERCENT: 78.2 %
PDW BLD-RTO: 16.5 % (ref 12.4–15.4)
PERFORMED ON: ABNORMAL
PERFORMED ON: NORMAL
PLATELET # BLD: 246 K/UL (ref 135–450)
PMV BLD AUTO: 7.8 FL (ref 5–10.5)
POTASSIUM SERPL-SCNC: 3.8 MMOL/L (ref 3.5–5.1)
PROTHROMBIN TIME: 11.9 SEC (ref 9.9–12.7)
RBC # BLD: 3.68 M/UL (ref 4.2–5.9)
SODIUM BLD-SCNC: 140 MMOL/L (ref 136–145)
TOTAL PROTEIN: 5.2 G/DL (ref 6.4–8.2)
TRIGL SERPL-MCNC: 175 MG/DL (ref 0–150)
VANCOMYCIN RANDOM: 11.1 UG/ML
WBC # BLD: 6.2 K/UL (ref 4–11)

## 2022-03-29 PROCEDURE — 2580000003 HC RX 258: Performed by: INTERNAL MEDICINE

## 2022-03-29 PROCEDURE — 71045 X-RAY EXAM CHEST 1 VIEW: CPT

## 2022-03-29 PROCEDURE — 6360000002 HC RX W HCPCS: Performed by: INTERNAL MEDICINE

## 2022-03-29 PROCEDURE — 85025 COMPLETE CBC W/AUTO DIFF WBC: CPT

## 2022-03-29 PROCEDURE — 36569 INSJ PICC 5 YR+ W/O IMAGING: CPT

## 2022-03-29 PROCEDURE — 94003 VENT MGMT INPAT SUBQ DAY: CPT

## 2022-03-29 PROCEDURE — 2000000000 HC ICU R&B

## 2022-03-29 PROCEDURE — C1751 CATH, INF, PER/CENT/MIDLINE: HCPCS

## 2022-03-29 PROCEDURE — 02HV33Z INSERTION OF INFUSION DEVICE INTO SUPERIOR VENA CAVA, PERCUTANEOUS APPROACH: ICD-10-PCS | Performed by: RADIOLOGY

## 2022-03-29 PROCEDURE — 85610 PROTHROMBIN TIME: CPT

## 2022-03-29 PROCEDURE — 2700000000 HC OXYGEN THERAPY PER DAY

## 2022-03-29 PROCEDURE — 6370000000 HC RX 637 (ALT 250 FOR IP): Performed by: INTERNAL MEDICINE

## 2022-03-29 PROCEDURE — 94761 N-INVAS EAR/PLS OXIMETRY MLT: CPT

## 2022-03-29 PROCEDURE — 70491 CT SOFT TISSUE NECK W/DYE: CPT

## 2022-03-29 PROCEDURE — 80053 COMPREHEN METABOLIC PANEL: CPT

## 2022-03-29 PROCEDURE — 6360000004 HC RX CONTRAST MEDICATION: Performed by: STUDENT IN AN ORGANIZED HEALTH CARE EDUCATION/TRAINING PROGRAM

## 2022-03-29 PROCEDURE — 2500000003 HC RX 250 WO HCPCS: Performed by: INTERNAL MEDICINE

## 2022-03-29 PROCEDURE — 84478 ASSAY OF TRIGLYCERIDES: CPT

## 2022-03-29 PROCEDURE — 80202 ASSAY OF VANCOMYCIN: CPT

## 2022-03-29 PROCEDURE — 99291 CRITICAL CARE FIRST HOUR: CPT | Performed by: INTERNAL MEDICINE

## 2022-03-29 PROCEDURE — 94640 AIRWAY INHALATION TREATMENT: CPT

## 2022-03-29 PROCEDURE — A4216 STERILE WATER/SALINE, 10 ML: HCPCS | Performed by: INTERNAL MEDICINE

## 2022-03-29 RX ORDER — SODIUM CHLORIDE 0.9 % (FLUSH) 0.9 %
5-40 SYRINGE (ML) INJECTION PRN
Status: DISCONTINUED | OUTPATIENT
Start: 2022-03-29 | End: 2022-04-02 | Stop reason: HOSPADM

## 2022-03-29 RX ORDER — LIDOCAINE HYDROCHLORIDE 10 MG/ML
5 INJECTION, SOLUTION EPIDURAL; INFILTRATION; INTRACAUDAL; PERINEURAL ONCE
Status: DISCONTINUED | OUTPATIENT
Start: 2022-03-29 | End: 2022-04-02 | Stop reason: HOSPADM

## 2022-03-29 RX ORDER — SODIUM CHLORIDE 9 MG/ML
25 INJECTION, SOLUTION INTRAVENOUS PRN
Status: DISCONTINUED | OUTPATIENT
Start: 2022-03-29 | End: 2022-04-02 | Stop reason: HOSPADM

## 2022-03-29 RX ORDER — SODIUM CHLORIDE 0.9 % (FLUSH) 0.9 %
5-40 SYRINGE (ML) INJECTION EVERY 12 HOURS SCHEDULED
Status: DISCONTINUED | OUTPATIENT
Start: 2022-03-29 | End: 2022-04-02 | Stop reason: HOSPADM

## 2022-03-29 RX ADMIN — POLYETHYLENE GLYCOL 3350 17 G: 17 POWDER, FOR SOLUTION ORAL at 08:17

## 2022-03-29 RX ADMIN — CEFEPIME HYDROCHLORIDE 2000 MG: 2 INJECTION, POWDER, FOR SOLUTION INTRAVENOUS at 09:20

## 2022-03-29 RX ADMIN — CEFEPIME HYDROCHLORIDE 2000 MG: 2 INJECTION, POWDER, FOR SOLUTION INTRAVENOUS at 17:14

## 2022-03-29 RX ADMIN — Medication 10 ML: at 12:50

## 2022-03-29 RX ADMIN — SODIUM CHLORIDE, PRESERVATIVE FREE 20 MG: 5 INJECTION INTRAVENOUS at 21:10

## 2022-03-29 RX ADMIN — INSULIN LISPRO 1 UNITS: 100 INJECTION, SOLUTION INTRAVENOUS; SUBCUTANEOUS at 12:44

## 2022-03-29 RX ADMIN — VANCOMYCIN HYDROCHLORIDE 1500 MG: 10 INJECTION, POWDER, LYOPHILIZED, FOR SOLUTION INTRAVENOUS at 12:59

## 2022-03-29 RX ADMIN — INSULIN LISPRO 1 UNITS: 100 INJECTION, SOLUTION INTRAVENOUS; SUBCUTANEOUS at 04:50

## 2022-03-29 RX ADMIN — SODIUM CHLORIDE 25 ML: 9 INJECTION, SOLUTION INTRAVENOUS at 20:48

## 2022-03-29 RX ADMIN — PROPOFOL 55 MCG/KG/MIN: 10 INJECTION, EMULSION INTRAVENOUS at 16:51

## 2022-03-29 RX ADMIN — GENTAMICIN SULFATE: 1 CREAM TOPICAL at 11:42

## 2022-03-29 RX ADMIN — ARFORMOTEROL TARTRATE 15 MCG: 15 SOLUTION RESPIRATORY (INHALATION) at 19:25

## 2022-03-29 RX ADMIN — SODIUM CHLORIDE 100 ML: 9 INJECTION, SOLUTION INTRAVENOUS at 12:53

## 2022-03-29 RX ADMIN — BUDESONIDE 500 MCG: 0.5 SUSPENSION RESPIRATORY (INHALATION) at 19:25

## 2022-03-29 RX ADMIN — CEFEPIME HYDROCHLORIDE 2000 MG: 2 INJECTION, POWDER, FOR SOLUTION INTRAVENOUS at 00:43

## 2022-03-29 RX ADMIN — ENOXAPARIN SODIUM 40 MG: 40 INJECTION SUBCUTANEOUS at 21:11

## 2022-03-29 RX ADMIN — IOPAMIDOL 75 ML: 755 INJECTION, SOLUTION INTRAVENOUS at 13:25

## 2022-03-29 RX ADMIN — PROPOFOL 55 MCG/KG/MIN: 10 INJECTION, EMULSION INTRAVENOUS at 09:13

## 2022-03-29 RX ADMIN — PROPOFOL 55 MCG/KG/MIN: 10 INJECTION, EMULSION INTRAVENOUS at 20:42

## 2022-03-29 RX ADMIN — SENNOSIDES AND DOCUSATE SODIUM 2 TABLET: 50; 8.6 TABLET ORAL at 08:17

## 2022-03-29 RX ADMIN — INSULIN LISPRO 2 UNITS: 100 INJECTION, SOLUTION INTRAVENOUS; SUBCUTANEOUS at 00:18

## 2022-03-29 RX ADMIN — Medication 200 MCG/HR: at 13:46

## 2022-03-29 RX ADMIN — SODIUM CHLORIDE, PRESERVATIVE FREE 20 MG: 5 INJECTION INTRAVENOUS at 08:17

## 2022-03-29 RX ADMIN — Medication 10 ML: at 21:00

## 2022-03-29 RX ADMIN — GENTAMICIN SULFATE: 1 CREAM TOPICAL at 21:10

## 2022-03-29 RX ADMIN — IPRATROPIUM BROMIDE AND ALBUTEROL SULFATE 3 ML: .5; 2.5 SOLUTION RESPIRATORY (INHALATION) at 19:25

## 2022-03-29 RX ADMIN — Medication 200 MCG/HR: at 01:56

## 2022-03-29 RX ADMIN — IPRATROPIUM BROMIDE AND ALBUTEROL SULFATE 3 ML: .5; 2.5 SOLUTION RESPIRATORY (INHALATION) at 07:42

## 2022-03-29 RX ADMIN — Medication 200 MCG/HR: at 20:46

## 2022-03-29 RX ADMIN — INSULIN LISPRO 1 UNITS: 100 INJECTION, SOLUTION INTRAVENOUS; SUBCUTANEOUS at 08:23

## 2022-03-29 RX ADMIN — Medication 10 ML: at 21:11

## 2022-03-29 RX ADMIN — IPRATROPIUM BROMIDE AND ALBUTEROL SULFATE 3 ML: .5; 2.5 SOLUTION RESPIRATORY (INHALATION) at 15:00

## 2022-03-29 RX ADMIN — VANCOMYCIN HYDROCHLORIDE 1500 MG: 10 INJECTION, POWDER, LYOPHILIZED, FOR SOLUTION INTRAVENOUS at 22:05

## 2022-03-29 RX ADMIN — NALOXEGOL OXALATE 25 MG: 25 TABLET, FILM COATED ORAL at 08:17

## 2022-03-29 RX ADMIN — SODIUM CHLORIDE 100 ML: 9 INJECTION, SOLUTION INTRAVENOUS at 09:16

## 2022-03-29 RX ADMIN — PROPOFOL 55 MCG/KG/MIN: 10 INJECTION, EMULSION INTRAVENOUS at 12:46

## 2022-03-29 RX ADMIN — Medication 4 MG/HR: at 11:48

## 2022-03-29 RX ADMIN — Medication 4 MG/HR: at 20:46

## 2022-03-29 RX ADMIN — BUDESONIDE 500 MCG: 0.5 SUSPENSION RESPIRATORY (INHALATION) at 07:43

## 2022-03-29 RX ADMIN — ARFORMOTEROL TARTRATE 15 MCG: 15 SOLUTION RESPIRATORY (INHALATION) at 07:43

## 2022-03-29 RX ADMIN — PROPOFOL 55 MCG/KG/MIN: 10 INJECTION, EMULSION INTRAVENOUS at 00:41

## 2022-03-29 RX ADMIN — GENTAMICIN SULFATE: 1 CREAM TOPICAL at 14:58

## 2022-03-29 ASSESSMENT — PULMONARY FUNCTION TESTS
PIF_VALUE: 25

## 2022-03-29 ASSESSMENT — PAIN SCALES - GENERAL
PAINLEVEL_OUTOF10: 0

## 2022-03-29 NOTE — RT PROTOCOL NOTE
RT Inhaler-Nebulizer Bronchodilator Protocol Note    There is a bronchodilator order in the chart from a provider indicating to follow the RT Bronchodilator Protocol and there is an Initiate RT Inhaler-Nebulizer Bronchodilator Protocol order as well (see protocol at bottom of note). CXR Findings:  XR CHEST PORTABLE    Result Date: 3/29/2022  Large consolidation right lower lobe from pneumonia. Small right pleural effusion. Mild congestion. No pneumothorax. XR CHEST PORTABLE    Result Date: 3/28/2022  Decreasing right basilar pleuroparenchymal disease as compared to prior. A portion of the right upper extremity PICC line is looped in the right supraclavicular region; correlate with catheter function. The findings from the last RT Protocol Assessment were as follows:   History Pulmonary Disease: Chronic pulmonary disease  Respiratory Pattern: Regular pattern and RR 12-20 bpm  Breath Sounds: Slightly diminished and/or crackles  Cough: Unable to generate effective cough  Indication for Bronchodilator Therapy: Decreased or absent breath sounds,Wheezing associated with pulm disorder  Bronchodilator Assessment Score: 8    Aerosolized bronchodilator medication orders have been revised according to the RT Inhaler-Nebulizer Bronchodilator Protocol below. Respiratory Therapist to perform RT Therapy Protocol Assessment initially then follow the protocol. Repeat RT Therapy Protocol Assessment PRN for score 0-3 or on second treatment, BID, and PRN for scores above 3. No Indications - adjust the frequency to every 6 hours PRN wheezing or bronchospasm, if no treatments needed after 48 hours then discontinue using Per Protocol order mode. If indication present, adjust the RT bronchodilator orders based on the Bronchodilator Assessment Score as indicated below.   Use Inhaler orders unless patient has one or more of the following: on home nebulizer, not able to hold breath for 10 seconds, is not alert and oriented, cannot activate and use MDI correctly, or respiratory rate 25 breaths per minute or more, then use the equivalent nebulizer order(s) with same Frequency and PRN reasons based on the score. If a patient is on this medication at home then do not decrease Frequency below that used at home. 0-3 - enter or revise RT bronchodilator order(s) to equivalent RT Bronchodilator order with Frequency of every 4 hours PRN for wheezing or increased work of breathing using Per Protocol order mode. 4-6 - enter or revise RT Bronchodilator order(s) to two equivalent RT bronchodilator orders with one order with BID Frequency and one order with Frequency of every 4 hours PRN wheezing or increased work of breathing using Per Protocol order mode. 7-10 - enter or revise RT Bronchodilator order(s) to two equivalent RT bronchodilator orders with one order with TID Frequency and one order with Frequency of every 4 hours PRN wheezing or increased work of breathing using Per Protocol order mode. 11-13 - enter or revise RT Bronchodilator order(s) to one equivalent RT bronchodilator order with QID Frequency and an Albuterol order with Frequency of every 4 hours PRN wheezing or increased work of breathing using Per Protocol order mode. Greater than 13 - enter or revise RT Bronchodilator order(s) to one equivalent RT bronchodilator order with every 4 hours Frequency and an Albuterol order with Frequency of every 2 hours PRN wheezing or increased work of breathing using Per Protocol order mode. RT to enter RT Home Evaluation for COPD & MDI Assessment order using Per Protocol order mode.     Electronically signed by Daphnie Hdez RCP on 3/29/2022 at 12:51 PM

## 2022-03-29 NOTE — PROGRESS NOTES
Clinical Pharmacy Note: Pharmacy to Dose Vancomcyin    Vancomycin Day: 4  Current Dosing Regimen: 1500 mg Q12H   Dosing Method: Bayesian Modeling    Random: 11.1    Recent Labs     03/28/22  0415 03/29/22  0506   BUN 12 15       Recent Labs     03/28/22  0415 03/29/22  0506   CREATININE <0.5* <0.5*       Recent Labs     03/28/22  0415 03/29/22  0506   WBC 6.3 6.2         Intake/Output Summary (Last 24 hours) at 3/29/2022 1210  Last data filed at 3/29/2022 0443  Gross per 24 hour   Intake 3665.64 ml   Output 3700 ml   Net -34.36 ml         Ht Readings from Last 1 Encounters:   03/29/22 5' 8\" (1.727 m)        Wt Readings from Last 1 Encounters:   03/29/22 178 lb 2.1 oz (80.8 kg)         Body mass index is 27.08 kg/m². CrCl cannot be calculated (This lab value cannot be used to calculate CrCl because it is not a number: <0.5). Assessment/Plan:  Vancomycin level is therapeutic. Level was drawn appropriately in respect to last dose given. Continue vancomycin regimen to 1500 mg every 12 hours. Bayesian Modeling predicts an AUC of 411 mg/L*hr and trough of 10.2 mg/L. Changes in regimen will be determined based on culture results, renal function, and clinical response. Pharmacy will continue to monitor and adjust regimen as necessary.     Thank you for the consult,    Brianda Young, PharmD  Pharmacy Resident   [id number]

## 2022-03-29 NOTE — PROGRESS NOTES
03/29/22 0743   Vent Patient Data   Peak Inspiratory Pressure 25 cmH2O   Mean Airway Pressure 11 cmH20   Rate Measured 16 br/min   Vt Exhaled 642 mL   Minute Volume 10.3 Liters   I:E Ratio 1:2.4   Plateau Pressure 20 CTS44   Static Compliance 42.8 mL/cmH2O   Dynamic Compliance 32.1 mL/cmH2O   Total PEEP 5.1 cmH20

## 2022-03-29 NOTE — CARE COORDINATION
SAUD informed by Claudia Singletary to have a referral to to [facility]. The SW spoke with the patient's wife regarding LTAC'S and the patient's wife stated she would like to go with the [facility].  SAUD sent a referral to [phone]    Electronically signed by MATTHIAS Devlin on 3/29/2022 at 4:02 PM

## 2022-03-29 NOTE — PROGRESS NOTES
Bilateral ankle dressing changes completed. Old dressing removed. Skin in bilateral ankles at affected sites is pink and no exudate or drainage is present. Both ankle wounds cleansed with normal sterile saline. Gentamicin Sulfate Cream 0.1% applied to wound beds. Covered with ABD pad and wrapped with kerlix gauze and secured with primapore tape.

## 2022-03-29 NOTE — PLAN OF CARE
Problem: Non-Violent Restraints  Goal: No harm/injury to patient while restraints in use  3/29/2022 1615 by Jory Cruz RN  Outcome: Ongoing  3/29/2022 0253 by Kvng Castro RN  Outcome: Ongoing     Problem: Skin Integrity:  Goal: Will show no infection signs and symptoms  Description: Will show no infection signs and symptoms  3/29/2022 1615 by Jory Cruz RN  Outcome: Ongoing  3/29/2022 0252 by Kvng Castro RN  Outcome: Ongoing  Goal: Absence of new skin breakdown  Description: Absence of new skin breakdown  Outcome: Ongoing     Problem: Falls - Risk of:  Goal: Will remain free from falls  Description: Will remain free from falls  3/29/2022 1615 by Jory Cruz RN  Outcome: Ongoing  3/29/2022 0252 by Kvng Castro RN  Outcome: Ongoing  Goal: Absence of physical injury  Description: Absence of physical injury  Outcome: Ongoing     Problem: Nutrition  Goal: Optimal nutrition therapy  3/29/2022 1615 by Jory Cruz RN  Outcome: Ongoing  3/29/2022 0955 by Karin Parisi RD, LD  Outcome: Ongoing  3/29/2022 0252 by Kvng Castro RN  Outcome: Ongoing     Problem: Discharge Planning:  Goal: Participates in care planning  Description: Participates in care planning  Outcome: Ongoing  Goal: Discharged to appropriate level of care  Description: Discharged to appropriate level of care  Outcome: Ongoing     Problem: Airway Clearance - Ineffective:  Goal: Ability to maintain a clear airway will improve  Description: Ability to maintain a clear airway will improve  3/29/2022 1615 by Jory Cruz RN  Outcome: Ongoing  3/29/2022 0252 by Kvng Castro RN  Outcome: Ongoing     Problem: Anxiety/Stress:  Goal: Level of anxiety will decrease  Description: Level of anxiety will decrease  Outcome: Ongoing     Problem: Aspiration:  Goal: Absence of aspiration  Description: Absence of aspiration  Outcome: Ongoing     Problem:  Bowel Function - Altered:  Goal: Bowel elimination is within specified parameters  Description: Bowel elimination is within specified parameters  Outcome: Ongoing     Problem: Gas Exchange - Impaired:  Goal: Levels of oxygenation will improve  Description: Levels of oxygenation will improve  3/29/2022 1615 by Lucy Patino RN  Outcome: Ongoing  3/29/2022 0252 by Kassandra Vela RN  Outcome: Ongoing     Problem: Pain:  Goal: Pain level will decrease  Description: Pain level will decrease  3/29/2022 1615 by Lucy Patino RN  Outcome: Ongoing  3/29/2022 0252 by Kassandra Vela RN  Outcome: Ongoing  Goal: Control of acute pain  Description: Control of acute pain  Outcome: Ongoing  Goal: Control of chronic pain  Description: Control of chronic pain  Outcome: Ongoing

## 2022-03-29 NOTE — PROGRESS NOTES
AM assessment completed. See complex vital flowsheet for complete assessment. Pt sedated on vent to a RASS of -4 with propofol, versed, and fentanyl infusing. Pt trach site is clean, dry, and intact. Pt makes non-purposeful movements and gags when suctioned. Diminished rhonchi heard upon auscultation. Pt coughs up thick, tan, sputum when suctioned. NSR on tele. Abd soft and round. Dobhoff at 83 cm, tube feed running at goal. Dressings on bilateral ankles are clean dry and intact. Echols in place. Restraints in place bilaterally to prevent removal of lines and vent tubing.

## 2022-03-29 NOTE — PROGRESS NOTES
Hospitalist Progress Note      PCP: Adrian Garcia MD    Date of Admission: 3/22/2022      Subjective: Trach to vent, FiO2 50%, leukocytosis improving.   Awaiting pathology      Medications:  Reviewed    Infusion Medications    sodium chloride 100 mL (03/29/22 0916)    norepinephrine Stopped (03/27/22 0401)    midazolam 4 mg/hr (03/29/22 1148)    fentaNYL Citrate-NaCl 200 mcg/hr (03/29/22 0156)    propofol 55 mcg/kg/min (03/29/22 0913)    sodium chloride      dextrose       Scheduled Medications    vancomycin  1,500 mg IntraVENous Q12H    ipratropium-albuterol  3 mL Inhalation TID    lidocaine 1 % injection  5 mL IntraDERmal Once    sodium chloride flush  5-40 mL IntraVENous 2 times per day    cefepime  2,000 mg IntraVENous Q8H    enoxaparin  40 mg SubCUTAneous Nightly    naloxegol  25 mg Oral QAM    polyethylene glycol  17 g Oral Daily    sennosides-docusate sodium  2 tablet Oral Daily    gentamicin   Topical TID    insulin lispro  0-6 Units SubCUTAneous Q4H    succinylcholine  140 mg IntraVENous Once    sodium chloride flush  5-40 mL IntraVENous 2 times per day    famotidine (PEPCID) injection  20 mg IntraVENous BID    budesonide  0.5 mg Nebulization BID    Arformoterol Tartrate  15 mcg Nebulization BID    scopolamine  1 patch TransDERmal Q72H     PRN Meds: sodium chloride flush, sodium chloride, hydrALAZINE, racepinephrine HCl, sodium chloride nebulizer, albuterol sulfate HFA, sodium chloride flush, sodium chloride, ondansetron **OR** ondansetron, acetaminophen **OR** acetaminophen, glucose, glucagon (rDNA), dextrose, dextrose bolus (hypoglycemia) **OR** dextrose bolus (hypoglycemia)      Intake/Output Summary (Last 24 hours) at 3/29/2022 1205  Last data filed at 3/29/2022 0443  Gross per 24 hour   Intake 3665.64 ml   Output 3700 ml   Net -34.36 ml       Physical Exam Performed:    /73   Pulse 73   Temp 98.5 °F (36.9 °C) (Bladder)   Resp 16   Ht 5' 8\" (1.727 m)   Wt 178 lb 2.1 oz (80.8 kg)   SpO2 95%   BMI 27.08 kg/m²     General appearance: In no acute distress  HEENT: Pupils equal, round, and reactive to light. Conjunctivae/corneas clear. Neck: Supple, with full range of motion. No jugular venous distention. Trachea midline. Respiratory:  Normal respiratory effort. Clear to auscultation, bilaterally without Rales/Wheezes/Rhonchi. Cardiovascular: Regular rate and rhythm with normal S1/S2 without murmurs, rubs or gallops. Abdomen: Soft, non-tender, non-distended with normal bowel sounds. Musculoskeletal: No clubbing, cyanosis or edema bilaterally. Full range of motion without deformity. Skin: Skin color, texture, turgor normal.  No rashes or lesions. Neurologic: Sedated  Psychiatric: Calm  Capillary Refill: Brisk,3 seconds, normal   Peripheral Pulses: +2 palpable, equal bilaterally       Labs:   Recent Labs     03/27/22 0415 03/28/22 0415 03/29/22  0506   WBC 8.9 6.3 6.2   HGB 11.2* 10.5* 10.5*   HCT 34.8* 33.2* 32.2*    195 246     Recent Labs     03/26/22  2240 03/26/22  2240 03/27/22 0415 03/28/22  0415 03/29/22  0506      < > 135* 136 140   K 3.8   < > 3.9 4.4 3.8      < > 102 102 103   CO2 22   < > 22 25 28   BUN 10   < > 10 12 15   CREATININE 0.6*   < > 0.5* <0.5* <0.5*   CALCIUM 8.2*   < > 8.3 8.7 9.0   PHOS 3.8  --   --   --   --     < > = values in this interval not displayed. Recent Labs     03/29/22  0506   AST 31   ALT 58*   BILITOT <0.2   ALKPHOS 93     Recent Labs     03/27/22 0415 03/28/22 0415 03/29/22  0506   INR 1.12 1.08 1.05     No results for input(s): Daniela Cheng in the last 72 hours. Urinalysis:      Lab Results   Component Value Date    NITRU Negative 02/02/2022    WBCUA 1 02/02/2022    RBCUA 1 02/02/2022    BLOODU Negative 02/02/2022    SPECGRAV 1.021 02/02/2022    GLUCOSEU Negative 02/02/2022       Radiology:  XR CHEST PORTABLE   Final Result   Large consolidation right lower lobe from pneumonia.   Small right pleural   effusion. Mild congestion. No pneumothorax. XR CHEST PORTABLE   Final Result   Decreasing right basilar pleuroparenchymal disease as compared to prior. A portion of the right upper extremity PICC line is looped in the right   supraclavicular region; correlate with catheter function. XR CHEST PORTABLE   Final Result   Slight worsening of the right pleural effusion. Placement of a right PICC   line catheter tip in the SVC. Remainder of lines and tubes are stable. XR CHEST PORTABLE   Final Result   1. Stable position of enteric feeding tube. 2. Interval progression of a small right pleural effusion and right basilar   airspace consolidation. XR ABDOMEN FOR NG/OG/NE TUBE PLACEMENT   Final Result   Enteric tube with distal end reaching the gastric body on 1 of 3 images. The   other 2 images demonstrate tip of enteric tube at the level of the   midesophagus. XR CHEST PORTABLE   Final Result   1. Opacity right lower lung compatible with pleural effusion and relaxation   atelectasis versus pneumonia. 2. Pulmonary sequela typical of that seen with smoking, including COPD;   correlate with clinical history. 3. Unchanged tracheostomy tube positioning. Dobbhoff type feeding tube   extends below the left hemidiaphragm, out of field of view. XR CHEST PORTABLE   Final Result   Supportive tubing is in normal position. Bibasilar opacities, atelectasis or airspace disease. There is mild   progression on the right side in the interval.         XR CHEST PORTABLE   Final Result   Mild dependent changes in the lower lung fields, atelectasis versus   infiltrate. Satisfactory position of endotracheal tube. XR ABDOMEN FOR NG/OG/NE TUBE PLACEMENT   Final Result   Tip of feeding tube projects in region of gastric fundus         XR CHEST PORTABLE   Final Result   1. Properly placed endotracheal tube.       2.  Mild bibasilar atelectasis         CT SOFT TISSUE NECK W CONTRAST   Final Result   Lobulated large mass wrapping the left side of the hypopharynx extending into   supraglottic and glottic portions of the larynx measuring approximately 3.5   cm diameter with a craniocaudad extent of 4.9 cm. The mass appears to   severely narrow the airway and could potentially make for difficult   intubation. Findings compatible with squamous cell carcinoma. Mild to moderate emphysema. XR CHEST PORTABLE   Final Result   Chronic findings in the chest without acute airspace disease identified. CT SOFT TISSUE NECK W CONTRAST    (Results Pending)           Assessment/Plan:    Active Hospital Problems    Diagnosis     Laryngeal mass [J38.7]     Acute hypercapnic respiratory failure (HCC) [J96.02]     Centrilobular emphysema (HCC) [J43.2]      Laryngeal mass: Subglottic mass status post paratracheal lymph node biopsy and tracheostomy ENT and critical care following, notes about 3.5 cm, status post trach, history of nicotine smoking. Critical care team/ENT plan to transfer to  once pathology finalized. Acute hypercapnic respiratory failure, combination of COPD, laryngeal mass, status post tracheostomy, remain ventilated and sedated continue bronchodilator    MRSA and Serratia pneumonia: Sensitive to cefepime and vancomycin, will continue antibiotic    Sepsis: Febrile, continue antibiotic as above, leukocytosis improving. DVT Prophylaxis: Lovenox  Diet: Diet NPO  ADULT TUBE FEEDING; Nasogastric; Standard with Fiber; Continuous; 20; Yes; 25; Q 4 hours; 45; 130; Q 4 hours; Protein; Administer 1 Proteinex P2Go daily. Flush with 30 mL water before and after.   Code Status: Full Code    Dispo -continue ICU care    Jerome Toth MD

## 2022-03-29 NOTE — PLAN OF CARE
Problem: Non-Violent Restraints  Goal: No harm/injury to patient while restraints in use  Outcome: Ongoing     Problem: Skin Integrity:  Goal: Will show no infection signs and symptoms  Description: Will show no infection signs and symptoms  Outcome: Ongoing     Problem: Falls - Risk of:  Goal: Will remain free from falls  Description: Will remain free from falls  Outcome: Ongoing     Problem: Nutrition  Goal: Optimal nutrition therapy  Outcome: Ongoing     Problem: Airway Clearance - Ineffective:  Goal: Ability to maintain a clear airway will improve  Description: Ability to maintain a clear airway will improve  Outcome: Ongoing     Problem: Gas Exchange - Impaired:  Goal: Levels of oxygenation will improve  Description: Levels of oxygenation will improve  Outcome: Ongoing     Problem: Pain:  Goal: Pain level will decrease  Description: Pain level will decrease  Outcome: Ongoing

## 2022-03-29 NOTE — PROGRESS NOTES
Dr. Kingston Jean-Baptiste was notified of CT results regarding pt's right subclavian line. All three ports were flushed and blood return was noted at 1730. Awaiting response.

## 2022-03-29 NOTE — PROGRESS NOTES
Nutrition Note    RECOMMENDATIONS  PO Diet: NPO  ONS: NPO  Nutrition Support:   1. Continue Jevity 1.5 at goal rate 45 mL per hour. 2. Continue water bolus 130 mL q 4 hours. 3. Continue to administer 1 Proteinex P2Go daily. Ensure head of bed is 30 - 45 degrees during continuous or bolus gastric feeding and for one hour after bolus. Turn off the feeding if head of bed is lowered less than 30 degrees. Monitor for tolerance (bowel habits, N/V, cramping, abdominal exam findings: distended, firm, tense, guarded, discomfort). NUTRITION ASSESSMENT   Pt seen during IPOC critical care rounds, now s/p trach placement 3/25. Propofol infusing at 24 mL per hour to provide 634 calories from fat daily. Dobhoff remains in place. Pt tolerating Jevity 1.5 at goal rate 45 mL per hour with 1 Proteinex P2Go daily and water bolus 130 mL q 4 hours. LBM 3/27. Will monitor for continued tolerance to EN at goal.      Nutrition Related Findings: Trace BUE/BLE edema. +11 liters. LBM 3/27.  Wounds: Diabetic Ulcer (Bilateral ankles)   Nutrition Education:  Education not indicated    Nutrition Goals: Pt will tolerate EN at goal     MALNUTRITION ASSESSMENT   Malnutrition Status: No malnutrition    NUTRITION DIAGNOSIS   · Inadequate oral intake related to impaired respiratory function as evidenced by intubation    CURRENT NUTRITION THERAPIES  Diet NPO  ADULT TUBE FEEDING; Nasogastric; Standard with Fiber; Continuous; 20; Yes; 25; Q 4 hours; 45; 130; Q 4 hours; Protein; Administer 1 Proteinex P2Go daily. Flush with 30 mL water before and after.      PO Intake: NPO   PO Supplement Intake:NPO    Current Tube Feeding (TF) Orders:  · Feeding Route: Nasogastric  · Formula: Standard with Fiber  · Schedule: Continuous  · Additives/Modulars: Protein (1 Proteinex P2Go daily to provide 104 calories and 26 grams protein daily)  · Water Flushes: 130 mL q 4 hours  · Current TF & Flush Orders Provides: As below  · Goal TF & Flush Orders Provides: Jevity 1.5 at goal rate 45 mL per hour to provide 1080 mL total volume, 1620 calories, 69 grams protein, 821 mL free water. ANTHROPOMETRICS   Current Height: 5' 8\" (172.7 cm)   Current Weight: 178 lb 2.1 oz (80.8 kg)     Admission weight: 163 lb 2.3 oz (74 kg)   Ideal Body Weight (IBW): 154 lbs  (70 kg)      BMI: 27.1    COMPARATIVE STANDARDS  Energy (kcal):  8932-0866     Protein (g):   grams       Fluid (ml/day):  6283-5471 mL    The patient will be monitored per nutrition standards of care. Consult dietitian if additional nutrition interventions are needed prior to RD reassessment.      David Cooper LD    Contact: [phone]

## 2022-03-29 NOTE — PROGRESS NOTES
[facility] Pulmonary/CCM Progress note      Admit Date: 3/22/2022    Chief Complaint: Respiratory distress    Subjective: Interval History: No new overnight events. Patient having tan-colored secretions upon respiratory suction, now growing MRSA/Serratia. Tracheostomy/Shiley size 6, appears comfortable. Hemodynamically stable.     Scheduled Meds:   vancomycin  1,500 mg IntraVENous Q12H    ipratropium-albuterol  3 mL Inhalation TID    lidocaine 1 % injection  5 mL IntraDERmal Once    sodium chloride flush  5-40 mL IntraVENous 2 times per day    cefepime  2,000 mg IntraVENous Q8H    enoxaparin  40 mg SubCUTAneous Nightly    naloxegol  25 mg Oral QAM    polyethylene glycol  17 g Oral Daily    sennosides-docusate sodium  2 tablet Oral Daily    gentamicin   Topical TID    insulin lispro  0-6 Units SubCUTAneous Q4H    succinylcholine  140 mg IntraVENous Once    sodium chloride flush  5-40 mL IntraVENous 2 times per day    famotidine (PEPCID) injection  20 mg IntraVENous BID    budesonide  0.5 mg Nebulization BID    Arformoterol Tartrate  15 mcg Nebulization BID    scopolamine  1 patch TransDERmal Q72H     Continuous Infusions:   sodium chloride 100 mL (03/29/22 0916)    norepinephrine Stopped (03/27/22 0401)    midazolam 4 mg/hr (03/29/22 1400)    fentaNYL Citrate-NaCl 200 mcg/hr (03/29/22 1346)    propofol 55 mcg/kg/min (03/29/22 1246)    sodium chloride 100 mL (03/29/22 1253)    dextrose       PRN Meds:sodium chloride flush, sodium chloride, hydrALAZINE, racepinephrine HCl, sodium chloride nebulizer, albuterol sulfate HFA, sodium chloride flush, sodium chloride, ondansetron **OR** ondansetron, acetaminophen **OR** acetaminophen, glucose, glucagon (rDNA), dextrose, dextrose bolus (hypoglycemia) **OR** dextrose bolus (hypoglycemia)    Review of Systems  Unable to obtain since patient is currently intubated and sedated    Objective:     Patient Vitals for the past 8 hrs:   BP Temp Temp src Pulse Resp SpO2 Height   03/29/22 1458 -- -- -- -- -- 99 % --   03/29/22 1400 -- -- -- 58 16 95 % --   03/29/22 1300 113/70 -- -- 61 16 94 % --   03/29/22 1200 122/71 98.5 °F (36.9 °C) Bladder 71 16 94 % --   03/29/22 1156 -- -- -- 73 -- -- --   03/29/22 1100 115/74 -- -- 79 16 92 % --   03/29/22 1000 117/70 -- -- 80 14 94 % --   03/29/22 0938 -- -- -- -- -- -- 5' 8\" (1.727 m)   03/29/22 0900 115/69 -- -- 66 16 96 % --   03/29/22 0800 130/73 98.5 °F (36.9 °C) Bladder 60 16 95 % --     I/O last 3 completed shifts: In: 5398.4 [I.V.:1945. 6; NG/GT:2188; IV Piggyback:1264.8]  Out: 6300 [Urine:6300]  I/O this shift:  In: 1360.5 [I.V.:530.3; NG/GT:572; IV Piggyback:258.2]  Out: 1200 [Urine:1200]    General Appearance: Intubated and sedated, in no acute distress  Skin: warm and dry, no rash or erythema  Head: normocephalic and atraumatic  Eyes: pupils equal, round, and reactive to light, extraocular eye movements intact, conjunctivae normal  ENT: external ear and ear canal normal bilaterally, nose without deformity, nasal mucosa and turbinates normal  Neck: supple and non-tender without mass, no cervical lymphadenopathy  Pulmonary/Chest: clear to auscultation bilaterally- no wheezes, rales or rhonchi, normal air movement, no respiratory distress  Cardiovascular: normal rate, regular rhythm,  no murmurs, rubs, distal pulses intact, no carotid bruits  Abdomen: soft, non-tender, non-distended, normal bowel sounds, no masses or organomegaly  Lymph Nodes: Cervical, supraclavicular normal  Extremities: no cyanosis, clubbing or edema  Musculoskeletal: normal range of motion, no joint swelling, deformity or tenderness  Neurologic: Sedated, no focal neurologic deficits    Data Review:  CBC:   Lab Results   Component Value Date    WBC 6.2 03/29/2022    RBC 3.68 03/29/2022     BMP:   Lab Results   Component Value Date    GLUCOSE 187 03/29/2022    CO2 28 03/29/2022    BUN 15 03/29/2022    CREATININE <0.5 03/29/2022    CALCIUM 9.0 03/29/2022 ABG:   Lab Results   Component Value Date    EAS5KHV 27.8 03/28/2022    BEART 2.0 03/28/2022    J5ZPQEZS 95.3 03/28/2022    PHART 7.382 03/28/2022    LQU5RUN 46.9 03/28/2022    PO2ART 82.8 03/28/2022    HKU4LNS 65.6 03/28/2022       Radiology: All pertinent images / reports were reviewed as a part of this visit. Narrative   EXAMINATION:   CT OF THE NECK SOFT TISSUE WITH CONTRAST  3/22/2022       TECHNIQUE:   CT of the neck was performed with the administration of intravenous contrast.   Multiplanar reformatted images are provided for review. Dose modulation,   iterative reconstruction, and/or weight based adjustment of the mA/kV was   utilized to reduce the radiation dose to as low as reasonably achievable.       COMPARISON:   None.       HISTORY:   ORDERING SYSTEM PROVIDED HISTORY: Hemoptysis and hoarseness   TECHNOLOGIST PROVIDED HISTORY:   Reason for exam:->Hemoptysis and hoarseness   Decision Support Exception - unselect if not a suspected or confirmed   emergency medical condition->Emergency Medical Condition (MA)   Reason for Exam: Hemoptysis and hoarseness   Relevant Medical/Surgical History: Shortness of Breath (pt. from home arrived   by [employer] [facility] brought pt. in.  pt. was fine and when he went to bed he   felt his back and chest get tight and then started with SOB. squad gave a   duoneb, 125mg solu medrol, AND INCREASED o2 TO 4L.  PT. normally wears 2-3 l   of )2.  )       FINDINGS:   PHARYNX/LARYNX:  The palatine tonsils are normal in appearance.  The tongue   is normal in appearance.  Involving the vallecula on the left and extending   inferiorly along the aryepiglottic fold and anterior/left anterolateral   hypopharynx is a large lobulated mass wrapping to the left from anterior to   posterior at the supraglottic larynx.  This thickened crescentic area   measures approximately 3.5 cm diameter and has a craniocaudad extent of 4.9   cm.  The mass laterally and posteriorly displaces the true and false cords   and appears to severely narrow the airway.  There is no associated erosion of   the laryngeal cartilages.       SALIVARY GLANDS/THYROID:  The parotid and submandibular glands appear   unremarkable.  The thyroid gland appears unremarkable.       LYMPH NODES:  No cervical or supraclavicular lymphadenopathy is seen.       SOFT TISSUES:  No appreciable soft tissue swelling or mass is seen.       BRAIN/ORBITS/SINUSES:  The visualized portion of the intracranial contents   appear unremarkable.  The visualized portion of the orbits, paranasal sinuses   and mastoid air cells demonstrate no acute abnormality.       LUNG APICES/SUPERIOR MEDIASTINUM:  No focal consolidation is seen within the   visualized lung apices.  There is a background of mild to moderate emphysema. No superior mediastinal lymphadenopathy or mass.  The visualized portion of   the trachea appears unremarkable.       BONES:  No aggressive appearing lytic or blastic bony lesion.           Impression   Lobulated large mass wrapping the left side of the hypopharynx extending into   supraglottic and glottic portions of the larynx measuring approximately 3.5   cm diameter with a craniocaudad extent of 4.9 cm.  The mass appears to   severely narrow the airway and could potentially make for difficult   intubation.  Findings compatible with squamous cell carcinoma.       Mild to moderate emphysema. 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 Right lower lobe infiltrate appears to be improving on subsequent chest x-rays. Improved oxygenation, decrease FiO2 to 45%, PEEP to 5. Patient is currently on deep sedation with propofol, Versed and fentanyl. We will try to wean down sedation when able to from tomorrow.   If patient is not transferred to [facility], will require LTAC placement. 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 Tan-colored secretions, now growing MRSA/Serratia, continue cefepime and IV vancomycin-sensitive. Mass might represent abscess versus necrotic tumor. Discussed with Dr. Marck Ibarra from ENT, will plan to wait for final pathology of the recently performed lymph node biopsy before transfer to . WBC 6.2. No fevers noted. Repeat CT imaging performed today shows persistent necrotic mass in the subglottic region with complete occlusion of the airway superior to the tracheostomy. History of moderate COPD based on prior PFT from January 2022, FEV1 of 1.83 L [63% predicted]. Continue with bronchodilators. Does not appear to be in exacerbation. Dobhoff tube feeding to continue. Continue with Lovenox and Pepcid.     Tejal Cai MD     Critical care time of 32 minutes excluding procedures

## 2022-03-29 NOTE — PROGRESS NOTES
03/29/22 1249   RT Protocol   History Pulmonary Disease 2   Respiratory pattern 0   Breath sounds 2   Cough 4   Indications for Bronchodilator Therapy Decreased or absent breath sounds; Wheezing associated with pulm disorder   Bronchodilator Assessment Score 8

## 2022-03-29 NOTE — PROGRESS NOTES
[address]  Patient has size [place]. Trach Kit of same size on standby  Yes, Obturator at head of the bed  Yes. Inner cannula cleaned/replaced Yes, drain sponge changed  Yes, Trach tie replaced is soiled No, Flange cleaned  Yes. [address] performed without complications.      Electronically signed by Ashley Mujica RCP on 3/29/2022 at 12:05 PM

## 2022-03-29 NOTE — PROGRESS NOTES
Nursing reassessment completed. Remains sedated, trached to mechanical ventilation. Temp 98.1 (bladder). Slight elevated /84 at midnight. Rechecked /84. Sinus Janes w/HR 51.  LS diminished. RASS -4.  98% on FiO2 50%. RR 16. No acute changes at this time. SR up x3. Call light in reach. Remains in bilateral soft wrist restraints. Bed at lowest position and wheels locked. Bed alarm engaged. Will continue to monitor.

## 2022-03-30 ENCOUNTER — APPOINTMENT (OUTPATIENT)
Dept: GENERAL RADIOLOGY | Age: 62
DRG: 004 | End: 2022-03-30
Payer: COMMERCIAL

## 2022-03-30 LAB
ANION GAP SERPL CALCULATED.3IONS-SCNC: 10 MMOL/L (ref 3–16)
BASOPHILS ABSOLUTE: 0 K/UL (ref 0–0.2)
BASOPHILS RELATIVE PERCENT: 0.4 %
BLOOD CULTURE, ROUTINE: NORMAL
BUN BLDV-MCNC: 16 MG/DL (ref 7–20)
CALCIUM SERPL-MCNC: 9.1 MG/DL (ref 8.3–10.6)
CHLORIDE BLD-SCNC: 100 MMOL/L (ref 99–110)
CO2: 29 MMOL/L (ref 21–32)
CREAT SERPL-MCNC: <0.5 MG/DL (ref 0.8–1.3)
CULTURE, BLOOD 2: NORMAL
EOSINOPHILS ABSOLUTE: 0.1 K/UL (ref 0–0.6)
EOSINOPHILS RELATIVE PERCENT: 1.6 %
GFR AFRICAN AMERICAN: >60
GFR NON-AFRICAN AMERICAN: >60
GLUCOSE BLD-MCNC: 107 MG/DL (ref 70–99)
GLUCOSE BLD-MCNC: 110 MG/DL (ref 70–99)
GLUCOSE BLD-MCNC: 127 MG/DL (ref 70–99)
GLUCOSE BLD-MCNC: 138 MG/DL (ref 70–99)
GLUCOSE BLD-MCNC: 143 MG/DL (ref 70–99)
GLUCOSE BLD-MCNC: 88 MG/DL (ref 70–99)
HCT VFR BLD CALC: 34 % (ref 40.5–52.5)
HEMOGLOBIN: 10.9 G/DL (ref 13.5–17.5)
INR BLD: 1.08 (ref 0.88–1.12)
LYMPHOCYTES ABSOLUTE: 1.1 K/UL (ref 1–5.1)
LYMPHOCYTES RELATIVE PERCENT: 19.2 %
MAGNESIUM: 2 MG/DL (ref 1.8–2.4)
MCH RBC QN AUTO: 27.8 PG (ref 26–34)
MCHC RBC AUTO-ENTMCNC: 32.1 G/DL (ref 31–36)
MCV RBC AUTO: 86.7 FL (ref 80–100)
MONOCYTES ABSOLUTE: 0.6 K/UL (ref 0–1.3)
MONOCYTES RELATIVE PERCENT: 11.8 %
NEUTROPHILS ABSOLUTE: 3.7 K/UL (ref 1.7–7.7)
NEUTROPHILS RELATIVE PERCENT: 67 %
PDW BLD-RTO: 16.2 % (ref 12.4–15.4)
PERFORMED ON: ABNORMAL
PERFORMED ON: NORMAL
PLATELET # BLD: 268 K/UL (ref 135–450)
PMV BLD AUTO: 6.8 FL (ref 5–10.5)
POTASSIUM REFLEX MAGNESIUM: 4.1 MMOL/L (ref 3.5–5.1)
PROTHROMBIN TIME: 12.3 SEC (ref 9.9–12.7)
RBC # BLD: 3.92 M/UL (ref 4.2–5.9)
SODIUM BLD-SCNC: 139 MMOL/L (ref 136–145)
WBC # BLD: 5.5 K/UL (ref 4–11)

## 2022-03-30 PROCEDURE — 6370000000 HC RX 637 (ALT 250 FOR IP): Performed by: INTERNAL MEDICINE

## 2022-03-30 PROCEDURE — 2580000003 HC RX 258: Performed by: INTERNAL MEDICINE

## 2022-03-30 PROCEDURE — 85025 COMPLETE CBC W/AUTO DIFF WBC: CPT

## 2022-03-30 PROCEDURE — 6360000002 HC RX W HCPCS: Performed by: INTERNAL MEDICINE

## 2022-03-30 PROCEDURE — 80048 BASIC METABOLIC PNL TOTAL CA: CPT

## 2022-03-30 PROCEDURE — 83735 ASSAY OF MAGNESIUM: CPT

## 2022-03-30 PROCEDURE — 94003 VENT MGMT INPAT SUBQ DAY: CPT

## 2022-03-30 PROCEDURE — 94761 N-INVAS EAR/PLS OXIMETRY MLT: CPT

## 2022-03-30 PROCEDURE — 94640 AIRWAY INHALATION TREATMENT: CPT

## 2022-03-30 PROCEDURE — 2700000000 HC OXYGEN THERAPY PER DAY

## 2022-03-30 PROCEDURE — 71045 X-RAY EXAM CHEST 1 VIEW: CPT

## 2022-03-30 PROCEDURE — 2000000000 HC ICU R&B

## 2022-03-30 PROCEDURE — 99291 CRITICAL CARE FIRST HOUR: CPT | Performed by: INTERNAL MEDICINE

## 2022-03-30 PROCEDURE — 2500000003 HC RX 250 WO HCPCS: Performed by: INTERNAL MEDICINE

## 2022-03-30 PROCEDURE — A4216 STERILE WATER/SALINE, 10 ML: HCPCS | Performed by: INTERNAL MEDICINE

## 2022-03-30 PROCEDURE — 36415 COLL VENOUS BLD VENIPUNCTURE: CPT

## 2022-03-30 PROCEDURE — 85610 PROTHROMBIN TIME: CPT

## 2022-03-30 RX ADMIN — GENTAMICIN SULFATE: 1 CREAM TOPICAL at 11:19

## 2022-03-30 RX ADMIN — BUDESONIDE 500 MCG: 0.5 SUSPENSION RESPIRATORY (INHALATION) at 20:49

## 2022-03-30 RX ADMIN — SODIUM CHLORIDE, PRESERVATIVE FREE 20 MG: 5 INJECTION INTRAVENOUS at 20:20

## 2022-03-30 RX ADMIN — BUDESONIDE 500 MCG: 0.5 SUSPENSION RESPIRATORY (INHALATION) at 07:31

## 2022-03-30 RX ADMIN — POLYETHYLENE GLYCOL 3350 17 G: 17 POWDER, FOR SOLUTION ORAL at 08:15

## 2022-03-30 RX ADMIN — INSULIN LISPRO 1 UNITS: 100 INJECTION, SOLUTION INTRAVENOUS; SUBCUTANEOUS at 20:28

## 2022-03-30 RX ADMIN — ARFORMOTEROL TARTRATE 15 MCG: 15 SOLUTION RESPIRATORY (INHALATION) at 20:49

## 2022-03-30 RX ADMIN — GENTAMICIN SULFATE: 1 CREAM TOPICAL at 17:50

## 2022-03-30 RX ADMIN — PROPOFOL 55 MCG/KG/MIN: 10 INJECTION, EMULSION INTRAVENOUS at 04:13

## 2022-03-30 RX ADMIN — Medication 200 MCG/HR: at 08:13

## 2022-03-30 RX ADMIN — SODIUM CHLORIDE 1000 ML: 9 INJECTION, SOLUTION INTRAVENOUS at 22:53

## 2022-03-30 RX ADMIN — PROPOFOL 50 MCG/KG/MIN: 10 INJECTION, EMULSION INTRAVENOUS at 09:05

## 2022-03-30 RX ADMIN — Medication 10 ML: at 08:16

## 2022-03-30 RX ADMIN — CEFEPIME HYDROCHLORIDE 2000 MG: 2 INJECTION, POWDER, FOR SOLUTION INTRAVENOUS at 17:44

## 2022-03-30 RX ADMIN — Medication 10 ML: at 20:30

## 2022-03-30 RX ADMIN — PROPOFOL 45 MCG/KG/MIN: 10 INJECTION, EMULSION INTRAVENOUS at 18:25

## 2022-03-30 RX ADMIN — Medication 3 MG/HR: at 21:04

## 2022-03-30 RX ADMIN — SODIUM CHLORIDE 1000 ML: 9 INJECTION, SOLUTION INTRAVENOUS at 23:10

## 2022-03-30 RX ADMIN — Medication 10 ML: at 11:24

## 2022-03-30 RX ADMIN — Medication 10 ML: at 11:23

## 2022-03-30 RX ADMIN — IPRATROPIUM BROMIDE AND ALBUTEROL SULFATE 3 ML: .5; 2.5 SOLUTION RESPIRATORY (INHALATION) at 20:49

## 2022-03-30 RX ADMIN — Medication 200 MCG/HR: at 20:53

## 2022-03-30 RX ADMIN — VANCOMYCIN HYDROCHLORIDE 1500 MG: 10 INJECTION, POWDER, LYOPHILIZED, FOR SOLUTION INTRAVENOUS at 11:17

## 2022-03-30 RX ADMIN — VANCOMYCIN HYDROCHLORIDE 1500 MG: 10 INJECTION, POWDER, LYOPHILIZED, FOR SOLUTION INTRAVENOUS at 22:54

## 2022-03-30 RX ADMIN — IPRATROPIUM BROMIDE AND ALBUTEROL SULFATE 3 ML: .5; 2.5 SOLUTION RESPIRATORY (INHALATION) at 07:31

## 2022-03-30 RX ADMIN — ENOXAPARIN SODIUM 40 MG: 40 INJECTION SUBCUTANEOUS at 20:20

## 2022-03-30 RX ADMIN — GENTAMICIN SULFATE: 1 CREAM TOPICAL at 22:44

## 2022-03-30 RX ADMIN — CEFEPIME HYDROCHLORIDE 2000 MG: 2 INJECTION, POWDER, FOR SOLUTION INTRAVENOUS at 00:12

## 2022-03-30 RX ADMIN — PROPOFOL 45 MCG/KG/MIN: 10 INJECTION, EMULSION INTRAVENOUS at 23:51

## 2022-03-30 RX ADMIN — ACETAMINOPHEN 325MG 650 MG: 325 TABLET ORAL at 06:15

## 2022-03-30 RX ADMIN — ARFORMOTEROL TARTRATE 15 MCG: 15 SOLUTION RESPIRATORY (INHALATION) at 07:31

## 2022-03-30 RX ADMIN — PROPOFOL 50 MCG/KG/MIN: 10 INJECTION, EMULSION INTRAVENOUS at 12:50

## 2022-03-30 RX ADMIN — SODIUM CHLORIDE, PRESERVATIVE FREE 20 MG: 5 INJECTION INTRAVENOUS at 08:15

## 2022-03-30 RX ADMIN — SENNOSIDES AND DOCUSATE SODIUM 2 TABLET: 50; 8.6 TABLET ORAL at 08:15

## 2022-03-30 RX ADMIN — NALOXEGOL OXALATE 25 MG: 25 TABLET, FILM COATED ORAL at 08:15

## 2022-03-30 RX ADMIN — ACETAMINOPHEN 325MG 650 MG: 325 TABLET ORAL at 18:04

## 2022-03-30 RX ADMIN — ACETAMINOPHEN 325MG 650 MG: 325 TABLET ORAL at 12:16

## 2022-03-30 RX ADMIN — IPRATROPIUM BROMIDE AND ALBUTEROL SULFATE 3 ML: .5; 2.5 SOLUTION RESPIRATORY (INHALATION) at 15:57

## 2022-03-30 RX ADMIN — SODIUM CHLORIDE 100 ML: 9 INJECTION, SOLUTION INTRAVENOUS at 11:14

## 2022-03-30 RX ADMIN — CEFEPIME HYDROCHLORIDE 2000 MG: 2 INJECTION, POWDER, FOR SOLUTION INTRAVENOUS at 09:17

## 2022-03-30 ASSESSMENT — PAIN SCALES - GENERAL
PAINLEVEL_OUTOF10: 0
PAINLEVEL_OUTOF10: 3
PAINLEVEL_OUTOF10: 0
PAINLEVEL_OUTOF10: 3

## 2022-03-30 ASSESSMENT — PULMONARY FUNCTION TESTS
PIF_VALUE: 25

## 2022-03-30 NOTE — PROGRESS NOTES
[facility]  HEAD AND NECK - ENT  PROGRESS  NOTE    Date of Service: 3/30/2022    ASSESSMENT: Jose Mckenzie is a 65 y.o. male consulted to the ENT service for acute airway obstruction in setting of obstructive hypopharyngeal mass status post tracheostomy with direct laryngoscopy and biopsy on 3/25/2022. PLAN/RECOMMENDATIONS:     Hypopharyngeal lesion  Acute airway obstruction  Acute respiratory distress  -6.0 cuffed tracheostomy tube in place. Secured in place with four-point sutures and Posey necktie.  -Still awaiting hypopharynx permanent and paratracheal LN pathology  - Repeat CT neck with continued concern for SCCa. I have discussed case with with  Head and neck surgery (Dr. Sarah Cope) who will initiate transfer today. -Vent management per ICU team  -Please do not hesitate to call ENT with any questions or concerns. SUBJECTIVE:   Significant overnight events: None per RN overnight    OBJECTIVE:  Physical Exam:   Temp (24hrs), Av °F (37.2 °C), Min:98.1 °F (36.7 °C), Max:99.9 °F (37.7 °C)    Vitals:    22 0333 22 0400 22 0500 22 0600   BP:  135/78 122/74 126/78   Pulse: 75 71 100 93   Resp: 16 18 29 22   Temp:  99.9 °F (37.7 °C)     TempSrc:  Bladder     SpO2: 98% 98% 96% 94%   Weight:  173 lb 8 oz (78.7 kg)     Height:         I/O last 3 completed shifts:   In: 5569.5 [I.V.:2335.3; NG/GT:2094; IV Piggyback:1140.2]  Out: 9928 [Urine:4225]    REVIEW OF SYSTEMS  The following systems were reviewed and revealed the following in addition to any already discussed in the HPI:     Review of systems but with unable to performed secondary to intubated and sedated status.         PHYSICAL EXAM  GENERAL: Intubated and sedated  EYES: EOMI, Anti-icteric  NOSE: On anterior rhinoscopy there is no epistaxis, nasal mucosa within normal limits, no purulent drainage  EARS: Normal external appearance; no otorrhea  FACE: 1/6 House-Brackmann Scale, symmetric  ORAL CAVITY: No oropharyngeal secretions. NECK: 6.0 cuffed tracheostomy tube in place secured with 4 point silk sutures and neck tie. No surrounding secretions or dried blood. CHEST: On vent, 50% FiO2, equal chest rise  SKIN: No rashes, normal appearing skin, no evidence of skin lesions/tumors  NEURO: Sedated    Lab Studies:  Lab Results   Component Value Date    WBC 5.5 03/30/2022    HGB 10.9 (L) 03/30/2022    HCT 34.0 (L) 03/30/2022    MCV 86.7 03/30/2022     03/30/2022     Lab Results   Component Value Date    GLUCOSE 138 (H) 03/30/2022    BUN 16 03/30/2022    CREATININE <0.5 (L) 03/30/2022    K 4.1 03/30/2022     03/30/2022     03/30/2022    CALCIUM 9.1 03/30/2022     Lab Results   Component Value Date    MG 1.70 (L) 03/26/2022     Lab Results   Component Value Date    PHOS 3.8 03/26/2022     Lab Results   Component Value Date    ALKPHOS 93 03/29/2022    ALT 58 (H) 03/29/2022    AST 31 03/29/2022    BILITOT <0.2 03/29/2022    PROT 5.2 (L) 03/29/2022     Lab Results   Component Value Date    INR 1.08 03/30/2022    INR 1.05 03/29/2022    INR 1.08 03/28/2022    PROTIME 12.3 03/30/2022    PROTIME 11.9 03/29/2022    PROTIME 12.3 03/28/2022     EXAMINATION:   CT OF THE NECK SOFT TISSUE WITH CONTRAST  3/29/2022       TECHNIQUE:   CT of the neck was performed with the administration of intravenous contrast.   Multiplanar reformatted images are provided for review. Dose modulation,   iterative reconstruction, and/or weight based adjustment of the mA/kV was   utilized to reduce the radiation dose to as low as reasonably achievable.       COMPARISON:   None.       HISTORY:   ORDERING SYSTEM PROVIDED HISTORY: hypopharyngeal lesion   TECHNOLOGIST PROVIDED HISTORY:   Reason for exam:->hypopharyngeal lesion   Reason for Exam: hypopharyngeal lesion       FINDINGS:   Soft tissues:  There is a multilobulated enhancing lesion at the level of   supraglottic region Circumferentially narrowing the supra glottic area   extending superiorly into the bilateral vallecula fossa completely   obstructing the upper of aerodigestive mucosa at the level of hypopharynx and   extending inferiorly to the glottic region and infraglottic area. .  The   lesion is concerning for invasive tumor, likely arising from the supraglottic   part of the larynx. .       Lungs: There is a thoracostomy tube in place. Robson Gentleman is an NG tube in place.       PHARYNX/LARYNX:  The palatine tonsils are normal in appearance.  The tongue   is normal in appearance.  No abscess collection.       SALIVARY GLANDS/THYROID:  The parotid and submandibular glands appear   unremarkable.  The thyroid gland appears unremarkable.       LYMPH NODES:  No pathologically enlarged cervical or supraclavicular   lymphadenopathy is seen.       BRAIN/ORBITS/SINUSES:  The visualized portion of the intracranial contents   appear unremarkable.  Moderate mucosal thickening of the ethmoidal air cells   and left sphenoid sinus.  Mild mucosal thickening of maxillary sinuses.  The   visualized portion of the orbits, paranasal sinuses and mastoid air cells   demonstrate no other abnormality.       LUNG APICES/SUPERIOR MEDIASTINUM: Small right pleural effusion and focal   consolidative changes within the dependent portion of the lungs likely   suggestive of consolidative pulmonary edema.  No superior mediastinal   lymphadenopathy or mass.  The visualized portion of the trachea appears   unremarkable.       BONES:  No aggressive appearing lytic or blastic bony lesion.           Impression   There is a multilobulated enhancing lesion at the level of supraglottic   region Circumferentially narrowing the supra glottic area extending   superiorly into the bilateral vallecula fossa completely obstructing the   upper of aerodigestive mucosa at the level of hypopharynx and extending   inferiorly to the glottic region and infraglottic area. . 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 .       Small right pleural effusion and focal consolidative changes within the   dependent portion of the lungs likely suggestive of consolidative pulmonary   edema.       Moderate mucosal thickening of the ethmoidal air cells and left sphenoid   sinus.  Mild mucosal thickening of maxillary sinuses.       RECOMMENDATIONS:   Unavailable

## 2022-03-30 NOTE — PROGRESS NOTES
[address]  Patient has size 6 Shiley. Trach Kit of same size on standby  Yes, Obturator at head of the bed  Yes. Inner cannula cleaned/replaced Yes, drain sponge changed  Yes, Trach tie replaced is soiled Yes, Flange cleaned  Yes. [address] performed without complications.

## 2022-03-30 NOTE — PROGRESS NOTES
AM assessment completed. See complex vitals flowsheet for complete assessment. Pt sedated on vent to a RASS of -3 with versed, fentanyl, and propofol infusing. Cough is present and pt is stacking breaths. NSR with PVCs; quadrigeminal. Fever of 100.2. Pt has increased oral and nasal secretions. Pt has non-purposful movement and eyes open to speech but doesn't follow commands. NG in place, tube feed running at goal. Wrist restraints bilaterally in place. Left wrist has +1 pitting edema. BLE dressings on pt ankles. Echols in place. Goal of reducing sedation today to assess pt mental status.

## 2022-03-30 NOTE — PROGRESS NOTES
Reassessment completed. We are reducing sedation as able. Pt is now at a RASS of -2. Pt opens eyes to voice and is following commands. Squeezes both fists with moderate strength and wiggles toes on both feet when asked. Wife at bedside and updated by Dr. Mk Mccann. Plan for bronchoscopy in AM and NPO after midnight. Consent signed by wife.

## 2022-03-30 NOTE — PLAN OF CARE
Problem: Non-Violent Restraints  Goal: No harm/injury to patient while restraints in use  3/29/2022 2257 by Merced Anaya RN  Outcome: Ongoing  3/29/2022 1615 by Margarita Suarez RN  Outcome: Ongoing     Problem: Skin Integrity:  Goal: Will show no infection signs and symptoms  Description: Will show no infection signs and symptoms  3/29/2022 2257 by Merced Anaya RN  Outcome: Ongoing  3/29/2022 1615 by Margarita Suarez RN  Outcome: Ongoing     Problem: Skin Integrity:  Goal: Absence of new skin breakdown  Description: Absence of new skin breakdown  3/29/2022 2257 by Merced Anaya RN  Outcome: Ongoing  3/29/2022 1615 by Margarita Suarez RN  Outcome: Ongoing     Problem: Falls - Risk of:  Goal: Will remain free from falls  Description: Will remain free from falls  3/29/2022 2257 by Merced Anaya RN  Outcome: Ongoing  3/29/2022 1615 by Margarita Suarez RN  Outcome: Ongoing     Problem: Nutrition  Goal: Optimal nutrition therapy  3/29/2022 2257 by Merced Anaya RN  Outcome: Ongoing  3/29/2022 1615 by Margarita Suarez RN  Outcome: Ongoing  3/29/2022 0955 by Wu Berumen RD, LD  Outcome: Ongoing     Problem: Discharge Planning:  Goal: Participates in care planning  Description: Participates in care planning  3/29/2022 2257 by Merced Anaya RN  Outcome: Ongoing  3/29/2022 1615 by Margarita Suarez RN  Outcome: Ongoing     Problem: Airway Clearance - Ineffective:  Goal: Ability to maintain a clear airway will improve  Description: Ability to maintain a clear airway will improve  3/29/2022 2257 by Merced Anaya RN  Outcome: Ongoing  3/29/2022 1615 by Margarita Suarez RN  Outcome: Ongoing     Problem: Anxiety/Stress:  Goal: Level of anxiety will decrease  Description: Level of anxiety will decrease  3/29/2022 2257 by Merced Anaya RN  Outcome: Ongoing  3/29/2022 1615 by Margarita Suarez RN  Outcome: Ongoing

## 2022-03-30 NOTE — PROGRESS NOTES
Nursing assessment. 98.9 temp. VSS. Sedated, intubated, on mechanical ventilation. RASS -3. Moderate dark tan thick secretion from trach. PICC line nurse to replace PICC. Consent obtained from patient's spouse. Paused all IVF's while procedure being done. Stayed with patient during procedure. Will monitor for reaction to exchange of line. SR up x3. Call light in reach. Bed alarm engaged.

## 2022-03-30 NOTE — PROGRESS NOTES
Arrived to place PICC line on pt. Day bedside RN Kamron Wong contacted and stated the pt PICC line is coiled, they would like a new one placed. Arrived to the unit and Day RN Kamron Wong and oncoming bedside ANTOINETTE Parrish informed that an order and consent need to be completed prior to procedure. Documentation obtained and time out completed with ANTOINETTE Parrish. Prior to echange there is no evidence of infection or other complications still has good blood return per bedside RN's. Ultrasound used to verify vein compatibility and no visbile clots observed at this time. Over wire exchange completed without complications. Catheter that was removed was intact, no complications. New line was placed measuring 38 cm and confirmed placement on 3CG. ANTOINETTE Parrish in room as all the pts IV medications had been stopped for procedure. New medications and tubing collected by Michelle CURRY, and handoff completed. Thank you for allowing our services and care.

## 2022-03-30 NOTE — PLAN OF CARE
Problem: Non-Violent Restraints  Goal: No harm/injury to patient while restraints in use  Outcome: Ongoing     Problem: Skin Integrity:  Goal: Will show no infection signs and symptoms  Description: Will show no infection signs and symptoms  Outcome: Ongoing  Goal: Absence of new skin breakdown  Description: Absence of new skin breakdown  Outcome: Ongoing     Problem: Falls - Risk of:  Goal: Will remain free from falls  Description: Will remain free from falls  Outcome: Ongoing  Goal: Absence of physical injury  Description: Absence of physical injury  Outcome: Ongoing     Problem: Nutrition  Goal: Optimal nutrition therapy  Outcome: Ongoing     Problem: Discharge Planning:  Goal: Participates in care planning  Description: Participates in care planning  Outcome: Ongoing  Goal: Discharged to appropriate level of care  Description: Discharged to appropriate level of care  Outcome: Ongoing     Problem: Airway Clearance - Ineffective:  Goal: Ability to maintain a clear airway will improve  Description: Ability to maintain a clear airway will improve  Outcome: Ongoing     Problem: Anxiety/Stress:  Goal: Level of anxiety will decrease  Description: Level of anxiety will decrease  Outcome: Ongoing     Problem: Aspiration:  Goal: Absence of aspiration  Description: Absence of aspiration  Outcome: Ongoing     Problem:  Bowel Function - Altered:  Goal: Bowel elimination is within specified parameters  Description: Bowel elimination is within specified parameters  Outcome: Ongoing     Problem: Gas Exchange - Impaired:  Goal: Levels of oxygenation will improve  Description: Levels of oxygenation will improve  Outcome: Ongoing     Problem: Pain:  Goal: Pain level will decrease  Description: Pain level will decrease  Outcome: Ongoing  Goal: Control of acute pain  Description: Control of acute pain  Outcome: Ongoing  Goal: Control of chronic pain  Description: Control of chronic pain  Outcome: Ongoing

## 2022-03-30 NOTE — PROGRESS NOTES
Nursing reassessment. Temp 99.9. (bladder temp). NSR 71.  RR 18. /78. RASS -3. When sedation paused patient opens his eyes. Slight agitation noted. Will attempt to wean lightly towards morning since patient his a high risk trach. Moist thick dark tan secretions suctioned. O2 sats were trending in 88-89% range and his FiO2 increased from 45 to 50%. After RT managed his trach and repositioned, his O2 sats improved to 92-94%. SR up x3. Remains in bilateral soft wrist restraints. Bed alarm engaged.

## 2022-03-30 NOTE — PROGRESS NOTES
Wife of pt called for an update. All questions were answered. She stated that she would be visiting early afternoon.

## 2022-03-30 NOTE — PROGRESS NOTES
Hospitalist Progress Note      PCP: Zelalem Marte MD    Date of Admission: 3/22/2022      Subjective: Trach to vent,  leukocytosis improving.   Awaiting pathology, no acute event overnight      Medications:  Reviewed    Infusion Medications    sodium chloride      sodium chloride 100 mL (03/30/22 1114)    norepinephrine Stopped (03/27/22 0401)    midazolam 2 mg/hr (03/30/22 1026)    fentaNYL Citrate-NaCl 200 mcg/hr (03/30/22 0813)    propofol 50 mcg/kg/min (03/30/22 0905)    sodium chloride 25 mL (03/29/22 2048)    dextrose       Scheduled Medications    lidocaine 1 % injection  5 mL IntraDERmal Once    sodium chloride flush  5-40 mL IntraVENous 2 times per day    vancomycin  1,500 mg IntraVENous Q12H    ipratropium-albuterol  3 mL Inhalation TID    lidocaine 1 % injection  5 mL IntraDERmal Once    sodium chloride flush  5-40 mL IntraVENous 2 times per day    cefepime  2,000 mg IntraVENous Q8H    enoxaparin  40 mg SubCUTAneous Nightly    naloxegol  25 mg Oral QAM    polyethylene glycol  17 g Oral Daily    sennosides-docusate sodium  2 tablet Oral Daily    gentamicin   Topical TID    insulin lispro  0-6 Units SubCUTAneous Q4H    succinylcholine  140 mg IntraVENous Once    sodium chloride flush  5-40 mL IntraVENous 2 times per day    famotidine (PEPCID) injection  20 mg IntraVENous BID    budesonide  0.5 mg Nebulization BID    Arformoterol Tartrate  15 mcg Nebulization BID    scopolamine  1 patch TransDERmal Q72H     PRN Meds: sodium chloride flush, sodium chloride, sodium chloride flush, sodium chloride, hydrALAZINE, racepinephrine HCl, sodium chloride nebulizer, albuterol sulfate HFA, sodium chloride flush, sodium chloride, ondansetron **OR** ondansetron, acetaminophen **OR** acetaminophen, glucose, glucagon (rDNA), dextrose, dextrose bolus (hypoglycemia) **OR** dextrose bolus (hypoglycemia)      Intake/Output Summary (Last 24 hours) at 3/30/2022 1228  Last data filed at 3/30/2022 0500  Gross per 24 hour   Intake 3331.48 ml   Output 2075 ml   Net 1256.48 ml       Physical Exam Performed:    BP (!) 157/83   Pulse 95   Temp 101.2 °F (38.4 °C) (Bladder)   Resp 20   Ht 5' 8\" (1.727 m)   Wt 173 lb 8 oz (78.7 kg)   SpO2 94%   BMI 26.38 kg/m²     General appearance: In no acute distress  HEENT: Pupils equal, round, and reactive to light. Conjunctivae/corneas clear. Neck: Supple, with full range of motion. No jugular venous distention. Trachea midline. Respiratory:  Normal respiratory effort. Clear to auscultation, bilaterally without Rales/Wheezes/Rhonchi. Cardiovascular: Regular rate and rhythm with normal S1/S2 without murmurs, rubs or gallops. Abdomen: Soft, non-tender, non-distended with normal bowel sounds. Musculoskeletal: No clubbing, cyanosis or edema bilaterally. Full range of motion without deformity. Skin: Skin color, texture, turgor normal.  No rashes or lesions. Neurologic: Sedated  Psychiatric: Calm  Capillary Refill: Brisk,3 seconds, normal   Peripheral Pulses: +2 palpable, equal bilaterally       Labs:   Recent Labs     03/28/22 0415 03/29/22  0506 03/30/22 0436   WBC 6.3 6.2 5.5   HGB 10.5* 10.5* 10.9*   HCT 33.2* 32.2* 34.0*    246 268     Recent Labs     03/28/22 0415 03/29/22  0506 03/30/22  0436    140 139   K 4.4 3.8 4.1    103 100   CO2 25 28 29   BUN 12 15 16   CREATININE <0.5* <0.5* <0.5*   CALCIUM 8.7 9.0 9.1     Recent Labs     03/29/22  0506   AST 31   ALT 58*   BILITOT <0.2   ALKPHOS 93     Recent Labs     03/28/22 0415 03/29/22  0506 03/30/22  0436   INR 1.08 1.05 1.08     No results for input(s): Ela Comment in the last 72 hours. Urinalysis:      Lab Results   Component Value Date    NITRU Negative 02/02/2022    WBCUA 1 02/02/2022    RBCUA 1 02/02/2022    BLOODU Negative 02/02/2022    SPECGRAV 1.021 02/02/2022    GLUCOSEU Negative 02/02/2022       Radiology:  XR CHEST PORTABLE   Final Result   1.  Worsening congestive heart failure and new left basilar airspace disease. CT SOFT TISSUE NECK W CONTRAST   Final Result   Addendum 1 of 1   ADDENDUM:   There is a right-sided subclavian line which makes a loop around itself at   the junction of right jugular and right subclavian vein with this area    being   abnormally prominent/having a varicose appearance. The findings have significantly progressed since the neck CT of 7 days    ago. If there has been interval radiation therapy or medical intervention,   findings may be secondary to treatment effect. The findings were sent to the Radiology Results [address] at    4:29   pm on 3/29/2022to be communicated to a licensed caregiver. Final   There is a multilobulated enhancing lesion at the level of supraglottic   region Circumferentially narrowing the supra glottic area extending   superiorly into the bilateral vallecula fossa completely obstructing the   upper of aerodigestive mucosa at the level of hypopharynx and extending   inferiorly to the glottic region and infraglottic area. . 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 .      Small right pleural effusion and focal consolidative changes within the   dependent portion of the lungs likely suggestive of consolidative pulmonary   edema. Moderate mucosal thickening of the ethmoidal air cells and left sphenoid   sinus. Mild mucosal thickening of maxillary sinuses. RECOMMENDATIONS:   Unavailable            XR CHEST PORTABLE   Final Result   Large consolidation right lower lobe from pneumonia. Small right pleural   effusion. Mild congestion. No pneumothorax. XR CHEST PORTABLE   Final Result   Decreasing right basilar pleuroparenchymal disease as compared to prior. A portion of the right upper extremity PICC line is looped in the right   supraclavicular region; correlate with catheter function.          XR CHEST PORTABLE   Final Result   Slight worsening of the right pleural effusion. Placement of a right PICC   line catheter tip in the SVC. Remainder of lines and tubes are stable. XR CHEST PORTABLE   Final Result   1. Stable position of enteric feeding tube. 2. Interval progression of a small right pleural effusion and right basilar   airspace consolidation. XR ABDOMEN FOR NG/OG/NE TUBE PLACEMENT   Final Result   Enteric tube with distal end reaching the gastric body on 1 of 3 images. The   other 2 images demonstrate tip of enteric tube at the level of the   midesophagus. XR CHEST PORTABLE   Final Result   1. Opacity right lower lung compatible with pleural effusion and relaxation   atelectasis versus pneumonia. 2. Pulmonary sequela typical of that seen with smoking, including COPD;   correlate with clinical history. 3. Unchanged tracheostomy tube positioning. Dobbhoff type feeding tube   extends below the left hemidiaphragm, out of field of view. XR CHEST PORTABLE   Final Result   Supportive tubing is in normal position. Bibasilar opacities, atelectasis or airspace disease. There is mild   progression on the right side in the interval.         XR CHEST PORTABLE   Final Result   Mild dependent changes in the lower lung fields, atelectasis versus   infiltrate. Satisfactory position of endotracheal tube. XR ABDOMEN FOR NG/OG/NE TUBE PLACEMENT   Final Result   Tip of feeding tube projects in region of gastric fundus         XR CHEST PORTABLE   Final Result   1. Properly placed endotracheal tube. 2.  Mild bibasilar atelectasis         CT SOFT TISSUE NECK W CONTRAST   Final Result   Lobulated large mass wrapping the left side of the hypopharynx extending into   supraglottic and glottic portions of the larynx measuring approximately 3.5   cm diameter with a craniocaudad extent of 4.9 cm.   The mass appears to   severely narrow the airway and could potentially make for difficult   intubation. Findings compatible with squamous cell carcinoma. Mild to moderate emphysema. XR CHEST PORTABLE   Final Result   Chronic findings in the chest without acute airspace disease identified. Assessment/Plan:    Active Hospital Problems    Diagnosis     Laryngeal mass [J38.7]     Acute hypercapnic respiratory failure (HCC) [J96.02]     Centrilobular emphysema (HCC) [J43.2]      Laryngeal mass: Subglottic mass status post paratracheal lymph node biopsy and tracheostomy ENT and critical care following, notes about 3.5 cm, status post trach, history of nicotine smoking. Critical care team/ENT plan to transfer to , pathology support benign/reactive process no malignancy    Acute hypercapnic respiratory failure, combination of COPD, laryngeal mass, status post tracheostomy, remain ventilated and sedated continue bronchodilator    MRSA and Serratia pneumonia: Sensitive to cefepime and vancomycin, will continue antibiotic    Sepsis: Febrile, continue antibiotic as above, leukocytosis improving. DVT Prophylaxis: Lovenox  Diet: Diet NPO  ADULT TUBE FEEDING; Nasogastric; Standard with Fiber; Continuous; 20; Yes; 25; Q 4 hours; 45; 130; Q 4 hours; Protein; Administer 1 Proteinex P2Go daily. Flush with 30 mL water before and after.   Code Status: Full Code    Dispo -continue ICU care, ENT is reaching out to  head and neck surgery for transfer    Bakari Small MD

## 2022-03-30 NOTE — PROGRESS NOTES
Nursing assessment completed. Bladder temp trending up 99.8 now. Otherwise VSS. Oral care and gentle turning Q2H. Moist productive cough and thick tan secretions moderate amount. Turned and repositioned. Remains on sedation and mechanical ventilation. SR up x3. Remains in bilateral soft wrist restraints. Bed alarm engaged.

## 2022-03-31 ENCOUNTER — APPOINTMENT (OUTPATIENT)
Dept: GENERAL RADIOLOGY | Age: 62
DRG: 004 | End: 2022-03-31
Payer: COMMERCIAL

## 2022-03-31 PROBLEM — J96.01 ACUTE RESPIRATORY FAILURE WITH HYPOXIA AND HYPERCAPNIA (HCC): Status: ACTIVE | Noted: 2022-03-10

## 2022-03-31 PROBLEM — E11.69 TYPE 2 DIABETES MELLITUS WITH OTHER SPECIFIED COMPLICATION (HCC): Status: ACTIVE | Noted: 2022-02-18

## 2022-03-31 LAB
ANION GAP SERPL CALCULATED.3IONS-SCNC: 11 MMOL/L (ref 3–16)
BASE EXCESS ARTERIAL: 7.6 MMOL/L (ref -3–3)
BASOPHILS ABSOLUTE: 0 K/UL (ref 0–0.2)
BASOPHILS RELATIVE PERCENT: 0.6 %
BUN BLDV-MCNC: 11 MG/DL (ref 7–20)
CALCIUM SERPL-MCNC: 8.8 MG/DL (ref 8.3–10.6)
CARBOXYHEMOGLOBIN ARTERIAL: 1.1 % (ref 0–1.5)
CHLORIDE BLD-SCNC: 98 MMOL/L (ref 99–110)
CO2: 28 MMOL/L (ref 21–32)
CREAT SERPL-MCNC: <0.5 MG/DL (ref 0.8–1.3)
EOSINOPHILS ABSOLUTE: 0.1 K/UL (ref 0–0.6)
EOSINOPHILS RELATIVE PERCENT: 1.2 %
GFR AFRICAN AMERICAN: >60
GFR NON-AFRICAN AMERICAN: >60
GLUCOSE BLD-MCNC: 107 MG/DL (ref 70–99)
GLUCOSE BLD-MCNC: 107 MG/DL (ref 70–99)
GLUCOSE BLD-MCNC: 108 MG/DL (ref 70–99)
GLUCOSE BLD-MCNC: 119 MG/DL (ref 70–99)
GLUCOSE BLD-MCNC: 122 MG/DL (ref 70–99)
GLUCOSE BLD-MCNC: 125 MG/DL (ref 70–99)
GLUCOSE BLD-MCNC: 151 MG/DL (ref 70–99)
GLUCOSE BLD-MCNC: 91 MG/DL (ref 70–99)
GLUCOSE BLD-MCNC: 93 MG/DL (ref 70–99)
HCO3 ARTERIAL: 30.5 MMOL/L (ref 21–29)
HCT VFR BLD CALC: 32.9 % (ref 40.5–52.5)
HEMOGLOBIN, ART, EXTENDED: 10.8 G/DL (ref 13.5–17.5)
HEMOGLOBIN: 10.5 G/DL (ref 13.5–17.5)
INR BLD: 1.22 (ref 0.88–1.12)
LYMPHOCYTES ABSOLUTE: 1.1 K/UL (ref 1–5.1)
LYMPHOCYTES RELATIVE PERCENT: 18.3 %
MCH RBC QN AUTO: 27.7 PG (ref 26–34)
MCHC RBC AUTO-ENTMCNC: 32 G/DL (ref 31–36)
MCV RBC AUTO: 86.6 FL (ref 80–100)
METHEMOGLOBIN ARTERIAL: 0.2 %
MONOCYTES ABSOLUTE: 0.4 K/UL (ref 0–1.3)
MONOCYTES RELATIVE PERCENT: 6.8 %
NEUTROPHILS ABSOLUTE: 4.5 K/UL (ref 1.7–7.7)
NEUTROPHILS RELATIVE PERCENT: 73.1 %
O2 SAT, ARTERIAL: 98 %
O2 THERAPY: ABNORMAL
PCO2 ARTERIAL: 35.6 MMHG (ref 35–45)
PDW BLD-RTO: 16.6 % (ref 12.4–15.4)
PERFORMED ON: ABNORMAL
PERFORMED ON: NORMAL
PERFORMED ON: NORMAL
PH ARTERIAL: 7.54 (ref 7.35–7.45)
PLATELET # BLD: 254 K/UL (ref 135–450)
PMV BLD AUTO: 7.6 FL (ref 5–10.5)
PO2 ARTERIAL: 90.2 MMHG (ref 75–108)
POTASSIUM REFLEX MAGNESIUM: 3.8 MMOL/L (ref 3.5–5.1)
PROCALCITONIN: 0.47 NG/ML (ref 0–0.15)
PROTHROMBIN TIME: 13.9 SEC (ref 9.9–12.7)
RBC # BLD: 3.79 M/UL (ref 4.2–5.9)
SODIUM BLD-SCNC: 137 MMOL/L (ref 136–145)
TCO2 ARTERIAL: 70.8 MMOL/L
WBC # BLD: 6.2 K/UL (ref 4–11)

## 2022-03-31 PROCEDURE — 71045 X-RAY EXAM CHEST 1 VIEW: CPT

## 2022-03-31 PROCEDURE — 2580000003 HC RX 258: Performed by: INTERNAL MEDICINE

## 2022-03-31 PROCEDURE — 84145 PROCALCITONIN (PCT): CPT

## 2022-03-31 PROCEDURE — 85610 PROTHROMBIN TIME: CPT

## 2022-03-31 PROCEDURE — 6360000002 HC RX W HCPCS: Performed by: INTERNAL MEDICINE

## 2022-03-31 PROCEDURE — 82803 BLOOD GASES ANY COMBINATION: CPT

## 2022-03-31 PROCEDURE — 99291 CRITICAL CARE FIRST HOUR: CPT | Performed by: INTERNAL MEDICINE

## 2022-03-31 PROCEDURE — 94761 N-INVAS EAR/PLS OXIMETRY MLT: CPT

## 2022-03-31 PROCEDURE — 6370000000 HC RX 637 (ALT 250 FOR IP): Performed by: INTERNAL MEDICINE

## 2022-03-31 PROCEDURE — 36592 COLLECT BLOOD FROM PICC: CPT

## 2022-03-31 PROCEDURE — 80048 BASIC METABOLIC PNL TOTAL CA: CPT

## 2022-03-31 PROCEDURE — 2500000003 HC RX 250 WO HCPCS

## 2022-03-31 PROCEDURE — 2700000000 HC OXYGEN THERAPY PER DAY

## 2022-03-31 PROCEDURE — 87070 CULTURE OTHR SPECIMN AEROBIC: CPT

## 2022-03-31 PROCEDURE — 2500000003 HC RX 250 WO HCPCS: Performed by: INTERNAL MEDICINE

## 2022-03-31 PROCEDURE — 2000000000 HC ICU R&B

## 2022-03-31 PROCEDURE — 87040 BLOOD CULTURE FOR BACTERIA: CPT

## 2022-03-31 PROCEDURE — 87205 SMEAR GRAM STAIN: CPT

## 2022-03-31 PROCEDURE — 94003 VENT MGMT INPAT SUBQ DAY: CPT

## 2022-03-31 PROCEDURE — A4216 STERILE WATER/SALINE, 10 ML: HCPCS | Performed by: INTERNAL MEDICINE

## 2022-03-31 PROCEDURE — 94640 AIRWAY INHALATION TREATMENT: CPT

## 2022-03-31 PROCEDURE — 87077 CULTURE AEROBIC IDENTIFY: CPT

## 2022-03-31 PROCEDURE — 31622 DX BRONCHOSCOPE/WASH: CPT | Performed by: INTERNAL MEDICINE

## 2022-03-31 PROCEDURE — 0BJ08ZZ INSPECTION OF TRACHEOBRONCHIAL TREE, VIA NATURAL OR ARTIFICIAL OPENING ENDOSCOPIC: ICD-10-PCS | Performed by: INTERNAL MEDICINE

## 2022-03-31 PROCEDURE — 36415 COLL VENOUS BLD VENIPUNCTURE: CPT

## 2022-03-31 PROCEDURE — 87186 SC STD MICRODIL/AGAR DIL: CPT

## 2022-03-31 PROCEDURE — 85025 COMPLETE CBC W/AUTO DIFF WBC: CPT

## 2022-03-31 RX ORDER — CIPROFLOXACIN 2 MG/ML
400 INJECTION, SOLUTION INTRAVENOUS EVERY 12 HOURS
Status: DISCONTINUED | OUTPATIENT
Start: 2022-03-31 | End: 2022-04-02 | Stop reason: HOSPADM

## 2022-03-31 RX ORDER — BUDESONIDE 0.5 MG/2ML
INHALANT ORAL
Status: DISPENSED
Start: 2022-03-31 | End: 2022-04-01

## 2022-03-31 RX ORDER — ARFORMOTEROL TARTRATE 15 UG/2ML
SOLUTION RESPIRATORY (INHALATION)
Status: DISPENSED
Start: 2022-03-31 | End: 2022-04-01

## 2022-03-31 RX ORDER — ROCURONIUM BROMIDE 10 MG/ML
INJECTION, SOLUTION INTRAVENOUS
Status: COMPLETED
Start: 2022-03-31 | End: 2022-03-31

## 2022-03-31 RX ORDER — VECURONIUM BROMIDE 1 MG/ML
INJECTION, POWDER, LYOPHILIZED, FOR SOLUTION INTRAVENOUS
Status: DISCONTINUED
Start: 2022-03-31 | End: 2022-03-31 | Stop reason: WASHOUT

## 2022-03-31 RX ADMIN — CEFEPIME HYDROCHLORIDE 2000 MG: 2 INJECTION, POWDER, FOR SOLUTION INTRAVENOUS at 02:01

## 2022-03-31 RX ADMIN — INSULIN LISPRO 1 UNITS: 100 INJECTION, SOLUTION INTRAVENOUS; SUBCUTANEOUS at 12:05

## 2022-03-31 RX ADMIN — SODIUM CHLORIDE, PRESERVATIVE FREE 20 MG: 5 INJECTION INTRAVENOUS at 09:04

## 2022-03-31 RX ADMIN — Medication 200 MCG/HR: at 20:23

## 2022-03-31 RX ADMIN — ROCURONIUM BROMIDE 50 MG: 10 INJECTION, SOLUTION INTRAVENOUS at 10:32

## 2022-03-31 RX ADMIN — PROPOFOL 45 MCG/KG/MIN: 10 INJECTION, EMULSION INTRAVENOUS at 14:48

## 2022-03-31 RX ADMIN — PROPOFOL 45 MCG/KG/MIN: 10 INJECTION, EMULSION INTRAVENOUS at 18:54

## 2022-03-31 RX ADMIN — ENOXAPARIN SODIUM 40 MG: 40 INJECTION SUBCUTANEOUS at 20:16

## 2022-03-31 RX ADMIN — SENNOSIDES AND DOCUSATE SODIUM 2 TABLET: 50; 8.6 TABLET ORAL at 10:35

## 2022-03-31 RX ADMIN — GENTAMICIN SULFATE: 1 CREAM TOPICAL at 14:48

## 2022-03-31 RX ADMIN — VANCOMYCIN HYDROCHLORIDE 1500 MG: 10 INJECTION, POWDER, LYOPHILIZED, FOR SOLUTION INTRAVENOUS at 20:25

## 2022-03-31 RX ADMIN — GENTAMICIN SULFATE: 1 CREAM TOPICAL at 20:05

## 2022-03-31 RX ADMIN — BUDESONIDE 500 MCG: 0.5 SUSPENSION RESPIRATORY (INHALATION) at 21:18

## 2022-03-31 RX ADMIN — BUDESONIDE 500 MCG: 0.5 SUSPENSION RESPIRATORY (INHALATION) at 07:58

## 2022-03-31 RX ADMIN — HYDRALAZINE HYDROCHLORIDE 10 MG: 20 INJECTION INTRAMUSCULAR; INTRAVENOUS at 14:25

## 2022-03-31 RX ADMIN — CIPROFLOXACIN 400 MG: 2 INJECTION, SOLUTION INTRAVENOUS at 15:37

## 2022-03-31 RX ADMIN — Medication 10 ML: at 20:05

## 2022-03-31 RX ADMIN — ARFORMOTEROL TARTRATE 15 MCG: 15 SOLUTION RESPIRATORY (INHALATION) at 21:18

## 2022-03-31 RX ADMIN — ACETAMINOPHEN 325MG 650 MG: 325 TABLET ORAL at 02:04

## 2022-03-31 RX ADMIN — ACETAMINOPHEN 325MG 650 MG: 325 TABLET ORAL at 15:34

## 2022-03-31 RX ADMIN — ARFORMOTEROL TARTRATE 15 MCG: 15 SOLUTION RESPIRATORY (INHALATION) at 07:58

## 2022-03-31 RX ADMIN — IPRATROPIUM BROMIDE AND ALBUTEROL SULFATE 3 ML: .5; 2.5 SOLUTION RESPIRATORY (INHALATION) at 21:19

## 2022-03-31 RX ADMIN — METRONIDAZOLE 500 MG: 500 INJECTION, SOLUTION INTRAVENOUS at 15:34

## 2022-03-31 RX ADMIN — CEFEPIME HYDROCHLORIDE 2000 MG: 2 INJECTION, POWDER, FOR SOLUTION INTRAVENOUS at 09:05

## 2022-03-31 RX ADMIN — NALOXEGOL OXALATE 25 MG: 25 TABLET, FILM COATED ORAL at 10:35

## 2022-03-31 RX ADMIN — SODIUM CHLORIDE, PRESERVATIVE FREE 20 MG: 5 INJECTION INTRAVENOUS at 20:14

## 2022-03-31 RX ADMIN — Medication 200 MCG/HR: at 09:02

## 2022-03-31 RX ADMIN — ACETAMINOPHEN 325MG 650 MG: 325 TABLET ORAL at 08:48

## 2022-03-31 RX ADMIN — METRONIDAZOLE 500 MG: 500 INJECTION, SOLUTION INTRAVENOUS at 22:24

## 2022-03-31 RX ADMIN — VANCOMYCIN HYDROCHLORIDE 1500 MG: 10 INJECTION, POWDER, LYOPHILIZED, FOR SOLUTION INTRAVENOUS at 10:53

## 2022-03-31 RX ADMIN — Medication 10 ML: at 09:26

## 2022-03-31 RX ADMIN — GENTAMICIN SULFATE: 1 CREAM TOPICAL at 09:27

## 2022-03-31 RX ADMIN — PROPOFOL 45 MCG/KG/MIN: 10 INJECTION, EMULSION INTRAVENOUS at 08:46

## 2022-03-31 RX ADMIN — Medication 6 MG/HR: at 20:23

## 2022-03-31 RX ADMIN — PROPOFOL 45 MCG/KG/MIN: 10 INJECTION, EMULSION INTRAVENOUS at 03:44

## 2022-03-31 RX ADMIN — IPRATROPIUM BROMIDE AND ALBUTEROL SULFATE 3 ML: .5; 2.5 SOLUTION RESPIRATORY (INHALATION) at 12:37

## 2022-03-31 RX ADMIN — IPRATROPIUM BROMIDE AND ALBUTEROL SULFATE 3 ML: .5; 2.5 SOLUTION RESPIRATORY (INHALATION) at 07:58

## 2022-03-31 ASSESSMENT — PULMONARY FUNCTION TESTS
PIF_VALUE: 25
PIF_VALUE: 25
PIF_VALUE: 28
PIF_VALUE: 25

## 2022-03-31 ASSESSMENT — PAIN SCALES - GENERAL
PAINLEVEL_OUTOF10: 0

## 2022-03-31 NOTE — CONSULTS
Infectious Diseases   Consult Note        Admission Date: 3/22/2022  Hospital Day: Hospital Day: 10   Attending: Mackenzie Montana MD  Date of service: 3/31/22     Reason for admission: COPD with exacerbation [facility]) [J44.1]  Airway compromise [J98.8]  Mass of hypopharynx [J39.2]  Acute respiratory failure with hypoxia and hypercapnia ([address] [J96.01, J96.02]  Acute hypercapnic respiratory failure ([address] [J96.02]    Chief complaint/ Reason for consult: Ongoing high fevers, MRSA and Serratia pneumonia    Microbiology:        I have reviewed allavailable micro lab data and cultures    · Blood culture (2/2) - collected on 3/26/2022: Negative so far  · Tracheal aspirate culture  - collected on 3/27/2022: MRSA, Serratia    Susceptibility      Staph aureus mrsa (1)    Antibiotic Interpretation Microscan  Method Status    ceFAZolin Resistant 16 mcg/mL BACTERIAL SUSCEPTIBILITY PANEL BY YEFRI     clindamycin Sensitive <=0.5 mcg/mL BACTERIAL SUSCEPTIBILITY PANEL BY YEFRI     erythromycin Resistant >4 mcg/mL BACTERIAL SUSCEPTIBILITY PANEL BY YEFRI     oxacillin Resistant >2 mcg/mL BACTERIAL SUSCEPTIBILITY PANEL BY YEFRI     tetracycline Resistant >8 mcg/mL BACTERIAL SUSCEPTIBILITY PANEL BY YEFRI     trimethoprim-sulfamethoxazole Sensitive <=0.5/9.5 mcg/mL BACTERIAL SUSCEPTIBILITY PANEL BY YEFRI     vancomycin Sensitive 1 mcg/mL BACTERIAL SUSCEPTIBILITY PANEL BY YEFRI       Serratia marcescens (2)    Antibiotic Interpretation Microscan  Method Status    amoxicillin-clavulanate Resistant >16/8 mcg/mL BACTERIAL SUSCEPTIBILITY PANEL BY YEFRI     ampicillin Resistant >16 mcg/mL BACTERIAL SUSCEPTIBILITY PANEL BY YEFRI     ampicillin-sulbactam Resistant >16/8 mcg/mL BACTERIAL SUSCEPTIBILITY PANEL BY YEFRI     ceFAZolin Resistant >16 mcg/mL BACTERIAL SUSCEPTIBILITY PANEL BY YEFRI     cefepime Sensitive <=2 mcg/mL BACTERIAL SUSCEPTIBILITY PANEL BY YEFRI     cefTRIAXone Sensitive <=1 mcg/mL BACTERIAL SUSCEPTIBILITY PANEL BY YEFRI     cefuroxime Resistant >16 mcg/mL BACTERIAL SUSCEPTIBILITY PANEL BY YEFRI     ciprofloxacin Sensitive <=1 mcg/mL BACTERIAL SUSCEPTIBILITY PANEL BY YEFRI     ertapenem Sensitive <=0.5 mcg/mL BACTERIAL SUSCEPTIBILITY PANEL BY YEFRI     gentamicin Sensitive <=4 mcg/mL BACTERIAL SUSCEPTIBILITY PANEL BY YEFRI     meropenem Sensitive <=1 mcg/mL BACTERIAL SUSCEPTIBILITY PANEL BY YEFRI     piperacillin-tazobactam Sensitive <=16 mcg/mL BACTERIAL SUSCEPTIBILITY PANEL BY YEFRI     trimethoprim-sulfamethoxazole Sensitive <=2/38 mcg/mL BACTERIAL SUSCEPTIBILITY PANEL BY YEFRI              Antibiotics and immunizations:       Current antibiotics: All antibiotics and their doses were reviewed by me    Recent Abx Admin                   vancomycin (VANCOCIN) 1,500 mg in dextrose 5 % 250 mL IVPB (mg) 1,500 mg New Bag 03/31/22 1053     1,500 mg New Bag 03/30/22 2254    cefepime (MAXIPIME) 2000 mg IVPB minibag (mg) 2,000 mg New Bag 03/31/22 0905     2,000 mg New Bag  0201     2,000 mg New Bag 03/30/22 1744                  Immunization History: All immunization history was reviewed by me today. Immunization History   Administered Date(s) Administered    COVID-19, Pfizer Purple top, DILUTE for use, 12+ yrs, 30mcg/0.3mL dose 03/15/2021, 04/12/2021, 12/22/2021    Influenza Virus Vaccine 01/21/2017, 01/21/2017    Influenza, Quadv, IM, PF (6 mo and older Fluzone, Flulaval, Fluarix, and 3 yrs and older Afluria) 10/31/2020    Tdap (Boostrix, Adacel) 03/27/2018    Zoster Recombinant (Shingrix) 03/27/2018       Known drug allergies: All allergies were reviewed and updated    Allergies   Allergen Reactions    Chantix [Varenicline] Other (See Comments)     Hallucinations         Social history:     Social History:  All social andepidemiologic history was reviewed and updated by me today as needed. · Tobacco use:   reports that he quit smoking about 3 months ago. His smoking use included cigarettes. He started smoking about 44 years ago.  He has a 10.00 pack-year smoking history. He has never used smokeless tobacco.  · Alcohol use:   reports current alcohol use of about 6.0 standard drinks of alcohol per week. · Currently lives in: [address]  ·  reports no history of drug use. COVID VACCINATION AND LAB RESULT RECORDS:     Internal Administration   First Dose COVID-19, Pfizer Purple top, DILUTE for use, 12+ yrs, 30mcg/0.3mL dose  03/15/2021   Second Dose COVID-19, Pfizer Purple top, DILUTE for use, 12+ yrs, 30mcg/0.3mL dose   04/12/2021       Last COVID Lab SARS-CoV-2 (no units)   Date Value   02/02/2022 Not Detected     SARS-CoV-2, PCR (no units)   Date Value   02/10/2021 Not Detected     SARS-CoV-2, NAAT (no units)   Date Value   12/28/2021 Not Detected            Assessment:     The patient is a 65 y.o. old male who  has a past medical history of Diabetes mellitus ([facility] [address] Fibromyalgia, History of DVT (deep vein thrombosis), Hyperlipidemia, Hypertension, and Type 2 diabetes mellitus with circulatory disorder, without long-term current use of insulin ([facility] [address] (2/18/2022). with following problems:    · MRSA and Serratia marcescens pneumonia  · Acute respiratory failure with hypoxia, required endotracheal intubation in the ER  · MRSA and Serratia pneumonia  · Requiring invasive mechanical ventilation  · Heavy smoker, recently quit in January 2022  · Multilobulated mass in the supraglottic area, concerning for invasive tumor  · Essential hypertension  · Hyperlipidemia  · Type 2 diabetes mellitus  · History of DVT  · Fibromyalgia  · Overweight due to excess calorie intake : Body mass index is 26.4 kg/m². Discussion:      The patient had 2 sets of blood cultures done on 3/26/2022. They were negative. Endotracheal aspirate culture from 3/27/2022 has grown MRSA and Serratia marcescens. Sensitivities reviewed. Patient has been on empiric IV vancomycin and cefepime. He has been having fevers up to 101. He underwent a bronchoscopy earlier today.   Bronchial washing culture has been sent. Last procalcitonin was 0.21 on 3/26/2022    Plan:     Diagnostic Workup:    · We will order 2 sets of blood cultures today  · Follow-up on bronchial washing cultures  · Will order procalcitonin level  · Continue to follow fever curve, WBC count and blood cultures  · Follow up on liverand renal functions closely  · Order bilateral heel wound cultures    Antimicrobials:    · Will continue IV vancomycin  Check Vancomycin trough before the 5th dose. Target vancomycin trough level of 15-20  mg/L or vancomycin area under curve (AUC) of 400-600 mg*h/L by Bayesian modeling method. If dosing vancomycin based on trough levels, keep vancomycin trough below 20 at all times. Avoid increasing the dose of vancomycin above a total of 4 grams in a 24-hour period in patients younger than 45 years and above 3 grams in a 24-hour period in a patient of age 39 years or older. Continue to monitor serum creatinine and Vanco levels closely, while the patient is on I/v Vancomycin. · Will stop IV cefepime today  · We will order IV ciprofloxacin 400 mg every 12 hours for gram-negative coverage  · Will order IV Flagyl 500 mg every 8 hour for gram-negative coverage  · We will follow up on the culture results and clinical progress and will make further recommendations accordingly. · Continue close vitals monitoring. · Maintain good glycemic control. · Fall precautions. Aspiration precautions. · Continue to watch for new fever or diarrhea. · DVT prophylaxis. · [place] patient. High risk of morbidity and mortality  · Continue close monitoring in ICU setting  · Discussed all above with ICU RN. Drug Monitoring:    · Continue serial monitoring for antibiotic toxicity as follows: Vancomycin trough, CBC, CMP, QTc interval  · Continue to watch for following: new or worsening fever, hypotension, hives, lip swelling and redness or purulence at vascular access sites.     I/v access Management:    · Continue to monitor i.v access sites for erythema, induration, discharge or tenderness. · As always, continue efforts to minimizetubes/lines/drains as clinically appropriate to reduce chances of line associated infections. Current isolation precautions:    Currently active isolation(s): Contact     Risk of Complications/Morbidity/Mortality: High     · Patient is critically ill and has a potentially life threatening infection that poses threat to life/bodily function. · There is potential for worsening infection/ sudden clinically significant or life-threatening deterioration in the patient's condition without appropriate antimicrobial therapy. · Complex medical decision making process was involved to select appropriate antimicrobial agents to reverse the cause of patient's severe infection/ illness. · Antimicrobial therapy requires intensive monitoring for toxicity and frequent dose adjustments to prevent toxicity and permanent end-organ dysfunction. Critical care time:  33 minutes    Thank you for involving me in the care of your patient. I will continue to follow. If you have any additional questions, please do not hesitate to contact me. Subjective:     Presenting complaint in ER:     Chief Complaint   Patient presents with    Shortness of Breath     pt. from home arrived by [employer] [facility] brought pt. in.  pt. was fine and when he went to bed he felt his back and chest get tight and then started with SOB. squad gave a duoneb, 125mg solu medrol, AND INCREASED o2 TO 4L. PT.normally wears 2-3 l of )2. HPI: Vamsi Napoles is a 65 y.o. male patient, who was seen at the request of Dr. Carolina Cerda MD.    History was obtained from chart review and RN as patient is on a ventilator    The patient was admitted on 3/22/2022. I have been consulted to see the patient for above mentioned reason(s).     The patient has multiple medical comorbidities, and presented to the ER for shortness of breath and sore throat. The patient was also having some chest tightness. He was hospitalized. He required endotracheal intubation for respiratory failure. 2 sets of blood cultures were sent on admission. He had a fever up to 100 degrees. He was started on empiric IV vancomycin and cefepime. He has remained on these antibiotics. He has undergone a bronchoscopy on 3/31/2022. He has continued to have fevers. Endotracheal aspirate culture from 3/27/2022 has grown MRSA and Serratia marcescens    I have been asked for my opinion for management for this patient. Past Medical History: All past medical history reviewed today. Past Medical History:   Diagnosis Date    Diabetes mellitus ([facility]     Fibromyalgia     History of DVT (deep vein thrombosis)     Hyperlipidemia     Hypertension     Type 2 diabetes mellitus with circulatory disorder, without long-term current use of insulin ([facility] [address] 2/18/2022         Past Surgical History: All pastsurgical history was reviewed today. Past Surgical History:   Procedure Laterality Date    APPENDECTOMY  2005    COLONOSCOPY  2016    FEMORAL BYPASS Left 02/26/2020    femoral posterior tibial bypass    FEMORAL-TIBIAL BYPASS GRAFT Right 12/11/2020    with femoral endarterectomy and patch angioplasty    FOOT DEBRIDEMENT Left 09/03/2020    FOOT DEBRIDEMENT Right 2/26/2021    DEBRIDEMENT OF RIGHT FOOT WOUND WITH GRAFT APPLICATION performed by Evangelina Borjas DPM at [address] [place] [address]  N/A 3/25/2022    DIRECT LARYNGOSCOPY WITH BIOPSY AND FROZEN SECTIONS performed by Araceli Wade DO at [address] [place] [address] Right 12/08/2020    stent leg for PVD    TRACHEOSTOMY N/A 3/25/2022    TRACHEOTOMY performed by Araceli Wade DO at [address] [facility]         Family History: All family history was reviewed today.         Problem Relation Age of Onset    Cancer Mother     Lung Cancer Mother     Diabetes Father     Stroke Father          Medications: All current and past medications were reviewed. Medications Prior to Admission: calcium-vitamin D (OSCAL-500) 500-200 MG-UNIT per tablet, Take 1 tablet by mouth 2 times daily  sennosides-docusate sodium (SENOKOT-S) 8.6-50 MG tablet, Take 1 tablet by mouth 2 times daily  [DISCONTINUED] aspirin 81 MG chewable tablet, Take 81 mg by mouth daily  [DISCONTINUED] diphenhydrAMINE (BENADRYL) 25 MG tablet, Take 25 mg by mouth every 6 hours as needed for Itching  [DISCONTINUED] gabapentin (NEURONTIN) 100 MG capsule, Take 100 mg by mouth 3 times daily. [DISCONTINUED] insulin lispro (HUMALOG) 100 UNIT/ML injection vial, Inject into the skin 4 times daily (before meals and nightly) 0-10 units  [DISCONTINUED] insulin glargine (LANTUS) 100 UNIT/ML injection vial, Inject into the skin nightly 55 units  [DISCONTINUED] methocarbamol (ROBAXIN) 750 MG tablet, Take 750 mg by mouth 2 times daily  [DISCONTINUED] metoprolol succinate (TOPROL XL) 100 MG extended release tablet, Take 100 mg by mouth daily  [DISCONTINUED] omeprazole (PRILOSEC) 20 MG delayed release capsule, Take 20 mg by mouth daily  [DISCONTINUED] oxyCODONE (ROXICODONE) 5 MG immediate release tablet, Take 5 mg by mouth every 6 hours as needed for Pain. [DISCONTINUED] tiotropium (SPIRIVA) 18 MCG inhalation capsule, Inhale 18 mcg into the lungs daily  [DISCONTINUED] buPROPion (WELLBUTRIN) 75 MG tablet, Take 150 mg by mouth daily  [] predniSONE (DELTASONE) 20 MG tablet, Take 2 tablets by mouth daily for 4 days, THEN 1 tablet daily for 4 days.   nicotine (NICODERM CQ) 14 MG/24HR, Place 1 patch onto the skin daily for 14 days (Patient not taking: Reported on 3/22/2022)  ipratropium-albuterol (DUONEB) 0.5-2.5 (3) MG/3ML SOLN nebulizer solution, Inhale 3 mLs into the lungs 4 times daily  furosemide (LASIX) 20 MG tablet, Take 1 tablet by mouth daily  guaiFENesin (MUCINEX) 600 MG extended release tablet, Take 1,200 mg by mouth 2 times daily  atorvastatin (LIPITOR) 80 MG tablet, Take 1 tablet by mouth daily Cancel atorvastatin 20 mg a day  Fluticasone-Umeclidin-Vilant (TRELEGY ELLIPTA) 200-62.5-25 MCG/INH AEPB, Inhale 1 puff into the lungs 2 times daily (Patient taking differently: Inhale 1 puff into the lungs daily )  gentamicin (GARAMYCIN) 0.1 % cream, Apply topically   daily. [DISCONTINUED] albuterol sulfate HFA (VENTOLIN HFA) 108 (90 Base) MCG/ACT inhaler, Inhale 2 puffs into the lungs 4 times daily as needed for Wheezing  acetaminophen (TYLENOL) 500 MG tablet, Take 1 tablet by mouth 4 times daily as needed for Pain  warfarin (JANTOVEN) 5 MG tablet, TAKE 1 AND 1/2 TABLETS BY MOUTH ONE TIME A DAY OR AS DIRECTED BY [facility] ([phone]) (Patient taking differently: Take 7.5 mg by mouth daily Except 10mg every Wednesday and Saturday OR AS DIRECTED BY [facility] ([phone]))  NIFEdipine (ADALAT CC) 60 MG extended release tablet, Take 1 tablet by mouth daily  carvedilol (COREG) 25 MG tablet, Take 1 tablet by mouth 2 times daily (with meals)  metFORMIN (GLUCOPHAGE) 500 MG tablet, Take 1 tablet by mouth 2 times daily (with meals)  fluticasone (FLONASE) 50 MCG/ACT nasal spray, 1 spray by Nasal route daily  blood glucose monitor kit and supplies, Dispense sufficient amount for indicated testing frequency plus additional to accommodate PRN testing needs. Dispense all needed supplies to include: monitor, strips, lancing device, lancets, control solutions, alcohol swabs. (Patient not taking: Reported on 3/22/2022)  Lancets MISC, 1 each by Does not apply route daily Test 2 times daily while on PREDNISONE then 1 time daily & as needed for symptoms of irregular blood sugar (Patient not taking: Reported on 3/22/2022)  blood glucose monitor strips, Test 2 times a day while on PREDNISONE, then 1 time daily & as needed for symptoms of irregular blood glucose.  Dispense sufficient amount for indicated testing frequency plus additional to accommodate PRN testing needs.  (Patient not taking: Reported on 3/22/2022)  ferrous sulfate (IRON 325) 325 (65 Fe) MG tablet, Take 1 tablet by mouth 2 times daily  [DISCONTINUED] tadalafil (CIALIS) 5 MG tablet, TAKE 1 TABLET BY MOUTH ONE TIME A DAY AS NEEDED FOR ERECTILE DYSFUNCTION (Patient not taking: Reported on 3/22/2022)  Vitamins/Minerals TABS, Take 1 tablet by mouth daily (Patient not taking: Reported on 3/22/2022)     ciprofloxacin  400 mg IntraVENous Q12H    metroNIDAZOLE  500 mg IntraVENous Q8H    lidocaine 1 % injection  5 mL IntraDERmal Once    sodium chloride flush  5-40 mL IntraVENous 2 times per day    vancomycin  1,500 mg IntraVENous Q12H    ipratropium-albuterol  3 mL Inhalation TID    lidocaine 1 % injection  5 mL IntraDERmal Once    sodium chloride flush  5-40 mL IntraVENous 2 times per day    enoxaparin  40 mg SubCUTAneous Nightly    naloxegol  25 mg Oral QAM    polyethylene glycol  17 g Oral Daily    sennosides-docusate sodium  2 tablet Oral Daily    gentamicin   Topical TID    insulin lispro  0-6 Units SubCUTAneous Q4H    succinylcholine  140 mg IntraVENous Once    sodium chloride flush  5-40 mL IntraVENous 2 times per day    famotidine (PEPCID) injection  20 mg IntraVENous BID    budesonide  0.5 mg Nebulization BID    Arformoterol Tartrate  15 mcg Nebulization BID    scopolamine  1 patch TransDERmal Q72H          REVIEW OF SYSTEMS:       Review of Systems   Unable to perform ROS: Intubated          Objective:       PHYSICAL EXAM:      Vitals:   Vitals:    03/31/22 1200 03/31/22 1230 03/31/22 1300 03/31/22 1330   BP: (!) 165/79 (!) 143/74 (!) 150/69 (!) 168/83   Pulse: 71 73 78 82   Resp: 19 16 16 18   Temp: 99.7 °F (37.6 °C)      TempSrc: Bladder      SpO2: 95% 98%     Weight:       Height:           Physical Exam     General: intubated and sedated, on ventilator   HEENT: normocephalic, atraumatic, sclera clear, pupils equal, light reflex preserved bilaterally, endotracheal tube in place  Cardiovascular: RRR, no murmurs/rubs/gallops detected  Pulmonary: bibasilar rales   Abdomen/GI: soft, no organomegaly, bowel sounds positive  Neuro: sedated, PERRL, moves all extremities when off sedation per RN  Skin: Lateral heel decubitus ulcers, no rash   Musculoskeletal:  No joint swelling, redness. No limitation of range of passive motion  Genitourinary: Echols's catheter in place  Psych: could not assess  Lymphatic/Immunologic: No obvious bruising, no cervical lymphadenopathy    Lines: All vascular access sites are healthy with no local erythema, discharge or tenderness    Intake and output:     I/O last 3 completed shifts: In: 6992.5 [I.V.:4444.8; NG/GT:1606; IV Piggyback:941.7]  Out: 3833 [GKUJX:5108]    Lab Data:   All available labs were reviewed by me today. CBC:   Recent Labs     03/29/22  0506 03/30/22  0436 03/31/22  0500   WBC 6.2 5.5 6.2   RBC 3.68* 3.92* 3.79*   HGB 10.5* 10.9* 10.5*   HCT 32.2* 34.0* 32.9*    268 254   MCV 87.5 86.7 86.6   MCH 28.4 27.8 27.7   MCHC 32.4 32.1 32.0   RDW 16.5* 16.2* 16.6*        BMP:  Recent Labs     03/29/22  0506 03/30/22  0436 03/31/22  0500    139 137   K 3.8 4.1 3.8    100 98*   CO2 28 29 28   BUN 15 16 11   CREATININE <0.5* <0.5* <0.5*   CALCIUM 9.0 9.1 8.8   GLUCOSE 187* 138* 119*        Hepatic FunctionPanel:   Lab Results   Component Value Date    ALKPHOS 93 03/29/2022    ALT 58 03/29/2022    AST 31 03/29/2022    PROT 5.2 03/29/2022    PROT 8.0 03/29/2010    BILITOT <0.2 03/29/2022    LABALBU 2.5 03/29/2022       CPK:   Lab Results   Component Value Date    CKTOTAL 81 02/01/2022     ESR: No results found for: SEDRATE  CRP: No results found for: CRP      Imaging: All pertinent images and reports for the current visit were reviewed by me during this visit. I reviewed the chest x-ray/CT scan/MRI images and independently interpreted the findings and results today.     XR CHEST PORTABLE   Final Result   Persistent bilateral airspace disease, slightly improved on the right, for   which pulmonary edema or pneumonia are considerations. Small right pleural   effusion. XR CHEST PORTABLE   Final Result   1. Worsening congestive heart failure and new left basilar airspace disease. CT SOFT TISSUE NECK W CONTRAST   Final Result   Addendum 1 of 1   ADDENDUM:   There is a right-sided subclavian line which makes a loop around itself at   the junction of right jugular and right subclavian vein with this area    being   abnormally prominent/having a varicose appearance. The findings have significantly progressed since the neck CT of 7 days    ago. If there has been interval radiation therapy or medical intervention,   findings may be secondary to treatment effect. The findings were sent to the Radiology Results [address] at    4:29   pm on 3/29/2022to be communicated to a licensed caregiver. Final   There is a multilobulated enhancing lesion at the level of supraglottic   region Circumferentially narrowing the supra glottic area extending   superiorly into the bilateral vallecula fossa completely obstructing the   upper of aerodigestive mucosa at the level of hypopharynx and extending   inferiorly to the glottic region and infraglottic area. . 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 .      Small right pleural effusion and focal consolidative changes within the   dependent portion of the lungs likely suggestive of consolidative pulmonary   edema. Moderate mucosal thickening of the ethmoidal air cells and left sphenoid   sinus. Mild mucosal thickening of maxillary sinuses. RECOMMENDATIONS:   Unavailable            XR CHEST PORTABLE   Final Result   Large consolidation right lower lobe from pneumonia. Small right pleural   effusion. Mild congestion. No pneumothorax.          XR CHEST PORTABLE Final Result   Decreasing right basilar pleuroparenchymal disease as compared to prior. A portion of the right upper extremity PICC line is looped in the right   supraclavicular region; correlate with catheter function. XR CHEST PORTABLE   Final Result   Slight worsening of the right pleural effusion. Placement of a right PICC   line catheter tip in the SVC. Remainder of lines and tubes are stable. XR CHEST PORTABLE   Final Result   1. Stable position of enteric feeding tube. 2. Interval progression of a small right pleural effusion and right basilar   airspace consolidation. XR ABDOMEN FOR NG/OG/NE TUBE PLACEMENT   Final Result   Enteric tube with distal end reaching the gastric body on 1 of 3 images. The   other 2 images demonstrate tip of enteric tube at the level of the   midesophagus. XR CHEST PORTABLE   Final Result   1. Opacity right lower lung compatible with pleural effusion and relaxation   atelectasis versus pneumonia. 2. Pulmonary sequela typical of that seen with smoking, including COPD;   correlate with clinical history. 3. Unchanged tracheostomy tube positioning. Dobbhoff type feeding tube   extends below the left hemidiaphragm, out of field of view. XR CHEST PORTABLE   Final Result   Supportive tubing is in normal position. Bibasilar opacities, atelectasis or airspace disease. There is mild   progression on the right side in the interval.         XR CHEST PORTABLE   Final Result   Mild dependent changes in the lower lung fields, atelectasis versus   infiltrate. Satisfactory position of endotracheal tube. XR ABDOMEN FOR NG/OG/NE TUBE PLACEMENT   Final Result   Tip of feeding tube projects in region of gastric fundus         XR CHEST PORTABLE   Final Result   1. Properly placed endotracheal tube.       2.  Mild bibasilar atelectasis         CT SOFT TISSUE NECK W CONTRAST   Final Result   Lobulated large mass wrapping the left side of the hypopharynx extending into   supraglottic and glottic portions of the larynx measuring approximately 3.5   cm diameter with a craniocaudad extent of 4.9 cm. The mass appears to   severely narrow the airway and could potentially make for difficult   intubation. Findings compatible with squamous cell carcinoma. Mild to moderate emphysema. XR CHEST PORTABLE   Final Result   Chronic findings in the chest without acute airspace disease identified. Outside records:    Labs, Microbiology, Radiology and pertinent results from Care everywhere, if available, were reviewed as a part ofthe consultation. Problem list:       Patient Active Problem List   Diagnosis Code    Peripheral artery disease ([facility]) I73.9    Centrilobular emphysema ([facility]) J43.2    COPD (chronic obstructive pulmonary disease) ([facility]) J44.9    Atherosclerosis of native arteries of right leg with ulceration of other part of foot ([facility] I70.235    Impotence N52.9    Acute deep vein thrombosis (DVT) of femoral vein of left lower extremity ([facility]) I82.412    Colon polyp K63.5    Hyperlipidemia E78.5    Non-healing ulcer of left ankle ([facility]) L97.329    Venous insufficiency I87.2    Venous ulcer ([facility] I83.009, L97.909    Essential hypertension I10    Peripheral vascular disease ([facility]) I73.9    Type 2 diabetes mellitus with other specified complication ([facility]) V38.46    COPD with exacerbation ([facility] J44.1    Acute respiratory failure with hypoxia and hypercapnia ([facility]) J96.01, J96.02    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 9) E66.3         Please note that this chart was generated using Dragon dictation software. Although every effort was made to ensure the accuracy of this automated transcription, some errors in transcription may have occurred inadvertently. If you may need any clarification, please do not hesitate to contact me through EPIC or at the phone number provided below with my electronic signature. Any pictures or media included in this note were obtained after taking informed verbal consent from the patient and with their approval to include those in the patient's medical record.         Michaela Stewart MD, MPH, JESSICA Guevara  3/31/2022, 2:05 PM  [facility] Infectious Disease   [facility] [place] [facility], [address] [place] [address]  Office: [phone]  Fax: [phone]  Clinic days:  Tuesday & Thursday

## 2022-03-31 NOTE — PROGRESS NOTES
Shift assessment completed. VSS. NSR on tele. Patient sedated w/ fentanyl, propofol and versed. Versed increased d/t patient breathing 35 times / minute and stacking breaths. Current vent settings are as followed: ACPC, rate 16, pressure 20, peep 5, FiO2 50%. Patient with 101 temperature this am. Tylenol administered prn and ice packs applied to patient. Patient turned and repositioned in bed. Restraints in place. Echols draining appropriately. Patient NPO for bronchoscopy. Will continue to monitor patient.

## 2022-03-31 NOTE — PROGRESS NOTES
Hospitalist Progress Note      PCP: Juventino Norwood MD    Date of Admission: 3/22/2022      Subjective: Trach to vent,  leukocytosis improving.   Awaiting pathology, no acute event overnight, status post bronchoscopy on 3/31, remained febrile      Medications:  Reviewed    Infusion Medications    sodium chloride 1,000 mL (03/30/22 2253)    sodium chloride 1,000 mL (03/30/22 2310)    norepinephrine Stopped (03/27/22 0401)    midazolam 4 mg/hr (03/31/22 0925)    fentaNYL Citrate-NaCl 200 mcg/hr (03/31/22 0902)    propofol 45 mcg/kg/min (03/31/22 0846)    sodium chloride 25 mL (03/29/22 2048)    dextrose       Scheduled Medications    lidocaine 1 % injection  5 mL IntraDERmal Once    sodium chloride flush  5-40 mL IntraVENous 2 times per day    vancomycin  1,500 mg IntraVENous Q12H    ipratropium-albuterol  3 mL Inhalation TID    lidocaine 1 % injection  5 mL IntraDERmal Once    sodium chloride flush  5-40 mL IntraVENous 2 times per day    cefepime  2,000 mg IntraVENous Q8H    enoxaparin  40 mg SubCUTAneous Nightly    naloxegol  25 mg Oral QAM    polyethylene glycol  17 g Oral Daily    sennosides-docusate sodium  2 tablet Oral Daily    gentamicin   Topical TID    insulin lispro  0-6 Units SubCUTAneous Q4H    succinylcholine  140 mg IntraVENous Once    sodium chloride flush  5-40 mL IntraVENous 2 times per day    famotidine (PEPCID) injection  20 mg IntraVENous BID    budesonide  0.5 mg Nebulization BID    Arformoterol Tartrate  15 mcg Nebulization BID    scopolamine  1 patch TransDERmal Q72H     PRN Meds: sodium chloride flush, sodium chloride, sodium chloride flush, sodium chloride, hydrALAZINE, racepinephrine HCl, sodium chloride nebulizer, albuterol sulfate HFA, sodium chloride flush, sodium chloride, ondansetron **OR** ondansetron, acetaminophen **OR** acetaminophen, glucose, glucagon (rDNA), dextrose, dextrose bolus (hypoglycemia) **OR** dextrose bolus GLUCOSEU Negative 02/02/2022       Radiology:  XR CHEST PORTABLE   Final Result   Persistent bilateral airspace disease, slightly improved on the right, for   which pulmonary edema or pneumonia are considerations. Small right pleural   effusion. XR CHEST PORTABLE   Final Result   1. Worsening congestive heart failure and new left basilar airspace disease. CT SOFT TISSUE NECK W CONTRAST   Final Result   Addendum 1 of 1   ADDENDUM:   There is a right-sided subclavian line which makes a loop around itself at   the junction of right jugular and right subclavian vein with this area    being   abnormally prominent/having a varicose appearance. The findings have significantly progressed since the neck CT of 7 days    ago. If there has been interval radiation therapy or medical intervention,   findings may be secondary to treatment effect. The findings were sent to the Radiology Results [address] at    4:29   pm on 3/29/2022to be communicated to a licensed caregiver. Final   There is a multilobulated enhancing lesion at the level of supraglottic   region Circumferentially narrowing the supra glottic area extending   superiorly into the bilateral vallecula fossa completely obstructing the   upper of aerodigestive mucosa at the level of hypopharynx and extending   inferiorly to the glottic region and infraglottic area. . 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 .      Small right pleural effusion and focal consolidative changes within the   dependent portion of the lungs likely suggestive of consolidative pulmonary   edema. Moderate mucosal thickening of the ethmoidal air cells and left sphenoid   sinus. Mild mucosal thickening of maxillary sinuses. RECOMMENDATIONS:   Unavailable            XR CHEST PORTABLE   Final Result   Large consolidation right lower lobe from pneumonia.   Small right pleural effusion. Mild congestion. No pneumothorax. XR CHEST PORTABLE   Final Result   Decreasing right basilar pleuroparenchymal disease as compared to prior. A portion of the right upper extremity PICC line is looped in the right   supraclavicular region; correlate with catheter function. XR CHEST PORTABLE   Final Result   Slight worsening of the right pleural effusion. Placement of a right PICC   line catheter tip in the SVC. Remainder of lines and tubes are stable. XR CHEST PORTABLE   Final Result   1. Stable position of enteric feeding tube. 2. Interval progression of a small right pleural effusion and right basilar   airspace consolidation. XR ABDOMEN FOR NG/OG/NE TUBE PLACEMENT   Final Result   Enteric tube with distal end reaching the gastric body on 1 of 3 images. The   other 2 images demonstrate tip of enteric tube at the level of the   midesophagus. XR CHEST PORTABLE   Final Result   1. Opacity right lower lung compatible with pleural effusion and relaxation   atelectasis versus pneumonia. 2. Pulmonary sequela typical of that seen with smoking, including COPD;   correlate with clinical history. 3. Unchanged tracheostomy tube positioning. Dobbhoff type feeding tube   extends below the left hemidiaphragm, out of field of view. XR CHEST PORTABLE   Final Result   Supportive tubing is in normal position. Bibasilar opacities, atelectasis or airspace disease. There is mild   progression on the right side in the interval.         XR CHEST PORTABLE   Final Result   Mild dependent changes in the lower lung fields, atelectasis versus   infiltrate. Satisfactory position of endotracheal tube. XR ABDOMEN FOR NG/OG/NE TUBE PLACEMENT   Final Result   Tip of feeding tube projects in region of gastric fundus         XR CHEST PORTABLE   Final Result   1. Properly placed endotracheal tube.       2.  Mild bibasilar atelectasis         CT SOFT TISSUE NECK W CONTRAST   Final Result   Lobulated large mass wrapping the left side of the hypopharynx extending into   supraglottic and glottic portions of the larynx measuring approximately 3.5   cm diameter with a craniocaudad extent of 4.9 cm. The mass appears to   severely narrow the airway and could potentially make for difficult   intubation. Findings compatible with squamous cell carcinoma. Mild to moderate emphysema. XR CHEST PORTABLE   Final Result   Chronic findings in the chest without acute airspace disease identified. Assessment/Plan:    Active Hospital Problems    Diagnosis     Laryngeal mass [J38.7]     Acute hypercapnic respiratory failure (HCC) [J96.02]     Centrilobular emphysema (HCC) [J43.2]      Laryngeal mass: Subglottic mass status post paratracheal lymph node biopsy and tracheostomy ENT and critical care following, notes about 3.5 cm, status post trach, history of nicotine smoking. Critical care team/ENT plan to transfer to , pathology support benign/reactive process no malignancy    Acute hypercapnic respiratory failure, combination of COPD, laryngeal mass, status post tracheostomy, remain ventilated and sedated continue bronchodilator    MRSA and Serratia pneumonia: Sensitive to cefepime and vancomycin, will continue antibiotic    Sepsis: Febrile, continue antibiotic as above, leukocytosis improving. Status post bronchoscopy 3/31    DVT Prophylaxis: Lovenox  Diet: Diet NPO  ADULT TUBE FEEDING; Nasogastric; Standard with Fiber; Continuous; 20; Yes; 25; Q 4 hours; 45; 130; Q 4 hours; Protein; Administer 1 Proteinex P2Go daily. Flush with 30 mL water before and after.   Code Status: Full Code    Dispo -continue ICU care, ENT is reaching out to  head and neck surgery for transfer    Willie Childress MD

## 2022-03-31 NOTE — CARE COORDINATION
Discharge Planning:     (CM) called [facility] ([phone]) and spoke with Renata George (RN) who agreed to contact the [address] [place] [address],[address] to collaborate on transfer. Renata George to call once a bed at [facility] is available for patient.       Malini RIZZO, SANJ[place]NA, [facility] -   [phone]    Electronically signed by MATTHIAS Sage on 3/31/2022 at 10:41 AM

## 2022-03-31 NOTE — OP NOTE
Bronchoscopy Procedure Note    Date of Operation: 3/31/22  Pre-op Diagnosis: Mucous plugging of airway  Post-op Diagnosis: Minimal mucus plugging noted  Surgeon: Luis Eduardo Ward MD  Anesthesia: General endotracheal anesthesia  Estimate Blood Loss: Minimal   Complications: None    Indications and History:  The patient is 65 y.o. male with halitosis, worsening respiratory status and recurrent fevers with associated subglottic mass and pneumonia. The risks, benefits, complications, treatment options and expected outcomes were discussed with the patient. The possibilities of reaction to medication, pulmonary aspiration, perforation of a viscus, bleeding, failure to diagnose a condition and creating a complication requiring transfusion or operation were discussed with the patient who freely signed the consent. Description of Procedure: The patient was in 5908, identified as [facility] and the procedure verified as flexible fiberoptic bronchoscopy. A time out was held and the above information confirmed. After the induction of topical nasopharyngeal anesthesia, the patient was placed in appropriate position and the bronchoscope was passed through the existing tracheostomy tube into the trachea. Lidocaine 2% 3 ml was used topically on the robert. Careful inspection of the tracheal lumen was accomplished. The scope was sequentially passed into the left main and then left upper and lower bronchi and segmental bronchi. The scope was then withdrawn and advanced into the right main bronchus and then into the RUL, RML, and RLL bronchi and segmental bronchi.      Endobronchial findings:   Trachea: Normal mucosa   Robert: Normal mucosa   Right main bronchus: Normal mucosa   Right upper lobe bronchus: Normal mucosa   Right middle lobe bronchus: Normal mucosa   Right lower lobe bronchus: Normal mucosa   Left main bronchus: Normal mucosa   Left upper lobe bronchus: Normal mucosa   Left lower lobe bronchus: Normal mucosa     Minimal secretions only was noted, which was removed and cultures were sent. Recommendation:   1. F/U on culture results     Attestation: I performed the procedure.     Eduard Wang MD

## 2022-03-31 NOTE — PROGRESS NOTES
Spoke with transfer center. ENT bed available at [facility]. Access center to set up transport and to call this RN back with transfer time. Will await call back and notify Dr. Alyssa Rosales.

## 2022-03-31 NOTE — CARE COORDINATION
Discharge Planning:     (JUAN ALBERTO) called and left a voicemail for [facility] admissions staff ([phone]) requesting a call back with an update on the patient's referral and acceptance status.  (JUAN ALBERTO) called Sandra Girard at [address] [place] [address] ([phone]) and provided referral for patient for LTAC as a back-up, if family is agreeable for patient to go.       Gabriel RIZZO, SANJUANA, [facility] -   [phone]    Electronically signed by MATTHIAS Corrales on 3/31/2022 at 8:14 AM

## 2022-03-31 NOTE — PROGRESS NOTES
03/30/22 2049   [facility] Patient Data   Plateau Pressure 20 XQR60   Static Compliance 52 mL/cmH2O   Dynamic Compliance 25 mL/cmH2O

## 2022-03-31 NOTE — PROGRESS NOTES
Patient stacking breaths. RR 34 times per minute. patient suctioned and Versed increased for vent compliance.

## 2022-03-31 NOTE — PROGRESS NOTES
[facility] Pulmonary/CCM Progress note      Admit Date: 3/22/2022    Chief Complaint: Respiratory distress    Subjective: Interval History: Unfortunately patient is clinically getting worse, with recurrent fevers. Has significant oral secretions with halitosis. More tachypneic today, not able to tolerate sedation wean.     Scheduled Meds:   ciprofloxacin  400 mg IntraVENous Q12H    metroNIDAZOLE  500 mg IntraVENous Q8H    lidocaine 1 % injection  5 mL IntraDERmal Once    sodium chloride flush  5-40 mL IntraVENous 2 times per day    vancomycin  1,500 mg IntraVENous Q12H    ipratropium-albuterol  3 mL Inhalation TID    lidocaine 1 % injection  5 mL IntraDERmal Once    sodium chloride flush  5-40 mL IntraVENous 2 times per day    enoxaparin  40 mg SubCUTAneous Nightly    naloxegol  25 mg Oral QAM    polyethylene glycol  17 g Oral Daily    sennosides-docusate sodium  2 tablet Oral Daily    gentamicin   Topical TID    insulin lispro  0-6 Units SubCUTAneous Q4H    succinylcholine  140 mg IntraVENous Once    sodium chloride flush  5-40 mL IntraVENous 2 times per day    famotidine (PEPCID) injection  20 mg IntraVENous BID    budesonide  0.5 mg Nebulization BID    Arformoterol Tartrate  15 mcg Nebulization BID    scopolamine  1 patch TransDERmal Q72H     Continuous Infusions:   sodium chloride 1,000 mL (03/30/22 2253)    sodium chloride 1,000 mL (03/30/22 2310)    norepinephrine Stopped (03/27/22 0401)    midazolam 6 mg/hr (03/31/22 1207)    fentaNYL Citrate-NaCl 200 mcg/hr (03/31/22 0902)    propofol 45 mcg/kg/min (03/31/22 0846)    sodium chloride 25 mL (03/29/22 2048)    dextrose       PRN Meds:sodium chloride flush, sodium chloride, sodium chloride flush, sodium chloride, hydrALAZINE, racepinephrine HCl, sodium chloride nebulizer, albuterol sulfate HFA, sodium chloride flush, sodium chloride, ondansetron **OR** ondansetron, acetaminophen **OR** acetaminophen, glucose, glucagon (rDNA), dextrose, dextrose bolus (hypoglycemia) **OR** dextrose bolus (hypoglycemia)    Review of Systems  Unable to obtain since patient is currently intubated and sedated    Objective:     Patient Vitals for the past 8 hrs:   BP Temp Temp src Pulse Resp SpO2   03/31/22 1330 (!) 168/83 -- -- 82 18 --   03/31/22 1300 (!) 150/69 -- -- 78 16 --   03/31/22 1230 (!) 143/74 -- -- 73 16 98 %   03/31/22 1200 (!) 165/79 99.7 °F (37.6 °C) Bladder 71 19 95 %   03/31/22 1159 -- -- -- -- (!) 37 --   03/31/22 1130 (!) 167/78 -- -- 75 24 --   03/31/22 1100 128/68 -- -- 93 16 --   03/31/22 1036 126/70 100.5 °F (38.1 °C) Temporal 102 16 97 %   03/31/22 1000 112/70 -- -- 101 16 95 %   03/31/22 0930 (!) 144/73 101.2 °F (38.4 °C) Bladder 96 19 --   03/31/22 0908 -- -- -- 97 30 --   03/31/22 0900 (!) 163/71 100.9 °F (38.3 °C) Bladder 93 18 --   03/31/22 0830 (!) 144/68 -- -- 90 16 --   03/31/22 0800 131/79 100.6 °F (38.1 °C) Bladder 79 16 97 %     I/O last 3 completed shifts: In: 6992.5 [I.V.:4444.8; NG/GT:1606; IV Piggyback:941.7]  Out: 9768 [Urine:4325]  No intake/output data recorded.     General Appearance: Intubated and sedated, in no acute distress  Skin: warm and dry, no rash or erythema  Head: normocephalic and atraumatic  Eyes: pupils equal, round, and reactive to light, extraocular eye movements intact, conjunctivae normal  ENT: external ear and ear canal normal bilaterally, nose without deformity, nasal mucosa and turbinates normal  Neck: supple and non-tender without mass, no cervical lymphadenopathy  Pulmonary/Chest: clear to auscultation bilaterally- no wheezes, rales or rhonchi, normal air movement, no respiratory distress  Cardiovascular: normal rate, regular rhythm,  no murmurs, rubs, distal pulses intact, no carotid bruits  Abdomen: soft, non-tender, non-distended, normal bowel sounds, no masses or organomegaly  Lymph Nodes: Cervical, supraclavicular normal  Extremities: no cyanosis, clubbing or edema  Musculoskeletal: normal range of motion, no joint swelling, deformity or tenderness  Neurologic: Sedated, no focal neurologic deficits    Data Review:  CBC:   Lab Results   Component Value Date    WBC 6.2 03/31/2022    RBC 3.79 03/31/2022     BMP:   Lab Results   Component Value Date    GLUCOSE 119 03/31/2022    CO2 28 03/31/2022    BUN 11 03/31/2022    CREATININE <0.5 03/31/2022    CALCIUM 8.8 03/31/2022     ABG:   Lab Results   Component Value Date    TCK6ETD 30.5 03/31/2022    BEART 7.6 03/31/2022    Q8KXKMNT 98.0 03/31/2022    PHART 7.542 03/31/2022    EZC2ZYH 35.6 03/31/2022    PO2ART 90.2 03/31/2022    EED4DOP 70.8 03/31/2022       Radiology: All pertinent images / reports were reviewed as a part of this visit. Narrative   EXAMINATION:   CT OF THE NECK SOFT TISSUE WITH CONTRAST  3/22/2022       TECHNIQUE:   CT of the neck was performed with the administration of intravenous contrast.   Multiplanar reformatted images are provided for review. Dose modulation,   iterative reconstruction, and/or weight based adjustment of the mA/kV was   utilized to reduce the radiation dose to as low as reasonably achievable.       COMPARISON:   None.       HISTORY:   ORDERING SYSTEM PROVIDED HISTORY: Hemoptysis and hoarseness   TECHNOLOGIST PROVIDED HISTORY:   Reason for exam:->Hemoptysis and hoarseness   Decision Support Exception - unselect if not a suspected or confirmed   emergency medical condition->Emergency Medical Condition (MA)   Reason for Exam: Hemoptysis and hoarseness   Relevant Medical/Surgical History: Shortness of Breath (pt. from home arrived   by [facility] tw brought pt. in.  pt. was fine and when he went to bed he   felt his back and chest get tight and then started with SOB. squad gave a   duoneb, 125mg solu medrol, AND INCREASED o2 TO 4L.  PT. normally wears 2-3 l   of )2.  )       FINDINGS:   PHARYNX/LARYNX:  The palatine tonsils are normal in appearance.  The tongue   is normal in appearance.  Involving the vallecula on the left and extending   inferiorly along the aryepiglottic fold and anterior/left anterolateral   hypopharynx is a large lobulated mass wrapping to the left from anterior to   posterior at the supraglottic larynx.  This thickened crescentic area   measures approximately 3.5 cm diameter and has a craniocaudad extent of 4.9   cm.  The mass laterally and posteriorly displaces the true and false cords   and appears to severely narrow the airway.  There is no associated erosion of   the laryngeal cartilages.       SALIVARY GLANDS/THYROID:  The parotid and submandibular glands appear   unremarkable.  The thyroid gland appears unremarkable.       LYMPH NODES:  No cervical or supraclavicular lymphadenopathy is seen.       SOFT TISSUES:  No appreciable soft tissue swelling or mass is seen.       BRAIN/ORBITS/SINUSES:  The visualized portion of the intracranial contents   appear unremarkable.  The visualized portion of the orbits, paranasal sinuses   and mastoid air cells demonstrate no acute abnormality.       LUNG APICES/SUPERIOR MEDIASTINUM:  No focal consolidation is seen within the   visualized lung apices.  There is a background of mild to moderate emphysema. No superior mediastinal lymphadenopathy or mass.  The visualized portion of   the trachea appears unremarkable.       BONES:  No aggressive appearing lytic or blastic bony lesion.           Impression   Lobulated large mass wrapping the left side of the hypopharynx extending into   supraglottic and glottic portions of the larynx measuring approximately 3.5   cm diameter with a craniocaudad extent of 4.9 cm.  The mass appears to   severely narrow the airway and could potentially make for difficult   intubation.  Findings compatible with squamous cell carcinoma.       Mild to moderate emphysema.        Problem List:     Acute hypoxic respiratory failure  Subglottic mass status post paratracheal LN biopsy + tracheostomy  MRSA/Serratia pneumonia  Moderate COPD  Assessment/Plan: Acute hypoxic respiratory failure. Improved oxygenation, FiO2 to 50%, PEEP to 5. Unfortunately patient sedation requirements have increased, possibly related to sepsis and recurrent fevers. Chest x-ray revealed bilateral infiltrates. Bronchoscopy was performed which showed minimal secretions. Currently treated for MRSA/Serratia pneumonia, antibiotics now switched to ciprofloxacin, vancomycin and Flagyl. ID was consulted today WBC 6.2. 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 Tan-colored secretions, now growing MRSA/Serratia, continue cefepime and IV vancomycin-sensitive. Persistent fevers probably related to necrotic subglottic mass. Discussed with ENT-plans for transfer to  for definitive therapy ? Repeat biopsy, though ICU beds are currently not available. History of moderate COPD based on prior PFT from January 2022, FEV1 of 1.83 L [63% predicted]. Continue with bronchodilators. Does not appear to be in exacerbation. Dobhoff tube feeding to continue. Continue with Lovenox and Pepcid. I had previously discussed with patient's wife about the plans for transfer to .     Yennifer Mantilla MD     Critical care time of 32 minutes excluding procedures

## 2022-03-31 NOTE — PROGRESS NOTES
Bronchoscopy completed at bedside with Dr. Moses Whitt and RT. Patient tolerated well. VSS. Will send bronchial washing to lab.

## 2022-03-31 NOTE — PROGRESS NOTES
ETA is 20 minutes per access center. Called and notified patient's wife at this time. 1655: report given to air care team at this time. 1704: attempted to call report at [facility]. Mel Gottron stated they weren't expecting patient until Sunday. Per Dr. Haley Obrien, Dr. Cathy Parker okay with patient transferring today, to be transferred at the latest Sunday. There are concerns regarding if patient is being admitted to the correct unit at [facility]. Patient may need a medical icu bed rather than ENT bed per Chief Resident at [facility] and Dr. Gisela Carbajal, ENT MD.     [phone]: transport cancelled at this time. Spoke with Dr. Gisela Carbajal via telephone. Dr. Gisela Carbajal requested to speak with Critical care MD. Notified Dr. Haley Obrien. 1723: left voicemail for patient's wife at this time.

## 2022-03-31 NOTE — PLAN OF CARE
Problem: Non-Violent Restraints  Goal: No harm/injury to patient while restraints in use  Outcome: Ongoing     Problem: Skin Integrity:  Goal: Will show no infection signs and symptoms  Description: Will show no infection signs and symptoms  Outcome: Ongoing     Problem: Falls - Risk of:  Goal: Will remain free from falls  Description: Will remain free from falls  Outcome: Ongoing     Problem: Nutrition  Goal: Optimal nutrition therapy  Outcome: Ongoing     Problem: Discharge Planning:  Goal: Participates in care planning  Description: Participates in care planning  Outcome: Ongoing     Problem: Anxiety/Stress:  Goal: Level of anxiety will decrease  Description: Level of anxiety will decrease  Outcome: Ongoing     Problem: Aspiration:  Goal: Absence of aspiration  Description: Absence of aspiration  Outcome: Ongoing     Problem: Gas Exchange - Impaired:  Goal: Levels of oxygenation will improve  Description: Levels of oxygenation will improve  Outcome: Ongoing     Problem: Pain:  Goal: Pain level will decrease  Description: Pain level will decrease  Outcome: Ongoing

## 2022-04-01 ENCOUNTER — APPOINTMENT (OUTPATIENT)
Dept: GENERAL RADIOLOGY | Age: 62
DRG: 004 | End: 2022-04-01
Payer: COMMERCIAL

## 2022-04-01 LAB
ANION GAP SERPL CALCULATED.3IONS-SCNC: 14 MMOL/L (ref 3–16)
BASOPHILS ABSOLUTE: 0.1 K/UL (ref 0–0.2)
BASOPHILS RELATIVE PERCENT: 1.1 %
BUN BLDV-MCNC: 9 MG/DL (ref 7–20)
CALCIUM SERPL-MCNC: 9 MG/DL (ref 8.3–10.6)
CHLORIDE BLD-SCNC: 98 MMOL/L (ref 99–110)
CO2: 25 MMOL/L (ref 21–32)
CREAT SERPL-MCNC: <0.5 MG/DL (ref 0.8–1.3)
EOSINOPHILS ABSOLUTE: 0.1 K/UL (ref 0–0.6)
EOSINOPHILS RELATIVE PERCENT: 0.9 %
GFR AFRICAN AMERICAN: >60
GFR NON-AFRICAN AMERICAN: >60
GLUCOSE BLD-MCNC: 106 MG/DL (ref 70–99)
GLUCOSE BLD-MCNC: 112 MG/DL (ref 70–99)
GLUCOSE BLD-MCNC: 127 MG/DL (ref 70–99)
GLUCOSE BLD-MCNC: 130 MG/DL (ref 70–99)
GLUCOSE BLD-MCNC: 147 MG/DL (ref 70–99)
GLUCOSE BLD-MCNC: 86 MG/DL (ref 70–99)
GLUCOSE BLD-MCNC: 93 MG/DL (ref 70–99)
HCT VFR BLD CALC: 33.7 % (ref 40.5–52.5)
HEMOGLOBIN: 11 G/DL (ref 13.5–17.5)
INR BLD: 1.3 (ref 0.88–1.12)
LYMPHOCYTES ABSOLUTE: 1 K/UL (ref 1–5.1)
LYMPHOCYTES RELATIVE PERCENT: 18.9 %
MCH RBC QN AUTO: 28.2 PG (ref 26–34)
MCHC RBC AUTO-ENTMCNC: 32.6 G/DL (ref 31–36)
MCV RBC AUTO: 86.4 FL (ref 80–100)
MONOCYTES ABSOLUTE: 0.3 K/UL (ref 0–1.3)
MONOCYTES RELATIVE PERCENT: 4.8 %
NEUTROPHILS ABSOLUTE: 4 K/UL (ref 1.7–7.7)
NEUTROPHILS RELATIVE PERCENT: 74.3 %
PDW BLD-RTO: 16.2 % (ref 12.4–15.4)
PERFORMED ON: ABNORMAL
PERFORMED ON: NORMAL
PERFORMED ON: NORMAL
PLATELET # BLD: 280 K/UL (ref 135–450)
PMV BLD AUTO: 8.4 FL (ref 5–10.5)
POTASSIUM REFLEX MAGNESIUM: 3.6 MMOL/L (ref 3.5–5.1)
PROTHROMBIN TIME: 14.9 SEC (ref 9.9–12.7)
RBC # BLD: 3.89 M/UL (ref 4.2–5.9)
SODIUM BLD-SCNC: 137 MMOL/L (ref 136–145)
WBC # BLD: 5.4 K/UL (ref 4–11)

## 2022-04-01 PROCEDURE — 36592 COLLECT BLOOD FROM PICC: CPT

## 2022-04-01 PROCEDURE — 94003 VENT MGMT INPAT SUBQ DAY: CPT

## 2022-04-01 PROCEDURE — 74018 RADEX ABDOMEN 1 VIEW: CPT

## 2022-04-01 PROCEDURE — 2580000003 HC RX 258: Performed by: INTERNAL MEDICINE

## 2022-04-01 PROCEDURE — 2500000003 HC RX 250 WO HCPCS: Performed by: INTERNAL MEDICINE

## 2022-04-01 PROCEDURE — 2700000000 HC OXYGEN THERAPY PER DAY

## 2022-04-01 PROCEDURE — 99233 SBSQ HOSP IP/OBS HIGH 50: CPT | Performed by: INTERNAL MEDICINE

## 2022-04-01 PROCEDURE — 99291 CRITICAL CARE FIRST HOUR: CPT | Performed by: INTERNAL MEDICINE

## 2022-04-01 PROCEDURE — 2000000000 HC ICU R&B

## 2022-04-01 PROCEDURE — 6360000002 HC RX W HCPCS: Performed by: INTERNAL MEDICINE

## 2022-04-01 PROCEDURE — 6370000000 HC RX 637 (ALT 250 FOR IP): Performed by: INTERNAL MEDICINE

## 2022-04-01 PROCEDURE — 87205 SMEAR GRAM STAIN: CPT

## 2022-04-01 PROCEDURE — 80048 BASIC METABOLIC PNL TOTAL CA: CPT

## 2022-04-01 PROCEDURE — 85025 COMPLETE CBC W/AUTO DIFF WBC: CPT

## 2022-04-01 PROCEDURE — A4216 STERILE WATER/SALINE, 10 ML: HCPCS | Performed by: INTERNAL MEDICINE

## 2022-04-01 PROCEDURE — 87070 CULTURE OTHR SPECIMN AEROBIC: CPT

## 2022-04-01 PROCEDURE — 94761 N-INVAS EAR/PLS OXIMETRY MLT: CPT

## 2022-04-01 PROCEDURE — 85610 PROTHROMBIN TIME: CPT

## 2022-04-01 PROCEDURE — 94640 AIRWAY INHALATION TREATMENT: CPT

## 2022-04-01 RX ADMIN — IPRATROPIUM BROMIDE AND ALBUTEROL SULFATE 3 ML: .5; 2.5 SOLUTION RESPIRATORY (INHALATION) at 20:57

## 2022-04-01 RX ADMIN — Medication 200 MCG/HR: at 23:08

## 2022-04-01 RX ADMIN — METRONIDAZOLE 500 MG: 500 INJECTION, SOLUTION INTRAVENOUS at 18:05

## 2022-04-01 RX ADMIN — SODIUM CHLORIDE, PRESERVATIVE FREE 20 MG: 5 INJECTION INTRAVENOUS at 20:28

## 2022-04-01 RX ADMIN — Medication 10 ML: at 20:27

## 2022-04-01 RX ADMIN — Medication 10 ML: at 08:25

## 2022-04-01 RX ADMIN — GENTAMICIN SULFATE: 1 CREAM TOPICAL at 08:25

## 2022-04-01 RX ADMIN — METRONIDAZOLE 500 MG: 500 INJECTION, SOLUTION INTRAVENOUS at 06:27

## 2022-04-01 RX ADMIN — Medication 6 MG/HR: at 19:08

## 2022-04-01 RX ADMIN — Medication 200 MCG/HR: at 10:17

## 2022-04-01 RX ADMIN — BUDESONIDE 500 MCG: 0.5 SUSPENSION RESPIRATORY (INHALATION) at 09:17

## 2022-04-01 RX ADMIN — SODIUM CHLORIDE, PRESERVATIVE FREE 20 MG: 5 INJECTION INTRAVENOUS at 08:25

## 2022-04-01 RX ADMIN — GENTAMICIN SULFATE: 1 CREAM TOPICAL at 15:03

## 2022-04-01 RX ADMIN — GENTAMICIN SULFATE: 1 CREAM TOPICAL at 20:28

## 2022-04-01 RX ADMIN — VANCOMYCIN HYDROCHLORIDE 1500 MG: 10 INJECTION, POWDER, LYOPHILIZED, FOR SOLUTION INTRAVENOUS at 12:01

## 2022-04-01 RX ADMIN — CIPROFLOXACIN 400 MG: 2 INJECTION, SOLUTION INTRAVENOUS at 03:18

## 2022-04-01 RX ADMIN — PROPOFOL 50 MCG/KG/MIN: 10 INJECTION, EMULSION INTRAVENOUS at 14:52

## 2022-04-01 RX ADMIN — IPRATROPIUM BROMIDE AND ALBUTEROL SULFATE 3 ML: .5; 2.5 SOLUTION RESPIRATORY (INHALATION) at 13:04

## 2022-04-01 RX ADMIN — CIPROFLOXACIN 400 MG: 2 INJECTION, SOLUTION INTRAVENOUS at 15:04

## 2022-04-01 RX ADMIN — ARFORMOTEROL TARTRATE 15 MCG: 15 SOLUTION RESPIRATORY (INHALATION) at 20:57

## 2022-04-01 RX ADMIN — METHYLNALTREXONE BROMIDE 12 MG: 12 INJECTION, SOLUTION SUBCUTANEOUS at 09:53

## 2022-04-01 RX ADMIN — BUDESONIDE 500 MCG: 0.5 SUSPENSION RESPIRATORY (INHALATION) at 20:57

## 2022-04-01 RX ADMIN — ENOXAPARIN SODIUM 40 MG: 40 INJECTION SUBCUTANEOUS at 20:28

## 2022-04-01 RX ADMIN — PROPOFOL 50 MCG/KG/MIN: 10 INJECTION, EMULSION INTRAVENOUS at 05:23

## 2022-04-01 RX ADMIN — PROPOFOL 50 MCG/KG/MIN: 10 INJECTION, EMULSION INTRAVENOUS at 09:52

## 2022-04-01 RX ADMIN — METRONIDAZOLE 500 MG: 500 INJECTION, SOLUTION INTRAVENOUS at 10:00

## 2022-04-01 RX ADMIN — ARFORMOTEROL TARTRATE 15 MCG: 15 SOLUTION RESPIRATORY (INHALATION) at 09:17

## 2022-04-01 RX ADMIN — VANCOMYCIN HYDROCHLORIDE 1500 MG: 10 INJECTION, POWDER, LYOPHILIZED, FOR SOLUTION INTRAVENOUS at 20:32

## 2022-04-01 RX ADMIN — NALOXEGOL OXALATE 25 MG: 25 TABLET, FILM COATED ORAL at 08:24

## 2022-04-01 RX ADMIN — PROPOFOL 45 MCG/KG/MIN: 10 INJECTION, EMULSION INTRAVENOUS at 01:04

## 2022-04-01 RX ADMIN — IPRATROPIUM BROMIDE AND ALBUTEROL SULFATE 3 ML: .5; 2.5 SOLUTION RESPIRATORY (INHALATION) at 09:18

## 2022-04-01 ASSESSMENT — PAIN SCALES - GENERAL
PAINLEVEL_OUTOF10: 0

## 2022-04-01 ASSESSMENT — PULMONARY FUNCTION TESTS
PIF_VALUE: 25

## 2022-04-01 NOTE — RT PROTOCOL NOTE
RT Inhaler-Nebulizer Bronchodilator Protocol Note    There is a bronchodilator order in the chart from a provider indicating to follow the RT Bronchodilator Protocol and there is an Initiate RT Inhaler-Nebulizer Bronchodilator Protocol order as well (see protocol at bottom of note). CXR Findings:  XR CHEST PORTABLE    Result Date: 3/31/2022  Persistent bilateral airspace disease, slightly improved on the right, for which pulmonary edema or pneumonia are considerations. Small right pleural effusion. The findings from the last RT Protocol Assessment were as follows:   History Pulmonary Disease: Chronic pulmonary disease  Respiratory Pattern: Regular pattern and RR 12-20 bpm  Breath Sounds: Slightly diminished and/or crackles  Cough: Weak, productive  Indication for Bronchodilator Therapy: Decreased or absent breath sounds,Wheezing associated with pulm disorder  Bronchodilator Assessment Score: 6    Aerosolized bronchodilator medication orders have been revised according to the RT Inhaler-Nebulizer Bronchodilator Protocol below. Respiratory Therapist to perform RT Therapy Protocol Assessment initially then follow the protocol. Repeat RT Therapy Protocol Assessment PRN for score 0-3 or on second treatment, BID, and PRN for scores above 3. No Indications - adjust the frequency to every 6 hours PRN wheezing or bronchospasm, if no treatments needed after 48 hours then discontinue using Per Protocol order mode. If indication present, adjust the RT bronchodilator orders based on the Bronchodilator Assessment Score as indicated below. Use Inhaler orders unless patient has one or more of the following: on home nebulizer, not able to hold breath for 10 seconds, is not alert and oriented, cannot activate and use MDI correctly, or respiratory rate 25 breaths per minute or more, then use the equivalent nebulizer order(s) with same Frequency and PRN reasons based on the score.   If a patient is on this medication at home then do not decrease Frequency below that used at home. 0-3 - enter or revise RT bronchodilator order(s) to equivalent RT Bronchodilator order with Frequency of every 4 hours PRN for wheezing or increased work of breathing using Per Protocol order mode. 4-6 - enter or revise RT Bronchodilator order(s) to two equivalent RT bronchodilator orders with one order with BID Frequency and one order with Frequency of every 4 hours PRN wheezing or increased work of breathing using Per Protocol order mode. 7-10 - enter or revise RT Bronchodilator order(s) to two equivalent RT bronchodilator orders with one order with TID Frequency and one order with Frequency of every 4 hours PRN wheezing or increased work of breathing using Per Protocol order mode. 11-13 - enter or revise RT Bronchodilator order(s) to one equivalent RT bronchodilator order with QID Frequency and an Albuterol order with Frequency of every 4 hours PRN wheezing or increased work of breathing using Per Protocol order mode. Greater than 13 - enter or revise RT Bronchodilator order(s) to one equivalent RT bronchodilator order with every 4 hours Frequency and an Albuterol order with Frequency of every 2 hours PRN wheezing or increased work of breathing using Per Protocol order mode. RT to enter RT Home Evaluation for COPD & MDI Assessment order using Per Protocol order mode.     Electronically signed by Elizabeth Robles RCP on 4/1/2022 at 6:19 PM

## 2022-04-01 NOTE — PLAN OF CARE
Problem: Non-Violent Restraints  Goal: No harm/injury to patient while restraints in use  Outcome: Ongoing

## 2022-04-01 NOTE — PROGRESS NOTES
[address]  Patient has size #6 Shiley. Trach Kit of same size on standby  Yes, Obturator at head of the bed  Yes. Inner cannula cleaned/replaced Yes, drain sponge changed  Yes, Trach tie replaced is soiled Yes, Flange cleaned  Yes. [address] performed without complications.  Comment pt appeaered to tolerate well

## 2022-04-01 NOTE — PROGRESS NOTES
Shift assessment completed. VSS. Patient in sync with ventilator. Current settings are as followed: ACPC, Rate 16, Pressure 20, peep 5, FiO2 50%. Temp sensing lopez in place and draining appropriately. TF infusing at goal rate via Dobbhoff. Patient turned and repositioned in bed. Restraints in place for patient safety. Will continue to monitor.

## 2022-04-01 NOTE — PROGRESS NOTES
04/01/22 1818   RT Protocol   History Pulmonary Disease 2   Respiratory pattern 0   Breath sounds 2   Cough 2   Bronchodilator Assessment Score 6

## 2022-04-01 NOTE — PROGRESS NOTES
Pt sedated on vent. Responds to pain. RASS -3. CPOT 0. Remains on propofol, versed and fentanyl. ACPC, rate 16, pressure 20, PEEP 5, FiO2 50%. Remains febrile 99.4. Restraints in place. Echols patent. Dressing changes completed to bilateral ankles per order. Pt turned and repositioned.

## 2022-04-01 NOTE — PROGRESS NOTES
Hospitalist Progress Note      PCP: Antonio Yarbrough MD    Date of Admission: 3/22/2022      Subjective: Acute event overnight, trach to [facility], awaiting for transfer to       Medications:  Reviewed    Infusion Medications    sodium chloride 1,000 mL (03/30/22 2253)    sodium chloride 1,000 mL (03/30/22 2310)    norepinephrine Stopped (03/27/22 0401)    midazolam 6 mg/hr (04/01/22 0622)    fentaNYL Citrate-NaCl 200 mcg/hr (04/01/22 1017)    propofol 50 mcg/kg/min (04/01/22 0952)    sodium chloride 25 mL (03/29/22 2048)    dextrose       Scheduled Medications    ciprofloxacin  400 mg IntraVENous Q12H    metroNIDAZOLE  500 mg IntraVENous Q8H    lidocaine 1 % injection  5 mL IntraDERmal Once    sodium chloride flush  5-40 mL IntraVENous 2 times per day    vancomycin  1,500 mg IntraVENous Q12H    ipratropium-albuterol  3 mL Inhalation TID    lidocaine 1 % injection  5 mL IntraDERmal Once    sodium chloride flush  5-40 mL IntraVENous 2 times per day    enoxaparin  40 mg SubCUTAneous Nightly    polyethylene glycol  17 g Oral Daily    gentamicin   Topical TID    insulin lispro  0-6 Units SubCUTAneous Q4H    succinylcholine  140 mg IntraVENous Once    sodium chloride flush  5-40 mL IntraVENous 2 times per day    famotidine (PEPCID) injection  20 mg IntraVENous BID    budesonide  0.5 mg Nebulization BID    Arformoterol Tartrate  15 mcg Nebulization BID    scopolamine  1 patch TransDERmal Q72H     PRN Meds: sodium chloride flush, sodium chloride, sodium chloride flush, sodium chloride, hydrALAZINE, racepinephrine HCl, sodium chloride nebulizer, albuterol sulfate HFA, sodium chloride flush, sodium chloride, ondansetron **OR** ondansetron, acetaminophen **OR** acetaminophen, glucose, glucagon (rDNA), dextrose, dextrose bolus (hypoglycemia) **OR** dextrose bolus (hypoglycemia)      Intake/Output Summary (Last 24 hours) at 4/1/2022 1122  Last data filed at 4/1/2022 5999  Gross per 24 hour   Intake 3090.83 ml   Output 3600 ml   Net -509.17 ml       Physical Exam Performed:    /64   Pulse 88   Temp 99.9 °F (37.7 °C) (Temporal)   Resp 15   Ht 5' 8\" (1.727 m)   Wt 170 lb 12.8 oz (77.5 kg)   SpO2 96%   BMI 25.97 kg/m²     General appearance: In no acute distress  HEENT: Pupils equal, round, and reactive to light. Conjunctivae/corneas clear. Neck: Supple, with full range of motion. No jugular venous distention. Trachea midline. Respiratory:  Normal respiratory effort. Clear to auscultation, bilaterally without Rales/Wheezes/Rhonchi. Cardiovascular: Regular rate and rhythm with normal S1/S2 without murmurs, rubs or gallops. Abdomen: Soft, non-tender, non-distended with normal bowel sounds. Musculoskeletal: No clubbing, cyanosis or edema bilaterally. Full range of motion without deformity. Skin: Skin color, texture, turgor normal.  No rashes or lesions. Neurologic: Sedated  Psychiatric: Calm  Capillary Refill: Brisk,3 seconds, normal   Peripheral Pulses: +2 palpable, equal bilaterally       Labs:   Recent Labs     03/30/22 0436 03/31/22  0500 04/01/22  0525   WBC 5.5 6.2 5.4   HGB 10.9* 10.5* 11.0*   HCT 34.0* 32.9* 33.7*    254 280     Recent Labs     03/30/22 0436 03/31/22  0500 04/01/22  0525    137 137   K 4.1 3.8 3.6    98* 98*   CO2 29 28 25   BUN 16 11 9   CREATININE <0.5* <0.5* <0.5*   CALCIUM 9.1 8.8 9.0     No results for input(s): AST, ALT, BILIDIR, BILITOT, ALKPHOS in the last 72 hours. Recent Labs     03/30/22 0436 03/31/22  0500 04/01/22  0525   INR 1.08 1.22* 1.30*     No results for input(s): CKTOTAL, TROPONINI in the last 72 hours.     Urinalysis:      Lab Results   Component Value Date    NITRU Negative 02/02/2022    WBCUA 1 02/02/2022    RBCUA 1 02/02/2022    BLOODU Negative 02/02/2022    SPECGRAV 1.021 02/02/2022    GLUCOSEU Negative 02/02/2022       Radiology:  XR CHEST PORTABLE   Final Result   Persistent bilateral airspace disease, slightly improved on the right, for   which pulmonary edema or pneumonia are considerations. Small right pleural   effusion. XR CHEST PORTABLE   Final Result   1. Worsening congestive heart failure and new left basilar airspace disease. CT SOFT TISSUE NECK W CONTRAST   Final Result   Addendum 1 of 1   ADDENDUM:   There is a right-sided subclavian line which makes a loop around itself at   the junction of right jugular and right subclavian vein with this area    being   abnormally prominent/having a varicose appearance. The findings have significantly progressed since the neck CT of 7 days    ago. If there has been interval radiation therapy or medical intervention,   findings may be secondary to treatment effect. The findings were sent to the Radiology Results [address] at    4:29   pm on 3/29/2022to be communicated to a licensed caregiver. Final   There is a multilobulated enhancing lesion at the level of supraglottic   region Circumferentially narrowing the supra glottic area extending   superiorly into the bilateral vallecula fossa completely obstructing the   upper of aerodigestive mucosa at the level of hypopharynx and extending   inferiorly to the glottic region and infraglottic area. . 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 .      Small right pleural effusion and focal consolidative changes within the   dependent portion of the lungs likely suggestive of consolidative pulmonary   edema. Moderate mucosal thickening of the ethmoidal air cells and left sphenoid   sinus. Mild mucosal thickening of maxillary sinuses. RECOMMENDATIONS:   Unavailable            XR CHEST PORTABLE   Final Result   Large consolidation right lower lobe from pneumonia. Small right pleural   effusion. Mild congestion. No pneumothorax.          XR CHEST PORTABLE   Final Result   Decreasing right basilar pleuroparenchymal disease as compared to prior. A portion of the right upper extremity PICC line is looped in the right   supraclavicular region; correlate with catheter function. XR CHEST PORTABLE   Final Result   Slight worsening of the right pleural effusion. Placement of a right PICC   line catheter tip in the SVC. Remainder of lines and tubes are stable. XR CHEST PORTABLE   Final Result   1. Stable position of enteric feeding tube. 2. Interval progression of a small right pleural effusion and right basilar   airspace consolidation. XR ABDOMEN FOR NG/OG/NE TUBE PLACEMENT   Final Result   Enteric tube with distal end reaching the gastric body on 1 of 3 images. The   other 2 images demonstrate tip of enteric tube at the level of the   midesophagus. XR CHEST PORTABLE   Final Result   1. Opacity right lower lung compatible with pleural effusion and relaxation   atelectasis versus pneumonia. 2. Pulmonary sequela typical of that seen with smoking, including COPD;   correlate with clinical history. 3. Unchanged tracheostomy tube positioning. Dobbhoff type feeding tube   extends below the left hemidiaphragm, out of field of view. XR CHEST PORTABLE   Final Result   Supportive tubing is in normal position. Bibasilar opacities, atelectasis or airspace disease. There is mild   progression on the right side in the interval.         XR CHEST PORTABLE   Final Result   Mild dependent changes in the lower lung fields, atelectasis versus   infiltrate. Satisfactory position of endotracheal tube. XR ABDOMEN FOR NG/OG/NE TUBE PLACEMENT   Final Result   Tip of feeding tube projects in region of gastric fundus         XR CHEST PORTABLE   Final Result   1. Properly placed endotracheal tube.       2.  Mild bibasilar atelectasis         CT SOFT TISSUE NECK W CONTRAST   Final Result   Lobulated large mass wrapping the left side of the hypopharynx extending into   supraglottic and glottic above, leukocytosis improving. Status post bronchoscopy 3/31    DVT Prophylaxis: Lovenox  Diet: Diet NPO  ADULT TUBE FEEDING; Nasogastric; Standard with Fiber; Continuous; 20; Yes; 25; Q 4 hours; 45; 130; Q 4 hours; Protein; Administer 1 Proteinex P2Go daily. Flush with 30 mL water before and after.   Code Status: Full Code    Dispo -continue ICU care, ENT is reaching out to  head and neck surgery for transfer, awaiting for ICU bed at  transfer per [facility]/ENT    Chani Schmidt MD

## 2022-04-01 NOTE — CARE COORDINATION
Discharge Planning:     (JUAN ALBERTO) called and spoke with [facility] staff, Alexys White, who stated that  informed that the patient's procedure is not occurring till Sunday (4/3) so the patient doesn't need to transfer to  till tomorrow. CM informed Alexys White that medical staff were under the impression that the patient would be transferred today to . Alexys White agreed to call the [address] [place] [address],[address] and discuss and to update CM with an answer. SANJUANA Caldwell, [facility] -   [phone]    Electronically signed by MATTHIAS Brown on 4/1/2022 at 11:07 AM      Update at [phone]:  CM received a voicemail from Alexys White who stated that she spoke with  who stated that Dr. Maciej Escobedo doesn't want the patient at [facility] till tomorrow since the patient won't be getting a procedure till Sunday (4/3). Alexys White stated that the patient's transportation remains scheduled for tomorrow. JUAN ALBERTO called Alexys White back to confirm transfer time. Alexys White stated that the transfer time has not been set as there is not bed confirmation from  yet. [facility] staff to inform patient's medical staff tomorrow of transportation time once UC bed is confirmed.       SANJUANA Caldwell [facility] -   [phone]    Electronically signed by MATTHIAS Brown on 4/1/2022 at 11:31 AM

## 2022-04-01 NOTE — PLAN OF CARE
Problem: Non-Violent Restraints  Goal: No harm/injury to patient while restraints in use  4/1/2022 0843 by Shonda Basurto RN  Outcome: Ongoing  4/1/2022 0040 by Lilibeth Norman RN  Outcome: Ongoing     Problem: Skin Integrity:  Goal: Will show no infection signs and symptoms  Description: Will show no infection signs and symptoms  4/1/2022 0843 by Shonda Basurto RN  Outcome: Ongoing  4/1/2022 0040 by Lilibeth Norman RN  Outcome: Ongoing     Problem: Falls - Risk of:  Goal: Will remain free from falls  Description: Will remain free from falls  4/1/2022 0843 by Shonda Basurto RN  Outcome: Ongoing  4/1/2022 0040 by Lilibeth Norman RN  Outcome: Ongoing     Problem: Nutrition  Goal: Optimal nutrition therapy  4/1/2022 0843 by Shonda Basurto RN  Outcome: Ongoing  4/1/2022 0040 by Lilibeth Norman RN  Outcome: Ongoing     Problem: Anxiety/Stress:  Goal: Level of anxiety will decrease  Description: Level of anxiety will decrease  4/1/2022 0843 by Shonda Basurto RN  Outcome: Ongoing  4/1/2022 0040 by Lilibeth Norman RN  Outcome: Ongoing     Problem: Aspiration:  Goal: Absence of aspiration  Description: Absence of aspiration  4/1/2022 0843 by Shonda Basurto RN  Outcome: Ongoing  4/1/2022 0040 by Lilibeth Norman RN  Outcome: Ongoing     Problem: Gas Exchange - Impaired:  Goal: Levels of oxygenation will improve  Description: Levels of oxygenation will improve  4/1/2022 0843 by Shonda Basurto RN  Outcome: Ongoing  4/1/2022 0040 by Lilibeth Norman RN  Outcome: Ongoing     Problem:  Bowel Function - Altered:  Goal: Bowel elimination is within specified parameters  Description: Bowel elimination is within specified parameters  4/1/2022 0843 by Shonda Basurto RN  Outcome: Ongoing  4/1/2022 0040 by Lilibeth Norman RN  Outcome: Ongoing     Problem: Pain:  Goal: Pain level will decrease  Description: Pain level will decrease  4/1/2022 0843 by Moni Frazier Opal RN  Outcome: Ongoing  4/1/2022 0040 by Cristian Gordon RN  Outcome: Ongoing

## 2022-04-01 NOTE — PROGRESS NOTES
Spoke with bedside RN who states that patient has plans for transfer to [facility]. He was initially supposed to transfer yesterday but there were issues with appropriate bed placement. These have been resolved and transfer set for tomorrow. RN states that patient's tracheostomy tube is in place without concerns. Will leave tracheostomy tube sutured in place, with sutures to be removed at [facility] after transport to secure the airway and further ensure the patient does not decannulate while on transport.

## 2022-04-01 NOTE — PROGRESS NOTES
[facility] Pulmonary/CCM Progress note      Admit Date: 3/22/2022    Chief Complaint: Respiratory distress    Subjective: Interval History: No new overnight events. Continues to have fevers. No significant secretions on oral suction. Hemodynamically stable. Does not tolerate sedation wean with breath stacking.     Scheduled Meds:   ciprofloxacin  400 mg IntraVENous Q12H    metroNIDAZOLE  500 mg IntraVENous Q8H    lidocaine 1 % injection  5 mL IntraDERmal Once    sodium chloride flush  5-40 mL IntraVENous 2 times per day    vancomycin  1,500 mg IntraVENous Q12H    ipratropium-albuterol  3 mL Inhalation TID    lidocaine 1 % injection  5 mL IntraDERmal Once    sodium chloride flush  5-40 mL IntraVENous 2 times per day    enoxaparin  40 mg SubCUTAneous Nightly    polyethylene glycol  17 g Oral Daily    gentamicin   Topical TID    insulin lispro  0-6 Units SubCUTAneous Q4H    succinylcholine  140 mg IntraVENous Once    sodium chloride flush  5-40 mL IntraVENous 2 times per day    famotidine (PEPCID) injection  20 mg IntraVENous BID    budesonide  0.5 mg Nebulization BID    Arformoterol Tartrate  15 mcg Nebulization BID    scopolamine  1 patch TransDERmal Q72H     Continuous Infusions:   sodium chloride 1,000 mL (03/30/22 2253)    sodium chloride 1,000 mL (03/30/22 2310)    norepinephrine Stopped (03/27/22 0401)    midazolam 6 mg/hr (04/01/22 0622)    fentaNYL Citrate-NaCl 200 mcg/hr (04/01/22 1017)    propofol 50 mcg/kg/min (04/01/22 0952)    sodium chloride 25 mL (03/29/22 2048)    dextrose       PRN Meds:sodium chloride flush, sodium chloride, sodium chloride flush, sodium chloride, hydrALAZINE, racepinephrine HCl, sodium chloride nebulizer, albuterol sulfate HFA, sodium chloride flush, sodium chloride, ondansetron **OR** ondansetron, acetaminophen **OR** acetaminophen, glucose, glucagon (rDNA), dextrose, dextrose bolus (hypoglycemia) **OR** dextrose bolus (hypoglycemia)    Review of Systems  Unable to obtain since patient is currently intubated and sedated    Objective:     Patient Vitals for the past 8 hrs:   BP Temp Temp src Pulse Resp SpO2   04/01/22 1000 114/64 -- -- 88 15 --   04/01/22 0900 119/65 -- -- 72 16 --   04/01/22 0800 122/64 99.9 °F (37.7 °C) Temporal 76 16 96 %   04/01/22 0700 112/65 -- -- 85 18 96 %   04/01/22 0600 (!) 148/80 -- -- 82 17 96 %   04/01/22 0500 (!) 142/71 -- -- 74 16 94 %   04/01/22 0400 119/76 100 °F (37.8 °C) Bladder 83 22 95 %     I/O last 3 completed shifts: In: 5714.7 [I.V.:3046; NG/GT:1213; IV Piggyback:1455.7]  Out: 6150 [Urine:6150]  No intake/output data recorded.     General Appearance: Intubated and sedated, in no acute distress  Skin: warm and dry, no rash or erythema  Head: normocephalic and atraumatic  Eyes: pupils equal, round, and reactive to light, extraocular eye movements intact, conjunctivae normal  ENT: external ear and ear canal normal bilaterally, nose without deformity, nasal mucosa and turbinates normal  Neck: supple and non-tender without mass, no cervical lymphadenopathy  Pulmonary/Chest: clear to auscultation bilaterally- no wheezes, rales or rhonchi, normal air movement, no respiratory distress  Cardiovascular: normal rate, regular rhythm,  no murmurs, rubs, distal pulses intact, no carotid bruits  Abdomen: soft, non-tender, non-distended, normal bowel sounds, no masses or organomegaly  Lymph Nodes: Cervical, supraclavicular normal  Extremities: no cyanosis, clubbing or edema  Musculoskeletal: normal range of motion, no joint swelling, deformity or tenderness  Neurologic: Sedated, no focal neurologic deficits    Data Review:  CBC:   Lab Results   Component Value Date    WBC 5.4 04/01/2022    RBC 3.89 04/01/2022     BMP:   Lab Results   Component Value Date    GLUCOSE 147 04/01/2022    CO2 25 04/01/2022    BUN 9 04/01/2022    CREATININE <0.5 04/01/2022    CALCIUM 9.0 04/01/2022     ABG:   Lab Results   Component Value Date    LZF8CLQ 30.5 03/31/2022    BEART 7.6 03/31/2022    F7KQPUKB 98.0 03/31/2022    PHART 7.542 03/31/2022    UGW9VJT 35.6 03/31/2022    PO2ART 90.2 03/31/2022    NIL1HIK 70.8 03/31/2022       Radiology: All pertinent images / reports were reviewed as a part of this visit. Narrative   EXAMINATION:   CT OF THE NECK SOFT TISSUE WITH CONTRAST  3/22/2022       TECHNIQUE:   CT of the neck was performed with the administration of intravenous contrast.   Multiplanar reformatted images are provided for review. Dose modulation,   iterative reconstruction, and/or weight based adjustment of the mA/kV was   utilized to reduce the radiation dose to as low as reasonably achievable.       COMPARISON:   None.       HISTORY:   ORDERING SYSTEM PROVIDED HISTORY: Hemoptysis and hoarseness   TECHNOLOGIST PROVIDED HISTORY:   Reason for exam:->Hemoptysis and hoarseness   Decision Support Exception - unselect if not a suspected or confirmed   emergency medical condition->Emergency Medical Condition (MA)   Reason for Exam: Hemoptysis and hoarseness   Relevant Medical/Surgical History: Shortness of Breath (pt. from home arrived   by [facility] twp brought pt. in.  pt. was fine and when he went to bed he   felt his back and chest get tight and then started with SOB. squad gave a   duoneb, 125mg solu medrol, AND INCREASED o2 TO 4L.  PT. normally wears 2-3 l   of )2. )       FINDINGS:   PHARYNX/LARYNX:  The palatine tonsils are normal in appearance.  The tongue   is normal in appearance.  Involving the vallecula on the left and extending   inferiorly along the aryepiglottic fold and anterior/left anterolateral   hypopharynx is a large lobulated mass wrapping to the left from anterior to   posterior at the supraglottic larynx.  This thickened crescentic area   measures approximately 3.5 cm diameter and has a craniocaudad extent of 4.9   cm.  The mass laterally and posteriorly displaces the true and false cords   and appears to severely narrow the airway.  There is no associated erosion of   the laryngeal cartilages.       SALIVARY GLANDS/THYROID:  The parotid and submandibular glands appear   unremarkable.  The thyroid gland appears unremarkable.       LYMPH NODES:  No cervical or supraclavicular lymphadenopathy is seen.       SOFT TISSUES:  No appreciable soft tissue swelling or mass is seen.       BRAIN/ORBITS/SINUSES:  The visualized portion of the intracranial contents   appear unremarkable.  The visualized portion of the orbits, paranasal sinuses   and mastoid air cells demonstrate no acute abnormality.       LUNG APICES/SUPERIOR MEDIASTINUM:  No focal consolidation is seen within the   visualized lung apices.  There is a background of mild to moderate emphysema. No superior mediastinal lymphadenopathy or mass.  The visualized portion of   the trachea appears unremarkable.       BONES:  No aggressive appearing lytic or blastic bony lesion.           Impression   Lobulated large mass wrapping the left side of the hypopharynx extending into   supraglottic and glottic portions of the larynx measuring approximately 3.5   cm diameter with a craniocaudad extent of 4.9 cm.  The mass appears to   severely narrow the airway and could potentially make for difficult   intubation.  Findings compatible with squamous cell carcinoma.       Mild to moderate emphysema. 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 Improved oxygenation, FiO2 to 50%, PEEP to 5. Significant sedation needs-tachypnea has improved, possibly related to sepsis and recurrent fevers. Chest x-ray revealed bilateral infiltrates. Bronchoscopy was performed 3/31 which showed minimal secretions. Currently treated for MRSA/Serratia pneumonia, antibiotics now switched to ciprofloxacin, vancomycin and Flagyl. WBC 5.4.     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 Tan-colored secretions, now growing MRSA/Serratia, antibiotics now switched over to ciprofloxacin, vancomycin and Flagyl. Blood culture x2 repeat yesterday. Bronchoscopy performed yesterday-respiratory cultures are pending. Spoke with Dr. Tru Unger from ENT at [facility] does feel that the focus of sepsis is unlikely to be related to the necrotic laryngeal mass. History of moderate COPD based on prior PFT from January 2022, FEV1 of 1.83 L [63% predicted]. Continue with bronchodilators. Does not appear to be in exacerbation. Dobhoff tube feeding to continue. Continue with Lovenox and Pepcid. Discussed with Dr. Jewel Bermeo, ENT at [facility] would be making arrangements for transfer to ENT ICU.     Ajay Lutz MD     Critical care time of 32 minutes excluding procedures

## 2022-04-01 NOTE — PROGRESS NOTES
Infectious Diseases   Progress Note        Admission Date: 3/22/2022  Hospital Day: Hospital Day: 11   Attending: Preet William MD  Date of service: 3/31/22     Reason for admission: COPD with exacerbation [facility]) [J44.1]  Airway compromise [J98.8]  Mass of hypopharynx [J39.2]  Acute respiratory failure with hypoxia and hypercapnia ([address] [J96.01, J96.02]  Acute hypercapnic respiratory failure ([address] [J96.02]    Chief complaint/ Reason for consult:     · MRSA and Serratia marcescens pneumonia  · Acute respiratory failure with hypoxia, required endotracheal intubation in the ER  · MRSA and Serratia pneumonia  · Requiring invasive mechanical ventilation  · Heavy smoker, recently quit in January 2022  · Multilobulated mass in the supraglottic area, concerning for invasive tumor    Microbiology:        I have reviewed allavailable micro lab data and cultures    · Blood culture (2/2) - collected on 3/26/2022: Negative so far  · Tracheal aspirate culture  - collected on 3/27/2022: MRSA, Serratia    Susceptibility      Staph aureus mrsa (1)    Antibiotic Interpretation Microscan  Method Status    ceFAZolin Resistant 16 mcg/mL BACTERIAL SUSCEPTIBILITY PANEL BY YEFRI     clindamycin Sensitive <=0.5 mcg/mL BACTERIAL SUSCEPTIBILITY PANEL BY YEFRI     erythromycin Resistant >4 mcg/mL BACTERIAL SUSCEPTIBILITY PANEL BY YEFRI     oxacillin Resistant >2 mcg/mL BACTERIAL SUSCEPTIBILITY PANEL BY YEFRI     tetracycline Resistant >8 mcg/mL BACTERIAL SUSCEPTIBILITY PANEL BY YEFRI     trimethoprim-sulfamethoxazole Sensitive <=0.5/9.5 mcg/mL BACTERIAL SUSCEPTIBILITY PANEL BY YEFRI     vancomycin Sensitive 1 mcg/mL BACTERIAL SUSCEPTIBILITY PANEL BY YEFRI       Serratia marcescens (2)    Antibiotic Interpretation Microscan  Method Status    amoxicillin-clavulanate Resistant >16/8 mcg/mL BACTERIAL SUSCEPTIBILITY PANEL BY YEFRI     ampicillin Resistant >16 mcg/mL BACTERIAL SUSCEPTIBILITY PANEL BY YEFRI     ampicillin-sulbactam Resistant >16/8 mcg/mL BACTERIAL SUSCEPTIBILITY PANEL BY YEFRI     ceFAZolin Resistant >16 mcg/mL BACTERIAL SUSCEPTIBILITY PANEL BY YEFRI     cefepime Sensitive <=2 mcg/mL BACTERIAL SUSCEPTIBILITY PANEL BY YEFRI     cefTRIAXone Sensitive <=1 mcg/mL BACTERIAL SUSCEPTIBILITY PANEL BY YEFRI     cefuroxime Resistant >16 mcg/mL BACTERIAL SUSCEPTIBILITY PANEL BY YEFRI     ciprofloxacin Sensitive <=1 mcg/mL BACTERIAL SUSCEPTIBILITY PANEL BY YEFRI     ertapenem Sensitive <=0.5 mcg/mL BACTERIAL SUSCEPTIBILITY PANEL BY YEFRI     gentamicin Sensitive <=4 mcg/mL BACTERIAL SUSCEPTIBILITY PANEL BY YEFRI     meropenem Sensitive <=1 mcg/mL BACTERIAL SUSCEPTIBILITY PANEL BY YEFRI     piperacillin-tazobactam Sensitive <=16 mcg/mL BACTERIAL SUSCEPTIBILITY PANEL BY YEFRI     trimethoprim-sulfamethoxazole Sensitive <=2/38 mcg/mL BACTERIAL SUSCEPTIBILITY PANEL BY YEFRI              Antibiotics and immunizations:       Current antibiotics: All antibiotics and their doses were reviewed by me    Recent Abx Admin                   vancomycin (VANCOCIN) 1,500 mg in dextrose 5 % 250 mL IVPB (mg) 1,500 mg New Bag 04/01/22 1201     1,500 mg New Bag 03/31/22 2025    metronidazole (FLAGYL) 500 mg in NaCl 100 mL IVPB premix (mg) 500 mg New Bag 04/01/22 1000     500 mg New Bag  0627     500 mg New Bag 03/31/22 2224     500 mg New Bag  1534    ciprofloxacin (CIPRO) IVPB 400 mg (mg) 400 mg New Bag 04/01/22 0318     400 mg New Bag 03/31/22 1537                  Immunization History: All immunization history was reviewed by me today. Immunization History   Administered Date(s) Administered    COVID-19, Pfizer Purple top, DILUTE for use, 12+ yrs, 30mcg/0.3mL dose 03/15/2021, 04/12/2021, 12/22/2021    Influenza Virus Vaccine 01/21/2017, 01/21/2017    Influenza, Quadv, IM, PF (6 mo and older Fluzone, Flulaval, Fluarix, and 3 yrs and older Afluria) 10/31/2020    Tdap (Boostrix, Adacel) 03/27/2018    Zoster Recombinant (Shingrix) 03/27/2018       Known drug allergies:      All allergies were reviewed and updated    Allergies   Allergen Reactions    Chantix [Varenicline] Other (See Comments)     Hallucinations         Social history:     Social History:  All social andepidemiologic history was reviewed and updated by me today as needed. · Tobacco use:   reports that he quit smoking about 3 months ago. His smoking use included cigarettes. He started smoking about 44 years ago. He has a 10.00 pack-year smoking history. He has never used smokeless tobacco.  · Alcohol use:   reports current alcohol use of about 6.0 standard drinks of alcohol per week. · Currently lives in: [address] [place] [address]  ·  reports no history of drug use. COVID VACCINATION AND LAB RESULT RECORDS:     Internal Administration   First Dose COVID-19, Pfizer Purple top, DILUTE for use, 12+ yrs, 30mcg/0.3mL dose  03/15/2021   Second Dose COVID-19, Pfizer Purple top, DILUTE for use, 12+ yrs, 30mcg/0.3mL dose   04/12/2021       Last COVID Lab SARS-CoV-2 (no units)   Date Value   02/02/2022 Not Detected     SARS-CoV-2, PCR (no units)   Date Value   02/10/2021 Not Detected     SARS-CoV-2, NAAT (no units)   Date Value   12/28/2021 Not Detected            Assessment:     The patient is a 65 y.o. old male who  has a past medical history of Diabetes mellitus ([facility] Fibromyalgia, History of DVT (deep vein thrombosis), Hyperlipidemia, Hypertension, and Type 2 diabetes mellitus with circulatory disorder, without long-term current use of insulin ([facility] (2/18/2022).  with following problems:    · MRSA and Serratia marcescens pneumonia-covered with IV vancomycin and IV Cipro  · Acute respiratory failure with hypoxia, required endotracheal intubation in the ER-remains on ventilator  · MRSA and Serratia pneumonia  · Requiring invasive mechanical ventilation   · Heavy smoker, recently quit in January 2022  · Multilobulated mass in the supraglottic area, concerning for invasive tumor  · Essential hypertension-blood pressure okay  · Hyperlipidemia  · Type 2 diabetes mellitus-maintain good glycemic control  · History of DVT  · Fibromyalgia  · Overweight due to excess calorie intake : Body mass index is 25.97 kg/m². Discussion:      The patient is afebrile. He is on IV vancomycin, IV ciprofloxacin and p.o. Flagyl. Serum creatinine 0.5. White cell count is 5400. *    Plan:     Diagnostic Workup:    · Continue to follow  fever curve, WBC count and blood cultures. · Continue to monitor blood counts, liver and renal function. Antimicrobials:    · Will continue IV vancomycin  · Target vancomycin trough level of 15-20  · Continue IV Flagyl 500 mg every 8 hour  · Continue IV Cipro 400 mg every 12 hour for Serratia coverage  · Endotracheal tube care and pulmonary toileting  · Ventilator support to maintain oxygen saturation above 92%  · Right ankle surgical site care  · We will follow up on the culture results and clinical progress and will make further recommendations accordingly. · Continue close vitals monitoring. · Plans for possible transfer to  tomorrow noted  · Maintain good glycemic control. · Fall precautions. · Aspiration precautions. · Continue to watch for new fever or diarrhea. · DVT prophylaxis. · Discussed all above with patient's wife and RN. Drug Monitoring:    · Continue monitoring for antibiotic toxicity as follows: CBC, CMP, vancomycin trough, QTc interval  · Continue to watch for following: new or worsening fever, new hypotension, hives, lip swelling and redness or purulence at vascular access sites. I/v access Management:    · Continue to monitor i.v access sites for erythema, induration, discharge or tenderness. · As always, continue efforts to minimize tubes/lines/drains as clinically appropriate to reduce chances of line associated infections. Level of complexity of visit: High     Risk of Complications/Morbidity: High     · Illness(es)/ Infection present that pose threat to life/bodily function.    · There is potential for severe exacerbation of infection/side effects of treatment. · Therapy requires intensive monitoring for antimicrobial agent toxicity. TIME SPENT TODAY:     - Spent over  36 minutes on visit (including interval history, physical exam, review of data including labs, cultures, imaging, development and implementation of treatment plan and coordination of complex care). More than 50 percent of this includes face-to-face time spent with the patient for counseling and coordination of care. Thank you for involving me in the care of your patient. I will continue to follow. If you have anyadditional questions, please do not hesitate to contact me. Subjective: Interval history: The patient was seen and examined at bedside. Interval history was obtained. The patient is tolerating antibiotics okay. He is afebrile. Past Medical History: All past medical history reviewed today. Past Medical History:   Diagnosis Date    Diabetes mellitus ([facility]     Fibromyalgia     History of DVT (deep vein thrombosis)     Hyperlipidemia     Hypertension     Type 2 diabetes mellitus with circulatory disorder, without long-term current use of insulin ([facility] 2/18/2022         Past Surgical History: All pastsurgical history was reviewed today.     Past Surgical History:   Procedure Laterality Date    APPENDECTOMY  2005    COLONOSCOPY  2016    FEMORAL BYPASS Left 02/26/2020    femoral posterior tibial bypass    FEMORAL-TIBIAL BYPASS GRAFT Right 12/11/2020    with femoral endarterectomy and patch angioplasty    FOOT DEBRIDEMENT Left 09/03/2020    FOOT DEBRIDEMENT Right 2/26/2021    DEBRIDEMENT OF RIGHT FOOT WOUND WITH GRAFT APPLICATION performed by Seth Palma DPM at [address]  N/A 3/25/2022    DIRECT LARYNGOSCOPY WITH BIOPSY AND FROZEN SECTIONS performed by Melisa Zhong DO at [place] [address] Right 12/08/2020    stent leg for PVD    TRACHEOSTOMY N/A 3/25/2022    TRACHEOTOMY performed by Brittany Carter DO at [address] [place] [address]         Family History: All family history was reviewed today. Problem Relation Age of Onset    Cancer Mother     Lung Cancer Mother     Diabetes Father     Stroke Father          Medications: All current and past medications were reviewed. Medications Prior to Admission: calcium-vitamin D (OSCAL-500) 500-200 MG-UNIT per tablet, Take 1 tablet by mouth 2 times daily  sennosides-docusate sodium (SENOKOT-S) 8.6-50 MG tablet, Take 1 tablet by mouth 2 times daily  [DISCONTINUED] aspirin 81 MG chewable tablet, Take 81 mg by mouth daily  [DISCONTINUED] diphenhydrAMINE (BENADRYL) 25 MG tablet, Take 25 mg by mouth every 6 hours as needed for Itching  [DISCONTINUED] gabapentin (NEURONTIN) 100 MG capsule, Take 100 mg by mouth 3 times daily. [DISCONTINUED] insulin lispro (HUMALOG) 100 UNIT/ML injection vial, Inject into the skin 4 times daily (before meals and nightly) 0-10 units  [DISCONTINUED] insulin glargine (LANTUS) 100 UNIT/ML injection vial, Inject into the skin nightly 55 units  [DISCONTINUED] methocarbamol (ROBAXIN) 750 MG tablet, Take 750 mg by mouth 2 times daily  [DISCONTINUED] metoprolol succinate (TOPROL XL) 100 MG extended release tablet, Take 100 mg by mouth daily  [DISCONTINUED] omeprazole (PRILOSEC) 20 MG delayed release capsule, Take 20 mg by mouth daily  [DISCONTINUED] oxyCODONE (ROXICODONE) 5 MG immediate release tablet, Take 5 mg by mouth every 6 hours as needed for Pain. [DISCONTINUED] tiotropium (SPIRIVA) 18 MCG inhalation capsule, Inhale 18 mcg into the lungs daily  [DISCONTINUED] buPROPion (WELLBUTRIN) 75 MG tablet, Take 150 mg by mouth daily  [] predniSONE (DELTASONE) 20 MG tablet, Take 2 tablets by mouth daily for 4 days, THEN 1 tablet daily for 4 days.   nicotine (NICODERM CQ) 14 MG/24HR, Place 1 patch onto the skin daily for 14 days (Patient not taking: Reported on 3/22/2022)  ipratropium-albuterol (DUONEB) 0.5-2.5 (3) MG/3ML SOLN nebulizer solution, Inhale 3 mLs into the lungs 4 times daily  furosemide (LASIX) 20 MG tablet, Take 1 tablet by mouth daily  guaiFENesin (MUCINEX) 600 MG extended release tablet, Take 1,200 mg by mouth 2 times daily  atorvastatin (LIPITOR) 80 MG tablet, Take 1 tablet by mouth daily Cancel atorvastatin 20 mg a day  Fluticasone-Umeclidin-Vilant (TRELEGY ELLIPTA) 200-62.5-25 MCG/INH AEPB, Inhale 1 puff into the lungs 2 times daily (Patient taking differently: Inhale 1 puff into the lungs daily )  gentamicin (GARAMYCIN) 0.1 % cream, Apply topically   daily. [DISCONTINUED] albuterol sulfate HFA (VENTOLIN HFA) 108 (90 Base) MCG/ACT inhaler, Inhale 2 puffs into the lungs 4 times daily as needed for Wheezing  acetaminophen (TYLENOL) 500 MG tablet, Take 1 tablet by mouth 4 times daily as needed for Pain  warfarin (JANTOVEN) 5 MG tablet, TAKE 1 AND 1/2 TABLETS BY MOUTH ONE TIME A DAY OR AS DIRECTED BY [facility] ([phone]) (Patient taking differently: Take 7.5 mg by mouth daily Except 10mg every Wednesday and Saturday OR AS DIRECTED BY [facility] ([phone]))  NIFEdipine (ADALAT CC) 60 MG extended release tablet, Take 1 tablet by mouth daily  carvedilol (COREG) 25 MG tablet, Take 1 tablet by mouth 2 times daily (with meals)  metFORMIN (GLUCOPHAGE) 500 MG tablet, Take 1 tablet by mouth 2 times daily (with meals)  fluticasone (FLONASE) 50 MCG/ACT nasal spray, 1 spray by Nasal route daily  blood glucose monitor kit and supplies, Dispense sufficient amount for indicated testing frequency plus additional to accommodate PRN testing needs. Dispense all needed supplies to include: monitor, strips, lancing device, lancets, control solutions, alcohol swabs.  (Patient not taking: Reported on 3/22/2022)  Lancets MISC, 1 each by Does not apply route daily Test 2 times daily while on PREDNISONE then 1 time daily & as needed for symptoms of irregular blood sugar (Patient not taking: Reported on 3/22/2022)  blood glucose monitor strips, Test 2 times a day while on PREDNISONE, then 1 time daily & as needed for symptoms of irregular blood glucose. Dispense sufficient amount for indicated testing frequency plus additional to accommodate PRN testing needs.  (Patient not taking: Reported on 3/22/2022)  ferrous sulfate (IRON 325) 325 (65 Fe) MG tablet, Take 1 tablet by mouth 2 times daily  [DISCONTINUED] tadalafil (CIALIS) 5 MG tablet, TAKE 1 TABLET BY MOUTH ONE TIME A DAY AS NEEDED FOR ERECTILE DYSFUNCTION (Patient not taking: Reported on 3/22/2022)  Vitamins/Minerals TABS, Take 1 tablet by mouth daily (Patient not taking: Reported on 3/22/2022)     ciprofloxacin  400 mg IntraVENous Q12H    metroNIDAZOLE  500 mg IntraVENous Q8H    lidocaine 1 % injection  5 mL IntraDERmal Once    sodium chloride flush  5-40 mL IntraVENous 2 times per day    vancomycin  1,500 mg IntraVENous Q12H    ipratropium-albuterol  3 mL Inhalation TID    lidocaine 1 % injection  5 mL IntraDERmal Once    sodium chloride flush  5-40 mL IntraVENous 2 times per day    enoxaparin  40 mg SubCUTAneous Nightly    polyethylene glycol  17 g Oral Daily    gentamicin   Topical TID    insulin lispro  0-6 Units SubCUTAneous Q4H    succinylcholine  140 mg IntraVENous Once    sodium chloride flush  5-40 mL IntraVENous 2 times per day    famotidine (PEPCID) injection  20 mg IntraVENous BID    budesonide  0.5 mg Nebulization BID    Arformoterol Tartrate  15 mcg Nebulization BID    scopolamine  1 patch TransDERmal Q72H          REVIEW OF SYSTEMS:       Review of Systems   Unable to perform ROS: Intubated          Objective:       PHYSICAL EXAM:      Vitals:   Vitals:    04/01/22 1000 04/01/22 1100 04/01/22 1130 04/01/22 1200   BP: 114/64 112/61 110/61 112/63   Pulse: 88 88 84 84   Resp: 15 16 16 16   Temp:    99.1 °F (37.3 °C)   TempSrc:    Bladder   SpO2:    96%   Weight:       Height:           Physical Exam General: intubated and sedated, on ventilator   HEENT: normocephalic, atraumatic, sclera clear, pupils equal, light reflex preserved bilaterally, endotracheal tube in place  Cardiovascular: RRR, no murmurs/rubs/gallops detected  Pulmonary: CTABL, no rhonchi/rales   Abdomen/GI: soft, no organomegaly, bowel sounds positive   Neuro: sedated, PERRL, moves all extremities when off sedation per RN  Skin: Ulcer noted in the medial side of the left ankle, see pictures below  Musculoskeletal:  No joint swelling, redness. No limitation of range of passive motion  Genitourinary: Echols's catheter in place  Psych: could not assess  Lymphatic/Immunologic: No obvious bruising, no cervical lymphadenopathy    Lines: All vascular access sites are healthy with no local erythema, discharge or tenderness            Intake and output:     I/O last 3 completed shifts: In: 5714.7 [I.V.:3046; NG/GT:1213; IV Piggyback:1455.7]  Out: 6150 [Urine:6150]    Lab Data:   All available labs were reviewed by me today. CBC:   Recent Labs     03/30/22  0436 03/31/22  0500 04/01/22  0525   WBC 5.5 6.2 5.4   RBC 3.92* 3.79* 3.89*   HGB 10.9* 10.5* 11.0*   HCT 34.0* 32.9* 33.7*    254 280   MCV 86.7 86.6 86.4   MCH 27.8 27.7 28.2   MCHC 32.1 32.0 32.6   RDW 16.2* 16.6* 16.2*        BMP:  Recent Labs     03/30/22  0436 03/31/22  0500 04/01/22  0525    137 137   K 4.1 3.8 3.6    98* 98*   CO2 29 28 25   BUN 16 11 9   CREATININE <0.5* <0.5* <0.5*   CALCIUM 9.1 8.8 9.0   GLUCOSE 138* 119* 147*        Hepatic FunctionPanel:   Lab Results   Component Value Date    ALKPHOS 93 03/29/2022    ALT 58 03/29/2022    AST 31 03/29/2022    PROT 5.2 03/29/2022    PROT 8.0 03/29/2010    BILITOT <0.2 03/29/2022    LABALBU 2.5 03/29/2022       CPK:   Lab Results   Component Value Date    CKTOTAL 81 02/01/2022     ESR: No results found for: SEDRATE  CRP: No results found for: CRP      Imaging:     All pertinent images and reports for the current visit were reviewed by me during this visit. I reviewed the chest x-ray/CT scan/MRI images and independently interpreted the findings and results today. XR ABDOMEN (KUB) (SINGLE AP VIEW)   Final Result   Mild stool burden      Otherwise no acute abnormality         XR CHEST PORTABLE   Final Result   Persistent bilateral airspace disease, slightly improved on the right, for   which pulmonary edema or pneumonia are considerations. Small right pleural   effusion. XR CHEST PORTABLE   Final Result   1. Worsening congestive heart failure and new left basilar airspace disease. CT SOFT TISSUE NECK W CONTRAST   Final Result   Addendum 1 of 1   ADDENDUM:   There is a right-sided subclavian line which makes a loop around itself at   the junction of right jugular and right subclavian vein with this area    being   abnormally prominent/having a varicose appearance. The findings have significantly progressed since the neck CT of 7 days    ago. If there has been interval radiation therapy or medical intervention,   findings may be secondary to treatment effect. The findings were sent to the Radiology Results [address] at    4:29   pm on 3/29/2022to be communicated to a licensed caregiver. Final   There is a multilobulated enhancing lesion at the level of supraglottic   region Circumferentially narrowing the supra glottic area extending   superiorly into the bilateral vallecula fossa completely obstructing the   upper of aerodigestive mucosa at the level of hypopharynx and extending   inferiorly to the glottic region and infraglottic area. . 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 .      Small right pleural effusion and focal consolidative changes within the   dependent portion of the lungs likely suggestive of consolidative pulmonary   edema.       Moderate mucosal thickening of the ethmoidal air cells and left sphenoid   sinus. Mild mucosal thickening of maxillary sinuses. RECOMMENDATIONS:   Unavailable            XR CHEST PORTABLE   Final Result   Large consolidation right lower lobe from pneumonia. Small right pleural   effusion. Mild congestion. No pneumothorax. XR CHEST PORTABLE   Final Result   Decreasing right basilar pleuroparenchymal disease as compared to prior. A portion of the right upper extremity PICC line is looped in the right   supraclavicular region; correlate with catheter function. XR CHEST PORTABLE   Final Result   Slight worsening of the right pleural effusion. Placement of a right PICC   line catheter tip in the SVC. Remainder of lines and tubes are stable. XR CHEST PORTABLE   Final Result   1. Stable position of enteric feeding tube. 2. Interval progression of a small right pleural effusion and right basilar   airspace consolidation. XR ABDOMEN FOR NG/OG/NE TUBE PLACEMENT   Final Result   Enteric tube with distal end reaching the gastric body on 1 of 3 images. The   other 2 images demonstrate tip of enteric tube at the level of the   midesophagus. XR CHEST PORTABLE   Final Result   1. Opacity right lower lung compatible with pleural effusion and relaxation   atelectasis versus pneumonia. 2. Pulmonary sequela typical of that seen with smoking, including COPD;   correlate with clinical history. 3. Unchanged tracheostomy tube positioning. Dobbhoff type feeding tube   extends below the left hemidiaphragm, out of field of view. XR CHEST PORTABLE   Final Result   Supportive tubing is in normal position. Bibasilar opacities, atelectasis or airspace disease. There is mild   progression on the right side in the interval.         XR CHEST PORTABLE   Final Result   Mild dependent changes in the lower lung fields, atelectasis versus   infiltrate. Satisfactory position of endotracheal tube.          XR ABDOMEN FOR NG/OG/NE TUBE PLACEMENT   Final Result   Tip of feeding tube projects in region of gastric fundus         XR CHEST PORTABLE   Final Result   1. Properly placed endotracheal tube. 2.  Mild bibasilar atelectasis         CT SOFT TISSUE NECK W CONTRAST   Final Result   Lobulated large mass wrapping the left side of the hypopharynx extending into   supraglottic and glottic portions of the larynx measuring approximately 3.5   cm diameter with a craniocaudad extent of 4.9 cm. The mass appears to   severely narrow the airway and could potentially make for difficult   intubation. Findings compatible with squamous cell carcinoma. Mild to moderate emphysema. XR CHEST PORTABLE   Final Result   Chronic findings in the chest without acute airspace disease identified. Outside records:    Labs, Microbiology, Radiology and pertinent results from Care everywhere, if available, were reviewed as a part ofthe consultation.       Problem list:       Patient Active Problem List   Diagnosis Code    Peripheral artery disease ([facility]) I73.9    Centrilobular emphysema ([facility]) J43.2    COPD (chronic obstructive pulmonary disease) ([facility]) J44.9    Atherosclerosis of native arteries of right leg with ulceration of other part of foot ([facility] I70.235    Impotence N52.9    Acute deep vein thrombosis (DVT) of femoral vein of left lower extremity ([facility]) I82.412    Colon polyp K63.5    Hyperlipidemia E78.5    Non-healing ulcer of left ankle ([facility]) L97.329    Venous insufficiency I87.2    Venous ulcer ([facility] I83.009, L97.909    Essential hypertension I10    Peripheral vascular disease ([facility]) I73.9    Type 2 diabetes mellitus with other specified complication ([facility]) Y36.23    COPD with exacerbation ([facility]) J44.1    Acute hypercapnic respiratory failure ([facility]) J96.02    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 9) E66.3    Tracheostomy dependent ([facility] [facility] [address] Z93.0    Decubitus ulcer of heel, bilateral L89.619, L89.629         Please note that this chart was generated using Dragon dictation software. Although every effort was made to ensure the accuracy of this automated transcription, some errors in transcription may have occurred inadvertently. If you may need any clarification, please do not hesitate to contact me through EPIC or at the phone number provided below with my electronic signature. Any pictures or media included in this note were obtained after taking informed verbal consent from the patient and with their approval to include those in the patient's medical record.         Snehal Manriquez MD, MPH, [address] [place] [address]  4/1/2022, 1:19 PM  [facility]   [address] [place] [address] [place] [address] [facility], [address] [place] [address]  Office: [phone]  Fax: [phone]  Clinic days:  Tuesday & Thursday

## 2022-04-02 ENCOUNTER — APPOINTMENT (OUTPATIENT)
Dept: GENERAL RADIOLOGY | Age: 62
DRG: 004 | End: 2022-04-02
Payer: COMMERCIAL

## 2022-04-02 VITALS
HEART RATE: 100 BPM | WEIGHT: 171.08 LBS | DIASTOLIC BLOOD PRESSURE: 68 MMHG | HEIGHT: 68 IN | BODY MASS INDEX: 25.93 KG/M2 | RESPIRATION RATE: 16 BRPM | OXYGEN SATURATION: 94 % | TEMPERATURE: 100.2 F | SYSTOLIC BLOOD PRESSURE: 115 MMHG

## 2022-04-02 LAB
ANION GAP SERPL CALCULATED.3IONS-SCNC: 14 MMOL/L (ref 3–16)
BASE EXCESS ARTERIAL: 3 (ref -3–3)
BASOPHILS ABSOLUTE: 0.1 K/UL (ref 0–0.2)
BASOPHILS RELATIVE PERCENT: 1.2 %
BUN BLDV-MCNC: 8 MG/DL (ref 7–20)
CALCIUM IONIZED: 1.27 MMOL/L (ref 1.12–1.32)
CALCIUM SERPL-MCNC: 9 MG/DL (ref 8.3–10.6)
CHLORIDE BLD-SCNC: 99 MMOL/L (ref 99–110)
CO2: 23 MMOL/L (ref 21–32)
CREAT SERPL-MCNC: <0.5 MG/DL (ref 0.8–1.3)
EOSINOPHILS ABSOLUTE: 0.1 K/UL (ref 0–0.6)
EOSINOPHILS RELATIVE PERCENT: 1.2 %
GFR AFRICAN AMERICAN: >60
GFR NON-AFRICAN AMERICAN: >60
GLUCOSE BLD-MCNC: 106 MG/DL (ref 70–99)
GLUCOSE BLD-MCNC: 111 MG/DL (ref 70–99)
GLUCOSE BLD-MCNC: 129 MG/DL (ref 70–99)
GLUCOSE BLD-MCNC: 143 MG/DL (ref 70–99)
GLUCOSE BLD-MCNC: 150 MG/DL (ref 70–99)
GLUCOSE BLD-MCNC: 97 MG/DL (ref 70–99)
HCO3 ARTERIAL: 28 MMOL/L (ref 21–29)
HCT VFR BLD CALC: 34.5 % (ref 40.5–52.5)
HEMOGLOBIN: 11.2 G/DL (ref 13.5–17.5)
HEMOGLOBIN: 13.1 GM/DL (ref 13.5–17.5)
INR BLD: 1.31 (ref 0.88–1.12)
LACTATE: 0.68 MMOL/L (ref 0.4–2)
LYMPHOCYTES ABSOLUTE: 1.1 K/UL (ref 1–5.1)
LYMPHOCYTES RELATIVE PERCENT: 23 %
MCH RBC QN AUTO: 27.9 PG (ref 26–34)
MCHC RBC AUTO-ENTMCNC: 32.5 G/DL (ref 31–36)
MCV RBC AUTO: 85.7 FL (ref 80–100)
MONOCYTES ABSOLUTE: 0.4 K/UL (ref 0–1.3)
MONOCYTES RELATIVE PERCENT: 8.4 %
NEUTROPHILS ABSOLUTE: 3.3 K/UL (ref 1.7–7.7)
NEUTROPHILS RELATIVE PERCENT: 66.2 %
O2 SAT, ARTERIAL: 92 % (ref 93–100)
PCO2 ARTERIAL: 47.4 MM HG (ref 35–45)
PDW BLD-RTO: 16 % (ref 12.4–15.4)
PERFORMED ON: ABNORMAL
PERFORMED ON: NORMAL
PH ARTERIAL: 7.38 (ref 7.35–7.45)
PLATELET # BLD: 292 K/UL (ref 135–450)
PMV BLD AUTO: 7.9 FL (ref 5–10.5)
PO2 ARTERIAL: 66.8 MM HG (ref 75–108)
POC HEMATOCRIT: 39 % (ref 40.5–52.5)
POC POTASSIUM: 3.7 MMOL/L (ref 3.5–5.1)
POC SAMPLE TYPE: ABNORMAL
POC SODIUM: 137 MMOL/L (ref 136–145)
POTASSIUM REFLEX MAGNESIUM: 4 MMOL/L (ref 3.5–5.1)
PROTHROMBIN TIME: 15 SEC (ref 9.9–12.7)
RBC # BLD: 4.03 M/UL (ref 4.2–5.9)
SODIUM BLD-SCNC: 136 MMOL/L (ref 136–145)
TCO2 ARTERIAL: 30 MMOL/L
WBC # BLD: 5 K/UL (ref 4–11)

## 2022-04-02 PROCEDURE — 36556 INSERT NON-TUNNEL CV CATH: CPT | Performed by: INTERNAL MEDICINE

## 2022-04-02 PROCEDURE — 71045 X-RAY EXAM CHEST 1 VIEW: CPT

## 2022-04-02 PROCEDURE — 6360000002 HC RX W HCPCS: Performed by: INTERNAL MEDICINE

## 2022-04-02 PROCEDURE — 02HV33Z INSERTION OF INFUSION DEVICE INTO SUPERIOR VENA CAVA, PERCUTANEOUS APPROACH: ICD-10-PCS | Performed by: INTERNAL MEDICINE

## 2022-04-02 PROCEDURE — 99291 CRITICAL CARE FIRST HOUR: CPT | Performed by: INTERNAL MEDICINE

## 2022-04-02 PROCEDURE — 2700000000 HC OXYGEN THERAPY PER DAY

## 2022-04-02 PROCEDURE — 94761 N-INVAS EAR/PLS OXIMETRY MLT: CPT

## 2022-04-02 PROCEDURE — 83605 ASSAY OF LACTIC ACID: CPT

## 2022-04-02 PROCEDURE — 82947 ASSAY GLUCOSE BLOOD QUANT: CPT

## 2022-04-02 PROCEDURE — 2580000003 HC RX 258: Performed by: INTERNAL MEDICINE

## 2022-04-02 PROCEDURE — 2500000003 HC RX 250 WO HCPCS: Performed by: INTERNAL MEDICINE

## 2022-04-02 PROCEDURE — A4216 STERILE WATER/SALINE, 10 ML: HCPCS | Performed by: INTERNAL MEDICINE

## 2022-04-02 PROCEDURE — 82330 ASSAY OF CALCIUM: CPT

## 2022-04-02 PROCEDURE — 85014 HEMATOCRIT: CPT

## 2022-04-02 PROCEDURE — 85610 PROTHROMBIN TIME: CPT

## 2022-04-02 PROCEDURE — 94640 AIRWAY INHALATION TREATMENT: CPT

## 2022-04-02 PROCEDURE — 6370000000 HC RX 637 (ALT 250 FOR IP): Performed by: INTERNAL MEDICINE

## 2022-04-02 PROCEDURE — 80048 BASIC METABOLIC PNL TOTAL CA: CPT

## 2022-04-02 PROCEDURE — 82803 BLOOD GASES ANY COMBINATION: CPT

## 2022-04-02 PROCEDURE — 84132 ASSAY OF SERUM POTASSIUM: CPT

## 2022-04-02 PROCEDURE — 84295 ASSAY OF SERUM SODIUM: CPT

## 2022-04-02 PROCEDURE — 85025 COMPLETE CBC W/AUTO DIFF WBC: CPT

## 2022-04-02 PROCEDURE — 94003 VENT MGMT INPAT SUBQ DAY: CPT

## 2022-04-02 RX ADMIN — BUDESONIDE 500 MCG: 0.5 SUSPENSION RESPIRATORY (INHALATION) at 07:15

## 2022-04-02 RX ADMIN — Medication 200 MCG/HR: at 11:30

## 2022-04-02 RX ADMIN — IPRATROPIUM BROMIDE AND ALBUTEROL SULFATE 3 ML: .5; 2.5 SOLUTION RESPIRATORY (INHALATION) at 07:16

## 2022-04-02 RX ADMIN — Medication 10 ML: at 08:10

## 2022-04-02 RX ADMIN — SODIUM CHLORIDE, PRESERVATIVE FREE 20 MG: 5 INJECTION INTRAVENOUS at 08:08

## 2022-04-02 RX ADMIN — METRONIDAZOLE 500 MG: 500 INJECTION, SOLUTION INTRAVENOUS at 02:44

## 2022-04-02 RX ADMIN — ARFORMOTEROL TARTRATE 15 MCG: 15 SOLUTION RESPIRATORY (INHALATION) at 07:15

## 2022-04-02 RX ADMIN — GENTAMICIN SULFATE: 1 CREAM TOPICAL at 08:09

## 2022-04-02 RX ADMIN — METRONIDAZOLE 500 MG: 500 INJECTION, SOLUTION INTRAVENOUS at 08:26

## 2022-04-02 RX ADMIN — PROPOFOL 60 MCG/KG/MIN: 10 INJECTION, EMULSION INTRAVENOUS at 08:20

## 2022-04-02 RX ADMIN — POLYETHYLENE GLYCOL 3350 17 G: 17 POWDER, FOR SOLUTION ORAL at 08:08

## 2022-04-02 RX ADMIN — PROPOFOL 50 MCG/KG/MIN: 10 INJECTION, EMULSION INTRAVENOUS at 00:07

## 2022-04-02 RX ADMIN — Medication 10 ML: at 08:09

## 2022-04-02 RX ADMIN — ACETAMINOPHEN 325MG 650 MG: 325 TABLET ORAL at 08:28

## 2022-04-02 RX ADMIN — Medication 6 MG/HR: at 08:23

## 2022-04-02 RX ADMIN — CIPROFLOXACIN 400 MG: 2 INJECTION, SOLUTION INTRAVENOUS at 04:09

## 2022-04-02 RX ADMIN — PROPOFOL 60 MCG/KG/MIN: 10 INJECTION, EMULSION INTRAVENOUS at 04:11

## 2022-04-02 ASSESSMENT — PAIN SCALES - GENERAL
PAINLEVEL_OUTOF10: 0
PAINLEVEL_OUTOF10: 0
PAINLEVEL_OUTOF10: 6
PAINLEVEL_OUTOF10: 0
PAINLEVEL_OUTOF10: 0

## 2022-04-02 ASSESSMENT — PULMONARY FUNCTION TESTS
PIF_VALUE: 23
PIF_VALUE: 23
PIF_VALUE: 25

## 2022-04-02 NOTE — PROGRESS NOTES
[address]  Patient has size 6 Shiley. Trach Kit of same size on standby  Yes, Obturator at head of the bed  Yes. Inner cannula cleaned/replaced Yes, drain sponge changed  Yes, Trach tie replaced is soiled No, Flange cleaned  Yes. [address] performed without complications.

## 2022-04-02 NOTE — PROGRESS NOTES
Initial shift assessment completed, see complex assessment in doc flowsheet. Pt sedated on vent. Pt has PICC in place. PICC did not give blood return and difficult to flush. X-ray pending to verify placement. Dobhoff clogged will replace and verify new placement with x-ray as well. Pt febrile. Pt restrained. POC includes transport this AM to .

## 2022-04-02 NOTE — PROGRESS NOTES
04/01/22 2056   [facility] Patient Data   Static Compliance 42 mL/cmH2O   Dynamic Compliance 40.2 mL/cmH2O

## 2022-04-02 NOTE — PROGRESS NOTES
[facility] Pulmonary/CCM Progress note      Admit Date: 3/22/2022    Chief Complaint: Respiratory distress    Subjective: Interval History: Persistent low-grade fevers, hemodynamically stable. PICC line apparently has no return-chest x-ray confirms abnormal positioning and hence was replaced with a right internal jugular central venous catheter.     Scheduled Meds:   ciprofloxacin  400 mg IntraVENous Q12H    metroNIDAZOLE  500 mg IntraVENous Q8H    lidocaine 1 % injection  5 mL IntraDERmal Once    sodium chloride flush  5-40 mL IntraVENous 2 times per day    vancomycin  1,500 mg IntraVENous Q12H    ipratropium-albuterol  3 mL Inhalation TID    lidocaine 1 % injection  5 mL IntraDERmal Once    sodium chloride flush  5-40 mL IntraVENous 2 times per day    enoxaparin  40 mg SubCUTAneous Nightly    polyethylene glycol  17 g Oral Daily    gentamicin   Topical TID    insulin lispro  0-6 Units SubCUTAneous Q4H    succinylcholine  140 mg IntraVENous Once    sodium chloride flush  5-40 mL IntraVENous 2 times per day    famotidine (PEPCID) injection  20 mg IntraVENous BID    budesonide  0.5 mg Nebulization BID    Arformoterol Tartrate  15 mcg Nebulization BID    scopolamine  1 patch TransDERmal Q72H     Continuous Infusions:   sodium chloride 1,000 mL (03/30/22 2253)    sodium chloride 1,000 mL (03/30/22 2310)    norepinephrine Stopped (03/27/22 0401)    midazolam 6 mg/hr (04/02/22 0823)    fentaNYL Citrate-NaCl 200 mcg/hr (04/02/22 1130)    propofol 60 mcg/kg/min (04/02/22 0820)    sodium chloride 25 mL (03/29/22 2048)    dextrose       PRN Meds:sodium chloride flush, sodium chloride, sodium chloride flush, sodium chloride, hydrALAZINE, racepinephrine HCl, sodium chloride nebulizer, albuterol sulfate HFA, sodium chloride flush, sodium chloride, ondansetron **OR** ondansetron, acetaminophen **OR** acetaminophen, glucose, glucagon (rDNA), dextrose, dextrose bolus (hypoglycemia) **OR** dextrose bolus (hypoglycemia)    Review of Systems  Unable to obtain since patient is currently intubated and sedated    Objective:     Patient Vitals for the past 8 hrs:   BP Temp Temp src Pulse Resp SpO2 Weight   04/02/22 1000 115/68 -- -- 100 16 94 % --   04/02/22 0900 110/68 -- -- 102 20 93 % --   04/02/22 0800 133/73 100.2 °F (37.9 °C) Bladder 98 22 95 % --   04/02/22 0600 (!) 166/86 -- -- 76 16 96 % --   04/02/22 0500 103/62 -- -- 85 16 95 % 171 lb 1.2 oz (77.6 kg)     I/O last 3 completed shifts: In: 5639.7 [I.V.:2223.8; NG/GT:1327; IV Piggyback:2088.8]  Out: 7196 [Urine:4600]  No intake/output data recorded.     General Appearance: Intubated and sedated, in no acute distress  Skin: warm and dry, no rash or erythema  Head: normocephalic and atraumatic  Eyes: pupils equal, round, and reactive to light, extraocular eye movements intact, conjunctivae normal  ENT: external ear and ear canal normal bilaterally, nose without deformity, nasal mucosa and turbinates normal  Neck: supple and non-tender without mass, no cervical lymphadenopathy  Pulmonary/Chest: clear to auscultation bilaterally- no wheezes, rales or rhonchi, normal air movement, no respiratory distress  Cardiovascular: normal rate, regular rhythm,  no murmurs, rubs, distal pulses intact, no carotid bruits  Abdomen: soft, non-tender, non-distended, normal bowel sounds, no masses or organomegaly  Lymph Nodes: Cervical, supraclavicular normal  Extremities: no cyanosis, clubbing or edema  Musculoskeletal: normal range of motion, no joint swelling, deformity or tenderness  Neurologic: Sedated, no focal neurologic deficits    Data Review:  CBC:   Lab Results   Component Value Date    WBC 5.0 04/02/2022    RBC 4.03 04/02/2022     BMP:   Lab Results   Component Value Date    GLUCOSE 143 04/02/2022    CO2 23 04/02/2022    BUN 8 04/02/2022    CREATININE <0.5 04/02/2022    CALCIUM 9.0 04/02/2022     ABG:   Lab Results   Component Value Date    FOZ2XFP 28.0 04/02/2022    BEART 3 04/02/2022    Z9SDDQWW 92 04/02/2022    PHART 7.380 04/02/2022    EEL2BMO 47.4 04/02/2022    PO2ART 66.8 04/02/2022    ABP7SJY 30 04/02/2022       Radiology: All pertinent images / reports were reviewed as a part of this visit. Narrative   EXAMINATION:   CT OF THE NECK SOFT TISSUE WITH CONTRAST  3/22/2022       TECHNIQUE:   CT of the neck was performed with the administration of intravenous contrast.   Multiplanar reformatted images are provided for review. Dose modulation,   iterative reconstruction, and/or weight based adjustment of the mA/kV was   utilized to reduce the radiation dose to as low as reasonably achievable.       COMPARISON:   None.       HISTORY:   ORDERING SYSTEM PROVIDED HISTORY: Hemoptysis and hoarseness   TECHNOLOGIST PROVIDED HISTORY:   Reason for exam:->Hemoptysis and hoarseness   Decision Support Exception - unselect if not a suspected or confirmed   emergency medical condition->Emergency Medical Condition (MA)   Reason for Exam: Hemoptysis and hoarseness   Relevant Medical/Surgical History: Shortness of Breath (pt. from home arrived   by [employer] brought pt. in.  pt. was fine and when he went to bed he   felt his back and chest get tight and then started with SOB. squad gave a   duoneb, 125mg solu medrol, AND INCREASED o2 TO 4L.  PT. normally wears 2-3 l   of )2.  )       FINDINGS:   PHARYNX/LARYNX:  The palatine tonsils are normal in appearance.  The tongue   is normal in appearance.  Involving the vallecula on the left and extending   inferiorly along the aryepiglottic fold and anterior/left anterolateral   hypopharynx is a large lobulated mass wrapping to the left from anterior to   posterior at the supraglottic larynx.  This thickened crescentic area   measures approximately 3.5 cm diameter and has a craniocaudad extent of 4.9   cm.  The mass laterally and posteriorly displaces the true and false cords   and appears to severely narrow the airway.  There is no associated erosion of   the laryngeal cartilages.       SALIVARY GLANDS/THYROID:  The parotid and submandibular glands appear   unremarkable.  The thyroid gland appears unremarkable.       LYMPH NODES:  No cervical or supraclavicular lymphadenopathy is seen.       SOFT TISSUES:  No appreciable soft tissue swelling or mass is seen.       BRAIN/ORBITS/SINUSES:  The visualized portion of the intracranial contents   appear unremarkable.  The visualized portion of the orbits, paranasal sinuses   and mastoid air cells demonstrate no acute abnormality.       LUNG APICES/SUPERIOR MEDIASTINUM:  No focal consolidation is seen within the   visualized lung apices.  There is a background of mild to moderate emphysema. No superior mediastinal lymphadenopathy or mass.  The visualized portion of   the trachea appears unremarkable.       BONES:  No aggressive appearing lytic or blastic bony lesion.           Impression   Lobulated large mass wrapping the left side of the hypopharynx extending into   supraglottic and glottic portions of the larynx measuring approximately 3.5   cm diameter with a craniocaudad extent of 4.9 cm.  The mass appears to   severely narrow the airway and could potentially make for difficult   intubation.  Findings compatible with squamous cell carcinoma.       Mild to moderate emphysema. 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 Improved oxygenation, FiO2 to 50%, PEEP to 5. Significant sedation needs-tachypnea has improved, possibly related to sepsis and recurrent fevers. Chest x-ray revealed bilateral infiltrates. Bronchoscopy was performed 3/31 which showed minimal secretions. Currently treated for MRSA/Serratia pneumonia-repeat respiratory cultures confirmed the same, currently on ciprofloxacin, vancomycin and Flagyl. WBC 5.0.   Blood cultures from 3/31 neg. 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 Plans for transfer to ENT ICU at [facility] today for further management. History of moderate COPD based on prior PFT from January 2022, FEV1 of 1.83 L [63% predicted]. Continue with bronchodilators. Does not appear to be in exacerbation. Daughter above was replaced with NG tube, new right internal jugular venous access obtained due to abnormal positioning of PICC line. Continue with Lovenox and Pepcid. Plans for transfer to  imminently. Patient's wife has been made aware of the transfer.     Dori Noel MD     Critical care time of 32 minutes excluding procedures

## 2022-04-02 NOTE — OP NOTE
Central Venous Catheter Insertion Procedure Note    Procedure: Insertion of Central Venous Catheter    Indications:  Acute respiratory failure    Procedure Details   Informed consent was obtained for the procedure, including sedation. Risks of lung perforation, hemorrhage, arrhythmia, and adverse drug reaction were discussed. Under sterile conditions the skin above the on the right internal jugular vein was prepped with betadine and covered with a sterile drape. Local anesthesia was applied to the skin and subcutaneous tissues. A 22-gauge needle was used to identify the vein. An 18-gauge needle was then inserted into the vein. A guide wire was then passed easily through the catheter. There were no arrhythmias. The catheter was then withdrawn. A 7.0 [place] triple-lumen central venous catheter was then inserted into the vessel over the guide wire. The catheter was sutured into place. Findings: There were no changes to vital signs. Catheter was flushed with 10 cc NS. Patient did tolerate procedure well. EBL: 2cc    Recommendations:  CXR ordered to verify placement.

## 2022-04-02 NOTE — DISCHARGE SUMMARY
Hospital Medicine Discharge Summary    Patient ID: Henry Muro      Patient's PCP: Mikayla Aguilar MD    Admit Date: 3/22/2022     Discharge Date: 4/2/2022      Admitting Provider: Guanakito Silver MD     Discharge Provider: Tiarra Mercedes MD     Discharge Diagnoses: Active Hospital Problems    Diagnosis     Airway compromise [J98.8]     Mass of hypopharynx [J39.2]     Pneumonia of right lower lobe due to methicillin resistant Staphylococcus aureus (MRSA) ([address] [J15.212]     S/P bronchoscopy [Z98.890]     Endotracheally intubated [Z97.8]     On mechanically assisted ventilation (HCC) [Z99.11]     History of DVT (deep vein thrombosis) [Z86.718]     Fibromyalgia [M79.7]     Overweight (BMI 25.0-29. 9) [E66.3]     Tracheostomy dependent ([address] [Z93.0]     Decubitus ulcer of heel, bilateral [L89.619, L89.629]     Laryngeal mass [J38.7]     Acute respiratory failure with hypoxia and hypercapnia (HCC) [J96.01, J96.02]     COPD with exacerbation ([address] [J44.1]     Type 2 diabetes mellitus with other specified complication ([address] [U70.23]     Centrilobular emphysema ([address] [J43.2]        The patient was seen and examined on day of discharge and this discharge summary is in conjunction with any daily progress note from day of discharge. Hospital Course:      Laryngeal mass: Subglottic mass status post paratracheal lymph node biopsy and tracheostomy ENT and critical care following, notes about 3.5 cm, status post trach, history of nicotine smoking.   Critical care team/ENT plan to transfer to , pathology support benign/reactive process no malignancy     Acute hypercapnic respiratory failure, combination of COPD, laryngeal mass, status post tracheostomy, remain ventilated and sedated continue bronchodilator     MRSA and Serratia pneumonia: Sensitive to cefepime and vancomycin, antibiotic has been changed per ID to vancomycin, Cipro, Flagyl     Sepsis: Febrile, continue antibiotic as above, leukocytosis improving. Status post bronchoscopy 3/31     Patient has been transferred to [address] [place] [address] for head and neck/ENT surgery      Physical Exam Performed:     /68   Pulse 100   Temp 100.2 °F (37.9 °C) (Bladder)   Resp 16   Ht 5' 8\" (1.727 m)   Wt 171 lb 1.2 oz (77.6 kg)   SpO2 94%   BMI 26.01 kg/m²       General appearance: In no acute distress, now trach dependent  HEENT:  Normal cephalic, atraumatic without obvious deformity. Pupils equal, round, and reactive to light. Extra ocular muscles intact. Conjunctivae/corneas clear. Neck: Supple, with full range of motion. No jugular venous distention. Trachea midline. Respiratory:  Normal respiratory effort. Clear to auscultation, bilaterally without Rales/Wheezes/Rhonchi. Cardiovascular:  Regular rate and rhythm with normal S1/S2 without murmurs, rubs or gallops. Abdomen: Soft, non-tender, non-distended with normal bowel sounds. Musculoskeletal:  No clubbing, cyanosis or edema bilaterally. Full range of motion without deformity. Skin: Skin color, texture, turgor normal.  No rashes or lesions. Neurologic:  Neurovascularly intact without any focal sensory/motor deficits. Psychiatric:  Alert   Capillary Refill: Brisk,< 3 seconds   Peripheral Pulses: +2 palpable, equal bilaterally       Labs:  For convenience and continuity at follow-up the following most recent labs are provided:      CBC:    Lab Results   Component Value Date    WBC 5.0 04/02/2022    HGB 11.2 04/02/2022    HCT 34.5 04/02/2022     04/02/2022       Renal:    Lab Results   Component Value Date     04/02/2022    K 4.0 04/02/2022    CL 99 04/02/2022    CO2 23 04/02/2022    BUN 8 04/02/2022    CREATININE <0.5 04/02/2022    CALCIUM 9.0 04/02/2022    PHOS 3.8 03/26/2022         Significant Diagnostic Studies    Radiology:   XR CHEST PORTABLE   Final Result   Satisfactory right IJ catheter placement         XR CHEST PORTABLE   Final Result   Increasing multifocal bilateral pneumonia, right greater than left. XR ABDOMEN (KUB) (SINGLE AP VIEW)   Final Result   Mild stool burden      Otherwise no acute abnormality         XR CHEST PORTABLE   Final Result   Persistent bilateral airspace disease, slightly improved on the right, for   which pulmonary edema or pneumonia are considerations. Small right pleural   effusion. XR CHEST PORTABLE   Final Result   1. Worsening congestive heart failure and new left basilar airspace disease. CT SOFT TISSUE NECK W CONTRAST   Final Result   Addendum 1 of 1   ADDENDUM:   There is a right-sided subclavian line which makes a loop around itself at   the junction of right jugular and right subclavian vein with this area    being   abnormally prominent/having a varicose appearance. The findings have significantly progressed since the neck CT of 7 days    ago. If there has been interval radiation therapy or medical intervention,   findings may be secondary to treatment effect. The findings were sent to the Radiology Results [address] at    4:29   pm on 3/29/2022to be communicated to a licensed caregiver. Final   There is a multilobulated enhancing lesion at the level of supraglottic   region Circumferentially narrowing the supra glottic area extending   superiorly into the bilateral vallecula fossa completely obstructing the   upper of aerodigestive mucosa at the level of hypopharynx and extending   inferiorly to the glottic region and infraglottic area. . 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 .      Small right pleural effusion and focal consolidative changes within the   dependent portion of the lungs likely suggestive of consolidative pulmonary   edema. Moderate mucosal thickening of the ethmoidal air cells and left sphenoid   sinus. Mild mucosal thickening of maxillary sinuses.       RECOMMENDATIONS:   Unavailable            XR CHEST PORTABLE   Final Result   Large consolidation right lower lobe from pneumonia. Small right pleural   effusion. Mild congestion. No pneumothorax. XR CHEST PORTABLE   Final Result   Decreasing right basilar pleuroparenchymal disease as compared to prior. A portion of the right upper extremity PICC line is looped in the right   supraclavicular region; correlate with catheter function. XR CHEST PORTABLE   Final Result   Slight worsening of the right pleural effusion. Placement of a right PICC   line catheter tip in the SVC. Remainder of lines and tubes are stable. XR CHEST PORTABLE   Final Result   1. Stable position of enteric feeding tube. 2. Interval progression of a small right pleural effusion and right basilar   airspace consolidation. XR ABDOMEN FOR NG/OG/NE TUBE PLACEMENT   Final Result   Enteric tube with distal end reaching the gastric body on 1 of 3 images. The   other 2 images demonstrate tip of enteric tube at the level of the   midesophagus. XR CHEST PORTABLE   Final Result   1. Opacity right lower lung compatible with pleural effusion and relaxation   atelectasis versus pneumonia. 2. Pulmonary sequela typical of that seen with smoking, including COPD;   correlate with clinical history. 3. Unchanged tracheostomy tube positioning. Dobbhoff type feeding tube   extends below the left hemidiaphragm, out of field of view. XR CHEST PORTABLE   Final Result   Supportive tubing is in normal position. Bibasilar opacities, atelectasis or airspace disease. There is mild   progression on the right side in the interval.         XR CHEST PORTABLE   Final Result   Mild dependent changes in the lower lung fields, atelectasis versus   infiltrate. Satisfactory position of endotracheal tube.          XR ABDOMEN FOR NG/OG/NE TUBE PLACEMENT   Final Result   Tip of feeding tube projects in region of gastric fundus         XR CHEST PORTABLE   Final Result   1. Properly placed endotracheal tube. 2.  Mild bibasilar atelectasis         CT SOFT TISSUE NECK W CONTRAST   Final Result   Lobulated large mass wrapping the left side of the hypopharynx extending into   supraglottic and glottic portions of the larynx measuring approximately 3.5   cm diameter with a craniocaudad extent of 4.9 cm. The mass appears to   severely narrow the airway and could potentially make for difficult   intubation. Findings compatible with squamous cell carcinoma. Mild to moderate emphysema. XR CHEST PORTABLE   Final Result   Chronic findings in the chest without acute airspace disease identified.                 Consults:     IP CONSULT TO OTOLARYNGOLOGY  IP CONSULT TO CRITICAL CARE  IP CONSULT TO PULMONOLOGY  IP CONSULT TO OTOLARYNGOLOGY  IP CONSULT TO [facility]  PHARMACY TO DOSE VANCOMYCIN  IP CONSULT TO INFECTIOUS DISEASES    Disposition: Transfer to [facility] for ENT    Condition at Discharge: Stable    Discharge Instructions/Follow-up: Follow-up with     Code Status:  Full Code     Activity: activity as tolerated    Diet: Tube feeding      Discharge Medications:     Discharge Medication List as of 4/2/2022 12:16 PM           Details   calcium-vitamin D (OSCAL-500) 500-200 MG-UNIT per tablet Take 1 tablet by mouth 2 times dailyHistorical Med      sennosides-docusate sodium (SENOKOT-S) 8.6-50 MG tablet Take 1 tablet by mouth 2 times dailyHistorical Med      nicotine (NICODERM CQ) 14 MG/24HR Place 1 patch onto the skin daily for 14 days, Disp-14 patch, R-0Normal      ipratropium-albuterol (DUONEB) 0.5-2.5 (3) MG/3ML SOLN nebulizer solution Inhale 3 mLs into the lungs 4 times daily, Disp-360 mL, R-3Print      furosemide (LASIX) 20 MG tablet Take 1 tablet by mouth daily, Disp-60 tablet, R-3Normal      guaiFENesin (MUCINEX) 600 MG extended release tablet Take 1,200 mg by mouth 2 times dailyHistorical Med      atorvastatin (LIPITOR) 80 MG tablet Take 1 tablet by mouth daily Cancel atorvastatin 20 mg a day, Disp-90 tablet, R-0Normal      Fluticasone-Umeclidin-Vilant (TRELEGY ELLIPTA) 200-62.5-25 MCG/INH AEPB Inhale 1 puff into the lungs 2 times daily, Disp-1 each, R-2Normal      gentamicin (GARAMYCIN) 0.1 % cream Apply topically   daily. , Disp-30 g, R-1, Normal      acetaminophen (TYLENOL) 500 MG tablet Take 1 tablet by mouth 4 times daily as needed for Pain, Disp-360 tablet, R-1Normal      warfarin (JANTOVEN) 5 MG tablet TAKE 1 AND 1/2 TABLETS BY MOUTH ONE TIME A DAY OR AS DIRECTED BY [facility] ([phone]), Disp-45 tablet, R-0Normal      NIFEdipine (ADALAT CC) 60 MG extended release tablet Take 1 tablet by mouth daily, Disp-90 tablet, R-0Normal      carvedilol (COREG) 25 MG tablet Take 1 tablet by mouth 2 times daily (with meals), Disp-60 tablet, R-3Normal      metFORMIN (GLUCOPHAGE) 500 MG tablet Take 1 tablet by mouth 2 times daily (with meals), Disp-180 tablet, R-0Normal      fluticasone (FLONASE) 50 MCG/ACT nasal spray 1 spray by Nasal route daily, Disp-9.9 g, R-5Normal      blood glucose monitor kit and supplies Dispense sufficient amount for indicated testing frequency plus additional to accommodate PRN testing needs. Dispense all needed supplies to include: monitor, strips, lancing device, lancets, control solutions, alcohol swabs., Disp-1 kit, R-0, Normal      Lancets MISC DAILY Starting Tue 1/4/2022, Disp-100 each, R-3, NormalTest 2 times daily while on PREDNISONE then 1 time daily & as needed for symptoms of irregular blood sugar      blood glucose monitor strips Test 2 times a day while on PREDNISONE, then 1 time daily & as needed for symptoms of irregular blood glucose. Dispense sufficient amount for indicated testing frequency plus additional to accommodate PRN testing needs. , Disp-100 strip, R-3, Normal      ferrous sulfate (IRON 325) 325 (65 Fe) MG tablet Take 1 tablet by mouth 2 times daily, Disp-180 tablet, R-0Normal Vitamins/Minerals TABS Take 1 tablet by mouth dailyHistorical Med             Time Spent on discharge is more than 30 minutes in the examination, evaluation, counseling and review of medications and discharge plan. Signed:    Romy Rothman MD   4/2/2022      Thank you Justus Buckner MD for the opportunity to be involved in this patient's care. If you have any questions or concerns please feel free to contact me at [phone].

## 2022-04-03 LAB
CULTURE, RESPIRATORY: ABNORMAL
GRAM STAIN RESULT: ABNORMAL
GRAM STAIN RESULT: ABNORMAL
ORGANISM: ABNORMAL

## 2022-04-04 LAB
BLOOD CULTURE, ROUTINE: NORMAL
CULTURE, BLOOD 2: NORMAL
GRAM STAIN RESULT: NORMAL
WOUND/ABSCESS: NORMAL

## 2022-04-26 ENCOUNTER — TELEPHONE (OUTPATIENT)
Dept: INTERNAL MEDICINE CLINIC | Age: 62
End: 2022-04-26

## 2022-04-26 NOTE — TELEPHONE ENCOUNTER
Marilyn De La Cruz from [facility] is calling ---pt released from [facility] yesterday---has NG tube feedings---needs verbal orders for home care for this---please call Marilyn De La Cruz at [phone] ext [phone]. Thanks.

## 2022-04-26 NOTE — TELEPHONE ENCOUNTER
Spoke with Luz Velez advised we have only seen patient once and that was in Feb.  Requested she check with surgeon that placed the NG tube.

## 2022-05-05 ENCOUNTER — OFFICE VISIT (OUTPATIENT)
Dept: INTERNAL MEDICINE CLINIC | Age: 62
End: 2022-05-05
Payer: COMMERCIAL

## 2022-05-05 VITALS
WEIGHT: 162.6 LBS | HEART RATE: 96 BPM | DIASTOLIC BLOOD PRESSURE: 62 MMHG | OXYGEN SATURATION: 100 % | BODY MASS INDEX: 24.72 KG/M2 | SYSTOLIC BLOOD PRESSURE: 100 MMHG

## 2022-05-05 DIAGNOSIS — I82.412 ACUTE DEEP VEIN THROMBOSIS (DVT) OF FEMORAL VEIN OF LEFT LOWER EXTREMITY (HCC): ICD-10-CM

## 2022-05-05 DIAGNOSIS — E11.69 TYPE 2 DIABETES MELLITUS WITH OTHER SPECIFIED COMPLICATION, WITHOUT LONG-TERM CURRENT USE OF INSULIN (HCC): ICD-10-CM

## 2022-05-05 DIAGNOSIS — I73.9 PERIPHERAL ARTERY DISEASE (HCC): ICD-10-CM

## 2022-05-05 DIAGNOSIS — C32.9 LARYNX CANCER (HCC): ICD-10-CM

## 2022-05-05 DIAGNOSIS — I10 ESSENTIAL HYPERTENSION: Primary | ICD-10-CM

## 2022-05-05 DIAGNOSIS — J41.0 SIMPLE CHRONIC BRONCHITIS (HCC): ICD-10-CM

## 2022-05-05 DIAGNOSIS — I10 ESSENTIAL HYPERTENSION: ICD-10-CM

## 2022-05-05 DIAGNOSIS — J43.2 CENTRILOBULAR EMPHYSEMA (HCC): ICD-10-CM

## 2022-05-05 DIAGNOSIS — Z09 HOSPITAL DISCHARGE FOLLOW-UP: ICD-10-CM

## 2022-05-05 PROBLEM — J38.7 LARYNGEAL MASS: Status: RESOLVED | Noted: 2022-03-23 | Resolved: 2022-05-05

## 2022-05-05 PROBLEM — J96.01 ACUTE RESPIRATORY FAILURE WITH HYPOXIA AND HYPERCAPNIA (HCC): Status: RESOLVED | Noted: 2022-03-10 | Resolved: 2022-05-05

## 2022-05-05 PROBLEM — I82.409 DVT (DEEP VENOUS THROMBOSIS) (HCC): Status: ACTIVE | Noted: 2022-04-10

## 2022-05-05 PROBLEM — J44.1 COPD WITH EXACERBATION (HCC): Status: RESOLVED | Noted: 2022-03-06 | Resolved: 2022-05-05

## 2022-05-05 PROBLEM — J96.02 ACUTE RESPIRATORY FAILURE WITH HYPOXIA AND HYPERCAPNIA (HCC): Status: RESOLVED | Noted: 2022-03-10 | Resolved: 2022-05-05

## 2022-05-05 PROBLEM — Z95.828 S/P IVC FILTER: Status: ACTIVE | Noted: 2022-04-13

## 2022-05-05 LAB
BASOPHILS ABSOLUTE: 0 K/UL (ref 0–0.2)
BASOPHILS RELATIVE PERCENT: 0.7 %
EOSINOPHILS ABSOLUTE: 0.1 K/UL (ref 0–0.6)
EOSINOPHILS RELATIVE PERCENT: 1.7 %
HCT VFR BLD CALC: 32.3 % (ref 40.5–52.5)
HEMOGLOBIN: 10.2 G/DL (ref 13.5–17.5)
LYMPHOCYTES ABSOLUTE: 2.4 K/UL (ref 1–5.1)
LYMPHOCYTES RELATIVE PERCENT: 48.3 %
MCH RBC QN AUTO: 27.3 PG (ref 26–34)
MCHC RBC AUTO-ENTMCNC: 31.7 G/DL (ref 31–36)
MCV RBC AUTO: 86 FL (ref 80–100)
MONOCYTES ABSOLUTE: 0.5 K/UL (ref 0–1.3)
MONOCYTES RELATIVE PERCENT: 11 %
NEUTROPHILS ABSOLUTE: 1.9 K/UL (ref 1.7–7.7)
NEUTROPHILS RELATIVE PERCENT: 38.3 %
PDW BLD-RTO: 15.2 % (ref 12.4–15.4)
PLATELET # BLD: 317 K/UL (ref 135–450)
PMV BLD AUTO: 8.1 FL (ref 5–10.5)
RBC # BLD: 3.75 M/UL (ref 4.2–5.9)
TSH SERPL DL<=0.05 MIU/L-ACNC: 1.83 UIU/ML (ref 0.27–4.2)
WBC # BLD: 4.9 K/UL (ref 4–11)

## 2022-05-05 PROCEDURE — G8427 DOCREV CUR MEDS BY ELIG CLIN: HCPCS | Performed by: INTERNAL MEDICINE

## 2022-05-05 PROCEDURE — 3023F SPIROM DOC REV: CPT | Performed by: INTERNAL MEDICINE

## 2022-05-05 PROCEDURE — G8420 CALC BMI NORM PARAMETERS: HCPCS | Performed by: INTERNAL MEDICINE

## 2022-05-05 PROCEDURE — 99214 OFFICE O/P EST MOD 30 MIN: CPT | Performed by: INTERNAL MEDICINE

## 2022-05-05 PROCEDURE — 1036F TOBACCO NON-USER: CPT | Performed by: INTERNAL MEDICINE

## 2022-05-05 PROCEDURE — 2022F DILAT RTA XM EVC RTNOPTHY: CPT | Performed by: INTERNAL MEDICINE

## 2022-05-05 PROCEDURE — 1111F DSCHRG MED/CURRENT MED MERGE: CPT | Performed by: INTERNAL MEDICINE

## 2022-05-05 PROCEDURE — 3044F HG A1C LEVEL LT 7.0%: CPT | Performed by: INTERNAL MEDICINE

## 2022-05-05 PROCEDURE — 3017F COLORECTAL CA SCREEN DOC REV: CPT | Performed by: INTERNAL MEDICINE

## 2022-05-05 RX ORDER — LISINOPRIL 20 MG/1
20 TABLET ORAL DAILY
COMMUNITY
Start: 2022-04-26 | End: 2022-06-13 | Stop reason: SDUPTHER

## 2022-05-05 RX ORDER — CETIRIZINE HYDROCHLORIDE 10 MG/1
1 TABLET ORAL DAILY
COMMUNITY
Start: 2022-04-25 | End: 2022-06-13 | Stop reason: SDUPTHER

## 2022-05-05 RX ORDER — LANOLIN ALCOHOL/MO/W.PET/CERES
6 CREAM (GRAM) TOPICAL NIGHTLY
COMMUNITY
Start: 2022-04-25 | End: 2022-06-13 | Stop reason: SDUPTHER

## 2022-05-05 RX ORDER — ENOXAPARIN SODIUM 100 MG/ML
80 INJECTION SUBCUTANEOUS EVERY 12 HOURS
COMMUNITY
Start: 2022-04-25 | End: 2022-05-25

## 2022-05-05 RX ORDER — BECAPLERMIN 0.01 %
1 GEL (GRAM) TOPICAL DAILY
COMMUNITY
Start: 2020-06-03

## 2022-05-05 ASSESSMENT — ENCOUNTER SYMPTOMS
COUGH: 0
CONSTIPATION: 0
SINUS PAIN: 0
ABDOMINAL PAIN: 0
BLOOD IN STOOL: 0
COLOR CHANGE: 0
WHEEZING: 0
CHEST TIGHTNESS: 0
SHORTNESS OF BREATH: 0

## 2022-05-05 NOTE — PROGRESS NOTES
Ashley Angel (:  1960) is a 65 y.o. male,New patient, here for evaluation of the following chief complaint(s):  Follow-Up from Hospital ([facility] larynx surgery in 2 weeks.)         ASSESSMENT/PLAN:  1. Essential hypertension  -     Comprehensive Metabolic Panel; Future  -     TSH; Future  2. Simple chronic bronchitis ([address]  3. Centrilobular emphysema ([address]  4. Peripheral artery disease ([address]  5. Acute deep vein thrombosis (DVT) of femoral vein of left lower extremity (HCC)  6. Type 2 diabetes mellitus with other specified complication, without long-term current use of insulin (HCC)  -     Hemoglobin A1C; Future  -     Lipid Panel; Future  7. L[facility] cancer ([facility]  -     CBC with Auto Differential; Future  -     Comprehensive Metabolic Panel; Future  8.  Hospital discharge follow-up  -     VT DISCHARGE MEDS RECONCILED W/ CURRENT OUTPATIENT MED LIST  At this stage the patient blood pressure is well controlled him concerned that he may be a bit on the dry side again to do his blood test as well to make sure that he does not have any electrolyte disturbances he has been taking all of his carvedilol diuretics and nifedipine during the hospitalization I feel that we need to reach decision in regards to his beta-blocker prior to his admission in terms of his cardiac prevention he will be seeing cardiology to have his presurgical risk assessment as well but given how low his pressure is I think he may not be a good candidate at this stage unless if his pressure starts improving with increased hydration    The patient has been treated with Lovenox for his DVT he can continue with all the way through till the evening of his procedure and then he can be discontinued to we will be able to do the procedure next morning    The patient is being fed through a feeding tube he is doing Fairly well with that but depending on the lab results may need to adjust some of his inputs    Patient diabetes is probably can be fairly controlled but cannot conclude without testing so we will check it today    Patient does not have any cardiovascular or pulmonary symptoms at this point and he is probably going to be able to have his procedure barring any new findings    At this point the patient does have multiple medical issues that are resulting in him being homebound and unable to get out of the house without significant hardship to himself  And to his family patient also have devices that requires continuous care and he will need to have skilled nursing as well as physical therapy and Occupational Therapy and speech therapy patient be certified for homebound status and his home health services will be approved and signed for    Return in about 8 weeks (around 6/30/2022).          Subjective   SUBJECTIVE/OBJECTIVE:    Lab Review   Lab Results   Component Value Date     04/02/2022     04/01/2022     03/31/2022    K 4.0 04/02/2022    K 3.6 04/01/2022    K 3.8 03/31/2022    CO2 23 04/02/2022    CO2 25 04/01/2022    CO2 28 03/31/2022    BUN 8 04/02/2022    BUN 9 04/01/2022    BUN 11 03/31/2022    CREATININE <0.5 04/02/2022    CREATININE <0.5 04/01/2022    CREATININE <0.5 03/31/2022    GLUCOSE 143 04/02/2022    GLUCOSE 147 04/01/2022    GLUCOSE 119 03/31/2022    CALCIUM 9.0 04/02/2022    CALCIUM 9.0 04/01/2022    CALCIUM 8.8 03/31/2022     Lab Results   Component Value Date    WBC 5.0 04/02/2022    WBC 5.4 04/01/2022    WBC 6.2 03/31/2022    HGB 11.2 04/02/2022    HGB 13.1 04/02/2022    HGB 11.0 04/01/2022    HCT 34.5 04/02/2022    HCT 33.7 04/01/2022    HCT 32.9 03/31/2022    MCV 85.7 04/02/2022    MCV 86.4 04/01/2022    MCV 86.6 03/31/2022     04/02/2022     04/01/2022     03/31/2022     Lab Results   Component Value Date    CHOL 140 02/18/2022    CHOL 134 03/08/2021    CHOL 255 03/29/2010    TRIG 175 03/29/2022    TRIG 152 03/22/2022    TRIG 105 02/18/2022    HDL 71 02/18/2022    HDL 46 03/08/2021    HDL 60 03/29/2010       Vitals 5/5/2022 3/25/2022 0/70/5996   SYSTOLIC 718 - -   DIASTOLIC 62 - -   Pulse 96 - -   Temp - - -   Resp - 1 1   SpO2 100 - -   Weight 162 lb 9.6 oz - -   Some recent data might be hidden       Reviewed the patient all his events unfortunately patient did have a very complex hospital admission where he essentially had a aspiration pneumonia complicated by DVT and found to have a supraglottic mass that was biopsied and found to have cancer    Diabetes  He presents for his follow-up diabetic visit. He has type 2 diabetes mellitus. His disease course has been stable. Pertinent negatives for hypoglycemia include no headaches. Pertinent negatives for diabetes include no chest pain, no fatigue, no polyuria and no weakness. Hypertension  This is a chronic problem. The current episode started more than 1 year ago. The problem is unchanged. The problem is controlled. Pertinent negatives include no chest pain, headaches, palpitations or shortness of breath. Review of Systems   Constitutional: Positive for appetite change. Negative for activity change, fatigue and unexpected weight change. HENT: Negative for congestion, ear pain and sinus pain. Respiratory: Negative for cough, chest tightness, shortness of breath and wheezing. Cardiovascular: Negative for chest pain and palpitations. Gastrointestinal: Negative for abdominal pain, blood in stool and constipation. Endocrine: Negative for cold intolerance, heat intolerance and polyuria. Genitourinary: Negative for dysuria, frequency and urgency. Musculoskeletal: Negative for arthralgias and myalgias. Skin: Negative for color change and rash. Neurological: Negative for weakness and headaches. Hematological: Negative for adenopathy. Does not bruise/bleed easily. Psychiatric/Behavioral: Negative for agitation, dysphoric mood and sleep disturbance. Objective   Physical Exam  Vitals and nursing note reviewed. Constitutional:       General: He is not in acute distress. Appearance: Normal appearance. HENT:      Head: Normocephalic and atraumatic. Right Ear: Tympanic membrane normal.      Left Ear: Tympanic membrane normal.      Nose: Nose normal.   Eyes:      Extraocular Movements: Extraocular movements intact. Conjunctiva/sclera: Conjunctivae normal.      Pupils: Pupils are equal, round, and reactive to light. Neck:      Vascular: No carotid bruit. Cardiovascular:      Rate and Rhythm: Normal rate and regular rhythm. Pulses: Normal pulses. Heart sounds: No murmur heard. Pulmonary:      Effort: Pulmonary effort is normal. No respiratory distress. Breath sounds: Normal breath sounds. Abdominal:      General: Abdomen is flat. Bowel sounds are normal. There is no distension. Palpations: Abdomen is soft. Tenderness: There is no abdominal tenderness. Musculoskeletal:         General: No swelling, tenderness or deformity. Cervical back: Normal range of motion and neck supple. No rigidity or tenderness. Right lower leg: No edema. Left lower leg: No edema. Lymphadenopathy:      Cervical: No cervical adenopathy. Skin:     Coloration: Skin is not jaundiced. Findings: No bruising, erythema or lesion. Neurological:      General: No focal deficit present. Mental Status: He is alert and oriented to person, place, and time. Cranial Nerves: No cranial nerve deficit. Motor: No weakness. Gait: Gait normal.            This dictation was generated by voice recognition computer software. Although all attempts are made to edit the dictation for accuracy, there may be errors in the transcription that are not intended. An electronic signature was used to authenticate this note.     --Roseanna Moreno MD

## 2022-05-06 ENCOUNTER — TELEPHONE (OUTPATIENT)
Dept: PHARMACY | Age: 62
End: 2022-05-06

## 2022-05-06 LAB
A/G RATIO: 1.3 (ref 1.1–2.2)
ALBUMIN SERPL-MCNC: 4.1 G/DL (ref 3.4–5)
ALP BLD-CCNC: 73 U/L (ref 40–129)
ALT SERPL-CCNC: 20 U/L (ref 10–40)
ANION GAP SERPL CALCULATED.3IONS-SCNC: 14 MMOL/L (ref 3–16)
AST SERPL-CCNC: 19 U/L (ref 15–37)
BILIRUB SERPL-MCNC: <0.2 MG/DL (ref 0–1)
BUN BLDV-MCNC: 12 MG/DL (ref 7–20)
CALCIUM SERPL-MCNC: 9.9 MG/DL (ref 8.3–10.6)
CHLORIDE BLD-SCNC: 96 MMOL/L (ref 99–110)
CHOLESTEROL, TOTAL: 112 MG/DL (ref 0–199)
CO2: 25 MMOL/L (ref 21–32)
CREAT SERPL-MCNC: 0.6 MG/DL (ref 0.8–1.3)
ESTIMATED AVERAGE GLUCOSE: 134.1 MG/DL
GFR AFRICAN AMERICAN: >60
GFR NON-AFRICAN AMERICAN: >60
GLUCOSE BLD-MCNC: 76 MG/DL (ref 70–99)
HBA1C MFR BLD: 6.3 %
HDLC SERPL-MCNC: 41 MG/DL (ref 40–60)
LDL CHOLESTEROL CALCULATED: 53 MG/DL
POTASSIUM SERPL-SCNC: 4.9 MMOL/L (ref 3.5–5.1)
SODIUM BLD-SCNC: 135 MMOL/L (ref 136–145)
TOTAL PROTEIN: 7.3 G/DL (ref 6.4–8.2)
TRIGL SERPL-MCNC: 92 MG/DL (ref 0–150)
VLDLC SERPL CALC-MCNC: 18 MG/DL

## 2022-05-06 NOTE — TELEPHONE ENCOUNTER
Not a telephone call. Reviewing chart to check status. Currently on Lovenox until procedure which is planned 5/26/22. ([facility] larynx surgery)    Will follow along to assess need for warfarin management if warfarin should be restarted.      Roena Gosselin, PharmD, [address] [place] [address]  Anticoagulation Service  [phone]

## 2022-05-18 ENCOUNTER — HOSPITAL ENCOUNTER (OUTPATIENT)
Dept: WOUND CARE | Age: 62
Discharge: HOME | End: 2022-05-18
Payer: COMMERCIAL

## 2022-05-18 VITALS — HEART RATE: 106 BPM | SYSTOLIC BLOOD PRESSURE: 113 MMHG | DIASTOLIC BLOOD PRESSURE: 69 MMHG | TEMPERATURE: 97.1 F

## 2022-05-18 DIAGNOSIS — I73.9 PERIPHERAL ARTERY DISEASE (HCC): Primary | ICD-10-CM

## 2022-05-18 PROCEDURE — 11042 DBRDMT SUBQ TIS 1ST 20SQCM/<: CPT

## 2022-05-18 PROCEDURE — 11043 DBRDMT MUSC&/FSCA 1ST 20/<: CPT

## 2022-05-18 RX ORDER — GENTAMICIN SULFATE 1 MG/G
OINTMENT TOPICAL ONCE
Status: CANCELLED | OUTPATIENT
Start: 2022-05-18 | End: 2022-05-18

## 2022-05-18 RX ORDER — CLOBETASOL PROPIONATE 0.5 MG/G
OINTMENT TOPICAL ONCE
Status: CANCELLED | OUTPATIENT
Start: 2022-05-18 | End: 2022-05-18

## 2022-05-18 RX ORDER — LIDOCAINE HYDROCHLORIDE 20 MG/ML
JELLY TOPICAL ONCE
Status: CANCELLED | OUTPATIENT
Start: 2022-05-18 | End: 2022-05-18

## 2022-05-18 RX ORDER — BACITRACIN, NEOMYCIN, POLYMYXIN B 400; 3.5; 5 [USP'U]/G; MG/G; [USP'U]/G
OINTMENT TOPICAL ONCE
Status: CANCELLED | OUTPATIENT
Start: 2022-05-18 | End: 2022-05-18

## 2022-05-18 RX ORDER — BACITRACIN ZINC AND POLYMYXIN B SULFATE 500; 1000 [USP'U]/G; [USP'U]/G
OINTMENT TOPICAL ONCE
Status: CANCELLED | OUTPATIENT
Start: 2022-05-18 | End: 2022-05-18

## 2022-05-18 RX ORDER — BETAMETHASONE DIPROPIONATE 0.05 %
OINTMENT (GRAM) TOPICAL ONCE
Status: CANCELLED | OUTPATIENT
Start: 2022-05-18 | End: 2022-05-18

## 2022-05-18 RX ORDER — LIDOCAINE 40 MG/G
CREAM TOPICAL ONCE
Status: DISCONTINUED | OUTPATIENT
Start: 2022-05-18 | End: 2022-05-19 | Stop reason: HOSPADM

## 2022-05-18 RX ORDER — GENTAMICIN SULFATE 1 MG/G
CREAM TOPICAL
Qty: 30 G | Refills: 1 | Status: SHIPPED | OUTPATIENT
Start: 2022-05-18 | End: 2022-09-14 | Stop reason: SDUPTHER

## 2022-05-18 RX ORDER — LIDOCAINE HYDROCHLORIDE 40 MG/ML
SOLUTION TOPICAL ONCE
Status: CANCELLED | OUTPATIENT
Start: 2022-05-18 | End: 2022-05-18

## 2022-05-18 RX ORDER — LIDOCAINE 50 MG/G
OINTMENT TOPICAL ONCE
Status: CANCELLED | OUTPATIENT
Start: 2022-05-18 | End: 2022-05-18

## 2022-05-18 RX ORDER — GINSENG 100 MG
CAPSULE ORAL ONCE
Status: CANCELLED | OUTPATIENT
Start: 2022-05-18 | End: 2022-05-18

## 2022-05-18 RX ORDER — LIDOCAINE 40 MG/G
CREAM TOPICAL ONCE
Status: CANCELLED | OUTPATIENT
Start: 2022-05-18 | End: 2022-05-18

## 2022-05-18 ASSESSMENT — PAIN DESCRIPTION - ONSET: ONSET: ON-GOING

## 2022-05-18 ASSESSMENT — PAIN - FUNCTIONAL ASSESSMENT: PAIN_FUNCTIONAL_ASSESSMENT: PREVENTS OR INTERFERES SOME ACTIVE ACTIVITIES AND ADLS

## 2022-05-18 ASSESSMENT — PAIN DESCRIPTION - ORIENTATION: ORIENTATION: LEFT

## 2022-05-18 ASSESSMENT — PAIN DESCRIPTION - PAIN TYPE: TYPE: CHRONIC PAIN

## 2022-05-18 ASSESSMENT — PAIN DESCRIPTION - FREQUENCY: FREQUENCY: CONTINUOUS

## 2022-05-18 ASSESSMENT — PAIN DESCRIPTION - DESCRIPTORS: DESCRIPTORS: THROBBING

## 2022-05-18 ASSESSMENT — PAIN SCALES - GENERAL: PAINLEVEL_OUTOF10: 5

## 2022-05-18 ASSESSMENT — PAIN DESCRIPTION - LOCATION: LOCATION: FOOT

## 2022-05-18 NOTE — PLAN OF CARE
Discharge instructions given. Patient verbalized understanding.   Return to [facility] after procedure at [facility]

## 2022-05-18 NOTE — PLAN OF CARE
[address] [facility]:      [address] f: [phone] [phone]: [phone] [email or web address]     Ordering Center: Meghan Deshpande [facility]  [place] [address]  [phone]  Dept: [phone]   Fax# [phone]    Patient Information:      Angie Duong   [phone]   : 1960  AGE: 65 y.o. GENDER: male   TODAYS DATE:  2022    Insurance:      PRIMARY INSURANCE:  Plan: [employer] JOLIE  Coverage: [employer] JOLIE  Effective Date: 2022  Group Number: [unfilled]  Subscriber Number: [id number] - (Commercial)    Payor/Plan Subscr  Sex Relation Sub. Ins. ID Effective Group Num   1.  [facility] 1960 Male Self [id number]                                    [id number]         Patient Wound Information:     Additional ICD-10 Codes:     Patient Active Problem List   Diagnosis Code    Peripheral artery disease ([facility]) I73.9    Centrilobular emphysema ([facility] J43.2    COPD (chronic obstructive pulmonary disease) ([facility]) J44.9    Atherosclerosis of native arteries of right leg with ulceration of other part of foot ([address] I70.235    Impotence N52.9    Acute deep vein thrombosis (DVT) of femoral vein of left lower extremity ([facility]) I82.412    Colon polyp K63.5    Hyperlipidemia E78.5    Non-healing ulcer of left ankle ([facility]) L97.329    Venous insufficiency I87.2    Venous ulcer ([address] I83.009, L97.909    Essential hypertension I10    Type 2 diabetes mellitus with other specified complication ([facility]) A62.48    Fibromyalgia M79.7    Overweight (BMI 25.0-29. 9) E66.3    Tracheostomy dependent (HCC) Z93.0    Decubitus ulcer of heel, bilateral L89.619, L89.629    DVT (deep venous thrombosis) ([facility]) I82.409    Larynx cancer (HCC) C32.9    S/P IVC filter Z95.828       WOUNDS REQUIRING DRESSING SUPPLIES:     Wound 22 Ankle Medial;Left #1 (Active)   Wound Image   05/18/22 1552   Wound Etiology Diabetic 05/18/22 1552   Wound Cleansed Cleansed with saline 05/18/22 1552   Wound Length (cm) 0.9 cm 05/18/22 1552   Wound Width (cm) 1.8 cm 05/18/22 1552   Wound Depth (cm) 0.1 cm 05/18/22 1552   Wound Surface Area (cm^2) 1.62 cm^2 05/18/22 1552   Wound Volume (cm^3) 0.162 cm^3 05/18/22 1552   Post-Procedure Length (cm) 0.9 cm 05/18/22 1603   Post-Procedure Width (cm) 1.8 cm 05/18/22 1603   Post-Procedure Depth (cm) 0.1 cm 05/18/22 1603   Post-Procedure Surface Area (cm^2) 1.62 cm^2 05/18/22 1603   Post-Procedure Volume (cm^3) 0.162 cm^3 05/18/22 1603   Drainage Amount None 05/18/22 1603   Drainage Description Yellow 05/18/22 1552   Odor None 05/18/22 1552   Jamila-wound Assessment Dry/flaky 05/18/22 1552   Margins Attached edges 05/18/22 1552   Number of days: 0       Wound 05/18/22 Ankle Medial;Right #2 (Active)   Wound Image   05/18/22 1552   Wound Etiology Diabetic 05/18/22 1552   Wound Cleansed Cleansed with saline 05/18/22 1552   Wound Length (cm) 2 cm 05/18/22 1552   Wound Width (cm) 4 cm 05/18/22 1552   Wound Depth (cm) 0.1 cm 05/18/22 1552   Wound Surface Area (cm^2) 8 cm^2 05/18/22 1552   Wound Volume (cm^3) 0.8 cm^3 05/18/22 1552   Post-Procedure Length (cm) 2 cm 05/18/22 1603   Post-Procedure Width (cm) 4 cm 05/18/22 1603   Post-Procedure Depth (cm) 0.1 cm 05/18/22 1603   Post-Procedure Surface Area (cm^2) 8 cm^2 05/18/22 1603   Post-Procedure Volume (cm^3) 0.8 cm^3 05/18/22 1603   Wound Assessment Bleeding 05/18/22 1603   Drainage Amount Small 05/18/22 1552   Drainage Description Yellow 05/18/22 1552   Odor None 05/18/22 1552   Jamila-wound Assessment Dry/flaky 05/18/22 1552   Margins Defined edges 05/18/22 1552   Number of days: 0     Incision 02/26/21 Anterior;Right (Active)   Number of days: 446       Supplies Requested :      WOUND #: 1 and 2   PRIMARY DRESSING:    Collagen  - EPIONA   Cover and Secure with: 4X4 gauze pad  Bulky roll gauze     FREQUENCY OF DRESSING CHANGES:  Every other day    Wound Thickness [x] Full   []Partial                                     Patient Wound(s) Debrided: [x] Yes   [] No    Debridement Date: 5/18/2022    Debribement Type: Excisional/Sharp    ADDITIONAL ITEMS:  [] Gloves Small  [x] Gloves Medium [] Gloves Large [] Gloves Francois Lissa  [] Paper Tape 1\" [] Paper Tape 2\" [] Paper Tape 3\"  [x] Medipore Tape 3\"  [x] Saline  [] Skin Prep   [] Adhesive Remover   [] Cotton Tip Applicators  [] Tubular Stocking   [] Size E  [] Size G  [] Other:    Patient currently being seen by Home Health: [] Yes   [x] No    Duration for needed supplies:  []15  [x]30  []60  []90 Days    Provider Information:      PROVIDER'S NAME/NPI  Sherin Daly DPM  [id number]    I give permission to coordinate the care for this patient

## 2022-05-18 NOTE — PROGRESS NOTES
[facility]  Progress Note and Procedure Note      Kirsty Eastman  AGE: 65 y.o. GENDER: male  : 1960  TODAY'S DATE:  2022    Subjective:     Chief Complaint   Patient presents with    Wound Check     bilateral feet F/U         HISTORY of PRESENT ILLNESS HPI     Kirsty Eastman is a 65 y.o. male who presents today for wound evaluation. History of Wound: Admits to having chronic wounds for at least a year on the left leg and 8 to 9 months on the right leg. He states that he has had arterial bypasses on both of his legs in the past.  He was seeing previous wound care and podiatry for these wounds. He states that his insurance had some issues and he has not been seeing anyone since 2021 he has been treating the wounds himself with PSO and bandages. 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 He admits that he will be having a laryngectomy in the near future due to a mass. He is currently breathing with a tracheotomy he is changing the bandages himself without incident.       Wound Pain:  intermittent  Severity:  3  10   Wound Type:  venous, arterial and diabetic  Modifying Factors:  edema, venous stasis, lymphedema, diabetes, decreased mobility, arterial insufficiency and decreased tissue oxygenation  Associated Signs/Symptoms:  drainage, numbness and odor        PAST MEDICAL HISTORY        Diagnosis Date    Diabetes mellitus ([address]     Fibromyalgia     History of DVT (deep vein thrombosis)     Hyperlipidemia     Hypertension     Type 2 diabetes mellitus with circulatory disorder, without long-term current use of insulin ([address] 2022       PAST SURGICAL HISTORY    Past Surgical History:   Procedure Laterality Date    APPENDECTOMY  2005    COLONOSCOPY  2016    FEMORAL BYPASS Left 2020    femoral posterior tibial bypass    FEMORAL-TIBIAL BYPASS GRAFT Right 2020    with femoral endarterectomy and patch angioplasty    FOOT DEBRIDEMENT Left 2020    FOOT DEBRIDEMENT Right 2021    DEBRIDEMENT OF RIGHT FOOT WOUND WITH GRAFT APPLICATION performed by Sim Rush DPM at [address]  N/A 3/25/2022    DIRECT LARYNGOSCOPY WITH BIOPSY AND FROZEN SECTIONS performed by Sandy Chawla DO at [facility] Right 2020    stent leg for PVD    TRACHEOSTOMY N/A 3/25/2022    TRACHEOTOMY performed by Sandy Chawla DO at [address] [facility] HISTORY    Family History   Problem Relation Age of Onset    Cancer Mother     Lung Cancer Mother     Diabetes Father     Stroke Father        SOCIAL HISTORY    Social History     Tobacco Use    Smoking status: Former Smoker     Packs/day: 0.50     Years: 20.00     Pack years: 10.00     Types: Cigarettes     Start date: 1977     Quit date: 2021     Years since quittin.3    Smokeless tobacco: Never Used   Vaping Use    Vaping Use: Never used   Substance Use Topics    Alcohol use:  Yes     Alcohol/week: 6.0 standard drinks     Types: 6 Cans of beer per week    Drug use: Never       ALLERGIES    Allergies   Allergen Reactions    Chantix [Varenicline] Other (See Comments)     Hallucinations         MEDICATIONS    Current Outpatient Medications on File Prior to Encounter   Medication Sig Dispense Refill    cyanocobalamin 250 MCG tablet 250 mcg by Nasogastric route daily      cetirizine (ZYRTEC) 10 MG tablet Take 1 tablet by mouth daily      enoxaparin (LOVENOX) 80 MG/0.8ML Inject 80 mg into the skin every 12 hours      lisinopril (PRINIVIL;ZESTRIL) 20 MG tablet 20 mg by Nasogastric route daily      melatonin 3 MG TABS tablet 6 mg by Nasogastric route nightly      becaplermin (REGRANEX) 0.01 % gel Apply 1 Applicatorful topically daily      calcium-vitamin D (OSCAL-500) 500-200 MG-UNIT per tablet Take 1 tablet by mouth 2 times daily      sennosides-docusate sodium (SENOKOT-S) 8.6-50 MG tablet Take 1 tablet by mouth 2 times daily      ipratropium-albuterol (DUONEB) 0.5-2.5 (3) MG/3ML SOLN nebulizer solution Inhale 3 mLs into the lungs 4 times daily 360 mL 3    atorvastatin (LIPITOR) 80 MG tablet Take 1 tablet by mouth daily Cancel atorvastatin 20 mg a day 90 tablet 0    Fluticasone-Umeclidin-Vilant (TRELEGY ELLIPTA) 200-62.5-25 MCG/INH AEPB Inhale 1 puff into the lungs 2 times daily 1 each 2    acetaminophen (TYLENOL) 500 MG tablet Take 1 tablet by mouth 4 times daily as needed for Pain 360 tablet 1    metFORMIN (GLUCOPHAGE) 500 MG tablet Take 1 tablet by mouth 2 times daily (with meals) 180 tablet 0    fluticasone (FLONASE) 50 MCG/ACT nasal spray 1 spray by Nasal route daily 9.9 g 5    ferrous sulfate (IRON 325) 325 (65 Fe) MG tablet Take 1 tablet by mouth 2 times daily 180 tablet 0    Vitamins/Minerals TABS Take 1 tablet by mouth daily        No current facility-administered medications on file prior to encounter. REVIEW OF SYSTEMS    Pertinent items are noted in HPI. Objective:      /69   Pulse 106   Temp 97.1 °F (36.2 °C) (Tympanic)     PHYSICAL EXAM    Vascular: Vascular status Impaired  palpable pedal pulses, right DP0/4 and PT1/4, left DP0/4 and PT1/4. CFT 3 seconds digits 1 to 5 bilateral.  Hair growthAbsent  both lower extremities and feet. Skin temperature is warm to warm from pretibial area to distal digits bilateral.  Exam is negative for rubor, pallor, cyanosis or signs of acute vascular compromise bilaterally. Exam is positive for edema bilateral lower extremity. Varicosities Present bilateral lower extremity. Neuro: Neurologic status diminished bilateral with epicritic Absent  , proprioceptive Absent , vibratory sensation Absent  and protopathicPresent. DTRs Absent  bilateral Achilles. There were no reproducible neuritic symptoms on exam bilateral feet/ankles. Derm: Ulceration to bilateral ankles. Ecchymosis Absent  bilateral feet/foot.     Musculoskeletal: No pain with debridement of wounds 5/5 muscle strength in/eversion and dorsi/plantarflexion bilateral feet. No gross instability noted. Assessment:     Problem List Items Addressed This Visit     Peripheral artery disease ([address] - Primary    Relevant Medications    lidocaine (LMX) 4 % cream    Other Relevant Orders    Initiate Outpatient Wound Care Protocol          Procedure Note    Performed by: Sherin Daly DPM    Consent obtained: Yes    Time out taken:  Yes    Pain Control: Anesthetic  Anesthetic: 4% Lidocaine Cream     Debridement:Excisional Debridement    Using curette the wound was sharply debrided    down through and including the removal of epidermis, dermis, subcutaneous tissue and muscle/fascia.         Devitalized Tissue Debrided:  fibrin, biofilm, slough, necrotic/eschar and exudate    Pre Debridement Measurements:  Are located in the Wound Documentation Flow Sheet    Wound #: 1     Wound Care Documentation:  Wound 05/18/22 Ankle Medial;Left #1 (Active)   Wound Image   05/18/22 1552   Wound Etiology Diabetic 05/18/22 1552   Wound Cleansed Cleansed with saline 05/18/22 1552   Wound Length (cm) 0.9 cm 05/18/22 1552   Wound Width (cm) 1.8 cm 05/18/22 1552   Wound Depth (cm) 0.1 cm 05/18/22 1552   Wound Surface Area (cm^2) 1.62 cm^2 05/18/22 1552   Wound Volume (cm^3) 0.162 cm^3 05/18/22 1552   Post-Procedure Length (cm) 0.9 cm 05/18/22 1603   Post-Procedure Width (cm) 1.8 cm 05/18/22 1603   Post-Procedure Depth (cm) 0.1 cm 05/18/22 1603   Post-Procedure Surface Area (cm^2) 1.62 cm^2 05/18/22 1603   Post-Procedure Volume (cm^3) 0.162 cm^3 05/18/22 1603   Drainage Amount None 05/18/22 1603   Drainage Description Yellow 05/18/22 1552   Odor None 05/18/22 1552   Jamila-wound Assessment Dry/flaky 05/18/22 1552   Margins Attached edges 05/18/22 1552   Number of days: 0       Wound 05/18/22 Ankle Medial;Right #2 (Active)   Wound Image   05/18/22 1552   Wound Etiology Diabetic 05/18/22 1552   Wound Cleansed infection.   Discharge Treatment  Follow-up after his laryngectomy    Written Patient Discharge Instructions Given            Electronically signed by Mariola Abarca DPM on 5/18/2022 at 4:31 PM

## 2022-05-19 ENCOUNTER — TELEPHONE (OUTPATIENT)
Dept: INTERNAL MEDICINE CLINIC | Age: 62
End: 2022-05-19

## 2022-05-19 NOTE — TELEPHONE ENCOUNTER
----- Message from Sabra Pierson sent at 5/19/2022 11:20 AM EDT -----  Subject: Message to Provider    QUESTIONS  Information for Provider? [employer] Darwin Burks number is [phone]    number is [phone] order for medical supplies did we get this fax? cb   ---------------------------------------------------------------------------  --------------  CALL BACK INFO  What is the best way for the office to contact you? OK to leave message on   voicemail  Preferred Call Back Phone Number? [phone]  ---------------------------------------------------------------------------  --------------  SCRIPT ANSWERS  Relationship to Patient? Third Party  Third Party Type? Other  Other Third Party Type? [employer]  Representative Name?  Darwin Vitaliy

## 2022-06-02 NOTE — TELEPHONE ENCOUNTER
Still inpatient post procedure. Heparin prophylaxis. Will continue to follow along.      Luis Doshi, PharmD, [address] [place] [address]  Anticoagulation Service  [phone]

## 2022-06-08 ENCOUNTER — TELEPHONE (OUTPATIENT)
Dept: INTERNAL MEDICINE CLINIC | Age: 62
End: 2022-06-08

## 2022-06-08 NOTE — TELEPHONE ENCOUNTER
[address] [phone] ext [phone]     Calling about home health plan of care that was faxed the end of April. Needs it to be done & sent back over, has been faxed 6 times, called on 05/19, still nothing received. Confirmed fax # as well with them to make sure it was correct.

## 2022-06-10 ENCOUNTER — TELEPHONE (OUTPATIENT)
Dept: INTERNAL MEDICINE CLINIC | Age: 62
End: 2022-06-10

## 2022-06-10 ENCOUNTER — TELEPHONE (OUTPATIENT)
Dept: PHARMACY | Age: 62
End: 2022-06-10

## 2022-06-10 DIAGNOSIS — I73.9 PAD (PERIPHERAL ARTERY DISEASE) (HCC): ICD-10-CM

## 2022-06-10 DIAGNOSIS — E78.00 PURE HYPERCHOLESTEROLEMIA: ICD-10-CM

## 2022-06-10 DIAGNOSIS — I10 ESSENTIAL HYPERTENSION: ICD-10-CM

## 2022-06-10 DIAGNOSIS — E11.9 TYPE 2 DIABETES MELLITUS WITHOUT COMPLICATION, WITHOUT LONG-TERM CURRENT USE OF INSULIN (HCC): ICD-10-CM

## 2022-06-10 NOTE — TELEPHONE ENCOUNTER
Pt wife calling requesting refill of Cumadin---Atorvastatin ---Cetirizine --Cyanocobalamine---Docusate  50 mg/5 ml---Lisinopril---Melatonin---Metformin    Last written 4/25-4/26/22  [facility] released from [facility] has HFU 6/16  Last OV 5/5/22  Next OV 6/16/22  Last recommended OV NA    Please send to [address]

## 2022-06-10 NOTE — TELEPHONE ENCOUNTER
Lela Sosa called from [facility]. [facility] going home with warfarin. Asked about recent dose. Last therapeutic INR w us 1/10/22 at: Warfarin 7.5 mg (1 & 1/2 tablets) daily except 10mg (2 tablets) every Wednesday and Saturday    Advised to restart warfarin at 7.5mg daily and recheck INR in 3-4 days. Will follow along to see who will be managing his warfarin.      Miriam Machdao, PharmD, [address] [place] [address]  Anticoagulation Service  [phone]

## 2022-06-13 RX ORDER — LISINOPRIL 20 MG/1
20 TABLET ORAL DAILY
Qty: 30 TABLET | Refills: 0 | Status: SHIPPED | OUTPATIENT
Start: 2022-06-13 | End: 2022-07-14 | Stop reason: SDUPTHER

## 2022-06-13 RX ORDER — SENNA AND DOCUSATE SODIUM 50; 8.6 MG/1; MG/1
1 TABLET, FILM COATED ORAL 2 TIMES DAILY
Qty: 60 TABLET | Refills: 0 | Status: SHIPPED | OUTPATIENT
Start: 2022-06-13 | End: 2022-07-14 | Stop reason: SDUPTHER

## 2022-06-13 RX ORDER — CETIRIZINE HYDROCHLORIDE 10 MG/1
10 TABLET ORAL DAILY
Qty: 30 TABLET | Refills: 0 | Status: SHIPPED | OUTPATIENT
Start: 2022-06-13 | End: 2022-07-14 | Stop reason: SDUPTHER

## 2022-06-13 RX ORDER — ATORVASTATIN CALCIUM 80 MG/1
80 TABLET, FILM COATED ORAL DAILY
Qty: 90 TABLET | Refills: 0 | Status: SHIPPED | OUTPATIENT
Start: 2022-06-13 | End: 2022-06-16 | Stop reason: SDUPTHER

## 2022-06-13 RX ORDER — LANOLIN ALCOHOL/MO/W.PET/CERES
6 CREAM (GRAM) TOPICAL NIGHTLY PRN
Qty: 30 TABLET | Refills: 2 | Status: SHIPPED | OUTPATIENT
Start: 2022-06-13 | End: 2022-07-14 | Stop reason: SDUPTHER

## 2022-06-16 ENCOUNTER — OFFICE VISIT (OUTPATIENT)
Dept: INTERNAL MEDICINE CLINIC | Age: 62
End: 2022-06-16
Payer: MEDICAID

## 2022-06-16 VITALS
HEART RATE: 90 BPM | DIASTOLIC BLOOD PRESSURE: 62 MMHG | OXYGEN SATURATION: 99 % | SYSTOLIC BLOOD PRESSURE: 100 MMHG | WEIGHT: 157.8 LBS | BODY MASS INDEX: 23.99 KG/M2

## 2022-06-16 DIAGNOSIS — I73.9 PAD (PERIPHERAL ARTERY DISEASE) (HCC): ICD-10-CM

## 2022-06-16 DIAGNOSIS — Z12.5 SPECIAL SCREENING, PROSTATE CANCER: ICD-10-CM

## 2022-06-16 DIAGNOSIS — I10 ESSENTIAL HYPERTENSION: Primary | ICD-10-CM

## 2022-06-16 DIAGNOSIS — D64.9 NORMOCHROMIC ANEMIA: ICD-10-CM

## 2022-06-16 DIAGNOSIS — J41.0 SIMPLE CHRONIC BRONCHITIS (HCC): ICD-10-CM

## 2022-06-16 DIAGNOSIS — E78.00 PURE HYPERCHOLESTEROLEMIA: ICD-10-CM

## 2022-06-16 DIAGNOSIS — Z12.11 COLON CANCER SCREENING: ICD-10-CM

## 2022-06-16 DIAGNOSIS — Z09 HOSPITAL DISCHARGE FOLLOW-UP: ICD-10-CM

## 2022-06-16 DIAGNOSIS — E11.69 TYPE 2 DIABETES MELLITUS WITH OTHER SPECIFIED COMPLICATION, WITHOUT LONG-TERM CURRENT USE OF INSULIN (HCC): ICD-10-CM

## 2022-06-16 DIAGNOSIS — I10 ESSENTIAL HYPERTENSION: ICD-10-CM

## 2022-06-16 LAB
BASOPHILS ABSOLUTE: 0 K/UL (ref 0–0.2)
BASOPHILS RELATIVE PERCENT: 0.8 %
EOSINOPHILS ABSOLUTE: 0.2 K/UL (ref 0–0.6)
EOSINOPHILS RELATIVE PERCENT: 4.8 %
FERRITIN: 85 NG/ML (ref 30–400)
FOLATE: >20 NG/ML (ref 4.78–24.2)
HCT VFR BLD CALC: 28.6 % (ref 40.5–52.5)
HEMOGLOBIN: 9.3 G/DL (ref 13.5–17.5)
IMMATURE RETIC FRACT: 0.46 (ref 0.21–0.37)
IRON SATURATION: 12 % (ref 20–50)
IRON: 37 UG/DL (ref 59–158)
LYMPHOCYTES ABSOLUTE: 2.3 K/UL (ref 1–5.1)
LYMPHOCYTES RELATIVE PERCENT: 48.7 %
MCH RBC QN AUTO: 27.1 PG (ref 26–34)
MCHC RBC AUTO-ENTMCNC: 32.6 G/DL (ref 31–36)
MCV RBC AUTO: 83.2 FL (ref 80–100)
MONOCYTES ABSOLUTE: 0.3 K/UL (ref 0–1.3)
MONOCYTES RELATIVE PERCENT: 5.6 %
NEUTROPHILS ABSOLUTE: 1.9 K/UL (ref 1.7–7.7)
NEUTROPHILS RELATIVE PERCENT: 40.1 %
PDW BLD-RTO: 15.8 % (ref 12.4–15.4)
PLATELET # BLD: 463 K/UL (ref 135–450)
PMV BLD AUTO: 7.2 FL (ref 5–10.5)
PROSTATE SPECIFIC ANTIGEN: 0.42 NG/ML (ref 0–4)
RBC # BLD: 3.44 M/UL (ref 4.2–5.9)
RETICULOCYTE ABSOLUTE COUNT: 0.06 M/UL
RETICULOCYTE COUNT PCT: 1.84 % (ref 0.5–2.18)
TOTAL IRON BINDING CAPACITY: 317 UG/DL (ref 260–445)
VITAMIN B-12: 1018 PG/ML (ref 211–911)
WBC # BLD: 4.8 K/UL (ref 4–11)

## 2022-06-16 PROCEDURE — 99214 OFFICE O/P EST MOD 30 MIN: CPT | Performed by: INTERNAL MEDICINE

## 2022-06-16 PROCEDURE — 1036F TOBACCO NON-USER: CPT | Performed by: INTERNAL MEDICINE

## 2022-06-16 PROCEDURE — G8420 CALC BMI NORM PARAMETERS: HCPCS | Performed by: INTERNAL MEDICINE

## 2022-06-16 PROCEDURE — 3023F SPIROM DOC REV: CPT | Performed by: INTERNAL MEDICINE

## 2022-06-16 PROCEDURE — 3044F HG A1C LEVEL LT 7.0%: CPT | Performed by: INTERNAL MEDICINE

## 2022-06-16 PROCEDURE — G8427 DOCREV CUR MEDS BY ELIG CLIN: HCPCS | Performed by: INTERNAL MEDICINE

## 2022-06-16 PROCEDURE — 2022F DILAT RTA XM EVC RTNOPTHY: CPT | Performed by: INTERNAL MEDICINE

## 2022-06-16 PROCEDURE — 1111F DSCHRG MED/CURRENT MED MERGE: CPT | Performed by: INTERNAL MEDICINE

## 2022-06-16 PROCEDURE — 3017F COLORECTAL CA SCREEN DOC REV: CPT | Performed by: INTERNAL MEDICINE

## 2022-06-16 RX ORDER — BUDESONIDE 0.5 MG/2ML
1 INHALANT ORAL 2 TIMES DAILY
Qty: 60 EACH | Refills: 2 | Status: SHIPPED | OUTPATIENT
Start: 2022-06-16 | End: 2022-09-02 | Stop reason: SDUPTHER

## 2022-06-16 RX ORDER — WARFARIN SODIUM 5 MG/1
7.5 TABLET ORAL DAILY
Qty: 90 TABLET | Refills: 1 | Status: SHIPPED | OUTPATIENT
Start: 2022-06-16 | End: 2022-07-14 | Stop reason: SDUPTHER

## 2022-06-16 RX ORDER — ATORVASTATIN CALCIUM 80 MG/1
80 TABLET, FILM COATED ORAL DAILY
Qty: 90 TABLET | Refills: 0 | Status: SHIPPED | OUTPATIENT
Start: 2022-06-16 | End: 2022-07-14 | Stop reason: SDUPTHER

## 2022-06-16 RX ORDER — BUDESONIDE 0.5 MG/2ML
1 INHALANT ORAL 2 TIMES DAILY
COMMUNITY
End: 2022-06-16 | Stop reason: SDUPTHER

## 2022-06-16 RX ORDER — OXYCODONE HYDROCHLORIDE 10 MG/1
TABLET ORAL
COMMUNITY
Start: 2022-06-10

## 2022-06-16 RX ORDER — ASPIRIN/CALCIUM/MAG/ALUMINUM 325 MG
TABLET ORAL
COMMUNITY
End: 2022-07-20

## 2022-06-16 RX ORDER — ONDANSETRON 4 MG/1
4 TABLET, ORALLY DISINTEGRATING ORAL EVERY 8 HOURS PRN
COMMUNITY

## 2022-06-16 RX ORDER — WARFARIN SODIUM 7.5 MG/1
7.5 TABLET ORAL
COMMUNITY
End: 2022-06-16 | Stop reason: SDUPTHER

## 2022-06-16 ASSESSMENT — ENCOUNTER SYMPTOMS
COLOR CHANGE: 0
SHORTNESS OF BREATH: 0
ABDOMINAL PAIN: 0
COUGH: 0
BLOOD IN STOOL: 0
WHEEZING: 0
CONSTIPATION: 0
SINUS PAIN: 0
CHEST TIGHTNESS: 0

## 2022-06-16 NOTE — PROGRESS NOTES
Williams Cabrales (:  1960) is a 65 y.o. male,New patient, here for evaluation of the following chief complaint(s):  Follow-Up from [facility] ([facility])         ASSESSMENT/PLAN:  1. Essential hypertension  -     atorvastatin (LIPITOR) 80 MG tablet; Take 1 tablet by mouth daily Cancel atorvastatin 20 mg a day, Disp-90 tablet, R-0Normal  -     CBC with Auto Differential; Future  2. Pure hypercholesterolemia  -     atorvastatin (LIPITOR) 80 MG tablet; Take 1 tablet by mouth daily Cancel atorvastatin 20 mg a day, Disp-90 tablet, R-0Normal  3. PAD (peripheral artery disease) (HCC)  -     atorvastatin (LIPITOR) 80 MG tablet; Take 1 tablet by mouth daily Cancel atorvastatin 20 mg a day, Disp-90 tablet, R-0Normal  4. Type 2 diabetes mellitus with other specified complication, without long-term current use of insulin ([facility] [address]  5. Simple chronic bronchitis ([facility] [address]  6. Hospital discharge follow-up  -     [place] DISCHARGE MEDS RECONCILED W/ CURRENT OUTPATIENT MED LIST  7. Normochromic anemia  -     Reticulocytes; Future  -     Iron and TIBC; Future  -     Ferritin; Future  -     Vitamin B12 & Folate; Future  8. Special screening, prostate cancer  -     PSA, Prostatic Specific Antigen; Future  9.  Colon cancer screening  -     [facility] - Maryanne Hopkins MD, Gastroenterology (ERCP & EUS), [facility]  Reviewed the patient his most recent events he is tolerating food better his tube is out we can monitor his weight and see if he is adding weight in a good way    The patient blood count is quite depressed we can repeat his blood count and his vitamin and iron levels to decide if he needs any supplementation    The patient needs to have his screening colonoscopy and referral has been still started in the system    We will also check the patient prostate antigen to decide if any other intervention is needed    Patient to continue checking his blood sugar occasionally as long as his fasting sugar is less than 120 and his 2-hour after he eats is around 1 50-1 60 then we are on target      Still homebound unable to get out of the last in contact with him and his family who cannot continue certifying him for home health services    Return in 3 months (on 9/16/2022). Subjective   SUBJECTIVE/OBJECTIVE:    Lab Review   Lab Results   Component Value Date     05/05/2022     04/02/2022     04/01/2022    K 4.9 05/05/2022    K 4.0 04/02/2022    K 3.6 04/01/2022    K 3.8 03/31/2022    CO2 25 05/05/2022    CO2 23 04/02/2022    CO2 25 04/01/2022    BUN 12 05/05/2022    BUN 8 04/02/2022    BUN 9 04/01/2022    CREATININE 0.6 05/05/2022    CREATININE <0.5 04/02/2022    CREATININE <0.5 04/01/2022    GLUCOSE 76 05/05/2022    GLUCOSE 143 04/02/2022    GLUCOSE 147 04/01/2022    CALCIUM 9.9 05/05/2022    CALCIUM 9.0 04/02/2022    CALCIUM 9.0 04/01/2022     Lab Results   Component Value Date    WBC 4.9 05/05/2022    WBC 5.0 04/02/2022    WBC 5.4 04/01/2022    HGB 10.2 05/05/2022    HGB 11.2 04/02/2022    HGB 13.1 04/02/2022    HCT 32.3 05/05/2022    HCT 34.5 04/02/2022    HCT 33.7 04/01/2022    MCV 86.0 05/05/2022    MCV 85.7 04/02/2022    MCV 86.4 04/01/2022     05/05/2022     04/02/2022     04/01/2022     Lab Results   Component Value Date    CHOL 112 05/05/2022    CHOL 140 02/18/2022    CHOL 134 03/08/2021    TRIG 92 05/05/2022    TRIG 175 03/29/2022    TRIG 152 03/22/2022    HDL 41 05/05/2022    HDL 71 02/18/2022    HDL 46 03/08/2021    HDL 60 03/29/2010       Vitals 6/16/2022 5/18/2022 5/7/8254   SYSTOLIC 405 324 803   DIASTOLIC 62 69 62   Pulse 90 106 96   Temp - 97.1 -   Resp - - -   SpO2 99 - 100   Weight 157 lb 12.8 oz - 162 lb 9.6 oz   Pain Level - 5 -   Some recent data might be hidden       Diabetes  He presents for his follow-up diabetic visit. He has type 2 diabetes mellitus. His disease course has been fluctuating. Pertinent negatives for hypoglycemia include no headaches.  Pertinent negatives for diabetes include no chest pain, no fatigue, no polyuria and no weakness. Hyperlipidemia  This is a chronic problem. The current episode started more than 1 year ago. The problem is controlled. Recent lipid tests were reviewed and are normal. Pertinent negatives include no chest pain, myalgias or shortness of breath. Review of Systems   Constitutional: Negative for activity change, appetite change, fatigue and unexpected weight change. HENT: Negative for congestion, ear pain and sinus pain. Respiratory: Negative for cough, chest tightness, shortness of breath and wheezing. Cardiovascular: Negative for chest pain and palpitations. Gastrointestinal: Negative for abdominal pain, blood in stool and constipation. Endocrine: Negative for cold intolerance, heat intolerance and polyuria. Genitourinary: Negative for dysuria, frequency and urgency. Musculoskeletal: Negative for arthralgias and myalgias. Skin: Negative for color change and rash. Neurological: Negative for weakness and headaches. Hematological: Negative for adenopathy. Does not bruise/bleed easily. Psychiatric/Behavioral: Negative for agitation, dysphoric mood and sleep disturbance. Objective   Physical Exam  Vitals and nursing note reviewed. Constitutional:       General: He is not in acute distress. Appearance: Normal appearance. HENT:      Head: Normocephalic and atraumatic. Right Ear: Tympanic membrane normal.      Left Ear: Tympanic membrane normal.      Nose: Nose normal.   Eyes:      Extraocular Movements: Extraocular movements intact. Conjunctiva/sclera: Conjunctivae normal.      Pupils: Pupils are equal, round, and reactive to light. Neck:      Vascular: No carotid bruit. Cardiovascular:      Rate and Rhythm: Normal rate and regular rhythm. Pulses: Normal pulses. Heart sounds: No murmur heard. Pulmonary:      Effort: Pulmonary effort is normal. No respiratory distress.       Breath sounds: Normal breath sounds. Abdominal:      General: Abdomen is flat. Bowel sounds are normal. There is no distension. Palpations: Abdomen is soft. Tenderness: There is no abdominal tenderness. Musculoskeletal:         General: No swelling, tenderness or deformity. Cervical back: Normal range of motion and neck supple. No rigidity or tenderness. Right lower leg: No edema. Left lower leg: No edema. Lymphadenopathy:      Cervical: No cervical adenopathy. Skin:     Coloration: Skin is not jaundiced. Findings: No bruising, erythema or lesion. Neurological:      General: No focal deficit present. Mental Status: He is alert and oriented to person, place, and time. Cranial Nerves: No cranial nerve deficit. Motor: No weakness. Gait: Gait normal.            This dictation was generated by voice recognition computer software. Although all attempts are made to edit the dictation for accuracy, there may be errors in the transcription that are not intended. An electronic signature was used to authenticate this note.     --Jami Deshpande MD

## 2022-06-20 ENCOUNTER — TELEPHONE (OUTPATIENT)
Dept: PHARMACY | Age: 62
End: 2022-06-20

## 2022-06-21 NOTE — TELEPHONE ENCOUNTER
Spoke w spouse and scheduled for 6/27/22. Restarting warfarin 6/21.     Vilma RosadoD, [address] [facility]  Anticoagulation Service  [phone]

## 2022-06-22 DIAGNOSIS — I10 ESSENTIAL HYPERTENSION: ICD-10-CM

## 2022-06-22 DIAGNOSIS — I73.9 PAD (PERIPHERAL ARTERY DISEASE) (HCC): ICD-10-CM

## 2022-06-22 DIAGNOSIS — E78.00 PURE HYPERCHOLESTEROLEMIA: ICD-10-CM

## 2022-06-22 RX ORDER — ATORVASTATIN CALCIUM 80 MG/1
80 TABLET, FILM COATED ORAL DAILY
Qty: 90 TABLET | Refills: 0 | OUTPATIENT
Start: 2022-06-22

## 2022-06-24 ENCOUNTER — HOSPITAL ENCOUNTER (OUTPATIENT)
Dept: WOUND CARE | Age: 62
Discharge: HOME | End: 2022-06-24
Payer: COMMERCIAL

## 2022-06-24 VITALS — TEMPERATURE: 96.8 F | DIASTOLIC BLOOD PRESSURE: 74 MMHG | SYSTOLIC BLOOD PRESSURE: 125 MMHG | HEART RATE: 98 BPM

## 2022-06-24 DIAGNOSIS — I73.9 PERIPHERAL ARTERY DISEASE (HCC): Primary | ICD-10-CM

## 2022-06-24 PROCEDURE — 11043 DBRDMT MUSC&/FSCA 1ST 20/<: CPT

## 2022-06-24 PROCEDURE — 11042 DBRDMT SUBQ TIS 1ST 20SQCM/<: CPT

## 2022-06-24 PROCEDURE — 11046 DBRDMT MUSC&/FSCA EA ADDL: CPT

## 2022-06-24 RX ORDER — GINSENG 100 MG
CAPSULE ORAL ONCE
Status: CANCELLED | OUTPATIENT
Start: 2022-06-24 | End: 2022-06-24

## 2022-06-24 RX ORDER — BACITRACIN, NEOMYCIN, POLYMYXIN B 400; 3.5; 5 [USP'U]/G; MG/G; [USP'U]/G
OINTMENT TOPICAL ONCE
Status: CANCELLED | OUTPATIENT
Start: 2022-06-24 | End: 2022-06-24

## 2022-06-24 RX ORDER — LIDOCAINE 40 MG/G
CREAM TOPICAL ONCE
Status: CANCELLED | OUTPATIENT
Start: 2022-06-24 | End: 2022-06-24

## 2022-06-24 RX ORDER — GENTAMICIN SULFATE 1 MG/G
OINTMENT TOPICAL ONCE
Status: CANCELLED | OUTPATIENT
Start: 2022-06-24 | End: 2022-06-24

## 2022-06-24 RX ORDER — LIDOCAINE HYDROCHLORIDE 20 MG/ML
JELLY TOPICAL ONCE
Status: CANCELLED | OUTPATIENT
Start: 2022-06-24 | End: 2022-06-24

## 2022-06-24 RX ORDER — LIDOCAINE HYDROCHLORIDE 40 MG/ML
SOLUTION TOPICAL ONCE
Status: CANCELLED | OUTPATIENT
Start: 2022-06-24 | End: 2022-06-24

## 2022-06-24 RX ORDER — BACITRACIN ZINC AND POLYMYXIN B SULFATE 500; 1000 [USP'U]/G; [USP'U]/G
OINTMENT TOPICAL ONCE
Status: CANCELLED | OUTPATIENT
Start: 2022-06-24 | End: 2022-06-24

## 2022-06-24 RX ORDER — LIDOCAINE 50 MG/G
OINTMENT TOPICAL ONCE
Status: CANCELLED | OUTPATIENT
Start: 2022-06-24 | End: 2022-06-24

## 2022-06-24 RX ORDER — CLOBETASOL PROPIONATE 0.5 MG/G
OINTMENT TOPICAL ONCE
Status: CANCELLED | OUTPATIENT
Start: 2022-06-24 | End: 2022-06-24

## 2022-06-24 RX ORDER — LIDOCAINE 40 MG/G
CREAM TOPICAL ONCE
Status: DISCONTINUED | OUTPATIENT
Start: 2022-06-24 | End: 2022-06-25 | Stop reason: HOSPADM

## 2022-06-24 RX ORDER — BETAMETHASONE DIPROPIONATE 0.05 %
OINTMENT (GRAM) TOPICAL ONCE
Status: CANCELLED | OUTPATIENT
Start: 2022-06-24 | End: 2022-06-24

## 2022-06-24 ASSESSMENT — PAIN SCALES - GENERAL: PAINLEVEL_OUTOF10: 4

## 2022-06-24 ASSESSMENT — PAIN DESCRIPTION - ORIENTATION: ORIENTATION: LEFT

## 2022-06-24 ASSESSMENT — PAIN DESCRIPTION - LOCATION: LOCATION: LEG

## 2022-06-24 ASSESSMENT — PAIN DESCRIPTION - FREQUENCY: FREQUENCY: CONTINUOUS

## 2022-06-24 ASSESSMENT — PAIN DESCRIPTION - DESCRIPTORS: DESCRIPTORS: ACHING

## 2022-06-24 ASSESSMENT — PAIN DESCRIPTION - ONSET: ONSET: ON-GOING

## 2022-06-24 ASSESSMENT — PAIN - FUNCTIONAL ASSESSMENT: PAIN_FUNCTIONAL_ASSESSMENT: ACTIVITIES ARE NOT PREVENTED

## 2022-06-24 NOTE — PROGRESS NOTES
FOOT DEBRIDEMENT Left 2020    FOOT DEBRIDEMENT Right 2021    DEBRIDEMENT OF RIGHT FOOT WOUND WITH GRAFT APPLICATION performed by Irma Barry DPM at [address]  N/A 3/25/2022    DIRECT LARYNGOSCOPY WITH BIOPSY AND FROZEN SECTIONS [facility] [address] [facility]    stent leg for PVD    TRACHEOSTOMY N/A 3/25/2022    TRACHEOTOMY performed by Baltazar Clay DO at [address] HISTORY    Family History   Problem Relation Age of Onset    Cancer Mother     Lung Cancer Mother     Diabetes Father     Stroke Father        SOCIAL HISTORY    Social History     Tobacco Use    Smoking status: Former Smoker     Packs/day: 0.50     Years: 20.00     Pack years: 10.00     Types: Cigarettes     Start date: 1977     Quit date: 2021     Years since quittin.4    Smokeless tobacco: Never Used   Vaping Use    Vaping Use: Never used   Substance Use Topics    Alcohol use:  Yes     Alcohol/week: 6.0 standard drinks     Types: 6 Cans of beer per week    Drug use: Never       ALLERGIES    Allergies   Allergen Reactions    Chantix [Varenicline] Other (See Comments)     Hallucinations         MEDICATIONS    Current Outpatient Medications on File Prior to Encounter   Medication Sig Dispense Refill    ondansetron (ZOFRAN-ODT) 4 MG disintegrating tablet Take 4 mg by mouth every 8 hours as needed for Nausea or Vomiting      Aspirin Buf,SySdx-UkBxv-XhYvp, (BUFFERED ASPIRIN) 325 MG TABS Take by mouth      Multiple Vitamins-Minerals (MULTIVITAMIN ADULT EXTRA C PO) 1 tablet by Nasogastric route daily      esomeprazole (NEXIUM) 20 MG delayed release capsule 20 mg by Nasogastric route every morning (before breakfast)      oxyCODONE HCl (OXY-IR) 10 MG immediate release tablet TAKE 1 TABLET BY MOUTH EVERY SIX HOURS AS NEEDED FOR PAIN FOR UP TO 7 DAYS      budesonide (PULMICORT) 0.5 MG/2ML nebulizer suspension Take 2 mLs by nebulization 2 times daily 60 each 2    atorvastatin (LIPITOR) 80 MG tablet Take 1 tablet by mouth daily Cancel atorvastatin 20 mg a day 90 tablet 0    warfarin (COUMADIN) 5 MG tablet Take 1.5 tablets by mouth daily 90 tablet 1    cetirizine (ZYRTEC) 10 MG tablet Take 1 tablet by mouth daily 30 tablet 0    sennosides-docusate sodium (SENOKOT-S) 8.6-50 MG tablet Take 1 tablet by mouth 2 times daily 60 tablet 0    lisinopril (PRINIVIL;ZESTRIL) 20 MG tablet Take 1 tablet by mouth daily 30 tablet 0    cyanocobalamin 250 MCG tablet Take 1 tablet by mouth daily 30 tablet 0    metFORMIN (GLUCOPHAGE) 500 MG tablet Take 1 tablet by mouth 2 times daily (with meals) 180 tablet 0    melatonin 3 mg TABS tablet Take 2 tablets by mouth nightly as needed (insomnia) 30 tablet 2    gentamicin (GARAMYCIN) 0.1 % cream Apply topically  daily. 30 g 1    becaplermin (REGRANEX) 0.01 % gel Apply 1 Applicatorful topically daily      calcium-vitamin D (OSCAL-500) 500-200 MG-UNIT per tablet Take 1 tablet by mouth 2 times daily      ipratropium-albuterol (DUONEB) 0.5-2.5 (3) MG/3ML SOLN nebulizer solution Inhale 3 mLs into the lungs 4 times daily 360 mL 3    Fluticasone-Umeclidin-Vilant (TRELEGY ELLIPTA) 200-62.5-25 MCG/INH AEPB Inhale 1 puff into the lungs 2 times daily 1 each 2    acetaminophen (TYLENOL) 500 MG tablet Take 1 tablet by mouth 4 times daily as needed for Pain 360 tablet 1    fluticasone (FLONASE) 50 MCG/ACT nasal spray 1 spray by Nasal route daily 9.9 g 5    Vitamins/Minerals TABS Take 1 tablet by mouth daily        No current facility-administered medications on file prior to encounter. REVIEW OF SYSTEMS    Pertinent items are noted in HPI. Objective:      /74   Pulse 98   Temp 96.8 °F (36 °C) (Tympanic)     PHYSICAL EXAM    Vascular: Vascular status Impaired  palpable pedal pulses, right DP0/4 and PT1/4, left DP0/4 and PT1/4.   CFT 3 seconds digits 1 to 5 bilateral.  Hair growthAbsent  both lower extremities and feet. Skin temperature is warm to warm from pretibial area to distal digits bilateral.  Exam is negative for rubor, pallor, cyanosis or signs of acute vascular compromise bilaterally. Exam is positive for edema bilateral lower extremity. Varicosities Present bilateral lower extremity. Neuro: Neurologic status diminished bilateral with epicritic Absent  , proprioceptive Absent , vibratory sensation Absent  and protopathicPresent. DTRs Absent  bilateral Achilles. There were no reproducible neuritic symptoms on exam bilateral feet/ankles. Derm: Ulceration to bilateral ankles. Ecchymosis Absent  bilateral feet/foot. Musculoskeletal: No pain with debridement of wounds 5/5 muscle strength in/eversion and dorsi/plantarflexion bilateral feet. No gross instability noted. Assessment:     Problem List Items Addressed This Visit     Peripheral artery disease ([facility] [address] - Primary    Relevant Medications    lidocaine (LMX) 4 % cream    Other Relevant Orders    Initiate Outpatient Wound Care Protocol          Procedure Note    Performed by: Airam Marinelli DPM    Consent obtained: Yes    Time out taken:  Yes    Pain Control: Anesthetic  Anesthetic: 4% Lidocaine Cream     Debridement:Excisional Debridement    Using curette the wound was sharply debrided    down through and including the removal of epidermis, dermis, subcutaneous tissue and muscle/fascia.         Devitalized Tissue Debrided:  fibrin, biofilm, slough, necrotic/eschar and exudate    Pre Debridement Measurements:  Are located in the Wound Documentation Flow Sheet    Wound #: 1     Wound Care Documentation:  Wound 05/18/22 Ankle Medial;Right #2 (Active)   Wound Image   06/24/22 1202   Wound Etiology Diabetic 06/24/22 1202   Wound Cleansed Cleansed with saline 06/24/22 1202   Offloading for Diabetic Foot Ulcers Offloading not required 06/24/22 1202   Wound Length (cm) 0.5 cm 06/24/22 1202   Wound Width (cm) 0.5 cm 06/24/22 1202 Wound Depth (cm) 0.1 cm 06/24/22 1202   Wound Surface Area (cm^2) 0.25 cm^2 06/24/22 1202   Change in Wound Size % (l*w) 96.88 06/24/22 1202   Wound Volume (cm^3) 0.025 cm^3 06/24/22 1202   Wound Healing % 97 06/24/22 1202   Post-Procedure Length (cm) 0.5 cm 06/24/22 1220   Post-Procedure Width (cm) 0.5 cm 06/24/22 1220   Post-Procedure Depth (cm) 0.1 cm 06/24/22 1220   Post-Procedure Surface Area (cm^2) 0.25 cm^2 06/24/22 1220   Post-Procedure Volume (cm^3) 0.025 cm^3 06/24/22 1220   Wound Assessment Bleeding 06/24/22 1220   Drainage Amount Moderate 06/24/22 1202   Drainage Description Yellow 06/24/22 1202   Odor None 06/24/22 1202   Jamila-wound Assessment Edematous 06/24/22 1202   Margins Attached edges 06/24/22 1202   Number of days: 37       Wound 06/24/22 Leg Left;Medial #1 (Active)   Wound Image   06/24/22 1202   Wound Etiology Venous 06/24/22 1202   Wound Cleansed Cleansed with saline 06/24/22 1202   Wound Length (cm) 1.8 cm 06/24/22 1202   Wound Width (cm) 3.8 cm 06/24/22 1202   Wound Depth (cm) 0.1 cm 06/24/22 1202   Wound Surface Area (cm^2) 6.84 cm^2 06/24/22 1202   Wound Volume (cm^3) 0.684 cm^3 06/24/22 1202   Post-Procedure Length (cm) 1.8 cm 06/24/22 1220   Post-Procedure Width (cm) 3.8 cm 06/24/22 1220   Post-Procedure Depth (cm) 0.1 cm 06/24/22 1220   Post-Procedure Surface Area (cm^2) 6.84 cm^2 06/24/22 1220   Post-Procedure Volume (cm^3) 0.684 cm^3 06/24/22 1220   Wound Assessment Slough 06/24/22 1202   Drainage Amount Moderate 06/24/22 1202   Drainage Description Yellow 06/24/22 1202   Odor None 06/24/22 1202   Jamila-wound Assessment Edematous 06/24/22 1202   Margins Attached edges 06/24/22 1202   Number of days: 0           Total Surface Area Debrided: 6.84 cm² left leg into the deep fascia and sq cm 1 cm² into the subcutaneous tissue of the right foot    Percentage of wound debrided 100%    Bleeding:  Minimal    Hemostasis Achieved:  by pressure    Procedural Pain:  0  / 10     Post Procedural Pain:  0 / 10     Response to treatment:  Well tolerated by patient. Plan:   Patient examined and evaluated   Wounds debrided without incident   Compression wrap with triad and gentamicin cream left leg and gentamicin cream with collagen right leg and Lac-Hydrin  Leave dressings every 2 days  Keep legs elevated at all times when not active   Continue follow-up with vascular surgery for decision on wounds for improving the blood flow to the distal left leg. He has not made his decision on what he would like to do. Patient would benefit from disability due to his multiple comorbidities including his chronic peripheral arterial disease with high risk for limb loss. The nature of the patient's condition was explained in depth. The patient was informed that their compliance to the treatment Plan is paramount to successful healing and prevention of further ulceration and/or infection.   Discharge Treatment  Follow-up after his laryngectomy    Written Patient Discharge Instructions Given            Electronically signed by Amarilys Duff DPM on 6/24/2022 at 12:33 PM

## 2022-06-24 NOTE — PLAN OF CARE
Discharge instructions given. Patient verbalized understanding. Return to [facility] in 1 week(s).   Starting chemo and radiation

## 2022-06-27 ENCOUNTER — ANTI-COAG VISIT (OUTPATIENT)
Dept: PHARMACY | Age: 62
End: 2022-06-27
Payer: COMMERCIAL

## 2022-06-27 DIAGNOSIS — I73.9 PERIPHERAL ARTERY DISEASE (HCC): Primary | ICD-10-CM

## 2022-06-27 LAB — INR BLD: 1.4

## 2022-06-27 PROCEDURE — 85610 PROTHROMBIN TIME: CPT

## 2022-06-27 PROCEDURE — 99211 OFF/OP EST MAY X REQ PHY/QHP: CPT

## 2022-06-27 NOTE — PROGRESS NOTES
Armin Joya is a 65 y.o. here for warfarin management. Lyndsay Melo had an INR test today. Results were reviewed and appropriate warfarin management was completed. This visit was performed as: An in person visit. Protocols were followed with precautions to reduce the spread of COVID-19. Patient verifies current dosing regimen: Yes     Warfarin medication reviewed and updated on the patient 's home medication list: Yes   All other medications reviewed and updated on the patient 's home medication list: No: No changes    Lab Results   Component Value Date    INR 1.40 2022    INR 1.31 (H) 2022    INR 1.30 (H) 2022       Patient Findings     Negatives:  Signs/symptoms of thrombosis, Signs/symptoms of bleeding, Change in health, Change in medications, Change in diet/appetite, Bruising          Anticoagulation Summary  As of 2022    INR goal:  2.0-3.0   TTR:  54.6 % (9.1 mo)   INR used for dosin.40 (2022)   Warfarin maintenance plan:  10 mg (5 mg x 2) every Mon, Wed, Fri; 7.5 mg (5 mg x 1.5) all other days   Weekly warfarin total:  60 mg   Plan last modified:  Ken Loyd (2022)   Next INR check:  2022   Priority:  High   Target end date: Indefinite    Indications    [facility] [I73.9]             Anticoagulation Episode Summary     INR check location:  Anticoagulation Clinic    Preferred lab:      Send INR reminders to:  [facility]    Comments:  EPIC      Anticoagulation Care Providers     Provider Role Specialty Phone number    Puneet Snow MD Referring Family Medicine [phone]          Warfarin assessment / plan:     Appears well  Sub-therapeutic INR     Patient was off warfarin for several months and restarted on . He reports that he was taking 7.5 mg everyday. Increased his warfarin dose by ~12% today and will check him more frequently starting out.     Description    NEW DOSE:  Warfarin 7.5 mg ( &  tablets) daily except 10mg (2 tablets) every Monday, Wednesday and Friday    Call [phone] with signs or symptoms of bleeding or ANY medication changes (including over-the-counter medications or herbal supplements). If significant bleeding occurs please seek immediate medical attention. Keep the number of servings of vitamin K containing foods (dark green, leafy vegetables) the same each week. Dark green vegetable 2-3 times a week and a mixed green salad ~ 3 times a week. Please call if this changes. Limit alcohol intake. Please call if this changes. Immunization History   Administered Date(s) Administered    COVID-19, Pfizer Purple top, DILUTE for use, 12+ yrs, 30mcg/0.3mL dose 03/15/2021, 04/12/2021, 12/22/2021    Influenza Virus Vaccine 01/21/2017, 01/21/2017    Influenza, Quadv, IM, PF (6 mo and older Fluzone, Flulaval, Fluarix, and 3 yrs and older Afluria) 10/31/2020    Tdap (Boostrix, Adacel) 03/27/2018    Zoster Recombinant (Shingrix) 03/27/2018           Orders Placed This Encounter   Procedures    Protime-INR     This external order was created through the results console. No orders of the defined types were placed in this encounter. Reviewed AVS with patient / caregiver. Billing Points:  Adjust dosage and/or reconcile meds (fill pill box) </= 5 medications - 2 points       CLINICAL PHARMACY CONSULT: MED RECONCILIATION/REVIEW ADDENDUM    For Pharmacy Admin Tracking Only     Intervention Detail: Dose Adjustment: 1, reason: Therapy Optimization   Total # of Interventions Recommended: 1   Total # of Interventions Accepted: 1   Time Spent (min): 20    I have seen the patient and reviewed the progress note written by the PharmD Candidate. I agree with this assesment and plan.    Katherine Crowley, [facility], PharmD 6/27/22 2:11 PM face

## 2022-07-01 ENCOUNTER — TELEPHONE (OUTPATIENT)
Dept: INTERNAL MEDICINE CLINIC | Age: 62
End: 2022-07-01

## 2022-07-01 ENCOUNTER — HOSPITAL ENCOUNTER (OUTPATIENT)
Dept: WOUND CARE | Age: 62
Discharge: HOME | End: 2022-07-01
Payer: COMMERCIAL

## 2022-07-01 VITALS
DIASTOLIC BLOOD PRESSURE: 65 MMHG | SYSTOLIC BLOOD PRESSURE: 117 MMHG | TEMPERATURE: 97 F | RESPIRATION RATE: 18 BRPM | HEART RATE: 101 BPM

## 2022-07-01 DIAGNOSIS — I73.9 PERIPHERAL ARTERY DISEASE (HCC): Primary | ICD-10-CM

## 2022-07-01 PROCEDURE — 11042 DBRDMT SUBQ TIS 1ST 20SQCM/<: CPT

## 2022-07-01 PROCEDURE — 11043 DBRDMT MUSC&/FSCA 1ST 20/<: CPT

## 2022-07-01 RX ORDER — BETAMETHASONE DIPROPIONATE 0.05 %
OINTMENT (GRAM) TOPICAL ONCE
Status: CANCELLED | OUTPATIENT
Start: 2022-07-01 | End: 2022-07-01

## 2022-07-01 RX ORDER — GENTAMICIN SULFATE 1 MG/G
OINTMENT TOPICAL ONCE
Status: CANCELLED | OUTPATIENT
Start: 2022-07-01 | End: 2022-07-01

## 2022-07-01 RX ORDER — BACITRACIN ZINC AND POLYMYXIN B SULFATE 500; 1000 [USP'U]/G; [USP'U]/G
OINTMENT TOPICAL ONCE
Status: CANCELLED | OUTPATIENT
Start: 2022-07-01 | End: 2022-07-01

## 2022-07-01 RX ORDER — CLOBETASOL PROPIONATE 0.5 MG/G
OINTMENT TOPICAL ONCE
Status: CANCELLED | OUTPATIENT
Start: 2022-07-01 | End: 2022-07-01

## 2022-07-01 RX ORDER — LIDOCAINE 50 MG/G
OINTMENT TOPICAL ONCE
Status: CANCELLED | OUTPATIENT
Start: 2022-07-01 | End: 2022-07-01

## 2022-07-01 RX ORDER — LIDOCAINE 40 MG/G
CREAM TOPICAL ONCE
Status: CANCELLED | OUTPATIENT
Start: 2022-07-01 | End: 2022-07-01

## 2022-07-01 RX ORDER — BACITRACIN, NEOMYCIN, POLYMYXIN B 400; 3.5; 5 [USP'U]/G; MG/G; [USP'U]/G
OINTMENT TOPICAL ONCE
Status: CANCELLED | OUTPATIENT
Start: 2022-07-01 | End: 2022-07-01

## 2022-07-01 RX ORDER — LIDOCAINE HYDROCHLORIDE 40 MG/ML
SOLUTION TOPICAL ONCE
Status: CANCELLED | OUTPATIENT
Start: 2022-07-01 | End: 2022-07-01

## 2022-07-01 RX ORDER — LIDOCAINE HYDROCHLORIDE 20 MG/ML
JELLY TOPICAL ONCE
Status: CANCELLED | OUTPATIENT
Start: 2022-07-01 | End: 2022-07-01

## 2022-07-01 RX ORDER — GINSENG 100 MG
CAPSULE ORAL ONCE
Status: CANCELLED | OUTPATIENT
Start: 2022-07-01 | End: 2022-07-01

## 2022-07-01 RX ORDER — LIDOCAINE 40 MG/G
CREAM TOPICAL ONCE
Status: DISCONTINUED | OUTPATIENT
Start: 2022-07-01 | End: 2022-07-02 | Stop reason: HOSPADM

## 2022-07-01 NOTE — TELEPHONE ENCOUNTER
Patient's wife calling back. Unable to come in for appointment today. Virtual visit scheduled for 4 pm today.

## 2022-07-01 NOTE — PROGRESS NOTES
Iris Quiroz  Progress Note and Procedure Note      Karen Mc  AGE: 65 y.o. GENDER: male  : 1960  TODAY'S DATE:  2022    Subjective:     Chief Complaint   Patient presents with    Wound Check     right lower extremity, left lower extremity         HISTORY of PRESENT ILLNESS HPI     Karen Mc is a 65 y.o. male who presents today for wound evaluation. History of Wound: Admits to having chronic wounds for at least a year on the left leg and 8 to 9 months on the right leg. He states that he has had arterial bypasses on both of his legs in the past.  He was seeing previous wound care and podiatry for these wounds. He states that his insurance had some issues and he has not been seeing anyone since 2021 he has been treating the wounds himself with PSO and bandages. 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 He has a swollen right leg. He admits that he has not been up and arrange for his Coumadin. He had an inferior vena cava filter placed last year. He has been getting the bandages changed by himself at home. He has home care once a week. He feels that the legs are looking better. He did have his throat surgery.       Wound Pain:  intermittent  Severity:  3 / 10   Wound Type:  venous, arterial and diabetic  Modifying Factors:  edema, venous stasis, lymphedema, diabetes, decreased mobility, arterial insufficiency and decreased tissue oxygenation  Associated Signs/Symptoms:  drainage, numbness and odor        PAST MEDICAL HISTORY        Diagnosis Date    Diabetes mellitus ([address]     Fibromyalgia     History of DVT (deep vein thrombosis)     Hyperlipidemia     Hypertension     Type 2 diabetes mellitus with circulatory disorder, without long-term current use of insulin ([address] 2022       PAST SURGICAL HISTORY    Past Surgical History:   Procedure Laterality Date    APPENDECTOMY  2005    COLONOSCOPY  2016    FEMORAL BYPASS Left 2020    femoral posterior tibial bypass    FEMORAL-TIBIAL BYPASS GRAFT Right 2020    with femoral endarterectomy and patch angioplasty    FOOT DEBRIDEMENT Left 2020    FOOT DEBRIDEMENT Right 2021    DEBRIDEMENT OF RIGHT FOOT WOUND WITH GRAFT APPLICATION performed by Tran Gross DPM at [address]  N/A 3/25/2022    DIRECT LARYNGOSCOPY WITH BIOPSY AND FROZEN SECTIONS performed by Virginia Quiroga DO at [facility] Right 2020    stent leg for PVD    TRACHEOSTOMY N/A 3/25/2022    TRACHEOTOMY performed by Virginia Quiroga DO at [address] HISTORY    Family History   Problem Relation Age of Onset    Cancer Mother     Lung Cancer Mother     Diabetes Father     Stroke Father        SOCIAL HISTORY    Social History     Tobacco Use    Smoking status: Former Smoker     Packs/day: 0.50     Years: 20.00     Pack years: 10.00     Types: Cigarettes     Start date: 1977     Quit date: 2021     Years since quittin.5    Smokeless tobacco: Never Used   Vaping Use    Vaping Use: Never used   Substance Use Topics    Alcohol use:  Yes     Alcohol/week: 6.0 standard drinks     Types: 6 Cans of beer per week    Drug use: Never       ALLERGIES    Allergies   Allergen Reactions    Chantix [Varenicline] Other (See Comments)     Hallucinations         MEDICATIONS    Current Outpatient Medications on File Prior to Encounter   Medication Sig Dispense Refill    ondansetron (ZOFRAN-ODT) 4 MG disintegrating tablet Take 4 mg by mouth every 8 hours as needed for Nausea or Vomiting      Aspirin Buf,WySas-DvIcm-RrKdf, (BUFFERED ASPIRIN) 325 MG TABS Take by mouth      Multiple Vitamins-Minerals (MULTIVITAMIN ADULT EXTRA C PO) 1 tablet by Nasogastric route daily      esomeprazole (NEXIUM) 20 MG delayed release capsule 20 mg by Nasogastric route every morning (before breakfast)      oxyCODONE HCl (OXY-IR) 10 MG immediate release tablet TAKE 1 TABLET BY MOUTH EVERY SIX HOURS AS NEEDED FOR PAIN FOR UP TO 7 DAYS      budesonide (PULMICORT) 0.5 MG/2ML nebulizer suspension Take 2 mLs by nebulization 2 times daily 60 each 2    atorvastatin (LIPITOR) 80 MG tablet Take 1 tablet by mouth daily Cancel atorvastatin 20 mg a day 90 tablet 0    warfarin (COUMADIN) 5 MG tablet Take 1.5 tablets by mouth daily (Patient taking differently: Take 7.5 mg by mouth daily Except 10 mg on Monday, Wednesday and Friday) 90 tablet 1    cetirizine (ZYRTEC) 10 MG tablet Take 1 tablet by mouth daily 30 tablet 0    sennosides-docusate sodium (SENOKOT-S) 8.6-50 MG tablet Take 1 tablet by mouth 2 times daily 60 tablet 0    lisinopril (PRINIVIL;ZESTRIL) 20 MG tablet Take 1 tablet by mouth daily 30 tablet 0    cyanocobalamin 250 MCG tablet Take 1 tablet by mouth daily 30 tablet 0    metFORMIN (GLUCOPHAGE) 500 MG tablet Take 1 tablet by mouth 2 times daily (with meals) 180 tablet 0    melatonin 3 mg TABS tablet Take 2 tablets by mouth nightly as needed (insomnia) 30 tablet 2    gentamicin (GARAMYCIN) 0.1 % cream Apply topically  daily. 30 g 1    becaplermin (REGRANEX) 0.01 % gel Apply 1 Applicatorful topically daily      calcium-vitamin D (OSCAL-500) 500-200 MG-UNIT per tablet Take 1 tablet by mouth 2 times daily      ipratropium-albuterol (DUONEB) 0.5-2.5 (3) MG/3ML SOLN nebulizer solution Inhale 3 mLs into the lungs 4 times daily 360 mL 3    Fluticasone-Umeclidin-Vilant (TRELEGY ELLIPTA) 200-62.5-25 MCG/INH AEPB Inhale 1 puff into the lungs 2 times daily 1 each 2    acetaminophen (TYLENOL) 500 MG tablet Take 1 tablet by mouth 4 times daily as needed for Pain 360 tablet 1    fluticasone (FLONASE) 50 MCG/ACT nasal spray 1 spray by Nasal route daily 9.9 g 5    Vitamins/Minerals TABS Take 1 tablet by mouth daily        No current facility-administered medications on file prior to encounter.        REVIEW OF SYSTEMS    Pertinent items are noted in HPI. Objective:      /65   Pulse (!) 101   Temp 97 °F (36.1 °C) (Infrared)   Resp 18     PHYSICAL EXAM    Vascular: Vascular status Impaired  palpable pedal pulses, right DP0/4 and PT1/4, left DP0/4 and PT1/4. CFT 3 seconds digits 1 to 5 bilateral.  Hair growthAbsent  both lower extremities and feet. Skin temperature is warm to warm from pretibial area to distal digits bilateral.  Exam is negative for rubor, pallor, cyanosis or signs of acute vascular compromise bilaterally. Exam is positive for edema bilateral lower extremity. Varicosities Present bilateral lower extremity. Neuro: Neurologic status diminished bilateral with epicritic Absent  , proprioceptive Absent , vibratory sensation Absent  and protopathicPresent. DTRs Absent  bilateral Achilles. There were no reproducible neuritic symptoms on exam bilateral feet/ankles. Derm: Ulceration to bilateral ankles. Ecchymosis Absent  bilateral feet/foot. Musculoskeletal: No pain with debridement of wounds 5/5 muscle strength in/eversion and dorsi/plantarflexion bilateral feet. No gross instability noted. Assessment:     Problem List Items Addressed This Visit     Peripheral artery disease ([facility] - Primary    Relevant Medications    lidocaine (LMX) 4 % cream    Other Relevant Orders    Initiate Outpatient Wound Care Protocol          Procedure Note    Performed by: Cindy Church DPM    Consent obtained: Yes    Time out taken:  Yes    Pain Control: Anesthetic  Anesthetic: 4% Lidocaine Cream     Debridement:Excisional Debridement    Using curette the wound was sharply debrided    down through and including the removal of epidermis, dermis, subcutaneous tissue and muscle/fascia.         Devitalized Tissue Debrided:  fibrin, biofilm, slough, necrotic/eschar and exudate    Pre Debridement Measurements:  Are located in the Wound Documentation Flow Sheet    Wound #: 1     Wound Care Documentation:  Wound 05/18/22 Ankle Medial;Right #2 (Active)   Wound Image   06/24/22 1202   Wound Etiology Diabetic 07/01/22 0804   Wound Cleansed Cleansed with saline 07/01/22 0804   Dressing/Treatment Antibacterial ointment;Collagen;Dry dressing 06/24/22 1220   Offloading for Diabetic Foot Ulcers Offloading not ordered 07/01/22 0804   Wound Length (cm) 1.5 cm 07/01/22 0804   Wound Width (cm) 2.1 cm 07/01/22 0804   Wound Depth (cm) 0.1 cm 07/01/22 0804   Wound Surface Area (cm^2) 3.15 cm^2 07/01/22 0804   Change in Wound Size % (l*w) 60.62 07/01/22 0804   Wound Volume (cm^3) 0.315 cm^3 07/01/22 0804   Wound Healing % 61 07/01/22 0804   Post-Procedure Length (cm) 1.5 cm 07/01/22 0825   Post-Procedure Width (cm) 2.1 cm 07/01/22 0825   Post-Procedure Depth (cm) 0.1 cm 07/01/22 0825   Post-Procedure Surface Area (cm^2) 3.15 cm^2 07/01/22 0825   Post-Procedure Volume (cm^3) 0.315 cm^3 07/01/22 0825   Wound Assessment Bleeding 07/01/22 0825   Drainage Amount Moderate 07/01/22 0804   Drainage Description Yellow;Serosanguinous 07/01/22 0804   Odor None 07/01/22 0804   Jamila-wound Assessment Edematous 07/01/22 0804   Margins Defined edges 07/01/22 0804   Number of days: 44       Wound 06/24/22 Leg Left;Medial #1 (Active)   Wound Image   06/24/22 1202   Wound Etiology Venous 07/01/22 0804   Wound Cleansed Cleansed with saline 07/01/22 0804   Dressing/Treatment Antibacterial ointment;Dry dressing;Collagen 06/24/22 1220   Wound Length (cm) 2.8 cm 07/01/22 0804   Wound Width (cm) 4 cm 07/01/22 0804   Wound Depth (cm) 0.1 cm 07/01/22 0804   Wound Surface Area (cm^2) 11.2 cm^2 07/01/22 0804   Change in Wound Size % (l*w) -63.74 07/01/22 0804   Wound Volume (cm^3) 1.12 cm^3 07/01/22 0804   Wound Healing % -64 07/01/22 0804   Post-Procedure Length (cm) 2.8 cm 07/01/22 0825   Post-Procedure Width (cm) 0.4 cm 07/01/22 0825   Post-Procedure Depth (cm) 0.1 cm 07/01/22 0825   Post-Procedure Surface Area (cm^2) 1.12 cm^2 07/01/22 0825   Post-Procedure Volume (cm^3) 0.112 cm^3 07/01/22 0825   Wound Assessment Bleeding 07/01/22 0825   Drainage Amount Moderate 07/01/22 0804   Drainage Description Yellow 07/01/22 0804   Odor None 07/01/22 0804   Jamila-wound Assessment Edematous 07/01/22 0804   Margins Defined edges 07/01/22 0804   Number of days: 7         Total Surface Area Debrided: 3.15 cm² left leg into the deep fascia and sq cm 1.12 cm² into the subcutaneous tissue of the right foot    Percentage of wound debrided 100%    Bleeding:  Minimal    Hemostasis Achieved:  by pressure    Procedural Pain:  0  / 10     Post Procedural Pain:  0 / 10     Response to treatment:  Well tolerated by patient. Plan:   Patient examined and evaluated   Wounds debrided without incident   Compression wrap with triad and gentamicin cream left leg and gentamicin cream with collagen right leg and Lac-Hydrin  Leave dressings every 2 days  To call his vascular surgeon to evaluate if he needs to have a DVT ultrasound. Recommend that he go get a ultrasound today. Keep legs elevated at all times when not active   Continue follow-up with vascular surgery for decision on wounds for improving the blood flow to the distal left leg. He has not made his decision on what he would like to do. Patient would benefit from disability due to his multiple comorbidities including his chronic peripheral arterial disease with high risk for limb loss. The nature of the patient's condition was explained in depth. The patient was informed that their compliance to the treatment Plan is paramount to successful healing and prevention of further ulceration and/or infection.   Discharge Treatment  Follow-up after his laryngectomy    Written Patient Discharge Instructions Given            Electronically signed by Lanie Canales DPM on 7/1/2022 at 9:05 AM

## 2022-07-01 NOTE — TELEPHONE ENCOUNTER
Patient will need to be seen today to evaluate and decide if the venous Doppler is appropriate versus some other testing

## 2022-07-01 NOTE — TELEPHONE ENCOUNTER
Patient's wife, Armand Valdez states patient seen at wound clinic today and he has right lower leg swelling. She states Dr Liv Simon advised them to contact PCP and ask if a venous doppler can be ordered to rule out DVT.

## 2022-07-05 ENCOUNTER — TELEPHONE (OUTPATIENT)
Dept: INTERNAL MEDICINE CLINIC | Age: 62
End: 2022-07-05

## 2022-07-05 ENCOUNTER — OFFICE VISIT (OUTPATIENT)
Dept: INTERNAL MEDICINE CLINIC | Age: 62
End: 2022-07-05
Payer: COMMERCIAL

## 2022-07-05 VITALS
BODY MASS INDEX: 25.06 KG/M2 | DIASTOLIC BLOOD PRESSURE: 70 MMHG | WEIGHT: 164.8 LBS | HEART RATE: 77 BPM | SYSTOLIC BLOOD PRESSURE: 112 MMHG | OXYGEN SATURATION: 97 %

## 2022-07-05 DIAGNOSIS — I82.4Y1 DEEP VEIN THROMBOSIS (DVT) OF PROXIMAL VEIN OF RIGHT LOWER EXTREMITY, UNSPECIFIED CHRONICITY (HCC): Primary | ICD-10-CM

## 2022-07-05 DIAGNOSIS — E11.69 TYPE 2 DIABETES MELLITUS WITH OTHER SPECIFIED COMPLICATION, WITHOUT LONG-TERM CURRENT USE OF INSULIN (HCC): ICD-10-CM

## 2022-07-05 PROCEDURE — 99214 OFFICE O/P EST MOD 30 MIN: CPT | Performed by: INTERNAL MEDICINE

## 2022-07-05 PROCEDURE — 2022F DILAT RTA XM EVC RTNOPTHY: CPT | Performed by: INTERNAL MEDICINE

## 2022-07-05 PROCEDURE — 3044F HG A1C LEVEL LT 7.0%: CPT | Performed by: INTERNAL MEDICINE

## 2022-07-05 PROCEDURE — G8419 CALC BMI OUT NRM PARAM NOF/U: HCPCS | Performed by: INTERNAL MEDICINE

## 2022-07-05 PROCEDURE — 3017F COLORECTAL CA SCREEN DOC REV: CPT | Performed by: INTERNAL MEDICINE

## 2022-07-05 PROCEDURE — G8427 DOCREV CUR MEDS BY ELIG CLIN: HCPCS | Performed by: INTERNAL MEDICINE

## 2022-07-05 PROCEDURE — 1036F TOBACCO NON-USER: CPT | Performed by: INTERNAL MEDICINE

## 2022-07-05 ASSESSMENT — ENCOUNTER SYMPTOMS: VISUAL CHANGE: 0

## 2022-07-05 NOTE — ASSESSMENT & PLAN NOTE
Diabetes is very well controlled concerned about the patient is not eating as much we will get a keep very close monitoring and decide if any further intervention is needed

## 2022-07-05 NOTE — ASSESSMENT & PLAN NOTE
She has been followed by the warfarin clinic his last INR was 1.4 more than a week ago he has been increased to 10 and 7.5mg but at this point and given that he is having more swelling and tenderness of that right leg I feel that he will need to have a repeat ultrasound and we may need to start him on Lovenox until his warfarin is therapeutic    The patient does have an inferior vena cava Filter and he is to follow-up with vascular surgery in a month for that

## 2022-07-05 NOTE — PROGRESS NOTES
elmo Wiley (:  1960) is a 65 y.o. male,New patient, here for evaluation of the following chief complaint(s):  Edema (edema R leg and calf 1.5 weeks. some pain. noted discoloration and tender), Leg Pain (Right), and Leg Swelling (Right)         ASSESSMENT/PLAN:  1. Deep vein thrombosis (DVT) of proximal vein of right lower extremity, unspecified chronicity (HCC)  Assessment & Plan:  She has been followed by the warfarin clinic his last INR was 1.4 more than a week ago he has been increased to 10 and 7.5mg but at this point and given that he is having more swelling and tenderness of that right leg I feel that he will need to have a repeat ultrasound and we may need to start him on Lovenox until his warfarin is therapeutic    The patient does have an inferior vena cava Filter and he is to follow-up with vascular surgery in a month for that  Orders:  -     US DUP LOWER EXTREMITY RIGHT JV; Future  -     VL Extremity Venous Right; Future  2. Type 2 diabetes mellitus with other specified complication, without long-term current use of insulin (HCC)  Assessment & Plan:  Diabetes is very well controlled concerned about the patient is not eating as much we will get a keep very close monitoring and decide if any further intervention is needed      Return if symptoms worsen or fail to improve, for As scheduled.          Subjective   SUBJECTIVE/OBJECTIVE:    Lab Review   Lab Results   Component Value Date/Time     2022 01:39 PM     2022 08:48 AM     2022 05:25 AM    K 4.9 2022 01:39 PM    K 4.0 2022 08:48 AM    K 3.6 2022 05:25 AM    K 3.8 2022 05:00 AM    CO2 25 2022 01:39 PM    CO2 23 2022 08:48 AM    CO2 25 2022 05:25 AM    BUN 12 2022 01:39 PM    BUN 8 2022 08:48 AM    BUN 9 2022 05:25 AM    CREATININE 0.6 2022 01:39 PM    CREATININE <0.5 2022 08:48 AM    CREATININE <0.5 2022 05:25 AM    GLUCOSE 76 05/05/2022 01:39 PM    GLUCOSE 143 04/02/2022 08:48 AM    GLUCOSE 147 04/01/2022 05:25 AM    CALCIUM 9.9 05/05/2022 01:39 PM    CALCIUM 9.0 04/02/2022 08:48 AM    CALCIUM 9.0 04/01/2022 05:25 AM     Lab Results   Component Value Date/Time    WBC 4.8 06/16/2022 03:21 PM    WBC 4.9 05/05/2022 01:39 PM    WBC 5.0 04/02/2022 08:48 AM    HGB 9.3 06/16/2022 03:21 PM    HGB 10.2 05/05/2022 01:39 PM    HGB 11.2 04/02/2022 08:48 AM    HCT 28.6 06/16/2022 03:21 PM    HCT 32.3 05/05/2022 01:39 PM    HCT 34.5 04/02/2022 08:48 AM    MCV 83.2 06/16/2022 03:21 PM    MCV 86.0 05/05/2022 01:39 PM    MCV 85.7 04/02/2022 08:48 AM     06/16/2022 03:21 PM     05/05/2022 01:39 PM     04/02/2022 08:48 AM     Lab Results   Component Value Date/Time    CHOL 112 05/05/2022 01:39 PM    CHOL 140 02/18/2022 03:24 PM    CHOL 134 03/08/2021 12:59 PM    TRIG 92 05/05/2022 01:39 PM    TRIG 175 03/29/2022 10:30 AM    TRIG 152 03/22/2022 03:00 AM    HDL 41 05/05/2022 01:39 PM    HDL 71 02/18/2022 03:24 PM    HDL 46 03/08/2021 12:59 PM    HDL 60 03/29/2010 01:01 PM       Vitals 7/5/2022 7/1/2022 7/07/1917   SYSTOLIC 305 210 617   DIASTOLIC 70 65 74   Site Right Upper Arm - -   Position Sitting - -   Cuff Size Medium Adult - -   Pulse 77 101 98   Temp - 97 96.8   Resp - 18 -   SpO2 97 - -   Weight 164 lb 12.8 oz - -   Pain Level - 4 4   Some recent data might be hidden       Leg Pain   The incident occurred more than 1 week ago. There was no injury mechanism. The quality of the pain is described as aching. The pain is at a severity of 6/10. Pertinent negatives include no inability to bear weight, loss of motion, loss of sensation, muscle weakness, numbness or tingling. Diabetes  He presents for his follow-up diabetic visit. He has type 2 diabetes mellitus. His disease course has been improving. Pertinent negatives for hypoglycemia include no sleepiness, speech difficulty or sweats.  Pertinent negatives for diabetes include no visual change. Symptoms are improving. Review of Systems   Neurological: Negative for tingling, speech difficulty and numbness. Objective   Physical Exam  Constitutional:       General: He is not in acute distress. Appearance: Normal appearance. HENT:      Head: Normocephalic and atraumatic. Right Ear: Tympanic membrane normal.      Left Ear: Tympanic membrane normal.      Nose: Nose normal.   Eyes:      Extraocular Movements: Extraocular movements intact. Conjunctiva/sclera: Conjunctivae normal.      Pupils: Pupils are equal, round, and reactive to light. Neck:      Vascular: No carotid bruit. Cardiovascular:      Rate and Rhythm: Normal rate and regular rhythm. Pulses: Normal pulses. Heart sounds: No murmur heard. Pulmonary:      Effort: Pulmonary effort is normal. No respiratory distress. Breath sounds: Normal breath sounds. Abdominal:      General: Abdomen is flat. Bowel sounds are normal. There is no distension. Palpations: Abdomen is soft. Tenderness: There is no abdominal tenderness. Musculoskeletal:         General: Swelling and tenderness present. No deformity. Cervical back: Normal range of motion and neck supple. No rigidity or tenderness. Right lower leg: Edema present. Left lower leg: No edema. Lymphadenopathy:      Cervical: No cervical adenopathy. Skin:     Coloration: Skin is not jaundiced. Findings: No bruising, erythema or lesion. Neurological:      General: No focal deficit present. Mental Status: He is alert and oriented to person, place, and time. Cranial Nerves: No cranial nerve deficit. Motor: No weakness. Gait: Gait normal.            This dictation was generated by voice recognition computer software. Although all attempts are made to edit the dictation for accuracy, there may be errors in the transcription that are not intended.     An electronic signature was used to authenticate this note.     --Claude Gómez MD

## 2022-07-06 ENCOUNTER — HOSPITAL ENCOUNTER (EMERGENCY)
Age: 62
Discharge: HOME | End: 2022-07-06
Attending: EMERGENCY MEDICINE
Payer: COMMERCIAL

## 2022-07-06 ENCOUNTER — APPOINTMENT (OUTPATIENT)
Dept: VASCULAR LAB | Age: 62
End: 2022-07-06
Payer: COMMERCIAL

## 2022-07-06 VITALS
HEART RATE: 85 BPM | BODY MASS INDEX: 25.16 KG/M2 | OXYGEN SATURATION: 100 % | RESPIRATION RATE: 20 BRPM | DIASTOLIC BLOOD PRESSURE: 65 MMHG | HEIGHT: 68 IN | SYSTOLIC BLOOD PRESSURE: 126 MMHG | TEMPERATURE: 97.8 F | WEIGHT: 166 LBS

## 2022-07-06 DIAGNOSIS — L03.115 CELLULITIS OF RIGHT LEG: Primary | ICD-10-CM

## 2022-07-06 PROCEDURE — 6370000000 HC RX 637 (ALT 250 FOR IP): Performed by: EMERGENCY MEDICINE

## 2022-07-06 PROCEDURE — 99284 EMERGENCY DEPT VISIT MOD MDM: CPT

## 2022-07-06 PROCEDURE — 93971 EXTREMITY STUDY: CPT

## 2022-07-06 RX ORDER — DOXYCYCLINE HYCLATE 100 MG
100 TABLET ORAL 2 TIMES DAILY
Qty: 14 TABLET | Refills: 0 | Status: SHIPPED | OUTPATIENT
Start: 2022-07-06 | End: 2022-07-13

## 2022-07-06 RX ORDER — SULFAMETHOXAZOLE AND TRIMETHOPRIM 800; 160 MG/1; MG/1
1 TABLET ORAL ONCE
Status: DISCONTINUED | OUTPATIENT
Start: 2022-07-06 | End: 2022-07-06

## 2022-07-06 RX ORDER — DOXYCYCLINE 100 MG/1
100 CAPSULE ORAL ONCE
Status: COMPLETED | OUTPATIENT
Start: 2022-07-06 | End: 2022-07-06

## 2022-07-06 RX ORDER — CEPHALEXIN 500 MG/1
500 CAPSULE ORAL ONCE
Status: COMPLETED | OUTPATIENT
Start: 2022-07-06 | End: 2022-07-06

## 2022-07-06 RX ORDER — HYDROCODONE BITARTRATE AND ACETAMINOPHEN 5; 325 MG/1; MG/1
1 TABLET ORAL EVERY 6 HOURS PRN
Qty: 12 TABLET | Refills: 0 | Status: SHIPPED | OUTPATIENT
Start: 2022-07-06 | End: 2022-07-08

## 2022-07-06 RX ORDER — CEPHALEXIN 500 MG/1
500 CAPSULE ORAL 4 TIMES DAILY
Qty: 28 CAPSULE | Refills: 0 | Status: SHIPPED | OUTPATIENT
Start: 2022-07-06 | End: 2022-07-13

## 2022-07-06 RX ADMIN — DOXYCYCLINE 100 MG: 100 CAPSULE ORAL at 15:27

## 2022-07-06 RX ADMIN — CEPHALEXIN 500 MG: 500 CAPSULE ORAL at 15:26

## 2022-07-06 ASSESSMENT — ENCOUNTER SYMPTOMS
NAUSEA: 0
DIARRHEA: 0
ABDOMINAL PAIN: 0
SHORTNESS OF BREATH: 0
COUGH: 0
VOMITING: 0

## 2022-07-06 NOTE — ED NOTES
Patient prepared for and ready to be discharged. Patient discharged at this time in no acute distress after verbalizing understanding of discharge instructions. Patient left after receiving After Visit Summary instructions.         Cheryl Cornelius RN  07/06/22 1553

## 2022-07-06 NOTE — ED PROVIDER NOTES
[address] [facility]          ATTENDING PHYSICIAN NOTE       Date of evaluation: 7/6/2022    Chief Complaint     Leg Pain      History of Present Illness     Clinton Fernandes is a 65 y.o. male with a history of diabetes, diabetic foot ulcers for which he is seen at the wound clinic, and prior DVT for which he is on Coumadin who presents with complaints of increasing pain in the right lower extremity as well as swelling and redness over the last 1-1/2 weeks. He was seen by his primary care physician yesterday and an outpatient Doppler was ordered to rule out DVT because the patient was recently on Lovenox for laryngectomy surgery and just was started back on Coumadin. His pain was increased overnight last night and therefore he came to the hospital to get it checked out. The patient is not reporting fever or shortness of breath. He is currently taking Coumadin and his last INR was 1.4 on 6/27. Review of Systems     Review of Systems   Constitutional: Negative for chills and fever. Respiratory: Negative for cough and shortness of breath. Cardiovascular: Negative for chest pain. Gastrointestinal: Negative for abdominal pain, diarrhea, nausea and vomiting. All other systems reviewed and are negative. Past Medical, Surgical, Family, and Social History     He has a past medical history of Diabetes mellitus ([address] Fibromyalgia, History of DVT (deep vein thrombosis), Hyperlipidemia, Hypertension, and Type 2 diabetes mellitus with circulatory disorder, without long-term current use of insulin ([address] He has a past surgical history that includes Colonoscopy (2016); Appendectomy (2005); other surgical history (Right, 12/08/2020); femoral bypass (Left, 02/26/2020); Femoral-tibial Bypass Graft (Right, 12/11/2020); Foot Debridement (Left, 09/03/2020); Foot Debridement (Right, 2/26/2021); tracheostomy (N/A, 3/25/2022); and laryngoscopy (N/A, 3/25/2022).   His family history includes Cancer in his mother; Diabetes in his father; Fariha Kasal in his mother; Stroke in his father. He reports that he quit smoking about 6 months ago. His smoking use included cigarettes. He started smoking about 44 years ago. He has a 10.00 pack-year smoking history. He has never used smokeless tobacco. He reports current alcohol use. He reports that he does not use drugs.     Medications     Discharge Medication List as of 7/6/2022  3:34 PM      CONTINUE these medications which have NOT CHANGED    Details   ondansetron (ZOFRAN-ODT) 4 MG disintegrating tablet Take 4 mg by mouth every 8 hours as needed for Nausea or VomitingHistorical Med      Aspirin Buf,OrDqr-QyNqz-UlEuj, (BUFFERED ASPIRIN) 325 MG TABS Take by mouthHistorical Med      Multiple Vitamins-Minerals (MULTIVITAMIN ADULT EXTRA C PO) 1 tablet by Nasogastric route dailyHistorical Med      esomeprazole (NEXIUM) 20 MG delayed release capsule 20 mg by Nasogastric route every morning (before breakfast)Historical Med      oxyCODONE HCl (OXY-IR) 10 MG immediate release tablet TAKE 1 TABLET BY MOUTH EVERY SIX HOURS AS NEEDED FOR PAIN FOR UP TO 7 DAYSHistorical Med      budesonide (PULMICORT) 0.5 MG/2ML nebulizer suspension Take 2 mLs by nebulization 2 times daily, Disp-60 each, R-2Normal      atorvastatin (LIPITOR) 80 MG tablet Take 1 tablet by mouth daily Cancel atorvastatin 20 mg a day, Disp-90 tablet, R-0Normal      warfarin (COUMADIN) 5 MG tablet Take 1.5 tablets by mouth daily, Disp-90 tablet, R-1Normal      cetirizine (ZYRTEC) 10 MG tablet Take 1 tablet by mouth daily, Disp-30 tablet, R-0Normal      sennosides-docusate sodium (SENOKOT-S) 8.6-50 MG tablet Take 1 tablet by mouth 2 times daily, Disp-60 tablet, R-0Normal      lisinopril (PRINIVIL;ZESTRIL) 20 MG tablet Take 1 tablet by mouth daily, Disp-30 tablet, R-0Normal      cyanocobalamin 250 MCG tablet Take 1 tablet by mouth daily, Disp-30 tablet, R-0Normal      metFORMIN (GLUCOPHAGE) 500 MG tablet Take 1 tablet by mouth 2 times daily (with meals), Disp-180 tablet, R-0Normal      melatonin 3 mg TABS tablet Take 2 tablets by mouth nightly as needed (insomnia), Disp-30 tablet, R-2Normal      gentamicin (GARAMYCIN) 0.1 % cream Apply topically  daily. , Disp-30 g, R-1, Normal      becaplermin (REGRANEX) 0.01 % gel Apply 1 Applicatorful topically daily, Topical, DAILY Starting Wed 6/3/2020, Historical Med      calcium-vitamin D (OSCAL-500) 500-200 MG-UNIT per tablet Take 1 tablet by mouth 2 times dailyHistorical Med      ipratropium-albuterol (DUONEB) 0.5-2.5 (3) MG/3ML SOLN nebulizer solution Inhale 3 mLs into the lungs 4 times daily, Disp-360 mL, R-3Print      Fluticasone-Umeclidin-Vilant (TRELEGY ELLIPTA) 200-62.5-25 MCG/INH AEPB Inhale 1 puff into the lungs 2 times daily, Disp-1 each, R-2Normal      acetaminophen (TYLENOL) 500 MG tablet Take 1 tablet by mouth 4 times daily as needed for Pain, Disp-360 tablet, R-1Normal      fluticasone (FLONASE) 50 MCG/ACT nasal spray 1 spray by Nasal route daily, Disp-9.9 g, R-5Normal      Vitamins/Minerals TABS Take 1 tablet by mouth daily Historical Med             Allergies     He is allergic to chantix [varenicline]. Physical Exam     INITIAL VITALS: BP: 127/84, Temp: 97.8 °F (36.6 °C), Heart Rate: 85, Resp: 20, SpO2: 100 %   Physical Exam  Vitals and nursing note reviewed. Constitutional:       General: He is not in acute distress. Appearance: He is well-developed. He is not diaphoretic. Cardiovascular:      Rate and Rhythm: Normal rate and regular rhythm. Pulmonary:      Effort: Pulmonary effort is normal.      Breath sounds: Normal breath sounds. Abdominal:      General: There is no distension. Palpations: Abdomen is soft. Musculoskeletal:         General: Swelling (RLE) and tenderness (RLE) present. Cervical back: Normal range of motion. Right lower leg: Edema present. Skin:     General: Skin is warm and dry. Findings: No rash. Neurological:      Mental Status: He is alert and oriented to person, place, and time. Psychiatric:         Behavior: Behavior normal.         Diagnostic Results     RADIOLOGY:  VL Extremity Venous Right   Right   Technically difficult due to shadowing from arteries and from the arterial   bypass graft and patient pain. No evidence of deep or superficial venous thrombosis in the right lower   extremity. Nodular structures noted in the right groin, one measuring 1.73x1.13 cm,   consistent with lymph nodes. Left   No evidence of deep venous thrombosis in the left common femoral vein. LABS:   No results found for this visit on 07/06/22. ED BEDSIDE ULTRASOUND:  No results found. RECENT VITALS:  BP: 126/65,Temp: 97.8 °F (36.6 °C), Heart Rate: 85, Resp: 20, SpO2: 100 %       ED Course     Nursing Notes, Past Medical Hx, Past Surgical Hx, Social Hx,Allergies, and Family Hx were reviewed. patient was given the following medications:  Orders Placed This Encounter   Medications    cephALEXin (KEFLEX) capsule 500 mg     Order Specific Question:   Antimicrobial Indications     Answer:   Skin and Soft Tissue Infection    DISCONTD: sulfamethoxazole-trimethoprim (BACTRIM DS;SEPTRA DS) 800-160 MG per tablet 1 tablet     Order Specific Question:   Antimicrobial Indications     Answer:   Skin and Soft Tissue Infection    doxycycline monohydrate (MONODOX) capsule 100 mg     Order Specific Question:   Antimicrobial Indications     Answer:   Skin and Soft Tissue Infection    cephALEXin (KEFLEX) 500 MG capsule     Sig: Take 1 capsule by mouth 4 times daily for 7 days     Dispense:  28 capsule     Refill:  0    doxycycline hyclate (VIBRA-TABS) 100 MG tablet     Sig: Take 1 tablet by mouth 2 times daily for 7 days     Dispense:  14 tablet     Refill:  0    HYDROcodone-acetaminophen (NORCO) 5-325 MG per tablet     Sig: Take 1 tablet by mouth every 6 hours as needed for Pain for up to 12 doses.  WARNING: May cause drowsiness. Do not use in combination with alcohol. Dispense:  12 tablet     Refill:  0       CONSULTS:  None    MEDICAL DECISIONMAKING / ASSESSMENT / Radha Navneet is a 65 y.o. male presenting with right leg pain, redness, and warmth, his doctor had been concerned about a possible DVT given his recent changes in anticoagulation. The patient is currently on Coumadin. Doppler study was performed of the patient's right lower extremity and this is negative for DVT. Ultimately the appearance of the patient's leg appears more consistent with cellulitis and that there is an ulcer in the vicinity and the redness extends from the ulcer. He is followed by wound management and will be following up there in 48 hours. He also has a an appointment with the anticoagulation clinic in 2 days as well. Given his history of MRSA, double coverage with Keflex and doxycycline will be prescribed. I did not think that the patient would need inpatient IV antibiotics at this point in time given the limited extent but if it does not continue to get better with antibiotics, he may need to come back for admission. Clinical Impression     1. Cellulitis of right leg        Disposition     PATIENT REFERRED TO:  Wound Care on Friday          Anticoagulation clinic on Friday          [facility] Emergency Department  [address]  [phone]    If symptoms worsen      DISCHARGE MEDICATIONS:  Discharge Medication List as of 7/6/2022  3:34 PM      START taking these medications    Details   cephALEXin (KEFLEX) 500 MG capsule Take 1 capsule by mouth 4 times daily for 7 days, Disp-28 capsule, R-0Normal      doxycycline hyclate (VIBRA-TABS) 100 MG tablet Take 1 tablet by mouth 2 times daily for 7 days, Disp-14 tablet, R-0Normal      HYDROcodone-acetaminophen (NORCO) 5-325 MG per tablet Take 1 tablet by mouth every 6 hours as needed for Pain for up to 12 doses.  WARNING:  May cause drowsiness.  Do not use in combination with alcohol., Disp-12 tablet, R-0Normal             DISPOSITION Decision To Discharge 07/06/2022 03:17:17 PM       Lucina Celis MD  07/06/22 7688

## 2022-07-06 NOTE — ED TRIAGE NOTES
Pt sees wound care for wounds in bilat. Lower ext. Pt saw pcp yesterday for increased swelling of R leg who rec.  He come to ER

## 2022-07-06 NOTE — TELEPHONE ENCOUNTER
They were advised yesterday in office (patient and male accompanying him) also phoned wife and instructed her with your recommendations. Currently states they are at the ER and its a 6-8 hour wait they were debating going to [employer] to see if they can be seen any faster and his leg he complained felt like a balloon.

## 2022-07-06 NOTE — TELEPHONE ENCOUNTER
Pt wife is calling---pt went to the ER at [facility] today----doppler was done ---no blood clot --leg red and swollen ER put pt on antibiotic ---wants to know if he should go back on his Warfarin---please call them. Thanks.

## 2022-07-06 NOTE — TELEPHONE ENCOUNTER
Can you please call and verify with the patient/family if he had a Doppler done or not, our last recommendation was for him to go to the emergency room

## 2022-07-07 NOTE — TELEPHONE ENCOUNTER
HARSH for patient, instructed spouse to ask for myself/ [facility] nurse for any questions or clarification.

## 2022-07-08 ENCOUNTER — HOSPITAL ENCOUNTER (OUTPATIENT)
Dept: WOUND CARE | Age: 62
Discharge: HOME | End: 2022-07-08
Payer: COMMERCIAL

## 2022-07-08 ENCOUNTER — ANTI-COAG VISIT (OUTPATIENT)
Dept: PHARMACY | Age: 62
End: 2022-07-08
Payer: COMMERCIAL

## 2022-07-08 VITALS
SYSTOLIC BLOOD PRESSURE: 128 MMHG | TEMPERATURE: 96.4 F | DIASTOLIC BLOOD PRESSURE: 85 MMHG | HEART RATE: 85 BPM | RESPIRATION RATE: 18 BRPM

## 2022-07-08 DIAGNOSIS — I73.9 PERIPHERAL ARTERY DISEASE (HCC): Primary | ICD-10-CM

## 2022-07-08 LAB — INTERNATIONAL NORMALIZATION RATIO, POC: 1.7

## 2022-07-08 PROCEDURE — 99211 OFF/OP EST MAY X REQ PHY/QHP: CPT

## 2022-07-08 PROCEDURE — 11043 DBRDMT MUSC&/FSCA 1ST 20/<: CPT

## 2022-07-08 PROCEDURE — 85610 PROTHROMBIN TIME: CPT

## 2022-07-08 PROCEDURE — 11042 DBRDMT SUBQ TIS 1ST 20SQCM/<: CPT

## 2022-07-08 PROCEDURE — 11046 DBRDMT MUSC&/FSCA EA ADDL: CPT

## 2022-07-08 RX ORDER — LIDOCAINE 40 MG/G
CREAM TOPICAL ONCE
Status: DISCONTINUED | OUTPATIENT
Start: 2022-07-08 | End: 2022-07-09 | Stop reason: HOSPADM

## 2022-07-08 RX ORDER — LIDOCAINE 40 MG/G
CREAM TOPICAL ONCE
Status: CANCELLED | OUTPATIENT
Start: 2022-07-08 | End: 2022-07-08

## 2022-07-08 RX ORDER — GENTAMICIN SULFATE 1 MG/G
OINTMENT TOPICAL ONCE
Status: CANCELLED | OUTPATIENT
Start: 2022-07-08 | End: 2022-07-08

## 2022-07-08 RX ORDER — GINSENG 100 MG
CAPSULE ORAL ONCE
Status: CANCELLED | OUTPATIENT
Start: 2022-07-08 | End: 2022-07-08

## 2022-07-08 RX ORDER — LIDOCAINE 50 MG/G
OINTMENT TOPICAL ONCE
Status: CANCELLED | OUTPATIENT
Start: 2022-07-08 | End: 2022-07-08

## 2022-07-08 RX ORDER — LIDOCAINE HYDROCHLORIDE 20 MG/ML
JELLY TOPICAL ONCE
Status: CANCELLED | OUTPATIENT
Start: 2022-07-08 | End: 2022-07-08

## 2022-07-08 RX ORDER — BETAMETHASONE DIPROPIONATE 0.05 %
OINTMENT (GRAM) TOPICAL ONCE
Status: CANCELLED | OUTPATIENT
Start: 2022-07-08 | End: 2022-07-08

## 2022-07-08 RX ORDER — LIDOCAINE HYDROCHLORIDE 40 MG/ML
SOLUTION TOPICAL ONCE
Status: CANCELLED | OUTPATIENT
Start: 2022-07-08 | End: 2022-07-08

## 2022-07-08 RX ORDER — BACITRACIN ZINC AND POLYMYXIN B SULFATE 500; 1000 [USP'U]/G; [USP'U]/G
OINTMENT TOPICAL ONCE
Status: CANCELLED | OUTPATIENT
Start: 2022-07-08 | End: 2022-07-08

## 2022-07-08 RX ORDER — BACITRACIN, NEOMYCIN, POLYMYXIN B 400; 3.5; 5 [USP'U]/G; MG/G; [USP'U]/G
OINTMENT TOPICAL ONCE
Status: CANCELLED | OUTPATIENT
Start: 2022-07-08 | End: 2022-07-08

## 2022-07-08 RX ORDER — CLOBETASOL PROPIONATE 0.5 MG/G
OINTMENT TOPICAL ONCE
Status: CANCELLED | OUTPATIENT
Start: 2022-07-08 | End: 2022-07-08

## 2022-07-08 NOTE — PLAN OF CARE
[facility]  [id number], [address]  Telephone: [phone]        [facility] Fax # [phone]        Discharge Instructions       [facility]  [address] [facility]   [address] [place] [address], [address]  Telephone: [phone]     Discharge Instructions     Important reminders:     **If you have any signs and symptoms of illness (Cough, fever, congestion, nausea, vomiting, diarrhea, etc.) please call the wound care center prior to your appointment.     1. Increase Protein intake for optimal wound healing  2. No added salt to reduce any swelling  3. If diabetic, maintain good glucose control  4. If you smoke, smoking prohibits wound healing, we ask that you refrain from smoking. 5. When taking antibiotics take the entire prescription as ordered. Do not stop taking until medication is all gone unless otherwise instructed. 6. Exercise as tolerated. 7. Keep weight off wounds and reposition every 2 hours if applicable. 8. If wound(s) is on your lower extremity, elevate legs to the level of the heart or above for 30 minutes 4-5 times a day and/or when sitting. Avoid standing for long periods of time. 9. Do not get wounds wet in bath or shower unless otherwise instructed by your physician. If your wound is on your foot or leg, you may purchase a cast bag. Please ask at the pharmacy.        If Vascular testing is ordered, please call [address] [place] [address] [phone]) to schedule.     Vascular tests ordered by Wound Care Physicians may take up to 2 hours to complete. Please keep that in mind when scheduling.      If Vascular testing is scheduled, please bring supplies to replace your dressing after testing is done. The vascular department does not stock supplies.      Wound: Right and Left leg     With each dressing change, rinse wounds with 0.9% Saline. (May use wound wash or soft contact solution. Both can be purchased at a local drug store).  If unable to obtain saline, may use a gentle soap and water.     Dressing care: Right and left leg - Gentamicin cream and Collagen,  Dry dressing, 1 tubi  each leg. Change every other day     Home Care Agency/Facility: [facility]     Your wound-care supplies will be provided by:   Please note, depending on your insurance coverage, you may have out-of-pocket expenses for these supplies. Someone from the company should call you to confirm your order and discuss those potential costs before they ship your products -- please anticipate that call. If your out-of-pocket cost could be substantial, Many companies have financial hardship programs for patients who qualify, so please ask about that if you might need a hand. If you have any questions about your supplies or your potential out-of-pocket costs, or if you need to place an order for a refill of supplies (typically monthly), please call the company directly.      Your  is Avi Laguerre     Follow up with Dr Mercy Steele in 2 weeks        [address] [facility] Information: Should you experience any significant changes in your wound(s) or have questions about your wound care, please [facility] at [phone] Monday  - Thursday 8:00 am - 4:00 pm and Friday 8:00 am - 1:00pm. If you need help with your wound outside these hours and cannot wait until we are again available, contact your PCP or go to the hospital emergency room.      PLEASE NOTE: IF YOU ARE UNABLE TO OBTAIN WOUND SUPPLIES, CONTINUE TO USE THE SUPPLIES YOU HAVE AVAILABLE UNTIL YOU ARE ABLE TO [facility]. IT IS MOST IMPORTANT TO KEEP THE WOUND COVERED AT ALL TIMES.     Patient Experience     Thank you for trusting us with your care.  You may receive a survey from a company called [employer] asking for your feedback. Abby Mcgrath would appreciate it if you took a few minutes to share your experience.  Your input is very valuable to us.           Skilled nurse to evaluate and treat for wound care. Change dressing as ordered  once a day on Monday, Wednesday and Friday using clean technique. Patient/Family/caregiver may change dressings as needed as instructed when Home Care unavailable.     WOUNDS REQUIRING DRESSING Changes:     Wound 05/18/22 Ankle Medial;Right #2 (Active)   Wound Image   06/24/22 1202   Wound Etiology Diabetic 07/08/22 1123   Wound Cleansed Cleansed with saline 07/08/22 1123   Dressing/Treatment Antibacterial ointment;Collagen;Dry dressing 07/08/22 1243   Offloading for Diabetic Foot Ulcers Offloading not ordered 07/08/22 1123   Wound Length (cm) 1.7 cm 07/08/22 1123   Wound Width (cm) 1.5 cm 07/08/22 1123   Wound Depth (cm) 0.1 cm 07/08/22 1123   Wound Surface Area (cm^2) 2.55 cm^2 07/08/22 1123   Change in Wound Size % (l*w) 68.12 07/08/22 1123   Wound Volume (cm^3) 0.255 cm^3 07/08/22 1123   Wound Healing % 68 07/08/22 1123   Post-Procedure Length (cm) 1.7 cm 07/08/22 1153   Post-Procedure Width (cm) 1.5 cm 07/08/22 1153   Post-Procedure Depth (cm) 0.1 cm 07/08/22 1153   Post-Procedure Surface Area (cm^2) 2.55 cm^2 07/08/22 1153   Post-Procedure Volume (cm^3) 0.255 cm^3 07/08/22 1153   Wound Assessment Bleeding 07/08/22 1153   Drainage Amount Moderate 07/08/22 1123   Drainage Description Yellow;Serosanguinous 07/08/22 1123   Odor None 07/08/22 1123   Jmaila-wound Assessment Edematous 07/08/22 1123   Margins Defined edges 07/08/22 1123   Number of days: 51       Wound 06/24/22 Leg Left;Medial #1 (Active)   Wound Image   06/24/22 1202   Wound Etiology Venous 07/08/22 1123   Wound Cleansed Cleansed with saline 07/08/22 1123   Dressing/Treatment Antibacterial ointment;Collagen;Dry dressing 07/08/22 1243   Wound Length (cm) 2.4 cm 07/08/22 1123   Wound Width (cm) 4.2 cm 07/08/22 1123   Wound Depth (cm) 0.1 cm 07/08/22 1123   Wound Surface Area (cm^2) 10.08 cm^2 07/08/22 1123   Change in Wound Size % (l*w) -47.37 07/08/22 1123   Wound Volume (cm^3) 1.008 cm^3 07/08/22 1123   Wound

## 2022-07-08 NOTE — PROGRESS NOTES
Iris Quiroz  Progress Note and Procedure Note      Mecca Gregorio  AGE: 65 y.o. GENDER: male  : 1960  TODAY'S DATE:  2022    Subjective:     Chief Complaint   Patient presents with    Wound Check     right and left lower extremity         HISTORY of PRESENT ILLNESS HPI     Mecca Gregorio is a 65 y.o. male who presents today for wound evaluation. History of Wound: Admits to having chronic wounds for at least a year on the left leg and 8 to 9 months on the right leg. He states that he has had arterial bypasses on both of his legs in the past.  He was seeing previous wound care and podiatry for these wounds. He states that his insurance had some issues and he has not been seeing anyone since 2021 he has been treating the wounds himself with PSO and bandages. 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 States the leg swelling is improved. He had an inferior vena cava filter placed last year. He has been getting the bandages changed by himself at home. He has home care once a week. He feels that the legs are looking better. He did have his throat surgery.       Wound Pain:  intermittent  Severity:  3 / 10   Wound Type:  venous, arterial and diabetic  Modifying Factors:  edema, venous stasis, lymphedema, diabetes, decreased mobility, arterial insufficiency and decreased tissue oxygenation  Associated Signs/Symptoms:  drainage, numbness and odor        PAST MEDICAL HISTORY        Diagnosis Date    Cancer ([address]     throat    Diabetes mellitus ([address]     Fibromyalgia     History of DVT (deep vein thrombosis)     Hyperlipidemia     Hypertension     Type 2 diabetes mellitus with circulatory disorder, without long-term current use of insulin ([address] 2022       PAST SURGICAL HISTORY    Past Surgical History:   Procedure Laterality Date    APPENDECTOMY  2005    COLONOSCOPY  2016    FEMORAL BYPASS Left 2020    femoral posterior tibial bypass    FEMORAL-TIBIAL BYPASS GRAFT Right 2020    with femoral endarterectomy and patch angioplasty    FOOT DEBRIDEMENT Left 2020    FOOT DEBRIDEMENT Right 2021    DEBRIDEMENT OF RIGHT FOOT WOUND WITH GRAFT APPLICATION performed by Marilia Wray DPM at [address]  N/A 3/25/2022    DIRECT LARYNGOSCOPY WITH BIOPSY AND FROZEN SECTIONS performed by Dolly Trammell DO at [address] [facility] Right 2020    stent leg for PVD    TRACHEOSTOMY N/A 3/25/2022    TRACHEOTOMY performed by Dolly Trammell DO at [address] HISTORY    Family History   Problem Relation Age of Onset    Cancer Mother     Lung Cancer Mother     Diabetes Father     Stroke Father        SOCIAL HISTORY    Social History     Tobacco Use    Smoking status: Former Smoker     Packs/day: 0.50     Years: 20.00     Pack years: 10.00     Types: Cigarettes     Start date: 1977     Quit date: 2021     Years since quittin.5    Smokeless tobacco: Never Used   Vaping Use    Vaping Use: Never used   Substance Use Topics    Alcohol use: Yes     Comment: 2 beers / day     Drug use: Never       ALLERGIES    Allergies   Allergen Reactions    Chantix [Varenicline] Other (See Comments)     Hallucinations         MEDICATIONS    Current Outpatient Medications on File Prior to Encounter   Medication Sig Dispense Refill    cephALEXin (KEFLEX) 500 MG capsule Take 1 capsule by mouth 4 times daily for 7 days 28 capsule 0    doxycycline hyclate (VIBRA-TABS) 100 MG tablet Take 1 tablet by mouth 2 times daily for 7 days 14 tablet 0    HYDROcodone-acetaminophen (NORCO) 5-325 MG per tablet Take 1 tablet by mouth every 6 hours as needed for Pain for up to 12 doses. WARNING:  May cause drowsiness. Do not use in combination with alcohol.  12 tablet 0    ondansetron (ZOFRAN-ODT) 4 MG disintegrating tablet Take 4 mg by mouth every 8 hours as needed for Nausea or Take 1 tablet by mouth 4 times daily as needed for Pain 360 tablet 1    fluticasone (FLONASE) 50 MCG/ACT nasal spray 1 spray by Nasal route daily 9.9 g 5    Vitamins/Minerals TABS Take 1 tablet by mouth daily        No current facility-administered medications on file prior to encounter. REVIEW OF SYSTEMS    Pertinent items are noted in HPI. Objective:      /85   Pulse 85   Temp (!) 96.4 °F (35.8 °C) (Infrared)   Resp 18     PHYSICAL EXAM    Vascular: Vascular status Impaired  palpable pedal pulses, right DP0/4 and PT1/4, left DP0/4 and PT1/4. CFT 3 seconds digits 1 to 5 bilateral.  Hair growthAbsent  both lower extremities and feet. Skin temperature is warm to warm from pretibial area to distal digits bilateral.  Exam is negative for rubor, pallor, cyanosis or signs of acute vascular compromise bilaterally. Exam is positive for edema bilateral lower extremity. Varicosities Present bilateral lower extremity. Neuro: Neurologic status diminished bilateral with epicritic Absent  , proprioceptive Absent , vibratory sensation Absent  and protopathicPresent. DTRs Absent  bilateral Achilles. There were no reproducible neuritic symptoms on exam bilateral feet/ankles. Derm: Ulceration to bilateral ankles. Ecchymosis Absent  bilateral feet/foot. Musculoskeletal: No pain with debridement of wounds 5/5 muscle strength in/eversion and dorsi/plantarflexion bilateral feet. No gross instability noted.         Assessment:     Problem List Items Addressed This Visit     Peripheral artery disease ([facility] - Primary    Relevant Medications    lidocaine (LMX) 4 % cream    Other Relevant Orders    Initiate Outpatient Wound Care Protocol          Procedure Note    Performed by: Vijaya Carlson DPM    Consent obtained: Yes    Time out taken:  Yes    Pain Control: Anesthetic  Anesthetic: 4% Lidocaine Cream     Debridement:Excisional Debridement    Using curette the wound was sharply debrided    down through and including the removal of epidermis, dermis, subcutaneous tissue and muscle/fascia.         Devitalized Tissue Debrided:  fibrin, biofilm, slough, necrotic/eschar and exudate    Pre Debridement Measurements:  Are located in the Wound Documentation Flow Sheet    Wound #: 1     Wound Care Documentation:  Wound 05/18/22 Ankle Medial;Right #2 (Active)   Wound Image   06/24/22 1202   Wound Etiology Diabetic 07/08/22 1123   Wound Cleansed Cleansed with saline 07/08/22 1123   Dressing/Treatment Antibacterial ointment;Collagen;Dry dressing 07/08/22 1243   Offloading for Diabetic Foot Ulcers Offloading not ordered 07/08/22 1123   Wound Length (cm) 1.7 cm 07/08/22 1123   Wound Width (cm) 1.5 cm 07/08/22 1123   Wound Depth (cm) 0.1 cm 07/08/22 1123   Wound Surface Area (cm^2) 2.55 cm^2 07/08/22 1123   Change in Wound Size % (l*w) 68.12 07/08/22 1123   Wound Volume (cm^3) 0.255 cm^3 07/08/22 1123   Wound Healing % 68 07/08/22 1123   Post-Procedure Length (cm) 1.7 cm 07/08/22 1153   Post-Procedure Width (cm) 1.5 cm 07/08/22 1153   Post-Procedure Depth (cm) 0.1 cm 07/08/22 1153   Post-Procedure Surface Area (cm^2) 2.55 cm^2 07/08/22 1153   Post-Procedure Volume (cm^3) 0.255 cm^3 07/08/22 1153   Wound Assessment Bleeding 07/08/22 1153   Drainage Amount Moderate 07/08/22 1123   Drainage Description Yellow;Serosanguinous 07/08/22 1123   Odor None 07/08/22 1123   Jamila-wound Assessment Edematous 07/08/22 1123   Margins Defined edges 07/08/22 1123   Number of days: 51       Wound 06/24/22 Leg Left;Medial #1 (Active)   Wound Image   06/24/22 1202   Wound Etiology Venous 07/08/22 1123   Wound Cleansed Cleansed with saline 07/08/22 1123   Dressing/Treatment Antibacterial ointment;Collagen;Dry dressing 07/08/22 1243   Wound Length (cm) 2.4 cm 07/08/22 1123   Wound Width (cm) 4.2 cm 07/08/22 1123   Wound Depth (cm) 0.1 cm 07/08/22 1123   Wound Surface Area (cm^2) 10.08 cm^2 07/08/22 1123   Change in Wound Size % (l*w) -47.37 07/08/22 1123 Wound Volume (cm^3) 1.008 cm^3 07/08/22 1123   Wound Healing % -47 07/08/22 1123   Post-Procedure Length (cm) 2.4 cm 07/08/22 1153   Post-Procedure Width (cm) 4.2 cm 07/08/22 1153   Post-Procedure Depth (cm) 0.1 cm 07/08/22 1153   Post-Procedure Surface Area (cm^2) 10.08 cm^2 07/08/22 1153   Post-Procedure Volume (cm^3) 1.008 cm^3 07/08/22 1153   Wound Assessment Bleeding 07/08/22 1153   Drainage Amount Moderate 07/08/22 1123   Drainage Description Yellow;Serosanguinous 07/08/22 1123   Odor None 07/08/22 1123   Jamila-wound Assessment Edematous 07/08/22 1123   Margins Defined edges 07/08/22 1123   Number of days: 14         Total Surface Area Debrided: 10.88 cm² left leg into the deep fascia and sq cm 2.55 cm² into the subcutaneous tissue of the right foot    Percentage of wound debrided 100%    Bleeding:  Minimal    Hemostasis Achieved:  by pressure    Procedural Pain:  0  / 10     Post Procedural Pain:  0 / 10     Response to treatment:  Well tolerated by patient. Plan:   Patient examined and evaluated   Wounds debrided without incident   Compression wrap with triad and gentamicin cream left leg and gentamicin cream with collagen right leg and Lac-Hydrin  Leave dressings every 2 days  Keep legs elevated at all times when not active   Continue follow-up with vascular surgery for decision on wounds for improving the blood flow to the distal left leg. He has not made his decision on what he would like to do. Patient would benefit from disability due to his multiple comorbidities including his chronic peripheral arterial disease with high risk for limb loss. The nature of the patient's condition was explained in depth. The patient was informed that their compliance to the treatment Plan is paramount to successful healing and prevention of further ulceration and/or infection.   Discharge Treatment Wound 06/24/22 Leg Left;Medial #1-Dressing/Treatment: Antibacterial ointment,Collagen,Dry dressing (refused tubagrip)  Wound 05/18/22 Ankle Medial;Right #2-Dressing/Treatment: Antibacterial ointment,Collagen,Dry dressing (refused tubagrip)Follow-up after his laryngectomy    Written Patient Discharge Instructions Given            Electronically signed by Harvinder Wellington DPM on 7/8/2022 at 12:57 PM

## 2022-07-08 NOTE — PLAN OF CARE
Discharge instructions given. Patient verbalized understanding. Return to [facility] in 2 week(s).

## 2022-07-08 NOTE — PROGRESS NOTES
Marcelino Lee is a 65 y.o. here for warfarin management. Simran Valdez had an INR test today. Results were reviewed and appropriate warfarin management was completed. This visit was performed as: An in person visit. Protocols were followed with precautions to reduce the spread of COVID-19. Patient verifies current dosing regimen: Yes     Warfarin medication reviewed and updated on the patient 's home medication list: Yes   All other medications reviewed and updated on the patient 's home medication list: Yes     Lab Results   Component Value Date    INR 1.7 2022    INR 1.40 2022    INR 1.31 (H) 2022       Patient Findings     Positives:  Change in health, Change in medications    Negatives:  Signs/symptoms of thrombosis, Signs/symptoms of bleeding, Missed doses, Change in diet/appetite, Bruising    Comments:  Patient was recently in the hospital for leg infection. Started 7 day course of Doxycycline and Cephalexin on . Anticoagulation Summary  As of 2022    INR goal:  2.0-3.0   TTR:  52.5 % (9.5 mo)   INR used for dosin.7 (2022)   Warfarin maintenance plan:  10 mg (5 mg x 2) every Mon, Wed, Fri; 7.5 mg (5 mg x 1.5) all other days   Weekly warfarin total:  60 mg   Plan last modified:  Alize Pandey (2022)   Next INR check:  7/15/2022   Priority:  High   Target end date: Indefinite    Indications    Peripheral artery disease ([facility] [address] [I73.9]             Anticoagulation Episode Summary     INR check location:  Anticoagulation Clinic    Preferred lab:      Send INR reminders to:  [facility]    Comments:  EPIC      Anticoagulation Care Providers     Provider Role Specialty Phone number    Romario Page MD Referring Family Medicine [phone]          Warfarin assessment / plan:     Appears well  Sub-therapeutic INR     Denies increased vitamin K intake. Denies increased activity. Denies signs or symptoms of clotting.      Missed dose(s) 1 encounter. Reviewed AVS with patient / caregiver.     Billing Points:  0 billing points this visit       CLINICAL PHARMACY CONSULT: MED RECONCILIATION/REVIEW ADDENDUM    For Pharmacy Admin Tracking Only     Intervention Detail:    Total # of Interventions Recommended: 0   Total # of Interventions Accepted: 0   Time Spent (min): 20

## 2022-07-14 DIAGNOSIS — E78.00 PURE HYPERCHOLESTEROLEMIA: ICD-10-CM

## 2022-07-14 DIAGNOSIS — E11.9 TYPE 2 DIABETES MELLITUS WITHOUT COMPLICATION, WITHOUT LONG-TERM CURRENT USE OF INSULIN (HCC): ICD-10-CM

## 2022-07-14 DIAGNOSIS — I73.9 PAD (PERIPHERAL ARTERY DISEASE) (HCC): ICD-10-CM

## 2022-07-14 DIAGNOSIS — M25.50 ARTHRALGIA, UNSPECIFIED JOINT: ICD-10-CM

## 2022-07-14 DIAGNOSIS — I10 ESSENTIAL HYPERTENSION: ICD-10-CM

## 2022-07-14 RX ORDER — ATORVASTATIN CALCIUM 80 MG/1
80 TABLET, FILM COATED ORAL DAILY
Qty: 90 TABLET | Refills: 0 | Status: SHIPPED | OUTPATIENT
Start: 2022-07-14 | End: 2022-08-06 | Stop reason: SDUPTHER

## 2022-07-14 RX ORDER — SENNA AND DOCUSATE SODIUM 50; 8.6 MG/1; MG/1
1 TABLET, FILM COATED ORAL 2 TIMES DAILY
Qty: 60 TABLET | Refills: 0 | Status: SHIPPED | OUTPATIENT
Start: 2022-07-14 | End: 2022-08-06 | Stop reason: SDUPTHER

## 2022-07-14 RX ORDER — LANOLIN ALCOHOL/MO/W.PET/CERES
6 CREAM (GRAM) TOPICAL NIGHTLY PRN
Qty: 30 TABLET | Refills: 2 | Status: SHIPPED | OUTPATIENT
Start: 2022-07-14 | End: 2022-08-06 | Stop reason: SDUPTHER

## 2022-07-14 RX ORDER — WARFARIN SODIUM 5 MG/1
7.5 TABLET ORAL DAILY
Qty: 90 TABLET | Refills: 1 | Status: SHIPPED | OUTPATIENT
Start: 2022-07-14 | End: 2022-08-06 | Stop reason: SDUPTHER

## 2022-07-14 RX ORDER — CETIRIZINE HYDROCHLORIDE 10 MG/1
10 TABLET ORAL DAILY
Qty: 30 TABLET | Refills: 0 | Status: SHIPPED | OUTPATIENT
Start: 2022-07-14 | End: 2022-07-15

## 2022-07-14 RX ORDER — LISINOPRIL 20 MG/1
20 TABLET ORAL DAILY
Qty: 30 TABLET | Refills: 0 | Status: SHIPPED | OUTPATIENT
Start: 2022-07-14 | End: 2022-08-06 | Stop reason: SDUPTHER

## 2022-07-14 RX ORDER — ACETAMINOPHEN 500 MG
500 TABLET ORAL 4 TIMES DAILY PRN
Qty: 360 TABLET | Refills: 1 | OUTPATIENT
Start: 2022-07-14

## 2022-07-15 ENCOUNTER — OFFICE VISIT (OUTPATIENT)
Dept: INTERNAL MEDICINE CLINIC | Age: 62
End: 2022-07-15
Payer: COMMERCIAL

## 2022-07-15 ENCOUNTER — APPOINTMENT (OUTPATIENT)
Dept: PHARMACY | Age: 62
End: 2022-07-15
Payer: COMMERCIAL

## 2022-07-15 VITALS
WEIGHT: 166.6 LBS | BODY MASS INDEX: 25.33 KG/M2 | OXYGEN SATURATION: 100 % | SYSTOLIC BLOOD PRESSURE: 130 MMHG | HEART RATE: 75 BPM | DIASTOLIC BLOOD PRESSURE: 78 MMHG

## 2022-07-15 DIAGNOSIS — I10 ESSENTIAL HYPERTENSION: ICD-10-CM

## 2022-07-15 DIAGNOSIS — E78.2 MIXED HYPERLIPIDEMIA: Primary | ICD-10-CM

## 2022-07-15 DIAGNOSIS — J41.0 SIMPLE CHRONIC BRONCHITIS (HCC): ICD-10-CM

## 2022-07-15 DIAGNOSIS — L29.9 ITCHING: ICD-10-CM

## 2022-07-15 DIAGNOSIS — E11.69 TYPE 2 DIABETES MELLITUS WITH OTHER SPECIFIED COMPLICATION, WITHOUT LONG-TERM CURRENT USE OF INSULIN (HCC): ICD-10-CM

## 2022-07-15 DIAGNOSIS — C32.9 LARYNX CANCER (HCC): ICD-10-CM

## 2022-07-15 DIAGNOSIS — I82.4Y1 DEEP VEIN THROMBOSIS (DVT) OF PROXIMAL VEIN OF RIGHT LOWER EXTREMITY, UNSPECIFIED CHRONICITY (HCC): ICD-10-CM

## 2022-07-15 PROCEDURE — 99214 OFFICE O/P EST MOD 30 MIN: CPT | Performed by: INTERNAL MEDICINE

## 2022-07-15 PROCEDURE — G8427 DOCREV CUR MEDS BY ELIG CLIN: HCPCS | Performed by: INTERNAL MEDICINE

## 2022-07-15 PROCEDURE — 1036F TOBACCO NON-USER: CPT | Performed by: INTERNAL MEDICINE

## 2022-07-15 PROCEDURE — G8419 CALC BMI OUT NRM PARAM NOF/U: HCPCS | Performed by: INTERNAL MEDICINE

## 2022-07-15 PROCEDURE — 3017F COLORECTAL CA SCREEN DOC REV: CPT | Performed by: INTERNAL MEDICINE

## 2022-07-15 PROCEDURE — 3023F SPIROM DOC REV: CPT | Performed by: INTERNAL MEDICINE

## 2022-07-15 PROCEDURE — 3044F HG A1C LEVEL LT 7.0%: CPT | Performed by: INTERNAL MEDICINE

## 2022-07-15 PROCEDURE — 2022F DILAT RTA XM EVC RTNOPTHY: CPT | Performed by: INTERNAL MEDICINE

## 2022-07-15 RX ORDER — CETIRIZINE HYDROCHLORIDE 10 MG/1
10 TABLET ORAL DAILY
Qty: 30 TABLET | Refills: 0 | Status: SHIPPED | OUTPATIENT
Start: 2022-07-15 | End: 2022-07-31 | Stop reason: SDUPTHER

## 2022-07-15 NOTE — PROGRESS NOTES
01:39 PM    MCV 85.7 04/02/2022 08:48 AM     06/16/2022 03:21 PM     05/05/2022 01:39 PM     04/02/2022 08:48 AM     Lab Results   Component Value Date/Time    CHOL 112 05/05/2022 01:39 PM    CHOL 140 02/18/2022 03:24 PM    CHOL 134 03/08/2021 12:59 PM    TRIG 92 05/05/2022 01:39 PM    TRIG 175 03/29/2022 10:30 AM    TRIG 152 03/22/2022 03:00 AM    HDL 41 05/05/2022 01:39 PM    HDL 71 02/18/2022 03:24 PM    HDL 46 03/08/2021 12:59 PM    HDL 60 03/29/2010 01:01 PM       Vitals 7/15/2022 7/15/2022 6/8/4805   SYSTOLIC 477 234 931   DIASTOLIC 78 70 85   Site Right Upper Arm - -   Position Sitting - -   Cuff Size - - -   Pulse - 75 85   Temp - - 96.4   Resp - - 18   SpO2 - 100 -   Weight - 166 lb 9.6 oz -   Height - - -   Body mass index - - -   Pain Level - - 4   Some recent data might be hidden       Hypertension  This is a chronic problem. The current episode started more than 1 year ago. The problem has been waxing and waning since onset. The problem is uncontrolled. Review of Systems       Objective   Physical Exam  Constitutional:       General: He is not in acute distress. Appearance: Normal appearance. HENT:      Head: Normocephalic and atraumatic. Right Ear: Tympanic membrane normal.      Left Ear: Tympanic membrane normal.      Nose: Nose normal.   Eyes:      Extraocular Movements: Extraocular movements intact. Conjunctiva/sclera: Conjunctivae normal.      Pupils: Pupils are equal, round, and reactive to light. Neck:      Vascular: No carotid bruit. Cardiovascular:      Rate and Rhythm: Normal rate and regular rhythm. Pulses: Normal pulses. Heart sounds: No murmur heard. Pulmonary:      Effort: Pulmonary effort is normal. No respiratory distress. Breath sounds: Normal breath sounds. Abdominal:      General: Abdomen is flat. Bowel sounds are normal. There is no distension. Palpations: Abdomen is soft. Tenderness:  There is no abdominal tenderness. Musculoskeletal:         General: No swelling, tenderness or deformity. Cervical back: Normal range of motion and neck supple. No rigidity or tenderness. Right lower leg: No edema. Left lower leg: No edema. Lymphadenopathy:      Cervical: No cervical adenopathy. Skin:     Coloration: Skin is not jaundiced. Findings: No bruising, erythema or lesion. Neurological:      General: No focal deficit present. Mental Status: He is alert and oriented to person, place, and time. Cranial Nerves: No cranial nerve deficit. Motor: No weakness. Gait: Gait normal.          This dictation was generated by voice recognition computer software. Although all attempts are made to edit the dictation for accuracy, there may be errors in the transcription that are not intended. An electronic signature was used to authenticate this note.     --Sotero Hsieh MD

## 2022-07-15 NOTE — ASSESSMENT & PLAN NOTE
Patient just started radiation yesterday for his laryngeal cancer he seems to be having some more itching and we can use some antihistamines to reduce that

## 2022-07-15 NOTE — ASSESSMENT & PLAN NOTE
Patient is still being treated for his DVT with anticoagulation is managed by the warfarin clinic his lower extremity swelling is very slowly improving he is not as tense and his redness is improving

## 2022-07-15 NOTE — ASSESSMENT & PLAN NOTE
Repeat blood pressure when the patient is rested showed his blood pressure to drop significantly into the 130 over 70s range

## 2022-07-19 ENCOUNTER — ANTI-COAG VISIT (OUTPATIENT)
Dept: PHARMACY | Age: 62
End: 2022-07-19
Payer: COMMERCIAL

## 2022-07-19 DIAGNOSIS — I73.9 PERIPHERAL ARTERY DISEASE (HCC): Primary | ICD-10-CM

## 2022-07-19 LAB — INTERNATIONAL NORMALIZATION RATIO, POC: 2

## 2022-07-19 PROCEDURE — 99211 OFF/OP EST MAY X REQ PHY/QHP: CPT

## 2022-07-19 PROCEDURE — 85610 PROTHROMBIN TIME: CPT

## 2022-07-19 NOTE — PROGRESS NOTES
Taya Starkey is a 65 y.o. here for warfarin management. Dena Nation had an INR test today. Results were reviewed and appropriate warfarin management was completed. This visit was performed as: An in person visit. Protocols were followed with precautions to reduce the spread of COVID-19. Patient verifies current dosing regimen: Yes     Warfarin medication reviewed and updated on the patient 's home medication list: Yes   All other medications reviewed and updated on the patient 's home medication list: No: No changes     Lab Results   Component Value Date    INR 2.0 2022    INR 1.7 2022    INR 1.40 2022       Patient Findings       Positives:  Change in health    Negatives:  Signs/symptoms of thrombosis, Signs/symptoms of bleeding, Missed doses, Change in medications, Change in diet/appetite, Bruising    Comments:  Started radiation. Will have radiation 5 days a week for the next 5 weeks Monday - Friday. Anticoagulation Summary  As of 2022      INR goal:  2.0-3.0   TTR:  50.4 % (9.9 mo)   INR used for dosin.0 (2022)   Warfarin maintenance plan:  10 mg (5 mg x 2) every Mon, Wed, Fri; 7.5 mg (5 mg x 1.5) all other days   Weekly warfarin total:  60 mg   Plan last modified:  Len Min (2022)   Next INR check:  2022   Priority:  High   Target end date: Indefinite    Indications    Peripheral artery disease ([facility] [address] [I73.9]                 Anticoagulation Episode Summary       INR check location:  Anticoagulation Clinic    Preferred lab:      Send INR reminders to:  [facility]    Comments:  EPIC          Anticoagulation Care Providers       Provider Role Specialty Phone number    Kami Nuñez MD Referring Family Medicine [phone]            Warfarin assessment / plan:     Appears well. No changes affecting warfarin therapy were noted. No acute findings. INR within goal range. No change to warfarin therapy today. Patient started radiation recently. Will have radiation 5 days a week for the next 5 weeks Monday - Friday. Scheduled next INR check for 2 weeks to ensure INR goal is maintained during radiation. Description    CONTINUE:  Warfarin 7.5 mg (1 & 1/2 tablets) daily except 10mg (2 tablets) every Monday, Wednesday and Friday    Call [phone] with signs or symptoms of bleeding or ANY medication changes (including over-the-counter medications or herbal supplements). If significant bleeding occurs please seek immediate medical attention. Keep the number of servings of vitamin K containing foods (dark green, leafy vegetables) the same each week. Dark green vegetable 2-3 times a week and a mixed green salad ~ 3 times a week. Please call if this changes. Limit alcohol intake. Please call if this changes. Immunization History   Administered Date(s) Administered    COVID-19, PFIZER PURPLE top, DILUTE for use, (age 15 y+), 30mcg/0.3mL 03/15/2021, 04/12/2021, 12/22/2021    Influenza Virus Vaccine 01/21/2017, 01/21/2017    Influenza, Caroline Ode, IM, PF (6 mo and older Fluzone, Flulaval, Fluarix, and 3 yrs and older Afluria) 10/31/2020    PPD Test 04/14/2022, 04/23/2022, 06/03/2022    Tdap (Boostrix, Adacel) 03/27/2018    Zoster Recombinant (Shingrix) 03/27/2018           Orders Placed This Encounter   Procedures    POCT INR     This external order was created through the results console. No orders of the defined types were placed in this encounter. Reviewed AVS with patient / caregiver.     Billing Points:  0 billing points this visit       CLINICAL PHARMACY CONSULT: MED RECONCILIATION/REVIEW ADDENDUM    For Pharmacy Admin Tracking Only    Intervention Detail:   Total # of Interventions Recommended: 0  Total # of Interventions Accepted: 0  Time Spent (min): 20

## 2022-07-20 ENCOUNTER — OFFICE VISIT (OUTPATIENT)
Dept: INTERNAL MEDICINE CLINIC | Age: 62
End: 2022-07-20
Payer: COMMERCIAL

## 2022-07-20 ENCOUNTER — HOSPITAL ENCOUNTER (OUTPATIENT)
Dept: WOUND CARE | Age: 62
Discharge: HOME | End: 2022-07-20
Payer: MEDICAID

## 2022-07-20 VITALS
HEART RATE: 102 BPM | SYSTOLIC BLOOD PRESSURE: 107 MMHG | RESPIRATION RATE: 16 BRPM | DIASTOLIC BLOOD PRESSURE: 70 MMHG | TEMPERATURE: 97.1 F

## 2022-07-20 VITALS
DIASTOLIC BLOOD PRESSURE: 78 MMHG | WEIGHT: 165.4 LBS | BODY MASS INDEX: 25.15 KG/M2 | OXYGEN SATURATION: 99 % | HEART RATE: 77 BPM | SYSTOLIC BLOOD PRESSURE: 128 MMHG

## 2022-07-20 DIAGNOSIS — E78.2 MIXED HYPERLIPIDEMIA: ICD-10-CM

## 2022-07-20 DIAGNOSIS — I10 ESSENTIAL HYPERTENSION: ICD-10-CM

## 2022-07-20 DIAGNOSIS — I82.4Y1 DEEP VEIN THROMBOSIS (DVT) OF PROXIMAL VEIN OF RIGHT LOWER EXTREMITY, UNSPECIFIED CHRONICITY (HCC): ICD-10-CM

## 2022-07-20 DIAGNOSIS — L97.323 NON-PRESSURE CHRONIC ULCER OF LEFT ANKLE WITH NECROSIS OF MUSCLE (HCC): ICD-10-CM

## 2022-07-20 DIAGNOSIS — L03.115 CELLULITIS OF LEG, RIGHT: ICD-10-CM

## 2022-07-20 DIAGNOSIS — L29.9 ITCHING: ICD-10-CM

## 2022-07-20 DIAGNOSIS — I70.235 ATHEROSCLEROSIS OF NATIVE ARTERIES OF RIGHT LEG WITH ULCERATION OF OTHER PART OF FOOT (HCC): ICD-10-CM

## 2022-07-20 DIAGNOSIS — B35.3 TINEA PEDIS OF RIGHT FOOT: ICD-10-CM

## 2022-07-20 DIAGNOSIS — L29.9 ITCHING: Primary | ICD-10-CM

## 2022-07-20 DIAGNOSIS — I73.9 PERIPHERAL ARTERY DISEASE (HCC): Primary | ICD-10-CM

## 2022-07-20 PROCEDURE — 29581 APPL MULTLAYER CMPRN SYS LEG: CPT

## 2022-07-20 PROCEDURE — 11042 DBRDMT SUBQ TIS 1ST 20SQCM/<: CPT

## 2022-07-20 PROCEDURE — 3017F COLORECTAL CA SCREEN DOC REV: CPT | Performed by: INTERNAL MEDICINE

## 2022-07-20 PROCEDURE — G8427 DOCREV CUR MEDS BY ELIG CLIN: HCPCS | Performed by: INTERNAL MEDICINE

## 2022-07-20 PROCEDURE — 99214 OFFICE O/P EST MOD 30 MIN: CPT | Performed by: INTERNAL MEDICINE

## 2022-07-20 PROCEDURE — 1036F TOBACCO NON-USER: CPT | Performed by: INTERNAL MEDICINE

## 2022-07-20 PROCEDURE — G8419 CALC BMI OUT NRM PARAM NOF/U: HCPCS | Performed by: INTERNAL MEDICINE

## 2022-07-20 PROCEDURE — 11043 DBRDMT MUSC&/FSCA 1ST 20/<: CPT

## 2022-07-20 RX ORDER — BACITRACIN ZINC AND POLYMYXIN B SULFATE 500; 1000 [USP'U]/G; [USP'U]/G
OINTMENT TOPICAL ONCE
Status: CANCELLED | OUTPATIENT
Start: 2022-07-20 | End: 2022-07-20

## 2022-07-20 RX ORDER — LIDOCAINE HYDROCHLORIDE 40 MG/ML
SOLUTION TOPICAL ONCE
Status: CANCELLED | OUTPATIENT
Start: 2022-07-20 | End: 2022-07-20

## 2022-07-20 RX ORDER — LIDOCAINE HYDROCHLORIDE 20 MG/ML
JELLY TOPICAL ONCE
Status: CANCELLED | OUTPATIENT
Start: 2022-07-20 | End: 2022-07-20

## 2022-07-20 RX ORDER — LIDOCAINE 50 MG/G
OINTMENT TOPICAL ONCE
Status: CANCELLED | OUTPATIENT
Start: 2022-07-20 | End: 2022-07-20

## 2022-07-20 RX ORDER — GINSENG 100 MG
CAPSULE ORAL ONCE
Status: CANCELLED | OUTPATIENT
Start: 2022-07-20 | End: 2022-07-20

## 2022-07-20 RX ORDER — SULFAMETHOXAZOLE AND TRIMETHOPRIM 800; 160 MG/1; MG/1
1 TABLET ORAL 2 TIMES DAILY
Qty: 14 TABLET | Refills: 0 | Status: SHIPPED | OUTPATIENT
Start: 2022-07-20 | End: 2022-07-27 | Stop reason: ALTCHOICE

## 2022-07-20 RX ORDER — LIDOCAINE 40 MG/G
CREAM TOPICAL ONCE
Status: CANCELLED | OUTPATIENT
Start: 2022-07-20 | End: 2022-07-20

## 2022-07-20 RX ORDER — LIDOCAINE 40 MG/G
CREAM TOPICAL ONCE
Status: DISCONTINUED | OUTPATIENT
Start: 2022-07-20 | End: 2022-07-21 | Stop reason: HOSPADM

## 2022-07-20 RX ORDER — BETAMETHASONE DIPROPIONATE 0.05 %
OINTMENT (GRAM) TOPICAL ONCE
Status: CANCELLED | OUTPATIENT
Start: 2022-07-20 | End: 2022-07-20

## 2022-07-20 RX ORDER — BACITRACIN, NEOMYCIN, POLYMYXIN B 400; 3.5; 5 [USP'U]/G; MG/G; [USP'U]/G
OINTMENT TOPICAL ONCE
Status: CANCELLED | OUTPATIENT
Start: 2022-07-20 | End: 2022-07-20

## 2022-07-20 RX ORDER — CLOBETASOL PROPIONATE 0.5 MG/G
OINTMENT TOPICAL ONCE
Status: CANCELLED | OUTPATIENT
Start: 2022-07-20 | End: 2022-07-20

## 2022-07-20 RX ORDER — GENTAMICIN SULFATE 1 MG/G
OINTMENT TOPICAL ONCE
Status: CANCELLED | OUTPATIENT
Start: 2022-07-20 | End: 2022-07-20

## 2022-07-20 RX ORDER — DIPHENHYDRAMINE HCL 25 MG
25 TABLET ORAL EVERY 6 HOURS PRN
Qty: 60 TABLET | Refills: 0 | Status: SHIPPED | OUTPATIENT
Start: 2022-07-20 | End: 2022-08-06 | Stop reason: SDUPTHER

## 2022-07-20 ASSESSMENT — PAIN DESCRIPTION - ORIENTATION: ORIENTATION: RIGHT

## 2022-07-20 ASSESSMENT — PAIN DESCRIPTION - PAIN TYPE: TYPE: CHRONIC PAIN

## 2022-07-20 ASSESSMENT — PAIN - FUNCTIONAL ASSESSMENT: PAIN_FUNCTIONAL_ASSESSMENT: PREVENTS OR INTERFERES SOME ACTIVE ACTIVITIES AND ADLS

## 2022-07-20 ASSESSMENT — PAIN DESCRIPTION - FREQUENCY: FREQUENCY: CONTINUOUS

## 2022-07-20 ASSESSMENT — PAIN DESCRIPTION - DESCRIPTORS: DESCRIPTORS: ACHING;THROBBING

## 2022-07-20 ASSESSMENT — PAIN SCALES - GENERAL: PAINLEVEL_OUTOF10: 7

## 2022-07-20 ASSESSMENT — PAIN DESCRIPTION - ONSET: ONSET: ON-GOING

## 2022-07-20 ASSESSMENT — PAIN DESCRIPTION - LOCATION: LOCATION: LEG

## 2022-07-20 NOTE — PROGRESS NOTES
Ramon De Leon (:  1960) is a 65 y.o. male,New patient, here for evaluation of the following chief complaint(s):  Leg Swelling (Right leg, atb complete. Note itching all over . C/o rash.)         ASSESSMENT/PLAN:  1. Itching  -     CBC with Auto Differential; Future  -     Comprehensive Metabolic Panel; Future  -     triamcinolone (KENALOG) 0.1 % ointment; Apply topically 2 times daily for 7 days, Topical, 2 TIMES DAILY Starting 2022, Until 2022, For 7 days, Disp-30 g, R-1, Normal  2. Essential hypertension  -     CBC with Auto Differential; Future  -     Comprehensive Metabolic Panel; Future  3. Cellulitis of leg, right  -     Culture, Aerobic and Anaerobic  -     sulfamethoxazole-trimethoprim (BACTRIM DS;SEPTRA DS) 800-160 MG per tablet; Take 1 tablet by mouth in the morning and 1 tablet before bedtime. Do all this for 7 days. , Disp-14 tablet, R-0Normal  Patient continues to have itching I think this could be multifactorial in him at this point for the lower extremity itching on the close area we will use a moisturizer to try and reduce some of the discomfort he is having for the other areas with the use of topical cortisone to reduce some of the itching but will also use cetirizine to be done during the day and at bedtime he can use 1-2 Benadryl to help him with the itching as well to help him with sleep    Meanwhile blood tests including liver function and blood counts will be done today there is also consideration that this could be related to his pain medication or any medicine he had so at this point we will get a try and have him for the next few days and see if that  When evaluating the patient wound he does have some increased redness and his gauze did have some yellow discharge culture was obtained we will can start the patient on Bactrim until we get the culture results he will let the wound clinic know that his warfarin may need to be adjusted we can reassess the patient next week  Return in about 1 week (around 7/27/2022), or if symptoms worsen or fail to improve.          Subjective   SUBJECTIVE/OBJECTIVE:    Lab Review   Lab Results   Component Value Date/Time     05/05/2022 01:39 PM     04/02/2022 08:48 AM     04/01/2022 05:25 AM    K 4.9 05/05/2022 01:39 PM    K 4.0 04/02/2022 08:48 AM    K 3.6 04/01/2022 05:25 AM    K 3.8 03/31/2022 05:00 AM    CO2 25 05/05/2022 01:39 PM    CO2 23 04/02/2022 08:48 AM    CO2 25 04/01/2022 05:25 AM    BUN 12 05/05/2022 01:39 PM    BUN 8 04/02/2022 08:48 AM    BUN 9 04/01/2022 05:25 AM    CREATININE 0.6 05/05/2022 01:39 PM    CREATININE <0.5 04/02/2022 08:48 AM    CREATININE <0.5 04/01/2022 05:25 AM    GLUCOSE 76 05/05/2022 01:39 PM    GLUCOSE 143 04/02/2022 08:48 AM    GLUCOSE 147 04/01/2022 05:25 AM    CALCIUM 9.9 05/05/2022 01:39 PM    CALCIUM 9.0 04/02/2022 08:48 AM    CALCIUM 9.0 04/01/2022 05:25 AM     Lab Results   Component Value Date/Time    WBC 4.8 06/16/2022 03:21 PM    WBC 4.9 05/05/2022 01:39 PM    WBC 5.0 04/02/2022 08:48 AM    HGB 9.3 06/16/2022 03:21 PM    HGB 10.2 05/05/2022 01:39 PM    HGB 11.2 04/02/2022 08:48 AM    HCT 28.6 06/16/2022 03:21 PM    HCT 32.3 05/05/2022 01:39 PM    HCT 34.5 04/02/2022 08:48 AM    MCV 83.2 06/16/2022 03:21 PM    MCV 86.0 05/05/2022 01:39 PM    MCV 85.7 04/02/2022 08:48 AM     06/16/2022 03:21 PM     05/05/2022 01:39 PM     04/02/2022 08:48 AM     Lab Results   Component Value Date/Time    CHOL 112 05/05/2022 01:39 PM    CHOL 140 02/18/2022 03:24 PM    CHOL 134 03/08/2021 12:59 PM    TRIG 92 05/05/2022 01:39 PM    TRIG 175 03/29/2022 10:30 AM    TRIG 152 03/22/2022 03:00 AM    HDL 41 05/05/2022 01:39 PM    HDL 71 02/18/2022 03:24 PM    HDL 46 03/08/2021 12:59 PM    HDL 60 03/29/2010 01:01 PM       Vitals 7/20/2022 7/15/2022 3/14/1414   SYSTOLIC 436 403 379   DIASTOLIC 78 78 70   Site - Right Upper Arm -   Position - Sitting -   Cuff Size - - -   Pulse 77 - 75   Temp - - -   Resp - - -   SpO2 99 - 100   Weight 165 lb 6.4 oz - 166 lb 9.6 oz   Height - - -   Body mass index - - -   Pain Level - - -   Some recent data might be hidden       Rash  This is a recurrent problem. The current episode started in the past 7 days. The affected locations include the right lower leg. The rash is characterized by dryness, pain and redness. Review of Systems   Skin:  Positive for rash. Objective   Physical Exam  Constitutional:       General: He is not in acute distress. Appearance: Normal appearance. HENT:      Head: Normocephalic and atraumatic. Right Ear: Tympanic membrane normal.      Left Ear: Tympanic membrane normal.      Nose: Nose normal.   Eyes:      Extraocular Movements: Extraocular movements intact. Conjunctiva/sclera: Conjunctivae normal.      Pupils: Pupils are equal, round, and reactive to light. Neck:      Vascular: No carotid bruit. Cardiovascular:      Rate and Rhythm: Normal rate and regular rhythm. Pulses: Normal pulses. Heart sounds: No murmur heard. Pulmonary:      Effort: Pulmonary effort is normal. No respiratory distress. Breath sounds: Normal breath sounds. Abdominal:      General: Abdomen is flat. Bowel sounds are normal. There is no distension. Palpations: Abdomen is soft. Tenderness: There is no abdominal tenderness. Musculoskeletal:         General: No swelling, tenderness or deformity. Cervical back: Normal range of motion and neck supple. No rigidity or tenderness. Right lower leg: No edema. Left lower leg: No edema. Lymphadenopathy:      Cervical: No cervical adenopathy. Skin:     Coloration: Skin is not jaundiced. Findings: No bruising, erythema or lesion. Neurological:      General: No focal deficit present. Mental Status: He is alert and oriented to person, place, and time. Cranial Nerves: No cranial nerve deficit. Motor: No weakness.       Gait: Gait normal.

## 2022-07-20 NOTE — PROGRESS NOTES
[facility]  Progress Note and Procedure Note      Yarely Dick  AGE: 65 y.o. GENDER: male  : 1960  TODAY'S DATE:  2022    Subjective:     Chief Complaint   Patient presents with    Wound Check     BILATERAL FEET         HISTORY of PRESENT ILLNESS HPI     Yarely Dick is a 65 y.o. male who presents today for wound evaluation. History of Wound: Admits to having chronic wounds for at least a year on the left leg and 8 to 9 months on the right leg. He states that he has had arterial bypasses on both of his legs in the past.  He was seeing previous wound care and podiatry for these wounds. He states that his insurance had some issues and he has not been seeing anyone since 2021 he has been treating the wounds himself with PSO and bandages. 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 States the leg swelling is improved. He had an inferior vena cava filter placed last year. He has been getting the bandages changed by himself at home. He has home care once a week. He feels that the legs are looking better. He did have his throat surgery.       Wound Pain:  intermittent  Severity:  3 / 10   Wound Type:  venous, arterial and diabetic  Modifying Factors:  edema, venous stasis, lymphedema, diabetes, decreased mobility, arterial insufficiency and decreased tissue oxygenation  Associated Signs/Symptoms:  drainage, numbness and odor        PAST MEDICAL HISTORY        Diagnosis Date    Cancer ([address]     throat    Diabetes mellitus ([address]     Fibromyalgia     History of DVT (deep vein thrombosis)     Hyperlipidemia     Hypertension     Type 2 diabetes mellitus with circulatory disorder, without long-term current use of insulin ([address] 2022       PAST SURGICAL HISTORY    Past Surgical History:   Procedure Laterality Date    APPENDECTOMY  2005    COLONOSCOPY  2016    FEMORAL BYPASS Left 2020    femoral posterior tibial bypass FEMORAL-TIBIAL BYPASS GRAFT Right 2020    with femoral endarterectomy and patch angioplasty    FOOT DEBRIDEMENT Left 2020    FOOT DEBRIDEMENT Right 2021    DEBRIDEMENT OF RIGHT FOOT WOUND WITH GRAFT APPLICATION performed by Bud Perales DPM at [facility] N/A 3/25/2022    DIRECT LARYNGOSCOPY WITH BIOPSY AND FROZEN SECTIONS performed by Maame Bee DO at [facility] Right 2020    stent leg for PVD    TRACHEOSTOMY N/A 3/25/2022    TRACHEOTOMY performed by Maame Bee DO at [address] [facility] [address] HISTORY    Family History   Problem Relation Age of Onset    Cancer Mother     Lung Cancer Mother     Diabetes Father     Stroke Father        SOCIAL HISTORY    Social History     Tobacco Use    Smoking status: Former     Packs/day: 0.50     Years: 20.00     Pack years: 10.00     Types: Cigarettes     Start date: 1977     Quit date: 2021     Years since quittin.5    Smokeless tobacco: Never   Vaping Use    Vaping Use: Never used   Substance Use Topics    Alcohol use: Yes     Comment: 2 beers / day     Drug use: Never       ALLERGIES    Allergies   Allergen Reactions    Chantix [Varenicline] Other (See Comments)     Hallucinations         MEDICATIONS    Current Outpatient Medications on File Prior to Encounter   Medication Sig Dispense Refill    triamcinolone (KENALOG) 0.1 % ointment Apply topically 2 times daily for 7 days 30 g 1    sulfamethoxazole-trimethoprim (BACTRIM DS;SEPTRA DS) 800-160 MG per tablet Take 1 tablet by mouth in the morning and 1 tablet before bedtime. Do all this for 7 days. 14 tablet 0    diphenhydrAMINE (BENADRYL ALLERGY) 25 MG tablet Take 1 tablet by mouth every 6 hours as needed for Itching 60 tablet 0    cetirizine (ZYRTEC) 10 MG tablet Take 1 tablet by mouth in the morning.  30 tablet 0    sennosides-docusate sodium (SENOKOT-S) 8.6-50 MG tablet Take 1 tablet by mouth 2 times daily 60 tablet 0    lisinopril (PRINIVIL;ZESTRIL) 20 MG tablet Take 1 tablet by mouth daily 30 tablet 0    cyanocobalamin 250 MCG tablet Take 1 tablet by mouth daily 30 tablet 0    metFORMIN (GLUCOPHAGE) 500 MG tablet Take 1 tablet by mouth 2 times daily (with meals) 180 tablet 0    melatonin 3 MG TABS tablet Take 2 tablets by mouth nightly as needed (insomnia) 30 tablet 2    atorvastatin (LIPITOR) 80 MG tablet Take 1 tablet by mouth daily Cancel atorvastatin 20 mg a day 90 tablet 0    warfarin (COUMADIN) 5 MG tablet Take 1.5 tablets by mouth daily 90 tablet 1    ondansetron (ZOFRAN-ODT) 4 MG disintegrating tablet Take 4 mg by mouth every 8 hours as needed for Nausea or Vomiting      Multiple Vitamins-Minerals (MULTIVITAMIN ADULT EXTRA C PO) 1 tablet by Nasogastric route daily      esomeprazole (NEXIUM) 20 MG delayed release capsule 20 mg by Nasogastric route every morning (before breakfast)      oxyCODONE HCl (OXY-IR) 10 MG immediate release tablet TAKE 1 TABLET BY MOUTH EVERY SIX HOURS AS NEEDED FOR PAIN FOR UP TO 7 DAYS      budesonide (PULMICORT) 0.5 MG/2ML nebulizer suspension Take 2 mLs by nebulization 2 times daily 60 each 2    gentamicin (GARAMYCIN) 0.1 % cream Apply topically  daily. 30 g 1    becaplermin (REGRANEX) 0.01 % gel Apply 1 Applicatorful topically daily      calcium-vitamin D (OSCAL-500) 500-200 MG-UNIT per tablet Take 1 tablet by mouth 2 times daily      ipratropium-albuterol (DUONEB) 0.5-2.5 (3) MG/3ML SOLN nebulizer solution Inhale 3 mLs into the lungs 4 times daily 360 mL 3    acetaminophen (TYLENOL) 500 MG tablet Take 1 tablet by mouth 4 times daily as needed for Pain 360 tablet 1    fluticasone (FLONASE) 50 MCG/ACT nasal spray 1 spray by Nasal route daily 9.9 g 5    Vitamins/Minerals TABS Take 1 tablet by mouth daily        No current facility-administered medications on file prior to encounter. REVIEW OF SYSTEMS    Pertinent items are noted in HPI.       Objective:      /70   Pulse (!) 102   Temp 97.1 °F (36.2 °C) (Infrared)   Resp 16     PHYSICAL EXAM    Vascular: Vascular status Impaired  palpable pedal pulses, right DP0/4 and PT1/4, left DP0/4 and PT1/4. CFT 3 seconds digits 1 to 5 bilateral.  Hair growthAbsent  both lower extremities and feet. Skin temperature is warm to warm from pretibial area to distal digits bilateral.  Exam is negative for rubor, pallor, cyanosis or signs of acute vascular compromise bilaterally. Exam is positive for edema bilateral lower extremity. Varicosities Present bilateral lower extremity. Neuro: Neurologic status diminished bilateral with epicritic Absent  , proprioceptive Absent , vibratory sensation Absent  and protopathicPresent. DTRs Absent  bilateral Achilles. There were no reproducible neuritic symptoms on exam bilateral feet/ankles. Derm: Ulceration to bilateral ankles. Ecchymosis Absent  bilateral feet/foot. Musculoskeletal: No pain with debridement of wounds 5/5 muscle strength in/eversion and dorsi/plantarflexion bilateral feet. No gross instability noted. 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         Devitalized Tissue Debrided:  fibrin, biofilm, slough, necrotic/eschar and exudate    Pre Debridement Measurements:  Are located in the Wound Documentation Flow Sheet    Wound #: 1     Wound Care Documentation:  Wound 05/18/22 Ankle Medial;Right #2 (Active)   Wound Image   06/24/22 1202   Wound Etiology Diabetic 07/20/22 1311   Wound Cleansed Cleansed with saline 07/20/22 1311   Dressing/Treatment Antibacterial ointment;Collagen;Dry dressing 07/08/22 1243   Offloading for Diabetic Foot Ulcers Offloading not ordered 07/20/22 1311   Wound Length (cm) 1.7 cm 07/20/22 1311   Wound Width (cm) 2.5 cm 07/20/22 1311   Wound Depth (cm) 0.1 cm 07/20/22 1311   Wound Surface Area (cm^2) 4.25 cm^2 07/20/22 1311   Change in Wound Size % (l*w) 46.88 07/20/22 1311   Wound Volume (cm^3) 0.425 cm^3 07/20/22 1311   Wound Healing % 47 07/20/22 1311   Post-Procedure Length (cm) 1.7 cm 07/20/22 1352   Post-Procedure Width (cm) 2.5 cm 07/20/22 1352   Post-Procedure Depth (cm) 0.1 cm 07/20/22 1352   Post-Procedure Surface Area (cm^2) 4.25 cm^2 07/20/22 1352   Post-Procedure Volume (cm^3) 0.425 cm^3 07/20/22 1352   Wound Assessment Bleeding 07/20/22 1352   Drainage Amount Moderate 07/20/22 1311   Drainage Description Serosanguinous 07/20/22 1311   Odor None 07/20/22 1311   Jamila-wound Assessment Dry/flaky 07/20/22 1311   Margins Defined edges 07/20/22 1311   Number of days: 63       Wound 06/24/22 Leg Left;Medial #1 (Active)   Wound Image   06/24/22 1202   Wound Etiology Diabetic 07/20/22 1311   Wound Cleansed Cleansed with saline 07/20/22 1311   Dressing/Treatment Antibacterial ointment;Collagen;Dry dressing 07/08/22 1243   Offloading for Diabetic Foot Ulcers Offloading not ordered 07/20/22 1311   Wound Length (cm) 2.4 cm 07/20/22 1311   Wound Width (cm) 4.3 cm 07/20/22 1311   Wound Depth (cm) 0.1 cm 07/20/22 1311   Wound Surface Area (cm^2) 10.32 cm^2 07/20/22 1311   Change in Wound Size % (l*w) -50.88 07/20/22 1311   Wound Volume (cm^3) 1.032 cm^3 07/20/22 1311   Wound Healing % -51 07/20/22 1311   Post-Procedure Length (cm) 2.4 cm 07/20/22 1352 Post-Procedure Width (cm) 4.3 cm 07/20/22 1352   Post-Procedure Depth (cm) 0.1 cm 07/20/22 1352   Post-Procedure Surface Area (cm^2) 10.32 cm^2 07/20/22 1352   Post-Procedure Volume (cm^3) 1.032 cm^3 07/20/22 1352   Wound Assessment Bleeding 07/20/22 1352   Drainage Amount Moderate 07/20/22 1311   Drainage Description Serosanguinous 07/20/22 1311   Odor None 07/20/22 1311   Jamila-wound Assessment Fragile 07/20/22 1311   Margins Defined edges 07/20/22 1311   Number of days: 26         Total Surface Area Debrided: 10.32 cm² left leg into the deep fascia and sq cm 4.25 cm² into the subcutaneous tissue of the right foot    Percentage of wound debrided 100%    Bleeding:  Minimal    Hemostasis Achieved:  by pressure    Procedural Pain:  0  / 10     Post Procedural Pain:  0 / 10     Response to treatment:  Well tolerated by patient. Plan:   Patient examined and evaluated   Wounds debrided without incident   Multilayer compression wrap right leg with Lotrisone. Multilayer compression wrap left leg  Follow-up Friday  Keep legs elevated at all times when not active   Continue follow-up with vascular surgery for decision on wounds for improving the blood flow to the distal left leg. He has not made his decision on what he would like to do. Patient would benefit from disability due to his multiple comorbidities including his chronic peripheral arterial disease with high risk for limb loss. The nature of the patient's condition was explained in depth. The patient was informed that their compliance to the treatment Plan is paramount to successful healing and prevention of further ulceration and/or infection.   Application of Lotrisone to the irritated areas of his leg right  Discharge Treatment follow-up on Friday for dressing change    Written Patient Discharge Instructions Given            Electronically signed by Lanie Canales DPM on 7/20/2022 at 2:08 PM

## 2022-07-20 NOTE — DISCHARGE INSTRUCTIONS
We will change on Friday    Compression Wraps  Location: Both legs   Type: Coflex calamine    Your doctor has ordered compression therapy for your wound. Compression bandages reduce the swelling, or edema, in your legs and prevent it from returning. The wound care staff will apply your compression wrap. It must be removed and re-applied at least weekly. As the swelling decreases, the boot no longer provides adequate compression and you need a new one. Once applied, you need to know how to take care of your compression wrap. The boot must stay dry. Do not get it wet in the shower or tub. You may do a partial bath, or you can cover the boot with a large plastic bag, secured at the top, so that no water can get in. Avoid standing in one place for long periods of time. If you must  one place, shift your weight and change positions often. If you have CHF, consult your doctor before following the next two recommendations for leg elevation. When sitting, elevate your legs on pillows, or put blocks under the foot of your bed. Your legs should be higher than your heart. If your boot becomes painful, or you notice an increase in swelling in your toes, numbness or tingling, or purple color to your toes, remove the wrap and call the [address] [place] [address]. If it is after hours, call your doctor for instructions or go to the nearest emergency room. Home Care Agency/Facility: [facility]     Your wound-care supplies will be provided by:  Please note, depending on your insurance coverage, you may have out-of-pocket expenses for these supplies. Someone from the company should call you to confirm your order and discuss those potential costs before they ship your products -- please anticipate that call. If your out-of-pocket cost could be substantial, Many companies have financial hardship programs for patients who qualify, so please ask about that if you might need a hand.  If you have any questions about your supplies or your potential out-of-pocket costs, or if you need to place an order for a refill of supplies (typically monthly), please call the company directly. Your  is Ira Salcido     Follow up with Dr Cheryle Sands in 1 weeks NURSE VISIT ON [facility] Information: Should you experience any significant changes in your wound(s) or have questions about your wound care, please [facility]  at [phone] Monday  - Thursday 8:00 am - 4:00 pm and Friday 8:00 am - 1:00pm. If you need help with your wound outside these hours and cannot wait until we are again available, contact your PCP or go to the hospital emergency room. PLEASE NOTE: IF YOU ARE UNABLE TO OBTAIN WOUND SUPPLIES, CONTINUE TO USE THE SUPPLIES YOU HAVE AVAILABLE UNTIL YOU ARE ABLE TO REACH US. IT IS MOST IMPORTANT TO KEEP THE WOUND COVERED AT ALL TIMES. Patient Experience     Thank you for trusting us with your care. You may receive a survey from a company called [employer] asking for your feedback. We would appreciate it if you took a few minutes to share your experience. Your input is very valuable to us.

## 2022-07-20 NOTE — PLAN OF CARE
Discharge instructions given. Patient verbalized understanding. Return to [facility] in 1 week(s).

## 2022-07-20 NOTE — PROGRESS NOTES
Multilayer Compression Wrap   Below the Knee    NAME:  Campbell Moseley  YOB: 1960  MEDICAL RECORD NUMBER:  [id number]  DATE:  7/20/2022    Multilayer compression wrap: Removed old Multilayer wrap if indicated and wash leg with mild soap/water. Applied moisturizing agent to dry skin as needed. Applied primary and secondary dressing as ordered. Applied multilayered dressing below the knee to right lower leg. Applied multilayered dressing below the knee to left lower leg. Instructed patient/caregiver not to remove dressing and to keep it clean and dry. Instructed patient/caregiver on complications to report to provider, such as pain, numbness in toes, heavy drainage, and slippage of dressing. Instructed patient on purpose of compression dressing and on activity and exercise recommendations.      Applied  2 layer   wrap from toes to knee overlapping each time    Electronically signed by Ashley Gutierrez RN on 7/20/2022 at 2:13 PM

## 2022-07-22 ENCOUNTER — HOSPITAL ENCOUNTER (OUTPATIENT)
Dept: WOUND CARE | Age: 62
Discharge: HOME | End: 2022-07-22
Payer: COMMERCIAL

## 2022-07-22 VITALS — SYSTOLIC BLOOD PRESSURE: 107 MMHG | TEMPERATURE: 96.8 F | DIASTOLIC BLOOD PRESSURE: 64 MMHG | HEART RATE: 90 BPM

## 2022-07-22 DIAGNOSIS — I73.9 PERIPHERAL ARTERY DISEASE (HCC): Primary | ICD-10-CM

## 2022-07-22 PROCEDURE — 29581 APPL MULTLAYER CMPRN SYS LEG: CPT

## 2022-07-22 RX ORDER — LIDOCAINE HYDROCHLORIDE 20 MG/ML
JELLY TOPICAL ONCE
Status: CANCELLED | OUTPATIENT
Start: 2022-07-22 | End: 2022-07-22

## 2022-07-22 RX ORDER — LIDOCAINE HYDROCHLORIDE 40 MG/ML
SOLUTION TOPICAL ONCE
Status: CANCELLED | OUTPATIENT
Start: 2022-07-22 | End: 2022-07-22

## 2022-07-22 RX ORDER — LIDOCAINE 50 MG/G
OINTMENT TOPICAL ONCE
Status: CANCELLED | OUTPATIENT
Start: 2022-07-22 | End: 2022-07-22

## 2022-07-22 RX ORDER — CLOBETASOL PROPIONATE 0.5 MG/G
OINTMENT TOPICAL ONCE
Status: CANCELLED | OUTPATIENT
Start: 2022-07-22 | End: 2022-07-22

## 2022-07-22 RX ORDER — GINSENG 100 MG
CAPSULE ORAL ONCE
Status: CANCELLED | OUTPATIENT
Start: 2022-07-22 | End: 2022-07-22

## 2022-07-22 RX ORDER — LIDOCAINE 40 MG/G
CREAM TOPICAL ONCE
Status: CANCELLED | OUTPATIENT
Start: 2022-07-22 | End: 2022-07-22

## 2022-07-22 RX ORDER — GENTAMICIN SULFATE 1 MG/G
OINTMENT TOPICAL ONCE
Status: CANCELLED | OUTPATIENT
Start: 2022-07-22 | End: 2022-07-22

## 2022-07-22 RX ORDER — LIDOCAINE 40 MG/G
CREAM TOPICAL ONCE
Status: DISCONTINUED | OUTPATIENT
Start: 2022-07-22 | End: 2022-07-23 | Stop reason: HOSPADM

## 2022-07-22 RX ORDER — BACITRACIN ZINC AND POLYMYXIN B SULFATE 500; 1000 [USP'U]/G; [USP'U]/G
OINTMENT TOPICAL ONCE
Status: CANCELLED | OUTPATIENT
Start: 2022-07-22 | End: 2022-07-22

## 2022-07-22 RX ORDER — BACITRACIN, NEOMYCIN, POLYMYXIN B 400; 3.5; 5 [USP'U]/G; MG/G; [USP'U]/G
OINTMENT TOPICAL ONCE
Status: CANCELLED | OUTPATIENT
Start: 2022-07-22 | End: 2022-07-22

## 2022-07-22 RX ORDER — BETAMETHASONE DIPROPIONATE 0.05 %
OINTMENT (GRAM) TOPICAL ONCE
Status: CANCELLED | OUTPATIENT
Start: 2022-07-22 | End: 2022-07-22

## 2022-07-22 NOTE — DISCHARGE INSTRUCTIONS
[address], [address] [facility]  Telephone: [phone]     Discharge Instructions     Important reminders:     **If you have any signs and symptoms of illness (Cough, fever, congestion, nausea, vomiting, diarrhea, etc.) please call the wound care center prior to your appointment. 1. Increase Protein intake for optimal wound healing  2. No added salt to reduce any swelling  3. If diabetic, maintain good glucose control  4. If you smoke, smoking prohibits wound healing, we ask that you refrain from smoking. 5. When taking antibiotics take the entire prescription as ordered. Do not stop taking until medication is all gone unless otherwise instructed. 6. Exercise as tolerated. 7. Keep weight off wounds and reposition every 2 hours if applicable. 8. If wound(s) is on your lower extremity, elevate legs to the level of the heart or above for 30 minutes 4-5 times a day and/or when sitting. Avoid standing for long periods of time. 9. Do not get wounds wet in bath or shower unless otherwise instructed by your physician. If your wound is on your foot or leg, you may purchase a cast bag. Please ask at the pharmacy. If Vascular testing is ordered, please call [address] [place] [address] [phone]) to schedule. Vascular tests ordered by Wound Care Physicians may take up to 2 hours to complete. Please keep that in mind when scheduling. If Vascular testing is scheduled, please bring supplies to replace your dressing after testing is done. The vascular department does not stock supplies. Wound: Right and Left leg     With each dressing change, rinse wounds with 0.9% Saline. (May use wound wash or soft contact solution. Both can be purchased at a local drug store). If unable to obtain saline, may use a gentle soap and water. Dressing care: Right and left leg - Gentamicin cream and Collagen to wounds.  Lotrisone to lower legs, Genyle coflex calamine to both, We will change on Friday    Compression Wraps  Location: Both legs   Type: Coflex calamine    Your doctor has ordered compression therapy for your wound. Compression bandages reduce the swelling, or edema, in your legs and prevent it from returning. The wound care staff will apply your compression wrap. It must be removed and re-applied at least weekly. As the swelling decreases, the boot no longer provides adequate compression and you need a new one. Once applied, you need to know how to take care of your compression wrap. The boot must stay dry. Do not get it wet in the shower or tub. You may do a partial bath, or you can cover the boot with a large plastic bag, secured at the top, so that no water can get in. Avoid standing in one place for long periods of time. If you must  one place, shift your weight and change positions often. If you have CHF, consult your doctor before following the next two recommendations for leg elevation. When sitting, elevate your legs on pillows, or put blocks under the foot of your bed. Your legs should be higher than your heart. If your boot becomes painful, or you notice an increase in swelling in your toes, numbness or tingling, or purple color to your toes, remove the wrap and call the [facility] [address]. If it is after hours, call your doctor for instructions or go to the nearest emergency room. Home Care Agency/Facility: [facility]     Your wound-care supplies will be provided by:  Please note, depending on your insurance coverage, you may have out-of-pocket expenses for these supplies. Someone from the company should call you to confirm your order and discuss those potential costs before they ship your products -- please anticipate that call. If your out-of-pocket cost could be substantial, Many companies have financial hardship programs for patients who qualify, so please ask about that if you might need a hand.  If you have any questions about your supplies or your potential out-of-pocket costs, or if you need to place an order for a refill of supplies (typically monthly), please call the company directly. Your  is Luba Paredes     Follow up with Dr Samina Harris in 1 weeks NURSE VISIT ON [address] [facility] Information: Should you experience any significant changes in your wound(s) or have questions about your wound care, please contact the [facility]  at [phone] Monday  - Thursday 8:00 am - 4:00 pm and Friday 8:00 am - 1:00pm. If you need help with your wound outside these hours and cannot wait until we are again available, contact your PCP or go to the hospital emergency room. PLEASE NOTE: IF YOU ARE UNABLE TO OBTAIN WOUND SUPPLIES, CONTINUE TO USE THE SUPPLIES YOU HAVE AVAILABLE UNTIL YOU ARE ABLE TO REACH US. IT IS MOST IMPORTANT TO KEEP THE WOUND COVERED AT ALL TIMES. Patient Experience     Thank you for trusting us with your care. You may receive a survey from a company called [employer] asking for your feedback. We would appreciate it if you took a few minutes to share your experience. Your input is very valuable to us.

## 2022-07-22 NOTE — PLAN OF CARE
Discharge instructions given. Patient verbalized understanding. Return to [address] [place] [address],[address] in 1 week(s).   Continue Coflex Calamine

## 2022-07-23 LAB
GRAM STAIN RESULT: ABNORMAL
ORGANISM: ABNORMAL
WOUND/ABSCESS: ABNORMAL

## 2022-07-25 RX ORDER — CLINDAMYCIN HYDROCHLORIDE 300 MG/1
300 CAPSULE ORAL 4 TIMES DAILY
Qty: 28 CAPSULE | Refills: 0 | Status: SHIPPED | OUTPATIENT
Start: 2022-07-25 | End: 2022-08-01

## 2022-07-26 NOTE — DISCHARGE INSTRUCTIONS
[address], [address] [facility] [address]  Telephone: [phone]     Discharge Instructions     Important reminders:     **If you have any signs and symptoms of illness (Cough, fever, congestion, nausea, vomiting, diarrhea, etc.) please call the wound care center prior to your appointment. 1. Increase Protein intake for optimal wound healing  2. No added salt to reduce any swelling  3. If diabetic, maintain good glucose control  4. If you smoke, smoking prohibits wound healing, we ask that you refrain from smoking. 5. When taking antibiotics take the entire prescription as ordered. Do not stop taking until medication is all gone unless otherwise instructed. 6. Exercise as tolerated. 7. Keep weight off wounds and reposition every 2 hours if applicable. 8. If wound(s) is on your lower extremity, elevate legs to the level of the heart or above for 30 minutes 4-5 times a day and/or when sitting. Avoid standing for long periods of time. 9. Do not get wounds wet in bath or shower unless otherwise instructed by your physician. If your wound is on your foot or leg, you may purchase a cast bag. Please ask at the pharmacy. If Vascular testing is ordered, please call [address] [place] [address] [phone]) to schedule. Vascular tests ordered by Wound Care Physicians may take up to 2 hours to complete. Please keep that in mind when scheduling. If Vascular testing is scheduled, please bring supplies to replace your dressing after testing is done. The vascular department does not stock supplies. Wound: Right and Left leg     With each dressing change, rinse wounds with 0.9% Saline. (May use wound wash or soft contact solution. Both can be purchased at a local drug store). If unable to obtain saline, may use a gentle soap and water. Dressing care: Right and left leg - Gentamicin cream and Collagen to wounds.  Lotrisone to lower legs, Genyle coflex calamine to both, Compression Wraps  Location: Both legs  Type: Coflex calamine     Your doctor has ordered compression therapy for your wound. Compression bandages reduce the swelling, or edema, in your legs and prevent it from returning. The wound care staff will apply your compression wrap. It must be removed and re-applied at least weekly. As the swelling decreases, the boot no longer provides adequate compression and you need a new one. Once applied, you need to know how to take care of your compression wrap. The boot must stay dry. Do not get it wet in the shower or tub. You may do a partial bath, or you can cover the boot with a large plastic bag, secured at the top, so that no water can get in. Avoid standing in one place for long periods of time. If you must  one place, shift your weight and change positions often. If you have CHF, consult your doctor before following the next two recommendations for leg elevation. When sitting, elevate your legs on pillows, or put blocks under the foot of your bed. Your legs should be higher than your heart. If your boot becomes painful, or you notice an increase in swelling in your toes, numbness or tingling, or purple color to your toes, remove the wrap and call the [place] [address] [facility] [address]. If it is after hours, call your doctor for instructions or go to the nearest emergency room. Home Care Agency/Facility: [facility]     Your wound-care supplies will be provided by:  Please note, depending on your insurance coverage, you may have out-of-pocket expenses for these supplies. Someone from the company should call you to confirm your order and discuss those potential costs before they ship your products -- please anticipate that call. If your out-of-pocket cost could be substantial, Many companies have financial hardship programs for patients who qualify, so please ask about that if you might need a hand.  If you have any questions about your supplies or your potential out-of-pocket costs, or if you need to place an order for a refill of supplies (typically monthly), please call the company directly. Your  is Nancy Toth     Follow up with Dr Leslie Small in 1 weeks         [address]: Should you experience any significant changes in your wound(s) or have questions about your wound care, please contact the [place] [address] at [phone] Monday  - Thursday 8:00 am - 4:00 pm and Friday 8:00 am - 1:00pm. If you need help with your wound outside these hours and cannot wait until we are again available, contact your PCP or go to the hospital emergency room. PLEASE NOTE: IF YOU ARE UNABLE TO OBTAIN WOUND SUPPLIES, CONTINUE TO USE THE SUPPLIES YOU HAVE AVAILABLE UNTIL YOU ARE ABLE TO REACH US. IT IS MOST IMPORTANT TO KEEP THE WOUND COVERED AT ALL TIMES. Patient Experience     Thank you for trusting us with your care. You may receive a survey from a company called [employer] asking for your feedback. We would appreciate it if you took a few minutes to share your experience. Your input is very valuable to us.

## 2022-07-27 ENCOUNTER — HOSPITAL ENCOUNTER (OUTPATIENT)
Dept: WOUND CARE | Age: 62
Discharge: HOME | End: 2022-07-27
Payer: MEDICAID

## 2022-07-27 VITALS
TEMPERATURE: 96.4 F | SYSTOLIC BLOOD PRESSURE: 123 MMHG | DIASTOLIC BLOOD PRESSURE: 76 MMHG | HEART RATE: 87 BPM | RESPIRATION RATE: 16 BRPM

## 2022-07-27 DIAGNOSIS — I73.9 PERIPHERAL ARTERY DISEASE (HCC): Primary | ICD-10-CM

## 2022-07-27 PROCEDURE — 11043 DBRDMT MUSC&/FSCA 1ST 20/<: CPT

## 2022-07-27 PROCEDURE — 11042 DBRDMT SUBQ TIS 1ST 20SQCM/<: CPT

## 2022-07-27 PROCEDURE — 29581 APPL MULTLAYER CMPRN SYS LEG: CPT

## 2022-07-27 RX ORDER — BACITRACIN ZINC AND POLYMYXIN B SULFATE 500; 1000 [USP'U]/G; [USP'U]/G
OINTMENT TOPICAL ONCE
Status: CANCELLED | OUTPATIENT
Start: 2022-07-27 | End: 2022-07-27

## 2022-07-27 RX ORDER — LIDOCAINE HYDROCHLORIDE 20 MG/ML
JELLY TOPICAL ONCE
Status: CANCELLED | OUTPATIENT
Start: 2022-07-27 | End: 2022-07-27

## 2022-07-27 RX ORDER — CLOBETASOL PROPIONATE 0.5 MG/G
OINTMENT TOPICAL ONCE
Status: CANCELLED | OUTPATIENT
Start: 2022-07-27 | End: 2022-07-27

## 2022-07-27 RX ORDER — LIDOCAINE HYDROCHLORIDE 40 MG/ML
SOLUTION TOPICAL ONCE
Status: CANCELLED | OUTPATIENT
Start: 2022-07-27 | End: 2022-07-27

## 2022-07-27 RX ORDER — LIDOCAINE 50 MG/G
OINTMENT TOPICAL ONCE
Status: CANCELLED | OUTPATIENT
Start: 2022-07-27 | End: 2022-07-27

## 2022-07-27 RX ORDER — LIDOCAINE 40 MG/G
CREAM TOPICAL ONCE
Status: CANCELLED | OUTPATIENT
Start: 2022-07-27 | End: 2022-07-27

## 2022-07-27 RX ORDER — LIDOCAINE 40 MG/G
CREAM TOPICAL ONCE
Status: DISCONTINUED | OUTPATIENT
Start: 2022-07-27 | End: 2022-07-28 | Stop reason: HOSPADM

## 2022-07-27 RX ORDER — GENTAMICIN SULFATE 1 MG/G
OINTMENT TOPICAL ONCE
Status: CANCELLED | OUTPATIENT
Start: 2022-07-27 | End: 2022-07-27

## 2022-07-27 RX ORDER — BETAMETHASONE DIPROPIONATE 0.05 %
OINTMENT (GRAM) TOPICAL ONCE
Status: CANCELLED | OUTPATIENT
Start: 2022-07-27 | End: 2022-07-27

## 2022-07-27 RX ORDER — GINSENG 100 MG
CAPSULE ORAL ONCE
Status: CANCELLED | OUTPATIENT
Start: 2022-07-27 | End: 2022-07-27

## 2022-07-27 RX ORDER — BACITRACIN, NEOMYCIN, POLYMYXIN B 400; 3.5; 5 [USP'U]/G; MG/G; [USP'U]/G
OINTMENT TOPICAL ONCE
Status: CANCELLED | OUTPATIENT
Start: 2022-07-27 | End: 2022-07-27

## 2022-07-27 ASSESSMENT — PAIN SCALES - GENERAL: PAINLEVEL_OUTOF10: 4

## 2022-07-27 ASSESSMENT — PAIN DESCRIPTION - LOCATION: LOCATION: LEG

## 2022-07-27 ASSESSMENT — PAIN DESCRIPTION - ORIENTATION: ORIENTATION: LEFT

## 2022-07-27 NOTE — PROGRESS NOTES
Iris Quiroz  Progress Note and Procedure Note      Ca Nunez  AGE: 65 y.o. GENDER: male  : 1960  TODAY'S DATE:  2022    Subjective:     Chief Complaint   Patient presents with    Wound Check     Right & Left lower leg         HISTORY of PRESENT ILLNESS HPI     Ca Nunez is a 65 y.o. male who presents today for wound evaluation. History of Wound: Admits to having chronic wounds for at least a year on the left leg and 8 to 9 months on the right leg. He states that he has had arterial bypasses on both of his legs in the past.  He was seeing previous wound care and podiatry for these wounds. He states that his insurance had some issues and he has not been seeing anyone since 2021 he has been treating the wounds himself with PSO and bandages. 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 States the leg swelling is improved. He had an inferior vena cava filter placed last year. He has been getting the bandages changed by himself at home. He has home care once a week. He feels that the legs are looking better. He did have his throat surgery.       Wound Pain:  intermittent  Severity:  3 / 10   Wound Type:  venous, arterial and diabetic  Modifying Factors:  edema, venous stasis, lymphedema, diabetes, decreased mobility, arterial insufficiency and decreased tissue oxygenation  Associated Signs/Symptoms:  drainage, numbness and odor        PAST MEDICAL HISTORY        Diagnosis Date    Cancer ([address]     throat    Diabetes mellitus ([address]     Fibromyalgia     History of DVT (deep vein thrombosis)     Hyperlipidemia     Hypertension     Type 2 diabetes mellitus with circulatory disorder, without long-term current use of insulin ([address] 2022       PAST SURGICAL HISTORY    Past Surgical History:   Procedure Laterality Date    APPENDECTOMY  2005    COLONOSCOPY  2016    FEMORAL BYPASS Left 2020    femoral posterior tibial bypass    FEMORAL-TIBIAL BYPASS GRAFT Right 2020    with femoral endarterectomy and patch angioplasty    FOOT DEBRIDEMENT Left 2020    FOOT DEBRIDEMENT Right 2021    DEBRIDEMENT OF RIGHT FOOT WOUND WITH GRAFT APPLICATION performed by Rudy Le DPM at [facility] N/A 3/25/2022    DIRECT LARYNGOSCOPY WITH BIOPSY AND FROZEN SECTIONS performed by Leo Loaiza DO at [address] Right 2020    stent leg for PVD    TRACHEOSTOMY N/A 3/25/2022    TRACHEOTOMY performed by Leo Loaiza DO at [address] [facility] [address] HISTORY    Family History   Problem Relation Age of Onset    Cancer Mother     Lung Cancer Mother     Diabetes Father     Stroke Father        SOCIAL HISTORY    Social History     Tobacco Use    Smoking status: Former     Packs/day: 0.50     Years: 20.00     Pack years: 10.00     Types: Cigarettes     Start date: 1977     Quit date: 2021     Years since quittin.5    Smokeless tobacco: Never   Vaping Use    Vaping Use: Never used   Substance Use Topics    Alcohol use: Yes     Comment: 2 beers / day     Drug use: Never       ALLERGIES    Allergies   Allergen Reactions    Chantix [Varenicline] Other (See Comments)     Hallucinations         MEDICATIONS    Current Outpatient Medications on File Prior to Encounter   Medication Sig Dispense Refill    clindamycin (CLEOCIN) 300 MG capsule Take 1 capsule by mouth in the morning and 1 capsule at noon and 1 capsule in the evening and 1 capsule before bedtime. Do all this for 7 days. 28 capsule 0    triamcinolone (KENALOG) 0.1 % ointment Apply topically 2 times daily for 7 days 30 g 1    diphenhydrAMINE (BENADRYL ALLERGY) 25 MG tablet Take 1 tablet by mouth every 6 hours as needed for Itching 60 tablet 0    cetirizine (ZYRTEC) 10 MG tablet Take 1 tablet by mouth in the morning.  30 tablet 0    sennosides-docusate sodium (SENOKOT-S) 8.6-50 MG tablet Take 1 tablet by mouth 2 times daily 60 tablet 0    lisinopril (PRINIVIL;ZESTRIL) 20 MG tablet Take 1 tablet by mouth daily 30 tablet 0    cyanocobalamin 250 MCG tablet Take 1 tablet by mouth daily 30 tablet 0    metFORMIN (GLUCOPHAGE) 500 MG tablet Take 1 tablet by mouth 2 times daily (with meals) 180 tablet 0    melatonin 3 MG TABS tablet Take 2 tablets by mouth nightly as needed (insomnia) 30 tablet 2    atorvastatin (LIPITOR) 80 MG tablet Take 1 tablet by mouth daily Cancel atorvastatin 20 mg a day 90 tablet 0    warfarin (COUMADIN) 5 MG tablet Take 1.5 tablets by mouth daily 90 tablet 1    ondansetron (ZOFRAN-ODT) 4 MG disintegrating tablet Take 4 mg by mouth every 8 hours as needed for Nausea or Vomiting      Multiple Vitamins-Minerals (MULTIVITAMIN ADULT EXTRA C PO) 1 tablet by Nasogastric route daily      esomeprazole (NEXIUM) 20 MG delayed release capsule 20 mg by Nasogastric route every morning (before breakfast)      oxyCODONE HCl (OXY-IR) 10 MG immediate release tablet TAKE 1 TABLET BY MOUTH EVERY SIX HOURS AS NEEDED FOR PAIN FOR UP TO 7 DAYS      budesonide (PULMICORT) 0.5 MG/2ML nebulizer suspension Take 2 mLs by nebulization 2 times daily 60 each 2    gentamicin (GARAMYCIN) 0.1 % cream Apply topically  daily. 30 g 1    becaplermin (REGRANEX) 0.01 % gel Apply 1 Applicatorful topically daily      calcium-vitamin D (OSCAL-500) 500-200 MG-UNIT per tablet Take 1 tablet by mouth 2 times daily      ipratropium-albuterol (DUONEB) 0.5-2.5 (3) MG/3ML SOLN nebulizer solution Inhale 3 mLs into the lungs 4 times daily 360 mL 3    acetaminophen (TYLENOL) 500 MG tablet Take 1 tablet by mouth 4 times daily as needed for Pain 360 tablet 1    fluticasone (FLONASE) 50 MCG/ACT nasal spray 1 spray by Nasal route daily 9.9 g 5    Vitamins/Minerals TABS Take 1 tablet by mouth daily        No current facility-administered medications on file prior to encounter. REVIEW OF SYSTEMS    Pertinent items are noted in HPI.       Objective:      /76   Pulse 87 Temp (!) 96.4 °F (35.8 °C) (Infrared)   Resp 16     PHYSICAL EXAM    Vascular: Vascular status Impaired  palpable pedal pulses, right DP0/4 and PT1/4, left DP0/4 and PT1/4. CFT 3 seconds digits 1 to 5 bilateral.  Hair growthAbsent  both lower extremities and feet. Skin temperature is warm to warm from pretibial area to distal digits bilateral.  Exam is negative for rubor, pallor, cyanosis or signs of acute vascular compromise bilaterally. Exam is positive for edema bilateral lower extremity. Varicosities Present bilateral lower extremity. Neuro: Neurologic status diminished bilateral with epicritic Absent  , proprioceptive Absent , vibratory sensation Absent  and protopathicPresent. DTRs Absent  bilateral Achilles. There were no reproducible neuritic symptoms on exam bilateral feet/ankles. Derm: Ulceration to bilateral ankles. Ecchymosis Absent  bilateral feet/foot. Musculoskeletal: No pain with debridement of wounds 5/5 muscle strength in/eversion and dorsi/plantarflexion bilateral feet. No gross instability noted. Assessment:     Problem List Items Addressed This Visit       Peripheral artery disease ([facility] - Primary    Relevant Medications    lidocaine (LMX) 4 % cream    Other Relevant Orders    Initiate Outpatient Wound Care Protocol       Procedure Note    Performed by: Nevaeh Arce DPM    Consent obtained: Yes    Time out taken:  Yes    Pain Control: Anesthetic  Anesthetic: 4% Lidocaine Cream     Debridement:Excisional Debridement    Using curette the wound was sharply debrided    down through and including the removal of epidermis, dermis, subcutaneous tissue and muscle/fascia.         Devitalized Tissue Debrided:  fibrin, biofilm, slough, necrotic/eschar and exudate    Pre Debridement Measurements:  Are located in the Wound Documentation Flow Sheet    Wound #: 1     Wound Care Documentation:  Wound 05/18/22 Ankle Medial;Right #2 (Active)   Wound Image   06/24/22 1202   Wound Etiology Diabetic 07/27/22 1326   Wound Cleansed Wound cleanser;Cleansed with saline 07/27/22 1326   Dressing/Treatment Antibacterial ointment;Collagen;Dry dressing 07/20/22 1352   Offloading for Diabetic Foot Ulcers Offloading not required 07/27/22 1326   Wound Length (cm) 1.4 cm 07/27/22 1326   Wound Width (cm) 2.3 cm 07/27/22 1326   Wound Depth (cm) 0.1 cm 07/27/22 1326   Wound Surface Area (cm^2) 3.22 cm^2 07/27/22 1326   Change in Wound Size % (l*w) 59.75 07/27/22 1326   Wound Volume (cm^3) 0.322 cm^3 07/27/22 1326   Wound Healing % 60 07/27/22 1326   Post-Procedure Length (cm) 1.4 cm 07/27/22 1415   Post-Procedure Width (cm) 2.3 cm 07/27/22 1415   Post-Procedure Depth (cm) 0.1 cm 07/27/22 1415   Post-Procedure Surface Area (cm^2) 3.22 cm^2 07/27/22 1415   Post-Procedure Volume (cm^3) 0.322 cm^3 07/27/22 1415   Wound Assessment Bleeding 07/27/22 1415   Drainage Amount Moderate 07/27/22 1326   Drainage Description Yellow 07/27/22 1326   Odor None 07/27/22 1326   Jamila-wound Assessment Dry/flaky 07/27/22 1326   Margins Defined edges 07/27/22 1326   Number of days: 70       Wound 06/24/22 Leg Left;Medial #1 (Active)   Wound Image   06/24/22 1202   Wound Etiology Diabetic 07/27/22 1326   Wound Cleansed Wound cleanser;Cleansed with saline 07/27/22 1326   Dressing/Treatment Antibacterial ointment;Collagen;Dry dressing 07/20/22 1352   Offloading for Diabetic Foot Ulcers Offloading not required 07/27/22 1326   Wound Length (cm) 2.2 cm 07/27/22 1326   Wound Width (cm) 4.5 cm 07/27/22 1326   Wound Depth (cm) 0.1 cm 07/27/22 1326   Wound Surface Area (cm^2) 9.9 cm^2 07/27/22 1326   Change in Wound Size % (l*w) -44.74 07/27/22 1326   Wound Volume (cm^3) 0.99 cm^3 07/27/22 1326   Wound Healing % -45 07/27/22 1326   Post-Procedure Length (cm) 2.2 cm 07/27/22 1415   Post-Procedure Width (cm) 4.5 cm 07/27/22 1415   Post-Procedure Depth (cm) 0.1 cm 07/27/22 1415   Post-Procedure Surface Area (cm^2) 9.9 cm^2 07/27/22 1415 Post-Procedure Volume (cm^3) 0.99 cm^3 07/27/22 1415   Wound Assessment Bleeding 07/27/22 1415   Drainage Amount Moderate 07/27/22 1326   Drainage Description Thick; Yellow 07/27/22 1326   Odor None 07/27/22 1326   Jamila-wound Assessment Hyperkeratosis (callous) 07/27/22 1326   Margins Defined edges 07/27/22 1326   Number of days: 33         Total Surface Area Debrided: 9.9 cm² left leg into the deep fascia and sq cm 3.22 cm² into the subcutaneous tissue of the right foot    Percentage of wound debrided 100%    Bleeding:  Minimal    Hemostasis Achieved:  by pressure    Procedural Pain:  0  / 10     Post Procedural Pain:  0 / 10     Response to treatment:  Well tolerated by patient. Plan:   Patient examined and evaluated   Wounds debrided without incident   Multilayer compression wrap right leg with Lotrisone. Multilayer compression wrap left leg  Follow-up Friday  Keep legs elevated at all times when not active   Continue follow-up with vascular surgery for decision on wounds for improving the blood flow to the distal left leg. He has not made his decision on what he would like to do. Patient would benefit from disability due to his multiple comorbidities including his chronic peripheral arterial disease with high risk for limb loss. The nature of the patient's condition was explained in depth. The patient was informed that their compliance to the treatment Plan is paramount to successful healing and prevention of further ulceration and/or infection.   Application of Lotrisone to the irritated areas of his leg right  Discharge Treatment follow-up on Friday for dressing change    Written Patient Discharge Instructions Given            Electronically signed by Nisha Cohn DPM on 7/27/2022 at 3:46 PM

## 2022-07-27 NOTE — PROGRESS NOTES
Multilayer Compression Wrap   Below the Knee    NAME:  Miya Ward  YOB: 1960  MEDICAL RECORD NUMBER:  [id number]  DATE:  7/27/2022    Multilayer compression wrap: Removed old Multilayer wrap if indicated and wash leg with mild soap/water. Applied moisturizing agent to dry skin as needed. Applied primary and secondary dressing as ordered. Applied multilayered dressing below the knee to right lower leg. Applied multilayered dressing below the knee to left lower leg. Instructed patient/caregiver not to remove dressing and to keep it clean and dry. Instructed patient/caregiver on complications to report to provider, such as pain, numbness in toes, heavy drainage, and slippage of dressing. Instructed patient on purpose of compression dressing and on activity and exercise recommendations.      Applied  2 layer wrap from toes to knee overlapping each time  Lotrisone to legs and Gentamicin and Collagen, Dry Dressing  Electronically signed by Jl Mccartney RN on 7/27/2022 at 3:05 PM

## 2022-07-28 ENCOUNTER — OFFICE VISIT (OUTPATIENT)
Dept: INTERNAL MEDICINE CLINIC | Age: 62
End: 2022-07-28
Payer: COMMERCIAL

## 2022-07-28 VITALS
OXYGEN SATURATION: 99 % | WEIGHT: 161.8 LBS | HEART RATE: 99 BPM | DIASTOLIC BLOOD PRESSURE: 60 MMHG | SYSTOLIC BLOOD PRESSURE: 100 MMHG | BODY MASS INDEX: 24.6 KG/M2

## 2022-07-28 DIAGNOSIS — L03.115 CELLULITIS OF RIGHT LOWER EXTREMITY: Primary | ICD-10-CM

## 2022-07-28 PROCEDURE — 1036F TOBACCO NON-USER: CPT | Performed by: INTERNAL MEDICINE

## 2022-07-28 PROCEDURE — 3017F COLORECTAL CA SCREEN DOC REV: CPT | Performed by: INTERNAL MEDICINE

## 2022-07-28 PROCEDURE — 99213 OFFICE O/P EST LOW 20 MIN: CPT | Performed by: INTERNAL MEDICINE

## 2022-07-28 PROCEDURE — G8420 CALC BMI NORM PARAMETERS: HCPCS | Performed by: INTERNAL MEDICINE

## 2022-07-28 PROCEDURE — G8427 DOCREV CUR MEDS BY ELIG CLIN: HCPCS | Performed by: INTERNAL MEDICINE

## 2022-07-28 RX ORDER — DIPHENHYDRAMINE HYDROCHLORIDE 25 MG/1
CAPSULE ORAL
COMMUNITY
Start: 2022-07-20

## 2022-07-28 ASSESSMENT — ENCOUNTER SYMPTOMS: DIARRHEA: 1

## 2022-07-28 NOTE — PROGRESS NOTES
Feroz Dhillon (:  1960) is a 65 y.o. male,New patient, here for evaluation of the following chief complaint(s):  Leg Pain (Wound clinic states improvement with redness and swelling.), Rash (Right arm, right hand. Wonders if itching leg and touching body has spread or what caused rash.), and Diarrhea (X2 days, unsure if antibiotic related or radiation related. Or neither.)         ASSESSMENT/PLAN:  1. Cellulitis of right lower extremity  Patient cellulitis seems to be gradually improving the patient is reporting reduction of his symptoms including itchiness as well as redness he does report a mild diarrhea we will get a keep an eye on that if he starts having watery diarrhea he will need to be checked for C. difficile right now it is seems to be not overly concerning      Return in about 2 weeks (around 2022).          Subjective   SUBJECTIVE/OBJECTIVE:    Lab Review   Lab Results   Component Value Date/Time     2022 01:39 PM     2022 08:48 AM     2022 05:25 AM    K 4.9 2022 01:39 PM    K 4.0 2022 08:48 AM    K 3.6 2022 05:25 AM    K 3.8 2022 05:00 AM    CO2 25 2022 01:39 PM    CO2 23 2022 08:48 AM    CO2 25 2022 05:25 AM    BUN 12 2022 01:39 PM    BUN 8 2022 08:48 AM    BUN 9 2022 05:25 AM    CREATININE 0.6 2022 01:39 PM    CREATININE <0.5 2022 08:48 AM    CREATININE <0.5 2022 05:25 AM    GLUCOSE 76 2022 01:39 PM    GLUCOSE 143 2022 08:48 AM    GLUCOSE 147 2022 05:25 AM    CALCIUM 9.9 2022 01:39 PM    CALCIUM 9.0 2022 08:48 AM    CALCIUM 9.0 2022 05:25 AM     Lab Results   Component Value Date/Time    WBC 4.8 2022 03:21 PM    WBC 4.9 2022 01:39 PM    WBC 5.0 2022 08:48 AM    HGB 9.3 2022 03:21 PM    HGB 10.2 2022 01:39 PM    HGB 11.2 2022 08:48 AM    HCT 28.6 2022 03:21 PM    HCT 32.3 2022 01:39 PM    HCT 34.5 04/02/2022 08:48 AM    MCV 83.2 06/16/2022 03:21 PM    MCV 86.0 05/05/2022 01:39 PM    MCV 85.7 04/02/2022 08:48 AM     06/16/2022 03:21 PM     05/05/2022 01:39 PM     04/02/2022 08:48 AM     Lab Results   Component Value Date/Time    CHOL 112 05/05/2022 01:39 PM    CHOL 140 02/18/2022 03:24 PM    CHOL 134 03/08/2021 12:59 PM    TRIG 92 05/05/2022 01:39 PM    TRIG 175 03/29/2022 10:30 AM    TRIG 152 03/22/2022 03:00 AM    HDL 41 05/05/2022 01:39 PM    HDL 71 02/18/2022 03:24 PM    HDL 46 03/08/2021 12:59 PM    HDL 60 03/29/2010 01:01 PM       Vitals 7/28/2022 7/27/2022 8/24/1059   SYSTOLIC 538 498 921   DIASTOLIC 60 76 64   Site - - -   Position - - -   Cuff Size - - -   Pulse 99 87 90   Temp - 96.4 96.8   Resp - 16 -   SpO2 99 - -   Weight 161 lb 12.8 oz - -   Height - - -   Body mass index - - -   Pain Level - 4 -   Some recent data might be hidden       Leg Pain   The incident occurred more than 1 week ago. The pain has been Improving since onset. Pertinent negatives include no inability to bear weight, loss of motion, loss of sensation, muscle weakness, numbness or tingling. Rash  Associated symptoms include diarrhea. Diarrhea       Review of Systems   Gastrointestinal:  Positive for diarrhea. Skin:  Positive for rash. Neurological:  Negative for tingling and numbness. Objective   Physical Exam       This dictation was generated by voice recognition computer software. Although all attempts are made to edit the dictation for accuracy, there may be errors in the transcription that are not intended. An electronic signature was used to authenticate this note.     --Darshan Sams MD

## 2022-07-31 DIAGNOSIS — E11.9 TYPE 2 DIABETES MELLITUS WITHOUT COMPLICATION, WITHOUT LONG-TERM CURRENT USE OF INSULIN (HCC): ICD-10-CM

## 2022-07-31 DIAGNOSIS — L29.9 ITCHING: ICD-10-CM

## 2022-08-01 RX ORDER — LANOLIN ALCOHOL/MO/W.PET/CERES
6 CREAM (GRAM) TOPICAL NIGHTLY PRN
Qty: 30 TABLET | Refills: 2 | Status: CANCELLED | OUTPATIENT
Start: 2022-08-01

## 2022-08-01 RX ORDER — WARFARIN SODIUM 5 MG/1
7.5 TABLET ORAL DAILY
Qty: 90 TABLET | Refills: 1 | OUTPATIENT
Start: 2022-08-01

## 2022-08-01 RX ORDER — GENTAMICIN SULFATE 1 MG/G
CREAM TOPICAL
Qty: 30 G | Refills: 1 | OUTPATIENT
Start: 2022-08-01

## 2022-08-01 RX ORDER — CETIRIZINE HYDROCHLORIDE 10 MG/1
10 TABLET ORAL DAILY
Qty: 30 TABLET | Refills: 0 | Status: SHIPPED | OUTPATIENT
Start: 2022-08-01 | End: 2022-08-06 | Stop reason: SDUPTHER

## 2022-08-02 ENCOUNTER — ANTI-COAG VISIT (OUTPATIENT)
Dept: PHARMACY | Age: 62
End: 2022-08-02
Payer: MEDICAID

## 2022-08-02 DIAGNOSIS — I73.9 PERIPHERAL ARTERY DISEASE (HCC): Primary | ICD-10-CM

## 2022-08-02 LAB — INTERNATIONAL NORMALIZATION RATIO, POC: 2.2

## 2022-08-02 PROCEDURE — 99211 OFF/OP EST MAY X REQ PHY/QHP: CPT

## 2022-08-02 PROCEDURE — 85610 PROTHROMBIN TIME: CPT

## 2022-08-02 NOTE — PROGRESS NOTES
Gege Gerardo is a 65 y.o. here for warfarin management. Cira Morrison had an INR test today. Results were reviewed and appropriate warfarin management was completed. This visit was performed as: An in person visit. Protocols were followed with precautions to reduce the spread of COVID-19. Patient verifies current dosing regimen: Yes     Warfarin medication reviewed and updated on the patient 's home medication list: Yes   All other medications reviewed and updated on the patient 's home medication list: No new medciations    Lab Results   Component Value Date    INR 2.2 2022    INR 2.0 2022    INR 1.7 2022       Patient Findings       Negatives:  Signs/symptoms of thrombosis, Signs/symptoms of bleeding, Change in health, Missed doses, Change in medications, Change in diet/appetite, Bruising            Anticoagulation Summary  As of 2022      INR goal:  2.0-3.0   TTR:  52.6 % (10.3 mo)   INR used for dosin.2 (2022)   Warfarin maintenance plan:  10 mg (5 mg x 2) every Mon, Wed, Fri; 7.5 mg (5 mg x 1.5) all other days   Weekly warfarin total:  60 mg   Plan last modified:  Kobe Longoria (2022)   Next INR check:  2022   Priority:  High   Target end date: Indefinite    Indications    Peripheral artery disease ([facility] [address] [I73.9]                 Anticoagulation Episode Summary       INR check location:  Anticoagulation Clinic    Preferred lab:      Send INR reminders to:  [facility]    Comments:  EPIC          Anticoagulation Care Providers       Provider Role Specialty Phone number    Shalini Harvey MD Referring Family Medicine [phone]            Warfarin assessment / plan:   Patient appears well. No complaints regarding warfarin therapy. Has 19 more radiation visits. No change to weekly warfarin dosing.          Description    CONTINUE:  Warfarin 7.5 mg (1 & 1/2 tablets) daily except 10mg (2 tablets) every Monday, Wednesday and Friday    Call [phone] with signs or symptoms of bleeding or ANY medication changes (including over-the-counter medications or herbal supplements). If significant bleeding occurs please seek immediate medical attention. Keep the number of servings of vitamin K containing foods (dark green, leafy vegetables) the same each week. Dark green vegetable 2-3 times a week and a mixed green salad ~ 3 times a week. Please call if this changes. Limit alcohol intake. Please call if this changes. Immunization History   Administered Date(s) Administered    COVID-19, PFIZER PURPLE top, DILUTE for use, (age 15 y+), 30mcg/0.3mL 03/15/2021, 04/12/2021, 12/22/2021    Influenza Virus Vaccine 01/21/2017, 01/21/2017    Influenza, Sadie Saint, IM, PF (6 mo and older Fluzone, Flulaval, Fluarix, and 3 yrs and older Afluria) 10/31/2020    PPD Test 04/14/2022, 04/23/2022, 06/03/2022    Tdap (Boostrix, Adacel) 03/27/2018    Zoster Recombinant (Shingrix) 03/27/2018           Orders Placed This Encounter   Procedures    POCT INR     This external order was created through the results console. No orders of the defined types were placed in this encounter. Reviewed AVS with patient / caregiver.     Billing Points:  0 billing points this visit       CLINICAL PHARMACY CONSULT: MED RECONCILIATION/REVIEW ADDENDUM    For Pharmacy Admin Tracking Only    Intervention Detail:   Total # of Interventions Recommended: 0  Total # of Interventions Accepted: 0  Time Spent (min): 15

## 2022-08-04 NOTE — DISCHARGE INSTRUCTIONS
[address], [address]  Telephone: [phone]     Discharge Instructions     Important reminders:     **If you have any signs and symptoms of illness (Cough, fever, congestion, nausea, vomiting, diarrhea, etc.) please call the wound care center prior to your appointment. 1. Increase Protein intake for optimal wound healing  2. No added salt to reduce any swelling  3. If diabetic, maintain good glucose control  4. If you smoke, smoking prohibits wound healing, we ask that you refrain from smoking. 5. When taking antibiotics take the entire prescription as ordered. Do not stop taking until medication is all gone unless otherwise instructed. 6. Exercise as tolerated. 7. Keep weight off wounds and reposition every 2 hours if applicable. 8. If wound(s) is on your lower extremity, elevate legs to the level of the heart or above for 30 minutes 4-5 times a day and/or when sitting. Avoid standing for long periods of time. 9. Do not get wounds wet in bath or shower unless otherwise instructed by your physician. If your wound is on your foot or leg, you may purchase a cast bag. Please ask at the pharmacy. If Vascular testing is ordered, please call [address] [place] [address] [phone]) to schedule. Vascular tests ordered by Wound Care Physicians may take up to 2 hours to complete. Please keep that in mind when scheduling. If Vascular testing is scheduled, please bring supplies to replace your dressing after testing is done. The vascular department does not stock supplies. Wound: Right and Left leg     With each dressing change, rinse wounds with 0.9% Saline. (May use wound wash or soft contact solution. Both can be purchased at a local drug store). If unable to obtain saline, may use a gentle soap and water. Dressing care: Right and left leg - Gentamicin cream Triad to wounds.  Lotrisone to lower legs, Gentle coflex calamine to both, Compression Wraps  Location: Both legs  Type: Coflex calamine     Your doctor has ordered compression therapy for your wound. Compression bandages reduce the swelling, or edema, in your legs and prevent it from returning. The wound care staff will apply your compression wrap. It must be removed and re-applied at least weekly. As the swelling decreases, the boot no longer provides adequate compression and you need a new one. Once applied, you need to know how to take care of your compression wrap. The boot must stay dry. Do not get it wet in the shower or tub. You may do a partial bath, or you can cover the boot with a large plastic bag, secured at the top, so that no water can get in. Avoid standing in one place for long periods of time. If you must  one place, shift your weight and change positions often. If you have CHF, consult your doctor before following the next two recommendations for leg elevation. When sitting, elevate your legs on pillows, or put blocks under the foot of your bed. Your legs should be higher than your heart. If your boot becomes painful, or you notice an increase in swelling in your toes, numbness or tingling, or purple color to your toes, remove the wrap and call the [address] [place] [address]. If it is after hours, call your doctor for instructions or go to the nearest emergency room. Home Care Agency/Facility: [facility]     Your wound-care supplies will be provided by:  Please note, depending on your insurance coverage, you may have out-of-pocket expenses for these supplies. Someone from the company should call you to confirm your order and discuss those potential costs before they ship your products -- please anticipate that call. If your out-of-pocket cost could be substantial, Many companies have financial hardship programs for patients who qualify, so please ask about that if you might need a hand.  If you have any questions about your supplies or your potential out-of-pocket costs, or if you need to place an order for a refill of supplies (typically monthly), please call the company directly. Your  is Deja Collins     Follow up with Dr Andria Olsen in 1 weeks        Jazmin Marshall 281: Should you experience any significant changes in your wound(s) or have questions about your wound care, please contact the [place] [address] at [phone] Monday  - Thursday 8:00 am - 4:00 pm and Friday 8:00 am - 1:00pm. If you need help with your wound outside these hours and cannot wait until we are again available, contact your PCP or go to the hospital emergency room. PLEASE NOTE: IF YOU ARE UNABLE TO OBTAIN WOUND SUPPLIES, CONTINUE TO USE THE SUPPLIES YOU HAVE AVAILABLE UNTIL YOU ARE ABLE TO REACH US. IT IS MOST IMPORTANT TO KEEP THE WOUND COVERED AT ALL TIMES. Patient Experience     Thank you for trusting us with your care. You may receive a survey from a company called [employer] asking for your feedback. We would appreciate it if you took a few minutes to share your experience. Your input is very valuable to us.

## 2022-08-05 ENCOUNTER — HOSPITAL ENCOUNTER (OUTPATIENT)
Dept: WOUND CARE | Age: 62
Discharge: HOME | End: 2022-08-05
Payer: MEDICAID

## 2022-08-05 VITALS — HEART RATE: 86 BPM | SYSTOLIC BLOOD PRESSURE: 134 MMHG | TEMPERATURE: 97 F | DIASTOLIC BLOOD PRESSURE: 75 MMHG

## 2022-08-05 DIAGNOSIS — I73.9 PERIPHERAL ARTERY DISEASE (HCC): Primary | ICD-10-CM

## 2022-08-05 PROCEDURE — 29581 APPL MULTLAYER CMPRN SYS LEG: CPT

## 2022-08-05 PROCEDURE — 11043 DBRDMT MUSC&/FSCA 1ST 20/<: CPT

## 2022-08-05 PROCEDURE — 11042 DBRDMT SUBQ TIS 1ST 20SQCM/<: CPT

## 2022-08-05 RX ORDER — CLOBETASOL PROPIONATE 0.5 MG/G
OINTMENT TOPICAL ONCE
Status: CANCELLED | OUTPATIENT
Start: 2022-08-05 | End: 2022-08-05

## 2022-08-05 RX ORDER — BACITRACIN, NEOMYCIN, POLYMYXIN B 400; 3.5; 5 [USP'U]/G; MG/G; [USP'U]/G
OINTMENT TOPICAL ONCE
Status: CANCELLED | OUTPATIENT
Start: 2022-08-05 | End: 2022-08-05

## 2022-08-05 RX ORDER — BACITRACIN ZINC AND POLYMYXIN B SULFATE 500; 1000 [USP'U]/G; [USP'U]/G
OINTMENT TOPICAL ONCE
Status: CANCELLED | OUTPATIENT
Start: 2022-08-05 | End: 2022-08-05

## 2022-08-05 RX ORDER — BETAMETHASONE DIPROPIONATE 0.05 %
OINTMENT (GRAM) TOPICAL ONCE
Status: CANCELLED | OUTPATIENT
Start: 2022-08-05 | End: 2022-08-05

## 2022-08-05 RX ORDER — GENTAMICIN SULFATE 1 MG/G
OINTMENT TOPICAL ONCE
Status: CANCELLED | OUTPATIENT
Start: 2022-08-05 | End: 2022-08-05

## 2022-08-05 RX ORDER — LIDOCAINE 50 MG/G
OINTMENT TOPICAL ONCE
Status: CANCELLED | OUTPATIENT
Start: 2022-08-05 | End: 2022-08-05

## 2022-08-05 RX ORDER — GINSENG 100 MG
CAPSULE ORAL ONCE
Status: CANCELLED | OUTPATIENT
Start: 2022-08-05 | End: 2022-08-05

## 2022-08-05 RX ORDER — LIDOCAINE HYDROCHLORIDE 20 MG/ML
JELLY TOPICAL ONCE
Status: CANCELLED | OUTPATIENT
Start: 2022-08-05 | End: 2022-08-05

## 2022-08-05 RX ORDER — LIDOCAINE HYDROCHLORIDE 40 MG/ML
SOLUTION TOPICAL ONCE
Status: CANCELLED | OUTPATIENT
Start: 2022-08-05 | End: 2022-08-05

## 2022-08-05 RX ORDER — LIDOCAINE 40 MG/G
CREAM TOPICAL ONCE
Status: CANCELLED | OUTPATIENT
Start: 2022-08-05 | End: 2022-08-05

## 2022-08-05 RX ORDER — LIDOCAINE 40 MG/G
CREAM TOPICAL ONCE
Status: DISCONTINUED | OUTPATIENT
Start: 2022-08-05 | End: 2022-08-06 | Stop reason: HOSPADM

## 2022-08-05 ASSESSMENT — PAIN SCALES - GENERAL: PAINLEVEL_OUTOF10: 0

## 2022-08-05 NOTE — PROGRESS NOTES
Multilayer Compression Wrap   Below the Knee    NAME:  Campbell Moseley  YOB: 1960  MEDICAL RECORD NUMBER:  [id number]  DATE:  8/5/2022    Multilayer compression wrap: Removed old Multilayer wrap if indicated and wash leg with mild soap/water. Applied moisturizing agent to dry skin as needed. Applied primary and secondary dressing as ordered. Applied multilayered dressing below the knee to right lower leg. Applied multilayered dressing below the knee to left lower leg. Instructed patient/caregiver not to remove dressing and to keep it clean and dry. Instructed patient/caregiver on complications to report to provider, such as pain, numbness in toes, heavy drainage, and slippage of dressing. Instructed patient on purpose of compression dressing and on activity and exercise recommendations.      Applied  2 layer wrap from toes to knee overlapping each time    Electronically signed by Ashley Gutierrez RN on 8/5/2022 at 10:58 AM

## 2022-08-05 NOTE — PROGRESS NOTES
[facility]  Progress Note and Procedure Note      Jeimy Najera  AGE: 65 y.o. GENDER: male  : 1960  TODAY'S DATE:  2022    Subjective:     Chief Complaint   Patient presents with    Wound Check     Left lower extremity, right lower extremity         HISTORY of PRESENT ILLNESS HPI     Jeimy Najera is a 65 y.o. male who presents today for wound evaluation. History of Wound: Admits to having chronic wounds for at least a year on the left leg and 8 to 9 months on the right leg. He states that he has had arterial bypasses on both of his legs in the past.  He was seeing previous wound care and podiatry for these wounds. He states that his insurance had some issues and he has not been seeing anyone since 2021 he has been treating the wounds himself with PSO and bandages. 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 He admits to the radiation therapy is making him feel rundown. However he communicates his legs are still doing better.       Wound Pain:  intermittent  Severity:  3 / 10   Wound Type:  venous, arterial and diabetic  Modifying Factors:  edema, venous stasis, lymphedema, diabetes, decreased mobility, arterial insufficiency and decreased tissue oxygenation  Associated Signs/Symptoms:  drainage, numbness and odor        PAST MEDICAL HISTORY        Diagnosis Date    Cancer ([address]     throat    Diabetes mellitus ([address]     Fibromyalgia     History of DVT (deep vein thrombosis)     Hyperlipidemia     Hypertension     Type 2 diabetes mellitus with circulatory disorder, without long-term current use of insulin ([address] 2022       PAST SURGICAL HISTORY    Past Surgical History:   Procedure Laterality Date    APPENDECTOMY  2005    COLONOSCOPY  2016    FEMORAL BYPASS Left 2020    femoral posterior tibial bypass    FEMORAL-TIBIAL BYPASS GRAFT Right 2020    with femoral endarterectomy and patch angioplasty    FOOT DEBRIDEMENT Left 2020    FOOT DEBRIDEMENT Right 2021    DEBRIDEMENT OF RIGHT FOOT WOUND WITH GRAFT APPLICATION performed by Bre Khalil DPM at [address] N/A 3/25/2022    DIRECT LARYNGOSCOPY WITH BIOPSY AND FROZEN SECTIONS performed by Lester Taylor DO at [facility] Right 2020    stent leg for PVD    TRACHEOSTOMY N/A 3/25/2022    TRACHEOTOMY performed by Lester Taylor DO at [address] [facility] [address] HISTORY    Family History   Problem Relation Age of Onset    Cancer Mother     Lung Cancer Mother     Diabetes Father     Stroke Father        SOCIAL HISTORY    Social History     Tobacco Use    Smoking status: Former     Packs/day: 0.50     Years: 20.00     Pack years: 10.00     Types: Cigarettes     Start date: 1977     Quit date: 2021     Years since quittin.6    Smokeless tobacco: Never   Vaping Use    Vaping Use: Never used   Substance Use Topics    Alcohol use: Yes     Comment: 2 beers / day     Drug use: Never       ALLERGIES    Allergies   Allergen Reactions    Chantix [Varenicline] Other (See Comments)     Hallucinations         MEDICATIONS    Current Outpatient Medications on File Prior to Encounter   Medication Sig Dispense Refill    cyanocobalamin 250 MCG tablet Take 1 tablet by mouth in the morning. 30 tablet 0    metFORMIN (GLUCOPHAGE) 500 MG tablet Take 1 tablet by mouth in the morning and 1 tablet in the evening. Take with meals. 180 tablet 0    cetirizine (ZYRTEC) 10 MG tablet Take 1 tablet by mouth in the morning.  30 tablet 0    BANOPHEN 25 MG capsule Take 1 tablet by mouth every 6 hours as needed for Itching      diphenhydrAMINE (BENADRYL ALLERGY) 25 MG tablet Take 1 tablet by mouth every 6 hours as needed for Itching 60 tablet 0    sennosides-docusate sodium (SENOKOT-S) 8.6-50 MG tablet Take 1 tablet by mouth 2 times daily 60 tablet 0    lisinopril (PRINIVIL;ZESTRIL) 20 MG tablet Take 1 tablet by mouth daily 30 tablet 0    melatonin 3 MG TABS tablet Take 2 tablets by mouth nightly as needed (insomnia) 30 tablet 2    atorvastatin (LIPITOR) 80 MG tablet Take 1 tablet by mouth daily Cancel atorvastatin 20 mg a day 90 tablet 0    warfarin (COUMADIN) 5 MG tablet Take 1.5 tablets by mouth daily 90 tablet 1    ondansetron (ZOFRAN-ODT) 4 MG disintegrating tablet Take 4 mg by mouth every 8 hours as needed for Nausea or Vomiting      Multiple Vitamins-Minerals (MULTIVITAMIN ADULT EXTRA C PO) 1 tablet by Nasogastric route daily      esomeprazole (NEXIUM) 20 MG delayed release capsule 20 mg by Nasogastric route every morning (before breakfast)      oxyCODONE HCl (OXY-IR) 10 MG immediate release tablet TAKE 1 TABLET BY MOUTH EVERY SIX HOURS AS NEEDED FOR PAIN FOR UP TO 7 DAYS      budesonide (PULMICORT) 0.5 MG/2ML nebulizer suspension Take 2 mLs by nebulization 2 times daily 60 each 2    gentamicin (GARAMYCIN) 0.1 % cream Apply topically  daily. 30 g 1    becaplermin (REGRANEX) 0.01 % gel Apply 1 Applicatorful topically daily      calcium-vitamin D (OSCAL-500) 500-200 MG-UNIT per tablet Take 1 tablet by mouth 2 times daily      ipratropium-albuterol (DUONEB) 0.5-2.5 (3) MG/3ML SOLN nebulizer solution Inhale 3 mLs into the lungs 4 times daily 360 mL 3    acetaminophen (TYLENOL) 500 MG tablet Take 1 tablet by mouth 4 times daily as needed for Pain 360 tablet 1    fluticasone (FLONASE) 50 MCG/ACT nasal spray 1 spray by Nasal route daily 9.9 g 5    Vitamins/Minerals TABS Take 1 tablet by mouth daily        No current facility-administered medications on file prior to encounter. REVIEW OF SYSTEMS    Pertinent items are noted in HPI. Objective:      /75   Pulse 86   Temp 97 °F (36.1 °C) (Infrared)     PHYSICAL EXAM    Vascular: Vascular status Impaired  palpable pedal pulses, right DP0/4 and PT1/4, left DP0/4 and PT1/4. CFT 3 seconds digits 1 to 5 bilateral.  Hair growthAbsent  both lower extremities and feet.   Skin temperature is warm to warm from pretibial area to distal digits bilateral.  Exam is negative for rubor, pallor, cyanosis or signs of acute vascular compromise bilaterally. Exam is positive for edema bilateral lower extremity. Varicosities Present bilateral lower extremity. Neuro: Neurologic status diminished bilateral with epicritic Absent  , proprioceptive Absent , vibratory sensation Absent  and protopathicPresent. DTRs Absent  bilateral Achilles. There were no reproducible neuritic symptoms on exam bilateral feet/ankles. Derm: Ulceration to bilateral ankles. Ecchymosis Absent  bilateral feet/foot. Musculoskeletal: No pain with debridement of wounds 5/5 muscle strength in/eversion and dorsi/plantarflexion bilateral feet. No gross instability noted. Assessment:     Problem List Items Addressed This Visit       Peripheral artery disease ([facility] [address] - Primary    Relevant Medications    lidocaine (LMX) 4 % cream    Other Relevant Orders    Initiate Outpatient Wound Care Protocol       Procedure Note    Performed by: Ping Vazquez DPM    Consent obtained: Yes    Time out taken:  Yes    Pain Control: Anesthetic  Anesthetic: 4% Lidocaine Cream     Debridement:Excisional Debridement    Using curette the wound was sharply debrided    down through and including the removal of epidermis, dermis, subcutaneous tissue and muscle/fascia.         Devitalized Tissue Debrided:  fibrin, biofilm, slough, necrotic/eschar and exudate    Pre Debridement Measurements:  Are located in the Wound Documentation Flow Sheet    Wound #: 1     Wound Care Documentation:  Wound 05/18/22 Ankle Medial;Right #2 (Active)   Wound Image   06/24/22 1202   Wound Etiology Diabetic 08/05/22 0805   Wound Cleansed Wound cleanser 08/05/22 0805   Dressing/Treatment Antibacterial ointment;Collagen;Dry dressing 07/20/22 1352   Offloading for Diabetic Foot Ulcers Offloading not required 08/05/22 0805   Wound Length (cm) 2 cm 08/05/22 0805   Wound Width (cm) 2.3 cm 08/05/22 0805   Wound Depth (cm) 0.1 cm 08/05/22 0805   Wound Surface Area (cm^2) 4.6 cm^2 08/05/22 0805   Change in Wound Size % (l*w) 42.5 08/05/22 0805   Wound Volume (cm^3) 0.46 cm^3 08/05/22 0805   Wound Healing % 43 08/05/22 0805   Post-Procedure Length (cm) 2 cm 08/05/22 0826   Post-Procedure Width (cm) 2.3 cm 08/05/22 0826   Post-Procedure Depth (cm) 0.1 cm 08/05/22 0826   Post-Procedure Surface Area (cm^2) 4.6 cm^2 08/05/22 0826   Post-Procedure Volume (cm^3) 0.46 cm^3 08/05/22 0826   Wound Assessment Bleeding 08/05/22 0826   Drainage Amount Moderate 08/05/22 0805   Drainage Description Serosanguinous; Yellow 08/05/22 0805   Odor Mild 08/05/22 0805   Jamila-wound Assessment Dry/flaky 08/05/22 0805   Margins Defined edges 08/05/22 0805   Number of days: 79       Wound 06/24/22 Leg Left;Medial #1 (Active)   Wound Image   06/24/22 1202   Wound Etiology Diabetic 08/05/22 0805   Wound Cleansed Wound cleanser 08/05/22 0805   Dressing/Treatment Antibacterial ointment;Collagen;Dry dressing 07/20/22 1352   Offloading for Diabetic Foot Ulcers Offloading not ordered; Offloading not required 08/05/22 0805   Wound Length (cm) 2.5 cm 08/05/22 0805   Wound Width (cm) 4.1 cm 08/05/22 0805   Wound Depth (cm) 0.1 cm 08/05/22 0805   Wound Surface Area (cm^2) 10.25 cm^2 08/05/22 0805   Change in Wound Size % (l*w) -49.85 08/05/22 0805   Wound Volume (cm^3) 1.025 cm^3 08/05/22 0805   Wound Healing % -50 08/05/22 0805   Post-Procedure Length (cm) 2.5 cm 08/05/22 0826   Post-Procedure Width (cm) 4.1 cm 08/05/22 0826   Post-Procedure Depth (cm) 0.1 cm 08/05/22 0826   Post-Procedure Surface Area (cm^2) 10.25 cm^2 08/05/22 0826   Post-Procedure Volume (cm^3) 1.025 cm^3 08/05/22 0826   Wound Assessment Bleeding 08/05/22 0826   Drainage Amount Moderate 08/05/22 0805   Drainage Description Serosanguinous; Thick; Yellow 08/05/22 0805   Odor Mild 08/05/22 0805   Jamila-wound Assessment Dry/flaky 08/05/22 0805   Margins Defined edges 08/05/22 0805   Number of days: 42        Total Surface Area Debrided: 10.25 cm² left leg into the deep fascia and sq cm 4.6 cm² into the subcutaneous tissue of the right foot    Percentage of wound debrided 100%    Bleeding:  Minimal    Hemostasis Achieved:  by pressure    Procedural Pain:  0  / 10     Post Procedural Pain:  0 / 10     Response to treatment:  Well tolerated by patient. Plan:   Patient examined and evaluated   Wounds debrided without incident   Multilayer compression wrap right leg with Lotrisone. Multilayer compression wrap left leg  Follow-up one week  Keep legs elevated at all times when not active   Continue follow-up with vascular surgery for decision on wounds for improving the blood flow to the distal left leg. He has not made his decision on what he would like to do. Patient would benefit from disability due to his multiple comorbidities including his chronic peripheral arterial disease with high risk for limb loss. The nature of the patient's condition was explained in depth. The patient was informed that their compliance to the treatment Plan is paramount to successful healing and prevention of further ulceration and/or infection.   Application of Lotrisone to the irritated areas of his leg right  Discharge Treatment follow-up on Friday for dressing change    Written Patient Discharge Instructions Given            Electronically signed by Christian Linn DPM on 8/5/2022 at 9:02 AM

## 2022-08-05 NOTE — PLAN OF CARE
Discharge instructions given. Patient verbalized understanding. Return to [facility] in 1 week(s).

## 2022-08-06 DIAGNOSIS — I10 ESSENTIAL HYPERTENSION: ICD-10-CM

## 2022-08-06 DIAGNOSIS — M25.50 ARTHRALGIA, UNSPECIFIED JOINT: ICD-10-CM

## 2022-08-06 DIAGNOSIS — I73.9 PAD (PERIPHERAL ARTERY DISEASE) (HCC): ICD-10-CM

## 2022-08-06 DIAGNOSIS — L29.9 ITCHING: ICD-10-CM

## 2022-08-06 DIAGNOSIS — E11.9 TYPE 2 DIABETES MELLITUS WITHOUT COMPLICATION, WITHOUT LONG-TERM CURRENT USE OF INSULIN (HCC): ICD-10-CM

## 2022-08-06 DIAGNOSIS — E78.00 PURE HYPERCHOLESTEROLEMIA: ICD-10-CM

## 2022-08-08 RX ORDER — ATORVASTATIN CALCIUM 80 MG/1
80 TABLET, FILM COATED ORAL DAILY
Qty: 90 TABLET | Refills: 0 | Status: SHIPPED | OUTPATIENT
Start: 2022-08-08 | End: 2022-09-02 | Stop reason: SDUPTHER

## 2022-08-08 RX ORDER — WARFARIN SODIUM 5 MG/1
7.5 TABLET ORAL DAILY
Qty: 90 TABLET | Refills: 1 | Status: SHIPPED | OUTPATIENT
Start: 2022-08-08 | End: 2022-09-02 | Stop reason: SDUPTHER

## 2022-08-08 RX ORDER — LANOLIN ALCOHOL/MO/W.PET/CERES
6 CREAM (GRAM) TOPICAL NIGHTLY PRN
Qty: 30 TABLET | Refills: 2 | Status: SHIPPED | OUTPATIENT
Start: 2022-08-08 | End: 2022-09-02 | Stop reason: SDUPTHER

## 2022-08-08 RX ORDER — LISINOPRIL 20 MG/1
20 TABLET ORAL DAILY
Qty: 30 TABLET | Refills: 0 | Status: SHIPPED | OUTPATIENT
Start: 2022-08-08 | End: 2022-08-22

## 2022-08-08 RX ORDER — CETIRIZINE HYDROCHLORIDE 10 MG/1
10 TABLET ORAL DAILY
Qty: 30 TABLET | Refills: 0 | Status: SHIPPED | OUTPATIENT
Start: 2022-08-08 | End: 2022-09-02 | Stop reason: SDUPTHER

## 2022-08-08 RX ORDER — DIPHENHYDRAMINE HCL 25 MG
25 TABLET ORAL EVERY 6 HOURS PRN
Qty: 60 TABLET | Refills: 0 | Status: SHIPPED | OUTPATIENT
Start: 2022-08-08 | End: 2022-09-02 | Stop reason: SDUPTHER

## 2022-08-08 RX ORDER — SENNA AND DOCUSATE SODIUM 50; 8.6 MG/1; MG/1
1 TABLET, FILM COATED ORAL 2 TIMES DAILY
Qty: 60 TABLET | Refills: 0 | Status: SHIPPED | OUTPATIENT
Start: 2022-08-08 | End: 2022-09-02 | Stop reason: SDUPTHER

## 2022-08-10 RX ORDER — ACETAMINOPHEN 500 MG
500 TABLET ORAL 4 TIMES DAILY PRN
Qty: 360 TABLET | Refills: 1 | OUTPATIENT
Start: 2022-08-10

## 2022-08-10 RX ORDER — LISINOPRIL 20 MG/1
TABLET ORAL
Qty: 30 TABLET | Refills: 0 | OUTPATIENT
Start: 2022-08-10

## 2022-08-12 ENCOUNTER — HOSPITAL ENCOUNTER (OUTPATIENT)
Dept: WOUND CARE | Age: 62
Discharge: HOME | End: 2022-08-12
Payer: MEDICAID

## 2022-08-12 VITALS
DIASTOLIC BLOOD PRESSURE: 68 MMHG | HEART RATE: 90 BPM | SYSTOLIC BLOOD PRESSURE: 113 MMHG | TEMPERATURE: 96.6 F | RESPIRATION RATE: 16 BRPM

## 2022-08-12 DIAGNOSIS — I73.9 PERIPHERAL ARTERY DISEASE (HCC): Primary | ICD-10-CM

## 2022-08-12 PROCEDURE — 29581 APPL MULTLAYER CMPRN SYS LEG: CPT

## 2022-08-12 PROCEDURE — 11043 DBRDMT MUSC&/FSCA 1ST 20/<: CPT

## 2022-08-12 PROCEDURE — 11042 DBRDMT SUBQ TIS 1ST 20SQCM/<: CPT

## 2022-08-12 RX ORDER — LIDOCAINE HYDROCHLORIDE 20 MG/ML
JELLY TOPICAL ONCE
Status: CANCELLED | OUTPATIENT
Start: 2022-08-12 | End: 2022-08-12

## 2022-08-12 RX ORDER — CLOBETASOL PROPIONATE 0.5 MG/G
OINTMENT TOPICAL ONCE
Status: CANCELLED | OUTPATIENT
Start: 2022-08-12 | End: 2022-08-12

## 2022-08-12 RX ORDER — CLINDAMYCIN HYDROCHLORIDE 300 MG/1
300 CAPSULE ORAL 3 TIMES DAILY
Qty: 30 CAPSULE | Refills: 0 | Status: SHIPPED | OUTPATIENT
Start: 2022-08-12 | End: 2022-08-22

## 2022-08-12 RX ORDER — LIDOCAINE 50 MG/G
OINTMENT TOPICAL ONCE
Status: CANCELLED | OUTPATIENT
Start: 2022-08-12 | End: 2022-08-12

## 2022-08-12 RX ORDER — GINSENG 100 MG
CAPSULE ORAL ONCE
Status: CANCELLED | OUTPATIENT
Start: 2022-08-12 | End: 2022-08-12

## 2022-08-12 RX ORDER — BACITRACIN, NEOMYCIN, POLYMYXIN B 400; 3.5; 5 [USP'U]/G; MG/G; [USP'U]/G
OINTMENT TOPICAL ONCE
Status: CANCELLED | OUTPATIENT
Start: 2022-08-12 | End: 2022-08-12

## 2022-08-12 RX ORDER — GENTAMICIN SULFATE 1 MG/G
OINTMENT TOPICAL ONCE
Status: CANCELLED | OUTPATIENT
Start: 2022-08-12 | End: 2022-08-12

## 2022-08-12 RX ORDER — LIDOCAINE HYDROCHLORIDE 40 MG/ML
SOLUTION TOPICAL ONCE
Status: CANCELLED | OUTPATIENT
Start: 2022-08-12 | End: 2022-08-12

## 2022-08-12 RX ORDER — LIDOCAINE 40 MG/G
CREAM TOPICAL ONCE
Status: DISCONTINUED | OUTPATIENT
Start: 2022-08-12 | End: 2022-08-13 | Stop reason: HOSPADM

## 2022-08-12 RX ORDER — LIDOCAINE 40 MG/G
CREAM TOPICAL ONCE
Status: CANCELLED | OUTPATIENT
Start: 2022-08-12 | End: 2022-08-12

## 2022-08-12 RX ORDER — BETAMETHASONE DIPROPIONATE 0.05 %
OINTMENT (GRAM) TOPICAL ONCE
Status: CANCELLED | OUTPATIENT
Start: 2022-08-12 | End: 2022-08-12

## 2022-08-12 RX ORDER — BACITRACIN ZINC AND POLYMYXIN B SULFATE 500; 1000 [USP'U]/G; [USP'U]/G
OINTMENT TOPICAL ONCE
Status: CANCELLED | OUTPATIENT
Start: 2022-08-12 | End: 2022-08-12

## 2022-08-12 ASSESSMENT — PAIN DESCRIPTION - ORIENTATION: ORIENTATION: LEFT;RIGHT

## 2022-08-12 ASSESSMENT — PAIN SCALES - GENERAL: PAINLEVEL_OUTOF10: 4

## 2022-08-12 ASSESSMENT — PAIN DESCRIPTION - LOCATION: LOCATION: ANKLE

## 2022-08-12 NOTE — PROGRESS NOTES
Iris Quiroz  Progress Note and Procedure Note      Yennifer Dang  AGE: 65 y.o. GENDER: male  : 1960  TODAY'S DATE:  2022    Subjective:     Chief Complaint   Patient presents with    Wound Check     Right lower leg, Left lower leg         HISTORY of PRESENT ILLNESS HPI     Yennifer Dang is a 65 y.o. male who presents today for wound evaluation. History of Wound: Admits to having chronic wounds for at least a year on the left leg and 8 to 9 months on the right leg. He states that he has had arterial bypasses on both of his legs in the past.  He was seeing previous wound care and podiatry for these wounds. He states that his insurance had some issues and he has not been seeing anyone since 2021 he has been treating the wounds himself with PSO and bandages. 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 He has 9 radiation treatments left. He states that he is getting some swelling and pain in the right leg. He states it similar to the last time it swelled up and was cracking. He admits that the clindamycin was successful in treating this. He would like to try this treatment again. He has no other complaints at this time.       Wound Pain:  intermittent  Severity:  3 / 10   Wound Type:  venous, arterial and diabetic  Modifying Factors:  edema, venous stasis, lymphedema, diabetes, decreased mobility, arterial insufficiency and decreased tissue oxygenation  Associated Signs/Symptoms:  drainage, numbness and odor        PAST MEDICAL HISTORY        Diagnosis Date    Cancer ([address]     throat    Diabetes mellitus ([address]     Fibromyalgia     History of DVT (deep vein thrombosis)     Hyperlipidemia     Hypertension     Type 2 diabetes mellitus with circulatory disorder, without long-term current use of insulin ([address] 2022       PAST SURGICAL HISTORY    Past Surgical History:   Procedure Laterality Date    APPENDECTOMY   COLONOSCOPY  2016    FEMORAL BYPASS Left 2020    femoral posterior tibial bypass    FEMORAL-TIBIAL BYPASS GRAFT Right 2020    with femoral endarterectomy and patch angioplasty    FOOT DEBRIDEMENT Left 2020    FOOT DEBRIDEMENT Right 2021    DEBRIDEMENT OF RIGHT FOOT WOUND WITH GRAFT APPLICATION performed by Amy Hernandez DPM at [facility] N/A 3/25/2022    DIRECT LARYNGOSCOPY WITH BIOPSY AND FROZEN SECTIONS performed by Aron Murray DO at [facility] Right 2020    stent leg for PVD    TRACHEOSTOMY N/A 3/25/2022    TRACHEOTOMY performed by Aron Murray DO at [address] [facility] [address] HISTORY    Family History   Problem Relation Age of Onset    Cancer Mother     Lung Cancer Mother     Diabetes Father     Stroke Father        SOCIAL HISTORY    Social History     Tobacco Use    Smoking status: Former     Packs/day: 0.50     Years: 20.00     Pack years: 10.00     Types: Cigarettes     Start date: 1977     Quit date: 2021     Years since quittin.6    Smokeless tobacco: Never   Vaping Use    Vaping Use: Never used   Substance Use Topics    Alcohol use: Yes     Comment: 2 beers / day     Drug use: Never       ALLERGIES    Allergies   Allergen Reactions    Chantix [Varenicline] Other (See Comments)     Hallucinations         MEDICATIONS    Current Outpatient Medications on File Prior to Encounter   Medication Sig Dispense Refill    sennosides-docusate sodium (SENOKOT-S) 8.6-50 MG tablet Take 1 tablet by mouth in the morning and 1 tablet before bedtime. 60 tablet 0    lisinopril (PRINIVIL;ZESTRIL) 20 MG tablet Take 1 tablet by mouth in the morning. 30 tablet 0    melatonin 3 MG TABS tablet Take 2 tablets by mouth nightly as needed (insomnia) 30 tablet 2    atorvastatin (LIPITOR) 80 MG tablet Take 1 tablet by mouth in the morning. Cancel atorvastatin 20 mg a day.  90 tablet 0    warfarin (COUMADIN) 5 MG tablet Take 1.5 tablets by mouth in the morning. 90 tablet 1    diphenhydrAMINE (BENADRYL ALLERGY) 25 MG tablet Take 1 tablet by mouth every 6 hours as needed for Itching 60 tablet 0    cyanocobalamin 250 MCG tablet Take 1 tablet by mouth in the morning. 30 tablet 0    metFORMIN (GLUCOPHAGE) 500 MG tablet Take 1 tablet by mouth in the morning and 1 tablet in the evening. Take with meals. 180 tablet 0    cetirizine (ZYRTEC) 10 MG tablet Take 1 tablet by mouth in the morning. 30 tablet 0    BANOPHEN 25 MG capsule Take 1 tablet by mouth every 6 hours as needed for Itching      ondansetron (ZOFRAN-ODT) 4 MG disintegrating tablet Take 4 mg by mouth every 8 hours as needed for Nausea or Vomiting      Multiple Vitamins-Minerals (MULTIVITAMIN ADULT EXTRA C PO) 1 tablet by Nasogastric route daily      esomeprazole (NEXIUM) 20 MG delayed release capsule 20 mg by Nasogastric route every morning (before breakfast)      oxyCODONE HCl (OXY-IR) 10 MG immediate release tablet TAKE 1 TABLET BY MOUTH EVERY SIX HOURS AS NEEDED FOR PAIN FOR UP TO 7 DAYS      budesonide (PULMICORT) 0.5 MG/2ML nebulizer suspension Take 2 mLs by nebulization 2 times daily 60 each 2    gentamicin (GARAMYCIN) 0.1 % cream Apply topically  daily. 30 g 1    becaplermin (REGRANEX) 0.01 % gel Apply 1 Applicatorful topically daily      calcium-vitamin D (OSCAL-500) 500-200 MG-UNIT per tablet Take 1 tablet by mouth 2 times daily      ipratropium-albuterol (DUONEB) 0.5-2.5 (3) MG/3ML SOLN nebulizer solution Inhale 3 mLs into the lungs 4 times daily 360 mL 3    acetaminophen (TYLENOL) 500 MG tablet Take 1 tablet by mouth 4 times daily as needed for Pain 360 tablet 1    fluticasone (FLONASE) 50 MCG/ACT nasal spray 1 spray by Nasal route daily 9.9 g 5    Vitamins/Minerals TABS Take 1 tablet by mouth daily        No current facility-administered medications on file prior to encounter. REVIEW OF SYSTEMS    Pertinent items are noted in HPI.       Objective:      /68   Pulse 90   Temp (!) 96.6 °F (35.9 °C) (Infrared)   Resp 16     PHYSICAL EXAM    Vascular: Vascular status Impaired  palpable pedal pulses, right DP0/4 and PT1/4, left DP0/4 and PT1/4. CFT 3 seconds digits 1 to 5 bilateral.  Hair growthAbsent  both lower extremities and feet. Skin temperature is warm to warm from pretibial area to distal digits bilateral.  Exam is negative for rubor, pallor, cyanosis or signs of acute vascular compromise bilaterally. Exam is positive for edema bilateral lower extremity. Varicosities Present bilateral lower extremity. Neuro: Neurologic status diminished bilateral with epicritic Absent  , proprioceptive Absent , vibratory sensation Absent  and protopathicPresent. DTRs Absent  bilateral Achilles. There were no reproducible neuritic symptoms on exam bilateral feet/ankles. Derm: Ulceration to bilateral ankles. Ecchymosis Absent  bilateral feet/foot. Musculoskeletal: No pain with debridement of wounds 5/5 muscle strength in/eversion and dorsi/plantarflexion bilateral feet. No gross instability noted. Assessment:     Problem List Items Addressed This Visit       Peripheral artery disease ([address] - Primary    Relevant Medications    lidocaine (LMX) 4 % cream    Other Relevant Orders    Initiate Outpatient Wound Care Protocol       Procedure Note    Performed by: Nevaeh Arce DPM    Consent obtained: Yes    Time out taken:  Yes    Pain Control: Anesthetic  Anesthetic: 4% Lidocaine Cream     Debridement:Excisional Debridement    Using curette the wound was sharply debrided    down through and including the removal of epidermis, dermis, subcutaneous tissue and muscle/fascia.         Devitalized Tissue Debrided:  fibrin, biofilm, slough, necrotic/eschar and exudate    Pre Debridement Measurements:  Are located in the Wound Documentation Flow Sheet    Wound #: 1     Wound Care Documentation:  Wound 05/18/22 Ankle Medial;Right #2 (Active)   Wound Image   06/24/22 1202   Wound Etiology Diabetic 08/12/22 0845   Wound Cleansed Wound cleanser 08/12/22 0845   Dressing/Treatment Antibacterial ointment;Collagen;Dry dressing 07/20/22 1352   Offloading for Diabetic Foot Ulcers Offloading not required 08/12/22 0845   Wound Length (cm) 1.2 cm 08/12/22 0845   Wound Width (cm) 2.3 cm 08/12/22 0845   Wound Depth (cm) 0.1 cm 08/12/22 0845   Wound Surface Area (cm^2) 2.76 cm^2 08/12/22 0845   Change in Wound Size % (l*w) 65.5 08/12/22 0845   Wound Volume (cm^3) 0.276 cm^3 08/12/22 0845   Wound Healing % 66 08/12/22 0845   Post-Procedure Length (cm) 1.2 cm 08/12/22 0911   Post-Procedure Width (cm) 2.3 cm 08/12/22 0911   Post-Procedure Depth (cm) 0.1 cm 08/12/22 0911   Post-Procedure Surface Area (cm^2) 2.76 cm^2 08/12/22 0911   Post-Procedure Volume (cm^3) 0.276 cm^3 08/12/22 0911   Wound Assessment Bleeding 08/12/22 0911   Drainage Amount Moderate 08/12/22 0845   Drainage Description Yellow 08/12/22 0845   Odor None 08/12/22 0845   Jamila-wound Assessment Dry/flaky 08/12/22 0845   Margins Defined edges 08/12/22 0845   Number of days: 86       Wound 06/24/22 Leg Left;Medial #1 (Active)   Wound Image   06/24/22 1202   Wound Etiology Diabetic 08/12/22 0845   Wound Cleansed Cleansed with saline 08/12/22 0845   Dressing/Treatment Antibacterial ointment;Collagen;Dry dressing 07/20/22 1352   Offloading for Diabetic Foot Ulcers Offloading not required 08/12/22 0845   Wound Length (cm) 2 cm 08/12/22 0845   Wound Width (cm) 4.1 cm 08/12/22 0845   Wound Depth (cm) 0.1 cm 08/12/22 0845   Wound Surface Area (cm^2) 8.2 cm^2 08/12/22 0845   Change in Wound Size % (l*w) -19.88 08/12/22 0845   Wound Volume (cm^3) 0.82 cm^3 08/12/22 0845   Wound Healing % -20 08/12/22 0845   Post-Procedure Length (cm) 2 cm 08/12/22 0911   Post-Procedure Width (cm) 4.1 cm 08/12/22 0911   Post-Procedure Depth (cm) 0.1 cm 08/12/22 0911   Post-Procedure Surface Area (cm^2) 8.2 cm^2 08/12/22 0911   Post-Procedure Volume (cm^3) 0.82 cm^3 08/12/22 0911   Wound Assessment Bleeding 08/12/22 0911   Drainage Amount Moderate 08/12/22 0845   Drainage Description Yellow 08/12/22 0845   Odor None 08/12/22 0845   Jamila-wound Assessment Dry/flaky 08/12/22 0845   Margins Defined edges 08/12/22 0845   Number of days: 49       Total Surface Area Debrided: 8.2 cm² left leg into the deep fascia and sq cm 2.76 cm² into the subcutaneous tissue of the right foot    Percentage of wound debrided 100%    Bleeding:  Minimal    Hemostasis Achieved:  by pressure    Procedural Pain:  0  / 10     Post Procedural Pain:  0 / 10     Response to treatment:  Well tolerated by patient. Plan:   Patient examined and evaluated   Wounds debrided without incident   Multilayer compression wrap right leg with Lotrisone. Multilayer compression wrap left leg  Follow-up one week  Keep legs elevated at all times when not active. Prescription for clindamycin for right leg cellulitis. Continue follow-up with vascular surgery for decision on wounds for improving the blood flow to the distal left leg. He has not made his decision on what he would like to do. Patient would benefit from disability due to his multiple comorbidities including his chronic peripheral arterial disease with high risk for limb loss. The nature of the patient's condition was explained in depth. The patient was informed that their compliance to the treatment Plan is paramount to successful healing and prevention of further ulceration and/or infection.   Application of Lotrisone to the irritated areas of his leg right  Discharge Treatment follow-up on Friday for dressing change    Written Patient Discharge Instructions Given            Electronically signed by Cheyanne Greene DPM on 8/12/2022 at 9:34 AM

## 2022-08-12 NOTE — PLAN OF CARE
Discharge instructions given. Patient verbalized understanding. Return to [facility] in 1 week(s). Called/faxed orders to  Antibiotics to pharmacy.  Nurse visit Monday

## 2022-08-12 NOTE — PROGRESS NOTES
Multilayer Compression Wrap   Below the Knee    NAME:  Yarely Dick  YOB: 1960  MEDICAL RECORD NUMBER:  [id number]  DATE:  8/12/2022    Multilayer compression wrap: Removed old Multilayer wrap if indicated and wash leg with mild soap/water. Applied moisturizing agent to dry skin as needed. Applied primary and secondary dressing as ordered. Applied multilayered dressing below the knee to right lower leg. Applied multilayered dressing below the knee to left lower leg. Instructed patient/caregiver not to remove dressing and to keep it clean and dry. Instructed patient/caregiver on complications to report to provider, such as pain, numbness in toes, heavy drainage, and slippage of dressing. Instructed patient on purpose of compression dressing and on activity and exercise recommendations.      Applied  2 layer wrap from toes to knee overlapping each time    Electronically signed by Mika Cota RN on 8/12/2022 at 9:43 AM

## 2022-08-15 ENCOUNTER — HOSPITAL ENCOUNTER (OUTPATIENT)
Dept: WOUND CARE | Age: 62
Discharge: HOME | End: 2022-08-15
Payer: MEDICAID

## 2022-08-15 DIAGNOSIS — I73.9 PERIPHERAL ARTERY DISEASE (HCC): Primary | ICD-10-CM

## 2022-08-15 PROCEDURE — 29581 APPL MULTLAYER CMPRN SYS LEG: CPT

## 2022-08-15 RX ORDER — CLOBETASOL PROPIONATE 0.5 MG/G
OINTMENT TOPICAL ONCE
Status: CANCELLED | OUTPATIENT
Start: 2022-08-15 | End: 2022-08-15

## 2022-08-15 RX ORDER — LIDOCAINE 50 MG/G
OINTMENT TOPICAL ONCE
Status: CANCELLED | OUTPATIENT
Start: 2022-08-15 | End: 2022-08-15

## 2022-08-15 RX ORDER — LIDOCAINE 40 MG/G
CREAM TOPICAL ONCE
Status: CANCELLED | OUTPATIENT
Start: 2022-08-15 | End: 2022-08-15

## 2022-08-15 RX ORDER — BETAMETHASONE DIPROPIONATE 0.05 %
OINTMENT (GRAM) TOPICAL ONCE
Status: CANCELLED | OUTPATIENT
Start: 2022-08-15 | End: 2022-08-15

## 2022-08-15 RX ORDER — BACITRACIN ZINC AND POLYMYXIN B SULFATE 500; 1000 [USP'U]/G; [USP'U]/G
OINTMENT TOPICAL ONCE
Status: CANCELLED | OUTPATIENT
Start: 2022-08-15 | End: 2022-08-15

## 2022-08-15 RX ORDER — BACITRACIN, NEOMYCIN, POLYMYXIN B 400; 3.5; 5 [USP'U]/G; MG/G; [USP'U]/G
OINTMENT TOPICAL ONCE
Status: CANCELLED | OUTPATIENT
Start: 2022-08-15 | End: 2022-08-15

## 2022-08-15 RX ORDER — LIDOCAINE HYDROCHLORIDE 20 MG/ML
JELLY TOPICAL ONCE
Status: CANCELLED | OUTPATIENT
Start: 2022-08-15 | End: 2022-08-15

## 2022-08-15 RX ORDER — LIDOCAINE HYDROCHLORIDE 40 MG/ML
SOLUTION TOPICAL ONCE
Status: CANCELLED | OUTPATIENT
Start: 2022-08-15 | End: 2022-08-15

## 2022-08-15 RX ORDER — GENTAMICIN SULFATE 1 MG/G
OINTMENT TOPICAL ONCE
Status: CANCELLED | OUTPATIENT
Start: 2022-08-15 | End: 2022-08-15

## 2022-08-15 RX ORDER — GINSENG 100 MG
CAPSULE ORAL ONCE
Status: CANCELLED | OUTPATIENT
Start: 2022-08-15 | End: 2022-08-15

## 2022-08-16 NOTE — DISCHARGE INSTRUCTIONS
[facility], [address] [place] [address] Road  Telephone: [phone]     Discharge Instructions     Important reminders:     **If you have any signs and symptoms of illness (Cough, fever, congestion, nausea, vomiting, diarrhea, etc.) please call the wound care center prior to your appointment. 1. Increase Protein intake for optimal wound healing  2. No added salt to reduce any swelling  3. If diabetic, maintain good glucose control  4. If you smoke, smoking prohibits wound healing, we ask that you refrain from smoking. 5. When taking antibiotics take the entire prescription as ordered. Do not stop taking until medication is all gone unless otherwise instructed. 6. Exercise as tolerated. 7. Keep weight off wounds and reposition every 2 hours if applicable. 8. If wound(s) is on your lower extremity, elevate legs to the level of the heart or above for 30 minutes 4-5 times a day and/or when sitting. Avoid standing for long periods of time. 9. Do not get wounds wet in bath or shower unless otherwise instructed by your physician. If your wound is on your foot or leg, you may purchase a cast bag. Please ask at the pharmacy. If Vascular testing is ordered, please call [address] [place] [address] ([phone]) to schedule. Vascular tests ordered by Wound Care Physicians may take up to 2 hours to complete. Please keep that in mind when scheduling. If Vascular testing is scheduled, please bring supplies to replace your dressing after testing is done. The vascular department does not stock supplies. Wound: Right and Left leg     With each dressing change, rinse wounds with 0.9% Saline. (May use wound wash or soft contact solution. Both can be purchased at a local drug store). If unable to obtain saline, may use a gentle soap and water. Dressing care: Right and left leg - Gentamicin cream Triad to wounds. DO not put a stack of 4x4 over wounds. Just one.   Lotrisone to lower legs, Gentle coflex calamine to both,       Compression Wraps  Location: Both legs  Type: Coflex calamine     Your doctor has ordered compression therapy for your wound. Compression bandages reduce the swelling, or edema, in your legs and prevent it from returning. The wound care staff will apply your compression wrap. It must be removed and re-applied at least weekly. As the swelling decreases, the boot no longer provides adequate compression and you need a new one. Once applied, you need to know how to take care of your compression wrap. The boot must stay dry. Do not get it wet in the shower or tub. You may do a partial bath, or you can cover the boot with a large plastic bag, secured at the top, so that no water can get in. Avoid standing in one place for long periods of time. If you must  one place, shift your weight and change positions often. If you have CHF, consult your doctor before following the next two recommendations for leg elevation. When sitting, elevate your legs on pillows, or put blocks under the foot of your bed. Your legs should be higher than your heart. If your boot becomes painful, or you notice an increase in swelling in your toes, numbness or tingling, or purple color to your toes, remove the wrap and call the [address]. If it is after hours, call your doctor for instructions or go to the nearest emergency room. Home Care Agency/Facility: [facility]     Your wound-care supplies will be provided by:  Please note, depending on your insurance coverage, you may have out-of-pocket expenses for these supplies. Someone from the company should call you to confirm your order and discuss those potential costs before they ship your products -- please anticipate that call. If your out-of-pocket cost could be substantial, Many companies have financial hardship programs for patients who qualify, so please ask about that if you might need a hand.  If you have any questions about your supplies or your potential out-of-pocket costs, or if you need to place an order for a refill of supplies (typically monthly), please call the company directly. Your  is Michelle Cramer     Follow up with Dr Sonam Garvin in 1 weeks. Wound Care Center Information: Should you experience any significant changes in your wound(s) or have questions about your wound care, please contact the [place] [address] at [phone] Monday  - Thursday 8:00 am - 4:00 pm and Friday 8:00 am - 1:00pm. If you need help with your wound outside these hours and cannot wait until we are again available, contact your PCP or go to the hospital emergency room. PLEASE NOTE: IF YOU ARE UNABLE TO OBTAIN WOUND SUPPLIES, CONTINUE TO USE THE SUPPLIES YOU HAVE AVAILABLE UNTIL YOU ARE ABLE TO REACH US. IT IS MOST IMPORTANT TO KEEP THE WOUND COVERED AT ALL TIMES. Patient Experience     Thank you for trusting us with your care. You may receive a survey from a company called [employer] asking for your feedback. We would appreciate it if you took a few minutes to share your experience. Your input is very valuable to us.

## 2022-08-17 ENCOUNTER — HOSPITAL ENCOUNTER (OUTPATIENT)
Dept: WOUND CARE | Age: 62
Discharge: HOME | End: 2022-08-17
Payer: MEDICAID

## 2022-08-17 VITALS — HEART RATE: 86 BPM | DIASTOLIC BLOOD PRESSURE: 61 MMHG | SYSTOLIC BLOOD PRESSURE: 96 MMHG | TEMPERATURE: 96.8 F

## 2022-08-17 DIAGNOSIS — I73.9 PERIPHERAL ARTERY DISEASE (HCC): Primary | ICD-10-CM

## 2022-08-17 PROCEDURE — 29581 APPL MULTLAYER CMPRN SYS LEG: CPT

## 2022-08-17 PROCEDURE — 11043 DBRDMT MUSC&/FSCA 1ST 20/<: CPT

## 2022-08-17 PROCEDURE — 11042 DBRDMT SUBQ TIS 1ST 20SQCM/<: CPT

## 2022-08-17 RX ORDER — LIDOCAINE 40 MG/G
CREAM TOPICAL ONCE
Status: CANCELLED | OUTPATIENT
Start: 2022-08-17 | End: 2022-08-17

## 2022-08-17 RX ORDER — LIDOCAINE 50 MG/G
OINTMENT TOPICAL ONCE
Status: CANCELLED | OUTPATIENT
Start: 2022-08-17 | End: 2022-08-17

## 2022-08-17 RX ORDER — GINSENG 100 MG
CAPSULE ORAL ONCE
Status: CANCELLED | OUTPATIENT
Start: 2022-08-17 | End: 2022-08-17

## 2022-08-17 RX ORDER — LIDOCAINE 40 MG/G
CREAM TOPICAL ONCE
Status: DISCONTINUED | OUTPATIENT
Start: 2022-08-17 | End: 2022-08-18 | Stop reason: HOSPADM

## 2022-08-17 RX ORDER — GENTAMICIN SULFATE 1 MG/G
OINTMENT TOPICAL ONCE
Status: CANCELLED | OUTPATIENT
Start: 2022-08-17 | End: 2022-08-17

## 2022-08-17 RX ORDER — BETAMETHASONE DIPROPIONATE 0.05 %
OINTMENT (GRAM) TOPICAL ONCE
Status: CANCELLED | OUTPATIENT
Start: 2022-08-17 | End: 2022-08-17

## 2022-08-17 RX ORDER — BACITRACIN, NEOMYCIN, POLYMYXIN B 400; 3.5; 5 [USP'U]/G; MG/G; [USP'U]/G
OINTMENT TOPICAL ONCE
Status: CANCELLED | OUTPATIENT
Start: 2022-08-17 | End: 2022-08-17

## 2022-08-17 RX ORDER — LIDOCAINE HYDROCHLORIDE 20 MG/ML
JELLY TOPICAL ONCE
Status: CANCELLED | OUTPATIENT
Start: 2022-08-17 | End: 2022-08-17

## 2022-08-17 RX ORDER — BACITRACIN ZINC AND POLYMYXIN B SULFATE 500; 1000 [USP'U]/G; [USP'U]/G
OINTMENT TOPICAL ONCE
Status: CANCELLED | OUTPATIENT
Start: 2022-08-17 | End: 2022-08-17

## 2022-08-17 RX ORDER — LIDOCAINE HYDROCHLORIDE 40 MG/ML
SOLUTION TOPICAL ONCE
Status: CANCELLED | OUTPATIENT
Start: 2022-08-17 | End: 2022-08-17

## 2022-08-17 RX ORDER — CLOBETASOL PROPIONATE 0.5 MG/G
OINTMENT TOPICAL ONCE
Status: CANCELLED | OUTPATIENT
Start: 2022-08-17 | End: 2022-08-17

## 2022-08-17 ASSESSMENT — PAIN DESCRIPTION - PAIN TYPE: TYPE: CHRONIC PAIN

## 2022-08-17 ASSESSMENT — PAIN DESCRIPTION - LOCATION: LOCATION: LEG

## 2022-08-17 ASSESSMENT — PAIN SCALES - GENERAL: PAINLEVEL_OUTOF10: 5

## 2022-08-17 ASSESSMENT — PAIN DESCRIPTION - ONSET: ONSET: ON-GOING

## 2022-08-17 ASSESSMENT — PAIN DESCRIPTION - ORIENTATION: ORIENTATION: RIGHT;LEFT

## 2022-08-17 ASSESSMENT — PAIN DESCRIPTION - FREQUENCY: FREQUENCY: CONTINUOUS

## 2022-08-17 ASSESSMENT — PAIN - FUNCTIONAL ASSESSMENT: PAIN_FUNCTIONAL_ASSESSMENT: PREVENTS OR INTERFERES SOME ACTIVE ACTIVITIES AND ADLS

## 2022-08-17 NOTE — PROGRESS NOTES
FEMORAL BYPASS Left 2020    femoral posterior tibial bypass    FEMORAL-TIBIAL BYPASS GRAFT Right 2020    with femoral endarterectomy and patch angioplasty    FOOT DEBRIDEMENT Left 2020    FOOT DEBRIDEMENT Right 2021    DEBRIDEMENT OF RIGHT FOOT WOUND WITH GRAFT APPLICATION performed by Amie Jimenez DPM at [address] [facility] N/A 3/25/2022    DIRECT LARYNGOSCOPY WITH BIOPSY AND FROZEN SECTIONS performed by Lanre Lucia DO at [address] [facility] Right 2020    stent leg for PVD    TRACHEOSTOMY N/A 3/25/2022    TRACHEOTOMY performed by Lanre Lucia DO at [address] [facility] [address] HISTORY    Family History   Problem Relation Age of Onset    Cancer Mother     Lung Cancer Mother     Diabetes Father     Stroke Father        SOCIAL HISTORY    Social History     Tobacco Use    Smoking status: Former     Packs/day: 0.50     Years: 20.00     Pack years: 10.00     Types: Cigarettes     Start date: 1977     Quit date: 2021     Years since quittin.6    Smokeless tobacco: Never   Vaping Use    Vaping Use: Never used   Substance Use Topics    Alcohol use: Yes     Comment: 2 beers / day     Drug use: Never       ALLERGIES    Allergies   Allergen Reactions    Chantix [Varenicline] Other (See Comments)     Hallucinations         MEDICATIONS    Current Outpatient Medications on File Prior to Encounter   Medication Sig Dispense Refill    clindamycin (CLEOCIN) 300 MG capsule Take 1 capsule by mouth in the morning and 1 capsule at noon and 1 capsule before bedtime. Do all this for 10 days. 30 capsule 0    sennosides-docusate sodium (SENOKOT-S) 8.6-50 MG tablet Take 1 tablet by mouth in the morning and 1 tablet before bedtime. 60 tablet 0    lisinopril (PRINIVIL;ZESTRIL) 20 MG tablet Take 1 tablet by mouth in the morning.  30 tablet 0    melatonin 3 MG TABS tablet Take 2 tablets by mouth nightly as needed (insomnia) 30 tablet 2    atorvastatin (LIPITOR) 80 MG tablet Take 1 tablet by mouth in the morning. Cancel atorvastatin 20 mg a day. 90 tablet 0    warfarin (COUMADIN) 5 MG tablet Take 1.5 tablets by mouth in the morning. 90 tablet 1    diphenhydrAMINE (BENADRYL ALLERGY) 25 MG tablet Take 1 tablet by mouth every 6 hours as needed for Itching 60 tablet 0    cyanocobalamin 250 MCG tablet Take 1 tablet by mouth in the morning. 30 tablet 0    metFORMIN (GLUCOPHAGE) 500 MG tablet Take 1 tablet by mouth in the morning and 1 tablet in the evening. Take with meals. 180 tablet 0    cetirizine (ZYRTEC) 10 MG tablet Take 1 tablet by mouth in the morning. 30 tablet 0    BANOPHEN 25 MG capsule Take 1 tablet by mouth every 6 hours as needed for Itching      ondansetron (ZOFRAN-ODT) 4 MG disintegrating tablet Take 4 mg by mouth every 8 hours as needed for Nausea or Vomiting      Multiple Vitamins-Minerals (MULTIVITAMIN ADULT EXTRA C PO) 1 tablet by Nasogastric route daily      esomeprazole (NEXIUM) 20 MG delayed release capsule 20 mg by Nasogastric route every morning (before breakfast)      oxyCODONE HCl (OXY-IR) 10 MG immediate release tablet TAKE 1 TABLET BY MOUTH EVERY SIX HOURS AS NEEDED FOR PAIN FOR UP TO 7 DAYS      budesonide (PULMICORT) 0.5 MG/2ML nebulizer suspension Take 2 mLs by nebulization 2 times daily 60 each 2    gentamicin (GARAMYCIN) 0.1 % cream Apply topically  daily.  30 g 1    becaplermin (REGRANEX) 0.01 % gel Apply 1 Applicatorful topically daily      calcium-vitamin D (OSCAL-500) 500-200 MG-UNIT per tablet Take 1 tablet by mouth 2 times daily      ipratropium-albuterol (DUONEB) 0.5-2.5 (3) MG/3ML SOLN nebulizer solution Inhale 3 mLs into the lungs 4 times daily 360 mL 3    acetaminophen (TYLENOL) 500 MG tablet Take 1 tablet by mouth 4 times daily as needed for Pain 360 tablet 1    fluticasone (FLONASE) 50 MCG/ACT nasal spray 1 spray by Nasal route daily 9.9 g 5    Vitamins/Minerals TABS Take 1 tablet by mouth daily        No current facility-administered medications on file prior to encounter. REVIEW OF SYSTEMS    Pertinent items are noted in HPI. Objective:      BP 96/61   Pulse 86   Temp 96.8 °F (36 °C) (Tympanic)     PHYSICAL EXAM    Vascular: Vascular status Impaired  palpable pedal pulses, right DP0/4 and PT1/4, left DP0/4 and PT1/4. CFT 3 seconds digits 1 to 5 bilateral.  Hair growthAbsent  both lower extremities and feet. Skin temperature is warm to warm from pretibial area to distal digits bilateral.  Exam is negative for rubor, pallor, cyanosis or signs of acute vascular compromise bilaterally. Exam is positive for edema bilateral lower extremity. Varicosities Present bilateral lower extremity. Neuro: Neurologic status diminished bilateral with epicritic Absent  , proprioceptive Absent , vibratory sensation Absent  and protopathicPresent. DTRs Absent  bilateral Achilles. There were no reproducible neuritic symptoms on exam bilateral feet/ankles. Derm: Ulceration to bilateral ankles. Ecchymosis Absent  bilateral feet/foot. Musculoskeletal: No pain with debridement of wounds 5/5 muscle strength in/eversion and dorsi/plantarflexion bilateral feet. No gross instability noted. Assessment:     Problem List Items Addressed This Visit       Peripheral artery disease ([facility] [address] - Primary    Relevant Medications    lidocaine (LMX) 4 % cream    Other Relevant Orders    Initiate Outpatient Wound Care Protocol       Procedure Note    Performed by: Cory Xie DPM    Consent obtained: Yes    Time out taken:  Yes    Pain Control: Anesthetic  Anesthetic: 4% Lidocaine Cream     Debridement:Excisional Debridement    Using curette the wound was sharply debrided    down through and including the removal of epidermis, dermis, subcutaneous tissue and muscle/fascia.         Devitalized Tissue Debrided:  fibrin, biofilm, slough, necrotic/eschar and exudate    Pre Debridement Measurements:  Are located in the Wound Documentation Flow Sheet    Wound #: 1     Wound Care Documentation:  Wound 05/18/22 Ankle Medial;Right #2 (Active)   Wound Image   06/24/22 1202   Wound Etiology Diabetic 08/17/22 1546   Wound Cleansed Wound cleanser 08/17/22 1546   Dressing/Treatment Antibacterial ointment;Triad hydro/zinc oxide-based hydrophilic paste 98/02/38 7769   Offloading for Diabetic Foot Ulcers Post op shoe 08/17/22 1546   Wound Length (cm) 1.4 cm 08/17/22 1546   Wound Width (cm) 2 cm 08/17/22 1546   Wound Depth (cm) 0.1 cm 08/17/22 1546   Wound Surface Area (cm^2) 2.8 cm^2 08/17/22 1546   Change in Wound Size % (l*w) 65 08/17/22 1546   Wound Volume (cm^3) 0.28 cm^3 08/17/22 1546   Wound Healing % 65 08/17/22 1546   Post-Procedure Length (cm) 1.4 cm 08/17/22 1618   Post-Procedure Width (cm) 2 cm 08/17/22 1618   Post-Procedure Depth (cm) 0.1 cm 08/17/22 1618   Post-Procedure Surface Area (cm^2) 2.8 cm^2 08/17/22 1618   Post-Procedure Volume (cm^3) 0.28 cm^3 08/17/22 1618   Wound Assessment Bleeding 08/17/22 1618   Drainage Amount Moderate 08/17/22 1546   Drainage Description Serosanguinous 08/17/22 1546   Odor None 08/17/22 1546   Jamila-wound Assessment Dry/flaky 08/17/22 1546   Margins Defined edges 08/17/22 1546   Number of days: 91       Wound 06/24/22 Leg Left;Medial #1 (Active)   Wound Image   06/24/22 1202   Wound Etiology Diabetic 08/17/22 1546   Wound Cleansed Wound cleanser 08/17/22 1546   Dressing/Treatment Antibacterial ointment;Triad hydro/zinc oxide-based hydrophilic paste 14/46/86 3202   Offloading for Diabetic Foot Ulcers Post op shoe 08/17/22 1546   Wound Length (cm) 2 cm 08/17/22 1546   Wound Width (cm) 4.2 cm 08/17/22 1546   Wound Depth (cm) 0.1 cm 08/17/22 1546   Wound Surface Area (cm^2) 8.4 cm^2 08/17/22 1546   Change in Wound Size % (l*w) -22.81 08/17/22 1546   Wound Volume (cm^3) 0.84 cm^3 08/17/22 1546   Wound Healing % -23 08/17/22 1546   Post-Procedure Length (cm) 2 cm 08/17/22 1618   Post-Procedure Width (cm) 4.2 cm 08/17/22 1618   Post-Procedure

## 2022-08-17 NOTE — PLAN OF CARE
Discharge instructions given. Patient verbalized understanding. Return to [facility] in 1 week(s).

## 2022-08-17 NOTE — PROGRESS NOTES
Multilayer Compression Wrap   Below the Knee    NAME:  Gege Gerardo  YOB: 1960  MEDICAL RECORD NUMBER:  [id number]  DATE:  8/17/2022    Multilayer compression wrap: Removed old Multilayer wrap if indicated and wash leg with mild soap/water. Applied moisturizing agent to dry skin as needed. Applied primary and secondary dressing as ordered. Applied multilayered dressing below the knee to right lower leg. Applied multilayered dressing below the knee to left lower leg. Instructed patient/caregiver not to remove dressing and to keep it clean and dry. Instructed patient/caregiver on complications to report to provider, such as pain, numbness in toes, heavy drainage, and slippage of dressing. Instructed patient on purpose of compression dressing and on activity and exercise recommendations.      Applied  2 layer wrap from toes to knee overlapping each time  Applied Lotrisone to Leg and triad to smita wounds and gentamicin to wound  Electronically signed by Kim Whitmore RN on 8/17/2022 at 4:51 PM

## 2022-08-22 RX ORDER — LISINOPRIL 20 MG/1
TABLET ORAL
Qty: 30 TABLET | Refills: 0 | Status: SHIPPED | OUTPATIENT
Start: 2022-08-22 | End: 2022-09-02 | Stop reason: SDUPTHER

## 2022-08-23 ENCOUNTER — ANTI-COAG VISIT (OUTPATIENT)
Dept: PHARMACY | Age: 62
End: 2022-08-23
Payer: MEDICAID

## 2022-08-23 DIAGNOSIS — I73.9 PERIPHERAL ARTERY DISEASE (HCC): Primary | ICD-10-CM

## 2022-08-23 LAB — INR BLD: 1.9

## 2022-08-23 PROCEDURE — 85610 PROTHROMBIN TIME: CPT

## 2022-08-23 PROCEDURE — 99211 OFF/OP EST MAY X REQ PHY/QHP: CPT

## 2022-08-23 NOTE — DISCHARGE INSTRUCTIONS
[address], [address]  Telephone: [phone]     Discharge Instructions     Important reminders:     **If you have any signs and symptoms of illness (Cough, fever, congestion, nausea, vomiting, diarrhea, etc.) please call the wound care center prior to your appointment. 1. Increase Protein intake for optimal wound healing  2. No added salt to reduce any swelling  3. If diabetic, maintain good glucose control  4. If you smoke, smoking prohibits wound healing, we ask that you refrain from smoking. 5. When taking antibiotics take the entire prescription as ordered. Do not stop taking until medication is all gone unless otherwise instructed. 6. Exercise as tolerated. 7. Keep weight off wounds and reposition every 2 hours if applicable. 8. If wound(s) is on your lower extremity, elevate legs to the level of the heart or above for 30 minutes 4-5 times a day and/or when sitting. Avoid standing for long periods of time. 9. Do not get wounds wet in bath or shower unless otherwise instructed by your physician. If your wound is on your foot or leg, you may purchase a cast bag. Please ask at the pharmacy. If Vascular testing is ordered, please call [address] [place] [address] [phone]) to schedule. Vascular tests ordered by Wound Care Physicians may take up to 2 hours to complete. Please keep that in mind when scheduling. If Vascular testing is scheduled, please bring supplies to replace your dressing after testing is done. The vascular department does not stock supplies. Wound: Right and Left leg     With each dressing change, rinse wounds with 0.9% Saline. (May use wound wash or soft contact solution. Both can be purchased at a local drug store). If unable to obtain saline, may use a gentle soap and water. Dressing care: Right and left leg - Gentamicin cream Triad to wounds. DO not put a stack of 4x4 over wounds. Just one.   Lotrisone to lower legs, Gentle coflex calamine to both,       Compression Wraps  Location: Both legs  Type: Coflex calamine     Your doctor has ordered compression therapy for your wound. Compression bandages reduce the swelling, or edema, in your legs and prevent it from returning. The wound care staff will apply your compression wrap. It must be removed and re-applied at least weekly. As the swelling decreases, the boot no longer provides adequate compression and you need a new one. Once applied, you need to know how to take care of your compression wrap. The boot must stay dry. Do not get it wet in the shower or tub. You may do a partial bath, or you can cover the boot with a large plastic bag, secured at the top, so that no water can get in. Avoid standing in one place for long periods of time. If you must  one place, shift your weight and change positions often. If you have CHF, consult your doctor before following the next two recommendations for leg elevation. When sitting, elevate your legs on pillows, or put blocks under the foot of your bed. Your legs should be higher than your heart. If your boot becomes painful, or you notice an increase in swelling in your toes, numbness or tingling, or purple color to your toes, remove the wrap and call the [address]. If it is after hours, call your doctor for instructions or go to the nearest emergency room. Home Care Agency/Facility: [facility]     Your wound-care supplies will be provided by:  Please note, depending on your insurance coverage, you may have out-of-pocket expenses for these supplies. Someone from the company should call you to confirm your order and discuss those potential costs before they ship your products -- please anticipate that call. If your out-of-pocket cost could be substantial, Many companies have financial hardship programs for patients who qualify, so please ask about that if you might need a hand.  If you have any questions about your supplies or your potential out-of-pocket costs, or if you need to place an order for a refill of supplies (typically monthly), please call the company directly. Your  is Ledy Rodríguez     Follow up with Dr Solis Mcdonnell in 1 weeks. Wound Care Center Information: Should you experience any significant changes in your wound(s) or have questions about your wound care, please contact the [place] [address] at [phone] Monday  - Thursday 8:00 am - 4:00 pm and Friday 8:00 am - 1:00pm. If you need help with your wound outside these hours and cannot wait until we are again available, contact your PCP or go to the hospital emergency room. PLEASE NOTE: IF YOU ARE UNABLE TO OBTAIN WOUND SUPPLIES, CONTINUE TO USE THE SUPPLIES YOU HAVE AVAILABLE UNTIL YOU ARE ABLE TO REACH US. IT IS MOST IMPORTANT TO KEEP THE WOUND COVERED AT ALL TIMES.

## 2022-08-23 NOTE — PROGRESS NOTES
Kassandra Jasmine is a 65 y.o. here for warfarin management. Sridhar Cardenas had an INR test today. Results were reviewed and appropriate warfarin management was completed. This visit was performed as: An in person visit. Protocols were followed with precautions to reduce the spread of COVID-19. Patient verifies current dosing regimen: Yes     Warfarin medication reviewed and updated on the patient 's home medication list: Yes   All other medications reviewed and updated on the patient 's home medication list: No: no changes     Lab Results   Component Value Date    INR 1.90 2022    INR 2.2 2022    INR 2.0 2022       Patient Findings       Negatives:  Signs/symptoms of thrombosis, Signs/symptoms of bleeding, Change in health, Missed doses, Change in medications, Change in diet/appetite, Bruising            Anticoagulation Summary  As of 2022      INR goal:  2.0-3.0   TTR:  53.8 % (11 mo)   INR used for dosin.90 (2022)   Warfarin maintenance plan:  10 mg (5 mg x 2) every Mon, Wed, Fri; 7.5 mg (5 mg x 1.5) all other days   Weekly warfarin total:  60 mg   Plan last modified:  Kathya Underwood (2022)   Next INR check:     Priority:  High   Target end date: Indefinite    Indications    Peripheral artery disease ([facility] [address] [I73.9]                 Anticoagulation Episode Summary       INR check location:  Anticoagulation Clinic    Preferred lab:      Send INR reminders to:  [facility]    Comments:  EPIC          Anticoagulation Care Providers       Provider Role Specialty Phone number    Lay Garcia MD Referring Family Medicine [phone]            Warfarin assessment / plan:     Appears well  Sub-therapeutic INR     Denies missed doses. Denies increased vitamin K intake. Denies medication changes. Denies signs or symptoms of clotting. Denies signs or symptoms of a stroke     INR slightly low today (1.9) after being in range for ~ a month.  Will continue the current dose but have him avoid vitamin K greens for a few days. Will recheck INR in 3 weeks to ensure back in range. Description    CONTINUE:  Warfarin 7.5 mg (1 & 1/2 tablets) daily except 10mg (2 tablets) every Monday, Wednesday and Friday    Call [phone] with signs or symptoms of bleeding or ANY medication changes (including over-the-counter medications or herbal supplements). If significant bleeding occurs please seek immediate medical attention. Keep the number of servings of vitamin K containing foods (dark green, leafy vegetables) the same each week. Dark green vegetable 2-3 times a week and a mixed green salad ~ 3 times a week. Please call if this changes. Limit alcohol intake. Please call if this changes. Immunization History   Administered Date(s) Administered    COVID-19, PFIZER PURPLE top, DILUTE for use, (age 15 y+), 30mcg/0.3mL 03/15/2021, 04/12/2021, 12/22/2021    Influenza Virus Vaccine 01/21/2017, 01/21/2017    Influenza, FLUARIX, FLULAVAL, (age 10 mo+) AND AFLURIA, FLUZONE (age 1 y+), PF 10/31/2020    PPD Test 04/14/2022, 04/23/2022, 06/03/2022    Tdap (Boostrix, Adacel) 03/27/2018    Zoster Recombinant (Shingrix) 03/27/2018           Orders Placed This Encounter   Procedures    Protime-INR     This external order was created through the results console. No orders of the defined types were placed in this encounter. Reviewed AVS with patient / caregiver.     Billing Points:  0 billing points this visit       CLINICAL PHARMACY CONSULT: MED RECONCILIATION/REVIEW ADDENDUM    For Pharmacy Admin Tracking Only    Intervention Detail:   Total # of Interventions Recommended: 0  Total # of Interventions Accepted: 0  Time Spent (min): 15

## 2022-08-24 ENCOUNTER — HOSPITAL ENCOUNTER (OUTPATIENT)
Dept: WOUND CARE | Age: 62
Discharge: HOME | End: 2022-08-24
Payer: MEDICAID

## 2022-08-24 VITALS — DIASTOLIC BLOOD PRESSURE: 72 MMHG | HEART RATE: 82 BPM | SYSTOLIC BLOOD PRESSURE: 124 MMHG

## 2022-08-24 DIAGNOSIS — I73.9 PERIPHERAL ARTERY DISEASE (HCC): Primary | ICD-10-CM

## 2022-08-24 PROCEDURE — 11043 DBRDMT MUSC&/FSCA 1ST 20/<: CPT

## 2022-08-24 PROCEDURE — 11042 DBRDMT SUBQ TIS 1ST 20SQCM/<: CPT

## 2022-08-24 PROCEDURE — 29581 APPL MULTLAYER CMPRN SYS LEG: CPT

## 2022-08-24 RX ORDER — GINSENG 100 MG
CAPSULE ORAL ONCE
Status: CANCELLED | OUTPATIENT
Start: 2022-08-24 | End: 2022-08-24

## 2022-08-24 RX ORDER — GENTAMICIN SULFATE 1 MG/G
OINTMENT TOPICAL ONCE
Status: CANCELLED | OUTPATIENT
Start: 2022-08-24 | End: 2022-08-24

## 2022-08-24 RX ORDER — LIDOCAINE HYDROCHLORIDE 40 MG/ML
SOLUTION TOPICAL ONCE
Status: CANCELLED | OUTPATIENT
Start: 2022-08-24 | End: 2022-08-24

## 2022-08-24 RX ORDER — LIDOCAINE HYDROCHLORIDE 20 MG/ML
JELLY TOPICAL ONCE
Status: CANCELLED | OUTPATIENT
Start: 2022-08-24 | End: 2022-08-24

## 2022-08-24 RX ORDER — LIDOCAINE 40 MG/G
CREAM TOPICAL ONCE
Status: CANCELLED | OUTPATIENT
Start: 2022-08-24 | End: 2022-08-24

## 2022-08-24 RX ORDER — CLOBETASOL PROPIONATE 0.5 MG/G
OINTMENT TOPICAL ONCE
Status: CANCELLED | OUTPATIENT
Start: 2022-08-24 | End: 2022-08-24

## 2022-08-24 RX ORDER — LIDOCAINE 40 MG/G
CREAM TOPICAL ONCE
Status: DISCONTINUED | OUTPATIENT
Start: 2022-08-24 | End: 2022-08-25 | Stop reason: HOSPADM

## 2022-08-24 RX ORDER — BETAMETHASONE DIPROPIONATE 0.05 %
OINTMENT (GRAM) TOPICAL ONCE
Status: CANCELLED | OUTPATIENT
Start: 2022-08-24 | End: 2022-08-24

## 2022-08-24 RX ORDER — BACITRACIN ZINC AND POLYMYXIN B SULFATE 500; 1000 [USP'U]/G; [USP'U]/G
OINTMENT TOPICAL ONCE
Status: CANCELLED | OUTPATIENT
Start: 2022-08-24 | End: 2022-08-24

## 2022-08-24 RX ORDER — BACITRACIN, NEOMYCIN, POLYMYXIN B 400; 3.5; 5 [USP'U]/G; MG/G; [USP'U]/G
OINTMENT TOPICAL ONCE
Status: CANCELLED | OUTPATIENT
Start: 2022-08-24 | End: 2022-08-24

## 2022-08-24 RX ORDER — LIDOCAINE 50 MG/G
OINTMENT TOPICAL ONCE
Status: CANCELLED | OUTPATIENT
Start: 2022-08-24 | End: 2022-08-24

## 2022-08-24 ASSESSMENT — PAIN DESCRIPTION - DESCRIPTORS: DESCRIPTORS: ACHING

## 2022-08-24 ASSESSMENT — PAIN DESCRIPTION - ONSET: ONSET: ON-GOING

## 2022-08-24 ASSESSMENT — PAIN - FUNCTIONAL ASSESSMENT: PAIN_FUNCTIONAL_ASSESSMENT: ACTIVITIES ARE NOT PREVENTED

## 2022-08-24 ASSESSMENT — PAIN DESCRIPTION - ORIENTATION: ORIENTATION: LEFT

## 2022-08-24 ASSESSMENT — PAIN DESCRIPTION - LOCATION: LOCATION: LEG

## 2022-08-24 ASSESSMENT — PAIN DESCRIPTION - FREQUENCY: FREQUENCY: CONTINUOUS

## 2022-08-24 ASSESSMENT — PAIN SCALES - GENERAL: PAINLEVEL_OUTOF10: 5

## 2022-08-24 NOTE — PROGRESS NOTES
FOOT DEBRIDEMENT Left 2020    FOOT DEBRIDEMENT Right 2021    DEBRIDEMENT OF RIGHT FOOT WOUND WITH GRAFT APPLICATION performed by Soo Thakkar DPM at [facility] [facility] N/A 3/25/2022    DIRECT LARYNGOSCOPY WITH BIOPSY AND FROZEN SECTIONS performed by Delaney Mcclelland DO at [address] [facility] Right 2020    stent leg for PVD    TRACHEOSTOMY N/A 3/25/2022    TRACHEOTOMY performed by Delaney Mcclelland DO at [address] HISTORY    Family History   Problem Relation Age of Onset    Cancer Mother     Lung Cancer Mother     Diabetes Father     Stroke Father        SOCIAL HISTORY    Social History     Tobacco Use    Smoking status: Former     Packs/day: 0.50     Years: 20.00     Pack years: 10.00     Types: Cigarettes     Start date: 1977     Quit date: 2021     Years since quittin.6    Smokeless tobacco: Never   Vaping Use    Vaping Use: Never used   Substance Use Topics    Alcohol use: Yes     Comment: 2 beers / day     Drug use: Never       ALLERGIES    Allergies   Allergen Reactions    Chantix [Varenicline] Other (See Comments)     Hallucinations         MEDICATIONS    Current Outpatient Medications on File Prior to Encounter   Medication Sig Dispense Refill    lisinopril (PRINIVIL;ZESTRIL) 20 MG tablet TAKE 1 TABLET BY MOUTH EVERY DAY 30 tablet 0    sennosides-docusate sodium (SENOKOT-S) 8.6-50 MG tablet Take 1 tablet by mouth in the morning and 1 tablet before bedtime. 60 tablet 0    melatonin 3 MG TABS tablet Take 2 tablets by mouth nightly as needed (insomnia) 30 tablet 2    atorvastatin (LIPITOR) 80 MG tablet Take 1 tablet by mouth in the morning. Cancel atorvastatin 20 mg a day. 90 tablet 0    warfarin (COUMADIN) 5 MG tablet Take 1.5 tablets by mouth in the morning.  90 tablet 1    diphenhydrAMINE (BENADRYL ALLERGY) 25 MG tablet Take 1 tablet by mouth every 6 hours as needed for Itching 60 tablet 0    cyanocobalamin 250 MCG tablet Take 1 tablet by mouth in the morning. 30 tablet 0    metFORMIN (GLUCOPHAGE) 500 MG tablet Take 1 tablet by mouth in the morning and 1 tablet in the evening. Take with meals. 180 tablet 0    cetirizine (ZYRTEC) 10 MG tablet Take 1 tablet by mouth in the morning. 30 tablet 0    BANOPHEN 25 MG capsule Take 1 tablet by mouth every 6 hours as needed for Itching      ondansetron (ZOFRAN-ODT) 4 MG disintegrating tablet Take 4 mg by mouth every 8 hours as needed for Nausea or Vomiting      Multiple Vitamins-Minerals (MULTIVITAMIN ADULT EXTRA C PO) 1 tablet by Nasogastric route daily      esomeprazole (NEXIUM) 20 MG delayed release capsule 20 mg by Nasogastric route every morning (before breakfast)      oxyCODONE HCl (OXY-IR) 10 MG immediate release tablet TAKE 1 TABLET BY MOUTH EVERY SIX HOURS AS NEEDED FOR PAIN FOR UP TO 7 DAYS      budesonide (PULMICORT) 0.5 MG/2ML nebulizer suspension Take 2 mLs by nebulization 2 times daily 60 each 2    gentamicin (GARAMYCIN) 0.1 % cream Apply topically  daily. 30 g 1    becaplermin (REGRANEX) 0.01 % gel Apply 1 Applicatorful topically daily      calcium-vitamin D (OSCAL-500) 500-200 MG-UNIT per tablet Take 1 tablet by mouth 2 times daily      ipratropium-albuterol (DUONEB) 0.5-2.5 (3) MG/3ML SOLN nebulizer solution Inhale 3 mLs into the lungs 4 times daily 360 mL 3    acetaminophen (TYLENOL) 500 MG tablet Take 1 tablet by mouth 4 times daily as needed for Pain 360 tablet 1    fluticasone (FLONASE) 50 MCG/ACT nasal spray 1 spray by Nasal route daily 9.9 g 5    Vitamins/Minerals TABS Take 1 tablet by mouth daily        No current facility-administered medications on file prior to encounter. REVIEW OF SYSTEMS    Pertinent items are noted in HPI. Objective:      /72   Pulse 82     PHYSICAL EXAM    Vascular: Vascular status Impaired  palpable pedal pulses, right DP0/4 and PT1/4, left DP0/4 and PT1/4.   CFT 3 seconds digits 1 to 5 bilateral.  Hair growthAbsent  both lower extremities and feet. Skin temperature is warm to warm from pretibial area to distal digits bilateral.  Exam is negative for rubor, pallor, cyanosis or signs of acute vascular compromise bilaterally. Exam is positive for edema bilateral lower extremity. Varicosities Present bilateral lower extremity. Neuro: Neurologic status diminished bilateral with epicritic Absent  , proprioceptive Absent , vibratory sensation Absent  and protopathicPresent. DTRs Absent  bilateral Achilles. There were no reproducible neuritic symptoms on exam bilateral feet/ankles. Derm: Ulceration to bilateral ankles. Ecchymosis Absent  bilateral feet/foot. Musculoskeletal: No pain with debridement of wounds 5/5 muscle strength in/eversion and dorsi/plantarflexion bilateral feet. No gross instability noted. Assessment:     Problem List Items Addressed This Visit       Peripheral artery disease ([facility] [address] - Primary    Relevant Medications    lidocaine (LMX) 4 % cream    Other Relevant Orders    Initiate Outpatient Wound Care Protocol       Procedure Note    Performed by: Dorie Martinez DPM    Consent obtained: Yes    Time out taken:  Yes    Pain Control: Anesthetic  Anesthetic: 4% Lidocaine Cream     Debridement:Excisional Debridement    Using curette the wound was sharply debrided    down through and including the removal of epidermis, dermis, subcutaneous tissue and muscle/fascia.         Devitalized Tissue Debrided:  fibrin, biofilm, slough, necrotic/eschar and exudate    Pre Debridement Measurements:  Are located in the Wound Documentation Flow Sheet    Wound #: 1     Wound Care Documentation:  Wound 05/18/22 Ankle Medial;Right #2 (Active)   Wound Image   06/24/22 1202   Wound Etiology Diabetic 08/24/22 1502   Wound Cleansed Wound cleanser 08/24/22 1502   Dressing/Treatment Antibacterial ointment;Triad hydro/zinc oxide-based hydrophilic paste 54/48/24 2005   Offloading for Diabetic Foot Ulcers Post op shoe 08/24/22 1502 Wound Length (cm) 1.3 cm 08/24/22 1502   Wound Width (cm) 1.8 cm 08/24/22 1502   Wound Depth (cm) 0.1 cm 08/24/22 1502   Wound Surface Area (cm^2) 2.34 cm^2 08/24/22 1502   Change in Wound Size % (l*w) 70.75 08/24/22 1502   Wound Volume (cm^3) 0.234 cm^3 08/24/22 1502   Wound Healing % 71 08/24/22 1502   Post-Procedure Length (cm) 1.3 cm 08/24/22 1525   Post-Procedure Width (cm) 1.8 cm 08/24/22 1525   Post-Procedure Depth (cm) 0.1 cm 08/24/22 1525   Post-Procedure Surface Area (cm^2) 2.34 cm^2 08/24/22 1525   Post-Procedure Volume (cm^3) 0.234 cm^3 08/24/22 1525   Wound Assessment Bleeding 08/24/22 1525   Drainage Amount Moderate 08/24/22 1502   Drainage Description Serosanguinous 08/24/22 1502   Odor None 08/24/22 1502   Jamila-wound Assessment Excoriated;Dry/flaky 08/24/22 1502   Margins Attached edges 08/24/22 1502   Number of days: 98       Wound 06/24/22 Leg Left;Medial #1 (Active)   Wound Image   06/24/22 1202   Wound Etiology Diabetic 08/24/22 1502   Wound Cleansed Wound cleanser 08/24/22 1502   Dressing/Treatment Antibacterial ointment;Triad hydro/zinc oxide-based hydrophilic paste 19/55/22 0752   Offloading for Diabetic Foot Ulcers Post op shoe 08/24/22 1502   Wound Length (cm) 2 cm 08/24/22 1502   Wound Width (cm) 3.8 cm 08/24/22 1502   Wound Depth (cm) 0.1 cm 08/24/22 1502   Wound Surface Area (cm^2) 7.6 cm^2 08/24/22 1502   Change in Wound Size % (l*w) -11.11 08/24/22 1502   Wound Volume (cm^3) 0.76 cm^3 08/24/22 1502   Wound Healing % -11 08/24/22 1502   Post-Procedure Length (cm) 2 cm 08/24/22 1525   Post-Procedure Width (cm) 3.8 cm 08/24/22 1525   Post-Procedure Depth (cm) 0.1 cm 08/24/22 1525   Post-Procedure Surface Area (cm^2) 7.6 cm^2 08/24/22 1525   Post-Procedure Volume (cm^3) 0.76 cm^3 08/24/22 1525   Wound Assessment Bleeding 08/24/22 1525   Drainage Amount Moderate 08/24/22 1502   Drainage Description Serosanguinous; Yellow 08/24/22 1502   Odor None 08/24/22 1502   Jamila-wound Assessment Dry/flaky; Excoriated;Fragile 08/24/22 1502   Margins Defined edges 08/24/22 1502   Number of days: 61           Total Surface Area Debrided: 7.6 cm² left leg into the deep fascia and sq cm 2.34 cm² into the subcutaneous tissue of the right foot    Percentage of wound debrided 100%    Bleeding:  Minimal    Hemostasis Achieved:  by pressure    Procedural Pain:  0  / 10     Post Procedural Pain:  0 / 10     Response to treatment:  Well tolerated by patient. Plan:   Patient examined and evaluated   Wounds debrided without incident   Multilayer compression wrap right leg with Lotrisone. Multilayer compression wrap left leg  Follow-up one week  Keep legs elevated at all times when not active. Finish antibiotic  Continue follow-up with vascular surgery for decision on wounds for improving the blood flow to the distal left leg. He has not made his decision on what he would like to do. Patient would benefit from disability due to his multiple comorbidities including his chronic peripheral arterial disease with high risk for limb loss. The nature of the patient's condition was explained in depth. The patient was informed that their compliance to the treatment Plan is paramount to successful healing and prevention of further ulceration and/or infection.   Application of Lotrisone to the irritated areas of his leg right  Discharge Treatment follow-up on Friday for dressing change    Written Patient Discharge Instructions Given            Electronically signed by Killian Gonzales DPM on 8/24/2022 at 3:51 PM

## 2022-08-26 ENCOUNTER — OFFICE VISIT (OUTPATIENT)
Dept: INTERNAL MEDICINE CLINIC | Age: 62
End: 2022-08-26
Payer: MEDICAID

## 2022-08-26 VITALS
WEIGHT: 155.8 LBS | HEART RATE: 108 BPM | DIASTOLIC BLOOD PRESSURE: 64 MMHG | SYSTOLIC BLOOD PRESSURE: 98 MMHG | OXYGEN SATURATION: 99 % | BODY MASS INDEX: 23.69 KG/M2

## 2022-08-26 DIAGNOSIS — K20.80 RADIATION ESOPHAGITIS: ICD-10-CM

## 2022-08-26 DIAGNOSIS — C32.9 LARYNX CANCER (HCC): Primary | ICD-10-CM

## 2022-08-26 DIAGNOSIS — Z93.0 TRACHEOSTOMY DEPENDENT (HCC): ICD-10-CM

## 2022-08-26 DIAGNOSIS — T66.XXXA RADIATION ESOPHAGITIS: ICD-10-CM

## 2022-08-26 DIAGNOSIS — E11.69 TYPE 2 DIABETES MELLITUS WITH OTHER SPECIFIED COMPLICATION, WITHOUT LONG-TERM CURRENT USE OF INSULIN (HCC): ICD-10-CM

## 2022-08-26 PROCEDURE — G8427 DOCREV CUR MEDS BY ELIG CLIN: HCPCS | Performed by: INTERNAL MEDICINE

## 2022-08-26 PROCEDURE — G8420 CALC BMI NORM PARAMETERS: HCPCS | Performed by: INTERNAL MEDICINE

## 2022-08-26 PROCEDURE — 3044F HG A1C LEVEL LT 7.0%: CPT | Performed by: INTERNAL MEDICINE

## 2022-08-26 PROCEDURE — 1036F TOBACCO NON-USER: CPT | Performed by: INTERNAL MEDICINE

## 2022-08-26 PROCEDURE — 3017F COLORECTAL CA SCREEN DOC REV: CPT | Performed by: INTERNAL MEDICINE

## 2022-08-26 PROCEDURE — 2022F DILAT RTA XM EVC RTNOPTHY: CPT | Performed by: INTERNAL MEDICINE

## 2022-08-26 PROCEDURE — 99214 OFFICE O/P EST MOD 30 MIN: CPT | Performed by: INTERNAL MEDICINE

## 2022-08-26 ASSESSMENT — ENCOUNTER SYMPTOMS
CHANGE IN BOWEL HABIT: 0
VISUAL CHANGE: 0
SWOLLEN GLANDS: 0
ABDOMINAL PAIN: 0
SORE THROAT: 0
VOMITING: 0

## 2022-08-26 NOTE — PROGRESS NOTES
Miya Ossineke (:  1960) is a 65 y.o. male,Established patient, here for evaluation of the following chief complaint(s):  Follow-up (6 week follow up, noted decreased weight/fluid apx 5 lbs) and Pharyngitis (Last radiation was Wednesday. Throat sore hard to eat.)         ASSESSMENT/PLAN:  1. Larynx cancer (HCC)  2. Radiation esophagitis  3. Tracheostomy dependent ([address]  4. Type 2 diabetes mellitus with other specified complication, without long-term current use of insulin ([address]  Patient had his radiation treatment its resulting in most likely radiation esophagitis since he is having difficulty with the swallowing he has been reassured by his radiation oncologist that it should improve within 4 weeks meanwhile he has been unable to eat and drink as normal so we will get a supplement his intake with boost and have him use roughly about 9 cans a day until his symptoms improve    Blood sugar is very well controlled we will have to monitor it once he start using the supplementation since that can significantly increase the numbers  No follow-ups on file.          Subjective   SUBJECTIVE/OBJECTIVE:    Lab Review   Lab Results   Component Value Date/Time     2022 01:39 PM     2022 08:48 AM     2022 05:25 AM    K 4.9 2022 01:39 PM    K 4.0 2022 08:48 AM    K 3.6 2022 05:25 AM    K 3.8 2022 05:00 AM    CO2 25 2022 01:39 PM    CO2 23 2022 08:48 AM    CO2 25 2022 05:25 AM    BUN 12 2022 01:39 PM    BUN 8 2022 08:48 AM    BUN 9 2022 05:25 AM    CREATININE 0.6 2022 01:39 PM    CREATININE <0.5 2022 08:48 AM    CREATININE <0.5 2022 05:25 AM    GLUCOSE 76 2022 01:39 PM    GLUCOSE 143 2022 08:48 AM    GLUCOSE 147 2022 05:25 AM    CALCIUM 9.9 2022 01:39 PM    CALCIUM 9.0 2022 08:48 AM    CALCIUM 9.0 2022 05:25 AM     Lab Results   Component Value Date/Time    WBC 4.8 2022 03:21 PM    WBC 4.9 05/05/2022 01:39 PM    WBC 5.0 04/02/2022 08:48 AM    HGB 9.3 06/16/2022 03:21 PM    HGB 10.2 05/05/2022 01:39 PM    HGB 11.2 04/02/2022 08:48 AM    HCT 28.6 06/16/2022 03:21 PM    HCT 32.3 05/05/2022 01:39 PM    HCT 34.5 04/02/2022 08:48 AM    MCV 83.2 06/16/2022 03:21 PM    MCV 86.0 05/05/2022 01:39 PM    MCV 85.7 04/02/2022 08:48 AM     06/16/2022 03:21 PM     05/05/2022 01:39 PM     04/02/2022 08:48 AM     Lab Results   Component Value Date/Time    CHOL 112 05/05/2022 01:39 PM    CHOL 140 02/18/2022 03:24 PM    CHOL 134 03/08/2021 12:59 PM    TRIG 92 05/05/2022 01:39 PM    TRIG 175 03/29/2022 10:30 AM    TRIG 152 03/22/2022 03:00 AM    HDL 41 05/05/2022 01:39 PM    HDL 71 02/18/2022 03:24 PM    HDL 46 03/08/2021 12:59 PM    HDL 60 03/29/2010 01:01 PM       Vitals 8/26/2022 8/24/2022 5/94/2888   SYSTOLIC 98 746 96   DIASTOLIC 64 72 61   Site - - -   Position - - -   Cuff Size - - -   Pulse 108 82 86   Temp - - 96.8   Resp - - -   SpO2 99 - -   Weight 155 lb 12.8 oz - -   Height - - -   Body mass index - - -   Pain Level - 5 5   Some recent data might be hidden       Pharyngitis  This is a new problem. The current episode started in the past 7 days. Pertinent negatives include no abdominal pain, anorexia, arthralgias, change in bowel habit, chest pain, chills, congestion, rash, sore throat, swollen glands, urinary symptoms, vertigo, visual change, vomiting or weakness. Diabetes  He presents for his follow-up diabetic visit. He has type 2 diabetes mellitus. Pertinent negatives for diabetes include no chest pain, no visual change, no weakness and no weight loss. Review of Systems   Constitutional:  Negative for chills and weight loss. HENT:  Negative for congestion and sore throat. Cardiovascular:  Negative for chest pain. Gastrointestinal:  Negative for abdominal pain, anorexia, change in bowel habit and vomiting. Musculoskeletal:  Negative for arthralgias. Skin:  Negative for rash. Neurological:  Negative for vertigo and weakness. Objective   Physical Exam  Vitals and nursing note reviewed. Constitutional:       General: He is not in acute distress. Appearance: Normal appearance. HENT:      Head: Normocephalic and atraumatic. Right Ear: Tympanic membrane normal.      Left Ear: Tympanic membrane normal.      Nose: Nose normal.   Eyes:      Extraocular Movements: Extraocular movements intact. Conjunctiva/sclera: Conjunctivae normal.      Pupils: Pupils are equal, round, and reactive to light. Neck:      Vascular: No carotid bruit. Cardiovascular:      Rate and Rhythm: Normal rate and regular rhythm. Pulses: Normal pulses. Heart sounds: No murmur heard. Pulmonary:      Effort: Pulmonary effort is normal. No respiratory distress. Breath sounds: Normal breath sounds. Abdominal:      General: Abdomen is flat. Bowel sounds are normal. There is no distension. Palpations: Abdomen is soft. Tenderness: There is no abdominal tenderness. Musculoskeletal:         General: No swelling, tenderness or deformity. Cervical back: Normal range of motion and neck supple. No rigidity or tenderness. Right lower leg: No edema. Left lower leg: No edema. Lymphadenopathy:      Cervical: No cervical adenopathy. Skin:     Coloration: Skin is not jaundiced. Findings: No bruising, erythema or lesion. Neurological:      General: No focal deficit present. Mental Status: He is alert and oriented to person, place, and time. Cranial Nerves: No cranial nerve deficit. Motor: No weakness. Gait: Gait normal.          This dictation was generated by voice recognition computer software. Although all attempts are made to edit the dictation for accuracy, there may be errors in the transcription that are not intended. An electronic signature was used to authenticate this note.     --Sadia Ogden MD

## 2022-08-30 ENCOUNTER — TELEPHONE (OUTPATIENT)
Dept: INTERNAL MEDICINE CLINIC | Age: 62
End: 2022-08-30

## 2022-08-31 ENCOUNTER — HOSPITAL ENCOUNTER (OUTPATIENT)
Dept: WOUND CARE | Age: 62
Discharge: HOME | End: 2022-08-31
Payer: MEDICAID

## 2022-08-31 VITALS
SYSTOLIC BLOOD PRESSURE: 149 MMHG | RESPIRATION RATE: 16 BRPM | DIASTOLIC BLOOD PRESSURE: 83 MMHG | HEART RATE: 78 BPM | TEMPERATURE: 97.6 F

## 2022-08-31 DIAGNOSIS — I73.9 PERIPHERAL ARTERY DISEASE (HCC): Primary | ICD-10-CM

## 2022-08-31 PROCEDURE — 11043 DBRDMT MUSC&/FSCA 1ST 20/<: CPT

## 2022-08-31 PROCEDURE — 11042 DBRDMT SUBQ TIS 1ST 20SQCM/<: CPT

## 2022-08-31 PROCEDURE — 29581 APPL MULTLAYER CMPRN SYS LEG: CPT

## 2022-08-31 RX ORDER — LIDOCAINE 40 MG/G
CREAM TOPICAL ONCE
Status: DISCONTINUED | OUTPATIENT
Start: 2022-08-31 | End: 2022-09-01 | Stop reason: HOSPADM

## 2022-08-31 RX ORDER — LIDOCAINE 50 MG/G
OINTMENT TOPICAL ONCE
Status: CANCELLED | OUTPATIENT
Start: 2022-08-31 | End: 2022-08-31

## 2022-08-31 RX ORDER — GENTAMICIN SULFATE 1 MG/G
OINTMENT TOPICAL ONCE
Status: CANCELLED | OUTPATIENT
Start: 2022-08-31 | End: 2022-08-31

## 2022-08-31 RX ORDER — CLOBETASOL PROPIONATE 0.5 MG/G
OINTMENT TOPICAL ONCE
Status: CANCELLED | OUTPATIENT
Start: 2022-08-31 | End: 2022-08-31

## 2022-08-31 RX ORDER — BACITRACIN, NEOMYCIN, POLYMYXIN B 400; 3.5; 5 [USP'U]/G; MG/G; [USP'U]/G
OINTMENT TOPICAL ONCE
Status: CANCELLED | OUTPATIENT
Start: 2022-08-31 | End: 2022-08-31

## 2022-08-31 RX ORDER — BETAMETHASONE DIPROPIONATE 0.05 %
OINTMENT (GRAM) TOPICAL ONCE
Status: CANCELLED | OUTPATIENT
Start: 2022-08-31 | End: 2022-08-31

## 2022-08-31 RX ORDER — BACITRACIN ZINC AND POLYMYXIN B SULFATE 500; 1000 [USP'U]/G; [USP'U]/G
OINTMENT TOPICAL ONCE
Status: CANCELLED | OUTPATIENT
Start: 2022-08-31 | End: 2022-08-31

## 2022-08-31 RX ORDER — GINSENG 100 MG
CAPSULE ORAL ONCE
Status: CANCELLED | OUTPATIENT
Start: 2022-08-31 | End: 2022-08-31

## 2022-08-31 RX ORDER — LIDOCAINE HYDROCHLORIDE 40 MG/ML
SOLUTION TOPICAL ONCE
Status: CANCELLED | OUTPATIENT
Start: 2022-08-31 | End: 2022-08-31

## 2022-08-31 RX ORDER — LIDOCAINE HYDROCHLORIDE 20 MG/ML
JELLY TOPICAL ONCE
Status: CANCELLED | OUTPATIENT
Start: 2022-08-31 | End: 2022-08-31

## 2022-08-31 RX ORDER — LIDOCAINE 40 MG/G
CREAM TOPICAL ONCE
Status: CANCELLED | OUTPATIENT
Start: 2022-08-31 | End: 2022-08-31

## 2022-08-31 NOTE — PROGRESS NOTES
Multilayer Compression Wrap   Below the Knee    NAME:  Jaclyn Kong  YOB: 1960  MEDICAL RECORD NUMBER:  [id number]  DATE:  8/31/2022    Multilayer compression wrap: Removed old Multilayer wrap if indicated and wash leg with mild soap/water. Applied moisturizing agent to dry skin as needed. Applied primary and secondary dressing as ordered. Applied multilayered dressing below the knee to right lower leg. Applied multilayered dressing below the knee to left lower leg. Instructed patient/caregiver not to remove dressing and to keep it clean and dry. Instructed patient/caregiver on complications to report to provider, such as pain, numbness in toes, heavy drainage, and slippage of dressing. Instructed patient on purpose of compression dressing and on activity and exercise recommendations.      Applied  2 layer wrap from toes to knee overlapping each time    Electronically signed by Michael Meyer LPN on 5/62/0260 at 9:95 PM

## 2022-08-31 NOTE — PLAN OF CARE
Discharge instructions given. Patient verbalized understanding. Return to [facility] in 1 week(s).   Follow up Dr Miriam Thompson scheduled

## 2022-08-31 NOTE — DISCHARGE INSTRUCTIONS
[address], [address]  Telephone: [phone]     Discharge Instructions     Important reminders:     **If you have any signs and symptoms of illness (Cough, fever, congestion, nausea, vomiting, diarrhea, etc.) please call the wound care center prior to your appointment. 1. Increase Protein intake for optimal wound healing  2. No added salt to reduce any swelling  3. If diabetic, maintain good glucose control  4. If you smoke, smoking prohibits wound healing, we ask that you refrain from smoking. 5. When taking antibiotics take the entire prescription as ordered. Do not stop taking until medication is all gone unless otherwise instructed. 6. Exercise as tolerated. 7. Keep weight off wounds and reposition every 2 hours if applicable. 8. If wound(s) is on your lower extremity, elevate legs to the level of the heart or above for 30 minutes 4-5 times a day and/or when sitting. Avoid standing for long periods of time. 9. Do not get wounds wet in bath or shower unless otherwise instructed by your physician. If your wound is on your foot or leg, you may purchase a cast bag. Please ask at the pharmacy. If Vascular testing is ordered, please call [address] [place] [address] [phone]) to schedule. Vascular tests ordered by Wound Care Physicians may take up to 2 hours to complete. Please keep that in mind when scheduling. If Vascular testing is scheduled, please bring supplies to replace your dressing after testing is done. The vascular department does not stock supplies. Wound: Right and Left leg     With each dressing change, rinse wounds with 0.9% Saline. (May use wound wash or soft contact solution. Both can be purchased at a local drug store). If unable to obtain saline, may use a gentle soap and water. Dressing care: Right and left leg - Gentamicin cream Triad to wounds. DO not put a stack of 4x4 over wounds. Just one.   Lotrisone to lower legs, Gentle coflex calamine to both,    Dr Tr Saldaña, Vascular Surgeon September 14th at 1215pm  [address] [address], [address]  Phone# [phone]  Fax# [phone]         Compression Wraps  Location: Both legs  Type: Coflex calamine     Your doctor has ordered compression therapy for your wound. Compression bandages reduce the swelling, or edema, in your legs and prevent it from returning. The wound care staff will apply your compression wrap. It must be removed and re-applied at least weekly. As the swelling decreases, the boot no longer provides adequate compression and you need a new one. Once applied, you need to know how to take care of your compression wrap. The boot must stay dry. Do not get it wet in the shower or tub. You may do a partial bath, or you can cover the boot with a large plastic bag, secured at the top, so that no water can get in. Avoid standing in one place for long periods of time. If you must  one place, shift your weight and change positions often. If you have CHF, consult your doctor before following the next two recommendations for leg elevation. When sitting, elevate your legs on pillows, or put blocks under the foot of your bed. Your legs should be higher than your heart. If your boot becomes painful, or you notice an increase in swelling in your toes, numbness or tingling, or purple color to your toes, remove the wrap and call the [facility] [address]. If it is after hours, call your doctor for instructions or go to the nearest emergency room. Home Care Agency/Facility: [facility]     Your wound-care supplies will be provided by:  Please note, depending on your insurance coverage, you may have out-of-pocket expenses for these supplies. Someone from the company should call you to confirm your order and discuss those potential costs before they ship your products -- please anticipate that call.  If your out-of-pocket cost could be substantial, Many companies have financial hardship programs for patients who qualify, so please ask about that if you might need a hand. If you have any questions about your supplies or your potential out-of-pocket costs, or if you need to place an order for a refill of supplies (typically monthly), please call the company directly. Your  is Donna Kramer     Follow up with Dr Gwen Ayala in 1 weeks. Wound Care Center Information: Should you experience any significant changes in your wound(s) or have questions about your wound care, please contact [facility] at [phone] Monday  - Thursday 8:00 am - 4:00 pm and Friday 8:00 am - 1:00pm. If you need help with your wound outside these hours and cannot wait until we are again available, contact your PCP or go to the hospital emergency room. PLEASE NOTE: IF YOU ARE UNABLE TO OBTAIN WOUND SUPPLIES, CONTINUE TO USE THE SUPPLIES YOU HAVE AVAILABLE UNTIL YOU ARE ABLE TO REACH US. IT IS MOST IMPORTANT TO KEEP THE WOUND COVERED AT ALL TIMES.

## 2022-08-31 NOTE — PROGRESS NOTES
Iris Quiroz  Progress Note and Procedure Note      Ca Nunez  AGE: 65 y.o. GENDER: male  : 1960  TODAY'S DATE:  2022    Subjective:     Chief Complaint   Patient presents with    Wound Check         HISTORY of PRESENT ILLNESS HPI     Ca Nunez is a 65 y.o. male who presents today for wound evaluation. History of Wound: Admits to having chronic wounds for at least a year on the left leg and 8 to 9 months on the right leg. He states that he has had arterial bypasses on both of his legs in the past.  He was seeing previous wound care and podiatry for these wounds. He states that his insurance had some issues and he has not been seeing anyone since 2021 he has been treating the wounds himself with PSO and bandages. 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 Is left the dressings intact since his last visit. He thinks the wounds are looking better and he has no other complaints at this time.       Wound Pain:  intermittent  Severity:  3 / 10   Wound Type:  venous, arterial and diabetic  Modifying Factors:  edema, venous stasis, lymphedema, diabetes, decreased mobility, arterial insufficiency and decreased tissue oxygenation  Associated Signs/Symptoms:  drainage, numbness and odor        PAST MEDICAL HISTORY        Diagnosis Date    Cancer ([address]     throat    Diabetes mellitus ([address]     Fibromyalgia     History of DVT (deep vein thrombosis)     Hyperlipidemia     Hypertension     Type 2 diabetes mellitus with circulatory disorder, without long-term current use of insulin ([address] 2022       PAST SURGICAL HISTORY    Past Surgical History:   Procedure Laterality Date    APPENDECTOMY  2005    COLONOSCOPY  2016    FEMORAL BYPASS Left 2020    femoral posterior tibial bypass    FEMORAL-TIBIAL BYPASS GRAFT Right 2020    with femoral endarterectomy and patch angioplasty    FOOT DEBRIDEMENT Left 2020    FOOT DEBRIDEMENT Right 2021    DEBRIDEMENT OF RIGHT FOOT WOUND WITH GRAFT APPLICATION performed by Saige Corbett DPM at [address] N/A 3/25/2022    DIRECT LARYNGOSCOPY WITH BIOPSY AND FROZEN SECTIONS performed by Promise Amin DO at [address] Right 2020    stent leg for PVD    TRACHEOSTOMY N/A 3/25/2022    TRACHEOTOMY performed by Promise Amin DO at [address] HISTORY    Family History   Problem Relation Age of Onset    Cancer Mother     Lung Cancer Mother     Diabetes Father     Stroke Father        SOCIAL HISTORY    Social History     Tobacco Use    Smoking status: Former     Packs/day: 0.50     Years: 20.00     Pack years: 10.00     Types: Cigarettes     Start date: 1977     Quit date: 2021     Years since quittin.6    Smokeless tobacco: Never   Vaping Use    Vaping Use: Never used   Substance Use Topics    Alcohol use: Yes     Comment: 2 beers / day     Drug use: Never       ALLERGIES    Allergies   Allergen Reactions    Chantix [Varenicline] Other (See Comments)     Hallucinations         MEDICATIONS    Current Outpatient Medications on File Prior to Encounter   Medication Sig Dispense Refill    lisinopril (PRINIVIL;ZESTRIL) 20 MG tablet TAKE 1 TABLET BY MOUTH EVERY DAY 30 tablet 0    sennosides-docusate sodium (SENOKOT-S) 8.6-50 MG tablet Take 1 tablet by mouth in the morning and 1 tablet before bedtime. 60 tablet 0    melatonin 3 MG TABS tablet Take 2 tablets by mouth nightly as needed (insomnia) 30 tablet 2    atorvastatin (LIPITOR) 80 MG tablet Take 1 tablet by mouth in the morning. Cancel atorvastatin 20 mg a day. 90 tablet 0    warfarin (COUMADIN) 5 MG tablet Take 1.5 tablets by mouth in the morning. 90 tablet 1    diphenhydrAMINE (BENADRYL ALLERGY) 25 MG tablet Take 1 tablet by mouth every 6 hours as needed for Itching 60 tablet 0    cyanocobalamin 250 MCG tablet Take 1 tablet by mouth in the morning.  30 tablet 0 metFORMIN (GLUCOPHAGE) 500 MG tablet Take 1 tablet by mouth in the morning and 1 tablet in the evening. Take with meals. 180 tablet 0    cetirizine (ZYRTEC) 10 MG tablet Take 1 tablet by mouth in the morning. 30 tablet 0    BANOPHEN 25 MG capsule Take 1 tablet by mouth every 6 hours as needed for Itching      ondansetron (ZOFRAN-ODT) 4 MG disintegrating tablet Take 4 mg by mouth every 8 hours as needed for Nausea or Vomiting      Multiple Vitamins-Minerals (MULTIVITAMIN ADULT EXTRA C PO) 1 tablet by Nasogastric route daily      esomeprazole (NEXIUM) 20 MG delayed release capsule 20 mg by Nasogastric route every morning (before breakfast)      oxyCODONE HCl (OXY-IR) 10 MG immediate release tablet TAKE 1 TABLET BY MOUTH EVERY SIX HOURS AS NEEDED FOR PAIN FOR UP TO 7 DAYS      budesonide (PULMICORT) 0.5 MG/2ML nebulizer suspension Take 2 mLs by nebulization 2 times daily 60 each 2    gentamicin (GARAMYCIN) 0.1 % cream Apply topically  daily. 30 g 1    becaplermin (REGRANEX) 0.01 % gel Apply 1 Applicatorful topically daily      calcium-vitamin D (OSCAL-500) 500-200 MG-UNIT per tablet Take 1 tablet by mouth 2 times daily      ipratropium-albuterol (DUONEB) 0.5-2.5 (3) MG/3ML SOLN nebulizer solution Inhale 3 mLs into the lungs 4 times daily 360 mL 3    acetaminophen (TYLENOL) 500 MG tablet Take 1 tablet by mouth 4 times daily as needed for Pain 360 tablet 1    fluticasone (FLONASE) 50 MCG/ACT nasal spray 1 spray by Nasal route daily 9.9 g 5    Vitamins/Minerals TABS Take 1 tablet by mouth daily        No current facility-administered medications on file prior to encounter. REVIEW OF SYSTEMS    Pertinent items are noted in HPI. Objective:      BP (!) 149/83   Pulse 78   Temp 97.6 °F (36.4 °C) (Infrared)   Resp 16     PHYSICAL EXAM    Vascular: Vascular status Impaired  palpable pedal pulses, right DP0/4 and PT1/4, left DP0/4 and PT1/4.   CFT 3 seconds digits 1 to 5 bilateral.  Hair growthAbsent  both lower extremities and feet. Skin temperature is warm to warm from pretibial area to distal digits bilateral.  Exam is negative for rubor, pallor, cyanosis or signs of acute vascular compromise bilaterally. Exam is positive for edema bilateral lower extremity. Varicosities Present bilateral lower extremity. Neuro: Neurologic status diminished bilateral with epicritic Absent  , proprioceptive Absent , vibratory sensation Absent  and protopathicPresent. DTRs Absent  bilateral Achilles. There were no reproducible neuritic symptoms on exam bilateral feet/ankles. Derm: Ulceration to bilateral ankles. Ecchymosis Absent  bilateral feet/foot. Musculoskeletal: No pain with debridement of wounds 5/5 muscle strength in/eversion and dorsi/plantarflexion bilateral feet. No gross instability noted. Assessment:     Problem List Items Addressed This Visit       Peripheral artery disease ([facility] - Primary    Relevant Medications    lidocaine (LMX) 4 % cream    Other Relevant Orders    Initiate Outpatient Wound Care Protocol       Procedure Note    Performed by: Nevaeh Arce DPM    Consent obtained: Yes    Time out taken:  Yes    Pain Control: Anesthetic  Anesthetic: 4% Lidocaine Cream     Debridement:Excisional Debridement    Using curette the wound was sharply debrided    down through and including the removal of epidermis, dermis, subcutaneous tissue and muscle/fascia.         Devitalized Tissue Debrided:  fibrin, biofilm, slough, necrotic/eschar and exudate    Pre Debridement Measurements:  Are located in the Wound Documentation Flow Sheet    Wound #: 1     Wound Care Documentation:  Wound 05/18/22 Ankle Medial;Right #2 (Active)   Wound Image   06/24/22 1202   Wound Etiology Diabetic 08/24/22 1502   Wound Cleansed Wound cleanser 08/31/22 1554   Dressing/Treatment Antibacterial ointment;Triad hydro/zinc oxide-based hydrophilic paste 22/45/08 9058   Offloading for Diabetic Foot Ulcers Post op shoe;Offloading not required 08/31/22 1554   Wound Length (cm) 1.3 cm 08/31/22 1554   Wound Width (cm) 1.8 cm 08/31/22 1554   Wound Depth (cm) 0.1 cm 08/31/22 1554   Wound Surface Area (cm^2) 2.34 cm^2 08/31/22 1554   Change in Wound Size % (l*w) 70.75 08/31/22 1554   Wound Volume (cm^3) 0.234 cm^3 08/31/22 1554   Wound Healing % 71 08/31/22 1554   Post-Procedure Length (cm) 1.3 cm 08/31/22 1623   Post-Procedure Width (cm) 1.8 cm 08/31/22 1623   Post-Procedure Depth (cm) 0.1 cm 08/31/22 1623   Post-Procedure Surface Area (cm^2) 2.34 cm^2 08/31/22 1623   Post-Procedure Volume (cm^3) 0.234 cm^3 08/31/22 1623   Wound Assessment Bleeding 08/31/22 1623   Drainage Amount Large 08/31/22 1554   Drainage Description Serosanguinous 08/24/22 1502   Odor None 08/31/22 1554   Jamila-wound Assessment Excoriated 08/31/22 1554   Margins Attached edges 08/31/22 1554   Number of days: 105       Wound 06/24/22 Leg Left;Medial #1 (Active)   Wound Image   06/24/22 1202   Wound Etiology Diabetic 08/31/22 1554   Wound Cleansed Wound cleanser 08/31/22 1554   Dressing/Treatment Antibacterial ointment;Triad hydro/zinc oxide-based hydrophilic paste 64/12/24 0761   Offloading for Diabetic Foot Ulcers Post op shoe;Offloading not required 08/31/22 1554   Wound Length (cm) 1.6 cm 08/31/22 1554   Wound Width (cm) 4.2 cm 08/31/22 1554   Wound Depth (cm) 0.2 cm 08/31/22 1554   Wound Surface Area (cm^2) 6.72 cm^2 08/31/22 1554   Change in Wound Size % (l*w) 1.75 08/31/22 1554   Wound Volume (cm^3) 1.344 cm^3 08/31/22 1554   Wound Healing % -96 08/31/22 1554   Post-Procedure Length (cm) 1.6 cm 08/31/22 1623   Post-Procedure Width (cm) 4.2 cm 08/31/22 1623   Post-Procedure Depth (cm) 0.2 cm 08/31/22 1623   Post-Procedure Surface Area (cm^2) 6.72 cm^2 08/31/22 1623   Post-Procedure Volume (cm^3) 1.344 cm^3 08/31/22 1623   Wound Assessment Bleeding;Slough 08/31/22 1554   Drainage Amount Moderate 08/31/22 1554   Drainage Description Serosanguinous; Yellow 08/31/22 1554   Odor None 08/31/22 1554   Jamila-wound Assessment Dry/flaky 08/31/22 1554   Margins Defined edges 08/31/22 1554   Number of days: 68         Total Surface Area Debrided: 6.72 cm² left leg into the deep fascia and sq cm 2.34 cm² into the subcutaneous tissue of the right foot    Percentage of wound debrided 100%    Bleeding:  Minimal    Hemostasis Achieved:  by pressure    Procedural Pain:  0  / 10     Post Procedural Pain:  0 / 10     Response to treatment:  Well tolerated by patient. Plan:   Patient examined and evaluated   Wounds debrided without incident   Multilayer compression wrap right leg with Lotrisone. Multilayer compression wrap left leg  Follow-up one week  Keep legs elevated at all times when not active. Finish antibiotic  Continue follow-up with vascular surgery for decision on wounds for improving the blood flow to the distal left leg. He has not made his decision on what he would like to do. Patient would benefit from disability due to his multiple comorbidities including his chronic peripheral arterial disease with high risk for limb loss. The nature of the patient's condition was explained in depth. The patient was informed that their compliance to the treatment Plan is paramount to successful healing and prevention of further ulceration and/or infection.   Application of Lotrisone to the irritated areas of his leg right  Discharge Treatment follow-up on Friday for dressing change    Written Patient Discharge Instructions Given            Electronically signed by Socorro Kerns DPM on 8/31/2022 at 4:51 PM

## 2022-09-02 ENCOUNTER — OFFICE VISIT (OUTPATIENT)
Dept: PULMONOLOGY | Age: 62
End: 2022-09-02
Payer: MEDICAID

## 2022-09-02 VITALS
HEIGHT: 68 IN | BODY MASS INDEX: 23.49 KG/M2 | HEART RATE: 55 BPM | SYSTOLIC BLOOD PRESSURE: 112 MMHG | OXYGEN SATURATION: 95 % | WEIGHT: 155 LBS | DIASTOLIC BLOOD PRESSURE: 60 MMHG

## 2022-09-02 DIAGNOSIS — E11.9 TYPE 2 DIABETES MELLITUS WITHOUT COMPLICATION, WITHOUT LONG-TERM CURRENT USE OF INSULIN (HCC): ICD-10-CM

## 2022-09-02 DIAGNOSIS — J96.11 CHRONIC RESPIRATORY FAILURE WITH HYPOXIA AND HYPERCAPNIA (HCC): ICD-10-CM

## 2022-09-02 DIAGNOSIS — M25.50 ARTHRALGIA, UNSPECIFIED JOINT: ICD-10-CM

## 2022-09-02 DIAGNOSIS — I10 ESSENTIAL HYPERTENSION: ICD-10-CM

## 2022-09-02 DIAGNOSIS — J43.2 CENTRILOBULAR EMPHYSEMA (HCC): Primary | ICD-10-CM

## 2022-09-02 DIAGNOSIS — J30.9 ALLERGIC RHINITIS, UNSPECIFIED SEASONALITY, UNSPECIFIED TRIGGER: ICD-10-CM

## 2022-09-02 DIAGNOSIS — J96.12 CHRONIC RESPIRATORY FAILURE WITH HYPOXIA AND HYPERCAPNIA (HCC): ICD-10-CM

## 2022-09-02 DIAGNOSIS — I73.9 PAD (PERIPHERAL ARTERY DISEASE) (HCC): ICD-10-CM

## 2022-09-02 DIAGNOSIS — Z87.891 HISTORY OF TOBACCO USE: ICD-10-CM

## 2022-09-02 DIAGNOSIS — E78.00 PURE HYPERCHOLESTEROLEMIA: ICD-10-CM

## 2022-09-02 DIAGNOSIS — C32.9 LARYNX CANCER (HCC): ICD-10-CM

## 2022-09-02 DIAGNOSIS — L29.9 ITCHING: ICD-10-CM

## 2022-09-02 PROCEDURE — G8427 DOCREV CUR MEDS BY ELIG CLIN: HCPCS | Performed by: INTERNAL MEDICINE

## 2022-09-02 PROCEDURE — 99214 OFFICE O/P EST MOD 30 MIN: CPT | Performed by: INTERNAL MEDICINE

## 2022-09-02 PROCEDURE — 1036F TOBACCO NON-USER: CPT | Performed by: INTERNAL MEDICINE

## 2022-09-02 PROCEDURE — 3017F COLORECTAL CA SCREEN DOC REV: CPT | Performed by: INTERNAL MEDICINE

## 2022-09-02 PROCEDURE — 3023F SPIROM DOC REV: CPT | Performed by: INTERNAL MEDICINE

## 2022-09-02 PROCEDURE — G8420 CALC BMI NORM PARAMETERS: HCPCS | Performed by: INTERNAL MEDICINE

## 2022-09-02 RX ORDER — ARFORMOTEROL TARTRATE 15 UG/2ML
1 SOLUTION RESPIRATORY (INHALATION) 2 TIMES DAILY
Qty: 120 ML | Refills: 5 | Status: SHIPPED | OUTPATIENT
Start: 2022-09-02 | End: 2022-09-06 | Stop reason: SDUPTHER

## 2022-09-02 NOTE — PROGRESS NOTES
PULMONARY OFFICE FOLLOW UP NOTE    REASON FOR VISIT:   Chief Complaint   Patient presents with    Follow-up     Panlobular emphysema          DATE OF VISIT: 9/2/2022     HISTORY OF PRESENT ILLNESS: 65y.o. year old male comes into the pulmonary office for follow-up. Patient was diagnosed to have laryngeal cancer in April/May 2022. Went to [facility] after undergoing tracheostomy here at [facility]. Received radiation therapy. Currently laryngeal cancer remains under control. Patient still has tracheostomy. Unable to use his Trelegy inhaler therapy. Doing only DuoNeb and Pulmicort nebulization. Breathing overall stable but does have occasional dyspnea on exertion. At nighttime has stopped using noninvasive ventilator. Only using oxygen via trach mask and a humidifier. Weight remains stable. Previously: Patient reports that his breathing is stable for the most part. Still having dyspnea on exertion. Ran out of Trelegy Ellipta because mistakingly he was told that he has to take it twice a day. Unable to get a refill from the pharmacy. Using only albuterol. Continues to have chest congestion. Using his noninvasive ventilator regularly every night. Has given up smoking. Not needing oxygen during the daytime. Denies any fever, night sweats or discolored expectoration. No hemoptysis, anorexia or weight loss. REVIEW OF SYSTEMS: 14 point ROS is negative beside mentioned in [address]      PAST MEDICAL HISTORY:   Past Medical History:   Diagnosis Date    Cancer ([address]     throat    Diabetes mellitus ([address]     Fibromyalgia     History of DVT (deep vein thrombosis)     Hyperlipidemia     Hypertension     Type 2 diabetes mellitus with circulatory disorder, without long-term current use of insulin ([address] 2/18/2022       PAST SURGICAL HISTORY:   Past Surgical History:   Procedure Laterality Date    APPENDECTOMY  2005    COLONOSCOPY  2016    FEMORAL BYPASS Left 02/26/2020    femoral posterior tibial bypass    FEMORAL-TIBIAL BYPASS GRAFT Right 2020    with femoral endarterectomy and patch angioplasty    FOOT DEBRIDEMENT Left 2020    FOOT DEBRIDEMENT Right 2021    DEBRIDEMENT OF RIGHT FOOT WOUND WITH GRAFT APPLICATION performed by Robert Mcclendon DPM at [address] [facility] N/A 3/25/2022    DIRECT LARYNGOSCOPY WITH BIOPSY AND FROZEN SECTIONS performed by Erik Patel DO at [address] [address] Right 2020    stent leg for PVD    TRACHEOSTOMY N/A 3/25/2022    [facility]   Social History     Tobacco Use    Smoking status: Former     Packs/day: 0.50     Years: 20.00     Pack years: 10.00     Types: Cigarettes     Start date: 1977     Quit date: 2021     Years since quittin.6    Smokeless tobacco: Never   Vaping Use    Vaping Use: Never used   Substance Use Topics    Alcohol use: Yes     Comment: 2 beers / day     Drug use: Never       FAMILY HISTORY:   Family History   Problem Relation Age of Onset    Cancer Mother     Lung Cancer Mother     Diabetes Father     Stroke Father        MEDICATIONS:     Current Outpatient Medications on File Prior to Visit   Medication Sig Dispense Refill    lisinopril (PRINIVIL;ZESTRIL) 20 MG tablet TAKE 1 TABLET BY MOUTH EVERY DAY 30 tablet 0    sennosides-docusate sodium (SENOKOT-S) 8.6-50 MG tablet Take 1 tablet by mouth in the morning and 1 tablet before bedtime. 60 tablet 0    melatonin 3 MG TABS tablet Take 2 tablets by mouth nightly as needed (insomnia) 30 tablet 2    atorvastatin (LIPITOR) 80 MG tablet Take 1 tablet by mouth in the morning. Cancel atorvastatin 20 mg a day. 90 tablet 0    warfarin (COUMADIN) 5 MG tablet Take 1.5 tablets by mouth in the morning.  90 tablet 1    diphenhydrAMINE (BENADRYL ALLERGY) 25 MG tablet Take 1 tablet by mouth every 6 hours as needed for Itching 60 tablet 0    cyanocobalamin 250 MCG tablet Take 1 tablet by mouth in the morning. 30 tablet 0    metFORMIN (GLUCOPHAGE) 500 MG tablet Take 1 tablet by mouth in the morning and 1 tablet in the evening. Take with meals. 180 tablet 0    cetirizine (ZYRTEC) 10 MG tablet Take 1 tablet by mouth in the morning. 30 tablet 0    BANOPHEN 25 MG capsule Take 1 tablet by mouth every 6 hours as needed for Itching      ondansetron (ZOFRAN-ODT) 4 MG disintegrating tablet Take 4 mg by mouth every 8 hours as needed for Nausea or Vomiting      Multiple Vitamins-Minerals (MULTIVITAMIN ADULT EXTRA C PO) 1 tablet by Nasogastric route daily      esomeprazole (NEXIUM) 20 MG delayed release capsule 20 mg by Nasogastric route every morning (before breakfast)      oxyCODONE HCl (OXY-IR) 10 MG immediate release tablet TAKE 1 TABLET BY MOUTH EVERY SIX HOURS AS NEEDED FOR PAIN FOR UP TO 7 DAYS      budesonide (PULMICORT) 0.5 MG/2ML nebulizer suspension Take 2 mLs by nebulization 2 times daily 60 each 2    gentamicin (GARAMYCIN) 0.1 % cream Apply topically  daily. 30 g 1    becaplermin (REGRANEX) 0.01 % gel Apply 1 Applicatorful topically daily      calcium-vitamin D (OSCAL-500) 500-200 MG-UNIT per tablet Take 1 tablet by mouth 2 times daily      ipratropium-albuterol (DUONEB) 0.5-2.5 (3) MG/3ML SOLN nebulizer solution Inhale 3 mLs into the lungs 4 times daily 360 mL 3    acetaminophen (TYLENOL) 500 MG tablet Take 1 tablet by mouth 4 times daily as needed for Pain 360 tablet 1    fluticasone (FLONASE) 50 MCG/ACT nasal spray 1 spray by Nasal route daily 9.9 g 5    Vitamins/Minerals TABS Take 1 tablet by mouth daily        No current facility-administered medications on file prior to visit. ALLERGIES:   Allergies as of 09/02/2022 - Fully Reviewed 09/02/2022   Allergen Reaction Noted    Chantix [varenicline] Other (See Comments) 03/08/2021      OBJECTIVE:   height is 5' 8\" (1.727 m) and weight is 155 lb (70.3 kg). His blood pressure is 112/60 and his pulse is 55.  His oxygen saturation is 95%. PHYSICAL EXAM:    CONSTITUTIONAL: He is a 65y.o.-year-old who appears his stated age. He is alert and oriented x 3 and in no acute distress. HEENT: PERRL. No scleral icterus. No thrush, atraumatic, normocephalic. NECK: Supple, without cervical or supraclavicular lymphadenopathy: T-tube in place. Extensive skin scar tissue seen around the tracheostomy site. CARDIOVASCULAR: S1 S2 RRR. Without murmer  RESPIRATORY & CHEST: Bilateral scattered wheezing heard. GASTROINTESTINAL & ABDOMEN: Soft, nontender, positive bowel sounds in all quadrants, non-distended, without hepatosplenomegaly. GENITOURINARY: Deferred. MUSCULOSKELETAL: No tenderness to palpation of the axial skeleton. There is no clubbing. No cyanosis. No edema of the lower extremities. SKIN OF BODY: No rash or jaundice. PSYCHIATRIC EVALUATION: Normal affect. Patient answers questions appropriately. HEMATOLOGIC/LYMPHATIC/ IMMUNOLOGIC: No palpable lymphadenopathy. NEUROLOGIC: Alert and oriented x 3. Groslly non-focal. Motor strength is 5+/5 in all muscle groups. The patient has a normal sensorium globally. Labs:    No flowsheet data found. All labs were personally reviewed by me any my interpretation is: CBC is anemia. Chem 8 is dyslipidemia. IMAGING:    Narrative   EXAMINATION:   ONE XRAY VIEW OF THE CHEST       2/1/2022 10:48 pm           FINDINGS:   There is chronic pulmonary change. There is no acute consolidation effusion. There is no pneumothorax. The mediastinal structures are unremarkable. The   upper abdomen unremarkable. The extrathoracic soft tissues are unremarkable. Impression   Chronic pulmonary change without acute cardiopulmonary process. Pulmonary Functions Testing Results:    Pulmonary Function Testing       Patient name:  Marie Crowell     [facility] Unit #:   [id number]   Date of test:  1/25/2022  Date of interpretation:   1/27/2022     Mr. Marie Crowell is a 62 y.o. year-old non smoker. The spirometry data were acceptable and reproducible. Spirometry:  Flow volume loops were obstructed. The FEV-1/FVC ratio was decreased. The FEV-1 was 1.83 liters (63% of predicted), which was moderately decreased. The FVC was 3.07 liters (82% of predicted), which was normal. Response to inhaled bronchodilators (albuterol) was not significant. Lung volumes:  Lung volumes were tested by plethysmography. The total lung capacity was 5.11 liters (83% of predicted), which was normal. The residual volume was 2.04 liters (91% of predicted), which was normal. The ratio of residual volume to total lung capacity (RV/TLC) was 40, which was normal.      Diffusion capacity was found to be 37% which is Severely decreased. Interpretation:  Moderate obstructive airway disease with no significant bronchodilator reversibility    IMPRESSION AND RECOMMENDATIONS:     1. Panlobular emphysema ([address]  -Patient has moderate COPD which frequently exacerbates. Currently his respiratory status is stable.  -Not due to laryngeal cancer and tracheostomy he is unable to use his Trelegy Ellipta inhaler.  -We will transition him to all nebulized medication in order to achieve sustained bronchodilation.  -He is already using DuoNeb nebulization as needed. Plan he will continue on Pulmicort nebulization twice a day. I will add Brovana nebulization twice a day to the regimen. 2. History of tobacco abuse  -Strongly urged the patient to stay quit from smoking. 3. Chronic respiratory failure with hypoxia and hypercapnia (HCC)  -Stable. Patient is currently not using noninvasive ventilator.  -Only using oxygen via trach mask.  -We will continue for now.  -Once his tracheostomy tube is decannulated, we will get him qualified for noninvasive ventilator again. 4.  Laryngeal cancer s/p tracheostomy  -Stable. Patient was diagnosed with laryngeal cancer in April/May 2022.   S/p radiation and tracheostomy.  -Following with ENT at [facility]. Return in about 6 months (around 3/2/2023) for COPD. Leonel Guerrero MD  Pulmonary Critical Care and Sleep Medicine  9/2/2022, 4:03 PM    This note was completed using dragon medical speech recognition software. Grammatical errors, random word insertions, pronoun errors and incomplete sentences are occasional consequences of this technology due to software limitations. If there are questions or concerns about the content of this note of information contained within the body of this dictation they should be addressed with the provider for clarification.

## 2022-09-06 DIAGNOSIS — E78.00 PURE HYPERCHOLESTEROLEMIA: ICD-10-CM

## 2022-09-06 DIAGNOSIS — I10 ESSENTIAL HYPERTENSION: ICD-10-CM

## 2022-09-06 DIAGNOSIS — M25.50 ARTHRALGIA, UNSPECIFIED JOINT: ICD-10-CM

## 2022-09-06 DIAGNOSIS — L29.9 ITCHING: ICD-10-CM

## 2022-09-06 DIAGNOSIS — J30.9 ALLERGIC RHINITIS, UNSPECIFIED SEASONALITY, UNSPECIFIED TRIGGER: ICD-10-CM

## 2022-09-06 DIAGNOSIS — E11.9 TYPE 2 DIABETES MELLITUS WITHOUT COMPLICATION, WITHOUT LONG-TERM CURRENT USE OF INSULIN (HCC): ICD-10-CM

## 2022-09-06 DIAGNOSIS — I73.9 PAD (PERIPHERAL ARTERY DISEASE) (HCC): ICD-10-CM

## 2022-09-06 RX ORDER — ATORVASTATIN CALCIUM 80 MG/1
80 TABLET, FILM COATED ORAL DAILY
Qty: 90 TABLET | Refills: 0 | OUTPATIENT
Start: 2022-09-06

## 2022-09-06 RX ORDER — SENNA AND DOCUSATE SODIUM 50; 8.6 MG/1; MG/1
1 TABLET, FILM COATED ORAL 2 TIMES DAILY
Qty: 60 TABLET | Refills: 0 | OUTPATIENT
Start: 2022-09-06

## 2022-09-06 RX ORDER — ARFORMOTEROL TARTRATE 15 UG/2ML
1 SOLUTION RESPIRATORY (INHALATION) 2 TIMES DAILY
Qty: 120 ML | Refills: 5 | OUTPATIENT
Start: 2022-09-06

## 2022-09-06 RX ORDER — BUDESONIDE 0.5 MG/2ML
1 INHALANT ORAL 2 TIMES DAILY
Qty: 60 EACH | Refills: 2 | Status: SHIPPED | OUTPATIENT
Start: 2022-09-06

## 2022-09-06 RX ORDER — WARFARIN SODIUM 5 MG/1
TABLET ORAL
Qty: 90 TABLET | Refills: 1 | Status: SHIPPED | OUTPATIENT
Start: 2022-09-06 | End: 2022-09-28 | Stop reason: SDUPTHER

## 2022-09-06 RX ORDER — CETIRIZINE HYDROCHLORIDE 10 MG/1
10 TABLET ORAL DAILY
Qty: 30 TABLET | Refills: 0 | Status: SHIPPED | OUTPATIENT
Start: 2022-09-06 | End: 2022-09-28 | Stop reason: SDUPTHER

## 2022-09-06 RX ORDER — GENTAMICIN SULFATE 1 MG/G
CREAM TOPICAL
Qty: 30 G | Refills: 1 | OUTPATIENT
Start: 2022-09-06

## 2022-09-06 RX ORDER — WARFARIN SODIUM 5 MG/1
7.5 TABLET ORAL DAILY
Qty: 90 TABLET | Refills: 1 | OUTPATIENT
Start: 2022-09-06

## 2022-09-06 RX ORDER — LISINOPRIL 20 MG/1
TABLET ORAL
Qty: 30 TABLET | Refills: 0 | OUTPATIENT
Start: 2022-09-06

## 2022-09-06 RX ORDER — LISINOPRIL 20 MG/1
20 TABLET ORAL DAILY
Qty: 30 TABLET | Refills: 0 | Status: SHIPPED | OUTPATIENT
Start: 2022-09-06 | End: 2022-09-28 | Stop reason: SDUPTHER

## 2022-09-06 RX ORDER — ACETAMINOPHEN 500 MG
500 TABLET ORAL 4 TIMES DAILY PRN
Qty: 360 TABLET | Refills: 1 | OUTPATIENT
Start: 2022-09-06

## 2022-09-06 RX ORDER — FLUTICASONE PROPIONATE 50 MCG
1 SPRAY, SUSPENSION (ML) NASAL DAILY
Qty: 9.9 G | Refills: 5 | OUTPATIENT
Start: 2022-09-06 | End: 2023-09-06

## 2022-09-06 RX ORDER — SENNA AND DOCUSATE SODIUM 50; 8.6 MG/1; MG/1
1 TABLET, FILM COATED ORAL 2 TIMES DAILY
Qty: 60 TABLET | Refills: 0 | Status: SHIPPED | OUTPATIENT
Start: 2022-09-06

## 2022-09-06 RX ORDER — ATORVASTATIN CALCIUM 80 MG/1
80 TABLET, FILM COATED ORAL DAILY
Qty: 90 TABLET | Refills: 0 | Status: SHIPPED | OUTPATIENT
Start: 2022-09-06 | End: 2022-09-28 | Stop reason: SDUPTHER

## 2022-09-06 RX ORDER — DIPHENHYDRAMINE HCL 25 MG
25 TABLET ORAL EVERY 6 HOURS PRN
Qty: 60 TABLET | Refills: 0 | OUTPATIENT
Start: 2022-09-06 | End: 2022-10-06

## 2022-09-06 RX ORDER — DIPHENHYDRAMINE HCL 25 MG
25 TABLET ORAL EVERY 6 HOURS PRN
Qty: 60 TABLET | Refills: 0 | Status: SHIPPED | OUTPATIENT
Start: 2022-09-06 | End: 2022-09-28 | Stop reason: SDUPTHER

## 2022-09-06 RX ORDER — CETIRIZINE HYDROCHLORIDE 10 MG/1
10 TABLET ORAL DAILY
Qty: 30 TABLET | Refills: 0 | OUTPATIENT
Start: 2022-09-06 | End: 2022-10-06

## 2022-09-06 RX ORDER — ARFORMOTEROL TARTRATE 15 UG/2ML
1 SOLUTION RESPIRATORY (INHALATION) 2 TIMES DAILY
Qty: 120 ML | Refills: 5 | Status: SHIPPED | OUTPATIENT
Start: 2022-09-06 | End: 2022-09-28 | Stop reason: SDUPTHER

## 2022-09-06 RX ORDER — LANOLIN ALCOHOL/MO/W.PET/CERES
6 CREAM (GRAM) TOPICAL NIGHTLY PRN
Qty: 30 TABLET | Refills: 2 | OUTPATIENT
Start: 2022-09-06

## 2022-09-06 RX ORDER — BUDESONIDE 0.5 MG/2ML
1 INHALANT ORAL 2 TIMES DAILY
Qty: 60 EACH | Refills: 2 | OUTPATIENT
Start: 2022-09-06

## 2022-09-06 RX ORDER — LANOLIN ALCOHOL/MO/W.PET/CERES
6 CREAM (GRAM) TOPICAL NIGHTLY PRN
Qty: 30 TABLET | Refills: 2 | Status: SHIPPED | OUTPATIENT
Start: 2022-09-06 | End: 2022-09-28 | Stop reason: SDUPTHER

## 2022-09-06 NOTE — TELEPHONE ENCOUNTER
Last OV: 8/26/2022  Next OV: Visit date not found  Recommendation OV date:    Last fill:8/8/22  Refills:0

## 2022-09-08 ENCOUNTER — TELEPHONE (OUTPATIENT)
Dept: PULMONOLOGY | Age: 62
End: 2022-09-08

## 2022-09-08 NOTE — DISCHARGE INSTRUCTIONS
[facility], [address] [place] [address] Road  Telephone: [phone]     Discharge Instructions     Important reminders:     **If you have any signs and symptoms of illness (Cough, fever, congestion, nausea, vomiting, diarrhea, etc.) please call the wound care center prior to your appointment. 1. Increase Protein intake for optimal wound healing  2. No added salt to reduce any swelling  3. If diabetic, maintain good glucose control  4. If you smoke, smoking prohibits wound healing, we ask that you refrain from smoking. 5. When taking antibiotics take the entire prescription as ordered. Do not stop taking until medication is all gone unless otherwise instructed. 6. Exercise as tolerated. 7. Keep weight off wounds and reposition every 2 hours if applicable. 8. If wound(s) is on your lower extremity, elevate legs to the level of the heart or above for 30 minutes 4-5 times a day and/or when sitting. Avoid standing for long periods of time. 9. Do not get wounds wet in bath or shower unless otherwise instructed by your physician. If your wound is on your foot or leg, you may purchase a cast bag. Please ask at the pharmacy. If Vascular testing is ordered, please call [address] [place] [address] ([phone]) to schedule. Vascular tests ordered by Wound Care Physicians may take up to 2 hours to complete. Please keep that in mind when scheduling. If Vascular testing is scheduled, please bring supplies to replace your dressing after testing is done. The vascular department does not stock supplies. Wound: Right and Left leg     With each dressing change, rinse wounds with 0.9% Saline. (May use wound wash or soft contact solution. Both can be purchased at a local drug store). If unable to obtain saline, may use a gentle soap and water. Dressing care: Right and left leg - transfer to back of right leg. Gentamicin cream Triad to wounds.  DO not put a stack of 4x4 over wounds. Just one. Lotrisone to lower legs, Gentle coflex calamine to both,     Dr Emerald Romero, Vascular Surgeon September 14th at 1215pm  [address] [facility], [address]  Phone# [phone]  Fax# [phone]         Compression Wraps  Location: Both legs  Type: Coflex calamine     Your doctor has ordered compression therapy for your wound. Compression bandages reduce the swelling, or edema, in your legs and prevent it from returning. The wound care staff will apply your compression wrap. It must be removed and re-applied at least weekly. As the swelling decreases, the boot no longer provides adequate compression and you need a new one. Once applied, you need to know how to take care of your compression wrap. The boot must stay dry. Do not get it wet in the shower or tub. You may do a partial bath, or you can cover the boot with a large plastic bag, secured at the top, so that no water can get in. Avoid standing in one place for long periods of time. If you must  one place, shift your weight and change positions often. If you have CHF, consult your doctor before following the next two recommendations for leg elevation. When sitting, elevate your legs on pillows, or put blocks under the foot of your bed. Your legs should be higher than your heart. If your boot becomes painful, or you notice an increase in swelling in your toes, numbness or tingling, or purple color to your toes, remove the wrap and call the [facility] [address]. If it is after hours, call your doctor for instructions or go to the nearest emergency room. Home Care Agency/Facility: [facility]     Your wound-care supplies will be provided by:  Please note, depending on your insurance coverage, you may have out-of-pocket expenses for these supplies. Someone from the company should call you to confirm your order and discuss those potential costs before they ship your products -- please anticipate that call.  If your out-of-pocket cost could be substantial, Many companies have financial hardship programs for patients who qualify, so please ask about that if you might need a hand. If you have any questions about your supplies or your potential out-of-pocket costs, or if you need to place an order for a refill of supplies (typically monthly), please call the company directly. Your  is Salvador Sr up with Dr Artur Zuleta in 1 weeks. Wound Care Center Information: Should you experience any significant changes in your wound(s) or have questions about your wound care, please contact the [place] [address] at [phone] Monday  - Thursday 8:00 am - 4:00 pm and Friday 8:00 am - 1:00pm. If you need help with your wound outside these hours and cannot wait until we are again available, contact your PCP or go to the hospital emergency room. PLEASE NOTE: IF YOU ARE UNABLE TO OBTAIN WOUND SUPPLIES, CONTINUE TO USE THE SUPPLIES YOU HAVE AVAILABLE UNTIL YOU ARE ABLE TO REACH US. IT IS MOST IMPORTANT TO KEEP THE WOUND COVERED AT ALL TIMES.

## 2022-09-08 NOTE — TELEPHONE ENCOUNTER
Joseph from [address] [place] [address] called about patients insurance and said he is getting an ineligibility response on his insurance card he tried contacting patient got no answer and he is seeming to think something is wrong with his insurance card.  He said a possible birth date wrong       [facility]: [phone]

## 2022-09-09 ENCOUNTER — HOSPITAL ENCOUNTER (OUTPATIENT)
Dept: WOUND CARE | Age: 62
Discharge: HOME | End: 2022-09-09
Payer: MEDICAID

## 2022-09-09 VITALS — SYSTOLIC BLOOD PRESSURE: 125 MMHG | DIASTOLIC BLOOD PRESSURE: 76 MMHG | TEMPERATURE: 96.4 F | HEART RATE: 100 BPM

## 2022-09-09 DIAGNOSIS — I73.9 PERIPHERAL ARTERY DISEASE (HCC): Primary | ICD-10-CM

## 2022-09-09 PROCEDURE — 29581 APPL MULTLAYER CMPRN SYS LEG: CPT

## 2022-09-09 PROCEDURE — 11043 DBRDMT MUSC&/FSCA 1ST 20/<: CPT

## 2022-09-09 PROCEDURE — 11042 DBRDMT SUBQ TIS 1ST 20SQCM/<: CPT

## 2022-09-09 RX ORDER — LIDOCAINE HYDROCHLORIDE 20 MG/ML
JELLY TOPICAL ONCE
Status: CANCELLED | OUTPATIENT
Start: 2022-09-09 | End: 2022-09-09

## 2022-09-09 RX ORDER — LIDOCAINE 40 MG/G
CREAM TOPICAL ONCE
Status: CANCELLED | OUTPATIENT
Start: 2022-09-09 | End: 2022-09-09

## 2022-09-09 RX ORDER — LIDOCAINE 40 MG/G
CREAM TOPICAL ONCE
Status: DISCONTINUED | OUTPATIENT
Start: 2022-09-09 | End: 2022-09-10 | Stop reason: HOSPADM

## 2022-09-09 RX ORDER — GENTAMICIN SULFATE 1 MG/G
OINTMENT TOPICAL ONCE
Status: CANCELLED | OUTPATIENT
Start: 2022-09-09 | End: 2022-09-09

## 2022-09-09 RX ORDER — BETAMETHASONE DIPROPIONATE 0.05 %
OINTMENT (GRAM) TOPICAL ONCE
Status: CANCELLED | OUTPATIENT
Start: 2022-09-09 | End: 2022-09-09

## 2022-09-09 RX ORDER — LIDOCAINE 50 MG/G
OINTMENT TOPICAL ONCE
Status: CANCELLED | OUTPATIENT
Start: 2022-09-09 | End: 2022-09-09

## 2022-09-09 RX ORDER — BACITRACIN, NEOMYCIN, POLYMYXIN B 400; 3.5; 5 [USP'U]/G; MG/G; [USP'U]/G
OINTMENT TOPICAL ONCE
Status: CANCELLED | OUTPATIENT
Start: 2022-09-09 | End: 2022-09-09

## 2022-09-09 RX ORDER — LIDOCAINE HYDROCHLORIDE 40 MG/ML
SOLUTION TOPICAL ONCE
Status: CANCELLED | OUTPATIENT
Start: 2022-09-09 | End: 2022-09-09

## 2022-09-09 RX ORDER — CLOBETASOL PROPIONATE 0.5 MG/G
OINTMENT TOPICAL ONCE
Status: CANCELLED | OUTPATIENT
Start: 2022-09-09 | End: 2022-09-09

## 2022-09-09 RX ORDER — GINSENG 100 MG
CAPSULE ORAL ONCE
Status: CANCELLED | OUTPATIENT
Start: 2022-09-09 | End: 2022-09-09

## 2022-09-09 RX ORDER — BACITRACIN ZINC AND POLYMYXIN B SULFATE 500; 1000 [USP'U]/G; [USP'U]/G
OINTMENT TOPICAL ONCE
Status: CANCELLED | OUTPATIENT
Start: 2022-09-09 | End: 2022-09-09

## 2022-09-09 ASSESSMENT — PAIN - FUNCTIONAL ASSESSMENT: PAIN_FUNCTIONAL_ASSESSMENT: PREVENTS OR INTERFERES SOME ACTIVE ACTIVITIES AND ADLS

## 2022-09-09 ASSESSMENT — PAIN SCALES - GENERAL: PAINLEVEL_OUTOF10: 4

## 2022-09-09 ASSESSMENT — PAIN DESCRIPTION - DESCRIPTORS: DESCRIPTORS: ACHING

## 2022-09-09 ASSESSMENT — PAIN DESCRIPTION - ORIENTATION: ORIENTATION: LEFT;RIGHT

## 2022-09-09 ASSESSMENT — PAIN DESCRIPTION - ONSET: ONSET: ON-GOING

## 2022-09-09 ASSESSMENT — PAIN DESCRIPTION - FREQUENCY: FREQUENCY: CONTINUOUS

## 2022-09-09 ASSESSMENT — PAIN DESCRIPTION - LOCATION: LOCATION: LEG

## 2022-09-09 NOTE — PROGRESS NOTES
Multilayer Compression Wrap   Below the Knee    NAME:  Efra Ramirez  YOB: 1960  MEDICAL RECORD NUMBER:  [id number]  DATE:  9/9/2022    Multilayer compression wrap: Applied primary and secondary dressing as ordered. Applied multilayered dressing below the knee to right lower leg. Applied multilayered dressing below the knee to left lower leg. Instructed patient/caregiver not to remove dressing and to keep it clean and dry. Instructed patient/caregiver on complications to report to provider, such as pain, numbness in toes, heavy drainage, and slippage of dressing. Instructed patient on purpose of compression dressing and on activity and exercise recommendations.      Applied  2 layer wrap from toes to knee overlapping each time    Electronically signed by Mia Proctor RN on 9/9/2022 at 12:51 PM

## 2022-09-09 NOTE — PROGRESS NOTES
[facility]  Progress Note and Procedure Note      Karen Mc  AGE: 65 y.o. GENDER: male  : 1960  TODAY'S DATE:  2022    Subjective:     Chief Complaint   Patient presents with    Wound Check     Bilateral  legs F/U         HISTORY of PRESENT ILLNESS HPI     Karen Mc is a 65 y.o. male who presents today for wound evaluation. History of Wound: Admits to having chronic wounds for at least a year on the left leg and 8 to 9 months on the right leg. He states that he has had arterial bypasses on both of his legs in the past.  He was seeing previous wound care and podiatry for these wounds. He states that his insurance had some issues and he has not been seeing anyone since 2021 he has been treating the wounds himself with PSO and bandages. He has had to take off the bandages because he was walking through water. He put bandages on his legs and they ripped some of the skin off the back of the right leg.     Wound Pain:  intermittent  Severity:  3 / 10   Wound Type:  venous, arterial and diabetic  Modifying Factors:  edema, venous stasis, lymphedema, diabetes, decreased mobility, arterial insufficiency and decreased tissue oxygenation  Associated Signs/Symptoms:  drainage, numbness and odor        PAST MEDICAL HISTORY        Diagnosis Date    Cancer ([address]     throat    Diabetes mellitus ([address]     Fibromyalgia     History of DVT (deep vein thrombosis)     Hyperlipidemia     Hypertension     Type 2 diabetes mellitus with circulatory disorder, without long-term current use of insulin ([address] 2022       PAST SURGICAL HISTORY    Past Surgical History:   Procedure Laterality Date    APPENDECTOMY  2005    COLONOSCOPY  2016    FEMORAL BYPASS Left 2020    femoral posterior tibial bypass    FEMORAL-TIBIAL BYPASS GRAFT Right 2020    with femoral endarterectomy and patch angioplasty    FOOT DEBRIDEMENT Left 2020    FOOT DEBRIDEMENT Right 2021 DEBRIDEMENT OF RIGHT FOOT WOUND WITH GRAFT APPLICATION performed by Amy Hernandez DPM at [address] N/A 3/25/2022    DIRECT LARYNGOSCOPY WITH BIOPSY AND FROZEN SECTIONS performed by Aron Murray DO at [address] [place] [address] 2020    stent leg for PVD    TRACHEOSTOMY N/A 3/25/2022    TRACHEOTOMY performed by Aron Murray DO at [address] HISTORY    Family History   Problem Relation Age of Onset    Cancer Mother     Lung Cancer Mother     Diabetes Father     Stroke Father        SOCIAL HISTORY    Social History     Tobacco Use    Smoking status: Former     Packs/day: 0.50     Years: 20.00     Pack years: 10.00     Types: Cigarettes     Start date: 1977     Quit date: 2021     Years since quittin.7    Smokeless tobacco: Never   Vaping Use    Vaping Use: Never used   Substance Use Topics    Alcohol use: Yes     Comment: 2 beers / day     Drug use: Never       ALLERGIES    Allergies   Allergen Reactions    Chantix [Varenicline] Other (See Comments)     Hallucinations         MEDICATIONS    Current Outpatient Medications on File Prior to Encounter   Medication Sig Dispense Refill    budesonide (PULMICORT) 0.5 MG/2ML nebulizer suspension Take 2 mLs by nebulization 2 times daily 60 each 2    sennosides-docusate sodium (SENOKOT-S) 8.6-50 MG tablet Take 1 tablet by mouth 2 times daily 60 tablet 0    melatonin 3 MG TABS tablet Take 2 tablets by mouth nightly as needed (insomnia) 30 tablet 2    atorvastatin (LIPITOR) 80 MG tablet Take 1 tablet by mouth daily Cancel atorvastatin 20 mg a day 90 tablet 0    warfarin (COUMADIN) 5 MG tablet Take daily as directed by anti coag clinic to maintain INR 2-3 90 tablet 1    diphenhydrAMINE (BENADRYL ALLERGY) 25 MG tablet Take 1 tablet by mouth every 6 hours as needed for Itching 60 tablet 0    cyanocobalamin 250 MCG tablet Take 1 tablet by mouth daily 30 tablet 0    metFORMIN (GLUCOPHAGE) 500 MG tablet Take 1 tablet by mouth 2 times daily (with meals) 60 tablet 0    cetirizine (ZYRTEC) 10 MG tablet Take 1 tablet by mouth daily 30 tablet 0    lisinopril (PRINIVIL;ZESTRIL) 20 MG tablet Take 1 tablet by mouth daily 30 tablet 0    Arformoterol Tartrate (BROVANA) 15 MCG/2ML NEBU Take 1 ampule by nebulization 2 times daily 120 mL 5    BANOPHEN 25 MG capsule Take 1 tablet by mouth every 6 hours as needed for Itching      ondansetron (ZOFRAN-ODT) 4 MG disintegrating tablet Take 4 mg by mouth every 8 hours as needed for Nausea or Vomiting      Multiple Vitamins-Minerals (MULTIVITAMIN ADULT EXTRA C PO) 1 tablet by Nasogastric route daily      esomeprazole (NEXIUM) 20 MG delayed release capsule 20 mg by Nasogastric route every morning (before breakfast)      oxyCODONE HCl (OXY-IR) 10 MG immediate release tablet TAKE 1 TABLET BY MOUTH EVERY SIX HOURS AS NEEDED FOR PAIN FOR UP TO 7 DAYS      gentamicin (GARAMYCIN) 0.1 % cream Apply topically  daily. 30 g 1    becaplermin (REGRANEX) 0.01 % gel Apply 1 Applicatorful topically daily      calcium-vitamin D (OSCAL-500) 500-200 MG-UNIT per tablet Take 1 tablet by mouth 2 times daily      ipratropium-albuterol (DUONEB) 0.5-2.5 (3) MG/3ML SOLN nebulizer solution Inhale 3 mLs into the lungs 4 times daily 360 mL 3    acetaminophen (TYLENOL) 500 MG tablet Take 1 tablet by mouth 4 times daily as needed for Pain 360 tablet 1    fluticasone (FLONASE) 50 MCG/ACT nasal spray 1 spray by Nasal route daily 9.9 g 5    Vitamins/Minerals TABS Take 1 tablet by mouth daily        No current facility-administered medications on file prior to encounter. REVIEW OF SYSTEMS    Pertinent items are noted in HPI. Objective:      /76   Pulse 100   Temp (!) 96.4 °F (35.8 °C)     PHYSICAL EXAM    Vascular: Vascular status Impaired  palpable pedal pulses, right DP0/4 and PT1/4, left DP0/4 and PT1/4. CFT 3 seconds digits 1 to 5 bilateral.  Hair growthAbsent  both lower extremities and feet.   Skin temperature is warm to warm from pretibial area to distal digits bilateral.  Exam is negative for rubor, pallor, cyanosis or signs of acute vascular compromise bilaterally. Exam is positive for edema bilateral lower extremity. Varicosities Present bilateral lower extremity. Neuro: Neurologic status diminished bilateral with epicritic Absent  , proprioceptive Absent , vibratory sensation Absent  and protopathicPresent. DTRs Absent  bilateral Achilles. There were no reproducible neuritic symptoms on exam bilateral feet/ankles. Derm: Ulceration to bilateral ankles. Ecchymosis Absent  bilateral feet/foot. Musculoskeletal: No pain with debridement of wounds 5/5 muscle strength in/eversion and dorsi/plantarflexion bilateral feet. No gross instability noted. Assessment:     Problem List Items Addressed This Visit       Peripheral artery disease ([facility] - Primary    Relevant Medications    lidocaine (LMX) 4 % cream    Other Relevant Orders    Initiate Outpatient Wound Care Protocol       Procedure Note    Performed by: Osman Irby DPM    Consent obtained: Yes    Time out taken:  Yes    Pain Control: Anesthetic  Anesthetic: 4% Lidocaine Cream     Debridement:Excisional Debridement    Using curette the wound was sharply debrided    down through and including the removal of epidermis, dermis, subcutaneous tissue and muscle/fascia.         Devitalized Tissue Debrided:  fibrin, biofilm, slough, necrotic/eschar and exudate    Pre Debridement Measurements:  Are located in the Wound Documentation Flow Sheet    Wound #: 1     Wound Care Documentation:  Wound 05/18/22 Ankle Medial;Right #2 (Active)   Wound Image   09/09/22 1055   Wound Etiology Diabetic 09/09/22 1055   Wound Cleansed Cleansed with saline 09/09/22 1055   Dressing/Treatment Antibacterial ointment;Triad hydro/zinc oxide-based hydrophilic paste 58/00/12 7263   Offloading for Diabetic Foot Ulcers Post op shoe 09/09/22 1055   Wound Length (cm) 1 cm 09/09/22 1055   Wound Width (cm) 13 cm 09/09/22 1055   Wound Depth (cm) 0.1 cm 09/09/22 1055   Wound Surface Area (cm^2) 13 cm^2 09/09/22 1055   Change in Wound Size % (l*w) -62.5 09/09/22 1055   Wound Volume (cm^3) 1.3 cm^3 09/09/22 1055   Wound Healing % -63 09/09/22 1055   Post-Procedure Length (cm) 1 cm 09/09/22 1126   Post-Procedure Width (cm) 1.3 cm 09/09/22 1126   Post-Procedure Depth (cm) 0.1 cm 09/09/22 1126   Post-Procedure Surface Area (cm^2) 1.3 cm^2 09/09/22 1126   Post-Procedure Volume (cm^3) 0.13 cm^3 09/09/22 1126   Wound Assessment Bleeding 09/09/22 1126   Drainage Amount Small 09/09/22 1055   Drainage Description Yellow 09/09/22 1055   Odor None 09/09/22 1055   Jamila-wound Assessment Excoriated 09/09/22 1055   Margins Attached edges 09/09/22 1055   Number of days: 114       Wound 06/24/22 Leg Left;Medial #1 (Active)   Wound Image   09/09/22 1055   Wound Etiology Diabetic 09/09/22 1055   Wound Cleansed Cleansed with saline 09/09/22 1055   Dressing/Treatment Antibacterial ointment;Triad hydro/zinc oxide-based hydrophilic paste 00/93/67 3316   Offloading for Diabetic Foot Ulcers Post op shoe 09/09/22 1055   Wound Length (cm) 2 cm 09/09/22 1055   Wound Width (cm) 4 cm 09/09/22 1055   Wound Depth (cm) 0.1 cm 09/09/22 1055   Wound Surface Area (cm^2) 8 cm^2 09/09/22 1055   Change in Wound Size % (l*w) -16.96 09/09/22 1055   Wound Volume (cm^3) 0.8 cm^3 09/09/22 1055   Wound Healing % -17 09/09/22 1055   Post-Procedure Length (cm) 2 cm 09/09/22 1126   Post-Procedure Width (cm) 4 cm 09/09/22 1126   Post-Procedure Depth (cm) 0.1 cm 09/09/22 1126   Post-Procedure Surface Area (cm^2) 8 cm^2 09/09/22 1126   Post-Procedure Volume (cm^3) 0.8 cm^3 09/09/22 1126   Wound Assessment Bleeding 09/09/22 1126   Drainage Amount Moderate 09/09/22 1055   Drainage Description Yellow 09/09/22 1055   Odor None 09/09/22 1055   Jamila-wound Assessment Dry/flaky 09/09/22 1055   Margins Defined edges 09/09/22 1055   Number of days: 77       Total Surface Area Debrided: 8 cm² left leg into the deep fascia and sq cm 1.3 cm² into the subcutaneous tissue of the right foot    Percentage of wound debrided 100%    Bleeding:  Minimal    Hemostasis Achieved:  by pressure    Procedural Pain:  0  / 10     Post Procedural Pain:  0 / 10     Response to treatment:  Well tolerated by patient. Plan:   Patient examined and evaluated   Wounds debrided without incident   Multilayer compression wrap right leg with Lotrisone. Multilayer compression wrap left leg  Follow-up one week  Keep legs elevated at all times when not active. Continue follow-up with vascular surgery for decision on wounds for improving the blood flow to the distal left leg. He has not made his decision on what he would like to do. Patient would benefit from disability due to his multiple comorbidities including his chronic peripheral arterial disease with high risk for limb loss. The nature of the patient's condition was explained in depth. The patient was informed that their compliance to the treatment Plan is paramount to successful healing and prevention of further ulceration and/or infection.   Application of Lotrisone to the irritated areas of his leg right  Discharge Treatment follow-up on Friday for dressing change    Written Patient Discharge Instructions Given            Electronically signed by Ping Vazquez DPM on 9/9/2022 at 12:18 PM

## 2022-09-12 ENCOUNTER — TELEPHONE (OUTPATIENT)
Dept: INTERNAL MEDICINE CLINIC | Age: 62
End: 2022-09-12

## 2022-09-12 DIAGNOSIS — M25.50 ARTHRALGIA, UNSPECIFIED JOINT: ICD-10-CM

## 2022-09-12 NOTE — TELEPHONE ENCOUNTER
Contact patient and see what he needs the gentamicin cream for I think he may be using it for his surgical site we can send him a prescription if he needs to but please check with him first

## 2022-09-13 ENCOUNTER — TELEPHONE (OUTPATIENT)
Dept: INTERNAL MEDICINE CLINIC | Age: 62
End: 2022-09-13

## 2022-09-13 ENCOUNTER — ANTI-COAG VISIT (OUTPATIENT)
Dept: PHARMACY | Age: 62
End: 2022-09-13

## 2022-09-13 DIAGNOSIS — I73.9 PERIPHERAL ARTERY DISEASE (HCC): Primary | ICD-10-CM

## 2022-09-13 LAB — INR BLD: 1.2

## 2022-09-13 PROCEDURE — 99211 OFF/OP EST MAY X REQ PHY/QHP: CPT

## 2022-09-13 PROCEDURE — 85610 PROTHROMBIN TIME: CPT

## 2022-09-13 NOTE — DISCHARGE INSTRUCTIONS
[address], [address] [facility] [address]  Telephone: [phone]     Discharge Instructions     Important reminders:     **If you have any signs and symptoms of illness (Cough, fever, congestion, nausea, vomiting, diarrhea, etc.) please call the wound care center prior to your appointment. 1. Increase Protein intake for optimal wound healing  2. No added salt to reduce any swelling  3. If diabetic, maintain good glucose control  4. If you smoke, smoking prohibits wound healing, we ask that you refrain from smoking. 5. When taking antibiotics take the entire prescription as ordered. Do not stop taking until medication is all gone unless otherwise instructed. 6. Exercise as tolerated. 7. Keep weight off wounds and reposition every 2 hours if applicable. 8. If wound(s) is on your lower extremity, elevate legs to the level of the heart or above for 30 minutes 4-5 times a day and/or when sitting. Avoid standing for long periods of time. 9. Do not get wounds wet in bath or shower unless otherwise instructed by your physician. If your wound is on your foot or leg, you may purchase a cast bag. Please ask at the pharmacy. If Vascular testing is ordered, please call [address] [place] [address] ([phone]) to schedule. Vascular tests ordered by Wound Care Physicians may take up to 2 hours to complete. Please keep that in mind when scheduling. If Vascular testing is scheduled, please bring supplies to replace your dressing after testing is done. The vascular department does not stock supplies. Wound: Right and Left leg     With each dressing change, rinse wounds with 0.9% Saline. (May use wound wash or soft contact solution. Both can be purchased at a local drug store). If unable to obtain saline, may use a gentle soap and water. Dressing care: Right and left leg - transfer to back of right leg. Gentamicin cream Triad to wounds.  DO not put a stack of 4x4 over wounds. Just one. Lotrisone to lower legs, Gentle coflex calamine to both,     Dr Diann Cervantes, Vascular Surgeon September 14th at 1215pm  [address] [place] [address], [address]  Phone# [phone]  Fax# [phone]         Compression Wraps  Location: Both legs  Type: Coflex calamine     Your doctor has ordered compression therapy for your wound. Compression bandages reduce the swelling, or edema, in your legs and prevent it from returning. The wound care staff will apply your compression wrap. It must be removed and re-applied at least weekly. As the swelling decreases, the boot no longer provides adequate compression and you need a new one. Once applied, you need to know how to take care of your compression wrap. The boot must stay dry. Do not get it wet in the shower or tub. You may do a partial bath, or you can cover the boot with a large plastic bag, secured at the top, so that no water can get in. Avoid standing in one place for long periods of time. If you must  one place, shift your weight and change positions often. If you have CHF, consult your doctor before following the next two recommendations for leg elevation. When sitting, elevate your legs on pillows, or put blocks under the foot of your bed. Your legs should be higher than your heart. If your boot becomes painful, or you notice an increase in swelling in your toes, numbness or tingling, or purple color to your toes, remove the wrap and call the [facility] [address]. If it is after hours, call your doctor for instructions or go to the nearest emergency room. Home Care Agency/Facility: [facility]     Your wound-care supplies will be provided by:  Please note, depending on your insurance coverage, you may have out-of-pocket expenses for these supplies. Someone from the company should call you to confirm your order and discuss those potential costs before they ship your products -- please anticipate that call.  If your out-of-pocket cost could be substantial, Many companies have financial hardship programs for patients who qualify, so please ask about that if you might need a hand. If you have any questions about your supplies or your potential out-of-pocket costs, or if you need to place an order for a refill of supplies (typically monthly), please call the company directly. Your  is Donna Kramer     Follow up with Dr Gwen Ayala in 1 weeks. Wound Care Center Information: Should you experience any significant changes in your wound(s) or have questions about your wound care, please contact the [place] [address] at [phone] Monday  - Thursday 8:00 am - 4:00 pm and Friday 8:00 am - 1:00pm. If you need help with your wound outside these hours and cannot wait until we are again available, contact your PCP or go to the hospital emergency room. PLEASE NOTE: IF YOU ARE UNABLE TO OBTAIN WOUND SUPPLIES, CONTINUE TO USE THE SUPPLIES YOU HAVE AVAILABLE UNTIL YOU ARE ABLE TO REACH US. IT IS MOST IMPORTANT TO KEEP THE WOUND COVERED AT ALL TIMES.

## 2022-09-13 NOTE — PROGRESS NOTES
Taya Starkey is a 65 y.o. here for warfarin management. Dena Nation had an INR test today. Results were reviewed and appropriate warfarin management was completed. This visit was performed as: An in person visit. Protocols were followed with precautions to reduce the spread of COVID-19. Patient verifies current dosing regimen: Yes     Warfarin medication reviewed and updated on the patient 's home medication list: Yes   All other medications reviewed and updated on the patient 's home medication list: No: No changes in medications. Lab Results   Component Value Date    INR 1.20 2022    INR 1.90 2022    INR 2.2 2022       Patient Findings       Positives:  Change in diet/appetite    Negatives:  Signs/symptoms of thrombosis, Signs/symptoms of bleeding, Change in health, Change in activity, Missed doses, Extra doses, Change in medications, Bruising    Comments:  Complain of appetite loss leading to decrease in weight from 190-149 Ibs in 3 weeks. Patient has been drinking a lot of boost shakes ( 6/day). He said he will drink it until he completes his radiation in 4 weeks. Ate salad on . Anticoagulation Summary  As of 2022      INR goal:  2.0-3.0   TTR:  50.4 % (11.7 mo)   INR used for dosin.20 (2022)   Warfarin maintenance plan:  7.5 mg (5 mg x 1.5) every Thu; 10 mg (5 mg x 2) all other days   Weekly warfarin total:  67.5 mg   Plan last modified:  Alicia Eduardo (2022)   Next INR check:  2022   Priority:  High   Target end date:   Indefinite    Indications    Peripheral artery disease ([facility] [I73.9]                 Anticoagulation Episode Summary       INR check location:  Anticoagulation Clinic    Preferred lab:      Send INR reminders to:  [facility]    Comments:  EPIC          Anticoagulation Care Providers       Provider Role Specialty Phone number    Kami Nuñez MD Referring Family Medicine [phone] Warfarin assessment / plan:     Appears well  Sub-therapeutic INR     Denies missed doses. Denies medication changes. Denies alcohol changes. Denies increased activity. Increased Vitamin K intake. INR today very low 1.2. It can be linked to drinking of 6 boost shakes per day. Patient stated that he will continue drinking the boost until he completes radiation in 4 weeks. Patient also complained of weight loss from 190-149 Ibs in 3 weeks. Patient instructed to start a NEW DOSE:  Warfarin 10 mg daily except 7.5 mg (1 & 1/2 tablets) on thursdays. To return in 10 days. Instructed to monitor for s/s of bleeding. Description    NEW DOSE:  Warfarin 10 mg daily except 7.5 mg (1 & 1/2 tablets) on thursdays. Call [phone] with signs or symptoms of bleeding or ANY medication changes (including over-the-counter medications or herbal supplements). If significant bleeding occurs please seek immediate medical attention. Keep the number of servings of vitamin K containing foods (dark green, leafy vegetables) the same each week. Dark green vegetable 2-3 times a week and a mixed green salad ~ 3 times a week. Please call if this changes. Limit alcohol intake. Please call if this changes. Immunization History   Administered Date(s) Administered    COVID-19, PFIZER PURPLE top, DILUTE for use, (age 15 y+), 30mcg/0.3mL 03/15/2021, 04/12/2021, 12/22/2021    Influenza Virus Vaccine 01/21/2017, 01/21/2017    Influenza, FLUARIX, FLULAVAL, Misael Ebbing (age 10 mo+) AND AFLURIA, (age 1 y+), PF, 0.5mL 10/31/2020    PPD Test 04/14/2022, 04/23/2022, 06/03/2022    Tdap (Boostrix, Adacel) 03/27/2018    Zoster Recombinant (Shingrix) 03/27/2018           Orders Placed This Encounter   Procedures    Protime-INR     This external order was created through the results console. No orders of the defined types were placed in this encounter. Reviewed AVS with patient / caregiver.     Billing Points:  Adjust dosage and/or reconcile meds (fill pill box) </= 5 medications - 2 points       CLINICAL PHARMACY CONSULT: MED RECONCILIATION/REVIEW ADDENDUM    For Pharmacy Admin Tracking Only    Intervention Detail: Dose Adjustment: 1, reason: Therapy Optimization  Total # of Interventions Recommended: 1  Total # of Interventions Accepted: 1  Time Spent (min): 20

## 2022-09-13 NOTE — TELEPHONE ENCOUNTER
Pt wife calling back---pt get the Gentamiacin at [facility] and Taran Rivas  [phone]. This is a new pharmacy for them. Also asking to get a script for Extra Strength Tylenol --their insuance will pay for this.  Thanks

## 2022-09-14 ENCOUNTER — HOSPITAL ENCOUNTER (OUTPATIENT)
Dept: WOUND CARE | Age: 62
Discharge: HOME | End: 2022-09-14
Payer: MEDICAID

## 2022-09-14 ENCOUNTER — OFFICE VISIT (OUTPATIENT)
Dept: VASCULAR SURGERY | Age: 62
End: 2022-09-14
Payer: MEDICAID

## 2022-09-14 VITALS
DIASTOLIC BLOOD PRESSURE: 84 MMHG | BODY MASS INDEX: 25.16 KG/M2 | SYSTOLIC BLOOD PRESSURE: 130 MMHG | WEIGHT: 166 LBS | HEIGHT: 68 IN

## 2022-09-14 VITALS — HEART RATE: 96 BPM | SYSTOLIC BLOOD PRESSURE: 132 MMHG | DIASTOLIC BLOOD PRESSURE: 76 MMHG

## 2022-09-14 DIAGNOSIS — I73.9 PERIPHERAL ARTERY DISEASE (HCC): Primary | ICD-10-CM

## 2022-09-14 PROCEDURE — 29581 APPL MULTLAYER CMPRN SYS LEG: CPT

## 2022-09-14 PROCEDURE — 11042 DBRDMT SUBQ TIS 1ST 20SQCM/<: CPT

## 2022-09-14 PROCEDURE — 11043 DBRDMT MUSC&/FSCA 1ST 20/<: CPT

## 2022-09-14 PROCEDURE — 11046 DBRDMT MUSC&/FSCA EA ADDL: CPT

## 2022-09-14 PROCEDURE — 99212 OFFICE O/P EST SF 10 MIN: CPT | Performed by: SURGERY

## 2022-09-14 RX ORDER — BACITRACIN, NEOMYCIN, POLYMYXIN B 400; 3.5; 5 [USP'U]/G; MG/G; [USP'U]/G
OINTMENT TOPICAL ONCE
Status: CANCELLED | OUTPATIENT
Start: 2022-09-14 | End: 2022-09-14

## 2022-09-14 RX ORDER — ACETAMINOPHEN 500 MG
500 TABLET ORAL 4 TIMES DAILY PRN
Qty: 360 TABLET | Refills: 1 | Status: SHIPPED | OUTPATIENT
Start: 2022-09-14

## 2022-09-14 RX ORDER — CLOBETASOL PROPIONATE 0.5 MG/G
OINTMENT TOPICAL ONCE
Status: CANCELLED | OUTPATIENT
Start: 2022-09-14 | End: 2022-09-14

## 2022-09-14 RX ORDER — LIDOCAINE HYDROCHLORIDE 20 MG/ML
JELLY TOPICAL ONCE
Status: CANCELLED | OUTPATIENT
Start: 2022-09-14 | End: 2022-09-14

## 2022-09-14 RX ORDER — LIDOCAINE 50 MG/G
OINTMENT TOPICAL ONCE
Status: CANCELLED | OUTPATIENT
Start: 2022-09-14 | End: 2022-09-14

## 2022-09-14 RX ORDER — LIDOCAINE HYDROCHLORIDE 40 MG/ML
SOLUTION TOPICAL ONCE
Status: CANCELLED | OUTPATIENT
Start: 2022-09-14 | End: 2022-09-14

## 2022-09-14 RX ORDER — LIDOCAINE 40 MG/G
CREAM TOPICAL ONCE
Status: DISCONTINUED | OUTPATIENT
Start: 2022-09-14 | End: 2022-09-15 | Stop reason: HOSPADM

## 2022-09-14 RX ORDER — CLINDAMYCIN HYDROCHLORIDE 300 MG/1
300 CAPSULE ORAL 3 TIMES DAILY
Qty: 21 CAPSULE | Refills: 0 | Status: SHIPPED | OUTPATIENT
Start: 2022-09-14 | End: 2022-09-21

## 2022-09-14 RX ORDER — GENTAMICIN SULFATE 1 MG/G
CREAM TOPICAL
Qty: 30 G | Refills: 1 | Status: SHIPPED | OUTPATIENT
Start: 2022-09-14

## 2022-09-14 RX ORDER — BACITRACIN ZINC AND POLYMYXIN B SULFATE 500; 1000 [USP'U]/G; [USP'U]/G
OINTMENT TOPICAL ONCE
Status: CANCELLED | OUTPATIENT
Start: 2022-09-14 | End: 2022-09-14

## 2022-09-14 RX ORDER — LIDOCAINE 40 MG/G
CREAM TOPICAL ONCE
Status: CANCELLED | OUTPATIENT
Start: 2022-09-14 | End: 2022-09-14

## 2022-09-14 RX ORDER — BETAMETHASONE DIPROPIONATE 0.05 %
OINTMENT (GRAM) TOPICAL ONCE
Status: CANCELLED | OUTPATIENT
Start: 2022-09-14 | End: 2022-09-14

## 2022-09-14 RX ORDER — GENTAMICIN SULFATE 1 MG/G
OINTMENT TOPICAL ONCE
Status: CANCELLED | OUTPATIENT
Start: 2022-09-14 | End: 2022-09-14

## 2022-09-14 RX ORDER — GINSENG 100 MG
CAPSULE ORAL ONCE
Status: CANCELLED | OUTPATIENT
Start: 2022-09-14 | End: 2022-09-14

## 2022-09-14 ASSESSMENT — PAIN DESCRIPTION - PAIN TYPE: TYPE: CHRONIC PAIN

## 2022-09-14 ASSESSMENT — PAIN - FUNCTIONAL ASSESSMENT: PAIN_FUNCTIONAL_ASSESSMENT: PREVENTS OR INTERFERES SOME ACTIVE ACTIVITIES AND ADLS

## 2022-09-14 ASSESSMENT — PAIN DESCRIPTION - ORIENTATION: ORIENTATION: LEFT;RIGHT

## 2022-09-14 ASSESSMENT — PAIN DESCRIPTION - LOCATION: LOCATION: LEG;FOOT

## 2022-09-14 ASSESSMENT — PAIN DESCRIPTION - ONSET: ONSET: ON-GOING

## 2022-09-14 ASSESSMENT — PAIN DESCRIPTION - DESCRIPTORS: DESCRIPTORS: ACHING

## 2022-09-14 ASSESSMENT — PAIN DESCRIPTION - FREQUENCY: FREQUENCY: CONTINUOUS

## 2022-09-14 ASSESSMENT — PAIN SCALES - GENERAL: PAINLEVEL_OUTOF10: 4

## 2022-09-14 NOTE — PLAN OF CARE
Discharge instructions given. Patient verbalized understanding. Return to [facility] in 1 week(s).

## 2022-09-14 NOTE — TELEPHONE ENCOUNTER
PT states that the cream is being used on his ankle. Also requesting for extra strength tylenol which is now fully covered by insurance They are using a new pharmacy:   [facility] [address]       [facility] at [phone] when med order has been sent to pharmacy.      Thank you

## 2022-09-14 NOTE — LETTER
[facility]) - Vascular and Endovascular Surgeons  [place] [address]  Phone: [phone]  Fax: [phone]           Cristiane Chin MD      September 14, 2022     Patient: Babita Archibald   MR Number: [id number]   YOB: 1960   Date of Visit: 9/14/2022       Dear Dr. Froy Rodríguez: Thank you for referring Babita Archibald to me for evaluation/treatment. Below are the relevant portions of my assessment and plan of care. If you have questions, please do not hesitate to call me. I look forward to following Randie Harada along with you.     Sincerely,        Cristiane Chin MD    CC providers:  Sulaiman Calix DPM  [address] [place] [id number]  [address]

## 2022-09-14 NOTE — PROGRESS NOTES
[facility]  Progress Note and Procedure Note      Clinton Fernandes  AGE: 65 y.o. GENDER: male  : 1960  TODAY'S DATE:  2022    Subjective:     Chief Complaint   Patient presents with    Wound Check     Bilateral legs F/U         HISTORY of PRESENT ILLNESS HPI     Clinton Fernandes is a 65 y.o. male who presents today for wound evaluation. History of Wound: Admits to having chronic wounds for at least a year on the left leg and 8 to 9 months on the right leg. He states that he has had arterial bypasses on both of his legs in the past.  He was seeing previous wound care and podiatry for these wounds. He states that his insurance had some issues and he has not been seeing anyone since 2021 he has been treating the wounds himself with PSO and bandages. Admits that the right leg has become itchy again and there is some red blotchiness. He states this is how the leg cellulitis always starts out. He thinks the wounds are looking better.     Wound Pain:  intermittent  Severity:  3 / 10   Wound Type:  venous, arterial and diabetic  Modifying Factors:  edema, venous stasis, lymphedema, diabetes, decreased mobility, arterial insufficiency and decreased tissue oxygenation  Associated Signs/Symptoms:  drainage, numbness and odor        PAST MEDICAL HISTORY        Diagnosis Date    Cancer ([address]     throat    Diabetes mellitus ([address]     Fibromyalgia     History of DVT (deep vein thrombosis)     Hyperlipidemia     Hypertension     Type 2 diabetes mellitus with circulatory disorder, without long-term current use of insulin ([address] 2022       PAST SURGICAL HISTORY    Past Surgical History:   Procedure Laterality Date    APPENDECTOMY  2005    COLONOSCOPY  2016    FEMORAL BYPASS Left 2020    femoral posterior tibial bypass    FEMORAL-TIBIAL BYPASS GRAFT Right 2020    with femoral endarterectomy and patch angioplasty    FOOT DEBRIDEMENT Left 2020    FOOT DEBRIDEMENT Right 2021    DEBRIDEMENT OF RIGHT FOOT WOUND WITH GRAFT APPLICATION performed by Jennifer Crawford DPM at [address] N/A 3/25/2022    DIRECT LARYNGOSCOPY WITH BIOPSY AND FROZEN SECTIONS performed by Yesy Jordan DO at [address] Right 2020    stent leg for PVD    TRACHEOSTOMY N/A 3/25/2022    TRACHEOTOMY performed by Yesy Jordan DO at [address] [facility] [address] HISTORY    Family History   Problem Relation Age of Onset    Cancer Mother     Lung Cancer Mother     Diabetes Father     Stroke Father        SOCIAL HISTORY    Social History     Tobacco Use    Smoking status: Former     Packs/day: 0.50     Years: 20.00     Pack years: 10.00     Types: Cigarettes     Start date: 1977     Quit date: 2021     Years since quittin.7    Smokeless tobacco: Never   Vaping Use    Vaping Use: Never used   Substance Use Topics    Alcohol use: Yes     Comment: 2 beers / day     Drug use: Never       ALLERGIES    Allergies   Allergen Reactions    Chantix [Varenicline] Other (See Comments)     Hallucinations         MEDICATIONS    Current Outpatient Medications on File Prior to Encounter   Medication Sig Dispense Refill    acetaminophen (TYLENOL) 500 MG tablet Take 1 tablet by mouth 4 times daily as needed for Pain 360 tablet 1    gentamicin (GARAMYCIN) 0.1 % cream Apply topically  daily.  30 g 1    budesonide (PULMICORT) 0.5 MG/2ML nebulizer suspension Take 2 mLs by nebulization 2 times daily 60 each 2    sennosides-docusate sodium (SENOKOT-S) 8.6-50 MG tablet Take 1 tablet by mouth 2 times daily 60 tablet 0    melatonin 3 MG TABS tablet Take 2 tablets by mouth nightly as needed (insomnia) 30 tablet 2    atorvastatin (LIPITOR) 80 MG tablet Take 1 tablet by mouth daily Cancel atorvastatin 20 mg a day 90 tablet 0    warfarin (COUMADIN) 5 MG tablet Take daily as directed by anti coag clinic to maintain INR 2-3 (Patient taking differently: Take 10 mg by mouth daily Except DP0/4 and PT1/4. CFT 3 seconds digits 1 to 5 bilateral.  Hair growthAbsent  both lower extremities and feet. Skin temperature is warm to warm from pretibial area to distal digits bilateral.  Exam is negative for rubor, pallor, cyanosis or signs of acute vascular compromise bilaterally. Exam is positive for edema bilateral lower extremity. Varicosities Present bilateral lower extremity. Neuro: Neurologic status diminished bilateral with epicritic Absent  , proprioceptive Absent , vibratory sensation Absent  and protopathicPresent. DTRs Absent  bilateral Achilles. There were no reproducible neuritic symptoms on exam bilateral feet/ankles. Derm: Ulceration to bilateral ankles. Ecchymosis Absent  bilateral feet/foot. Musculoskeletal: No pain with debridement of wounds 5/5 muscle strength in/eversion and dorsi/plantarflexion bilateral feet. No gross instability noted. Assessment:     Problem List Items Addressed This Visit       Peripheral artery disease ([facility] - Primary    Relevant Medications    lidocaine (LMX) 4 % cream    Other Relevant Orders    Initiate Outpatient Wound Care Protocol       Procedure Note    Performed by: Lanie Canales DPM    Consent obtained: Yes    Time out taken:  Yes    Pain Control: Anesthetic  Anesthetic: 4% Lidocaine Cream     Debridement:Excisional Debridement    Using curette the wound was sharply debrided    down through and including the removal of epidermis, dermis, subcutaneous tissue and muscle/fascia.         Devitalized Tissue Debrided:  fibrin, biofilm, slough, necrotic/eschar and exudate    Pre Debridement Measurements:  Are located in the Wound Documentation Flow Sheet    Wound #: 1     Wound Care Documentation:  Wound 05/18/22 Ankle Medial;Right #2 (Active)   Wound Image   09/09/22 1055   Wound Etiology Diabetic 09/14/22 1337   Wound Cleansed Cleansed with saline 09/14/22 1337   Dressing/Treatment Antibacterial ointment;Triad hydro/zinc oxide-based hydrophilic paste 12/25/46 6277   Offloading for Diabetic Foot Ulcers Offloading not required 09/14/22 1337   Wound Length (cm) 0.7 cm 09/14/22 1337   Wound Width (cm) 0.8 cm 09/14/22 1337   Wound Depth (cm) 0.1 cm 09/14/22 1337   Wound Surface Area (cm^2) 0.56 cm^2 09/14/22 1337   Change in Wound Size % (l*w) 93 09/14/22 1337   Wound Volume (cm^3) 0.056 cm^3 09/14/22 1337   Wound Healing % 93 09/14/22 1337   Post-Procedure Length (cm) 0.7 cm 09/14/22 1410   Post-Procedure Width (cm) 0.8 cm 09/14/22 1410   Post-Procedure Depth (cm) 0.1 cm 09/14/22 1410   Post-Procedure Surface Area (cm^2) 0.56 cm^2 09/14/22 1410   Post-Procedure Volume (cm^3) 0.056 cm^3 09/14/22 1410   Wound Assessment Bleeding 09/14/22 1410   Drainage Amount Moderate 09/14/22 1337   Drainage Description Serosanguinous 09/14/22 1337   Odor None 09/14/22 1337   Jamila-wound Assessment Dry/flaky; Excoriated 09/14/22 1337   Margins Attached edges 09/14/22 1337   Number of days: 119       Wound 06/24/22 Leg Left;Medial #1 (Active)   Wound Image   09/09/22 1055   Wound Etiology Diabetic 09/14/22 1337   Wound Cleansed Cleansed with saline 09/14/22 1337   Dressing/Treatment Antibacterial ointment;Triad hydro/zinc oxide-based hydrophilic paste 19/08/01 9386   Offloading for Diabetic Foot Ulcers Offloading not required 09/14/22 1337   Wound Length (cm) 1.9 cm 09/14/22 1337   Wound Width (cm) 3.9 cm 09/14/22 1337   Wound Depth (cm) 0.1 cm 09/14/22 1337   Wound Surface Area (cm^2) 7.41 cm^2 09/14/22 1337   Change in Wound Size % (l*w) -8.33 09/14/22 1337   Wound Volume (cm^3) 0.741 cm^3 09/14/22 1337   Wound Healing % -8 09/14/22 1337   Post-Procedure Length (cm) 1.9 cm 09/14/22 1410   Post-Procedure Width (cm) 3.9 cm 09/14/22 1410   Post-Procedure Depth (cm) 0.1 cm 09/14/22 1410   Post-Procedure Surface Area (cm^2) 7.41 cm^2 09/14/22 1410   Post-Procedure Volume (cm^3) 0.741 cm^3 09/14/22 1410   Wound Assessment Bleeding 09/14/22 1410   Drainage Amount Moderate 09/14/22 1337   Drainage Description Serosanguinous 09/14/22 1337   Odor None 09/14/22 1337   Jamila-wound Assessment Dry/flaky; Excoriated 09/14/22 1337   Margins Attached edges 09/14/22 1337   Number of days: 82           Total Surface Area Debrided: 7.41 cm² left leg into the deep fascia and sq cm 0.56 cm² into the subcutaneous tissue of the right foot    Percentage of wound debrided 100%    Bleeding:  Minimal    Hemostasis Achieved:  by pressure    Procedural Pain:  0  / 10     Post Procedural Pain:  0 / 10     Response to treatment:  Well tolerated by patient. Plan:   Patient examined and evaluated   Wounds debrided without incident   Multilayer compression wrap right leg with Lotrisone. Multilayer compression wrap left leg  Follow-up one week  Keep legs elevated at all times when not active. Continue follow-up with vascular surgery for decision on wounds for improving the blood flow to the distal left leg. He has not made his decision on what he would like to do. Patient would benefit from disability due to his multiple comorbidities including his chronic peripheral arterial disease with high risk for limb loss. Prescription for clindamycin. The nature of the patient's condition was explained in depth. The patient was informed that their compliance to the treatment Plan is paramount to successful healing and prevention of further ulceration and/or infection.   Application of Lotrisone to the irritated areas of his leg right  Discharge Treatment follow-up on Friday for dressing change    Written Patient Discharge Instructions Given            Electronically signed by Killian Gonzales DPM on 9/14/2022 at 2:34 PM

## 2022-09-22 NOTE — DISCHARGE INSTRUCTIONS
[address], [address]  Telephone: [phone]     Discharge Instructions     Important reminders:     **If you have any signs and symptoms of illness (Cough, fever, congestion, nausea, vomiting, diarrhea, etc.) please call the wound care center prior to your appointment. 1. Increase Protein intake for optimal wound healing  2. No added salt to reduce any swelling  3. If diabetic, maintain good glucose control  4. If you smoke, smoking prohibits wound healing, we ask that you refrain from smoking. 5. When taking antibiotics take the entire prescription as ordered. Do not stop taking until medication is all gone unless otherwise instructed. 6. Exercise as tolerated. 7. Keep weight off wounds and reposition every 2 hours if applicable. 8. If wound(s) is on your lower extremity, elevate legs to the level of the heart or above for 30 minutes 4-5 times a day and/or when sitting. Avoid standing for long periods of time. 9. Do not get wounds wet in bath or shower unless otherwise instructed by your physician. If your wound is on your foot or leg, you may purchase a cast bag. Please ask at the pharmacy. If Vascular testing is ordered, please call [address] [place] [address] ([phone]) to schedule. Vascular tests ordered by Wound Care Physicians may take up to 2 hours to complete. Please keep that in mind when scheduling. If Vascular testing is scheduled, please bring supplies to replace your dressing after testing is done. The vascular department does not stock supplies. Wound: Right and Left leg     With each dressing change, rinse wounds with 0.9% Saline. (May use wound wash or soft contact solution. Both can be purchased at a local drug store). If unable to obtain saline, may use a gentle soap and water. Dressing care: Right and left leg - transfer to back of right leg. Gentamicin cream Triad to wounds.  DO not put a stack of 4x4 over wounds. Just one. Lotrisone to lower legs, Gentle coflex calamine to both,     Dr Jose Blanco, Vascular Surgeon September 14th at 1215pm  [address] [facility], [address]  Phone# [phone]  Fax# [phone]         Compression Wraps  Location: Both legs  Type: Coflex calamine     Your doctor has ordered compression therapy for your wound. Compression bandages reduce the swelling, or edema, in your legs and prevent it from returning. The wound care staff will apply your compression wrap. It must be removed and re-applied at least weekly. As the swelling decreases, the boot no longer provides adequate compression and you need a new one. Once applied, you need to know how to take care of your compression wrap. The boot must stay dry. Do not get it wet in the shower or tub. You may do a partial bath, or you can cover the boot with a large plastic bag, secured at the top, so that no water can get in. Avoid standing in one place for long periods of time. If you must  one place, shift your weight and change positions often. If you have CHF, consult your doctor before following the next two recommendations for leg elevation. When sitting, elevate your legs on pillows, or put blocks under the foot of your bed. Your legs should be higher than your heart. If your boot becomes painful, or you notice an increase in swelling in your toes, numbness or tingling, or purple color to your toes, remove the wrap and call the [facility] [address]. If it is after hours, call your doctor for instructions or go to the nearest emergency room. Home Care Agency/Facility: [facility]     Your wound-care supplies will be provided by:  Please note, depending on your insurance coverage, you may have out-of-pocket expenses for these supplies. Someone from the company should call you to confirm your order and discuss those potential costs before they ship your products -- please anticipate that call.  If your out-of-pocket cost could be substantial, Many companies have financial hardship programs for patients who qualify, so please ask about that if you might need a hand. If you have any questions about your supplies or your potential out-of-pocket costs, or if you need to place an order for a refill of supplies (typically monthly), please call the company directly. Your  is Alejandra Rodríguez     Follow up with Dr Ara Sharma in 2 weeks. Nurse visit 9/30        Wound Care Center Information: Should you experience any significant changes in your wound(s) or have questions about your wound care, please contact the [place] [address] at [phone] Monday  - Thursday 8:00 am - 4:00 pm and Friday 8:00 am - 1:00pm. If you need help with your wound outside these hours and cannot wait until we are again available, contact your PCP or go to the hospital emergency room. PLEASE NOTE: IF YOU ARE UNABLE TO OBTAIN WOUND SUPPLIES, CONTINUE TO USE THE SUPPLIES YOU HAVE AVAILABLE UNTIL YOU ARE ABLE TO REACH US. IT IS MOST IMPORTANT TO KEEP THE WOUND COVERED AT ALL TIMES.

## 2022-09-23 ENCOUNTER — HOSPITAL ENCOUNTER (OUTPATIENT)
Dept: WOUND CARE | Age: 62
Discharge: HOME | End: 2022-09-23
Payer: MEDICAID

## 2022-09-23 ENCOUNTER — ANTI-COAG VISIT (OUTPATIENT)
Dept: PHARMACY | Age: 62
End: 2022-09-23

## 2022-09-23 ENCOUNTER — TELEPHONE (OUTPATIENT)
Dept: PHARMACY | Age: 62
End: 2022-09-23

## 2022-09-23 VITALS
DIASTOLIC BLOOD PRESSURE: 82 MMHG | HEART RATE: 88 BPM | TEMPERATURE: 96.9 F | RESPIRATION RATE: 16 BRPM | SYSTOLIC BLOOD PRESSURE: 133 MMHG

## 2022-09-23 DIAGNOSIS — I73.9 PERIPHERAL ARTERY DISEASE (HCC): Primary | ICD-10-CM

## 2022-09-23 LAB — INTERNATIONAL NORMALIZATION RATIO, POC: 2.1

## 2022-09-23 PROCEDURE — 99211 OFF/OP EST MAY X REQ PHY/QHP: CPT

## 2022-09-23 PROCEDURE — 85610 PROTHROMBIN TIME: CPT

## 2022-09-23 PROCEDURE — 11042 DBRDMT SUBQ TIS 1ST 20SQCM/<: CPT

## 2022-09-23 PROCEDURE — 29581 APPL MULTLAYER CMPRN SYS LEG: CPT

## 2022-09-23 PROCEDURE — 11043 DBRDMT MUSC&/FSCA 1ST 20/<: CPT

## 2022-09-23 RX ORDER — BACITRACIN, NEOMYCIN, POLYMYXIN B 400; 3.5; 5 [USP'U]/G; MG/G; [USP'U]/G
OINTMENT TOPICAL ONCE
Status: CANCELLED | OUTPATIENT
Start: 2022-09-23 | End: 2022-09-23

## 2022-09-23 RX ORDER — GINSENG 100 MG
CAPSULE ORAL ONCE
Status: CANCELLED | OUTPATIENT
Start: 2022-09-23 | End: 2022-09-23

## 2022-09-23 RX ORDER — GENTAMICIN SULFATE 1 MG/G
OINTMENT TOPICAL ONCE
Status: CANCELLED | OUTPATIENT
Start: 2022-09-23 | End: 2022-09-23

## 2022-09-23 RX ORDER — LIDOCAINE 40 MG/G
CREAM TOPICAL ONCE
Status: CANCELLED | OUTPATIENT
Start: 2022-09-23 | End: 2022-09-23

## 2022-09-23 RX ORDER — LIDOCAINE 50 MG/G
OINTMENT TOPICAL ONCE
Status: CANCELLED | OUTPATIENT
Start: 2022-09-23 | End: 2022-09-23

## 2022-09-23 RX ORDER — BACITRACIN ZINC AND POLYMYXIN B SULFATE 500; 1000 [USP'U]/G; [USP'U]/G
OINTMENT TOPICAL ONCE
Status: CANCELLED | OUTPATIENT
Start: 2022-09-23 | End: 2022-09-23

## 2022-09-23 RX ORDER — LIDOCAINE HYDROCHLORIDE 20 MG/ML
JELLY TOPICAL ONCE
Status: CANCELLED | OUTPATIENT
Start: 2022-09-23 | End: 2022-09-23

## 2022-09-23 RX ORDER — LIDOCAINE 40 MG/G
CREAM TOPICAL ONCE
Status: DISCONTINUED | OUTPATIENT
Start: 2022-09-23 | End: 2022-09-25 | Stop reason: HOSPADM

## 2022-09-23 RX ORDER — BETAMETHASONE DIPROPIONATE 0.05 %
OINTMENT (GRAM) TOPICAL ONCE
Status: CANCELLED | OUTPATIENT
Start: 2022-09-23 | End: 2022-09-23

## 2022-09-23 RX ORDER — LIDOCAINE HYDROCHLORIDE 40 MG/ML
SOLUTION TOPICAL ONCE
Status: CANCELLED | OUTPATIENT
Start: 2022-09-23 | End: 2022-09-23

## 2022-09-23 RX ORDER — CLOBETASOL PROPIONATE 0.5 MG/G
OINTMENT TOPICAL ONCE
Status: CANCELLED | OUTPATIENT
Start: 2022-09-23 | End: 2022-09-23

## 2022-09-23 NOTE — PLAN OF CARE
Diagnosis Code    Peripheral artery disease ([facility]) I73.9    Centrilobular emphysema (HCC) J43.2    COPD (chronic obstructive pulmonary disease) (HCC) J44.9    Atherosclerosis of native arteries of right leg with ulceration of other part of foot ([facility]) I70.235    Impotence N52.9    Acute deep vein thrombosis (DVT) of femoral vein of left lower extremity ([facility]) I82.412    Colon polyp K63.5    Hyperlipidemia E78.5    Non-pressure chronic ulcer of left ankle with necrosis of muscle ([facility]) L97.323    Venous insufficiency I87.2    Venous ulcer ([facility]) I83.009, L97.909    Essential hypertension I10    Type 2 diabetes mellitus with other specified complication ([facility]) T65.61    Fibromyalgia M79.7    Overweight (BMI 25.0-29. 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 : Yes (9/23/2022  9:00 AM)  Has the Wound had Documented Treatment for 30 Days?: Yes (9/23/2022  9:00 AM)  Recurrent Wound with Skin Substitute within Last Year?: No (9/23/2022  9:00 AM)  Radiologic Testing in Last 3 Months?: N/A (9/23/2022  9:00 AM)  Vascular Assessment in Last 6 Months: Yes (9/23/2022  9:00 AM)  Smoking Status: Non- Smoker (9/23/2022  9:00 AM)  Nutrition Assessed: Yes (9/23/2022  9:00 AM)  Wound Free from Infection : Wound Culture Completed (9/23/2022  9:00 AM)  Is Wound Free of [address] [place], and/or [place]?: Yes (9/23/2022  9:00 AM)  Malignant Process in Wound : N/A (9/23/2022  9:00 AM)  Hemoglobin A1C in Last 3 Months?: Yes (9/23/2022  9:00 AM)  Hemoglobin A1C Last Result : 6.3 (9/23/2022  9:00 AM)  Offloading for Diabetic Foot Ulcer?: N/A (9/23/2022  9:00 AM)  Compression Therapy of 20 mmHg or Greater?: Yes; Multi Layer Compression (9/23/2022  9:00 AM)         Is the Patient a Diabetic: Yes    HgBA1c:    Lab Results   Component Value Date/Time    LABA1C 6.3 05/05/2022 01:39 PM        Wound 05/18/22 Ankle Medial;Right #2 (Active)   Wound Image   09/09/22 1055   Wound Etiology Diabetic 09/23/22 0821   Wound Cleansed Wound cleanser 09/23/22 0821   Dressing/Treatment Antibacterial ointment;Triad hydro/zinc oxide-based hydrophilic paste 12/20/95 3021   Offloading for Diabetic Foot Ulcers Offloading not ordered 09/23/22 0821   Wound Length (cm) 0.7 cm 09/23/22 0821   Wound Width (cm) 1.2 cm 09/23/22 0821   Wound Depth (cm) 0.1 cm 09/23/22 0821   Wound Surface Area (cm^2) 0.84 cm^2 09/23/22 0821   Change in Wound Size % (l*w) 89.5 09/23/22 0821   Wound Volume (cm^3) 0.084 cm^3 09/23/22 0821   Wound Healing % 90 09/23/22 0821   Post-Procedure Length (cm) 0.9 cm 09/23/22 0850   Post-Procedure Width (cm) 1.2 cm 09/23/22 0850   Post-Procedure Depth (cm) 0.1 cm 09/23/22 0850   Post-Procedure Surface Area (cm^2) 1.08 cm^2 09/23/22 0850   Post-Procedure Volume (cm^3) 0.108 cm^3 09/23/22 0850   Wound Assessment Bleeding 09/23/22 0850   Drainage Amount Moderate 09/23/22 0821   Drainage Description Serosanguinous 09/23/22 0821   Odor None 09/23/22 0821   Jamila-wound Assessment Dry/flaky 09/23/22 0821   Margins Defined edges 09/23/22 0821   Number of days: 128       Wound 06/24/22 Leg Left;Medial #1 (Active)   Wound Image   09/09/22 1055   Wound Etiology Diabetic 09/23/22 0821   Wound Cleansed Wound cleanser 09/23/22 0821   Dressing/Treatment Antibacterial ointment;Triad hydro/zinc oxide-based hydrophilic paste 59/50/60 9484   Offloading for Diabetic Foot Ulcers Offloading not ordered 09/23/22 0821   Wound Length (cm) 1.6 cm 09/23/22 0821   Wound Width (cm) 3.9 cm 09/23/22 0821   Wound Depth (cm) 0.2 cm 09/23/22 0821   Wound Surface Area (cm^2) 6.24 cm^2 09/23/22 0821   Change in Wound Size % (l*w) 8.77 09/23/22 0821   Wound Volume (cm^3) 1.248 cm^3 09/23/22 0821   Wound Healing % -82 09/23/22 0821 Post-Procedure Length (cm) 1.6 cm 09/23/22 0850   Post-Procedure Width (cm) 3.9 cm 09/23/22 0850   Post-Procedure Depth (cm) 0.2 cm 09/23/22 0850   Post-Procedure Surface Area (cm^2) 6.24 cm^2 09/23/22 0850   Post-Procedure Volume (cm^3) 1.248 cm^3 09/23/22 0850   Wound Assessment Bleeding 09/23/22 0850   Drainage Amount Moderate 09/23/22 0821   Drainage Description Serosanguinous; Yellow 09/23/22 0821   Odor None 09/23/22 0821   Jamila-wound Assessment Dry/flaky 09/23/22 0821   Margins Defined edges 09/23/22 0821   Number of days: 91       Provider Information:      James Barry NAME/NPI: Nevaeh Arce DPM  [id number]      Electronically signed by Abby Sarabia RN on 9/23/2022 at 9:18 AM

## 2022-09-23 NOTE — PLAN OF CARE
Insurance Verification [address] [address] [place], [address]  Telephone: [phone]          [place]: [address]  [facility] [address]  [phone]      Facility NPI: [id number]  Facility Tax ID [id number]    Provider Information:      Provider's Name Lindy Bautista [place]  [id number]        Patient Information:      Marisol Bruno 8   [phone]   : 1960  AGE: 65 y.o. GENDER: male   TODAYS DATE:      Application/product Information:     Benefit Verification Request:    Reason for Request: New Wound    [employer] Fax# [phone]    Trunk/Arms/Legs [address] (1st 25 sq cm)    Apligraf    Has patient been treated with any other Skin Substitute for this Wound:   No    If yes, How many previous applications:  NA    WOUND #: 1 and 2    Total Square CM: 7    Procedure setting: Hospital Outpatient Department    Is the Patient currently in a skilled nursing facility: No     Is the wound work related: No    Procedure date:10/5/2022      Insurance:        PRIMARY INSURANCE:  Plan: [employer] DEPT OF JOB  Coverage: [place]ICA[place]  Effective Date: 2022  Group Number: [unfilled]  Subscriber Number: [id number] - (Medicaid)    Payer/Plan Subscr  Sex Relation Sub. Ins. ID Effective Group Num   1. [place] -* ABBIELINDA 1960 Male Self [id number] 22                                    [address]   2.  [place] -* LINDA LEIVA 1960 Male Self [id number] 22                                    [address]       Patient Information:     Additional Dx Codes: J19.878, O23.302, I87.2    Problem List Items Addressed This Visit       Peripheral artery disease ([facility] [address] - Primary    Relevant Medications    lidocaine (LMX) 4 % cream    Other Relevant Orders    Initiate Outpatient Wound Care Protocol       Patient Active Problem List   Diagnosis Code Peripheral artery disease ([facility]) I73.9    Centrilobular emphysema (HCC) J43.2    COPD (chronic obstructive pulmonary disease) ([facility]) J44.9    Atherosclerosis of native arteries of right leg with ulceration of other part of foot ([facility]) I70.235    Impotence N52.9    Acute deep vein thrombosis (DVT) of femoral vein of left lower extremity ([facility]) I82.412    Colon polyp K63.5    Hyperlipidemia E78.5    Non-pressure chronic ulcer of left ankle with necrosis of muscle ([facility]) L97.323    Venous insufficiency I87.2    Venous ulcer ([facility]) I83.009, L97.909    Essential hypertension I10    Type 2 diabetes mellitus with other specified complication ([facility]) Y75.58    Fibromyalgia M79.7    Overweight (BMI 25.0-29. 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) I82.409    Larynx cancer ([facility]) C32.9    S/P IVC filter Z95.828    Tinea pedis of right foot B35.3       Advanced Modality Checklist  Is Wound Greater than 1.0 sq cm? : Yes (9/23/2022  9:00 AM)  Has the Wound had Documented Treatment for 30 Days?: Yes (9/23/2022  9:00 AM)  Recurrent Wound with Skin Substitute within Last Year?: No (9/23/2022  9:00 AM)  Radiologic Testing in Last 3 Months?: N/A (9/23/2022  9:00 AM)  Vascular Assessment in Last 6 Months: Yes (9/23/2022  9:00 AM)  Smoking Status: Non- Smoker (9/23/2022  9:00 AM)  Nutrition Assessed: Yes (9/23/2022  9:00 AM)  Wound Free from Infection : Wound Culture Completed (9/23/2022  9:00 AM)  Is Wound Free of [address] [place], and/or [place]?: Yes (9/23/2022  9:00 AM)  Malignant Process in Wound : N/A (9/23/2022  9:00 AM)  Hemoglobin A1C in Last 3 Months?: Yes (9/23/2022  9:00 AM)  Hemoglobin A1C Last Result : 6.3 (9/23/2022  9:00 AM)  Offloading for Diabetic Foot Ulcer?: N/A (9/23/2022  9:00 AM)  Compression Therapy of 20 mmHg or Greater?: Yes; Multi Layer Compression (9/23/2022  9:00 AM)         Is the Patient a Diabetic: Yes    HgBA1c: Lab Results   Component Value Date/Time    LABA1C 6.3 05/05/2022 01:39 PM        Wound 05/18/22 Ankle Medial;Right #2 (Active)   Wound Image   09/09/22 1055   Wound Etiology Diabetic 09/23/22 0821   Wound Cleansed Wound cleanser 09/23/22 0821   Dressing/Treatment Antibacterial ointment;Triad hydro/zinc oxide-based hydrophilic paste 56/81/26 7183   Offloading for Diabetic Foot Ulcers Offloading not ordered 09/23/22 0821   Wound Length (cm) 0.7 cm 09/23/22 0821   Wound Width (cm) 1.2 cm 09/23/22 0821   Wound Depth (cm) 0.1 cm 09/23/22 0821   Wound Surface Area (cm^2) 0.84 cm^2 09/23/22 0821   Change in Wound Size % (l*w) 89.5 09/23/22 0821   Wound Volume (cm^3) 0.084 cm^3 09/23/22 0821   Wound Healing % 90 09/23/22 0821   Post-Procedure Length (cm) 0.9 cm 09/23/22 0850   Post-Procedure Width (cm) 1.2 cm 09/23/22 0850   Post-Procedure Depth (cm) 0.1 cm 09/23/22 0850   Post-Procedure Surface Area (cm^2) 1.08 cm^2 09/23/22 0850   Post-Procedure Volume (cm^3) 0.108 cm^3 09/23/22 0850   Wound Assessment Bleeding 09/23/22 0850   Drainage Amount Moderate 09/23/22 0821   Drainage Description Serosanguinous 09/23/22 0821   Odor None 09/23/22 0821   Jamila-wound Assessment Dry/flaky 09/23/22 0821   Margins Defined edges 09/23/22 0821   Number of days: 128       Wound 06/24/22 Leg Left;Medial #1 (Active)   Wound Image   09/09/22 1055   Wound Etiology Diabetic 09/23/22 0821   Wound Cleansed Wound cleanser 09/23/22 0821   Dressing/Treatment Antibacterial ointment;Triad hydro/zinc oxide-based hydrophilic paste 21/47/32 8466   Offloading for Diabetic Foot Ulcers Offloading not ordered 09/23/22 0821   Wound Length (cm) 1.6 cm 09/23/22 0821   Wound Width (cm) 3.9 cm 09/23/22 0821   Wound Depth (cm) 0.2 cm 09/23/22 0821   Wound Surface Area (cm^2) 6.24 cm^2 09/23/22 0821   Change in Wound Size % (l*w) 8.77 09/23/22 0821   Wound Volume (cm^3) 1.248 cm^3 09/23/22 0821   Wound Healing % -82 09/23/22 0821   Post-Procedure Length (cm) 1.6 cm 09/23/22 0850   Post-Procedure Width (cm) 3.9 cm 09/23/22 0850   Post-Procedure Depth (cm) 0.2 cm 09/23/22 0850   Post-Procedure Surface Area (cm^2) 6.24 cm^2 09/23/22 0850   Post-Procedure Volume (cm^3) 1.248 cm^3 09/23/22 0850   Wound Assessment Bleeding 09/23/22 0850   Drainage Amount Moderate 09/23/22 0821   Drainage Description Serosanguinous; Yellow 09/23/22 0821   Odor None 09/23/22 0821   Jamila-wound Assessment Dry/flaky 09/23/22 0821   Margins Defined edges 09/23/22 0821   Number of days: 91       Provider Information:      Tray Taylor NAME/NPI: Yaakov Diallo DPM  [id number]      Electronically signed by Saundra Wells RN on 9/23/2022 at 9:14 AM

## 2022-09-23 NOTE — PLAN OF CARE
Discharge instructions given. Patient verbalized understanding. Return to [facility] in 1 week(s).   Nurse visit next week/ Apply for skin subs

## 2022-09-23 NOTE — PROGRESS NOTES
[facility]  Progress Note and Procedure Note      Mecca Gregorio  AGE: 65 y.o. GENDER: male  : 1960  TODAY'S DATE:  2022    Subjective:     Chief Complaint   Patient presents with    Wound Check     Right and left lower extremity         HISTORY of PRESENT ILLNESS HPI     Mecca Gregorio is a 65 y.o. male who presents today for wound evaluation. History of Wound: Admits to having chronic wounds for at least a year on the left leg and 8 to 9 months on the right leg. He states that he has had arterial bypasses on both of his legs in the past.  He was seeing previous wound care and podiatry for these wounds. He states that his insurance had some issues and he has not been seeing anyone since 2021 he has been treating the wounds himself with PSO and bandages. Admits that the right leg has become itchy again and there is some red blotchiness. He states this is how the leg cellulitis always starts out. He thinks the wounds are looking better.     Wound Pain:  intermittent  Severity:  3 / 10   Wound Type:  venous, arterial and diabetic  Modifying Factors:  edema, venous stasis, lymphedema, diabetes, decreased mobility, arterial insufficiency and decreased tissue oxygenation  Associated Signs/Symptoms:  drainage, numbness and odor        PAST MEDICAL HISTORY        Diagnosis Date    Cancer ([address]     throat    Diabetes mellitus ([address]     Fibromyalgia     History of DVT (deep vein thrombosis)     Hyperlipidemia     Hypertension     Type 2 diabetes mellitus with circulatory disorder, without long-term current use of insulin ([address] 2022       PAST SURGICAL HISTORY    Past Surgical History:   Procedure Laterality Date    APPENDECTOMY  2005    COLONOSCOPY  2016    FEMORAL BYPASS Left 2020    femoral posterior tibial bypass    FEMORAL-TIBIAL BYPASS GRAFT Right 2020    with femoral endarterectomy and patch angioplasty    FOOT DEBRIDEMENT Left 2020    FOOT DEBRIDEMENT Right 2021    DEBRIDEMENT OF RIGHT FOOT WOUND WITH GRAFT APPLICATION performed by Jennifer Crawford DPM at [address] N/A 3/25/2022    DIRECT LARYNGOSCOPY WITH BIOPSY AND FROZEN SECTIONS performed by Yesy Jordan DO at [facility] Right 2020    stent leg for PVD    TRACHEOSTOMY N/A 3/25/2022    TRACHEOTOMY performed by Yesy Jordan DO at [address] HISTORY    Family History   Problem Relation Age of Onset    Cancer Mother     Lung Cancer Mother     Diabetes Father     Stroke Father        SOCIAL HISTORY    Social History     Tobacco Use    Smoking status: Former     Packs/day: 0.50     Years: 20.00     Pack years: 10.00     Types: Cigarettes     Start date: 1977     Quit date: 2021     Years since quittin.7    Smokeless tobacco: Never   Vaping Use    Vaping Use: Never used   Substance Use Topics    Alcohol use: Yes     Comment: 2 beers / day     Drug use: Never       ALLERGIES    Allergies   Allergen Reactions    Chantix [Varenicline] Other (See Comments)     Hallucinations         MEDICATIONS    Current Outpatient Medications on File Prior to Encounter   Medication Sig Dispense Refill    acetaminophen (TYLENOL) 500 MG tablet Take 1 tablet by mouth 4 times daily as needed for Pain 360 tablet 1    gentamicin (GARAMYCIN) 0.1 % cream Apply topically  daily.  30 g 1    budesonide (PULMICORT) 0.5 MG/2ML nebulizer suspension Take 2 mLs by nebulization 2 times daily 60 each 2    sennosides-docusate sodium (SENOKOT-S) 8.6-50 MG tablet Take 1 tablet by mouth 2 times daily 60 tablet 0    melatonin 3 MG TABS tablet Take 2 tablets by mouth nightly as needed (insomnia) 30 tablet 2    atorvastatin (LIPITOR) 80 MG tablet Take 1 tablet by mouth daily Cancel atorvastatin 20 mg a day 90 tablet 0    warfarin (COUMADIN) 5 MG tablet Take daily as directed by anti coag clinic to maintain INR 2-3 (Patient taking differently: Take 10 mg by mouth daily Except 7.5 mg on Thursday or as directed by anti coag clinic to maintain INR 2-3) 90 tablet 1    diphenhydrAMINE (BENADRYL ALLERGY) 25 MG tablet Take 1 tablet by mouth every 6 hours as needed for Itching 60 tablet 0    cyanocobalamin 250 MCG tablet Take 1 tablet by mouth daily 30 tablet 0    metFORMIN (GLUCOPHAGE) 500 MG tablet Take 1 tablet by mouth 2 times daily (with meals) 60 tablet 0    cetirizine (ZYRTEC) 10 MG tablet Take 1 tablet by mouth daily 30 tablet 0    lisinopril (PRINIVIL;ZESTRIL) 20 MG tablet Take 1 tablet by mouth daily 30 tablet 0    Arformoterol Tartrate (BROVANA) 15 MCG/2ML NEBU Take 1 ampule by nebulization 2 times daily 120 mL 5    BANOPHEN 25 MG capsule Take 1 tablet by mouth every 6 hours as needed for Itching      ondansetron (ZOFRAN-ODT) 4 MG disintegrating tablet Take 4 mg by mouth every 8 hours as needed for Nausea or Vomiting      Multiple Vitamins-Minerals (MULTIVITAMIN ADULT EXTRA C PO) 1 tablet by Nasogastric route daily      esomeprazole (NEXIUM) 20 MG delayed release capsule 20 mg by Nasogastric route every morning (before breakfast)      oxyCODONE HCl (OXY-IR) 10 MG immediate release tablet TAKE 1 TABLET BY MOUTH EVERY SIX HOURS AS NEEDED FOR PAIN FOR UP TO 7 DAYS      becaplermin (REGRANEX) 0.01 % gel Apply 1 Applicatorful topically daily      calcium-vitamin D (OSCAL-500) 500-200 MG-UNIT per tablet Take 1 tablet by mouth 2 times daily      ipratropium-albuterol (DUONEB) 0.5-2.5 (3) MG/3ML SOLN nebulizer solution Inhale 3 mLs into the lungs 4 times daily 360 mL 3    fluticasone (FLONASE) 50 MCG/ACT nasal spray 1 spray by Nasal route daily 9.9 g 5    Vitamins/Minerals TABS Take 1 tablet by mouth daily        No current facility-administered medications on file prior to encounter. REVIEW OF SYSTEMS    Pertinent items are noted in HPI.       Objective:      /82   Pulse 88   Temp 96.9 °F (36.1 °C) (Infrared)   Resp 16     PHYSICAL EXAM    Vascular: Vascular status Impaired  palpable pedal pulses, right DP0/4 and PT1/4, left DP0/4 and PT1/4. CFT 3 seconds digits 1 to 5 bilateral.  Hair growthAbsent  both lower extremities and feet. Skin temperature is warm to warm from pretibial area to distal digits bilateral.  Exam is negative for rubor, pallor, cyanosis or signs of acute vascular compromise bilaterally. Exam is positive for edema bilateral lower extremity. Varicosities Present bilateral lower extremity. Neuro: Neurologic status diminished bilateral with epicritic Absent  , proprioceptive Absent , vibratory sensation Absent  and protopathicPresent. DTRs Absent  bilateral Achilles. There were no reproducible neuritic symptoms on exam bilateral feet/ankles. Derm: Ulceration to bilateral ankles. Ecchymosis Absent  bilateral feet/foot. Musculoskeletal: No pain with debridement of wounds 5/5 muscle strength in/eversion and dorsi/plantarflexion bilateral feet. No gross instability noted. Assessment:     Problem List Items Addressed This Visit       Peripheral artery disease ([facility] [address] - Primary    Relevant Medications    lidocaine (LMX) 4 % cream    Other Relevant Orders    Initiate Outpatient Wound Care Protocol       Procedure Note    Performed by: Kimberly Carlos DPM    Consent obtained: Yes    Time out taken:  Yes    Pain Control: Anesthetic  Anesthetic: 4% Lidocaine Cream     Debridement:Excisional Debridement    Using curette the wound was sharply debrided    down through and including the removal of epidermis, dermis, subcutaneous tissue and muscle/fascia.         Devitalized Tissue Debrided:  fibrin, biofilm, slough, necrotic/eschar and exudate    Pre Debridement Measurements:  Are located in the Wound Documentation Flow Sheet    Wound #: 1     Wound Care Documentation:  Wound 05/18/22 Ankle Medial;Right #2 (Active)   Wound Image   09/09/22 1055   Wound Etiology Diabetic 09/23/22 0821   Wound Cleansed Wound cleanser 09/23/22 2206 Dressing/Treatment Antibacterial ointment;Triad hydro/zinc oxide-based hydrophilic paste 40/92/20 9613   Offloading for Diabetic Foot Ulcers Offloading not ordered 09/23/22 0821   Wound Length (cm) 0.7 cm 09/23/22 0821   Wound Width (cm) 1.2 cm 09/23/22 0821   Wound Depth (cm) 0.1 cm 09/23/22 0821   Wound Surface Area (cm^2) 0.84 cm^2 09/23/22 0821   Change in Wound Size % (l*w) 89.5 09/23/22 0821   Wound Volume (cm^3) 0.084 cm^3 09/23/22 0821   Wound Healing % 90 09/23/22 0821   Post-Procedure Length (cm) 0.9 cm 09/23/22 0850   Post-Procedure Width (cm) 1.2 cm 09/23/22 0850   Post-Procedure Depth (cm) 0.1 cm 09/23/22 0850   Post-Procedure Surface Area (cm^2) 1.08 cm^2 09/23/22 0850   Post-Procedure Volume (cm^3) 0.108 cm^3 09/23/22 0850   Wound Assessment Bleeding 09/23/22 0850   Drainage Amount Moderate 09/23/22 0821   Drainage Description Serosanguinous 09/23/22 0821   Odor None 09/23/22 0821   Jamila-wound Assessment Dry/flaky 09/23/22 0821   Margins Defined edges 09/23/22 0821   Number of days: 128       Wound 06/24/22 Leg Left;Medial #1 (Active)   Wound Image   09/09/22 1055   Wound Etiology Diabetic 09/23/22 0821   Wound Cleansed Wound cleanser 09/23/22 0821   Dressing/Treatment Antibacterial ointment;Triad hydro/zinc oxide-based hydrophilic paste 39/02/44 8728   Offloading for Diabetic Foot Ulcers Offloading not ordered 09/23/22 0821   Wound Length (cm) 1.6 cm 09/23/22 0821   Wound Width (cm) 3.9 cm 09/23/22 0821   Wound Depth (cm) 0.2 cm 09/23/22 0821   Wound Surface Area (cm^2) 6.24 cm^2 09/23/22 0821   Change in Wound Size % (l*w) 8.77 09/23/22 0821   Wound Volume (cm^3) 1.248 cm^3 09/23/22 0821   Wound Healing % -82 09/23/22 0821   Post-Procedure Length (cm) 1.6 cm 09/23/22 0850   Post-Procedure Width (cm) 3.9 cm 09/23/22 0850   Post-Procedure Depth (cm) 0.2 cm 09/23/22 0850   Post-Procedure Surface Area (cm^2) 6.24 cm^2 09/23/22 0850   Post-Procedure Volume (cm^3) 1.248 cm^3 09/23/22 0850   Wound Assessment Bleeding 09/23/22 0850   Drainage Amount Moderate 09/23/22 0821   Drainage Description Serosanguinous; Yellow 09/23/22 0821   Odor None 09/23/22 0821   Jamila-wound Assessment Dry/flaky 09/23/22 0821   Margins Defined edges 09/23/22 0821   Number of days: 91          Total Surface Area Debrided: 6.24 cm² left leg into the deep fascia and sq cm 0.1 cm² into the subcutaneous tissue of the right foot    Percentage of wound debrided 100%    Bleeding:  Minimal    Hemostasis Achieved:  by pressure    Procedural Pain:  0  / 10     Post Procedural Pain:  0 / 10     Response to treatment:  Well tolerated by patient. Plan:   Patient examined and evaluated   Wounds debrided without incident   Multilayer compression wrap right leg with Lotrisone. Multilayer compression wrap left leg  Follow-up one week  Keep legs elevated at all times when not active. Continue follow-up with vascular surgery for decision on wounds for improving the blood flow to the distal left leg. He has not made his decision on what he would like to do. Patient would benefit from disability due to his multiple comorbidities including his chronic peripheral arterial disease with high risk for limb loss. The nature of the patient's condition was explained in depth. The patient was informed that their compliance to the treatment Plan is paramount to successful healing and prevention of further ulceration and/or infection.   Application of Lotrisone to the irritated areas of his leg right  Discharge Treatment follow-up on Friday for dressing change    Written Patient Discharge Instructions Given            Electronically signed by Carmen Garcia DPM on 9/23/2022 at 9:28 AM

## 2022-09-23 NOTE — PROGRESS NOTES
Multilayer Compression Wrap   Below the Knee    NAME:  Elliot Contreras  YOB: 1960  MEDICAL RECORD NUMBER:  [id number]  DATE:  9/23/2022    Multilayer compression wrap: Removed old Multilayer wrap if indicated and wash leg with mild soap/water. Applied moisturizing agent to dry skin as needed. Applied primary and secondary dressing as ordered. Applied multilayered dressing below the knee to right lower leg. Applied multilayered dressing below the knee to left lower leg. Instructed patient/caregiver not to remove dressing and to keep it clean and dry. Instructed patient/caregiver on complications to report to provider, such as pain, numbness in toes, heavy drainage, and slippage of dressing. Instructed patient on purpose of compression dressing and on activity and exercise recommendations.      Applied  2 layer wrap from toes to knee overlapping each time    Electronically signed by KATHY BROOKS LPN on 7/08/1513 at 7:31 AM

## 2022-09-23 NOTE — PLAN OF CARE
Insurance Verification [address]  [address] [facility], [address]  Telephone: [phone]          [place]: Meghan Deshpande [address]  [place] [address]  [phone]      Facility NPI: [id number]  Facility Tax ID [id number]    Provider Information:      Provider's Name Rene Norton  [id number]        Patient Information:      Don Abarca   [phone]   : 1960  AGE: 65 y.o. GENDER: male   TODAYS DATE:  2/10/3994    Application/product Information:     Benefit Verification Request:    Reason for Request: New Wound    [employer] ([facility]) Fax# [phone]    Trunk/Arms/Legs 56683 (1st 25 sq cm)    Theraskin    Has patient been treated with any other Skin Substitute for this Wound:   No    If yes, How many previous applications: NA    WOUND #: 1 and 2    Total Square CM: 7    Procedure setting: Hospital Outpatient Department    Is the Patient currently in a skilled nursing facility: No     Is the wound work related: No    Procedure date: 10/3/2022      Insurance:        PRIMARY INSURANCE:  Plan: [place] [employer]  Coverage: [place]  Effective Date: 2022  Group Number: [unfilled]  Subscriber Number: [id number] - (Medicaid)    Payer/Plan Subscr  Sex Relation Sub. Ins. ID Effective Group Num   1. [place] -* ABBIELINDA 1960 Male Self [id number]                                    [address]   2.  [place] -* LINDA LEIVA 1960 Male Self [id number]                                    [address]       Patient Information:     Additional Dx Codes: H02.305, T92.812, I87.2    Problem List Items Addressed This Visit       Peripheral artery disease ([facility] [address] - Primary    Relevant Medications    lidocaine (LMX) 4 % cream    Other Relevant Orders    Initiate Outpatient Wound Care Protocol       Patient Active Problem List Diagnosis Code    Peripheral artery disease ([facility]) I73.9    Centrilobular emphysema (HCC) J43.2    COPD (chronic obstructive pulmonary disease) ([facility]) J44.9    Atherosclerosis of native arteries of right leg with ulceration of other part of foot ([facility]) I70.235    Impotence N52.9    Acute deep vein thrombosis (DVT) of femoral vein of left lower extremity ([facility]) I82.412    Colon polyp K63.5    Hyperlipidemia E78.5    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 9) E66.3    Tracheostomy dependent ([facility]) Z93.0    Decubitus ulcer of heel, bilateral L89.619, L89.629    DVT (deep venous thrombosis) ([facility]) I82.409    Larynx cancer ([facility]) C32.9    S/P IVC filter Z95.828    Tinea pedis of right foot B35.3       Advanced Modality Checklist  Is Wound Greater than 1.0 sq cm? : Yes (9/23/2022  9:00 AM)  Has the Wound had Documented Treatment for 30 Days?: Yes (9/23/2022  9:00 AM)  Recurrent Wound with Skin Substitute within Last Year?: No (9/23/2022  9:00 AM)  Radiologic Testing in Last 3 Months?: N/A (9/23/2022  9:00 AM)  Vascular Assessment in Last 6 Months: Yes (9/23/2022  9:00 AM)  Smoking Status: Non- Smoker (9/23/2022  9:00 AM)  Nutrition Assessed: Yes (9/23/2022  9:00 AM)  Wound Free from Infection : Wound Culture Completed (9/23/2022  9:00 AM)  Is Wound Free of [address] [place], and/or [place]?: Yes (9/23/2022  9:00 AM)  Malignant Process in Wound : N/A (9/23/2022  9:00 AM)  Hemoglobin A1C in Last 3 Months?: Yes (9/23/2022  9:00 AM)  Hemoglobin A1C Last Result : 6.3 (9/23/2022  9:00 AM)  Offloading for Diabetic Foot Ulcer?: N/A (9/23/2022  9:00 AM)  Compression Therapy of 20 mmHg or Greater?: Yes; Multi Layer Compression (9/23/2022  9:00 AM)         Is the Patient a Diabetic: Yes    HgBA1c:    Lab Results   Component Value Date/Time    LABA1C 6.3 05/05/2022 01:39 PM        Wound 05/18/22 Ankle Medial;Right #2 (Active)   Wound Image   09/09/22 1055   Wound Etiology Diabetic 09/23/22 0821   Wound Cleansed Wound cleanser 09/23/22 0821   Dressing/Treatment Antibacterial ointment;Triad hydro/zinc oxide-based hydrophilic paste 48/04/14 0471   Offloading for Diabetic Foot Ulcers Offloading not ordered 09/23/22 0821   Wound Length (cm) 0.7 cm 09/23/22 0821   Wound Width (cm) 1.2 cm 09/23/22 0821   Wound Depth (cm) 0.1 cm 09/23/22 0821   Wound Surface Area (cm^2) 0.84 cm^2 09/23/22 0821   Change in Wound Size % (l*w) 89.5 09/23/22 0821   Wound Volume (cm^3) 0.084 cm^3 09/23/22 0821   Wound Healing % 90 09/23/22 0821   Post-Procedure Length (cm) 0.9 cm 09/23/22 0850   Post-Procedure Width (cm) 1.2 cm 09/23/22 0850   Post-Procedure Depth (cm) 0.1 cm 09/23/22 0850   Post-Procedure Surface Area (cm^2) 1.08 cm^2 09/23/22 0850   Post-Procedure Volume (cm^3) 0.108 cm^3 09/23/22 0850   Wound Assessment Bleeding 09/23/22 0850   Drainage Amount Moderate 09/23/22 0821   Drainage Description Serosanguinous 09/23/22 0821   Odor None 09/23/22 0821   Jamila-wound Assessment Dry/flaky 09/23/22 0821   Margins Defined edges 09/23/22 0821   Number of days: 128       Wound 06/24/22 Leg Left;Medial #1 (Active)   Wound Image   09/09/22 1055   Wound Etiology Diabetic 09/23/22 0821   Wound Cleansed Wound cleanser 09/23/22 0821   Dressing/Treatment Antibacterial ointment;Triad hydro/zinc oxide-based hydrophilic paste 71/36/74 5358   Offloading for Diabetic Foot Ulcers Offloading not ordered 09/23/22 0821   Wound Length (cm) 1.6 cm 09/23/22 0821   Wound Width (cm) 3.9 cm 09/23/22 0821   Wound Depth (cm) 0.2 cm 09/23/22 0821   Wound Surface Area (cm^2) 6.24 cm^2 09/23/22 0821   Change in Wound Size % (l*w) 8.77 09/23/22 0821   Wound Volume (cm^3) 1.248 cm^3 09/23/22 0821   Wound Healing % -82 09/23/22 0821 Post-Procedure Length (cm) 1.6 cm 09/23/22 0850   Post-Procedure Width (cm) 3.9 cm 09/23/22 0850   Post-Procedure Depth (cm) 0.2 cm 09/23/22 0850   Post-Procedure Surface Area (cm^2) 6.24 cm^2 09/23/22 0850   Post-Procedure Volume (cm^3) 1.248 cm^3 09/23/22 0850   Wound Assessment Bleeding 09/23/22 0850   Drainage Amount Moderate 09/23/22 0821   Drainage Description Serosanguinous; Yellow 09/23/22 0821   Odor None 09/23/22 0821   Jamila-wound Assessment Dry/flaky 09/23/22 0821   Margins Defined edges 09/23/22 0821   Number of days: 91       Provider Information:      Brianna Rogers NAME/NPI: Jorge Cárdenas DPM  [id number]      Electronically signed by Caroline Rodriguez RN on 9/23/2022 at 9:16 AM

## 2022-09-24 NOTE — PROGRESS NOTES
Des Benton is a 65 y.o. here for warfarin management. Eric Reynaga had an INR test today. Results were reviewed and appropriate warfarin management was completed. This visit was performed as: An in person visit. Protocols were followed with precautions to reduce the spread of COVID-19. Patient verifies current dosing regimen: Yes     Warfarin medication reviewed and updated on the patient 's home medication list: Yes   All other medications reviewed and updated on the patient 's home medication list: No new medications     Lab Results   Component Value Date    INR 2.1 2022    INR 1.20 2022    INR 1.90 2022       Patient Findings       Positives: Other complaints    Negatives:  Signs/symptoms of thrombosis, Signs/symptoms of bleeding, Change in health, Missed doses, Change in medications, Change in diet/appetite, Bruising    Comments:  Continues radiation            Anticoagulation Summary  As of 2022      INR goal:  2.0-3.0   TTR:  49.2 % (1 y)   INR used for dosin.1 (2022)   Warfarin maintenance plan:  7.5 mg (5 mg x 1.5) every Thu; 10 mg (5 mg x 2) all other days   Weekly warfarin total:  67.5 mg   Plan last modified:  Alicia Eduardo (2022)   Next INR check:  10/6/2022   Priority:  High   Target end date: Indefinite    Indications    Peripheral artery disease ([facility] [I73.9]                 Anticoagulation Episode Summary       INR check location:  Anticoagulation Clinic    Preferred lab:      Send INR reminders to:  [facility]AFF    Comments:  EPIC          Anticoagulation Care Providers       Provider Role Specialty Phone number    Adrianne Antonio MD Referring Family Medicine [phone]            Warfarin assessment / plan:     Appears well. No changes affecting warfarin therapy were noted. No acute findings. INR within goal range. No change to warfarin therapy today. Continues radiation treatments.       Description CONTINUE:  Warfarin 10 mg daily except 7.5 mg (1 & 1/2 tablets) on thursdays. Drinking 6 Boost shakes per day while on radiation   Call [phone] with signs or symptoms of bleeding or ANY medication changes (including over-the-counter medications or herbal supplements). If significant bleeding occurs please seek immediate medical attention. Keep the number of servings of vitamin K containing foods (dark green, leafy vegetables) the same each week. Dark green vegetable 2-3 times a week and a mixed green salad ~ 3 times a week. Please call if this changes. Limit alcohol intake. Please call if this changes. Immunization History   Administered Date(s) Administered    COVID-19, PFIZER PURPLE top, DILUTE for use, (age 15 y+), 30mcg/0.3mL 03/15/2021, 04/12/2021, 12/22/2021    Influenza Virus Vaccine 01/21/2017, 01/21/2017    Influenza, FLUARIX, FLULAVAL, Sravan Dyer (age 10 mo+) AND AFLURIA, (age 1 y+), PF, 0.5mL 10/31/2020    PPD Test 04/14/2022, 04/23/2022, 06/03/2022    Tdap (Boostrix, Adacel) 03/27/2018    Zoster Recombinant (Shingrix) 03/27/2018           Orders Placed This Encounter   Procedures    POCT INR     This external order was created through the results console. No orders of the defined types were placed in this encounter. Reviewed AVS with patient / caregiver.     Billing Points:  0 billing points this visit       CLINICAL PHARMACY CONSULT: MED RECONCILIATION/REVIEW ADDENDUM    For Pharmacy Admin Tracking Only    Intervention Detail:   Total # of Interventions Recommended: 0  Total # of Interventions Accepted: 0  Time Spent (min): 20

## 2022-09-28 DIAGNOSIS — I73.9 PAD (PERIPHERAL ARTERY DISEASE) (HCC): ICD-10-CM

## 2022-09-28 DIAGNOSIS — E11.9 TYPE 2 DIABETES MELLITUS WITHOUT COMPLICATION, WITHOUT LONG-TERM CURRENT USE OF INSULIN (HCC): ICD-10-CM

## 2022-09-28 DIAGNOSIS — I10 ESSENTIAL HYPERTENSION: ICD-10-CM

## 2022-09-28 DIAGNOSIS — E78.00 PURE HYPERCHOLESTEROLEMIA: ICD-10-CM

## 2022-09-28 DIAGNOSIS — J43.1 PANLOBULAR EMPHYSEMA (HCC): Primary | ICD-10-CM

## 2022-09-28 DIAGNOSIS — L29.9 ITCHING: ICD-10-CM

## 2022-09-29 RX ORDER — DIPHENHYDRAMINE HCL 25 MG
25 TABLET ORAL EVERY 6 HOURS PRN
Qty: 60 TABLET | Refills: 0 | Status: SHIPPED | OUTPATIENT
Start: 2022-09-29 | End: 2022-10-17

## 2022-09-29 RX ORDER — LANOLIN ALCOHOL/MO/W.PET/CERES
6 CREAM (GRAM) TOPICAL NIGHTLY PRN
Qty: 30 TABLET | Refills: 2 | Status: SHIPPED | OUTPATIENT
Start: 2022-09-29

## 2022-09-29 RX ORDER — CETIRIZINE HYDROCHLORIDE 10 MG/1
10 TABLET ORAL DAILY
Qty: 30 TABLET | Refills: 0 | Status: SHIPPED | OUTPATIENT
Start: 2022-09-29 | End: 2022-10-29

## 2022-09-29 RX ORDER — ATORVASTATIN CALCIUM 80 MG/1
80 TABLET, FILM COATED ORAL DAILY
Qty: 90 TABLET | Refills: 0 | Status: SHIPPED | OUTPATIENT
Start: 2022-09-29 | End: 2022-10-17

## 2022-09-29 RX ORDER — ARFORMOTEROL TARTRATE 15 UG/2ML
1 SOLUTION RESPIRATORY (INHALATION) 2 TIMES DAILY
Qty: 120 ML | Refills: 3 | Status: SHIPPED | OUTPATIENT
Start: 2022-09-29

## 2022-09-29 RX ORDER — WARFARIN SODIUM 5 MG/1
TABLET ORAL
Qty: 90 TABLET | Refills: 1 | Status: SHIPPED | OUTPATIENT
Start: 2022-09-29

## 2022-09-29 RX ORDER — LISINOPRIL 20 MG/1
20 TABLET ORAL DAILY
Qty: 30 TABLET | Refills: 0 | Status: SHIPPED | OUTPATIENT
Start: 2022-09-29 | End: 2022-10-10

## 2022-09-30 ENCOUNTER — HOSPITAL ENCOUNTER (OUTPATIENT)
Dept: WOUND CARE | Age: 62
Discharge: HOME | End: 2022-09-30
Payer: MEDICAID

## 2022-09-30 DIAGNOSIS — I73.9 PERIPHERAL ARTERY DISEASE (HCC): Primary | ICD-10-CM

## 2022-09-30 PROCEDURE — 29581 APPL MULTLAYER CMPRN SYS LEG: CPT

## 2022-09-30 RX ORDER — GENTAMICIN SULFATE 1 MG/G
OINTMENT TOPICAL ONCE
Status: CANCELLED | OUTPATIENT
Start: 2022-09-30 | End: 2022-09-30

## 2022-09-30 RX ORDER — BETAMETHASONE DIPROPIONATE 0.05 %
OINTMENT (GRAM) TOPICAL ONCE
Status: CANCELLED | OUTPATIENT
Start: 2022-09-30 | End: 2022-09-30

## 2022-09-30 RX ORDER — LIDOCAINE HYDROCHLORIDE 20 MG/ML
JELLY TOPICAL ONCE
Status: CANCELLED | OUTPATIENT
Start: 2022-09-30 | End: 2022-09-30

## 2022-09-30 RX ORDER — BACITRACIN ZINC AND POLYMYXIN B SULFATE 500; 1000 [USP'U]/G; [USP'U]/G
OINTMENT TOPICAL ONCE
Status: CANCELLED | OUTPATIENT
Start: 2022-09-30 | End: 2022-09-30

## 2022-09-30 RX ORDER — LIDOCAINE HYDROCHLORIDE 40 MG/ML
SOLUTION TOPICAL ONCE
Status: CANCELLED | OUTPATIENT
Start: 2022-09-30 | End: 2022-09-30

## 2022-09-30 RX ORDER — BACITRACIN, NEOMYCIN, POLYMYXIN B 400; 3.5; 5 [USP'U]/G; MG/G; [USP'U]/G
OINTMENT TOPICAL ONCE
Status: CANCELLED | OUTPATIENT
Start: 2022-09-30 | End: 2022-09-30

## 2022-09-30 RX ORDER — LIDOCAINE 50 MG/G
OINTMENT TOPICAL ONCE
Status: CANCELLED | OUTPATIENT
Start: 2022-09-30 | End: 2022-09-30

## 2022-09-30 RX ORDER — CLOBETASOL PROPIONATE 0.5 MG/G
OINTMENT TOPICAL ONCE
Status: CANCELLED | OUTPATIENT
Start: 2022-09-30 | End: 2022-09-30

## 2022-09-30 RX ORDER — GINSENG 100 MG
CAPSULE ORAL ONCE
Status: CANCELLED | OUTPATIENT
Start: 2022-09-30 | End: 2022-09-30

## 2022-09-30 RX ORDER — LIDOCAINE 40 MG/G
CREAM TOPICAL ONCE
Status: CANCELLED | OUTPATIENT
Start: 2022-09-30 | End: 2022-09-30

## 2022-09-30 NOTE — PLAN OF CARE
Multilayer Compression Wrap   Below the Knee    NAME:  Lola Pike  YOB: 1960  MEDICAL RECORD NUMBER:  [id number]  DATE:  9/30/2022    Patietn here for Rewrap    Multilayer compression wrap: Removed old Multilayer wrap if indicated and wash leg with mild soap/water. Applied moisturizing agent to dry skin as needed. Applied primary and secondary dressing as ordered. Applied multilayered dressing below the knee to right lower leg. Applied multilayered dressing below the knee to left lower leg.      Applied  2 layer wrap from toes to knee overlapping each time    Electronically signed by Shiloh Figueroa RN on 9/30/2022 at 9:28 AM

## 2022-10-06 ENCOUNTER — ANTI-COAG VISIT (OUTPATIENT)
Dept: PHARMACY | Age: 62
End: 2022-10-06
Payer: MEDICAID

## 2022-10-06 DIAGNOSIS — I73.9 PERIPHERAL ARTERY DISEASE (HCC): Primary | ICD-10-CM

## 2022-10-06 LAB — INTERNATIONAL NORMALIZATION RATIO, POC: 1.3

## 2022-10-06 PROCEDURE — 99211 OFF/OP EST MAY X REQ PHY/QHP: CPT

## 2022-10-06 PROCEDURE — 85610 PROTHROMBIN TIME: CPT

## 2022-10-06 RX ORDER — METOPROLOL SUCCINATE 25 MG/1
25 TABLET, EXTENDED RELEASE ORAL DAILY
COMMUNITY

## 2022-10-06 NOTE — PROGRESS NOTES
Ness Huang is a 65 y.o. here for warfarin management. Angel Reyes had an INR test today. Results were reviewed and appropriate warfarin management was completed. This visit was performed as: An in person visit. Protocols were followed with precautions to reduce the spread of COVID-19. Patient verifies current dosing regimen: Yes     Warfarin medication reviewed and updated on the patient 's home medication list: Yes   All other medications reviewed and updated on the patient 's home medication list: Yes    Lab Results   Component Value Date    INR 1.3 10/06/2022    INR 2.1 2022    INR 1.20 2022       Patient Findings       Positives:  Change in medications, Change in diet/appetite    Negatives:  Signs/symptoms of thrombosis, Signs/symptoms of bleeding, Missed doses, Bruising    Comments:  Started metoprolol - no interaction w warfarin. Increase in BOOST intake. 9 bottles per day (from 6 bottles per day)            Anticoagulation Summary  As of 10/6/2022      INR goal:  2.0-3.0   TTR:  48.0 % (1 y)   INR used for dosin.3 (10/6/2022)   Warfarin maintenance plan:  15 mg (5 mg x 3) every Mon, Fri; 10 mg (5 mg x 2) all other days   Weekly warfarin total:  80 mg   Plan last modified:  Anahi Garibay RPH (10/6/2022)   Next INR check:  10/14/2022   Priority:  High   Target end date: Indefinite    Indications    Peripheral artery disease ([facility] [I73.9]                 Anticoagulation Episode Summary       INR check location:  Anticoagulation Clinic    Preferred lab:      Send INR reminders to:  [facility]AFF    Comments:  EPIC          Anticoagulation Care Providers       Provider Role Specialty Phone number    Patrice Alan MD Referring Family Medicine [phone]            Warfarin assessment / plan:     Appears well  Sub-therapeutic INR     Denies missed doses. Denies signs or symptoms of clotting.   Denies signs or symptoms of a stroke     Increased Vitamin K intake. Medication changes. Started metoprolol - no interaction w warfarin    Reports he is drinking 9 BOOSTS per day. We had 6 per day at his last visit. This is likely contributing to his low INR. I will have him take a higher dose of warfarin today and then increase his weekly warfarin dose by 18%. Description    Take Warfarin 15mg today ONLY  THEN NEW DOSE:  Warfarin 10 mg daily EXCEPT 15mg every Monday and Friday    Drinking 9 Boost shakes per day     Call [phone] with signs or symptoms of bleeding or ANY medication changes (including over-the-counter medications or herbal supplements). If significant bleeding occurs please seek immediate medical attention. Keep the number of servings of vitamin K containing foods (dark green, leafy vegetables) the same each week. Dark green vegetable 2-3 times a week and a mixed green salad ~ 3 times a week. Please call if this changes. Limit alcohol intake. Please call if this changes. Immunization History   Administered Date(s) Administered    COVID-19, PFIZER PURPLE top, DILUTE for use, (age 15 y+), 30mcg/0.3mL 03/15/2021, 04/12/2021, 12/22/2021    Influenza Virus Vaccine 01/21/2017, 01/21/2017    Influenza, FLUARIX, FLULAVAL, Azzie Bishop (age 10 mo+) AND AFLURIA, (age 1 y+), PF, 0.5mL 10/31/2020    PPD Test 04/14/2022, 04/23/2022, 06/03/2022    Tdap (Boostrix, Adacel) 03/27/2018    Zoster Recombinant (Shingrix) 03/27/2018           Orders Placed This Encounter   Procedures    POCT INR     This external order was created through the results console. No orders of the defined types were placed in this encounter. Reviewed AVS with patient / caregiver.     Billing Points:  Adjust dosage and/or reconcile meds (fill pill box) </= 5 medications - 2 points       CLINICAL PHARMACY CONSULT: MED RECONCILIATION/REVIEW ADDENDUM    For Pharmacy Admin Tracking Only    Intervention Detail: Dose Adjustment: 1, reason: Therapy Optimization  Total #

## 2022-10-07 ENCOUNTER — HOSPITAL ENCOUNTER (OUTPATIENT)
Dept: WOUND CARE | Age: 62
Discharge: HOME | End: 2022-10-07
Payer: MEDICAID

## 2022-10-07 VITALS
RESPIRATION RATE: 16 BRPM | HEART RATE: 79 BPM | DIASTOLIC BLOOD PRESSURE: 73 MMHG | SYSTOLIC BLOOD PRESSURE: 121 MMHG | TEMPERATURE: 96.9 F

## 2022-10-07 DIAGNOSIS — I73.9 PERIPHERAL ARTERY DISEASE (HCC): Primary | ICD-10-CM

## 2022-10-07 PROCEDURE — 29581 APPL MULTLAYER CMPRN SYS LEG: CPT

## 2022-10-07 PROCEDURE — 11043 DBRDMT MUSC&/FSCA 1ST 20/<: CPT

## 2022-10-07 PROCEDURE — 15275 SKIN SUB GRAFT FACE/NK/HF/G: CPT

## 2022-10-07 RX ORDER — BACITRACIN, NEOMYCIN, POLYMYXIN B 400; 3.5; 5 [USP'U]/G; MG/G; [USP'U]/G
OINTMENT TOPICAL ONCE
Status: CANCELLED | OUTPATIENT
Start: 2022-10-07 | End: 2022-10-07

## 2022-10-07 RX ORDER — BACITRACIN ZINC AND POLYMYXIN B SULFATE 500; 1000 [USP'U]/G; [USP'U]/G
OINTMENT TOPICAL ONCE
Status: CANCELLED | OUTPATIENT
Start: 2022-10-07 | End: 2022-10-07

## 2022-10-07 RX ORDER — GENTAMICIN SULFATE 1 MG/G
OINTMENT TOPICAL ONCE
Status: CANCELLED | OUTPATIENT
Start: 2022-10-07 | End: 2022-10-07

## 2022-10-07 RX ORDER — LIDOCAINE HYDROCHLORIDE 20 MG/ML
JELLY TOPICAL ONCE
Status: CANCELLED | OUTPATIENT
Start: 2022-10-07 | End: 2022-10-07

## 2022-10-07 RX ORDER — LIDOCAINE 50 MG/G
OINTMENT TOPICAL ONCE
Status: CANCELLED | OUTPATIENT
Start: 2022-10-07 | End: 2022-10-07

## 2022-10-07 RX ORDER — LIDOCAINE 40 MG/G
CREAM TOPICAL ONCE
Status: DISCONTINUED | OUTPATIENT
Start: 2022-10-07 | End: 2022-10-08 | Stop reason: HOSPADM

## 2022-10-07 RX ORDER — LIDOCAINE 40 MG/G
CREAM TOPICAL ONCE
Status: CANCELLED | OUTPATIENT
Start: 2022-10-07 | End: 2022-10-07

## 2022-10-07 RX ORDER — BETAMETHASONE DIPROPIONATE 0.05 %
OINTMENT (GRAM) TOPICAL ONCE
Status: CANCELLED | OUTPATIENT
Start: 2022-10-07 | End: 2022-10-07

## 2022-10-07 RX ORDER — LIDOCAINE HYDROCHLORIDE 40 MG/ML
SOLUTION TOPICAL ONCE
Status: CANCELLED | OUTPATIENT
Start: 2022-10-07 | End: 2022-10-07

## 2022-10-07 RX ORDER — GINSENG 100 MG
CAPSULE ORAL ONCE
Status: CANCELLED | OUTPATIENT
Start: 2022-10-07 | End: 2022-10-07

## 2022-10-07 RX ORDER — CLOBETASOL PROPIONATE 0.5 MG/G
OINTMENT TOPICAL ONCE
Status: CANCELLED | OUTPATIENT
Start: 2022-10-07 | End: 2022-10-07

## 2022-10-07 ASSESSMENT — PAIN DESCRIPTION - ORIENTATION: ORIENTATION: LEFT

## 2022-10-07 ASSESSMENT — PAIN SCALES - GENERAL: PAINLEVEL_OUTOF10: 4

## 2022-10-07 ASSESSMENT — PAIN DESCRIPTION - LOCATION: LOCATION: ANKLE

## 2022-10-07 NOTE — PROGRESS NOTES
Multilayer Compression Wrap   Below the Knee    NAME:  Any Lr  YOB: 1960  MEDICAL RECORD NUMBER:  [id number]  DATE:  10/7/2022    Multilayer compression wrap: Removed old Multilayer wrap if indicated and wash leg with mild soap/water. Applied moisturizing agent to dry skin as needed. Applied primary and secondary dressing as ordered. Applied multilayered dressing below the knee to right lower leg. Applied multilayered dressing below the knee to left lower leg. Instructed patient/caregiver not to remove dressing and to keep it clean and dry. Instructed patient/caregiver on complications to report to provider, such as pain, numbness in toes, heavy drainage, and slippage of dressing. Instructed patient on purpose of compression dressing and on activity and exercise recommendations.      Applied  2 layer wrap from toes to knee overlapping each time    Electronically signed by Win Meadows RN on 10/7/2022 at 12:56 PM

## 2022-10-07 NOTE — DISCHARGE INSTRUCTIONS
[address], [address]  Telephone: [phone]     Discharge Instructions     Important reminders:     **If you have any signs and symptoms of illness (Cough, fever, congestion, nausea, vomiting, diarrhea, etc.) please call the wound care center prior to your appointment. 1. Increase Protein intake for optimal wound healing  2. No added salt to reduce any swelling  3. If diabetic, maintain good glucose control  4. If you smoke, smoking prohibits wound healing, we ask that you refrain from smoking. 5. When taking antibiotics take the entire prescription as ordered. Do not stop taking until medication is all gone unless otherwise instructed. 6. Exercise as tolerated. 7. Keep weight off wounds and reposition every 2 hours if applicable. 8. If wound(s) is on your lower extremity, elevate legs to the level of the heart or above for 30 minutes 4-5 times a day and/or when sitting. Avoid standing for long periods of time. 9. Do not get wounds wet in bath or shower unless otherwise instructed by your physician. If your wound is on your foot or leg, you may purchase a cast bag. Please ask at the pharmacy. If Vascular testing is ordered, please call [address] [place] [address] ([phone]) to schedule. Vascular tests ordered by Wound Care Physicians may take up to 2 hours to complete. Please keep that in mind when scheduling. If Vascular testing is scheduled, please bring supplies to replace your dressing after testing is done. The vascular department does not stock supplies. Wound: Right and Left leg     With each dressing change, rinse wounds with 0.9% Saline. (May use wound wash or soft contact solution. Both can be purchased at a local drug store). If unable to obtain saline, may use a gentle soap and water.      Dressing care: Right and left leg - Left leg - Theraskin, transfer, foam to anterior ankle coflex calamine, RIght leg and foot - Lotrisone, foam to anterior ankle, colflex calamine. Change next week       Dr Tori Kraus, Vascular Surgeon September 14th at 1215pm  [address] [place] [address], [address]  Phone# [phone]  Fax# [phone]         Compression Wraps  Location: Both legs  Type: Coflex calamine     Your doctor has ordered compression therapy for your wound. Compression bandages reduce the swelling, or edema, in your legs and prevent it from returning. The wound care staff will apply your compression wrap. It must be removed and re-applied at least weekly. As the swelling decreases, the boot no longer provides adequate compression and you need a new one. Once applied, you need to know how to take care of your compression wrap. The boot must stay dry. Do not get it wet in the shower or tub. You may do a partial bath, or you can cover the boot with a large plastic bag, secured at the top, so that no water can get in. Avoid standing in one place for long periods of time. If you must  one place, shift your weight and change positions often. If you have CHF, consult your doctor before following the next two recommendations for leg elevation. When sitting, elevate your legs on pillows, or put blocks under the foot of your bed. Your legs should be higher than your heart. If your boot becomes painful, or you notice an increase in swelling in your toes, numbness or tingling, or purple color to your toes, remove the wrap and call the [facility] [address]. If it is after hours, call your doctor for instructions or go to the nearest emergency room. Home Care Agency/Facility: [facility]     Your wound-care supplies will be provided by:  Please note, depending on your insurance coverage, you may have out-of-pocket expenses for these supplies. Someone from the company should call you to confirm your order and discuss those potential costs before they ship your products -- please anticipate that call.  If your out-of-pocket cost could be substantial, Many companies have financial hardship programs for patients who qualify, so please ask about that if you might need a hand. If you have any questions about your supplies or your potential out-of-pocket costs, or if you need to place an order for a refill of supplies (typically monthly), please call the company directly. Your  is [place]     Follow up with Dr Hemal Caicedo in 1 weeks. Nurse visit 10/14        [address] [facility] Information: Should you experience any significant changes in your wound(s) or have questions about your wound care, please contact the [place] [address] at [phone] Monday  - Thursday 8:00 am - 4:00 pm and Friday 8:00 am - 1:00pm. If you need help with your wound outside these hours and cannot wait until we are again available, contact your PCP or go to the hospital emergency room. PLEASE NOTE: IF YOU ARE UNABLE TO OBTAIN WOUND SUPPLIES, CONTINUE TO USE THE SUPPLIES YOU HAVE AVAILABLE UNTIL YOU ARE ABLE TO REACH US. IT IS MOST IMPORTANT TO KEEP THE WOUND COVERED AT ALL TIMES.

## 2022-10-07 NOTE — PLAN OF CARE
TheraSkin Treatment Note    NAME:  Weston Treviño  YOB: 1960  MEDICAL RECORD NUMBER:  [id number]  DATE:  10/7/2022    Goal:  Patient will receive safe and proper application of skin substitute. Patient will comply with caring for dressing, offloading and reporting complications. Expiration date of TheraSkin checked immediately prior to use. Package intact prior to use and no damage noted. Transport temperature controlled and acceptable. TheraSkin was prepared for application by removing from package. TheraSkin was rinsed 2 times in room temperature normal saline for 2 minutes each time. A 2nd saline rinse was left on the TheraSkin until the physician was ready to apply it within 120 minutes of thawing. White side goes against ulcer bed. TheraSkin was applied to Left foot and affixed with steri-strips by the physician. Compression wrap was applied on top of non-adherent dressing. Fluffed gauze was applied. Dressing was secured with cover roll type tape to stabilize graft. Coban or ace wrap was applied to secure graft and decrease edema. Patient/caregiver was instructed not to remove dressing and to keep it clean and dry. Pt/family/caregiver was instructed on signs and symptoms of complications to report such as draining through, falling down/slipping, getting wet, or severe pain or tingling in toes. Thera Skin may be applied a total of 10 times per wound over a 12 week period. Date of first application of Thera Skin for this current wound is October 7, 2022.      Application # 1                 Guidelines followed      Electronically signed by Janelle Ann RN on 10/7/2022 at 9:39 AM

## 2022-10-07 NOTE — PLAN OF CARE
[address], [address] [place] [address] Road  Telephone: [phone]        [facility] Fax # [phone]        Discharge Instructions         [address] [facility], [address] [place] [address] Road  Telephone: [phone]     Discharge Instructions     Important reminders:     **If you have any signs and symptoms of illness (Cough, fever, congestion, nausea, vomiting, diarrhea, etc.) please call the wound care center prior to your appointment. 1. Increase Protein intake for optimal wound healing  2. No added salt to reduce any swelling  3. If diabetic, maintain good glucose control  4. If you smoke, smoking prohibits wound healing, we ask that you refrain from smoking. 5. When taking antibiotics take the entire prescription as ordered. Do not stop taking until medication is all gone unless otherwise instructed. 6. Exercise as tolerated. 7. Keep weight off wounds and reposition every 2 hours if applicable. 8. If wound(s) is on your lower extremity, elevate legs to the level of the heart or above for 30 minutes 4-5 times a day and/or when sitting. Avoid standing for long periods of time. 9. Do not get wounds wet in bath or shower unless otherwise instructed by your physician. If your wound is on your foot or leg, you may purchase a cast bag. Please ask at the pharmacy. If Vascular testing is ordered, please call [address] [place] [address] [phone]) to schedule. Vascular tests ordered by Wound Care Physicians may take up to 2 hours to complete. Please keep that in mind when scheduling. If Vascular testing is scheduled, please bring supplies to replace your dressing after testing is done. The vascular department does not stock supplies. Wound: Right and Left leg     With each dressing change, rinse wounds with 0.9% Saline. (May use wound wash or soft contact solution. Both can be purchased at a local drug store).  If unable to obtain saline, may use a gentle soap and water. Dressing care: Right and left leg - Left leg - Theraskin, transfer, foam to anterior ankle coflex calamine, RIght leg and foot - Lotrisone, foam to anterior ankle, colflex calamine. Change next week       Dr Vincent Quiroga, Vascular Surgeon September 14th at 1215pm  [address] [address], [address]  Phone# [phone]  Fax# [phone]         Compression Wraps  Location: Both legs  Type: Coflex calamine     Your doctor has ordered compression therapy for your wound. Compression bandages reduce the swelling, or edema, in your legs and prevent it from returning. The wound care staff will apply your compression wrap. It must be removed and re-applied at least weekly. As the swelling decreases, the boot no longer provides adequate compression and you need a new one. Once applied, you need to know how to take care of your compression wrap. The boot must stay dry. Do not get it wet in the shower or tub. You may do a partial bath, or you can cover the boot with a large plastic bag, secured at the top, so that no water can get in. Avoid standing in one place for long periods of time. If you must  one place, shift your weight and change positions often. If you have CHF, consult your doctor before following the next two recommendations for leg elevation. When sitting, elevate your legs on pillows, or put blocks under the foot of your bed. Your legs should be higher than your heart. If your boot becomes painful, or you notice an increase in swelling in your toes, numbness or tingling, or purple color to your toes, remove the wrap and call the [address] [place] [address] Road. If it is after hours, call your doctor for instructions or go to the nearest emergency room. Home Care Agency/Facility: [facility]     Your wound-care supplies will be provided by:  Please note, depending on your insurance coverage, you may have out-of-pocket expenses for these supplies. Someone from the company should call you to confirm your order and discuss those potential costs before they ship your products -- please anticipate that call. If your out-of-pocket cost could be substantial, Many companies have financial hardship programs for patients who qualify, so please ask about that if you might need a hand. If you have any questions about your supplies or your potential out-of-pocket costs, or if you need to place an order for a refill of supplies (typically monthly), please call the company directly. Your  is Imani Wilson     Follow up with Dr Addi Naik in 1 weeks. Nurse visit 10/14        [address] [place] [address] Information: Should you experience any significant changes in your wound(s) or have questions about your wound care, please contact the [facility] at [phone] Monday  - Thursday 8:00 am - 4:00 pm and Friday 8:00 am - 1:00pm. If you need help with your wound outside these hours and cannot wait until we are again available, contact your PCP or go to the hospital emergency room. PLEASE NOTE: IF YOU ARE UNABLE TO OBTAIN WOUND SUPPLIES, CONTINUE TO USE THE SUPPLIES YOU HAVE AVAILABLE UNTIL YOU ARE ABLE TO REACH US. IT IS MOST IMPORTANT TO KEEP THE WOUND COVERED AT ALL TIMES. Skilled nurse to evaluate and treat for wound care. Change dressing as ordered  once a day on Monday, Tuesday, Wednesday, Thursday, Friday, using clean technique. Patient/Family/caregiver may change dressings as needed as instructed when Home Care unavailable.     WOUNDS REQUIRING DRESSING Changes:     Wound 06/24/22 Ankle Left;Medial #1 (Active)   Wound Image   10/07/22 0853   Wound Etiology Arterial 10/07/22 0853   Wound Cleansed Cleansed with saline 10/07/22 0853   Dressing/Treatment Antibacterial ointment;Triad hydro/zinc oxide-based hydrophilic paste 12/23/18 3050   Offloading for Diabetic Foot Ulcers Offloading not ordered 09/23/22 0821   Wound Length (cm) 1.6 cm 10/07/22 0853   Wound Width (cm) 3.7 cm 10/07/22 0853   Wound Depth (cm) 0.2 cm 10/07/22 0853   Wound Surface Area (cm^2) 5.92 cm^2 10/07/22 0853   Change in Wound Size % (l*w) 13.45 10/07/22 0853   Wound Volume (cm^3) 1.184 cm^3 10/07/22 0853   Wound Healing % -73 10/07/22 0853   Post-Procedure Length (cm) 1.6 cm 10/07/22 0934   Post-Procedure Width (cm) 3.7 cm 10/07/22 0934   Post-Procedure Depth (cm) 0.2 cm 10/07/22 0934   Post-Procedure Surface Area (cm^2) 5.92 cm^2 10/07/22 0934   Post-Procedure Volume (cm^3) 1.184 cm^3 10/07/22 0934   Wound Assessment Bleeding 10/07/22 0934   Drainage Amount Moderate 10/07/22 0853   Drainage Description Purulent; Thick 10/07/22 0853   Odor None 10/07/22 0853   Jamila-wound Assessment Dry/flaky 10/07/22 0853   Margins Defined edges 10/07/22 0853   Number of days: 105     Incision 02/26/21 Anterior;Right (Active)   Number of days: 588       Patient seen and treated on 10/7/2022    By Omar Bowers DPM  [id number]   (provider/NPI)

## 2022-10-07 NOTE — PROGRESS NOTES
Iris Quiroz  Progress Note and Procedure Note      Margaret rAagon  AGE: 65 y.o. GENDER: male  : 1960  TODAY'S DATE:  10/7/2022    Subjective:     Chief Complaint   Patient presents with    Wound Check     Right lower leg, Left lower leg         HISTORY of PRESENT ILLNESS HPI     Margaret Aragon is a 65 y.o. male who presents today for wound evaluation. History of Wound: Admits to having chronic wounds for at least a year on the left leg and 8 to 9 months on the right leg. He states that he has had arterial bypasses on both of his legs in the past.  He was seeing previous wound care and podiatry for these wounds. He states that his insurance had some issues and he has not been seeing anyone since 2021 he has been treating the wounds himself with PSO and bandages. He has left his bandages on as directed. He has no other complaints this time. He is decreased pain in the right leg.     Wound Pain:  intermittent left  Severity:  3 / 10   Wound Type:  venous, arterial and diabetic  Modifying Factors:  edema, venous stasis, lymphedema, diabetes, decreased mobility, arterial insufficiency and decreased tissue oxygenation  Associated Signs/Symptoms:  drainage, numbness and odor        PAST MEDICAL HISTORY        Diagnosis Date    Cancer ([address]     throat    Diabetes mellitus ([address]     Fibromyalgia     History of DVT (deep vein thrombosis)     Hyperlipidemia     Hypertension     Type 2 diabetes mellitus with circulatory disorder, without long-term current use of insulin ([address] 2022       PAST SURGICAL HISTORY    Past Surgical History:   Procedure Laterality Date    APPENDECTOMY  2005    COLONOSCOPY  2016    FEMORAL BYPASS Left 2020    femoral posterior tibial bypass    FEMORAL-TIBIAL BYPASS GRAFT Right 2020    with femoral endarterectomy and patch angioplasty    FOOT DEBRIDEMENT Left 2020    FOOT DEBRIDEMENT Right 2021    DEBRIDEMENT OF RIGHT FOOT WOUND WITH GRAFT APPLICATION performed by Dennis Katz DPM at [address] [facility] N/A 3/25/2022    DIRECT LARYNGOSCOPY WITH BIOPSY AND FROZEN SECTIONS performed by José Maynard DO at [address] [facility] Right 2020    stent leg for PVD    TRACHEOSTOMY N/A 3/25/2022    TRACHEOTOMY performed by José Maynard DO at [address] [facility] [address] HISTORY    Family History   Problem Relation Age of Onset    Cancer Mother     Lung Cancer Mother     Diabetes Father     Stroke Father        SOCIAL HISTORY    Social History     Tobacco Use    Smoking status: Former     Packs/day: 0.50     Years: 20.00     Pack years: 10.00     Types: Cigarettes     Start date: 1977     Quit date: 2021     Years since quittin.7    Smokeless tobacco: Never   Vaping Use    Vaping Use: Never used   Substance Use Topics    Alcohol use: Yes     Comment: 2 beers / day     Drug use: Never       ALLERGIES    Allergies   Allergen Reactions    Chantix [Varenicline] Other (See Comments)     Hallucinations         MEDICATIONS    Current Outpatient Medications on File Prior to Encounter   Medication Sig Dispense Refill    metoprolol succinate (TOPROL XL) 25 MG extended release tablet Take 25 mg by mouth daily      melatonin 3 MG TABS tablet Take 2 tablets by mouth nightly as needed (insomnia) 30 tablet 2    atorvastatin (LIPITOR) 80 MG tablet Take 1 tablet by mouth daily Cancel atorvastatin 20 mg a day 90 tablet 0    warfarin (COUMADIN) 5 MG tablet Take daily as directed by anti coag clinic to maintain INR 2-3 90 tablet 1    diphenhydrAMINE (BENADRYL ALLERGY) 25 MG tablet Take 1 tablet by mouth every 6 hours as needed for Itching 60 tablet 0    cyanocobalamin 250 MCG tablet Take 1 tablet by mouth daily 30 tablet 0    metFORMIN (GLUCOPHAGE) 500 MG tablet Take 1 tablet by mouth 2 times daily (with meals) 60 tablet 0    cetirizine (ZYRTEC) 10 MG tablet Take 1 tablet by mouth daily 30 tablet 0    lisinopril (PRINIVIL;ZESTRIL) 20 MG tablet Take 1 tablet by mouth daily 30 tablet 0    Arformoterol Tartrate (BROVANA) 15 MCG/2ML NEBU Take 1 ampule by nebulization 2 times daily 120 mL 3    acetaminophen (TYLENOL) 500 MG tablet Take 1 tablet by mouth 4 times daily as needed for Pain 360 tablet 1    gentamicin (GARAMYCIN) 0.1 % cream Apply topically  daily. 30 g 1    budesonide (PULMICORT) 0.5 MG/2ML nebulizer suspension Take 2 mLs by nebulization 2 times daily 60 each 2    sennosides-docusate sodium (SENOKOT-S) 8.6-50 MG tablet Take 1 tablet by mouth 2 times daily 60 tablet 0    BANOPHEN 25 MG capsule Take 1 tablet by mouth every 6 hours as needed for Itching      ondansetron (ZOFRAN-ODT) 4 MG disintegrating tablet Take 4 mg by mouth every 8 hours as needed for Nausea or Vomiting      Multiple Vitamins-Minerals (MULTIVITAMIN ADULT EXTRA C PO) 1 tablet by Nasogastric route daily      esomeprazole (NEXIUM) 20 MG delayed release capsule 20 mg by Nasogastric route every morning (before breakfast)      oxyCODONE HCl (OXY-IR) 10 MG immediate release tablet TAKE 1 TABLET BY MOUTH EVERY SIX HOURS AS NEEDED FOR PAIN FOR UP TO 7 DAYS      becaplermin (REGRANEX) 0.01 % gel Apply 1 Applicatorful topically daily      calcium-vitamin D (OSCAL-500) 500-200 MG-UNIT per tablet Take 1 tablet by mouth 2 times daily      ipratropium-albuterol (DUONEB) 0.5-2.5 (3) MG/3ML SOLN nebulizer solution Inhale 3 mLs into the lungs 4 times daily 360 mL 3    fluticasone (FLONASE) 50 MCG/ACT nasal spray 1 spray by Nasal route daily 9.9 g 5    Vitamins/Minerals TABS Take 1 tablet by mouth daily        No current facility-administered medications on file prior to encounter. REVIEW OF SYSTEMS    Pertinent items are noted in HPI. Objective:      /73   Pulse 79   Temp 96.9 °F (36.1 °C) (Infrared)   Resp 16     PHYSICAL EXAM    Vascular: Vascular status Impaired  palpable pedal pulses, right DP0/4 and PT1/4, left DP0/4 and PT1/4.   CFT 3 seconds digits 1 to 5 bilateral.  Hair growthAbsent  both lower extremities and feet. Skin temperature is warm to warm from pretibial area to distal digits bilateral.  Exam is negative for rubor, pallor, cyanosis or signs of acute vascular compromise bilaterally. Exam is positive for edema bilateral lower extremity. Varicosities Present bilateral lower extremity. Neuro: Neurologic status diminished bilateral with epicritic Absent  , proprioceptive Absent , vibratory sensation Absent  and protopathicPresent. DTRs Absent  bilateral Achilles. There were no reproducible neuritic symptoms on exam bilateral feet/ankles. Derm: Ulceration to bilateral ankles. Ecchymosis Absent  bilateral feet/foot. Musculoskeletal: No pain with debridement of wounds 5/5 muscle strength in/eversion and dorsi/plantarflexion bilateral feet. No gross instability noted. Assessment:     Problem List Items Addressed This Visit       Peripheral artery disease ([facility] - Primary    Relevant Medications    lidocaine (LMX) 4 % cream    Other Relevant Orders    Initiate Outpatient Wound Care Protocol       Procedure Note    Performed by: Jeanine Lino DPM    Consent obtained: Yes    Time out taken:  Yes    Pain Control: Anesthetic  Anesthetic: 4% Lidocaine Cream     Debridement:Excisional Debridement    Using curette the wound was sharply debrided    down through and including the removal of epidermis, dermis, subcutaneous tissue and muscle/fascia.         Devitalized Tissue Debrided:  fibrin, biofilm, slough, necrotic/eschar and exudate    Pre Debridement Measurements:  Are located in the Wound Documentation Flow Sheet    Wound #: 1     Wound Care Documentation:  Wound 06/24/22 Ankle Left;Medial #1 (Active)   Wound Image   10/07/22 0853   Wound Etiology Arterial 10/07/22 0853   Wound Cleansed Cleansed with saline 10/07/22 0853   Dressing/Treatment Antibacterial ointment;Triad hydro/zinc oxide-based hydrophilic paste 29/70/44 0879 Offloading for Diabetic Foot Ulcers Offloading not ordered 09/23/22 0821   Wound Length (cm) 1.6 cm 10/07/22 0853   Wound Width (cm) 3.7 cm 10/07/22 0853   Wound Depth (cm) 0.2 cm 10/07/22 0853   Wound Surface Area (cm^2) 5.92 cm^2 10/07/22 0853   Change in Wound Size % (l*w) 13.45 10/07/22 0853   Wound Volume (cm^3) 1.184 cm^3 10/07/22 0853   Wound Healing % -73 10/07/22 0853   Post-Procedure Length (cm) 1.6 cm 10/07/22 0934   Post-Procedure Width (cm) 3.7 cm 10/07/22 0934   Post-Procedure Depth (cm) 0.2 cm 10/07/22 0934   Post-Procedure Surface Area (cm^2) 5.92 cm^2 10/07/22 0934   Post-Procedure Volume (cm^3) 1.184 cm^3 10/07/22 0934   Wound Assessment Bleeding 10/07/22 0934   Drainage Amount Moderate 10/07/22 0853   Drainage Description Purulent; Thick 10/07/22 0853   Odor None 10/07/22 0853   Jamila-wound Assessment Dry/flaky 10/07/22 0853   Margins Defined edges 10/07/22 0853   Number of days: 105         Total Surface Area Debrided: 5.92 cm² left leg into the deep fascia     Percentage of wound debrided 100%    Bleeding:  Minimal    Hemostasis Achieved:  by pressure    Procedural Pain:  0  / 10     Post Procedural Pain:  0 / 10     Response to treatment:  Well tolerated by patient. Plan:   Patient examined and evaluated   Wounds debrided without incident   Multilayer compression wrap right leg with Lotrisone. Multilayer compression wrap left leg  Follow-up one week  Application of TheraSkin without incident to the left wound  Keep legs elevated at all times when not active. The nature of the patient's condition was explained in depth. The patient was informed that their compliance to the treatment Plan is paramount to successful healing and prevention of further ulceration and/or infection.   Discharge Treatment follow-up on Friday for dressing change    Written Patient Discharge Instructions Given            Electronically signed by David Coyle DPM on 10/7/2022 at 9:41 AM

## 2022-10-07 NOTE — PLAN OF CARE
Discharge instructions given. Patient verbalized understanding. Return to [address] [place] [address] in 1 week(s).

## 2022-10-08 DIAGNOSIS — E11.9 TYPE 2 DIABETES MELLITUS WITHOUT COMPLICATION, WITHOUT LONG-TERM CURRENT USE OF INSULIN (HCC): ICD-10-CM

## 2022-10-10 RX ORDER — LISINOPRIL 20 MG/1
TABLET ORAL
Qty: 30 TABLET | Refills: 0 | Status: SHIPPED | OUTPATIENT
Start: 2022-10-10 | End: 2022-10-17 | Stop reason: SDUPTHER

## 2022-10-14 ENCOUNTER — HOSPITAL ENCOUNTER (OUTPATIENT)
Dept: WOUND CARE | Age: 62
Discharge: HOME | End: 2022-10-14
Payer: MEDICAID

## 2022-10-14 ENCOUNTER — ANTI-COAG VISIT (OUTPATIENT)
Dept: PHARMACY | Age: 62
End: 2022-10-14
Payer: MEDICAID

## 2022-10-14 DIAGNOSIS — I73.9 PERIPHERAL ARTERY DISEASE (HCC): Primary | ICD-10-CM

## 2022-10-14 LAB — INR BLD: 2.4

## 2022-10-14 PROCEDURE — 29581 APPL MULTLAYER CMPRN SYS LEG: CPT

## 2022-10-14 PROCEDURE — 85610 PROTHROMBIN TIME: CPT

## 2022-10-14 PROCEDURE — 99211 OFF/OP EST MAY X REQ PHY/QHP: CPT

## 2022-10-14 RX ORDER — LIDOCAINE HYDROCHLORIDE 20 MG/ML
JELLY TOPICAL ONCE
Status: CANCELLED | OUTPATIENT
Start: 2022-10-14 | End: 2022-10-14

## 2022-10-14 RX ORDER — LIDOCAINE 40 MG/G
CREAM TOPICAL ONCE
Status: CANCELLED | OUTPATIENT
Start: 2022-10-14 | End: 2022-10-14

## 2022-10-14 RX ORDER — BACITRACIN, NEOMYCIN, POLYMYXIN B 400; 3.5; 5 [USP'U]/G; MG/G; [USP'U]/G
OINTMENT TOPICAL ONCE
Status: CANCELLED | OUTPATIENT
Start: 2022-10-14 | End: 2022-10-14

## 2022-10-14 RX ORDER — CLOBETASOL PROPIONATE 0.5 MG/G
OINTMENT TOPICAL ONCE
Status: CANCELLED | OUTPATIENT
Start: 2022-10-14 | End: 2022-10-14

## 2022-10-14 RX ORDER — BACITRACIN ZINC AND POLYMYXIN B SULFATE 500; 1000 [USP'U]/G; [USP'U]/G
OINTMENT TOPICAL ONCE
Status: CANCELLED | OUTPATIENT
Start: 2022-10-14 | End: 2022-10-14

## 2022-10-14 RX ORDER — BETAMETHASONE DIPROPIONATE 0.05 %
OINTMENT (GRAM) TOPICAL ONCE
Status: CANCELLED | OUTPATIENT
Start: 2022-10-14 | End: 2022-10-14

## 2022-10-14 RX ORDER — GENTAMICIN SULFATE 1 MG/G
OINTMENT TOPICAL ONCE
Status: CANCELLED | OUTPATIENT
Start: 2022-10-14 | End: 2022-10-14

## 2022-10-14 RX ORDER — LIDOCAINE 50 MG/G
OINTMENT TOPICAL ONCE
Status: CANCELLED | OUTPATIENT
Start: 2022-10-14 | End: 2022-10-14

## 2022-10-14 RX ORDER — GINSENG 100 MG
CAPSULE ORAL ONCE
Status: CANCELLED | OUTPATIENT
Start: 2022-10-14 | End: 2022-10-14

## 2022-10-14 RX ORDER — LIDOCAINE HYDROCHLORIDE 40 MG/ML
SOLUTION TOPICAL ONCE
Status: CANCELLED | OUTPATIENT
Start: 2022-10-14 | End: 2022-10-14

## 2022-10-14 NOTE — PROGRESS NOTES
Chang Desouza is a 65 y.o. here for warfarin management. Luis Waller had an INR test today. Results were reviewed and appropriate warfarin management was completed. This visit was performed as: An in person visit. Protocols were followed with precautions to reduce the spread of COVID-19. Patient verifies current warfarin dosing regimen: Yes     Warfarin medication reviewed and updated on the patient 's home medication list: Yes   All other medications reviewed and updated on the patient 's home medication list: No: No medication changes      Lab Results   Component Value Date    INR 2.40 10/14/2022    INR 1.3 10/06/2022    INR 2.1 2022       Patient Findings       Positives:  Upcoming invasive procedure    Negatives:  Signs/symptoms of thrombosis, Signs/symptoms of bleeding, Change in health, Missed doses, Change in medications, Change in diet/appetite, Bruising    Comments:  Esophageal surgery on 10/27 - Patient instructed to hold warfarin 10/23 - 10/27            Anticoagulation Summary  As of 10/14/2022      INR goal:  2.0-3.0   TTR:  47.7 % (1 y)   INR used for dosin.40 (10/14/2022)   Warfarin maintenance plan:  15 mg (5 mg x 3) every Mon, Fri; 10 mg (5 mg x 2) all other days   Weekly warfarin total:  80 mg   Plan last modified:  Kyaw Aldana [facility] (10/6/2022)   Next INR check:  2022   Priority:  High   Target end date: Indefinite    Indications    Peripheral artery disease ([facility] [I73.9]                 Anticoagulation Episode Summary       INR check location:  Anticoagulation Clinic    Preferred lab:      Send INR reminders to:  [facility] STAFF    Comments:  EPIC          Anticoagulation Care Providers       Provider Role Specialty Phone number    Senait Yeh MD Referring Family Medicine [phone]            Warfarin assessment / plan:     Appears well. No changes affecting warfarin therapy were noted. No acute findings. INR within goal range.      No change to warfarin therapy today. INR 2.4 today. No changes to warfarin dosing. Patient has esophageal surgery on 10/27. He was instructed to hold his warfarin by his physician from 10/23-10/27. We will see him back 1 week after his procedure. Patient is still drinking 9 Boost drinks per day and will let us know if this changes. Description    CONTINUE:  Warfarin 10 mg daily EXCEPT 15 mg every Monday and Friday    Drinking 9 Boost shakes per day. Please let us know if this changes. Call [phone] with signs or symptoms of bleeding or ANY medication changes (including over-the-counter medications or herbal supplements). If significant bleeding occurs please seek immediate medical attention. Keep the number of servings of vitamin K containing foods (dark green, leafy vegetables) the same each week. Dark green vegetable 2-3 times a week and a mixed green salad ~ 3 times a week. Please call if this changes. Limit alcohol intake. Please call if this changes. Immunization History   Administered Date(s) Administered    COVID-19, PFIZER PURPLE top, DILUTE for use, (age 15 y+), 30mcg/0.3mL 03/15/2021, 04/12/2021, 12/22/2021    Influenza Virus Vaccine 01/21/2017, 01/21/2017    Influenza, FLUARIX, FLULAVAL, Cathye Kohut (age 10 mo+) AND AFLURIA, (age 1 y+), PF, 0.5mL 10/31/2020    PPD Test 04/14/2022, 04/23/2022, 06/03/2022    Tdap (Boostrix, Adacel) 03/27/2018    Zoster Recombinant (Shingrix) 03/27/2018           Orders Placed This Encounter   Procedures    Protime-INR     This external order was created through the results console. No orders of the defined types were placed in this encounter. Reviewed AVS with patient / caregiver.     Billing Points:  0 billing points this visit       CLINICAL PHARMACY CONSULT: MED RECONCILIATION/REVIEW ADDENDUM    For Pharmacy Admin Tracking Only    Intervention Detail:   Total # of Interventions Recommended: 0  Total # of Interventions Accepted: 0  Time Spent (min): [address] [employer] [employer] [place]  PharmD Candidate [id number]

## 2022-10-14 NOTE — PROGRESS NOTES
Multilayer Compression Wrap   Below the Knee    NAME:  Jon Gatica  YOB: 1960  MEDICAL RECORD NUMBER:  [id number]  DATE:  10/14/2022    Multilayer compression wrap: Removed old Multilayer wrap if indicated and wash leg with mild soap/water. Applied moisturizing agent to dry skin as needed. Applied primary and secondary dressing as ordered. Applied multilayered dressing below the knee to right lower leg. Applied multilayered dressing below the knee to left lower leg. Instructed patient/caregiver not to remove dressing and to keep it clean and dry. Instructed patient/caregiver on complications to report to provider, such as pain, numbness in toes, heavy drainage, and slippage of dressing. Instructed patient on purpose of compression dressing and on activity and exercise recommendations.      Applied  2 layer wrap from toes to knee overlapping each time    Electronically signed by David Mosley RN on 10/14/2022 at 9:14 AM

## 2022-10-15 DIAGNOSIS — L29.9 ITCHING: ICD-10-CM

## 2022-10-17 DIAGNOSIS — I73.9 PAD (PERIPHERAL ARTERY DISEASE) (HCC): ICD-10-CM

## 2022-10-17 DIAGNOSIS — I10 ESSENTIAL HYPERTENSION: ICD-10-CM

## 2022-10-17 DIAGNOSIS — E78.00 PURE HYPERCHOLESTEROLEMIA: ICD-10-CM

## 2022-10-17 DIAGNOSIS — E11.9 TYPE 2 DIABETES MELLITUS WITHOUT COMPLICATION, WITHOUT LONG-TERM CURRENT USE OF INSULIN (HCC): ICD-10-CM

## 2022-10-17 RX ORDER — ATORVASTATIN CALCIUM 80 MG/1
TABLET, FILM COATED ORAL
Qty: 90 TABLET | Refills: 0 | Status: SHIPPED | OUTPATIENT
Start: 2022-10-17

## 2022-10-17 RX ORDER — LISINOPRIL 20 MG/1
TABLET ORAL
Qty: 30 TABLET | Refills: 0 | OUTPATIENT
Start: 2022-10-17

## 2022-10-17 RX ORDER — LISINOPRIL 20 MG/1
TABLET ORAL
Qty: 30 TABLET | Refills: 0 | Status: SHIPPED | OUTPATIENT
Start: 2022-10-17 | End: 2022-11-04

## 2022-10-20 NOTE — DISCHARGE INSTRUCTIONS
[facility], [address] [place] [address] Road  Telephone: [phone]     Discharge Instructions     Important reminders:     **If you have any signs and symptoms of illness (Cough, fever, congestion, nausea, vomiting, diarrhea, etc.) please call the wound care center prior to your appointment. 1. Increase Protein intake for optimal wound healing  2. No added salt to reduce any swelling  3. If diabetic, maintain good glucose control  4. If you smoke, smoking prohibits wound healing, we ask that you refrain from smoking. 5. When taking antibiotics take the entire prescription as ordered. Do not stop taking until medication is all gone unless otherwise instructed. 6. Exercise as tolerated. 7. Keep weight off wounds and reposition every 2 hours if applicable. 8. If wound(s) is on your lower extremity, elevate legs to the level of the heart or above for 30 minutes 4-5 times a day and/or when sitting. Avoid standing for long periods of time. 9. Do not get wounds wet in bath or shower unless otherwise instructed by your physician. If your wound is on your foot or leg, you may purchase a cast bag. Please ask at the pharmacy. If Vascular testing is ordered, please call [address] [place] [address] [phone]) to schedule. Vascular tests ordered by Wound Care Physicians may take up to 2 hours to complete. Please keep that in mind when scheduling. If Vascular testing is scheduled, please bring supplies to replace your dressing after testing is done. The vascular department does not stock supplies. Wound: Right and Left leg     With each dressing change, rinse wounds with 0.9% Saline. (May use wound wash or soft contact solution. Both can be purchased at a local drug store). If unable to obtain saline, may use a gentle soap and water.      Dressing care: Right and left leg - Left leg - Theraskin, transfer, foam to anterior ankle coflex calamine, RIght leg and foot - Lotrisone, foam to anterior ankle, colflex calamine. Change next week        Dr Myron Mcarthur, Vascular Surgeon September 14th at 1215pm  [address] [place] [address], [address]  Phone# [phone]  Fax# [phone]         Compression Wraps  Location: Both legs  Type: Coflex calamine     Your doctor has ordered compression therapy for your wound. Compression bandages reduce the swelling, or edema, in your legs and prevent it from returning. The wound care staff will apply your compression wrap. It must be removed and re-applied at least weekly. As the swelling decreases, the boot no longer provides adequate compression and you need a new one. Once applied, you need to know how to take care of your compression wrap. The boot must stay dry. Do not get it wet in the shower or tub. You may do a partial bath, or you can cover the boot with a large plastic bag, secured at the top, so that no water can get in. Avoid standing in one place for long periods of time. If you must  one place, shift your weight and change positions often. If you have CHF, consult your doctor before following the next two recommendations for leg elevation. When sitting, elevate your legs on pillows, or put blocks under the foot of your bed. Your legs should be higher than your heart. If your boot becomes painful, or you notice an increase in swelling in your toes, numbness or tingling, or purple color to your toes, remove the wrap and call the [facility] [address]. If it is after hours, call your doctor for instructions or go to the nearest emergency room. Home Care Agency/Facility: [facility]     Your wound-care supplies will be provided by:  Please note, depending on your insurance coverage, you may have out-of-pocket expenses for these supplies. Someone from the company should call you to confirm your order and discuss those potential costs before they ship your products -- please anticipate that call.  If your out-of-pocket cost could be substantial, Many companies have financial hardship programs for patients who qualify, so please ask about that if you might need a hand. If you have any questions about your supplies or your potential out-of-pocket costs, or if you need to place an order for a refill of supplies (typically monthly), please call the company directly. Your  is Leanna Infante     Follow up with Dr Beny Taylor in 1 weeks. Wound Care Center Information: Should you experience any significant changes in your wound(s) or have questions about your wound care, please contact the [place] [address] at [phone] Monday  - Thursday 8:00 am - 4:00 pm and Friday 8:00 am - 1:00pm. If you need help with your wound outside these hours and cannot wait until we are again available, contact your PCP or go to the hospital emergency room. PLEASE NOTE: IF YOU ARE UNABLE TO OBTAIN WOUND SUPPLIES, CONTINUE TO USE THE SUPPLIES YOU HAVE AVAILABLE UNTIL YOU ARE ABLE TO REACH US. IT IS MOST IMPORTANT TO KEEP THE WOUND COVERED AT ALL TIMES.

## 2022-10-21 ENCOUNTER — HOSPITAL ENCOUNTER (OUTPATIENT)
Dept: WOUND CARE | Age: 62
Discharge: HOME | End: 2022-10-21
Payer: MEDICAID

## 2022-10-21 VITALS
SYSTOLIC BLOOD PRESSURE: 121 MMHG | HEART RATE: 86 BPM | DIASTOLIC BLOOD PRESSURE: 74 MMHG | RESPIRATION RATE: 16 BRPM | TEMPERATURE: 96.9 F

## 2022-10-21 DIAGNOSIS — I73.9 PERIPHERAL ARTERY DISEASE (HCC): Primary | ICD-10-CM

## 2022-10-21 PROCEDURE — 15275 SKIN SUB GRAFT FACE/NK/HF/G: CPT

## 2022-10-21 PROCEDURE — 11043 DBRDMT MUSC&/FSCA 1ST 20/<: CPT

## 2022-10-21 PROCEDURE — 29581 APPL MULTLAYER CMPRN SYS LEG: CPT

## 2022-10-21 RX ORDER — LIDOCAINE 40 MG/G
CREAM TOPICAL ONCE
Status: CANCELLED | OUTPATIENT
Start: 2022-10-21 | End: 2022-10-21

## 2022-10-21 RX ORDER — CLOBETASOL PROPIONATE 0.5 MG/G
OINTMENT TOPICAL ONCE
Status: CANCELLED | OUTPATIENT
Start: 2022-10-21 | End: 2022-10-21

## 2022-10-21 RX ORDER — BETAMETHASONE DIPROPIONATE 0.05 %
OINTMENT (GRAM) TOPICAL ONCE
Status: CANCELLED | OUTPATIENT
Start: 2022-10-21 | End: 2022-10-21

## 2022-10-21 RX ORDER — LIDOCAINE 50 MG/G
OINTMENT TOPICAL ONCE
Status: CANCELLED | OUTPATIENT
Start: 2022-10-21 | End: 2022-10-21

## 2022-10-21 RX ORDER — BACITRACIN, NEOMYCIN, POLYMYXIN B 400; 3.5; 5 [USP'U]/G; MG/G; [USP'U]/G
OINTMENT TOPICAL ONCE
Status: CANCELLED | OUTPATIENT
Start: 2022-10-21 | End: 2022-10-21

## 2022-10-21 RX ORDER — BACITRACIN ZINC AND POLYMYXIN B SULFATE 500; 1000 [USP'U]/G; [USP'U]/G
OINTMENT TOPICAL ONCE
Status: CANCELLED | OUTPATIENT
Start: 2022-10-21 | End: 2022-10-21

## 2022-10-21 RX ORDER — LIDOCAINE HYDROCHLORIDE 20 MG/ML
JELLY TOPICAL ONCE
Status: CANCELLED | OUTPATIENT
Start: 2022-10-21 | End: 2022-10-21

## 2022-10-21 RX ORDER — GINSENG 100 MG
CAPSULE ORAL ONCE
Status: CANCELLED | OUTPATIENT
Start: 2022-10-21 | End: 2022-10-21

## 2022-10-21 RX ORDER — GENTAMICIN SULFATE 1 MG/G
OINTMENT TOPICAL ONCE
Status: CANCELLED | OUTPATIENT
Start: 2022-10-21 | End: 2022-10-21

## 2022-10-21 RX ORDER — LIDOCAINE HYDROCHLORIDE 40 MG/ML
SOLUTION TOPICAL ONCE
Status: CANCELLED | OUTPATIENT
Start: 2022-10-21 | End: 2022-10-21

## 2022-10-21 RX ORDER — LIDOCAINE 40 MG/G
CREAM TOPICAL ONCE
Status: DISCONTINUED | OUTPATIENT
Start: 2022-10-21 | End: 2022-10-22 | Stop reason: HOSPADM

## 2022-10-21 NOTE — PROGRESS NOTES
Multilayer Compression Wrap   Below the Knee    NAME:  Jayashree Lane  YOB: 1960  MEDICAL RECORD NUMBER:  [id number]  DATE:  10/21/2022    Multilayer compression wrap: Removed old Multilayer wrap if indicated and wash leg with mild soap/water. Applied moisturizing agent to dry skin as needed. Applied primary and secondary dressing as ordered. Applied multilayered dressing below the knee to right lower leg. Applied multilayered dressing below the knee to left lower leg. Instructed patient/caregiver not to remove dressing and to keep it clean and dry. Instructed patient/caregiver on complications to report to provider, such as pain, numbness in toes, heavy drainage, and slippage of dressing. Instructed patient on purpose of compression dressing and on activity and exercise recommendations.      Applied  2 layer wrap from toes to knee overlapping each time    Electronically signed by KATHY BROOKS LPN on 51/56/7556 at 9:03 AM

## 2022-10-21 NOTE — PLAN OF CARE
Compression [address] for [employer]:     [place] [address] f: [phone] f: [phone] p: [phone] [email or web address]      Ordering Center: [facility] [address]  [facility]  WOUND CARE Dept: [address] [place] [address] NUMBER [phone]    Patient Information:      Socorro Lockhart Patient   [phone]   : 1960  AGE: 65 y.o.      GENDER: male   TODAYS DATE:  10/21/2022    Insurance:      PRIMARY INSURANCE:  Plan: [employer] [place] [employer]  Coverage: [place]  Effective Date: 2022  Group Number: [unfilled]  Subscriber Number: [id number] - (Medicaid)    SECONDARY INSURANCE:  Plan:   Coverage:   Effective Date:   [unfilled]    [email or web address]     [unfilled]   [unfilled]     Patient Information:      Problem List Items Addressed This Visit       Peripheral artery disease ([facility] [address] - Primary    Relevant Medications    lidocaine (LMX) 4 % cream    Other Relevant Orders    Initiate Outpatient Wound Care Protocol       Wound 22 Ankle Left;Medial #1 (Active)   Wound Image   10/07/22 0853   Wound Etiology Arterial 10/21/22 0809   Wound Cleansed Wound cleanser 10/21/22 0809   Dressing/Treatment Antibacterial ointment;Triad hydro/zinc oxide-based hydrophilic paste  9660   Offloading for Diabetic Foot Ulcers Offloading not ordered 10/21/22 0809   Wound Length (cm) 1.6 cm 10/21/22 0809   Wound Width (cm) 3.8 cm 10/21/22 0809   Wound Depth (cm) 0.2 cm 10/21/22 0809   Wound Surface Area (cm^2) 6.08 cm^2 10/21/22 0809   Change in Wound Size % (l*w) 11.11 10/21/22 0809   Wound Volume (cm^3) 1.216 cm^3 10/21/22 0809   Wound Healing % -78 10/21/22 0809   Post-Procedure Length (cm) 1.6 cm 10/21/22 0829   Post-Procedure Width (cm) 3.8 cm 10/21/22 0829   Post-Procedure Depth (cm) 0.2 cm 10/21/22 0829   Post-Procedure Surface Area (cm^2) 6.08 cm^2 10/21/22 0829   Post-Procedure Volume (cm^3) 1.216 cm^3 10/21/22 0829   Wound Assessment Bleeding 10/21/22 0829   Drainage Amount Moderate 10/21/22 0809   Drainage Description Purulent; Thick 10/21/22 0809   Odor None 10/21/22 0809   Jamila-wound Assessment Dry/flaky 10/21/22 0809   Margins Defined edges 10/21/22 0809   Number of days: 119       Right Leg Measurements and previously used compression: (ALL measurements are in cm)  Right Leg Edema Point of Measurement  Great toe to forefoot: 13 cm (Knee to heel 44cm)  Heel to ankle: 13 cm  Heel to calf: 34  Leg circumference: 34.5 cm  Ankle circumference: 22.5 cm  Foot circumference: 24 cm  Compression Therapy: 2 layer compression wrap    Left Leg Measurements and previously used compression: (ALL measurements are in cm)  Left Leg Edema Point of Measurement  Great toe to forefoot: 13 cm  Heel to ankle: 13 cm  Heel to calf: 34  Leg circumference: 36 cm  Ankle circumference: 24.5 cm  Foot circumference: 24 cm  Compression Therapy: 2 layer compression wrap    Supplies Requested :     Medicare Requirements  Patient must have a qualifying Active Venous Ulcer if ordering Bilateral Compression Wounds MUST be present on both legs for Medicare Coverage. The patient can Not be on home health or have had a Medicare part A stay in the past 24 hours.     Patient Wound(s) Debrided: [x] Yes if yes please add date 10/21/2022   [] No    Debribement Type: Excisional/Sharp    Patient currently being seen by Home Health: [] Yes   [x] No     Compression Type: Medi Dual Layer compression stockings, 30-40 mm/Hg, knee-high, BILATERAL lower legs     Provider Information:      PROVIDER'S NAME/NPI: Rene Russ  [id number]

## 2022-10-21 NOTE — PROGRESS NOTES
TheraSkin Treatment Note    NAME:  Noy Campbell  YOB: 1960  MEDICAL RECORD NUMBER:  [id number]  DATE:  10/21/2022    Goal:  Patient will receive safe and proper application of skin substitute. Patient will comply with caring for dressing, offloading and reporting complications. Expiration date of TheraSkin checked immediately prior to use. Package intact prior to use and no damage noted. Transport temperature controlled and acceptable. TheraSkin was prepared for application by removing from package. TheraSkin was rinsed 2 times in room temperature normal saline for 2 minutes each time. A 2nd saline rinse was left on the TheraSkin until the physician was ready to apply it within 120 minutes of thawing. White side goes against ulcer bed. TheraSkin was applied to Left ankle and affixed with steri-strips by the physician. Mepitel, 2 layer was applied on top of non-adherent dressing. Fluffed gauze was applied. Dressing was secured with cover roll type tape to stabilize graft. Coban or ace wrap was applied to secure graft and decrease edema. Patient/caregiver was instructed not to remove dressing and to keep it clean and dry. Pt/family/caregiver was instructed on signs and symptoms of complications to report such as draining through, falling down/slipping, getting wet, or severe pain or tingling in toes. Pt/family/caregiver was instructed on need for offloading and elevation of affected extremity (if applicable) and on use of prescribed offloading device. Thera Skin may be applied a total of 10 times per wound over a 12 week period. Date of first application of Thera Skin for this current wound is October 7, 2022.      Application # 2                 Guidelines followed      Electronically signed by Oriana Aponte RN on 10/21/2022 at 9:03 AM

## 2022-10-21 NOTE — PROGRESS NOTES
[facility]  Progress Note and Procedure Note      Philly Maza  AGE: 65 y.o. GENDER: male  : 1960  TODAY'S DATE:  10/21/2022    Subjective:     Chief Complaint   Patient presents with    Wound Check         HISTORY of PRESENT ILLNESS HPI     Philly Maza is a 65 y.o. male who presents today for wound evaluation. History of Wound: Admits to having chronic wounds for at least a year on the left leg and 8 to 9 months on the right leg. He states that he has had arterial bypasses on both of his legs in the past.  He was seeing previous wound care and podiatry for these wounds. He states that his insurance had some issues and he has not been seeing anyone since 2021 he has been treating the wounds himself with PSO and bandages. Bandages changed once since his last visit. He has no complaints. The right leg remains healed.   He is currently happy with his treatment he would like to proceed with repeat skin graft of the left wound    Wound Pain:  intermittent left  Severity:  3 / 10   Wound Type:  venous, arterial and diabetic  Modifying Factors:  edema, venous stasis, lymphedema, diabetes, decreased mobility, arterial insufficiency and decreased tissue oxygenation  Associated Signs/Symptoms:  drainage, numbness and odor        PAST MEDICAL HISTORY        Diagnosis Date    Cancer ([address]     throat    Diabetes mellitus ([address]     Fibromyalgia     History of DVT (deep vein thrombosis)     Hyperlipidemia     Hypertension     Type 2 diabetes mellitus with circulatory disorder, without long-term current use of insulin ([address] 2022       PAST SURGICAL HISTORY    Past Surgical History:   Procedure Laterality Date    APPENDECTOMY  2005    COLONOSCOPY  2016    FEMORAL BYPASS Left 2020    femoral posterior tibial bypass    FEMORAL-TIBIAL BYPASS GRAFT Right 2020    with femoral endarterectomy and patch angioplasty    FOOT DEBRIDEMENT Left 2020    FOOT DEBRIDEMENT Right 2021    DEBRIDEMENT OF RIGHT FOOT WOUND WITH GRAFT APPLICATION performed by Randy Su DPM at [address] N/A 3/25/2022    DIRECT LARYNGOSCOPY WITH BIOPSY AND FROZEN SECTIONS performed by Toyin Block DO at [address] Right 2020    stent leg for PVD    TRACHEOSTOMY N/A 3/25/2022    TRACHEOTOMY performed by Toyin Block DO at [address]    Family History   Problem Relation Age of Onset    Cancer Mother     Lung Cancer Mother     Diabetes Father     Stroke Father        SOCIAL HISTORY    Social History     Tobacco Use    Smoking status: Former     Packs/day: 0.50     Years: 20.00     Pack years: 10.00     Types: Cigarettes     Start date: 1977     Quit date: 2021     Years since quittin.8    Smokeless tobacco: Never   Vaping Use    Vaping Use: Never used   Substance Use Topics    Alcohol use: Yes     Comment: 2 beers / day     Drug use: Never       ALLERGIES    Allergies   Allergen Reactions    Chantix [Varenicline] Other (See Comments)     Hallucinations         MEDICATIONS    Current Outpatient Medications on File Prior to Encounter   Medication Sig Dispense Refill    WAL-DRYL ALLERGY 25 MG tablet TAKE 1 TABLET BY MOUTH EVERY 6 HOURS AS NEEDED FOR ITCHING 60 tablet 0    vitamin B-12 (CYANOCOBALAMIN) 250 MCG tablet TAKE 1 TABLET BY MOUTH DAILY 30 tablet 0    atorvastatin (LIPITOR) 80 MG tablet TAKE 1 TABLET BY MOUTH DAILY 90 tablet 0    metFORMIN (GLUCOPHAGE) 500 MG tablet TAKE 1 TABLET BY MOUTH TWO TIMES A DAY WITH MEALS 60 tablet 0    lisinopril (PRINIVIL;ZESTRIL) 20 MG tablet TAKE 1 TABLET BY MOUTH EVERY DAY 30 tablet 0    metoprolol succinate (TOPROL XL) 25 MG extended release tablet Take 25 mg by mouth daily      melatonin 3 MG TABS tablet Take 2 tablets by mouth nightly as needed (insomnia) 30 tablet 2    warfarin (COUMADIN) 5 MG tablet Take daily as directed by anti coag clinic to maintain INR 2-3 (Patient taking differently: Take warfarin 15 mg every Monday and Friday, and 10 mg all the other days or as direct by [facility]mad[facility] [phone]) 90 tablet 1    cetirizine (ZYRTEC) 10 MG tablet Take 1 tablet by mouth daily 30 tablet 0    Arformoterol Tartrate (BROVANA) 15 MCG/2ML NEBU Take 1 ampule by nebulization 2 times daily 120 mL 3    acetaminophen (TYLENOL) 500 MG tablet Take 1 tablet by mouth 4 times daily as needed for Pain 360 tablet 1    gentamicin (GARAMYCIN) 0.1 % cream Apply topically  daily. 30 g 1    budesonide (PULMICORT) 0.5 MG/2ML nebulizer suspension Take 2 mLs by nebulization 2 times daily 60 each 2    sennosides-docusate sodium (SENOKOT-S) 8.6-50 MG tablet Take 1 tablet by mouth 2 times daily 60 tablet 0    BANOPHEN 25 MG capsule Take 1 tablet by mouth every 6 hours as needed for Itching      ondansetron (ZOFRAN-ODT) 4 MG disintegrating tablet Take 4 mg by mouth every 8 hours as needed for Nausea or Vomiting      Multiple Vitamins-Minerals (MULTIVITAMIN ADULT EXTRA C PO) 1 tablet by Nasogastric route daily      esomeprazole (NEXIUM) 20 MG delayed release capsule 20 mg by Nasogastric route every morning (before breakfast)      oxyCODONE HCl (OXY-IR) 10 MG immediate release tablet TAKE 1 TABLET BY MOUTH EVERY SIX HOURS AS NEEDED FOR PAIN FOR UP TO 7 DAYS      becaplermin (REGRANEX) 0.01 % gel Apply 1 Applicatorful topically daily      calcium-vitamin D (OSCAL-500) 500-200 MG-UNIT per tablet Take 1 tablet by mouth 2 times daily      ipratropium-albuterol (DUONEB) 0.5-2.5 (3) MG/3ML SOLN nebulizer solution Inhale 3 mLs into the lungs 4 times daily 360 mL 3    fluticasone (FLONASE) 50 MCG/ACT nasal spray 1 spray by Nasal route daily 9.9 g 5    Vitamins/Minerals TABS Take 1 tablet by mouth daily        No current facility-administered medications on file prior to encounter. REVIEW OF SYSTEMS    Pertinent items are noted in HPI.       Objective:      /74   Pulse 86   Temp 96.9 °F (36.1 °C) (Infrared)   Resp 16     PHYSICAL EXAM    Vascular: Vascular status Impaired  palpable pedal pulses, right DP0/4 and PT1/4, left DP0/4 and PT1/4. CFT 3 seconds digits 1 to 5 bilateral.  Hair growthAbsent  both lower extremities and feet. Skin temperature is warm to warm from pretibial area to distal digits bilateral.  Exam is negative for rubor, pallor, cyanosis or signs of acute vascular compromise bilaterally. Exam is positive for edema bilateral lower extremity. Varicosities Present bilateral lower extremity. Neuro: Neurologic status diminished bilateral with epicritic Absent  , proprioceptive Absent , vibratory sensation Absent  and protopathicPresent. DTRs Absent  bilateral Achilles. There were no reproducible neuritic symptoms on exam bilateral feet/ankles. Derm: Ulceration to bilateral ankles. Ecchymosis Absent  bilateral feet/foot. Musculoskeletal: No pain with debridement of wounds 5/5 muscle strength in/eversion and dorsi/plantarflexion bilateral feet. No gross instability noted. Assessment:     Problem List Items Addressed This Visit       Peripheral artery disease ([facility] [address] - Primary    Relevant Medications    lidocaine (LMX) 4 % cream    Other Relevant Orders    Initiate Outpatient Wound Care Protocol       Procedure Note    Performed by: Kiana Hernandez DPM    Consent obtained: Yes    Time out taken:  Yes    Pain Control: Anesthetic  Anesthetic: 4% Lidocaine Cream     Debridement:Excisional Debridement    Using curette the wound was sharply debrided    down through and including the removal of epidermis, dermis, subcutaneous tissue and muscle/fascia.         Devitalized Tissue Debrided:  fibrin, biofilm, slough, necrotic/eschar and exudate    Pre Debridement Measurements:  Are located in the Wound Documentation Flow Sheet    Wound #: 1   Wound Care Documentation:  Wound 06/24/22 Ankle Left;Medial #1 (Active)   Wound Image   10/07/22 0853   Wound Etiology Arterial 10/21/22 0809   Wound Cleansed Wound cleanser 10/21/22 08   Dressing/Treatment Antibacterial ointment;Triad hydro/zinc oxide-based hydrophilic paste  8885   Offloading for Diabetic Foot Ulcers Offloading not ordered 10/21/22 0809   Wound Length (cm) 1.6 cm 10/21/22 0809   Wound Width (cm) 3.8 cm 10/21/22 0809   Wound Depth (cm) 0.2 cm 10/21/22 0809   Wound Surface Area (cm^2) 6.08 cm^2 10/21/22 0809   Change in Wound Size % (l*w) 11.11 10/21/22 08   Wound Volume (cm^3) 1.216 cm^3 10/21/22 0809   Wound Healing % -78 10/21/22 0809   Post-Procedure Length (cm) 1.6 cm 10/21/22 0829   Post-Procedure Width (cm) 3.8 cm 10/21/22 0829   Post-Procedure Depth (cm) 0.2 cm 10/21/22 0829   Post-Procedure Surface Area (cm^2) 6.08 cm^2 10/21/22 0829   Post-Procedure Volume (cm^3) 1.216 cm^3 10/21/22 0829   Wound Assessment Bleeding 10/21/22 0829   Drainage Amount Moderate 10/21/22 0809   Drainage Description Purulent; Thick 10/21/22 0809   Odor None 10/21/22 0809   Jamila-wound Assessment Dry/flaky 10/21/22 0809   Margins Defined edges 10/21/22 0809   Number of days: 119        Total Surface Area Debrided: 6.08cm² left leg into the deep fascia     Percentage of wound debrided 100%    Bleeding:  Minimal    Hemostasis Achieved:  by pressure    Procedural Pain:  0  / 10     Post Procedural Pain:  0 / 10     Response to treatment:  Well tolerated by patient. Any Lr  AGE: 65 y.o. GENDER: male  : 1960  TODAY'S DATE:  10/21/2022    Chief Complaint   Patient presents with    Wound Check          Skin Substitute Applied:       Theraskin 13 sq/cm            Performed by: Yesy Patton DPM    Wound Type:diabetic    Consent obtained: Yes    Time out taken: Yes     Fenestrated: No    Instrument(s) scissors and forceps      [] Mesher Utilized    All  Guidelines Followed    Skin Substitute was Applied to Wound Number(s):     Wound #: 1      Wound 22 Ankle Left;Medial #1 (Active)   Wound Image   10/07/22 2226   Wound Etiology Arterial 10/21/22 0809   Wound Cleansed Wound cleanser 10/21/22 0809   Dressing/Treatment Antibacterial ointment;Triad hydro/zinc oxide-based hydrophilic paste 46/80/03 0174   Offloading for Diabetic Foot Ulcers Offloading not ordered 10/21/22 0809   Wound Length (cm) 1.6 cm 10/21/22 0809   Wound Width (cm) 3.8 cm 10/21/22 0809   Wound Depth (cm) 0.2 cm 10/21/22 0809   Wound Surface Area (cm^2) 6.08 cm^2 10/21/22 0809   Change in Wound Size % (l*w) 11.11 10/21/22 0809   Wound Volume (cm^3) 1.216 cm^3 10/21/22 0809   Wound Healing % -78 10/21/22 0809   Post-Procedure Length (cm) 1.6 cm 10/21/22 0829   Post-Procedure Width (cm) 3.8 cm 10/21/22 0829   Post-Procedure Depth (cm) 0.2 cm 10/21/22 0829   Post-Procedure Surface Area (cm^2) 6.08 cm^2 10/21/22 0829   Post-Procedure Volume (cm^3) 1.216 cm^3 10/21/22 0829   Wound Assessment Bleeding 10/21/22 0829   Drainage Amount Moderate 10/21/22 0809   Drainage Description Purulent; Thick 10/21/22 0809   Odor None 10/21/22 0809   Jamila-wound Assessment Dry/flaky 10/21/22 0809   Margins Defined edges 10/21/22 0809   Number of days: 119         Total Surface Area Covered 7 sq/cm     Amount Wasted 5 sq/cm    Reason for Waste best size      Was the Product Layered  No     Secured: Yes    Secured With:  []Steri Strips    []Sutures     []Staples [x]Other    Procedural Pain: 0/10     Post Procedural Pain: 0 / 10    Response to Treatment:  Well tolerated by patient. Plan:   Patient examined and evaluated   Wounds debrided without incident   Multilayer compression wrap right leg with Lotrisone. Multilayer compression wrap left leg  Follow-up one week  Application of TheraSkin without incident to the left wound  Keep legs elevated at all times when not active. The nature of the patient's condition was explained in depth.  The patient was informed that their compliance to the treatment Plan is paramount to successful healing and prevention of further ulceration and/or infection.   Discharge Treatment follow-up on Friday for dressing change    Written Patient Discharge Instructions Given            Electronically signed by Jose Carson DPM on 10/21/2022 at 8:41 AM

## 2022-10-21 NOTE — PLAN OF CARE
Discharge instructions given. Patient verbalized understanding. Return to [address] [place] [address],[address] in 1 week(s).   Called/faxed orders to  [facility] for stockings

## 2022-10-27 NOTE — DISCHARGE INSTRUCTIONS
[facility], [address] [place] [address] Road  Telephone: [phone]     Discharge Instructions     Important reminders:     **If you have any signs and symptoms of illness (Cough, fever, congestion, nausea, vomiting, diarrhea, etc.) please call the wound care center prior to your appointment. 1. Increase Protein intake for optimal wound healing  2. No added salt to reduce any swelling  3. If diabetic, maintain good glucose control  4. If you smoke, smoking prohibits wound healing, we ask that you refrain from smoking. 5. When taking antibiotics take the entire prescription as ordered. Do not stop taking until medication is all gone unless otherwise instructed. 6. Exercise as tolerated. 7. Keep weight off wounds and reposition every 2 hours if applicable. 8. If wound(s) is on your lower extremity, elevate legs to the level of the heart or above for 30 minutes 4-5 times a day and/or when sitting. Avoid standing for long periods of time. 9. Do not get wounds wet in bath or shower unless otherwise instructed by your physician. If your wound is on your foot or leg, you may purchase a cast bag. Please ask at the pharmacy. If Vascular testing is ordered, please call [address] [place] [address] ([phone]) to schedule. Vascular tests ordered by Wound Care Physicians may take up to 2 hours to complete. Please keep that in mind when scheduling. If Vascular testing is scheduled, please bring supplies to replace your dressing after testing is done. The vascular department does not stock supplies. Wound: Right and Left leg     With each dressing change, rinse wounds with 0.9% Saline. (May use wound wash or soft contact solution. Both can be purchased at a local drug store). If unable to obtain saline, may use a gentle soap and water.      Dressing care: Right and left leg - Left leg - Triad, Gentamicin and collagen powder, dry dresisng, coflex calamine, RIght leg and foot - Lotrisone, foam to anterior ankle, colflex calamine. Change next week        Dr Brigida You, Vascular Surgeon September 14th at 1215pm  [address] [address], [address]  Phone# [phone]  Fax# [phone]         Compression Wraps  Location: Both legs  Type: Coflex calamine     Your doctor has ordered compression therapy for your wound. Compression bandages reduce the swelling, or edema, in your legs and prevent it from returning. The wound care staff will apply your compression wrap. It must be removed and re-applied at least weekly. As the swelling decreases, the boot no longer provides adequate compression and you need a new one. Once applied, you need to know how to take care of your compression wrap. The boot must stay dry. Do not get it wet in the shower or tub. You may do a partial bath, or you can cover the boot with a large plastic bag, secured at the top, so that no water can get in. Avoid standing in one place for long periods of time. If you must  one place, shift your weight and change positions often. If you have CHF, consult your doctor before following the next two recommendations for leg elevation. When sitting, elevate your legs on pillows, or put blocks under the foot of your bed. Your legs should be higher than your heart. If your boot becomes painful, or you notice an increase in swelling in your toes, numbness or tingling, or purple color to your toes, remove the wrap and call the [facility] [address]. If it is after hours, call your doctor for instructions or go to the nearest emergency room. Home Care Agency/Facility: [facility]     Your wound-care supplies will be provided by:  Please note, depending on your insurance coverage, you may have out-of-pocket expenses for these supplies. Someone from the company should call you to confirm your order and discuss those potential costs before they ship your products -- please anticipate that call.  If your out-of-pocket cost could be substantial, Many companies have financial hardship programs for patients who qualify, so please ask about that if you might need a hand. If you have any questions about your supplies or your potential out-of-pocket costs, or if you need to place an order for a refill of supplies (typically monthly), please call the company directly. Your  is Keo Evangelista     Follow up with Dr Jerri Mclaughlin in 1 weeks. Wound Care Center Information: Should you experience any significant changes in your wound(s) or have questions about your wound care, please contact the [place] [address] at [phone] Monday  - Thursday 8:00 am - 4:00 pm and Friday 8:00 am - 1:00pm. If you need help with your wound outside these hours and cannot wait until we are again available, contact your PCP or go to the hospital emergency room. PLEASE NOTE: IF YOU ARE UNABLE TO OBTAIN WOUND SUPPLIES, CONTINUE TO USE THE SUPPLIES YOU HAVE AVAILABLE UNTIL YOU ARE ABLE TO REACH US. IT IS MOST IMPORTANT TO KEEP THE WOUND COVERED AT ALL TIMES.

## 2022-10-28 ENCOUNTER — HOSPITAL ENCOUNTER (OUTPATIENT)
Dept: WOUND CARE | Age: 62
Discharge: HOME | End: 2022-10-28
Payer: MEDICAID

## 2022-10-28 VITALS
TEMPERATURE: 96.8 F | RESPIRATION RATE: 16 BRPM | DIASTOLIC BLOOD PRESSURE: 75 MMHG | HEART RATE: 100 BPM | SYSTOLIC BLOOD PRESSURE: 123 MMHG

## 2022-10-28 DIAGNOSIS — I73.9 PERIPHERAL ARTERY DISEASE (HCC): Primary | ICD-10-CM

## 2022-10-28 PROCEDURE — 29581 APPL MULTLAYER CMPRN SYS LEG: CPT

## 2022-10-28 PROCEDURE — 11043 DBRDMT MUSC&/FSCA 1ST 20/<: CPT

## 2022-10-28 RX ORDER — GENTAMICIN SULFATE 1 MG/G
OINTMENT TOPICAL ONCE
Status: CANCELLED | OUTPATIENT
Start: 2022-10-28 | End: 2022-10-28

## 2022-10-28 RX ORDER — BACITRACIN ZINC AND POLYMYXIN B SULFATE 500; 1000 [USP'U]/G; [USP'U]/G
OINTMENT TOPICAL ONCE
Status: CANCELLED | OUTPATIENT
Start: 2022-10-28 | End: 2022-10-28

## 2022-10-28 RX ORDER — LIDOCAINE HYDROCHLORIDE 40 MG/ML
SOLUTION TOPICAL ONCE
Status: CANCELLED | OUTPATIENT
Start: 2022-10-28 | End: 2022-10-28

## 2022-10-28 RX ORDER — LIDOCAINE 40 MG/G
CREAM TOPICAL ONCE
Status: DISCONTINUED | OUTPATIENT
Start: 2022-10-28 | End: 2022-10-29 | Stop reason: HOSPADM

## 2022-10-28 RX ORDER — LIDOCAINE 50 MG/G
OINTMENT TOPICAL ONCE
Status: CANCELLED | OUTPATIENT
Start: 2022-10-28 | End: 2022-10-28

## 2022-10-28 RX ORDER — LIDOCAINE HYDROCHLORIDE 20 MG/ML
JELLY TOPICAL ONCE
Status: CANCELLED | OUTPATIENT
Start: 2022-10-28 | End: 2022-10-28

## 2022-10-28 RX ORDER — GINSENG 100 MG
CAPSULE ORAL ONCE
Status: CANCELLED | OUTPATIENT
Start: 2022-10-28 | End: 2022-10-28

## 2022-10-28 RX ORDER — CLOBETASOL PROPIONATE 0.5 MG/G
OINTMENT TOPICAL ONCE
Status: CANCELLED | OUTPATIENT
Start: 2022-10-28 | End: 2022-10-28

## 2022-10-28 RX ORDER — LIDOCAINE 40 MG/G
CREAM TOPICAL ONCE
Status: CANCELLED | OUTPATIENT
Start: 2022-10-28 | End: 2022-10-28

## 2022-10-28 RX ORDER — BACITRACIN, NEOMYCIN, POLYMYXIN B 400; 3.5; 5 [USP'U]/G; MG/G; [USP'U]/G
OINTMENT TOPICAL ONCE
Status: CANCELLED | OUTPATIENT
Start: 2022-10-28 | End: 2022-10-28

## 2022-10-28 RX ORDER — BETAMETHASONE DIPROPIONATE 0.05 %
OINTMENT (GRAM) TOPICAL ONCE
Status: CANCELLED | OUTPATIENT
Start: 2022-10-28 | End: 2022-10-28

## 2022-10-28 ASSESSMENT — PAIN DESCRIPTION - ORIENTATION: ORIENTATION: LEFT

## 2022-10-28 ASSESSMENT — PAIN - FUNCTIONAL ASSESSMENT: PAIN_FUNCTIONAL_ASSESSMENT: PREVENTS OR INTERFERES SOME ACTIVE ACTIVITIES AND ADLS

## 2022-10-28 ASSESSMENT — PAIN DESCRIPTION - DESCRIPTORS: DESCRIPTORS: ACHING

## 2022-10-28 ASSESSMENT — PAIN DESCRIPTION - LOCATION: LOCATION: ANKLE

## 2022-10-28 ASSESSMENT — PAIN DESCRIPTION - PAIN TYPE: TYPE: CHRONIC PAIN

## 2022-10-28 NOTE — PLAN OF CARE
Discharge instructions given. Patient verbalized understanding. Return to [facility] in 1 week(s).   [place] next week

## 2022-10-28 NOTE — PROGRESS NOTES
Iris Quiroz  Progress Note and Procedure Note      Philly Maza  AGE: 65 y.o. GENDER: male  : 1960  TODAY'S DATE:  10/28/2022    Subjective:     Chief Complaint   Patient presents with    Wound Check     Bilateral legs         HISTORY of PRESENT ILLNESS HPI     Philly Maza is a 65 y.o. male who presents today for wound evaluation. History of Wound: Admits to having chronic wounds for at least a year on the left leg and 8 to 9 months on the right leg. He states that he has had arterial bypasses on both of his legs in the past.  He was seeing previous wound care and podiatry for these wounds. He states that his insurance had some issues and he has not been seeing anyone since 2021 he has been treating the wounds himself with PSO and bandages. Left the dressings intact as directed. He has no complaints.   Wound Pain:  intermittent left  Severity:  3 / 10   Wound Type:  venous, arterial and diabetic  Modifying Factors:  edema, venous stasis, lymphedema, diabetes, decreased mobility, arterial insufficiency and decreased tissue oxygenation  Associated Signs/Symptoms:  drainage, numbness and odor        PAST MEDICAL HISTORY        Diagnosis Date    Cancer ([address]     throat    Diabetes mellitus ([address]     Fibromyalgia     History of DVT (deep vein thrombosis)     Hyperlipidemia     Hypertension     Type 2 diabetes mellitus with circulatory disorder, without long-term current use of insulin ([address] 2022       PAST SURGICAL HISTORY    Past Surgical History:   Procedure Laterality Date    APPENDECTOMY  2005    COLONOSCOPY  2016    FEMORAL BYPASS Left 2020    femoral posterior tibial bypass    FEMORAL-TIBIAL BYPASS GRAFT Right 2020    with femoral endarterectomy and patch angioplasty    FOOT DEBRIDEMENT Left 2020    FOOT DEBRIDEMENT Right 2021    DEBRIDEMENT OF RIGHT FOOT WOUND WITH GRAFT APPLICATION performed by Mecca Osorio DPM at [facility] LARYNGOSCOPY N/A 3/25/2022    DIRECT LARYNGOSCOPY WITH BIOPSY AND FROZEN SECTIONS performed by Corrie Patel DO at [facility] [address] Right 2020    stent leg for PVD    TRACHEOSTOMY N/A 3/25/2022    TRACHEOTOMY performed by Corrie Patel DO at [address] HISTORY    Family History   Problem Relation Age of Onset    Cancer Mother     Lung Cancer Mother     Diabetes Father     Stroke Father        SOCIAL HISTORY    Social History     Tobacco Use    Smoking status: Former     Packs/day: 0.50     Years: 20.00     Pack years: 10.00     Types: Cigarettes     Start date: 1977     Quit date: 2021     Years since quittin.8    Smokeless tobacco: Never   Vaping Use    Vaping Use: Never used   Substance Use Topics    Alcohol use: Yes     Comment: 2 beers / day     Drug use: Never       ALLERGIES    Allergies   Allergen Reactions    Chantix [Varenicline] Other (See Comments)     Hallucinations         MEDICATIONS    Current Outpatient Medications on File Prior to Encounter   Medication Sig Dispense Refill    WAL-DRYL ALLERGY 25 MG tablet TAKE 1 TABLET BY MOUTH EVERY 6 HOURS AS NEEDED FOR ITCHING 60 tablet 0    vitamin B-12 (CYANOCOBALAMIN) 250 MCG tablet TAKE 1 TABLET BY MOUTH DAILY 30 tablet 0    atorvastatin (LIPITOR) 80 MG tablet TAKE 1 TABLET BY MOUTH DAILY 90 tablet 0    metFORMIN (GLUCOPHAGE) 500 MG tablet TAKE 1 TABLET BY MOUTH TWO TIMES A DAY WITH MEALS 60 tablet 0    lisinopril (PRINIVIL;ZESTRIL) 20 MG tablet TAKE 1 TABLET BY MOUTH EVERY DAY 30 tablet 0    metoprolol succinate (TOPROL XL) 25 MG extended release tablet Take 25 mg by mouth daily      melatonin 3 MG TABS tablet Take 2 tablets by mouth nightly as needed (insomnia) 30 tablet 2    warfarin (COUMADIN) 5 MG tablet Take daily as directed by anti coag clinic to maintain INR 2-3 (Patient taking differently: Take warfarin 15 mg every Monday and Friday, and 10 mg all the other days or as direct by [facility] [phone]) 90 tablet 1    cetirizine (ZYRTEC) 10 MG tablet Take 1 tablet by mouth daily 30 tablet 0    Arformoterol Tartrate (BROVANA) 15 MCG/2ML NEBU Take 1 ampule by nebulization 2 times daily 120 mL 3    acetaminophen (TYLENOL) 500 MG tablet Take 1 tablet by mouth 4 times daily as needed for Pain 360 tablet 1    gentamicin (GARAMYCIN) 0.1 % cream Apply topically  daily. 30 g 1    budesonide (PULMICORT) 0.5 MG/2ML nebulizer suspension Take 2 mLs by nebulization 2 times daily 60 each 2    sennosides-docusate sodium (SENOKOT-S) 8.6-50 MG tablet Take 1 tablet by mouth 2 times daily 60 tablet 0    BANOPHEN 25 MG capsule Take 1 tablet by mouth every 6 hours as needed for Itching      ondansetron (ZOFRAN-ODT) 4 MG disintegrating tablet Take 4 mg by mouth every 8 hours as needed for Nausea or Vomiting      Multiple Vitamins-Minerals (MULTIVITAMIN ADULT EXTRA C PO) 1 tablet by Nasogastric route daily      esomeprazole (NEXIUM) 20 MG delayed release capsule 20 mg by Nasogastric route every morning (before breakfast)      oxyCODONE HCl (OXY-IR) 10 MG immediate release tablet TAKE 1 TABLET BY MOUTH EVERY SIX HOURS AS NEEDED FOR PAIN FOR UP TO 7 DAYS      becaplermin (REGRANEX) 0.01 % gel Apply 1 Applicatorful topically daily      calcium-vitamin D (OSCAL-500) 500-200 MG-UNIT per tablet Take 1 tablet by mouth 2 times daily      ipratropium-albuterol (DUONEB) 0.5-2.5 (3) MG/3ML SOLN nebulizer solution Inhale 3 mLs into the lungs 4 times daily 360 mL 3    fluticasone (FLONASE) 50 MCG/ACT nasal spray 1 spray by Nasal route daily 9.9 g 5    Vitamins/Minerals TABS Take 1 tablet by mouth daily        No current facility-administered medications on file prior to encounter. REVIEW OF SYSTEMS    Pertinent items are noted in HPI.       Objective:      /75   Pulse 100   Temp 96.8 °F (36 °C) (Infrared)   Resp 16     PHYSICAL EXAM    Vascular: Vascular status Impaired  palpable pedal pulses, right DP0/4 and PT1/4, left DP0/4 and PT1/4. CFT 3 seconds digits 1 to 5 bilateral.  Hair growthAbsent  both lower extremities and feet. Skin temperature is warm to warm from pretibial area to distal digits bilateral.  Exam is negative for rubor, pallor, cyanosis or signs of acute vascular compromise bilaterally. Exam is positive for edema bilateral lower extremity. Varicosities Present bilateral lower extremity. Neuro: Neurologic status diminished bilateral with epicritic Absent  , proprioceptive Absent , vibratory sensation Absent  and protopathicPresent. DTRs Absent  bilateral Achilles. There were no reproducible neuritic symptoms on exam bilateral feet/ankles. Derm: Ulceration to bilateral ankles. Ecchymosis Absent  bilateral feet/foot. Musculoskeletal: No pain with debridement of wounds 5/5 muscle strength in/eversion and dorsi/plantarflexion bilateral feet. No gross instability noted. Procedure Note    Performed by: Betsy Spears DPM    Consent obtained: Yes    Time out taken:  Yes    Pain Control: Anesthetic  Anesthetic: 4% Topical Xylocaine     Debridement:Excisional Debridement    Using curette the wound was sharply debrided    down through and including the removal of epidermis, dermis, subcutaneous tissue, and muscle/fascia.         Devitalized Tissue Debrided:  fibrin, biofilm, slough, necrotic/eschar, and exudate    Pre Debridement Measurements:  Are located in the Wound Documentation Flow Sheet    Wound #: 1     Wound Care Documentation:  Wound 06/24/22 Ankle Left;Medial #1 (Active)   Wound Image   10/07/22 0853   Wound Etiology Venous 10/28/22 0816   Wound Cleansed Wound cleanser 10/21/22 0809   Dressing/Treatment Antibacterial ointment;Triad hydro/zinc oxide-based hydrophilic paste 38/48/83 5902   Offloading for Diabetic Foot Ulcers Offloading not ordered 10/21/22 0809   Wound Length (cm) 2.4 cm 10/28/22 0816   Wound Width (cm) 4.3 cm 10/28/22 0816   Wound Depth (cm) 0.2 cm 10/28/22 0816 Wound Surface Area (cm^2) 10.32 cm^2 10/28/22 0816   Change in Wound Size % (l*w) -50.88 10/28/22 0816   Wound Volume (cm^3) 2.064 cm^3 10/28/22 0816   Wound Healing % -202 10/28/22 0816   Post-Procedure Length (cm) 2.4 cm 10/28/22 0852   Post-Procedure Width (cm) 4.3 cm 10/28/22 0852   Post-Procedure Depth (cm) 0.2 cm 10/28/22 0852   Post-Procedure Surface Area (cm^2) 10.32 cm^2 10/28/22 0852   Post-Procedure Volume (cm^3) 2.064 cm^3 10/28/22 0852   Wound Assessment Bleeding 10/28/22 0852   Drainage Amount Moderate 10/28/22 0816   Drainage Description Serosanguinous; Yellow 10/28/22 0816   Odor None 10/28/22 0816   Jamila-wound Assessment Intact 10/28/22 0816   Margins Defined edges 10/28/22 0816   Number of days: 126       Total Surface Area Debrided:  10.32 sq cm     Percentage of wound debrided 100%    Bleeding:  Minimal    Hemostasis Achieved:  by pressure    Procedural Pain:  5  / 10     Post Procedural Pain:  0 / 10     Response to treatment:  Well tolerated by patient. Assessment:     Problem List Items Addressed This Visit       Peripheral artery disease ([facility] [address] - Primary    Relevant Medications    lidocaine (LMX) 4 % cream    Other Relevant Orders    Initiate Outpatient Wound Care Protocol          Plan:   Patient examined and evaluated   Wound debrided without incident   Multilayer compression wrap right leg with Lotrisone. Multilayer compression wrap left leg  Follow-up one week  Keep legs elevated at all times when not active. The nature of the patient's condition was explained in depth. The patient was informed that their compliance to the treatment Plan is paramount to successful healing and prevention of further ulceration and/or infection.   Discharge Treatment follow-up on Friday for dressing change    Written Patient Discharge Instructions Given            Electronically signed by Robb Jean Baptiste DPM on 10/28/2022 at 9:06 AM

## 2022-10-28 NOTE — TELEPHONE ENCOUNTER
Regarding: [place]- Feeling depressed and needs to talk to someone  ----- Message from Lizbeth Garcia sent at 10/27/2022  9:08 PM CDT -----  Patient Name: Dimas Madrid    Full Name of Provider seen for current symptoms:Dr. Amber Alarcon    Symptoms: [place]- Feeling depressed and needs to talk to someone    Pregnant (females aged 13-60. If Yes, how long?):n/a    Call Back #: [phone]     Clinic Site / Call Center Account # for provider seen for current symptoms: [id number]    Which State are you currently located in? (enter State name in Summary field): [place]    Patients needing callback from the RN are informed of the following:   Please be aware the return phone call may come from an unidentified phone number or out of state phone number. Also keep in mind that call back times vary based on call volumes.  If your condition becomes life threatening while you wait for a callback, you should seek immediate medical assistance by calling 911 or going to the Emergency Department for evaluation.     The patient should be on the warfarin, he was not supposed to be off it.   So please advise the family to make sure that he takes the warfarin and to get a PT/INR test within 2 to 3 days to make sure that he is not going high given that he is on antibiotics

## 2022-10-28 NOTE — PROGRESS NOTES
Multilayer Compression Wrap   Below the Knee    NAME:  Lonnie Kothari  YOB: 1960  MEDICAL RECORD NUMBER:  [id number]  DATE:  10/28/2022    Multilayer compression wrap: Removed old Multilayer wrap if indicated and wash leg with mild soap/water. Applied moisturizing agent to dry skin as needed. Applied primary and secondary dressing as ordered. Applied multilayered dressing below the knee to right lower leg. Applied multilayered dressing below the knee to left lower leg. Instructed patient/caregiver not to remove dressing and to keep it clean and dry. Instructed patient/caregiver on complications to report to provider, such as pain, numbness in toes, heavy drainage, and slippage of dressing. Instructed patient on purpose of compression dressing and on activity and exercise recommendations.      Applied  2 layer wrap from toes to knee overlapping each time    Electronically signed by Fiordaliza Croft RN on 10/28/2022 at 9:10 AM

## 2022-10-31 ENCOUNTER — ANTI-COAG VISIT (OUTPATIENT)
Dept: PHARMACY | Age: 62
End: 2022-10-31
Payer: MEDICAID

## 2022-10-31 DIAGNOSIS — I73.9 PERIPHERAL ARTERY DISEASE (HCC): Primary | ICD-10-CM

## 2022-10-31 LAB — INR BLD: 1.3

## 2022-10-31 PROCEDURE — 85610 PROTHROMBIN TIME: CPT

## 2022-10-31 PROCEDURE — 99211 OFF/OP EST MAY X REQ PHY/QHP: CPT

## 2022-10-31 NOTE — DISCHARGE INSTRUCTIONS
[address], [address]  Telephone: [phone]     Discharge Instructions     Important reminders:     **If you have any signs and symptoms of illness (Cough, fever, congestion, nausea, vomiting, diarrhea, etc.) please call the wound care center prior to your appointment. 1. Increase Protein intake for optimal wound healing  2. No added salt to reduce any swelling  3. If diabetic, maintain good glucose control  4. If you smoke, smoking prohibits wound healing, we ask that you refrain from smoking. 5. When taking antibiotics take the entire prescription as ordered. Do not stop taking until medication is all gone unless otherwise instructed. 6. Exercise as tolerated. 7. Keep weight off wounds and reposition every 2 hours if applicable. 8. If wound(s) is on your lower extremity, elevate legs to the level of the heart or above for 30 minutes 4-5 times a day and/or when sitting. Avoid standing for long periods of time. 9. Do not get wounds wet in bath or shower unless otherwise instructed by your physician. If your wound is on your foot or leg, you may purchase a cast bag. Please ask at the pharmacy. If Vascular testing is ordered, please call [address] [place] [address] ([phone]) to schedule. Vascular tests ordered by Wound Care Physicians may take up to 2 hours to complete. Please keep that in mind when scheduling. If Vascular testing is scheduled, please bring supplies to replace your dressing after testing is done. The vascular department does not stock supplies. Wound: Right and Left leg     With each dressing change, rinse wounds with 0.9% Saline. (May use wound wash or soft contact solution. Both can be purchased at a local drug store). If unable to obtain saline, may use a gentle soap and water. Dressing care: Right  Lotrisone, Compresison stocking.   Left leg - Ttheraksin, Mepitel, dry dresisng, coflex calamine,  Change next week        Dr Cassie Moreno, Vascular Surgeon September 14th at 1215pm  [address] [place] [address], [address]  Phone# [phone]  Fax# [phone]         Compression Wraps  Location: Left legs  Type: Coflex calamine     Your doctor has ordered compression therapy for your wound. Compression bandages reduce the swelling, or edema, in your legs and prevent it from returning. The wound care staff will apply your compression wrap. It must be removed and re-applied at least weekly. As the swelling decreases, the boot no longer provides adequate compression and you need a new one. Once applied, you need to know how to take care of your compression wrap. The boot must stay dry. Do not get it wet in the shower or tub. You may do a partial bath, or you can cover the boot with a large plastic bag, secured at the top, so that no water can get in. Avoid standing in one place for long periods of time. If you must  one place, shift your weight and change positions often. If you have CHF, consult your doctor before following the next two recommendations for leg elevation. When sitting, elevate your legs on pillows, or put blocks under the foot of your bed. Your legs should be higher than your heart. If your boot becomes painful, or you notice an increase in swelling in your toes, numbness or tingling, or purple color to your toes, remove the wrap and call the [facility] [address]. If it is after hours, call your doctor for instructions or go to the nearest emergency room. Home Care Agency/Facility: [facility]     Your wound-care supplies will be provided by:  Please note, depending on your insurance coverage, you may have out-of-pocket expenses for these supplies. Someone from the company should call you to confirm your order and discuss those potential costs before they ship your products -- please anticipate that call.  If your out-of-pocket cost could be substantial, Many companies have financial hardship programs for patients who qualify, so please ask about that if you might need a hand. If you have any questions about your supplies or your potential out-of-pocket costs, or if you need to place an order for a refill of supplies (typically monthly), please call the company directly. Your  is Darin Liriano     Follow up with Dr Komal Purdy in 1 weeks. Wound Care Center Information: Should you experience any significant changes in your wound(s) or have questions about your wound care, please contact the [facility]  at [phone] Monday  - Thursday 8:00 am - 4:00 pm and Friday 8:00 am - 1:00pm. If you need help with your wound outside these hours and cannot wait until we are again available, contact your PCP or go to the hospital emergency room. PLEASE NOTE: IF YOU ARE UNABLE TO OBTAIN WOUND SUPPLIES, CONTINUE TO USE THE SUPPLIES YOU HAVE AVAILABLE UNTIL YOU ARE ABLE TO REACH US. IT IS MOST IMPORTANT TO KEEP THE WOUND COVERED AT ALL TIMES.

## 2022-10-31 NOTE — PROGRESS NOTES
Eva Collins is a 65 y.o. here for warfarin management. Elen Malhotra had an INR test today. Results were reviewed and appropriate warfarin management was completed. This visit was performed as: An in person visit. Protocols were followed with precautions to reduce the spread of COVID-19. Patient verifies current warfarin dosing regimen: Yes     Warfarin medication reviewed and updated on the patient 's home medication list: Yes   All other medications reviewed and updated on the patient 's home medication list: No: No medication changes     Lab Results   Component Value Date    INR 1.30 10/31/2022    INR 2.40 10/14/2022    INR 1.3 10/06/2022       Patient Findings       Negatives:  Signs/symptoms of thrombosis, Signs/symptoms of bleeding, Change in health, Missed doses, Change in medications, Change in diet/appetite, Bruising    Comments:  9 BOOSTs per day  Had surgery on 10/28 - Held warfarin 5 days prior            Anticoagulation Summary  As of 10/31/2022      INR goal:  2.0-3.0   TTR:  47.3 % (1.1 y)   INR used for dosin.30 (10/31/2022)   Warfarin maintenance plan:  15 mg (5 mg x 3) every Mon, Fri; 10 mg (5 mg x 2) all other days; Starting 10/31/2022   Weekly warfarin total:  80 mg   Plan last modified:  Gibran Pemberton RPH (10/6/2022)   Next INR check:  2022   Priority:  High   Target end date: Indefinite    Indications    Peripheral artery disease ([facility] [I73.9]                 Anticoagulation Episode Summary       INR check location:  Anticoagulation Clinic    Preferred lab:      Send INR reminders to:  [facility]    Comments:  EPIC          Anticoagulation Care Providers       Provider Role Specialty Phone number    Clark Siu MD Referring Family Medicine [phone]            There were no vitals taken for this visit. Warfarin assessment / plan:     Appears well  Sub-therapeutic INR        Missed dose(s) Held warfarin for procedure from 10/23-10/28.     INR 1.3 today. Patient has only had 2 doses of warfarin since his procedure, which explains his subtherapeutic INR. Patient instructed to continue his regular dosing schedule and return in ~1 week. Description    CONTINUE:  Warfarin 10 mg daily EXCEPT 15 mg every Monday and Friday. Drinking 9 Boost shakes per day. Please let us know if this changes. Call [phone] with signs or symptoms of bleeding or ANY medication changes (including over-the-counter medications or herbal supplements). If significant bleeding occurs please seek immediate medical attention. Keep the number of servings of vitamin K containing foods (dark green, leafy vegetables) the same each week. Dark green vegetable 2-3 times a week and a mixed green salad ~ 3 times a week. Please call if this changes. Limit alcohol intake. Please call if this changes. Immunization History   Administered Date(s) Administered    COVID-19, PFIZER PURPLE top, DILUTE for use, (age 15 y+), 30mcg/0.3mL 03/15/2021, 04/12/2021, 12/22/2021    Influenza Virus Vaccine 01/21/2017, 01/21/2017    Influenza, FLUARIX, FLULAVAL, Alix Childes (age 10 mo+) AND AFLURIA, (age 1 y+), PF, 0.5mL 10/31/2020    PPD Test 04/14/2022, 04/23/2022, 06/03/2022    Tdap (Boostrix, Adacel) 03/27/2018    Zoster Recombinant (Shingrix) 03/27/2018           Orders Placed This Encounter   Procedures    Protime-INR     This external order was created through the results console. No orders of the defined types were placed in this encounter. Reviewed AVS with patient / caregiver.     Billing Points:  0 billing points this visit       CLINICAL PHARMACY CONSULT: MED RECONCILIATION/REVIEW ADDENDUM    For Pharmacy Admin Tracking Only    Intervention Detail:   Total # of Interventions Recommended: 0  Total # of Interventions Accepted: 0  Time Spent (min): Zack Sotomayor  PharmD Candidate [id number]

## 2022-11-02 ENCOUNTER — HOSPITAL ENCOUNTER (OUTPATIENT)
Dept: WOUND CARE | Age: 62
Discharge: HOME | End: 2022-11-02
Payer: MEDICAID

## 2022-11-02 VITALS
HEART RATE: 101 BPM | SYSTOLIC BLOOD PRESSURE: 140 MMHG | DIASTOLIC BLOOD PRESSURE: 76 MMHG | RESPIRATION RATE: 16 BRPM | TEMPERATURE: 97.1 F

## 2022-11-02 DIAGNOSIS — I73.9 PERIPHERAL ARTERY DISEASE (HCC): Primary | ICD-10-CM

## 2022-11-02 PROCEDURE — 11043 DBRDMT MUSC&/FSCA 1ST 20/<: CPT

## 2022-11-02 PROCEDURE — 15275 SKIN SUB GRAFT FACE/NK/HF/G: CPT

## 2022-11-02 PROCEDURE — 29581 APPL MULTLAYER CMPRN SYS LEG: CPT

## 2022-11-02 RX ORDER — LIDOCAINE 40 MG/G
CREAM TOPICAL ONCE
Status: DISCONTINUED | OUTPATIENT
Start: 2022-11-02 | End: 2022-11-03 | Stop reason: HOSPADM

## 2022-11-02 RX ORDER — LIDOCAINE HYDROCHLORIDE 20 MG/ML
JELLY TOPICAL ONCE
Status: CANCELLED | OUTPATIENT
Start: 2022-11-02 | End: 2022-11-02

## 2022-11-02 RX ORDER — LIDOCAINE 50 MG/G
OINTMENT TOPICAL ONCE
Status: CANCELLED | OUTPATIENT
Start: 2022-11-02 | End: 2022-11-02

## 2022-11-02 RX ORDER — LIDOCAINE HYDROCHLORIDE 40 MG/ML
SOLUTION TOPICAL ONCE
Status: CANCELLED | OUTPATIENT
Start: 2022-11-02 | End: 2022-11-02

## 2022-11-02 RX ORDER — BETAMETHASONE DIPROPIONATE 0.05 %
OINTMENT (GRAM) TOPICAL ONCE
Status: CANCELLED | OUTPATIENT
Start: 2022-11-02 | End: 2022-11-02

## 2022-11-02 RX ORDER — GENTAMICIN SULFATE 1 MG/G
OINTMENT TOPICAL ONCE
Status: CANCELLED | OUTPATIENT
Start: 2022-11-02 | End: 2022-11-02

## 2022-11-02 RX ORDER — LIDOCAINE 40 MG/G
CREAM TOPICAL ONCE
Status: CANCELLED | OUTPATIENT
Start: 2022-11-02 | End: 2022-11-02

## 2022-11-02 RX ORDER — BACITRACIN, NEOMYCIN, POLYMYXIN B 400; 3.5; 5 [USP'U]/G; MG/G; [USP'U]/G
OINTMENT TOPICAL ONCE
Status: CANCELLED | OUTPATIENT
Start: 2022-11-02 | End: 2022-11-02

## 2022-11-02 RX ORDER — CLOBETASOL PROPIONATE 0.5 MG/G
OINTMENT TOPICAL ONCE
Status: CANCELLED | OUTPATIENT
Start: 2022-11-02 | End: 2022-11-02

## 2022-11-02 RX ORDER — GINSENG 100 MG
CAPSULE ORAL ONCE
Status: CANCELLED | OUTPATIENT
Start: 2022-11-02 | End: 2022-11-02

## 2022-11-02 RX ORDER — BACITRACIN ZINC AND POLYMYXIN B SULFATE 500; 1000 [USP'U]/G; [USP'U]/G
OINTMENT TOPICAL ONCE
Status: CANCELLED | OUTPATIENT
Start: 2022-11-02 | End: 2022-11-02

## 2022-11-02 NOTE — PROGRESS NOTES
[facility]  Progress Note and Procedure Note      Eva Collins  AGE: 65 y.o. GENDER: male  : 1960  TODAY'S DATE:  2022    Subjective:     Chief Complaint   Patient presents with    Wound Check         HISTORY of PRESENT ILLNESS HPI     Eva Collins is a 65 y.o. male who presents today for wound evaluation. History of Wound: Admits to having chronic wounds for at least a year on the left leg and 8 to 9 months on the right leg. He states that he has had arterial bypasses on both of his legs in the past.  He was seeing previous wound care and podiatry for these wounds. He states that his insurance had some issues and he has not been seeing anyone since 2021 he has been treating the wounds himself with PSO and bandages. Left the dressings intact as directed. He feels that the dressing on the right may been a little tight. Otherwise he feels both legs are doing better.   Wound Pain:  intermittent left  Severity:  3 / 10   Wound Type:  venous, arterial and diabetic  Modifying Factors:  edema, venous stasis, lymphedema, diabetes, decreased mobility, arterial insufficiency and decreased tissue oxygenation  Associated Signs/Symptoms:  drainage, numbness and odor        PAST MEDICAL HISTORY        Diagnosis Date    Cancer ([address]     throat    Diabetes mellitus ([address]     Fibromyalgia     History of DVT (deep vein thrombosis)     Hyperlipidemia     Hypertension     Type 2 diabetes mellitus with circulatory disorder, without long-term current use of insulin ([address] 2022       PAST SURGICAL HISTORY    Past Surgical History:   Procedure Laterality Date    APPENDECTOMY  2005    COLONOSCOPY  2016    FEMORAL BYPASS Left 2020    femoral posterior tibial bypass    FEMORAL-TIBIAL BYPASS GRAFT Right 2020    with femoral endarterectomy and patch angioplasty    FOOT DEBRIDEMENT Left 2020    FOOT DEBRIDEMENT Right 2021    DEBRIDEMENT OF RIGHT FOOT WOUND WITH GRAFT APPLICATION performed by Tere Miner DPM at [facility] N/A 3/25/2022    DIRECT LARYNGOSCOPY WITH BIOPSY AND FROZEN SECTIONS performed by Arie Nowak DO at [facility] Right 2020    stent leg for PVD    TRACHEOSTOMY N/A 3/25/2022    TRACHEOTOMY performed by Arie Nowak DO at [address] [facility] [address] HISTORY    Family History   Problem Relation Age of Onset    Cancer Mother     Lung Cancer Mother     Diabetes Father     Stroke Father        SOCIAL HISTORY    Social History     Tobacco Use    Smoking status: Former     Packs/day: 0.50     Years: 20.00     Pack years: 10.00     Types: Cigarettes     Start date: 1977     Quit date: 2021     Years since quittin.8    Smokeless tobacco: Never   Vaping Use    Vaping Use: Never used   Substance Use Topics    Alcohol use: Yes     Comment: 2 beers / day     Drug use: Never       ALLERGIES    Allergies   Allergen Reactions    Chantix [Varenicline] Other (See Comments)     Hallucinations         MEDICATIONS    Current Outpatient Medications on File Prior to Encounter   Medication Sig Dispense Refill    WAL-DRYL ALLERGY 25 MG tablet TAKE 1 TABLET BY MOUTH EVERY 6 HOURS AS NEEDED FOR ITCHING 60 tablet 0    vitamin B-12 (CYANOCOBALAMIN) 250 MCG tablet TAKE 1 TABLET BY MOUTH DAILY 30 tablet 0    atorvastatin (LIPITOR) 80 MG tablet TAKE 1 TABLET BY MOUTH DAILY 90 tablet 0    metFORMIN (GLUCOPHAGE) 500 MG tablet TAKE 1 TABLET BY MOUTH TWO TIMES A DAY WITH MEALS 60 tablet 0    lisinopril (PRINIVIL;ZESTRIL) 20 MG tablet TAKE 1 TABLET BY MOUTH EVERY DAY 30 tablet 0    metoprolol succinate (TOPROL XL) 25 MG extended release tablet Take 25 mg by mouth daily      melatonin 3 MG TABS tablet Take 2 tablets by mouth nightly as needed (insomnia) 30 tablet 2    warfarin (COUMADIN) 5 MG tablet Take daily as directed by anti coag clinic to maintain INR 2-3 (Patient taking differently: Take warfarin 15 mg every Monday and Friday, and 10 mg all the other days or as direct by [facility] [phone]) 90 tablet 1    Arformoterol Tartrate (BROVANA) 15 MCG/2ML NEBU Take 1 ampule by nebulization 2 times daily 120 mL 3    acetaminophen (TYLENOL) 500 MG tablet Take 1 tablet by mouth 4 times daily as needed for Pain 360 tablet 1    gentamicin (GARAMYCIN) 0.1 % cream Apply topically  daily. 30 g 1    budesonide (PULMICORT) 0.5 MG/2ML nebulizer suspension Take 2 mLs by nebulization 2 times daily 60 each 2    sennosides-docusate sodium (SENOKOT-S) 8.6-50 MG tablet Take 1 tablet by mouth 2 times daily 60 tablet 0    BANOPHEN 25 MG capsule Take 1 tablet by mouth every 6 hours as needed for Itching      ondansetron (ZOFRAN-ODT) 4 MG disintegrating tablet Take 4 mg by mouth every 8 hours as needed for Nausea or Vomiting      Multiple Vitamins-Minerals (MULTIVITAMIN ADULT EXTRA C PO) 1 tablet by Nasogastric route daily      esomeprazole (NEXIUM) 20 MG delayed release capsule 20 mg by Nasogastric route every morning (before breakfast)      oxyCODONE HCl (OXY-IR) 10 MG immediate release tablet TAKE 1 TABLET BY MOUTH EVERY SIX HOURS AS NEEDED FOR PAIN FOR UP TO 7 DAYS      becaplermin (REGRANEX) 0.01 % gel Apply 1 Applicatorful topically daily      calcium-vitamin D (OSCAL-500) 500-200 MG-UNIT per tablet Take 1 tablet by mouth 2 times daily      ipratropium-albuterol (DUONEB) 0.5-2.5 (3) MG/3ML SOLN nebulizer solution Inhale 3 mLs into the lungs 4 times daily 360 mL 3    fluticasone (FLONASE) 50 MCG/ACT nasal spray 1 spray by Nasal route daily 9.9 g 5    Vitamins/Minerals TABS Take 1 tablet by mouth daily        No current facility-administered medications on file prior to encounter. REVIEW OF SYSTEMS    Pertinent items are noted in HPI.       Objective:      BP (!) 140/76   Pulse (!) 101   Temp 97.1 °F (36.2 °C) (Infrared)   Resp 16     PHYSICAL EXAM    Vascular: Vascular status Impaired  palpable pedal pulses, right DP0/4 and PT1/4, left DP0/4 and PT1/4. CFT 3 seconds digits 1 to 5 bilateral.  Hair growthAbsent  both lower extremities and feet. Skin temperature is warm to warm from pretibial area to distal digits bilateral.  Exam is negative for rubor, pallor, cyanosis or signs of acute vascular compromise bilaterally. Exam is positive for edema bilateral lower extremity. Varicosities Present bilateral lower extremity. Neuro: Neurologic status diminished bilateral with epicritic Absent  , proprioceptive Absent , vibratory sensation Absent  and protopathicPresent. DTRs Absent  bilateral Achilles. There were no reproducible neuritic symptoms on exam bilateral feet/ankles. Derm: Ulceration to bilateral ankles. Ecchymosis Absent  bilateral feet/foot. Musculoskeletal: No pain with debridement of wounds 5/5 muscle strength in/eversion and dorsi/plantarflexion bilateral feet. No gross instability noted. Procedure Note    Performed by: David Coyle DPM    Consent obtained: Yes    Time out taken:  Yes    Pain Control: Anesthetic  Anesthetic: 4% Topical Xylocaine     Debridement:Excisional Debridement    Using curette the wound was sharply debrided    down through and including the removal of epidermis, dermis, subcutaneous tissue, and muscle/fascia.         Devitalized Tissue Debrided:  fibrin, biofilm, slough, necrotic/eschar, and exudate    Pre Debridement Measurements:  Are located in the Wound Documentation Flow Sheet    Wound #: 1     Wound Care Documentation:  Wound 06/24/22 Ankle Left;Medial #1 (Active)   Wound Image   10/07/22 0853   Wound Etiology Venous 11/02/22 1304   Wound Cleansed Wound cleanser 11/02/22 1304   Dressing/Treatment Antibacterial ointment;Triad hydro/zinc oxide-based hydrophilic paste 69/99/93 1144   Offloading for Diabetic Foot Ulcers Offloading not ordered 11/02/22 1304   Wound Length (cm) 1.8 cm 11/02/22 1304   Wound Width (cm) 3.4 cm 11/02/22 1304   Wound Depth (cm) 0.2 cm 11/02/22 1304   Wound Surface Area (cm^2) 6.12 cm^2 22 130   Change in Wound Size % (l*w) 10.53 22 130   Wound Volume (cm^3) 1.224 cm^3 22 130   Wound Healing % -79 22 130   Post-Procedure Length (cm) 2.4 cm 10/28/22 0852   Post-Procedure Width (cm) 4.3 cm 10/28/22 0852   Post-Procedure Depth (cm) 0.2 cm 10/28/22 0852   Post-Procedure Surface Area (cm^2) 10.32 cm^2 10/28/22 08   Post-Procedure Volume (cm^3) 2.064 cm^3 10/28/22 08   Wound Assessment Bleeding;Slough 22 130   Drainage Amount Moderate 22   Drainage Description Thick; Yellow 22 130   Odor None 22   Jamila-wound Assessment Dry/flaky 22   Margins Defined edges 22 130   Number of days: 131       Total Surface Area Debrided:  6.12sq cm     Percentage of wound debrided 100%    Bleeding:  Minimal    Hemostasis Achieved:  by pressure    Procedural Pain:  5  / 10     Post Procedural Pain:  0 / 10     Response to treatment:  Well tolerated by patient. Young Hush  AGE: 65 y.o. GENDER: male  : 1960  TODAY'S DATE:  2022    Chief Complaint   Patient presents with    Wound Check          Skin Substitute Applied:       Theraskin 6 sq/cm            Performed by: Peter Morgan DPM    Wound Type:venous, arterial, and lymphedema    Consent obtained: Yes    Time out taken: Yes     Fenestrated: Yes    Instrument(s) scissors and forceps      [] Mesher Utilized    All  Guidelines Followed    Skin Substitute was Applied to Wound Number(s):     Wound #: 1      Wound 22 Ankle Left;Medial #1 (Active)   Wound Image   10/07/22 0853   Wound Etiology Venous 22   Wound Cleansed Wound cleanser 22 130   Dressing/Treatment Antibacterial ointment;Triad hydro/zinc oxide-based hydrophilic paste 58/77/84 3076   Offloading for Diabetic Foot Ulcers Offloading not ordered 22 1304   Wound Length (cm) 1.8 cm 22 1304   Wound Width (cm) 3.4 cm 22 1304   Wound Depth (cm) 0.2 cm 11/02/22 1304   Wound Surface Area (cm^2) 6.12 cm^2 11/02/22 1304   Change in Wound Size % (l*w) 10.53 11/02/22 1304   Wound Volume (cm^3) 1.224 cm^3 11/02/22 1304   Wound Healing % -79 11/02/22 1304   Post-Procedure Length (cm) 2.4 cm 10/28/22 0852   Post-Procedure Width (cm) 4.3 cm 10/28/22 0852   Post-Procedure Depth (cm) 0.2 cm 10/28/22 0852   Post-Procedure Surface Area (cm^2) 10.32 cm^2 10/28/22 0852   Post-Procedure Volume (cm^3) 2.064 cm^3 10/28/22 0852   Wound Assessment Bleeding;Slough 11/02/22 1304   Drainage Amount Moderate 11/02/22 1304   Drainage Description Thick; Yellow 11/02/22 1304   Odor None 11/02/22 1304   Jamila-wound Assessment Dry/flaky 11/02/22 1304   Margins Defined edges 11/02/22 1304   Number of days: 131         Total Surface Area Covered 6 sq/cm     Amount Wasted 0 sq/cm    Reason for Waste 0      Was the Product Layered  Yes     Secured: Yes    Secured With:  [x]Steri Strips    []Sutures     []Staples []Other    Procedural Pain: 8/10     Post Procedural Pain: 4 / 10    Response to Treatment:  Well tolerated by patient. Assessment:     Problem List Items Addressed This Visit       Peripheral artery disease ([address] - Primary    Relevant Medications    lidocaine (LMX) 4 % cream    Other Relevant Orders    Initiate Outpatient Wound Care Protocol          Plan:   Patient examined and evaluated   Wound debrided without incident   Application of skin graft  Compression stockings right leg with Lotrisone. Multilayer compression wrap left leg  Follow-up one week  Keep legs elevated at all times when not active. The nature of the patient's condition was explained in depth. The patient was informed that their compliance to the treatment Plan is paramount to successful healing and prevention of further ulceration and/or infection.   Discharge Treatment follow-up on Friday for dressing change    Written Patient Discharge Instructions Given            Electronically signed by Ara Boys, DPM on 11/2/2022 at 1:51 PM

## 2022-11-02 NOTE — PROGRESS NOTES
Multilayer Compression Wrap   Below the Knee    NAME:  Sneha Swenson  YOB: 1960  MEDICAL RECORD NUMBER:  [id number]  DATE:  11/2/2022    Multilayer compression wrap: Applied primary and secondary dressing as ordered. Applied multilayered dressing below the knee to left lower leg. Instructed patient/caregiver not to remove dressing and to keep it clean and dry. Instructed patient/caregiver on complications to report to provider, such as pain, numbness in toes, heavy drainage, and slippage of dressing. Instructed patient on purpose of compression dressing and on activity and exercise recommendations.      Applied  2 layer wrap from toes to knee overlapping each time    Electronically signed by Merry Burden RN on 11/2/2022 at 3:38 PM

## 2022-11-03 ENCOUNTER — TELEPHONE (OUTPATIENT)
Dept: PHARMACY | Age: 62
End: 2022-11-03

## 2022-11-03 NOTE — TELEPHONE ENCOUNTER
Patient called, started azithromycin. Anticipate no significant interaction with Coumadin. Advised patient to continue current dose and follow up as scheduled. Recent INR was subtherapeutic after a procedure. So do not anticipate any elevation in INR that would be problematic.      Lab Results   Component Value Date    INR 1.30 10/31/2022         For Pharmacy Admin Tracking Only    Intervention Detail:   Total # of Interventions Recommended: 0  Total # of Interventions Accepted: 0  Time Spent (min): 5

## 2022-11-04 DIAGNOSIS — E11.9 TYPE 2 DIABETES MELLITUS WITHOUT COMPLICATION, WITHOUT LONG-TERM CURRENT USE OF INSULIN (HCC): ICD-10-CM

## 2022-11-04 RX ORDER — LISINOPRIL 20 MG/1
TABLET ORAL
Qty: 30 TABLET | Refills: 0 | Status: SHIPPED | OUTPATIENT
Start: 2022-11-04

## 2022-11-07 ENCOUNTER — TELEPHONE (OUTPATIENT)
Dept: INTERNAL MEDICINE CLINIC | Age: 62
End: 2022-11-07

## 2022-11-07 NOTE — TELEPHONE ENCOUNTER
Pts wife Clarissa Watts is calling for pt. He was diagnosed with pneumonia 11/3. Pt was given antibiotics, cough seems somewhat better but still there. He is complaining of rt side pain, No available appts. Bonifacio reach out to pt.

## 2022-11-07 NOTE — DISCHARGE INSTRUCTIONS
[address], [address]  Telephone: [phone]     Discharge Instructions     Important reminders:     **If you have any signs and symptoms of illness (Cough, fever, congestion, nausea, vomiting, diarrhea, etc.) please call the wound care center prior to your appointment. 1. Increase Protein intake for optimal wound healing  2. No added salt to reduce any swelling  3. If diabetic, maintain good glucose control  4. If you smoke, smoking prohibits wound healing, we ask that you refrain from smoking. 5. When taking antibiotics take the entire prescription as ordered. Do not stop taking until medication is all gone unless otherwise instructed. 6. Exercise as tolerated. 7. Keep weight off wounds and reposition every 2 hours if applicable. 8. If wound(s) is on your lower extremity, elevate legs to the level of the heart or above for 30 minutes 4-5 times a day and/or when sitting. Avoid standing for long periods of time. 9. Do not get wounds wet in bath or shower unless otherwise instructed by your physician. If your wound is on your foot or leg, you may purchase a cast bag. Please ask at the pharmacy. If Vascular testing is ordered, please call [address] [place] [address] ([phone]) to schedule. Vascular tests ordered by Wound Care Physicians may take up to 2 hours to complete. Please keep that in mind when scheduling. If Vascular testing is scheduled, please bring supplies to replace your dressing after testing is done. The vascular department does not stock supplies. Wound: Right and Left leg     With each dressing change, rinse wounds with 0.9% Saline. (May use wound wash or soft contact solution. Both can be purchased at a local drug store). If unable to obtain saline, may use a gentle soap and water. Dressing care: Right Lotrisone, Compresison stocking.   Left leg -  Gentamicin cream, Epiona, dry dressing, coflex calamine- these will be changed at your next visit unless they slide down or cause pain. If they slide, gets wet, or cause pain please call the wound care center, you may need to come in to be re-wrapped. If you are unable to get to your follow up appointment you will need to remove your wraps at home & place some kind of compression. Dr Barbara Cardoso, Vascular Surgeon September 14th at 1215pm  [address], [address]  Phone# [phone]  Fax# [phone]         Compression Wraps  Location: Left lower leg below knee  Type: Coflex calamine     Your doctor has ordered compression therapy for your wound. Compression bandages reduce the swelling, or edema, in your legs and prevent it from returning. The wound care staff will apply your compression wrap. It must be removed and re-applied at least weekly. As the swelling decreases, the boot no longer provides adequate compression and you need a new one. Once applied, you need to know how to take care of your compression wrap. The boot must stay dry. Do not get it wet in the shower or tub. You may do a partial bath, or you can cover the boot with a large plastic bag, secured at the top, so that no water can get in. Avoid standing in one place for long periods of time. If you must  one place, shift your weight and change positions often. If you have CHF, consult your doctor before following the next two recommendations for leg elevation. When sitting, elevate your legs on pillows, or put blocks under the foot of your bed. Your legs should be higher than your heart. If your boot becomes painful, or you notice an increase in swelling in your toes, numbness or tingling, or purple color to your toes, remove the wrap and call the [facility] [address]. If it is after hours, call your doctor for instructions or go to the nearest emergency room.         Home Care Agency/Facility: [facility]     Your wound-care supplies will be provided by:  Please note, depending on your insurance coverage, you may have out-of-pocket expenses for these supplies. Someone from the company should call you to confirm your order and discuss those potential costs before they ship your products -- please anticipate that call. If your out-of-pocket cost could be substantial, Many companies have financial hardship programs for patients who qualify, so please ask about that if you might need a hand. If you have any questions about your supplies or your potential out-of-pocket costs, or if you need to place an order for a refill of supplies (typically monthly), please call the company directly. Your  is Melvin Arguelles     Follow up with Dr Malcolm Lino in 1 weeks. Wound Care Center Information: Should you experience any significant changes in your wound(s) or have questions about your wound care, please [facility]  at [phone] Monday  - Thursday 8:00 am - 4:00 pm and Friday 8:00 am - 1:00pm. If you need help with your wound outside these hours and cannot wait until we are again available, contact your PCP or go to the hospital emergency room. PLEASE NOTE: IF YOU ARE UNABLE TO OBTAIN WOUND SUPPLIES, CONTINUE TO USE THE SUPPLIES YOU HAVE AVAILABLE UNTIL YOU ARE ABLE TO REACH US. IT IS MOST IMPORTANT TO KEEP THE WOUND COVERED AT ALL TIMES.

## 2022-11-08 ENCOUNTER — OFFICE VISIT (OUTPATIENT)
Dept: INTERNAL MEDICINE CLINIC | Age: 62
End: 2022-11-08
Payer: MEDICAID

## 2022-11-08 ENCOUNTER — ANTI-COAG VISIT (OUTPATIENT)
Dept: PHARMACY | Age: 62
End: 2022-11-08
Payer: MEDICAID

## 2022-11-08 VITALS
OXYGEN SATURATION: 98 % | DIASTOLIC BLOOD PRESSURE: 60 MMHG | WEIGHT: 169.8 LBS | HEIGHT: 68 IN | HEART RATE: 62 BPM | BODY MASS INDEX: 25.73 KG/M2 | SYSTOLIC BLOOD PRESSURE: 110 MMHG

## 2022-11-08 DIAGNOSIS — Z12.11 COLON CANCER SCREENING: ICD-10-CM

## 2022-11-08 DIAGNOSIS — J41.0 SIMPLE CHRONIC BRONCHITIS (HCC): ICD-10-CM

## 2022-11-08 DIAGNOSIS — I73.9 PERIPHERAL ARTERY DISEASE (HCC): Primary | ICD-10-CM

## 2022-11-08 DIAGNOSIS — I10 ESSENTIAL HYPERTENSION: ICD-10-CM

## 2022-11-08 DIAGNOSIS — J43.2 CENTRILOBULAR EMPHYSEMA (HCC): Primary | ICD-10-CM

## 2022-11-08 DIAGNOSIS — E11.59 TYPE 2 DIABETES MELLITUS WITH OTHER CIRCULATORY COMPLICATION, WITHOUT LONG-TERM CURRENT USE OF INSULIN (HCC): ICD-10-CM

## 2022-11-08 LAB — INR BLD: 1.8

## 2022-11-08 PROCEDURE — 85610 PROTHROMBIN TIME: CPT

## 2022-11-08 PROCEDURE — 99214 OFFICE O/P EST MOD 30 MIN: CPT | Performed by: INTERNAL MEDICINE

## 2022-11-08 PROCEDURE — 3078F DIAST BP <80 MM HG: CPT | Performed by: INTERNAL MEDICINE

## 2022-11-08 PROCEDURE — 3044F HG A1C LEVEL LT 7.0%: CPT | Performed by: INTERNAL MEDICINE

## 2022-11-08 PROCEDURE — 3074F SYST BP LT 130 MM HG: CPT | Performed by: INTERNAL MEDICINE

## 2022-11-08 PROCEDURE — 99211 OFF/OP EST MAY X REQ PHY/QHP: CPT

## 2022-11-08 RX ORDER — BENZONATATE 200 MG/1
200 CAPSULE ORAL 3 TIMES DAILY PRN
Qty: 30 CAPSULE | Refills: 0 | Status: SHIPPED | OUTPATIENT
Start: 2022-11-08 | End: 2022-11-30

## 2022-11-08 ASSESSMENT — ENCOUNTER SYMPTOMS
RHINORRHEA: 0
SORE THROAT: 0
COUGH: 1
VISUAL CHANGE: 0
SHORTNESS OF BREATH: 0

## 2022-11-08 NOTE — PROGRESS NOTES
Tal Thomson is a 65 y.o. here for warfarin management. Gill Mir had an INR test today. Results were reviewed and appropriate warfarin management was completed. This visit was performed as: An in person visit. Protocols were followed with precautions to reduce the spread of COVID-19. Patient verifies current warfarin dosing regimen: Yes     Warfarin medication reviewed and updated on the patient 's home medication list: Yes   All other medications reviewed and updated on the patient 's home medication list: Yes     Lab Results   Component Value Date    INR 1.8 2022    INR 1.30 10/31/2022    INR 2.40 10/14/2022       Patient Findings       Negatives:  Signs/symptoms of thrombosis, Signs/symptoms of bleeding, Change in health, Missed doses, Change in medications, Change in diet/appetite, Bruising            Anticoagulation Summary  As of 2022      INR goal:  2.0-3.0   TTR:  46.3 % (1.1 y)   INR used for dosin.8 (2022)   Warfarin maintenance plan:  15 mg (5 mg x 3) every Mon, Fri; 10 mg (5 mg x 2) all other days; Starting 2022   Weekly warfarin total:  80 mg   Plan last modified:  Tanner Miller RPH (10/6/2022)   Next INR check:  2022   Priority:  High   Target end date: Indefinite    Indications    Peripheral artery disease ([facility] [address] [I73.9]                 Anticoagulation Episode Summary       INR check location:  Anticoagulation Clinic    Preferred lab:      Send INR reminders to:  [facility]    Comments:  EPIC          Anticoagulation Care Providers       Provider Role Specialty Phone number    Alexis Kirkland MD Referring Family Medicine [phone]            There were no vitals taken for this visit. Warfarin assessment / plan:     Appears well  Sub-therapeutic INR        Recently held warfarin several days for a procedure, INR likely still coming up from that. Will continue current dosing and check again in 2 weeks.     Description CONTINUE:  Warfarin 10 mg daily EXCEPT 15 mg every Monday and Friday. Drinking 9 Boost shakes per day. Please let us know if this changes. Call [phone] with signs or symptoms of bleeding or ANY medication changes (including over-the-counter medications or herbal supplements). If significant bleeding occurs please seek immediate medical attention. Keep the number of servings of vitamin K containing foods (dark green, leafy vegetables) the same each week. Dark green vegetable 2-3 times a week and a mixed green salad ~ 3 times a week. Please call if this changes. Limit alcohol intake. Please call if this changes. Immunization History   Administered Date(s) Administered    COVID-19, PFIZER PURPLE top, DILUTE for use, (age 15 y+), 30mcg/0.3mL 03/15/2021, 04/12/2021, 12/22/2021    Influenza Virus Vaccine 01/21/2017, 01/21/2017    Influenza, FLUARIX, FLULAVAL, Vida Denton (age 10 mo+) AND AFLURIA, (age 1 y+), PF, 0.5mL 10/31/2020    PPD Test 04/14/2022, 04/23/2022, 06/03/2022    Tdap (Boostrix, Adacel) 03/27/2018    Zoster Recombinant (Shingrix) 03/27/2018           Orders Placed This Encounter   Procedures    Protime-INR     This external order was created through the results console. No orders of the defined types were placed in this encounter. Reviewed AVS with patient / caregiver.     Billing Points:  0 billing points this visit       CLINICAL PHARMACY CONSULT: MED RECONCILIATION/REVIEW ADDENDUM    For Pharmacy Admin Tracking Only    Intervention Detail:   Total # of Interventions Recommended: 0  Total # of Interventions Accepted: 0  Time Spent (min): Alphonse Reese PharmD 11/8/22  3:23 PM

## 2022-11-08 NOTE — PROGRESS NOTES
Chang Desouza (:  1960) is a 65 y.o. male,Established patient, here for evaluation of the following chief complaint(s):  Cough and Follow-up (Pneumonia 11/3)         ASSESSMENT/PLAN:  1. Centrilobular emphysema (HCC)  -     benzonatate (TESSALON) 200 MG capsule; Take 1 capsule by mouth 3 times daily as needed for Cough, Disp-30 capsule, R-0Normal  2. Simple chronic bronchitis (HCC)  -     benzonatate (TESSALON) 200 MG capsule; Take 1 capsule by mouth 3 times daily as needed for Cough, Disp-30 capsule, R-0Normal  3. Essential hypertension  4. Type 2 diabetes mellitus with other circulatory complication, without long-term current use of insulin (HCC)  -     Comprehensive Metabolic Panel; Future  -     CBC with Auto Differential; Future  -     Hemoglobin A1C; Future  -     Lipid Panel; Future  5. Colon cancer screening  -     [facility] - Paco Feliciano MD, Gastroenterology (ERCP & EUS), [facility]    Return in about 3 months (around 2023).          Subjective   SUBJECTIVE/OBJECTIVE:    Lab Review   Lab Results   Component Value Date/Time     2022 01:39 PM     2022 08:48 AM     2022 05:25 AM    K 4.9 2022 01:39 PM    K 4.0 2022 08:48 AM    K 3.6 2022 05:25 AM    K 3.8 2022 05:00 AM    CO2 25 2022 01:39 PM    CO2 23 2022 08:48 AM    CO2 25 2022 05:25 AM    BUN 12 2022 01:39 PM    BUN 8 2022 08:48 AM    BUN 9 2022 05:25 AM    CREATININE 0.6 2022 01:39 PM    CREATININE <0.5 2022 08:48 AM    CREATININE <0.5 2022 05:25 AM    GLUCOSE 76 2022 01:39 PM    GLUCOSE 143 2022 08:48 AM    GLUCOSE 147 2022 05:25 AM    CALCIUM 9.9 2022 01:39 PM    CALCIUM 9.0 2022 08:48 AM    CALCIUM 9.0 2022 05:25 AM     Lab Results   Component Value Date/Time    WBC 4.8 2022 03:21 PM    WBC 4.9 2022 01:39 PM    WBC 5.0 2022 08:48 AM    HGB 9.3 2022 03:21 PM    HGB 10.2 05/05/2022 01:39 PM    HGB 11.2 04/02/2022 08:48 AM    HCT 28.6 06/16/2022 03:21 PM    HCT 32.3 05/05/2022 01:39 PM    HCT 34.5 04/02/2022 08:48 AM    MCV 83.2 06/16/2022 03:21 PM    MCV 86.0 05/05/2022 01:39 PM    MCV 85.7 04/02/2022 08:48 AM     06/16/2022 03:21 PM     05/05/2022 01:39 PM     04/02/2022 08:48 AM     Lab Results   Component Value Date/Time    CHOL 112 05/05/2022 01:39 PM    CHOL 140 02/18/2022 03:24 PM    CHOL 134 03/08/2021 12:59 PM    TRIG 92 05/05/2022 01:39 PM    TRIG 175 03/29/2022 10:30 AM    TRIG 152 03/22/2022 03:00 AM    HDL 41 05/05/2022 01:39 PM    HDL 71 02/18/2022 03:24 PM    HDL 46 03/08/2021 12:59 PM    HDL 60 03/29/2010 01:01 PM       Vitals 11/8/2022 11/2/2022 91/29/7582   SYSTOLIC 435 533 649   DIASTOLIC 60 76 75   Site - - -   Position - - -   Cuff Size - - -   Pulse 62 101 100   Temp - 97.1 96.8   Resp - 16 16   SpO2 98 - -   Weight 169 lb 12.8 oz - -   Height 5' 8\" - -   Body mass index 25.82 kg/m2 - -   Pain Level - - -   Some recent data might be hidden       Cough  This is a new problem. The current episode started 1 to 4 weeks ago. Pertinent negatives include no chest pain, chills, ear congestion, ear pain, fever, rhinorrhea, sore throat or shortness of breath. Diabetes  He presents for his follow-up diabetic visit. He has type 2 diabetes mellitus. Pertinent negatives for diabetes include no chest pain, no visual change and no weakness. Review of Systems   Constitutional:  Negative for chills and fever. HENT:  Negative for ear pain, rhinorrhea and sore throat. Respiratory:  Positive for cough. Negative for shortness of breath. Cardiovascular:  Negative for chest pain. Neurological:  Negative for weakness. Objective   Physical Exam  Constitutional:       General: He is not in acute distress. Appearance: Normal appearance. HENT:      Head: Normocephalic and atraumatic.       Right Ear: Tympanic membrane normal.      Left Ear: Tympanic membrane normal.      Nose: Nose normal.   Eyes:      Extraocular Movements: Extraocular movements intact. Conjunctiva/sclera: Conjunctivae normal.      Pupils: Pupils are equal, round, and reactive to light. Neck:      Vascular: No carotid bruit. Cardiovascular:      Rate and Rhythm: Normal rate and regular rhythm. Pulses: Normal pulses. Heart sounds: No murmur heard. Pulmonary:      Effort: Pulmonary effort is normal. No respiratory distress. Breath sounds: Normal breath sounds. Abdominal:      General: Abdomen is flat. Bowel sounds are normal. There is no distension. Palpations: Abdomen is soft. Tenderness: There is no abdominal tenderness. Musculoskeletal:         General: No swelling, tenderness or deformity. Cervical back: Normal range of motion and neck supple. No rigidity or tenderness. Right lower leg: No edema. Left lower leg: No edema. Lymphadenopathy:      Cervical: No cervical adenopathy. Skin:     Coloration: Skin is not jaundiced. Findings: No bruising, erythema or lesion. Neurological:      General: No focal deficit present. Mental Status: He is alert and oriented to person, place, and time. Cranial Nerves: No cranial nerve deficit. Motor: No weakness. Gait: Gait normal.          This dictation was generated by voice recognition computer software. Although all attempts are made to edit the dictation for accuracy, there may be errors in the transcription that are not intended. An electronic signature was used to authenticate this note.     --Gary Hogan MD

## 2022-11-09 ENCOUNTER — HOSPITAL ENCOUNTER (OUTPATIENT)
Dept: WOUND CARE | Age: 62
Discharge: HOME | End: 2022-11-09
Payer: MEDICAID

## 2022-11-09 VITALS
SYSTOLIC BLOOD PRESSURE: 140 MMHG | TEMPERATURE: 97.3 F | RESPIRATION RATE: 16 BRPM | DIASTOLIC BLOOD PRESSURE: 74 MMHG | HEART RATE: 101 BPM

## 2022-11-09 DIAGNOSIS — I73.9 PERIPHERAL ARTERY DISEASE (HCC): Primary | ICD-10-CM

## 2022-11-09 PROCEDURE — 29581 APPL MULTLAYER CMPRN SYS LEG: CPT

## 2022-11-09 PROCEDURE — 11043 DBRDMT MUSC&/FSCA 1ST 20/<: CPT

## 2022-11-09 RX ORDER — CLOBETASOL PROPIONATE 0.5 MG/G
OINTMENT TOPICAL ONCE
Status: CANCELLED | OUTPATIENT
Start: 2022-11-09 | End: 2022-11-09

## 2022-11-09 RX ORDER — LIDOCAINE HYDROCHLORIDE 40 MG/ML
SOLUTION TOPICAL ONCE
Status: CANCELLED | OUTPATIENT
Start: 2022-11-09 | End: 2022-11-09

## 2022-11-09 RX ORDER — GENTAMICIN SULFATE 1 MG/G
OINTMENT TOPICAL ONCE
Status: CANCELLED | OUTPATIENT
Start: 2022-11-09 | End: 2022-11-09

## 2022-11-09 RX ORDER — LIDOCAINE 40 MG/G
CREAM TOPICAL ONCE
Status: DISCONTINUED | OUTPATIENT
Start: 2022-11-09 | End: 2022-11-10 | Stop reason: HOSPADM

## 2022-11-09 RX ORDER — LIDOCAINE HYDROCHLORIDE 20 MG/ML
JELLY TOPICAL ONCE
Status: CANCELLED | OUTPATIENT
Start: 2022-11-09 | End: 2022-11-09

## 2022-11-09 RX ORDER — BETAMETHASONE DIPROPIONATE 0.05 %
OINTMENT (GRAM) TOPICAL ONCE
Status: CANCELLED | OUTPATIENT
Start: 2022-11-09 | End: 2022-11-09

## 2022-11-09 RX ORDER — BACITRACIN, NEOMYCIN, POLYMYXIN B 400; 3.5; 5 [USP'U]/G; MG/G; [USP'U]/G
OINTMENT TOPICAL ONCE
Status: CANCELLED | OUTPATIENT
Start: 2022-11-09 | End: 2022-11-09

## 2022-11-09 RX ORDER — LIDOCAINE 40 MG/G
CREAM TOPICAL ONCE
Status: CANCELLED | OUTPATIENT
Start: 2022-11-09 | End: 2022-11-09

## 2022-11-09 RX ORDER — LIDOCAINE 50 MG/G
OINTMENT TOPICAL ONCE
Status: CANCELLED | OUTPATIENT
Start: 2022-11-09 | End: 2022-11-09

## 2022-11-09 RX ORDER — BACITRACIN ZINC AND POLYMYXIN B SULFATE 500; 1000 [USP'U]/G; [USP'U]/G
OINTMENT TOPICAL ONCE
Status: CANCELLED | OUTPATIENT
Start: 2022-11-09 | End: 2022-11-09

## 2022-11-09 RX ORDER — GINSENG 100 MG
CAPSULE ORAL ONCE
Status: CANCELLED | OUTPATIENT
Start: 2022-11-09 | End: 2022-11-09

## 2022-11-09 NOTE — PLAN OF CARE
Discharge instructions given. Patient verbalized understanding. Return to [address] [place] [address],[address] in 1 week(s).

## 2022-11-09 NOTE — PROGRESS NOTES
Multilayer Compression Wrap   Below the Knee    NAME:  Lonnie Kothari  YOB: 1960  MEDICAL RECORD NUMBER:  [id number]  DATE:  11/9/2022    Multilayer compression wrap: Removed old Multilayer wrap if indicated and wash leg with mild soap/water. Applied moisturizing agent to dry skin as needed. Applied primary and secondary dressing as ordered. Applied multilayered dressing below the knee to left lower leg. Instructed patient/caregiver not to remove dressing and to keep it clean and dry. Instructed patient/caregiver on complications to report to provider, such as pain, numbness in toes, heavy drainage, and slippage of dressing. Instructed patient on purpose of compression dressing and on activity and exercise recommendations.      Applied  2 layer wrap from toes to knee overlapping each time    Electronically signed by Fiordaliza Croft RN on 11/9/2022 at 2:21 PM

## 2022-11-09 NOTE — PROGRESS NOTES
[facility]  Progress Note and Procedure Note      Sneha Swenson  AGE: 65 y.o. GENDER: male  : 1960  TODAY'S DATE:  2022    Subjective:     Chief Complaint   Patient presents with    Wound Check         HISTORY of PRESENT ILLNESS HPI     Sneha Swenson is a 65 y.o. male who presents today for wound evaluation. History of Wound: Admits to having chronic wounds for at least a year on the left leg and 8 to 9 months on the right leg. He states that he has had arterial bypasses on both of his legs in the past.  He was seeing previous wound care and podiatry for these wounds. He states that his insurance had some issues and he has not been seeing anyone since 2021 he has been treating the wounds himself with PSO and bandages. Left the dressings intact as directed. He feels that the dressing on the right may been a little tight. Otherwise he feels both legs are doing better.   Wound Pain:  intermittent left  Severity:  3 / 10   Wound Type:  venous, arterial and diabetic  Modifying Factors:  edema, venous stasis, lymphedema, diabetes, decreased mobility, arterial insufficiency and decreased tissue oxygenation  Associated Signs/Symptoms:  drainage, numbness and odor        PAST MEDICAL HISTORY        Diagnosis Date    Cancer ([address]     throat    Diabetes mellitus ([address]     Fibromyalgia     History of DVT (deep vein thrombosis)     Hyperlipidemia     Hypertension     Type 2 diabetes mellitus with circulatory disorder, without long-term current use of insulin ([address] 2022       PAST SURGICAL HISTORY    Past Surgical History:   Procedure Laterality Date    APPENDECTOMY  2005    COLONOSCOPY  2016    FEMORAL BYPASS Left 2020    femoral posterior tibial bypass    FEMORAL-TIBIAL BYPASS GRAFT Right 2020    with femoral endarterectomy and patch angioplasty    FOOT DEBRIDEMENT Left 2020    FOOT DEBRIDEMENT Right 2021    DEBRIDEMENT OF RIGHT FOOT WOUND WITH GRAFT APPLICATION performed by Mecca Osorio DPM at [address] [facility] N/A 3/25/2022    DIRECT LARYNGOSCOPY WITH BIOPSY AND FROZEN SECTIONS performed by Donn Xiong DO at [address] Right 2020    stent leg for PVD    TRACHEOSTOMY N/A 3/25/2022    TRACHEOTOMY performed by Donn Xiong DO at [address] HISTORY    Family History   Problem Relation Age of Onset    Cancer Mother     Lung Cancer Mother     Diabetes Father     Stroke Father        SOCIAL HISTORY    Social History     Tobacco Use    Smoking status: Former     Packs/day: 0.50     Years: 20.00     Pack years: 10.00     Types: Cigarettes     Start date: 1977     Quit date: 2021     Years since quittin.8    Smokeless tobacco: Never   Vaping Use    Vaping Use: Never used   Substance Use Topics    Alcohol use: Yes     Comment: 2 beers / day     Drug use: Never       ALLERGIES    Allergies   Allergen Reactions    Chantix [Varenicline] Other (See Comments)     Hallucinations         MEDICATIONS    Current Outpatient Medications on File Prior to Encounter   Medication Sig Dispense Refill    benzonatate (TESSALON) 200 MG capsule Take 1 capsule by mouth 3 times daily as needed for Cough 30 capsule 0    metFORMIN (GLUCOPHAGE) 500 MG tablet TAKE 1 TABLET BY MOUTH TWICE DAILY WITH MEALS 60 tablet 0    lisinopril (PRINIVIL;ZESTRIL) 20 MG tablet TAKE 1 TABLET BY MOUTH EVERY DAY 30 tablet 0    WAL-DRYL ALLERGY 25 MG tablet TAKE 1 TABLET BY MOUTH EVERY 6 HOURS AS NEEDED FOR ITCHING 60 tablet 0    vitamin B-12 (CYANOCOBALAMIN) 250 MCG tablet TAKE 1 TABLET BY MOUTH DAILY 30 tablet 0    atorvastatin (LIPITOR) 80 MG tablet TAKE 1 TABLET BY MOUTH DAILY 90 tablet 0    metoprolol succinate (TOPROL XL) 25 MG extended release tablet Take 25 mg by mouth daily      melatonin 3 MG TABS tablet Take 2 tablets by mouth nightly as needed (insomnia) 30 tablet 2    warfarin (COUMADIN) 5 MG tablet Take daily as directed by anti coag clinic to maintain INR 2-3 (Patient taking differently: Take warfarin 15 mg every Monday and Friday, and 10 mg all the other days or as direct by [facility] [phone]) 90 tablet 1    Arformoterol Tartrate (BROVANA) 15 MCG/2ML NEBU Take 1 ampule by nebulization 2 times daily 120 mL 3    acetaminophen (TYLENOL) 500 MG tablet Take 1 tablet by mouth 4 times daily as needed for Pain 360 tablet 1    gentamicin (GARAMYCIN) 0.1 % cream Apply topically  daily. 30 g 1    budesonide (PULMICORT) 0.5 MG/2ML nebulizer suspension Take 2 mLs by nebulization 2 times daily 60 each 2    sennosides-docusate sodium (SENOKOT-S) 8.6-50 MG tablet Take 1 tablet by mouth 2 times daily 60 tablet 0    BANOPHEN 25 MG capsule Take 1 tablet by mouth every 6 hours as needed for Itching      ondansetron (ZOFRAN-ODT) 4 MG disintegrating tablet Take 4 mg by mouth every 8 hours as needed for Nausea or Vomiting      Multiple Vitamins-Minerals (MULTIVITAMIN ADULT EXTRA C PO) 1 tablet by Nasogastric route daily      esomeprazole (NEXIUM) 20 MG delayed release capsule 20 mg by Nasogastric route every morning (before breakfast)      oxyCODONE HCl (OXY-IR) 10 MG immediate release tablet TAKE 1 TABLET BY MOUTH EVERY SIX HOURS AS NEEDED FOR PAIN FOR UP TO 7 DAYS      becaplermin (REGRANEX) 0.01 % gel Apply 1 Applicatorful topically daily      calcium-vitamin D (OSCAL-500) 500-200 MG-UNIT per tablet Take 1 tablet by mouth 2 times daily      ipratropium-albuterol (DUONEB) 0.5-2.5 (3) MG/3ML SOLN nebulizer solution Inhale 3 mLs into the lungs 4 times daily 360 mL 3    fluticasone (FLONASE) 50 MCG/ACT nasal spray 1 spray by Nasal route daily 9.9 g 5    Vitamins/Minerals TABS Take 1 tablet by mouth daily        No current facility-administered medications on file prior to encounter. REVIEW OF SYSTEMS    Pertinent items are noted in HPI.       Objective:      BP (!) 140/74   Pulse (!) 101   Temp 97.3 °F (36.3 °C) (Infrared) Resp 16     PHYSICAL EXAM    Vascular: Vascular status Impaired  palpable pedal pulses, right DP0/4 and PT1/4, left DP0/4 and PT1/4. CFT 3 seconds digits 1 to 5 bilateral.  Hair growthAbsent  both lower extremities and feet. Skin temperature is warm to warm from pretibial area to distal digits bilateral.  Exam is negative for rubor, pallor, cyanosis or signs of acute vascular compromise bilaterally. Exam is positive for edema bilateral lower extremity. Varicosities Present bilateral lower extremity. Neuro: Neurologic status diminished bilateral with epicritic Absent  , proprioceptive Absent , vibratory sensation Absent  and protopathicPresent. DTRs Absent  bilateral Achilles. There were no reproducible neuritic symptoms on exam bilateral feet/ankles. Derm: Ulceration to bilateral ankles. Ecchymosis Absent  bilateral feet/foot. Musculoskeletal: No pain with debridement of wounds 5/5 muscle strength in/eversion and dorsi/plantarflexion bilateral feet. No gross instability noted. Procedure Note    Performed by: Clark Bush DPM    Consent obtained: Yes    Time out taken:  Yes    Pain Control: Anesthetic  Anesthetic: 4% Topical Xylocaine     Debridement:Excisional Debridement    Using curette the wound was sharply debrided    down through and including the removal of epidermis, dermis, subcutaneous tissue, and muscle/fascia.         Devitalized Tissue Debrided:  fibrin, biofilm, slough, necrotic/eschar, and exudate    Pre Debridement Measurements:  Are located in the Wound Documentation Flow Sheet    Wound #: 1     Wound Care Documentation:  Wound 06/24/22 Ankle Left;Medial #1 (Active)   Wound Image   10/07/22 0853   Wound Etiology Venous 11/02/22 1304   Wound Cleansed Wound cleanser 11/02/22 1304   Dressing/Treatment Antibacterial ointment;Triad hydro/zinc oxide-based hydrophilic paste 50/72/33 6569   Offloading for Diabetic Foot Ulcers Offloading not ordered 11/02/22 1304   Wound Length (cm) 1.8 cm 22 130   Wound Width (cm) 3.4 cm 22 130   Wound Depth (cm) 0.2 cm 22 130   Wound Surface Area (cm^2) 6.12 cm^2 22 130   Change in Wound Size % (l*w) 10.53 22 130   Wound Volume (cm^3) 1.224 cm^3 22 130   Wound Healing % -79 22   Post-Procedure Length (cm) 2.4 cm 10/28/22 0852   Post-Procedure Width (cm) 4.3 cm 10/28/22 0852   Post-Procedure Depth (cm) 0.2 cm 10/28/22 0852   Post-Procedure Surface Area (cm^2) 10.32 cm^2 10/28/22 0852   Post-Procedure Volume (cm^3) 2.064 cm^3 10/28/22 08   Wound Assessment Bleeding;Slough 22 130   Drainage Amount Moderate 22   Drainage Description Thick; Yellow 22 130   Odor None 22   Jamila-wound Assessment Dry/flaky 22   Margins Defined edges 22   Number of days: 131       Total Surface Area Debrided:  6.12sq cm     Percentage of wound debrided 100%    Bleeding:  Minimal    Hemostasis Achieved:  by pressure    Procedural Pain:  5  / 10     Post Procedural Pain:  0 / 10     Response to treatment:  Well tolerated by patient. Rommie Conception  AGE: 65 y.o. GENDER: male  : 1960  TODAY'S DATE:  2022    Chief Complaint   Patient presents with    Wound Check          Skin Substitute Applied:       Theraskin 6 sq/cm            Performed by: William Fragoso DPM    Wound Type:venous, arterial, and lymphedema    Consent obtained: Yes    Time out taken: Yes     Fenestrated: Yes    Instrument(s) scissors and forceps      [] Mesher Utilized    All  Guidelines Followed    Skin Substitute was Applied to Wound Number(s):     Wound #: 1      Wound 22 Ankle Left;Medial #1 (Active)   Wound Image   10/07/22 0853   Wound Etiology Venous 22   Wound Cleansed Wound cleanser 11/02/22 1304   Dressing/Treatment Antibacterial ointment;Triad hydro/zinc oxide-based hydrophilic paste //30 1401   Offloading for Diabetic Foot Ulcers Offloading not ordered 11/02/22 1304   Wound Length (cm) 1.8 cm 11/02/22 1304   Wound Width (cm) 3.4 cm 11/02/22 1304   Wound Depth (cm) 0.2 cm 11/02/22 1304   Wound Surface Area (cm^2) 6.12 cm^2 11/02/22 1304   Change in Wound Size % (l*w) 10.53 11/02/22 1304   Wound Volume (cm^3) 1.224 cm^3 11/02/22 1304   Wound Healing % -79 11/02/22 1304   Post-Procedure Length (cm) 2.4 cm 10/28/22 0852   Post-Procedure Width (cm) 4.3 cm 10/28/22 0852   Post-Procedure Depth (cm) 0.2 cm 10/28/22 0852   Post-Procedure Surface Area (cm^2) 10.32 cm^2 10/28/22 0852   Post-Procedure Volume (cm^3) 2.064 cm^3 10/28/22 0852   Wound Assessment Bleeding;Slough 11/02/22 1304   Drainage Amount Moderate 11/02/22 1304   Drainage Description Thick; Yellow 11/02/22 1304   Odor None 11/02/22 1304   Jamila-wound Assessment Dry/flaky 11/02/22 1304   Margins Defined edges 11/02/22 1304   Number of days: 131         Total Surface Area Covered 6 sq/cm     Amount Wasted 0 sq/cm    Reason for Waste 0      Was the Product Layered  Yes     Secured: Yes    Secured With:  [x]Steri Strips    []Sutures     []Staples []Other    Procedural Pain: 8/10     Post Procedural Pain: 4 / 10    Response to Treatment:  Well tolerated by patient. Assessment:     Problem List Items Addressed This Visit       Peripheral artery disease ([facility] [address] - Primary    Relevant Medications    lidocaine (LMX) 4 % cream    Other Relevant Orders    Initiate Outpatient Wound Care Protocol          Plan:   Patient examined and evaluated   Wound debrided without incident   Application of skin graft  Compression stockings right leg with Lotrisone. Multilayer compression wrap left leg  Follow-up one week  Keep legs elevated at all times when not active. The nature of the patient's condition was explained in depth. The patient was informed that their compliance to the treatment Plan is paramount to successful healing and prevention of further ulceration and/or infection.   Discharge Treatment follow-up on Friday for dressing change    Written Patient Discharge Instructions Given            Electronically signed by Trista Horton DPM on 11/9/2022 at 2:27 PM

## 2022-11-11 ENCOUNTER — TELEPHONE (OUTPATIENT)
Dept: INTERNAL MEDICINE CLINIC | Age: 62
End: 2022-11-11

## 2022-11-11 NOTE — TELEPHONE ENCOUNTER
Certificate of medical necessity (In top bin)      04/26/22 Original date of evaluation. Order reads 2 LPM continuous.

## 2022-11-14 ENCOUNTER — PATIENT MESSAGE (OUTPATIENT)
Dept: INTERNAL MEDICINE CLINIC | Age: 62
End: 2022-11-14

## 2022-11-14 DIAGNOSIS — L29.9 ITCHING: ICD-10-CM

## 2022-11-14 RX ORDER — CETIRIZINE HYDROCHLORIDE 10 MG/1
10 TABLET ORAL DAILY
Qty: 30 TABLET | Refills: 0 | Status: SHIPPED | OUTPATIENT
Start: 2022-11-14 | End: 2022-11-30

## 2022-11-14 NOTE — TELEPHONE ENCOUNTER
Advised patient that it is available over-the-counter in a rapid dissolve that comes in 300 tablet pill bottle for $12 which would be about a dollars per month

## 2022-11-18 ENCOUNTER — HOSPITAL ENCOUNTER (OUTPATIENT)
Dept: WOUND CARE | Age: 62
Discharge: HOME | End: 2022-11-18
Payer: MEDICAID

## 2022-11-18 VITALS
DIASTOLIC BLOOD PRESSURE: 80 MMHG | TEMPERATURE: 97.3 F | SYSTOLIC BLOOD PRESSURE: 124 MMHG | HEART RATE: 96 BPM | RESPIRATION RATE: 16 BRPM

## 2022-11-18 DIAGNOSIS — I73.9 PERIPHERAL ARTERY DISEASE (HCC): Primary | ICD-10-CM

## 2022-11-18 PROCEDURE — 29581 APPL MULTLAYER CMPRN SYS LEG: CPT

## 2022-11-18 PROCEDURE — 11043 DBRDMT MUSC&/FSCA 1ST 20/<: CPT

## 2022-11-18 RX ORDER — BACITRACIN ZINC AND POLYMYXIN B SULFATE 500; 1000 [USP'U]/G; [USP'U]/G
OINTMENT TOPICAL ONCE
OUTPATIENT
Start: 2022-11-18 | End: 2022-11-18

## 2022-11-18 RX ORDER — LIDOCAINE HYDROCHLORIDE 20 MG/ML
JELLY TOPICAL ONCE
OUTPATIENT
Start: 2022-11-18 | End: 2022-11-18

## 2022-11-18 RX ORDER — GENTAMICIN SULFATE 1 MG/G
OINTMENT TOPICAL ONCE
OUTPATIENT
Start: 2022-11-18 | End: 2022-11-18

## 2022-11-18 RX ORDER — LIDOCAINE 40 MG/G
CREAM TOPICAL ONCE
Status: DISCONTINUED | OUTPATIENT
Start: 2022-11-18 | End: 2022-11-19 | Stop reason: HOSPADM

## 2022-11-18 RX ORDER — GINSENG 100 MG
CAPSULE ORAL ONCE
OUTPATIENT
Start: 2022-11-18 | End: 2022-11-18

## 2022-11-18 RX ORDER — LIDOCAINE HYDROCHLORIDE 40 MG/ML
SOLUTION TOPICAL ONCE
OUTPATIENT
Start: 2022-11-18 | End: 2022-11-18

## 2022-11-18 RX ORDER — LIDOCAINE 40 MG/G
CREAM TOPICAL ONCE
Status: CANCELLED | OUTPATIENT
Start: 2022-11-18 | End: 2022-11-18

## 2022-11-18 RX ORDER — BETAMETHASONE DIPROPIONATE 0.05 %
OINTMENT (GRAM) TOPICAL ONCE
OUTPATIENT
Start: 2022-11-18 | End: 2022-11-18

## 2022-11-18 RX ORDER — LIDOCAINE 50 MG/G
OINTMENT TOPICAL ONCE
OUTPATIENT
Start: 2022-11-18 | End: 2022-11-18

## 2022-11-18 RX ORDER — BACITRACIN, NEOMYCIN, POLYMYXIN B 400; 3.5; 5 [USP'U]/G; MG/G; [USP'U]/G
OINTMENT TOPICAL ONCE
OUTPATIENT
Start: 2022-11-18 | End: 2022-11-18

## 2022-11-18 RX ORDER — CLOBETASOL PROPIONATE 0.5 MG/G
OINTMENT TOPICAL ONCE
OUTPATIENT
Start: 2022-11-18 | End: 2022-11-18

## 2022-11-18 NOTE — DISCHARGE INSTRUCTIONS
[facility], [address] [place] [address] Road  Telephone: [phone]     Discharge Instructions     Important reminders:     **If you have any signs and symptoms of illness (Cough, fever, congestion, nausea, vomiting, diarrhea, etc.) please call the wound care center prior to your appointment. 1. Increase Protein intake for optimal wound healing  2. No added salt to reduce any swelling  3. If diabetic, maintain good glucose control  4. If you smoke, smoking prohibits wound healing, we ask that you refrain from smoking. 5. When taking antibiotics take the entire prescription as ordered. Do not stop taking until medication is all gone unless otherwise instructed. 6. Exercise as tolerated. 7. Keep weight off wounds and reposition every 2 hours if applicable. 8. If wound(s) is on your lower extremity, elevate legs to the level of the heart or above for 30 minutes 4-5 times a day and/or when sitting. Avoid standing for long periods of time. 9. Do not get wounds wet in bath or shower unless otherwise instructed by your physician. If your wound is on your foot or leg, you may purchase a cast bag. Please ask at the pharmacy. If Vascular testing is ordered, please call [address] [place] [address] [phone]) to schedule. Vascular tests ordered by Wound Care Physicians may take up to 2 hours to complete. Please keep that in mind when scheduling. If Vascular testing is scheduled, please bring supplies to replace your dressing after testing is done. The vascular department does not stock supplies. Wound: Right and Left leg     With each dressing change, rinse wounds with 0.9% Saline. (May use wound wash or soft contact solution. Both can be purchased at a local drug store). If unable to obtain saline, may use a gentle soap and water. Dressing care: Right Lotrisone, Compresison stocking.   Left leg -  Gentamicin cream, Epiona, dry dressing, coflex calamine- these will be changed at your next visit unless they slide down or cause pain. If they slide, gets wet, or cause pain please call the wound care center, you may need to come in to be re-wrapped. If you are unable to get to your follow up appointment you will need to remove your wraps at home & place some kind of compression. Dr Myron Mcarthur, Vascular Surgeon September 14th at 1215pm  [address], [address]  Phone# [phone]  Fax# [phone]         Compression Wraps  Location: Left lower leg below knee  Type: Coflex calamine     Your doctor has ordered compression therapy for your wound. Compression bandages reduce the swelling, or edema, in your legs and prevent it from returning. The wound care staff will apply your compression wrap. It must be removed and re-applied at least weekly. As the swelling decreases, the boot no longer provides adequate compression and you need a new one. Once applied, you need to know how to take care of your compression wrap. The boot must stay dry. Do not get it wet in the shower or tub. You may do a partial bath, or you can cover the boot with a large plastic bag, secured at the top, so that no water can get in. Avoid standing in one place for long periods of time. If you must  one place, shift your weight and change positions often. If you have CHF, consult your doctor before following the next two recommendations for leg elevation. When sitting, elevate your legs on pillows, or put blocks under the foot of your bed. Your legs should be higher than your heart. If your boot becomes painful, or you notice an increase in swelling in your toes, numbness or tingling, or purple color to your toes, remove the wrap and call the [address]. If it is after hours, call your doctor for instructions or go to the nearest emergency room.         Home Care Agency/Facility: [facility]     Your wound-care supplies will be provided by:  Please note, depending on your insurance coverage, you may have out-of-pocket expenses for these supplies. Someone from the company should call you to confirm your order and discuss those potential costs before they ship your products -- please anticipate that call. If your out-of-pocket cost could be substantial, Many companies have financial hardship programs for patients who qualify, so please ask about that if you might need a hand. If you have any questions about your supplies or your potential out-of-pocket costs, or if you need to place an order for a refill of supplies (typically monthly), please call the company directly. Your  is Brittani Evangelista     Follow up with Dr La Rees in 1 weeks. Wound Care Center Information: Should you experience any significant changes in your wound(s) or have questions about your wound care, please [facility]  at [phone] Monday  - Thursday 8:00 am - 4:00 pm and Friday 8:00 am - 1:00pm. If you need help with your wound outside these hours and cannot wait until we are again available, contact your PCP or go to the hospital emergency room. PLEASE NOTE: IF YOU ARE UNABLE TO OBTAIN WOUND SUPPLIES, CONTINUE TO USE THE SUPPLIES YOU HAVE AVAILABLE UNTIL YOU ARE ABLE TO REACH US. IT IS MOST IMPORTANT TO KEEP THE WOUND COVERED AT ALL TIMES.

## 2022-11-18 NOTE — PLAN OF CARE
Discharge instructions given. Patient verbalized understanding. Return to [facility] in 1 week(s).

## 2022-11-18 NOTE — PROGRESS NOTES
Danae Valdovinos DO at [address]    OTHER SURGICAL HISTORY Right 2020    stent leg for PVD    TRACHEOSTOMY N/A 3/25/2022    TRACHEOTOMY performed by Danae Valdovinos DO at [facility] [address] HISTORY    Family History   Problem Relation Age of Onset    Cancer Mother     Lung Cancer Mother     Diabetes Father     Stroke Father        SOCIAL HISTORY    Social History     Tobacco Use    Smoking status: Former     Packs/day: 0.50     Years: 20.00     Pack years: 10.00     Types: Cigarettes     Start date: 1977     Quit date: 2021     Years since quittin.8    Smokeless tobacco: Never   Vaping Use    Vaping Use: Never used   Substance Use Topics    Alcohol use: Yes     Comment: 2 beers / day     Drug use: Never       ALLERGIES    Allergies   Allergen Reactions    Chantix [Varenicline] Other (See Comments)     Hallucinations         MEDICATIONS    Current Outpatient Medications on File Prior to Encounter   Medication Sig Dispense Refill    cetirizine (ZYRTEC) 10 MG tablet TAKE 1 TABLET BY MOUTH DAILY 30 tablet 0    metFORMIN (GLUCOPHAGE) 500 MG tablet TAKE 1 TABLET BY MOUTH TWICE DAILY WITH MEALS 60 tablet 0    lisinopril (PRINIVIL;ZESTRIL) 20 MG tablet TAKE 1 TABLET BY MOUTH EVERY DAY 30 tablet 0    WAL-DRYL ALLERGY 25 MG tablet TAKE 1 TABLET BY MOUTH EVERY 6 HOURS AS NEEDED FOR ITCHING 60 tablet 0    vitamin B-12 (CYANOCOBALAMIN) 250 MCG tablet TAKE 1 TABLET BY MOUTH DAILY 30 tablet 0    atorvastatin (LIPITOR) 80 MG tablet TAKE 1 TABLET BY MOUTH DAILY 90 tablet 0    metoprolol succinate (TOPROL XL) 25 MG extended release tablet Take 25 mg by mouth daily      melatonin 3 MG TABS tablet Take 2 tablets by mouth nightly as needed (insomnia) 30 tablet 2    warfarin (COUMADIN) 5 MG tablet Take daily as directed by anti coag clinic to maintain INR 2-3 (Patient taking differently: Take warfarin 15 mg every Monday and Friday, and 10 mg all the other days or as direct by [facility] Coumadin [facility] [phone]) 90 tablet 1    Arformoterol Tartrate (BROVANA) 15 MCG/2ML NEBU Take 1 ampule by nebulization 2 times daily 120 mL 3    acetaminophen (TYLENOL) 500 MG tablet Take 1 tablet by mouth 4 times daily as needed for Pain 360 tablet 1    gentamicin (GARAMYCIN) 0.1 % cream Apply topically  daily. 30 g 1    budesonide (PULMICORT) 0.5 MG/2ML nebulizer suspension Take 2 mLs by nebulization 2 times daily 60 each 2    sennosides-docusate sodium (SENOKOT-S) 8.6-50 MG tablet Take 1 tablet by mouth 2 times daily 60 tablet 0    BANOPHEN 25 MG capsule Take 1 tablet by mouth every 6 hours as needed for Itching      ondansetron (ZOFRAN-ODT) 4 MG disintegrating tablet Take 4 mg by mouth every 8 hours as needed for Nausea or Vomiting      Multiple Vitamins-Minerals (MULTIVITAMIN ADULT EXTRA C PO) 1 tablet by Nasogastric route daily      esomeprazole (NEXIUM) 20 MG delayed release capsule 20 mg by Nasogastric route every morning (before breakfast)      oxyCODONE HCl (OXY-IR) 10 MG immediate release tablet TAKE 1 TABLET BY MOUTH EVERY SIX HOURS AS NEEDED FOR PAIN FOR UP TO 7 DAYS      becaplermin (REGRANEX) 0.01 % gel Apply 1 Applicatorful topically daily      calcium-vitamin D (OSCAL-500) 500-200 MG-UNIT per tablet Take 1 tablet by mouth 2 times daily      ipratropium-albuterol (DUONEB) 0.5-2.5 (3) MG/3ML SOLN nebulizer solution Inhale 3 mLs into the lungs 4 times daily 360 mL 3    fluticasone (FLONASE) 50 MCG/ACT nasal spray 1 spray by Nasal route daily 9.9 g 5    Vitamins/Minerals TABS Take 1 tablet by mouth daily        No current facility-administered medications on file prior to encounter. REVIEW OF SYSTEMS    Pertinent items are noted in HPI. Objective:      /80   Pulse 96   Temp 97.3 °F (36.3 °C) (Infrared)   Resp 16     PHYSICAL EXAM    Vascular: Vascular status Impaired  palpable pedal pulses, right DP0/4 and PT1/4, left DP0/4 and PT1/4.   CFT 3 seconds digits 1 to 5 bilateral.  Hair growthAbsent both lower extremities and feet. Skin temperature is warm to warm from pretibial area to distal digits bilateral.  Exam is negative for rubor, pallor, cyanosis or signs of acute vascular compromise bilaterally. Exam is positive for edema bilateral lower extremity. Varicosities Present bilateral lower extremity. Neuro: Neurologic status diminished bilateral with epicritic Absent  , proprioceptive Absent , vibratory sensation Absent  and protopathicPresent. DTRs Absent  bilateral Achilles. There were no reproducible neuritic symptoms on exam bilateral feet/ankles. Derm: Ulceration to bilateral ankles. Ecchymosis Absent  bilateral feet/foot. Musculoskeletal: No pain with debridement of wounds 5/5 muscle strength in/eversion and dorsi/plantarflexion bilateral feet. No gross instability noted. Procedure Note    Performed by: Mari Chavez DPM    Consent obtained: Yes    Time out taken:  Yes    Pain Control: Anesthetic  Anesthetic: 4% Topical Xylocaine     Debridement:Excisional Debridement    Using curette the wound was sharply debrided    down through and including the removal of epidermis, dermis, subcutaneous tissue, and muscle/fascia.         Devitalized Tissue Debrided:  fibrin, biofilm, slough, necrotic/eschar, and exudate    Pre Debridement Measurements:  Are located in the Wound Documentation Flow Sheet    Wound #: 1     Wound Care Documentation:  Wound 06/24/22 Ankle Left;Medial #1 (Active)   Wound Image   11/18/22 0858   Wound Etiology Venous 11/18/22 0849   Wound Cleansed Wound cleanser 11/18/22 0849   Dressing/Treatment Antibacterial ointment;Collagen 11/18/22 0916   Offloading for Diabetic Foot Ulcers Offloading not ordered 11/18/22 0849   Wound Length (cm) 1.3 cm 11/18/22 0849   Wound Width (cm) 3.3 cm 11/18/22 0849   Wound Depth (cm) 0.2 cm 11/18/22 0849   Wound Surface Area (cm^2) 4.29 cm^2 11/18/22 0849   Change in Wound Size % (l*w) 37.28 11/18/22 0849   Wound Volume (cm^3) 0.858 cm^3 11/18/22 0849   Wound Healing % -25 11/18/22 0849   Post-Procedure Length (cm) 1.6 cm 11/18/22 0858   Post-Procedure Width (cm) 3.3 cm 11/18/22 0858   Post-Procedure Depth (cm) 0.2 cm 11/18/22 0858   Post-Procedure Surface Area (cm^2) 5.28 cm^2 11/18/22 0858   Post-Procedure Volume (cm^3) 1. 056 cm^3 11/18/22 0858   Wound Assessment Bleeding 11/18/22 0858   Drainage Amount Moderate 11/18/22 0849   Drainage Description Yellow 11/18/22 0849   Odor Mild 11/18/22 0849   Jamila-wound Assessment Dry/flaky 11/18/22 0849   Margins Undefined edges 11/18/22 0849   Number of days: 147           Total Surface Area Debrided:  5.28sq cm     Percentage of wound debrided 100%    Bleeding:  Minimal    Hemostasis Achieved:  by pressure    Procedural Pain:  5  / 10     Post Procedural Pain:  0 / 10     Response to treatment:  Well tolerated by patient. Assessment:     Problem List Items Addressed This Visit       Peripheral artery disease ([facility] [address] - Primary    Relevant Medications    lidocaine (LMX) 4 % cream    Other Relevant Orders    Initiate Outpatient Wound Care Protocol          Plan:   Patient examined and evaluated   Wound debrided without incident   Application of skin graft  Compression stockings right leg with Lotrisone. Multilayer compression wrap left leg  Follow-up one week  Keep legs elevated at all times when not active. The nature of the patient's condition was explained in depth. The patient was informed that their compliance to the treatment Plan is paramount to successful healing and prevention of further ulceration and/or infection.   Discharge Treatment follow-up on Friday for dressing change    Written Patient Discharge Instructions Given            Electronically signed by Linda Mcgregor DPM on 11/18/2022 at 9:44 AM

## 2022-11-18 NOTE — PROGRESS NOTES
Multilayer Compression Wrap   Below the Knee    NAME:  Maggie Cowan  YOB: 1960  MEDICAL RECORD NUMBER:  [id number]  DATE:  11/18/2022    Multilayer compression wrap: Removed old Multilayer wrap if indicated and wash leg with mild soap/water. Applied moisturizing agent to dry skin as needed. Applied primary and secondary dressing as ordered. Applied multilayered dressing below the knee to left lower leg. Instructed patient/caregiver not to remove dressing and to keep it clean and dry. Instructed patient/caregiver on complications to report to provider, such as pain, numbness in toes, heavy drainage, and slippage of dressing. Instructed patient on purpose of compression dressing and on activity and exercise recommendations.      Applied  2 layer wrap from toes to knee overlapping each time    Electronically signed by KATHY BROOKS LPN on 61/39/4107 at 9:16 AM

## 2022-11-21 DIAGNOSIS — J43.1 PANLOBULAR EMPHYSEMA (HCC): ICD-10-CM

## 2022-11-21 RX ORDER — BUDESONIDE 0.5 MG/2ML
1 INHALANT ORAL 2 TIMES DAILY
Qty: 60 EACH | Refills: 2 | Status: SHIPPED | OUTPATIENT
Start: 2022-11-21

## 2022-11-21 RX ORDER — SENNA AND DOCUSATE SODIUM 50; 8.6 MG/1; MG/1
1 TABLET, FILM COATED ORAL 2 TIMES DAILY
Qty: 60 TABLET | Refills: 0 | Status: SHIPPED | OUTPATIENT
Start: 2022-11-21

## 2022-11-22 ENCOUNTER — TELEPHONE (OUTPATIENT)
Dept: INTERNAL MEDICINE CLINIC | Age: 62
End: 2022-11-22

## 2022-11-22 ENCOUNTER — ANTI-COAG VISIT (OUTPATIENT)
Dept: PHARMACY | Age: 62
End: 2022-11-22
Payer: MEDICAID

## 2022-11-22 DIAGNOSIS — I73.9 PERIPHERAL ARTERY DISEASE (HCC): Primary | ICD-10-CM

## 2022-11-22 LAB — INTERNATIONAL NORMALIZATION RATIO, POC: 3.6

## 2022-11-22 PROCEDURE — 85610 PROTHROMBIN TIME: CPT

## 2022-11-22 PROCEDURE — 99211 OFF/OP EST MAY X REQ PHY/QHP: CPT

## 2022-11-22 NOTE — TELEPHONE ENCOUNTER
Detail Level: Detailed Will need a Dx code for Budesonide 0.5MG/2ML suspension to complete. Please advise. If this requires a response please respond to the pool ( [facility] [address]).

## 2022-11-22 NOTE — PROGRESS NOTES
Devante Hogan is a 65 y.o. here for warfarin management. Teressa Wiggins had an INR test today. Results were reviewed and appropriate warfarin management was completed. This visit was performed as: An in person visit. Protocols were followed with precautions to reduce the spread of COVID-19. Patient verifies current warfarin dosing regimen: Yes     Warfarin medication reviewed and updated on the patient 's home medication list: Yes   All other medications reviewed and updated on the patient 's home medication list: No: no changes     Lab Results   Component Value Date    INR 3.6 11/22/2022    INR 1.8 11/08/2022    INR 1.30 10/31/2022       Patient Findings       Positives:  Missed doses, Extra doses, Bruising    Negatives:  Signs/symptoms of bleeding, Change in health, Change in medications, Change in diet/appetite    Comments:  Took extra 5mg on Sunday. Still drinking Boost daily            Anticoagulation Summary  As of 11/22/2022      INR goal:  2.0-3.0   TTR:  46.6 % (1.2 y)   INR used for dosing:  3.6 (11/22/2022)   Warfarin maintenance plan:  15 mg (5 mg x 3) every Mon, Fri; 10 mg (5 mg x 2) all other days; Starting 11/22/2022   Weekly warfarin total:  80 mg   Plan last modified:  Sera Villeda RPH (10/6/2022)   Next INR check:  12/6/2022   Priority:  High   Target end date: Indefinite    Indications    Peripheral artery disease ([facility] [address] [I73.9]                 Anticoagulation Episode Summary       INR check location:  Anticoagulation Clinic    Preferred lab:      Send INR reminders to:  [facility]    Comments:  EPIC          Anticoagulation Care Providers       Provider Role Specialty Phone number    Rocio Burk MD Referring Family Medicine [phone]            There were no vitals taken for this visit. Warfarin assessment / plan:     Appears well  Supra-therapeutic INR. Denies signs and symptoms of bleeding/bruising. Denies medication changes.   Denies change in appetite. Denies illness, fever, vomiting or diarrhea. Extra warfarin doses. : He took an extra 5mg of warfarin 2 days ago on accident. This is most likely the reason his INR is elevated at 3.6 today. He denies any bruising or bleeding but will watch for this. For INR of 3.6, we will have him take a reduced dose of warfarin 5mg and then resume his previous dosing. Of note, he had his IVC filter removed last week, since he has been on warfarin for some time now. Description    TOMORROW ONLY: Take warfarin 5mg(1 tablet) then  CONTINUE:  Warfarin 10 mg daily EXCEPT 15 mg every Monday and Friday. Drinking 9 Boost shakes per day. Please let us know if this changes. Call [phone] with signs or symptoms of bleeding or ANY medication changes (including over-the-counter medications or herbal supplements). If significant bleeding occurs please seek immediate medical attention. Keep the number of servings of vitamin K containing foods (dark green, leafy vegetables) the same each week. Dark green vegetable 2-3 times a week and a mixed green salad ~ 3 times a week. Please call if this changes. Limit alcohol intake. Please call if this changes. Immunization History   Administered Date(s) Administered    COVID-19, PFIZER PURPLE top, DILUTE for use, (age 15 y+), 30mcg/0.3mL 03/15/2021, 04/12/2021, 12/22/2021    Influenza Virus Vaccine 01/21/2017, 01/21/2017    Influenza, FLUARIX, FLULAVAL, Alix Childes (age 10 mo+) AND AFLURIA, (age 1 y+), PF, 0.5mL 10/31/2020    PPD Test 04/14/2022, 04/23/2022, 06/03/2022    Tdap (Boostrix, Adacel) 03/27/2018    Zoster Recombinant (Shingrix) 03/27/2018           Orders Placed This Encounter   Procedures    POCT INR     This external order was created through the results console. No orders of the defined types were placed in this encounter. Reviewed AVS with patient / caregiver.     Billing Points:  Adjust dosage and/or reconcile meds (fill pill box) </= 5 medications - 2 points       CLINICAL PHARMACY CONSULT: MED RECONCILIATION/REVIEW ADDENDUM    For Pharmacy Admin Tracking Only    Intervention Detail: Dose Adjustment: 1, reason: Therapy Optimization  Total # of Interventions Recommended: 1  Total # of Interventions Accepted: 1  Time Spent (min): 20

## 2022-11-22 NOTE — TELEPHONE ENCOUNTER
Submitted PA for Budesonide 0.5MG/2ML suspension. Key: [id number]. Via CM STATUS: APPROVED from 11/22/2022-11/22/2023. Letter attached. If this requires a response please respond to the pool ( [facility] [address]). Thank you please advise patient.

## 2022-11-23 ENCOUNTER — HOSPITAL ENCOUNTER (OUTPATIENT)
Dept: WOUND CARE | Age: 62
Discharge: HOME | End: 2022-11-23
Payer: MEDICAID

## 2022-11-23 VITALS
HEART RATE: 84 BPM | RESPIRATION RATE: 16 BRPM | SYSTOLIC BLOOD PRESSURE: 131 MMHG | DIASTOLIC BLOOD PRESSURE: 74 MMHG | TEMPERATURE: 97.3 F

## 2022-11-23 DIAGNOSIS — I73.9 PERIPHERAL ARTERY DISEASE (HCC): Primary | ICD-10-CM

## 2022-11-23 PROCEDURE — 29581 APPL MULTLAYER CMPRN SYS LEG: CPT

## 2022-11-23 RX ORDER — GINSENG 100 MG
CAPSULE ORAL ONCE
OUTPATIENT
Start: 2022-11-23 | End: 2022-11-23

## 2022-11-23 RX ORDER — BACITRACIN, NEOMYCIN, POLYMYXIN B 400; 3.5; 5 [USP'U]/G; MG/G; [USP'U]/G
OINTMENT TOPICAL ONCE
OUTPATIENT
Start: 2022-11-23 | End: 2022-11-23

## 2022-11-23 RX ORDER — LIDOCAINE 40 MG/G
CREAM TOPICAL ONCE
Status: DISCONTINUED | OUTPATIENT
Start: 2022-11-23 | End: 2022-11-24 | Stop reason: HOSPADM

## 2022-11-23 RX ORDER — GENTAMICIN SULFATE 1 MG/G
OINTMENT TOPICAL ONCE
OUTPATIENT
Start: 2022-11-23 | End: 2022-11-23

## 2022-11-23 RX ORDER — LIDOCAINE 40 MG/G
CREAM TOPICAL ONCE
OUTPATIENT
Start: 2022-11-23 | End: 2022-11-23

## 2022-11-23 RX ORDER — LIDOCAINE HYDROCHLORIDE 40 MG/ML
SOLUTION TOPICAL ONCE
OUTPATIENT
Start: 2022-11-23 | End: 2022-11-23

## 2022-11-23 RX ORDER — BETAMETHASONE DIPROPIONATE 0.05 %
OINTMENT (GRAM) TOPICAL ONCE
OUTPATIENT
Start: 2022-11-23 | End: 2022-11-23

## 2022-11-23 RX ORDER — BACITRACIN ZINC AND POLYMYXIN B SULFATE 500; 1000 [USP'U]/G; [USP'U]/G
OINTMENT TOPICAL ONCE
OUTPATIENT
Start: 2022-11-23 | End: 2022-11-23

## 2022-11-23 RX ORDER — CLOBETASOL PROPIONATE 0.5 MG/G
OINTMENT TOPICAL ONCE
OUTPATIENT
Start: 2022-11-23 | End: 2022-11-23

## 2022-11-23 RX ORDER — LIDOCAINE 50 MG/G
OINTMENT TOPICAL ONCE
OUTPATIENT
Start: 2022-11-23 | End: 2022-11-23

## 2022-11-23 RX ORDER — LIDOCAINE HYDROCHLORIDE 20 MG/ML
JELLY TOPICAL ONCE
OUTPATIENT
Start: 2022-11-23 | End: 2022-11-23

## 2022-11-23 NOTE — PROGRESS NOTES
Multilayer Compression Wrap   Below the Knee    NAME:  Bhanu Villareal  YOB: 1960  MEDICAL RECORD NUMBER:  [id number]  DATE:  11/23/2022    Multilayer compression wrap: Removed old Multilayer wrap if indicated and wash leg with mild soap/water. Applied moisturizing agent to dry skin as needed. Applied primary and secondary dressing as ordered. Applied multilayered dressing below the knee to left lower leg. Instructed patient/caregiver not to remove dressing and to keep it clean and dry. Instructed patient/caregiver on complications to report to provider, such as pain, numbness in toes, heavy drainage, and slippage of dressing. Instructed patient on purpose of compression dressing and on activity and exercise recommendations.      Applied  2 layer wrap from toes to knee overlapping each time    Electronically signed by KATHY BROOKS LPN on 51/42/0990 at 11:03 AM

## 2022-11-27 DIAGNOSIS — L29.9 ITCHING: ICD-10-CM

## 2022-11-27 DIAGNOSIS — J43.2 CENTRILOBULAR EMPHYSEMA (HCC): ICD-10-CM

## 2022-11-27 DIAGNOSIS — J41.0 SIMPLE CHRONIC BRONCHITIS (HCC): ICD-10-CM

## 2022-11-28 ENCOUNTER — TELEPHONE (OUTPATIENT)
Dept: PULMONOLOGY | Age: 62
End: 2022-11-28

## 2022-11-28 RX ORDER — ARFORMOTEROL TARTRATE 15 UG/2ML
1 SOLUTION RESPIRATORY (INHALATION) 2 TIMES DAILY
Qty: 120 ML | Refills: 3 | Status: SHIPPED | OUTPATIENT
Start: 2022-11-28 | End: 2022-11-28

## 2022-11-28 RX ORDER — ARFORMOTEROL TARTRATE 15 UG/2ML
1 SOLUTION RESPIRATORY (INHALATION) 2 TIMES DAILY
Qty: 120 ML | Refills: 3 | OUTPATIENT
Start: 2022-11-28

## 2022-11-28 RX ORDER — FORMOTEROL FUMARATE 20 UG/2ML
20 SOLUTION RESPIRATORY (INHALATION) EVERY 12 HOURS SCHEDULED
Qty: 2 ML | Refills: 5 | Status: SHIPPED | OUTPATIENT
Start: 2022-11-28

## 2022-11-28 NOTE — TELEPHONE ENCOUNTER
Received fax from [facility] stating pt's insurance [facility]) will not cover Arformoterol.       Request substitution

## 2022-11-29 ENCOUNTER — TELEPHONE (OUTPATIENT)
Dept: INTERNAL MEDICINE CLINIC | Age: 62
End: 2022-11-29

## 2022-11-30 ENCOUNTER — TELEPHONE (OUTPATIENT)
Dept: INTERNAL MEDICINE CLINIC | Age: 62
End: 2022-11-30

## 2022-11-30 RX ORDER — CETIRIZINE HYDROCHLORIDE 10 MG/1
10 TABLET ORAL DAILY
Qty: 30 TABLET | Refills: 0 | Status: SHIPPED | OUTPATIENT
Start: 2022-11-30 | End: 2022-12-30

## 2022-11-30 RX ORDER — DIPHENHYDRAMINE HCL 25 MG/1
TABLET ORAL
Qty: 60 TABLET | Refills: 0 | Status: SHIPPED | OUTPATIENT
Start: 2022-11-30

## 2022-11-30 RX ORDER — BENZONATATE 200 MG/1
CAPSULE ORAL
Qty: 30 CAPSULE | Refills: 0 | Status: SHIPPED | OUTPATIENT
Start: 2022-11-30

## 2022-11-30 NOTE — TELEPHONE ENCOUNTER
Aleja Shen from [facility] is calling---on 11/28 they faxed over a medical necessity form and are asking when you can send it back---she can be reached at [phone]. Thanks.

## 2022-12-01 ENCOUNTER — TELEPHONE (OUTPATIENT)
Dept: INTERNAL MEDICINE CLINIC | Age: 62
End: 2022-12-01

## 2022-12-01 NOTE — DISCHARGE INSTRUCTIONS
[address], [address]  Telephone: [phone]     Discharge Instructions     Important reminders:     **If you have any signs and symptoms of illness (Cough, fever, congestion, nausea, vomiting, diarrhea, etc.) please call the wound care center prior to your appointment. 1. Increase Protein intake for optimal wound healing  2. No added salt to reduce any swelling  3. If diabetic, maintain good glucose control  4. If you smoke, smoking prohibits wound healing, we ask that you refrain from smoking. 5. When taking antibiotics take the entire prescription as ordered. Do not stop taking until medication is all gone unless otherwise instructed. 6. Exercise as tolerated. 7. Keep weight off wounds and reposition every 2 hours if applicable. 8. If wound(s) is on your lower extremity, elevate legs to the level of the heart or above for 30 minutes 4-5 times a day and/or when sitting. Avoid standing for long periods of time. 9. Do not get wounds wet in bath or shower unless otherwise instructed by your physician. If your wound is on your foot or leg, you may purchase a cast bag. Please ask at the pharmacy. If Vascular testing is ordered, please call [address] [place] [address] [phone]) to schedule. Vascular tests ordered by Wound Care Physicians may take up to 2 hours to complete. Please keep that in mind when scheduling. If Vascular testing is scheduled, please bring supplies to replace your dressing after testing is done. The vascular department does not stock supplies. Wound: Right and Left leg     With each dressing change, rinse wounds with 0.9% Saline. (May use wound wash or soft contact solution. Both can be purchased at a local drug store). If unable to obtain saline, may use a gentle soap and water. Dressing care: Right Lotrisone, Compresison stocking.   Left leg -  Gentamicin cream, Epiona, dry dressing, coflex calamine- these will be changed at your next visit unless they slide down or cause pain. If they slide, gets wet, or cause pain please call the wound care center, you may need to come in to be re-wrapped. If you are unable to get to your follow up appointment you will need to remove your wraps at home & place some kind of compression. Dr Victorino Workman, Vascular Surgeon September 14th at 1215pm  [address], [address]  Phone# [phone]  Fax# [phone]         Compression Wraps  Location: Left lower leg below knee  Type: Coflex calamine     Your doctor has ordered compression therapy for your wound. Compression bandages reduce the swelling, or edema, in your legs and prevent it from returning. The wound care staff will apply your compression wrap. It must be removed and re-applied at least weekly. As the swelling decreases, the boot no longer provides adequate compression and you need a new one. Once applied, you need to know how to take care of your compression wrap. The boot must stay dry. Do not get it wet in the shower or tub. You may do a partial bath, or you can cover the boot with a large plastic bag, secured at the top, so that no water can get in. Avoid standing in one place for long periods of time. If you must  one place, shift your weight and change positions often. If you have CHF, consult your doctor before following the next two recommendations for leg elevation. When sitting, elevate your legs on pillows, or put blocks under the foot of your bed. Your legs should be higher than your heart. If your boot becomes painful, or you notice an increase in swelling in your toes, numbness or tingling, or purple color to your toes, remove the wrap and call the [address]. If it is after hours, call your doctor for instructions or go to the nearest emergency room.         Home Care Agency/Facility: [facility]     Your wound-care supplies will be provided by:  Please note, depending on your insurance coverage, you may have out-of-pocket expenses for these supplies. Someone from the company should call you to confirm your order and discuss those potential costs before they ship your products -- please anticipate that call. If your out-of-pocket cost could be substantial, Many companies have financial hardship programs for patients who qualify, so please ask about that if you might need a hand. If you have any questions about your supplies or your potential out-of-pocket costs, or if you need to place an order for a refill of supplies (typically monthly), please call the company directly. Your  is Shana Xiong     Follow up with Dr Ludivina Macias in 1 weeks. Wound Care Center Information: Should you experience any significant changes in your wound(s) or have questions about your wound care, [facility]  at [phone] Monday  - Thursday 8:00 am - 4:00 pm and Friday 8:00 am - 1:00pm. If you need help with your wound outside these hours and cannot wait until we are again available, contact your PCP or go to the hospital emergency room. PLEASE NOTE: IF YOU ARE UNABLE TO OBTAIN WOUND SUPPLIES, CONTINUE TO USE THE SUPPLIES YOU HAVE AVAILABLE UNTIL YOU ARE ABLE TO REACH US. IT IS MOST IMPORTANT TO KEEP THE WOUND COVERED AT ALL TIMES.

## 2022-12-01 NOTE — TELEPHONE ENCOUNTER
5+ min hold unable to reach anyone at [place] with [facility] for clarifying who wrote the Boost order. Faxing to request clarification.

## 2022-12-02 ENCOUNTER — TELEPHONE (OUTPATIENT)
Dept: INTERNAL MEDICINE CLINIC | Age: 62
End: 2022-12-02

## 2022-12-02 ENCOUNTER — HOSPITAL ENCOUNTER (OUTPATIENT)
Dept: WOUND CARE | Age: 62
Discharge: HOME | End: 2022-12-02

## 2022-12-02 NOTE — TELEPHONE ENCOUNTER
Upon speaking to [employer], she states patient doesn't need to see a nurse. Declined offered appointment dates as requested a Tuesday or a Friday being patient is  covid + offered either day the following week. Bettygajose juan Linda declined and states she will have him get it elsewhere he sees doctors almost daily being that he has cancer.

## 2022-12-02 NOTE — TELEPHONE ENCOUNTER
Eric Padilla from [place] [phone] calling in to check on orders for pt, denied originally but ended up finding the original order where we did prescribe supplement to pt. Order scanned in on 8/26/22, under External Medical Records- documented as tube feeding care program under the media tab. Please reassess.

## 2022-12-02 NOTE — TELEPHONE ENCOUNTER
----- Message from Patricia Robles sent at 12/1/2022  2:24 PM EST -----  Subject: Message to Provider    QUESTIONS  Information for Provider? patient wife is calling to schedule his flu shot   and also 3rd booster if you have it. He was told he has covid though   yesterday. please call   ---------------------------------------------------------------------------  --------------  Dillon KAM  [phone]; OK to leave message on voicemail  ---------------------------------------------------------------------------  --------------  SCRIPT ANSWERS  Relationship to Patient? Other  Representative Name? wife  Additional information verified (besides Name and Date of Birth)? Phone   Number  Is the Adult patient requesting a Flu Shot? Yes  Is the parent/patient requesting a Flu Shot for a child older than 6   months? No  Does the patient have a question for their provider regarding the flu   shot? No  Have you been diagnosed with COVID-19 in the past 10 days?  Yes

## 2022-12-05 ENCOUNTER — APPOINTMENT (OUTPATIENT)
Dept: PHARMACY | Age: 62
End: 2022-12-05
Payer: MEDICAID

## 2022-12-05 NOTE — DISCHARGE INSTRUCTIONS
[facility], [address] [place] [address] Road  Telephone: [phone]     Discharge Instructions     Important reminders:     **If you have any signs and symptoms of illness (Cough, fever, congestion, nausea, vomiting, diarrhea, etc.) please call the wound care center prior to your appointment. 1. Increase Protein intake for optimal wound healing  2. No added salt to reduce any swelling  3. If diabetic, maintain good glucose control  4. If you smoke, smoking prohibits wound healing, we ask that you refrain from smoking. 5. When taking antibiotics take the entire prescription as ordered. Do not stop taking until medication is all gone unless otherwise instructed. 6. Exercise as tolerated. 7. Keep weight off wounds and reposition every 2 hours if applicable. 8. If wound(s) is on your lower extremity, elevate legs to the level of the heart or above for 30 minutes 4-5 times a day and/or when sitting. Avoid standing for long periods of time. 9. Do not get wounds wet in bath or shower unless otherwise instructed by your physician. If your wound is on your foot or leg, you may purchase a cast bag. Please ask at the pharmacy. If Vascular testing is ordered, please call [address] [place] [address] [phone]) to schedule. Vascular tests ordered by Wound Care Physicians may take up to 2 hours to complete. Please keep that in mind when scheduling. If Vascular testing is scheduled, please bring supplies to replace your dressing after testing is done. The vascular department does not stock supplies. Wound: Right and Left leg     With each dressing change, rinse wounds with 0.9% Saline. (May use wound wash or soft contact solution. Both can be purchased at a local drug store). If unable to obtain saline, may use a gentle soap and water. Dressing care: Right Lotrisone, Compresison stocking.   Left leg -  Gentamicin cream, Epiona, dry dressing, Triad to periwound, and coflex calamine- these will be changed at your next visit unless they slide down or cause pain. If they slide, gets wet, or cause pain please call the wound care center, you may need to come in to be re-wrapped. If you are unable to get to your follow up appointment you will need to remove your wraps at home & place some kind of compression. Dr Randy Wayne, Vascular Surgeon September 14th at 1215pm  [address], [address]  Phone# [phone]  Fax# [phone]         Compression Wraps  Location: Left lower leg below knee  Type: Coflex calamine     Your doctor has ordered compression therapy for your wound. Compression bandages reduce the swelling, or edema, in your legs and prevent it from returning. The wound care staff will apply your compression wrap. It must be removed and re-applied at least weekly. As the swelling decreases, the boot no longer provides adequate compression and you need a new one. Once applied, you need to know how to take care of your compression wrap. The boot must stay dry. Do not get it wet in the shower or tub. You may do a partial bath, or you can cover the boot with a large plastic bag, secured at the top, so that no water can get in. Avoid standing in one place for long periods of time. If you must  one place, shift your weight and change positions often. If you have CHF, consult your doctor before following the next two recommendations for leg elevation. When sitting, elevate your legs on pillows, or put blocks under the foot of your bed. Your legs should be higher than your heart. If your boot becomes painful, or you notice an increase in swelling in your toes, numbness or tingling, or purple color to your toes, remove the wrap and call the [facility] [address]. If it is after hours, call your doctor for instructions or go to the nearest emergency room.         Home Care Agency/Facility: [facility]     Your wound-care supplies will be provided by:  Please note, depending on your insurance coverage, you may have out-of-pocket expenses for these supplies. Someone from the company should call you to confirm your order and discuss those potential costs before they ship your products -- please anticipate that call. If your out-of-pocket cost could be substantial, Many companies have financial hardship programs for patients who qualify, so please ask about that if you might need a hand. If you have any questions about your supplies or your potential out-of-pocket costs, or if you need to place an order for a refill of supplies (typically monthly), please call the company directly. Your  is Keo Evangelista     Follow up with Dr Jerri Mclaughlin in 1 weeks. Wound Care Center Information: Should you experience any significant changes in your wound(s) or have questions about your wound care, please contact the [place] [address] at [phone] Monday  - Thursday 8:00 am - 4:00 pm and Friday 8:00 am - 1:00pm. If you need help with your wound outside these hours and cannot wait until we are again available, contact your PCP or go to the hospital emergency room. PLEASE NOTE: IF YOU ARE UNABLE TO OBTAIN WOUND SUPPLIES, CONTINUE TO USE THE SUPPLIES YOU HAVE AVAILABLE UNTIL YOU ARE ABLE TO REACH US. IT IS MOST IMPORTANT TO KEEP THE WOUND COVERED AT ALL TIMES.

## 2022-12-05 NOTE — TELEPHONE ENCOUNTER
Per 11/22/2022 chart note this medication has been approved from 11/22/2022-11/22/2023. If this requires a response please respond to the pool ( [id number] [address] [facility]). Thank you please advise patient.

## 2022-12-06 NOTE — TELEPHONE ENCOUNTER
Calling to check status of medical necessity for boost. Advised message was sent back to pcp on 12/2/22 & note on pcp desk today to also look into this. Original order was placed on 8/26/22 by pcp.

## 2022-12-08 ENCOUNTER — PATIENT MESSAGE (OUTPATIENT)
Dept: INTERNAL MEDICINE CLINIC | Age: 62
End: 2022-12-08

## 2022-12-08 ENCOUNTER — HOSPITAL ENCOUNTER (OUTPATIENT)
Dept: WOUND CARE | Age: 62
Discharge: HOME | End: 2022-12-08
Payer: MEDICAID

## 2022-12-08 VITALS — DIASTOLIC BLOOD PRESSURE: 82 MMHG | RESPIRATION RATE: 15 BRPM | HEART RATE: 93 BPM | SYSTOLIC BLOOD PRESSURE: 126 MMHG

## 2022-12-08 DIAGNOSIS — L97.323 NON-PRESSURE CHRONIC ULCER OF LEFT ANKLE WITH NECROSIS OF MUSCLE (HCC): ICD-10-CM

## 2022-12-08 DIAGNOSIS — I70.243 ATHEROSCLEROSIS OF NATIVE ARTERY OF LEFT LOWER EXTREMITY WITH ULCERATION OF ANKLE (HCC): Primary | ICD-10-CM

## 2022-12-08 DIAGNOSIS — I73.9 PERIPHERAL ARTERY DISEASE (HCC): ICD-10-CM

## 2022-12-08 PROCEDURE — 29581 APPL MULTLAYER CMPRN SYS LEG: CPT

## 2022-12-08 PROCEDURE — 11043 DBRDMT MUSC&/FSCA 1ST 20/<: CPT

## 2022-12-08 RX ORDER — BACITRACIN ZINC AND POLYMYXIN B SULFATE 500; 1000 [USP'U]/G; [USP'U]/G
OINTMENT TOPICAL ONCE
OUTPATIENT
Start: 2022-12-08 | End: 2022-12-08

## 2022-12-08 RX ORDER — LIDOCAINE HYDROCHLORIDE 20 MG/ML
JELLY TOPICAL ONCE
OUTPATIENT
Start: 2022-12-08 | End: 2022-12-08

## 2022-12-08 RX ORDER — GENTAMICIN SULFATE 1 MG/G
OINTMENT TOPICAL ONCE
OUTPATIENT
Start: 2022-12-08 | End: 2022-12-08

## 2022-12-08 RX ORDER — LIDOCAINE HYDROCHLORIDE 40 MG/ML
SOLUTION TOPICAL ONCE
OUTPATIENT
Start: 2022-12-08 | End: 2022-12-08

## 2022-12-08 RX ORDER — BACITRACIN, NEOMYCIN, POLYMYXIN B 400; 3.5; 5 [USP'U]/G; MG/G; [USP'U]/G
OINTMENT TOPICAL ONCE
OUTPATIENT
Start: 2022-12-08 | End: 2022-12-08

## 2022-12-08 RX ORDER — BETAMETHASONE DIPROPIONATE 0.05 %
OINTMENT (GRAM) TOPICAL ONCE
OUTPATIENT
Start: 2022-12-08 | End: 2022-12-08

## 2022-12-08 RX ORDER — GINSENG 100 MG
CAPSULE ORAL ONCE
OUTPATIENT
Start: 2022-12-08 | End: 2022-12-08

## 2022-12-08 RX ORDER — LIDOCAINE 40 MG/G
CREAM TOPICAL ONCE
Status: DISCONTINUED | OUTPATIENT
Start: 2022-12-08 | End: 2022-12-09 | Stop reason: HOSPADM

## 2022-12-08 RX ORDER — LIDOCAINE 50 MG/G
OINTMENT TOPICAL ONCE
OUTPATIENT
Start: 2022-12-08 | End: 2022-12-08

## 2022-12-08 RX ORDER — LIDOCAINE 40 MG/G
CREAM TOPICAL ONCE
OUTPATIENT
Start: 2022-12-08 | End: 2022-12-08

## 2022-12-08 RX ORDER — CLOBETASOL PROPIONATE 0.5 MG/G
OINTMENT TOPICAL ONCE
OUTPATIENT
Start: 2022-12-08 | End: 2022-12-08

## 2022-12-08 NOTE — PROGRESS NOTES
[address] [place] [address] and [facility]   Medical Staff Progress Note     Checo Padilla RECORD NUMBER:  [id number]  AGE: 65 y.o. GENDER: male  : 1960  EPISODE DATE:  2022    Chief complaint and reason for visit:     Chief Complaint   Patient presents with    Wound Check     Follow up left leg wound      Patient presenting for follow up evaluation of wound(s) per chief complaint. Subjective and ROS: Symptoms, wound related issues, or other pertinent wound history since last visit: no new wound care issues. Tolerating current treatment. No systemic complaints above baseline. History of Wound Context: Per original history and physical on this patient. 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 Fibrous nonviable tissue. Debridement. Collagen, 2 layer coflex calamine    Other associated diagnoses or problems addressed:  Venous stasis with inflammation. Compression therapy. Leg edema. Elevation. Nutritional support. Low-sodium diet, protein emphasis with meals. Education and counseling provided. Pertinent imaging reviewed including independent interpretation include:  None    Pertinent labs reviewed. Medical records and review of external note (s) from other providers done as well.     New lab or imaging orders placed:  None     Prescription drug management: N/A     Discussion of management or test interpretation with N/A. Comorbid conditions affecting wound healing: As per PMH which was reviewed. Risk of complications and/or mortality of patient management: This patient has a moderate risk of morbidity and mortality from additional diagnostic testing or treatment. This is due to the above conditions affecting wound healing as well as patient and procedure risk factors. Education and discussion held with patient regarding these disease processes pertinent to wound(s). Other pertinent decisions include: minor surgery or procedures as below. The patient's diagnosis or treatment is not significantly limited by social determinants of health as noted by: N/A . Wound 06/24/22 Ankle Left;Medial #1 (Active)   Wound Image   12/08/22 1032   Wound Etiology Venous 12/08/22 1032   Wound Cleansed Cleansed with saline 12/08/22 1032   Dressing/Treatment Collagen 11/23/22 1104   Offloading for Diabetic Foot Ulcers Offloading not ordered 11/18/22 0849   Wound Length (cm) 1.5 cm 12/08/22 1032   Wound Width (cm) 3.3 cm 12/08/22 1032   Wound Depth (cm) 0.1 cm 12/08/22 1032   Wound Surface Area (cm^2) 4.95 cm^2 12/08/22 1032   Change in Wound Size % (l*w) 27.63 12/08/22 1032   Wound Volume (cm^3) 0.495 cm^3 12/08/22 1032   Wound Healing % 28 12/08/22 1032   Post-Procedure Length (cm) 1.6 cm 11/18/22 0858   Post-Procedure Width (cm) 3.3 cm 11/18/22 0858   Post-Procedure Depth (cm) 0.2 cm 11/18/22 0858   Post-Procedure Surface Area (cm^2) 5.28 cm^2 11/18/22 0858   Post-Procedure Volume (cm^3) 1. 056 cm^3 11/18/22 0858   Wound Assessment Granulation tissue 12/08/22 1032   Drainage Amount Small 12/08/22 1032   Drainage Description Serosanguinous 12/08/22 1032   Odor None 12/08/22 1032   Jamila-wound Assessment Intact 12/08/22 1032   Margins Undefined edges 11/23/22 1047   Number of days: 167     Incision 02/26/21 Anterior;Right (Active)   Number of days: 650       Procedures done during this encounter:   Debridement: Excisional Debridement  Indications:  Based on my examination of this patient's wound(s)/ulcer(s) today, debridement is required to promote healing and evaluate the wound base. Risks and benefits discussed with patient who has agreed to proceed. Performed by: Crissy Hill MD  Consent obtained:  Yes  Time out taken:  Yes  Pain Control: Anesthetic  Anesthetic: 4% Lidocaine Cream   Using curette the wound(s)/ulcer(s) was/were debrided down through and including the removal of dermis, subcutaneous tissue, and muscle/fascia. Devitalized Tissue Debrided:  fibrin, biofilm, slough, and necrotic/eschar  Pre Debridement Measurements:  Are located in the [place]  Documentation Flow Sheet  Wound/Ulcer #: 1  Post Debridement Measurements:  Wound/Ulcer Descriptions are Pre Debridement except measurements: Total Surface Area Debrided:  4.95 sq cm   Diabetic/Pressure/Non Pressure Ulcers only:  Ulcer: Non-Pressure ulcer, muscle necrosis   Estimated Blood Loss:  Minimal  Hemostasis Achieved:  by pressure  Procedural Pain:  0  / 10   Post Procedural Pain:  0 / 10   Response to treatment:  Well tolerated by patient. TIME: E/M Time spent with patient and/or patient care issues: [] 15-20 min  [] 21-30 min  [] 31-44 min  [] 45 min or more.    This is above the usual time needed to address patient's chief complaint today: [] Yes  [] No  This time includes physician non-face-to-face service time visit on the date of service such as  Preparing to see the patient (eg, review of tests)  Obtaining and/or reviewing separately obtained history  Performing a medically necessary appropriate examination and/or evaluation  Counseling and educating the patient/family/caregiver  Ordering medications, tests, or procedures  Referring and communicating with other health care professionals as needed  Documenting clinical information in the electronic or other health record  Independently interpreting results (not reported separately) and communicating results to the patient/family/caregiver  Care coordination (not reported separately)    Objective:    /82   Pulse 93   Resp 15   Wt Readings from Last 3 Encounters:   11/08/22 169 lb 12.8 oz (77 kg)   09/14/22 166 lb (75.3 kg)   09/02/22 155 lb (70.3 kg)       PHYSICAL EXAM  General: Alert and in no acute distress. Normal appearing  Skin: Warm and dry, no rash  Head: Normocephalic and atraumatic  Eyes: Extraocular eye movements intact, conjunctivae normal, and sclera anicteric  ENT: Hearing grossly normal bilaterally. Normal appearance  Respiratory: no chest wall tenderness. no respiratory distress  GI: Abdomen non-tender and benign  Musculoskeletal: Baseline range of motion in joints. Nontender calves. No cyanosis. Edema 1+. Neurologic: Speech normal. At baseline without new focal deficits. Mental status normal or at baseline  Vascular: Vascular status Impaired  palpable pedal pulses, right DP0/4 and PT1/4, left DP0/4 and PT1/4. CFT 3 seconds digits 1 to 5 bilateral.  Hair growthAbsent  both lower extremities and feet. Skin temperature is warm to warm from pretibial area to distal digits bilateral.  Exam is negative for rubor, pallor, cyanosis or signs of acute vascular compromise bilaterally. Exam is positive for edema bilateral lower extremity. Varicosities Present bilateral lower extremity. Neuro: Neurologic status diminished bilateral with epicritic Absent  , proprioceptive Absent , vibratory sensation Absent  and protopathicPresent. DTRs Absent  bilateral Achilles. There were no reproducible neuritic symptoms on exam bilateral feet/ankles.     PAST MEDICAL HISTORY        Diagnosis Date    Cancer ([facility] [address]     throat    Diabetes mellitus ([facility] [address]     Fibromyalgia     History of DVT (deep vein thrombosis)     Hyperlipidemia     Hypertension     Type 2 diabetes mellitus with circulatory disorder, without long-term current use of insulin ([facility] [address] 2/18/2022       PAST SURGICAL HISTORY    Past Surgical History:   Procedure Laterality Date    APPENDECTOMY  2005    COLONOSCOPY  2016    FEMORAL BYPASS Left 2020    femoral posterior tibial bypass    FEMORAL-TIBIAL BYPASS GRAFT Right 2020    with femoral endarterectomy and patch angioplasty    FOOT DEBRIDEMENT Left 2020    FOOT DEBRIDEMENT Right 2021    DEBRIDEMENT OF RIGHT FOOT WOUND WITH GRAFT APPLICATION performed by Nikkie Vasquez DPM at [address] N/A 3/25/2022    DIRECT LARYNGOSCOPY WITH BIOPSY AND FROZEN SECTIONS performed by Rosey Woodward DO at [address] [facility] Right 2020    stent leg for PVD    TRACHEOSTOMY N/A 3/25/2022    TRACHEOTOMY performed by Rosey Woodward DO at [address] [facility] [address] HISTORY    Family History   Problem Relation Age of Onset    Cancer Mother     Lung Cancer Mother     Diabetes Father     Stroke Father        SOCIAL HISTORY    Social History     Tobacco Use    Smoking status: Former     Packs/day: 0.50     Years: 20.00     Pack years: 10.00     Types: Cigarettes     Start date: 1977     Quit date: 2021     Years since quittin.9    Smokeless tobacco: Never   Vaping Use    Vaping Use: Never used   Substance Use Topics    Alcohol use: Yes     Comment: 2 beers / day     Drug use: Never       ALLERGIES    Allergies   Allergen Reactions    Chantix [Varenicline] Other (See Comments)     Hallucinations         MEDICATIONS    Current Outpatient Medications on File Prior to Encounter   Medication Sig Dispense Refill    vitamin B-12 (CYANOCOBALAMIN) 250 MCG tablet TAKE 1 TABLET BY MOUTH DAILY 30 tablet 0    BANOPHEN 25 MG tablet TAKE 1 TABLET BY MOUTH EVERY 6 HOURS AS NEEDED FOR ITCHING 60 tablet 0    benzonatate (TESSALON) 200 MG capsule TAKE 1 CAPSULE BY MOUTH THREE TIMES DAILY AS NEEDED FOR COUGH 30 capsule 0    cetirizine (ZYRTEC) 10 MG tablet TAKE 1 TABLET BY MOUTH DAILY 30 tablet 0    formoterol (PERFOROMIST) 20 MCG/2ML nebulizer solution Take 2 mLs by nebulization every 12 hours 2 mL 5    budesonide (PULMICORT) 0.5 MG/2ML nebulizer suspension Take 2 mLs by nebulization 2 times daily 60 each 2    sennosides-docusate sodium (SENOKOT-S) 8.6-50 MG tablet Take 1 tablet by mouth 2 times daily 60 tablet 0    metFORMIN (GLUCOPHAGE) 500 MG tablet TAKE 1 TABLET BY MOUTH TWICE DAILY WITH MEALS 60 tablet 0    lisinopril (PRINIVIL;ZESTRIL) 20 MG tablet TAKE 1 TABLET BY MOUTH EVERY DAY 30 tablet 0    atorvastatin (LIPITOR) 80 MG tablet TAKE 1 TABLET BY MOUTH DAILY 90 tablet 0    metoprolol succinate (TOPROL XL) 25 MG extended release tablet Take 25 mg by mouth daily      melatonin 3 MG TABS tablet Take 2 tablets by mouth nightly as needed (insomnia) 30 tablet 2    warfarin (COUMADIN) 5 MG tablet Take daily as directed by anti coag clinic to maintain INR 2-3 (Patient taking differently: Take warfarin 15 mg every Monday and Friday, and 10 mg all the other days or as direct by [facility] Coumadin Service [phone]) 90 tablet 1    acetaminophen (TYLENOL) 500 MG tablet Take 1 tablet by mouth 4 times daily as needed for Pain 360 tablet 1    gentamicin (GARAMYCIN) 0.1 % cream Apply topically  daily.  30 g 1    BANOPHEN 25 MG capsule Take 1 tablet by mouth every 6 hours as needed for Itching      ondansetron (ZOFRAN-ODT) 4 MG disintegrating tablet Take 4 mg by mouth every 8 hours as needed for Nausea or Vomiting      Multiple Vitamins-Minerals (MULTIVITAMIN ADULT EXTRA C PO) 1 tablet by Nasogastric route daily      esomeprazole (NEXIUM) 20 MG delayed release capsule 20 mg by Nasogastric route every morning (before breakfast)      oxyCODONE HCl (OXY-IR) 10 MG immediate release tablet TAKE 1 TABLET BY MOUTH EVERY SIX HOURS AS NEEDED FOR PAIN FOR UP TO 7 DAYS      becaplermin (REGRANEX) 0.01 % gel Apply 1 Applicatorful topically daily      calcium-vitamin D (OSCAL-500) 500-200 MG-UNIT per tablet Take 1 tablet by mouth 2 times daily      ipratropium-albuterol (DUONEB) 0.5-2.5 (3) MG/3ML SOLN nebulizer solution Inhale 3 mLs into the lungs 4 times daily 360 mL 3    fluticasone (FLONASE) 50 MCG/ACT nasal spray 1 spray by Nasal route daily 9.9 g 5    Vitamins/Minerals TABS Take 1 tablet by mouth daily        No current facility-administered medications on file prior to encounter. Written patient dismissal instructions given to patient and signed by patient or POA.          Electronically signed by Shira Romero MD on 12/8/2022 at 10:39 AM

## 2022-12-08 NOTE — PLAN OF CARE
Discharge instructions given. Patient verbalized understanding. Continue Compression wrap. Return to [facility] in 1 week(s).

## 2022-12-09 ENCOUNTER — ANTI-COAG VISIT (OUTPATIENT)
Dept: PHARMACY | Age: 62
End: 2022-12-09
Payer: MEDICAID

## 2022-12-09 DIAGNOSIS — I73.9 PERIPHERAL ARTERY DISEASE (HCC): Primary | ICD-10-CM

## 2022-12-09 LAB — INR BLD: 2.6

## 2022-12-09 PROCEDURE — 85610 PROTHROMBIN TIME: CPT

## 2022-12-09 PROCEDURE — 99211 OFF/OP EST MAY X REQ PHY/QHP: CPT

## 2022-12-09 RX ORDER — BUDESONIDE AND FORMOTEROL FUMARATE DIHYDRATE 160; 4.5 UG/1; UG/1
2 AEROSOL RESPIRATORY (INHALATION) 2 TIMES DAILY
COMMUNITY
End: 2022-12-09 | Stop reason: SDUPTHER

## 2022-12-09 RX ORDER — BUDESONIDE AND FORMOTEROL FUMARATE DIHYDRATE 160; 4.5 UG/1; UG/1
2 AEROSOL RESPIRATORY (INHALATION) 2 TIMES DAILY
Qty: 10.2 G | Refills: 2 | Status: SHIPPED | OUTPATIENT
Start: 2022-12-09

## 2022-12-09 NOTE — TELEPHONE ENCOUNTER
From: Margaret Aragon  To: Dr. Aureliano Paz: 12/8/2022 5:23 PM EST  Subject: Symbicort inhaler refill    I need to have my symbicort inhaler refilled. Information is attached:   budesonide-formoteroL 160-4.5 mcg/actuation inhaler  Commonly known as: SYMBICORT  Learn more  Inhale 2 puffs into the lungs 2 times a day. This prescription cannot be refilled through [facility] at this time.     Show detailsShow details

## 2022-12-09 NOTE — PROGRESS NOTES
Blessing Phipps is a 65 y.o. here for warfarin management. Roderick Fan had an INR test today. Results were reviewed and appropriate warfarin management was completed. This visit was performed as: An in person visit. Protocols were followed with precautions to reduce the spread of COVID-19. Patient verifies current warfarin dosing regimen: Yes     Warfarin medication reviewed and updated on the patient 's home medication list: Yes   All other medications reviewed and updated on the patient 's home medication list: No: no changes     Lab Results   Component Value Date    INR 2.60 2022    INR 3.6 2022    INR 1.8 2022       Patient Findings       Negatives:  Signs/symptoms of bleeding, Missed doses, Extra doses, Change in medications, Change in diet/appetite, Bruising            Anticoagulation Summary  As of 2022      INR goal:  2.0-3.0   TTR:  46.4 % (1.2 y)   INR used for dosin.60 (2022)   Warfarin maintenance plan:  15 mg (5 mg x 3) every Mon, Fri; 10 mg (5 mg x 2) all other days; Starting 2022   Weekly warfarin total:  80 mg   Plan last modified:  Brian Cantu RPH (10/6/2022)   Next INR check:  2022   Priority:  High   Target end date: Indefinite    Indications    Peripheral artery disease ([facility] [address] [I73.9]                 Anticoagulation Episode Summary       INR check location:  Anticoagulation Clinic    Preferred lab:      Send INR reminders to:  [facility]    Comments:  EPIC          Anticoagulation Care Providers       Provider Role Specialty Phone number    Dorie Cai MD Referring Family Medicine [phone]            There were no vitals taken for this visit. Warfarin assessment / plan:     Appears well. No changes affecting warfarin therapy were noted. No acute findings. INR within goal range. No change to warfarin dosing today.        Description    CONTINUE:  Warfarin 10 mg daily EXCEPT 15 mg every Monday and Friday. Drinking 9 Boost shakes per day. Please let us know if this changes. Call [phone] with signs or symptoms of bleeding or ANY medication changes (including over-the-counter medications or herbal supplements). If significant bleeding occurs please seek immediate medical attention. Keep the number of servings of vitamin K containing foods (dark green, leafy vegetables) the same each week. Dark green vegetable 2-3 times a week and a mixed green salad ~ 3 times a week. Please call if this changes. Limit alcohol intake. Please call if this changes. Immunization History   Administered Date(s) Administered    COVID-19, PFIZER PURPLE top, DILUTE for use, (age 15 y+), 30mcg/0.3mL 03/15/2021, 04/12/2021, 12/22/2021    Influenza Virus Vaccine 01/21/2017, 01/21/2017    Influenza, FLUARIX, FLULAVAL, Tammie Abdoul (age 10 mo+) AND AFLURIA, (age 1 y+), PF, 0.5mL 10/31/2020    PPD Test 04/14/2022, 04/23/2022, 06/03/2022    Tdap (Boostrix, Adacel) 03/27/2018    Zoster Recombinant (Shingrix) 03/27/2018           Orders Placed This Encounter   Procedures    Protime-INR     This external order was created through the results console. No orders of the defined types were placed in this encounter. Reviewed AVS with patient / caregiver.     Billing Points:  0 billing points this visit       CLINICAL PHARMACY CONSULT: MED RECONCILIATION/REVIEW ADDENDUM    For Pharmacy Admin Tracking Only    Time Spent (min): 10

## 2022-12-12 NOTE — DISCHARGE INSTRUCTIONS
[facility], [address] [place] [address] Road  Telephone: [phone]     Discharge Instructions     Important reminders:     **If you have any signs and symptoms of illness (Cough, fever, congestion, nausea, vomiting, diarrhea, etc.) please call the wound care center prior to your appointment. 1. Increase Protein intake for optimal wound healing  2. No added salt to reduce any swelling  3. If diabetic, maintain good glucose control  4. If you smoke, smoking prohibits wound healing, we ask that you refrain from smoking. 5. When taking antibiotics take the entire prescription as ordered. Do not stop taking until medication is all gone unless otherwise instructed. 6. Exercise as tolerated. 7. Keep weight off wounds and reposition every 2 hours if applicable. 8. If wound(s) is on your lower extremity, elevate legs to the level of the heart or above for 30 minutes 4-5 times a day and/or when sitting. Avoid standing for long periods of time. 9. Do not get wounds wet in bath or shower unless otherwise instructed by your physician. If your wound is on your foot or leg, you may purchase a cast bag. Please ask at the pharmacy. If Vascular testing is ordered, please call [address] [place] [address] [phone]) to schedule. Vascular tests ordered by Wound Care Physicians may take up to 2 hours to complete. Please keep that in mind when scheduling. If Vascular testing is scheduled, please bring supplies to replace your dressing after testing is done. The vascular department does not stock supplies. Wound: Right and Left leg     With each dressing change, rinse wounds with 0.9% Saline. (May use wound wash or soft contact solution. Both can be purchased at a local drug store). If unable to obtain saline, may use a gentle soap and water.      Dressing care:  Left leg -  Gentamicin cream, Traid and collagen powder, dry dressing, and coflex calamine- these will be changed at your next visit unless they slide down or cause pain. If they slide, gets wet, or cause pain please call the wound care center, you may need to come in to be re-wrapped. If you are unable to get to your follow up appointment you will need to remove your wraps at home & place some kind of compression. Dr Katy Whitten, Vascular Surgeon September 14th at 1215pm  [address] [address], [address]  Phone# [phone]  Fax# [phone]         Compression Wraps  Location: Left lower leg below knee  Type: Coflex calamine     Your doctor has ordered compression therapy for your wound. Compression bandages reduce the swelling, or edema, in your legs and prevent it from returning. The wound care staff will apply your compression wrap. It must be removed and re-applied at least weekly. As the swelling decreases, the boot no longer provides adequate compression and you need a new one. Once applied, you need to know how to take care of your compression wrap. The boot must stay dry. Do not get it wet in the shower or tub. You may do a partial bath, or you can cover the boot with a large plastic bag, secured at the top, so that no water can get in. Avoid standing in one place for long periods of time. If you must  one place, shift your weight and change positions often. If you have CHF, consult your doctor before following the next two recommendations for leg elevation. When sitting, elevate your legs on pillows, or put blocks under the foot of your bed. Your legs should be higher than your heart. If your boot becomes painful, or you notice an increase in swelling in your toes, numbness or tingling, or purple color to your toes, remove the wrap and call the [address] [place] [address] Road. If it is after hours, call your doctor for instructions or go to the nearest emergency room.         Home Care Agency/Facility: [facility]     Your wound-care supplies will be provided by:  Please note, depending on your insurance coverage, you may have out-of-pocket expenses for these supplies. Someone from the company should call you to confirm your order and discuss those potential costs before they ship your products -- please anticipate that call. If your out-of-pocket cost could be substantial, Many companies have financial hardship programs for patients who qualify, so please ask about that if you might need a hand. If you have any questions about your supplies or your potential out-of-pocket costs, or if you need to place an order for a refill of supplies (typically monthly), please call the company directly. Your  is Noreen Perez     Follow up with Dr Jurgen Sher in 1 weeks. Wound Care Center Information: Should you experience any significant changes in your wound(s) or have questions about your wound care, please [facility]  at [phone] Monday  - Thursday 8:00 am - 4:00 pm and Friday 8:00 am - 1:00pm. If you need help with your wound outside these hours and cannot wait until we are again available, contact your PCP or go to the hospital emergency room. PLEASE NOTE: IF YOU ARE UNABLE TO OBTAIN WOUND SUPPLIES, CONTINUE TO USE THE SUPPLIES YOU HAVE AVAILABLE UNTIL YOU ARE ABLE TO REACH US. IT IS MOST IMPORTANT TO KEEP THE WOUND COVERED AT ALL TIMES.

## 2022-12-13 RX ORDER — BUDESONIDE AND FORMOTEROL FUMARATE DIHYDRATE 160; 4.5 UG/1; UG/1
2 AEROSOL RESPIRATORY (INHALATION) 2 TIMES DAILY
Qty: 10.2 G | Refills: 2 | Status: SHIPPED | OUTPATIENT
Start: 2022-12-13

## 2022-12-13 NOTE — TELEPHONE ENCOUNTER
From: Cynthia Mcgowan  Sent: 12/12/2022 6:47 PM EST  [facility]  Subject: b-12    need to have my symbicort inhaler refilled. Information is attached:   budesonide-formoteroL 160-4.5 mcg/actuation inhaler  Commonly known as: SYMBICORT  Learn more  Inhale 2 puffs into the lungs 2 times a day. This prescription cannot be refilled through [facility] at this time. I sent this request to Dr. Donal Harris, with no response.

## 2022-12-14 ENCOUNTER — HOSPITAL ENCOUNTER (OUTPATIENT)
Dept: WOUND CARE | Age: 62
Discharge: HOME | End: 2022-12-14
Payer: MEDICAID

## 2022-12-14 VITALS
TEMPERATURE: 97 F | RESPIRATION RATE: 16 BRPM | SYSTOLIC BLOOD PRESSURE: 120 MMHG | HEART RATE: 89 BPM | DIASTOLIC BLOOD PRESSURE: 71 MMHG

## 2022-12-14 DIAGNOSIS — I73.9 PERIPHERAL ARTERY DISEASE (HCC): Primary | ICD-10-CM

## 2022-12-14 PROCEDURE — 11043 DBRDMT MUSC&/FSCA 1ST 20/<: CPT

## 2022-12-14 RX ORDER — CLOBETASOL PROPIONATE 0.5 MG/G
OINTMENT TOPICAL ONCE
OUTPATIENT
Start: 2022-12-14 | End: 2022-12-14

## 2022-12-14 RX ORDER — BETAMETHASONE DIPROPIONATE 0.05 %
OINTMENT (GRAM) TOPICAL ONCE
OUTPATIENT
Start: 2022-12-14 | End: 2022-12-14

## 2022-12-14 RX ORDER — BACITRACIN, NEOMYCIN, POLYMYXIN B 400; 3.5; 5 [USP'U]/G; MG/G; [USP'U]/G
OINTMENT TOPICAL ONCE
OUTPATIENT
Start: 2022-12-14 | End: 2022-12-14

## 2022-12-14 RX ORDER — BACITRACIN ZINC AND POLYMYXIN B SULFATE 500; 1000 [USP'U]/G; [USP'U]/G
OINTMENT TOPICAL ONCE
OUTPATIENT
Start: 2022-12-14 | End: 2022-12-14

## 2022-12-14 RX ORDER — GINSENG 100 MG
CAPSULE ORAL ONCE
OUTPATIENT
Start: 2022-12-14 | End: 2022-12-14

## 2022-12-14 RX ORDER — LIDOCAINE 50 MG/G
OINTMENT TOPICAL ONCE
OUTPATIENT
Start: 2022-12-14 | End: 2022-12-14

## 2022-12-14 RX ORDER — GENTAMICIN SULFATE 1 MG/G
OINTMENT TOPICAL ONCE
OUTPATIENT
Start: 2022-12-14 | End: 2022-12-14

## 2022-12-14 RX ORDER — LIDOCAINE HYDROCHLORIDE 40 MG/ML
SOLUTION TOPICAL ONCE
OUTPATIENT
Start: 2022-12-14 | End: 2022-12-14

## 2022-12-14 RX ORDER — LIDOCAINE HYDROCHLORIDE 20 MG/ML
JELLY TOPICAL ONCE
OUTPATIENT
Start: 2022-12-14 | End: 2022-12-14

## 2022-12-14 RX ORDER — LIDOCAINE 40 MG/G
CREAM TOPICAL ONCE
OUTPATIENT
Start: 2022-12-14 | End: 2022-12-14

## 2022-12-14 RX ORDER — LIDOCAINE 40 MG/G
CREAM TOPICAL ONCE
Status: DISCONTINUED | OUTPATIENT
Start: 2022-12-14 | End: 2022-12-15 | Stop reason: HOSPADM

## 2022-12-14 NOTE — PROGRESS NOTES
Multilayer Compression Wrap   Below the Knee    NAME:  Lonnie Kothari  YOB: 1960  MEDICAL RECORD NUMBER:  [id number]  DATE:  12/14/2022    Multilayer compression wrap: Removed old Multilayer wrap if indicated and wash leg with mild soap/water. Applied primary and secondary dressing as ordered. Applied multilayered dressing below the knee to left lower leg. Instructed patient/caregiver not to remove dressing and to keep it clean and dry. Instructed patient/caregiver on complications to report to provider, such as pain, numbness in toes, heavy drainage, and slippage of dressing. Instructed patient on purpose of compression dressing and on activity and exercise recommendations.      Applied  2 layer wrap from toes to knee overlapping each time    Electronically signed by Medina Lawton LPN on 70/80/3760 at 3:58 PM

## 2022-12-14 NOTE — PLAN OF CARE
Discharge instructions given. Patient verbalized understanding. Return to [facility] in 1 week(s).

## 2022-12-14 NOTE — PROGRESS NOTES
Iris Quiroz  Progress Note and Procedure Note      Vinayak Moore  AGE: 65 y.o. GENDER: male  : 1960  TODAY'S DATE:  2022    Subjective:     Chief Complaint   Patient presents with    Wound Check     Left lower extremity             HISTORY of PRESENT ILLNESS HPI     Vinayak Moore is a 65 y.o. male who presents today for wound evaluation. History of Wound: Admits to having chronic wounds for years. He states that he has had arterial bypasses on both of his legs in the past.  He was seeing previous wound care and podiatry for these wounds. He states that his insurance had some issues and he has not been seeing anyone since 2021. H    Left the dressing intact. He states he does not have any pain unless the wound is touched. He has no other complaints at this time. He has been getting his bandage change regular.   He is having minimal but occasional pain   wound Pain:  intermittent left  Severity:  3 / 10   Wound Type:  venous, arterial and diabetic  Modifying Factors:  edema, venous stasis, lymphedema, diabetes, decreased mobility, arterial insufficiency and decreased tissue oxygenation  Associated Signs/Symptoms:  drainage, numbness and odor        PAST MEDICAL HISTORY        Diagnosis Date    Cancer ([address]     throat    Diabetes mellitus ([address]     Fibromyalgia     History of DVT (deep vein thrombosis)     Hyperlipidemia     Hypertension     Type 2 diabetes mellitus with circulatory disorder, without long-term current use of insulin ([address] 2022       PAST SURGICAL HISTORY    Past Surgical History:   Procedure Laterality Date    APPENDECTOMY  2005    COLONOSCOPY  2016    FEMORAL BYPASS Left 2020    femoral posterior tibial bypass    FEMORAL-TIBIAL BYPASS GRAFT Right 2020    with femoral endarterectomy and patch angioplasty    FOOT DEBRIDEMENT Left 2020    FOOT DEBRIDEMENT Right 2021    DEBRIDEMENT OF RIGHT FOOT WOUND WITH GRAFT APPLICATION performed by Eduar Lazo DPM at [address] [facility] N/A 3/25/2022    DIRECT LARYNGOSCOPY WITH BIOPSY AND FROZEN SECTIONS performed by Jose Guadalupe Arthur DO at [facility] Right 2020    stent leg for PVD    TRACHEOSTOMY N/A 3/25/2022    TRACHEOTOMY performed by Jose Guadalupe Arthur DO at [address] [facility] [address] HISTORY    Family History   Problem Relation Age of Onset    Cancer Mother     Lung Cancer Mother     Diabetes Father     Stroke Father        SOCIAL HISTORY    Social History     Tobacco Use    Smoking status: Former     Packs/day: 0.50     Years: 20.00     Pack years: 10.00     Types: Cigarettes     Start date: 1977     Quit date: 2021     Years since quittin.9    Smokeless tobacco: Never   Vaping Use    Vaping Use: Never used   Substance Use Topics    Alcohol use: Yes     Comment: 2 beers / day     Drug use: Never       ALLERGIES    Allergies   Allergen Reactions    Chantix [Varenicline] Other (See Comments)     Hallucinations         MEDICATIONS    Current Outpatient Medications on File Prior to Encounter   Medication Sig Dispense Refill    budesonide-formoterol (SYMBICORT) 160-4.5 MCG/ACT AERO Inhale 2 puffs into the lungs 2 times daily 10.2 g 2    vitamin B-12 (CYANOCOBALAMIN) 250 MCG tablet TAKE 1 TABLET BY MOUTH DAILY 30 tablet 0    BANOPHEN 25 MG tablet TAKE 1 TABLET BY MOUTH EVERY 6 HOURS AS NEEDED FOR ITCHING 60 tablet 0    benzonatate (TESSALON) 200 MG capsule TAKE 1 CAPSULE BY MOUTH THREE TIMES DAILY AS NEEDED FOR COUGH 30 capsule 0    cetirizine (ZYRTEC) 10 MG tablet TAKE 1 TABLET BY MOUTH DAILY 30 tablet 0    formoterol (PERFOROMIST) 20 MCG/2ML nebulizer solution Take 2 mLs by nebulization every 12 hours 2 mL 5    budesonide (PULMICORT) 0.5 MG/2ML nebulizer suspension Take 2 mLs by nebulization 2 times daily 60 each 2    sennosides-docusate sodium (SENOKOT-S) 8.6-50 MG tablet Take 1 tablet by mouth 2 times daily 60 tablet 0    metFORMIN (GLUCOPHAGE) 500 MG tablet TAKE 1 TABLET BY MOUTH TWICE DAILY WITH MEALS 60 tablet 0    lisinopril (PRINIVIL;ZESTRIL) 20 MG tablet TAKE 1 TABLET BY MOUTH EVERY DAY 30 tablet 0    atorvastatin (LIPITOR) 80 MG tablet TAKE 1 TABLET BY MOUTH DAILY 90 tablet 0    metoprolol succinate (TOPROL XL) 25 MG extended release tablet Take 25 mg by mouth daily      melatonin 3 MG TABS tablet Take 2 tablets by mouth nightly as needed (insomnia) 30 tablet 2    warfarin (COUMADIN) 5 MG tablet Take daily as directed by anti coag clinic to maintain INR 2-3 (Patient taking differently: Take warfarin 15 mg every Monday and Friday, and 10 mg all the other days or as direct by [facility] [phone]) 90 tablet 1    acetaminophen (TYLENOL) 500 MG tablet Take 1 tablet by mouth 4 times daily as needed for Pain 360 tablet 1    gentamicin (GARAMYCIN) 0.1 % cream Apply topically  daily.  30 g 1    BANOPHEN 25 MG capsule Take 1 tablet by mouth every 6 hours as needed for Itching      ondansetron (ZOFRAN-ODT) 4 MG disintegrating tablet Take 4 mg by mouth every 8 hours as needed for Nausea or Vomiting      Multiple Vitamins-Minerals (MULTIVITAMIN ADULT EXTRA C PO) 1 tablet by Nasogastric route daily      esomeprazole (NEXIUM) 20 MG delayed release capsule 20 mg by Nasogastric route every morning (before breakfast)      oxyCODONE HCl (OXY-IR) 10 MG immediate release tablet TAKE 1 TABLET BY MOUTH EVERY SIX HOURS AS NEEDED FOR PAIN FOR UP TO 7 DAYS      becaplermin (REGRANEX) 0.01 % gel Apply 1 Applicatorful topically daily      calcium-vitamin D (OSCAL-500) 500-200 MG-UNIT per tablet Take 1 tablet by mouth 2 times daily      ipratropium-albuterol (DUONEB) 0.5-2.5 (3) MG/3ML SOLN nebulizer solution Inhale 3 mLs into the lungs 4 times daily 360 mL 3    fluticasone (FLONASE) 50 MCG/ACT nasal spray 1 spray by Nasal route daily 9.9 g 5    Vitamins/Minerals TABS Take 1 tablet by mouth daily        No current facility-administered medications on file prior to encounter. REVIEW OF SYSTEMS    Pertinent items are noted in HPI. Objective:      /71   Pulse 89   Temp 97 °F (36.1 °C) (Infrared)   Resp 16     PHYSICAL EXAM    Vascular: Vascular status Impaired  palpable pedal pulses, right DP0/4 and PT1/4, left DP0/4 and PT1/4. CFT 3 seconds digits 1 to 5 bilateral.  Hair growthAbsent  both lower extremities and feet. Skin temperature is warm to warm from pretibial area to distal digits bilateral.  Exam is negative for rubor, pallor, cyanosis or signs of acute vascular compromise bilaterally. Exam is positive for edema bilateral lower extremity. Varicosities Present bilateral lower extremity. Neuro: Neurologic status diminished bilateral with epicritic Absent  , proprioceptive Absent , vibratory sensation Absent  and protopathicPresent. DTRs Absent  bilateral Achilles. There were no reproducible neuritic symptoms on exam bilateral feet/ankles. Derm: Ulceration to bilateral ankles. Ecchymosis Absent  bilateral feet/foot. Musculoskeletal: No pain with debridement of wounds 5/5 muscle strength in/eversion and dorsi/plantarflexion bilateral feet. No gross instability noted. Procedure Note    Performed by: Betsy Spears DPM    Consent obtained: Yes    Time out taken:  Yes    Pain Control: Anesthetic  Anesthetic: 4% Topical Xylocaine     Debridement:Excisional Debridement    Using curette the wound was sharply debrided    down through and including the removal of epidermis, dermis, subcutaneous tissue, and muscle/fascia.         Devitalized Tissue Debrided:  fibrin, biofilm, slough, necrotic/eschar, and exudate    Pre Debridement Measurements:  Are located in the Wound Documentation Flow Sheet    Wound #: 1     Wound Care Documentation:  Wound 06/24/22 Ankle Left;Medial #1 (Active)   Wound Image   12/08/22 1032   Wound Etiology Venous 12/14/22 1502   Wound Cleansed Wound cleanser 12/14/22 1502   Dressing/Treatment Collagen 11/23/22 1104   Offloading for Diabetic Foot Ulcers Offloading not ordered 11/18/22 0849   Wound Length (cm) 1.5 cm 12/14/22 1502   Wound Width (cm) 3.7 cm 12/14/22 1502   Wound Depth (cm) 0.1 cm 12/14/22 1502   Wound Surface Area (cm^2) 5.55 cm^2 12/14/22 1502   Change in Wound Size % (l*w) 18.86 12/14/22 1502   Wound Volume (cm^3) 0.555 cm^3 12/14/22 1502   Wound Healing % 19 12/14/22 1502   Post-Procedure Length (cm) 1.5 cm 12/14/22 1536   Post-Procedure Width (cm) 3.7 cm 12/14/22 1536   Post-Procedure Depth (cm) 0.1 cm 12/14/22 1536   Post-Procedure Surface Area (cm^2) 5.55 cm^2 12/14/22 1536   Post-Procedure Volume (cm^3) 0.555 cm^3 12/14/22 1536   Wound Assessment Bleeding 12/14/22 1536   Drainage Amount Moderate 12/14/22 1502   Drainage Description Serosanguinous 12/14/22 1502   Odor None 12/14/22 1502   Jamila-wound Assessment Dry/flaky; Intact 12/14/22 1502   Margins Undefined edges 11/23/22 1047   Number of days: 173           Total Surface Area Debrided:  5.55sq cm     Percentage of wound debrided 100%    Bleeding:  Minimal    Hemostasis Achieved:  by pressure    Procedural Pain:  5  / 10     Post Procedural Pain:  0 / 10     Response to treatment:  Well tolerated by patient. Assessment:     Problem List Items Addressed This Visit       Peripheral artery disease ([facility] - Primary    Relevant Medications    lidocaine (LMX) 4 % cream    Other Relevant Orders    Initiate Outpatient Wound Care Protocol          Plan:   Patient examined and evaluated   Wound debrided without incident   Application of skin graft  Compression stockings right leg with Lotrisone. Multilayer compression wrap left leg  Follow-up one week  Keep legs elevated at all times when not active. Plan for skin substitute next week. The nature of the patient's condition was explained in depth.  The patient was informed that their compliance to the treatment Plan is paramount to successful healing and prevention of further ulceration and/or infection.   Discharge Treatment follow-up on Friday for dressing change    Written Patient Discharge Instructions Given            Electronically signed by Peter Morgan DPM on 12/14/2022 at 4:06 PM

## 2022-12-16 RX ORDER — DOCUSATE SODIUM 50 MG AND SENNOSIDES 8.6 MG 8.6; 5 MG/1; MG/1
TABLET, FILM COATED ORAL
Qty: 60 TABLET | Refills: 0 | Status: SHIPPED | OUTPATIENT
Start: 2022-12-16

## 2022-12-21 ENCOUNTER — HOSPITAL ENCOUNTER (OUTPATIENT)
Dept: WOUND CARE | Age: 62
Discharge: HOME | End: 2022-12-21
Payer: MEDICAID

## 2022-12-21 VITALS
TEMPERATURE: 97.3 F | DIASTOLIC BLOOD PRESSURE: 66 MMHG | RESPIRATION RATE: 16 BRPM | SYSTOLIC BLOOD PRESSURE: 115 MMHG | HEART RATE: 90 BPM

## 2022-12-21 DIAGNOSIS — J43.2 CENTRILOBULAR EMPHYSEMA (HCC): ICD-10-CM

## 2022-12-21 DIAGNOSIS — J41.0 SIMPLE CHRONIC BRONCHITIS (HCC): ICD-10-CM

## 2022-12-21 DIAGNOSIS — I73.9 PERIPHERAL ARTERY DISEASE (HCC): Primary | ICD-10-CM

## 2022-12-21 PROCEDURE — 15271 SKIN SUB GRAFT TRNK/ARM/LEG: CPT

## 2022-12-21 PROCEDURE — 11042 DBRDMT SUBQ TIS 1ST 20SQCM/<: CPT

## 2022-12-21 PROCEDURE — 29581 APPL MULTLAYER CMPRN SYS LEG: CPT

## 2022-12-21 RX ORDER — CLOBETASOL PROPIONATE 0.5 MG/G
OINTMENT TOPICAL ONCE
OUTPATIENT
Start: 2022-12-21 | End: 2022-12-21

## 2022-12-21 RX ORDER — LIDOCAINE 50 MG/G
OINTMENT TOPICAL ONCE
OUTPATIENT
Start: 2022-12-21 | End: 2022-12-21

## 2022-12-21 RX ORDER — LIDOCAINE 40 MG/G
CREAM TOPICAL ONCE
Status: DISCONTINUED | OUTPATIENT
Start: 2022-12-21 | End: 2022-12-22 | Stop reason: HOSPADM

## 2022-12-21 RX ORDER — LIDOCAINE HYDROCHLORIDE 20 MG/ML
JELLY TOPICAL ONCE
OUTPATIENT
Start: 2022-12-21 | End: 2022-12-21

## 2022-12-21 RX ORDER — GENTAMICIN SULFATE 1 MG/G
OINTMENT TOPICAL ONCE
OUTPATIENT
Start: 2022-12-21 | End: 2022-12-21

## 2022-12-21 RX ORDER — BETAMETHASONE DIPROPIONATE 0.05 %
OINTMENT (GRAM) TOPICAL ONCE
OUTPATIENT
Start: 2022-12-21 | End: 2022-12-21

## 2022-12-21 RX ORDER — LIDOCAINE 40 MG/G
CREAM TOPICAL ONCE
OUTPATIENT
Start: 2022-12-21 | End: 2022-12-21

## 2022-12-21 RX ORDER — BACITRACIN, NEOMYCIN, POLYMYXIN B 400; 3.5; 5 [USP'U]/G; MG/G; [USP'U]/G
OINTMENT TOPICAL ONCE
OUTPATIENT
Start: 2022-12-21 | End: 2022-12-21

## 2022-12-21 RX ORDER — BACITRACIN ZINC AND POLYMYXIN B SULFATE 500; 1000 [USP'U]/G; [USP'U]/G
OINTMENT TOPICAL ONCE
OUTPATIENT
Start: 2022-12-21 | End: 2022-12-21

## 2022-12-21 RX ORDER — LIDOCAINE HYDROCHLORIDE 40 MG/ML
SOLUTION TOPICAL ONCE
OUTPATIENT
Start: 2022-12-21 | End: 2022-12-21

## 2022-12-21 RX ORDER — GINSENG 100 MG
CAPSULE ORAL ONCE
OUTPATIENT
Start: 2022-12-21 | End: 2022-12-21

## 2022-12-21 NOTE — PROGRESS NOTES
Iris Rivera  Progress Note and Procedure Note      Korin Quispe  AGE: 65 y.o. GENDER: male  : 1960  TODAY'S DATE:  2022    Subjective:     Chief Complaint   Patient presents with    Wound Check     Left lower leg           HISTORY of PRESENT ILLNESS HPI     Korin Quispe is a 65 y.o. male who presents today for wound evaluation. History of Wound: Admits to having chronic wounds for years. He states that he has had arterial bypasses on both of his legs in the past.  He was seeing previous wound care and podiatry for these wounds. He states that his insurance had some issues and he has not been seeing anyone since 2021. H    Left the dressing intact. He states he does not have any pain unless the wound is touched. He has no other complaints at this time. He has been getting his bandage change regular.   He is having minimal but occasional pain   wound Pain:  intermittent left  Severity:  3 / 10   Wound Type:  venous, arterial and diabetic  Modifying Factors:  edema, venous stasis, lymphedema, diabetes, decreased mobility, arterial insufficiency and decreased tissue oxygenation  Associated Signs/Symptoms:  drainage, numbness and odor        PAST MEDICAL HISTORY        Diagnosis Date    Cancer ([address]     throat    Diabetes mellitus ([address]     Fibromyalgia     History of DVT (deep vein thrombosis)     Hyperlipidemia     Hypertension     Type 2 diabetes mellitus with circulatory disorder, without long-term current use of insulin ([address] 2022       PAST SURGICAL HISTORY    Past Surgical History:   Procedure Laterality Date    APPENDECTOMY  2005    COLONOSCOPY  2016    FEMORAL BYPASS Left 2020    femoral posterior tibial bypass    FEMORAL-TIBIAL BYPASS GRAFT Right 2020    with femoral endarterectomy and patch angioplasty    FOOT DEBRIDEMENT Left 2020    FOOT DEBRIDEMENT Right 2021    DEBRIDEMENT OF RIGHT FOOT WOUND WITH GRAFT APPLICATION performed by Aldo Mancuso DPM at [address] [facility] N/A 3/25/2022    DIRECT LARYNGOSCOPY WITH BIOPSY AND FROZEN SECTIONS performed by Elvina Hodgkins, DO at [address] [facility] Right 2020    stent leg for PVD    TRACHEOSTOMY N/A 3/25/2022    TRACHEOTOMY performed by Elvina Hodgkins, DO at [address] [facility] HISTORY    Family History   Problem Relation Age of Onset    Cancer Mother     Lung Cancer Mother     Diabetes Father     Stroke Father        SOCIAL HISTORY    Social History     Tobacco Use    Smoking status: Former     Packs/day: 0.50     Years: 20.00     Pack years: 10.00     Types: Cigarettes     Start date: 1977     Quit date: 2021     Years since quittin.9    Smokeless tobacco: Never   Vaping Use    Vaping Use: Never used   Substance Use Topics    Alcohol use: Yes     Comment: 2 beers / day     Drug use: Never       ALLERGIES    Allergies   Allergen Reactions    Chantix [Varenicline] Other (See Comments)     Hallucinations         MEDICATIONS    Current Outpatient Medications on File Prior to Encounter   Medication Sig Dispense Refill    STIMULANT LAXATIVE 8.6-50 MG per tablet TAKE 1 TABLET BY MOUTH TWICE DAILY 60 tablet 0    budesonide-formoterol (SYMBICORT) 160-4.5 MCG/ACT AERO Inhale 2 puffs into the lungs 2 times daily 10.2 g 2    vitamin B-12 (CYANOCOBALAMIN) 250 MCG tablet TAKE 1 TABLET BY MOUTH DAILY 30 tablet 0    BANOPHEN 25 MG tablet TAKE 1 TABLET BY MOUTH EVERY 6 HOURS AS NEEDED FOR ITCHING 60 tablet 0    benzonatate (TESSALON) 200 MG capsule TAKE 1 CAPSULE BY MOUTH THREE TIMES DAILY AS NEEDED FOR COUGH 30 capsule 0    cetirizine (ZYRTEC) 10 MG tablet TAKE 1 TABLET BY MOUTH DAILY 30 tablet 0    formoterol (PERFOROMIST) 20 MCG/2ML nebulizer solution Take 2 mLs by nebulization every 12 hours 2 mL 5    budesonide (PULMICORT) 0.5 MG/2ML nebulizer suspension Take 2 mLs by nebulization 2 times daily 60 each 2    metFORMIN (GLUCOPHAGE) 500 MG tablet TAKE 1 TABLET BY MOUTH TWICE DAILY WITH MEALS 60 tablet 0    lisinopril (PRINIVIL;ZESTRIL) 20 MG tablet TAKE 1 TABLET BY MOUTH EVERY DAY 30 tablet 0    atorvastatin (LIPITOR) 80 MG tablet TAKE 1 TABLET BY MOUTH DAILY 90 tablet 0    metoprolol succinate (TOPROL XL) 25 MG extended release tablet Take 25 mg by mouth daily      melatonin 3 MG TABS tablet Take 2 tablets by mouth nightly as needed (insomnia) 30 tablet 2    warfarin (COUMADIN) 5 MG tablet Take daily as directed by anti coag clinic to maintain INR 2-3 (Patient taking differently: Take warfarin 15 mg every Monday and Friday, and 10 mg all the other days or as direct by [facility] [phone]) 90 tablet 1    acetaminophen (TYLENOL) 500 MG tablet Take 1 tablet by mouth 4 times daily as needed for Pain 360 tablet 1    gentamicin (GARAMYCIN) 0.1 % cream Apply topically  daily. 30 g 1    BANOPHEN 25 MG capsule Take 1 tablet by mouth every 6 hours as needed for Itching      ondansetron (ZOFRAN-ODT) 4 MG disintegrating tablet Take 4 mg by mouth every 8 hours as needed for Nausea or Vomiting      Multiple Vitamins-Minerals (MULTIVITAMIN ADULT EXTRA C PO) 1 tablet by Nasogastric route daily      esomeprazole (NEXIUM) 20 MG delayed release capsule 20 mg by Nasogastric route every morning (before breakfast)      oxyCODONE HCl (OXY-IR) 10 MG immediate release tablet TAKE 1 TABLET BY MOUTH EVERY SIX HOURS AS NEEDED FOR PAIN FOR UP TO 7 DAYS      becaplermin (REGRANEX) 0.01 % gel Apply 1 Applicatorful topically daily      calcium-vitamin D (OSCAL-500) 500-200 MG-UNIT per tablet Take 1 tablet by mouth 2 times daily      ipratropium-albuterol (DUONEB) 0.5-2.5 (3) MG/3ML SOLN nebulizer solution Inhale 3 mLs into the lungs 4 times daily 360 mL 3    fluticasone (FLONASE) 50 MCG/ACT nasal spray 1 spray by Nasal route daily 9.9 g 5    Vitamins/Minerals TABS Take 1 tablet by mouth daily        No current facility-administered medications on file prior to encounter. REVIEW OF SYSTEMS    Pertinent items are noted in HPI. Objective:      /66   Pulse 90   Temp 97.3 °F (36.3 °C) (Infrared)   Resp 16     PHYSICAL EXAM    Vascular: Vascular status Impaired  palpable pedal pulses, right DP0/4 and PT1/4, left DP0/4 and PT1/4. CFT 3 seconds digits 1 to 5 bilateral.  Hair growthAbsent  both lower extremities and feet. Skin temperature is warm to warm from pretibial area to distal digits bilateral.  Exam is negative for rubor, pallor, cyanosis or signs of acute vascular compromise bilaterally. Exam is positive for edema bilateral lower extremity. Varicosities Present bilateral lower extremity. Neuro: Neurologic status diminished bilateral with epicritic Absent  , proprioceptive Absent , vibratory sensation Absent  and protopathicPresent. DTRs Absent  bilateral Achilles. There were no reproducible neuritic symptoms on exam bilateral feet/ankles. Derm: Ulceration to bilateral ankles. Ecchymosis Absent  bilateral feet/foot. Musculoskeletal: No pain with debridement of wounds 5/5 muscle strength in/eversion and dorsi/plantarflexion bilateral feet. No gross instability noted. Procedure Note    Performed by: Fely Arteaga DPM    Consent obtained: Yes    Time out taken:  Yes    Pain Control: Anesthetic  Anesthetic: 4% Topical Xylocaine     Debridement:Excisional Debridement    Using curette the wound was sharply debrided    down through and including the removal of epidermis, dermis, subcutaneous tissue, and muscle/fascia.         Devitalized Tissue Debrided:  fibrin, biofilm, slough, necrotic/eschar, and exudate    Pre Debridement Measurements:  Are located in the Wound Documentation Flow Sheet    Wound #: 1     Wound Care Documentation:  Wound 06/24/22 Ankle Left;Medial #1 (Active)   Wound Image   12/08/22 1032   Wound Etiology Venous 12/21/22 1448   Wound Cleansed Wound cleanser 12/21/22 1448   Dressing/Treatment Collagen 11/23/22 1104   Offloading for Diabetic Foot Ulcers Offloading not ordered 22 0849   Wound Length (cm) 1 cm 22 1448   Wound Width (cm) 3.6 cm 22 1448   Wound Depth (cm) 0.1 cm 22 1448   Wound Surface Area (cm^2) 3.6 cm^2 22 1448   Change in Wound Size % (l*w) 47.37 22 1448   Wound Volume (cm^3) 0.36 cm^3 22 1448   Wound Healing % 47 22 1448   Post-Procedure Length (cm) 1 cm 22 1520   Post-Procedure Width (cm) 3.6 cm 22 1520   Post-Procedure Depth (cm) 0.1 cm 22 1520   Post-Procedure Surface Area (cm^2) 3.6 cm^2 22 1520   Post-Procedure Volume (cm^3) 0.36 cm^3 22 1520   Wound Assessment Bleeding 22 1520   Drainage Amount Moderate 22 1448   Drainage Description Serosanguinous 22 1448   Odor None 22 1448   Jamila-wound Assessment Dry/flaky 22 1448   Margins Defined edges 22 1448   Number of days: 180           Total Surface Area Debrided:  3.6sq cm     Percentage of wound debrided 100%    Bleeding:  Minimal    Hemostasis Achieved:  by pressure    Procedural Pain:  5  / 10     Post Procedural Pain:  0 / 10     Response to treatment:  Well tolerated by patient. Lyndon Sherri  AGE: 65 y.o. GENDER: male  : 1960  TODAY'S DATE:  2022    Chief Complaint   Patient presents with    Wound Check     Left lower leg          Skin Substitute Applied:       Theraskin 6 sq/cm  Performed by: Mariola Salcedo DPM    Wound Type:venous and arterial    Consent obtained: Yes    Time out taken: Yes     Fenestrated: Yes    Instrument(s) scissors and forceps      [] Mesher Utilized    All  Guidelines Followed    Skin Substitute was Applied to Wound Number(s):         Wound 22 Ankle Left;Medial #1 (Active)   Wound Image   22 1032   Wound Etiology Venous 22 1448   Wound Cleansed Wound cleanser 22 1448   Dressing/Treatment Collagen 22 1104   Offloading for Diabetic Foot Ulcers Offloading not ordered 22 8273   Wound Length (cm) 1 cm 12/21/22 1448   Wound Width (cm) 3.6 cm 12/21/22 1448   Wound Depth (cm) 0.1 cm 12/21/22 1448   Wound Surface Area (cm^2) 3.6 cm^2 12/21/22 1448   Change in Wound Size % (l*w) 47.37 12/21/22 1448   Wound Volume (cm^3) 0.36 cm^3 12/21/22 1448   Wound Healing % 47 12/21/22 1448   Post-Procedure Length (cm) 1 cm 12/21/22 1520   Post-Procedure Width (cm) 3.6 cm 12/21/22 1520   Post-Procedure Depth (cm) 0.1 cm 12/21/22 1520   Post-Procedure Surface Area (cm^2) 3.6 cm^2 12/21/22 1520   Post-Procedure Volume (cm^3) 0.36 cm^3 12/21/22 1520   Wound Assessment Bleeding 12/21/22 1520   Drainage Amount Moderate 12/21/22 1448   Drainage Description Serosanguinous 12/21/22 1448   Odor None 12/21/22 1448   Jamila-wound Assessment Dry/flaky 12/21/22 1448   Margins Defined edges 12/21/22 1448   Number of days: 180         Total Surface Area Covered 4 sq/cm     Amount Wasted 2 sq/cm    Reason for Waste most appropriate sized product    Was the Product Layered  No     Secured: Yes    Secured With: Apply layer compression wrap  []Steri Strips    []Sutures     []Staples [x]Other    Procedural Pain: 0/10     Post Procedural Pain: 3 / 10    Response to Treatment:  Well tolerated by patient. Assessment:     Problem List Items Addressed This Visit       Peripheral artery disease ([facility] [address] - Primary    Relevant Medications    lidocaine (LMX) 4 % cream    Other Relevant Orders    Initiate Outpatient Wound Care Protocol          Plan:   Patient examined and evaluated   Wound debrided without incident   Application of skin graft  Compression stockings right leg with Lotrisone. Multilayer compression wrap left leg  Follow-up one week  Keep legs elevated at all times when not active. Plan for skin substitute next week. The nature of the patient's condition was explained in depth.  The patient was informed that their compliance to the treatment Plan is paramount to successful healing and prevention of further ulceration and/or infection.   Discharge Treatment follow-up on Friday for dressing change    Written Patient Discharge Instructions Given            Electronically signed by Sánchez Salcedo DPM on 12/21/2022 at 3:30 PM

## 2022-12-21 NOTE — DISCHARGE INSTRUCTIONS
[address], [address]  Telephone: [phone]     Discharge Instructions     Important reminders:     **If you have any signs and symptoms of illness (Cough, fever, congestion, nausea, vomiting, diarrhea, etc.) please call the wound care center prior to your appointment. 1. Increase Protein intake for optimal wound healing  2. No added salt to reduce any swelling  3. If diabetic, maintain good glucose control  4. If you smoke, smoking prohibits wound healing, we ask that you refrain from smoking. 5. When taking antibiotics take the entire prescription as ordered. Do not stop taking until medication is all gone unless otherwise instructed. 6. Exercise as tolerated. 7. Keep weight off wounds and reposition every 2 hours if applicable. 8. If wound(s) is on your lower extremity, elevate legs to the level of the heart or above for 30 minutes 4-5 times a day and/or when sitting. Avoid standing for long periods of time. 9. Do not get wounds wet in bath or shower unless otherwise instructed by your physician. If your wound is on your foot or leg, you may purchase a cast bag. Please ask at the pharmacy. If Vascular testing is ordered, please call [address] [place] [address] [phone]) to schedule. Vascular tests ordered by Wound Care Physicians may take up to 2 hours to complete. Please keep that in mind when scheduling. If Vascular testing is scheduled, please bring supplies to replace your dressing after testing is done. The vascular department does not stock supplies. Wound: Right and Left leg     With each dressing change, rinse wounds with 0.9% Saline. (May use wound wash or soft contact solution. Both can be purchased at a local drug store). If unable to obtain saline, may use a gentle soap and water.      Dressing care:  Left leg -  Theraskin, Transfer, 4x4, and coflex calamine- these will be changed at your next visit unless they slide down or cause pain. If they slide, gets wet, or cause pain please call the wound care center, you may need to come in to be re-wrapped. If you are unable to get to your follow up appointment you will need to remove your wraps at home & place some kind of compression. Dr Keely Lala, Vascular Surgeon September 14th at 1215pm  [address] [address], [address]  Phone# [phone]  Fax# [phone]         Compression Wraps  Location: Left lower leg below knee  Type: Coflex calamine     Your doctor has ordered compression therapy for your wound. Compression bandages reduce the swelling, or edema, in your legs and prevent it from returning. The wound care staff will apply your compression wrap. It must be removed and re-applied at least weekly. As the swelling decreases, the boot no longer provides adequate compression and you need a new one. Once applied, you need to know how to take care of your compression wrap. The boot must stay dry. Do not get it wet in the shower or tub. You may do a partial bath, or you can cover the boot with a large plastic bag, secured at the top, so that no water can get in. Avoid standing in one place for long periods of time. If you must  one place, shift your weight and change positions often. If you have CHF, consult your doctor before following the next two recommendations for leg elevation. When sitting, elevate your legs on pillows, or put blocks under the foot of your bed. Your legs should be higher than your heart. If your boot becomes painful, or you notice an increase in swelling in your toes, numbness or tingling, or purple color to your toes, remove the wrap and call the [facility] [address]. If it is after hours, call your doctor for instructions or go to the nearest emergency room.         Home Care Agency/Facility: [facility]     Your wound-care supplies will be provided by:  Please note, depending on your insurance coverage, you may have out-of-pocket expenses for these supplies. Someone from the company should call you to confirm your order and discuss those potential costs before they ship your products -- please anticipate that call. If your out-of-pocket cost could be substantial, Many companies have financial hardship programs for patients who qualify, so please ask about that if you might need a hand. If you have any questions about your supplies or your potential out-of-pocket costs, or if you need to place an order for a refill of supplies (typically monthly), please call the company directly. Your  is Allison Morrissey     Follow up with Dr Pierce Lozoya in 1 weeks. Wound Care Center Information: Should you experience any significant changes in your wound(s) or have questions about your wound care, please contact the [facility] at [phone] Monday  - Thursday 8:00 am - 4:00 pm and Friday 8:00 am - 1:00pm. If you need help with your wound outside these hours and cannot wait until we are again available, contact your PCP or go to the hospital emergency room. PLEASE NOTE: IF YOU ARE UNABLE TO OBTAIN WOUND SUPPLIES, CONTINUE TO USE THE SUPPLIES YOU HAVE AVAILABLE UNTIL YOU ARE ABLE TO REACH US. IT IS MOST IMPORTANT TO KEEP THE WOUND COVERED AT ALL TIMES.

## 2022-12-21 NOTE — PLAN OF CARE
TheraSkin Treatment Note    NAME:  Jon Gatica  YOB: 1960  MEDICAL RECORD NUMBER:  [id number]  DATE:  12/21/2022    Goal:  Patient will receive safe and proper application of skin substitute. Patient will comply with caring for dressing, offloading and reporting complications. Expiration date of TheraSkin checked immediately prior to use. Package intact prior to use and no damage noted. Transport temperature controlled and acceptable. TheraSkin was prepared for application by removing from package. TheraSkin was rinsed 2 times in room temperature normal saline for 2 minutes each time. A 2nd saline rinse was left on the TheraSkin until the physician was ready to apply it within 120 minutes of thawing. White side goes against ulcer bed. TheraSkin was applied to Left leg and affixed with steri-strips by the physician. Coflex Calamine was applied on top of non-adherent dressing. Fluffed gauze was applied. Dressing was secured with cover roll type tape to stabilize graft. Coban or ace wrap was applied to secure graft and decrease edema. Patient/caregiver was instructed not to remove dressing and to keep it clean and dry. Pt/family/caregiver was instructed on signs and symptoms of complications to report such as draining through, falling down/slipping, getting wet, or severe pain or tingling in toes. Pt/family/caregiver was instructed on need for offloading and elevation of affected extremity (if applicable) and on use of prescribed offloading device. Thera Skin may be applied a total of 10 times per wound over a 12 week period. Date of first application of Thera Skin for this current wound is October 7, 2022.      Application # 4                 Guidelines followed      Electronically signed by David Mosley RN on 12/21/2022 at 3:22 PM

## 2022-12-22 RX ORDER — BENZONATATE 200 MG/1
CAPSULE ORAL
Qty: 30 CAPSULE | Refills: 0 | Status: SHIPPED | OUTPATIENT
Start: 2022-12-22

## 2022-12-24 DIAGNOSIS — L29.9 ITCHING: ICD-10-CM

## 2022-12-28 ENCOUNTER — HOSPITAL ENCOUNTER (OUTPATIENT)
Dept: WOUND CARE | Age: 62
Discharge: HOME | End: 2022-12-28

## 2022-12-28 NOTE — DISCHARGE INSTRUCTIONS
[address], [address]  Telephone: [phone]     Discharge Instructions     Important reminders:     **If you have any signs and symptoms of illness (Cough, fever, congestion, nausea, vomiting, diarrhea, etc.) please call the wound care center prior to your appointment. 1. Increase Protein intake for optimal wound healing  2. No added salt to reduce any swelling  3. If diabetic, maintain good glucose control  4. If you smoke, smoking prohibits wound healing, we ask that you refrain from smoking. 5. When taking antibiotics take the entire prescription as ordered. Do not stop taking until medication is all gone unless otherwise instructed. 6. Exercise as tolerated. 7. Keep weight off wounds and reposition every 2 hours if applicable. 8. If wound(s) is on your lower extremity, elevate legs to the level of the heart or above for 30 minutes 4-5 times a day and/or when sitting. Avoid standing for long periods of time. 9. Do not get wounds wet in bath or shower unless otherwise instructed by your physician. If your wound is on your foot or leg, you may purchase a cast bag. Please ask at the pharmacy. If Vascular testing is ordered, please call [address] [place] [address] [phone]) to schedule. Vascular tests ordered by Wound Care Physicians may take up to 2 hours to complete. Please keep that in mind when scheduling. If Vascular testing is scheduled, please bring supplies to replace your dressing after testing is done. The vascular department does not stock supplies. Wound: Right and Left leg     With each dressing change, rinse wounds with 0.9% Saline. (May use wound wash or soft contact solution. Both can be purchased at a local drug store). If unable to obtain saline, may use a gentle soap and water.      Dressing care:  Left leg -  Theraskin, Transfer, 4x4, and coflex calamine- these will be changed at your next visit unless they slide down or cause pain. If they slide, gets wet, or cause pain please call the wound care center, you may need to come in to be re-wrapped. If you are unable to get to your follow up appointment you will need to remove your wraps at home & place some kind of compression. Dr Brigida You, Vascular Surgeon September 14th at 1215pm  [address] [address], [address]  Phone# [phone]  Fax# [phone]         Compression Wraps  Location: Left lower leg below knee  Type: Coflex calamine     Your doctor has ordered compression therapy for your wound. Compression bandages reduce the swelling, or edema, in your legs and prevent it from returning. The wound care staff will apply your compression wrap. It must be removed and re-applied at least weekly. As the swelling decreases, the boot no longer provides adequate compression and you need a new one. Once applied, you need to know how to take care of your compression wrap. The boot must stay dry. Do not get it wet in the shower or tub. You may do a partial bath, or you can cover the boot with a large plastic bag, secured at the top, so that no water can get in. Avoid standing in one place for long periods of time. If you must  one place, shift your weight and change positions often. If you have CHF, consult your doctor before following the next two recommendations for leg elevation. When sitting, elevate your legs on pillows, or put blocks under the foot of your bed. Your legs should be higher than your heart. If your boot becomes painful, or you notice an increase in swelling in your toes, numbness or tingling, or purple color to your toes, remove the wrap and call the [facility] [address]. If it is after hours, call your doctor for instructions or go to the nearest emergency room.         Home Care Agency/Facility: [facility]     Your wound-care supplies will be provided by:  Please note, depending on your insurance coverage, you may have out-of-pocket expenses for these supplies. Someone from the company should call you to confirm your order and discuss those potential costs before they ship your products -- please anticipate that call. If your out-of-pocket cost could be substantial, Many companies have financial hardship programs for patients who qualify, so please ask about that if you might need a hand. If you have any questions about your supplies or your potential out-of-pocket costs, or if you need to place an order for a refill of supplies (typically monthly), please call the company directly. Your  is Noreen Perez     Follow up with Dr Jurgen Sher in 1 weeks. Wound Care Center Information: Should you experience any significant changes in your wound(s) or have questions about your wound care, please contact the [facility] at [phone] Monday  - Thursday 8:00 am - 4:00 pm and Friday 8:00 am - 1:00pm. If you need help with your wound outside these hours and cannot wait until we are again available, contact your PCP or go to the hospital emergency room. PLEASE NOTE: IF YOU ARE UNABLE TO OBTAIN WOUND SUPPLIES, CONTINUE TO USE THE SUPPLIES YOU HAVE AVAILABLE UNTIL YOU ARE ABLE TO REACH US. IT IS MOST IMPORTANT TO KEEP THE WOUND COVERED AT ALL TIMES. 8

## 2022-12-29 RX ORDER — LANOLIN ALCOHOL/MO/W.PET/CERES
CREAM (GRAM) TOPICAL
Qty: 30 TABLET | Refills: 2 | Status: SHIPPED | OUTPATIENT
Start: 2022-12-29

## 2022-12-29 NOTE — DISCHARGE INSTRUCTIONS
[address], [address]  Telephone: [phone]     Discharge Instructions     Important reminders:     **If you have any signs and symptoms of illness (Cough, fever, congestion, nausea, vomiting, diarrhea, etc.) please call the wound care center prior to your appointment. 1. Increase Protein intake for optimal wound healing  2. No added salt to reduce any swelling  3. If diabetic, maintain good glucose control  4. If you smoke, smoking prohibits wound healing, we ask that you refrain from smoking. 5. When taking antibiotics take the entire prescription as ordered. Do not stop taking until medication is all gone unless otherwise instructed. 6. Exercise as tolerated. 7. Keep weight off wounds and reposition every 2 hours if applicable. 8. If wound(s) is on your lower extremity, elevate legs to the level of the heart or above for 30 minutes 4-5 times a day and/or when sitting. Avoid standing for long periods of time. 9. Do not get wounds wet in bath or shower unless otherwise instructed by your physician. If your wound is on your foot or leg, you may purchase a cast bag. Please ask at the pharmacy. If Vascular testing is ordered, please call [address] [place] [address] [phone]) to schedule. Vascular tests ordered by Wound Care Physicians may take up to 2 hours to complete. Please keep that in mind when scheduling. If Vascular testing is scheduled, please bring supplies to replace your dressing after testing is done. The vascular department does not stock supplies. Wound: Right and Left leg     With each dressing change, rinse wounds with 0.9% Saline. (May use wound wash or soft contact solution. Both can be purchased at a local drug store). If unable to obtain saline, may use a gentle soap and water.      Dressing care:  Left leg -  Triad, gent and collagen powder, absorbing pad, and coflex calamine- these will be changed at your next visit unless they slide down or cause pain. If they slide, gets wet, or cause pain please call the wound care center, you may need to come in to be re-wrapped. If you are unable to get to your follow up appointment you will need to remove your wraps at home & place some kind of compression. Dr Emma Urias, Vascular Surgeon September 14th at 1215pm  [address] [address], [address]  Phone# [phone]  Fax# [phone]         Compression Wraps  Location: Left lower leg below knee  Type: Coflex calamine     Your doctor has ordered compression therapy for your wound. Compression bandages reduce the swelling, or edema, in your legs and prevent it from returning. The wound care staff will apply your compression wrap. It must be removed and re-applied at least weekly. As the swelling decreases, the boot no longer provides adequate compression and you need a new one. Once applied, you need to know how to take care of your compression wrap. The boot must stay dry. Do not get it wet in the shower or tub. You may do a partial bath, or you can cover the boot with a large plastic bag, secured at the top, so that no water can get in. Avoid standing in one place for long periods of time. If you must  one place, shift your weight and change positions often. If you have CHF, consult your doctor before following the next two recommendations for leg elevation. When sitting, elevate your legs on pillows, or put blocks under the foot of your bed. Your legs should be higher than your heart. If your boot becomes painful, or you notice an increase in swelling in your toes, numbness or tingling, or purple color to your toes, remove the wrap and call the [facility] [address]. If it is after hours, call your doctor for instructions or go to the nearest emergency room.         Home Care Agency/Facility: [facility]     Your wound-care supplies will be provided by:  Please note, depending on your insurance coverage, you may have out-of-pocket expenses for these supplies. Someone from the company should call you to confirm your order and discuss those potential costs before they ship your products -- please anticipate that call. If your out-of-pocket cost could be substantial, Many companies have financial hardship programs for patients who qualify, so please ask about that if you might need a hand. If you have any questions about your supplies or your potential out-of-pocket costs, or if you need to place an order for a refill of supplies (typically monthly), please call the company directly. Your  is Anthony Bliss     Follow up with Dr Indy Harper in 1 weeks. Wound Care Center Information: Should you experience any significant changes in your wound(s) or have questions about your wound care, please contact the [facility]  at [phone] Monday  - Thursday 8:00 am - 4:00 pm and Friday 8:00 am - 1:00pm. If you need help with your wound outside these hours and cannot wait until we are again available, contact your PCP or go to the hospital emergency room. PLEASE NOTE: IF YOU ARE UNABLE TO OBTAIN WOUND SUPPLIES, CONTINUE TO USE THE SUPPLIES YOU HAVE AVAILABLE UNTIL YOU ARE ABLE TO REACH US. IT IS MOST IMPORTANT TO KEEP THE WOUND COVERED AT ALL TIMES.

## 2023-01-02 DIAGNOSIS — I73.9 PAD (PERIPHERAL ARTERY DISEASE) (HCC): ICD-10-CM

## 2023-01-02 DIAGNOSIS — I10 ESSENTIAL HYPERTENSION: ICD-10-CM

## 2023-01-02 DIAGNOSIS — E78.00 PURE HYPERCHOLESTEROLEMIA: ICD-10-CM

## 2023-01-03 RX ORDER — GENTAMICIN SULFATE 1 MG/G
CREAM TOPICAL
Qty: 30 G | Refills: 1 | Status: SHIPPED | OUTPATIENT
Start: 2023-01-03

## 2023-01-03 RX ORDER — DOCUSATE SODIUM 50 MG AND SENNOSIDES 8.6 MG 8.6; 5 MG/1; MG/1
TABLET, FILM COATED ORAL
Qty: 60 TABLET | Refills: 0 | Status: SHIPPED | OUTPATIENT
Start: 2023-01-03

## 2023-01-03 RX ORDER — WARFARIN SODIUM 5 MG/1
TABLET ORAL
Qty: 90 TABLET | Refills: 1 | OUTPATIENT
Start: 2023-01-03

## 2023-01-03 RX ORDER — ATORVASTATIN CALCIUM 80 MG/1
TABLET, FILM COATED ORAL
Qty: 90 TABLET | Refills: 0 | Status: SHIPPED | OUTPATIENT
Start: 2023-01-03

## 2023-01-04 ENCOUNTER — HOSPITAL ENCOUNTER (OUTPATIENT)
Dept: WOUND CARE | Age: 63
Discharge: HOME | End: 2023-01-04
Payer: MEDICAID

## 2023-01-04 VITALS
TEMPERATURE: 97.1 F | DIASTOLIC BLOOD PRESSURE: 72 MMHG | RESPIRATION RATE: 16 BRPM | HEART RATE: 90 BPM | SYSTOLIC BLOOD PRESSURE: 117 MMHG

## 2023-01-04 DIAGNOSIS — I73.9 PERIPHERAL ARTERY DISEASE (HCC): Primary | ICD-10-CM

## 2023-01-04 PROCEDURE — 29581 APPL MULTLAYER CMPRN SYS LEG: CPT

## 2023-01-04 PROCEDURE — 11043 DBRDMT MUSC&/FSCA 1ST 20/<: CPT

## 2023-01-04 RX ORDER — LIDOCAINE HYDROCHLORIDE 20 MG/ML
JELLY TOPICAL ONCE
OUTPATIENT
Start: 2023-01-04 | End: 2023-01-04

## 2023-01-04 RX ORDER — BACITRACIN, NEOMYCIN, POLYMYXIN B 400; 3.5; 5 [USP'U]/G; MG/G; [USP'U]/G
OINTMENT TOPICAL ONCE
OUTPATIENT
Start: 2023-01-04 | End: 2023-01-04

## 2023-01-04 RX ORDER — BETAMETHASONE DIPROPIONATE 0.05 %
OINTMENT (GRAM) TOPICAL ONCE
OUTPATIENT
Start: 2023-01-04 | End: 2023-01-04

## 2023-01-04 RX ORDER — BACITRACIN ZINC AND POLYMYXIN B SULFATE 500; 1000 [USP'U]/G; [USP'U]/G
OINTMENT TOPICAL ONCE
OUTPATIENT
Start: 2023-01-04 | End: 2023-01-04

## 2023-01-04 RX ORDER — LIDOCAINE HYDROCHLORIDE 40 MG/ML
SOLUTION TOPICAL ONCE
OUTPATIENT
Start: 2023-01-04 | End: 2023-01-04

## 2023-01-04 RX ORDER — GINSENG 100 MG
CAPSULE ORAL ONCE
OUTPATIENT
Start: 2023-01-04 | End: 2023-01-04

## 2023-01-04 RX ORDER — LIDOCAINE 40 MG/G
CREAM TOPICAL ONCE
Status: DISCONTINUED | OUTPATIENT
Start: 2023-01-04 | End: 2023-01-05 | Stop reason: HOSPADM

## 2023-01-04 RX ORDER — LIDOCAINE 40 MG/G
CREAM TOPICAL ONCE
OUTPATIENT
Start: 2023-01-04 | End: 2023-01-04

## 2023-01-04 RX ORDER — CLOBETASOL PROPIONATE 0.5 MG/G
OINTMENT TOPICAL ONCE
OUTPATIENT
Start: 2023-01-04 | End: 2023-01-04

## 2023-01-04 RX ORDER — LIDOCAINE 50 MG/G
OINTMENT TOPICAL ONCE
OUTPATIENT
Start: 2023-01-04 | End: 2023-01-04

## 2023-01-04 RX ORDER — GENTAMICIN SULFATE 1 MG/G
OINTMENT TOPICAL ONCE
OUTPATIENT
Start: 2023-01-04 | End: 2023-01-04

## 2023-01-04 ASSESSMENT — PAIN SCALES - GENERAL: PAINLEVEL_OUTOF10: 4

## 2023-01-04 ASSESSMENT — PAIN DESCRIPTION - ORIENTATION: ORIENTATION: LEFT

## 2023-01-04 ASSESSMENT — PAIN DESCRIPTION - LOCATION: LOCATION: ANKLE

## 2023-01-04 NOTE — PLAN OF CARE
Discharge instructions given. Patient verbalized understanding. Return to [facility] in 1 week(s).

## 2023-01-04 NOTE — PLAN OF CARE
Multilayer Compression Wrap   Below the Knee    NAME:  Taya Starkey  YOB: 1960  MEDICAL RECORD NUMBER:  [id number]  DATE:  1/4/2023    Multilayer compression wrap: Applied moisturizing agent to dry skin as needed. Applied primary and secondary dressing as ordered. Applied multilayered dressing below the knee to left lower leg. Instructed patient/caregiver not to remove dressing and to keep it clean and dry. Instructed patient/caregiver on complications to report to provider, such as pain, numbness in toes, heavy drainage, and slippage of dressing. Instructed patient on purpose of compression dressing and on activity and exercise recommendations.      Applied  2 layer wrap from toes to knee overlapping each time    Electronically signed by Liz Lopez RN on 1/4/2023 at 3:36 PM

## 2023-01-04 NOTE — PROGRESS NOTES
Iris Quiroz  Progress Note and Procedure Note      Ilya Joseph  AGE: 65 y.o. GENDER: male  : 1960  TODAY'S DATE:  2023    Subjective:     Chief Complaint   Patient presents with    Wound Check     Left lower leg           HISTORY of PRESENT ILLNESS HPI     Ilya Joseph is a 65 y.o. male who presents today for wound evaluation. History of Wound: Admits to having chronic wounds for years. He states that he has had arterial bypasses on both of his legs in the past.  He was seeing previous wound care and podiatry for these wounds. He states that his insurance had some issues and he has not been seeing anyone since 2021. H    Left the dressing intact. He states he does not have any pain unless the wound is touched. He has no other complaints at this time. He has been getting his bandage change regular.   He is having minimal but occasional pain and is having a malignant lung biopsy since his last visit.  wound Pain:  intermittent left  Severity:  3 / 10   Wound Type:  venous, arterial and diabetic  Modifying Factors:  edema, venous stasis, lymphedema, diabetes, decreased mobility, arterial insufficiency and decreased tissue oxygenation  Associated Signs/Symptoms:  drainage, numbness and odor        PAST MEDICAL HISTORY        Diagnosis Date    Cancer ([address]     throat    Diabetes mellitus ([address]     Fibromyalgia     History of DVT (deep vein thrombosis)     Hyperlipidemia     Hypertension     Type 2 diabetes mellitus with circulatory disorder, without long-term current use of insulin ([address] 2022       PAST SURGICAL HISTORY    Past Surgical History:   Procedure Laterality Date    APPENDECTOMY  2005    COLONOSCOPY  2016    FEMORAL BYPASS Left 2020    femoral posterior tibial bypass    FEMORAL-TIBIAL BYPASS GRAFT Right 2020    with femoral endarterectomy and patch angioplasty    FOOT DEBRIDEMENT Left 2020    FOOT DEBRIDEMENT Right 2021    DEBRIDEMENT OF RIGHT FOOT WOUND WITH GRAFT APPLICATION performed by Frank Stafford DPM at [address] N/A 3/25/2022    DIRECT LARYNGOSCOPY WITH BIOPSY AND FROZEN SECTIONS performed by Iggy Thomson DO at [address] Right 2020    stent leg for PVD    TRACHEOSTOMY N/A 3/25/2022    TRACHEOTOMY performed by Iggy Thomson DO at [address] HISTORY    Family History   Problem Relation Age of Onset    Cancer Mother     Lung Cancer Mother     Diabetes Father     Stroke Father        SOCIAL HISTORY    Social History     Tobacco Use    Smoking status: Former     Packs/day: 0.50     Years: 20.00     Pack years: 10.00     Types: Cigarettes     Start date: 1977     Quit date: 2021     Years since quittin.0    Smokeless tobacco: Never   Vaping Use    Vaping Use: Never used   Substance Use Topics    Alcohol use: Yes     Comment: 2 beers / day     Drug use: Never       ALLERGIES    Allergies   Allergen Reactions    Chantix [Varenicline] Other (See Comments)     Hallucinations         MEDICATIONS    Current Outpatient Medications on File Prior to Encounter   Medication Sig Dispense Refill    STIMULANT LAXATIVE 8.6-50 MG per tablet TAKE 1 TABLET BY MOUTH TWICE DAILY 60 tablet 0    atorvastatin (LIPITOR) 80 MG tablet TAKE 1 TABLET BY MOUTH DAILY 90 tablet 0    gentamicin (GARAMYCIN) 0.1 % cream APPLY TOPICALLY TO THE AFFECTED AREA DAILY 30 g 1    melatonin 3 MG TABS tablet TAKE 2 TABLETS BY MOUTH EVERY NIGHT AS NEEDED FOR INSOMNIA 30 tablet 2    benzonatate (TESSALON) 200 MG capsule TAKE 1 CAPSULE BY MOUTH THREE TIMES DAILY AS NEEDED FOR COUGH 30 capsule 0    budesonide-formoterol (SYMBICORT) 160-4.5 MCG/ACT AERO Inhale 2 puffs into the lungs 2 times daily 10.2 g 2    vitamin B-12 (CYANOCOBALAMIN) 250 MCG tablet TAKE 1 TABLET BY MOUTH DAILY 30 tablet 0    BANOPHEN 25 MG tablet TAKE 1 TABLET BY MOUTH EVERY 6 HOURS AS NEEDED FOR ITCHING 60 tablet 0    formoterol (PERFOROMIST) 20 MCG/2ML nebulizer solution Take 2 mLs by nebulization every 12 hours 2 mL 5    budesonide (PULMICORT) 0.5 MG/2ML nebulizer suspension Take 2 mLs by nebulization 2 times daily 60 each 2    metFORMIN (GLUCOPHAGE) 500 MG tablet TAKE 1 TABLET BY MOUTH TWICE DAILY WITH MEALS 60 tablet 0    lisinopril (PRINIVIL;ZESTRIL) 20 MG tablet TAKE 1 TABLET BY MOUTH EVERY DAY 30 tablet 0    metoprolol succinate (TOPROL XL) 25 MG extended release tablet Take 25 mg by mouth daily      warfarin (COUMADIN) 5 MG tablet Take daily as directed by anti coag clinic to maintain INR 2-3 (Patient taking differently: Take warfarin 15 mg every Monday and Friday, and 10 mg all the other days or as direct by [facility] [phone]) 90 tablet 1    acetaminophen (TYLENOL) 500 MG tablet Take 1 tablet by mouth 4 times daily as needed for Pain 360 tablet 1    BANOPHEN 25 MG capsule Take 1 tablet by mouth every 6 hours as needed for Itching      ondansetron (ZOFRAN-ODT) 4 MG disintegrating tablet Take 4 mg by mouth every 8 hours as needed for Nausea or Vomiting      Multiple Vitamins-Minerals (MULTIVITAMIN ADULT EXTRA C PO) 1 tablet by Nasogastric route daily      esomeprazole (NEXIUM) 20 MG delayed release capsule 20 mg by Nasogastric route every morning (before breakfast)      oxyCODONE HCl (OXY-IR) 10 MG immediate release tablet TAKE 1 TABLET BY MOUTH EVERY SIX HOURS AS NEEDED FOR PAIN FOR UP TO 7 DAYS      becaplermin (REGRANEX) 0.01 % gel Apply 1 Applicatorful topically daily      calcium-vitamin D (OSCAL-500) 500-200 MG-UNIT per tablet Take 1 tablet by mouth 2 times daily      ipratropium-albuterol (DUONEB) 0.5-2.5 (3) MG/3ML SOLN nebulizer solution Inhale 3 mLs into the lungs 4 times daily 360 mL 3    fluticasone (FLONASE) 50 MCG/ACT nasal spray 1 spray by Nasal route daily 9.9 g 5    Vitamins/Minerals TABS Take 1 tablet by mouth daily        No current facility-administered medications on file prior to encounter. REVIEW OF SYSTEMS    Pertinent items are noted in HPI. Objective:      /72   Pulse 90   Temp 97.1 °F (36.2 °C) (Infrared)   Resp 16     PHYSICAL EXAM    Vascular: Vascular status Impaired  palpable pedal pulses, right DP0/4 and PT1/4, left DP0/4 and PT1/4. CFT 3 seconds digits 1 to 5 bilateral.  Hair growthAbsent  both lower extremities and feet. Skin temperature is warm to warm from pretibial area to distal digits bilateral.  Exam is negative for rubor, pallor, cyanosis or signs of acute vascular compromise bilaterally. Exam is positive for edema bilateral lower extremity. Varicosities Present bilateral lower extremity. Neuro: Neurologic status diminished bilateral with epicritic Absent  , proprioceptive Absent , vibratory sensation Absent  and protopathicPresent. DTRs Absent  bilateral Achilles. There were no reproducible neuritic symptoms on exam bilateral feet/ankles. Derm: Ulceration to bilateral ankles. Ecchymosis Absent  bilateral feet/foot. Musculoskeletal: No pain with debridement of wounds 5/5 muscle strength in/eversion and dorsi/plantarflexion bilateral feet. No gross instability noted. Procedure Note    Performed by: Jesús Pruitt DPM    Consent obtained: Yes    Time out taken:  Yes    Pain Control: Anesthetic  Anesthetic: 4% Topical Xylocaine     Debridement:Excisional Debridement    Using curette the wound was sharply debrided    down through and including the removal of epidermis, dermis, subcutaneous tissue, and muscle/fascia.         Devitalized Tissue Debrided:  fibrin, biofilm, slough, necrotic/eschar, and exudate    Pre Debridement Measurements:  Are located in the Wound Documentation Flow Sheet    Wound #: 1   Wound Care Documentation:  Wound 06/24/22 Ankle Left;Medial #1 (Active)   Wound Image   12/08/22 1032   Wound Etiology Venous 01/04/23 1448   Wound Cleansed Wound cleanser 01/04/23 1448   Dressing/Treatment Collagen 11/23/22 1104 Offloading for Diabetic Foot Ulcers Offloading not ordered 11/18/22 0849   Wound Length (cm) 1.5 cm 01/04/23 1448   Wound Width (cm) 3.6 cm 01/04/23 1448   Wound Depth (cm) 0.2 cm 01/04/23 1448   Wound Surface Area (cm^2) 5.4 cm^2 01/04/23 1448   Change in Wound Size % (l*w) 21.05 01/04/23 1448   Wound Volume (cm^3) 1.08 cm^3 01/04/23 1448   Wound Healing % -58 01/04/23 1448   Post-Procedure Length (cm) 1.5 cm 01/04/23 1514   Post-Procedure Width (cm) 3.6 cm 01/04/23 1514   Post-Procedure Depth (cm) 0.2 cm 01/04/23 1514   Post-Procedure Surface Area (cm^2) 5.4 cm^2 01/04/23 1514   Post-Procedure Volume (cm^3) 1.08 cm^3 01/04/23 1514   Wound Assessment Bleeding 01/04/23 1514   Drainage Amount Small 01/04/23 1448   Drainage Description Serous 01/04/23 1448   Odor None 01/04/23 1448   Jamila-wound Assessment Dry/flaky 01/04/23 1448   Margins Defined edges 01/04/23 1448   Number of days: 194         Total Surface Area Debrided:  5.4sq cm     Percentage of wound debrided 100%    Bleeding:  Minimal    Hemostasis Achieved:  by pressure    Procedural Pain:  5  / 10     Post Procedural Pain:  0 / 10     Response to treatment:  Well tolerated by patient. Assessment:     Problem List Items Addressed This Visit       Peripheral artery disease ([address] - Primary    Relevant Medications    lidocaine (LMX) 4 % cream    Other Relevant Orders    Initiate Outpatient Wound Care Protocol          Plan:   Patient examined and evaluated   Wound debrided without incident   Compression stockings right leg with Lotrisone. Multilayer compression wrap left leg  Follow-up one week  Keep legs elevated at all times when not active. Plan for skin substitute next week. The nature of the patient's condition was explained in depth. The patient was informed that their compliance to the treatment Plan is paramount to successful healing and prevention of further ulceration and/or infection.   Discharge Treatment follow-up on Friday for dressing change    Written Patient Discharge Instructions Given            Electronically signed by Christian Linn DPM on 1/4/2023 at 3:25 PM

## 2023-01-06 ENCOUNTER — ANTI-COAG VISIT (OUTPATIENT)
Dept: PHARMACY | Age: 63
End: 2023-01-06
Payer: MEDICAID

## 2023-01-06 DIAGNOSIS — I73.9 PERIPHERAL ARTERY DISEASE (HCC): Primary | ICD-10-CM

## 2023-01-06 LAB — INTERNATIONAL NORMALIZATION RATIO, POC: 2.9

## 2023-01-06 PROCEDURE — 85610 PROTHROMBIN TIME: CPT

## 2023-01-06 PROCEDURE — 99211 OFF/OP EST MAY X REQ PHY/QHP: CPT

## 2023-01-06 NOTE — PROGRESS NOTES
Gege Gerardo is a 65 y.o. here for warfarin management. Cira Morrison had an INR test today. Results were reviewed and appropriate warfarin management was completed. This visit was performed as: An in person visit. Protocols were followed with precautions to reduce the spread of COVID-19. Patient verifies current warfarin dosing regimen: Yes     Warfarin medication reviewed and updated on the patient 's home medication list: Yes   All other medications reviewed and updated on the patient 's home medication list: No: No medication changes reported     Lab Results   Component Value Date    INR 2.9 2023    INR 2.60 2022    INR 3.6 2022       Patient Findings       Positives:  Change in medications    Negatives:  Signs/symptoms of thrombosis, Signs/symptoms of bleeding, Upcoming invasive procedure, Emergency department visit, Upcoming dental procedure, Missed doses, Extra doses, Change in diet/appetite, Hospital admission, Bruising    Comments: In 2 weeks will be starting immunotherapy. Oncologist through    Inquired about starting Xarelto per oncologists suggestion. Advised oncologist would prescribe if preferred and advised to let us know if that changes. Anticoagulation Summary  As of 2023      INR goal:  2.0-3.0   TTR:  49.7 % (1.3 y)   INR used for dosin.9 (2023)   Warfarin maintenance plan:  15 mg (5 mg x 3) every Mon, Fri; 10 mg (5 mg x 2) all other days; Starting 2023   Weekly warfarin total:  80 mg   Plan last modified:  Sharan Duncan RPH (10/6/2022)   Next INR check:  2/3/2023   Priority:  High   Target end date:   Indefinite    Indications    Peripheral artery disease ([facility] [address] [I73.9]                 Anticoagulation Episode Summary       INR check location:  Anticoagulation Clinic    Preferred lab:      Send INR reminders to:  [facility]    Comments:  [facility]          Anticoagulation Care Providers       Provider Role Specialty Phone number    Trev Benítez MD Referring Family Medicine [phone]            There were no vitals taken for this visit. Warfarin assessment / plan:     Appears well. No changes affecting warfarin therapy were noted. No acute findings. INR within goal range. No change to warfarin dosing today. Of note patient had recent lung biopsy. His oncologist at [facility] will be starting him on immunotherapy in 2 weeks. Oncologist told him to inquire to us about taking rivaroxaban. Counseled on the difference between warfarin and rivaroxaban. Advised to call the oncologist, to see if he would send in a prescription to see how expensive it is with his insurance. If this happens and is covered well, he will let us know. Description    CONTINUE:  Warfarin 10 mg daily EXCEPT 15 mg every Monday and Friday. Drinking 9 Boost shakes per day. Please let us know if this changes. Call [phone] with signs or symptoms of bleeding or ANY medication changes (including over-the-counter medications or herbal supplements). If significant bleeding occurs please seek immediate medical attention. Keep the number of servings of vitamin K containing foods (dark green, leafy vegetables) the same each week. Dark green vegetable 2-3 times a week and a mixed green salad ~ 3 times a week. Please call if this changes. Limit alcohol intake. Please call if this changes.           Immunization History   Administered Date(s) Administered    COVID-19, PFIZER PURPLE top, DILUTE for use, (age 15 y+), 30mcg/0.3mL 03/15/2021, 04/12/2021, 12/22/2021    Influenza Virus Vaccine 01/21/2017, 01/21/2017    Influenza, FLUARIX, FLULAVAL, Sophronia Marker (age 10 mo+) AND AFLURIA, (age 1 y+), PF, 0.5mL 10/31/2020    PPD Test 04/14/2022, 04/23/2022, 06/03/2022    Tdap (Boostrix, Adacel) 03/27/2018    Zoster Recombinant (Shingrix) 03/27/2018           Orders Placed This Encounter   Procedures    POCT INR     This external order was created through the results console. No orders of the defined types were placed in this encounter. Reviewed AVS with patient / caregiver.     Billing Points:    Basic Education (disease state, med overview, expected symptoms) - 1 point        CLINICAL PHARMACY CONSULT: MED RECONCILIATION/REVIEW [address] Tracking Only    Intervention Detail:   Total # of Interventions Recommended: 0  Total # of Interventions Accepted: 0  Time Spent (min): 15

## 2023-01-09 NOTE — DISCHARGE INSTRUCTIONS
[address], [address]  Telephone: [phone]     Discharge Instructions     Important reminders:     **If you have any signs and symptoms of illness (Cough, fever, congestion, nausea, vomiting, diarrhea, etc.) please call the wound care center prior to your appointment. 1. Increase Protein intake for optimal wound healing  2. No added salt to reduce any swelling  3. If diabetic, maintain good glucose control  4. If you smoke, smoking prohibits wound healing, we ask that you refrain from smoking. 5. When taking antibiotics take the entire prescription as ordered. Do not stop taking until medication is all gone unless otherwise instructed. 6. Exercise as tolerated. 7. Keep weight off wounds and reposition every 2 hours if applicable. 8. If wound(s) is on your lower extremity, elevate legs to the level of the heart or above for 30 minutes 4-5 times a day and/or when sitting. Avoid standing for long periods of time. 9. Do not get wounds wet in bath or shower unless otherwise instructed by your physician. If your wound is on your foot or leg, you may purchase a cast bag. Please ask at the pharmacy. If Vascular testing is ordered, please call [address] [place] [address] ([phone]) to schedule. Vascular tests ordered by Wound Care Physicians may take up to 2 hours to complete. Please keep that in mind when scheduling. If Vascular testing is scheduled, please bring supplies to replace your dressing after testing is done. The vascular department does not stock supplies. Wound: Right and Left leg     With each dressing change, rinse wounds with 0.9% Saline. (May use wound wash or soft contact solution. Both can be purchased at a local drug store). If unable to obtain saline, may use a gentle soap and water.      Dressing care:  Left leg -  Triad, gent and collagen powder, absorbing pad, and coflex calamine- these will be changed at your next visit unless they slide down or cause pain. If they slide, gets wet, or cause pain please call the wound care center, you may need to come in to be re-wrapped. If you are unable to get to your follow up appointment you will need to remove your wraps at home & place some kind of compression. Nails Clipped 1/13/2023      Dr Randy Wayne, Vascular Surgeon September 14th at 1215pm  [address] [address], [address]  Phone# [phone]  Fax# [phone]         Compression Wraps  Location: Left lower leg below knee  Type: Coflex calamine     Your doctor has ordered compression therapy for your wound. Compression bandages reduce the swelling, or edema, in your legs and prevent it from returning. The wound care staff will apply your compression wrap. It must be removed and re-applied at least weekly. As the swelling decreases, the boot no longer provides adequate compression and you need a new one. Once applied, you need to know how to take care of your compression wrap. The boot must stay dry. Do not get it wet in the shower or tub. You may do a partial bath, or you can cover the boot with a large plastic bag, secured at the top, so that no water can get in. Avoid standing in one place for long periods of time. If you must  one place, shift your weight and change positions often. If you have CHF, consult your doctor before following the next two recommendations for leg elevation. When sitting, elevate your legs on pillows, or put blocks under the foot of your bed. Your legs should be higher than your heart. If your boot becomes painful, or you notice an increase in swelling in your toes, numbness or tingling, or purple color to your toes, remove the wrap and call the [place] [address]. If it is after hours, call your doctor for instructions or go to the nearest emergency room.         Home Care Agency/Facility: [facility]     Your wound-care supplies will be provided by:  Please note, depending on your insurance coverage, you may have out-of-pocket expenses for these supplies. Someone from the company should call you to confirm your order and discuss those potential costs before they ship your products -- please anticipate that call. If your out-of-pocket cost could be substantial, Many companies have financial hardship programs for patients who qualify, so please ask about that if you might need a hand. If you have any questions about your supplies or your potential out-of-pocket costs, or if you need to place an order for a refill of supplies (typically monthly), please call the company directly. Your  is Anson See     Follow up with Dr Rhonda Gore in 1 weeks. Wound Care Center Information: Should you experience any significant changes in your wound(s) or have questions about your wound care, please contact the [facility] [employer] [employer] at [phone] Monday  - Thursday 8:00 am - 4:00 pm and Friday 8:00 am - 1:00pm. If you need help with your wound outside these hours and cannot wait until we are again available, contact your PCP or go to the hospital emergency room. PLEASE NOTE: IF YOU ARE UNABLE TO OBTAIN WOUND SUPPLIES, CONTINUE TO USE THE SUPPLIES YOU HAVE AVAILABLE UNTIL YOU ARE ABLE TO REACH US. IT IS MOST IMPORTANT TO KEEP THE WOUND COVERED AT ALL TIMES.

## 2023-01-12 ENCOUNTER — TELEPHONE (OUTPATIENT)
Dept: PHARMACY | Age: 63
End: 2023-01-12

## 2023-01-12 ENCOUNTER — TELEPHONE (OUTPATIENT)
Dept: INTERNAL MEDICINE CLINIC | Age: 63
End: 2023-01-12

## 2023-01-12 NOTE — TELEPHONE ENCOUNTER
We received a call from the interventional radiology department(Angella) at [facility]. Mr. Damaris Ramos is scheduled to have a port placed on 1-23-23.  would like Roselyn Choco to hold his warfarin for 5 days prior to the procedure. I spoke to Richard Early wife, who was already aware of the warfarin hold. He will take his last dose on 1-17-23. We will check his INR a week after his procedure.     [address]  Anticoagulation Service  [phone]

## 2023-01-12 NOTE — TELEPHONE ENCOUNTER
Angella from [facility] is calling need verbal order to stop coumadin 5 days before pt has port put in 1/31----please call Lashawn Armstrong or Katerine Hill at [phone]. Thanks.

## 2023-01-13 ENCOUNTER — HOSPITAL ENCOUNTER (OUTPATIENT)
Dept: WOUND CARE | Age: 63
Discharge: HOME | End: 2023-01-13
Payer: MEDICAID

## 2023-01-13 VITALS
DIASTOLIC BLOOD PRESSURE: 67 MMHG | WEIGHT: 170.9 LBS | HEART RATE: 90 BPM | SYSTOLIC BLOOD PRESSURE: 131 MMHG | BODY MASS INDEX: 25.9 KG/M2 | HEIGHT: 68 IN

## 2023-01-13 DIAGNOSIS — I73.9 PERIPHERAL ARTERY DISEASE (HCC): Primary | ICD-10-CM

## 2023-01-13 PROCEDURE — 11721 DEBRIDE NAIL 6 OR MORE: CPT

## 2023-01-13 PROCEDURE — 11043 DBRDMT MUSC&/FSCA 1ST 20/<: CPT

## 2023-01-13 PROCEDURE — 29581 APPL MULTLAYER CMPRN SYS LEG: CPT

## 2023-01-13 RX ORDER — LIDOCAINE HYDROCHLORIDE 40 MG/ML
SOLUTION TOPICAL ONCE
OUTPATIENT
Start: 2023-01-13 | End: 2023-01-13

## 2023-01-13 RX ORDER — LIDOCAINE 40 MG/G
CREAM TOPICAL ONCE
OUTPATIENT
Start: 2023-01-13 | End: 2023-01-13

## 2023-01-13 RX ORDER — LIDOCAINE 40 MG/G
CREAM TOPICAL ONCE
Status: DISCONTINUED | OUTPATIENT
Start: 2023-01-13 | End: 2023-01-14 | Stop reason: HOSPADM

## 2023-01-13 RX ORDER — LIDOCAINE 50 MG/G
OINTMENT TOPICAL ONCE
OUTPATIENT
Start: 2023-01-13 | End: 2023-01-13

## 2023-01-13 RX ORDER — LIDOCAINE HYDROCHLORIDE 20 MG/ML
JELLY TOPICAL ONCE
OUTPATIENT
Start: 2023-01-13 | End: 2023-01-13

## 2023-01-13 RX ORDER — BACITRACIN, NEOMYCIN, POLYMYXIN B 400; 3.5; 5 [USP'U]/G; MG/G; [USP'U]/G
OINTMENT TOPICAL ONCE
OUTPATIENT
Start: 2023-01-13 | End: 2023-01-13

## 2023-01-13 RX ORDER — BETAMETHASONE DIPROPIONATE 0.05 %
OINTMENT (GRAM) TOPICAL ONCE
OUTPATIENT
Start: 2023-01-13 | End: 2023-01-13

## 2023-01-13 RX ORDER — BACITRACIN ZINC AND POLYMYXIN B SULFATE 500; 1000 [USP'U]/G; [USP'U]/G
OINTMENT TOPICAL ONCE
OUTPATIENT
Start: 2023-01-13 | End: 2023-01-13

## 2023-01-13 RX ORDER — GENTAMICIN SULFATE 1 MG/G
OINTMENT TOPICAL ONCE
OUTPATIENT
Start: 2023-01-13 | End: 2023-01-13

## 2023-01-13 RX ORDER — CLOBETASOL PROPIONATE 0.5 MG/G
OINTMENT TOPICAL ONCE
OUTPATIENT
Start: 2023-01-13 | End: 2023-01-13

## 2023-01-13 RX ORDER — GINSENG 100 MG
CAPSULE ORAL ONCE
OUTPATIENT
Start: 2023-01-13 | End: 2023-01-13

## 2023-01-13 ASSESSMENT — PAIN SCALES - GENERAL: PAINLEVEL_OUTOF10: 4

## 2023-01-13 ASSESSMENT — PAIN DESCRIPTION - DESCRIPTORS: DESCRIPTORS: SORE

## 2023-01-13 ASSESSMENT — PAIN - FUNCTIONAL ASSESSMENT: PAIN_FUNCTIONAL_ASSESSMENT: ACTIVITIES ARE NOT PREVENTED

## 2023-01-13 ASSESSMENT — PAIN DESCRIPTION - LOCATION: LOCATION: LEG

## 2023-01-13 ASSESSMENT — PAIN DESCRIPTION - FREQUENCY: FREQUENCY: CONTINUOUS

## 2023-01-13 ASSESSMENT — PAIN DESCRIPTION - ORIENTATION: ORIENTATION: LEFT

## 2023-01-13 ASSESSMENT — PAIN DESCRIPTION - ONSET: ONSET: ON-GOING

## 2023-01-13 NOTE — PROGRESS NOTES
Multilayer Compression Wrap   Below the Knee    NAME:  Goldy Martinez  YOB: 1960  MEDICAL RECORD NUMBER:  [id number]  DATE:  1/13/2023    Multilayer compression wrap: Applied primary and secondary dressing as ordered. Applied multilayered dressing below the knee to left lower leg. Instructed patient/caregiver not to remove dressing and to keep it clean and dry. Instructed patient/caregiver on complications to report to provider, such as pain, numbness in toes, heavy drainage, and slippage of dressing. Instructed patient on purpose of compression dressing and on activity and exercise recommendations.      Applied  2 layer wrap from toes to knee overlapping each time    Electronically signed by Paddy Ormond, RN on 1/13/2023 at 9:03 AM

## 2023-01-13 NOTE — PLAN OF CARE
Insurance Verification Request            Ordering Center:     [facility]  [address] [facility], [address] [place] [address]  Telephone:  [phone]    Facility NPI: [id number]  Facility Tax ID [id number]    Yanelis Garcia RN    Provider Information:     Provider's Name and NPI Rene Nichols  [id number]      Patient Information:      Honey Bundy [facility]   [phone]   : 1960  AGE: 65 y.o. GENDER: male   TODAYS DATE:      Application/product Information:     Benefit Verification Request:    Reason for Request: New Insurance     [employer] Fax# [phone]    Trunk/Arms/Legs [address] (1st 25 sq cm)    Epifix and Epicord    Has patient been treated with any other Skin Substitute for this Wound:   Yes Name of the product or produces Rojelio Goodell, Number of previous applications 4, Wound size 6.12 sq/cm, Number of wounds 1,  Location of wound Left leg, Duration of wound 6 months, Estimated number of applications 6     If yes, How many previous applications: 4    WOUND #: 1    Total Square CM: 6.12    Procedure setting: Hospital Outpatient Department POS 22    Is the Patient currently in a skilled nursing facility: No     Is the wound work related: No    Procedure date: 2023      Insurance:        PRIMARY INSURANCE:  Plan: [employer] DEPT OF JOB  Coverage[place]  Effective Date: 2022  Group Number: [unfilled]  Subscriber Number: [id number] - (Medicaid)    Payer/Plan Subscr  Sex Relation Sub. Ins. ID Effective Group Num   1.  [place] -* LINDA LEIVA 1960 Male Self [id number]                                    [address]       Patient Information:     Dx Codes: S99.538, I87.2    Problem List Items Addressed This Visit       Peripheral artery disease ([facility] [address] - Primary    Relevant Medications    lidocaine (LMX) 4 % cream (Start on 2023  8:45 AM)    Other Relevant Orders    Initiate Outpatient Wound Care Protocol       Advanced Modality Checklist  Is Wound Greater than 1.0 sq cm? : Y (9/23/2022  9:00 AM)  Has the Wound had Documented Treatment for 30 Days?: Y (9/23/2022  9:00 AM)  Recurrent Wound with Skin Substitute within Last Year?: N (9/23/2022  9:00 AM)  Radiologic Testing in Last 3 Months?: N/A (9/23/2022  9:00 AM)  Vascular Assessment in Last 6 Months: Y (9/23/2022  9:00 AM)  Smoking Status: Non- Smoker (9/23/2022  9:00 AM)  Nutrition Assessed: Y (9/23/2022  9:00 AM)  Wound Free from Infection : Wound Culture Completed (9/23/2022  9:00 AM)  Is Wound Free of [address], [address], and/or [place]?: Y (9/23/2022  9:00 AM)  Malignant Process in Wound : N/A (9/23/2022  9:00 AM)  Hemoglobin A1C in Last 3 Months?: Y (9/23/2022  9:00 AM)  Hemoglobin A1C Last Result : 6.3 (9/23/2022  9:00 AM)  Offloading for Diabetic Foot Ulcer?: N/A (9/23/2022  9:00 AM)  Compression Therapy of 20 mmHg or Greater?: Y; Multi Layer Compression (9/23/2022  9:00 AM)         Is the Patient a Diabetic: Yes    HgBA1c:    Lab Results   Component Value Date/Time    LABA1C 6.3 05/05/2022 01:39 PM        Wound 06/24/22 Ankle Left;Medial #1 (Active)   Wound Image   12/08/22 1032   Wound Etiology Venous 01/13/23 0815   Wound Cleansed Wound cleanser 01/13/23 0815   Dressing/Treatment Collagen 11/23/22 1104   Offloading for Diabetic Foot Ulcers Offloading not ordered 11/18/22 0849   Wound Length (cm) 1.7 cm 01/13/23 0815   Wound Width (cm) 3.6 cm 01/13/23 0815   Wound Depth (cm) 0.2 cm 01/13/23 0815   Wound Surface Area (cm^2) 6.12 cm^2 01/13/23 0815   Change in Wound Size % (l*w) 10.53 01/13/23 0815   Wound Volume (cm^3) 1.224 cm^3 01/13/23 0815   Wound Healing % -79 01/13/23 0815   Post-Procedure Length (cm) 1.7 cm 01/13/23 0828   Post-Procedure Width (cm) 3.6 cm 01/13/23 0828   Post-Procedure Depth (cm) 0.2 cm 01/13/23 0828   Post-Procedure Surface Area (cm^2) 6.12 cm^2 01/13/23 0828   Post-Procedure Volume (cm^3) 1.224 cm^3 01/13/23 0828   Wound Assessment Bleeding 01/13/23 0828   Drainage Amount Small 01/13/23 0815   Drainage Description Serosanguinous 01/13/23 0815   Odor None 01/13/23 0815   Jamila-wound Assessment Fragile 01/13/23 0815   Margins Defined edges 01/13/23 0815   Number of days: 982       Provider Information:         Electronically signed by Russel Champion RN on 1/13/2023 at 8:32 AM

## 2023-01-13 NOTE — PLAN OF CARE
Discharge instructions given. Patient verbalized understanding. Return to [facility] in 1 week(s).

## 2023-01-13 NOTE — PROGRESS NOTES
Iris Quiroz  Progress Note and Procedure Note      Justyna Aguilar  AGE: 65 y.o. GENDER: male  : 1960  TODAY'S DATE:  2023    Subjective:     Chief Complaint   Patient presents with    Wound Check     Left Leg F/U           HISTORY of PRESENT ILLNESS HPI     Justyna Aguilar is a 65 y.o. male who presents today for wound evaluation. History of Wound: Admits to having chronic wounds for years. He states that he has had arterial bypasses on both of his legs in the past.  He was seeing previous wound care and podiatry for these wounds. He states that his insurance had some issues and he has not been seeing anyone since 2021. He left the dressing intact. He states he does not have any pain unless the wound is touched. He has no other complaints at this time. He has been getting his bandage change regular. He is having minimal but occasional pain and is having a malignant lung biopsy since his last visit. Will be starting oral chemo.   He has complaints of painful elongated toenails that he cannot trim on his own.  wound Pain:  intermittent left  Severity:  3 / 10   Wound Type:  venous, arterial and diabetic  Modifying Factors:  edema, venous stasis, lymphedema, diabetes, decreased mobility, arterial insufficiency and decreased tissue oxygenation  Associated Signs/Symptoms:  drainage, numbness and odor        PAST MEDICAL HISTORY        Diagnosis Date    Cancer ([address]     throat    Diabetes mellitus ([address]     Fibromyalgia     History of DVT (deep vein thrombosis)     Hyperlipidemia     Hypertension     Type 2 diabetes mellitus with circulatory disorder, without long-term current use of insulin ([address] 2022       PAST SURGICAL HISTORY    Past Surgical History:   Procedure Laterality Date    APPENDECTOMY  2005    COLONOSCOPY  2016    FEMORAL BYPASS Left 2020    femoral posterior tibial bypass    FEMORAL-TIBIAL BYPASS GRAFT Right 2020    with femoral endarterectomy and patch angioplasty    FOOT DEBRIDEMENT Left 2020    FOOT DEBRIDEMENT Right 2021    DEBRIDEMENT OF RIGHT FOOT WOUND WITH GRAFT APPLICATION performed by Michaela Lane DPM at [facility] N/A 3/25/2022    DIRECT LARYNGOSCOPY WITH BIOPSY AND FROZEN SECTIONS performed by Piyush Padilla DO at [facility] Right 2020    stent leg for PVD    TRACHEOSTOMY N/A 3/25/2022    TRACHEOTOMY performed by Piyush Padilla DO at [facility] HISTORY    Family History   Problem Relation Age of Onset    Cancer Mother     Lung Cancer Mother     Diabetes Father     Stroke Father        SOCIAL HISTORY    Social History     Tobacco Use    Smoking status: Former     Packs/day: 0.50     Years: 20.00     Pack years: 10.00     Types: Cigarettes     Start date: 1977     Quit date: 2021     Years since quittin.0    Smokeless tobacco: Never   Vaping Use    Vaping Use: Never used   Substance Use Topics    Alcohol use: Yes     Comment: 2 beers / day     Drug use: Never       ALLERGIES    Allergies   Allergen Reactions    Chantix [Varenicline] Other (See Comments)     Hallucinations         MEDICATIONS    Current Outpatient Medications on File Prior to Encounter   Medication Sig Dispense Refill    STIMULANT LAXATIVE 8.6-50 MG per tablet TAKE 1 TABLET BY MOUTH TWICE DAILY 60 tablet 0    atorvastatin (LIPITOR) 80 MG tablet TAKE 1 TABLET BY MOUTH DAILY 90 tablet 0    gentamicin (GARAMYCIN) 0.1 % cream APPLY TOPICALLY TO THE AFFECTED AREA DAILY 30 g 1    melatonin 3 MG TABS tablet TAKE 2 TABLETS BY MOUTH EVERY NIGHT AS NEEDED FOR INSOMNIA 30 tablet 2    benzonatate (TESSALON) 200 MG capsule TAKE 1 CAPSULE BY MOUTH THREE TIMES DAILY AS NEEDED FOR COUGH 30 capsule 0    budesonide-formoterol (SYMBICORT) 160-4.5 MCG/ACT AERO Inhale 2 puffs into the lungs 2 times daily 10.2 g 2    vitamin B-12 (CYANOCOBALAMIN) 250 MCG tablet TAKE 1 TABLET BY MOUTH DAILY 30 tablet 0    BANOPHEN 25 MG tablet TAKE 1 TABLET BY MOUTH EVERY 6 HOURS AS NEEDED FOR ITCHING 60 tablet 0    formoterol (PERFOROMIST) 20 MCG/2ML nebulizer solution Take 2 mLs by nebulization every 12 hours 2 mL 5    budesonide (PULMICORT) 0.5 MG/2ML nebulizer suspension Take 2 mLs by nebulization 2 times daily 60 each 2    metFORMIN (GLUCOPHAGE) 500 MG tablet TAKE 1 TABLET BY MOUTH TWICE DAILY WITH MEALS 60 tablet 0    lisinopril (PRINIVIL;ZESTRIL) 20 MG tablet TAKE 1 TABLET BY MOUTH EVERY DAY 30 tablet 0    metoprolol succinate (TOPROL XL) 25 MG extended release tablet Take 25 mg by mouth daily      warfarin (COUMADIN) 5 MG tablet Take daily as directed by anti coag clinic to maintain INR 2-3 (Patient taking differently: Take warfarin 15 mg every Monday and Friday, and 10 mg all the other days or as direct by [facility] Coumadin [facility] [phone]) 90 tablet 1    acetaminophen (TYLENOL) 500 MG tablet Take 1 tablet by mouth 4 times daily as needed for Pain 360 tablet 1    BANOPHEN 25 MG capsule Take 1 tablet by mouth every 6 hours as needed for Itching      ondansetron (ZOFRAN-ODT) 4 MG disintegrating tablet Take 4 mg by mouth every 8 hours as needed for Nausea or Vomiting      Multiple Vitamins-Minerals (MULTIVITAMIN ADULT EXTRA C PO) 1 tablet by Nasogastric route daily      esomeprazole (NEXIUM) 20 MG delayed release capsule 20 mg by Nasogastric route every morning (before breakfast)      oxyCODONE HCl (OXY-IR) 10 MG immediate release tablet TAKE 1 TABLET BY MOUTH EVERY SIX HOURS AS NEEDED FOR PAIN FOR UP TO 7 DAYS      becaplermin (REGRANEX) 0.01 % gel Apply 1 Applicatorful topically daily      calcium-vitamin D (OSCAL-500) 500-200 MG-UNIT per tablet Take 1 tablet by mouth 2 times daily      ipratropium-albuterol (DUONEB) 0.5-2.5 (3) MG/3ML SOLN nebulizer solution Inhale 3 mLs into the lungs 4 times daily 360 mL 3    fluticasone (FLONASE) 50 MCG/ACT nasal spray 1 spray by Nasal route daily 9.9 g 5 Vitamins/Minerals TABS Take 1 tablet by mouth daily        No current facility-administered medications on file prior to encounter. REVIEW OF SYSTEMS    Pertinent items are noted in HPI. Objective:      /67   Pulse 90   Ht 5' 8\" (1.727 m)   Wt 170 lb 14.4 oz (77.5 kg)   BMI 25.99 kg/m²     PHYSICAL EXAM    Vascular: Vascular status Impaired  palpable pedal pulses, right DP0/4 and PT1/4, left DP0/4 and PT1/4. CFT 3 seconds digits 1 to 5 bilateral.  Hair growthAbsent  both lower extremities and feet. Skin temperature is warm to warm from pretibial area to distal digits bilateral.  Exam is negative for rubor, pallor, cyanosis or signs of acute vascular compromise bilaterally. Exam is positive for edema bilateral lower extremity. Varicosities Present bilateral lower extremity. Neuro: Neurologic status diminished bilateral with epicritic Absent  , proprioceptive Absent , vibratory sensation Absent  and protopathicPresent. DTRs Absent  bilateral Achilles. There were no reproducible neuritic symptoms on exam bilateral feet/ankles. Derm: Ulceration to bilateral ankles. Ecchymosis Absent  bilateral feet/foot. Elongated, yellow, crumbly and thickened greater than 3mm all toenails both feet. Musculoskeletal: No pain with debridement of wounds 5/5 muscle strength in/eversion and dorsi/plantarflexion bilateral feet. No gross instability noted. Procedure Note    Performed by: Jourdan Hernandez DPM    Consent obtained: Yes    Time out taken:  Yes    Pain Control: Anesthetic  Anesthetic: 4% Topical Xylocaine     Debridement:Excisional Debridement    Using curette the wound was sharply debrided    down through and including the removal of epidermis, dermis, subcutaneous tissue, and muscle/fascia.         Devitalized Tissue Debrided:  fibrin, biofilm, slough, necrotic/eschar, and exudate    Pre Debridement Measurements:  Are located in the Wound Documentation Flow Sheet    Wound #: 1   Wound Care Documentation:  Wound 06/24/22 Ankle Left;Medial #1 (Active)   Wound Image   12/08/22 1032   Wound Etiology Venous 01/13/23 0815   Wound Cleansed Wound cleanser 01/13/23 0815   Dressing/Treatment Collagen 11/23/22 1104   Offloading for Diabetic Foot Ulcers Offloading not ordered 11/18/22 0849   Wound Length (cm) 1.7 cm 01/13/23 0815   Wound Width (cm) 3.6 cm 01/13/23 0815   Wound Depth (cm) 0.2 cm 01/13/23 0815   Wound Surface Area (cm^2) 6.12 cm^2 01/13/23 0815   Change in Wound Size % (l*w) 10.53 01/13/23 0815   Wound Volume (cm^3) 1.224 cm^3 01/13/23 0815   Wound Healing % -79 01/13/23 0815   Post-Procedure Length (cm) 1.7 cm 01/13/23 0828   Post-Procedure Width (cm) 3.6 cm 01/13/23 0828   Post-Procedure Depth (cm) 0.2 cm 01/13/23 0828   Post-Procedure Surface Area (cm^2) 6.12 cm^2 01/13/23 0828   Post-Procedure Volume (cm^3) 1.224 cm^3 01/13/23 0828   Wound Assessment Bleeding 01/13/23 0828   Drainage Amount Small 01/13/23 0815   Drainage Description Serosanguinous 01/13/23 0815   Odor None 01/13/23 0815   Jamila-wound Assessment Fragile 01/13/23 0815   Margins Defined edges 01/13/23 0815   Number of days: 203         Total Surface Area Debrided:  6.12sq cm     Percentage of wound debrided 100%    Bleeding:  Minimal    Hemostasis Achieved:  by pressure    Procedural Pain:  4  / 10     Post Procedural Pain:  0 / 10     Response to treatment:  Well tolerated by patient. - All nails bilateral feet debrided in thickness and length with out incident. Patient handled procedure well. Assessment:     Problem List Items Addressed This Visit       Peripheral artery disease ([facility] [address] - Primary    Relevant Medications    lidocaine (LMX) 4 % cream    Other Relevant Orders    Initiate Outpatient Wound Care Protocol          Plan:   Patient examined and evaluated   Wound debrided without incident   Compression stockings right leg with Lotrisone.   Multilayer compression wrap left leg  Follow-up one week  Keep legs elevated at all times when not active. Plan for skin substitute next week. The nature of the patient's condition was explained in depth. The patient was informed that their compliance to the treatment Plan is paramount to successful healing and prevention of further ulceration and/or infection.   Discharge Treatment follow-up on Friday for dressing change    Written Patient Discharge Instructions Given            Electronically signed by Betsy Spears DPM on 1/13/2023 at 9:12 AM

## 2023-01-15 DIAGNOSIS — E11.9 TYPE 2 DIABETES MELLITUS WITHOUT COMPLICATION, WITHOUT LONG-TERM CURRENT USE OF INSULIN (HCC): ICD-10-CM

## 2023-01-15 DIAGNOSIS — J41.0 SIMPLE CHRONIC BRONCHITIS (HCC): ICD-10-CM

## 2023-01-15 DIAGNOSIS — L29.9 ITCHING: ICD-10-CM

## 2023-01-15 DIAGNOSIS — J43.2 CENTRILOBULAR EMPHYSEMA (HCC): ICD-10-CM

## 2023-01-16 DIAGNOSIS — L29.9 ITCHING: ICD-10-CM

## 2023-01-16 RX ORDER — DIPHENHYDRAMINE HCL 25 MG
25 TABLET ORAL EVERY 6 HOURS PRN
Qty: 60 TABLET | Refills: 0 | Status: SHIPPED | OUTPATIENT
Start: 2023-01-16 | End: 2023-03-13

## 2023-01-16 RX ORDER — CETIRIZINE HYDROCHLORIDE 10 MG/1
10 TABLET ORAL DAILY
Qty: 30 TABLET | Refills: 0 | Status: SHIPPED | OUTPATIENT
Start: 2023-01-16 | End: 2023-02-15

## 2023-01-16 RX ORDER — BENZONATATE 200 MG/1
CAPSULE ORAL
Qty: 30 CAPSULE | Refills: 0 | Status: SHIPPED | OUTPATIENT
Start: 2023-01-16

## 2023-01-16 RX ORDER — LISINOPRIL 20 MG/1
TABLET ORAL
Qty: 30 TABLET | Refills: 0 | Status: SHIPPED | OUTPATIENT
Start: 2023-01-16

## 2023-01-16 RX ORDER — LANOLIN ALCOHOL/MO/W.PET/CERES
6 CREAM (GRAM) TOPICAL NIGHTLY
Qty: 60 TABLET | Refills: 2 | Status: SHIPPED | OUTPATIENT
Start: 2023-01-16

## 2023-01-19 NOTE — DISCHARGE INSTRUCTIONS
[address], [address]  Telephone: [phone]     Discharge Instructions     Important reminders:     **If you have any signs and symptoms of illness (Cough, fever, congestion, nausea, vomiting, diarrhea, etc.) please call the wound care center prior to your appointment. 1. Increase Protein intake for optimal wound healing  2. No added salt to reduce any swelling  3. If diabetic, maintain good glucose control  4. If you smoke, smoking prohibits wound healing, we ask that you refrain from smoking. 5. When taking antibiotics take the entire prescription as ordered. Do not stop taking until medication is all gone unless otherwise instructed. 6. Exercise as tolerated. 7. Keep weight off wounds and reposition every 2 hours if applicable. 8. If wound(s) is on your lower extremity, elevate legs to the level of the heart or above for 30 minutes 4-5 times a day and/or when sitting. Avoid standing for long periods of time. 9. Do not get wounds wet in bath or shower unless otherwise instructed by your physician. If your wound is on your foot or leg, you may purchase a cast bag. Please ask at the pharmacy. If Vascular testing is ordered, please call [address] [place] [address] [phone]) to schedule. Vascular tests ordered by Wound Care Physicians may take up to 2 hours to complete. Please keep that in mind when scheduling. If Vascular testing is scheduled, please bring supplies to replace your dressing after testing is done. The vascular department does not stock supplies. Wound: Right and Left leg     With each dressing change, rinse wounds with 0.9% Saline. (May use wound wash or soft contact solution. Both can be purchased at a local drug store). If unable to obtain saline, may use a gentle soap and water.      Dressing care:  Left leg -   gent and collagen powder, absorbing pad, and coflex calamine- these will be changed at your next visit unless they slide down or cause pain. If they slide, gets wet, or cause pain please call the wound care center, you may need to come in to be re-wrapped. If you are unable to get to your follow up appointment you will need to remove your wraps at home & place some kind of compression. Nails Clipped 1/13/2023      Dr Courtney Kingston, Vascular Surgeon September 14th at 1215pm  [address] [address], [address]  Phone# [phone]  Fax# [phone]         Compression Wraps  Location: Left lower leg below knee  Type: Coflex calamine     Your doctor has ordered compression therapy for your wound. Compression bandages reduce the swelling, or edema, in your legs and prevent it from returning. The wound care staff will apply your compression wrap. It must be removed and re-applied at least weekly. As the swelling decreases, the boot no longer provides adequate compression and you need a new one. Once applied, you need to know how to take care of your compression wrap. The boot must stay dry. Do not get it wet in the shower or tub. You may do a partial bath, or you can cover the boot with a large plastic bag, secured at the top, so that no water can get in. Avoid standing in one place for long periods of time. If you must  one place, shift your weight and change positions often. If you have CHF, consult your doctor before following the next two recommendations for leg elevation. When sitting, elevate your legs on pillows, or put blocks under the foot of your bed. Your legs should be higher than your heart. If your boot becomes painful, or you notice an increase in swelling in your toes, numbness or tingling, or purple color to your toes, remove the wrap and call the [facility] [address]. If it is after hours, call your doctor for instructions or go to the nearest emergency room.         Home Care Agency/Facility: [facility]     Your wound-care supplies will be provided by:  Please note, depending on your insurance coverage, you may have out-of-pocket expenses for these supplies. Someone from the company should call you to confirm your order and discuss those potential costs before they ship your products -- please anticipate that call. If your out-of-pocket cost could be substantial, Many companies have financial hardship programs for patients who qualify, so please ask about that if you might need a hand. If you have any questions about your supplies or your potential out-of-pocket costs, or if you need to place an order for a refill of supplies (typically monthly), please call the company directly. Your  is Hortencia Jeronimo     Follow up with Dr Dain Harris in 1 weeks. Wound Care Center Information: Should you experience any significant changes in your wound(s) or have questions about your wound care, please [facility]  at [phone] Monday  - Thursday 8:00 am - 4:00 pm and Friday 8:00 am - 1:00pm. If you need help with your wound outside these hours and cannot wait until we are again available, contact your PCP or go to the hospital emergency room. PLEASE NOTE: IF YOU ARE UNABLE TO OBTAIN WOUND SUPPLIES, CONTINUE TO USE THE SUPPLIES YOU HAVE AVAILABLE UNTIL YOU ARE ABLE TO REACH US. IT IS MOST IMPORTANT TO KEEP THE WOUND COVERED AT ALL TIMES.

## 2023-01-20 ENCOUNTER — HOSPITAL ENCOUNTER (OUTPATIENT)
Dept: WOUND CARE | Age: 63
Discharge: HOME | End: 2023-01-20
Payer: MEDICAID

## 2023-01-20 VITALS
TEMPERATURE: 96.8 F | HEART RATE: 90 BPM | RESPIRATION RATE: 16 BRPM | SYSTOLIC BLOOD PRESSURE: 109 MMHG | DIASTOLIC BLOOD PRESSURE: 70 MMHG

## 2023-01-20 DIAGNOSIS — I73.9 PERIPHERAL ARTERY DISEASE (HCC): Primary | ICD-10-CM

## 2023-01-20 PROCEDURE — 11043 DBRDMT MUSC&/FSCA 1ST 20/<: CPT

## 2023-01-20 PROCEDURE — 29581 APPL MULTLAYER CMPRN SYS LEG: CPT

## 2023-01-20 RX ORDER — LIDOCAINE HYDROCHLORIDE 20 MG/ML
JELLY TOPICAL ONCE
OUTPATIENT
Start: 2023-01-20 | End: 2023-01-20

## 2023-01-20 RX ORDER — BETAMETHASONE DIPROPIONATE 0.05 %
OINTMENT (GRAM) TOPICAL ONCE
OUTPATIENT
Start: 2023-01-20 | End: 2023-01-20

## 2023-01-20 RX ORDER — GENTAMICIN SULFATE 1 MG/G
OINTMENT TOPICAL ONCE
OUTPATIENT
Start: 2023-01-20 | End: 2023-01-20

## 2023-01-20 RX ORDER — LIDOCAINE HYDROCHLORIDE 40 MG/ML
SOLUTION TOPICAL ONCE
OUTPATIENT
Start: 2023-01-20 | End: 2023-01-20

## 2023-01-20 RX ORDER — LIDOCAINE 40 MG/G
CREAM TOPICAL ONCE
OUTPATIENT
Start: 2023-01-20 | End: 2023-01-20

## 2023-01-20 RX ORDER — LIDOCAINE 40 MG/G
CREAM TOPICAL ONCE
Status: DISCONTINUED | OUTPATIENT
Start: 2023-01-20 | End: 2023-01-21 | Stop reason: HOSPADM

## 2023-01-20 RX ORDER — CLOBETASOL PROPIONATE 0.5 MG/G
OINTMENT TOPICAL ONCE
OUTPATIENT
Start: 2023-01-20 | End: 2023-01-20

## 2023-01-20 RX ORDER — GINSENG 100 MG
CAPSULE ORAL ONCE
OUTPATIENT
Start: 2023-01-20 | End: 2023-01-20

## 2023-01-20 RX ORDER — LIDOCAINE 50 MG/G
OINTMENT TOPICAL ONCE
OUTPATIENT
Start: 2023-01-20 | End: 2023-01-20

## 2023-01-20 RX ORDER — BACITRACIN, NEOMYCIN, POLYMYXIN B 400; 3.5; 5 [USP'U]/G; MG/G; [USP'U]/G
OINTMENT TOPICAL ONCE
OUTPATIENT
Start: 2023-01-20 | End: 2023-01-20

## 2023-01-20 RX ORDER — BACITRACIN ZINC AND POLYMYXIN B SULFATE 500; 1000 [USP'U]/G; [USP'U]/G
OINTMENT TOPICAL ONCE
OUTPATIENT
Start: 2023-01-20 | End: 2023-01-20

## 2023-01-20 ASSESSMENT — PAIN DESCRIPTION - LOCATION: LOCATION: LEG

## 2023-01-20 ASSESSMENT — PAIN DESCRIPTION - ORIENTATION: ORIENTATION: LEFT

## 2023-01-20 ASSESSMENT — PAIN SCALES - GENERAL: PAINLEVEL_OUTOF10: 4

## 2023-01-20 NOTE — PLAN OF CARE
Discharge instructions given. Patient verbalized understanding. Return to [facility] in 1 week(s).

## 2023-01-20 NOTE — PROGRESS NOTES
Iris Rivera  Progress Note and Procedure Note      Patricia Zepeda  AGE: 65 y.o. GENDER: male  : 1960  TODAY'S DATE:  2023    Subjective:     Chief Complaint   Patient presents with    Wound Check     Left lower leg           HISTORY of PRESENT ILLNESS HPI     Patricia Zepeda is a 65 y.o. male who presents today for wound evaluation. History of Wound: Admits to having chronic wounds for years. He states that he has had arterial bypasses on both of his legs in the past.  He was seeing previous wound care and podiatry for these wounds. He states that his insurance had some issues and he has not been seeing anyone since 2021. H he took the bandage off since his last visit. He states that there was pulling on the wound. He admits to going to a lot of doctors visits this past week and was on his foot a lot. He does note that the leg is more swollen.     Wound Pain:  intermittent left  Severity:  3 / 10   Wound Type:  venous, arterial and diabetic  Modifying Factors:  edema, venous stasis, lymphedema, diabetes, decreased mobility, arterial insufficiency and decreased tissue oxygenation  Associated Signs/Symptoms:  drainage, numbness and odor        PAST MEDICAL HISTORY        Diagnosis Date    Cancer ([address]     throat    Diabetes mellitus ([address]     Fibromyalgia     History of DVT (deep vein thrombosis)     Hyperlipidemia     Hypertension     Type 2 diabetes mellitus with circulatory disorder, without long-term current use of insulin ([address] 2022       PAST SURGICAL HISTORY    Past Surgical History:   Procedure Laterality Date    APPENDECTOMY  2005    COLONOSCOPY  2016    FEMORAL BYPASS Left 2020    femoral posterior tibial bypass    FEMORAL-TIBIAL BYPASS GRAFT Right 2020    with femoral endarterectomy and patch angioplasty    FOOT DEBRIDEMENT Left 2020    FOOT DEBRIDEMENT Right 2021    DEBRIDEMENT OF RIGHT FOOT WOUND WITH GRAFT APPLICATION performed by Yoanna Gutierrez DPM at [address] [facility] N/A 3/25/2022    DIRECT LARYNGOSCOPY WITH BIOPSY AND FROZEN SECTIONS performed by Bora Duong DO at [address]  [facility] Right 2020    stent leg for PVD    TRACHEOSTOMY N/A 3/25/2022    TRACHEOTOMY performed by Bora Duong DO at [address] [facility] [address] HISTORY    Family History   Problem Relation Age of Onset    Cancer Mother     Lung Cancer Mother     Diabetes Father     Stroke Father        SOCIAL HISTORY    Social History     Tobacco Use    Smoking status: Former     Packs/day: 0.50     Years: 20.00     Pack years: 10.00     Types: Cigarettes     Start date: 1977     Quit date: 2021     Years since quittin.0    Smokeless tobacco: Never   Vaping Use    Vaping Use: Never used   Substance Use Topics    Alcohol use: Yes     Comment: 2 beers / day     Drug use: Never       ALLERGIES    Allergies   Allergen Reactions    Chantix [Varenicline] Other (See Comments)     Hallucinations         MEDICATIONS    Current Outpatient Medications on File Prior to Encounter   Medication Sig Dispense Refill    lisinopril (PRINIVIL;ZESTRIL) 20 MG tablet TAKE 1 TABLET BY MOUTH EVERY DAY 30 tablet 0    metFORMIN (GLUCOPHAGE) 500 MG tablet TAKE 1 TABLET BY MOUTH TWICE DAILY WITH MEALS 60 tablet 0    cetirizine (ZYRTEC) 10 MG tablet TAKE 1 TABLET BY MOUTH DAILY 30 tablet 0    benzonatate (TESSALON) 200 MG capsule TAKE 1 CAPSULE BY MOUTH THREE TIMES DAILY AS NEEDED FOR COUGH 30 capsule 0    vitamin B-12 (CYANOCOBALAMIN) 250 MCG tablet Take 1 tablet by mouth daily 30 tablet 0    diphenhydrAMINE (BANOPHEN) 25 MG tablet Take 1 tablet by mouth every 6 hours as needed for Itching 60 tablet 0    melatonin 3 MG TABS tablet Take 2 tablets by mouth nightly 60 tablet 2    STIMULANT LAXATIVE 8.6-50 MG per tablet TAKE 1 TABLET BY MOUTH TWICE DAILY 60 tablet 0    atorvastatin (LIPITOR) 80 MG tablet TAKE 1 TABLET BY MOUTH DAILY 90 tablet 0    gentamicin (GARAMYCIN) 0.1 % cream APPLY TOPICALLY TO THE AFFECTED AREA DAILY 30 g 1    budesonide-formoterol (SYMBICORT) 160-4.5 MCG/ACT AERO Inhale 2 puffs into the lungs 2 times daily 10.2 g 2    formoterol (PERFOROMIST) 20 MCG/2ML nebulizer solution Take 2 mLs by nebulization every 12 hours 2 mL 5    budesonide (PULMICORT) 0.5 MG/2ML nebulizer suspension Take 2 mLs by nebulization 2 times daily 60 each 2    metoprolol succinate (TOPROL XL) 25 MG extended release tablet Take 25 mg by mouth daily      warfarin (COUMADIN) 5 MG tablet Take daily as directed by anti coag clinic to maintain INR 2-3 (Patient taking differently: Take warfarin 15 mg every Monday and Friday, and 10 mg all the other days or as direct by [facility] [phone]) 90 tablet 1    acetaminophen (TYLENOL) 500 MG tablet Take 1 tablet by mouth 4 times daily as needed for Pain 360 tablet 1    BANOPHEN 25 MG capsule Take 1 tablet by mouth every 6 hours as needed for Itching      ondansetron (ZOFRAN-ODT) 4 MG disintegrating tablet Take 4 mg by mouth every 8 hours as needed for Nausea or Vomiting      Multiple Vitamins-Minerals (MULTIVITAMIN ADULT EXTRA C PO) 1 tablet by Nasogastric route daily      esomeprazole (NEXIUM) 20 MG delayed release capsule 20 mg by Nasogastric route every morning (before breakfast)      oxyCODONE HCl (OXY-IR) 10 MG immediate release tablet TAKE 1 TABLET BY MOUTH EVERY SIX HOURS AS NEEDED FOR PAIN FOR UP TO 7 DAYS      becaplermin (REGRANEX) 0.01 % gel Apply 1 Applicatorful topically daily      calcium-vitamin D (OSCAL-500) 500-200 MG-UNIT per tablet Take 1 tablet by mouth 2 times daily      ipratropium-albuterol (DUONEB) 0.5-2.5 (3) MG/3ML SOLN nebulizer solution Inhale 3 mLs into the lungs 4 times daily 360 mL 3    fluticasone (FLONASE) 50 MCG/ACT nasal spray 1 spray by Nasal route daily 9.9 g 5    Vitamins/Minerals TABS Take 1 tablet by mouth daily        No current facility-administered medications on file prior to encounter. REVIEW OF SYSTEMS    Pertinent items are noted in HPI. Objective:      /70   Pulse 90   Temp 96.8 °F (36 °C) (Infrared)   Resp 16     PHYSICAL EXAM    Vascular: Vascular status Impaired  palpable pedal pulses, right DP0/4 and PT1/4, left DP0/4 and PT1/4. CFT 3 seconds digits 1 to 5 bilateral.  Hair growthAbsent  both lower extremities and feet. Skin temperature is warm to warm from pretibial area to distal digits bilateral.  Exam is negative for rubor, pallor, cyanosis or signs of acute vascular compromise bilaterally. Exam is positive for edema bilateral lower extremity. Varicosities Present bilateral lower extremity. Neuro: Neurologic status diminished bilateral with epicritic Absent  , proprioceptive Absent , vibratory sensation Absent  and protopathicPresent. DTRs Absent  bilateral Achilles. There were no reproducible neuritic symptoms on exam bilateral feet/ankles. Derm: Ulceration to bilateral ankles. Ecchymosis Absent  bilateral feet/foot. Elongated, yellow, crumbly and thickened greater than 3mm all toenails both feet. Musculoskeletal: No pain with debridement of wounds 5/5 muscle strength in/eversion and dorsi/plantarflexion bilateral feet. No gross instability noted. Procedure Note    Performed by: Emily Parham DPM    Consent obtained: Yes    Time out taken:  Yes    Pain Control: Anesthetic  Anesthetic: 4% Topical Xylocaine     Debridement:Excisional Debridement    Using curette the wound was sharply debrided    down through and including the removal of epidermis, dermis, subcutaneous tissue, and muscle/fascia.         Devitalized Tissue Debrided:  fibrin, biofilm, slough, necrotic/eschar, and exudate    Pre Debridement Measurements:  Are located in the Wound Documentation Flow Sheet    Wound #: 1   Wound Care Documentation:  Wound 06/24/22 Ankle Left;Medial #1 (Active)   Wound Image   12/08/22 1032   Wound Etiology Venous 01/20/23 0817   Wound Cleansed Wound cleanser 01/20/23 0817   Dressing/Treatment Collagen 11/23/22 1104   Offloading for Diabetic Foot Ulcers Offloading not ordered 11/18/22 0849   Wound Length (cm) 1.8 cm 01/20/23 0817   Wound Width (cm) 3.6 cm 01/20/23 0817   Wound Depth (cm) 0.2 cm 01/20/23 0817   Wound Surface Area (cm^2) 6.48 cm^2 01/20/23 0817   Change in Wound Size % (l*w) 5.26 01/20/23 0817   Wound Volume (cm^3) 1.296 cm^3 01/20/23 0817   Wound Healing % -89 01/20/23 0817   Post-Procedure Length (cm) 1.8 cm 01/20/23 0845   Post-Procedure Width (cm) 3.6 cm 01/20/23 0845   Post-Procedure Depth (cm) 0.2 cm 01/20/23 0845   Post-Procedure Surface Area (cm^2) 6.48 cm^2 01/20/23 0845   Post-Procedure Volume (cm^3) 1.296 cm^3 01/20/23 0845   Wound Assessment Bleeding 01/20/23 0845   Drainage Amount Moderate 01/20/23 0817   Drainage Description Serosanguinous 01/20/23 0817   Odor None 01/20/23 0817   Jamila-wound Assessment Blanchable erythema 01/20/23 0817   Margins Defined edges 01/20/23 0817   Number of days: 210         Total Surface Area Debrided:  6.48sq cm     Percentage of wound debrided 100%    Bleeding:  Minimal    Hemostasis Achieved:  by pressure    Procedural Pain:  4  / 10     Post Procedural Pain:  0 / 10     Response to treatment:  Well tolerated by patient. - All nails bilateral feet debrided in thickness and length with out incident. Patient handled procedure well. Assessment:     Problem List Items Addressed This Visit       Peripheral artery disease ([address] - Primary    Relevant Medications    lidocaine (LMX) 4 % cream    Other Relevant Orders    Initiate Outpatient Wound Care Protocol          Plan:   Patient examined and evaluated   Wound debrided without incident   Multilayer compression wrap left leg  Follow-up one week  Keep legs elevated at all times when not active. Plan for skin substitute next week. The nature of the patient's condition was explained in depth.  The patient was informed that their compliance to the treatment Plan is paramount to successful healing and prevention of further ulceration and/or infection.   Discharge Treatment follow-up on Friday for dressing change    Written Patient Discharge Instructions Given            Electronically signed by Vadim Bagley DPM on 1/20/2023 at 9:13 AM

## 2023-01-30 RX ORDER — WARFARIN SODIUM 5 MG/1
TABLET ORAL
Qty: 90 TABLET | Refills: 1 | OUTPATIENT
Start: 2023-01-30

## 2023-01-31 NOTE — DISCHARGE INSTRUCTIONS
[facility], [address] [place] [address] Road  Telephone: [phone]     Discharge Instructions     Important reminders:     **If you have any signs and symptoms of illness (Cough, fever, congestion, nausea, vomiting, diarrhea, etc.) please call the wound care center prior to your appointment. 1. Increase Protein intake for optimal wound healing  2. No added salt to reduce any swelling  3. If diabetic, maintain good glucose control  4. If you smoke, smoking prohibits wound healing, we ask that you refrain from smoking. 5. When taking antibiotics take the entire prescription as ordered. Do not stop taking until medication is all gone unless otherwise instructed. 6. Exercise as tolerated. 7. Keep weight off wounds and reposition every 2 hours if applicable. 8. If wound(s) is on your lower extremity, elevate legs to the level of the heart or above for 30 minutes 4-5 times a day and/or when sitting. Avoid standing for long periods of time. 9. Do not get wounds wet in bath or shower unless otherwise instructed by your physician. If your wound is on your foot or leg, you may purchase a cast bag. Please ask at the pharmacy. If Vascular testing is ordered, please call [address] [place] [address] [phone]) to schedule. Vascular tests ordered by Wound Care Physicians may take up to 2 hours to complete. Please keep that in mind when scheduling. If Vascular testing is scheduled, please bring supplies to replace your dressing after testing is done. The vascular department does not stock supplies. Wound: Right and Left leg     With each dressing change, rinse wounds with 0.9% Saline. (May use wound wash or soft contact solution. Both can be purchased at a local drug store). If unable to obtain saline, may use a gentle soap and water.      Dressing care:  Left leg -   gent and collagen powder, absorbing pad, and coflex calamine- these will be changed at your next visit unless they slide down or cause pain. If they slide, gets wet, or cause pain please call the wound care center, you may need to come in to be re-wrapped. If you are unable to get to your follow up appointment you will need to remove your wraps at home & place some kind of compression. Nails Clipped 1/13/2023      Dr Tori Kraus, Vascular Surgeon September 14th at 1215pm  [address] [place] [address], [address]  Phone# [phone]  Fax# [phone]         Compression Wraps  Location: Left lower leg below knee  Type: Coflex calamine     Your doctor has ordered compression therapy for your wound. Compression bandages reduce the swelling, or edema, in your legs and prevent it from returning. The wound care staff will apply your compression wrap. It must be removed and re-applied at least weekly. As the swelling decreases, the boot no longer provides adequate compression and you need a new one. Once applied, you need to know how to take care of your compression wrap. The boot must stay dry. Do not get it wet in the shower or tub. You may do a partial bath, or you can cover the boot with a large plastic bag, secured at the top, so that no water can get in. Avoid standing in one place for long periods of time. If you must  one place, shift your weight and change positions often. If you have CHF, consult your doctor before following the next two recommendations for leg elevation. When sitting, elevate your legs on pillows, or put blocks under the foot of your bed. Your legs should be higher than your heart. If your boot becomes painful, or you notice an increase in swelling in your toes, numbness or tingling, or purple color to your toes, remove the wrap and call the [address]. If it is after hours, call your doctor for instructions or go to the nearest emergency room.         Home Care Agency/Facility: [facility]     Your wound-care supplies will be provided by:  Please note, depending on your insurance coverage, you may have out-of-pocket expenses for these supplies. Someone from the company should call you to confirm your order and discuss those potential costs before they ship your products -- please anticipate that call. If your out-of-pocket cost could be substantial, Many companies have financial hardship programs for patients who qualify, so please ask about that if you might need a hand. If you have any questions about your supplies or your potential out-of-pocket costs, or if you need to place an order for a refill of supplies (typically monthly), please call the company directly. Your  is Marisol Drummond     Follow up with Dr Sy Cuevas in 1 weeks. Wound Care Center Information: Should you experience any significant changes in your wound(s) or have questions about your wound care, please contact the [facility] at [phone] Monday  - Thursday 8:00 am - 4:00 pm and Friday 8:00 am - 1:00pm. If you need help with your wound outside these hours and cannot wait until we are again available, contact your PCP or go to the hospital emergency room. PLEASE NOTE: IF YOU ARE UNABLE TO OBTAIN WOUND SUPPLIES, CONTINUE TO USE THE SUPPLIES YOU HAVE AVAILABLE UNTIL YOU ARE ABLE TO REACH US. IT IS MOST IMPORTANT TO KEEP THE WOUND COVERED AT ALL TIMES.

## 2023-02-01 ENCOUNTER — HOSPITAL ENCOUNTER (OUTPATIENT)
Dept: WOUND CARE | Age: 63
Discharge: HOME | End: 2023-02-01
Payer: COMMERCIAL

## 2023-02-01 VITALS
SYSTOLIC BLOOD PRESSURE: 112 MMHG | RESPIRATION RATE: 16 BRPM | TEMPERATURE: 96.8 F | HEART RATE: 96 BPM | DIASTOLIC BLOOD PRESSURE: 68 MMHG

## 2023-02-01 DIAGNOSIS — I73.9 PERIPHERAL ARTERY DISEASE (HCC): Primary | ICD-10-CM

## 2023-02-01 PROCEDURE — 11043 DBRDMT MUSC&/FSCA 1ST 20/<: CPT

## 2023-02-01 PROCEDURE — 29581 APPL MULTLAYER CMPRN SYS LEG: CPT

## 2023-02-01 RX ORDER — LIDOCAINE 40 MG/G
CREAM TOPICAL ONCE
OUTPATIENT
Start: 2023-02-01 | End: 2023-02-01

## 2023-02-01 RX ORDER — LIDOCAINE HYDROCHLORIDE 40 MG/ML
SOLUTION TOPICAL ONCE
OUTPATIENT
Start: 2023-02-01 | End: 2023-02-01

## 2023-02-01 RX ORDER — GENTAMICIN SULFATE 1 MG/G
OINTMENT TOPICAL ONCE
OUTPATIENT
Start: 2023-02-01 | End: 2023-02-01

## 2023-02-01 RX ORDER — BACITRACIN, NEOMYCIN, POLYMYXIN B 400; 3.5; 5 [USP'U]/G; MG/G; [USP'U]/G
OINTMENT TOPICAL ONCE
OUTPATIENT
Start: 2023-02-01 | End: 2023-02-01

## 2023-02-01 RX ORDER — LIDOCAINE HYDROCHLORIDE 20 MG/ML
JELLY TOPICAL ONCE
OUTPATIENT
Start: 2023-02-01 | End: 2023-02-01

## 2023-02-01 RX ORDER — BACITRACIN ZINC AND POLYMYXIN B SULFATE 500; 1000 [USP'U]/G; [USP'U]/G
OINTMENT TOPICAL ONCE
OUTPATIENT
Start: 2023-02-01 | End: 2023-02-01

## 2023-02-01 RX ORDER — BETAMETHASONE DIPROPIONATE 0.05 %
OINTMENT (GRAM) TOPICAL ONCE
OUTPATIENT
Start: 2023-02-01 | End: 2023-02-01

## 2023-02-01 RX ORDER — LEVOTHYROXINE SODIUM 0.03 MG/1
25 TABLET ORAL DAILY
COMMUNITY

## 2023-02-01 RX ORDER — CLOBETASOL PROPIONATE 0.5 MG/G
OINTMENT TOPICAL ONCE
OUTPATIENT
Start: 2023-02-01 | End: 2023-02-01

## 2023-02-01 RX ORDER — GINSENG 100 MG
CAPSULE ORAL ONCE
OUTPATIENT
Start: 2023-02-01 | End: 2023-02-01

## 2023-02-01 RX ORDER — LIDOCAINE 40 MG/G
CREAM TOPICAL ONCE
Status: DISCONTINUED | OUTPATIENT
Start: 2023-02-01 | End: 2023-02-02 | Stop reason: HOSPADM

## 2023-02-01 RX ORDER — LIDOCAINE 50 MG/G
OINTMENT TOPICAL ONCE
OUTPATIENT
Start: 2023-02-01 | End: 2023-02-01

## 2023-02-01 ASSESSMENT — PAIN DESCRIPTION - LOCATION: LOCATION: LEG

## 2023-02-01 ASSESSMENT — PAIN DESCRIPTION - ORIENTATION: ORIENTATION: LEFT

## 2023-02-01 ASSESSMENT — PAIN DESCRIPTION - PAIN TYPE: TYPE: CHRONIC PAIN

## 2023-02-01 ASSESSMENT — PAIN DESCRIPTION - DESCRIPTORS: DESCRIPTORS: THROBBING;SHOOTING;SHARP

## 2023-02-01 ASSESSMENT — PAIN DESCRIPTION - FREQUENCY: FREQUENCY: INTERMITTENT

## 2023-02-01 ASSESSMENT — PAIN - FUNCTIONAL ASSESSMENT: PAIN_FUNCTIONAL_ASSESSMENT: ACTIVITIES ARE NOT PREVENTED

## 2023-02-01 ASSESSMENT — PAIN DESCRIPTION - ONSET: ONSET: ON-GOING

## 2023-02-01 ASSESSMENT — PAIN SCALES - GENERAL: PAINLEVEL_OUTOF10: 6

## 2023-02-01 NOTE — PLAN OF CARE
Insurance Verification Request            Ordering Center:     [address], [address] [place] [address]  Telephone:  [phone]    Facility NPI: [id number]  Facility Tax ID [id number]    Shiloh Figueroa RN    Provider Information:     Provider's Name and NPI Ca Jordan [facility]  [id number]      Patient Information:      Brad Fernandez   [phone]   : 1960  AGE: 65 y.o. GENDER: male   TODAYS DATE:  4379    Application/product Information:     Benefit Verification Request:    Reason for Request: New Insurance     [employer] Fax# [phone]    Trunk/Arms/Legs [id number] (1st 25 sq cm)    Epifix and Epicord    Has patient been treated with any other Skin Substitute for this Wound:   Yes Name of the product or produces Theraskin, Number of previous applications 4, Wound size 7.2 sq/cm, Number of wounds 1,  Location of wound Left leg, Duration of wound 6 months, Estimated number of applications 10     If yes, How many previous applications: 4    WOUND #: 1    Total Square CM: 7.2      Procedure setting: Hospital Outpatient Department POS 22    Is the Patient currently in a skilled nursing facility: No     Is the wound work related: No    Procedure date: 2023      Insurance:        PRIMARY INSURANCE:  Plan[place]  Coverage[place] BCBS [place]  Effective Date:   Group Number: [unfilled]  Subscriber Number: [id number] - ([address])    Payer/Plan Subscr  Sex Relation Sub. Ins. ID Effective Group Num   1. [place] -* LINDA LEIVA 1960 Male Self [id number]                                    [address]   2.  Zack Olun E 1960 Male Self [id number]                                   [address]       Patient Information:     Dx Codes: K88.276, I87.2    Problem List Items Addressed This Visit       Peripheral artery disease ([facility] [address] [facility] - Primary Relevant Medications    lidocaine (LMX) 4 % cream    Other Relevant Orders    Initiate Outpatient Wound Care Protocol       Advanced Modality Checklist  Is Wound Greater than 1.0 sq cm? : Y (9/23/2022  9:00 AM)  Has the Wound had Documented Treatment for 30 Days?: Y (9/23/2022  9:00 AM)  Recurrent Wound with Skin Substitute within Last Year?: N (9/23/2022  9:00 AM)  Radiologic Testing in Last 3 Months?: N/A (9/23/2022  9:00 AM)  Vascular Assessment in Last 6 Months: Y (9/23/2022  9:00 AM)  Smoking Status: Non- Smoker (9/23/2022  9:00 AM)  Nutrition Assessed: Y (9/23/2022  9:00 AM)  Wound Free from Infection : Wound Culture Completed (9/23/2022  9:00 AM)  Is Wound Free of [address], [address], and/or Bio-[place]?: Y (9/23/2022  9:00 AM)  Malignant Process in Wound : N/A (9/23/2022  9:00 AM)  Hemoglobin A1C in Last 3 Months?: Y (9/23/2022  9:00 AM)  Hemoglobin A1C Last Result : 6.3 (9/23/2022  9:00 AM)  Offloading for Diabetic Foot Ulcer?: N/A (9/23/2022  9:00 AM)  Compression Therapy of 20 mmHg or Greater?: Y; Multi Layer Compression (9/23/2022  9:00 AM)         Is the Patient a Diabetic: Yes    HgBA1c:    Lab Results   Component Value Date/Time    LABA1C 6.3 05/05/2022 01:39 PM        Wound 06/24/22 Ankle Left;Medial #1 (Active)   Wound Image   12/08/22 1032   Wound Etiology Venous 02/01/23 1448   Wound Cleansed Cleansed with saline 02/01/23 1448   Dressing/Treatment Collagen 11/23/22 1104   Offloading for Diabetic Foot Ulcers Offloading not ordered 11/18/22 0849   Wound Length (cm) 2 cm 02/01/23 1448   Wound Width (cm) 3.6 cm 02/01/23 1448   Wound Depth (cm) 0.1 cm 02/01/23 1448   Wound Surface Area (cm^2) 7.2 cm^2 02/01/23 1448   Change in Wound Size % (l*w) -5.26 02/01/23 1448   Wound Volume (cm^3) 0.72 cm^3 02/01/23 1448   Wound Healing % -5 02/01/23 1448   Post-Procedure Length (cm) 2 cm 02/01/23 1510   Post-Procedure Width (cm) 3.6 cm 02/01/23 1510   Post-Procedure Depth (cm) 0.2 cm 02/01/23 1510   Post-Procedure Surface Area (cm^2) 7.2 cm^2 02/01/23 1510   Post-Procedure Volume (cm^3) 1.44 cm^3 02/01/23 1510   Wound Assessment Bleeding 02/01/23 1510   Drainage Amount Small 02/01/23 1448   Drainage Description Serosanguinous 02/01/23 1448   Odor None 02/01/23 1448   Jamila-wound Assessment Excoriated;Edematous 02/01/23 1448   Margins Defined edges 02/01/23 1448   Number of days: 433       Provider Information:         Electronically signed by Moisse Sinclair RN on 2/1/2023 at 4:22 PM

## 2023-02-01 NOTE — PROGRESS NOTES
Iris Quiroz  Progress Note and Procedure Note      Jessica Yeung  AGE: 65 y.o. GENDER: male  : 1960  TODAY'S DATE:  2023    Subjective:     Chief Complaint   Patient presents with    Wound Check     Lower extremities           HISTORY of PRESENT ILLNESS HPI     Jessica Yeung is a 65 y.o. male who presents today for wound evaluation. History of Wound: Admits to having chronic wounds for years. He states that he has had arterial bypasses on both of his legs in the past.  He was seeing previous wound care and podiatry for these wounds. He states that his insurance had some issues and he has not been seeing anyone since 2021. He missed his last appointment. He had to take the bandage off on Saturday. He admits that he is having more swelling in the leg since then and a lot of increased pain. He has no other complaints at this time.     Wound Pain:  intermittent left  Severity:  3 / 10   Wound Type:  venous, arterial and diabetic  Modifying Factors:  edema, venous stasis, lymphedema, diabetes, decreased mobility, arterial insufficiency and decreased tissue oxygenation  Associated Signs/Symptoms:  drainage, numbness and odor        PAST MEDICAL HISTORY        Diagnosis Date    Cancer ([address]     throat    Diabetes mellitus ([address]     Fibromyalgia     History of DVT (deep vein thrombosis)     Hyperlipidemia     Hypertension     Type 2 diabetes mellitus with circulatory disorder, without long-term current use of insulin ([address] 2022       PAST SURGICAL HISTORY    Past Surgical History:   Procedure Laterality Date    APPENDECTOMY  2005    COLONOSCOPY  2016    FEMORAL BYPASS Left 2020    femoral posterior tibial bypass    FEMORAL-TIBIAL BYPASS GRAFT Right 2020    with femoral endarterectomy and patch angioplasty    FOOT DEBRIDEMENT Left 2020    FOOT DEBRIDEMENT Right 2021    DEBRIDEMENT OF RIGHT FOOT WOUND WITH GRAFT APPLICATION performed by Vonda Bell Pk Orr DPM at [facility] N/A 3/25/2022    DIRECT LARYNGOSCOPY WITH BIOPSY AND FROZEN SECTIONS performed by Corrie Patel DO at [facility] Right 2020    stent leg for PVD    TRACHEOSTOMY N/A 3/25/2022    TRACHEOTOMY performed by Corrie Patel DO at [address] [facility] [address] HISTORY    Family History   Problem Relation Age of Onset    Cancer Mother     Lung Cancer Mother     Diabetes Father     Stroke Father        SOCIAL HISTORY    Social History     Tobacco Use    Smoking status: Former     Packs/day: 0.50     Years: 20.00     Pack years: 10.00     Types: Cigarettes     Start date: 1977     Quit date: 2021     Years since quittin.0    Smokeless tobacco: Never   Vaping Use    Vaping Use: Never used   Substance Use Topics    Alcohol use: Yes     Comment: 2 beers / day     Drug use: Never       ALLERGIES    Allergies   Allergen Reactions    Chantix [Varenicline] Other (See Comments)     Hallucinations         MEDICATIONS    Current Outpatient Medications on File Prior to Encounter   Medication Sig Dispense Refill    levothyroxine (SYNTHROID) 25 MCG tablet Take 25 mcg by mouth Daily      lisinopril (PRINIVIL;ZESTRIL) 20 MG tablet TAKE 1 TABLET BY MOUTH EVERY DAY 30 tablet 0    metFORMIN (GLUCOPHAGE) 500 MG tablet TAKE 1 TABLET BY MOUTH TWICE DAILY WITH MEALS 60 tablet 0    cetirizine (ZYRTEC) 10 MG tablet TAKE 1 TABLET BY MOUTH DAILY 30 tablet 0    benzonatate (TESSALON) 200 MG capsule TAKE 1 CAPSULE BY MOUTH THREE TIMES DAILY AS NEEDED FOR COUGH 30 capsule 0    vitamin B-12 (CYANOCOBALAMIN) 250 MCG tablet Take 1 tablet by mouth daily 30 tablet 0    diphenhydrAMINE (BANOPHEN) 25 MG tablet Take 1 tablet by mouth every 6 hours as needed for Itching 60 tablet 0    melatonin 3 MG TABS tablet Take 2 tablets by mouth nightly 60 tablet 2    STIMULANT LAXATIVE 8.6-50 MG per tablet TAKE 1 TABLET BY MOUTH TWICE DAILY 60 tablet 0    atorvastatin (LIPITOR) 80 MG tablet TAKE 1 TABLET BY MOUTH DAILY 90 tablet 0    gentamicin (GARAMYCIN) 0.1 % cream APPLY TOPICALLY TO THE AFFECTED AREA DAILY 30 g 1    budesonide-formoterol (SYMBICORT) 160-4.5 MCG/ACT AERO Inhale 2 puffs into the lungs 2 times daily 10.2 g 2    formoterol (PERFOROMIST) 20 MCG/2ML nebulizer solution Take 2 mLs by nebulization every 12 hours 2 mL 5    budesonide (PULMICORT) 0.5 MG/2ML nebulizer suspension Take 2 mLs by nebulization 2 times daily 60 each 2    metoprolol succinate (TOPROL XL) 25 MG extended release tablet Take 25 mg by mouth daily      warfarin (COUMADIN) 5 MG tablet Take daily as directed by anti coag clinic to maintain INR 2-3 (Patient taking differently: Take warfarin 15 mg every Monday and Friday, and 10 mg all the other days or as direct by [facility] Coumadin Service [phone]) 90 tablet 1    acetaminophen (TYLENOL) 500 MG tablet Take 1 tablet by mouth 4 times daily as needed for Pain 360 tablet 1    BANOPHEN 25 MG capsule Take 1 tablet by mouth every 6 hours as needed for Itching      ondansetron (ZOFRAN-ODT) 4 MG disintegrating tablet Take 4 mg by mouth every 8 hours as needed for Nausea or Vomiting      Multiple Vitamins-Minerals (MULTIVITAMIN ADULT EXTRA C PO) 1 tablet by Nasogastric route daily      esomeprazole (NEXIUM) 20 MG delayed release capsule 20 mg by Nasogastric route every morning (before breakfast)      oxyCODONE HCl (OXY-IR) 10 MG immediate release tablet TAKE 1 TABLET BY MOUTH EVERY SIX HOURS AS NEEDED FOR PAIN FOR UP TO 7 DAYS      becaplermin (REGRANEX) 0.01 % gel Apply 1 Applicatorful topically daily      calcium-vitamin D (OSCAL-500) 500-200 MG-UNIT per tablet Take 1 tablet by mouth 2 times daily      ipratropium-albuterol (DUONEB) 0.5-2.5 (3) MG/3ML SOLN nebulizer solution Inhale 3 mLs into the lungs 4 times daily 360 mL 3    fluticasone (FLONASE) 50 MCG/ACT nasal spray 1 spray by Nasal route daily 9.9 g 5    Vitamins/Minerals TABS Take 1 tablet by mouth daily No current facility-administered medications on file prior to encounter. REVIEW OF SYSTEMS    Pertinent items are noted in HPI. Objective:      /68   Pulse 96   Temp 96.8 °F (36 °C) (Infrared)   Resp 16     PHYSICAL EXAM    Vascular: Vascular status Impaired  palpable pedal pulses, right DP0/4 and PT1/4, left DP0/4 and PT1/4. CFT 3 seconds digits 1 to 5 bilateral.  Hair growthAbsent  both lower extremities and feet. Skin temperature is warm to warm from pretibial area to distal digits bilateral.  Exam is negative for rubor, pallor, cyanosis or signs of acute vascular compromise bilaterally. Exam is positive for edema bilateral lower extremity. Varicosities Present bilateral lower extremity. Neuro: Neurologic status diminished bilateral with epicritic Absent  , proprioceptive Absent , vibratory sensation Absent  and protopathicPresent. DTRs Absent  bilateral Achilles. There were no reproducible neuritic symptoms on exam bilateral feet/ankles. Derm: Ulceration to bilateral ankles. Ecchymosis Absent  bilateral feet/foot. Elongated, yellow, crumbly and thickened greater than 3mm all toenails both feet. Musculoskeletal: No pain with debridement of wounds 5/5 muscle strength in/eversion and dorsi/plantarflexion bilateral feet. No gross instability noted. Procedure Note    Performed by: Claudia Swenson DPM    Consent obtained: Yes    Time out taken:  Yes    Pain Control: Anesthetic  Anesthetic: 4% Topical Xylocaine     Debridement:Excisional Debridement    Using curette the wound was sharply debrided    down through and including the removal of epidermis, dermis, subcutaneous tissue, and muscle/fascia.         Devitalized Tissue Debrided:  fibrin, biofilm, slough, necrotic/eschar, and exudate    Pre Debridement Measurements:  Are located in the Wound Documentation Flow Sheet    Wound #: 1   Wound Care Documentation:  Wound 06/24/22 Ankle Left;Medial #1 (Active)   Wound Image 12/08/22 1032   Wound Etiology Venous 02/01/23 1448   Wound Cleansed Cleansed with saline 02/01/23 1448   Dressing/Treatment Collagen 11/23/22 1104   Offloading for Diabetic Foot Ulcers Offloading not ordered 11/18/22 0849   Wound Length (cm) 2 cm 02/01/23 1448   Wound Width (cm) 3.6 cm 02/01/23 1448   Wound Depth (cm) 0.1 cm 02/01/23 1448   Wound Surface Area (cm^2) 7.2 cm^2 02/01/23 1448   Change in Wound Size % (l*w) -5.26 02/01/23 1448   Wound Volume (cm^3) 0.72 cm^3 02/01/23 1448   Wound Healing % -5 02/01/23 1448   Post-Procedure Length (cm) 2 cm 02/01/23 1510   Post-Procedure Width (cm) 3.6 cm 02/01/23 1510   Post-Procedure Depth (cm) 0.2 cm 02/01/23 1510   Post-Procedure Surface Area (cm^2) 7.2 cm^2 02/01/23 1510   Post-Procedure Volume (cm^3) 1.44 cm^3 02/01/23 1510   Wound Assessment Bleeding 02/01/23 1510   Drainage Amount Small 02/01/23 1448   Drainage Description Serosanguinous 02/01/23 1448   Odor None 02/01/23 1448   Jamila-wound Assessment Excoriated;Edematous 02/01/23 1448   Margins Defined edges 02/01/23 1448   Number of days: 222       Total Surface Area Debrided:  6.48sq cm     Percentage of wound debrided 100%    Bleeding:  Minimal    Hemostasis Achieved:  by pressure    Procedural Pain:  4  / 10     Post Procedural Pain:  0 / 10     Response to treatment:  Well tolerated by patient. - All nails bilateral feet debrided in thickness and length with out incident. Patient handled procedure well. Assessment:     Problem List Items Addressed This Visit       Peripheral artery disease ([facility] - Primary    Relevant Medications    lidocaine (LMX) 4 % cream    Other Relevant Orders    Initiate Outpatient Wound Care Protocol          Plan:   Patient examined and evaluated   Wound debrided without incident   Multilayer compression wrap left leg  Follow-up one week  Keep legs elevated at all times when not active. Plan for skin substitute next week.   The nature of the patient's condition was explained in depth. The patient was informed that their compliance to the treatment Plan is paramount to successful healing and prevention of further ulceration and/or infection.   Discharge Treatment follow-up on Friday for dressing change    Written Patient Discharge Instructions Given            Electronically signed by Betsy Spears DPM on 2/1/2023 at 4:39 PM

## 2023-02-01 NOTE — PLAN OF CARE
Discharge instructions given. Patient verbalized understanding. Return to [address] [place] [address],[address] in 1 week(s).

## 2023-02-01 NOTE — PROGRESS NOTES
Multilayer Compression Wrap   Below the Knee    NAME:  Lola Pike  YOB: 1960  MEDICAL RECORD NUMBER:  [id number]  DATE:  2/1/2023    Multilayer compression wrap: Removed old Multilayer wrap if indicated and wash leg with mild soap/water. Applied moisturizing agent to dry skin as needed. Applied primary and secondary dressing as ordered. Applied multilayered dressing below the knee to left lower leg. Instructed patient/caregiver not to remove dressing and to keep it clean and dry. Instructed patient/caregiver on complications to report to provider, such as pain, numbness in toes, heavy drainage, and slippage of dressing. Instructed patient on purpose of compression dressing and on activity and exercise recommendations.      Applied  2 layer wrap from toes to knee overlapping each time    Electronically signed by Michael Singh RN on 2/1/2023 at 4:34 PM

## 2023-02-03 ENCOUNTER — ANTI-COAG VISIT (OUTPATIENT)
Dept: PHARMACY | Age: 63
End: 2023-02-03
Payer: MEDICAID

## 2023-02-03 DIAGNOSIS — I73.9 PERIPHERAL ARTERY DISEASE (HCC): Primary | ICD-10-CM

## 2023-02-03 LAB — INTERNATIONAL NORMALIZATION RATIO, POC: 1.8

## 2023-02-03 PROCEDURE — 99211 OFF/OP EST MAY X REQ PHY/QHP: CPT | Performed by: SPEECH-LANGUAGE PATHOLOGIST

## 2023-02-03 PROCEDURE — 85610 PROTHROMBIN TIME: CPT | Performed by: SPEECH-LANGUAGE PATHOLOGIST

## 2023-02-03 RX ORDER — WARFARIN SODIUM 5 MG/1
10 TABLET ORAL DAILY
Qty: 70 TABLET | Refills: 3 | Status: SHIPPED | OUTPATIENT
Start: 2023-02-03

## 2023-02-03 NOTE — PROGRESS NOTES
Philly Maza is a 59 y.o. here for warfarin management. Nasra Morelos had an INR test today. Results were reviewed and appropriate warfarin management was completed. This visit was performed as: An in person visit. Protocols were followed with precautions to reduce the spread of COVID-19. Patient verifies current warfarin dosing regimen: Yes     Warfarin medication reviewed and updated on the patient 's home medication list: Yes   All other medications reviewed and updated on the patient 's home medication list: Yes     Lab Results   Component Value Date    INR 1.8 2023    INR 2.9 2023    INR 2.60 2022           Anticoagulation Summary  As of 2/3/2023      INR goal:  2.0-3.0   TTR:  51.5 % (1.4 y)   INR used for dosin.8 (2/3/2023)   Warfarin maintenance plan:  15 mg (5 mg x 3) every Mon, Fri; 10 mg (5 mg x 2) all other days; Starting 2/3/2023   Weekly warfarin total:  80 mg   Plan last modified:  Lorri Lozoya RP (10/6/2022)   Next INR check:  2023   Priority:  High   Target end date: Indefinite    Indications    Peripheral artery disease ([facility] [I73.9]                 Anticoagulation Episode Summary       INR check location:  Anticoagulation Clinic    Preferred lab:      Send INR reminders to:  [facility]    Comments:  EPIC          Anticoagulation Care Providers       Provider Role Specialty Phone number    Yulisa Campa MD Referring Family Medicine [phone]            There were no vitals taken for this visit. Warfarin assessment / plan:     Appears well  Sub-therapeutic INR     Denies increased vitamin K intake. Denies alcohol changes. Denies increased activity. Missed dose(s) Patient my have missed a dose in the last week or two. Medication changes. Patient recently started levothyroxine 25 micrograms daily. Patient states he is down to about 6 BOOST drinks/day from about 9 per day.   Will continue patient's prior dosing regimen (he will get 15 mg today), and have him follow up in 3 weeks. Description    CONTINUE:  Warfarin 10 mg daily EXCEPT 15 mg every Monday and Friday. Drinking 9 Boost shakes per day. Please let us know if this changes. Call [phone] with signs or symptoms of bleeding or ANY medication changes (including over-the-counter medications or herbal supplements). If significant bleeding occurs please seek immediate medical attention. Keep the number of servings of vitamin K containing foods (dark green, leafy vegetables) the same each week. Dark green vegetable 2-3 times a week and a mixed green salad ~ 3 times a week. Please call if this changes. Limit alcohol intake. Please call if this changes. Immunization History   Administered Date(s) Administered    COVID-19, PFIZER PURPLE top, DILUTE for use, (age 15 y+), 30mcg/0.3mL 03/15/2021, 2021, 2021    Influenza Virus Vaccine 2017, 2017    Influenza, FLUARIX, FLULAVAL, [place] (age 10 mo+) AND AFLURIA, (age 1 y+), PF, 0.5mL 10/31/2020    PPD Test 2022, 2022, 2022    Tdap (Boostrix, Adacel) 2018    Zoster Recombinant (Shingrix) 2018           Orders Placed This Encounter   Procedures    POCT INR     This external order was created through the results console. Orders Placed This Encounter   Medications    warfarin (COUMADIN) 5 MG tablet     Sig: Take 2 tablets by mouth daily (10 mg), except take 3 tablets every Monday and Friday (15 mg). OR as directed by the [facility] Anticoagulation Service [phone]. Dispense:  70 tablet     Refill:  3    E-Prescribing Status: Receipt confirmed by pharmacy (2/3/2023  2:34 PM EST)    Reviewed AVS with patient / caregiver.     Billing Points:  Basic Education (disease state, med overview, expected symptoms) - 1 point      For Pharmacy Admin Tracking Only    Intervention Detail: Adherence Monitorin and Refill(s) Provided  Total # of Interventions Recommended: 1  Total # of Interventions Accepted: 1  Time Spent (min): 15     Cris Miles PharmD, BCPS  2/3/2023  2:48 PM

## 2023-02-07 NOTE — DISCHARGE INSTRUCTIONS
[address], [address]  Telephone: [phone]     Discharge Instructions     Important reminders:     **If you have any signs and symptoms of illness (Cough, fever, congestion, nausea, vomiting, diarrhea, etc.) please call the wound care center prior to your appointment. 1. Increase Protein intake for optimal wound healing  2. No added salt to reduce any swelling  3. If diabetic, maintain good glucose control  4. If you smoke, smoking prohibits wound healing, we ask that you refrain from smoking. 5. When taking antibiotics take the entire prescription as ordered. Do not stop taking until medication is all gone unless otherwise instructed. 6. Exercise as tolerated. 7. Keep weight off wounds and reposition every 2 hours if applicable. 8. If wound(s) is on your lower extremity, elevate legs to the level of the heart or above for 30 minutes 4-5 times a day and/or when sitting. Avoid standing for long periods of time. 9. Do not get wounds wet in bath or shower unless otherwise instructed by your physician. If your wound is on your foot or leg, you may purchase a cast bag. Please ask at the pharmacy. If Vascular testing is ordered, please call [address] [place] [address] [phone]) to schedule. Vascular tests ordered by Wound Care Physicians may take up to 2 hours to complete. Please keep that in mind when scheduling. If Vascular testing is scheduled, please bring supplies to replace your dressing after testing is done. The vascular department does not stock supplies. Wound: Right and Left leg     With each dressing change, rinse wounds with 0.9% Saline. (May use wound wash or soft contact solution. Both can be purchased at a local drug store). If unable to obtain saline, may use a gentle soap and water.      Dressing care:  Left leg -   gent and Triad, absorbing pad, and coflex calamine- these will be changed at your next visit unless they slide down or cause pain. If they slide, gets wet, or cause pain please call the wound care center, you may need to come in to be re-wrapped. If you are unable to get to your follow up appointment you will need to remove your wraps at home & place some kind of compression. Nails Clipped 1/13/2023      Dr Edson Jesus, Vascular Surgeon September 14th at 1215pm  [address], [address]  Phone# [phone]  Fax# [phone]         Compression Wraps  Location: Left lower leg below knee  Type: Coflex calamine     Your doctor has ordered compression therapy for your wound. Compression bandages reduce the swelling, or edema, in your legs and prevent it from returning. The wound care staff will apply your compression wrap. It must be removed and re-applied at least weekly. As the swelling decreases, the boot no longer provides adequate compression and you need a new one. Once applied, you need to know how to take care of your compression wrap. The boot must stay dry. Do not get it wet in the shower or tub. You may do a partial bath, or you can cover the boot with a large plastic bag, secured at the top, so that no water can get in. Avoid standing in one place for long periods of time. If you must  one place, shift your weight and change positions often. If you have CHF, consult your doctor before following the next two recommendations for leg elevation. When sitting, elevate your legs on pillows, or put blocks under the foot of your bed. Your legs should be higher than your heart. If your boot becomes painful, or you notice an increase in swelling in your toes, numbness or tingling, or purple color to your toes, remove the wrap and call the [facility] [address]. If it is after hours, call your doctor for instructions or go to the nearest emergency room.         Home Care Agency/Facility: [facility]     Your wound-care supplies will be provided by:  Please note, depending on your insurance coverage, you may have out-of-pocket expenses for these supplies. Someone from the company should call you to confirm your order and discuss those potential costs before they ship your products -- please anticipate that call. If your out-of-pocket cost could be substantial, Many companies have financial hardship programs for patients who qualify, so please ask about that if you might need a hand. If you have any questions about your supplies or your potential out-of-pocket costs, or if you need to place an order for a refill of supplies (typically monthly), please call the company directly. Your  is Yang Zambrano     Follow up with Dr Cezar Gaspar in 1 weeks. Wound Care Center Information: Should you experience any significant changes in your wound(s) or have questions about your wound care, please [facility]  at [phone] Monday  - Thursday 8:00 am - 4:00 pm and Friday 8:00 am - 1:00pm. If you need help with your wound outside these hours and cannot wait until we are again available, contact your PCP or go to the hospital emergency room. PLEASE NOTE: IF YOU ARE UNABLE TO OBTAIN WOUND SUPPLIES, CONTINUE TO USE THE SUPPLIES YOU HAVE AVAILABLE UNTIL YOU ARE ABLE TO REACH US. IT IS MOST IMPORTANT TO KEEP THE WOUND COVERED AT ALL TIMES.

## 2023-02-08 ENCOUNTER — HOSPITAL ENCOUNTER (OUTPATIENT)
Dept: WOUND CARE | Age: 63
Discharge: HOME | End: 2023-02-08
Payer: COMMERCIAL

## 2023-02-08 ENCOUNTER — OFFICE VISIT (OUTPATIENT)
Dept: INTERNAL MEDICINE CLINIC | Age: 63
End: 2023-02-08
Payer: MEDICAID

## 2023-02-08 VITALS
DIASTOLIC BLOOD PRESSURE: 66 MMHG | HEIGHT: 68 IN | SYSTOLIC BLOOD PRESSURE: 114 MMHG | OXYGEN SATURATION: 99 % | HEART RATE: 90 BPM | WEIGHT: 178.2 LBS | BODY MASS INDEX: 27.01 KG/M2

## 2023-02-08 VITALS
DIASTOLIC BLOOD PRESSURE: 66 MMHG | SYSTOLIC BLOOD PRESSURE: 108 MMHG | HEART RATE: 93 BPM | TEMPERATURE: 96.9 F | RESPIRATION RATE: 16 BRPM

## 2023-02-08 DIAGNOSIS — Z93.0 TRACHEOSTOMY DEPENDENT (HCC): ICD-10-CM

## 2023-02-08 DIAGNOSIS — E78.2 MIXED HYPERLIPIDEMIA: ICD-10-CM

## 2023-02-08 DIAGNOSIS — E03.9 ACQUIRED HYPOTHYROIDISM: ICD-10-CM

## 2023-02-08 DIAGNOSIS — J43.2 CENTRILOBULAR EMPHYSEMA (HCC): ICD-10-CM

## 2023-02-08 DIAGNOSIS — I70.243 ATHEROSCLEROSIS OF NATIVE ARTERY OF LEFT LOWER EXTREMITY WITH ULCERATION OF ANKLE (HCC): ICD-10-CM

## 2023-02-08 DIAGNOSIS — C32.9 LARYNX CANCER (HCC): ICD-10-CM

## 2023-02-08 DIAGNOSIS — I73.9 PERIPHERAL ARTERY DISEASE (HCC): ICD-10-CM

## 2023-02-08 DIAGNOSIS — E11.69 TYPE 2 DIABETES MELLITUS WITH OTHER SPECIFIED COMPLICATION, WITHOUT LONG-TERM CURRENT USE OF INSULIN (HCC): Primary | ICD-10-CM

## 2023-02-08 DIAGNOSIS — I73.9 PERIPHERAL ARTERY DISEASE (HCC): Primary | ICD-10-CM

## 2023-02-08 DIAGNOSIS — J41.0 SIMPLE CHRONIC BRONCHITIS (HCC): ICD-10-CM

## 2023-02-08 DIAGNOSIS — I10 ESSENTIAL HYPERTENSION: ICD-10-CM

## 2023-02-08 PROBLEM — I82.409 DVT (DEEP VENOUS THROMBOSIS) (HCC): Status: RESOLVED | Noted: 2022-04-10 | Resolved: 2023-02-08

## 2023-02-08 PROBLEM — I82.412 ACUTE DEEP VEIN THROMBOSIS (DVT) OF FEMORAL VEIN OF LEFT LOWER EXTREMITY (HCC): Status: RESOLVED | Noted: 2020-02-09 | Resolved: 2023-02-08

## 2023-02-08 LAB — HBA1C MFR BLD: 6.2 %

## 2023-02-08 PROCEDURE — 3078F DIAST BP <80 MM HG: CPT | Performed by: INTERNAL MEDICINE

## 2023-02-08 PROCEDURE — 99214 OFFICE O/P EST MOD 30 MIN: CPT | Performed by: INTERNAL MEDICINE

## 2023-02-08 PROCEDURE — 83036 HEMOGLOBIN GLYCOSYLATED A1C: CPT | Performed by: INTERNAL MEDICINE

## 2023-02-08 PROCEDURE — 3074F SYST BP LT 130 MM HG: CPT | Performed by: INTERNAL MEDICINE

## 2023-02-08 PROCEDURE — 29581 APPL MULTLAYER CMPRN SYS LEG: CPT

## 2023-02-08 PROCEDURE — 11043 DBRDMT MUSC&/FSCA 1ST 20/<: CPT

## 2023-02-08 RX ORDER — BACITRACIN ZINC AND POLYMYXIN B SULFATE 500; 1000 [USP'U]/G; [USP'U]/G
OINTMENT TOPICAL ONCE
OUTPATIENT
Start: 2023-02-08 | End: 2023-02-08

## 2023-02-08 RX ORDER — LIDOCAINE 40 MG/G
CREAM TOPICAL ONCE
Status: DISCONTINUED | OUTPATIENT
Start: 2023-02-08 | End: 2023-02-09 | Stop reason: HOSPADM

## 2023-02-08 RX ORDER — LIDOCAINE 40 MG/G
CREAM TOPICAL ONCE
OUTPATIENT
Start: 2023-02-08 | End: 2023-02-08

## 2023-02-08 RX ORDER — LIDOCAINE 50 MG/G
OINTMENT TOPICAL ONCE
OUTPATIENT
Start: 2023-02-08 | End: 2023-02-08

## 2023-02-08 RX ORDER — LIDOCAINE HYDROCHLORIDE 20 MG/ML
JELLY TOPICAL ONCE
OUTPATIENT
Start: 2023-02-08 | End: 2023-02-08

## 2023-02-08 RX ORDER — BETAMETHASONE DIPROPIONATE 0.05 %
OINTMENT (GRAM) TOPICAL ONCE
OUTPATIENT
Start: 2023-02-08 | End: 2023-02-08

## 2023-02-08 RX ORDER — LIDOCAINE HYDROCHLORIDE 40 MG/ML
SOLUTION TOPICAL ONCE
OUTPATIENT
Start: 2023-02-08 | End: 2023-02-08

## 2023-02-08 RX ORDER — CLOBETASOL PROPIONATE 0.5 MG/G
OINTMENT TOPICAL ONCE
OUTPATIENT
Start: 2023-02-08 | End: 2023-02-08

## 2023-02-08 RX ORDER — GENTAMICIN SULFATE 1 MG/G
OINTMENT TOPICAL ONCE
OUTPATIENT
Start: 2023-02-08 | End: 2023-02-08

## 2023-02-08 RX ORDER — BACITRACIN, NEOMYCIN, POLYMYXIN B 400; 3.5; 5 [USP'U]/G; MG/G; [USP'U]/G
OINTMENT TOPICAL ONCE
OUTPATIENT
Start: 2023-02-08 | End: 2023-02-08

## 2023-02-08 RX ORDER — GINSENG 100 MG
CAPSULE ORAL ONCE
OUTPATIENT
Start: 2023-02-08 | End: 2023-02-08

## 2023-02-08 SDOH — ECONOMIC STABILITY: FOOD INSECURITY: WITHIN THE PAST 12 MONTHS, YOU WORRIED THAT YOUR FOOD WOULD RUN OUT BEFORE YOU GOT MONEY TO BUY MORE.: NEVER TRUE

## 2023-02-08 SDOH — ECONOMIC STABILITY: HOUSING INSECURITY
IN THE LAST 12 MONTHS, WAS THERE A TIME WHEN YOU DID NOT HAVE A STEADY PLACE TO SLEEP OR SLEPT IN A SHELTER (INCLUDING NOW)?: NO

## 2023-02-08 SDOH — ECONOMIC STABILITY: FOOD INSECURITY: WITHIN THE PAST 12 MONTHS, THE FOOD YOU BOUGHT JUST DIDN'T LAST AND YOU DIDN'T HAVE MONEY TO GET MORE.: NEVER TRUE

## 2023-02-08 SDOH — ECONOMIC STABILITY: INCOME INSECURITY: HOW HARD IS IT FOR YOU TO PAY FOR THE VERY BASICS LIKE FOOD, HOUSING, MEDICAL CARE, AND HEATING?: NOT HARD AT ALL

## 2023-02-08 ASSESSMENT — PATIENT HEALTH QUESTIONNAIRE - PHQ9
SUM OF ALL RESPONSES TO PHQ QUESTIONS 1-9: 0
1. LITTLE INTEREST OR PLEASURE IN DOING THINGS: 0
SUM OF ALL RESPONSES TO PHQ QUESTIONS 1-9: 0
2. FEELING DOWN, DEPRESSED OR HOPELESS: 0
SUM OF ALL RESPONSES TO PHQ9 QUESTIONS 1 & 2: 0

## 2023-02-08 ASSESSMENT — PAIN DESCRIPTION - LOCATION: LOCATION: LEG;ANKLE

## 2023-02-08 ASSESSMENT — ENCOUNTER SYMPTOMS
VISUAL CHANGE: 0
BLURRED VISION: 0
CONSTIPATION: 0
HOARSE VOICE: 0
DIARRHEA: 0

## 2023-02-08 ASSESSMENT — PAIN DESCRIPTION - ORIENTATION: ORIENTATION: LEFT

## 2023-02-08 ASSESSMENT — PAIN SCALES - GENERAL: PAINLEVEL_OUTOF10: 6

## 2023-02-08 NOTE — PROGRESS NOTES
Iris Rivera  Progress Note and Procedure Note      Amaris Hart  AGE: 59 y.o. GENDER: male  : 1960  TODAY'S DATE:  2023    Subjective:     Chief Complaint   Patient presents with    Wound Check     Left lower leg           HISTORY of PRESENT ILLNESS HPI     Amaris Hart is a 59 y.o. male who presents today for wound evaluation. History of Wound: Admits to having chronic wounds for years. He states that he has had arterial bypasses on both of his legs in the past.  He was seeing previous wound care and podiatry for these wounds. He states that his insurance had some issues and he has not been seeing anyone since 2021. He states that he left the bandage intact since last visit. He does admit to having some sharp pain around the wound.     Wound Pain:  intermittent left  Severity:  3 / 10   Wound Type:  venous, arterial and diabetic  Modifying Factors:  edema, venous stasis, lymphedema, diabetes, decreased mobility, arterial insufficiency and decreased tissue oxygenation  Associated Signs/Symptoms:  drainage, numbness and odor        PAST MEDICAL HISTORY        Diagnosis Date    Acquired hypothyroidism 2023    Cancer ([facility] [address]     throat    Diabetes mellitus ([address]     Fibromyalgia     History of DVT (deep vein thrombosis)     Hyperlipidemia     Hypertension     Type 2 diabetes mellitus with circulatory disorder, without long-term current use of insulin ([address] 2022       PAST SURGICAL HISTORY    Past Surgical History:   Procedure Laterality Date    APPENDECTOMY  2005    COLONOSCOPY  2016    FEMORAL BYPASS Left 2020    femoral posterior tibial bypass    FEMORAL-TIBIAL BYPASS GRAFT Right 2020    with femoral endarterectomy and patch angioplasty    FOOT DEBRIDEMENT Left 2020    FOOT DEBRIDEMENT Right 2021    DEBRIDEMENT OF RIGHT FOOT WOUND WITH GRAFT APPLICATION performed by Max Barth DPM at [facility] N/A 3/25/2022 DIRECT LARYNGOSCOPY WITH BIOPSY AND FROZEN SECTIONS performed by Elly French DO at [address] Right 2020    stent leg for PVD    TRACHEOSTOMY N/A 3/25/2022    TRACHEOTOMY performed by Elly French DO at [address] HISTORY    Family History   Problem Relation Age of Onset    Cancer Mother     Lung Cancer Mother     Diabetes Father     Stroke Father        SOCIAL HISTORY    Social History     Tobacco Use    Smoking status: Former     Packs/day: 0.50     Years: 20.00     Pack years: 10.00     Types: Cigarettes     Start date: 1977     Quit date: 2021     Years since quittin.1    Smokeless tobacco: Never   Vaping Use    Vaping Use: Never used   Substance Use Topics    Alcohol use: Yes     Comment: 2 beers / day     Drug use: Never       ALLERGIES    Allergies   Allergen Reactions    Chantix [Varenicline] Other (See Comments)     Hallucinations         MEDICATIONS    Current Outpatient Medications on File Prior to Encounter   Medication Sig Dispense Refill    warfarin (COUMADIN) 5 MG tablet Take 2 tablets by mouth daily (10 mg), except take 3 tablets every Monday and Friday (15 mg).   OR as directed by the [address] [place], [place] [address]th  Anticoagulation Service [phone]. 70 tablet 3    levothyroxine (SYNTHROID) 25 MCG tablet Take 25 mcg by mouth Daily      lisinopril (PRINIVIL;ZESTRIL) 20 MG tablet TAKE 1 TABLET BY MOUTH EVERY DAY 30 tablet 0    metFORMIN (GLUCOPHAGE) 500 MG tablet TAKE 1 TABLET BY MOUTH TWICE DAILY WITH MEALS 60 tablet 0    cetirizine (ZYRTEC) 10 MG tablet TAKE 1 TABLET BY MOUTH DAILY 30 tablet 0    benzonatate (TESSALON) 200 MG capsule TAKE 1 CAPSULE BY MOUTH THREE TIMES DAILY AS NEEDED FOR COUGH 30 capsule 0    vitamin B-12 (CYANOCOBALAMIN) 250 MCG tablet Take 1 tablet by mouth daily 30 tablet 0    diphenhydrAMINE (BANOPHEN) 25 MG tablet Take 1 tablet by mouth every 6 hours as needed for Itching 60 tablet 0    melatonin 3 MG TABS tablet Take 2 tablets by mouth nightly 60 tablet 2    STIMULANT LAXATIVE 8.6-50 MG per tablet TAKE 1 TABLET BY MOUTH TWICE DAILY 60 tablet 0    atorvastatin (LIPITOR) 80 MG tablet TAKE 1 TABLET BY MOUTH DAILY 90 tablet 0    gentamicin (GARAMYCIN) 0.1 % cream APPLY TOPICALLY TO THE AFFECTED AREA DAILY 30 g 1    budesonide-formoterol (SYMBICORT) 160-4.5 MCG/ACT AERO Inhale 2 puffs into the lungs 2 times daily 10.2 g 2    formoterol (PERFOROMIST) 20 MCG/2ML nebulizer solution Take 2 mLs by nebulization every 12 hours 2 mL 5    budesonide (PULMICORT) 0.5 MG/2ML nebulizer suspension Take 2 mLs by nebulization 2 times daily 60 each 2    metoprolol succinate (TOPROL XL) 25 MG extended release tablet Take 25 mg by mouth daily      acetaminophen (TYLENOL) 500 MG tablet Take 1 tablet by mouth 4 times daily as needed for Pain 360 tablet 1    BANOPHEN 25 MG capsule Take 1 tablet by mouth every 6 hours as needed for Itching      ondansetron (ZOFRAN-ODT) 4 MG disintegrating tablet Take 4 mg by mouth every 8 hours as needed for Nausea or Vomiting      Multiple Vitamins-Minerals (MULTIVITAMIN ADULT EXTRA C PO) 1 tablet by Nasogastric route daily      esomeprazole (NEXIUM) 20 MG delayed release capsule 20 mg by Nasogastric route every morning (before breakfast)      oxyCODONE HCl (OXY-IR) 10 MG immediate release tablet TAKE 1 TABLET BY MOUTH EVERY SIX HOURS AS NEEDED FOR PAIN FOR UP TO 7 DAYS      becaplermin (REGRANEX) 0.01 % gel Apply 1 Applicatorful topically daily      calcium-vitamin D (OSCAL-500) 500-200 MG-UNIT per tablet Take 1 tablet by mouth 2 times daily      ipratropium-albuterol (DUONEB) 0.5-2.5 (3) MG/3ML SOLN nebulizer solution Inhale 3 mLs into the lungs 4 times daily 360 mL 3    fluticasone (FLONASE) 50 MCG/ACT nasal spray 1 spray by Nasal route daily 9.9 g 5    Vitamins/Minerals TABS Take 1 tablet by mouth daily        No current facility-administered medications on file prior to encounter.        REVIEW OF SYSTEMS    Pertinent items are noted in HPI. Objective:      /66   Pulse 93   Temp 96.9 °F (36.1 °C) (Infrared)   Resp 16     PHYSICAL EXAM    Vascular: Vascular status Impaired  palpable pedal pulses, right DP0/4 and PT1/4, left DP0/4 and PT1/4. CFT 3 seconds digits 1 to 5 bilateral.  Hair growthAbsent  both lower extremities and feet. Skin temperature is warm to warm from pretibial area to distal digits bilateral.  Exam is negative for rubor, pallor, cyanosis or signs of acute vascular compromise bilaterally. Exam is positive for edema bilateral lower extremity. Varicosities Present bilateral lower extremity. Neuro: Neurologic status diminished bilateral with epicritic Absent  , proprioceptive Absent , vibratory sensation Absent  and protopathicPresent. DTRs Absent  bilateral Achilles. There were no reproducible neuritic symptoms on exam bilateral feet/ankles. Derm: Ulceration to bilateral ankles. Ecchymosis Absent  bilateral feet/foot. Elongated, yellow, crumbly and thickened greater than 3mm all toenails both feet. Musculoskeletal: No pain with debridement of wounds 5/5 muscle strength in/eversion and dorsi/plantarflexion bilateral feet. No gross instability noted. Procedure Note    Performed by: Svetlana Holley DPM    Consent obtained: Yes    Time out taken:  Yes    Pain Control: Anesthetic  Anesthetic: 4% Topical Xylocaine     Debridement:Excisional Debridement    Using curette the wound was sharply debrided    down through and including the removal of epidermis, dermis, subcutaneous tissue, and muscle/fascia.         Devitalized Tissue Debrided:  fibrin, biofilm, slough, necrotic/eschar, and exudate    Pre Debridement Measurements:  Are located in the Wound Documentation Flow Sheet    Wound #: 1   Wound Care Documentation:  Wound 06/24/22 Ankle Left;Medial #1 (Active)   Wound Image   02/08/23 1345   Wound Etiology Venous 02/08/23 1345   Wound Cleansed Cleansed with saline 02/08/23 1345   Dressing/Treatment Collagen 11/23/22 1104   Offloading for Diabetic Foot Ulcers Offloading not ordered 11/18/22 0849   Wound Length (cm) 2.4 cm 02/08/23 1345   Wound Width (cm) 4 cm 02/08/23 1345   Wound Depth (cm) 0.3 cm 02/08/23 1345   Wound Surface Area (cm^2) 9.6 cm^2 02/08/23 1345   Change in Wound Size % (l*w) -40.35 02/08/23 1345   Wound Volume (cm^3) 2.88 cm^3 02/08/23 1345   Wound Healing % -321 02/08/23 1345   Post-Procedure Length (cm) 2 cm 02/01/23 1510   Post-Procedure Width (cm) 3.6 cm 02/01/23 1510   Post-Procedure Depth (cm) 0.2 cm 02/01/23 1510   Post-Procedure Surface Area (cm^2) 7.2 cm^2 02/01/23 1510   Post-Procedure Volume (cm^3) 1.44 cm^3 02/01/23 1510   Wound Assessment Slough;Pink/red 02/08/23 1345   Drainage Amount Moderate 02/08/23 1345   Drainage Description Purulent 02/08/23 1345   Odor Mild 02/08/23 1345   Jamila-wound Assessment Maceration;Blanchable erythema 02/08/23 1345   Margins Defined edges 02/08/23 1345   Number of days: 229           Total Surface Area Debrided: 7.2sq cm     Percentage of wound debrided 100%    Bleeding:  Minimal    Hemostasis Achieved:  by pressure    Procedural Pain:  4  / 10     Post Procedural Pain:  0 / 10     Response to treatment:  Well tolerated by patient. - All nails bilateral feet debrided in thickness and length with out incident. Patient handled procedure well. Assessment:     Problem List Items Addressed This Visit       Peripheral artery disease ([facility] [address] - Primary    Relevant Medications    lidocaine (LMX) 4 % cream    Other Relevant Orders    Initiate Outpatient Wound Care Protocol          Plan:   Patient examined and evaluated   Wound debrided without incident   Multilayer compression wrap left leg  Follow-up one week  Keep legs elevated at all times when not active. Plan for skin substitute next week. The nature of the patient's condition was explained in depth.  The patient was informed that their compliance to the treatment Plan is paramount to successful healing and prevention of further ulceration and/or infection.   Discharge Treatment follow-up on Friday for dressing change    Written Patient Discharge Instructions Given            Electronically signed by Jvuenal Jensen DPM on 2/8/2023 at 2:38 PM

## 2023-02-08 NOTE — PLAN OF CARE
Discharge instructions given. Patient verbalized understanding. Return to [address] [place] [address],[address] in 1 week(s).   Awaiting Approval on [employer]

## 2023-02-08 NOTE — PROGRESS NOTES
Multilayer Compression Wrap   Below the Knee    NAME:  Karan Hu  YOB: 1960  MEDICAL RECORD NUMBER:  [id number]  DATE:  2/8/2023    Multilayer compression wrap: Removed old Multilayer wrap if indicated and wash leg with mild soap/water. Applied primary and secondary dressing as ordered. Applied multilayered dressing below the knee to left lower leg. Instructed patient/caregiver not to remove dressing and to keep it clean and dry. Instructed patient/caregiver on complications to report to provider, such as pain, numbness in toes, heavy drainage, and slippage of dressing. Instructed patient on purpose of compression dressing and on activity and exercise recommendations.      Applied  2 layer wrap from toes to knee overlapping each time    Electronically signed by Ita Beaulieu RN on 2/8/2023 at 2:20 PM

## 2023-02-08 NOTE — PROGRESS NOTES
Yousuf العلي (:  1960) is a 59 y.o. male,Established patient, here for evaluation of the following chief complaint(s):  3 Month Follow-Up         ASSESSMENT/PLAN:  1. Type 2 diabetes mellitus with other specified complication, without long-term current use of insulin (HCC)  -     POCT glycosylated hemoglobin (Hb A1C)  2. Acquired hypothyroidism  3. Essential hypertension  4. Mixed hyperlipidemia  5. Centrilobular emphysema ([address]  6. Simple chronic bronchitis ([address]  7. Tracheostomy dependent ([address]  8. Peripheral artery disease ([address]  9. Larynx cancer ([facility] [address]  10. 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 Patient was recently diagnosed with hypothyroidism we will get a have him do his blood test prior to his next visit for the time being he was started on Synthroid and seems to be doing fairly well. Patient blood pressure is well controlled    Patient diabetes was very well controlled before he did gain several pounds dose again recheck his A1c today  Return in about 6 months (around 2023).          Subjective   SUBJECTIVE/OBJECTIVE:    Lab Review   Lab Results   Component Value Date/Time     2022 01:39 PM     2022 08:48 AM     2022 05:25 AM    K 4.9 2022 01:39 PM    K 4.0 2022 08:48 AM    K 3.6 2022 05:25 AM    K 3.8 2022 05:00 AM    CO2 25 2022 01:39 PM    CO2 23 2022 08:48 AM    CO2 25 2022 05:25 AM    BUN 12 2022 01:39 PM    BUN 8 2022 08:48 AM    BUN 9 2022 05:25 AM    CREATININE 0.6 2022 01:39 PM    CREATININE <0.5 2022 08:48 AM    CREATININE <0.5 2022 05:25 AM    GLUCOSE 76 2022 01:39 PM    GLUCOSE 143 2022 08:48 AM    GLUCOSE 147 2022 05:25 AM    CALCIUM 9.9 05/05/2022 01:39 PM    CALCIUM 9.0 04/02/2022 08:48 AM    CALCIUM 9.0 04/01/2022 05:25 AM     Lab Results   Component Value Date/Time    WBC 4.8 06/16/2022 03:21 PM    WBC 4.9 05/05/2022 01:39 PM    WBC 5.0 04/02/2022 08:48 AM    HGB 9.3 06/16/2022 03:21 PM    HGB 10.2 05/05/2022 01:39 PM    HGB 11.2 04/02/2022 08:48 AM    HCT 28.6 06/16/2022 03:21 PM    HCT 32.3 05/05/2022 01:39 PM    HCT 34.5 04/02/2022 08:48 AM    MCV 83.2 06/16/2022 03:21 PM    MCV 86.0 05/05/2022 01:39 PM    MCV 85.7 04/02/2022 08:48 AM     06/16/2022 03:21 PM     05/05/2022 01:39 PM     04/02/2022 08:48 AM     Lab Results   Component Value Date/Time    CHOL 112 05/05/2022 01:39 PM    CHOL 140 02/18/2022 03:24 PM    CHOL 134 03/08/2021 12:59 PM    TRIG 92 05/05/2022 01:39 PM    TRIG 175 03/29/2022 10:30 AM    TRIG 152 03/22/2022 03:00 AM    HDL 41 05/05/2022 01:39 PM    HDL 71 02/18/2022 03:24 PM    HDL 46 03/08/2021 12:59 PM    HDL 60 03/29/2010 01:01 PM       Vitals 2/8/2023 2/1/2023 0/15/4779   SYSTOLIC 974 764 699   DIASTOLIC 66 68 70   Site - - -   Position - - -   Cuff Size - - -   Pulse 90 96 90   Temp - 96.8 96.8   Resp - 16 16   SpO2 99 - -   Weight 178 lb 3.2 oz - -   Height 5' 8\" - -   Body mass index 27.09 kg/m2 - -   Pain Level - 6 4   Some recent data might be hidden       Diabetes  He presents for his follow-up diabetic visit. He has type 2 diabetes mellitus. Pertinent negatives for hypoglycemia include no nervousness/anxiousness. Pertinent negatives for diabetes include no blurred vision, no chest pain, no fatigue, no visual change, no weakness and no weight loss. Thyroid Problem  Presents for follow-up visit. Patient reports no anxiety, cold intolerance, constipation, depressed mood, diaphoresis, diarrhea, fatigue, hoarse voice, visual change or weight loss. Review of Systems   Constitutional:  Negative for diaphoresis, fatigue and weight loss.    HENT:  Negative for hoarse voice.    Eyes:  Negative for blurred vision. Cardiovascular:  Negative for chest pain. Gastrointestinal:  Negative for constipation and diarrhea. Endocrine: Negative for cold intolerance. Neurological:  Negative for weakness. Psychiatric/Behavioral:  The patient is not nervous/anxious. Objective   Physical Exam       This dictation was generated by voice recognition computer software. Although all attempts are made to edit the dictation for accuracy, there may be errors in the transcription that are not intended. An electronic signature was used to authenticate this note.     --Anibal Bae MD

## 2023-02-15 ENCOUNTER — HOSPITAL ENCOUNTER (OUTPATIENT)
Dept: WOUND CARE | Age: 63
Discharge: HOME | End: 2023-02-15
Payer: COMMERCIAL

## 2023-02-15 VITALS
RESPIRATION RATE: 16 BRPM | TEMPERATURE: 96.9 F | SYSTOLIC BLOOD PRESSURE: 112 MMHG | DIASTOLIC BLOOD PRESSURE: 77 MMHG | HEART RATE: 104 BPM

## 2023-02-15 DIAGNOSIS — I73.9 PERIPHERAL ARTERY DISEASE (HCC): Primary | ICD-10-CM

## 2023-02-15 PROCEDURE — 11043 DBRDMT MUSC&/FSCA 1ST 20/<: CPT

## 2023-02-15 PROCEDURE — 29581 APPL MULTLAYER CMPRN SYS LEG: CPT

## 2023-02-15 RX ORDER — BETAMETHASONE DIPROPIONATE 0.05 %
OINTMENT (GRAM) TOPICAL ONCE
OUTPATIENT
Start: 2023-02-15 | End: 2023-02-15

## 2023-02-15 RX ORDER — LIDOCAINE HYDROCHLORIDE 20 MG/ML
JELLY TOPICAL ONCE
OUTPATIENT
Start: 2023-02-15 | End: 2023-02-15

## 2023-02-15 RX ORDER — LIDOCAINE 40 MG/G
CREAM TOPICAL ONCE
Status: DISCONTINUED | OUTPATIENT
Start: 2023-02-15 | End: 2023-02-16 | Stop reason: HOSPADM

## 2023-02-15 RX ORDER — BACITRACIN ZINC AND POLYMYXIN B SULFATE 500; 1000 [USP'U]/G; [USP'U]/G
OINTMENT TOPICAL ONCE
OUTPATIENT
Start: 2023-02-15 | End: 2023-02-15

## 2023-02-15 RX ORDER — BACITRACIN, NEOMYCIN, POLYMYXIN B 400; 3.5; 5 [USP'U]/G; MG/G; [USP'U]/G
OINTMENT TOPICAL ONCE
OUTPATIENT
Start: 2023-02-15 | End: 2023-02-15

## 2023-02-15 RX ORDER — LIDOCAINE 50 MG/G
OINTMENT TOPICAL ONCE
OUTPATIENT
Start: 2023-02-15 | End: 2023-02-15

## 2023-02-15 RX ORDER — LIDOCAINE 40 MG/G
CREAM TOPICAL ONCE
OUTPATIENT
Start: 2023-02-15 | End: 2023-02-15

## 2023-02-15 RX ORDER — LIDOCAINE HYDROCHLORIDE 40 MG/ML
SOLUTION TOPICAL ONCE
OUTPATIENT
Start: 2023-02-15 | End: 2023-02-15

## 2023-02-15 RX ORDER — GENTAMICIN SULFATE 1 MG/G
OINTMENT TOPICAL ONCE
OUTPATIENT
Start: 2023-02-15 | End: 2023-02-15

## 2023-02-15 RX ORDER — CLOBETASOL PROPIONATE 0.5 MG/G
OINTMENT TOPICAL ONCE
OUTPATIENT
Start: 2023-02-15 | End: 2023-02-15

## 2023-02-15 RX ORDER — GINSENG 100 MG
CAPSULE ORAL ONCE
OUTPATIENT
Start: 2023-02-15 | End: 2023-02-15

## 2023-02-15 ASSESSMENT — PAIN DESCRIPTION - PAIN TYPE: TYPE: CHRONIC PAIN

## 2023-02-15 ASSESSMENT — PAIN DESCRIPTION - DESCRIPTORS: DESCRIPTORS: ACHING

## 2023-02-15 ASSESSMENT — PAIN - FUNCTIONAL ASSESSMENT: PAIN_FUNCTIONAL_ASSESSMENT: ACTIVITIES ARE NOT PREVENTED

## 2023-02-15 ASSESSMENT — PAIN DESCRIPTION - FREQUENCY: FREQUENCY: INTERMITTENT

## 2023-02-15 ASSESSMENT — PAIN DESCRIPTION - LOCATION: LOCATION: ANKLE

## 2023-02-15 ASSESSMENT — PAIN DESCRIPTION - ORIENTATION: ORIENTATION: LEFT;RIGHT

## 2023-02-15 ASSESSMENT — PAIN SCALES - GENERAL: PAINLEVEL_OUTOF10: 7

## 2023-02-15 ASSESSMENT — PAIN DESCRIPTION - ONSET: ONSET: ON-GOING

## 2023-02-15 NOTE — DISCHARGE INSTRUCTIONS
[address], [address] [place] [address]  Telephone: [phone]     Discharge Instructions     Important reminders:     **If you have any signs and symptoms of illness (Cough, fever, congestion, nausea, vomiting, diarrhea, etc.) please call the wound care center prior to your appointment. 1. Increase Protein intake for optimal wound healing  2. No added salt to reduce any swelling  3. If diabetic, maintain good glucose control  4. If you smoke, smoking prohibits wound healing, we ask that you refrain from smoking. 5. When taking antibiotics take the entire prescription as ordered. Do not stop taking until medication is all gone unless otherwise instructed. 6. Exercise as tolerated. 7. Keep weight off wounds and reposition every 2 hours if applicable. 8. If wound(s) is on your lower extremity, elevate legs to the level of the heart or above for 30 minutes 4-5 times a day and/or when sitting. Avoid standing for long periods of time. 9. Do not get wounds wet in bath or shower unless otherwise instructed by your physician. If your wound is on your foot or leg, you may purchase a cast bag. Please ask at the pharmacy. If Vascular testing is ordered, please call [address] [place] [address] [phone]) to schedule. Vascular tests ordered by Wound Care Physicians may take up to 2 hours to complete. Please keep that in mind when scheduling. If Vascular testing is scheduled, please bring supplies to replace your dressing after testing is done. The vascular department does not stock supplies. Wound: Right and Left leg     With each dressing change, rinse wounds with 0.9% Saline. (May use wound wash or soft contact solution. Both can be purchased at a local drug store). If unable to obtain saline, may use a gentle soap and water.      Dressing care:  Left leg BOTH WOUNDS-   gent and Triad, Foam and coflex calamine- these will be changed at your next visit unless they slide down or cause pain. If they slide, gets wet, or cause pain please call the wound care center, you may need to come in to be re-wrapped. If you are unable to get to your follow up appointment you will need to remove your wraps at home & place some kind of compression. Nails Clipped 1/13/2023      Dr Arpita Estevez, Vascular Surgeon September 14th at 1215pm  [address] [place] [address], [address]  Phone# [phone]  Fax# [phone]         Compression Wraps  Location: Left lower leg below knee  Type: Coflex calamine     Your doctor has ordered compression therapy for your wound. Compression bandages reduce the swelling, or edema, in your legs and prevent it from returning. The wound care staff will apply your compression wrap. It must be removed and re-applied at least weekly. As the swelling decreases, the boot no longer provides adequate compression and you need a new one. Once applied, you need to know how to take care of your compression wrap. The boot must stay dry. Do not get it wet in the shower or tub. You may do a partial bath, or you can cover the boot with a large plastic bag, secured at the top, so that no water can get in. Avoid standing in one place for long periods of time. If you must  one place, shift your weight and change positions often. If you have CHF, consult your doctor before following the next two recommendations for leg elevation. When sitting, elevate your legs on pillows, or put blocks under the foot of your bed. Your legs should be higher than your heart. If your boot becomes painful, or you notice an increase in swelling in your toes, numbness or tingling, or purple color to your toes, remove the wrap and call the [facility] [address]. If it is after hours, call your doctor for instructions or go to the nearest emergency room.         Home Care Agency/Facility: [facility]     Your wound-care supplies will be provided by:  Please note, depending on your insurance coverage, you may have out-of-pocket expenses for these supplies. Someone from the company should call you to confirm your order and discuss those potential costs before they ship your products -- please anticipate that call. If your out-of-pocket cost could be substantial, Many companies have financial hardship programs for patients who qualify, so please ask about that if you might need a hand. If you have any questions about your supplies or your potential out-of-pocket costs, or if you need to place an order for a refill of supplies (typically monthly), please call the company directly. Your  is [employer]     Follow up with Dr Ronn Underwood in 1 weeks. Wound Care Center Information: Should you experience any significant changes in your wound(s) or have questions about your wound care, please [facility]  at [phone] Monday  - Thursday 8:00 am - 4:00 pm and Friday 8:00 am - 1:00pm. If you need help with your wound outside these hours and cannot wait until we are again available, contact your PCP or go to the hospital emergency room. PLEASE NOTE: IF YOU ARE UNABLE TO OBTAIN WOUND SUPPLIES, CONTINUE TO USE THE SUPPLIES YOU HAVE AVAILABLE UNTIL YOU ARE ABLE TO REACH US. IT IS MOST IMPORTANT TO KEEP THE WOUND COVERED AT ALL TIMES.

## 2023-02-15 NOTE — PLAN OF CARE
Discharge instructions given. Patient verbalized understanding. Return to [facility] in 1 week(s).

## 2023-02-15 NOTE — PROGRESS NOTES
Iris Quiroz  Progress Note and Procedure Note      Yarely Dick  AGE: 59 y.o. GENDER: male  : 1960  TODAY'S DATE:  2/15/2023    Subjective:     Chief Complaint   Patient presents with    Wound Check     LOWER EXTREMITIES           HISTORY of PRESENT ILLNESS HPI     Yarely Dick is a 59 y.o. male who presents today for wound evaluation. History of Wound: Admits to having chronic wounds for years. He states that he has had arterial bypasses on both of his legs in the past.  He was seeing previous wound care and podiatry for these wounds. He states that his insurance had some issues and he has not been seeing anyone since 2021. Had to remove the bandage because he had some pain on the lateral aspect of the left foot. He noted a new wound. He does admit to having some sharp pain around the wound.     Wound Pain:  intermittent left  Severity:  3 / 10   Wound Type:  venous, arterial and diabetic  Modifying Factors:  edema, venous stasis, lymphedema, diabetes, decreased mobility, arterial insufficiency and decreased tissue oxygenation  Associated Signs/Symptoms:  drainage, numbness and odor        PAST MEDICAL HISTORY        Diagnosis Date    Acquired hypothyroidism 2023    Cancer ([facility] [address]     throat    Diabetes mellitus ([address]     Fibromyalgia     History of DVT (deep vein thrombosis)     Hyperlipidemia     Hypertension     Type 2 diabetes mellitus with circulatory disorder, without long-term current use of insulin ([address] 2022       PAST SURGICAL HISTORY    Past Surgical History:   Procedure Laterality Date    APPENDECTOMY  2005    COLONOSCOPY  2016    FEMORAL BYPASS Left 2020    femoral posterior tibial bypass    FEMORAL-TIBIAL BYPASS GRAFT Right 2020    with femoral endarterectomy and patch angioplasty    FOOT DEBRIDEMENT Left 2020    FOOT DEBRIDEMENT Right 2021    DEBRIDEMENT OF RIGHT FOOT WOUND WITH GRAFT APPLICATION performed by Wayne Jones Pro Keller DPM at [address] [facility] N/A 3/25/2022    DIRECT LARYNGOSCOPY WITH BIOPSY AND FROZEN SECTIONS performed by Gabino Chauhan DO at [address] Right 2020    stent leg for PVD    TRACHEOSTOMY N/A 3/25/2022    TRACHEOTOMY performed by Gabino Chauhan DO at [address] HISTORY    Family History   Problem Relation Age of Onset    Cancer Mother     Lung Cancer Mother     Diabetes Father     Stroke Father        SOCIAL HISTORY    Social History     Tobacco Use    Smoking status: Former     Packs/day: 0.50     Years: 20.00     Pack years: 10.00     Types: Cigarettes     Start date: 1977     Quit date: 2021     Years since quittin.1    Smokeless tobacco: Never   Vaping Use    Vaping Use: Never used   Substance Use Topics    Alcohol use: Yes     Comment: 2 beers / day     Drug use: Never       ALLERGIES    Allergies   Allergen Reactions    Chantix [Varenicline] Other (See Comments)     Hallucinations         MEDICATIONS    Current Outpatient Medications on File Prior to Encounter   Medication Sig Dispense Refill    warfarin (COUMADIN) 5 MG tablet Take 2 tablets by mouth daily (10 mg), except take 3 tablets every Monday and Friday (15 mg).   OR as directed by the [facility]ticoagulation Service [phone]. 70 tablet 3    levothyroxine (SYNTHROID) 25 MCG tablet Take 25 mcg by mouth Daily      lisinopril (PRINIVIL;ZESTRIL) 20 MG tablet TAKE 1 TABLET BY MOUTH EVERY DAY 30 tablet 0    metFORMIN (GLUCOPHAGE) 500 MG tablet TAKE 1 TABLET BY MOUTH TWICE DAILY WITH MEALS 60 tablet 0    cetirizine (ZYRTEC) 10 MG tablet TAKE 1 TABLET BY MOUTH DAILY 30 tablet 0    benzonatate (TESSALON) 200 MG capsule TAKE 1 CAPSULE BY MOUTH THREE TIMES DAILY AS NEEDED FOR COUGH 30 capsule 0    vitamin B-12 (CYANOCOBALAMIN) 250 MCG tablet Take 1 tablet by mouth daily 30 tablet 0    diphenhydrAMINE (BANOPHEN) 25 MG tablet Take 1 tablet by mouth every 6 hours as needed for Itching 60 tablet 0    melatonin 3 MG TABS tablet Take 2 tablets by mouth nightly 60 tablet 2    STIMULANT LAXATIVE 8.6-50 MG per tablet TAKE 1 TABLET BY MOUTH TWICE DAILY 60 tablet 0    atorvastatin (LIPITOR) 80 MG tablet TAKE 1 TABLET BY MOUTH DAILY 90 tablet 0    gentamicin (GARAMYCIN) 0.1 % cream APPLY TOPICALLY TO THE AFFECTED AREA DAILY 30 g 1    budesonide-formoterol (SYMBICORT) 160-4.5 MCG/ACT AERO Inhale 2 puffs into the lungs 2 times daily 10.2 g 2    formoterol (PERFOROMIST) 20 MCG/2ML nebulizer solution Take 2 mLs by nebulization every 12 hours 2 mL 5    budesonide (PULMICORT) 0.5 MG/2ML nebulizer suspension Take 2 mLs by nebulization 2 times daily 60 each 2    metoprolol succinate (TOPROL XL) 25 MG extended release tablet Take 25 mg by mouth daily      acetaminophen (TYLENOL) 500 MG tablet Take 1 tablet by mouth 4 times daily as needed for Pain 360 tablet 1    BANOPHEN 25 MG capsule Take 1 tablet by mouth every 6 hours as needed for Itching      ondansetron (ZOFRAN-ODT) 4 MG disintegrating tablet Take 4 mg by mouth every 8 hours as needed for Nausea or Vomiting      Multiple Vitamins-Minerals (MULTIVITAMIN ADULT EXTRA C PO) 1 tablet by Nasogastric route daily      esomeprazole (NEXIUM) 20 MG delayed release capsule 20 mg by Nasogastric route every morning (before breakfast)      oxyCODONE HCl (OXY-IR) 10 MG immediate release tablet TAKE 1 TABLET BY MOUTH EVERY SIX HOURS AS NEEDED FOR PAIN FOR UP TO 7 DAYS      becaplermin (REGRANEX) 0.01 % gel Apply 1 Applicatorful topically daily      calcium-vitamin D (OSCAL-500) 500-200 MG-UNIT per tablet Take 1 tablet by mouth 2 times daily      ipratropium-albuterol (DUONEB) 0.5-2.5 (3) MG/3ML SOLN nebulizer solution Inhale 3 mLs into the lungs 4 times daily 360 mL 3    fluticasone (FLONASE) 50 MCG/ACT nasal spray 1 spray by Nasal route daily 9.9 g 5    Vitamins/Minerals TABS Take 1 tablet by mouth daily        No current facility-administered medications on file prior to encounter. REVIEW OF SYSTEMS    Pertinent items are noted in HPI. Objective:      /77   Pulse (!) 104   Temp 96.9 °F (36.1 °C) (Infrared)   Resp 16     PHYSICAL EXAM    Vascular: Vascular status Impaired  palpable pedal pulses, right DP0/4 and PT1/4, left DP0/4 and PT1/4. CFT 3 seconds digits 1 to 5 bilateral.  Hair growthAbsent  both lower extremities and feet. Skin temperature is warm to warm from pretibial area to distal digits bilateral.  Exam is negative for rubor, pallor, cyanosis or signs of acute vascular compromise bilaterally. Exam is positive for edema bilateral lower extremity. Varicosities Present bilateral lower extremity. Neuro: Neurologic status diminished bilateral with epicritic Absent  , proprioceptive Absent , vibratory sensation Absent  and protopathicPresent. DTRs Absent  bilateral Achilles. There were no reproducible neuritic symptoms on exam bilateral feet/ankles. Derm: Ulceration to bilateral ankles. Ecchymosis Absent  bilateral feet/foot. Elongated, yellow, crumbly and thickened greater than 3mm all toenails both feet. Musculoskeletal: No pain with debridement of wounds 5/5 muscle strength in/eversion and dorsi/plantarflexion bilateral feet. No gross instability noted. Procedure Note    Performed by: Nevaeh Arce DPM    Consent obtained: Yes    Time out taken:  Yes    Pain Control: Anesthetic  Anesthetic: 4% Topical Xylocaine     Debridement:Excisional Debridement    Using curette the wound was sharply debrided    down through and including the removal of epidermis, dermis, subcutaneous tissue, and muscle/fascia.         Devitalized Tissue Debrided:  fibrin, biofilm, slough, necrotic/eschar, and exudate    Pre Debridement Measurements:  Are located in the Wound Documentation Flow Sheet    Wound #: 1   Wound Care Documentation:  Wound 06/24/22 Ankle Left;Medial #1 (Active)   Wound Image   02/08/23 1345   Wound Etiology Venous 02/15/23 1317 Wound Cleansed Cleansed with saline 02/15/23 1317   Dressing/Treatment Collagen 11/23/22 1104   Offloading for Diabetic Foot Ulcers Offloading not ordered 11/18/22 0849   Wound Length (cm) 2 cm 02/15/23 1317   Wound Width (cm) 3.5 cm 02/15/23 1317   Wound Depth (cm) 0.2 cm 02/15/23 1317   Wound Surface Area (cm^2) 7 cm^2 02/15/23 1317   Change in Wound Size % (l*w) -2.34 02/15/23 1317   Wound Volume (cm^3) 1.4 cm^3 02/15/23 1317   Wound Healing % -105 02/15/23 1317   Post-Procedure Length (cm) 2 cm 02/15/23 1342   Post-Procedure Width (cm) 3.5 cm 02/15/23 1342   Post-Procedure Depth (cm) 0.2 cm 02/15/23 1342   Post-Procedure Surface Area (cm^2) 7 cm^2 02/15/23 1342   Post-Procedure Volume (cm^3) 1.4 cm^3 02/15/23 1342   Wound Assessment Bleeding 02/15/23 1342   Drainage Amount Moderate 02/15/23 1317   Drainage Description Serosanguinous 02/15/23 1317   Odor None 02/15/23 1317   Jamila-wound Assessment Edematous 02/15/23 1317   Margins Defined edges 02/15/23 1317   Number of days: 236       Wound 02/15/23 Heel Lower; Outer;Left #2 (Active)   Wound Image   02/15/23 1317   Wound Etiology Venous 02/15/23 1317   Wound Cleansed Cleansed with saline 02/15/23 1317   Offloading for Diabetic Foot Ulcers Offloading not ordered 02/15/23 1317   Wound Length (cm) 1.2 cm 02/15/23 1317   Wound Width (cm) 0.7 cm 02/15/23 1317   Wound Depth (cm) 0.1 cm 02/15/23 1317   Wound Surface Area (cm^2) 0.84 cm^2 02/15/23 1317   Wound Volume (cm^3) 0.084 cm^3 02/15/23 1317   Post-Procedure Length (cm) 1.2 cm 02/15/23 1342   Post-Procedure Width (cm) 0.7 cm 02/15/23 1342   Post-Procedure Depth (cm) 0.1 cm 02/15/23 1342   Post-Procedure Surface Area (cm^2) 0.84 cm^2 02/15/23 1342   Post-Procedure Volume (cm^3) 0.084 cm^3 02/15/23 1342   Wound Assessment Bleeding 02/15/23 1342   Drainage Amount Small 02/15/23 1317   Drainage Description Serosanguinous 02/15/23 1317   Odor None 02/15/23 1317   Jamila-wound Assessment Excoriated 02/15/23 1317 Margins Undefined edges 02/15/23 1317   Number of days: 0         Total Surface Area Debrided: 7.84sq cm both wounds    Percentage of wound debrided 100%    Bleeding:  Minimal    Hemostasis Achieved:  by pressure    Procedural Pain:  4  / 10     Post Procedural Pain:  0 / 10     Response to treatment:  Well tolerated by patient. - All nails bilateral feet debrided in thickness and length with out incident. Patient handled procedure well. Assessment:     Problem List Items Addressed This Visit       Peripheral artery disease ([facility] [address] - Primary    Relevant Medications    lidocaine (LMX) 4 % cream    Other Relevant Orders    Initiate Outpatient Wound Care Protocol          Plan:   Patient examined and evaluated   Wound debrided without incident   Multilayer compression wrap left leg  Follow-up one week  Keep legs elevated at all times when not active. Plan for skin substitute when approved. The nature of the patient's condition was explained in depth. The patient was informed that their compliance to the treatment Plan is paramount to successful healing and prevention of further ulceration and/or infection.   Discharge Treatment follow-up on Friday for dressing change    Written Patient Discharge Instructions Given            Electronically signed by Lindy Bautista DPM on 2/15/2023 at 2:17 PM

## 2023-02-18 DIAGNOSIS — J41.0 SIMPLE CHRONIC BRONCHITIS (HCC): ICD-10-CM

## 2023-02-18 DIAGNOSIS — L29.9 ITCHING: ICD-10-CM

## 2023-02-18 DIAGNOSIS — E11.9 TYPE 2 DIABETES MELLITUS WITHOUT COMPLICATION, WITHOUT LONG-TERM CURRENT USE OF INSULIN (HCC): ICD-10-CM

## 2023-02-18 DIAGNOSIS — J43.2 CENTRILOBULAR EMPHYSEMA (HCC): ICD-10-CM

## 2023-02-18 DIAGNOSIS — M25.50 ARTHRALGIA, UNSPECIFIED JOINT: ICD-10-CM

## 2023-02-18 RX ORDER — BENZONATATE 200 MG/1
CAPSULE ORAL
Qty: 30 CAPSULE | Refills: 0 | Status: CANCELLED | OUTPATIENT
Start: 2023-02-18

## 2023-02-20 RX ORDER — CETIRIZINE HYDROCHLORIDE 10 MG/1
10 TABLET ORAL DAILY
Qty: 30 TABLET | Refills: 0 | Status: SHIPPED | OUTPATIENT
Start: 2023-02-20 | End: 2023-03-22

## 2023-02-20 RX ORDER — BENZONATATE 200 MG/1
CAPSULE ORAL
Qty: 30 CAPSULE | Refills: 0 | Status: SHIPPED | OUTPATIENT
Start: 2023-02-20

## 2023-02-20 RX ORDER — LISINOPRIL 20 MG/1
TABLET ORAL
Qty: 30 TABLET | Refills: 0 | Status: SHIPPED | OUTPATIENT
Start: 2023-02-20

## 2023-02-20 RX ORDER — PSEUDOEPHED/ACETAMINOPH/DIPHEN 30MG-500MG
TABLET ORAL
Qty: 360 TABLET | Refills: 1 | Status: SHIPPED | OUTPATIENT
Start: 2023-02-20

## 2023-02-21 NOTE — DISCHARGE INSTRUCTIONS
[address], [address]  Telephone: [phone]     Discharge Instructions     Important reminders:     **If you have any signs and symptoms of illness (Cough, fever, congestion, nausea, vomiting, diarrhea, etc.) please call the wound care center prior to your appointment. 1. Increase Protein intake for optimal wound healing  2. No added salt to reduce any swelling  3. If diabetic, maintain good glucose control  4. If you smoke, smoking prohibits wound healing, we ask that you refrain from smoking. 5. When taking antibiotics take the entire prescription as ordered. Do not stop taking until medication is all gone unless otherwise instructed. 6. Exercise as tolerated. 7. Keep weight off wounds and reposition every 2 hours if applicable. 8. If wound(s) is on your lower extremity, elevate legs to the level of the heart or above for 30 minutes 4-5 times a day and/or when sitting. Avoid standing for long periods of time. 9. Do not get wounds wet in bath or shower unless otherwise instructed by your physician. If your wound is on your foot or leg, you may purchase a cast bag. Please ask at the pharmacy. If Vascular testing is ordered, please call [address] [place] [address] [phone]) to schedule. Vascular tests ordered by Wound Care Physicians may take up to 2 hours to complete. Please keep that in mind when scheduling. If Vascular testing is scheduled, please bring supplies to replace your dressing after testing is done. The vascular department does not stock supplies. Wound: Right and Left leg     With each dressing change, rinse wounds with 0.9% Saline. (May use wound wash or soft contact solution. Both can be purchased at a local drug store). If unable to obtain saline, may use a gentle soap and water.      Dressing care:  Left leg BOTH WOUNDS-   gent and Triad, Foam and coflex calamine- these will be changed at your next visit unless they slide down or cause pain. If they slide, gets wet, or cause pain please call the wound care center, you may need to come in to be re-wrapped. If you are unable to get to your follow up appointment you will need to remove your wraps at home & place some kind of compression. Nails Clipped 1/13/2023      Dr Brittani Goyal, Vascular Surgeon September 14th at 1215pm  [address], [address]  Phone# [phone]  Fax# [phone]         Compression Wraps  Location: Left lower leg below knee  Type: Coflex calamine     Your doctor has ordered compression therapy for your wound. Compression bandages reduce the swelling, or edema, in your legs and prevent it from returning. The wound care staff will apply your compression wrap. It must be removed and re-applied at least weekly. As the swelling decreases, the boot no longer provides adequate compression and you need a new one. Once applied, you need to know how to take care of your compression wrap. The boot must stay dry. Do not get it wet in the shower or tub. You may do a partial bath, or you can cover the boot with a large plastic bag, secured at the top, so that no water can get in. Avoid standing in one place for long periods of time. If you must  one place, shift your weight and change positions often. If you have CHF, consult your doctor before following the next two recommendations for leg elevation. When sitting, elevate your legs on pillows, or put blocks under the foot of your bed. Your legs should be higher than your heart. If your boot becomes painful, or you notice an increase in swelling in your toes, numbness or tingling, or purple color to your toes, remove the wrap and call the [facility] [address]. If it is after hours, call your doctor for instructions or go to the nearest emergency room.         Home Care Agency/Facility: [facility]     Your wound-care supplies will be provided by:  Please note, depending on your insurance coverage, you may have out-of-pocket expenses for these supplies. Someone from the company should call you to confirm your order and discuss those potential costs before they ship your products -- please anticipate that call. If your out-of-pocket cost could be substantial, Many companies have financial hardship programs for patients who qualify, so please ask about that if you might need a hand. If you have any questions about your supplies or your potential out-of-pocket costs, or if you need to place an order for a refill of supplies (typically monthly), please call the company directly. Your  is Nilesh Hernandez     Follow up with Dr Pankaj Elena in 1 weeks. Wound Care Center Information: Should you experience any significant changes in your wound(s) or have questions about your wound care, please [facility] the [facility]  at [phone] Monday  - Thursday 8:00 am - 4:00 pm and Friday 8:00 am - 1:00pm. If you need help with your wound outside these hours and cannot wait until we are again available, contact your PCP or go to the hospital emergency room. PLEASE NOTE: IF YOU ARE UNABLE TO OBTAIN WOUND SUPPLIES, CONTINUE TO USE THE SUPPLIES YOU HAVE AVAILABLE UNTIL YOU ARE ABLE TO REACH US. IT IS MOST IMPORTANT TO KEEP THE WOUND COVERED AT ALL TIMES.

## 2023-02-22 ENCOUNTER — HOSPITAL ENCOUNTER (OUTPATIENT)
Dept: WOUND CARE | Age: 63
Discharge: HOME | End: 2023-02-22
Payer: COMMERCIAL

## 2023-02-22 DIAGNOSIS — I73.9 PERIPHERAL ARTERY DISEASE (HCC): Primary | ICD-10-CM

## 2023-02-22 PROCEDURE — 29581 APPL MULTLAYER CMPRN SYS LEG: CPT

## 2023-02-22 PROCEDURE — 11043 DBRDMT MUSC&/FSCA 1ST 20/<: CPT

## 2023-02-22 RX ORDER — GENTAMICIN SULFATE 1 MG/G
OINTMENT TOPICAL ONCE
OUTPATIENT
Start: 2023-02-22 | End: 2023-02-22

## 2023-02-22 RX ORDER — LIDOCAINE 40 MG/G
CREAM TOPICAL ONCE
Status: DISCONTINUED | OUTPATIENT
Start: 2023-02-22 | End: 2023-02-23 | Stop reason: HOSPADM

## 2023-02-22 RX ORDER — GINSENG 100 MG
CAPSULE ORAL ONCE
OUTPATIENT
Start: 2023-02-22 | End: 2023-02-22

## 2023-02-22 RX ORDER — LIDOCAINE 50 MG/G
OINTMENT TOPICAL ONCE
OUTPATIENT
Start: 2023-02-22 | End: 2023-02-22

## 2023-02-22 RX ORDER — LIDOCAINE HYDROCHLORIDE 20 MG/ML
JELLY TOPICAL ONCE
OUTPATIENT
Start: 2023-02-22 | End: 2023-02-22

## 2023-02-22 RX ORDER — BACITRACIN, NEOMYCIN, POLYMYXIN B 400; 3.5; 5 [USP'U]/G; MG/G; [USP'U]/G
OINTMENT TOPICAL ONCE
OUTPATIENT
Start: 2023-02-22 | End: 2023-02-22

## 2023-02-22 RX ORDER — LIDOCAINE HYDROCHLORIDE 40 MG/ML
SOLUTION TOPICAL ONCE
OUTPATIENT
Start: 2023-02-22 | End: 2023-02-22

## 2023-02-22 RX ORDER — BETAMETHASONE DIPROPIONATE 0.05 %
OINTMENT (GRAM) TOPICAL ONCE
OUTPATIENT
Start: 2023-02-22 | End: 2023-02-22

## 2023-02-22 RX ORDER — CLOBETASOL PROPIONATE 0.5 MG/G
OINTMENT TOPICAL ONCE
OUTPATIENT
Start: 2023-02-22 | End: 2023-02-22

## 2023-02-22 RX ORDER — BACITRACIN ZINC AND POLYMYXIN B SULFATE 500; 1000 [USP'U]/G; [USP'U]/G
OINTMENT TOPICAL ONCE
OUTPATIENT
Start: 2023-02-22 | End: 2023-02-22

## 2023-02-22 RX ORDER — LIDOCAINE 40 MG/G
CREAM TOPICAL ONCE
OUTPATIENT
Start: 2023-02-22 | End: 2023-02-22

## 2023-02-22 ASSESSMENT — PAIN - FUNCTIONAL ASSESSMENT: PAIN_FUNCTIONAL_ASSESSMENT: PREVENTS OR INTERFERES SOME ACTIVE ACTIVITIES AND ADLS

## 2023-02-22 ASSESSMENT — PAIN DESCRIPTION - FREQUENCY: FREQUENCY: CONTINUOUS

## 2023-02-22 ASSESSMENT — PAIN DESCRIPTION - LOCATION: LOCATION: ANKLE

## 2023-02-22 ASSESSMENT — PAIN DESCRIPTION - ONSET: ONSET: ON-GOING

## 2023-02-22 ASSESSMENT — PAIN DESCRIPTION - PAIN TYPE: TYPE: CHRONIC PAIN

## 2023-02-22 ASSESSMENT — PAIN DESCRIPTION - DESCRIPTORS: DESCRIPTORS: ACHING

## 2023-02-22 ASSESSMENT — PAIN DESCRIPTION - ORIENTATION: ORIENTATION: LEFT

## 2023-02-22 NOTE — PROGRESS NOTES
Iris Rivera  Progress Note and Procedure Note      Tal Thomson  AGE: 59 y.o. GENDER: male  : 1960  TODAY'S DATE:  2023    Subjective:     Chief Complaint   Patient presents with    Wound Check     Bilateral legs F/U           HISTORY of PRESENT ILLNESS HPI     Tal Thomson is a 59 y.o. male who presents today for wound evaluation. History of Wound: Admits to having chronic wounds for years. He states that he has had arterial bypasses on both of his legs in the past.  He was seeing previous wound care and podiatry for these wounds. He states that his insurance had some issues and he has not been seeing anyone since 2021. Pain at night in the left ankle. No other complaints.      Wound Pain:  intermittent left  Severity:  3 / 10   Wound Type:  venous, arterial and diabetic  Modifying Factors:  edema, venous stasis, lymphedema, diabetes, decreased mobility, arterial insufficiency and decreased tissue oxygenation  Associated Signs/Symptoms:  drainage, numbness and odor        PAST MEDICAL HISTORY        Diagnosis Date    Acquired hypothyroidism 2023    Cancer ([address]     throat    Diabetes mellitus ([address]     Fibromyalgia     History of DVT (deep vein thrombosis)     Hyperlipidemia     Hypertension     Type 2 diabetes mellitus with circulatory disorder, without long-term current use of insulin ([address] 2022       PAST SURGICAL HISTORY    Past Surgical History:   Procedure Laterality Date    APPENDECTOMY  2005    COLONOSCOPY  2016    FEMORAL BYPASS Left 2020    femoral posterior tibial bypass    FEMORAL-TIBIAL BYPASS GRAFT Right 2020    with femoral endarterectomy and patch angioplasty    FOOT DEBRIDEMENT Left 2020    FOOT DEBRIDEMENT Right 2021    DEBRIDEMENT OF RIGHT FOOT WOUND WITH GRAFT APPLICATION performed by Eric Harris DPM at [address] [place] [address] N/A 3/25/2022    DIRECT LARYNGOSCOPY WITH BIOPSY AND FROZEN SECTIONS performed by Toyin Block DO at [address] Right 2020    stent leg for PVD    TRACHEOSTOMY N/A 3/25/2022    TRACHEOTOMY performed by Toyin Block DO at [address] HISTORY    Family History   Problem Relation Age of Onset    Cancer Mother     Lung Cancer Mother     Diabetes Father     Stroke Father        SOCIAL HISTORY    Social History     Tobacco Use    Smoking status: Former     Packs/day: 0.50     Years: 20.00     Pack years: 10.00     Types: Cigarettes     Start date: 1977     Quit date: 2021     Years since quittin.1    Smokeless tobacco: Never   Vaping Use    Vaping Use: Never used   Substance Use Topics    Alcohol use: Yes     Comment: 2 beers / day     Drug use: Never       ALLERGIES    Allergies   Allergen Reactions    Chantix [Varenicline] Other (See Comments)     Hallucinations         MEDICATIONS    Current Outpatient Medications on File Prior to Encounter   Medication Sig Dispense Refill    ACETAMINOPHEN EXTRA STRENGTH 500 MG tablet TAKE 1 TABLET BY MOUTH FOUR TIMES DAILY AS NEEDED FOR PAIN 360 tablet 1    lisinopril (PRINIVIL;ZESTRIL) 20 MG tablet TAKE 1 TABLET BY MOUTH EVERY DAY 30 tablet 0    metFORMIN (GLUCOPHAGE) 500 MG tablet TAKE 1 TABLET BY MOUTH TWICE DAILY WITH MEALS 60 tablet 0    cetirizine (ZYRTEC) 10 MG tablet TAKE 1 TABLET BY MOUTH DAILY 30 tablet 0    benzonatate (TESSALON) 200 MG capsule TAKE 1 CAPSULE BY MOUTH THREE TIMES DAILY AS NEEDED FOR COUGH 30 capsule 0    warfarin (COUMADIN) 5 MG tablet Take 2 tablets by mouth daily (10 mg), except take 3 tablets every Monday and Friday (15 mg).   OR as directed by the [facility] Anticoagulation Service [phone]. 70 tablet 3    levothyroxine (SYNTHROID) 25 MCG tablet Take 25 mcg by mouth Daily      vitamin B-12 (CYANOCOBALAMIN) 250 MCG tablet Take 1 tablet by mouth daily 30 tablet 0    diphenhydrAMINE (BANOPHEN) 25 MG tablet Take 1 tablet by mouth every 6 hours as needed for Itching 60 tablet 0    melatonin 3 MG TABS tablet Take 2 tablets by mouth nightly 60 tablet 2    STIMULANT LAXATIVE 8.6-50 MG per tablet TAKE 1 TABLET BY MOUTH TWICE DAILY 60 tablet 0    atorvastatin (LIPITOR) 80 MG tablet TAKE 1 TABLET BY MOUTH DAILY 90 tablet 0    gentamicin (GARAMYCIN) 0.1 % cream APPLY TOPICALLY TO THE AFFECTED AREA DAILY 30 g 1    budesonide-formoterol (SYMBICORT) 160-4.5 MCG/ACT AERO Inhale 2 puffs into the lungs 2 times daily 10.2 g 2    formoterol (PERFOROMIST) 20 MCG/2ML nebulizer solution Take 2 mLs by nebulization every 12 hours 2 mL 5    budesonide (PULMICORT) 0.5 MG/2ML nebulizer suspension Take 2 mLs by nebulization 2 times daily 60 each 2    metoprolol succinate (TOPROL XL) 25 MG extended release tablet Take 25 mg by mouth daily      BANOPHEN 25 MG capsule Take 1 tablet by mouth every 6 hours as needed for Itching      ondansetron (ZOFRAN-ODT) 4 MG disintegrating tablet Take 4 mg by mouth every 8 hours as needed for Nausea or Vomiting      Multiple Vitamins-Minerals (MULTIVITAMIN ADULT EXTRA C PO) 1 tablet by Nasogastric route daily      esomeprazole (NEXIUM) 20 MG delayed release capsule 20 mg by Nasogastric route every morning (before breakfast)      oxyCODONE HCl (OXY-IR) 10 MG immediate release tablet TAKE 1 TABLET BY MOUTH EVERY SIX HOURS AS NEEDED FOR PAIN FOR UP TO 7 DAYS      becaplermin (REGRANEX) 0.01 % gel Apply 1 Applicatorful topically daily      calcium-vitamin D (OSCAL-500) 500-200 MG-UNIT per tablet Take 1 tablet by mouth 2 times daily      ipratropium-albuterol (DUONEB) 0.5-2.5 (3) MG/3ML SOLN nebulizer solution Inhale 3 mLs into the lungs 4 times daily 360 mL 3    fluticasone (FLONASE) 50 MCG/ACT nasal spray 1 spray by Nasal route daily 9.9 g 5    Vitamins/Minerals TABS Take 1 tablet by mouth daily        No current facility-administered medications on file prior to encounter. REVIEW OF SYSTEMS    Pertinent items are noted in HPI. Objective:       There were no vitals taken for this visit. PHYSICAL EXAM    Vascular: Vascular status Impaired  palpable pedal pulses, right DP0/4 and PT1/4, left DP0/4 and PT1/4. CFT 3 seconds digits 1 to 5 bilateral.  Hair growthAbsent  both lower extremities and feet. Skin temperature is warm to warm from pretibial area to distal digits bilateral.  Exam is negative for rubor, pallor, cyanosis or signs of acute vascular compromise bilaterally. Exam is positive for edema bilateral lower extremity. Varicosities Present bilateral lower extremity. Neuro: Neurologic status diminished bilateral with epicritic Absent  , proprioceptive Absent , vibratory sensation Absent  and protopathicPresent. DTRs Absent  bilateral Achilles. There were no reproducible neuritic symptoms on exam bilateral feet/ankles. Derm: Ulceration to bilateral ankles. Ecchymosis Absent  bilateral feet/foot. Elongated, yellow, crumbly and thickened greater than 3mm all toenails both feet. Musculoskeletal: No pain with debridement of wounds 5/5 muscle strength in/eversion and dorsi/plantarflexion bilateral feet. No gross instability noted. Procedure Note    Performed by: Jeanine Lino DPM    Consent obtained: Yes    Time out taken:  Yes    Pain Control: Anesthetic  Anesthetic: 4% Topical Xylocaine     Debridement:Excisional Debridement    Using curette the wound was sharply debrided    down through and including the removal of epidermis, dermis, subcutaneous tissue, and muscle/fascia.         Devitalized Tissue Debrided:  fibrin, biofilm, slough, necrotic/eschar, and exudate    Pre Debridement Measurements:  Are located in the Wound Documentation Flow Sheet    Wound #: 1   Wound Care Documentation:  Wound 06/24/22 Ankle Left;Medial #1 (Active)   Wound Image   02/08/23 1345   Wound Etiology Venous 02/22/23 1354   Wound Cleansed Cleansed with saline 02/22/23 1354   Dressing/Treatment Collagen 11/23/22 1104   Offloading for Diabetic Foot Ulcers Offloading not ordered 02/22/23 1354   Wound Length (cm) 2.5 cm 02/22/23 1354   Wound Width (cm) 4 cm 02/22/23 1354   Wound Depth (cm) 0.2 cm 02/22/23 1354   Wound Surface Area (cm^2) 10 cm^2 02/22/23 1354   Change in Wound Size % (l*w) -46.2 02/22/23 1354   Wound Volume (cm^3) 2 cm^3 02/22/23 1354   Wound Healing % -192 02/22/23 1354   Post-Procedure Length (cm) 2.5 cm 02/22/23 1437   Post-Procedure Width (cm) 4 cm 02/22/23 1437   Post-Procedure Depth (cm) 0.2 cm 02/22/23 1437   Post-Procedure Surface Area (cm^2) 10 cm^2 02/22/23 1437   Post-Procedure Volume (cm^3) 2 cm^3 02/22/23 1437   Wound Assessment Bleeding 02/22/23 1437   Drainage Amount Moderate 02/22/23 1354   Drainage Description Yellow;Sanguinous 02/22/23 1354   Odor None 02/22/23 1354   Jamila-wound Assessment Edematous 02/22/23 1354   Margins Defined edges 02/22/23 1354   Wound Thickness Description not for Pressure Injury Full thickness 02/22/23 1354   Number of days: 243       Wound 02/15/23 Heel Lower; Outer;Left #2 (Active)   Wound Image   02/15/23 1317   Wound Etiology Venous 02/22/23 1354   Wound Cleansed Cleansed with saline 02/22/23 1354   Offloading for Diabetic Foot Ulcers Offloading not required 02/22/23 1354   Wound Length (cm) 0.9 cm 02/22/23 1354   Wound Width (cm) 1.1 cm 02/22/23 1354   Wound Depth (cm) 0.1 cm 02/22/23 1354   Wound Surface Area (cm^2) 0.99 cm^2 02/22/23 1354   Change in Wound Size % (l*w) -17.86 02/22/23 1354   Wound Volume (cm^3) 0.099 cm^3 02/22/23 1354   Wound Healing % -18 02/22/23 1354   Post-Procedure Length (cm) 0.9 cm 02/22/23 1437   Post-Procedure Width (cm) 1.1 cm 02/22/23 1437   Post-Procedure Depth (cm) 0.1 cm 02/22/23 1437   Post-Procedure Surface Area (cm^2) 0.99 cm^2 02/22/23 1437   Post-Procedure Volume (cm^3) 0.099 cm^3 02/22/23 1437   Wound Assessment Bleeding 02/22/23 1437   Drainage Amount Small 02/22/23 1354   Drainage Description Yellow;Sanguinous 02/22/23 1354   Odor None 02/22/23 1354   Jamila-wound Assessment Excoriated;Edematous 02/22/23 1354   Margins Attached edges 02/22/23 1354   Number of days: 7         Total Surface Area Debrided: 11sq cm both wounds    Percentage of wound debrided 100%    Bleeding:  Minimal    Hemostasis Achieved:  by pressure    Procedural Pain:  4  / 10     Post Procedural Pain:  0 / 10     Response to treatment:  Well tolerated by patient. - All nails bilateral feet debrided in thickness and length with out incident. Patient handled procedure well. Assessment:     Problem List Items Addressed This Visit       Peripheral artery disease ([facility] - Primary    Relevant Medications    lidocaine (LMX) 4 % cream    Other Relevant Orders    Initiate Outpatient Wound Care Protocol          Plan:   Patient examined and evaluated   Wound debrided without incident   Multilayer compression wrap left leg  Follow-up one week  Keep legs elevated at all times when not active. Plan for skin substitute when approved. The nature of the patient's condition was explained in depth. The patient was informed that their compliance to the treatment Plan is paramount to successful healing and prevention of further ulceration and/or infection.   Discharge Treatment follow-up on Friday for dressing change    Written Patient Discharge Instructions Given            Electronically signed by Clark Bush DPM on 2/22/2023 at 4:49 PM

## 2023-02-22 NOTE — PLAN OF CARE
Discharge instructions given. Patient verbalized understanding. Return to [facility] in 1 week(s).

## 2023-02-24 ENCOUNTER — ANTI-COAG VISIT (OUTPATIENT)
Dept: PHARMACY | Age: 63
End: 2023-02-24
Payer: COMMERCIAL

## 2023-02-24 DIAGNOSIS — I73.9 PERIPHERAL ARTERY DISEASE (HCC): Primary | ICD-10-CM

## 2023-02-24 LAB — INTERNATIONAL NORMALIZATION RATIO, POC: 4.5

## 2023-02-24 PROCEDURE — 99211 OFF/OP EST MAY X REQ PHY/QHP: CPT

## 2023-02-24 PROCEDURE — 85610 PROTHROMBIN TIME: CPT

## 2023-02-24 NOTE — PROGRESS NOTES
Weston Treviño is a 59 y.o. here for warfarin management. Leonela Romero had an INR test today. Results were reviewed and appropriate warfarin management was completed. This visit was performed as: An in person visit. Protocols were followed with precautions to reduce the spread of COVID-19. Patient verifies current warfarin dosing regimen: Yes     Warfarin medication reviewed and updated on the patient 's home medication list: Yes   All other medications reviewed and updated on the patient 's home medication list: Yes: no changes     Lab Results   Component Value Date    INR 4.5 2023    INR 1.8 2023    INR 2.9 2023       Patient Findings       Negatives:  Signs/symptoms of thrombosis, Signs/symptoms of bleeding, Change in medications, Change in diet/appetite, Bruising            Anticoagulation Summary  As of 2023      INR goal:  2.0-3.0   TTR:  50.9 % (1.4 y)   INR used for dosin.5 (2023)   Warfarin maintenance plan:  15 mg (5 mg x 3) every Mon, Fri; 10 mg (5 mg x 2) all other days; Starting 2023   Weekly warfarin total:  80 mg   Plan last modified:  Merced Patrick RP (10/6/2022)   Next INR check:  3/10/2023   Priority:  High   Target end date: Indefinite    Indications    Peripheral artery disease ([facility] [I73.9]                 Anticoagulation Episode Summary       INR check location:  Anticoagulation Clinic    Preferred lab:      Send INR reminders to:  [facility]    Comments:  EPIC          Anticoagulation Care Providers       Provider Role Specialty Phone number    Rosa Ayala MD Referring Family Medicine [phone]            There were no vitals taken for this visit. Warfarin assessment / plan:     Appears well  Supra-therapeutic INR. Denies signs and symptoms of bleeding/bruising. Denies medication changes. Denies extra warfarin doses. Denies increased alcohol intake. Denies decrease in vitamin K intake.      Hasn't had green vegetables in the last few days, but still sticking to the 2-3 green vegetables per week. He as surprised that his INR was elevated. Will hold warfarin today ONLY and then return to his regular weekly dose. Description    Do not take Warfarin today ONLY  THEN CONTINUE:  Warfarin 10 mg daily EXCEPT 15 mg every Monday and Friday. Drinking about 6 Boost shakes per day. Please let us know if this changes. Call [phone] with signs or symptoms of bleeding or ANY medication changes (including over-the-counter medications or herbal supplements). If significant bleeding occurs please seek immediate medical attention. Keep the number of servings of vitamin K containing foods (dark green, leafy vegetables) the same each week. Dark green vegetable 2-3 times a week and a mixed green salad ~ 3 times a week. Please call if this changes. Limit alcohol intake. Please call if this changes. Immunization History   Administered Date(s) Administered    COVID-19, PFIZER PURPLE top, DILUTE for use, (age 15 y+), 30mcg/0.3mL 03/15/2021, 04/12/2021, 12/22/2021    Influenza Virus Vaccine 01/21/2017, 01/21/2017    Influenza, FLUARIX, FLULAVAL, Willye Ohm (age 10 mo+) AND AFLURIA, (age 1 y+), PF, 0.5mL 10/31/2020    PPD Test 04/14/2022, 04/23/2022, 06/03/2022    Tdap (Boostrix, Adacel) 03/27/2018    Zoster Recombinant (Shingrix) 03/27/2018           Orders Placed This Encounter   Procedures    POCT INR     This external order was created through the results console. No orders of the defined types were placed in this encounter. Reviewed AVS with patient / caregiver.     Billing Points:  Adjust dosage and/or reconcile meds (fill pill box) </= 5 medications - 2 points     For Pharmacy Admin Tracking Only    Intervention Detail: Dose Adjustment: 1, reason: Therapy De-escalation  Total # of Interventions Recommended: 1  Total # of Interventions Accepted: 1  Time Spent (min): 15

## 2023-02-28 DIAGNOSIS — J41.0 SIMPLE CHRONIC BRONCHITIS (HCC): ICD-10-CM

## 2023-02-28 DIAGNOSIS — J43.2 CENTRILOBULAR EMPHYSEMA (HCC): ICD-10-CM

## 2023-02-28 RX ORDER — BENZONATATE 200 MG/1
CAPSULE ORAL
Qty: 30 CAPSULE | Refills: 0 | Status: SHIPPED | OUTPATIENT
Start: 2023-02-28

## 2023-03-01 ENCOUNTER — HOSPITAL ENCOUNTER (OUTPATIENT)
Dept: WOUND CARE | Age: 63
Discharge: HOME | End: 2023-03-01
Payer: MEDICAID

## 2023-03-01 VITALS — DIASTOLIC BLOOD PRESSURE: 70 MMHG | SYSTOLIC BLOOD PRESSURE: 109 MMHG | TEMPERATURE: 96.8 F | HEART RATE: 93 BPM

## 2023-03-01 DIAGNOSIS — I73.9 PERIPHERAL ARTERY DISEASE (HCC): Primary | ICD-10-CM

## 2023-03-01 PROCEDURE — 11043 DBRDMT MUSC&/FSCA 1ST 20/<: CPT

## 2023-03-01 PROCEDURE — 29581 APPL MULTLAYER CMPRN SYS LEG: CPT

## 2023-03-01 RX ORDER — LIDOCAINE 40 MG/G
CREAM TOPICAL ONCE
Status: DISCONTINUED | OUTPATIENT
Start: 2023-03-01 | End: 2023-03-02 | Stop reason: HOSPADM

## 2023-03-01 RX ORDER — BACITRACIN ZINC AND POLYMYXIN B SULFATE 500; 1000 [USP'U]/G; [USP'U]/G
OINTMENT TOPICAL ONCE
OUTPATIENT
Start: 2023-03-01 | End: 2023-03-01

## 2023-03-01 RX ORDER — GENTAMICIN SULFATE 1 MG/G
OINTMENT TOPICAL ONCE
OUTPATIENT
Start: 2023-03-01 | End: 2023-03-01

## 2023-03-01 RX ORDER — LIDOCAINE 50 MG/G
OINTMENT TOPICAL ONCE
OUTPATIENT
Start: 2023-03-01 | End: 2023-03-01

## 2023-03-01 RX ORDER — LIDOCAINE HYDROCHLORIDE 40 MG/ML
SOLUTION TOPICAL ONCE
OUTPATIENT
Start: 2023-03-01 | End: 2023-03-01

## 2023-03-01 RX ORDER — CLOBETASOL PROPIONATE 0.5 MG/G
OINTMENT TOPICAL ONCE
OUTPATIENT
Start: 2023-03-01 | End: 2023-03-01

## 2023-03-01 RX ORDER — LIDOCAINE 40 MG/G
CREAM TOPICAL ONCE
OUTPATIENT
Start: 2023-03-01 | End: 2023-03-01

## 2023-03-01 RX ORDER — BACITRACIN, NEOMYCIN, POLYMYXIN B 400; 3.5; 5 [USP'U]/G; MG/G; [USP'U]/G
OINTMENT TOPICAL ONCE
OUTPATIENT
Start: 2023-03-01 | End: 2023-03-01

## 2023-03-01 RX ORDER — GINSENG 100 MG
CAPSULE ORAL ONCE
OUTPATIENT
Start: 2023-03-01 | End: 2023-03-01

## 2023-03-01 RX ORDER — BETAMETHASONE DIPROPIONATE 0.05 %
OINTMENT (GRAM) TOPICAL ONCE
OUTPATIENT
Start: 2023-03-01 | End: 2023-03-01

## 2023-03-01 RX ORDER — LIDOCAINE HYDROCHLORIDE 20 MG/ML
JELLY TOPICAL ONCE
OUTPATIENT
Start: 2023-03-01 | End: 2023-03-01

## 2023-03-01 ASSESSMENT — PAIN DESCRIPTION - FREQUENCY: FREQUENCY: CONTINUOUS

## 2023-03-01 ASSESSMENT — PAIN - FUNCTIONAL ASSESSMENT: PAIN_FUNCTIONAL_ASSESSMENT: PREVENTS OR INTERFERES SOME ACTIVE ACTIVITIES AND ADLS

## 2023-03-01 ASSESSMENT — PAIN DESCRIPTION - ONSET: ONSET: ON-GOING

## 2023-03-01 ASSESSMENT — PAIN DESCRIPTION - LOCATION: LOCATION: ANKLE

## 2023-03-01 ASSESSMENT — PAIN DESCRIPTION - ORIENTATION: ORIENTATION: LEFT

## 2023-03-01 ASSESSMENT — PAIN DESCRIPTION - PAIN TYPE: TYPE: CHRONIC PAIN

## 2023-03-01 ASSESSMENT — PAIN SCALES - GENERAL: PAINLEVEL_OUTOF10: 5

## 2023-03-01 ASSESSMENT — PAIN DESCRIPTION - DESCRIPTORS: DESCRIPTORS: BURNING;THROBBING

## 2023-03-01 NOTE — PROGRESS NOTES
Multilayer Compression Wrap   Below the Knee    NAME:  Amaris Hart  YOB: 1960  MEDICAL RECORD NUMBER:  [id number]  DATE:  3/1/2023    Multilayer compression wrap: Removed old Multilayer wrap if indicated and wash leg with mild soap/water. Applied moisturizing agent to dry skin as needed. Applied primary and secondary dressing as ordered. Applied multilayered dressing below the knee to left lower leg. Instructed patient/caregiver not to remove dressing and to keep it clean and dry. Instructed patient/caregiver on complications to report to provider, such as pain, numbness in toes, heavy drainage, and slippage of dressing. Instructed patient on purpose of compression dressing and on activity and exercise recommendations.      Applied  2 layer wrap from toes to knee overlapping each time  Applied Tati and dry dressing  Electronically signed by Kennedy Li RN on 3/1/2023 at 3:08 PM

## 2023-03-01 NOTE — PLAN OF CARE
Discharge instructions given. Patient verbalized understanding. Return to [facility] in 1 week(s).   Message sent to Dr Jorge Adamson regarding increased pain and possible follow up

## 2023-03-01 NOTE — PROGRESS NOTES
Iris Rivera  Progress Note and Procedure Note      Colletta Capes  AGE: 59 y.o. GENDER: male  : 1960  TODAY'S DATE:  3/1/2023    Subjective:     Chief Complaint   Patient presents with    Wound Check     Bilateral legs  F/U           HISTORY of PRESENT ILLNESS HPI     Colletta Capes is a 59 y.o. male who presents today for wound evaluation. History of Wound: Admits to having chronic wounds for years. He states that he has had arterial bypasses on both of his legs in the past.  He was seeing previous wound care and podiatry for these wounds. He states that his insurance had some issues and he has not been seeing anyone since 2021. He states he took the bandage off after 4 days. He has no other complaints at this time.     Wound Pain:  intermittent left  Severity:  3 / 10   Wound Type:  venous, arterial and diabetic  Modifying Factors:  edema, venous stasis, lymphedema, diabetes, decreased mobility, arterial insufficiency and decreased tissue oxygenation  Associated Signs/Symptoms:  drainage, numbness and odor        PAST MEDICAL HISTORY        Diagnosis Date    Acquired hypothyroidism 2023    Cancer ([address]     throat    Diabetes mellitus ([address]     Fibromyalgia     History of DVT (deep vein thrombosis)     Hyperlipidemia     Hypertension     Type 2 diabetes mellitus with circulatory disorder, without long-term current use of insulin ([address] 2022       PAST SURGICAL HISTORY    Past Surgical History:   Procedure Laterality Date    APPENDECTOMY  2005    COLONOSCOPY  2016    FEMORAL BYPASS Left 2020    femoral posterior tibial bypass    FEMORAL-TIBIAL BYPASS GRAFT Right 2020    with femoral endarterectomy and patch angioplasty    FOOT DEBRIDEMENT Left 2020    FOOT DEBRIDEMENT Right 2021    DEBRIDEMENT OF RIGHT FOOT WOUND WITH GRAFT APPLICATION performed by Gary Zelaya DPM at [address] [facility] N/A 3/25/2022    DIRECT LARYNGOSCOPY WITH BIOPSY AND FROZEN SECTIONS performed by Kayce Cardoza DO at [address] Right 2020    stent leg for PVD    TRACHEOSTOMY N/A 3/25/2022    TRACHEOTOMY performed by Kayce Cardoza DO at [address] HISTORY    Family History   Problem Relation Age of Onset    Cancer Mother     Lung Cancer Mother     Diabetes Father     Stroke Father        SOCIAL HISTORY    Social History     Tobacco Use    Smoking status: Former     Packs/day: 0.50     Years: 20.00     Pack years: 10.00     Types: Cigarettes     Start date: 1977     Quit date: 2021     Years since quittin.1    Smokeless tobacco: Never   Vaping Use    Vaping Use: Never used   Substance Use Topics    Alcohol use: Yes     Comment: 2 beers / day     Drug use: Never       ALLERGIES    Allergies   Allergen Reactions    Chantix [Varenicline] Other (See Comments)     Hallucinations         MEDICATIONS    Current Outpatient Medications on File Prior to Encounter   Medication Sig Dispense Refill    benzonatate (TESSALON) 200 MG capsule TAKE 1 CAPSULE BY MOUTH THREE TIMES DAILY AS NEEDED FOR COUGH 30 capsule 0    ACETAMINOPHEN EXTRA STRENGTH 500 MG tablet TAKE 1 TABLET BY MOUTH FOUR TIMES DAILY AS NEEDED FOR PAIN 360 tablet 1    lisinopril (PRINIVIL;ZESTRIL) 20 MG tablet TAKE 1 TABLET BY MOUTH EVERY DAY 30 tablet 0    metFORMIN (GLUCOPHAGE) 500 MG tablet TAKE 1 TABLET BY MOUTH TWICE DAILY WITH MEALS 60 tablet 0    cetirizine (ZYRTEC) 10 MG tablet TAKE 1 TABLET BY MOUTH DAILY 30 tablet 0    warfarin (COUMADIN) 5 MG tablet Take 2 tablets by mouth daily (10 mg), except take 3 tablets every Monday and Friday (15 mg).   OR as directed by the [facility] Anticoagulation Service [phone]. 70 tablet 3    levothyroxine (SYNTHROID) 25 MCG tablet Take 25 mcg by mouth Daily      vitamin B-12 (CYANOCOBALAMIN) 250 MCG tablet Take 1 tablet by mouth daily 30 tablet 0    diphenhydrAMINE (BANOPHEN) 25 MG tablet Take 1 tablet by mouth every 6 hours as needed for Itching 60 tablet 0    melatonin 3 MG TABS tablet Take 2 tablets by mouth nightly 60 tablet 2    STIMULANT LAXATIVE 8.6-50 MG per tablet TAKE 1 TABLET BY MOUTH TWICE DAILY 60 tablet 0    atorvastatin (LIPITOR) 80 MG tablet TAKE 1 TABLET BY MOUTH DAILY 90 tablet 0    gentamicin (GARAMYCIN) 0.1 % cream APPLY TOPICALLY TO THE AFFECTED AREA DAILY 30 g 1    budesonide-formoterol (SYMBICORT) 160-4.5 MCG/ACT AERO Inhale 2 puffs into the lungs 2 times daily 10.2 g 2    formoterol (PERFOROMIST) 20 MCG/2ML nebulizer solution Take 2 mLs by nebulization every 12 hours 2 mL 5    budesonide (PULMICORT) 0.5 MG/2ML nebulizer suspension Take 2 mLs by nebulization 2 times daily 60 each 2    metoprolol succinate (TOPROL XL) 25 MG extended release tablet Take 25 mg by mouth daily      BANOPHEN 25 MG capsule Take 1 tablet by mouth every 6 hours as needed for Itching      ondansetron (ZOFRAN-ODT) 4 MG disintegrating tablet Take 4 mg by mouth every 8 hours as needed for Nausea or Vomiting      Multiple Vitamins-Minerals (MULTIVITAMIN ADULT EXTRA C PO) 1 tablet by Nasogastric route daily      esomeprazole (NEXIUM) 20 MG delayed release capsule 20 mg by Nasogastric route every morning (before breakfast)      oxyCODONE HCl (OXY-IR) 10 MG immediate release tablet TAKE 1 TABLET BY MOUTH EVERY SIX HOURS AS NEEDED FOR PAIN FOR UP TO 7 DAYS      becaplermin (REGRANEX) 0.01 % gel Apply 1 Applicatorful topically daily      calcium-vitamin D (OSCAL-500) 500-200 MG-UNIT per tablet Take 1 tablet by mouth 2 times daily      ipratropium-albuterol (DUONEB) 0.5-2.5 (3) MG/3ML SOLN nebulizer solution Inhale 3 mLs into the lungs 4 times daily 360 mL 3    fluticasone (FLONASE) 50 MCG/ACT nasal spray 1 spray by Nasal route daily 9.9 g 5    Vitamins/Minerals TABS Take 1 tablet by mouth daily        No current facility-administered medications on file prior to encounter.        REVIEW OF SYSTEMS    Pertinent items are noted in HPI.      Objective:      /70   Pulse 93   Temp 96.8 °F (36 °C)     PHYSICAL EXAM    Vascular: Vascular status Impaired  palpable pedal pulses, right DP0/4 and PT1/4, left DP0/4 and PT1/4. CFT 3 seconds digits 1 to 5 bilateral.  Hair growthAbsent  both lower extremities and feet. Skin temperature is warm to warm from pretibial area to distal digits bilateral.  Exam is negative for rubor, pallor, cyanosis or signs of acute vascular compromise bilaterally. Exam is positive for edema bilateral lower extremity. Varicosities Present bilateral lower extremity. Neuro: Neurologic status diminished bilateral with epicritic Absent  , proprioceptive Absent , vibratory sensation Absent  and protopathicPresent. DTRs Absent  bilateral Achilles. There were no reproducible neuritic symptoms on exam bilateral feet/ankles. Derm: Ulceration to bilateral ankles. Ecchymosis Absent  bilateral feet/foot. Elongated, yellow, crumbly and thickened greater than 3mm all toenails both feet. Musculoskeletal: No pain with debridement of wounds 5/5 muscle strength in/eversion and dorsi/plantarflexion bilateral feet. No gross instability noted. Procedure Note    Performed by: Mae Villeda DPM    Consent obtained: Yes    Time out taken:  Yes    Pain Control: Anesthetic  Anesthetic: 4% Topical Xylocaine     Debridement:Excisional Debridement    Using curette the wound was sharply debrided    down through and including the removal of epidermis, dermis, subcutaneous tissue, and muscle/fascia.         Devitalized Tissue Debrided:  fibrin, biofilm, slough, necrotic/eschar, and exudate    Wound Care Documentation:  Wound 06/24/22 Ankle Left;Medial #1 (Active)   Wound Image   02/08/23 1345   Wound Etiology Venous 03/01/23 1432   Wound Cleansed Cleansed with saline 03/01/23 1432   Dressing/Treatment Collagen 11/23/22 1104   Offloading for Diabetic Foot Ulcers Offloading not required 03/01/23 1432   Wound Length (cm) 2.8 cm 03/01/23 1432   Wound Width (cm) 4.7 cm 03/01/23 1432   Wound Depth (cm) 0.2 cm 03/01/23 1432   Wound Surface Area (cm^2) 13.16 cm^2 03/01/23 1432   Change in Wound Size % (l*w) -92.4 03/01/23 1432   Wound Volume (cm^3) 2.632 cm^3 03/01/23 1432   Wound Healing % -285 03/01/23 1432   Post-Procedure Length (cm) 2.8 cm 03/01/23 1450   Post-Procedure Width (cm) 4.7 cm 03/01/23 1450   Post-Procedure Depth (cm) 0.2 cm 03/01/23 1450   Post-Procedure Surface Area (cm^2) 13.16 cm^2 03/01/23 1450   Post-Procedure Volume (cm^3) 2.632 cm^3 03/01/23 1450   Wound Assessment Bleeding 03/01/23 1450   Drainage Amount Moderate 03/01/23 1432   Drainage Description Serosanguinous; Yellow 03/01/23 1432   Odor None 03/01/23 1432   Jamila-wound Assessment Edematous 03/01/23 1432   Margins Defined edges 03/01/23 1432   Wound Thickness Description not for Pressure Injury Full thickness 02/22/23 1354   Number of days: 250       Wound 02/15/23 Heel Lower; Outer;Left #2 (Active)   Wound Image   02/15/23 1317   Wound Etiology Venous 03/01/23 1432   Wound Cleansed Cleansed with saline 03/01/23 1432   Offloading for Diabetic Foot Ulcers Offloading not required 03/01/23 1432   Wound Length (cm) 1 cm 03/01/23 1432   Wound Width (cm) 2 cm 03/01/23 1432   Wound Depth (cm) 0.1 cm 03/01/23 1432   Wound Surface Area (cm^2) 2 cm^2 03/01/23 1432   Change in Wound Size % (l*w) -138.1 03/01/23 1432   Wound Volume (cm^3) 0.2 cm^3 03/01/23 1432   Wound Healing % -138 03/01/23 1432   Post-Procedure Length (cm) 1 cm 03/01/23 1450   Post-Procedure Width (cm) 2 cm 03/01/23 1450   Post-Procedure Depth (cm) 0.1 cm 03/01/23 1450   Post-Procedure Surface Area (cm^2) 2 cm^2 03/01/23 1450   Post-Procedure Volume (cm^3) 0.2 cm^3 03/01/23 1450   Wound Assessment Bleeding 03/01/23 1450   Drainage Amount Small 03/01/23 1432   Drainage Description Yellow;Sanguinous 03/01/23 1432   Odor None 03/01/23 1432   Jamila-wound Assessment Edematous 03/01/23 1432   Margins Attached edges 03/01/23 1432   Number of days: 14             Total Surface Area Debrided: 15.6sq cm both wounds    Percentage of wound debrided 100%    Bleeding:  Minimal    Hemostasis Achieved:  by pressure    Procedural Pain:  4  / 10     Post Procedural Pain:  0 / 10     Response to treatment:  Well tolerated by patient. - All nails bilateral feet debrided in thickness and length with out incident. Patient handled procedure well. Assessment:     Problem List Items Addressed This Visit       Peripheral artery disease ([facility] - Primary    Relevant Medications    lidocaine (LMX) 4 % cream    Other Relevant Orders    Initiate Outpatient Wound Care Protocol          Plan:   Patient examined and evaluated   Wounds larger  Wound debrided without incident   Multilayer compression wrap left leg  Follow-up one week  Keep legs elevated at all times when not active. Plan for skin substitute when approved. The nature of the patient's condition was explained in depth. The patient was informed that their compliance to the treatment Plan is paramount to successful healing and prevention of further ulceration and/or infection.   Discharge Treatment follow-up on Friday for dressing change    Written Patient Discharge Instructions Given            Electronically signed by Harpreet Kong DPM on 3/1/2023 at 3:07 PM

## 2023-03-01 NOTE — DISCHARGE INSTRUCTIONS
[address], [address]  Telephone: [phone]     Discharge Instructions     Important reminders:     **If you have any signs and symptoms of illness (Cough, fever, congestion, nausea, vomiting, diarrhea, etc.) please call the wound care center prior to your appointment. 1. Increase Protein intake for optimal wound healing  2. No added salt to reduce any swelling  3. If diabetic, maintain good glucose control  4. If you smoke, smoking prohibits wound healing, we ask that you refrain from smoking. 5. When taking antibiotics take the entire prescription as ordered. Do not stop taking until medication is all gone unless otherwise instructed. 6. Exercise as tolerated. 7. Keep weight off wounds and reposition every 2 hours if applicable. 8. If wound(s) is on your lower extremity, elevate legs to the level of the heart or above for 30 minutes 4-5 times a day and/or when sitting. Avoid standing for long periods of time. 9. Do not get wounds wet in bath or shower unless otherwise instructed by your physician. If your wound is on your foot or leg, you may purchase a cast bag. Please ask at the pharmacy. If Vascular testing is ordered, please call [address] [place] [address] [phone]) to schedule. Vascular tests ordered by Wound Care Physicians may take up to 2 hours to complete. Please keep that in mind when scheduling. If Vascular testing is scheduled, please bring supplies to replace your dressing after testing is done. The vascular department does not stock supplies. Wound: Right and Left leg     With each dressing change, rinse wounds with 0.9% Saline. (May use wound wash or soft contact solution. Both can be purchased at a local drug store). If unable to obtain saline, may use a gentle soap and water.      Dressing care:  Left leg BOTH WOUNDS-   Tati, Foam and coflex calamine- these will be changed at your next visit unless they slide down or cause pain. If they slide, gets wet, or cause pain please call the wound care center, you may need to come in to be re-wrapped. If you are unable to get to your follow up appointment you will need to remove your wraps at home & place some kind of compression. Nails Clipped 1/13/2023      Dr Lavonne Saunders, Vascular Surgeon September 14th at 1215pm  [address] [address], [address]  Phone# [phone]  Fax# [phone]         Compression Wraps  Location: Left lower leg below knee  Type: Coflex calamine     Your doctor has ordered compression therapy for your wound. Compression bandages reduce the swelling, or edema, in your legs and prevent it from returning. The wound care staff will apply your compression wrap. It must be removed and re-applied at least weekly. As the swelling decreases, the boot no longer provides adequate compression and you need a new one. Once applied, you need to know how to take care of your compression wrap. The boot must stay dry. Do not get it wet in the shower or tub. You may do a partial bath, or you can cover the boot with a large plastic bag, secured at the top, so that no water can get in. Avoid standing in one place for long periods of time. If you must  one place, shift your weight and change positions often. If you have CHF, consult your doctor before following the next two recommendations for leg elevation. When sitting, elevate your legs on pillows, or put blocks under the foot of your bed. Your legs should be higher than your heart. If your boot becomes painful, or you notice an increase in swelling in your toes, numbness or tingling, or purple color to your toes, remove the wrap and call the [facility] [address]. If it is after hours, call your doctor for instructions or go to the nearest emergency room.         Home Care Agency/Facility: [facility]     Your wound-care supplies will be provided by:  Please note, depending on your insurance coverage, you may have out-of-pocket expenses for these supplies. Someone from the company should call you to confirm your order and discuss those potential costs before they ship your products -- please anticipate that call. If your out-of-pocket cost could be substantial, Many companies have financial hardship programs for patients who qualify, so please ask about that if you might need a hand. If you have any questions about your supplies or your potential out-of-pocket costs, or if you need to place an order for a refill of supplies (typically monthly), please call the company directly. Your  is Melina Lopez     Follow up with Dr Christina Tolbert in 1 weeks. Wound Care Center Information: Should you experience any significant changes in your wound(s) or have questions about your wound care, please contact the [employer] at [phone] Monday  - Thursday 8:00 am - 4:00 pm and Friday 8:00 am - 1:00pm. If you need help with your wound outside these hours and cannot wait until we are again available, contact your PCP or go to the hospital emergency room. PLEASE NOTE: IF YOU ARE UNABLE TO OBTAIN WOUND SUPPLIES, CONTINUE TO USE THE SUPPLIES YOU HAVE AVAILABLE UNTIL YOU ARE ABLE TO REACH US. IT IS MOST IMPORTANT TO KEEP THE WOUND COVERED AT ALL TIMES.

## 2023-03-07 ENCOUNTER — PROCEDURE VISIT (OUTPATIENT)
Dept: VASCULAR SURGERY | Age: 63
End: 2023-03-07
Payer: MEDICAID

## 2023-03-07 ENCOUNTER — TELEPHONE (OUTPATIENT)
Dept: SURGERY | Age: 63
End: 2023-03-07

## 2023-03-07 DIAGNOSIS — I73.9 PVD (PERIPHERAL VASCULAR DISEASE) WITH CLAUDICATION (HCC): Primary | ICD-10-CM

## 2023-03-07 PROCEDURE — 93925 LOWER EXTREMITY STUDY: CPT | Performed by: SURGERY

## 2023-03-07 NOTE — TELEPHONE ENCOUNTER
Patient had BLE ADS with the following preliminary results:    Right Impression   The right RAN is . 92  The femoral to tibial bypass is patent with the following measurements:  Inflow: 74cm/sec   Proximal anastomosis: 97cm/sec diameter 5.6mm  Proximal graft: 48cm/sec diameter 4.6mm  Mid graft: 53cm/sec diameter 5.2mm  Distal graft: 41cm/sec diameter 5.1mm  Distal anastomosis: 121cm/sec diameter 2.4mm  Outflow: 104cm/sec. Decreased flow noted in the anterior tibial artery. Left Impression   The left RAN could not be obtained due to absent signals. The femoral to tibial bypass is occluded in its entirety. Patient c/o rest pain in the left foot worse at night. Advised pt he would be notified with results and further instruction.

## 2023-03-07 NOTE — DISCHARGE INSTRUCTIONS
[address], [address]  Telephone: [phone]     Discharge Instructions     Important reminders:     **If you have any signs and symptoms of illness (Cough, fever, congestion, nausea, vomiting, diarrhea, etc.) please call the wound care center prior to your appointment. 1. Increase Protein intake for optimal wound healing  2. No added salt to reduce any swelling  3. If diabetic, maintain good glucose control  4. If you smoke, smoking prohibits wound healing, we ask that you refrain from smoking. 5. When taking antibiotics take the entire prescription as ordered. Do not stop taking until medication is all gone unless otherwise instructed. 6. Exercise as tolerated. 7. Keep weight off wounds and reposition every 2 hours if applicable. 8. If wound(s) is on your lower extremity, elevate legs to the level of the heart or above for 30 minutes 4-5 times a day and/or when sitting. Avoid standing for long periods of time. 9. Do not get wounds wet in bath or shower unless otherwise instructed by your physician. If your wound is on your foot or leg, you may purchase a cast bag. Please ask at the pharmacy. If Vascular testing is ordered, please call [address] [place] [address] ([phone]) to schedule. Vascular tests ordered by Wound Care Physicians may take up to 2 hours to complete. Please keep that in mind when scheduling. If Vascular testing is scheduled, please bring supplies to replace your dressing after testing is done. The vascular department does not stock supplies. Wound: Right and Left leg     With each dressing change, rinse wounds with 0.9% Saline. (May use wound wash or soft contact solution. Both can be purchased at a local drug store). If unable to obtain saline, may use a gentle soap and water.      Dressing care:  Left leg BOTH WOUNDS-   Tati, Foam and coflex calamine- these will be changed at your next visit unless they slide down or cause pain. If they slide, gets wet, or cause pain please call the wound care center, you may need to come in to be re-wrapped. If you are unable to get to your follow up appointment you will need to remove your wraps at home & place some kind of compression.      Nails Clipped 1/13/2023      Dr Chapin, Vascular Surgeon September 14th at 1215pm  [address], [address]  Phone# [phone]  Fax# [phone]         Compression Wraps  Location: Left lower leg below knee  Type: Coflex calamine     Your doctor has ordered compression therapy for your wound. Compression bandages reduce the swelling, or edema, in your legs and prevent it from returning. The wound care staff will apply your compression wrap. It must be removed and re-applied at least weekly. As the swelling decreases, the boot no longer provides adequate compression and you need a new one. Once applied, you need to know how to take care of your compression wrap.     The boot must stay dry. Do not get it wet in the shower or tub. You may do a partial bath, or you can cover the boot with a large plastic bag, secured at the top, so that no water can get in.     Avoid standing in one place for long periods of time. If you must  one place, shift your weight and change positions often.     If you have CHF, consult your doctor before following the next two recommendations for leg elevation.     When sitting, elevate your legs on pillows, or put blocks under the foot of your bed. Your legs should be higher than your heart.     If your boot becomes painful, or you notice an increase in swelling in your toes, numbness or tingling, or purple color to your toes, remove the wrap and call the Wound Care Center. If it is after hours, call your doctor for instructions or go to the nearest emergency room.        Home Care Agency/Facility: [facility]     Your wound-care supplies will be provided by:  Please note, depending on your insurance coverage, you may  have out-of-pocket expenses for these supplies. Someone from the company should call you to confirm your order and discuss those potential costs before they ship your products -- please anticipate that call. If your out-of-pocket cost could be substantial, Many companies have financial hardship programs for patients who qualify, so please ask about that if you might need a hand. If you have any questions about your supplies or your potential out-of-pocket costs, or if you need to place an order for a refill of supplies (typically monthly), please call the company directly. Your  is Alejandra Rodríguez     Follow up with Dr Ara Sharma in 1 weeks. Wound Care Center Information: Should you experience any significant changes in your wound(s) or have questions about your wound care, please contact the [facility] [employer]  at [phone] Monday  - Thursday 8:00 am - 4:00 pm and Friday 8:00 am - 1:00pm. If you need help with your wound outside these hours and cannot wait until we are again available, contact your PCP or go to the hospital emergency room. PLEASE NOTE: IF YOU ARE UNABLE TO OBTAIN WOUND SUPPLIES, CONTINUE TO USE THE SUPPLIES YOU HAVE AVAILABLE UNTIL YOU ARE ABLE TO REACH US. IT IS MOST IMPORTANT TO KEEP THE WOUND COVERED AT ALL TIMES.

## 2023-03-08 ENCOUNTER — TELEPHONE (OUTPATIENT)
Dept: VASCULAR SURGERY | Age: 63
End: 2023-03-08

## 2023-03-08 ENCOUNTER — HOSPITAL ENCOUNTER (OUTPATIENT)
Dept: WOUND CARE | Age: 63
Discharge: HOME | End: 2023-03-08
Payer: MEDICAID

## 2023-03-08 VITALS
DIASTOLIC BLOOD PRESSURE: 62 MMHG | SYSTOLIC BLOOD PRESSURE: 105 MMHG | HEART RATE: 93 BPM | RESPIRATION RATE: 16 BRPM | TEMPERATURE: 96.8 F

## 2023-03-08 DIAGNOSIS — I73.9 PERIPHERAL ARTERY DISEASE (HCC): Primary | ICD-10-CM

## 2023-03-08 PROCEDURE — 29581 APPL MULTLAYER CMPRN SYS LEG: CPT

## 2023-03-08 PROCEDURE — 11043 DBRDMT MUSC&/FSCA 1ST 20/<: CPT

## 2023-03-08 RX ORDER — LIDOCAINE 40 MG/G
CREAM TOPICAL ONCE
OUTPATIENT
Start: 2023-03-08 | End: 2023-03-08

## 2023-03-08 RX ORDER — LIDOCAINE HYDROCHLORIDE 20 MG/ML
JELLY TOPICAL ONCE
OUTPATIENT
Start: 2023-03-08 | End: 2023-03-08

## 2023-03-08 RX ORDER — BACITRACIN, NEOMYCIN, POLYMYXIN B 400; 3.5; 5 [USP'U]/G; MG/G; [USP'U]/G
OINTMENT TOPICAL ONCE
OUTPATIENT
Start: 2023-03-08 | End: 2023-03-08

## 2023-03-08 RX ORDER — GENTAMICIN SULFATE 1 MG/G
OINTMENT TOPICAL ONCE
OUTPATIENT
Start: 2023-03-08 | End: 2023-03-08

## 2023-03-08 RX ORDER — LIDOCAINE 50 MG/G
OINTMENT TOPICAL ONCE
OUTPATIENT
Start: 2023-03-08 | End: 2023-03-08

## 2023-03-08 RX ORDER — CLOBETASOL PROPIONATE 0.5 MG/G
OINTMENT TOPICAL ONCE
OUTPATIENT
Start: 2023-03-08 | End: 2023-03-08

## 2023-03-08 RX ORDER — GINSENG 100 MG
CAPSULE ORAL ONCE
OUTPATIENT
Start: 2023-03-08 | End: 2023-03-08

## 2023-03-08 RX ORDER — BACITRACIN ZINC AND POLYMYXIN B SULFATE 500; 1000 [USP'U]/G; [USP'U]/G
OINTMENT TOPICAL ONCE
OUTPATIENT
Start: 2023-03-08 | End: 2023-03-08

## 2023-03-08 RX ORDER — LIDOCAINE HYDROCHLORIDE 40 MG/ML
SOLUTION TOPICAL ONCE
OUTPATIENT
Start: 2023-03-08 | End: 2023-03-08

## 2023-03-08 RX ORDER — BETAMETHASONE DIPROPIONATE 0.05 %
OINTMENT (GRAM) TOPICAL ONCE
OUTPATIENT
Start: 2023-03-08 | End: 2023-03-08

## 2023-03-08 RX ORDER — LIDOCAINE 40 MG/G
CREAM TOPICAL ONCE
Status: DISCONTINUED | OUTPATIENT
Start: 2023-03-08 | End: 2023-03-09 | Stop reason: HOSPADM

## 2023-03-08 ASSESSMENT — PAIN DESCRIPTION - PAIN TYPE: TYPE: CHRONIC PAIN

## 2023-03-08 ASSESSMENT — PAIN SCALES - GENERAL: PAINLEVEL_OUTOF10: 7

## 2023-03-08 ASSESSMENT — PAIN DESCRIPTION - DESCRIPTORS: DESCRIPTORS: ACHING;THROBBING

## 2023-03-08 ASSESSMENT — PAIN DESCRIPTION - FREQUENCY: FREQUENCY: INTERMITTENT

## 2023-03-08 ASSESSMENT — PAIN DESCRIPTION - ORIENTATION: ORIENTATION: LEFT

## 2023-03-08 ASSESSMENT — PAIN - FUNCTIONAL ASSESSMENT: PAIN_FUNCTIONAL_ASSESSMENT: PREVENTS OR INTERFERES SOME ACTIVE ACTIVITIES AND ADLS

## 2023-03-08 ASSESSMENT — PAIN DESCRIPTION - LOCATION: LOCATION: LEG

## 2023-03-08 ASSESSMENT — PAIN DESCRIPTION - ONSET: ONSET: ON-GOING

## 2023-03-08 NOTE — PROGRESS NOTES
Iris Quiroz  Progress Note and Procedure Note      Kassandra Jasmine  AGE: 59 y.o. GENDER: male  : 1960  TODAY'S DATE:  3/8/2023    Subjective:     No chief complaint on file. HISTORY of PRESENT ILLNESS HPI     Kassandra Jasmine is a 59 y.o. male who presents today for wound evaluation. History of Wound: Admits to having chronic wounds for years. He states that he has had arterial bypasses on both of his legs in the past.  He was seeing previous wound care and podiatry for these wounds. He states that his insurance had some issues and he has not been seeing anyone since 2021. He took the bandage off yesterday so he could shower. Otherwise he left intact. He has no other complaints at this time. He got his vascular studies completed since his last visit.     Wound Pain:  intermittent left  Severity:  3 / 10   Wound Type:  venous, arterial and diabetic  Modifying Factors:  edema, venous stasis, lymphedema, diabetes, decreased mobility, arterial insufficiency and decreased tissue oxygenation  Associated Signs/Symptoms:  drainage, numbness and odor        PAST MEDICAL HISTORY        Diagnosis Date    Acquired hypothyroidism 2023    Cancer ([facility] [address]     throat    Diabetes mellitus ([address]     Fibromyalgia     History of DVT (deep vein thrombosis)     Hyperlipidemia     Hypertension     Type 2 diabetes mellitus with circulatory disorder, without long-term current use of insulin ([address] 2022       PAST SURGICAL HISTORY    Past Surgical History:   Procedure Laterality Date    APPENDECTOMY  2005    COLONOSCOPY  2016    FEMORAL BYPASS Left 2020    femoral posterior tibial bypass    FEMORAL-TIBIAL BYPASS GRAFT Right 2020    with femoral endarterectomy and patch angioplasty    FOOT DEBRIDEMENT Left 2020    FOOT DEBRIDEMENT Right 2021    DEBRIDEMENT OF RIGHT FOOT WOUND WITH GRAFT APPLICATION performed by Esteban Councilman, DPM at [address] [facility] N/A 3/25/2022    DIRECT LARYNGOSCOPY WITH BIOPSY AND FROZEN SECTIONS performed by Hien Oneill DO at [address] Right 2020    stent leg for PVD    TRACHEOSTOMY N/A 3/25/2022    TRACHEOTOMY performed by Hien Oneill DO at [address] [facility] [address] HISTORY    Family History   Problem Relation Age of Onset    Cancer Mother     Lung Cancer Mother     Diabetes Father     Stroke Father        SOCIAL HISTORY    Social History     Tobacco Use    Smoking status: Former     Packs/day: 0.50     Years: 20.00     Pack years: 10.00     Types: Cigarettes     Start date: 1977     Quit date: 2021     Years since quittin.1    Smokeless tobacco: Never   Vaping Use    Vaping Use: Never used   Substance Use Topics    Alcohol use: Yes     Comment: 2 beers / day     Drug use: Never       ALLERGIES    Allergies   Allergen Reactions    Chantix [Varenicline] Other (See Comments)     Hallucinations         MEDICATIONS    Current Outpatient Medications on File Prior to Encounter   Medication Sig Dispense Refill    benzonatate (TESSALON) 200 MG capsule TAKE 1 CAPSULE BY MOUTH THREE TIMES DAILY AS NEEDED FOR COUGH 30 capsule 0    ACETAMINOPHEN EXTRA STRENGTH 500 MG tablet TAKE 1 TABLET BY MOUTH FOUR TIMES DAILY AS NEEDED FOR PAIN 360 tablet 1    lisinopril (PRINIVIL;ZESTRIL) 20 MG tablet TAKE 1 TABLET BY MOUTH EVERY DAY 30 tablet 0    metFORMIN (GLUCOPHAGE) 500 MG tablet TAKE 1 TABLET BY MOUTH TWICE DAILY WITH MEALS 60 tablet 0    cetirizine (ZYRTEC) 10 MG tablet TAKE 1 TABLET BY MOUTH DAILY 30 tablet 0    warfarin (COUMADIN) 5 MG tablet Take 2 tablets by mouth daily (10 mg), except take 3 tablets every Monday and Friday (15 mg).   OR as directed by the [facility] Anticoagulation [facility] [phone]. 70 tablet 3    levothyroxine (SYNTHROID) 25 MCG tablet Take 25 mcg by mouth Daily      vitamin B-12 (CYANOCOBALAMIN) 250 MCG tablet Take 1 tablet by mouth daily 30 tablet 0    diphenhydrAMINE (BANOPHEN) 25 MG tablet Take 1 tablet by mouth every 6 hours as needed for Itching 60 tablet 0    melatonin 3 MG TABS tablet Take 2 tablets by mouth nightly 60 tablet 2    STIMULANT LAXATIVE 8.6-50 MG per tablet TAKE 1 TABLET BY MOUTH TWICE DAILY 60 tablet 0    atorvastatin (LIPITOR) 80 MG tablet TAKE 1 TABLET BY MOUTH DAILY 90 tablet 0    gentamicin (GARAMYCIN) 0.1 % cream APPLY TOPICALLY TO THE AFFECTED AREA DAILY 30 g 1    budesonide-formoterol (SYMBICORT) 160-4.5 MCG/ACT AERO Inhale 2 puffs into the lungs 2 times daily 10.2 g 2    formoterol (PERFOROMIST) 20 MCG/2ML nebulizer solution Take 2 mLs by nebulization every 12 hours 2 mL 5    budesonide (PULMICORT) 0.5 MG/2ML nebulizer suspension Take 2 mLs by nebulization 2 times daily 60 each 2    metoprolol succinate (TOPROL XL) 25 MG extended release tablet Take 25 mg by mouth daily      BANOPHEN 25 MG capsule Take 1 tablet by mouth every 6 hours as needed for Itching      ondansetron (ZOFRAN-ODT) 4 MG disintegrating tablet Take 4 mg by mouth every 8 hours as needed for Nausea or Vomiting      Multiple Vitamins-Minerals (MULTIVITAMIN ADULT EXTRA C PO) 1 tablet by Nasogastric route daily      esomeprazole (NEXIUM) 20 MG delayed release capsule 20 mg by Nasogastric route every morning (before breakfast)      oxyCODONE HCl (OXY-IR) 10 MG immediate release tablet TAKE 1 TABLET BY MOUTH EVERY SIX HOURS AS NEEDED FOR PAIN FOR UP TO 7 DAYS      becaplermin (REGRANEX) 0.01 % gel Apply 1 Applicatorful topically daily      calcium-vitamin D (OSCAL-500) 500-200 MG-UNIT per tablet Take 1 tablet by mouth 2 times daily      ipratropium-albuterol (DUONEB) 0.5-2.5 (3) MG/3ML SOLN nebulizer solution Inhale 3 mLs into the lungs 4 times daily 360 mL 3    fluticasone (FLONASE) 50 MCG/ACT nasal spray 1 spray by Nasal route daily 9.9 g 5    Vitamins/Minerals TABS Take 1 tablet by mouth daily        No current facility-administered medications on file prior to encounter.        REVIEW OF SYSTEMS    Pertinent items are noted in HPI. Objective:      /62   Pulse 93   Temp 96.8 °F (36 °C) (Infrared)   Resp 16     PHYSICAL EXAM    Vascular: Vascular status Impaired  palpable pedal pulses, right DP0/4 and PT1/4, left DP0/4 and PT1/4. CFT 3 seconds digits 1 to 5 bilateral.  Hair growthAbsent  both lower extremities and feet. Skin temperature is warm to warm from pretibial area to distal digits bilateral.  Exam is negative for rubor, pallor, cyanosis or signs of acute vascular compromise bilaterally. Exam is positive for edema bilateral lower extremity. Varicosities Present bilateral lower extremity. Neuro: Neurologic status diminished bilateral with epicritic Absent  , proprioceptive Absent , vibratory sensation Absent  and protopathicPresent. DTRs Absent  bilateral Achilles. There were no reproducible neuritic symptoms on exam bilateral feet/ankles. Derm: Ulceration to bilateral ankles. Ecchymosis Absent  bilateral feet/foot. Elongated, yellow, crumbly and thickened greater than 3mm all toenails both feet. Musculoskeletal: No pain with debridement of wounds 5/5 muscle strength in/eversion and dorsi/plantarflexion bilateral feet. No gross instability noted. Procedure Note    Performed by: Saran Harrington DPM    Consent obtained: Yes    Time out taken:  Yes    Pain Control: Anesthetic  Anesthetic: 4% Topical Xylocaine     Debridement:Excisional Debridement    Using curette the wound was sharply debrided    down through and including the removal of epidermis, dermis, subcutaneous tissue, and muscle/fascia.         Devitalized Tissue Debrided:  fibrin, biofilm, slough, necrotic/eschar, and exudate    Wound Care Documentation:  Wound 06/24/22 Ankle Left;Medial #1 (Active)   Wound Image   02/08/23 1345   Wound Etiology Venous 03/08/23 1410   Wound Cleansed Cleansed with saline 03/08/23 1410   Dressing/Treatment Collagen 11/23/22 1104   Offloading for Diabetic Foot Ulcers Offloading not required 03/08/23 1410   Wound Length (cm) 3.2 cm 03/08/23 1410   Wound Width (cm) 4.6 cm 03/08/23 1410   Wound Depth (cm) 0.2 cm 03/08/23 1410   Wound Surface Area (cm^2) 14.72 cm^2 03/08/23 1410   Change in Wound Size % (l*w) -115.2 03/08/23 1410   Wound Volume (cm^3) 2.944 cm^3 03/08/23 1410   Wound Healing % -330 03/08/23 1410   Post-Procedure Length (cm) 3.2 cm 03/08/23 1431   Post-Procedure Width (cm) 4.6 cm 03/08/23 1431   Post-Procedure Depth (cm) 0.2 cm 03/08/23 1431   Post-Procedure Surface Area (cm^2) 14.72 cm^2 03/08/23 1431   Post-Procedure Volume (cm^3) 2.944 cm^3 03/08/23 1431   Wound Assessment Bleeding 03/08/23 1431   Drainage Amount Moderate 03/08/23 1410   Drainage Description Serosanguinous; Yellow 03/08/23 1410   Odor None 03/08/23 1410   Jamila-wound Assessment Edematous 03/08/23 1410   Margins Defined edges 03/08/23 1410   Wound Thickness Description not for Pressure Injury Full thickness 02/22/23 1354   Number of days: 257       Wound 02/15/23 Heel Lower; Outer;Left #2 (Active)   Wound Image   02/15/23 1317   Wound Etiology Venous 03/08/23 1410   Wound Cleansed Wound cleanser 03/08/23 1410   Offloading for Diabetic Foot Ulcers Offloading not required 03/08/23 1410   Wound Length (cm) 1 cm 03/08/23 1410   Wound Width (cm) 1.1 cm 03/08/23 1410   Wound Depth (cm) 0.1 cm 03/08/23 1410   Wound Surface Area (cm^2) 1.1 cm^2 03/08/23 1410   Change in Wound Size % (l*w) -30.95 03/08/23 1410   Wound Volume (cm^3) 0.11 cm^3 03/08/23 1410   Wound Healing % -31 03/08/23 1410   Post-Procedure Length (cm) 1 cm 03/08/23 1431   Post-Procedure Width (cm) 1.1 cm 03/08/23 1431   Post-Procedure Depth (cm) 0.1 cm 03/08/23 1431   Post-Procedure Surface Area (cm^2) 1.1 cm^2 03/08/23 1431   Post-Procedure Volume (cm^3) 0.11 cm^3 03/08/23 1431   Wound Assessment Bleeding 03/08/23 1431   Drainage Amount Small 03/08/23 1410   Drainage Description Serosanguinous 03/08/23 1410   Odor None 03/08/23 1410 Jamila-wound Assessment Edematous 03/08/23 1410   Margins Defined edges 03/08/23 1410   Number of days: 21         Total Surface Area Debrided: 14sq cm both wounds    Percentage of wound debrided 100%    Bleeding:  Minimal    Hemostasis Achieved:  by pressure    Procedural Pain:  4  / 10     Post Procedural Pain:  0 / 10     Response to treatment:  Well tolerated by patient. - All nails bilateral feet debrided in thickness and length with out incident. Patient handled procedure well. Assessment:     Problem List Items Addressed This Visit       Peripheral artery disease ([facility] [address] - Primary    Relevant Medications    lidocaine (LMX) 4 % cream    Other Relevant Orders    Initiate Outpatient Wound Care Protocol          Plan:   Patient examined and evaluated   Wounds larger  Wound debrided without incident   Multilayer compression wrap left leg  Follow-up one week  Keep legs elevated at all times when not active. Plan for skin substitute when approved. The nature of the patient's condition was explained in depth. The patient was informed that their compliance to the treatment Plan is paramount to successful healing and prevention of further ulceration and/or infection.   Discharge Treatment follow-up on Friday for dressing change    Written Patient Discharge Instructions Given            Electronically signed by Ping Vazquez DPM on 3/8/2023 at 3:28 PM

## 2023-03-08 NOTE — PLAN OF CARE
Discharge instructions given. Patient verbalized understanding. Return to [facility] in 1 week(s). Art study done yesterday.  Awaiting Dr Maldonado  opinion

## 2023-03-08 NOTE — TELEPHONE ENCOUNTER
Left message to call for results of LE testing.     Electronically signed by HOLGER Kim CNP on 3/8/2023 at 3:22 PM

## 2023-03-08 NOTE — PROGRESS NOTES
Multilayer Compression Wrap   Below the Knee    NAME:  Tacho Renee  YOB: 1960  MEDICAL RECORD NUMBER:  [id number]  DATE:  3/8/2023    Multilayer compression wrap: Removed old Multilayer wrap if indicated and wash leg with mild soap/water. Applied moisturizing agent to dry skin as needed. Applied primary and secondary dressing as ordered. Applied multilayered dressing below the knee to left lower leg. Instructed patient/caregiver not to remove dressing and to keep it clean and dry. Instructed patient/caregiver on complications to report to provider, such as pain, numbness in toes, heavy drainage, and slippage of dressing. Instructed patient on purpose of compression dressing and on activity and exercise recommendations.      Applied  2 layer   wrap from toes to knee overlapping each time    Electronically signed by KATHY BROOKS LPN on 5/3/8743 at 4:79 PM

## 2023-03-09 ENCOUNTER — TELEPHONE (OUTPATIENT)
Dept: VASCULAR SURGERY | Age: 63
End: 2023-03-09

## 2023-03-09 NOTE — TELEPHONE ENCOUNTER
----- Message from HOLGER Goddard CNP sent at 3/9/2023  9:21 AM EST -----  Patient has known occluded left leg bypass with continued wounds and some pain. Had angiogram in February 2022 which showed occluded bypass at that time and plan was to hold off on any bypass surgery. He has stage 4 laryngeal cancer and is undergoing chemotherapy. His oncologist is Dr. Yaima Stovall at [facility] ([phone]). Not sure if you want to reach out to oncologist and see if okay for angiogram with the chemo he is getting or if prefer to have patient see Dr. Sherryle Massy in office to discuss and see how he is doing and evaluate wounds? He follows with Dr. Fani Infante. I told the wife we would be in contact with plan. Thanks.

## 2023-03-09 NOTE — TELEPHONE ENCOUNTER
Discussed results of BLE arterial duplex which shows a known occluded left leg bypass. Patient had angiogram in February 2022 which showed occluded bypass at that time and plan was to hold off on any bypass surgery. He continues to have wound to his ankle and some pain. He has stage 4 laryngeal cancer and is undergoing chemotherapy. His oncologist is Dr. Roshan Guzman at [facility] ([phone]). Discussed option of moving forward with left leg angiogram.  Would need to get clearance from oncologist.  Will inform our office and Dr. Ayan Armstrong and update patient with further plan.     Electronically signed by HOLGER Amaral CNP on 3/9/2023 at 9:21 AM

## 2023-03-10 ENCOUNTER — ANTI-COAG VISIT (OUTPATIENT)
Dept: PHARMACY | Age: 63
End: 2023-03-10
Payer: MEDICAID

## 2023-03-10 DIAGNOSIS — I73.9 PERIPHERAL ARTERY DISEASE (HCC): Primary | ICD-10-CM

## 2023-03-10 LAB — INR BLD: 2.5

## 2023-03-10 PROCEDURE — 99211 OFF/OP EST MAY X REQ PHY/QHP: CPT

## 2023-03-10 PROCEDURE — 85610 PROTHROMBIN TIME: CPT

## 2023-03-10 NOTE — PROGRESS NOTES
Molly Strafford is a 59 y.o. here for warfarin management. Aj Benson had an INR test today. Results were reviewed and appropriate warfarin management was completed. This visit was performed as: An in person visit. Protocols were followed with precautions to reduce the spread of COVID-19. Patient verifies current warfarin dosing regimen: Yes     Warfarin medication reviewed and updated on the patient 's home medication list: Yes   All other medications reviewed and updated on the patient 's home medication list: No     Lab Results   Component Value Date    INR 2.50 03/10/2023    INR 4.5 2023    INR 1.8 2023       Patient Findings       Negatives:  Signs/symptoms of bleeding, Change in health, Change in alcohol use, Change in activity, Upcoming invasive procedure, Emergency department visit, Upcoming dental procedure, Missed doses, Extra doses, Change in medications, Change in diet/appetite, Hospital admission, Bruising            Anticoagulation Summary  As of 3/10/2023      INR goal:  2.0-3.0   TTR:  50.4 % (1.5 y)   INR used for dosin.50 (3/10/2023)   Warfarin maintenance plan:  15 mg (5 mg x 3) every Mon, Fri; 10 mg (5 mg x 2) all other days; Starting 3/10/2023   Weekly warfarin total:  80 mg   Plan last modified:  Merced Patrick RPH (10/6/2022)   Next INR check:  2023   Priority:  High   Target end date: Indefinite    Indications    Peripheral artery disease ([facility] [address] [I73.9]                 Anticoagulation Episode Summary       INR check location:  Anticoagulation Clinic    Preferred lab:      Send INR reminders to:  [facility]    Comments:  EPIC   23 consent          Anticoagulation Care Providers       Provider Role Specialty Phone number    Zoya Collado MD Referring Family Medicine [phone]            There were no vitals taken for this visit. Warfarin assessment / plan:     Appears well. No changes affecting warfarin therapy were noted. No acute findings. Patient was pleasant at visit today. He reports still doing 6 boosts per day, 2-3 alcoholic drinks per week, and 2-3 servings of vitamin K vegetables. Discussed keeping this diet consistent. With INR therapeutic today at 2.5 there will be no changes to his warfarin regimen. We will reassess in 4 weeks. Description    CONTINUE:  Warfarin 10 mg daily EXCEPT 15 mg every Monday and Friday. Drinking about 6 Boost shakes per day. Please let us know if this changes. Call [phone] with signs or symptoms of bleeding or ANY medication changes (including over-the-counter medications or herbal supplements). If significant bleeding occurs please seek immediate medical attention. Keep the number of servings of vitamin K containing foods (dark green, leafy vegetables) the same each week. Dark green vegetable 2-3 times a week and a mixed green salad ~ 3 times a week. Please call if this changes. Limit alcohol intake. Drinks 2-3 alcoholic beverages per week. Immunization History   Administered Date(s) Administered    COVID-19, PFIZER PURPLE top, DILUTE for use, (age 15 y+), 30mcg/0.3mL 03/15/2021, 2021, 2021    Influenza Virus Vaccine 2017, 2017    Influenza, FLUARIX, FLULAVAL, [place] (age 10 mo+) AND AFLURIA, (age 1 y+), PF, 0.5mL 10/31/2020    PPD Test 2022, 2022, 2022    Tdap (Boostrix, Adacel) 2018    Zoster Recombinant (Shingrix) 2018       Orders Placed This Encounter   Procedures    Protime-INR     This external order was created through the results console. No orders of the defined types were placed in this encounter. Reviewed AVS with patient / caregiver.     Billing Points:  0 billing points this visit     For Pharmacy Admin Tracking Only    Intervention Detail: Adherence Monitorin  Total # of Interventions Recommended: 0  Total # of Interventions Accepted: 0  Time Spent (min): 20     Aya Radhika, PharmD Candidate 2023 3/10/2023 4:00 PM

## 2023-03-12 DIAGNOSIS — L29.9 ITCHING: ICD-10-CM

## 2023-03-13 RX ORDER — GENTAMICIN SULFATE 1 MG/G
CREAM TOPICAL
Qty: 30 G | Refills: 1 | Status: SHIPPED | OUTPATIENT
Start: 2023-03-13

## 2023-03-13 RX ORDER — DIPHENHYDRAMINE HCL 25 MG
25 TABLET ORAL EVERY 6 HOURS PRN
Qty: 60 TABLET | Refills: 0 | Status: SHIPPED | OUTPATIENT
Start: 2023-03-13

## 2023-03-15 ENCOUNTER — HOSPITAL ENCOUNTER (OUTPATIENT)
Dept: WOUND CARE | Age: 63
Discharge: HOME | End: 2023-03-15
Payer: MEDICAID

## 2023-03-15 VITALS
HEART RATE: 88 BPM | RESPIRATION RATE: 16 BRPM | DIASTOLIC BLOOD PRESSURE: 83 MMHG | SYSTOLIC BLOOD PRESSURE: 126 MMHG | TEMPERATURE: 96.5 F

## 2023-03-15 DIAGNOSIS — I73.9 PERIPHERAL ARTERY DISEASE (HCC): Primary | ICD-10-CM

## 2023-03-15 PROCEDURE — 11043 DBRDMT MUSC&/FSCA 1ST 20/<: CPT

## 2023-03-15 RX ORDER — GINSENG 100 MG
CAPSULE ORAL ONCE
OUTPATIENT
Start: 2023-03-15 | End: 2023-03-15

## 2023-03-15 RX ORDER — GENTAMICIN SULFATE 1 MG/G
OINTMENT TOPICAL ONCE
OUTPATIENT
Start: 2023-03-15 | End: 2023-03-15

## 2023-03-15 RX ORDER — LIDOCAINE HYDROCHLORIDE 40 MG/ML
SOLUTION TOPICAL ONCE
OUTPATIENT
Start: 2023-03-15 | End: 2023-03-15

## 2023-03-15 RX ORDER — BETAMETHASONE DIPROPIONATE 0.05 %
OINTMENT (GRAM) TOPICAL ONCE
OUTPATIENT
Start: 2023-03-15 | End: 2023-03-15

## 2023-03-15 RX ORDER — BACITRACIN, NEOMYCIN, POLYMYXIN B 400; 3.5; 5 [USP'U]/G; MG/G; [USP'U]/G
OINTMENT TOPICAL ONCE
OUTPATIENT
Start: 2023-03-15 | End: 2023-03-15

## 2023-03-15 RX ORDER — LIDOCAINE HYDROCHLORIDE 20 MG/ML
JELLY TOPICAL ONCE
OUTPATIENT
Start: 2023-03-15 | End: 2023-03-15

## 2023-03-15 RX ORDER — BACITRACIN ZINC AND POLYMYXIN B SULFATE 500; 1000 [USP'U]/G; [USP'U]/G
OINTMENT TOPICAL ONCE
OUTPATIENT
Start: 2023-03-15 | End: 2023-03-15

## 2023-03-15 RX ORDER — LIDOCAINE 40 MG/G
CREAM TOPICAL ONCE
OUTPATIENT
Start: 2023-03-15 | End: 2023-03-15

## 2023-03-15 RX ORDER — LIDOCAINE 50 MG/G
OINTMENT TOPICAL ONCE
OUTPATIENT
Start: 2023-03-15 | End: 2023-03-15

## 2023-03-15 RX ORDER — CLOBETASOL PROPIONATE 0.5 MG/G
OINTMENT TOPICAL ONCE
OUTPATIENT
Start: 2023-03-15 | End: 2023-03-15

## 2023-03-15 RX ORDER — GABAPENTIN 100 MG/1
100 CAPSULE ORAL NIGHTLY
Qty: 30 CAPSULE | Refills: 0 | Status: SHIPPED | OUTPATIENT
Start: 2023-03-15 | End: 2023-04-14

## 2023-03-15 RX ORDER — LIDOCAINE 40 MG/G
CREAM TOPICAL ONCE
Status: DISCONTINUED | OUTPATIENT
Start: 2023-03-15 | End: 2023-03-16 | Stop reason: HOSPADM

## 2023-03-15 ASSESSMENT — PAIN DESCRIPTION - FREQUENCY: FREQUENCY: INTERMITTENT

## 2023-03-15 ASSESSMENT — PAIN DESCRIPTION - ORIENTATION: ORIENTATION: LEFT

## 2023-03-15 ASSESSMENT — PAIN SCALES - GENERAL: PAINLEVEL_OUTOF10: 7

## 2023-03-15 ASSESSMENT — PAIN DESCRIPTION - DESCRIPTORS: DESCRIPTORS: ACHING;THROBBING

## 2023-03-15 ASSESSMENT — PAIN DESCRIPTION - ONSET: ONSET: ON-GOING

## 2023-03-15 ASSESSMENT — PAIN - FUNCTIONAL ASSESSMENT: PAIN_FUNCTIONAL_ASSESSMENT: PREVENTS OR INTERFERES SOME ACTIVE ACTIVITIES AND ADLS

## 2023-03-15 ASSESSMENT — PAIN DESCRIPTION - LOCATION: LOCATION: LEG;FOOT

## 2023-03-15 ASSESSMENT — PAIN DESCRIPTION - PAIN TYPE: TYPE: CHRONIC PAIN

## 2023-03-15 NOTE — PROGRESS NOTES
Iris Rivera  Progress Note and Procedure Note      Noy Campbell  AGE: 59 y.o. GENDER: male  : 1960  TODAY'S DATE:  3/15/2023    Subjective:     Chief Complaint   Patient presents with    Wound Check     Lower extremities             HISTORY of PRESENT ILLNESS HPI     Noy Campbell is a 59 y.o. male who presents today for wound evaluation. History of Wound: Admits to having chronic wounds for years. He states that he has had arterial bypasses on both of his legs in the past.  He was seeing previous wound care and podiatry for these wounds. He states that his insurance had some issues and he has not been seeing anyone since 2021. States he had to take his bandages off because his feet were hurting at night. He admits to moderate pain.     Wound Pain:  intermittent left  Severity:  3 / 10   Wound Type:  venous, arterial and diabetic  Modifying Factors:  edema, venous stasis, lymphedema, diabetes, decreased mobility, arterial insufficiency and decreased tissue oxygenation  Associated Signs/Symptoms:  drainage, numbness and odor        PAST MEDICAL HISTORY        Diagnosis Date    Acquired hypothyroidism 2023    Cancer ([facility] [address]     throat    Diabetes mellitus ([address]     Fibromyalgia     History of DVT (deep vein thrombosis)     Hyperlipidemia     Hypertension     Type 2 diabetes mellitus with circulatory disorder, without long-term current use of insulin ([address] 2022       PAST SURGICAL HISTORY    Past Surgical History:   Procedure Laterality Date    APPENDECTOMY  2005    COLONOSCOPY  2016    FEMORAL BYPASS Left 2020    femoral posterior tibial bypass    FEMORAL-TIBIAL BYPASS GRAFT Right 2020    with femoral endarterectomy and patch angioplasty    FOOT DEBRIDEMENT Left 2020    FOOT DEBRIDEMENT Right 2021    DEBRIDEMENT OF RIGHT FOOT WOUND WITH GRAFT APPLICATION performed by Radha Stack DPM at [facility] N/A 3/25/2022 DIRECT LARYNGOSCOPY WITH BIOPSY AND FROZEN SECTIONS performed by Dora Jameson DO at [address] [facility] [address] Right 2020    stent leg for PVD    TRACHEOSTOMY N/A 3/25/2022    TRACHEOTOMY performed by Dora Jameson DO at [address] [facility] HISTORY    Family History   Problem Relation Age of Onset    Cancer Mother     Lung Cancer Mother     Diabetes Father     Stroke Father        SOCIAL HISTORY    Social History     Tobacco Use    Smoking status: Former     Packs/day: 0.50     Years: 20.00     Pack years: 10.00     Types: Cigarettes     Start date: 1977     Quit date: 2021     Years since quittin.2    Smokeless tobacco: Never   Vaping Use    Vaping Use: Never used   Substance Use Topics    Alcohol use: Yes     Comment: 2 beers / day     Drug use: Never       ALLERGIES    Allergies   Allergen Reactions    Chantix [Varenicline] Other (See Comments)     Hallucinations         MEDICATIONS    Current Outpatient Medications on File Prior to Encounter   Medication Sig Dispense Refill    vitamin B-12 (CYANOCOBALAMIN) 250 MCG tablet TAKE 1 TABLET BY MOUTH DAILY 30 tablet 0    gentamicin (GARAMYCIN) 0.1 % cream APPLY TOPICALLY TO THE AFFECTED AREA DAILY 30 g 1    diphenhydrAMINE (BENADRYL) 25 MG tablet TAKE 1 TABLET BY MOUTH EVERY 6 HOURS AS NEEDED FOR ITCHING 60 tablet 0    benzonatate (TESSALON) 200 MG capsule TAKE 1 CAPSULE BY MOUTH THREE TIMES DAILY AS NEEDED FOR COUGH 30 capsule 0    ACETAMINOPHEN EXTRA STRENGTH 500 MG tablet TAKE 1 TABLET BY MOUTH FOUR TIMES DAILY AS NEEDED FOR PAIN 360 tablet 1    lisinopril (PRINIVIL;ZESTRIL) 20 MG tablet TAKE 1 TABLET BY MOUTH EVERY DAY 30 tablet 0    metFORMIN (GLUCOPHAGE) 500 MG tablet TAKE 1 TABLET BY MOUTH TWICE DAILY WITH MEALS 60 tablet 0    cetirizine (ZYRTEC) 10 MG tablet TAKE 1 TABLET BY MOUTH DAILY 30 tablet 0    warfarin (COUMADIN) 5 MG tablet Take 2 tablets by mouth daily (10 mg), except take 3 tablets every Monday and Friday (15 mg). OR as directed by the [facility] Anticoagulation Service [phone]. 70 tablet 3    levothyroxine (SYNTHROID) 25 MCG tablet Take 25 mcg by mouth Daily      melatonin 3 MG TABS tablet Take 2 tablets by mouth nightly 60 tablet 2    STIMULANT LAXATIVE 8.6-50 MG per tablet TAKE 1 TABLET BY MOUTH TWICE DAILY 60 tablet 0    atorvastatin (LIPITOR) 80 MG tablet TAKE 1 TABLET BY MOUTH DAILY 90 tablet 0    budesonide-formoterol (SYMBICORT) 160-4.5 MCG/ACT AERO Inhale 2 puffs into the lungs 2 times daily 10.2 g 2    formoterol (PERFOROMIST) 20 MCG/2ML nebulizer solution Take 2 mLs by nebulization every 12 hours 2 mL 5    budesonide (PULMICORT) 0.5 MG/2ML nebulizer suspension Take 2 mLs by nebulization 2 times daily 60 each 2    metoprolol succinate (TOPROL XL) 25 MG extended release tablet Take 25 mg by mouth daily      BANOPHEN 25 MG capsule Take 1 tablet by mouth every 6 hours as needed for Itching      ondansetron (ZOFRAN-ODT) 4 MG disintegrating tablet Take 4 mg by mouth every 8 hours as needed for Nausea or Vomiting      Multiple Vitamins-Minerals (MULTIVITAMIN ADULT EXTRA C PO) 1 tablet by Nasogastric route daily      esomeprazole (NEXIUM) 20 MG delayed release capsule 20 mg by Nasogastric route every morning (before breakfast)      oxyCODONE HCl (OXY-IR) 10 MG immediate release tablet TAKE 1 TABLET BY MOUTH EVERY SIX HOURS AS NEEDED FOR PAIN FOR UP TO 7 DAYS      becaplermin (REGRANEX) 0.01 % gel Apply 1 Applicatorful topically daily      calcium-vitamin D (OSCAL-500) 500-200 MG-UNIT per tablet Take 1 tablet by mouth 2 times daily      ipratropium-albuterol (DUONEB) 0.5-2.5 (3) MG/3ML SOLN nebulizer solution Inhale 3 mLs into the lungs 4 times daily 360 mL 3    fluticasone (FLONASE) 50 MCG/ACT nasal spray 1 spray by Nasal route daily 9.9 g 5    Vitamins/Minerals TABS Take 1 tablet by mouth daily        No current facility-administered medications on file prior to encounter.        REVIEW OF SYSTEMS    Pertinent items are noted in HPI. Objective:      /83   Pulse 88   Temp (!) 96.5 °F (35.8 °C) (Infrared)   Resp 16     PHYSICAL EXAM    Vascular: Vascular status Impaired  palpable pedal pulses, right DP0/4 and PT1/4, left DP0/4 and PT1/4. CFT 3 seconds digits 1 to 5 bilateral.  Hair growthAbsent  both lower extremities and feet. Skin temperature is warm to warm from pretibial area to distal digits bilateral.  Exam is negative for rubor, pallor, cyanosis or signs of acute vascular compromise bilaterally. Exam is positive for edema bilateral lower extremity. Varicosities Present bilateral lower extremity. Neuro: Neurologic status diminished bilateral with epicritic Absent  , proprioceptive Absent , vibratory sensation Absent  and protopathicPresent. DTRs Absent  bilateral Achilles. There were no reproducible neuritic symptoms on exam bilateral feet/ankles. Derm: Ulceration to bilateral ankles. Ecchymosis Absent  bilateral feet/foot. Elongated, yellow, crumbly and thickened greater than 3mm all toenails both feet. Musculoskeletal: No pain with debridement of wounds 5/5 muscle strength in/eversion and dorsi/plantarflexion bilateral feet. No gross instability noted. Procedure Note    Performed by: Shabnam Alicea DPM    Consent obtained: Yes    Time out taken:  Yes    Pain Control: Anesthetic  Anesthetic: 4% Topical Xylocaine     Debridement:Excisional Debridement    Using curette the wound was sharply debrided    down through and including the removal of epidermis, dermis, subcutaneous tissue, and muscle/fascia.         Devitalized Tissue Debrided:  fibrin, biofilm, slough, necrotic/eschar, and exudate  Wound Care Documentation:  Wound 06/24/22 Ankle Left;Medial #1 (Active)   Wound Image   02/08/23 1345   Wound Etiology Venous 03/15/23 1419   Wound Cleansed Cleansed with saline 03/15/23 1419   Dressing/Treatment Collagen 11/23/22 1104   Offloading for Diabetic Foot Ulcers Offloading not required 03/15/23 1419   Wound Length (cm) 3.2 cm 03/15/23 1419   Wound Width (cm) 0.8 cm 03/15/23 1419   Wound Depth (cm) 0.2 cm 03/15/23 1419   Wound Surface Area (cm^2) 2.56 cm^2 03/15/23 1419   Change in Wound Size % (l*w) 62.57 03/15/23 1419   Wound Volume (cm^3) 0.512 cm^3 03/15/23 1419   Wound Healing % 25 03/15/23 1419   Post-Procedure Length (cm) 3.2 cm 03/15/23 1436   Post-Procedure Width (cm) 0.8 cm 03/15/23 1436   Post-Procedure Depth (cm) 0.2 cm 03/15/23 1436   Post-Procedure Surface Area (cm^2) 2.56 cm^2 03/15/23 1436   Post-Procedure Volume (cm^3) 0.512 cm^3 03/15/23 1436   Wound Assessment Bleeding 03/15/23 1436   Drainage Amount Small 03/15/23 1419   Drainage Description Serosanguinous 03/15/23 1419   Odor None 03/15/23 1419   Jamila-wound Assessment Edematous; Hyperkeratosis (callous) 03/15/23 1419   Margins Defined edges 03/15/23 1419   Wound Thickness Description not for Pressure Injury Full thickness 02/22/23 1354   Number of days: 264       Wound 02/15/23 Heel Lower; Outer;Left #2 (Active)   Wound Image   02/15/23 1317   Wound Etiology Venous 03/15/23 1419   Wound Cleansed Wound cleanser 03/15/23 1419   Dressing/Treatment Collagen;Dry dressing 03/08/23 1539   Offloading for Diabetic Foot Ulcers Offloading not required 03/15/23 1419   Wound Length (cm) 1.6 cm 03/15/23 1419   Wound Width (cm) 1.8 cm 03/15/23 1419   Wound Depth (cm) 0.2 cm 03/15/23 1419   Wound Surface Area (cm^2) 2.88 cm^2 03/15/23 1419   Change in Wound Size % (l*w) -242.86 03/15/23 1419   Wound Volume (cm^3) 0.576 cm^3 03/15/23 1419   Wound Healing % -586 03/15/23 1419   Post-Procedure Length (cm) 1.6 cm 03/15/23 1436   Post-Procedure Width (cm) 1.8 cm 03/15/23 1436   Post-Procedure Depth (cm) 0.2 cm 03/15/23 1436   Post-Procedure Surface Area (cm^2) 2.88 cm^2 03/15/23 1436   Post-Procedure Volume (cm^3) 0.576 cm^3 03/15/23 1436   Wound Assessment Bleeding 03/15/23 1436   Drainage Amount Small 03/15/23 1419   Drainage Description Serosanguinous 03/15/23 1419   Odor None 03/15/23 1419   Jamila-wound Assessment Edematous 03/15/23 1419   Margins Defined edges 03/15/23 1419   Number of days: 28         Total Surface Area Debrided: 5sq cm both wounds    Percentage of wound debrided 100%    Bleeding:  Minimal    Hemostasis Achieved:  by pressure    Procedural Pain:  4  / 10     Post Procedural Pain:  0 / 10     Response to treatment:  Well tolerated by patient. - All nails bilateral feet debrided in thickness and length with out incident. Patient handled procedure well. Assessment:     Problem List Items Addressed This Visit       Peripheral artery disease ([facility] [address] - Primary    Relevant Medications    lidocaine (LMX) 4 % cream    Other Relevant Orders    Initiate Outpatient Wound Care Protocol          Plan:   Patient examined and evaluated   Wounds larger  Wound debrided without incident   Multilayer compression wrap left leg  Follow-up one week  Keep legs elevated at all times when not active. Awaiting recommendations from vascular surgery. Prescription for gabapentin 100 mg 1 at bedtime. The nature of the patient's condition was explained in depth. The patient was informed that their compliance to the treatment Plan is paramount to successful healing and prevention of further ulceration and/or infection.   Discharge Treatment follow-up on Friday for dressing change    Written Patient Discharge Instructions Given            Electronically signed by Diane Shepherd DPM on 3/15/2023 at 3:13 PM

## 2023-03-15 NOTE — DISCHARGE INSTRUCTIONS
[address], [address] [place] [address]  Telephone: [phone]     Discharge Instructions     Important reminders:     **If you have any signs and symptoms of illness (Cough, fever, congestion, nausea, vomiting, diarrhea, etc.) please call the wound care center prior to your appointment. 1. Increase Protein intake for optimal wound healing  2. No added salt to reduce any swelling  3. If diabetic, maintain good glucose control  4. If you smoke, smoking prohibits wound healing, we ask that you refrain from smoking. 5. When taking antibiotics take the entire prescription as ordered. Do not stop taking until medication is all gone unless otherwise instructed. 6. Exercise as tolerated. 7. Keep weight off wounds and reposition every 2 hours if applicable. 8. If wound(s) is on your lower extremity, elevate legs to the level of the heart or above for 30 minutes 4-5 times a day and/or when sitting. Avoid standing for long periods of time. 9. Do not get wounds wet in bath or shower unless otherwise instructed by your physician. If your wound is on your foot or leg, you may purchase a cast bag. Please ask at the pharmacy. If Vascular testing is ordered, please call [address] [place] [address] [phone]) to schedule. Vascular tests ordered by Wound Care Physicians may take up to 2 hours to complete. Please keep that in mind when scheduling. If Vascular testing is scheduled, please bring supplies to replace your dressing after testing is done. The vascular department does not stock supplies. Wound: Right and Left leg     With each dressing change, rinse wounds with 0.9% Saline. (May use wound wash or soft contact solution. Both can be purchased at a local drug store). If unable to obtain saline, may use a gentle soap and water. Dressing care:  Left leg BOTH WOUNDS-   Gentamicin cream and triad (give to pt) Foam and  1 tubigrip.  Change every day    Nails Clipped 1/13/2023      Dr Polly Marks, Vascular Surgeon September 14th at 1215pm  [address] [address], [address] [place] [address] Road  Phone# [phone]  Fax# [phone]               Home Care Agency/Facility: [facility]     Your wound-care supplies will be provided by:  Please note, depending on your insurance coverage, you may have out-of-pocket expenses for these supplies. Someone from the company should call you to confirm your order and discuss those potential costs before they ship your products -- please anticipate that call. If your out-of-pocket cost could be substantial, Many companies have financial hardship programs for patients who qualify, so please ask about that if you might need a hand. If you have any questions about your supplies or your potential out-of-pocket costs, or if you need to place an order for a refill of supplies (typically monthly), please call the company directly. Your  is Nathalie Ramos     Follow up with Dr Yvon Okeefe in 1 weeks. Wound Care Center Information: Should you experience any significant changes in your wound(s) or have questions about your wound care, please contact the [facility] at [phone] Monday  - Thursday 8:00 am - 4:00 pm and Friday 8:00 am - 1:00pm. If you need help with your wound outside these hours and cannot wait until we are again available, contact your PCP or go to the hospital emergency room. PLEASE NOTE: IF YOU ARE UNABLE TO OBTAIN WOUND SUPPLIES, CONTINUE TO USE THE SUPPLIES YOU HAVE AVAILABLE UNTIL YOU ARE ABLE TO REACH US. IT IS MOST IMPORTANT TO KEEP THE WOUND COVERED AT ALL TIMES.

## 2023-03-21 ENCOUNTER — OFFICE VISIT (OUTPATIENT)
Dept: VASCULAR SURGERY | Age: 63
End: 2023-03-21
Payer: MEDICAID

## 2023-03-21 VITALS
BODY MASS INDEX: 26.52 KG/M2 | WEIGHT: 175 LBS | SYSTOLIC BLOOD PRESSURE: 128 MMHG | DIASTOLIC BLOOD PRESSURE: 76 MMHG | HEIGHT: 68 IN

## 2023-03-21 DIAGNOSIS — E11.9 TYPE 2 DIABETES MELLITUS WITHOUT COMPLICATION, WITHOUT LONG-TERM CURRENT USE OF INSULIN (HCC): ICD-10-CM

## 2023-03-21 DIAGNOSIS — I73.9 PVD (PERIPHERAL VASCULAR DISEASE) WITH CLAUDICATION (HCC): Primary | ICD-10-CM

## 2023-03-21 DIAGNOSIS — J43.2 CENTRILOBULAR EMPHYSEMA (HCC): ICD-10-CM

## 2023-03-21 DIAGNOSIS — L29.9 ITCHING: ICD-10-CM

## 2023-03-21 DIAGNOSIS — J41.0 SIMPLE CHRONIC BRONCHITIS (HCC): ICD-10-CM

## 2023-03-21 PROCEDURE — 99213 OFFICE O/P EST LOW 20 MIN: CPT | Performed by: SURGERY

## 2023-03-21 PROCEDURE — 3074F SYST BP LT 130 MM HG: CPT | Performed by: SURGERY

## 2023-03-21 PROCEDURE — 3078F DIAST BP <80 MM HG: CPT | Performed by: SURGERY

## 2023-03-21 RX ORDER — CETIRIZINE HYDROCHLORIDE 10 MG/1
10 TABLET ORAL DAILY
Qty: 30 TABLET | Refills: 0 | Status: SHIPPED | OUTPATIENT
Start: 2023-03-21 | End: 2023-04-20

## 2023-03-21 RX ORDER — LISINOPRIL 20 MG/1
TABLET ORAL
Qty: 30 TABLET | Refills: 0 | Status: SHIPPED | OUTPATIENT
Start: 2023-03-21

## 2023-03-21 NOTE — PROGRESS NOTES
Lung Cancer in his mother; Stroke in his father. Home Medications:  Current Outpatient Medications   Medication Sig Dispense Refill    gabapentin (NEURONTIN) 100 MG capsule Take 1 capsule by mouth nightly for 30 days. Intended supply: 30 days 30 capsule 0    vitamin B-12 (CYANOCOBALAMIN) 250 MCG tablet TAKE 1 TABLET BY MOUTH DAILY 30 tablet 0    gentamicin (GARAMYCIN) 0.1 % cream APPLY TOPICALLY TO THE AFFECTED AREA DAILY 30 g 1    diphenhydrAMINE (BENADRYL) 25 MG tablet TAKE 1 TABLET BY MOUTH EVERY 6 HOURS AS NEEDED FOR ITCHING 60 tablet 0    benzonatate (TESSALON) 200 MG capsule TAKE 1 CAPSULE BY MOUTH THREE TIMES DAILY AS NEEDED FOR COUGH 30 capsule 0    ACETAMINOPHEN EXTRA STRENGTH 500 MG tablet TAKE 1 TABLET BY MOUTH FOUR TIMES DAILY AS NEEDED FOR PAIN 360 tablet 1    lisinopril (PRINIVIL;ZESTRIL) 20 MG tablet TAKE 1 TABLET BY MOUTH EVERY DAY 30 tablet 0    metFORMIN (GLUCOPHAGE) 500 MG tablet TAKE 1 TABLET BY MOUTH TWICE DAILY WITH MEALS 60 tablet 0    cetirizine (ZYRTEC) 10 MG tablet TAKE 1 TABLET BY MOUTH DAILY 30 tablet 0    warfarin (COUMADIN) 5 MG tablet Take 2 tablets by mouth daily (10 mg), except take 3 tablets every Monday and Friday (15 mg).   OR as directed by the [facility] Anticoagulation Service [phone]. 70 tablet 3    levothyroxine (SYNTHROID) 25 MCG tablet Take 25 mcg by mouth Daily      melatonin 3 MG TABS tablet Take 2 tablets by mouth nightly 60 tablet 2    STIMULANT LAXATIVE 8.6-50 MG per tablet TAKE 1 TABLET BY MOUTH TWICE DAILY 60 tablet 0    atorvastatin (LIPITOR) 80 MG tablet TAKE 1 TABLET BY MOUTH DAILY 90 tablet 0    budesonide-formoterol (SYMBICORT) 160-4.5 MCG/ACT AERO Inhale 2 puffs into the lungs 2 times daily 10.2 g 2    formoterol (PERFOROMIST) 20 MCG/2ML nebulizer solution Take 2 mLs by nebulization every 12 hours 2 mL 5    budesonide (PULMICORT) 0.5 MG/2ML nebulizer suspension Take 2 mLs by nebulization 2 times daily 60 each 2    metoprolol succinate (TOPROL XL) 25

## 2023-03-22 ENCOUNTER — HOSPITAL ENCOUNTER (OUTPATIENT)
Dept: WOUND CARE | Age: 63
Discharge: HOME | End: 2023-03-22
Payer: MEDICAID

## 2023-03-22 ENCOUNTER — PREP FOR PROCEDURE (OUTPATIENT)
Dept: VASCULAR SURGERY | Age: 63
End: 2023-03-22

## 2023-03-22 VITALS — RESPIRATION RATE: 15 BRPM | DIASTOLIC BLOOD PRESSURE: 73 MMHG | HEART RATE: 81 BPM | SYSTOLIC BLOOD PRESSURE: 137 MMHG

## 2023-03-22 DIAGNOSIS — I73.9 PERIPHERAL ARTERY DISEASE (HCC): Primary | ICD-10-CM

## 2023-03-22 PROCEDURE — 11043 DBRDMT MUSC&/FSCA 1ST 20/<: CPT

## 2023-03-22 RX ORDER — LIDOCAINE 40 MG/G
CREAM TOPICAL ONCE
Status: DISCONTINUED | OUTPATIENT
Start: 2023-03-22 | End: 2023-03-23 | Stop reason: HOSPADM

## 2023-03-22 RX ORDER — LIDOCAINE 40 MG/G
CREAM TOPICAL ONCE
OUTPATIENT
Start: 2023-03-22 | End: 2023-03-22

## 2023-03-22 RX ORDER — LIDOCAINE HYDROCHLORIDE 20 MG/ML
JELLY TOPICAL ONCE
OUTPATIENT
Start: 2023-03-22 | End: 2023-03-22

## 2023-03-22 RX ORDER — CLOBETASOL PROPIONATE 0.5 MG/G
OINTMENT TOPICAL ONCE
OUTPATIENT
Start: 2023-03-22 | End: 2023-03-22

## 2023-03-22 RX ORDER — GENTAMICIN SULFATE 1 MG/G
OINTMENT TOPICAL ONCE
OUTPATIENT
Start: 2023-03-22 | End: 2023-03-22

## 2023-03-22 RX ORDER — BETAMETHASONE DIPROPIONATE 0.05 %
OINTMENT (GRAM) TOPICAL ONCE
OUTPATIENT
Start: 2023-03-22 | End: 2023-03-22

## 2023-03-22 RX ORDER — BACITRACIN, NEOMYCIN, POLYMYXIN B 400; 3.5; 5 [USP'U]/G; MG/G; [USP'U]/G
OINTMENT TOPICAL ONCE
OUTPATIENT
Start: 2023-03-22 | End: 2023-03-22

## 2023-03-22 RX ORDER — BACITRACIN ZINC AND POLYMYXIN B SULFATE 500; 1000 [USP'U]/G; [USP'U]/G
OINTMENT TOPICAL ONCE
OUTPATIENT
Start: 2023-03-22 | End: 2023-03-22

## 2023-03-22 RX ORDER — LIDOCAINE HYDROCHLORIDE 40 MG/ML
SOLUTION TOPICAL ONCE
OUTPATIENT
Start: 2023-03-22 | End: 2023-03-22

## 2023-03-22 RX ORDER — GINSENG 100 MG
CAPSULE ORAL ONCE
OUTPATIENT
Start: 2023-03-22 | End: 2023-03-22

## 2023-03-22 RX ORDER — BENZONATATE 200 MG/1
CAPSULE ORAL
Qty: 30 CAPSULE | Refills: 0 | Status: SHIPPED | OUTPATIENT
Start: 2023-03-22

## 2023-03-22 RX ORDER — LIDOCAINE 50 MG/G
OINTMENT TOPICAL ONCE
OUTPATIENT
Start: 2023-03-22 | End: 2023-03-22

## 2023-03-22 NOTE — DISCHARGE INSTRUCTIONS
[address], [address] [facility]  Telephone: [phone]     Discharge Instructions     Important reminders:     **If you have any signs and symptoms of illness (Cough, fever, congestion, nausea, vomiting, diarrhea, etc.) please call the wound care center prior to your appointment. 1. Increase Protein intake for optimal wound healing  2. No added salt to reduce any swelling  3. If diabetic, maintain good glucose control  4. If you smoke, smoking prohibits wound healing, we ask that you refrain from smoking. 5. When taking antibiotics take the entire prescription as ordered. Do not stop taking until medication is all gone unless otherwise instructed. 6. Exercise as tolerated. 7. Keep weight off wounds and reposition every 2 hours if applicable. 8. If wound(s) is on your lower extremity, elevate legs to the level of the heart or above for 30 minutes 4-5 times a day and/or when sitting. Avoid standing for long periods of time. 9. Do not get wounds wet in bath or shower unless otherwise instructed by your physician. If your wound is on your foot or leg, you may purchase a cast bag. Please ask at the pharmacy. If Vascular testing is ordered, please call [address] [place] [address] [phone]) to schedule. Vascular tests ordered by Wound Care Physicians may take up to 2 hours to complete. Please keep that in mind when scheduling. If Vascular testing is scheduled, please bring supplies to replace your dressing after testing is done. The vascular department does not stock supplies. Wound: Right and Left leg     With each dressing change, rinse wounds with 0.9% Saline. (May use wound wash or soft contact solution. Both can be purchased at a local drug store). If unable to obtain saline, may use a gentle soap and water. Dressing care:  Left leg BOTH WOUNDS-   Gentamicin cream and triad  Foam and  1 tubigrip.  Change every day     Nails Clipped 1/13/2023

## 2023-03-22 NOTE — PROGRESS NOTES
Venous 03/22/23 1428   Wound Cleansed Cleansed with saline 03/22/23 1428   Dressing/Treatment Collagen 11/23/22 1104   Offloading for Diabetic Foot Ulcers Offloading not required 03/15/23 1419   Wound Length (cm) 3.4 cm 03/22/23 1428   Wound Width (cm) 5 cm 03/22/23 1428   Wound Depth (cm) 0.1 cm 03/22/23 1428   Wound Surface Area (cm^2) 17 cm^2 03/22/23 1428   Change in Wound Size % (l*w) -148.54 03/22/23 1428   Wound Volume (cm^3) 1.7 cm^3 03/22/23 1428   Wound Healing % -149 03/22/23 1428   Post-Procedure Length (cm) 3.4 cm 03/22/23 1443   Post-Procedure Width (cm) 5 cm 03/22/23 1443   Post-Procedure Depth (cm) 0.1 cm 03/22/23 1443   Post-Procedure Surface Area (cm^2) 17 cm^2 03/22/23 1443   Post-Procedure Volume (cm^3) 1.7 cm^3 03/22/23 1443   Wound Assessment Bleeding 03/22/23 1443   Drainage Amount Small 03/22/23 1428   Drainage Description Serosanguinous 03/22/23 1428   Odor None 03/22/23 1428   Jamila-wound Assessment Intact 03/22/23 1428   Margins Attached edges; Defined edges 03/22/23 1428   Wound Thickness Description not for Pressure Injury Full thickness 02/22/23 1354   Number of days: 271       Wound 02/15/23 Heel Lower; Outer;Left #2 (Active)   Wound Image   02/15/23 1317   Wound Etiology Venous 03/22/23 1428   Wound Cleansed Wound cleanser 03/22/23 1428   Dressing/Treatment Collagen;Dry dressing 03/08/23 1539   Offloading for Diabetic Foot Ulcers Offloading ordered 03/22/23 1428   Wound Length (cm) 2 cm 03/22/23 1428   Wound Width (cm) 1 cm 03/22/23 1428   Wound Depth (cm) 0.2 cm 03/22/23 1428   Wound Surface Area (cm^2) 2 cm^2 03/22/23 1428   Change in Wound Size % (l*w) -138.1 03/22/23 1428   Wound Volume (cm^3) 0.4 cm^3 03/22/23 1428   Wound Healing % -376 03/22/23 1428   Post-Procedure Length (cm) 2 cm 03/22/23 1443   Post-Procedure Width (cm) 1 cm 03/22/23 1443   Post-Procedure Depth (cm) 0.2 cm 03/22/23 1443   Post-Procedure Surface Area (cm^2) 2 cm^2 03/22/23 1443   Post-Procedure Volume (cm^3)

## 2023-03-22 NOTE — PLAN OF CARE
Discharge instructions given. Patient verbalized understanding. Return to [address] [place] [address],[address] in 1 week(s).   Angio next Monday

## 2023-03-24 RX ORDER — SODIUM CHLORIDE 9 MG/ML
INJECTION, SOLUTION INTRAVENOUS PRN
Status: CANCELLED | OUTPATIENT
Start: 2023-03-24

## 2023-03-24 RX ORDER — SODIUM CHLORIDE 0.9 % (FLUSH) 0.9 %
5-40 SYRINGE (ML) INJECTION EVERY 12 HOURS SCHEDULED
Status: CANCELLED | OUTPATIENT
Start: 2023-03-24

## 2023-03-24 RX ORDER — SODIUM CHLORIDE 0.9 % (FLUSH) 0.9 %
5-40 SYRINGE (ML) INJECTION PRN
Status: CANCELLED | OUTPATIENT
Start: 2023-03-24

## 2023-03-27 ENCOUNTER — HOSPITAL ENCOUNTER (OUTPATIENT)
Dept: CARDIAC CATH/INVASIVE PROCEDURES | Age: 63
Discharge: HOME | End: 2023-03-27
Attending: SURGERY | Admitting: SURGERY
Payer: MEDICAID

## 2023-03-27 LAB
INR PPP: 2.16 (ref 0.87–1.14)
PROTHROMBIN TIME: 24.2 SEC (ref 11.7–14.5)

## 2023-03-27 PROCEDURE — 36415 COLL VENOUS BLD VENIPUNCTURE: CPT

## 2023-03-27 PROCEDURE — 85610 PROTHROMBIN TIME: CPT

## 2023-03-27 PROCEDURE — 99212 OFFICE O/P EST SF 10 MIN: CPT

## 2023-03-27 NOTE — H&P
H&P Update    I have reviewed the history and physical and examined the patient and find no relevant changes. I have reviewed with the patient and/or family the risks, benefits, and alternatives to the procedure. Pre-sedation Assessment    Patient:  Cricket Watters   :   1960  Intended Procedure:  Abdominal aortogram with lower extremity evaluation and possible intervention. Oleg Bradley nurses notes reviewed and agreed. Medications reviewed  Allergies:    Allergies   Allergen Reactions    Chantix [Varenicline] Other (See Comments)     Hallucinations           Pre-Procedure Assessment/Plan:  ASA 3 - Patient with moderate systemic disease with functional limitations  Malampati 3    Level of Sedation Plan:Mild sedation    Post Procedure plan: Return to same level of care      Signed:  Demetrice Almanza MD, 3/27/2023, 1:27 PM

## 2023-03-29 LAB — INR BLD: 2.8 (ref 0.88–1.12)

## 2023-03-30 NOTE — DISCHARGE INSTRUCTIONS
[address], [address] [facility]  Telephone: [phone]     Discharge Instructions     Important reminders:     **If you have any signs and symptoms of illness (Cough, fever, congestion, nausea, vomiting, diarrhea, etc.) please call the wound care center prior to your appointment. 1. Increase Protein intake for optimal wound healing  2. No added salt to reduce any swelling  3. If diabetic, maintain good glucose control  4. If you smoke, smoking prohibits wound healing, we ask that you refrain from smoking. 5. When taking antibiotics take the entire prescription as ordered. Do not stop taking until medication is all gone unless otherwise instructed. 6. Exercise as tolerated. 7. Keep weight off wounds and reposition every 2 hours if applicable. 8. If wound(s) is on your lower extremity, elevate legs to the level of the heart or above for 30 minutes 4-5 times a day and/or when sitting. Avoid standing for long periods of time. 9. Do not get wounds wet in bath or shower unless otherwise instructed by your physician. If your wound is on your foot or leg, you may purchase a cast bag. Please ask at the pharmacy. If Vascular testing is ordered, please call [address] [place] [address] [phone]) to schedule. Vascular tests ordered by Wound Care Physicians may take up to 2 hours to complete. Please keep that in mind when scheduling. If Vascular testing is scheduled, please bring supplies to replace your dressing after testing is done. The vascular department does not stock supplies. Wound: Right and Left leg     With each dressing change, rinse wounds with 0.9% Saline. (May use wound wash or soft contact solution. Both can be purchased at a local drug store). If unable to obtain saline, may use a gentle soap and water. Dressing care:  Left leg BOTH WOUNDS-   Gentamicin cream and triad  Foam and  1 tubigrip.  Change every day     Nails Clipped 1/13/2023

## 2023-03-31 ENCOUNTER — HOSPITAL ENCOUNTER (OUTPATIENT)
Dept: WOUND CARE | Age: 63
Discharge: HOME | End: 2023-03-31

## 2023-03-31 ENCOUNTER — HOSPITAL ENCOUNTER (OUTPATIENT)
Dept: CARDIAC CATH/INVASIVE PROCEDURES | Age: 63
Discharge: HOME | End: 2023-03-31
Attending: SURGERY | Admitting: SURGERY
Payer: MEDICAID

## 2023-03-31 VITALS
BODY MASS INDEX: 26.52 KG/M2 | WEIGHT: 175 LBS | SYSTOLIC BLOOD PRESSURE: 137 MMHG | HEIGHT: 68 IN | HEART RATE: 105 BPM | DIASTOLIC BLOOD PRESSURE: 90 MMHG | RESPIRATION RATE: 16 BRPM

## 2023-03-31 LAB
ANION GAP SERPL CALCULATED.3IONS-SCNC: 14 MMOL/L (ref 3–16)
BUN SERPL-MCNC: 25 MG/DL (ref 7–20)
CALCIUM SERPL-MCNC: 9.4 MG/DL (ref 8.3–10.6)
CHLORIDE SERPL-SCNC: 100 MMOL/L (ref 99–110)
CO2 SERPL-SCNC: 20 MMOL/L (ref 21–32)
CREAT SERPL-MCNC: 0.8 MG/DL (ref 0.8–1.3)
DEPRECATED RDW RBC AUTO: 16.2 % (ref 12.4–15.4)
GFR SERPLBLD CREATININE-BSD FMLA CKD-EPI: >60 ML/MIN/{1.73_M2}
GLUCOSE SERPL-MCNC: 96 MG/DL (ref 70–99)
HCT VFR BLD AUTO: 33.9 % (ref 40.5–52.5)
HGB BLD-MCNC: 10.8 G/DL (ref 13.5–17.5)
MCH RBC QN AUTO: 27.9 PG (ref 26–34)
MCHC RBC AUTO-ENTMCNC: 31.9 G/DL (ref 31–36)
MCV RBC AUTO: 87.3 FL (ref 80–100)
PLATELET # BLD AUTO: 275 K/UL (ref 135–450)
PMV BLD AUTO: 6.7 FL (ref 5–10.5)
POTASSIUM SERPL-SCNC: 4.1 MMOL/L (ref 3.5–5.1)
RBC # BLD AUTO: 3.88 M/UL (ref 4.2–5.9)
SODIUM SERPL-SCNC: 134 MMOL/L (ref 136–145)
WBC # BLD AUTO: 3.1 K/UL (ref 4–11)

## 2023-03-31 PROCEDURE — 75625 CONTRAST EXAM ABDOMINL AORTA: CPT | Performed by: SURGERY

## 2023-03-31 PROCEDURE — 99152 MOD SED SAME PHYS/QHP 5/>YRS: CPT | Performed by: SURGERY

## 2023-03-31 PROCEDURE — 99153 MOD SED SAME PHYS/QHP EA: CPT

## 2023-03-31 PROCEDURE — 85027 COMPLETE CBC AUTOMATED: CPT

## 2023-03-31 PROCEDURE — 76937 US GUIDE VASCULAR ACCESS: CPT | Performed by: SURGERY

## 2023-03-31 PROCEDURE — 36246 INS CATH ABD/L-EXT ART 2ND: CPT

## 2023-03-31 PROCEDURE — C1887 CATHETER, GUIDING: HCPCS

## 2023-03-31 PROCEDURE — 2500000003 HC RX 250 WO HCPCS

## 2023-03-31 PROCEDURE — 6360000002 HC RX W HCPCS

## 2023-03-31 PROCEDURE — 6360000004 HC RX CONTRAST MEDICATION: Performed by: SURGERY

## 2023-03-31 PROCEDURE — 36415 COLL VENOUS BLD VENIPUNCTURE: CPT

## 2023-03-31 PROCEDURE — 75716 ARTERY X-RAYS ARMS/LEGS: CPT

## 2023-03-31 PROCEDURE — 36246 INS CATH ABD/L-EXT ART 2ND: CPT | Performed by: SURGERY

## 2023-03-31 PROCEDURE — 2709999900 HC NON-CHARGEABLE SUPPLY

## 2023-03-31 PROCEDURE — 99152 MOD SED SAME PHYS/QHP 5/>YRS: CPT

## 2023-03-31 PROCEDURE — 75625 CONTRAST EXAM ABDOMINL AORTA: CPT

## 2023-03-31 PROCEDURE — 80048 BASIC METABOLIC PNL TOTAL CA: CPT

## 2023-03-31 PROCEDURE — C1769 GUIDE WIRE: HCPCS

## 2023-03-31 PROCEDURE — 75716 ARTERY X-RAYS ARMS/LEGS: CPT | Performed by: SURGERY

## 2023-03-31 PROCEDURE — C1894 INTRO/SHEATH, NON-LASER: HCPCS

## 2023-03-31 RX ORDER — SODIUM CHLORIDE 9 MG/ML
INJECTION, SOLUTION INTRAVENOUS PRN
Status: DISCONTINUED | OUTPATIENT
Start: 2023-03-31 | End: 2023-04-03 | Stop reason: HOSPADM

## 2023-03-31 RX ORDER — SODIUM CHLORIDE 0.9 % (FLUSH) 0.9 %
5-40 SYRINGE (ML) INJECTION PRN
Status: DISCONTINUED | OUTPATIENT
Start: 2023-03-31 | End: 2023-04-03 | Stop reason: HOSPADM

## 2023-03-31 RX ORDER — SODIUM CHLORIDE 0.9 % (FLUSH) 0.9 %
5-40 SYRINGE (ML) INJECTION EVERY 12 HOURS SCHEDULED
Status: DISCONTINUED | OUTPATIENT
Start: 2023-03-31 | End: 2023-04-03 | Stop reason: HOSPADM

## 2023-03-31 RX ADMIN — IOHEXOL 77 ML: 350 INJECTION, SOLUTION INTRAVENOUS at 15:05

## 2023-03-31 NOTE — H&P
H&P Update    I have reviewed the history and physical and examined the patient and find no relevant changes.   I have reviewed with the patient and/or family the risks, benefits, and alternatives to the procedure.    Pre-sedation Assessment    Patient:  Julio C Gibson   :   1960  Intended Procedure:  Abdominal aortogram with lower extremity evaluation and possible intervention.      [facility] nurses notes reviewed and agreed.  Medications reviewed  Allergies:   Allergies   Allergen Reactions    Chantix [Varenicline] Other (See Comments)     Hallucinations           Pre-Procedure Assessment/Plan:  ASA 3 - Patient with moderate systemic disease with functional limitations  Malampati 3    Level of Sedation Plan:Mild sedation    Post Procedure plan: Return to same level of care      Signed:  Dev Chapin II, MD, 3/31/2023, 2:11 PM

## 2023-03-31 NOTE — OP NOTE
This was then exchanged for a 5 Trever Maid sheath. An omniflush catheter was positioned in the abdominal aorta at the level of the renal arteries and an abdominal aortogram was performed. It was pulled back to the level of the aortic bifurcation and a bilateral lower extremity runoff was then performed. The Omni Flush was then used to hook the aortic bifurcation. A stiff Glidewire is placed up and over the bifurcation into the left common femoral the Omni Flush was removed and a glide cath was placed up and over the bifurcation into the left common femoral artery for improved visualization of the tibial vessels. Selective angiograms were performed of the popliteal and tibials still with limited contrast opacification secondary to proximal occlusive disease. The procedure was then terminated. Manual pressure was held in his right groin he was transferred recovery room in stable condition    Abdominal Aortogram:    Right and left renal patent. Infrarenal abdominal aorta is patent. Bilateral common iliac external and internal iliac arteries are patent. There does appear to be a patent stent in the proximal right external iliac artery. Right lower extremity:    Right common femoral and profunda are widely patent. The native SFA popliteal and proximal tibial vessels are occluded. There is a widely patent bypass from the right common femoral to the posterior tibial artery with a single-vessel posterior tibial runoff into the right foot    Left lower extremity:    Left common femoral with evidence of previous endarterectomy. The profunda is patent. The native SFA popliteal and proximal tibial vessels are occluded. The left SFA to posterior tibial bypass is occluded. There is distal reconstitution of the posterior tibial and the distal calf.   The anterior tibial does appear to reconstitute in the more proximal calf there is limited contrast opacification of both the posterior tibial and anterior tibial

## 2023-03-31 NOTE — DISCHARGE INSTRUCTIONS
PERIPHERAL ANGIOGRAM    Care of your puncture site:  Remove bandage 24 hours after the procedure. May shower in 24 hours but do not sit in a bathtub/pool of water for 5 days or until the wound is healed. Inspect the site daily and gently clean using soap and water while standing in the shower. Dry thoroughly and apply a Band-Aid that covers the entire site. Do not apply powder or lotion. Normal Observations:  Soreness or tenderness which may last one week. Mild oozing from the incision site. Possible bruising that could last 2 weeks. Activity:  You may resume driving 24 hours following the procedure. You may resume normal activity in 5 days or after the wound heals. Avoid lifting more than 10 pounds for 5 days or until the wound heals. Avoid strenuous exercise or activity for 1 week. You may return to work in 2 days with above restrictions. Nutrition:  Regular diet. Drink at least 8 to 10 glasses of decaffeinated, non-alcoholic fluid for the next 24 hours to flush the x-ray dye used for your angiogram out of your body. Call your doctor immediately if your condition worsens, for any other concerns, for a follow-up appointment or if you experience any of the following:  Significant bleeding that does not stop after 10 minutes of applying firm pressure on the puncture site. Increased swelling on the groin or leg. Unusual pain, numbness, or tingling of the groin or down the leg. Any signs of infection such as: redness, yellow drainage at the site, swelling or pain. Other Instructions:  Hold Metformin or Metformin containing drugs for 48 hours after procedure.

## 2023-04-05 ENCOUNTER — HOSPITAL ENCOUNTER (OUTPATIENT)
Dept: WOUND CARE | Age: 63
Discharge: HOME | End: 2023-04-05
Payer: MEDICAID

## 2023-04-05 VITALS
SYSTOLIC BLOOD PRESSURE: 113 MMHG | HEART RATE: 87 BPM | TEMPERATURE: 97 F | RESPIRATION RATE: 16 BRPM | DIASTOLIC BLOOD PRESSURE: 69 MMHG

## 2023-04-05 DIAGNOSIS — I73.9 PERIPHERAL ARTERY DISEASE (HCC): Primary | ICD-10-CM

## 2023-04-05 DIAGNOSIS — M25.50 ARTHRALGIA, UNSPECIFIED JOINT: ICD-10-CM

## 2023-04-05 PROCEDURE — 11046 DBRDMT MUSC&/FSCA EA ADDL: CPT

## 2023-04-05 PROCEDURE — 11043 DBRDMT MUSC&/FSCA 1ST 20/<: CPT

## 2023-04-05 RX ORDER — LIDOCAINE HYDROCHLORIDE 40 MG/ML
SOLUTION TOPICAL ONCE
OUTPATIENT
Start: 2023-04-05 | End: 2023-04-05

## 2023-04-05 RX ORDER — LIDOCAINE 40 MG/G
CREAM TOPICAL ONCE
OUTPATIENT
Start: 2023-04-05 | End: 2023-04-05

## 2023-04-05 RX ORDER — LIDOCAINE HYDROCHLORIDE 20 MG/ML
JELLY TOPICAL ONCE
OUTPATIENT
Start: 2023-04-05 | End: 2023-04-05

## 2023-04-05 RX ORDER — LIDOCAINE 40 MG/G
CREAM TOPICAL ONCE
Status: DISCONTINUED | OUTPATIENT
Start: 2023-04-05 | End: 2023-04-06 | Stop reason: HOSPADM

## 2023-04-05 RX ORDER — GINSENG 100 MG
CAPSULE ORAL ONCE
OUTPATIENT
Start: 2023-04-05 | End: 2023-04-05

## 2023-04-05 RX ORDER — BACITRACIN ZINC AND POLYMYXIN B SULFATE 500; 1000 [USP'U]/G; [USP'U]/G
OINTMENT TOPICAL ONCE
OUTPATIENT
Start: 2023-04-05 | End: 2023-04-05

## 2023-04-05 RX ORDER — BACITRACIN, NEOMYCIN, POLYMYXIN B 400; 3.5; 5 [USP'U]/G; MG/G; [USP'U]/G
OINTMENT TOPICAL ONCE
OUTPATIENT
Start: 2023-04-05 | End: 2023-04-05

## 2023-04-05 RX ORDER — GENTAMICIN SULFATE 1 MG/G
OINTMENT TOPICAL ONCE
OUTPATIENT
Start: 2023-04-05 | End: 2023-04-05

## 2023-04-05 RX ORDER — LIDOCAINE 50 MG/G
OINTMENT TOPICAL ONCE
OUTPATIENT
Start: 2023-04-05 | End: 2023-04-05

## 2023-04-05 RX ORDER — CLOBETASOL PROPIONATE 0.5 MG/G
OINTMENT TOPICAL ONCE
OUTPATIENT
Start: 2023-04-05 | End: 2023-04-05

## 2023-04-05 RX ORDER — BETAMETHASONE DIPROPIONATE 0.05 %
OINTMENT (GRAM) TOPICAL ONCE
OUTPATIENT
Start: 2023-04-05 | End: 2023-04-05

## 2023-04-05 RX ORDER — GENTAMICIN SULFATE 1 MG/G
CREAM TOPICAL
Qty: 30 G | Refills: 1 | Status: SHIPPED | OUTPATIENT
Start: 2023-04-05

## 2023-04-05 NOTE — DISCHARGE INSTRUCTIONS
[address], [address]  Telephone: [phone]     Discharge Instructions     Important reminders:     **If you have any signs and symptoms of illness (Cough, fever, congestion, nausea, vomiting, diarrhea, etc.) please call the wound care center prior to your appointment. 1. Increase Protein intake for optimal wound healing  2. No added salt to reduce any swelling  3. If diabetic, maintain good glucose control  4. If you smoke, smoking prohibits wound healing, we ask that you refrain from smoking. 5. When taking antibiotics take the entire prescription as ordered. Do not stop taking until medication is all gone unless otherwise instructed. 6. Exercise as tolerated. 7. Keep weight off wounds and reposition every 2 hours if applicable. 8. If wound(s) is on your lower extremity, elevate legs to the level of the heart or above for 30 minutes 4-5 times a day and/or when sitting. Avoid standing for long periods of time. 9. Do not get wounds wet in bath or shower unless otherwise instructed by your physician. If your wound is on your foot or leg, you may purchase a cast bag. Please ask at the pharmacy. If Vascular testing is ordered, please call [address] [place] [address] [phone]) to schedule. Vascular tests ordered by Wound Care Physicians may take up to 2 hours to complete. Please keep that in mind when scheduling. If Vascular testing is scheduled, please bring supplies to replace your dressing after testing is done. The vascular department does not stock supplies. Wound: Right and Left leg     With each dressing change, rinse wounds with 0.9% Saline. (May use wound wash or soft contact solution. Both can be purchased at a local drug store). If unable to obtain saline, may use a gentle soap and water. Dressing care:  Left leg BOTH WOUNDS-   Gentamicin cream and triad  Foam and  1 tubigrip Try size G, Give pt an E.  Change every day

## 2023-04-05 NOTE — PROGRESS NOTES
DAILY AS NEEDED FOR PAIN 360 tablet 1    warfarin (COUMADIN) 5 MG tablet Take 2 tablets by mouth daily (10 mg), except take 3 tablets every Monday and Friday (15 mg).   OR as directed by the [facility]agulation [facility] [phone]. 70 tablet 3    levothyroxine (SYNTHROID) 25 MCG tablet Take 25 mcg by mouth Daily      melatonin 3 MG TABS tablet Take 2 tablets by mouth nightly 60 tablet 2    STIMULANT LAXATIVE 8.6-50 MG per tablet TAKE 1 TABLET BY MOUTH TWICE DAILY 60 tablet 0    atorvastatin (LIPITOR) 80 MG tablet TAKE 1 TABLET BY MOUTH DAILY 90 tablet 0    budesonide-formoterol (SYMBICORT) 160-4.5 MCG/ACT AERO Inhale 2 puffs into the lungs 2 times daily 10.2 g 2    formoterol (PERFOROMIST) 20 MCG/2ML nebulizer solution Take 2 mLs by nebulization every 12 hours 2 mL 5    budesonide (PULMICORT) 0.5 MG/2ML nebulizer suspension Take 2 mLs by nebulization 2 times daily 60 each 2    metoprolol succinate (TOPROL XL) 25 MG extended release tablet Take 25 mg by mouth daily      BANOPHEN 25 MG capsule Take 1 tablet by mouth every 6 hours as needed for Itching      ondansetron (ZOFRAN-ODT) 4 MG disintegrating tablet Take 4 mg by mouth every 8 hours as needed for Nausea or Vomiting      Multiple Vitamins-Minerals (MULTIVITAMIN ADULT EXTRA C PO) 1 tablet by Nasogastric route daily      esomeprazole (NEXIUM) 20 MG delayed release capsule 20 mg by Nasogastric route every morning (before breakfast)      oxyCODONE HCl (OXY-IR) 10 MG immediate release tablet TAKE 1 TABLET BY MOUTH EVERY SIX HOURS AS NEEDED FOR PAIN FOR UP TO 7 DAYS      becaplermin (REGRANEX) 0.01 % gel Apply 1 Applicatorful topically daily      calcium-vitamin D (OSCAL-500) 500-200 MG-UNIT per tablet Take 1 tablet by mouth 2 times daily      ipratropium-albuterol (DUONEB) 0.5-2.5 (3) MG/3ML SOLN nebulizer solution Inhale 3 mLs into the lungs 4 times daily 360 mL 3    fluticasone (FLONASE) 50 MCG/ACT nasal spray 1 spray by Nasal route daily 9.9 g 5

## 2023-04-05 NOTE — PLAN OF CARE
[address] [facility]:      [address] f: [phone] f: [phone] p: [phone] Eugene@"[email or web address]     Ordering Center: Meghan Deshpande [address] [place] [address]  [phone]  Dept: [phone]   Fax# [phone]    Patient Information:      Werner Solis   [phone]   : 1960  AGE: 59 y.o. GENDER: male   TODAYS DATE:  2023    Insurance:      PRIMARY INSURANCE:  Plan: [employer]ICA[employer]  Coverage: [employer] [place]ICA[place]  Effective Date: 2023  Group Number: [unfilled]  Subscriber Number: [id number] - (Medicaid Managed)    Payer/Plan Subscr  Sex Relation Sub. Ins. ID Effective Group Num   1. 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 9) E66.3    Tracheostomy dependent (HCC) Z93.0    Decubitus ulcer of heel, bilateral L89.619, L89.629    Larynx cancer (HCC) C32.9    S/P IVC filter Z95.828    Tinea pedis of right foot B35.3    Acquired hypothyroidism E03.9       WOUNDS REQUIRING DRESSING SUPPLIES:     Wound 06/24/22 Ankle Left;Medial #1 (Active)   Wound Image   23 1341   Wound Etiology

## 2023-04-06 ENCOUNTER — OFFICE VISIT (OUTPATIENT)
Dept: VASCULAR SURGERY | Age: 63
End: 2023-04-06
Payer: MEDICAID

## 2023-04-06 VITALS
HEIGHT: 68 IN | DIASTOLIC BLOOD PRESSURE: 78 MMHG | WEIGHT: 175 LBS | SYSTOLIC BLOOD PRESSURE: 132 MMHG | BODY MASS INDEX: 26.52 KG/M2

## 2023-04-06 DIAGNOSIS — I70.222 CRITICAL LIMB ISCHEMIA OF LEFT LOWER EXTREMITY (HCC): Primary | ICD-10-CM

## 2023-04-06 PROCEDURE — 99204 OFFICE O/P NEW MOD 45 MIN: CPT | Performed by: STUDENT IN AN ORGANIZED HEALTH CARE EDUCATION/TRAINING PROGRAM

## 2023-04-06 PROCEDURE — 3075F SYST BP GE 130 - 139MM HG: CPT | Performed by: STUDENT IN AN ORGANIZED HEALTH CARE EDUCATION/TRAINING PROGRAM

## 2023-04-06 PROCEDURE — 3078F DIAST BP <80 MM HG: CPT | Performed by: STUDENT IN AN ORGANIZED HEALTH CARE EDUCATION/TRAINING PROGRAM

## 2023-04-06 RX ORDER — ACETAMINOPHEN 500 MG
TABLET ORAL
Qty: 360 TABLET | Refills: 1 | OUTPATIENT
Start: 2023-04-06

## 2023-04-06 RX ORDER — OXYCODONE HYDROCHLORIDE AND ACETAMINOPHEN 5; 325 MG/1; MG/1
1 TABLET ORAL EVERY 6 HOURS PRN
Qty: 28 TABLET | Refills: 0 | Status: SHIPPED | OUTPATIENT
Start: 2023-04-06 | End: 2023-04-13

## 2023-04-06 NOTE — PROGRESS NOTES
continues to have severe daily pain. He denies chest pain or shortness of breath. He has history of laryngeal cancer s/p tracheostomy. Objective   Physical Exam  Neck:      Comments: Tracheostomy   Cardiovascular:      Rate and Rhythm: Normal rate and regular rhythm. Comments: Non palp pedal pulses  Pulmonary:      Effort: Pulmonary effort is normal. No respiratory distress. Musculoskeletal:      Right lower leg: Edema present. Left lower leg: Edema present. Skin:     General: Skin is warm and dry. Capillary Refill: Capillary refill takes 2 to 3 seconds. Comments: Medial/lateral ankle wounds   Neurological:      General: No focal deficit present. Mental Status: He is alert.           Shailesh Mcduffie, DO, FACS, FSVS, [address] [facility]

## 2023-04-07 ENCOUNTER — TELEPHONE (OUTPATIENT)
Dept: PHARMACY | Age: 63
End: 2023-04-07

## 2023-04-07 NOTE — TELEPHONE ENCOUNTER
Ruth Braulio called and reports he had an angiogram 4/3/23. Warfarin was held 5 days prior. Didn't start warfarin until 4/5/23. Canceled 4/7 appt and rescheduled for 4/17/23. Merline Sieving, PharmD, [address] [facility]  [phone]    For Pharmacy Admin Tracking Only    Intervention Detail:   Total # of Interventions Recommended:    Total # of Interventions Accepted:   Time Spent (min): 5

## 2023-04-12 ENCOUNTER — TELEPHONE (OUTPATIENT)
Dept: SURGERY | Age: 63
End: 2023-04-12

## 2023-04-17 ENCOUNTER — TELEPHONE (OUTPATIENT)
Dept: INTERNAL MEDICINE CLINIC | Age: 63
End: 2023-04-17

## 2023-04-17 DIAGNOSIS — I73.9 PAD (PERIPHERAL ARTERY DISEASE) (HCC): ICD-10-CM

## 2023-04-17 DIAGNOSIS — E78.00 PURE HYPERCHOLESTEROLEMIA: ICD-10-CM

## 2023-04-17 DIAGNOSIS — I10 ESSENTIAL HYPERTENSION: ICD-10-CM

## 2023-04-17 RX ORDER — DOCUSATE SODIUM 50 MG AND SENNOSIDES 8.6 MG 8.6; 5 MG/1; MG/1
TABLET, FILM COATED ORAL
Qty: 60 TABLET | Refills: 0 | Status: SHIPPED | OUTPATIENT
Start: 2023-04-17

## 2023-04-17 RX ORDER — ENOXAPARIN SODIUM 100 MG/ML
1 INJECTION SUBCUTANEOUS 2 TIMES DAILY
Qty: 5 EACH | Refills: 0 | Status: SHIPPED | OUTPATIENT
Start: 2023-04-17

## 2023-04-17 NOTE — TELEPHONE ENCOUNTER
Message on  to call back. Was able to get OR time on Wednesday 4-19-23 at 0930. Arrive to [facility] at 0800. NPO after midnight. Will be admitted after surgery.

## 2023-04-18 ENCOUNTER — OFFICE VISIT (OUTPATIENT)
Dept: INTERNAL MEDICINE CLINIC | Age: 63
End: 2023-04-18
Payer: MEDICAID

## 2023-04-18 ENCOUNTER — ANESTHESIA EVENT (OUTPATIENT)
Dept: OPERATING ROOM | Age: 63
End: 2023-04-18
Payer: MEDICAID

## 2023-04-18 VITALS
WEIGHT: 181 LBS | OXYGEN SATURATION: 100 % | DIASTOLIC BLOOD PRESSURE: 80 MMHG | HEART RATE: 86 BPM | SYSTOLIC BLOOD PRESSURE: 120 MMHG | BODY MASS INDEX: 27.52 KG/M2

## 2023-04-18 DIAGNOSIS — I73.9 PERIPHERAL ARTERY DISEASE (HCC): ICD-10-CM

## 2023-04-18 DIAGNOSIS — Z09 SURGERY FOLLOW-UP EXAMINATION: ICD-10-CM

## 2023-04-18 DIAGNOSIS — I10 ESSENTIAL HYPERTENSION: ICD-10-CM

## 2023-04-18 DIAGNOSIS — I70.222 ATHEROSCLEROSIS OF NATIVE ARTERIES OF EXTREMITIES WITH REST PAIN, LEFT LEG (HCC): Primary | ICD-10-CM

## 2023-04-18 DIAGNOSIS — E03.9 ACQUIRED HYPOTHYROIDISM: ICD-10-CM

## 2023-04-18 DIAGNOSIS — E11.69 TYPE 2 DIABETES MELLITUS WITH OTHER SPECIFIED COMPLICATION, WITHOUT LONG-TERM CURRENT USE OF INSULIN (HCC): ICD-10-CM

## 2023-04-18 LAB
INTERNATIONAL NORMALIZATION RATIO, POC: 3.3
PROTHROMBIN TIME, POC: 0

## 2023-04-18 PROCEDURE — 99214 OFFICE O/P EST MOD 30 MIN: CPT | Performed by: INTERNAL MEDICINE

## 2023-04-18 PROCEDURE — 3044F HG A1C LEVEL LT 7.0%: CPT | Performed by: INTERNAL MEDICINE

## 2023-04-18 PROCEDURE — 3079F DIAST BP 80-89 MM HG: CPT | Performed by: INTERNAL MEDICINE

## 2023-04-18 PROCEDURE — 3074F SYST BP LT 130 MM HG: CPT | Performed by: INTERNAL MEDICINE

## 2023-04-18 PROCEDURE — 85610 PROTHROMBIN TIME: CPT | Performed by: INTERNAL MEDICINE

## 2023-04-18 RX ORDER — BLOOD-GLUCOSE METER
1 KIT MISCELLANEOUS DAILY
Qty: 1 KIT | Refills: 0 | Status: SHIPPED | OUTPATIENT
Start: 2023-04-18

## 2023-04-18 RX ORDER — ATORVASTATIN CALCIUM 80 MG/1
TABLET, FILM COATED ORAL
Qty: 90 TABLET | Refills: 0 | Status: SHIPPED | OUTPATIENT
Start: 2023-04-18

## 2023-04-18 RX ORDER — LISINOPRIL 20 MG/1
TABLET ORAL
Qty: 30 TABLET | Refills: 0 | Status: SHIPPED | OUTPATIENT
Start: 2023-04-18

## 2023-04-18 ASSESSMENT — ENCOUNTER SYMPTOMS
WHEEZING: 0
COUGH: 0
SHORTNESS OF BREATH: 0
SINUS PAIN: 0
ABDOMINAL PAIN: 0
BLOOD IN STOOL: 0
COLOR CHANGE: 0
CHEST TIGHTNESS: 0
CONSTIPATION: 0

## 2023-04-18 NOTE — PROGRESS NOTES
Sourav Dumas (:  1960) is a 59 y.o. male,Established patient, here for evaluation of the following chief complaint(s):  Pre-op Exam (Popliteal bypass. Inr 3.3 today)         ASSESSMENT/PLAN:  1. Atherosclerosis of native arteries of extremities with rest pain, left leg ([address]  2. Surgery follow-up examination  -     POCT INR  3. Essential hypertension  4. Type 2 diabetes mellitus with other specified complication, without long-term current use of insulin ([address]  5. Peripheral artery disease ([address]  6. Acquired hypothyroidism    At this point from a cardiovascular standpoint patient seems to be doing fairly well unfortunately his INR is quite elevated at 3.3 will get in contact his surgeon's office and inform of that his surgery is scheduled for tomorrow and I do not think that can be reversed that quickly unless fresh frozen plasma is used which would make it very difficult for the warfarin to become effective again for at least anywhere between a week to 3 weeks    The rest of his blood test seems appropriate    Patient blood pressure is well controlled continue continue the same medication for the time being    Should surgeon elect to go with the fresh frozen plasma then he would be cleared for surgery once his INR is below 1.5  No follow-ups on file.          Subjective   SUBJECTIVE/OBJECTIVE:    Lab Review   Lab Results   Component Value Date/Time     2023 02:10 PM     2022 01:39 PM     2022 08:48 AM    K 4.1 2023 02:10 PM    K 4.9 2022 01:39 PM    K 4.0 2022 08:48 AM    K 3.6 2022 05:25 AM    K 3.8 2022 05:00 AM    CO2 20 2023 02:10 PM    CO2 25 2022 01:39 PM    CO2 23 2022 08:48 AM    BUN 25 2023 02:10 PM    BUN 12 2022 01:39 PM    BUN 8 2022 08:48 AM    CREATININE 0.8 2023 02:10 PM    CREATININE 0.6 2022 01:39 PM    CREATININE <0.5 2022 08:48 AM    GLUCOSE 96 2023 02:10 PM

## 2023-04-18 NOTE — TELEPHONE ENCOUNTER
INR 3.3 today. As per Dr Rosy Espinal, surgery cancelled on 4-19-23. Pt to continue Lovenox injections Q12 hours until surgery on 4-25-23. NPO after midnight. Arrive to [facility] at 0600 for surgery at 0730. Pt verbalized understanding.

## 2023-04-19 ENCOUNTER — ANESTHESIA (OUTPATIENT)
Dept: OPERATING ROOM | Age: 63
End: 2023-04-19
Payer: MEDICAID

## 2023-04-20 DIAGNOSIS — E11.9 TYPE 2 DIABETES MELLITUS WITHOUT COMPLICATION, WITHOUT LONG-TERM CURRENT USE OF INSULIN (HCC): ICD-10-CM

## 2023-04-20 DIAGNOSIS — L29.9 ITCHING: ICD-10-CM

## 2023-04-20 RX ORDER — CETIRIZINE HYDROCHLORIDE 10 MG/1
10 TABLET ORAL DAILY
Qty: 30 TABLET | Refills: 0 | Status: SHIPPED | OUTPATIENT
Start: 2023-04-20 | End: 2023-05-20

## 2023-04-20 NOTE — TELEPHONE ENCOUNTER
Last OV: 2/8/2023  Next OV: 8/9/2023    Next appointment due:6 months (around 8/8/2023).     Last fill:3/21/23  Refills: 60 w/0

## 2023-04-21 RX ORDER — BUDESONIDE 0.5 MG/2ML
INHALANT ORAL
Qty: 120 ML | Refills: 0 | Status: SHIPPED | OUTPATIENT
Start: 2023-04-21

## 2023-04-24 RX ORDER — ENOXAPARIN SODIUM 100 MG/ML
INJECTION SUBCUTANEOUS
Qty: 4 ML | Refills: 0 | Status: ON HOLD | OUTPATIENT
Start: 2023-04-24 | End: 2023-05-01

## 2023-04-25 ENCOUNTER — HOSPITAL ENCOUNTER (INPATIENT)
Age: 63
LOS: 8 days | Discharge: NURSING, REHAB OR LONG-TERM CARE | DRG: 181 | End: 2023-05-03
Attending: STUDENT IN AN ORGANIZED HEALTH CARE EDUCATION/TRAINING PROGRAM | Admitting: STUDENT IN AN ORGANIZED HEALTH CARE EDUCATION/TRAINING PROGRAM
Payer: MEDICAID

## 2023-04-25 DIAGNOSIS — I70.229 CRITICAL LOWER LIMB ISCHEMIA (HCC): Primary | ICD-10-CM

## 2023-04-25 PROBLEM — I70.222 CRITICAL LIMB ISCHEMIA OF LEFT LOWER EXTREMITY (HCC): Status: ACTIVE | Noted: 2023-04-25

## 2023-04-25 LAB
ANION GAP SERPL CALCULATED.3IONS-SCNC: 13 MMOL/L (ref 3–16)
APTT BLD: 30.7 SEC (ref 22.7–35.9)
APTT BLD: 46.4 SEC (ref 22.7–35.9)
BUN SERPL-MCNC: 16 MG/DL (ref 7–20)
CALCIUM SERPL-MCNC: 9.6 MG/DL (ref 8.3–10.6)
CHLORIDE SERPL-SCNC: 106 MMOL/L (ref 99–110)
CO2 SERPL-SCNC: 22 MMOL/L (ref 21–32)
CREAT SERPL-MCNC: 0.8 MG/DL (ref 0.8–1.3)
DEPRECATED RDW RBC AUTO: 16.3 % (ref 12.4–15.4)
DEPRECATED RDW RBC AUTO: 16.4 % (ref 12.4–15.4)
GFR SERPLBLD CREATININE-BSD FMLA CKD-EPI: >60 ML/MIN/{1.73_M2}
GLUCOSE BLD-MCNC: 119 MG/DL (ref 70–99)
GLUCOSE BLD-MCNC: 134 MG/DL (ref 70–99)
GLUCOSE SERPL-MCNC: 114 MG/DL (ref 70–99)
HCT VFR BLD AUTO: 23.5 % (ref 40.5–52.5)
HCT VFR BLD AUTO: 31.9 % (ref 40.5–52.5)
HGB BLD-MCNC: 10.3 G/DL (ref 13.5–17.5)
HGB BLD-MCNC: 7.6 G/DL (ref 13.5–17.5)
INR PPP: 1.06 (ref 0.84–1.16)
MCH RBC QN AUTO: 28.8 PG (ref 26–34)
MCH RBC QN AUTO: 29.2 PG (ref 26–34)
MCHC RBC AUTO-ENTMCNC: 32.2 G/DL (ref 31–36)
MCHC RBC AUTO-ENTMCNC: 32.4 G/DL (ref 31–36)
MCV RBC AUTO: 89.6 FL (ref 80–100)
MCV RBC AUTO: 90 FL (ref 80–100)
PERFORMED ON: ABNORMAL
PERFORMED ON: ABNORMAL
PLATELET # BLD AUTO: 232 K/UL (ref 135–450)
PLATELET # BLD AUTO: 272 K/UL (ref 135–450)
PMV BLD AUTO: 7.5 FL (ref 5–10.5)
PMV BLD AUTO: 7.6 FL (ref 5–10.5)
POTASSIUM SERPL-SCNC: 4 MMOL/L (ref 3.5–5.1)
PROTHROMBIN TIME: 13.8 SEC (ref 11.5–14.8)
RBC # BLD AUTO: 2.61 M/UL (ref 4.2–5.9)
RBC # BLD AUTO: 3.56 M/UL (ref 4.2–5.9)
SODIUM SERPL-SCNC: 141 MMOL/L (ref 136–145)
WBC # BLD AUTO: 3.1 K/UL (ref 4–11)
WBC # BLD AUTO: 7.5 K/UL (ref 4–11)

## 2023-04-25 PROCEDURE — 94761 N-INVAS EAR/PLS OXIMETRY MLT: CPT

## 2023-04-25 PROCEDURE — 2580000003 HC RX 258

## 2023-04-25 PROCEDURE — 6370000000 HC RX 637 (ALT 250 FOR IP): Performed by: STUDENT IN AN ORGANIZED HEALTH CARE EDUCATION/TRAINING PROGRAM

## 2023-04-25 PROCEDURE — 6360000002 HC RX W HCPCS

## 2023-04-25 PROCEDURE — 3700000000 HC ANESTHESIA ATTENDED CARE: Performed by: STUDENT IN AN ORGANIZED HEALTH CARE EDUCATION/TRAINING PROGRAM

## 2023-04-25 PROCEDURE — 6360000002 HC RX W HCPCS: Performed by: STUDENT IN AN ORGANIZED HEALTH CARE EDUCATION/TRAINING PROGRAM

## 2023-04-25 PROCEDURE — 2500000003 HC RX 250 WO HCPCS

## 2023-04-25 PROCEDURE — 6360000002 HC RX W HCPCS: Performed by: ANESTHESIOLOGY

## 2023-04-25 PROCEDURE — 2000000000 HC ICU R&B

## 2023-04-25 PROCEDURE — 80048 BASIC METABOLIC PNL TOTAL CA: CPT

## 2023-04-25 PROCEDURE — 2709999900 HC NON-CHARGEABLE SUPPLY: Performed by: STUDENT IN AN ORGANIZED HEALTH CARE EDUCATION/TRAINING PROGRAM

## 2023-04-25 PROCEDURE — 2580000003 HC RX 258: Performed by: STUDENT IN AN ORGANIZED HEALTH CARE EDUCATION/TRAINING PROGRAM

## 2023-04-25 PROCEDURE — 83036 HEMOGLOBIN GLYCOSYLATED A1C: CPT

## 2023-04-25 PROCEDURE — 85610 PROTHROMBIN TIME: CPT

## 2023-04-25 PROCEDURE — 2720000010 HC SURG SUPPLY STERILE: Performed by: STUDENT IN AN ORGANIZED HEALTH CARE EDUCATION/TRAINING PROGRAM

## 2023-04-25 PROCEDURE — 94640 AIRWAY INHALATION TREATMENT: CPT

## 2023-04-25 PROCEDURE — 041L09M BYPASS LEFT FEMORAL ARTERY TO PERONEAL ARTERY WITH AUTOLOGOUS VENOUS TISSUE, OPEN APPROACH: ICD-10-PCS | Performed by: STUDENT IN AN ORGANIZED HEALTH CARE EDUCATION/TRAINING PROGRAM

## 2023-04-25 PROCEDURE — 3600000015 HC SURGERY LEVEL 5 ADDTL 15MIN: Performed by: STUDENT IN AN ORGANIZED HEALTH CARE EDUCATION/TRAINING PROGRAM

## 2023-04-25 PROCEDURE — 85027 COMPLETE CBC AUTOMATED: CPT

## 2023-04-25 PROCEDURE — 7100000001 HC PACU RECOVERY - ADDTL 15 MIN

## 2023-04-25 PROCEDURE — 7100000000 HC PACU RECOVERY - FIRST 15 MIN

## 2023-04-25 PROCEDURE — 85730 THROMBOPLASTIN TIME PARTIAL: CPT

## 2023-04-25 PROCEDURE — 3700000001 HC ADD 15 MINUTES (ANESTHESIA): Performed by: STUDENT IN AN ORGANIZED HEALTH CARE EDUCATION/TRAINING PROGRAM

## 2023-04-25 PROCEDURE — P9045 ALBUMIN (HUMAN), 5%, 250 ML: HCPCS

## 2023-04-25 PROCEDURE — 2580000003 HC RX 258: Performed by: ANESTHESIOLOGY

## 2023-04-25 PROCEDURE — 0Q8K0ZZ DIVISION OF LEFT FIBULA, OPEN APPROACH: ICD-10-PCS | Performed by: STUDENT IN AN ORGANIZED HEALTH CARE EDUCATION/TRAINING PROGRAM

## 2023-04-25 PROCEDURE — 3600000005 HC SURGERY LEVEL 5 BASE: Performed by: STUDENT IN AN ORGANIZED HEALTH CARE EDUCATION/TRAINING PROGRAM

## 2023-04-25 PROCEDURE — 06BP0ZZ EXCISION OF RIGHT SAPHENOUS VEIN, OPEN APPROACH: ICD-10-PCS | Performed by: STUDENT IN AN ORGANIZED HEALTH CARE EDUCATION/TRAINING PROGRAM

## 2023-04-25 PROCEDURE — 06BQ0ZZ EXCISION OF LEFT SAPHENOUS VEIN, OPEN APPROACH: ICD-10-PCS | Performed by: STUDENT IN AN ORGANIZED HEALTH CARE EDUCATION/TRAINING PROGRAM

## 2023-04-25 PROCEDURE — A4217 STERILE WATER/SALINE, 500 ML: HCPCS | Performed by: STUDENT IN AN ORGANIZED HEALTH CARE EDUCATION/TRAINING PROGRAM

## 2023-04-25 PROCEDURE — 2700000000 HC OXYGEN THERAPY PER DAY

## 2023-04-25 RX ORDER — LIDOCAINE HYDROCHLORIDE 20 MG/ML
INJECTION, SOLUTION EPIDURAL; INFILTRATION; INTRACAUDAL; PERINEURAL PRN
Status: DISCONTINUED | OUTPATIENT
Start: 2023-04-25 | End: 2023-04-25 | Stop reason: SDUPTHER

## 2023-04-25 RX ORDER — HEPARIN SODIUM 1000 [USP'U]/ML
60 INJECTION, SOLUTION INTRAVENOUS; SUBCUTANEOUS PRN
Status: DISCONTINUED | OUTPATIENT
Start: 2023-04-25 | End: 2023-04-25

## 2023-04-25 RX ORDER — ONDANSETRON 2 MG/ML
4 INJECTION INTRAMUSCULAR; INTRAVENOUS
Status: DISCONTINUED | OUTPATIENT
Start: 2023-04-25 | End: 2023-04-25

## 2023-04-25 RX ORDER — LEVOTHYROXINE SODIUM 0.03 MG/1
25 TABLET ORAL DAILY
Status: DISCONTINUED | OUTPATIENT
Start: 2023-04-25 | End: 2023-04-25 | Stop reason: DRUGHIGH

## 2023-04-25 RX ORDER — ESMOLOL HYDROCHLORIDE 10 MG/ML
INJECTION INTRAVENOUS PRN
Status: DISCONTINUED | OUTPATIENT
Start: 2023-04-25 | End: 2023-04-25 | Stop reason: SDUPTHER

## 2023-04-25 RX ORDER — FENTANYL CITRATE 50 UG/ML
INJECTION, SOLUTION INTRAMUSCULAR; INTRAVENOUS PRN
Status: DISCONTINUED | OUTPATIENT
Start: 2023-04-25 | End: 2023-04-25 | Stop reason: SDUPTHER

## 2023-04-25 RX ORDER — ENOXAPARIN SODIUM 100 MG/ML
40 INJECTION SUBCUTANEOUS ONCE
Status: DISCONTINUED | OUTPATIENT
Start: 2023-04-25 | End: 2023-04-25

## 2023-04-25 RX ORDER — ALBUMIN, HUMAN INJ 5% 5 %
SOLUTION INTRAVENOUS PRN
Status: DISCONTINUED | OUTPATIENT
Start: 2023-04-25 | End: 2023-04-25 | Stop reason: SDUPTHER

## 2023-04-25 RX ORDER — FENTANYL CITRATE 50 UG/ML
25 INJECTION, SOLUTION INTRAMUSCULAR; INTRAVENOUS EVERY 5 MIN PRN
Status: DISCONTINUED | OUTPATIENT
Start: 2023-04-25 | End: 2023-04-25

## 2023-04-25 RX ORDER — ONDANSETRON 2 MG/ML
4 INJECTION INTRAMUSCULAR; INTRAVENOUS EVERY 6 HOURS PRN
Status: DISCONTINUED | OUTPATIENT
Start: 2023-04-25 | End: 2023-05-03 | Stop reason: HOSPADM

## 2023-04-25 RX ORDER — SODIUM CHLORIDE 9 MG/ML
INJECTION, SOLUTION INTRAVENOUS PRN
Status: DISCONTINUED | OUTPATIENT
Start: 2023-04-25 | End: 2023-05-03 | Stop reason: HOSPADM

## 2023-04-25 RX ORDER — SODIUM CHLORIDE 0.9 % (FLUSH) 0.9 %
5-40 SYRINGE (ML) INJECTION PRN
Status: DISCONTINUED | OUTPATIENT
Start: 2023-04-25 | End: 2023-04-27

## 2023-04-25 RX ORDER — PANTOPRAZOLE SODIUM 40 MG/1
40 TABLET, DELAYED RELEASE ORAL
Status: DISCONTINUED | OUTPATIENT
Start: 2023-04-26 | End: 2023-05-03 | Stop reason: HOSPADM

## 2023-04-25 RX ORDER — SODIUM CHLORIDE 9 MG/ML
INJECTION, SOLUTION INTRAVENOUS PRN
Status: DISCONTINUED | OUTPATIENT
Start: 2023-04-25 | End: 2023-04-25 | Stop reason: HOSPADM

## 2023-04-25 RX ORDER — SODIUM CHLORIDE 0.9 % (FLUSH) 0.9 %
5-40 SYRINGE (ML) INJECTION EVERY 12 HOURS SCHEDULED
Status: DISCONTINUED | OUTPATIENT
Start: 2023-04-25 | End: 2023-05-03 | Stop reason: HOSPADM

## 2023-04-25 RX ORDER — ONDANSETRON 2 MG/ML
INJECTION INTRAMUSCULAR; INTRAVENOUS PRN
Status: DISCONTINUED | OUTPATIENT
Start: 2023-04-25 | End: 2023-04-25 | Stop reason: SDUPTHER

## 2023-04-25 RX ORDER — DEXMEDETOMIDINE HYDROCHLORIDE 100 UG/ML
INJECTION, SOLUTION INTRAVENOUS PRN
Status: DISCONTINUED | OUTPATIENT
Start: 2023-04-25 | End: 2023-04-25 | Stop reason: SDUPTHER

## 2023-04-25 RX ORDER — IPRATROPIUM BROMIDE AND ALBUTEROL SULFATE 2.5; .5 MG/3ML; MG/3ML
3 SOLUTION RESPIRATORY (INHALATION) 4 TIMES DAILY
Status: DISCONTINUED | OUTPATIENT
Start: 2023-04-25 | End: 2023-05-02

## 2023-04-25 RX ORDER — LEVOTHYROXINE SODIUM 0.05 MG/1
50 TABLET ORAL DAILY
Status: DISCONTINUED | OUTPATIENT
Start: 2023-04-26 | End: 2023-05-03 | Stop reason: HOSPADM

## 2023-04-25 RX ORDER — OXYCODONE HYDROCHLORIDE 10 MG/1
10 TABLET ORAL EVERY 4 HOURS PRN
Status: DISCONTINUED | OUTPATIENT
Start: 2023-04-25 | End: 2023-05-03 | Stop reason: HOSPADM

## 2023-04-25 RX ORDER — BUDESONIDE 0.5 MG/2ML
0.5 INHALANT ORAL 2 TIMES DAILY
Status: DISCONTINUED | OUTPATIENT
Start: 2023-04-25 | End: 2023-04-25 | Stop reason: SDUPTHER

## 2023-04-25 RX ORDER — FENTANYL CITRATE 50 UG/ML
50 INJECTION, SOLUTION INTRAMUSCULAR; INTRAVENOUS EVERY 5 MIN PRN
Status: DISCONTINUED | OUTPATIENT
Start: 2023-04-25 | End: 2023-04-25

## 2023-04-25 RX ORDER — HEPARIN SODIUM 1000 [USP'U]/ML
30 INJECTION, SOLUTION INTRAVENOUS; SUBCUTANEOUS PRN
Status: DISCONTINUED | OUTPATIENT
Start: 2023-04-25 | End: 2023-04-25

## 2023-04-25 RX ORDER — IPRATROPIUM BROMIDE 42 UG/1
2 SPRAY, METERED NASAL 4 TIMES DAILY
Status: DISCONTINUED | OUTPATIENT
Start: 2023-04-25 | End: 2023-05-03 | Stop reason: HOSPADM

## 2023-04-25 RX ORDER — SODIUM CHLORIDE 0.9 % (FLUSH) 0.9 %
5-40 SYRINGE (ML) INJECTION PRN
Status: DISCONTINUED | OUTPATIENT
Start: 2023-04-25 | End: 2023-05-03 | Stop reason: HOSPADM

## 2023-04-25 RX ORDER — SODIUM CHLORIDE 0.9 % (FLUSH) 0.9 %
5-40 SYRINGE (ML) INJECTION EVERY 12 HOURS SCHEDULED
Status: DISCONTINUED | OUTPATIENT
Start: 2023-04-25 | End: 2023-04-25 | Stop reason: HOSPADM

## 2023-04-25 RX ORDER — LISINOPRIL 20 MG/1
20 TABLET ORAL DAILY
Status: DISCONTINUED | OUTPATIENT
Start: 2023-04-26 | End: 2023-05-03 | Stop reason: HOSPADM

## 2023-04-25 RX ORDER — ATORVASTATIN CALCIUM 80 MG/1
80 TABLET, FILM COATED ORAL DAILY
Status: DISCONTINUED | OUTPATIENT
Start: 2023-04-25 | End: 2023-05-03 | Stop reason: HOSPADM

## 2023-04-25 RX ORDER — ROCURONIUM BROMIDE 10 MG/ML
INJECTION, SOLUTION INTRAVENOUS PRN
Status: DISCONTINUED | OUTPATIENT
Start: 2023-04-25 | End: 2023-04-25 | Stop reason: SDUPTHER

## 2023-04-25 RX ORDER — LANOLIN ALCOHOL/MO/W.PET/CERES
6 CREAM (GRAM) TOPICAL NIGHTLY
Status: DISCONTINUED | OUTPATIENT
Start: 2023-04-25 | End: 2023-05-03 | Stop reason: HOSPADM

## 2023-04-25 RX ORDER — BUDESONIDE 0.5 MG/2ML
0.5 INHALANT ORAL 2 TIMES DAILY
Status: DISCONTINUED | OUTPATIENT
Start: 2023-04-25 | End: 2023-05-03 | Stop reason: HOSPADM

## 2023-04-25 RX ORDER — CETIRIZINE HYDROCHLORIDE 10 MG/1
10 TABLET ORAL DAILY
Status: DISCONTINUED | OUTPATIENT
Start: 2023-04-25 | End: 2023-05-03 | Stop reason: HOSPADM

## 2023-04-25 RX ORDER — MIDAZOLAM HYDROCHLORIDE 1 MG/ML
INJECTION INTRAMUSCULAR; INTRAVENOUS PRN
Status: DISCONTINUED | OUTPATIENT
Start: 2023-04-25 | End: 2023-04-25 | Stop reason: SDUPTHER

## 2023-04-25 RX ORDER — HEPARIN SODIUM 10000 [USP'U]/ML
INJECTION, SOLUTION INTRAVENOUS; SUBCUTANEOUS PRN
Status: DISCONTINUED | OUTPATIENT
Start: 2023-04-25 | End: 2023-04-25 | Stop reason: SDUPTHER

## 2023-04-25 RX ORDER — MEPERIDINE HYDROCHLORIDE 25 MG/ML
12.5 INJECTION INTRAMUSCULAR; INTRAVENOUS; SUBCUTANEOUS
Status: DISCONTINUED | OUTPATIENT
Start: 2023-04-25 | End: 2023-04-25

## 2023-04-25 RX ORDER — PHENYLEPHRINE HCL IN 0.9% NACL 1 MG/10 ML
SYRINGE (ML) INTRAVENOUS PRN
Status: DISCONTINUED | OUTPATIENT
Start: 2023-04-25 | End: 2023-04-25 | Stop reason: SDUPTHER

## 2023-04-25 RX ORDER — ONDANSETRON 4 MG/1
4 TABLET, ORALLY DISINTEGRATING ORAL EVERY 8 HOURS PRN
Status: DISCONTINUED | OUTPATIENT
Start: 2023-04-25 | End: 2023-05-03 | Stop reason: HOSPADM

## 2023-04-25 RX ORDER — DEXAMETHASONE SODIUM PHOSPHATE 4 MG/ML
INJECTION, SOLUTION INTRA-ARTICULAR; INTRALESIONAL; INTRAMUSCULAR; INTRAVENOUS; SOFT TISSUE PRN
Status: DISCONTINUED | OUTPATIENT
Start: 2023-04-25 | End: 2023-04-25 | Stop reason: SDUPTHER

## 2023-04-25 RX ORDER — SODIUM CHLORIDE 9 MG/ML
INJECTION, SOLUTION INTRAVENOUS PRN
Status: DISCONTINUED | OUTPATIENT
Start: 2023-04-25 | End: 2023-04-27

## 2023-04-25 RX ORDER — SODIUM CHLORIDE 9 MG/ML
INJECTION, SOLUTION INTRAVENOUS PRN
Status: DISCONTINUED | OUTPATIENT
Start: 2023-04-25 | End: 2023-04-25 | Stop reason: SDUPTHER

## 2023-04-25 RX ORDER — INSULIN LISPRO 100 [IU]/ML
0-4 INJECTION, SOLUTION INTRAVENOUS; SUBCUTANEOUS NIGHTLY
Status: DISCONTINUED | OUTPATIENT
Start: 2023-04-25 | End: 2023-05-03 | Stop reason: HOSPADM

## 2023-04-25 RX ORDER — DEXTROSE MONOHYDRATE 100 MG/ML
INJECTION, SOLUTION INTRAVENOUS CONTINUOUS PRN
Status: DISCONTINUED | OUTPATIENT
Start: 2023-04-25 | End: 2023-05-03 | Stop reason: HOSPADM

## 2023-04-25 RX ORDER — METOPROLOL SUCCINATE 25 MG/1
25 TABLET, EXTENDED RELEASE ORAL DAILY
Status: DISCONTINUED | OUTPATIENT
Start: 2023-04-25 | End: 2023-05-03 | Stop reason: HOSPADM

## 2023-04-25 RX ORDER — PROPOFOL 10 MG/ML
INJECTION, EMULSION INTRAVENOUS PRN
Status: DISCONTINUED | OUTPATIENT
Start: 2023-04-25 | End: 2023-04-25 | Stop reason: SDUPTHER

## 2023-04-25 RX ORDER — OXYCODONE HYDROCHLORIDE 5 MG/1
5 TABLET ORAL EVERY 4 HOURS PRN
Status: DISCONTINUED | OUTPATIENT
Start: 2023-04-25 | End: 2023-05-03 | Stop reason: HOSPADM

## 2023-04-25 RX ORDER — SODIUM CHLORIDE 0.9 % (FLUSH) 0.9 %
5-40 SYRINGE (ML) INJECTION EVERY 12 HOURS SCHEDULED
Status: DISCONTINUED | OUTPATIENT
Start: 2023-04-25 | End: 2023-04-25 | Stop reason: SDUPTHER

## 2023-04-25 RX ORDER — SODIUM CHLORIDE 9 MG/ML
INJECTION, SOLUTION INTRAVENOUS CONTINUOUS
Status: DISCONTINUED | OUTPATIENT
Start: 2023-04-25 | End: 2023-04-27

## 2023-04-25 RX ORDER — SODIUM CHLORIDE, SODIUM LACTATE, POTASSIUM CHLORIDE, CALCIUM CHLORIDE 600; 310; 30; 20 MG/100ML; MG/100ML; MG/100ML; MG/100ML
INJECTION, SOLUTION INTRAVENOUS CONTINUOUS PRN
Status: DISCONTINUED | OUTPATIENT
Start: 2023-04-25 | End: 2023-04-25 | Stop reason: SDUPTHER

## 2023-04-25 RX ORDER — GLYCOPYRROLATE 0.2 MG/ML
INJECTION INTRAMUSCULAR; INTRAVENOUS PRN
Status: DISCONTINUED | OUTPATIENT
Start: 2023-04-25 | End: 2023-04-25 | Stop reason: SDUPTHER

## 2023-04-25 RX ORDER — METOPROLOL TARTRATE 5 MG/5ML
INJECTION INTRAVENOUS PRN
Status: DISCONTINUED | OUTPATIENT
Start: 2023-04-25 | End: 2023-04-25 | Stop reason: SDUPTHER

## 2023-04-25 RX ORDER — SODIUM CHLORIDE 0.9 % (FLUSH) 0.9 %
5-40 SYRINGE (ML) INJECTION PRN
Status: DISCONTINUED | OUTPATIENT
Start: 2023-04-25 | End: 2023-04-25 | Stop reason: HOSPADM

## 2023-04-25 RX ORDER — HEPARIN SODIUM 10000 [USP'U]/100ML
0-4000 INJECTION, SOLUTION INTRAVENOUS CONTINUOUS
Status: DISPENSED | OUTPATIENT
Start: 2023-04-25 | End: 2023-04-27

## 2023-04-25 RX ORDER — INSULIN LISPRO 100 [IU]/ML
0-8 INJECTION, SOLUTION INTRAVENOUS; SUBCUTANEOUS
Status: DISCONTINUED | OUTPATIENT
Start: 2023-04-25 | End: 2023-05-03 | Stop reason: HOSPADM

## 2023-04-25 RX ORDER — SODIUM CHLORIDE 0.9 % (FLUSH) 0.9 %
5-40 SYRINGE (ML) INJECTION PRN
Status: DISCONTINUED | OUTPATIENT
Start: 2023-04-25 | End: 2023-04-25 | Stop reason: SDUPTHER

## 2023-04-25 RX ORDER — PROTAMINE SULFATE 10 MG/ML
INJECTION, SOLUTION INTRAVENOUS PRN
Status: DISCONTINUED | OUTPATIENT
Start: 2023-04-25 | End: 2023-04-25 | Stop reason: SDUPTHER

## 2023-04-25 RX ADMIN — Medication 100 MCG: at 12:19

## 2023-04-25 RX ADMIN — DEXMEDETOMIDINE HYDROCHLORIDE 4 MCG: 100 INJECTION, SOLUTION INTRAVENOUS at 15:09

## 2023-04-25 RX ADMIN — ESMOLOL HYDROCHLORIDE 25 MG: 100 INJECTION, SOLUTION INTRAVENOUS at 09:12

## 2023-04-25 RX ADMIN — ALBUMIN (HUMAN) 12.5 G: 12.5 SOLUTION INTRAVENOUS at 13:03

## 2023-04-25 RX ADMIN — FENTANYL CITRATE 50 MCG: 50 INJECTION, SOLUTION INTRAMUSCULAR; INTRAVENOUS at 16:31

## 2023-04-25 RX ADMIN — Medication 200 MCG: at 14:04

## 2023-04-25 RX ADMIN — SODIUM CHLORIDE: 9 INJECTION, SOLUTION INTRAVENOUS at 17:25

## 2023-04-25 RX ADMIN — ESMOLOL HYDROCHLORIDE 25 MG: 100 INJECTION, SOLUTION INTRAVENOUS at 08:40

## 2023-04-25 RX ADMIN — CEFAZOLIN 2000 MG: 2 INJECTION, POWDER, FOR SOLUTION INTRAMUSCULAR; INTRAVENOUS at 11:53

## 2023-04-25 RX ADMIN — FENTANYL CITRATE 25 MCG: 50 INJECTION INTRAMUSCULAR; INTRAVENOUS at 11:56

## 2023-04-25 RX ADMIN — HYDROMORPHONE HYDROCHLORIDE 0.25 MG: 1 INJECTION, SOLUTION INTRAMUSCULAR; INTRAVENOUS; SUBCUTANEOUS at 09:43

## 2023-04-25 RX ADMIN — HYDROMORPHONE HYDROCHLORIDE 0.25 MG: 1 INJECTION, SOLUTION INTRAMUSCULAR; INTRAVENOUS; SUBCUTANEOUS at 08:18

## 2023-04-25 RX ADMIN — Medication 100 MCG: at 10:28

## 2023-04-25 RX ADMIN — SODIUM CHLORIDE: 9 INJECTION, SOLUTION INTRAVENOUS at 07:43

## 2023-04-25 RX ADMIN — SODIUM CHLORIDE: 9 INJECTION, SOLUTION INTRAVENOUS at 09:49

## 2023-04-25 RX ADMIN — Medication 100 MCG: at 13:56

## 2023-04-25 RX ADMIN — CEFAZOLIN 2000 MG: 2 INJECTION, POWDER, FOR SOLUTION INTRAMUSCULAR; INTRAVENOUS at 20:31

## 2023-04-25 RX ADMIN — Medication 200 MCG: at 15:17

## 2023-04-25 RX ADMIN — FENTANYL CITRATE 25 MCG: 50 INJECTION INTRAMUSCULAR; INTRAVENOUS at 10:48

## 2023-04-25 RX ADMIN — Medication 200 MCG: at 15:48

## 2023-04-25 RX ADMIN — PROPOFOL 150 MG: 10 INJECTION, EMULSION INTRAVENOUS at 07:48

## 2023-04-25 RX ADMIN — FENTANYL CITRATE 25 MCG: 50 INJECTION, SOLUTION INTRAMUSCULAR; INTRAVENOUS at 16:48

## 2023-04-25 RX ADMIN — Medication 100 MCG: at 12:09

## 2023-04-25 RX ADMIN — Medication 200 MCG: at 15:39

## 2023-04-25 RX ADMIN — METOPROLOL TARTRATE 2.5 MG: 1 INJECTION, SOLUTION INTRAVENOUS at 09:39

## 2023-04-25 RX ADMIN — Medication 200 MCG: at 13:49

## 2023-04-25 RX ADMIN — Medication 200 MCG: at 15:52

## 2023-04-25 RX ADMIN — DEXMEDETOMIDINE HYDROCHLORIDE 6 MCG: 100 INJECTION, SOLUTION INTRAVENOUS at 15:11

## 2023-04-25 RX ADMIN — METOPROLOL SUCCINATE 25 MG: 25 TABLET, EXTENDED RELEASE ORAL at 20:32

## 2023-04-25 RX ADMIN — MIDAZOLAM 2 MG: 1 INJECTION INTRAMUSCULAR; INTRAVENOUS at 07:43

## 2023-04-25 RX ADMIN — SODIUM CHLORIDE, SODIUM LACTATE, POTASSIUM CHLORIDE, AND CALCIUM CHLORIDE: .6; .31; .03; .02 INJECTION, SOLUTION INTRAVENOUS at 13:55

## 2023-04-25 RX ADMIN — Medication 150 MCG: at 08:08

## 2023-04-25 RX ADMIN — BUDESONIDE 500 MCG: 0.5 INHALANT RESPIRATORY (INHALATION) at 21:06

## 2023-04-25 RX ADMIN — Medication 100 MCG: at 12:22

## 2023-04-25 RX ADMIN — Medication 150 MCG: at 08:00

## 2023-04-25 RX ADMIN — Medication 50 MCG: at 07:56

## 2023-04-25 RX ADMIN — Medication 100 MCG: at 13:51

## 2023-04-25 RX ADMIN — Medication 100 MCG: at 10:40

## 2023-04-25 RX ADMIN — FENTANYL CITRATE 50 MCG: 50 INJECTION INTRAMUSCULAR; INTRAVENOUS at 10:12

## 2023-04-25 RX ADMIN — PROTAMINE SULFATE 20 MG: 10 INJECTION, SOLUTION INTRAVENOUS at 14:17

## 2023-04-25 RX ADMIN — CEFAZOLIN 2000 MG: 2 INJECTION, POWDER, FOR SOLUTION INTRAMUSCULAR; INTRAVENOUS at 07:54

## 2023-04-25 RX ADMIN — Medication 200 MCG: at 15:55

## 2023-04-25 RX ADMIN — ROCURONIUM BROMIDE 50 MG: 10 INJECTION, SOLUTION INTRAVENOUS at 07:53

## 2023-04-25 RX ADMIN — OXYCODONE HYDROCHLORIDE 10 MG: 10 TABLET ORAL at 21:11

## 2023-04-25 RX ADMIN — HYDROMORPHONE HYDROCHLORIDE 0.25 MG: 1 INJECTION, SOLUTION INTRAMUSCULAR; INTRAVENOUS; SUBCUTANEOUS at 08:55

## 2023-04-25 RX ADMIN — Medication 200 MCG: at 11:44

## 2023-04-25 RX ADMIN — IPRATROPIUM BROMIDE AND ALBUTEROL SULFATE 3 ML: 2.5; .5 SOLUTION RESPIRATORY (INHALATION) at 20:57

## 2023-04-25 RX ADMIN — Medication 200 MCG: at 14:13

## 2023-04-25 RX ADMIN — CETIRIZINE HYDROCHLORIDE 10 MG: 10 TABLET, FILM COATED ORAL at 20:32

## 2023-04-25 RX ADMIN — Medication 150 MCG: at 08:11

## 2023-04-25 RX ADMIN — PROPOFOL 30 MG: 10 INJECTION, EMULSION INTRAVENOUS at 15:12

## 2023-04-25 RX ADMIN — ONDANSETRON 4 MG: 2 INJECTION INTRAMUSCULAR; INTRAVENOUS at 07:54

## 2023-04-25 RX ADMIN — Medication 100 MCG: at 10:54

## 2023-04-25 RX ADMIN — FENTANYL CITRATE 50 MCG: 50 INJECTION INTRAMUSCULAR; INTRAVENOUS at 08:26

## 2023-04-25 RX ADMIN — Medication 100 MCG: at 08:05

## 2023-04-25 RX ADMIN — Medication 200 MCG: at 15:58

## 2023-04-25 RX ADMIN — SUGAMMADEX 200 MG: 100 INJECTION, SOLUTION INTRAVENOUS at 15:24

## 2023-04-25 RX ADMIN — FENTANYL CITRATE 50 MCG: 50 INJECTION INTRAMUSCULAR; INTRAVENOUS at 07:53

## 2023-04-25 RX ADMIN — Medication 100 MCG: at 10:32

## 2023-04-25 RX ADMIN — HYDROMORPHONE HYDROCHLORIDE 0.25 MG: 1 INJECTION, SOLUTION INTRAMUSCULAR; INTRAVENOUS; SUBCUTANEOUS at 12:45

## 2023-04-25 RX ADMIN — HYDROMORPHONE HYDROCHLORIDE 0.25 MG: 1 INJECTION, SOLUTION INTRAMUSCULAR; INTRAVENOUS; SUBCUTANEOUS at 09:09

## 2023-04-25 RX ADMIN — GLYCOPYRROLATE 0.2 MG: 0.2 INJECTION INTRAMUSCULAR; INTRAVENOUS at 08:00

## 2023-04-25 RX ADMIN — LIDOCAINE HYDROCHLORIDE 60 MG: 20 INJECTION, SOLUTION EPIDURAL; INFILTRATION; INTRACAUDAL; PERINEURAL at 07:48

## 2023-04-25 RX ADMIN — ROCURONIUM BROMIDE 30 MG: 10 INJECTION, SOLUTION INTRAVENOUS at 08:36

## 2023-04-25 RX ADMIN — Medication 6 MG: at 22:11

## 2023-04-25 RX ADMIN — HYDROMORPHONE HYDROCHLORIDE 0.25 MG: 1 INJECTION, SOLUTION INTRAMUSCULAR; INTRAVENOUS; SUBCUTANEOUS at 14:20

## 2023-04-25 RX ADMIN — HEPARIN SODIUM 5000 UNITS: 10000 INJECTION INTRAVENOUS; SUBCUTANEOUS at 11:53

## 2023-04-25 RX ADMIN — Medication 100 MCG: at 10:49

## 2023-04-25 RX ADMIN — HEPARIN SODIUM 970 UNITS/HR: 10000 INJECTION, SOLUTION INTRAVENOUS at 21:29

## 2023-04-25 RX ADMIN — Medication 200 MCG: at 15:22

## 2023-04-25 RX ADMIN — FENTANYL CITRATE 25 MCG: 50 INJECTION, SOLUTION INTRAMUSCULAR; INTRAVENOUS at 17:02

## 2023-04-25 RX ADMIN — DEXAMETHASONE SODIUM PHOSPHATE 4 MG: 4 INJECTION, SOLUTION INTRAMUSCULAR; INTRAVENOUS at 07:54

## 2023-04-25 RX ADMIN — Medication 200 MCG: at 14:17

## 2023-04-25 RX ADMIN — HYDROMORPHONE HYDROCHLORIDE 0.5 MG: 1 INJECTION, SOLUTION INTRAMUSCULAR; INTRAVENOUS; SUBCUTANEOUS at 14:27

## 2023-04-25 ASSESSMENT — PAIN DESCRIPTION - DESCRIPTORS
DESCRIPTORS: PATIENT UNABLE TO DESCRIBE
DESCRIPTORS: ACHING
DESCRIPTORS: ACHING;DISCOMFORT;SHOOTING;SHARP

## 2023-04-25 ASSESSMENT — PAIN - FUNCTIONAL ASSESSMENT
PAIN_FUNCTIONAL_ASSESSMENT: PREVENTS OR INTERFERES WITH ALL ACTIVE AND SOME PASSIVE ACTIVITIES
PAIN_FUNCTIONAL_ASSESSMENT: PREVENTS OR INTERFERES SOME ACTIVE ACTIVITIES AND ADLS
PAIN_FUNCTIONAL_ASSESSMENT: 0-10

## 2023-04-25 ASSESSMENT — PAIN DESCRIPTION - ORIENTATION
ORIENTATION: LEFT

## 2023-04-25 ASSESSMENT — PAIN DESCRIPTION - LOCATION
LOCATION: LEG

## 2023-04-25 ASSESSMENT — ENCOUNTER SYMPTOMS: SHORTNESS OF BREATH: 0

## 2023-04-25 ASSESSMENT — PAIN DESCRIPTION - PAIN TYPE
TYPE: SURGICAL PAIN
TYPE: ACUTE PAIN;SURGICAL PAIN

## 2023-04-25 ASSESSMENT — PAIN DESCRIPTION - ONSET
ONSET: ON-GOING
ONSET: ON-GOING

## 2023-04-25 ASSESSMENT — PAIN SCALES - GENERAL
PAINLEVEL_OUTOF10: 7
PAINLEVEL_OUTOF10: 9
PAINLEVEL_OUTOF10: 6
PAINLEVEL_OUTOF10: 9
PAINLEVEL_OUTOF10: 6

## 2023-04-25 ASSESSMENT — PAIN DESCRIPTION - FREQUENCY
FREQUENCY: CONTINUOUS
FREQUENCY: CONTINUOUS

## 2023-04-25 NOTE — ANESTHESIA PRE PROCEDURE
ventricle is normal in size and function. TAPSE: 2.55 cm. RV s' velocity: 17.2 cm/s. Tricuspid Valve   The tricuspid valve is normal in structure and function. There is no   significant tricuspid valve regurgitation or stenosis. Right Atrium   The right atrial size is normal.      Pulmonic Valve   The pulmonic valve is not well visualized. There is no evidence of pulmonic valve regurgitation or stenosis. Pericardial Effusion   No pericardial effusion noted. Pleural Effusion   No pleural effusion. Miscellaneous   The inferior vena cava appears normal in size with normal respiratory   variation. Neuro/Psych:   (+) neuromuscular disease:,    (-) seizures, TIA and CVA           GI/Hepatic/Renal:        (-) GERD, PUD, liver disease and no renal disease       Endo/Other:    (+) DiabetesType II DM, , hypothyroidism::., .                 Abdominal:             Vascular:   + PVD, aortic or cerebral, DVT, . Other Findings:             Anesthesia Plan      general     ASA 4     (Medical history and informed consent taken from wife Jae Nayak by phone.)  Induction: intravenous. MIPS: Postoperative opioids intended and Prophylactic antiemetics administered. Anesthetic plan and risks discussed with spouse. Plan discussed with CRNA.                     Malka Austin MD   4/25/2023

## 2023-04-25 NOTE — ANESTHESIA POSTPROCEDURE EVALUATION
Department of Anesthesiology  Postprocedure Note    Patient: Cynthia Mcgowan  MRN: [id number]  YOB: 1960  Date of evaluation: 4/25/2023      Procedure Summary     Date: 04/25/23 Room / Location: [facility] OR [address] [place] [address]    Anesthesia Start: 0384 Anesthesia Stop: 5789    Procedure: LEFT FEMORAL PERINEAL ARTERY BYPASS WITH SPLICED COMPOSITE SAPHENOUS VEIN GRAFT WITH PARTIAL FIBIALECTOMY (Left: Leg Lower) Diagnosis:       Critical limb ischemia of left lower extremity (HCC)      (CRITICAL LIMB ISCHEMIA OF LEFT LOWER EXTREMITY)    Surgeons: Shailesh Mcduffie DO Responsible Provider: Jr Benito MD    Anesthesia Type: General ASA Status: 4          Anesthesia Type: General    Andrew Phase I: Andrew Score: 10    Andrew Phase II:        Anesthesia Post Evaluation    Patient location during evaluation: ICU  Patient participation: complete - patient participated  Level of consciousness: awake  Airway patency: patent  Nausea & Vomiting: no nausea and no vomiting  Complications: no  Cardiovascular status: hemodynamically stable  Respiratory status: acceptable (non-rebreather mask to trach )  Hydration status: stable  Comments: Care transferred to ICU RN. Patient noted to have tingling in right fingers. Motor intact. Doppler Radial pulse. Will monitor.

## 2023-04-26 LAB
ABO + RH BLD: NORMAL
ANION GAP SERPL CALCULATED.3IONS-SCNC: 10 MMOL/L (ref 3–16)
ANTI-XA UNFRAC HEPARIN: 0.64 IU/ML (ref 0.3–0.7)
ANTI-XA UNFRAC HEPARIN: 0.73 IU/ML (ref 0.3–0.7)
ANTI-XA UNFRAC HEPARIN: 0.78 IU/ML (ref 0.3–0.7)
APTT BLD: 53.1 SEC (ref 22.7–35.9)
BLD GP AB SCN SERPL QL: NORMAL
BLOOD BANK DISPENSE STATUS: NORMAL
BLOOD BANK PRODUCT CODE: NORMAL
BPU ID: NORMAL
BUN SERPL-MCNC: 19 MG/DL (ref 7–20)
CALCIUM SERPL-MCNC: 7.7 MG/DL (ref 8.3–10.6)
CHLORIDE SERPL-SCNC: 110 MMOL/L (ref 99–110)
CO2 SERPL-SCNC: 20 MMOL/L (ref 21–32)
CREAT SERPL-MCNC: 1 MG/DL (ref 0.8–1.3)
DEPRECATED RDW RBC AUTO: 16.7 % (ref 12.4–15.4)
DESCRIPTION BLOOD BANK: NORMAL
EST. AVERAGE GLUCOSE BLD GHB EST-MCNC: 122.6 MG/DL
GFR SERPLBLD CREATININE-BSD FMLA CKD-EPI: >60 ML/MIN/{1.73_M2}
GLUCOSE BLD-MCNC: 131 MG/DL (ref 70–99)
GLUCOSE BLD-MCNC: 137 MG/DL (ref 70–99)
GLUCOSE BLD-MCNC: 142 MG/DL (ref 70–99)
GLUCOSE BLD-MCNC: 98 MG/DL (ref 70–99)
GLUCOSE SERPL-MCNC: 139 MG/DL (ref 70–99)
HBA1C MFR BLD: 5.9 %
HCT VFR BLD AUTO: 18.3 % (ref 40.5–52.5)
HCT VFR BLD AUTO: 20.1 % (ref 40.5–52.5)
HGB BLD-MCNC: 6 G/DL (ref 13.5–17.5)
HGB BLD-MCNC: 6.5 G/DL (ref 13.5–17.5)
MCH RBC QN AUTO: 29.8 PG (ref 26–34)
MCHC RBC AUTO-ENTMCNC: 32.5 G/DL (ref 31–36)
MCV RBC AUTO: 91.7 FL (ref 80–100)
PERFORMED ON: ABNORMAL
PERFORMED ON: NORMAL
PLATELET # BLD AUTO: 191 K/UL (ref 135–450)
PMV BLD AUTO: 7.9 FL (ref 5–10.5)
POTASSIUM SERPL-SCNC: 4.4 MMOL/L (ref 3.5–5.1)
RBC # BLD AUTO: 2 M/UL (ref 4.2–5.9)
SODIUM SERPL-SCNC: 140 MMOL/L (ref 136–145)
WBC # BLD AUTO: 4.5 K/UL (ref 4–11)

## 2023-04-26 PROCEDURE — 85520 HEPARIN ASSAY: CPT

## 2023-04-26 PROCEDURE — 97530 THERAPEUTIC ACTIVITIES: CPT

## 2023-04-26 PROCEDURE — 6370000000 HC RX 637 (ALT 250 FOR IP): Performed by: STUDENT IN AN ORGANIZED HEALTH CARE EDUCATION/TRAINING PROGRAM

## 2023-04-26 PROCEDURE — 80048 BASIC METABOLIC PNL TOTAL CA: CPT

## 2023-04-26 PROCEDURE — 36430 TRANSFUSION BLD/BLD COMPNT: CPT

## 2023-04-26 PROCEDURE — P9047 ALBUMIN (HUMAN), 25%, 50ML: HCPCS | Performed by: STUDENT IN AN ORGANIZED HEALTH CARE EDUCATION/TRAINING PROGRAM

## 2023-04-26 PROCEDURE — 2580000003 HC RX 258: Performed by: STUDENT IN AN ORGANIZED HEALTH CARE EDUCATION/TRAINING PROGRAM

## 2023-04-26 PROCEDURE — 86850 RBC ANTIBODY SCREEN: CPT

## 2023-04-26 PROCEDURE — 85014 HEMATOCRIT: CPT

## 2023-04-26 PROCEDURE — 94760 N-INVAS EAR/PLS OXIMETRY 1: CPT

## 2023-04-26 PROCEDURE — 86900 BLOOD TYPING SEROLOGIC ABO: CPT

## 2023-04-26 PROCEDURE — 99254 IP/OBS CNSLTJ NEW/EST MOD 60: CPT | Performed by: INTERNAL MEDICINE

## 2023-04-26 PROCEDURE — 97162 PT EVAL MOD COMPLEX 30 MIN: CPT

## 2023-04-26 PROCEDURE — 2700000000 HC OXYGEN THERAPY PER DAY

## 2023-04-26 PROCEDURE — 86901 BLOOD TYPING SEROLOGIC RH(D): CPT

## 2023-04-26 PROCEDURE — P9016 RBC LEUKOCYTES REDUCED: HCPCS

## 2023-04-26 PROCEDURE — 85027 COMPLETE CBC AUTOMATED: CPT

## 2023-04-26 PROCEDURE — APPNB15 APP NON BILLABLE TIME 0-15 MINS: Performed by: NURSE PRACTITIONER

## 2023-04-26 PROCEDURE — 6370000000 HC RX 637 (ALT 250 FOR IP): Performed by: NURSE PRACTITIONER

## 2023-04-26 PROCEDURE — 86923 COMPATIBILITY TEST ELECTRIC: CPT

## 2023-04-26 PROCEDURE — 85018 HEMOGLOBIN: CPT

## 2023-04-26 PROCEDURE — 2580000003 HC RX 258: Performed by: NURSE PRACTITIONER

## 2023-04-26 PROCEDURE — 94640 AIRWAY INHALATION TREATMENT: CPT

## 2023-04-26 PROCEDURE — 2000000000 HC ICU R&B

## 2023-04-26 PROCEDURE — 6360000002 HC RX W HCPCS: Performed by: STUDENT IN AN ORGANIZED HEALTH CARE EDUCATION/TRAINING PROGRAM

## 2023-04-26 PROCEDURE — 6360000002 HC RX W HCPCS: Performed by: NURSE PRACTITIONER

## 2023-04-26 RX ORDER — ALBUMIN (HUMAN) 12.5 G/50ML
25 SOLUTION INTRAVENOUS ONCE
Status: COMPLETED | OUTPATIENT
Start: 2023-04-26 | End: 2023-04-26

## 2023-04-26 RX ORDER — SODIUM CHLORIDE 9 MG/ML
INJECTION, SOLUTION INTRAVENOUS PRN
Status: DISCONTINUED | OUTPATIENT
Start: 2023-04-26 | End: 2023-04-27

## 2023-04-26 RX ORDER — ACETAMINOPHEN 325 MG/1
650 TABLET ORAL EVERY 4 HOURS PRN
Status: DISCONTINUED | OUTPATIENT
Start: 2023-04-26 | End: 2023-05-03 | Stop reason: HOSPADM

## 2023-04-26 RX ORDER — FUROSEMIDE 10 MG/ML
20 INJECTION INTRAMUSCULAR; INTRAVENOUS
Status: COMPLETED | OUTPATIENT
Start: 2023-04-26 | End: 2023-04-26

## 2023-04-26 RX ADMIN — IPRATROPIUM BROMIDE AND ALBUTEROL SULFATE 3 ML: 2.5; .5 SOLUTION RESPIRATORY (INHALATION) at 07:44

## 2023-04-26 RX ADMIN — LEVOTHYROXINE SODIUM 50 MCG: 0.05 TABLET ORAL at 05:44

## 2023-04-26 RX ADMIN — BUDESONIDE 500 MCG: 0.5 INHALANT RESPIRATORY (INHALATION) at 07:44

## 2023-04-26 RX ADMIN — HEPARIN SODIUM 820 UNITS/HR: 10000 INJECTION, SOLUTION INTRAVENOUS at 22:00

## 2023-04-26 RX ADMIN — Medication 6 MG: at 22:01

## 2023-04-26 RX ADMIN — OXYCODONE HYDROCHLORIDE 10 MG: 10 TABLET ORAL at 21:25

## 2023-04-26 RX ADMIN — ACETAMINOPHEN 650 MG: 325 TABLET ORAL at 18:03

## 2023-04-26 RX ADMIN — SODIUM CHLORIDE: 9 INJECTION, SOLUTION INTRAVENOUS at 18:18

## 2023-04-26 RX ADMIN — Medication 10 ML: at 22:02

## 2023-04-26 RX ADMIN — SODIUM CHLORIDE: 9 INJECTION, SOLUTION INTRAVENOUS at 21:55

## 2023-04-26 RX ADMIN — CEFAZOLIN 2000 MG: 2 INJECTION, POWDER, FOR SOLUTION INTRAMUSCULAR; INTRAVENOUS at 03:57

## 2023-04-26 RX ADMIN — BUDESONIDE 500 MCG: 0.5 INHALANT RESPIRATORY (INHALATION) at 20:27

## 2023-04-26 RX ADMIN — OXYCODONE HYDROCHLORIDE 10 MG: 10 TABLET ORAL at 12:36

## 2023-04-26 RX ADMIN — OXYCODONE HYDROCHLORIDE 10 MG: 10 TABLET ORAL at 02:21

## 2023-04-26 RX ADMIN — IPRATROPIUM BROMIDE AND ALBUTEROL SULFATE 3 ML: 2.5; .5 SOLUTION RESPIRATORY (INHALATION) at 20:27

## 2023-04-26 RX ADMIN — ALBUMIN (HUMAN) 25 G: 0.25 INJECTION, SOLUTION INTRAVENOUS at 22:52

## 2023-04-26 RX ADMIN — LISINOPRIL 20 MG: 20 TABLET ORAL at 08:39

## 2023-04-26 RX ADMIN — IPRATROPIUM BROMIDE 2 SPRAY: 42 SPRAY, METERED NASAL at 16:53

## 2023-04-26 RX ADMIN — IPRATROPIUM BROMIDE 2 SPRAY: 42 SPRAY, METERED NASAL at 21:15

## 2023-04-26 RX ADMIN — IPRATROPIUM BROMIDE AND ALBUTEROL SULFATE 3 ML: 2.5; .5 SOLUTION RESPIRATORY (INHALATION) at 16:21

## 2023-04-26 RX ADMIN — IPRATROPIUM BROMIDE AND ALBUTEROL SULFATE 3 ML: 2.5; .5 SOLUTION RESPIRATORY (INHALATION) at 11:56

## 2023-04-26 RX ADMIN — CETIRIZINE HYDROCHLORIDE 10 MG: 10 TABLET, FILM COATED ORAL at 08:39

## 2023-04-26 RX ADMIN — SODIUM CHLORIDE: 9 INJECTION, SOLUTION INTRAVENOUS at 04:43

## 2023-04-26 RX ADMIN — IPRATROPIUM BROMIDE 2 SPRAY: 42 SPRAY, METERED NASAL at 13:44

## 2023-04-26 RX ADMIN — METOPROLOL SUCCINATE 25 MG: 25 TABLET, EXTENDED RELEASE ORAL at 08:39

## 2023-04-26 RX ADMIN — OXYCODONE HYDROCHLORIDE 10 MG: 10 TABLET ORAL at 08:39

## 2023-04-26 RX ADMIN — FUROSEMIDE 20 MG: 10 INJECTION, SOLUTION INTRAMUSCULAR; INTRAVENOUS at 18:04

## 2023-04-26 RX ADMIN — ATORVASTATIN CALCIUM 80 MG: 80 TABLET, FILM COATED ORAL at 08:39

## 2023-04-26 RX ADMIN — PANTOPRAZOLE SODIUM 40 MG: 40 TABLET, DELAYED RELEASE ORAL at 05:44

## 2023-04-26 ASSESSMENT — PAIN DESCRIPTION - LOCATION
LOCATION: INCISION;LEG
LOCATION: BACK;LEG
LOCATION: LEG

## 2023-04-26 ASSESSMENT — PAIN SCALES - GENERAL
PAINLEVEL_OUTOF10: 7
PAINLEVEL_OUTOF10: 5
PAINLEVEL_OUTOF10: 5
PAINLEVEL_OUTOF10: 0
PAINLEVEL_OUTOF10: 5

## 2023-04-26 ASSESSMENT — PAIN DESCRIPTION - PAIN TYPE
TYPE: ACUTE PAIN;SURGICAL PAIN
TYPE: SURGICAL PAIN

## 2023-04-26 ASSESSMENT — PAIN DESCRIPTION - FREQUENCY
FREQUENCY: CONTINUOUS
FREQUENCY: CONTINUOUS

## 2023-04-26 ASSESSMENT — PAIN DESCRIPTION - ONSET
ONSET: ON-GOING
ONSET: ON-GOING

## 2023-04-26 ASSESSMENT — PAIN DESCRIPTION - DESCRIPTORS
DESCRIPTORS: ACHING;DISCOMFORT
DESCRIPTORS: THROBBING
DESCRIPTORS: ACHING;DISCOMFORT

## 2023-04-26 ASSESSMENT — PAIN DESCRIPTION - ORIENTATION
ORIENTATION: LEFT
ORIENTATION: RIGHT;LEFT
ORIENTATION: LEFT

## 2023-04-26 ASSESSMENT — PAIN - FUNCTIONAL ASSESSMENT
PAIN_FUNCTIONAL_ASSESSMENT: PREVENTS OR INTERFERES SOME ACTIVE ACTIVITIES AND ADLS
PAIN_FUNCTIONAL_ASSESSMENT: PREVENTS OR INTERFERES WITH ALL ACTIVE AND SOME PASSIVE ACTIVITIES

## 2023-04-27 LAB
ANTI-XA UNFRAC HEPARIN: 0.22 IU/ML (ref 0.3–0.7)
BLOOD BANK DISPENSE STATUS: NORMAL
BLOOD BANK DISPENSE STATUS: NORMAL
BLOOD BANK PRODUCT CODE: NORMAL
BLOOD BANK PRODUCT CODE: NORMAL
BPU ID: NORMAL
BPU ID: NORMAL
DEPRECATED RDW RBC AUTO: 15.3 % (ref 12.4–15.4)
DESCRIPTION BLOOD BANK: NORMAL
DESCRIPTION BLOOD BANK: NORMAL
GLUCOSE BLD-MCNC: 123 MG/DL (ref 70–99)
GLUCOSE BLD-MCNC: 139 MG/DL (ref 70–99)
GLUCOSE BLD-MCNC: 149 MG/DL (ref 70–99)
GLUCOSE BLD-MCNC: 94 MG/DL (ref 70–99)
HCT VFR BLD AUTO: 16.8 % (ref 40.5–52.5)
HCT VFR BLD AUTO: 26.2 % (ref 40.5–52.5)
HGB BLD-MCNC: 5.6 G/DL (ref 13.5–17.5)
HGB BLD-MCNC: 9.1 G/DL (ref 13.5–17.5)
INR PPP: 1.26 (ref 0.84–1.16)
MCH RBC QN AUTO: 30.1 PG (ref 26–34)
MCHC RBC AUTO-ENTMCNC: 33.5 G/DL (ref 31–36)
MCV RBC AUTO: 89.8 FL (ref 80–100)
PERFORMED ON: ABNORMAL
PERFORMED ON: NORMAL
PLATELET # BLD AUTO: 172 K/UL (ref 135–450)
PMV BLD AUTO: 7.6 FL (ref 5–10.5)
PROTHROMBIN TIME: 15.8 SEC (ref 11.5–14.8)
RBC # BLD AUTO: 1.87 M/UL (ref 4.2–5.9)
WBC # BLD AUTO: 4.1 K/UL (ref 4–11)

## 2023-04-27 PROCEDURE — 6370000000 HC RX 637 (ALT 250 FOR IP): Performed by: STUDENT IN AN ORGANIZED HEALTH CARE EDUCATION/TRAINING PROGRAM

## 2023-04-27 PROCEDURE — 85027 COMPLETE CBC AUTOMATED: CPT

## 2023-04-27 PROCEDURE — 97110 THERAPEUTIC EXERCISES: CPT

## 2023-04-27 PROCEDURE — 85014 HEMATOCRIT: CPT

## 2023-04-27 PROCEDURE — 2580000003 HC RX 258: Performed by: STUDENT IN AN ORGANIZED HEALTH CARE EDUCATION/TRAINING PROGRAM

## 2023-04-27 PROCEDURE — 2700000000 HC OXYGEN THERAPY PER DAY

## 2023-04-27 PROCEDURE — 94640 AIRWAY INHALATION TREATMENT: CPT

## 2023-04-27 PROCEDURE — 85520 HEPARIN ASSAY: CPT

## 2023-04-27 PROCEDURE — 85018 HEMOGLOBIN: CPT

## 2023-04-27 PROCEDURE — 97530 THERAPEUTIC ACTIVITIES: CPT

## 2023-04-27 PROCEDURE — 36415 COLL VENOUS BLD VENIPUNCTURE: CPT

## 2023-04-27 PROCEDURE — 94760 N-INVAS EAR/PLS OXIMETRY 1: CPT

## 2023-04-27 PROCEDURE — 36591 DRAW BLOOD OFF VENOUS DEVICE: CPT

## 2023-04-27 PROCEDURE — 2580000003 HC RX 258: Performed by: ANESTHESIOLOGY

## 2023-04-27 PROCEDURE — 36430 TRANSFUSION BLD/BLD COMPNT: CPT

## 2023-04-27 PROCEDURE — 2000000000 HC ICU R&B

## 2023-04-27 PROCEDURE — 97166 OT EVAL MOD COMPLEX 45 MIN: CPT

## 2023-04-27 PROCEDURE — 6360000002 HC RX W HCPCS: Performed by: NURSE PRACTITIONER

## 2023-04-27 PROCEDURE — 85610 PROTHROMBIN TIME: CPT

## 2023-04-27 PROCEDURE — 30233N1 TRANSFUSION OF NONAUTOLOGOUS RED BLOOD CELLS INTO PERIPHERAL VEIN, PERCUTANEOUS APPROACH: ICD-10-PCS | Performed by: STUDENT IN AN ORGANIZED HEALTH CARE EDUCATION/TRAINING PROGRAM

## 2023-04-27 PROCEDURE — APPNB30 APP NON BILLABLE TIME 0-30 MINS: Performed by: NURSE PRACTITIONER

## 2023-04-27 RX ORDER — FUROSEMIDE 10 MG/ML
20 INJECTION INTRAMUSCULAR; INTRAVENOUS PRN
Status: DISCONTINUED | OUTPATIENT
Start: 2023-04-27 | End: 2023-05-01

## 2023-04-27 RX ORDER — SODIUM CHLORIDE 9 MG/ML
INJECTION, SOLUTION INTRAVENOUS PRN
Status: DISCONTINUED | OUTPATIENT
Start: 2023-04-27 | End: 2023-05-03

## 2023-04-27 RX ORDER — WARFARIN SODIUM 5 MG/1
10 TABLET ORAL
Status: COMPLETED | OUTPATIENT
Start: 2023-04-27 | End: 2023-04-27

## 2023-04-27 RX ORDER — HEPARIN SODIUM 1000 [USP'U]/ML
2000 INJECTION, SOLUTION INTRAVENOUS; SUBCUTANEOUS ONCE
Status: DISCONTINUED | OUTPATIENT
Start: 2023-04-27 | End: 2023-04-27 | Stop reason: DRUGHIGH

## 2023-04-27 RX ORDER — ENOXAPARIN SODIUM 100 MG/ML
1 INJECTION SUBCUTANEOUS 2 TIMES DAILY
Status: DISCONTINUED | OUTPATIENT
Start: 2023-04-27 | End: 2023-05-02

## 2023-04-27 RX ADMIN — IPRATROPIUM BROMIDE AND ALBUTEROL SULFATE 3 ML: 2.5; .5 SOLUTION RESPIRATORY (INHALATION) at 08:22

## 2023-04-27 RX ADMIN — WARFARIN SODIUM 10 MG: 5 TABLET ORAL at 17:59

## 2023-04-27 RX ADMIN — PANTOPRAZOLE SODIUM 40 MG: 40 TABLET, DELAYED RELEASE ORAL at 08:34

## 2023-04-27 RX ADMIN — BUDESONIDE 500 MCG: 0.5 INHALANT RESPIRATORY (INHALATION) at 19:36

## 2023-04-27 RX ADMIN — IPRATROPIUM BROMIDE AND ALBUTEROL SULFATE 3 ML: 2.5; .5 SOLUTION RESPIRATORY (INHALATION) at 19:36

## 2023-04-27 RX ADMIN — BUDESONIDE 500 MCG: 0.5 INHALANT RESPIRATORY (INHALATION) at 08:22

## 2023-04-27 RX ADMIN — ENOXAPARIN SODIUM 80 MG: 100 INJECTION SUBCUTANEOUS at 20:37

## 2023-04-27 RX ADMIN — LEVOTHYROXINE SODIUM 50 MCG: 0.05 TABLET ORAL at 08:34

## 2023-04-27 RX ADMIN — OXYCODONE HYDROCHLORIDE 10 MG: 10 TABLET ORAL at 19:45

## 2023-04-27 RX ADMIN — CETIRIZINE HYDROCHLORIDE 10 MG: 10 TABLET, FILM COATED ORAL at 08:34

## 2023-04-27 RX ADMIN — OXYCODONE HYDROCHLORIDE 10 MG: 10 TABLET ORAL at 08:10

## 2023-04-27 RX ADMIN — ATORVASTATIN CALCIUM 80 MG: 80 TABLET, FILM COATED ORAL at 08:34

## 2023-04-27 RX ADMIN — Medication 10 ML: at 08:36

## 2023-04-27 RX ADMIN — OXYCODONE HYDROCHLORIDE 10 MG: 10 TABLET ORAL at 13:34

## 2023-04-27 RX ADMIN — IPRATROPIUM BROMIDE AND ALBUTEROL SULFATE 3 ML: 2.5; .5 SOLUTION RESPIRATORY (INHALATION) at 16:01

## 2023-04-27 RX ADMIN — FUROSEMIDE 20 MG: 10 INJECTION, SOLUTION INTRAMUSCULAR; INTRAVENOUS at 14:12

## 2023-04-27 RX ADMIN — ENOXAPARIN SODIUM 80 MG: 100 INJECTION SUBCUTANEOUS at 11:02

## 2023-04-27 RX ADMIN — Medication 10 ML: at 08:35

## 2023-04-27 RX ADMIN — ACETAMINOPHEN 650 MG: 325 TABLET ORAL at 04:58

## 2023-04-27 RX ADMIN — IPRATROPIUM BROMIDE 2 SPRAY: 42 SPRAY, METERED NASAL at 14:16

## 2023-04-27 RX ADMIN — IPRATROPIUM BROMIDE AND ALBUTEROL SULFATE 3 ML: 2.5; .5 SOLUTION RESPIRATORY (INHALATION) at 11:37

## 2023-04-27 ASSESSMENT — PAIN SCALES - GENERAL
PAINLEVEL_OUTOF10: 5
PAINLEVEL_OUTOF10: 7
PAINLEVEL_OUTOF10: 3
PAINLEVEL_OUTOF10: 7
PAINLEVEL_OUTOF10: 7
PAINLEVEL_OUTOF10: 5

## 2023-04-27 ASSESSMENT — PAIN DESCRIPTION - ORIENTATION: ORIENTATION: LEFT;RIGHT

## 2023-04-27 ASSESSMENT — PAIN DESCRIPTION - LOCATION: LOCATION: LEG

## 2023-04-27 ASSESSMENT — PAIN - FUNCTIONAL ASSESSMENT: PAIN_FUNCTIONAL_ASSESSMENT: PREVENTS OR INTERFERES SOME ACTIVE ACTIVITIES AND ADLS

## 2023-04-27 ASSESSMENT — PAIN DESCRIPTION - PAIN TYPE: TYPE: SURGICAL PAIN;ACUTE PAIN

## 2023-04-27 ASSESSMENT — PAIN DESCRIPTION - DESCRIPTORS: DESCRIPTORS: ACHING;BURNING

## 2023-04-28 LAB
ANION GAP SERPL CALCULATED.3IONS-SCNC: 10 MMOL/L (ref 3–16)
BASOPHILS # BLD: 0 K/UL (ref 0–0.2)
BASOPHILS NFR BLD: 1 %
BUN SERPL-MCNC: 7 MG/DL (ref 7–20)
CALCIUM SERPL-MCNC: 8.1 MG/DL (ref 8.3–10.6)
CHLORIDE SERPL-SCNC: 106 MMOL/L (ref 99–110)
CO2 SERPL-SCNC: 22 MMOL/L (ref 21–32)
CREAT SERPL-MCNC: 0.7 MG/DL (ref 0.8–1.3)
DEPRECATED RDW RBC AUTO: 15 % (ref 12.4–15.4)
EOSINOPHIL # BLD: 0.1 K/UL (ref 0–0.6)
EOSINOPHIL NFR BLD: 1.2 %
GFR SERPLBLD CREATININE-BSD FMLA CKD-EPI: >60 ML/MIN/{1.73_M2}
GLUCOSE BLD-MCNC: 127 MG/DL (ref 70–99)
GLUCOSE BLD-MCNC: 137 MG/DL (ref 70–99)
GLUCOSE BLD-MCNC: 195 MG/DL (ref 70–99)
GLUCOSE SERPL-MCNC: 129 MG/DL (ref 70–99)
HCT VFR BLD AUTO: 24.1 % (ref 40.5–52.5)
HGB BLD-MCNC: 7.8 G/DL (ref 13.5–17.5)
INR PPP: 1.19 (ref 0.84–1.16)
LYMPHOCYTES # BLD: 0.5 K/UL (ref 1–5.1)
LYMPHOCYTES NFR BLD: 10.4 %
MAGNESIUM SERPL-MCNC: 1.8 MG/DL (ref 1.8–2.4)
MCH RBC QN AUTO: 28.3 PG (ref 26–34)
MCHC RBC AUTO-ENTMCNC: 32.5 G/DL (ref 31–36)
MCV RBC AUTO: 87 FL (ref 80–100)
MONOCYTES # BLD: 0.4 K/UL (ref 0–1.3)
MONOCYTES NFR BLD: 9.9 %
NEUTROPHILS # BLD: 3.4 K/UL (ref 1.7–7.7)
NEUTROPHILS NFR BLD: 77.5 %
PERFORMED ON: ABNORMAL
PLATELET # BLD AUTO: 192 K/UL (ref 135–450)
PMV BLD AUTO: 7.3 FL (ref 5–10.5)
POTASSIUM SERPL-SCNC: 3.4 MMOL/L (ref 3.5–5.1)
PROTHROMBIN TIME: 15.1 SEC (ref 11.5–14.8)
RBC # BLD AUTO: 2.77 M/UL (ref 4.2–5.9)
SODIUM SERPL-SCNC: 138 MMOL/L (ref 136–145)
WBC # BLD AUTO: 4.4 K/UL (ref 4–11)

## 2023-04-28 PROCEDURE — 6370000000 HC RX 637 (ALT 250 FOR IP): Performed by: STUDENT IN AN ORGANIZED HEALTH CARE EDUCATION/TRAINING PROGRAM

## 2023-04-28 PROCEDURE — 97530 THERAPEUTIC ACTIVITIES: CPT

## 2023-04-28 PROCEDURE — 2700000000 HC OXYGEN THERAPY PER DAY

## 2023-04-28 PROCEDURE — 2000000000 HC ICU R&B

## 2023-04-28 PROCEDURE — 80048 BASIC METABOLIC PNL TOTAL CA: CPT

## 2023-04-28 PROCEDURE — 97116 GAIT TRAINING THERAPY: CPT

## 2023-04-28 PROCEDURE — 94760 N-INVAS EAR/PLS OXIMETRY 1: CPT

## 2023-04-28 PROCEDURE — 6370000000 HC RX 637 (ALT 250 FOR IP): Performed by: NURSE PRACTITIONER

## 2023-04-28 PROCEDURE — 85610 PROTHROMBIN TIME: CPT

## 2023-04-28 PROCEDURE — 2580000003 HC RX 258: Performed by: STUDENT IN AN ORGANIZED HEALTH CARE EDUCATION/TRAINING PROGRAM

## 2023-04-28 PROCEDURE — 85025 COMPLETE CBC W/AUTO DIFF WBC: CPT

## 2023-04-28 PROCEDURE — 2580000003 HC RX 258: Performed by: ANESTHESIOLOGY

## 2023-04-28 PROCEDURE — 94640 AIRWAY INHALATION TREATMENT: CPT

## 2023-04-28 PROCEDURE — 6360000002 HC RX W HCPCS: Performed by: NURSE PRACTITIONER

## 2023-04-28 PROCEDURE — 97110 THERAPEUTIC EXERCISES: CPT

## 2023-04-28 PROCEDURE — 83735 ASSAY OF MAGNESIUM: CPT

## 2023-04-28 PROCEDURE — APPNB30 APP NON BILLABLE TIME 0-30 MINS: Performed by: NURSE PRACTITIONER

## 2023-04-28 RX ORDER — WARFARIN SODIUM 5 MG/1
15 TABLET ORAL
Status: COMPLETED | OUTPATIENT
Start: 2023-04-28 | End: 2023-04-28

## 2023-04-28 RX ORDER — POTASSIUM CHLORIDE 20 MEQ/1
40 TABLET, EXTENDED RELEASE ORAL ONCE
Status: COMPLETED | OUTPATIENT
Start: 2023-04-28 | End: 2023-04-28

## 2023-04-28 RX ORDER — POLYETHYLENE GLYCOL 3350 17 G/17G
17 POWDER, FOR SOLUTION ORAL DAILY PRN
Status: DISCONTINUED | OUTPATIENT
Start: 2023-04-28 | End: 2023-05-03 | Stop reason: HOSPADM

## 2023-04-28 RX ORDER — POTASSIUM CHLORIDE 7.45 MG/ML
10 INJECTION INTRAVENOUS PRN
Status: DISCONTINUED | OUTPATIENT
Start: 2023-04-28 | End: 2023-05-03 | Stop reason: HOSPADM

## 2023-04-28 RX ORDER — POTASSIUM CHLORIDE 20 MEQ/1
40 TABLET, EXTENDED RELEASE ORAL PRN
Status: DISCONTINUED | OUTPATIENT
Start: 2023-04-28 | End: 2023-05-03 | Stop reason: HOSPADM

## 2023-04-28 RX ADMIN — PANTOPRAZOLE SODIUM 40 MG: 40 TABLET, DELAYED RELEASE ORAL at 06:01

## 2023-04-28 RX ADMIN — Medication 10 ML: at 20:25

## 2023-04-28 RX ADMIN — LISINOPRIL 20 MG: 20 TABLET ORAL at 12:32

## 2023-04-28 RX ADMIN — BUDESONIDE 500 MCG: 0.5 INHALANT RESPIRATORY (INHALATION) at 07:40

## 2023-04-28 RX ADMIN — LEVOTHYROXINE SODIUM 50 MCG: 0.05 TABLET ORAL at 06:01

## 2023-04-28 RX ADMIN — IPRATROPIUM BROMIDE AND ALBUTEROL SULFATE 3 ML: 2.5; .5 SOLUTION RESPIRATORY (INHALATION) at 12:21

## 2023-04-28 RX ADMIN — IPRATROPIUM BROMIDE 2 SPRAY: 42 SPRAY, METERED NASAL at 12:33

## 2023-04-28 RX ADMIN — IPRATROPIUM BROMIDE AND ALBUTEROL SULFATE 3 ML: 2.5; .5 SOLUTION RESPIRATORY (INHALATION) at 16:04

## 2023-04-28 RX ADMIN — CETIRIZINE HYDROCHLORIDE 10 MG: 10 TABLET, FILM COATED ORAL at 09:36

## 2023-04-28 RX ADMIN — IPRATROPIUM BROMIDE 2 SPRAY: 42 SPRAY, METERED NASAL at 18:13

## 2023-04-28 RX ADMIN — OXYCODONE HYDROCHLORIDE 10 MG: 10 TABLET ORAL at 19:27

## 2023-04-28 RX ADMIN — ENOXAPARIN SODIUM 80 MG: 100 INJECTION SUBCUTANEOUS at 09:36

## 2023-04-28 RX ADMIN — BUDESONIDE 500 MCG: 0.5 INHALANT RESPIRATORY (INHALATION) at 19:15

## 2023-04-28 RX ADMIN — IPRATROPIUM BROMIDE 2 SPRAY: 42 SPRAY, METERED NASAL at 08:30

## 2023-04-28 RX ADMIN — WARFARIN SODIUM 15 MG: 5 TABLET ORAL at 18:13

## 2023-04-28 RX ADMIN — IPRATROPIUM BROMIDE AND ALBUTEROL SULFATE 3 ML: 2.5; .5 SOLUTION RESPIRATORY (INHALATION) at 19:15

## 2023-04-28 RX ADMIN — METOPROLOL SUCCINATE 25 MG: 25 TABLET, EXTENDED RELEASE ORAL at 09:35

## 2023-04-28 RX ADMIN — ATORVASTATIN CALCIUM 80 MG: 80 TABLET, FILM COATED ORAL at 09:35

## 2023-04-28 RX ADMIN — Medication 10 ML: at 12:34

## 2023-04-28 RX ADMIN — ENOXAPARIN SODIUM 80 MG: 100 INJECTION SUBCUTANEOUS at 20:25

## 2023-04-28 RX ADMIN — OXYCODONE HYDROCHLORIDE 10 MG: 10 TABLET ORAL at 09:36

## 2023-04-28 RX ADMIN — POLYETHYLENE GLYCOL 3350 17 G: 17 POWDER, FOR SOLUTION ORAL at 16:17

## 2023-04-28 RX ADMIN — POTASSIUM CHLORIDE 40 MEQ: 1500 TABLET, EXTENDED RELEASE ORAL at 16:17

## 2023-04-28 RX ADMIN — IPRATROPIUM BROMIDE AND ALBUTEROL SULFATE 3 ML: 2.5; .5 SOLUTION RESPIRATORY (INHALATION) at 07:40

## 2023-04-28 RX ADMIN — ACETAMINOPHEN 650 MG: 325 TABLET ORAL at 16:31

## 2023-04-28 ASSESSMENT — PAIN SCALES - GENERAL: PAINLEVEL_OUTOF10: 7

## 2023-04-28 ASSESSMENT — PAIN DESCRIPTION - ORIENTATION: ORIENTATION: LEFT

## 2023-04-28 ASSESSMENT — PAIN DESCRIPTION - LOCATION: LOCATION: LEG

## 2023-04-29 LAB
DEPRECATED RDW RBC AUTO: 14.8 % (ref 12.4–15.4)
GLUCOSE BLD-MCNC: 111 MG/DL (ref 70–99)
GLUCOSE BLD-MCNC: 140 MG/DL (ref 70–99)
GLUCOSE BLD-MCNC: 140 MG/DL (ref 70–99)
GLUCOSE BLD-MCNC: 171 MG/DL (ref 70–99)
HCT VFR BLD AUTO: 24.5 % (ref 40.5–52.5)
HGB BLD-MCNC: 8.3 G/DL (ref 13.5–17.5)
INR PPP: 1.26 (ref 0.84–1.16)
MCH RBC QN AUTO: 29.8 PG (ref 26–34)
MCHC RBC AUTO-ENTMCNC: 33.8 G/DL (ref 31–36)
MCV RBC AUTO: 88.3 FL (ref 80–100)
PATHOLOGIST REPORT, TRANSFUSION REACTION: NORMAL
PERFORMED ON: ABNORMAL
PLATELET # BLD AUTO: 241 K/UL (ref 135–450)
PMV BLD AUTO: 7.8 FL (ref 5–10.5)
PROTHROMBIN TIME: 15.8 SEC (ref 11.5–14.8)
RBC # BLD AUTO: 2.78 M/UL (ref 4.2–5.9)
WBC # BLD AUTO: 5.3 K/UL (ref 4–11)

## 2023-04-29 PROCEDURE — 2700000000 HC OXYGEN THERAPY PER DAY

## 2023-04-29 PROCEDURE — 85610 PROTHROMBIN TIME: CPT

## 2023-04-29 PROCEDURE — 6360000002 HC RX W HCPCS: Performed by: NURSE PRACTITIONER

## 2023-04-29 PROCEDURE — 6370000000 HC RX 637 (ALT 250 FOR IP): Performed by: NURSE PRACTITIONER

## 2023-04-29 PROCEDURE — 6370000000 HC RX 637 (ALT 250 FOR IP): Performed by: STUDENT IN AN ORGANIZED HEALTH CARE EDUCATION/TRAINING PROGRAM

## 2023-04-29 PROCEDURE — 2060000000 HC ICU INTERMEDIATE R&B

## 2023-04-29 PROCEDURE — 2580000003 HC RX 258: Performed by: STUDENT IN AN ORGANIZED HEALTH CARE EDUCATION/TRAINING PROGRAM

## 2023-04-29 PROCEDURE — 94640 AIRWAY INHALATION TREATMENT: CPT

## 2023-04-29 PROCEDURE — 85027 COMPLETE CBC AUTOMATED: CPT

## 2023-04-29 PROCEDURE — 94760 N-INVAS EAR/PLS OXIMETRY 1: CPT

## 2023-04-29 PROCEDURE — 2580000003 HC RX 258: Performed by: ANESTHESIOLOGY

## 2023-04-29 RX ORDER — WARFARIN SODIUM 5 MG/1
15 TABLET ORAL
Status: COMPLETED | OUTPATIENT
Start: 2023-04-29 | End: 2023-04-29

## 2023-04-29 RX ADMIN — BUDESONIDE 500 MCG: 0.5 INHALANT RESPIRATORY (INHALATION) at 08:18

## 2023-04-29 RX ADMIN — ACETAMINOPHEN 650 MG: 325 TABLET ORAL at 17:11

## 2023-04-29 RX ADMIN — Medication 10 ML: at 08:36

## 2023-04-29 RX ADMIN — WARFARIN SODIUM 15 MG: 5 TABLET ORAL at 17:56

## 2023-04-29 RX ADMIN — METOPROLOL SUCCINATE 25 MG: 25 TABLET, EXTENDED RELEASE ORAL at 08:36

## 2023-04-29 RX ADMIN — OXYCODONE HYDROCHLORIDE 10 MG: 10 TABLET ORAL at 13:28

## 2023-04-29 RX ADMIN — OXYCODONE HYDROCHLORIDE 10 MG: 10 TABLET ORAL at 08:36

## 2023-04-29 RX ADMIN — BUDESONIDE 500 MCG: 0.5 INHALANT RESPIRATORY (INHALATION) at 19:36

## 2023-04-29 RX ADMIN — Medication 10 ML: at 19:47

## 2023-04-29 RX ADMIN — IPRATROPIUM BROMIDE AND ALBUTEROL SULFATE 3 ML: 2.5; .5 SOLUTION RESPIRATORY (INHALATION) at 11:41

## 2023-04-29 RX ADMIN — LISINOPRIL 20 MG: 20 TABLET ORAL at 08:36

## 2023-04-29 RX ADMIN — OXYCODONE HYDROCHLORIDE 10 MG: 10 TABLET ORAL at 20:11

## 2023-04-29 RX ADMIN — Medication 6 MG: at 21:45

## 2023-04-29 RX ADMIN — IPRATROPIUM BROMIDE 2 SPRAY: 42 SPRAY, METERED NASAL at 19:47

## 2023-04-29 RX ADMIN — IPRATROPIUM BROMIDE AND ALBUTEROL SULFATE 3 ML: 2.5; .5 SOLUTION RESPIRATORY (INHALATION) at 08:18

## 2023-04-29 RX ADMIN — CETIRIZINE HYDROCHLORIDE 10 MG: 10 TABLET, FILM COATED ORAL at 08:36

## 2023-04-29 RX ADMIN — IPRATROPIUM BROMIDE AND ALBUTEROL SULFATE 3 ML: 2.5; .5 SOLUTION RESPIRATORY (INHALATION) at 19:36

## 2023-04-29 RX ADMIN — LEVOTHYROXINE SODIUM 50 MCG: 0.05 TABLET ORAL at 06:17

## 2023-04-29 RX ADMIN — IPRATROPIUM BROMIDE 2 SPRAY: 42 SPRAY, METERED NASAL at 08:35

## 2023-04-29 RX ADMIN — Medication 10 ML: at 08:37

## 2023-04-29 RX ADMIN — ENOXAPARIN SODIUM 80 MG: 100 INJECTION SUBCUTANEOUS at 08:35

## 2023-04-29 RX ADMIN — POLYETHYLENE GLYCOL 3350 17 G: 17 POWDER, FOR SOLUTION ORAL at 08:43

## 2023-04-29 RX ADMIN — ENOXAPARIN SODIUM 80 MG: 100 INJECTION SUBCUTANEOUS at 19:46

## 2023-04-29 RX ADMIN — PANTOPRAZOLE SODIUM 40 MG: 40 TABLET, DELAYED RELEASE ORAL at 06:17

## 2023-04-29 RX ADMIN — IPRATROPIUM BROMIDE AND ALBUTEROL SULFATE 3 ML: 2.5; .5 SOLUTION RESPIRATORY (INHALATION) at 17:04

## 2023-04-29 RX ADMIN — ATORVASTATIN CALCIUM 80 MG: 80 TABLET, FILM COATED ORAL at 08:36

## 2023-04-29 ASSESSMENT — PAIN DESCRIPTION - LOCATION
LOCATION: LEG

## 2023-04-29 ASSESSMENT — PAIN DESCRIPTION - ORIENTATION
ORIENTATION: LEFT

## 2023-04-29 ASSESSMENT — PAIN SCALES - GENERAL
PAINLEVEL_OUTOF10: 7
PAINLEVEL_OUTOF10: 6
PAINLEVEL_OUTOF10: 7
PAINLEVEL_OUTOF10: 4
PAINLEVEL_OUTOF10: 5
PAINLEVEL_OUTOF10: 2
PAINLEVEL_OUTOF10: 7
PAINLEVEL_OUTOF10: 7

## 2023-04-29 ASSESSMENT — PAIN DESCRIPTION - FREQUENCY: FREQUENCY: CONTINUOUS

## 2023-04-29 ASSESSMENT — PAIN DESCRIPTION - PAIN TYPE
TYPE: ACUTE PAIN;SURGICAL PAIN

## 2023-04-29 ASSESSMENT — PAIN DESCRIPTION - DESCRIPTORS
DESCRIPTORS: ACHING

## 2023-04-29 ASSESSMENT — PAIN - FUNCTIONAL ASSESSMENT: PAIN_FUNCTIONAL_ASSESSMENT: PREVENTS OR INTERFERES SOME ACTIVE ACTIVITIES AND ADLS

## 2023-04-29 ASSESSMENT — PAIN DESCRIPTION - ONSET: ONSET: ON-GOING

## 2023-04-30 LAB
GLUCOSE BLD-MCNC: 102 MG/DL (ref 70–99)
GLUCOSE BLD-MCNC: 129 MG/DL (ref 70–99)
GLUCOSE BLD-MCNC: 149 MG/DL (ref 70–99)
GLUCOSE BLD-MCNC: 183 MG/DL (ref 70–99)
INR PPP: 1.4 (ref 0.84–1.16)
PERFORMED ON: ABNORMAL
PROTHROMBIN TIME: 17.1 SEC (ref 11.5–14.8)

## 2023-04-30 PROCEDURE — 6370000000 HC RX 637 (ALT 250 FOR IP): Performed by: NURSE PRACTITIONER

## 2023-04-30 PROCEDURE — 6360000002 HC RX W HCPCS: Performed by: NURSE PRACTITIONER

## 2023-04-30 PROCEDURE — 6370000000 HC RX 637 (ALT 250 FOR IP): Performed by: STUDENT IN AN ORGANIZED HEALTH CARE EDUCATION/TRAINING PROGRAM

## 2023-04-30 PROCEDURE — 94760 N-INVAS EAR/PLS OXIMETRY 1: CPT

## 2023-04-30 PROCEDURE — 2580000003 HC RX 258: Performed by: ANESTHESIOLOGY

## 2023-04-30 PROCEDURE — 94640 AIRWAY INHALATION TREATMENT: CPT

## 2023-04-30 PROCEDURE — 2060000000 HC ICU INTERMEDIATE R&B

## 2023-04-30 PROCEDURE — 2580000003 HC RX 258: Performed by: STUDENT IN AN ORGANIZED HEALTH CARE EDUCATION/TRAINING PROGRAM

## 2023-04-30 PROCEDURE — 2700000000 HC OXYGEN THERAPY PER DAY

## 2023-04-30 PROCEDURE — 85610 PROTHROMBIN TIME: CPT

## 2023-04-30 RX ORDER — WARFARIN SODIUM 5 MG/1
10 TABLET ORAL
Status: COMPLETED | OUTPATIENT
Start: 2023-04-30 | End: 2023-04-30

## 2023-04-30 RX ADMIN — METOPROLOL SUCCINATE 25 MG: 25 TABLET, EXTENDED RELEASE ORAL at 08:13

## 2023-04-30 RX ADMIN — Medication 10 ML: at 22:40

## 2023-04-30 RX ADMIN — ENOXAPARIN SODIUM 80 MG: 100 INJECTION SUBCUTANEOUS at 22:36

## 2023-04-30 RX ADMIN — Medication 6 MG: at 22:36

## 2023-04-30 RX ADMIN — LISINOPRIL 20 MG: 20 TABLET ORAL at 08:13

## 2023-04-30 RX ADMIN — ENOXAPARIN SODIUM 80 MG: 100 INJECTION SUBCUTANEOUS at 08:13

## 2023-04-30 RX ADMIN — IPRATROPIUM BROMIDE AND ALBUTEROL SULFATE 3 ML: 2.5; .5 SOLUTION RESPIRATORY (INHALATION) at 08:37

## 2023-04-30 RX ADMIN — LEVOTHYROXINE SODIUM 50 MCG: 0.05 TABLET ORAL at 06:40

## 2023-04-30 RX ADMIN — PANTOPRAZOLE SODIUM 40 MG: 40 TABLET, DELAYED RELEASE ORAL at 06:40

## 2023-04-30 RX ADMIN — IPRATROPIUM BROMIDE 2 SPRAY: 42 SPRAY, METERED NASAL at 09:55

## 2023-04-30 RX ADMIN — BUDESONIDE 500 MCG: 0.5 INHALANT RESPIRATORY (INHALATION) at 08:37

## 2023-04-30 RX ADMIN — ATORVASTATIN CALCIUM 80 MG: 80 TABLET, FILM COATED ORAL at 08:13

## 2023-04-30 RX ADMIN — CETIRIZINE HYDROCHLORIDE 10 MG: 10 TABLET, FILM COATED ORAL at 08:13

## 2023-04-30 RX ADMIN — IPRATROPIUM BROMIDE AND ALBUTEROL SULFATE 3 ML: 2.5; .5 SOLUTION RESPIRATORY (INHALATION) at 15:48

## 2023-04-30 RX ADMIN — BUDESONIDE 500 MCG: 0.5 INHALANT RESPIRATORY (INHALATION) at 20:07

## 2023-04-30 RX ADMIN — OXYCODONE HYDROCHLORIDE 10 MG: 10 TABLET ORAL at 13:50

## 2023-04-30 RX ADMIN — IPRATROPIUM BROMIDE AND ALBUTEROL SULFATE 3 ML: 2.5; .5 SOLUTION RESPIRATORY (INHALATION) at 11:44

## 2023-04-30 RX ADMIN — IPRATROPIUM BROMIDE AND ALBUTEROL SULFATE 3 ML: 2.5; .5 SOLUTION RESPIRATORY (INHALATION) at 20:07

## 2023-04-30 RX ADMIN — IPRATROPIUM BROMIDE 2 SPRAY: 42 SPRAY, METERED NASAL at 19:00

## 2023-04-30 RX ADMIN — OXYCODONE HYDROCHLORIDE 10 MG: 10 TABLET ORAL at 22:35

## 2023-04-30 RX ADMIN — Medication 10 ML: at 08:15

## 2023-04-30 RX ADMIN — POLYETHYLENE GLYCOL 3350 17 G: 17 POWDER, FOR SOLUTION ORAL at 09:56

## 2023-04-30 RX ADMIN — IPRATROPIUM BROMIDE 2 SPRAY: 42 SPRAY, METERED NASAL at 13:07

## 2023-04-30 RX ADMIN — WARFARIN SODIUM 10 MG: 5 TABLET ORAL at 17:35

## 2023-04-30 ASSESSMENT — PAIN SCALES - GENERAL
PAINLEVEL_OUTOF10: 7
PAINLEVEL_OUTOF10: 2
PAINLEVEL_OUTOF10: 2
PAINLEVEL_OUTOF10: 6
PAINLEVEL_OUTOF10: 7
PAINLEVEL_OUTOF10: 2

## 2023-04-30 ASSESSMENT — PAIN DESCRIPTION - DESCRIPTORS
DESCRIPTORS: BURNING
DESCRIPTORS: ACHING

## 2023-04-30 ASSESSMENT — PAIN DESCRIPTION - ONSET: ONSET: ON-GOING

## 2023-04-30 ASSESSMENT — PAIN DESCRIPTION - FREQUENCY: FREQUENCY: CONTINUOUS

## 2023-04-30 ASSESSMENT — PAIN - FUNCTIONAL ASSESSMENT
PAIN_FUNCTIONAL_ASSESSMENT: PREVENTS OR INTERFERES SOME ACTIVE ACTIVITIES AND ADLS
PAIN_FUNCTIONAL_ASSESSMENT: PREVENTS OR INTERFERES SOME ACTIVE ACTIVITIES AND ADLS

## 2023-04-30 ASSESSMENT — PAIN DESCRIPTION - LOCATION
LOCATION: LEG

## 2023-04-30 ASSESSMENT — PAIN DESCRIPTION - ORIENTATION
ORIENTATION: LEFT
ORIENTATION: LEFT
ORIENTATION: RIGHT;LEFT

## 2023-04-30 ASSESSMENT — PAIN DESCRIPTION - PAIN TYPE: TYPE: ACUTE PAIN;SURGICAL PAIN

## 2023-05-01 LAB
ANION GAP SERPL CALCULATED.3IONS-SCNC: 13 MMOL/L (ref 3–16)
BACTERIA BPU AEROBE CULT: NORMAL
BUN SERPL-MCNC: 9 MG/DL (ref 7–20)
CALCIUM SERPL-MCNC: 8.5 MG/DL (ref 8.3–10.6)
CHLORIDE SERPL-SCNC: 99 MMOL/L (ref 99–110)
CO2 SERPL-SCNC: 22 MMOL/L (ref 21–32)
CREAT SERPL-MCNC: 0.7 MG/DL (ref 0.8–1.3)
DEPRECATED RDW RBC AUTO: 15 % (ref 12.4–15.4)
GFR SERPLBLD CREATININE-BSD FMLA CKD-EPI: >60 ML/MIN/{1.73_M2}
GLUCOSE BLD-MCNC: 127 MG/DL (ref 70–99)
GLUCOSE BLD-MCNC: 133 MG/DL (ref 70–99)
GLUCOSE BLD-MCNC: 139 MG/DL (ref 70–99)
GLUCOSE BLD-MCNC: 139 MG/DL (ref 70–99)
GLUCOSE SERPL-MCNC: 135 MG/DL (ref 70–99)
HCT VFR BLD AUTO: 21.2 % (ref 40.5–52.5)
HGB BLD-MCNC: 7.2 G/DL (ref 13.5–17.5)
INR PPP: 1.6 (ref 0.84–1.16)
MCH RBC QN AUTO: 29.5 PG (ref 26–34)
MCHC RBC AUTO-ENTMCNC: 33.8 G/DL (ref 31–36)
MCV RBC AUTO: 87.3 FL (ref 80–100)
PERFORMED ON: ABNORMAL
PLATELET # BLD AUTO: 310 K/UL (ref 135–450)
PMV BLD AUTO: 7.5 FL (ref 5–10.5)
POTASSIUM SERPL-SCNC: 4.3 MMOL/L (ref 3.5–5.1)
PROTHROMBIN TIME: 19 SEC (ref 11.5–14.8)
RBC # BLD AUTO: 2.43 M/UL (ref 4.2–5.9)
SODIUM SERPL-SCNC: 134 MMOL/L (ref 136–145)
WBC # BLD AUTO: 5.1 K/UL (ref 4–11)

## 2023-05-01 PROCEDURE — 6370000000 HC RX 637 (ALT 250 FOR IP): Performed by: STUDENT IN AN ORGANIZED HEALTH CARE EDUCATION/TRAINING PROGRAM

## 2023-05-01 PROCEDURE — 6360000002 HC RX W HCPCS: Performed by: NURSE PRACTITIONER

## 2023-05-01 PROCEDURE — 97110 THERAPEUTIC EXERCISES: CPT

## 2023-05-01 PROCEDURE — 2060000000 HC ICU INTERMEDIATE R&B

## 2023-05-01 PROCEDURE — 6370000000 HC RX 637 (ALT 250 FOR IP): Performed by: NURSE PRACTITIONER

## 2023-05-01 PROCEDURE — APPNB30 APP NON BILLABLE TIME 0-30 MINS: Performed by: NURSE PRACTITIONER

## 2023-05-01 PROCEDURE — 2580000003 HC RX 258: Performed by: ANESTHESIOLOGY

## 2023-05-01 PROCEDURE — 97535 SELF CARE MNGMENT TRAINING: CPT

## 2023-05-01 PROCEDURE — 85610 PROTHROMBIN TIME: CPT

## 2023-05-01 PROCEDURE — 97530 THERAPEUTIC ACTIVITIES: CPT

## 2023-05-01 PROCEDURE — 97116 GAIT TRAINING THERAPY: CPT

## 2023-05-01 PROCEDURE — 80048 BASIC METABOLIC PNL TOTAL CA: CPT

## 2023-05-01 PROCEDURE — 94640 AIRWAY INHALATION TREATMENT: CPT

## 2023-05-01 PROCEDURE — 85027 COMPLETE CBC AUTOMATED: CPT

## 2023-05-01 PROCEDURE — 94760 N-INVAS EAR/PLS OXIMETRY 1: CPT

## 2023-05-01 PROCEDURE — 2580000003 HC RX 258: Performed by: STUDENT IN AN ORGANIZED HEALTH CARE EDUCATION/TRAINING PROGRAM

## 2023-05-01 RX ORDER — BISACODYL 10 MG
10 SUPPOSITORY, RECTAL RECTAL DAILY PRN
Status: DISCONTINUED | OUTPATIENT
Start: 2023-05-01 | End: 2023-05-03 | Stop reason: HOSPADM

## 2023-05-01 RX ORDER — FUROSEMIDE 10 MG/ML
20 INJECTION INTRAMUSCULAR; INTRAVENOUS ONCE
Status: COMPLETED | OUTPATIENT
Start: 2023-05-01 | End: 2023-05-01

## 2023-05-01 RX ORDER — WARFARIN SODIUM 7.5 MG/1
15 TABLET ORAL
Status: COMPLETED | OUTPATIENT
Start: 2023-05-01 | End: 2023-05-01

## 2023-05-01 RX ORDER — FUROSEMIDE 10 MG/ML
20 INJECTION INTRAMUSCULAR; INTRAVENOUS ONCE
Status: DISCONTINUED | OUTPATIENT
Start: 2023-05-01 | End: 2023-05-01

## 2023-05-01 RX ADMIN — BUDESONIDE 500 MCG: 0.5 INHALANT RESPIRATORY (INHALATION) at 20:04

## 2023-05-01 RX ADMIN — LEVOTHYROXINE SODIUM 50 MCG: 0.05 TABLET ORAL at 05:46

## 2023-05-01 RX ADMIN — BUDESONIDE 500 MCG: 0.5 INHALANT RESPIRATORY (INHALATION) at 07:40

## 2023-05-01 RX ADMIN — OXYCODONE HYDROCHLORIDE 10 MG: 10 TABLET ORAL at 05:46

## 2023-05-01 RX ADMIN — WARFARIN SODIUM 15 MG: 7.5 TABLET ORAL at 18:29

## 2023-05-01 RX ADMIN — FUROSEMIDE 20 MG: 10 INJECTION, SOLUTION INTRAMUSCULAR; INTRAVENOUS at 14:32

## 2023-05-01 RX ADMIN — LISINOPRIL 20 MG: 20 TABLET ORAL at 09:37

## 2023-05-01 RX ADMIN — ACETAMINOPHEN 650 MG: 325 TABLET ORAL at 20:36

## 2023-05-01 RX ADMIN — ATORVASTATIN CALCIUM 80 MG: 80 TABLET, FILM COATED ORAL at 09:37

## 2023-05-01 RX ADMIN — IPRATROPIUM BROMIDE 2 SPRAY: 42 SPRAY, METERED NASAL at 21:59

## 2023-05-01 RX ADMIN — Medication 10 ML: at 14:32

## 2023-05-01 RX ADMIN — ENOXAPARIN SODIUM 80 MG: 100 INJECTION SUBCUTANEOUS at 09:37

## 2023-05-01 RX ADMIN — OXYCODONE HYDROCHLORIDE 10 MG: 10 TABLET ORAL at 21:58

## 2023-05-01 RX ADMIN — PANTOPRAZOLE SODIUM 40 MG: 40 TABLET, DELAYED RELEASE ORAL at 05:46

## 2023-05-01 RX ADMIN — OXYCODONE HYDROCHLORIDE 10 MG: 10 TABLET ORAL at 09:46

## 2023-05-01 RX ADMIN — ENOXAPARIN SODIUM 80 MG: 100 INJECTION SUBCUTANEOUS at 20:28

## 2023-05-01 RX ADMIN — IPRATROPIUM BROMIDE AND ALBUTEROL SULFATE 3 ML: 2.5; .5 SOLUTION RESPIRATORY (INHALATION) at 20:04

## 2023-05-01 RX ADMIN — IPRATROPIUM BROMIDE AND ALBUTEROL SULFATE 3 ML: 2.5; .5 SOLUTION RESPIRATORY (INHALATION) at 07:40

## 2023-05-01 RX ADMIN — Medication 10 ML: at 20:29

## 2023-05-01 RX ADMIN — CETIRIZINE HYDROCHLORIDE 10 MG: 10 TABLET, FILM COATED ORAL at 09:37

## 2023-05-01 RX ADMIN — Medication 10 ML: at 09:39

## 2023-05-01 RX ADMIN — Medication 6 MG: at 21:59

## 2023-05-01 RX ADMIN — METOPROLOL SUCCINATE 25 MG: 25 TABLET, EXTENDED RELEASE ORAL at 09:37

## 2023-05-01 RX ADMIN — IPRATROPIUM BROMIDE AND ALBUTEROL SULFATE 3 ML: 2.5; .5 SOLUTION RESPIRATORY (INHALATION) at 11:22

## 2023-05-01 ASSESSMENT — PAIN DESCRIPTION - LOCATION
LOCATION: LEG

## 2023-05-01 ASSESSMENT — PAIN DESCRIPTION - ORIENTATION
ORIENTATION: RIGHT;LEFT
ORIENTATION: RIGHT;LEFT
ORIENTATION: LEFT

## 2023-05-01 ASSESSMENT — PAIN - FUNCTIONAL ASSESSMENT
PAIN_FUNCTIONAL_ASSESSMENT: PREVENTS OR INTERFERES SOME ACTIVE ACTIVITIES AND ADLS

## 2023-05-01 ASSESSMENT — PAIN SCALES - GENERAL
PAINLEVEL_OUTOF10: 7
PAINLEVEL_OUTOF10: 5
PAINLEVEL_OUTOF10: 7
PAINLEVEL_OUTOF10: 7
PAINLEVEL_OUTOF10: 5

## 2023-05-01 ASSESSMENT — PAIN DESCRIPTION - DESCRIPTORS
DESCRIPTORS: THROBBING
DESCRIPTORS: ACHING;DISCOMFORT
DESCRIPTORS: ACHING;THROBBING

## 2023-05-01 NOTE — CONSULTS
Consult Note  Physical Medicine and Rehabilitation    Patient: Andrew Rebolledo  [id number]  Date: 5/1/2023      Chief Complaint: LLE pain    History of Present Illness/Hospital Course:  Patient is a 60 yo M with pmh head/neck cancer s/p laryngectomy and chronic trach, PAD, HTN, HLD, DM2, DVT, hypothyroidism, and fibromyalgia who initially presented 4/25/2023 for left common femoral to peroneal artery bypass graft using spliced composite segment of saphenous vein with Dr. Susan Gilbert. Course complicated by acute blood loss anemia, post-op pulmonary insufficiency. Currently, patient reports moderate LLE pain. Worse with movement. Improves with rest. He has generalized weakness and difficulty with mobility. He is interested in doing acute rehab to improve his function prior to returning home. Would like to go to [facility] because he has been there previously and would like their SLP to assess his speaking valve for leak.      Prior Level of Function:  Independent for mobility, ADLs    Current Level of Function:  Quynh for mobility  Up to maxA for ADLs    Pertinent Social History:  Support: lives with spouse and son  Home set-up: bi level home with 6 steps to enter then 5 steps up/down     Past Medical History:   Diagnosis Date    Acquired hypothyroidism 2/8/2023    Cancer ([facility]     throat    Diabetes mellitus ([address]     Fibromyalgia     History of DVT (deep vein thrombosis)     Hyperlipidemia     Hypertension     Type 2 diabetes mellitus with circulatory disorder, without long-term current use of insulin ([facility] 2/18/2022       Past Surgical History:   Procedure Laterality Date    APPENDECTOMY  2005    COLONOSCOPY  2016    FEMORAL BYPASS Left 02/26/2020    femoral posterior tibial bypass    FEMORAL BYPASS Left 4/25/2023    LEFT FEMORAL PERINEAL ARTERY BYPASS WITH SPLICED COMPOSITE SAPHENOUS VEIN GRAFT WITH PARTIAL FIBIALECTOMY performed by Joyce Velasco DO at [facility] 12/11/2020

## 2023-05-01 NOTE — DISCHARGE INSTR - COC
(Comment) 04/30/23 1200   Number of days: 6       Wound 06/24/22 Ankle Left;Medial #1 (Active)   Wound Etiology Arterial 04/30/23 1230   Dressing Status Clean;Dry; Intact 04/30/23 2100   Wound Cleansed Cleansed with saline 04/30/23 1230   Dressing/Treatment Triad hydro/zinc oxide-based hydrophilic paste; Roll gauze 04/30/23 1230   Wound Length (cm) 4.6 cm 04/12/23 1350   Wound Width (cm) 5.3 cm 04/12/23 1350   Wound Depth (cm) 0.1 cm 04/12/23 1350   Wound Surface Area (cm^2) 24.38 cm^2 04/12/23 1350   Change in Wound Size % (l*w) -256.43 04/12/23 1350   Wound Volume (cm^3) 2.438 cm^3 04/12/23 1350   Wound Healing % -256 04/12/23 1350   Post-Procedure Length (cm) 4.6 cm 04/12/23 1423   Post-Procedure Width (cm) 5.3 cm 04/12/23 1423   Post-Procedure Depth (cm) 0.1 cm 04/12/23 1423   Post-Procedure Surface Area (cm^2) 24.38 cm^2 04/12/23 1423   Post-Procedure Volume (cm^3) 2.438 cm^3 04/12/23 1423   Wound Assessment Eschar dry 04/27/23 1215   Drainage Amount None 04/30/23 1200   Drainage Description Yellow 04/25/23 2000   Odor None 04/30/23 1200   Jamila-wound Assessment Other (Comment) 04/30/23 1200   Margins Attached edges 04/27/23 1215   Number of days: 311       Wound 02/15/23 Heel Lower; Outer;Left #2 (Active)   Wound Etiology Arterial 04/30/23 1230   Dressing Status Clean;Dry; Intact 04/30/23 2100   Wound Cleansed Cleansed with saline 04/30/23 1230   Dressing/Treatment Triad hydro/zinc oxide-based hydrophilic paste; Roll gauze 04/30/23 1230   Offloading for Diabetic Foot Ulcers Offloading not ordered 04/30/23 1200   Wound Length (cm) 1.8 cm 04/12/23 1350   Wound Width (cm) 1.5 cm 04/12/23 1350   Wound Depth (cm) 0.2 cm 04/12/23 1350   Wound Surface Area (cm^2) 2.7 cm^2 04/12/23 1350   Change in Wound Size % (l*w) -221.43 04/12/23 1350   Wound Volume (cm^3) 0.54 cm^3 04/12/23 1350   Wound Healing % -543 04/12/23 1350   Post-Procedure Length (cm) 1.8 cm 04/12/23 1423   Post-Procedure Width (cm) 1.5 cm 04/12/23 1423

## 2023-05-01 NOTE — CARE COORDINATION
Prior [facility] has been voided for [facility] ARU. Spoke to [place] with [facility], confirms facility can accept and will submit for pre cert.      Electronically signed by Amber Esteves RN Case Management on 5/1/2023 at 4:02 PM

## 2023-05-01 NOTE — CARE COORDINATION
Spoke to Zane in [address] who tells me she was trying to cancel request for ARU she was unable to do so as ARU has already been approved. Met with patient, patient wants to pursue [facility] and not our ARU. Spoke to [place] at [facility] who is going to see if she can transfer auth to her facility and will let me know.     Electronically signed by Belem Guzmán RN Case Management on 5/1/2023 at 1:49 PM

## 2023-05-01 NOTE — CARE COORDINATION
Met with patient. Discussed ARU choices. Patient would like [facility]. Referral made. Rukhsana at [facility] made aware. The Plan for Transition of Care is related to the following treatment goals: strengthening    The patient was provided with a choice of provider and agrees   with the discharge plan. [x] Yes [] No    Freedom of choice list was provided with basic dialogue that supports the patient's individualized plan of care/goals, treatment preferences and shares the quality data associated with the providers.  [x] Yes [] No    Electronically signed by Laura Dowd RN Case Management on 5/1/2023 at 11:01 AM

## 2023-05-02 ENCOUNTER — APPOINTMENT (OUTPATIENT)
Dept: GENERAL RADIOLOGY | Age: 63
DRG: 181 | End: 2023-05-02
Attending: STUDENT IN AN ORGANIZED HEALTH CARE EDUCATION/TRAINING PROGRAM
Payer: MEDICAID

## 2023-05-02 LAB
GLUCOSE BLD-MCNC: 130 MG/DL (ref 70–99)
GLUCOSE BLD-MCNC: 134 MG/DL (ref 70–99)
GLUCOSE BLD-MCNC: 141 MG/DL (ref 70–99)
GLUCOSE BLD-MCNC: 149 MG/DL (ref 70–99)
INR PPP: 1.91 (ref 0.84–1.16)
PERFORMED ON: ABNORMAL
PROTHROMBIN TIME: 21.8 SEC (ref 11.5–14.8)

## 2023-05-02 PROCEDURE — 2700000000 HC OXYGEN THERAPY PER DAY

## 2023-05-02 PROCEDURE — 94761 N-INVAS EAR/PLS OXIMETRY MLT: CPT

## 2023-05-02 PROCEDURE — 97110 THERAPEUTIC EXERCISES: CPT

## 2023-05-02 PROCEDURE — 2580000003 HC RX 258: Performed by: ANESTHESIOLOGY

## 2023-05-02 PROCEDURE — 97116 GAIT TRAINING THERAPY: CPT

## 2023-05-02 PROCEDURE — APPNB30 APP NON BILLABLE TIME 0-30 MINS: Performed by: NURSE PRACTITIONER

## 2023-05-02 PROCEDURE — 6370000000 HC RX 637 (ALT 250 FOR IP): Performed by: NURSE PRACTITIONER

## 2023-05-02 PROCEDURE — 97530 THERAPEUTIC ACTIVITIES: CPT

## 2023-05-02 PROCEDURE — 94640 AIRWAY INHALATION TREATMENT: CPT

## 2023-05-02 PROCEDURE — 6360000002 HC RX W HCPCS: Performed by: NURSE PRACTITIONER

## 2023-05-02 PROCEDURE — 2580000003 HC RX 258: Performed by: STUDENT IN AN ORGANIZED HEALTH CARE EDUCATION/TRAINING PROGRAM

## 2023-05-02 PROCEDURE — 85610 PROTHROMBIN TIME: CPT

## 2023-05-02 PROCEDURE — 6370000000 HC RX 637 (ALT 250 FOR IP): Performed by: STUDENT IN AN ORGANIZED HEALTH CARE EDUCATION/TRAINING PROGRAM

## 2023-05-02 PROCEDURE — 2060000000 HC ICU INTERMEDIATE R&B

## 2023-05-02 PROCEDURE — 71045 X-RAY EXAM CHEST 1 VIEW: CPT

## 2023-05-02 RX ORDER — IPRATROPIUM BROMIDE AND ALBUTEROL SULFATE 2.5; .5 MG/3ML; MG/3ML
3 SOLUTION RESPIRATORY (INHALATION) EVERY 4 HOURS PRN
Status: DISCONTINUED | OUTPATIENT
Start: 2023-05-02 | End: 2023-05-03 | Stop reason: HOSPADM

## 2023-05-02 RX ORDER — WARFARIN SODIUM 5 MG/1
10 TABLET ORAL
Status: COMPLETED | OUTPATIENT
Start: 2023-05-02 | End: 2023-05-02

## 2023-05-02 RX ADMIN — METOPROLOL SUCCINATE 25 MG: 25 TABLET, EXTENDED RELEASE ORAL at 09:45

## 2023-05-02 RX ADMIN — LISINOPRIL 20 MG: 20 TABLET ORAL at 09:45

## 2023-05-02 RX ADMIN — Medication 6 MG: at 20:44

## 2023-05-02 RX ADMIN — PANTOPRAZOLE SODIUM 40 MG: 40 TABLET, DELAYED RELEASE ORAL at 06:34

## 2023-05-02 RX ADMIN — IPRATROPIUM BROMIDE AND ALBUTEROL SULFATE 3 ML: 2.5; .5 SOLUTION RESPIRATORY (INHALATION) at 08:13

## 2023-05-02 RX ADMIN — OXYCODONE HYDROCHLORIDE 10 MG: 10 TABLET ORAL at 22:11

## 2023-05-02 RX ADMIN — BUDESONIDE 500 MCG: 0.5 INHALANT RESPIRATORY (INHALATION) at 08:13

## 2023-05-02 RX ADMIN — WARFARIN SODIUM 10 MG: 5 TABLET ORAL at 19:03

## 2023-05-02 RX ADMIN — CETIRIZINE HYDROCHLORIDE 10 MG: 10 TABLET, FILM COATED ORAL at 09:45

## 2023-05-02 RX ADMIN — ENOXAPARIN SODIUM 80 MG: 100 INJECTION SUBCUTANEOUS at 09:45

## 2023-05-02 RX ADMIN — IPRATROPIUM BROMIDE AND ALBUTEROL SULFATE 3 ML: 2.5; .5 SOLUTION RESPIRATORY (INHALATION) at 16:19

## 2023-05-02 RX ADMIN — IPRATROPIUM BROMIDE 2 SPRAY: 42 SPRAY, METERED NASAL at 09:38

## 2023-05-02 RX ADMIN — Medication 10 ML: at 20:43

## 2023-05-02 RX ADMIN — IPRATROPIUM BROMIDE 2 SPRAY: 42 SPRAY, METERED NASAL at 20:43

## 2023-05-02 RX ADMIN — LEVOTHYROXINE SODIUM 50 MCG: 0.05 TABLET ORAL at 06:34

## 2023-05-02 RX ADMIN — Medication 10 ML: at 10:48

## 2023-05-02 RX ADMIN — IPRATROPIUM BROMIDE AND ALBUTEROL SULFATE 3 ML: 2.5; .5 SOLUTION RESPIRATORY (INHALATION) at 12:11

## 2023-05-02 RX ADMIN — ATORVASTATIN CALCIUM 80 MG: 80 TABLET, FILM COATED ORAL at 09:45

## 2023-05-02 RX ADMIN — IPRATROPIUM BROMIDE AND ALBUTEROL SULFATE 3 ML: 2.5; .5 SOLUTION RESPIRATORY (INHALATION) at 20:14

## 2023-05-02 RX ADMIN — BUDESONIDE 500 MCG: 0.5 INHALANT RESPIRATORY (INHALATION) at 20:15

## 2023-05-02 RX ADMIN — OXYCODONE HYDROCHLORIDE 10 MG: 10 TABLET ORAL at 10:51

## 2023-05-02 RX ADMIN — Medication 10 ML: at 10:47

## 2023-05-02 ASSESSMENT — PAIN - FUNCTIONAL ASSESSMENT: PAIN_FUNCTIONAL_ASSESSMENT: PREVENTS OR INTERFERES SOME ACTIVE ACTIVITIES AND ADLS

## 2023-05-02 ASSESSMENT — PAIN DESCRIPTION - LOCATION
LOCATION: LEG
LOCATION: GENERALIZED

## 2023-05-02 ASSESSMENT — PAIN SCALES - WONG BAKER
WONGBAKER_NUMERICALRESPONSE: 0
WONGBAKER_NUMERICALRESPONSE: 0

## 2023-05-02 ASSESSMENT — PAIN SCALES - GENERAL
PAINLEVEL_OUTOF10: 0
PAINLEVEL_OUTOF10: 7
PAINLEVEL_OUTOF10: 8
PAINLEVEL_OUTOF10: 0
PAINLEVEL_OUTOF10: 2

## 2023-05-02 ASSESSMENT — PAIN DESCRIPTION - ORIENTATION: ORIENTATION: LEFT

## 2023-05-02 ASSESSMENT — PAIN DESCRIPTION - DESCRIPTORS: DESCRIPTORS: ACHING;NAGGING;PENETRATING

## 2023-05-02 NOTE — DISCHARGE INSTRUCTIONS
-Call to schedule a follow up with Dr. Renu Lee for 3 weeks from day of surgery.  -Please call Dr. Marcela gates at [phone] to make an appointment.    -Avoid any heavy lifting more than 5 lbs or strenuous exercise until seen by Dr. Renu Lee for follow up and given permission to do so. -You may walk in moderation.  -Try not to climb up and down steps more than necessary for the first 2 weeks after surgery.  -Keep incision(s) clean and dry. -You may shower beginning 2 weeks from day of surgery.  -When showering, you can let the water run over your incision(s), try not to let the water hit your incision(s) directly. -Do not take a bath, swim or submerge your incision(s) in water until given the okay to do so by Dr. Renu Lee.  -Do not rub your scrub your incision(s), gently cleanse them to keep them clean. -You may pat your incision(s) dry with a towel afterwards. -If you begin to notice clear drainage, you may place a loose dressing over your incision(s). -If you begin to notice any infectious appearing drainage, excessive swelling near your incision(s), fever, chills, nausea or vomiting, please call Dr. Marcela gates immediately.  -For severe pain or pain which is not relieved by pain medication, please call Dr. Marcela gates for further instruction.   -If you have any questions or concerns, please do not hesitate to call Dr. Marcela gates.

## 2023-05-02 NOTE — CARE COORDINATION
Spoke to Willian joshi at [facility], tells me pre cert has been obtained for ARU.  Will inform vascular surgery team.    Electronically signed by Kyra Null RN Case Management on 5/2/2023 at 3:35 PM

## 2023-05-02 NOTE — PLAN OF CARE
Problem: Chronic Conditions and Co-morbidities  Goal: Patient's chronic conditions and co-morbidity symptoms are monitored and maintained or improved  Outcome: Progressing     Problem: Discharge Planning  Goal: Discharge to home or other facility with appropriate resources  Outcome: Progressing  Flowsheets (Taken 5/1/2023 2201)  Discharge to home or other facility with appropriate resources: Identify barriers to discharge with patient and caregiver     Problem: Pain  Goal: Verbalizes/displays adequate comfort level or baseline comfort level  Outcome: Progressing     Problem: Safety - Adult  Goal: Free from fall injury  Outcome: Progressing     Problem: ABCDS Injury Assessment  Goal: Absence of physical injury  Outcome: Progressing     Problem: Skin/Tissue Integrity  Goal: Absence of new skin breakdown  Description: 1. Monitor for areas of redness and/or skin breakdown  2. Assess vascular access sites hourly  3. Every 4-6 hours minimum:  Change oxygen saturation probe site  4. Every 4-6 hours:  If on nasal continuous positive airway pressure, respiratory therapy assess nares and determine need for appliance change or resting period.   Outcome: Progressing

## 2023-05-02 NOTE — PLAN OF CARE
Problem: Chronic Conditions and Co-morbidities  Goal: Patient's chronic conditions and co-morbidity symptoms are monitored and maintained or improved  5/2/2023 0611 by Khadra Davila RN  Outcome: Progressing     Problem: Discharge Planning  Goal: Discharge to home or other facility with appropriate resources  5/2/2023 0611 by Khadra Davila RN  Outcome: Progressing     Problem: Pain  Goal: Verbalizes/displays adequate comfort level or baseline comfort level  5/2/2023 0611 by Khadra Davila RN  Outcome: Progressing     Problem: Safety - Adult  Goal: Free from fall injury  5/2/2023 0611 by Khadra Davila RN  Outcome: Progressing     Problem: ABCDS Injury Assessment  Goal: Absence of physical injury  5/2/2023 0611 by Khadra Davila RN  Outcome: Progressing     Problem: Skin/Tissue Integrity  Goal: Absence of new skin breakdown  Description: 1. Monitor for areas of redness and/or skin breakdown  2. Assess vascular access sites hourly  3. Every 4-6 hours minimum:  Change oxygen saturation probe site  4. Every 4-6 hours:  If on nasal continuous positive airway pressure, respiratory therapy assess nares and determine need for appliance change or resting period.   5/2/2023 5951 by Khadra Davila RN  Outcome: Progressing

## 2023-05-02 NOTE — PLAN OF CARE
Problem: Chronic Conditions and Co-morbidities  Goal: Patient's chronic conditions and co-morbidity symptoms are monitored and maintained or improved  5/2/2023 1120 by Judy Hassan RN  Outcome: Progressing  5/2/2023 0611 by Melisa Freedman RN  Outcome: Progressing     Problem: Discharge Planning  Goal: Discharge to home or other facility with appropriate resources  5/2/2023 1120 by Judy Hassan RN  Outcome: Progressing  5/2/2023 0611 by Melisa Freedman RN  Outcome: Progressing     Problem: Pain  Goal: Verbalizes/displays adequate comfort level or baseline comfort level  5/2/2023 1120 by Judy Hassan RN  Outcome: Progressing  5/2/2023 0611 by Melisa Freedman RN  Outcome: Progressing     Problem: Safety - Adult  Goal: Free from fall injury  5/2/2023 1120 by Judy Hassan RN  Outcome: Progressing  5/2/2023 0611 by Melisa Freedman RN  Outcome: Progressing     Problem: ABCDS Injury Assessment  Goal: Absence of physical injury  5/2/2023 1120 by Judy Hassan RN  Outcome: Progressing  5/2/2023 0611 by Melisa Freedman RN  Outcome: Progressing     Problem: Skin/Tissue Integrity  Goal: Absence of new skin breakdown  Description: 1. Monitor for areas of redness and/or skin breakdown  2. Assess vascular access sites hourly  3. Every 4-6 hours minimum:  Change oxygen saturation probe site  4. Every 4-6 hours:  If on nasal continuous positive airway pressure, respiratory therapy assess nares and determine need for appliance change or resting period.   5/2/2023 1120 by Judy Hassan RN  Outcome: Progressing  5/2/2023 0611 by Melisa Freedman RN  Outcome: Progressing

## 2023-05-03 VITALS
HEART RATE: 112 BPM | RESPIRATION RATE: 21 BRPM | TEMPERATURE: 98.7 F | BODY MASS INDEX: 26.8 KG/M2 | WEIGHT: 176.81 LBS | SYSTOLIC BLOOD PRESSURE: 136 MMHG | HEIGHT: 68 IN | OXYGEN SATURATION: 91 % | DIASTOLIC BLOOD PRESSURE: 93 MMHG

## 2023-05-03 LAB
BACTERIA URNS QL MICRO: NORMAL /HPF
BILIRUB UR QL STRIP.AUTO: NEGATIVE
CLARITY UR: CLEAR
COLOR UR: YELLOW
DEPRECATED RDW RBC AUTO: 15.4 % (ref 12.4–15.4)
EPI CELLS #/AREA URNS AUTO: 1 /HPF (ref 0–5)
GLUCOSE BLD-MCNC: 137 MG/DL (ref 70–99)
GLUCOSE BLD-MCNC: 139 MG/DL (ref 70–99)
GLUCOSE BLD-MCNC: 140 MG/DL (ref 70–99)
GLUCOSE UR STRIP.AUTO-MCNC: NEGATIVE MG/DL
HCT VFR BLD AUTO: 22 % (ref 40.5–52.5)
HGB BLD-MCNC: 7.2 G/DL (ref 13.5–17.5)
HGB UR QL STRIP.AUTO: NEGATIVE
HYALINE CASTS #/AREA URNS AUTO: 2 /LPF (ref 0–8)
INR PPP: 2.16 (ref 0.84–1.16)
KETONES UR STRIP.AUTO-MCNC: NEGATIVE MG/DL
LEUKOCYTE ESTERASE UR QL STRIP.AUTO: NEGATIVE
MCH RBC QN AUTO: 28.5 PG (ref 26–34)
MCHC RBC AUTO-ENTMCNC: 32.9 G/DL (ref 31–36)
MCV RBC AUTO: 86.7 FL (ref 80–100)
NITRITE UR QL STRIP.AUTO: NEGATIVE
PERFORMED ON: ABNORMAL
PH UR STRIP.AUTO: 5.5 [PH] (ref 5–8)
PLATELET # BLD AUTO: 406 K/UL (ref 135–450)
PMV BLD AUTO: 7.4 FL (ref 5–10.5)
PROT UR STRIP.AUTO-MCNC: 30 MG/DL
PROTHROMBIN TIME: 23.9 SEC (ref 11.5–14.8)
RBC # BLD AUTO: 2.53 M/UL (ref 4.2–5.9)
RBC CLUMPS #/AREA URNS AUTO: 1 /HPF (ref 0–4)
SP GR UR STRIP.AUTO: 1.02 (ref 1–1.03)
UA COMPLETE W REFLEX CULTURE PNL UR: ABNORMAL
UA DIPSTICK W REFLEX MICRO PNL UR: YES
URN SPEC COLLECT METH UR: ABNORMAL
UROBILINOGEN UR STRIP-ACNC: 1 E.U./DL
WBC # BLD AUTO: 5 K/UL (ref 4–11)
WBC #/AREA URNS AUTO: 0 /HPF (ref 0–5)

## 2023-05-03 PROCEDURE — 6370000000 HC RX 637 (ALT 250 FOR IP): Performed by: STUDENT IN AN ORGANIZED HEALTH CARE EDUCATION/TRAINING PROGRAM

## 2023-05-03 PROCEDURE — 81001 URINALYSIS AUTO W/SCOPE: CPT

## 2023-05-03 PROCEDURE — 36415 COLL VENOUS BLD VENIPUNCTURE: CPT

## 2023-05-03 PROCEDURE — 94640 AIRWAY INHALATION TREATMENT: CPT

## 2023-05-03 PROCEDURE — 85610 PROTHROMBIN TIME: CPT

## 2023-05-03 PROCEDURE — 85027 COMPLETE CBC AUTOMATED: CPT

## 2023-05-03 RX ORDER — WARFARIN SODIUM 5 MG/1
10 TABLET ORAL
Status: DISCONTINUED | OUTPATIENT
Start: 2023-05-03 | End: 2023-05-03

## 2023-05-03 RX ORDER — OXYCODONE HYDROCHLORIDE 5 MG/1
10 TABLET ORAL EVERY 6 HOURS PRN
Qty: 30 TABLET | Refills: 0 | Status: SHIPPED | OUTPATIENT
Start: 2023-05-03 | End: 2023-05-10

## 2023-05-03 RX ORDER — WARFARIN SODIUM 5 MG/1
10 TABLET ORAL
Status: COMPLETED | OUTPATIENT
Start: 2023-05-03 | End: 2023-05-03

## 2023-05-03 RX ADMIN — OXYCODONE HYDROCHLORIDE 10 MG: 10 TABLET ORAL at 09:19

## 2023-05-03 RX ADMIN — PANTOPRAZOLE SODIUM 40 MG: 40 TABLET, DELAYED RELEASE ORAL at 06:10

## 2023-05-03 RX ADMIN — BUDESONIDE 500 MCG: 0.5 INHALANT RESPIRATORY (INHALATION) at 07:57

## 2023-05-03 RX ADMIN — IPRATROPIUM BROMIDE 2 SPRAY: 42 SPRAY, METERED NASAL at 09:05

## 2023-05-03 RX ADMIN — OXYCODONE HYDROCHLORIDE 10 MG: 10 TABLET ORAL at 16:06

## 2023-05-03 RX ADMIN — LEVOTHYROXINE SODIUM 50 MCG: 0.05 TABLET ORAL at 06:10

## 2023-05-03 RX ADMIN — WARFARIN SODIUM 10 MG: 5 TABLET ORAL at 16:07

## 2023-05-03 RX ADMIN — ATORVASTATIN CALCIUM 80 MG: 80 TABLET, FILM COATED ORAL at 09:16

## 2023-05-03 RX ADMIN — CETIRIZINE HYDROCHLORIDE 10 MG: 10 TABLET, FILM COATED ORAL at 09:16

## 2023-05-03 RX ADMIN — ACETAMINOPHEN 650 MG: 325 TABLET ORAL at 09:16

## 2023-05-03 RX ADMIN — IPRATROPIUM BROMIDE 2 SPRAY: 42 SPRAY, METERED NASAL at 13:05

## 2023-05-03 ASSESSMENT — PAIN DESCRIPTION - ORIENTATION: ORIENTATION: LEFT

## 2023-05-03 ASSESSMENT — PAIN SCALES - GENERAL
PAINLEVEL_OUTOF10: 7
PAINLEVEL_OUTOF10: 7

## 2023-05-03 ASSESSMENT — PAIN DESCRIPTION - LOCATION: LOCATION: LEG

## 2023-05-03 ASSESSMENT — PAIN DESCRIPTION - DESCRIPTORS: DESCRIPTORS: ACHING

## 2023-05-03 NOTE — DISCHARGE SUMMARY
Discharge Summary    Date: 5/3/2023  Patient Name: Adrian Mckenna    YOB: 1960     Age: 59 y.o. Admit Date: 4/25/2023  Discharge Date: 5/3/2023  Discharge Condition: Good    Admission Diagnosis  Critical limb ischemia of left lower extremity ([facility] [I70.222]; Critical lower limb ischemia (HCC) [I70.229]      Discharge Diagnosis  Principal Problem:    Critical lower limb ischemia (HCC)  Active Problems:    Critical limb ischemia of left lower extremity (HCC)  Resolved Problems:    * No resolved hospital problems. [facility] AND [facility] Stay  Narrative of Hospital Course: This is a 59year old male patient who underwent composite vein left femoral to peroneal artery bypass graft 8 days ago. He has been convalescing. He did have post operative anemia which responded to transfusions. He underwent therapy and was approved for ARU status. He is maintained on coumadin for his lower extremity grafts and is therapeutic at this time. Okay and safe for discharge on regular diet and okay for therapy. Consultants:  PHARMACY TO DOSE WARFARIN  IP CONSULT TO PHYSICAL MEDICINE REHAB    Surgeries/procedures Performed:      Treatments:    Surgery        Discharge Plan/Disposition:  Home    Hospital/Incidental Findings Requiring Follow Up:    Patient Instructions:    Diet:    Activity:Activity as Tolerated  For number of days (if applicable): Other Instructions:    Provider Follow-Up:   No follow-ups on file. Significant Diagnostic Studies:    Recent Labs:  Admission on 04/25/2023  No results displayed because visit has over 200 results. ------------    Radiology last 7 days:  XR CHEST PORTABLE    Result Date: 5/2/2023  Tubular structure noted overlying the right lower chest. This is likely exterior to the patient, however there is no lateral film to confirm. Recommend repeat PA and films. Otherwise, no radiographic evidence of acute pulmonary disease.         [unfilled]    Discharge Medications    Current

## 2023-05-03 NOTE — CARE COORDINATION
CASE MANAGEMENT DISCHARGE SUMMARY:    DISCHARGE DATE: 5/3/23    DISCHARGED TO:    Name:   [facility]  [facility] [address] [place] [address]   Report Number:  [phone]  Fax:  [phone]    TRANSPORTATION: [facility]             TIME: 4:15pm    INSURANCE PRECERT OBTAINED: yes    HENS/PASRR COMPLETED: n/a    COMMENTS: Met with patient & spoke to wife over the phone. Agrees to [facility] ARU at [facility]. Elle Quigley at [facility] & ANTOINETTE Cohen aware of dc plan.     Electronically signed by Jacqueline Feliciano RN Case Management on 5/3/2023 at 8:58 AM

## 2023-05-03 NOTE — PROGRESS NOTES
ARU pa remains pending clinical review. Will continue to monitor for changes and update CM as needed.
Clinical Pharmacy Note  Warfarin Consult    Arvind Gloria is a 59 y.o. male receiving warfarin managed by pharmacy. Patient being bridged with LMW heparin. Warfarin Indication: PVD  Target INR range: 2-3   Dose prior to admission: 10 mg daily except 15 mg on M/F - managed by [facility]    Current warfarin drug-drug interactions: none of significance    Recent Labs     04/29/23  0430 04/30/23  0358 05/01/23  0539   HGB 8.3*  --  7.2*   HCT 24.5*  --  21.2*   INR 1.26* 1.40* 1.60*         Assessment/Plan:    Warfarin 15 mg tonight. Patient remains on Lovenox 1 mg/kg SQ Q12H. Daily PT/INR until stable within therapeutic range. Thank you for the consult. Will continue to follow.     Electronically signed by Miguel Cruz, [address] [place] [address] on 5/1/2023 at 9:04 AM
Clinical Pharmacy Note  Warfarin Consult    Karan Hu is a 59 y.o. male receiving warfarin managed by pharmacy. Patient being bridged with LMW heparin. Warfarin Indication: PVD  Target INR range: 2-3   Dose prior to admission: 10 mg daily except 15 mg on M/F - managed by [facility]    Current warfarin drug-drug interactions: none of significance    Recent Labs     04/30/23  0358 05/01/23  0539 05/02/23  0435   HGB  --  7.2*  --    HCT  --  21.2*  --    INR 1.40* 1.60* 1.91*         Assessment/Plan:    Warfarin 10 mg tonight. Patient remains on Lovenox 1 mg/kg SQ Q12H. Daily PT/INR until stable within therapeutic range. Thank you for the consult. Will continue to follow.     Electronically signed by Friddie Ormond, [facility] on 5/2/2023 at 8:39 AM
Clinical Pharmacy Note  Warfarin Consult    Martha Esparza is a 59 y.o. male receiving warfarin managed by pharmacy. Patient being bridged with LMW heparin. Warfarin Indication: PVD  Target INR range: 2-3   Dose prior to admission: 10 mg daily except 15 mg on M/F - managed by [facility]    Current warfarin drug-drug interactions: none of significance    Recent Labs     05/01/23  0539 05/02/23  0435 05/03/23  0610   HGB 7.2*  --  7.2*   HCT 21.2*  --  22.0*   INR 1.60* 1.91* 2.16*         Assessment/Plan:    Warfarin 10 mg tonight. Daily PT/INR until stable within therapeutic range. Thank you for the consult. Will continue to follow.     Electronically signed by Joseph Villalpando, [facility] on 5/3/2023 at 8:09 AM
Comprehensive Nutrition Assessment    Type and Reason for Visit:  Initial, RD Nutrition Re-Screen/LOS    Nutrition Recommendations/Plan:   Continue CCC (4)  Encouraged pt to contact Dietitian should any questions arise regarding diet     Malnutrition Assessment:  Malnutrition Status:  No malnutrition (05/03/23 1423)    Context:  Acute Illness         Nutrition Assessment:    Screened for LOS. PMH includes Trach (throat cancer), DM, HLD, Fibromyalgia. Pt adm r/t critical lower limb ischemia. Pt underwent revascularization surgery to LLE on 4/25/23. Diet adv to [address] (4) and pt reported tolerating diet well. Denied ed needs per feedback. Noted that pt is scheduled to discharge to rehab unit at [facility] this date. Nutrition Related Findings:    Labs reviewed. Noted BM on 5/1. Noted non-pitting edema to RLE. Wound Type: Multiple, Wound Vac, Surgical Incision (altered vascular areas and SIs on BLE. Noted wound vac to LLE.)       Current Nutrition Intake & Therapies:    Average Meal Intake: %     ADULT DIET; Regular; 4 carb choices (60 gm/meal)    Anthropometric Measures:  Height: 5' 8\" (172.7 cm)  Ideal Body Weight (IBW): 154 lbs (70 kg)    Admission Body Weight: 175 lb (79.4 kg)  Current Body Weight: 177 lb (80.3 kg),   IBW. Weight Source: Bed Scale  Current BMI (kg/m2): 26.9                          BMI Categories: Overweight (BMI 25.0-29. 9)        Nutrition Diagnosis:   No nutrition diagnosis at this time r    Nutrition Interventions:   Food and/or Nutrient Delivery: Continue Current Diet  Nutrition Education/Counseling: Education declined  Coordination of Nutrition Care: Continue to monitor while inpatient       Goals:     Goals: PO intake 50% or greater       Nutrition Monitoring and Evaluation:   Behavioral-Environmental Outcomes: None Identified  Food/Nutrient Intake Outcomes: Food and Nutrient Intake  Physical Signs/Symptoms Outcomes: Biochemical Data, Constipation, Diarrhea, Fluid Status or Edema, Weight,
[facility]  Progress Note    5/2/2023 11:00 AM    Chief Complaint: LLE Critical Limb Ischemia     POD #7 Left Common Femoral to Peroneal Artery Bypass with Vein [place] with Dr. Yaya Cook    Subjective: Pt seen resting in bed, reports he is feeling well, denies subjective fevers or chills, no complaints, dressings remains in place     Vital Signs:  Vitals:    05/02/23 0428 05/02/23 0813 05/02/23 0945 05/02/23 1051   BP:       Pulse: (!) 101  (!) 114    Resp: 20   16   Temp:       TempSrc:  Oral     SpO2: 93%      Weight:       Height:           I/O:    Intake/Output Summary (Last 24 hours) at 5/2/2023 1100  Last data filed at 5/1/2023 1643  Gross per 24 hour   Intake --   Output 800 ml   Net -800 ml         Physical Exam:   General: no apparent distress, alert and oriented, T-max 101.1 over the past 24 hours  Chest/Lungs: non labored breathing no tachypnea, retractions or cyanosis, tracheostomy  Cardiac: Heart regular rate and rhythm - mildly tachycardic  Extremities:   -LLE - sensation intact to toes, motor function intact to toes and foot, dressings remain in place  Vascular: extremities warm and well perfused, no signs of cyanosis or ischemia    Wounds:   Left Medial Ankle Wound - Photo taken 4/26/2023      Left Lateral Ankle Wound - Photo taken 4/26/2023        Labs:   Lab Results   Component Value Date/Time     05/01/2023 01:00 PM    K 4.3 05/01/2023 01:00 PM    K 3.4 04/28/2023 04:00 AM    CL 99 05/01/2023 01:00 PM    CO2 22 05/01/2023 01:00 PM    BUN 9 05/01/2023 01:00 PM    CREATININE 0.7 05/01/2023 01:00 PM    GFRAA >60 05/05/2022 01:39 PM    GFRAA >60 03/29/2010 01:01 PM    LABGLOM >60 05/01/2023 01:00 PM    GLUCOSE 135 05/01/2023 01:00 PM    PHOS 3.8 03/26/2022 10:40 PM    MG 1.80 04/28/2023 04:00 AM    CALCIUM 8.5 05/01/2023 01:00 PM     Lab Results   Component Value Date/Time    WBC 5.1 05/01/2023 05:39 AM    RBC 2.43 05/01/2023 05:39 AM    HGB 7.2 05/01/2023 05:39 AM
Occupational Therapy  Facility/Department: [address] PROGRESSIVE CARE  Occupational Therapy Daily Note    Name: Dione Jung  : 1960  MRN: [id number]  Date of Service: 2023    Discharge Recommendations:  Patient would benefit from continued therapy after discharge, 5-7 sessions per week (Pt requesting [facility] for rehab)  OT Equipment Recommendations  Other: defer to [facility] Shailesh scored a 15/24 on the AM-PAC ADL Inpatient form. Current research shows that an AM-PAC score of 17 or less is typically not associated with a discharge to the patient's home setting. Based on the patient's AM-PAC score and their current ADL deficits, it is recommended that the patient have 5-7 sessions per week of Occupational Therapy at d/c to increase the patient's independence. At this time, this patient demonstrates complex nursing, medical, and rehabilitative needs, and would benefit from intensive rehabilitation services upon discharge from the Inpatient setting. This patient demonstrates the ability to participate in and benefit from an intensive therapy program with a coordinated interdisciplinary team approach to foster frequent, structured, and documented communication among disciplines, who will work together to establish, prioritize, and achieve treatment goals. Please see assessment section for further patient specific details. If patient discharges prior to next session this note will serve as a discharge summary. Please see below for the latest assessment towards goals. Patient Diagnosis(es): There were no encounter diagnoses. Past Medical History:  has a past medical history of Acquired hypothyroidism, Cancer ([address] Diabetes mellitus ([address] Fibromyalgia, History of DVT (deep vein thrombosis), Hyperlipidemia, Hypertension, and Type 2 diabetes mellitus with circulatory disorder, without long-term current use of insulin ([address]   Past Surgical History:  has a past surgical history that includes
PA obtained for admission to ARU however patient has decided he would like to go elsewhere. Called availity at [phone] to cancel auth without success as they could not help me but transferred me to [phone] at [facility] for assist. They were unable to hep but gave the number [phone] but they were unable to help as well. Called [phone] however it is an automated line and because the members ID doesn't begin with 3 letters they could not help me and the line was disconnected. Called [phone] and spoke with Provider support and was given [phone] to call to void PA. Spoke to Lawanda who has voided the PA, reference number [id number].
Patient resting in bed, alert and oriented. Patient c/o pain 8/10, PRN pain medication given with some relief. Trach clean and dry with speaking valve in place. Left lower leg dressing in place, clean, dry and intact. Patient safe with call light in reach and bed alarm on for safety.
Physical Therapy  Facility/Department: [address] PROGRESSIVE CARE  Physical Therapy Treatment Note    Name: Shruthi Alarcon  : 1960  MRN: [id number]  Date of Service: 2023    Discharge Recommendations:  Continue to assess pending progress, IP Rehab Guido Lick per pt/family.)   PT Equipment Recommendations  Other: Will monitor for potential equipt needs. Shruthi Alarcon scored a 16/24 on the AM-PAC short mobility form. Current research shows that an AM-PAC score of 17 or less is typically not associated with a discharge to the patient's home setting. Based on the patient's AM-PAC score and their current functional mobility deficits, it is recommended that the patient have 5-7 sessions per week of Physical Therapy at d/c to increase the patient's independence. At this time, this patient demonstrates complex nursing, medical, and rehabilitative needs, and would benefit from intensive rehabilitation services upon discharge from the Inpatient setting. This patient demonstrates the ability to participate in and benefit from an intensive therapy program with a coordinated interdisciplinary team approach to foster frequent, structured, and documented communication among disciplines, who will work together to establish, prioritize, and achieve treatment goals. Please see assessment section for further patient specific details. Assessment   Body Structures, Functions, Activity Limitations Requiring Skilled Therapeutic Intervention: Decreased functional mobility ; Decreased tolerance to work activity; Decreased strength;Decreased endurance; Increased pain  Assessment: 58 y/o male admit 2023 with Critical Limb Ischemia. 2023 S/P [place] L GSV and R GSV; L Common Fem to Peroneal Artery Bypass Graft; Partial Fibulectomy for window for Bypass Grafting. PMH as noted including COPD, Emphysema, A-Fib, Larynx Ca/Surg (Tracheotomy 3/2022), PAD, LE Vasc Surg.   [facility]
Physical Therapy  Facility/Department: [address] PROGRESSIVE CARE  Physical Therapy Treatment Note    Name: Amy Greer  : 1960  MRN: [id number]  Date of Service: 2023    Discharge Recommendations:  Continue to assess pending progress, IP Rehab Thed[facility] Bun per pt/family.)   PT Equipment Recommendations  Other: Will monitor for potential equipt needs. Amy Greer scored a 16/24 on the AM-PAC short mobility form. Current research shows that an AM-PAC score of 17 or less is typically not associated with a discharge to the patient's home setting. Based on the patient's AM-PAC score and their current functional mobility deficits, it is recommended that the patient have 5-7 sessions per week of Physical Therapy at d/c to increase the patient's independence. At this time, this patient demonstrates complex nursing, medical, and rehabilitative needs, and would benefit from intensive rehabilitation services upon discharge from the Inpatient setting. This patient demonstrates the ability to participate in and benefit from an intensive therapy program with a coordinated interdisciplinary team approach to foster frequent, structured, and documented communication among disciplines, who will work together to establish, prioritize, and achieve treatment goals. Please see assessment section for further patient specific details. If patient discharges prior to next session this note will serve as a discharge summary. Please see below for the latest assessment towards goals. Assessment   Body Structures, Functions, Activity Limitations Requiring Skilled Therapeutic Intervention: Decreased functional mobility ; Decreased tolerance to work activity; Decreased strength;Decreased endurance; Increased pain  Assessment: 58 y/o male admit 2023 with Critical Limb Ischemia. 2023 S/P [place] L GSV and R GSV; L Common Fem to Peroneal Artery Bypass Graft; Partial Fibulectomy for window for Bypass Grafting.   PMH as
Right chest port difficult to flush at this time. Blood return sluggish. Another RN to attempt replacing needle.
Plan:  -PT/OT  -SubQ Lovenox 1 mg/kg with bridge to Coumadin until INR Therapeutic - pharmacy consulted to dose   -PRN Suppository for constipation   -IV Lasix x1 for mild LLE swelling  -Okay for D/C once plans finalized - pt states he would like to go to [facility] for ongoing Rehab after D/C - d/w social work    All pertinent information and plan of care discussed with Dr. Antonio Bynum. All questions and concerns were addressed with the patient. I have discussed the above stated plan with the patient and the nurse. The patient verbalized understanding and agreed with the plan.     Thank you for allowing to us to participate in the care of Silas Davis      Electronically signed by HOLGER Solomon CNP on 5/1/2023 at 10:53 AM

## 2023-05-03 NOTE — PLAN OF CARE
Problem: Chronic Conditions and Co-morbidities  Goal: Patient's chronic conditions and co-morbidity symptoms are monitored and maintained or improved  5/2/2023 2105 by Connie Angeles RN  Outcome: Progressing      Problem: Discharge Planning  Goal: Discharge to home or other facility with appropriate resources  5/2/2023 2105 by Connie Angeles RN  Outcome: Progressing         Problem: Pain  Goal: Verbalizes/displays adequate comfort level or baseline comfort level  5/2/2023 2105 by Connie Angeles RN  Outcome: Progressing         Problem: Safety - Adult  Goal: Free from fall injury  5/2/2023 2105 by Connie Angeles RN  Outcome: Progressing         Problem: ABCDS Injury Assessment  Goal: Absence of physical injury  5/2/2023 2105 by Connie Angeles RN  Outcome: Progressing         Problem: Skin/Tissue Integrity  Goal: Absence of new skin breakdown  Description: 1. Monitor for areas of redness and/or skin breakdown  2. Assess vascular access sites hourly  3. Every 4-6 hours minimum:  Change oxygen saturation probe site  4. Every 4-6 hours:  If on nasal continuous positive airway pressure, respiratory therapy assess nares and determine need for appliance change or resting period.   5/2/2023 2105 by Connie Angeles RN  Outcome: Progressing

## 2023-05-10 ENCOUNTER — TELEPHONE (OUTPATIENT)
Dept: PHARMACY | Age: 63
End: 2023-05-10

## 2023-05-10 NOTE — TELEPHONE ENCOUNTER
Spoke with Leo Ogden,  he is still in the hospital at [facility]. Will follow along. Advised him to call once he is discharged to schedule a f/u. He agrees. Angel Montoya, PharmD, [address] [place] [address]  [phone]    For Pharmacy Admin Tracking Only    Intervention Detail:   Total # of Interventions Recommended:    Total # of Interventions Accepted:   Time Spent (min): 5

## 2023-05-15 RX ORDER — DOCUSATE SODIUM 50 MG AND SENNOSIDES 8.6 MG 8.6; 5 MG/1; MG/1
TABLET, FILM COATED ORAL
Qty: 60 TABLET | Refills: 0 | Status: SHIPPED | OUTPATIENT
Start: 2023-05-15

## 2023-05-15 NOTE — TELEPHONE ENCOUNTER
Last OV: 4/18/2023  Next OV: 8/9/2023    Next appointment due:around 8/8/2023).     Last fill:4/17/23  Refills:0

## 2023-05-18 ENCOUNTER — OFFICE VISIT (OUTPATIENT)
Dept: VASCULAR SURGERY | Age: 63
End: 2023-05-18

## 2023-05-18 DIAGNOSIS — Z09 POSTOP CHECK: Primary | ICD-10-CM

## 2023-05-18 PROCEDURE — 99024 POSTOP FOLLOW-UP VISIT: CPT | Performed by: STUDENT IN AN ORGANIZED HEALTH CARE EDUCATION/TRAINING PROGRAM

## 2023-05-18 NOTE — DISCHARGE INSTRUCTIONS
[address], [address] [facility] [address]  Telephone: [phone]     Discharge Instructions     Important reminders:     **If you have any signs and symptoms of illness (Cough, fever, congestion, nausea, vomiting, diarrhea, etc.) please call the wound care center prior to your appointment. 1. Increase Protein intake for optimal wound healing  2. No added salt to reduce any swelling  3. If diabetic, maintain good glucose control  4. If you smoke, smoking prohibits wound healing, we ask that you refrain from smoking. 5. When taking antibiotics take the entire prescription as ordered. Do not stop taking until medication is all gone unless otherwise instructed. 6. Exercise as tolerated. 7. Keep weight off wounds and reposition every 2 hours if applicable. 8. If wound(s) is on your lower extremity, elevate legs to the level of the heart or above for 30 minutes 4-5 times a day and/or when sitting. Avoid standing for long periods of time. 9. Do not get wounds wet in bath or shower unless otherwise instructed by your physician. If your wound is on your foot or leg, you may purchase a cast bag. Please ask at the pharmacy. If Vascular testing is ordered, please call [address] [place] [address] [phone]) to schedule. Vascular tests ordered by Wound Care Physicians may take up to 2 hours to complete. Please keep that in mind when scheduling. If Vascular testing is scheduled, please bring supplies to replace your dressing after testing is done. The vascular department does not stock supplies. Wound: Right and Left leg     With each dressing change, rinse wounds with 0.9% Saline. (May use wound wash or soft contact solution. Both can be purchased at a local drug store). If unable to obtain saline, may use a gentle soap and water. Dressing care:  Leg wounds (surgical) gentamicin cream, 4x4.  Left ankle wounds- Gentamicin cream and triad  Optilock, kerlix, 6 inch
Yes...

## 2023-05-18 NOTE — PROGRESS NOTES
Jon Gatica (:  1960) is a 59 y.o. male,Established patient, here for evaluation of the following chief complaint(s):  Post-Op Check         ASSESSMENT/PLAN:  1. Postop check    This is a 59year old male patient s/p complex tibial artery revascularization for critical limb ischemia. Continue with daily dressing changes. Recommend betadine to distal calf incisions to dry out. Will need to continue with daily compression. Follow up in 2 weeks. All questions or concerns. Subjective   SUBJECTIVE/OBJECTIVE:  This is a 59year old male patient who presents to the office for follow up after left femoral to peroneal artery bypass with spliced vein for critical limb ischemia. Overall doing well. Pain controlled. Some drainage from lower leg incisions but otherwise managing. States that wound care is satisfied with progress. Objective   Physical Exam  Cardiovascular:      Rate and Rhythm: Normal rate and regular rhythm. Comments: Palp graft pulse  Pulmonary:      Effort: Pulmonary effort is normal. No respiratory distress. Musculoskeletal:      Left lower leg: Edema present. Skin:     General: Skin is warm and dry. Comments: Majority of incisions have healed. Lower half of calf incisions with slight separation but no significant appreciable undermining, draining clear fluid   Neurological:      Mental Status: He is alert.           Cy Spivey DO, FACS, FSVS, [address] [facility]

## 2023-05-19 ENCOUNTER — TELEPHONE (OUTPATIENT)
Dept: INTERNAL MEDICINE CLINIC | Age: 63
End: 2023-05-19

## 2023-05-19 ENCOUNTER — HOSPITAL ENCOUNTER (OUTPATIENT)
Dept: WOUND CARE | Age: 63
Discharge: HOME | End: 2023-05-19
Payer: MEDICAID

## 2023-05-19 VITALS
TEMPERATURE: 96.6 F | RESPIRATION RATE: 16 BRPM | DIASTOLIC BLOOD PRESSURE: 66 MMHG | SYSTOLIC BLOOD PRESSURE: 100 MMHG | HEART RATE: 82 BPM

## 2023-05-19 DIAGNOSIS — L29.9 ITCHING: ICD-10-CM

## 2023-05-19 DIAGNOSIS — I70.243 ATHEROSCLEROSIS OF NATIVE ARTERY OF LEFT LOWER EXTREMITY WITH ULCERATION OF ANKLE (HCC): ICD-10-CM

## 2023-05-19 DIAGNOSIS — I73.9 PERIPHERAL ARTERY DISEASE (HCC): Primary | ICD-10-CM

## 2023-05-19 DIAGNOSIS — L97.323 NON-PRESSURE CHRONIC ULCER OF LEFT ANKLE WITH NECROSIS OF MUSCLE (HCC): ICD-10-CM

## 2023-05-19 PROCEDURE — 11043 DBRDMT MUSC&/FSCA 1ST 20/<: CPT

## 2023-05-19 PROCEDURE — 11046 DBRDMT MUSC&/FSCA EA ADDL: CPT

## 2023-05-19 RX ORDER — LIDOCAINE HYDROCHLORIDE 20 MG/ML
JELLY TOPICAL ONCE
OUTPATIENT
Start: 2023-05-19 | End: 2023-05-19

## 2023-05-19 RX ORDER — CETIRIZINE HYDROCHLORIDE 10 MG/1
10 TABLET ORAL DAILY
Qty: 30 TABLET | Refills: 0 | Status: SHIPPED | OUTPATIENT
Start: 2023-05-19 | End: 2023-06-18

## 2023-05-19 RX ORDER — BACITRACIN ZINC AND POLYMYXIN B SULFATE 500; 1000 [USP'U]/G; [USP'U]/G
OINTMENT TOPICAL ONCE
OUTPATIENT
Start: 2023-05-19 | End: 2023-05-19

## 2023-05-19 RX ORDER — BACITRACIN, NEOMYCIN, POLYMYXIN B 400; 3.5; 5 [USP'U]/G; MG/G; [USP'U]/G
OINTMENT TOPICAL ONCE
OUTPATIENT
Start: 2023-05-19 | End: 2023-05-19

## 2023-05-19 RX ORDER — LIDOCAINE 40 MG/G
CREAM TOPICAL ONCE
OUTPATIENT
Start: 2023-05-19 | End: 2023-05-19

## 2023-05-19 RX ORDER — BETAMETHASONE DIPROPIONATE 0.05 %
OINTMENT (GRAM) TOPICAL ONCE
OUTPATIENT
Start: 2023-05-19 | End: 2023-05-19

## 2023-05-19 RX ORDER — GINSENG 100 MG
CAPSULE ORAL ONCE
OUTPATIENT
Start: 2023-05-19 | End: 2023-05-19

## 2023-05-19 RX ORDER — CLOBETASOL PROPIONATE 0.5 MG/G
OINTMENT TOPICAL ONCE
OUTPATIENT
Start: 2023-05-19 | End: 2023-05-19

## 2023-05-19 RX ORDER — DIPHENHYDRAMINE HCL 25 MG/1
TABLET ORAL
Qty: 60 TABLET | Refills: 0 | Status: SHIPPED | OUTPATIENT
Start: 2023-05-19

## 2023-05-19 RX ORDER — LIDOCAINE 50 MG/G
OINTMENT TOPICAL ONCE
OUTPATIENT
Start: 2023-05-19 | End: 2023-05-19

## 2023-05-19 RX ORDER — GENTAMICIN SULFATE 1 MG/G
CREAM TOPICAL
Qty: 30 G | Refills: 2 | Status: SHIPPED | OUTPATIENT
Start: 2023-05-19

## 2023-05-19 RX ORDER — GENTAMICIN SULFATE 1 MG/G
OINTMENT TOPICAL ONCE
OUTPATIENT
Start: 2023-05-19 | End: 2023-05-19

## 2023-05-19 RX ORDER — LIDOCAINE 40 MG/G
CREAM TOPICAL ONCE
Status: DISCONTINUED | OUTPATIENT
Start: 2023-05-19 | End: 2023-05-20 | Stop reason: HOSPADM

## 2023-05-19 RX ORDER — LIDOCAINE HYDROCHLORIDE 40 MG/ML
SOLUTION TOPICAL ONCE
OUTPATIENT
Start: 2023-05-19 | End: 2023-05-19

## 2023-05-19 NOTE — PROGRESS NOTES
[facility]   Progress Note and Procedure Note      Blessing Phipps  AGE: 59 y.o. GENDER: male  : 1960  TODAY'S DATE:  2023    Subjective:     Chief Complaint   Patient presents with    Wound Check     Left lower leg             HISTORY of PRESENT ILLNESS HPI     Blessing Phipps is a 59 y.o. male who presents today for wound evaluation. History of Wound: Admits to having chronic wounds for years. He states that he has had arterial bypasses on both of his legs in the past.  He was seeing previous wound care and podiatry for these wounds. He states that his insurance had some issues and he has not been seeing anyone since 2021. He is going to get home care. He has been recently discharged from rehab. He had his bypass of the left leg since his last visit. He states the leg feels much better. He would like to continue wound care treatment.   Wound Pain:  intermittent left  Severity:  5 / 10   Wound Type:  venous, arterial and diabetic  Modifying Factors:  edema, venous stasis, lymphedema, diabetes, decreased mobility, arterial insufficiency and decreased tissue oxygenation  Associated Signs/Symptoms:  drainage, numbness and odor        PAST MEDICAL HISTORY        Diagnosis Date    Acquired hypothyroidism 2023    Cancer ([facility] [address]     throat    Diabetes mellitus ([facility] [facility]     Fibromyalgia     History of DVT (deep vein thrombosis)     Hyperlipidemia     Hypertension     Type 2 diabetes mellitus with circulatory disorder, without long-term current use of insulin ([facility] [facility] 2022       PAST SURGICAL HISTORY    Past Surgical History:   Procedure Laterality Date    APPENDECTOMY  2005    COLONOSCOPY  2016    FEMORAL BYPASS Left 2020    femoral posterior tibial bypass    FEMORAL BYPASS Left 2023    LEFT FEMORAL PERINEAL ARTERY BYPASS WITH SPLICED COMPOSITE SAPHENOUS VEIN GRAFT WITH PARTIAL FIBIALECTOMY performed by Jennifer Morris DO at [facility]

## 2023-05-19 NOTE — PLAN OF CARE
[address] [facility]:      [address] f: [phone] f: [phone] p: [phone] [email or web address]     Ordering Center: [facility] [address]  [place] [address]  [phone]  Dept: [phone]   Fax# [phone]    Patient Information:      Beto Blevins [facility]   [phone]   : 1960  AGE: 59 y.o. GENDER: male   TODAYS DATE:  2023    Insurance:      PRIMARY INSURANCE:  Plan[facility]  Coverage: [facility]  Effective Date: 2023  Group Number: [unfilled]  Subscriber Number: [id number] - (Medicaid Managed)    Payer/Plan Subscr  Sex Relation Sub. Ins. ID Effective Group Num   1. 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 9) E66.3    Tracheostomy dependent (HCC) Z93.0    Decubitus ulcer of heel, bilateral L89.619, L89.629    Larynx cancer (HCC) C32.9    S/P IVC filter Z95.828    Tinea pedis of right foot B35.3    Acquired hypothyroidism E03.9    Critical limb ischemia of left lower extremity (HCC) I70.222    Critical lower limb ischemia (HCC) I70.229       WOUNDS REQUIRING DRESSING
Discharge instructions given. Patient verbalized understanding. Return to [facility] in 1 week(s). Pt unsure of home care company.  Will call back with name
saline, may use a gentle soap and water.     Dressing care:  Leg wounds (surgical) gentamicin cream, 4x4. Left ankle wounds- Gentamicin cream and triad  Optilock, kerlix, 6 inch ace to knee. Change every other day. Home care Monday and Wednesday    Nails Clipped 1/13/2023      Dr Chapin, Vascular Surgeon September 14th at 1215pm  [address] , [address]  Phone# [phone]  Fax# [phone]             Home Care Agency/Facility: [facility]     Your wound-care supplies will be provided by:  Please note, depending on your insurance coverage, you may have out-of-pocket expenses for these supplies. Someone from the company should call you to confirm your order and discuss those potential costs before they ship your products -- please anticipate that call. If your out-of-pocket cost could be substantial, Many companies have financial hardship programs for patients who qualify, so please ask about that if you might need a hand. If you have any questions about your supplies or your potential out-of-pocket costs, or if you need to place an order for a refill of supplies (typically monthly), please call the company directly.     Your  is Yenny     Follow up with Dr Agosto in 1 weeks.         Wound Care Center Information: Should you experience any significant changes in your wound(s) or have questions about your wound care, please contact the [facility] at [phone] Monday  - Thursday 8:00 am - 4:00 pm and Friday 8:00 am - 1:00pm. If you need help with your wound outside these hours and cannot wait until we are again available, contact your PCP or go to the hospital emergency room.     PLEASE NOTE: IF YOU ARE UNABLE TO OBTAIN WOUND SUPPLIES, CONTINUE TO USE THE SUPPLIES YOU HAVE AVAILABLE UNTIL YOU ARE ABLE TO REACH US. IT IS MOST IMPORTANT TO KEEP THE WOUND COVERED AT ALL TIMES.        Skilled nurse to evaluate and treat for wound care. Change dressing as ordered  once a day on

## 2023-05-22 ENCOUNTER — OFFICE VISIT (OUTPATIENT)
Dept: INTERNAL MEDICINE CLINIC | Age: 63
End: 2023-05-22
Payer: MEDICAID

## 2023-05-22 ENCOUNTER — TELEPHONE (OUTPATIENT)
Dept: INTERNAL MEDICINE CLINIC | Age: 63
End: 2023-05-22

## 2023-05-22 VITALS
WEIGHT: 167.8 LBS | HEIGHT: 68 IN | BODY MASS INDEX: 25.43 KG/M2 | DIASTOLIC BLOOD PRESSURE: 62 MMHG | OXYGEN SATURATION: 98 % | HEART RATE: 105 BPM | SYSTOLIC BLOOD PRESSURE: 102 MMHG

## 2023-05-22 DIAGNOSIS — I70.229 CRITICAL LOWER LIMB ISCHEMIA (HCC): ICD-10-CM

## 2023-05-22 DIAGNOSIS — I70.222 ATHEROSCLEROSIS OF NATIVE ARTERIES OF EXTREMITIES WITH REST PAIN, LEFT LEG (HCC): ICD-10-CM

## 2023-05-22 DIAGNOSIS — I70.222 CRITICAL LIMB ISCHEMIA OF LEFT LOWER EXTREMITY (HCC): ICD-10-CM

## 2023-05-22 DIAGNOSIS — L89.619 DECUBITUS ULCER OF HEEL, BILATERAL: ICD-10-CM

## 2023-05-22 DIAGNOSIS — I87.2 VENOUS INSUFFICIENCY: ICD-10-CM

## 2023-05-22 DIAGNOSIS — Z09 HOSPITAL DISCHARGE FOLLOW-UP: Primary | ICD-10-CM

## 2023-05-22 DIAGNOSIS — Z93.0 TRACHEOSTOMY DEPENDENT (HCC): ICD-10-CM

## 2023-05-22 DIAGNOSIS — L97.909 VENOUS ULCER (HCC): ICD-10-CM

## 2023-05-22 DIAGNOSIS — E11.69 TYPE 2 DIABETES MELLITUS WITH OTHER SPECIFIED COMPLICATION, WITHOUT LONG-TERM CURRENT USE OF INSULIN (HCC): ICD-10-CM

## 2023-05-22 DIAGNOSIS — I83.009 VENOUS ULCER (HCC): ICD-10-CM

## 2023-05-22 DIAGNOSIS — I73.9 PERIPHERAL ARTERY DISEASE (HCC): ICD-10-CM

## 2023-05-22 DIAGNOSIS — L97.323 NON-PRESSURE CHRONIC ULCER OF LEFT ANKLE WITH NECROSIS OF MUSCLE (HCC): ICD-10-CM

## 2023-05-22 DIAGNOSIS — L89.629 DECUBITUS ULCER OF HEEL, BILATERAL: ICD-10-CM

## 2023-05-22 PROCEDURE — 1111F DSCHRG MED/CURRENT MED MERGE: CPT | Performed by: NURSE PRACTITIONER

## 2023-05-22 PROCEDURE — 3044F HG A1C LEVEL LT 7.0%: CPT | Performed by: NURSE PRACTITIONER

## 2023-05-22 PROCEDURE — 3078F DIAST BP <80 MM HG: CPT | Performed by: NURSE PRACTITIONER

## 2023-05-22 PROCEDURE — 99214 OFFICE O/P EST MOD 30 MIN: CPT | Performed by: NURSE PRACTITIONER

## 2023-05-22 PROCEDURE — 3074F SYST BP LT 130 MM HG: CPT | Performed by: NURSE PRACTITIONER

## 2023-05-22 RX ORDER — OXYCODONE HYDROCHLORIDE 5 MG/1
TABLET ORAL
COMMUNITY
Start: 2023-05-15

## 2023-05-22 RX ORDER — BLOOD-GLUCOSE METER
KIT MISCELLANEOUS
Qty: 1 KIT | Refills: 0 | Status: SHIPPED | OUTPATIENT
Start: 2023-05-22

## 2023-05-22 RX ORDER — POLYETHYLENE GLYCOL 3350 17 G/17G
POWDER, FOR SOLUTION ORAL DAILY
COMMUNITY
Start: 2022-06-10

## 2023-05-22 RX ORDER — FERROUS SULFATE 325(65) MG
1 TABLET ORAL DAILY
COMMUNITY
Start: 2023-05-15

## 2023-05-22 RX ORDER — PANTOPRAZOLE SODIUM 40 MG/1
TABLET, DELAYED RELEASE ORAL
COMMUNITY
Start: 2023-05-15

## 2023-05-22 RX ORDER — LEVOTHYROXINE SODIUM 0.05 MG/1
TABLET ORAL
COMMUNITY
Start: 2023-05-15

## 2023-05-22 RX ORDER — BUDESONIDE AND FORMOTEROL FUMARATE DIHYDRATE 160; 4.5 UG/1; UG/1
2 AEROSOL RESPIRATORY (INHALATION) 2 TIMES DAILY
COMMUNITY
Start: 2022-12-13

## 2023-05-22 RX ORDER — LIDOCAINE AND PRILOCAINE 25; 25 MG/G; MG/G
CREAM TOPICAL
COMMUNITY
Start: 2023-04-20

## 2023-05-22 NOTE — PROGRESS NOTES
for PVD    PORTACATH PLACEMENT      \"power port\" right upper chest    TRACHEOSTOMY N/A 2022    TRACHEOTOMY performed by True Alvarado DO at [address] History    Marital status:      Spouse name: Not on file    Number of children: Not on file    Years of education: Not on file    Highest education level: Not on file   Occupational History    Not on file   Tobacco Use    Smoking status: Former     Packs/day: 0.50     Years: 20.00     Pack years: 10.00     Types: Cigarettes     Start date: 1977     Quit date: 2021     Years since quittin.4    Smokeless tobacco: Never   Vaping Use    Vaping Use: Never used   Substance and Sexual Activity    Alcohol use: Yes     Comment: 2 beers / day     Drug use: Never    Sexual activity: Yes     Partners: Female   Other Topics Concern    Not on file   Social History Narrative    Not on file     Social Determinants of Health     Financial Resource Strain: Low Risk     Difficulty of Paying Living Expenses: Not hard at all   Food Insecurity: No Food Insecurity    Worried About [address] in the Last Year: Never true    [facility] in the Last Year: Never true   Transportation Needs: Unknown    Lack of Transportation (Medical): Not on file    Lack of Transportation (Non-Medical):  No   Physical Activity: Not on file   Stress: Not on file   Social Connections: Not on file   Intimate Partner Violence: Not on file   Housing Stability: Unknown    Unable to Pay for Housing in the Last Year: Not on file    Number of Places Lived in the Last Year: Not on file    Unstable Housing in the Last Year: No       Family History   Problem Relation Age of Onset    Cancer Mother     Lung Cancer Mother     Diabetes Father     Stroke Father        Current Outpatient Medications   Medication Sig Dispense Refill    pantoprazole (PROTONIX) 40 MG tablet TAKE 1 TABLET BY MOUTH BEFORE BREAKFAST      levothyroxine (SYNTHROID) 50 MCG

## 2023-05-26 ENCOUNTER — TELEPHONE (OUTPATIENT)
Dept: INTERNAL MEDICINE CLINIC | Age: 63
End: 2023-05-26

## 2023-05-26 ENCOUNTER — HOSPITAL ENCOUNTER (OUTPATIENT)
Dept: WOUND CARE | Age: 63
Discharge: HOME | End: 2023-05-26
Payer: MEDICAID

## 2023-05-26 VITALS
HEART RATE: 113 BPM | SYSTOLIC BLOOD PRESSURE: 117 MMHG | RESPIRATION RATE: 16 BRPM | DIASTOLIC BLOOD PRESSURE: 73 MMHG | TEMPERATURE: 96.8 F

## 2023-05-26 DIAGNOSIS — I73.9 PERIPHERAL ARTERY DISEASE (HCC): Primary | ICD-10-CM

## 2023-05-26 PROCEDURE — 11043 DBRDMT MUSC&/FSCA 1ST 20/<: CPT

## 2023-05-26 PROCEDURE — 11046 DBRDMT MUSC&/FSCA EA ADDL: CPT

## 2023-05-26 RX ORDER — CLOBETASOL PROPIONATE 0.5 MG/G
OINTMENT TOPICAL ONCE
OUTPATIENT
Start: 2023-05-26 | End: 2023-05-26

## 2023-05-26 RX ORDER — LIDOCAINE HYDROCHLORIDE 20 MG/ML
JELLY TOPICAL ONCE
OUTPATIENT
Start: 2023-05-26 | End: 2023-05-26

## 2023-05-26 RX ORDER — GINSENG 100 MG
CAPSULE ORAL ONCE
OUTPATIENT
Start: 2023-05-26 | End: 2023-05-26

## 2023-05-26 RX ORDER — LIDOCAINE 40 MG/G
CREAM TOPICAL ONCE
OUTPATIENT
Start: 2023-05-26 | End: 2023-05-26

## 2023-05-26 RX ORDER — LIDOCAINE 40 MG/G
CREAM TOPICAL ONCE
Status: DISCONTINUED | OUTPATIENT
Start: 2023-05-26 | End: 2023-05-27 | Stop reason: HOSPADM

## 2023-05-26 RX ORDER — LIDOCAINE HYDROCHLORIDE 40 MG/ML
SOLUTION TOPICAL ONCE
OUTPATIENT
Start: 2023-05-26 | End: 2023-05-26

## 2023-05-26 RX ORDER — BETAMETHASONE DIPROPIONATE 0.05 %
OINTMENT (GRAM) TOPICAL ONCE
OUTPATIENT
Start: 2023-05-26 | End: 2023-05-26

## 2023-05-26 RX ORDER — BACITRACIN, NEOMYCIN, POLYMYXIN B 400; 3.5; 5 [USP'U]/G; MG/G; [USP'U]/G
OINTMENT TOPICAL ONCE
OUTPATIENT
Start: 2023-05-26 | End: 2023-05-26

## 2023-05-26 RX ORDER — GENTAMICIN SULFATE 1 MG/G
OINTMENT TOPICAL ONCE
OUTPATIENT
Start: 2023-05-26 | End: 2023-05-26

## 2023-05-26 RX ORDER — LIDOCAINE 50 MG/G
OINTMENT TOPICAL ONCE
OUTPATIENT
Start: 2023-05-26 | End: 2023-05-26

## 2023-05-26 RX ORDER — BACITRACIN ZINC AND POLYMYXIN B SULFATE 500; 1000 [USP'U]/G; [USP'U]/G
OINTMENT TOPICAL ONCE
OUTPATIENT
Start: 2023-05-26 | End: 2023-05-26

## 2023-05-26 NOTE — PROGRESS NOTES
[facility]  Progress Note and Procedure Note      Lay Clemens  AGE: 59 y.o. GENDER: male  : 1960  TODAY'S DATE:  2023    Subjective:     Chief Complaint   Patient presents with    Wound Check     Left lower extremity             HISTORY of PRESENT ILLNESS HPI     Lay Clemens is a 59 y.o. male who presents today for wound evaluation. History of Wound: Admits to having chronic wounds for years. He states that he has had arterial bypasses on both of his legs in the past.  He was seeing previous wound care and podiatry for these wounds. He states that his insurance had some issues and he has not been seeing anyone since 2021. He is going to get home care. He has been recently discharged from rehab. He had his bypass of the left leg since his last visit. He states the leg feels much better. He would like to continue wound care treatment.   Wound Pain:  intermittent left  Severity:  5 / 10   Wound Type:  venous, arterial and diabetic  Modifying Factors:  edema, venous stasis, lymphedema, diabetes, decreased mobility, arterial insufficiency and decreased tissue oxygenation  Associated Signs/Symptoms:  drainage, numbness and odor        PAST MEDICAL HISTORY        Diagnosis Date    Acquired hypothyroidism 2023    Cancer ([facility] [address]     throat    Diabetes mellitus ([address]     Fibromyalgia     History of DVT (deep vein thrombosis)     Hyperlipidemia     Hypertension     Type 2 diabetes mellitus with circulatory disorder, without long-term current use of insulin ([address] 2022       PAST SURGICAL HISTORY    Past Surgical History:   Procedure Laterality Date    APPENDECTOMY  2005    COLONOSCOPY  2016    FEMORAL BYPASS Left 2020    femoral posterior tibial bypass    FEMORAL BYPASS Left 2023    LEFT FEMORAL PERINEAL ARTERY BYPASS WITH SPLICED COMPOSITE SAPHENOUS VEIN GRAFT WITH PARTIAL FIBIALECTOMY performed by Arben Espino DO at [facility]

## 2023-05-26 NOTE — DISCHARGE INSTRUCTIONS
[address], [address] [facility] [address]  Telephone: [phone]     Discharge Instructions     Important reminders:     **If you have any signs and symptoms of illness (Cough, fever, congestion, nausea, vomiting, diarrhea, etc.) please call the wound care center prior to your appointment. 1. Increase Protein intake for optimal wound healing  2. No added salt to reduce any swelling  3. If diabetic, maintain good glucose control  4. If you smoke, smoking prohibits wound healing, we ask that you refrain from smoking. 5. When taking antibiotics take the entire prescription as ordered. Do not stop taking until medication is all gone unless otherwise instructed. 6. Exercise as tolerated. 7. Keep weight off wounds and reposition every 2 hours if applicable. 8. If wound(s) is on your lower extremity, elevate legs to the level of the heart or above for 30 minutes 4-5 times a day and/or when sitting. Avoid standing for long periods of time. 9. Do not get wounds wet in bath or shower unless otherwise instructed by your physician. If your wound is on your foot or leg, you may purchase a cast bag. Please ask at the pharmacy. If Vascular testing is ordered, please call [address] [place] [address] [phone]) to schedule. Vascular tests ordered by Wound Care Physicians may take up to 2 hours to complete. Please keep that in mind when scheduling. If Vascular testing is scheduled, please bring supplies to replace your dressing after testing is done. The vascular department does not stock supplies. Wound: Right and Left leg     With each dressing change, rinse wounds with 0.9% Saline. (May use wound wash or soft contact solution. Both can be purchased at a local drug store). If unable to obtain saline, may use a gentle soap and water. Dressing care:   Left all wounds- Gentamicin cream and triad  Optilock, kerlix, 6 inch ace to knee. Change every other day.  Home care

## 2023-05-26 NOTE — PLAN OF CARE
[facility]  [address] [facility], [address]  Telephone: [phone]        Other [facility]        Discharge Instructions         [facility]  [address] [facility], [address]  Telephone: [phone]     Discharge Instructions     Important reminders:     **If you have any signs and symptoms of illness (Cough, fever, congestion, nausea, vomiting, diarrhea, etc.) please call the wound care center prior to your appointment. 1. Increase Protein intake for optimal wound healing  2. No added salt to reduce any swelling  3. If diabetic, maintain good glucose control  4. If you smoke, smoking prohibits wound healing, we ask that you refrain from smoking. 5. When taking antibiotics take the entire prescription as ordered. Do not stop taking until medication is all gone unless otherwise instructed. 6. Exercise as tolerated. 7. Keep weight off wounds and reposition every 2 hours if applicable. 8. If wound(s) is on your lower extremity, elevate legs to the level of the heart or above for 30 minutes 4-5 times a day and/or when sitting. Avoid standing for long periods of time. 9. Do not get wounds wet in bath or shower unless otherwise instructed by your physician. If your wound is on your foot or leg, you may purchase a cast bag. Please ask at the pharmacy. If Vascular testing is ordered, please call [address] [place] [address] [phone]) to schedule. Vascular tests ordered by Wound Care Physicians may take up to 2 hours to complete. Please keep that in mind when scheduling. If Vascular testing is scheduled, please bring supplies to replace your dressing after testing is done. The vascular department does not stock supplies. Wound: Right and Left leg     With each dressing change, rinse wounds with 0.9% Saline. (May use wound wash or soft contact solution. Both can be purchased at a local drug store).  If unable to

## 2023-05-26 NOTE — PLAN OF CARE
Discharge instructions given. Patient verbalized understanding. Return to [facility] in 1 week(s).   Called/faxed orders to  [facility]

## 2023-05-30 ENCOUNTER — PATIENT MESSAGE (OUTPATIENT)
Dept: INTERNAL MEDICINE CLINIC | Age: 63
End: 2023-05-30

## 2023-05-30 ENCOUNTER — TELEPHONE (OUTPATIENT)
Dept: INTERNAL MEDICINE CLINIC | Age: 63
End: 2023-05-30

## 2023-05-30 NOTE — TELEPHONE ENCOUNTER
Alex Shepard from [facility] is calling---needs verbal orders for home PT today through 6/30 2 times a week---please call him at [phone]. Thanks.

## 2023-05-30 NOTE — TELEPHONE ENCOUNTER
From: Lawrence Puentes  To: Dr. Marcus Dillon: 5/30/2023 4:54 PM EDT  Subject: Contour Next 1 blood glucose meter    There was a prescription submitted for a new glucose meter. The insurance company needs a prior authorization form from your office before they will fill the prescription. Please help.

## 2023-05-31 ENCOUNTER — TELEPHONE (OUTPATIENT)
Dept: INTERNAL MEDICINE CLINIC | Age: 63
End: 2023-05-31

## 2023-05-31 NOTE — TELEPHONE ENCOUNTER
[facility] calling, needs frequency for glucose monitor kit & supplies. Please send updates script.

## 2023-05-31 NOTE — TELEPHONE ENCOUNTER
Okay to give the verbal order for consent the home health certification and I will sign it he was properly documented

## 2023-06-01 NOTE — DISCHARGE INSTRUCTIONS
[address], [address] [facility] [address]  Telephone: [phone]     Discharge Instructions     Important reminders:     **If you have any signs and symptoms of illness (Cough, fever, congestion, nausea, vomiting, diarrhea, etc.) please call the wound care center prior to your appointment. 1. Increase Protein intake for optimal wound healing  2. No added salt to reduce any swelling  3. If diabetic, maintain good glucose control  4. If you smoke, smoking prohibits wound healing, we ask that you refrain from smoking. 5. When taking antibiotics take the entire prescription as ordered. Do not stop taking until medication is all gone unless otherwise instructed. 6. Exercise as tolerated. 7. Keep weight off wounds and reposition every 2 hours if applicable. 8. If wound(s) is on your lower extremity, elevate legs to the level of the heart or above for 30 minutes 4-5 times a day and/or when sitting. Avoid standing for long periods of time. 9. Do not get wounds wet in bath or shower unless otherwise instructed by your physician. If your wound is on your foot or leg, you may purchase a cast bag. Please ask at the pharmacy. If Vascular testing is ordered, please call [address] [place] [address] [phone]) to schedule. Vascular tests ordered by Wound Care Physicians may take up to 2 hours to complete. Please keep that in mind when scheduling. If Vascular testing is scheduled, please bring supplies to replace your dressing after testing is done. The vascular department does not stock supplies. Wound: Right and Left leg     With each dressing change, rinse wounds with 0.9% Saline. (May use wound wash or soft contact solution. Both can be purchased at a local drug store). If unable to obtain saline, may use a gentle soap and water.      Dressing care: Left all wounds- Gentamicin cream and triad to leg wounds, Gentamicin cream & Epiona to medial ankle, Optilock, Daralene Coca

## 2023-06-02 ENCOUNTER — HOSPITAL ENCOUNTER (OUTPATIENT)
Dept: WOUND CARE | Age: 63
Discharge: HOME | End: 2023-06-02
Payer: MEDICAID

## 2023-06-02 VITALS
DIASTOLIC BLOOD PRESSURE: 73 MMHG | RESPIRATION RATE: 16 BRPM | TEMPERATURE: 96.8 F | HEART RATE: 114 BPM | SYSTOLIC BLOOD PRESSURE: 110 MMHG

## 2023-06-02 DIAGNOSIS — I73.9 PERIPHERAL ARTERY DISEASE (HCC): Primary | ICD-10-CM

## 2023-06-02 PROCEDURE — 11042 DBRDMT SUBQ TIS 1ST 20SQCM/<: CPT

## 2023-06-02 PROCEDURE — 29581 APPL MULTLAYER CMPRN SYS LEG: CPT

## 2023-06-02 PROCEDURE — 11046 DBRDMT MUSC&/FSCA EA ADDL: CPT

## 2023-06-02 PROCEDURE — 11043 DBRDMT MUSC&/FSCA 1ST 20/<: CPT

## 2023-06-02 PROCEDURE — 11045 DBRDMT SUBQ TISS EACH ADDL: CPT

## 2023-06-02 RX ORDER — CLOBETASOL PROPIONATE 0.5 MG/G
OINTMENT TOPICAL ONCE
OUTPATIENT
Start: 2023-06-02 | End: 2023-06-02

## 2023-06-02 RX ORDER — BETAMETHASONE DIPROPIONATE 0.05 %
OINTMENT (GRAM) TOPICAL ONCE
OUTPATIENT
Start: 2023-06-02 | End: 2023-06-02

## 2023-06-02 RX ORDER — GENTAMICIN SULFATE 1 MG/G
OINTMENT TOPICAL ONCE
OUTPATIENT
Start: 2023-06-02 | End: 2023-06-02

## 2023-06-02 RX ORDER — LIDOCAINE 50 MG/G
OINTMENT TOPICAL ONCE
OUTPATIENT
Start: 2023-06-02 | End: 2023-06-02

## 2023-06-02 RX ORDER — BACITRACIN ZINC AND POLYMYXIN B SULFATE 500; 1000 [USP'U]/G; [USP'U]/G
OINTMENT TOPICAL ONCE
OUTPATIENT
Start: 2023-06-02 | End: 2023-06-02

## 2023-06-02 RX ORDER — LIDOCAINE 40 MG/G
CREAM TOPICAL ONCE
Status: DISCONTINUED | OUTPATIENT
Start: 2023-06-02 | End: 2023-06-03 | Stop reason: HOSPADM

## 2023-06-02 RX ORDER — LIDOCAINE HYDROCHLORIDE 20 MG/ML
JELLY TOPICAL ONCE
OUTPATIENT
Start: 2023-06-02 | End: 2023-06-02

## 2023-06-02 RX ORDER — GINSENG 100 MG
CAPSULE ORAL ONCE
OUTPATIENT
Start: 2023-06-02 | End: 2023-06-02

## 2023-06-02 RX ORDER — BACITRACIN, NEOMYCIN, POLYMYXIN B 400; 3.5; 5 [USP'U]/G; MG/G; [USP'U]/G
OINTMENT TOPICAL ONCE
OUTPATIENT
Start: 2023-06-02 | End: 2023-06-02

## 2023-06-02 RX ORDER — LIDOCAINE HYDROCHLORIDE 40 MG/ML
SOLUTION TOPICAL ONCE
OUTPATIENT
Start: 2023-06-02 | End: 2023-06-02

## 2023-06-02 RX ORDER — LIDOCAINE 40 MG/G
CREAM TOPICAL ONCE
OUTPATIENT
Start: 2023-06-02 | End: 2023-06-02

## 2023-06-02 ASSESSMENT — PAIN DESCRIPTION - PAIN TYPE: TYPE: CHRONIC PAIN

## 2023-06-02 ASSESSMENT — PAIN - FUNCTIONAL ASSESSMENT: PAIN_FUNCTIONAL_ASSESSMENT: PREVENTS OR INTERFERES SOME ACTIVE ACTIVITIES AND ADLS

## 2023-06-02 ASSESSMENT — PAIN DESCRIPTION - FREQUENCY: FREQUENCY: CONTINUOUS

## 2023-06-02 ASSESSMENT — PAIN SCALES - GENERAL: PAINLEVEL_OUTOF10: 4

## 2023-06-02 ASSESSMENT — PAIN DESCRIPTION - ONSET: ONSET: ON-GOING

## 2023-06-02 ASSESSMENT — PAIN DESCRIPTION - LOCATION: LOCATION: ANKLE

## 2023-06-02 ASSESSMENT — PAIN DESCRIPTION - DESCRIPTORS: DESCRIPTORS: ACHING

## 2023-06-02 ASSESSMENT — PAIN DESCRIPTION - ORIENTATION: ORIENTATION: LEFT

## 2023-06-02 NOTE — PLAN OF CARE
Discharge instructions given. Patient verbalized understanding. Return to [facility] in 1 week(s).   Called/faxed orders to [facility]

## 2023-06-02 NOTE — PROGRESS NOTES
Multilayer Compression Wrap   Below the Knee    NAME:  Jacob Marrero  YOB: 1960  MEDICAL RECORD NUMBER:  [id number]  DATE:  6/2/2023    Multilayer compression wrap: Removed old Multilayer wrap if indicated and wash leg with mild soap/water. Applied moisturizing agent to dry skin as needed. Applied primary and secondary dressing as ordered. Applied multilayered dressing below the knee to left lower leg. Instructed patient/caregiver not to remove dressing and to keep it clean and dry. Instructed patient/caregiver on complications to report to provider, such as pain, numbness in toes, heavy drainage, and slippage of dressing. Instructed patient on purpose of compression dressing and on activity and exercise recommendations.      Applied  [place] wrap from toes to knee overlapping each time  Gentamicin and Triad to leg wounds, Gentamicin and Collagen to ankle wounds  Electronically signed by Edith Avila RN on 6/2/2023 at 10:31 AM

## 2023-06-02 NOTE — PROGRESS NOTES
[address], [address] [place] [address] Road  Telephone: [phone] Fax # [phone]      Discharge Instructions         [address], [address] [place] [address] Road  Telephone: [phone]     Discharge Instructions     Important reminders:     **If you have any signs and symptoms of illness (Cough, fever, congestion, nausea, vomiting, diarrhea, etc.) please call the wound care center prior to your appointment. 1. Increase Protein intake for optimal wound healing  2. No added salt to reduce any swelling  3. If diabetic, maintain good glucose control  4. If you smoke, smoking prohibits wound healing, we ask that you refrain from smoking. 5. When taking antibiotics take the entire prescription as ordered. Do not stop taking until medication is all gone unless otherwise instructed. 6. Exercise as tolerated. 7. Keep weight off wounds and reposition every 2 hours if applicable. 8. If wound(s) is on your lower extremity, elevate legs to the level of the heart or above for 30 minutes 4-5 times a day and/or when sitting. Avoid standing for long periods of time. 9. Do not get wounds wet in bath or shower unless otherwise instructed by your physician. If your wound is on your foot or leg, you may purchase a cast bag. Please ask at the pharmacy. If Vascular testing is ordered, please call [address] [place] [address] [phone]) to schedule. Vascular tests ordered by Wound Care Physicians may take up to 2 hours to complete. Please keep that in mind when scheduling. If Vascular testing is scheduled, please bring supplies to replace your dressing after testing is done. The vascular department does not stock supplies. Wound: Right and Left leg     With each dressing change, rinse wounds with 0.9% Saline. (May use wound wash or soft contact solution. Both can be purchased at a local drug store).  If

## 2023-06-02 NOTE — PROGRESS NOTES
Iris Rivera  Progress Note and Procedure Note      Linda Melendez  AGE: 59 y.o. GENDER: male  : 1960  TODAY'S DATE:  2023    Subjective:     Chief Complaint   Patient presents with    Wound Check     Left lower extremity             HISTORY of PRESENT ILLNESS HPI     Linda Melendez is a 59 y.o. male who presents today for wound evaluation. History of Wound: Admits to having chronic wounds for years. He states that he has had arterial bypasses on both of his legs in the past.  He was seeing previous wound care and podiatry for these wounds. He states that his insurance had some issues and he has not been seeing anyone since 2021. Getting home care dressing changes. He has no complaints at this time. He states he thinks his leg is swollen. He has follow-up with vascular next week.   Wound Pain:  intermittent left  Severity:  5 / 10   Wound Type:  venous, arterial and diabetic  Modifying Factors:  edema, venous stasis, lymphedema, diabetes, decreased mobility, arterial insufficiency and decreased tissue oxygenation  Associated Signs/Symptoms:  drainage, numbness and odor        PAST MEDICAL HISTORY        Diagnosis Date    Acquired hypothyroidism 2023    Cancer ([facility] [address]     throat    Diabetes mellitus ([address]     Fibromyalgia     History of DVT (deep vein thrombosis)     Hyperlipidemia     Hypertension     Type 2 diabetes mellitus with circulatory disorder, without long-term current use of insulin ([address] 2022       PAST SURGICAL HISTORY    Past Surgical History:   Procedure Laterality Date    APPENDECTOMY  2005    COLONOSCOPY  2016    FEMORAL BYPASS Left 2020    femoral posterior tibial bypass    FEMORAL BYPASS Left 2023    LEFT FEMORAL PERINEAL ARTERY BYPASS WITH SPLICED COMPOSITE SAPHENOUS VEIN GRAFT WITH PARTIAL FIB[facility]ECTOMY [facility]    FEMORAL-TIBIAL BYPASS GRAFT Right 2020    with femoral endarterectomy and patch

## 2023-06-07 ENCOUNTER — TELEPHONE (OUTPATIENT)
Dept: PHARMACY | Age: 63
End: 2023-06-07

## 2023-06-07 NOTE — TELEPHONE ENCOUNTER
I attempted to call Michelle Espinal to schedule an INR visit. He was last seen 3/10 and had been in the hospital at [facility] but it looks like he is home now based on his chart notes. I left him a message to give us a call back.       Kelly Welsh, PharmD 06/07/23 9:48 AM

## 2023-06-08 ENCOUNTER — TELEPHONE (OUTPATIENT)
Dept: INTERNAL MEDICINE CLINIC | Age: 63
End: 2023-06-08

## 2023-06-08 ENCOUNTER — OFFICE VISIT (OUTPATIENT)
Dept: VASCULAR SURGERY | Age: 63
End: 2023-06-08

## 2023-06-08 VITALS — BODY MASS INDEX: 25.51 KG/M2 | HEIGHT: 68 IN

## 2023-06-08 DIAGNOSIS — Z09 POSTOP CHECK: Primary | ICD-10-CM

## 2023-06-08 PROCEDURE — 99024 POSTOP FOLLOW-UP VISIT: CPT | Performed by: STUDENT IN AN ORGANIZED HEALTH CARE EDUCATION/TRAINING PROGRAM

## 2023-06-08 NOTE — PROGRESS NOTES
Lola Pike (:  1960) is a 59 y.o. male,Established patient, here for evaluation of the following chief complaint(s):  Post-Op Check         ASSESSMENT/PLAN:  1. Postop check    This is a 59year old male patient s/p left femoral peroneal bypass graft placement. Overall his wounds are tammie which is encouraging. Will defer to wound care for ongoing management of his leg wounds. No signs of infection. Will order surveillance duplex to be done for both legs. All questions answered. Subjective   SUBJECTIVE/OBJECTIVE:  This is a 59year old male patient who presents to the office today for 2nd post op visit after left femoral-peroneal bypass with spliced vein. His distal wounds are healing. He is ambulating. He is concerned about some of the topicals on his wounds and excessive compression therapy. No excessive drainage or fevers/chills noted. Objective   Physical Exam  Cardiovascular:      Rate and Rhythm: Normal rate and regular rhythm. Comments: Palp graft pulse   Skin:     General: Skin is warm and dry. Capillary Refill: Capillary refill takes less than 2 seconds. Comments: Medial and lateral calf wounds have granulation tissue, distal separation of medial wound, ankle wounds have healthy granulation tissue, no surrounding erythema or signs of drainage   Neurological:      Mental Status: He is alert.           Yovani Toussaint DO, FACS, FSVS, [address] [facility] Vascular and Endovascular Surgery

## 2023-06-09 ENCOUNTER — TELEPHONE (OUTPATIENT)
Dept: PHARMACY | Age: 63
End: 2023-06-09

## 2023-06-09 ENCOUNTER — HOSPITAL ENCOUNTER (OUTPATIENT)
Dept: WOUND CARE | Age: 63
Discharge: HOME | End: 2023-06-09
Payer: MEDICAID

## 2023-06-09 VITALS
DIASTOLIC BLOOD PRESSURE: 67 MMHG | RESPIRATION RATE: 17 BRPM | SYSTOLIC BLOOD PRESSURE: 105 MMHG | TEMPERATURE: 96.6 F | HEART RATE: 97 BPM

## 2023-06-09 DIAGNOSIS — I73.9 PERIPHERAL ARTERY DISEASE (HCC): Primary | ICD-10-CM

## 2023-06-09 PROCEDURE — 11046 DBRDMT MUSC&/FSCA EA ADDL: CPT

## 2023-06-09 PROCEDURE — 29581 APPL MULTLAYER CMPRN SYS LEG: CPT

## 2023-06-09 PROCEDURE — 11043 DBRDMT MUSC&/FSCA 1ST 20/<: CPT

## 2023-06-09 RX ORDER — LIDOCAINE 40 MG/G
CREAM TOPICAL ONCE
Status: DISCONTINUED | OUTPATIENT
Start: 2023-06-09 | End: 2023-06-10 | Stop reason: HOSPADM

## 2023-06-09 RX ORDER — IBUPROFEN 200 MG
TABLET ORAL ONCE
OUTPATIENT
Start: 2023-06-09 | End: 2023-06-09

## 2023-06-09 RX ORDER — LIDOCAINE HYDROCHLORIDE 40 MG/ML
SOLUTION TOPICAL ONCE
OUTPATIENT
Start: 2023-06-09 | End: 2023-06-09

## 2023-06-09 RX ORDER — GENTAMICIN SULFATE 1 MG/G
OINTMENT TOPICAL ONCE
OUTPATIENT
Start: 2023-06-09 | End: 2023-06-09

## 2023-06-09 RX ORDER — LIDOCAINE 40 MG/G
CREAM TOPICAL ONCE
OUTPATIENT
Start: 2023-06-09 | End: 2023-06-09

## 2023-06-09 RX ORDER — BETAMETHASONE DIPROPIONATE 0.05 %
OINTMENT (GRAM) TOPICAL ONCE
OUTPATIENT
Start: 2023-06-09 | End: 2023-06-09

## 2023-06-09 RX ORDER — LIDOCAINE 50 MG/G
OINTMENT TOPICAL ONCE
OUTPATIENT
Start: 2023-06-09 | End: 2023-06-09

## 2023-06-09 RX ORDER — LIDOCAINE HYDROCHLORIDE 20 MG/ML
JELLY TOPICAL ONCE
OUTPATIENT
Start: 2023-06-09 | End: 2023-06-09

## 2023-06-09 RX ORDER — GINSENG 100 MG
CAPSULE ORAL ONCE
OUTPATIENT
Start: 2023-06-09 | End: 2023-06-09

## 2023-06-09 RX ORDER — BACITRACIN ZINC AND POLYMYXIN B SULFATE 500; 1000 [USP'U]/G; [USP'U]/G
OINTMENT TOPICAL ONCE
OUTPATIENT
Start: 2023-06-09 | End: 2023-06-09

## 2023-06-09 RX ORDER — CLOBETASOL PROPIONATE 0.5 MG/G
OINTMENT TOPICAL ONCE
OUTPATIENT
Start: 2023-06-09 | End: 2023-06-09

## 2023-06-09 ASSESSMENT — PAIN SCALES - GENERAL: PAINLEVEL_OUTOF10: 6

## 2023-06-09 ASSESSMENT — PAIN DESCRIPTION - LOCATION: LOCATION: ANKLE;FOOT

## 2023-06-09 ASSESSMENT — PAIN DESCRIPTION - ORIENTATION: ORIENTATION: LEFT

## 2023-06-09 NOTE — DISCHARGE INSTRUCTIONS
[address], [address] [facility]  Telephone: [phone]     Discharge Instructions     Important reminders:     **If you have any signs and symptoms of illness (Cough, fever, congestion, nausea, vomiting, diarrhea, etc.) please call the wound care center prior to your appointment. 1. Increase Protein intake for optimal wound healing  2. No added salt to reduce any swelling  3. If diabetic, maintain good glucose control  4. If you smoke, smoking prohibits wound healing, we ask that you refrain from smoking. 5. When taking antibiotics take the entire prescription as ordered. Do not stop taking until medication is all gone unless otherwise instructed. 6. Exercise as tolerated. 7. Keep weight off wounds and reposition every 2 hours if applicable. 8. If wound(s) is on your lower extremity, elevate legs to the level of the heart or above for 30 minutes 4-5 times a day and/or when sitting. Avoid standing for long periods of time. 9. Do not get wounds wet in bath or shower unless otherwise instructed by your physician. If your wound is on your foot or leg, you may purchase a cast bag. Please ask at the pharmacy. If Vascular testing is ordered, please call [address] [place] [address] [phone]) to schedule. Vascular tests ordered by Wound Care Physicians may take up to 2 hours to complete. Please keep that in mind when scheduling. If Vascular testing is scheduled, please bring supplies to replace your dressing after testing is done. The vascular department does not stock supplies. Wound: Right and Left leg     With each dressing change, rinse wounds with 0.9% Saline. (May use wound wash or soft contact solution. Both can be purchased at a local drug store). If unable to obtain saline, may use a gentle soap and water.      Dressing care: Left leg- Gentamicin cream and triad to leg wounds, Left ankle - Betadine ointment, Optilock, Coflex calamine- Plan to

## 2023-06-09 NOTE — TELEPHONE ENCOUNTER
I attempted to call Maru Ballard to schedule an INR visit. He was last seen 3/10 and had been in the hospital at [facility] but it looks like he is home now based on his chart notes. I left him a message to give us a call back. LVM. This is our second attempt to reach him.     Rukhsana Rodríguez, Pharmacy Student

## 2023-06-09 NOTE — PLAN OF CARE
[address], [address] [place] [address] Road  Telephone: [phone] Fax # [phone]        Discharge Instructions         [address], [address] [place] [address] Road  Telephone: [phone]     Discharge Instructions     Important reminders:     **If you have any signs and symptoms of illness (Cough, fever, congestion, nausea, vomiting, diarrhea, etc.) please call the wound care center prior to your appointment. 1. Increase Protein intake for optimal wound healing  2. No added salt to reduce any swelling  3. If diabetic, maintain good glucose control  4. If you smoke, smoking prohibits wound healing, we ask that you refrain from smoking. 5. When taking antibiotics take the entire prescription as ordered. Do not stop taking until medication is all gone unless otherwise instructed. 6. Exercise as tolerated. 7. Keep weight off wounds and reposition every 2 hours if applicable. 8. If wound(s) is on your lower extremity, elevate legs to the level of the heart or above for 30 minutes 4-5 times a day and/or when sitting. Avoid standing for long periods of time. 9. Do not get wounds wet in bath or shower unless otherwise instructed by your physician. If your wound is on your foot or leg, you may purchase a cast bag. Please ask at the pharmacy. If Vascular testing is ordered, please call [address] [place] [address] ([phone]) to schedule. Vascular tests ordered by Wound Care Physicians may take up to 2 hours to complete. Please keep that in mind when scheduling. If Vascular testing is scheduled, please bring supplies to replace your dressing after testing is done. The vascular department does not stock supplies. Wound: Right and Left leg     With each dressing change, rinse wounds with 0.9% Saline. (May use wound wash or soft contact solution. Both can be purchased at a local drug store).  If
Discharge instructions given. Patient verbalized understanding. Return to [facility] in 1 week(s).   Called/faxed orders to  [facility]
Post-Procedure Length (cm) 8 cm 06/09/23 1010   Post-Procedure Width (cm) 0.8 cm 06/09/23 1010   Post-Procedure Depth (cm) 0.2 cm 06/09/23 1010   Post-Procedure Surface Area (cm^2) 6.4 cm^2 06/09/23 1010   Post-Procedure Volume (cm^3) 1.28 cm^3 06/09/23 1010   Wound Assessment Blood filled blister 06/09/23 1010   Drainage Amount Moderate 06/09/23 0940   Drainage Description Serous 06/09/23 0940   Odor None 06/09/23 0940   Jamila-wound Assessment Dry/flaky 06/09/23 0940   Margins Attached edges 06/09/23 0940   Number of days: 20          Supplies Requested :      DISPENSE AS WRITTEN    WOUND #: 1   PRIMARY DRESSING:    Other: Zetuvit with border   Cover and Secure with: None     FREQUENCY OF DRESSING CHANGES:  Daily    Wound Thickness [x] Full   []Partial       WOUND #: 4   PRIMARY DRESSING:    None   Cover and Secure with: 4X4 gauze pad  Bulky roll gauze     FREQUENCY OF DRESSING CHANGES:  Daily    Wound Thickness [x] Full   []Partial         Patient Wound(s) Debrided: [x] Yes   [] No    Debridement Date: 6/9/2023    Debridement Type: Excisional/Sharp    ADDITIONAL ITEMS:  [] Gloves Small  [x] Gloves Medium [] Gloves Large [] Gloves XLarge [] Paper Tape 2\" [] Paper Tape 3\"  [] Medipore Tape 3\" [x] Medipore Tape 4\"    [] Hypofix skin sensitive tape 2\"  [] Hypofix skin sensitive tape 4\"  [] Saline  [] Skin Prep   [] Adhesive Remover   [] Cotton Tip Applicators  [] Tubular Stocking   [] Size E  [] Size G  [] Other:    Patient currently being seen by Home Health: [x] Yes   [] No    Quantity of days dispensed:  []15  [x]30  []60  []90 Days    Order valid for 90 days    OK to forward order to an in-network provider [x] Yes   [] No    Provider Information:      PROVIDER'S NAME/NPI  Emilia Wardell, [place]  [id number]    I give permission to coordinate the care for this patient

## 2023-06-09 NOTE — PROGRESS NOTES
Multilayer Compression Wrap   Below the Knee    NAME:  Chang Desouza  YOB: 1960  MEDICAL RECORD NUMBER:  [id number]  DATE:  6/9/2023    Multilayer compression wrap: Removed old Multilayer wrap if indicated and wash leg with mild soap/water. Applied moisturizing agent to dry skin as needed. Applied primary and secondary dressing as ordered. Applied multilayered dressing below the knee to left lower leg. Instructed patient/caregiver not to remove dressing and to keep it clean and dry. Instructed patient/caregiver on complications to report to provider, such as pain, numbness in toes, heavy drainage, and slippage of dressing. Instructed patient on purpose of compression dressing and on activity and exercise recommendations.      Applied  2 layer wrap from toes to knee overlapping each time    Electronically signed by Katrina Aschoff, RN on 6/9/2023 at 12:07 PM
angioplasty    FOOT DEBRIDEMENT Left 2020    FOOT DEBRIDEMENT Right 2021    DEBRIDEMENT OF RIGHT FOOT WOUND WITH GRAFT APPLICATION performed by Radha Stack DPM at [facility] N/A 2022    DIRECT LARYNGOSCOPY WITH BIOPSY AND FROZEN SECTIONS performed by Nazario Witt DO at [facility] Right 2020    stent leg for PVD    PORTACATH PLACEMENT      \"power port\" right upper chest    TRACHEOSTOMY N/A 2022    TRACHEOTOMY performed by Nazario Witt DO at [facility] HISTORY    Family History   Problem Relation Age of Onset    Cancer Mother     Lung Cancer Mother     Diabetes Father     Stroke Father        SOCIAL HISTORY    Social History     Tobacco Use    Smoking status: Former     Packs/day: 0.50     Years: 20.00     Pack years: 10.00     Types: Cigarettes     Start date: 1977     Quit date: 2021     Years since quittin.4    Smokeless tobacco: Never   Vaping Use    Vaping Use: Never used   Substance Use Topics    Alcohol use: Yes     Comment: 2 beers / day     Drug use: Never       ALLERGIES    Allergies   Allergen Reactions    Chantix [Varenicline] Other (See Comments)     Hallucinations         MEDICATIONS    Current Outpatient Medications on File Prior to Encounter   Medication Sig Dispense Refill    blood glucose monitor kit and supplies Dispense sufficient amount for indicated testing frequency plus additional to accommodate PRN testing needs. Dispense all needed supplies to include: monitor, strips, lancing device, lancets, control solutions, alcohol swabs.  1 kit 0    pantoprazole (PROTONIX) 40 MG tablet TAKE 1 TABLET BY MOUTH BEFORE BREAKFAST      levothyroxine (SYNTHROID) 50 MCG tablet       polyethylene glycol (GLYCOLAX) 17 GM/SCOOP powder by Nasogastric route daily      FEROSUL 325 (65 Fe) MG tablet Take 1 tablet by mouth daily      lidocaine-prilocaine (EMLA) 2.5-2.5 % cream apply to port site 30-60minutes

## 2023-06-15 DIAGNOSIS — L29.9 ITCHING: ICD-10-CM

## 2023-06-15 RX ORDER — DOCUSATE SODIUM 50 MG AND SENNOSIDES 8.6 MG 8.6; 5 MG/1; MG/1
TABLET, FILM COATED ORAL
Qty: 60 TABLET | Refills: 0 | OUTPATIENT
Start: 2023-06-15

## 2023-06-15 RX ORDER — CETIRIZINE HYDROCHLORIDE 10 MG/1
10 TABLET ORAL DAILY
Qty: 30 TABLET | Refills: 1 | Status: SHIPPED | OUTPATIENT
Start: 2023-06-15 | End: 2023-07-15

## 2023-06-16 DIAGNOSIS — I73.9 PERIPHERAL ARTERY DISEASE (HCC): ICD-10-CM

## 2023-06-16 PROCEDURE — 0HBRXZZ EXCISION OF TOE NAIL, EXTERNAL APPROACH: ICD-10-PCS | Performed by: PODIATRIST

## 2023-06-16 PROCEDURE — 2W1TX6Z COMPRESSION OF LEFT FOOT USING PRESSURE DRESSING: ICD-10-PCS | Performed by: PODIATRIST

## 2023-06-16 PROCEDURE — 0KBT0ZZ EXCISION OF LEFT LOWER LEG MUSCLE, OPEN APPROACH: ICD-10-PCS | Performed by: PODIATRIST

## 2023-06-18 ENCOUNTER — APPOINTMENT (OUTPATIENT)
Dept: CT IMAGING | Age: 63
DRG: 951 | End: 2023-06-18
Payer: MEDICAID

## 2023-06-18 ENCOUNTER — APPOINTMENT (OUTPATIENT)
Dept: GENERAL RADIOLOGY | Age: 63
DRG: 951 | End: 2023-06-18
Payer: MEDICAID

## 2023-06-18 ENCOUNTER — HOSPITAL ENCOUNTER (INPATIENT)
Age: 63
LOS: 3 days | Discharge: HOME | DRG: 951 | End: 2023-06-21
Attending: INTERNAL MEDICINE | Admitting: INTERNAL MEDICINE
Payer: MEDICAID

## 2023-06-18 DIAGNOSIS — K92.2 UPPER GI BLEED: ICD-10-CM

## 2023-06-18 DIAGNOSIS — N17.9 AKI (ACUTE KIDNEY INJURY) (HCC): ICD-10-CM

## 2023-06-18 DIAGNOSIS — D64.9 SYMPTOMATIC ANEMIA: ICD-10-CM

## 2023-06-18 DIAGNOSIS — R79.1 SUPRATHERAPEUTIC INR: ICD-10-CM

## 2023-06-18 DIAGNOSIS — D62 ACUTE BLOOD LOSS ANEMIA: Primary | ICD-10-CM

## 2023-06-18 DIAGNOSIS — D64.9 ANEMIA, UNSPECIFIED TYPE: ICD-10-CM

## 2023-06-18 PROBLEM — R57.8 HEMORRHAGIC SHOCK (HCC): Status: ACTIVE | Noted: 2023-06-18

## 2023-06-18 LAB
ABO + RH BLD: NORMAL
ABO + RH BLD: NORMAL
ALBUMIN SERPL-MCNC: 3.5 G/DL (ref 3.4–5)
ALBUMIN/GLOB SERPL: 0.9 {RATIO} (ref 1.1–2.2)
ALP SERPL-CCNC: 67 U/L (ref 40–129)
ALT SERPL-CCNC: 9 U/L (ref 10–40)
ANION GAP SERPL CALCULATED.3IONS-SCNC: 22 MMOL/L (ref 3–16)
ANISOCYTOSIS BLD QL SMEAR: ABNORMAL
APTT BLD: >180 SEC (ref 22.7–35.9)
AST SERPL-CCNC: 16 U/L (ref 15–37)
BASOPHILS # BLD: 0.1 K/UL (ref 0–0.2)
BASOPHILS NFR BLD: 1.3 %
BILIRUB SERPL-MCNC: <0.2 MG/DL (ref 0–1)
BLD GP AB SCN SERPL QL: NORMAL
BLOOD BANK DISPENSE STATUS: NORMAL
BLOOD BANK PRODUCT CODE: NORMAL
BPU ID: NORMAL
BUN SERPL-MCNC: 46 MG/DL (ref 7–20)
CALCIUM SERPL-MCNC: 9 MG/DL (ref 8.3–10.6)
CHLORIDE SERPL-SCNC: 102 MMOL/L (ref 99–110)
CO2 SERPL-SCNC: 12 MMOL/L (ref 21–32)
CREAT SERPL-MCNC: 2 MG/DL (ref 0.8–1.3)
DEPRECATED RDW RBC AUTO: 19.9 % (ref 12.4–15.4)
DESCRIPTION BLOOD BANK: NORMAL
EOSINOPHIL # BLD: 0.1 K/UL (ref 0–0.6)
EOSINOPHIL NFR BLD: 1.9 %
GFR SERPLBLD CREATININE-BSD FMLA CKD-EPI: 37 ML/MIN/{1.73_M2}
GLUCOSE SERPL-MCNC: 97 MG/DL (ref 70–99)
HCT VFR BLD AUTO: 19 % (ref 40.5–52.5)
HEMOCCULT STL QL: ABNORMAL
HGB BLD-MCNC: 5.7 G/DL (ref 13.5–17.5)
INR PPP: 6.49 (ref 0.84–1.16)
LIPASE SERPL-CCNC: 58 U/L (ref 13–60)
LYMPHOCYTES # BLD: 1 K/UL (ref 1–5.1)
LYMPHOCYTES NFR BLD: 20.3 %
MCH RBC QN AUTO: 27.1 PG (ref 26–34)
MCHC RBC AUTO-ENTMCNC: 30.2 G/DL (ref 31–36)
MCV RBC AUTO: 89.7 FL (ref 80–100)
MONOCYTES # BLD: 0.3 K/UL (ref 0–1.3)
MONOCYTES NFR BLD: 6.6 %
NEUTROPHILS # BLD: 3.3 K/UL (ref 1.7–7.7)
NEUTROPHILS NFR BLD: 69.9 %
NT-PROBNP SERPL-MCNC: 352 PG/ML (ref 0–124)
PLATELET # BLD AUTO: 367 K/UL (ref 135–450)
PLATELET BLD QL SMEAR: ADEQUATE
PMV BLD AUTO: 7 FL (ref 5–10.5)
POLYCHROMASIA BLD QL SMEAR: ABNORMAL
POTASSIUM SERPL-SCNC: 4 MMOL/L (ref 3.5–5.1)
PROT SERPL-MCNC: 7.5 G/DL (ref 6.4–8.2)
PROTHROMBIN TIME: 56.3 SEC (ref 11.5–14.8)
RBC # BLD AUTO: 2.12 M/UL (ref 4.2–5.9)
SLIDE REVIEW: ABNORMAL
SODIUM SERPL-SCNC: 136 MMOL/L (ref 136–145)
TROPONIN, HIGH SENSITIVITY: 45 NG/L (ref 0–22)
WBC # BLD AUTO: 4.8 K/UL (ref 4–11)

## 2023-06-18 PROCEDURE — 30233K1 TRANSFUSION OF NONAUTOLOGOUS FROZEN PLASMA INTO PERIPHERAL VEIN, PERCUTANEOUS APPROACH: ICD-10-PCS | Performed by: INTERNAL MEDICINE

## 2023-06-18 PROCEDURE — 74176 CT ABD & PELVIS W/O CONTRAST: CPT

## 2023-06-18 PROCEDURE — 80053 COMPREHEN METABOLIC PANEL: CPT

## 2023-06-18 PROCEDURE — 96375 TX/PRO/DX INJ NEW DRUG ADDON: CPT

## 2023-06-18 PROCEDURE — 6360000002 HC RX W HCPCS: Performed by: PHYSICIAN ASSISTANT

## 2023-06-18 PROCEDURE — 83880 ASSAY OF NATRIURETIC PEPTIDE: CPT

## 2023-06-18 PROCEDURE — P9040 RBC LEUKOREDUCED IRRADIATED: HCPCS

## 2023-06-18 PROCEDURE — 85025 COMPLETE CBC W/AUTO DIFF WBC: CPT

## 2023-06-18 PROCEDURE — 71045 X-RAY EXAM CHEST 1 VIEW: CPT

## 2023-06-18 PROCEDURE — P9017 PLASMA 1 DONOR FRZ W/IN 8 HR: HCPCS

## 2023-06-18 PROCEDURE — 86923 COMPATIBILITY TEST ELECTRIC: CPT

## 2023-06-18 PROCEDURE — 85730 THROMBOPLASTIN TIME PARTIAL: CPT

## 2023-06-18 PROCEDURE — 2580000003 HC RX 258: Performed by: PHYSICIAN ASSISTANT

## 2023-06-18 PROCEDURE — 86901 BLOOD TYPING SEROLOGIC RH(D): CPT

## 2023-06-18 PROCEDURE — 83690 ASSAY OF LIPASE: CPT

## 2023-06-18 PROCEDURE — 36415 COLL VENOUS BLD VENIPUNCTURE: CPT

## 2023-06-18 PROCEDURE — 93005 ELECTROCARDIOGRAM TRACING: CPT | Performed by: INTERNAL MEDICINE

## 2023-06-18 PROCEDURE — 86900 BLOOD TYPING SEROLOGIC ABO: CPT

## 2023-06-18 PROCEDURE — 85610 PROTHROMBIN TIME: CPT

## 2023-06-18 PROCEDURE — 99285 EMERGENCY DEPT VISIT HI MDM: CPT

## 2023-06-18 PROCEDURE — 96365 THER/PROPH/DIAG IV INF INIT: CPT

## 2023-06-18 PROCEDURE — C9113 INJ PANTOPRAZOLE SODIUM, VIA: HCPCS | Performed by: PHYSICIAN ASSISTANT

## 2023-06-18 PROCEDURE — 2000000000 HC ICU R&B

## 2023-06-18 PROCEDURE — 2100000000 HC CCU R&B

## 2023-06-18 PROCEDURE — 84484 ASSAY OF TROPONIN QUANT: CPT

## 2023-06-18 PROCEDURE — 36430 TRANSFUSION BLD/BLD COMPNT: CPT

## 2023-06-18 PROCEDURE — 30233N1 TRANSFUSION OF NONAUTOLOGOUS RED BLOOD CELLS INTO PERIPHERAL VEIN, PERCUTANEOUS APPROACH: ICD-10-PCS | Performed by: INTERNAL MEDICINE

## 2023-06-18 PROCEDURE — 86850 RBC ANTIBODY SCREEN: CPT

## 2023-06-18 PROCEDURE — 82270 OCCULT BLOOD FECES: CPT

## 2023-06-18 PROCEDURE — 30233L1 TRANSFUSION OF NONAUTOLOGOUS FRESH PLASMA INTO PERIPHERAL VEIN, PERCUTANEOUS APPROACH: ICD-10-PCS | Performed by: INTERNAL MEDICINE

## 2023-06-18 RX ORDER — ACETAMINOPHEN 650 MG/1
650 SUPPOSITORY RECTAL EVERY 6 HOURS PRN
Status: DISCONTINUED | OUTPATIENT
Start: 2023-06-18 | End: 2023-06-21 | Stop reason: HOSPADM

## 2023-06-18 RX ORDER — 0.9 % SODIUM CHLORIDE 0.9 %
500 INTRAVENOUS SOLUTION INTRAVENOUS ONCE
Status: COMPLETED | OUTPATIENT
Start: 2023-06-18 | End: 2023-06-18

## 2023-06-18 RX ORDER — PANTOPRAZOLE SODIUM 40 MG/10ML
80 INJECTION, POWDER, LYOPHILIZED, FOR SOLUTION INTRAVENOUS ONCE
Status: COMPLETED | OUTPATIENT
Start: 2023-06-18 | End: 2023-06-18

## 2023-06-18 RX ORDER — ONDANSETRON 2 MG/ML
4 INJECTION INTRAMUSCULAR; INTRAVENOUS EVERY 6 HOURS PRN
Status: DISCONTINUED | OUTPATIENT
Start: 2023-06-18 | End: 2023-06-21 | Stop reason: HOSPADM

## 2023-06-18 RX ORDER — SODIUM CHLORIDE 0.9 % (FLUSH) 0.9 %
5-40 SYRINGE (ML) INJECTION PRN
Status: DISCONTINUED | OUTPATIENT
Start: 2023-06-18 | End: 2023-06-21 | Stop reason: HOSPADM

## 2023-06-18 RX ORDER — SODIUM CHLORIDE 9 MG/ML
INJECTION, SOLUTION INTRAVENOUS PRN
Status: DISCONTINUED | OUTPATIENT
Start: 2023-06-18 | End: 2023-06-21 | Stop reason: HOSPADM

## 2023-06-18 RX ORDER — ONDANSETRON 4 MG/1
4 TABLET, ORALLY DISINTEGRATING ORAL EVERY 8 HOURS PRN
Status: DISCONTINUED | OUTPATIENT
Start: 2023-06-18 | End: 2023-06-21 | Stop reason: HOSPADM

## 2023-06-18 RX ORDER — ACETAMINOPHEN 325 MG/1
650 TABLET ORAL EVERY 6 HOURS PRN
Status: DISCONTINUED | OUTPATIENT
Start: 2023-06-18 | End: 2023-06-21 | Stop reason: HOSPADM

## 2023-06-18 RX ORDER — SODIUM CHLORIDE 0.9 % (FLUSH) 0.9 %
5-40 SYRINGE (ML) INJECTION EVERY 12 HOURS SCHEDULED
Status: DISCONTINUED | OUTPATIENT
Start: 2023-06-18 | End: 2023-06-21 | Stop reason: HOSPADM

## 2023-06-18 RX ORDER — POLYETHYLENE GLYCOL 3350 17 G/17G
17 POWDER, FOR SOLUTION ORAL DAILY PRN
Status: DISCONTINUED | OUTPATIENT
Start: 2023-06-18 | End: 2023-06-21 | Stop reason: HOSPADM

## 2023-06-18 RX ADMIN — PHYTONADIONE 10 MG: 10 INJECTION, EMULSION INTRAMUSCULAR; INTRAVENOUS; SUBCUTANEOUS at 22:00

## 2023-06-18 RX ADMIN — SODIUM CHLORIDE 500 ML: 9 INJECTION, SOLUTION INTRAVENOUS at 22:00

## 2023-06-18 RX ADMIN — PANTOPRAZOLE SODIUM 80 MG: 40 INJECTION, POWDER, FOR SOLUTION INTRAVENOUS at 20:47

## 2023-06-18 ASSESSMENT — ENCOUNTER SYMPTOMS
SHORTNESS OF BREATH: 0
COUGH: 0
DIARRHEA: 0
VOMITING: 0
RHINORRHEA: 0
NAUSEA: 0
ABDOMINAL PAIN: 0

## 2023-06-19 ENCOUNTER — TELEPHONE (OUTPATIENT)
Dept: INTERNAL MEDICINE CLINIC | Age: 63
End: 2023-06-19

## 2023-06-19 LAB
ALBUMIN SERPL-MCNC: 3.2 G/DL (ref 3.4–5)
ANION GAP SERPL CALCULATED.3IONS-SCNC: 10 MMOL/L (ref 3–16)
APTT BLD: 88 SEC (ref 22.7–35.9)
BASOPHILS # BLD: 0 K/UL (ref 0–0.2)
BASOPHILS NFR BLD: 1.3 %
BILIRUB UR QL STRIP.AUTO: NEGATIVE
BUN SERPL-MCNC: 34 MG/DL (ref 7–20)
CA-I BLD-SCNC: 1.19 MMOL/L (ref 1.12–1.32)
CALCIUM SERPL-MCNC: 8.7 MG/DL (ref 8.3–10.6)
CHLORIDE SERPL-SCNC: 107 MMOL/L (ref 99–110)
CLARITY UR: CLEAR
CO2 SERPL-SCNC: 20 MMOL/L (ref 21–32)
COLOR UR: YELLOW
CREAT SERPL-MCNC: 0.9 MG/DL (ref 0.8–1.3)
DEPRECATED RDW RBC AUTO: 16.4 % (ref 12.4–15.4)
EKG ATRIAL RATE: 99 BPM
EKG DIAGNOSIS: NORMAL
EKG P AXIS: 75 DEGREES
EKG P-R INTERVAL: 162 MS
EKG Q-T INTERVAL: 362 MS
EKG QRS DURATION: 98 MS
EKG QTC CALCULATION (BAZETT): 464 MS
EKG R AXIS: -31 DEGREES
EKG T AXIS: 49 DEGREES
EKG VENTRICULAR RATE: 99 BPM
EOSINOPHIL # BLD: 0.2 K/UL (ref 0–0.6)
EOSINOPHIL NFR BLD: 4.5 %
GFR SERPLBLD CREATININE-BSD FMLA CKD-EPI: >60 ML/MIN/{1.73_M2}
GLUCOSE BLD-MCNC: 115 MG/DL (ref 70–99)
GLUCOSE BLD-MCNC: 151 MG/DL (ref 70–99)
GLUCOSE BLD-MCNC: 158 MG/DL (ref 70–99)
GLUCOSE SERPL-MCNC: 107 MG/DL (ref 70–99)
GLUCOSE UR STRIP.AUTO-MCNC: NEGATIVE MG/DL
HCT VFR BLD AUTO: 22.8 % (ref 40.5–52.5)
HCT VFR BLD AUTO: 23.2 % (ref 40.5–52.5)
HCT VFR BLD AUTO: 24.2 % (ref 40.5–52.5)
HCT VFR BLD AUTO: 24.2 % (ref 40.5–52.5)
HGB BLD-MCNC: 7.4 G/DL (ref 13.5–17.5)
HGB BLD-MCNC: 7.6 G/DL (ref 13.5–17.5)
HGB UR QL STRIP.AUTO: NEGATIVE
INR PPP: 1.57 (ref 0.84–1.16)
INR PPP: 2.19 (ref 0.84–1.16)
INR PPP: >17.24 (ref 0.84–1.16)
KETONES UR STRIP.AUTO-MCNC: NEGATIVE MG/DL
LEUKOCYTE ESTERASE UR QL STRIP.AUTO: NEGATIVE
LYMPHOCYTES # BLD: 0.5 K/UL (ref 1–5.1)
LYMPHOCYTES NFR BLD: 14.5 %
MCH RBC QN AUTO: 28.2 PG (ref 26–34)
MCHC RBC AUTO-ENTMCNC: 32.4 G/DL (ref 31–36)
MCV RBC AUTO: 87.1 FL (ref 80–100)
MONOCYTES # BLD: 0.3 K/UL (ref 0–1.3)
MONOCYTES NFR BLD: 10.2 %
NEUTROPHILS # BLD: 2.4 K/UL (ref 1.7–7.7)
NEUTROPHILS NFR BLD: 69.5 %
NITRITE UR QL STRIP.AUTO: NEGATIVE
PERFORMED ON: ABNORMAL
PH BLDV: 7.33 [PH] (ref 7.35–7.45)
PH UR STRIP.AUTO: 5.5 [PH] (ref 5–8)
PHOSPHATE SERPL-MCNC: 2.9 MG/DL (ref 2.5–4.9)
PLATELET # BLD AUTO: 305 K/UL (ref 135–450)
PMV BLD AUTO: 6.8 FL (ref 5–10.5)
POTASSIUM SERPL-SCNC: 4.5 MMOL/L (ref 3.5–5.1)
PROT UR STRIP.AUTO-MCNC: NEGATIVE MG/DL
PROTHROMBIN TIME: 18.7 SEC (ref 11.5–14.8)
PROTHROMBIN TIME: 24.2 SEC (ref 11.5–14.8)
PROTHROMBIN TIME: >120 SEC (ref 11.5–14.8)
RBC # BLD AUTO: 2.62 M/UL (ref 4.2–5.9)
SODIUM SERPL-SCNC: 137 MMOL/L (ref 136–145)
SP GR UR STRIP.AUTO: 1.01 (ref 1–1.03)
TROPONIN, HIGH SENSITIVITY: 21 NG/L (ref 0–22)
UA COMPLETE W REFLEX CULTURE PNL UR: NORMAL
UA DIPSTICK W REFLEX MICRO PNL UR: NORMAL
URN SPEC COLLECT METH UR: NORMAL
UROBILINOGEN UR STRIP-ACNC: 0.2 E.U./DL
WBC # BLD AUTO: 3.4 K/UL (ref 4–11)

## 2023-06-19 PROCEDURE — 82330 ASSAY OF CALCIUM: CPT

## 2023-06-19 PROCEDURE — 84484 ASSAY OF TROPONIN QUANT: CPT

## 2023-06-19 PROCEDURE — 6360000002 HC RX W HCPCS: Performed by: PHYSICIAN ASSISTANT

## 2023-06-19 PROCEDURE — 2700000000 HC OXYGEN THERAPY PER DAY

## 2023-06-19 PROCEDURE — 6370000000 HC RX 637 (ALT 250 FOR IP): Performed by: INTERNAL MEDICINE

## 2023-06-19 PROCEDURE — 85025 COMPLETE CBC W/AUTO DIFF WBC: CPT

## 2023-06-19 PROCEDURE — 94761 N-INVAS EAR/PLS OXIMETRY MLT: CPT

## 2023-06-19 PROCEDURE — 2580000003 HC RX 258: Performed by: INTERNAL MEDICINE

## 2023-06-19 PROCEDURE — 80069 RENAL FUNCTION PANEL: CPT

## 2023-06-19 PROCEDURE — 81003 URINALYSIS AUTO W/O SCOPE: CPT

## 2023-06-19 PROCEDURE — C9113 INJ PANTOPRAZOLE SODIUM, VIA: HCPCS | Performed by: PHYSICIAN ASSISTANT

## 2023-06-19 PROCEDURE — 94760 N-INVAS EAR/PLS OXIMETRY 1: CPT

## 2023-06-19 PROCEDURE — 85730 THROMBOPLASTIN TIME PARTIAL: CPT

## 2023-06-19 PROCEDURE — 2000000000 HC ICU R&B

## 2023-06-19 PROCEDURE — 93010 ELECTROCARDIOGRAM REPORT: CPT | Performed by: INTERNAL MEDICINE

## 2023-06-19 PROCEDURE — 36415 COLL VENOUS BLD VENIPUNCTURE: CPT

## 2023-06-19 PROCEDURE — 85014 HEMATOCRIT: CPT

## 2023-06-19 PROCEDURE — 85610 PROTHROMBIN TIME: CPT

## 2023-06-19 PROCEDURE — 85018 HEMOGLOBIN: CPT

## 2023-06-19 RX ORDER — INSULIN LISPRO 100 [IU]/ML
0-4 INJECTION, SOLUTION INTRAVENOUS; SUBCUTANEOUS NIGHTLY
Status: DISCONTINUED | OUTPATIENT
Start: 2023-06-19 | End: 2023-06-21 | Stop reason: HOSPADM

## 2023-06-19 RX ORDER — ATORVASTATIN CALCIUM 80 MG/1
80 TABLET, FILM COATED ORAL DAILY
Status: DISCONTINUED | OUTPATIENT
Start: 2023-06-19 | End: 2023-06-21 | Stop reason: HOSPADM

## 2023-06-19 RX ORDER — INSULIN LISPRO 100 [IU]/ML
0-8 INJECTION, SOLUTION INTRAVENOUS; SUBCUTANEOUS
Status: DISCONTINUED | OUTPATIENT
Start: 2023-06-19 | End: 2023-06-21 | Stop reason: HOSPADM

## 2023-06-19 RX ORDER — LEVOTHYROXINE SODIUM 0.1 MG/1
100 TABLET ORAL
Status: DISCONTINUED | OUTPATIENT
Start: 2023-06-19 | End: 2023-06-21 | Stop reason: HOSPADM

## 2023-06-19 RX ORDER — OXYCODONE HYDROCHLORIDE 5 MG/1
5 TABLET ORAL EVERY 6 HOURS PRN
Status: DISCONTINUED | OUTPATIENT
Start: 2023-06-19 | End: 2023-06-21 | Stop reason: HOSPADM

## 2023-06-19 RX ORDER — DEXTROSE MONOHYDRATE 100 MG/ML
INJECTION, SOLUTION INTRAVENOUS CONTINUOUS PRN
Status: DISCONTINUED | OUTPATIENT
Start: 2023-06-19 | End: 2023-06-21 | Stop reason: HOSPADM

## 2023-06-19 RX ORDER — PANTOPRAZOLE SODIUM 40 MG/10ML
40 INJECTION, POWDER, LYOPHILIZED, FOR SOLUTION INTRAVENOUS 2 TIMES DAILY
Status: DISCONTINUED | OUTPATIENT
Start: 2023-06-19 | End: 2023-06-21 | Stop reason: HOSPADM

## 2023-06-19 RX ADMIN — OXYCODONE HYDROCHLORIDE 5 MG: 5 TABLET ORAL at 15:00

## 2023-06-19 RX ADMIN — ACETAMINOPHEN 650 MG: 325 TABLET ORAL at 15:00

## 2023-06-19 RX ADMIN — OXYCODONE HYDROCHLORIDE 5 MG: 5 TABLET ORAL at 21:19

## 2023-06-19 RX ADMIN — Medication 10 ML: at 09:58

## 2023-06-19 RX ADMIN — PANTOPRAZOLE SODIUM 40 MG: 40 INJECTION, POWDER, FOR SOLUTION INTRAVENOUS at 09:57

## 2023-06-19 RX ADMIN — Medication 10 ML: at 20:25

## 2023-06-19 RX ADMIN — OXYCODONE HYDROCHLORIDE 5 MG: 5 TABLET ORAL at 08:13

## 2023-06-19 RX ADMIN — ACETAMINOPHEN 650 MG: 325 TABLET ORAL at 08:14

## 2023-06-19 RX ADMIN — ATORVASTATIN CALCIUM 80 MG: 80 TABLET, FILM COATED ORAL at 08:13

## 2023-06-19 RX ADMIN — PANTOPRAZOLE SODIUM 40 MG: 40 INJECTION, POWDER, FOR SOLUTION INTRAVENOUS at 20:25

## 2023-06-19 RX ADMIN — ACETAMINOPHEN 650 MG: 325 TABLET ORAL at 21:20

## 2023-06-19 RX ADMIN — LEVOTHYROXINE SODIUM 100 MCG: 100 TABLET ORAL at 06:29

## 2023-06-19 ASSESSMENT — PAIN SCALES - GENERAL
PAINLEVEL_OUTOF10: 0
PAINLEVEL_OUTOF10: 6
PAINLEVEL_OUTOF10: 0
PAINLEVEL_OUTOF10: 6
PAINLEVEL_OUTOF10: 0
PAINLEVEL_OUTOF10: 6

## 2023-06-19 ASSESSMENT — PAIN DESCRIPTION - ORIENTATION
ORIENTATION: LEFT;LOWER
ORIENTATION: LEFT

## 2023-06-19 ASSESSMENT — PAIN DESCRIPTION - LOCATION
LOCATION: LEG
LOCATION: LEG

## 2023-06-19 ASSESSMENT — PAIN DESCRIPTION - DESCRIPTORS
DESCRIPTORS: POUNDING
DESCRIPTORS: THROBBING

## 2023-06-19 ASSESSMENT — PAIN - FUNCTIONAL ASSESSMENT: PAIN_FUNCTIONAL_ASSESSMENT: ACTIVITIES ARE NOT PREVENTED

## 2023-06-19 NOTE — CONSULTS
Gastroenterology Consult Note        Patient: Nargis Ferrari  : 1960  Acct#:      Date:  2023    Subjective:       History of Present Illness  Patient is a 59 y.o.  male admitted with Hemorrhagic shock ([facility] [address] [R57.8]  Acute blood loss anemia [D62]  Upper GI bleed [K92.2]  DIEGO (acute kidney injury) ([address] [N17.9]  Supratherapeutic INR [R79.1]  Symptomatic anemia [D64.9] who is seen in consult for melena. 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 He is on Keytruda. He has a trach. History of diabetes, hypertension, hyperlipidemia, peripheral vascular disease and is on Coumadin for this. Patient had femoral to peroneal artery bypass graft 2023. He was anemic and received blood transfusions then. He went to rehab and his Coumadin dose was changed. When he went home, he was accidentally taking the wrong dose of Coumadin (higher than prescribed). 3 days ago started having black/runny bowel movements. Had dizziness so came to the ER. He was hypotensive with blood pressure 80/44. Hemoglobin was 5.7. INR was greater than 18. He responded to IV fluids and PRBC transfusions. States last black bowel movement was last night. Stool was dark brown on ELDER in the ER but guaiac positive. Denies red blood with bowel movements. No abdominal pain, nausea, vomiting, heartburn. Some intermittent dysphagia localized to the throat with solid food if he takes too big of a bite. Does take p.o. iron but states stool is never black like this. Last colonoscopy . No prior EGD. Denies NSAID use.       Past Medical History:   Diagnosis Date    Acquired hypothyroidism 2023    Cancer ([facility] [address]     throat    Diabetes mellitus ([facility] [address]     Fibromyalgia     History of DVT (deep vein thrombosis)     Hyperlipidemia     Hypertension     Type 2 diabetes mellitus with circulatory disorder, without long-term current use of insulin ([facility] [address] 2022      Past Surgical

## 2023-06-19 NOTE — PROGRESS NOTES
4 Eyes Skin Assessment     The patient is being assess for  Admission    I agree that 2 RN's have performed a thorough Head to Toe Skin Assessment on the patient. ALL assessment sites listed below have been assessed. Areas assessed by both nurses:   [x]   Head, Face, and Ears   [x]   Shoulders, Back, and Chest  [x]   Arms, Elbows, and Hands   [x]   Coccyx, Sacrum, and IschIum  [x]   Legs, Feet, and Heels        Does the Patient have Skin Breakdown?   Yes LDA WOUND CARE was Initiated documentation include the Jamila-wound, Wound Assessment, Measurements, Dressing Treatment, Drainage, and Color\",         Harshil Prevention initiated:  No   Wound Care Orders initiated:  Yes      [address] nurse consulted for Pressure Injury (Stage 3,4, Unstageable, DTI, NWPT, and Complex wounds), New and Established Ostomies:  Yes      Nurse 1 eSignature: Electronically signed by Nilesh Rivera RN on 6/19/23 at 3:22 AM EDT    **SHARE this note so that the co-signing nurse is able to place an eSignature**    Nurse 2 eSignature: Electronically signed by Magnus Mitchell RN on 6/19/23 at 3:32 AM EDT

## 2023-06-19 NOTE — PROGRESS NOTES
Transfused two units of blood. Unable to scan bar codes. Lab notified. Performed 2 RN verification and documented in the paper forms provided by Blood Bank. See patient's paper chart.

## 2023-06-19 NOTE — ED NOTES
Ynes Epstein [place] aware of patients hypotension, pt currently non symptomatic and A&O x4, awaiting blood from blood bank at this time.      [address], RN  06/18/23 8284

## 2023-06-19 NOTE — PROGRESS NOTES
Pharmacy Home Medication Reconciliation Note    A medication reconciliation has been completed for Miguel Ángel oSusa 1960    Pharmacy: [address] [place] [address]  Information provided by: patient    The patient's home medication list is as follows: No current facility-administered medications on file prior to encounter. Current Outpatient Medications on File Prior to Encounter   Medication Sig Dispense Refill    cetirizine (ZYRTEC) 10 MG tablet TAKE 1 TABLET BY MOUTH DAILY 30 tablet 1    blood glucose monitor kit and supplies Dispense sufficient amount for indicated testing frequency plus additional to accommodate PRN testing needs. Dispense all needed supplies to include: monitor, strips, lancing device, lancets, control solutions, alcohol swabs. 1 kit 0    pantoprazole (PROTONIX) 40 MG tablet TAKE 1 TABLET BY MOUTH BEFORE BREAKFAST      levothyroxine (SYNTHROID) 100 MCG tablet Take 1 tablet by mouth every morning      polyethylene glycol (GLYCOLAX) 17 GM/SCOOP powder by Nasogastric route daily      FEROSUL 325 (65 Fe) MG tablet Take 1 tablet by mouth daily      lidocaine-prilocaine (EMLA) 2.5-2.5 % cream apply to port site 30-60minutes prior to chemotherapy      nystatin (MYCOSTATIN) 333278 UNIT/ML suspension SWISH AND SWALLOW 5 ML THREE TIMES DAILY AS DIRECTED      oxyCODONE (ROXICODONE) 5 MG immediate release tablet TAKE 1 TABLET BY MOUTH FOUR TIMES DAILY AS NEEDED FOR MODERATE PAIN      budesonide-formoterol (SYMBICORT) 160-4.5 MCG/ACT AERO Inhale 2 puffs into the lungs 2 times daily      Blood Glucose Monitoring Suppl (CONTOUR BLOOD GLUCOSE SYSTEM) w/Device KIT Monitor glucose daily and prn for concerns of hyperglycemia or hypoglycemia 1 kit 0    gentamicin (GARAMYCIN) 0.1 % cream Apply topically daily.  (Patient taking differently: three times a week) 30 g 2    BANOPHEN 25 MG tablet TAKE 1 TABLET BY MOUTH EVERY 6 HOURS AS NEEDED FOR ITCHING 60 tablet 0    metFORMIN (GLUCOPHAGE) 500 MG

## 2023-06-19 NOTE — PROGRESS NOTES
Patient arrives to CVU room 2912 connected to the monitor. Ambulates with minimal assistance. One unit of plasma has just completed thru the chest port.

## 2023-06-19 NOTE — H&P
HOSPITALISTS HISTORY AND PHYSICAL    6/19/2023 11:17 AM    Patient Information:  Adrianna Su is a 59 y.o. male [id number]  PCP:  Bao Moe MD (Tel: [phone] )    Chief complaint:    Chief Complaint   Patient presents with    Dizziness     Pt via wife from home, c/o dizziness and fatigue for a few days, BP low in triage, 80/44       History of Present Illness:  Van Chen is a 59 y.o. 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 Patient is a trach from this was an we have discharged to rehab and his Coumadin medication was changed and patient was not aware thinks he took too much Coumadin came to the ED because patient felt dizzy and fatigued for last couple days was found to be hypotensive with blood pressure of 80/44 INR was greater than 18 and hemoglobin of 5.7 patient was given 2 units of PRBCs and fresh frozen plasma and patient's blood pressure started to improve has never had a EGD colonoscopy was in 2016 currently denies any further bleeding. REVIEW OF SYSTEMS:   Constitutional: Negative for fever,chills or night sweats  ENT: Negative for rhinorrhea, epistaxis, hoarseness, sore throat. Respiratory: Negative for shortness of breath,wheezing  Cardiovascular: Negative for chest pain, palpitations   Gastrointestinal:see serenity  Genitourinary: Negative for polyuria, dysuria   Hematologic/Lymphatic: Negative for bleeding tendency, easy bruising  Musculoskeletal: Negative for myalgias and arthralgias  Neurologic: Negative for confusion,dysarthria. Skin: Negative for itching,rash  Psychiatric: Negative for depression,anxiety, agitation. Endocrine: Negative for polydipsia,polyuria,heat /cold intolerance.     Past Medical History:   has a past medical history of Acquired hypothyroidism, Cancer ([facility] [address] Diabetes mellitus ([facility] [address] Fibromyalgia, History of DVT (deep vein

## 2023-06-19 NOTE — ACP (ADVANCE CARE PLANNING)
Advanced Care Planning Note. Purpose of Encounter: Advanced care planning in light of hospitalization  Parties In Attendance: Patient,    Decisional Capacity: Yes  Subjective: Patient  understand that this conversation is to address long term care goal  Objective: Patient admitted to hospital with acute blood loss anemia secondary to GI bleeding history of lung cancer status post trach  Goals of Care Determination: Patient would pursue CPR and Intubation if required.  Would consider egd and colonscopy If needed  Code Status: full code  Time spent on Advanced care Plannin minutes  Advanced Care Planning Documents: documented patient's wishes, would like wife Jorge Ivory to make medical decisions if unable to make decisions

## 2023-06-19 NOTE — ED PROVIDER NOTES
EKG NOTE:    Sinus rhythm at a rate of 99 beats a minute with no acute ST elevations or depressions or pathologic Q waves. I was not involved with the care of this patient.         Jacob Benitez MD  06/18/23 7659
for evaluation for lightheadedness. Patient states that he has been feeling fatigued since yesterday, he states that he is on chemotherapy. He states he does have a history of larynx cancer, and reports that his last chemotherapy treatment was 2 weeks ago. Patient does have a history of anemia in regards to cancer/chemotherapy. Patient states that he has required blood transfusions in the past.  The patient reports that today he felt lightheaded, he states that he is on Coumadin secondary history of peripheral vascular disease, bypass of his legs and history of DVT, stool was black today. He has been holding his Coumadin since Friday as he reports that his previous INR was 8    On examination, he is hypotensive, initially in the 80s. He does appear to be pale. His abdomen is benign. Stool is dark brown in color. Disposition Considerations (tests considered but not done, Admit vs D/C, Shared Decision Making, Pt Expectation of Test or Tx.):    Occult blood is positive    EKG as documented by my attending, please see his note for further details. CBC shows no evidence of leukocytosis, he is anemic at 5.7, dropped from 7.2. His occult blood was positive. She is CMP does show a BUN of 46, which could be due to an upper GI source, possibly dehydration as his creatinine is 2.0 we will give a 500 mL fluid bolus, will also add on Protonix. Troponin is elevated which is likely secondary to demand, and renal function. His INR is 6.49. Patient was given FFP as well as vitamin K to reverse the INR. He was also given 2 units of blood. Patient blood pressure is overall improving, states he is overall feeling better, however I feel that he will need to be admitted to the ICU due to his acute blood loss anemia, and symptomatic anemia. Hospitalist has been paged for admission, the patient's updated, agreeable with plan, stable for admission    I am the Primary Clinician of Record. FINAL IMPRESSION      1.  Acute

## 2023-06-20 ENCOUNTER — ANESTHESIA (OUTPATIENT)
Dept: ENDOSCOPY | Age: 63
DRG: 951 | End: 2023-06-20
Payer: MEDICAID

## 2023-06-20 ENCOUNTER — APPOINTMENT (OUTPATIENT)
Dept: PHARMACY | Age: 63
End: 2023-06-20
Payer: MEDICAID

## 2023-06-20 ENCOUNTER — ANESTHESIA EVENT (OUTPATIENT)
Dept: ENDOSCOPY | Age: 63
DRG: 951 | End: 2023-06-20
Payer: MEDICAID

## 2023-06-20 LAB
ANION GAP SERPL CALCULATED.3IONS-SCNC: 7 MMOL/L (ref 3–16)
BASOPHILS # BLD: 0 K/UL (ref 0–0.2)
BASOPHILS NFR BLD: 1.4 %
BUN SERPL-MCNC: 16 MG/DL (ref 7–20)
CALCIUM SERPL-MCNC: 8.8 MG/DL (ref 8.3–10.6)
CHLORIDE SERPL-SCNC: 105 MMOL/L (ref 99–110)
CO2 SERPL-SCNC: 23 MMOL/L (ref 21–32)
CREAT SERPL-MCNC: 0.8 MG/DL (ref 0.8–1.3)
DEPRECATED RDW RBC AUTO: 17 % (ref 12.4–15.4)
EOSINOPHIL # BLD: 0.2 K/UL (ref 0–0.6)
EOSINOPHIL NFR BLD: 7.5 %
GFR SERPLBLD CREATININE-BSD FMLA CKD-EPI: >60 ML/MIN/{1.73_M2}
GLUCOSE BLD-MCNC: 107 MG/DL (ref 70–99)
GLUCOSE BLD-MCNC: 111 MG/DL (ref 70–99)
GLUCOSE BLD-MCNC: 119 MG/DL (ref 70–99)
GLUCOSE BLD-MCNC: 157 MG/DL (ref 70–99)
GLUCOSE SERPL-MCNC: 109 MG/DL (ref 70–99)
HCT VFR BLD AUTO: 23.2 % (ref 40.5–52.5)
HCT VFR BLD AUTO: 24.1 % (ref 40.5–52.5)
HCT VFR BLD AUTO: 24.9 % (ref 40.5–52.5)
HGB BLD-MCNC: 7.2 G/DL (ref 13.5–17.5)
HGB BLD-MCNC: 7.7 G/DL (ref 13.5–17.5)
HGB BLD-MCNC: 7.9 G/DL (ref 13.5–17.5)
INR PPP: 1.22 (ref 0.84–1.16)
LYMPHOCYTES # BLD: 0.6 K/UL (ref 1–5.1)
LYMPHOCYTES NFR BLD: 21.4 %
MCH RBC QN AUTO: 27.5 PG (ref 26–34)
MCHC RBC AUTO-ENTMCNC: 31.1 G/DL (ref 31–36)
MCV RBC AUTO: 88.3 FL (ref 80–100)
MONOCYTES # BLD: 0.3 K/UL (ref 0–1.3)
MONOCYTES NFR BLD: 10.2 %
NEUTROPHILS # BLD: 1.6 K/UL (ref 1.7–7.7)
NEUTROPHILS NFR BLD: 59.5 %
PERFORMED ON: ABNORMAL
PLATELET # BLD AUTO: 307 K/UL (ref 135–450)
PMV BLD AUTO: 6.8 FL (ref 5–10.5)
POTASSIUM SERPL-SCNC: 4 MMOL/L (ref 3.5–5.1)
PROTHROMBIN TIME: 15.4 SEC (ref 11.5–14.8)
RBC # BLD AUTO: 2.63 M/UL (ref 4.2–5.9)
SODIUM SERPL-SCNC: 135 MMOL/L (ref 136–145)
WBC # BLD AUTO: 2.7 K/UL (ref 4–11)

## 2023-06-20 PROCEDURE — 2720000010 HC SURG SUPPLY STERILE: Performed by: INTERNAL MEDICINE

## 2023-06-20 PROCEDURE — 3609017100 HC EGD: Performed by: INTERNAL MEDICINE

## 2023-06-20 PROCEDURE — 6360000002 HC RX W HCPCS: Performed by: NURSE ANESTHETIST, CERTIFIED REGISTERED

## 2023-06-20 PROCEDURE — 6370000000 HC RX 637 (ALT 250 FOR IP): Performed by: INTERNAL MEDICINE

## 2023-06-20 PROCEDURE — 85018 HEMOGLOBIN: CPT

## 2023-06-20 PROCEDURE — 85610 PROTHROMBIN TIME: CPT

## 2023-06-20 PROCEDURE — 3700000000 HC ANESTHESIA ATTENDED CARE: Performed by: INTERNAL MEDICINE

## 2023-06-20 PROCEDURE — 2000000000 HC ICU R&B

## 2023-06-20 PROCEDURE — 3609013200 HC EGD W/ ABLATION: Performed by: INTERNAL MEDICINE

## 2023-06-20 PROCEDURE — 85025 COMPLETE CBC W/AUTO DIFF WBC: CPT

## 2023-06-20 PROCEDURE — 2580000003 HC RX 258: Performed by: INTERNAL MEDICINE

## 2023-06-20 PROCEDURE — 6360000002 HC RX W HCPCS: Performed by: INTERNAL MEDICINE

## 2023-06-20 PROCEDURE — 7100000000 HC PACU RECOVERY - FIRST 15 MIN: Performed by: INTERNAL MEDICINE

## 2023-06-20 PROCEDURE — 2580000003 HC RX 258: Performed by: NURSE ANESTHETIST, CERTIFIED REGISTERED

## 2023-06-20 PROCEDURE — 80048 BASIC METABOLIC PNL TOTAL CA: CPT

## 2023-06-20 PROCEDURE — C9113 INJ PANTOPRAZOLE SODIUM, VIA: HCPCS | Performed by: INTERNAL MEDICINE

## 2023-06-20 PROCEDURE — 3700000001 HC ADD 15 MINUTES (ANESTHESIA): Performed by: INTERNAL MEDICINE

## 2023-06-20 PROCEDURE — 3609013000 HC EGD TRANSORAL CONTROL BLEEDING ANY METHOD: Performed by: INTERNAL MEDICINE

## 2023-06-20 PROCEDURE — 94761 N-INVAS EAR/PLS OXIMETRY MLT: CPT

## 2023-06-20 PROCEDURE — 2709999900 HC NON-CHARGEABLE SUPPLY: Performed by: INTERNAL MEDICINE

## 2023-06-20 PROCEDURE — 0W3P8ZZ CONTROL BLEEDING IN GASTROINTESTINAL TRACT, VIA NATURAL OR ARTIFICIAL OPENING ENDOSCOPIC: ICD-10-PCS | Performed by: INTERNAL MEDICINE

## 2023-06-20 PROCEDURE — 2500000003 HC RX 250 WO HCPCS: Performed by: NURSE ANESTHETIST, CERTIFIED REGISTERED

## 2023-06-20 PROCEDURE — 2700000000 HC OXYGEN THERAPY PER DAY

## 2023-06-20 PROCEDURE — 7100000001 HC PACU RECOVERY - ADDTL 15 MIN: Performed by: INTERNAL MEDICINE

## 2023-06-20 PROCEDURE — 85014 HEMATOCRIT: CPT

## 2023-06-20 RX ORDER — SODIUM CHLORIDE 9 MG/ML
INJECTION, SOLUTION INTRAVENOUS CONTINUOUS PRN
Status: DISCONTINUED | OUTPATIENT
Start: 2023-06-20 | End: 2023-06-20 | Stop reason: SDUPTHER

## 2023-06-20 RX ORDER — PEG-3350, SODIUM SULFATE, SODIUM CHLORIDE, POTASSIUM CHLORIDE, SODIUM ASCORBATE AND ASCORBIC ACID 7.5-2.691G
100 KIT ORAL ONCE
Status: COMPLETED | OUTPATIENT
Start: 2023-06-21 | End: 2023-06-21

## 2023-06-20 RX ORDER — PROPOFOL 10 MG/ML
INJECTION, EMULSION INTRAVENOUS PRN
Status: DISCONTINUED | OUTPATIENT
Start: 2023-06-20 | End: 2023-06-20 | Stop reason: SDUPTHER

## 2023-06-20 RX ORDER — LIDOCAINE HYDROCHLORIDE 20 MG/ML
INJECTION, SOLUTION INFILTRATION; PERINEURAL PRN
Status: DISCONTINUED | OUTPATIENT
Start: 2023-06-20 | End: 2023-06-20 | Stop reason: SDUPTHER

## 2023-06-20 RX ORDER — PEG-3350, SODIUM SULFATE, SODIUM CHLORIDE, POTASSIUM CHLORIDE, SODIUM ASCORBATE AND ASCORBIC ACID 7.5-2.691G
100 KIT ORAL ONCE
Status: COMPLETED | OUTPATIENT
Start: 2023-06-20 | End: 2023-06-20

## 2023-06-20 RX ADMIN — PANTOPRAZOLE SODIUM 40 MG: 40 INJECTION, POWDER, FOR SOLUTION INTRAVENOUS at 20:36

## 2023-06-20 RX ADMIN — LIDOCAINE HYDROCHLORIDE 8 MG: 20 INJECTION, SOLUTION INFILTRATION; PERINEURAL at 10:03

## 2023-06-20 RX ADMIN — Medication 10 ML: at 21:25

## 2023-06-20 RX ADMIN — LEVOTHYROXINE SODIUM 100 MCG: 100 TABLET ORAL at 06:13

## 2023-06-20 RX ADMIN — PROPOFOL 80 MG: 10 INJECTION, EMULSION INTRAVENOUS at 10:03

## 2023-06-20 RX ADMIN — PHENYLEPHRINE HYDROCHLORIDE 100 MCG: 10 INJECTION INTRAVENOUS at 10:09

## 2023-06-20 RX ADMIN — PROPOFOL 20 MG: 10 INJECTION, EMULSION INTRAVENOUS at 10:15

## 2023-06-20 RX ADMIN — ACETAMINOPHEN 650 MG: 325 TABLET ORAL at 15:43

## 2023-06-20 RX ADMIN — PROPOFOL 20 MG: 10 INJECTION, EMULSION INTRAVENOUS at 10:06

## 2023-06-20 RX ADMIN — ACETAMINOPHEN 650 MG: 325 TABLET ORAL at 06:13

## 2023-06-20 RX ADMIN — OXYCODONE HYDROCHLORIDE 5 MG: 5 TABLET ORAL at 06:13

## 2023-06-20 RX ADMIN — PROPOFOL 20 MG: 10 INJECTION, EMULSION INTRAVENOUS at 10:09

## 2023-06-20 RX ADMIN — SODIUM CHLORIDE: 9 INJECTION, SOLUTION INTRAVENOUS at 09:52

## 2023-06-20 RX ADMIN — PHENYLEPHRINE HYDROCHLORIDE 100 MCG: 10 INJECTION INTRAVENOUS at 10:15

## 2023-06-20 RX ADMIN — PEG-3350, SODIUM SULFATE, SODIUM CHLORIDE, POTASSIUM CHLORIDE, SODIUM ASCORBATE AND ASCORBIC ACID 100 G: KIT at 20:36

## 2023-06-20 RX ADMIN — PROPOFOL 20 MG: 10 INJECTION, EMULSION INTRAVENOUS at 10:12

## 2023-06-20 RX ADMIN — OXYCODONE HYDROCHLORIDE 5 MG: 5 TABLET ORAL at 15:43

## 2023-06-20 RX ADMIN — PHENYLEPHRINE HYDROCHLORIDE 200 MCG: 10 INJECTION INTRAVENOUS at 10:07

## 2023-06-20 ASSESSMENT — PAIN DESCRIPTION - DESCRIPTORS: DESCRIPTORS: THROBBING

## 2023-06-20 ASSESSMENT — PAIN SCALES - GENERAL
PAINLEVEL_OUTOF10: 2
PAINLEVEL_OUTOF10: 2
PAINLEVEL_OUTOF10: 7
PAINLEVEL_OUTOF10: 2
PAINLEVEL_OUTOF10: 6
PAINLEVEL_OUTOF10: 2

## 2023-06-20 ASSESSMENT — PAIN - FUNCTIONAL ASSESSMENT: PAIN_FUNCTIONAL_ASSESSMENT: ACTIVITIES ARE NOT PREVENTED

## 2023-06-20 ASSESSMENT — PAIN DESCRIPTION - ORIENTATION: ORIENTATION: LEFT

## 2023-06-20 ASSESSMENT — PAIN DESCRIPTION - LOCATION: LOCATION: LEG

## 2023-06-20 ASSESSMENT — ENCOUNTER SYMPTOMS: SHORTNESS OF BREATH: 0

## 2023-06-20 NOTE — PROGRESS NOTES
Pt transported back to inpatient bed; pt stand by assist back to bed; bedside report given to CVU RN, pt transported back with all belongings.

## 2023-06-20 NOTE — PROGRESS NOTES
Reviewed patient's medical and surgical history in electronic record and with patient at the bedside. All questions regarding procedure answered. Scope verified using 2 person system. Thea Russell

## 2023-06-20 NOTE — ANESTHESIA PRE PROCEDURE
Department of Anesthesiology  Preprocedure Note       Name:  Shelli Crum   Age:  59 y.o.  :  1960                                          MRN:  [id number]         Date:  2023      Surgeon: Kerwin Proctor):  Rosette Elias MD    Procedure: Procedure(s):  EGD DIAGNOSTIC ONLY    Medications prior to admission:   Prior to Admission medications    Medication Sig Start Date End Date Taking? Authorizing Provider   cetirizine (ZYRTEC) 10 MG tablet TAKE 1 TABLET BY MOUTH DAILY 6/15/23 7/15/23  Christian Virk MD   blood glucose monitor kit and supplies Dispense sufficient amount for indicated testing frequency plus additional to accommodate PRN testing needs. Dispense all needed supplies to include: monitor, strips, lancing device, lancets, control solutions, alcohol swabs.  23   Christian Virk MD   pantoprazole (PROTONIX) 40 MG tablet TAKE 1 TABLET BY MOUTH BEFORE BREAKFAST 5/15/23   Historical Provider, MD   levothyroxine (SYNTHROID) 100 MCG tablet Take 1 tablet by mouth every morning 5/15/23   Historical Provider, MD   polyethylene glycol (GLYCOLAX) 17 GM/SCOOP powder by Nasogastric route daily 6/10/22   Historical Provider, MD   FEROSUL 325 (65 Fe) MG tablet Take 1 tablet by mouth daily 5/15/23   Historical Provider, MD   lidocaine-prilocaine (EMLA) 2.5-2.5 % cream apply to port site 30-60minutes prior to chemotherapy 23   Historical Provider, MD   nystatin (MYCOSTATIN) 190955 UNIT/ML suspension SWISH AND SWALLOW 5 ML THREE TIMES DAILY AS DIRECTED 23   Historical Provider, MD   oxyCODONE (Martha Soraida) 5 MG immediate release tablet TAKE 1 TABLET BY MOUTH FOUR TIMES DAILY AS NEEDED FOR MODERATE PAIN 5/15/23   Historical Provider, MD   budesonide-formoterol (SYMBICORT) 160-4.5 MCG/ACT AERO Inhale 2 puffs into the lungs 2 times daily 22   Historical Provider, MD   Blood Glucose Monitoring Suppl (CONTOUR BLOOD GLUCOSE SYSTEM) w/Device KIT Monitor glucose daily and prn for concerns of hyperglycemia

## 2023-06-20 NOTE — BRIEF OP NOTE
EGD:     small non-bleeding AVM in D2. Ablated with APC  otherwise normal EGD      Rec:    will plan colonoscopy tomorrow since last was 2016  pt has chronic anemia that is normocytic so may all be anemia of chronic disease but is on oral iron.       Radha Amaya MD  [address]

## 2023-06-20 NOTE — PROGRESS NOTES
Pt arrived from endo to PACU bay 4. Reported received from endo staff. Pt asleep, minimally arousable to voice. Pt arrives with simple mask in place, infusing 4L oxygen via trach, vitals as charted.

## 2023-06-20 NOTE — PLAN OF CARE
Problem: Safety - Adult  Goal: Free from fall injury  6/20/2023 0953 by Lalo Mcknight RN  Outcome: Progressing  6/20/2023 0316 by Sharyn Cranker, RN  Outcome: Progressing     Problem: ABCDS Injury Assessment  Goal: Absence of physical injury  6/20/2023 0953 by Lalo Mcknight RN  Outcome: Progressing  6/20/2023 0316 by Sharyn Cranker, RN  Outcome: Progressing     Problem: Chronic Conditions and Co-morbidities  Goal: Patient's chronic conditions and co-morbidity symptoms are monitored and maintained or improved  6/20/2023 0953 by Lalo Mcknight RN  Outcome: Progressing  6/20/2023 0316 by Sharyn Cranker, RN  Outcome: Progressing     Problem: Pain  Goal: Verbalizes/displays adequate comfort level or baseline comfort level  Outcome: Progressing

## 2023-06-20 NOTE — PROGRESS NOTES
[facility]    6/20/2023 12:52 PM        Name: Prieto Escalante .               Admitted: 6/18/2023  Primary Care Provider: Celena Elliott MD (Tel: [phone])      Subjective:    No nausea vomiting no further blood in stool    Reviewed interval ancillary notes    Current Medications  [START ON 6/21/2023] PEG-KCl-NaCl-NaSulf-Na Asc-C (MOVIPREP) solution 100 g, Once  PEG-KCl-NaCl-NaSulf-Na Asc-C (MOVIPREP) solution 100 g, Once  atorvastatin (LIPITOR) tablet 80 mg, Daily  mometasone-formoterol (DULERA) 200-5 MCG/ACT inhaler 2 puff, BID  levothyroxine (SYNTHROID) tablet 100 mcg, QAM AC  oxyCODONE (ROXICODONE) immediate release tablet 5 mg, Q6H PRN  pantoprazole (PROTONIX) injection 40 mg, BID  insulin lispro (HUMALOG) injection vial 0-8 Units, TID WC  insulin lispro (HUMALOG) injection vial 0-4 Units, Nightly  dextrose bolus 10% 125 mL, PRN   Or  dextrose bolus 10% 250 mL, PRN  glucagon (rDNA) injection 1 mg, PRN  dextrose 10 % infusion, Continuous PRN  0.9 % sodium chloride infusion, PRN  0.9 % sodium chloride infusion, PRN  sodium chloride flush 0.9 % injection 5-40 mL, 2 times per day  sodium chloride flush 0.9 % injection 5-40 mL, PRN  0.9 % sodium chloride infusion, PRN  ondansetron (ZOFRAN-ODT) disintegrating tablet 4 mg, Q8H PRN   Or  ondansetron (ZOFRAN) injection 4 mg, Q6H PRN  polyethylene glycol (GLYCOLAX) packet 17 g, Daily PRN  acetaminophen (TYLENOL) tablet 650 mg, Q6H PRN   Or  acetaminophen (TYLENOL) suppository 650 mg, Q6H PRN        Objective:  BP 98/72   Pulse 94   Temp 96.9 °F (36.1 °C) (Infrared)   Resp 16   Ht 5' 8\" (1.727 m)   Wt 165 lb (74.8 kg)   SpO2 98%   BMI 25.09 kg/m²     Intake/Output Summary (Last 24 hours) at 6/20/2023 1252  Last data filed at 6/20/2023 1016  Gross per 24 hour   Intake 440 ml   Output 650 ml   Net -210 ml      Wt Readings from Last 3 Encounters:   06/20/23 165 lb (74.8

## 2023-06-20 NOTE — ANESTHESIA POSTPROCEDURE EVALUATION
Department of Anesthesiology  Postprocedure Note    Patient: Anselmo Barrientos  MRN: [id number]  YOB: 1960  Date of evaluation: 6/20/2023      Procedure Summary     Date: 06/20/23 Room / Location: [address] [place] [address]    Anesthesia Start: 5679 Anesthesia Stop: 1022    Procedures:       EGD DIAGNOSTIC ONLY (Abdomen)      EGD CONTROL HEMORRHAGE (Abdomen) Diagnosis:       Gastrointestinal hemorrhage, unspecified gastrointestinal hemorrhage type      (Gastrointestinal hemorrhage, unspecified gastrointestinal hemorrhage type [K92.2])    Surgeons: Tyson Phoenix, MD Responsible Provider: Jeff Gomez MD    Anesthesia Type: MAC ASA Status: 3          Anesthesia Type: No value filed.     Andrew Phase I: Andrew Score: 8    Andrew Phase II:        Anesthesia Post Evaluation    Patient location during evaluation: PACU  Patient participation: complete - patient participated  Level of consciousness: awake and awake and alert  Pain score: 2  Airway patency: patent  Nausea & Vomiting: no vomiting  Complications: no  Cardiovascular status: blood pressure returned to baseline  Respiratory status: acceptable  Hydration status: euvolemic  Multimodal analgesia pain management approach

## 2023-06-21 ENCOUNTER — ANESTHESIA EVENT (OUTPATIENT)
Dept: ENDOSCOPY | Age: 63
DRG: 951 | End: 2023-06-21
Payer: MEDICAID

## 2023-06-21 ENCOUNTER — ANESTHESIA (OUTPATIENT)
Dept: ENDOSCOPY | Age: 63
DRG: 951 | End: 2023-06-21
Payer: MEDICAID

## 2023-06-21 VITALS
HEIGHT: 68 IN | BODY MASS INDEX: 25.03 KG/M2 | DIASTOLIC BLOOD PRESSURE: 77 MMHG | HEART RATE: 108 BPM | TEMPERATURE: 98.3 F | WEIGHT: 165.12 LBS | SYSTOLIC BLOOD PRESSURE: 128 MMHG | RESPIRATION RATE: 18 BRPM | OXYGEN SATURATION: 100 %

## 2023-06-21 LAB
ANION GAP SERPL CALCULATED.3IONS-SCNC: 9 MMOL/L (ref 3–16)
BASOPHILS # BLD: 0 K/UL (ref 0–0.2)
BASOPHILS NFR BLD: 1.4 %
BUN SERPL-MCNC: 6 MG/DL (ref 7–20)
CALCIUM SERPL-MCNC: 8.4 MG/DL (ref 8.3–10.6)
CHLORIDE SERPL-SCNC: 106 MMOL/L (ref 99–110)
CO2 SERPL-SCNC: 22 MMOL/L (ref 21–32)
CREAT SERPL-MCNC: <0.5 MG/DL (ref 0.8–1.3)
DEPRECATED RDW RBC AUTO: 17.3 % (ref 12.4–15.4)
EOSINOPHIL # BLD: 0.1 K/UL (ref 0–0.6)
EOSINOPHIL NFR BLD: 3.8 %
GFR SERPLBLD CREATININE-BSD FMLA CKD-EPI: >60 ML/MIN/{1.73_M2}
GLUCOSE BLD-MCNC: 102 MG/DL (ref 70–99)
GLUCOSE BLD-MCNC: 103 MG/DL (ref 70–99)
GLUCOSE BLD-MCNC: 88 MG/DL (ref 70–99)
GLUCOSE BLD-MCNC: 97 MG/DL (ref 70–99)
GLUCOSE SERPL-MCNC: 113 MG/DL (ref 70–99)
HCT VFR BLD AUTO: 23.9 % (ref 40.5–52.5)
HGB BLD-MCNC: 7.4 G/DL (ref 13.5–17.5)
INR PPP: 1.2 (ref 0.84–1.16)
LYMPHOCYTES # BLD: 0.5 K/UL (ref 1–5.1)
LYMPHOCYTES NFR BLD: 14.1 %
MCH RBC QN AUTO: 27.5 PG (ref 26–34)
MCHC RBC AUTO-ENTMCNC: 31.1 G/DL (ref 31–36)
MCV RBC AUTO: 88.5 FL (ref 80–100)
MONOCYTES # BLD: 0.3 K/UL (ref 0–1.3)
MONOCYTES NFR BLD: 8 %
NEUTROPHILS # BLD: 2.5 K/UL (ref 1.7–7.7)
NEUTROPHILS NFR BLD: 72.7 %
PERFORMED ON: ABNORMAL
PERFORMED ON: ABNORMAL
PERFORMED ON: NORMAL
PERFORMED ON: NORMAL
PLATELET # BLD AUTO: 308 K/UL (ref 135–450)
PMV BLD AUTO: 6.6 FL (ref 5–10.5)
POTASSIUM SERPL-SCNC: 3.6 MMOL/L (ref 3.5–5.1)
PROTHROMBIN TIME: 15.2 SEC (ref 11.5–14.8)
RBC # BLD AUTO: 2.71 M/UL (ref 4.2–5.9)
SODIUM SERPL-SCNC: 137 MMOL/L (ref 136–145)
TROPONIN, HIGH SENSITIVITY: 15 NG/L (ref 0–22)
WBC # BLD AUTO: 3.5 K/UL (ref 4–11)

## 2023-06-21 PROCEDURE — 2580000003 HC RX 258: Performed by: INTERNAL MEDICINE

## 2023-06-21 PROCEDURE — 7100000000 HC PACU RECOVERY - FIRST 15 MIN: Performed by: INTERNAL MEDICINE

## 2023-06-21 PROCEDURE — C9113 INJ PANTOPRAZOLE SODIUM, VIA: HCPCS | Performed by: INTERNAL MEDICINE

## 2023-06-21 PROCEDURE — 6370000000 HC RX 637 (ALT 250 FOR IP): Performed by: INTERNAL MEDICINE

## 2023-06-21 PROCEDURE — 84484 ASSAY OF TROPONIN QUANT: CPT

## 2023-06-21 PROCEDURE — 3700000000 HC ANESTHESIA ATTENDED CARE: Performed by: INTERNAL MEDICINE

## 2023-06-21 PROCEDURE — 85025 COMPLETE CBC W/AUTO DIFF WBC: CPT

## 2023-06-21 PROCEDURE — 2709999900 HC NON-CHARGEABLE SUPPLY: Performed by: INTERNAL MEDICINE

## 2023-06-21 PROCEDURE — 85610 PROTHROMBIN TIME: CPT

## 2023-06-21 PROCEDURE — 2580000003 HC RX 258: Performed by: NURSE ANESTHETIST, CERTIFIED REGISTERED

## 2023-06-21 PROCEDURE — 6360000002 HC RX W HCPCS: Performed by: INTERNAL MEDICINE

## 2023-06-21 PROCEDURE — 88305 TISSUE EXAM BY PATHOLOGIST: CPT

## 2023-06-21 PROCEDURE — 3700000001 HC ADD 15 MINUTES (ANESTHESIA): Performed by: INTERNAL MEDICINE

## 2023-06-21 PROCEDURE — 3609010600 HC COLONOSCOPY POLYPECTOMY SNARE/COLD BIOPSY: Performed by: INTERNAL MEDICINE

## 2023-06-21 PROCEDURE — 80048 BASIC METABOLIC PNL TOTAL CA: CPT

## 2023-06-21 PROCEDURE — 0DBL8ZZ EXCISION OF TRANSVERSE COLON, VIA NATURAL OR ARTIFICIAL OPENING ENDOSCOPIC: ICD-10-PCS | Performed by: INTERNAL MEDICINE

## 2023-06-21 PROCEDURE — 6360000002 HC RX W HCPCS: Performed by: NURSE ANESTHETIST, CERTIFIED REGISTERED

## 2023-06-21 PROCEDURE — 7100000001 HC PACU RECOVERY - ADDTL 15 MIN: Performed by: INTERNAL MEDICINE

## 2023-06-21 RX ORDER — BUDESONIDE 0.25 MG/2ML
250 INHALANT ORAL 2 TIMES DAILY
Qty: 60 EACH | Refills: 3 | Status: SHIPPED | OUTPATIENT
Start: 2023-06-21

## 2023-06-21 RX ORDER — ARFORMOTEROL TARTRATE 15 UG/2ML
1 SOLUTION RESPIRATORY (INHALATION) 2 TIMES DAILY
Qty: 120 ML | Refills: 3 | Status: SHIPPED | OUTPATIENT
Start: 2023-06-21

## 2023-06-21 RX ORDER — GENTAMICIN SULFATE 1 MG/G
CREAM TOPICAL ONCE
Status: COMPLETED | OUTPATIENT
Start: 2023-06-21 | End: 2023-06-21

## 2023-06-21 RX ORDER — SODIUM CHLORIDE 9 MG/ML
INJECTION, SOLUTION INTRAVENOUS CONTINUOUS PRN
Status: DISCONTINUED | OUTPATIENT
Start: 2023-06-21 | End: 2023-06-21 | Stop reason: SDUPTHER

## 2023-06-21 RX ORDER — PROPOFOL 10 MG/ML
INJECTION, EMULSION INTRAVENOUS PRN
Status: DISCONTINUED | OUTPATIENT
Start: 2023-06-21 | End: 2023-06-21 | Stop reason: SDUPTHER

## 2023-06-21 RX ORDER — WARFARIN SODIUM 5 MG/1
TABLET ORAL
Qty: 70 TABLET | Refills: 3 | Status: SHIPPED | OUTPATIENT
Start: 2023-06-21

## 2023-06-21 RX ADMIN — PANTOPRAZOLE SODIUM 40 MG: 40 INJECTION, POWDER, FOR SOLUTION INTRAVENOUS at 09:16

## 2023-06-21 RX ADMIN — ACETAMINOPHEN 650 MG: 325 TABLET ORAL at 02:14

## 2023-06-21 RX ADMIN — GENTAMICIN SULFATE: 1 CREAM TOPICAL at 16:56

## 2023-06-21 RX ADMIN — LEVOTHYROXINE SODIUM 100 MCG: 100 TABLET ORAL at 09:16

## 2023-06-21 RX ADMIN — PEG-3350, SODIUM SULFATE, SODIUM CHLORIDE, POTASSIUM CHLORIDE, SODIUM ASCORBATE AND ASCORBIC ACID 100 G: KIT at 02:04

## 2023-06-21 RX ADMIN — PROPOFOL 50 MG: 10 INJECTION, EMULSION INTRAVENOUS at 10:31

## 2023-06-21 RX ADMIN — ATORVASTATIN CALCIUM 80 MG: 80 TABLET, FILM COATED ORAL at 09:16

## 2023-06-21 RX ADMIN — SODIUM CHLORIDE: 9 INJECTION, SOLUTION INTRAVENOUS at 10:27

## 2023-06-21 RX ADMIN — PROPOFOL 50 MG: 10 INJECTION, EMULSION INTRAVENOUS at 10:40

## 2023-06-21 RX ADMIN — OXYCODONE HYDROCHLORIDE 5 MG: 5 TABLET ORAL at 02:12

## 2023-06-21 RX ADMIN — OXYCODONE HYDROCHLORIDE 5 MG: 5 TABLET ORAL at 09:27

## 2023-06-21 RX ADMIN — SODIUM CHLORIDE: 9 INJECTION, SOLUTION INTRAVENOUS at 10:15

## 2023-06-21 RX ADMIN — PROPOFOL 100 MCG/KG/MIN: 10 INJECTION, EMULSION INTRAVENOUS at 10:32

## 2023-06-21 RX ADMIN — Medication 10 ML: at 09:18

## 2023-06-21 ASSESSMENT — PAIN - FUNCTIONAL ASSESSMENT: PAIN_FUNCTIONAL_ASSESSMENT: 0-10

## 2023-06-21 ASSESSMENT — PAIN SCALES - GENERAL: PAINLEVEL_OUTOF10: 6

## 2023-06-21 ASSESSMENT — ENCOUNTER SYMPTOMS: SHORTNESS OF BREATH: 0

## 2023-06-21 NOTE — DISCHARGE SUMMARY
Hospital Medicine Discharge Summary    Patient: Nargis Ferrari     Gender: male  : 1960   Age: 59 y.o. MRN: [id number]    Admitting Physician: Zac Quinn MD  Discharge Physician: Raul Glover MD     Code Status: Full Code     Admit Date: 2023   Discharge Date:   2023    Disposition:  Home  Time spent arranging discharge: 31 minutes    Discharge Diagnoses: Active Hospital Problems    Diagnosis Date Noted    Hemorrhagic shock ([address] [R57.8] 2023   hemorhagic shock secondary to acute blood loss anemia from GI bleed secondary to coagulopathy from Coumadin INR was greater than 18      Condition at Discharge:  Stable    Hospital Course:   Admitted to hospital with hemorrhagic shock was given 2 units of PRBCs unit of FFP and vitamin K and INR was corrected from 18 hemoglobin stabilized patient was started Protonix drip underwent EGD with small nonbleeding AVM that was ablated underwent colonoscopy with 3 polyps removed, will need repeat colonoscopy in 3 years per GI cleared for discharge anticoagulation was resumed discussed with pharmacy recommend starting 10 mg daily and will follow-up with [facility] Coumadin clinic closely    Discharge Exam:    /71   Pulse 96   Temp 97.2 °F (36.2 °C) (Temporal)   Resp 20   Ht 5' 8\" (1.727 m)   Wt 165 lb 2 oz (74.9 kg)   SpO2 95%   BMI 25.11 kg/m²   General appearance:  Appears comfortable. AAOx3  HEENT: atraumatic, Pupils equal, muscous membranes moist, no masses appreciated s/p trache  Cardiovascular: rrr no murmurs appreciated  Respiratory: CTAB no wheezing  Gastrointestinal: Abdomen soft, non-tender, BS+  EXT: no edema  Neurology: no gross focal deficts  Psychiatry: Appropriate affect.  Not agitated  Skin: Warm, dry, no rashes appreciated    Discharge Medications:   Current Discharge Medication List        START taking these medications    Details   arformoterol tartrate (BROVANA) 15 MCG/2ML NEBU Take 1 ampule by nebulization

## 2023-06-21 NOTE — PROGRESS NOTES
Pt transferred to room 2912 at this time. A&O with no signs of distress. Tele on, with visual on monitor, HR of 70s. Report given to CVICU RN. V/u and denies questions or further needs at this time.

## 2023-06-21 NOTE — CARE COORDINATION
[address] [address] Continence Nurse  Follow-up Progress Note       NAME:  Checo Padilla RECORD NUMBER:  [id number]  AGE:  59 y.o. GENDER:  male  :  1960  TODAY'S DATE:  2023    Subjective:   Wound Identification:  Wound Type: venous, diabetic, and non-healing surgical  Contributing Factors: edema, venous stasis, and diabetes        Patient Goal of Care:  [x] Wound Healing  [] Odor Control  [] Palliative Care  [] Pain Control   [] Other:     Objective:    /77   Pulse (!) 108   Temp 98.3 °F (36.8 °C) (Temporal)   Resp 18   Ht 5' 8\" (1.727 m)   Wt 165 lb 2 oz (74.9 kg)   SpO2 100%   BMI 25.11 kg/m²   Harshil Risk Score: Harshil Scale Score: 19  Assessment:   Measurements:  Wound 22 Ankle Left;Medial #1 (Active)   Wound Image   23 1130   Wound Etiology Venous 23 0955   Dressing Status Clean;Dry; Intact 23 0755   Wound Cleansed Not Cleansed 23 0755   Dressing/Treatment ABD; Ace wrap 23 0755   Offloading for Diabetic Foot Ulcers Offloading not required 23 0955   Wound Length (cm) 4 cm 23 0000   Wound Width (cm) 6 cm 23 0000   Wound Depth (cm) 0.1 cm 23 0955   Wound Surface Area (cm^2) 24 cm^2 23 0000   Change in Wound Size % (l*w) -250.88 23 0000   Wound Volume (cm^3) 1.8 cm^3 23 0955   Wound Healing % -163 23 0955   Post-Procedure Length (cm) 4 cm 23 1036   Post-Procedure Width (cm) 4.5 cm 23 1036   Post-Procedure Depth (cm) 0.1 cm 23 1036   Post-Procedure Surface Area (cm^2) 18 cm^2 23 1036   Post-Procedure Volume (cm^3) 1.8 cm^3 23 1036   Wound Assessment Bleeding 23 1036   Drainage Amount None 23 0755   Drainage Description Serosanguinous 23 0955   Odor None 23 0955   Jamila-wound Assessment Fragile 23 0955   Margins Attached edges 23 0955   Number of days: 362       Wound 02/15/23 Ankle Left;Lateral #2

## 2023-06-21 NOTE — ANESTHESIA PRE PROCEDURE
Department of Anesthesiology  Preprocedure Note       Name:  Prieto Escalante   Age:  59 y.o.  :  1960                                          MRN:  [id number]         Date:  2023      Surgeon: Whitney Christianson):  Ro Girard MD    Procedure: Procedure(s):  COLONOSCOPY DIAGNOSTIC    Medications prior to admission:   Prior to Admission medications    Medication Sig Start Date End Date Taking? Authorizing Provider   cetirizine (ZYRTEC) 10 MG tablet TAKE 1 TABLET BY MOUTH DAILY 6/15/23 7/15/23  Celena Elliott MD   blood glucose monitor kit and supplies Dispense sufficient amount for indicated testing frequency plus additional to accommodate PRN testing needs. Dispense all needed supplies to include: monitor, strips, lancing device, lancets, control solutions, alcohol swabs.  23   Celena Elliott MD   pantoprazole (PROTONIX) 40 MG tablet TAKE 1 TABLET BY MOUTH BEFORE BREAKFAST 5/15/23   Historical Provider, MD   levothyroxine (SYNTHROID) 100 MCG tablet Take 1 tablet by mouth every morning 5/15/23   Historical Provider, MD   polyethylene glycol (GLYCOLAX) 17 GM/SCOOP powder by Nasogastric route daily 6/10/22   Historical Provider, MD   FEROSUL 325 (65 Fe) MG tablet Take 1 tablet by mouth daily 5/15/23   Historical Provider, MD   lidocaine-prilocaine (EMLA) 2.5-2.5 % cream apply to port site 30-60minutes prior to chemotherapy 23   Historical Provider, MD   nystatin (MYCOSTATIN) 413359 UNIT/ML suspension SWISH AND SWALLOW 5 ML THREE TIMES DAILY AS DIRECTED 23   Historical Provider, MD   oxyCODONE (Tito Cheeks) 5 MG immediate release tablet TAKE 1 TABLET BY MOUTH FOUR TIMES DAILY AS NEEDED FOR MODERATE PAIN 5/15/23   Historical Provider, MD   budesonide-formoterol (SYMBICORT) 160-4.5 MCG/ACT AERO Inhale 2 puffs into the lungs 2 times daily 22   Historical Provider, MD   Blood Glucose Monitoring Suppl (CONTOUR BLOOD GLUCOSE SYSTEM) w/Device KIT Monitor glucose daily and prn for concerns of

## 2023-06-21 NOTE — PLAN OF CARE
Problem: Safety - Adult  Goal: Free from fall injury  Outcome: Adequate for Discharge     Problem: ABCDS Injury Assessment  Goal: Absence of physical injury  Outcome: Adequate for Discharge     Problem: Chronic Conditions and Co-morbidities  Goal: Patient's chronic conditions and co-morbidity symptoms are monitored and maintained or improved  Outcome: Adequate for Discharge     Problem: Pain  Goal: Verbalizes/displays adequate comfort level or baseline comfort level  Outcome: Adequate for Discharge

## 2023-06-21 NOTE — PROGRESS NOTES
Patient came to unit with 11 ml of air in R radial TR band. 2 ml taken out at 1345. No bleeding noted.

## 2023-06-21 NOTE — CARE COORDINATION
Pt has no discharge needs. Per wound RN note pt goes to wound center every Friday for care of diabetic ulcers and incision wounds on left leg and foot and has appt Friday June 23rd.      Patient discharged 6/21/2023 to home  All discharge needs met per case management     Brad Maher RN, BSN  [phone]

## 2023-06-21 NOTE — PROGRESS NOTES
Pt arrived from Endo to PACU bay 2. Report received from Endo staff. Pt arousable to voice. Pt on 4L 02, NSR, and VSS. Will continue to monitor.

## 2023-06-21 NOTE — PROGRESS NOTES
CLINICAL PHARMACY NOTE: MEDS TO BEDS    Total # of Prescriptions Filled: 1   The following medications were delivered to the patient:  WARFARIN SODIUM 5MG    Additional Documentation:  [facility] [address]  DAMIAN HAS SENT THE PA FOR BUDESONIDE. DELIVERED TO PT. = SIGNED   OK TO DELIVER PER ANTOINETTE [address].

## 2023-06-21 NOTE — PROGRESS NOTES
Pt stable and able to be transferred from PACU to room 2912. A&O , VSS, with no complaints at this time. CVICU called and notified that pt is being transferred out of PACU and to room.

## 2023-06-21 NOTE — CARE COORDINATION
CM completed PA on Budesonid on Covermymeds website with keycode : [id number]. Outpt pharmacy updated and also that pt wanted his medications sent to [facility]. CM went to update pt on the above and he was receiving a call from his  Home	[place].     Mayank Massey RN, BSN  [phone]

## 2023-06-21 NOTE — CARE COORDINATION
Patient seen for leg wounds. Patient goes to wound center every Friday for care of diabetic ulcers and  incision wounds on left leg and foot. Patient has appointment for Friday, June 23rd. Patient does want dressing changed today before discharge. I spoke to nurse Michael So, she will request gentamicin ointment for dressing change today.   [address] [place], BSN, RN, [address] [place] [address]  [phone]

## 2023-06-21 NOTE — BRIEF OP NOTE
Colon   3 small polyps removed  S/p left colectomy      Rec:     Colonoscopy in 3 yr  Will sign off.    May resume anticoagulation    Florence Smith MD  [address]

## 2023-06-21 NOTE — ANESTHESIA POSTPROCEDURE EVALUATION
Department of Anesthesiology  Postprocedure Note    Patient: Rob Pickens  MRN: [id number]  YOB: 1960  Date of evaluation: 6/21/2023      Procedure Summary     Date: 06/21/23 Room / Location: [address] [place] [address]    Anesthesia Start: 1027 Anesthesia Stop: 1053    Procedure: COLONOSCOPY POLYPECTOMY SNARE/COLD BIOPSY Diagnosis:       Anemia, unspecified type      (Anemia, unspecified type [D64.9])    Surgeons: Yolanda Malhotra MD Responsible Provider: Christine Kiser MD    Anesthesia Type: MAC ASA Status: 3          Anesthesia Type: No value filed.     Andrew Phase I: Andrew Score: 10    Andrew Phase II:        Anesthesia Post Evaluation    Patient location during evaluation: PACU  Patient participation: complete - patient participated  Level of consciousness: awake and alert  Pain score: 0  Airway patency: patent  Nausea & Vomiting: no nausea  Complications: no  Cardiovascular status: blood pressure returned to baseline  Respiratory status: acceptable  Hydration status: euvolemic

## 2023-06-23 ENCOUNTER — TELEPHONE (OUTPATIENT)
Dept: PHARMACY | Age: 63
End: 2023-06-23

## 2023-06-23 ENCOUNTER — HOSPITAL ENCOUNTER (OUTPATIENT)
Dept: WOUND CARE | Age: 63
Discharge: HOME | End: 2023-06-23
Payer: MEDICAID

## 2023-06-23 DIAGNOSIS — E11.9 TYPE 2 DIABETES MELLITUS WITHOUT COMPLICATION, WITHOUT LONG-TERM CURRENT USE OF INSULIN (HCC): ICD-10-CM

## 2023-06-23 DIAGNOSIS — I73.9 PERIPHERAL ARTERY DISEASE (HCC): Primary | ICD-10-CM

## 2023-06-23 DIAGNOSIS — I73.9 PAD (PERIPHERAL ARTERY DISEASE) (HCC): ICD-10-CM

## 2023-06-23 DIAGNOSIS — E78.00 PURE HYPERCHOLESTEROLEMIA: ICD-10-CM

## 2023-06-23 DIAGNOSIS — L29.9 ITCHING: ICD-10-CM

## 2023-06-23 DIAGNOSIS — I10 ESSENTIAL HYPERTENSION: ICD-10-CM

## 2023-06-23 PROCEDURE — 29581 APPL MULTLAYER CMPRN SYS LEG: CPT

## 2023-06-23 RX ORDER — LEVOTHYROXINE SODIUM 0.1 MG/1
100 TABLET ORAL EVERY MORNING
Qty: 30 TABLET | Refills: 0 | Status: CANCELLED | OUTPATIENT
Start: 2023-06-23

## 2023-06-23 RX ORDER — GINSENG 100 MG
CAPSULE ORAL ONCE
OUTPATIENT
Start: 2023-06-23 | End: 2023-06-23

## 2023-06-23 RX ORDER — LIDOCAINE HYDROCHLORIDE 20 MG/ML
JELLY TOPICAL ONCE
OUTPATIENT
Start: 2023-06-23 | End: 2023-06-23

## 2023-06-23 RX ORDER — LANOLIN ALCOHOL/MO/W.PET/CERES
6 CREAM (GRAM) TOPICAL NIGHTLY
Qty: 60 TABLET | Refills: 2 | Status: CANCELLED | OUTPATIENT
Start: 2023-06-23

## 2023-06-23 RX ORDER — LIDOCAINE 40 MG/G
CREAM TOPICAL ONCE
OUTPATIENT
Start: 2023-06-23 | End: 2023-06-23

## 2023-06-23 RX ORDER — LIDOCAINE HYDROCHLORIDE 40 MG/ML
SOLUTION TOPICAL ONCE
OUTPATIENT
Start: 2023-06-23 | End: 2023-06-23

## 2023-06-23 RX ORDER — FERROUS SULFATE 325(65) MG
1 TABLET ORAL DAILY
Qty: 30 TABLET | Refills: 0 | Status: CANCELLED | OUTPATIENT
Start: 2023-06-23

## 2023-06-23 RX ORDER — PANTOPRAZOLE SODIUM 40 MG/1
TABLET, DELAYED RELEASE ORAL
Qty: 30 TABLET | Status: CANCELLED | OUTPATIENT
Start: 2023-06-23

## 2023-06-23 RX ORDER — LIDOCAINE 50 MG/G
OINTMENT TOPICAL ONCE
OUTPATIENT
Start: 2023-06-23 | End: 2023-06-23

## 2023-06-23 RX ORDER — IBUPROFEN 200 MG
TABLET ORAL ONCE
OUTPATIENT
Start: 2023-06-23 | End: 2023-06-23

## 2023-06-23 RX ORDER — GENTAMICIN SULFATE 1 MG/G
CREAM TOPICAL
Qty: 30 G | Refills: 2 | Status: CANCELLED | OUTPATIENT
Start: 2023-06-23

## 2023-06-23 RX ORDER — BETAMETHASONE DIPROPIONATE 0.05 %
OINTMENT (GRAM) TOPICAL ONCE
OUTPATIENT
Start: 2023-06-23 | End: 2023-06-23

## 2023-06-23 RX ORDER — BACITRACIN ZINC AND POLYMYXIN B SULFATE 500; 1000 [USP'U]/G; [USP'U]/G
OINTMENT TOPICAL ONCE
OUTPATIENT
Start: 2023-06-23 | End: 2023-06-23

## 2023-06-23 RX ORDER — POLYETHYLENE GLYCOL 3350 17 G/17G
POWDER, FOR SOLUTION ORAL DAILY
Status: CANCELLED | OUTPATIENT
Start: 2023-06-23

## 2023-06-23 RX ORDER — LISINOPRIL 20 MG/1
20 TABLET ORAL DAILY
Qty: 30 TABLET | Refills: 0 | Status: CANCELLED | OUTPATIENT
Start: 2023-06-23

## 2023-06-23 RX ORDER — ATORVASTATIN CALCIUM 80 MG/1
80 TABLET, FILM COATED ORAL DAILY
Qty: 90 TABLET | Refills: 0 | Status: CANCELLED | OUTPATIENT
Start: 2023-06-23

## 2023-06-23 RX ORDER — DIPHENHYDRAMINE HCL 25 MG
TABLET ORAL
Qty: 60 TABLET | Refills: 0 | Status: CANCELLED | OUTPATIENT
Start: 2023-06-23

## 2023-06-23 RX ORDER — GENTAMICIN SULFATE 1 MG/G
OINTMENT TOPICAL ONCE
OUTPATIENT
Start: 2023-06-23 | End: 2023-06-23

## 2023-06-23 RX ORDER — CLOBETASOL PROPIONATE 0.5 MG/G
OINTMENT TOPICAL ONCE
OUTPATIENT
Start: 2023-06-23 | End: 2023-06-23

## 2023-06-23 RX ORDER — LIDOCAINE AND PRILOCAINE 25; 25 MG/G; MG/G
CREAM TOPICAL
Status: CANCELLED | OUTPATIENT
Start: 2023-06-23

## 2023-06-23 NOTE — PROGRESS NOTES
Physician Progress Note      PATIENT:               Cody Peters  CSN #:                  [id number]  :                       1960  ADMIT DATE:       2023 8:09 PM  [address] [place] DATE:        2023 5:45 PM  RESPONDING  PROVIDER #:        Jaylene Evans MD          QUERY TEXT:    Pt admitted with hemorrhagic shock secondary to acute blood loss anemia from   GI bleed secondary to coagulopathy from Coumadin. Noted documentation of   possible Dehydration in ED. If possible, please document in progress notes   and/or discharge summary whether: The medical record reflects the following:  Risk Factors: presented with c/o lightheaded and dizzy  Clinical Indicators: b/p 80/44, H&H 5.7/19.0, BUN 46, creatinine 2.0. Baldev Fabien Conn improved to BUN 34 and creatinine 0.9 after IV fluids and transfusion. No   nausea, vomiting nor diarrhea. Per ED: \"CMP does show a BUN of 46, which could   be due to an upper GI source, possibly dehydration as his creatinine is 2.0   we will give a 500 mL fluid bolus\". Treatment: 50ml bolus of NS IV  Options provided:  -- Possible Dehydration confirmed present on admission  -- Possible Dehydration ruled out  -- Other - I will add my own diagnosis  -- Disagree - Not applicable / Not valid  -- Disagree - Clinically unable to determine / Unknown  -- Refer to Clinical Documentation Reviewer    PROVIDER RESPONSE TEXT:    The diagnosis of possible dehydration was ruled out.     Query created by: Ernesto Zee on 2023 9:55 AM      Electronically signed by:  Jaylene Evans MD 2023 10:46 AM

## 2023-06-23 NOTE — TELEPHONE ENCOUNTER
Reached out to Chanel to discuss the negotiations between [place] and [facility] as noted below. Spoke with Iván Cardenas who is on his contact list. She has the same insurance and they are aware of the situation. She thought they were coming to an agreement. I advised that it was my understanding that they still had not come to an agreement and July 1st the insurance may be excluded. I encouraged them to stay in close contact with the insurance as to how long they they will have coverage with [facility] providers. I advised they may extend coverage, but please clarify. I advised July 1st was an important date and I would check with insurance before seeing any [facility] physicians after July 1st to make sure those visits would be covered. She was appreciative and agreeable. Situation   [employer] (otherwise known as [address]) and [facility]) have been negotiating for the  past nine months. If were not able to reach an agreement beforehand, our Managed Medicaid contract with  [employer] may terminate effective July 1. By failing to reach an equitable agreement with us, Joshua may force you to leave the [facility] ([facility]) doctors you know and trust.   We promise to continue doing our part and work hard, in good dora, to reach a new agreement  with [employer] on behalf of our patients and the communities we serve - so nothing comes  between you and the caregivers you rely on. Next steps   Call Joshua at the number on the back of your insurance card to understand your options for  care on and after July 1.        o Ask Joshua if you qualify for Continuity of Care. If so, make sure Joshua sends           through all required forms and regulations.  Your care team can help you fill out any           paperwork, but Pete will ultimately need to approve the request.         o Ultimately, it is up to Pete whether they will continue to cover your care at [facility] Mike requires you to switch to a

## 2023-06-23 NOTE — PROGRESS NOTES
Multilayer Compression Wrap   Below the Knee    NAME:  Anselmo Barrientos  YOB: 1960  MEDICAL RECORD NUMBER:  [id number]  DATE:  6/23/2023    Multilayer compression wrap: Removed old Multilayer wrap if indicated and wash leg with mild soap/water. Applied primary and secondary dressing as ordered. Applied multilayered dressing below the knee to left lower leg. Instructed patient/caregiver not to remove dressing and to keep it clean and dry. Instructed patient/caregiver on complications to report to provider, such as pain, numbness in toes, heavy drainage, and slippage of dressing. Instructed patient on purpose of compression dressing and on activity and exercise recommendations.      Applied  2 layer wrap from toes to knee overlapping each time    Electronically signed by Reyna Gilbert RN on 6/23/2023 at 9:28 AM

## 2023-06-28 DIAGNOSIS — E11.9 TYPE 2 DIABETES MELLITUS WITHOUT COMPLICATION, WITHOUT LONG-TERM CURRENT USE OF INSULIN (HCC): ICD-10-CM

## 2023-06-28 RX ORDER — LISINOPRIL 20 MG/1
TABLET ORAL
Qty: 30 TABLET | Refills: 2 | Status: SHIPPED | OUTPATIENT
Start: 2023-06-28

## 2023-06-29 ENCOUNTER — TELEPHONE (OUTPATIENT)
Dept: INTERNAL MEDICINE CLINIC | Age: 63
End: 2023-06-29

## 2023-06-30 ENCOUNTER — HOSPITAL ENCOUNTER (OUTPATIENT)
Dept: WOUND CARE | Age: 63
Discharge: HOME | End: 2023-06-30
Payer: MEDICAID

## 2023-06-30 DIAGNOSIS — I73.9 PERIPHERAL ARTERY DISEASE (HCC): Primary | ICD-10-CM

## 2023-06-30 PROCEDURE — 11043 DBRDMT MUSC&/FSCA 1ST 20/<: CPT

## 2023-06-30 PROCEDURE — 29581 APPL MULTLAYER CMPRN SYS LEG: CPT

## 2023-06-30 PROCEDURE — 11046 DBRDMT MUSC&/FSCA EA ADDL: CPT

## 2023-06-30 RX ORDER — LIDOCAINE HYDROCHLORIDE 20 MG/ML
JELLY TOPICAL ONCE
OUTPATIENT
Start: 2023-06-30 | End: 2023-06-30

## 2023-06-30 RX ORDER — BETAMETHASONE DIPROPIONATE 0.05 %
OINTMENT (GRAM) TOPICAL ONCE
OUTPATIENT
Start: 2023-06-30 | End: 2023-06-30

## 2023-06-30 RX ORDER — CLOBETASOL PROPIONATE 0.5 MG/G
OINTMENT TOPICAL ONCE
OUTPATIENT
Start: 2023-06-30 | End: 2023-06-30

## 2023-06-30 RX ORDER — LIDOCAINE 50 MG/G
OINTMENT TOPICAL ONCE
OUTPATIENT
Start: 2023-06-30 | End: 2023-06-30

## 2023-06-30 RX ORDER — LIDOCAINE 40 MG/G
CREAM TOPICAL ONCE
OUTPATIENT
Start: 2023-06-30 | End: 2023-06-30

## 2023-06-30 RX ORDER — LIDOCAINE 40 MG/G
CREAM TOPICAL ONCE
Status: DISCONTINUED | OUTPATIENT
Start: 2023-06-30 | End: 2023-07-01 | Stop reason: HOSPADM

## 2023-06-30 RX ORDER — BACITRACIN ZINC AND POLYMYXIN B SULFATE 500; 1000 [USP'U]/G; [USP'U]/G
OINTMENT TOPICAL ONCE
OUTPATIENT
Start: 2023-06-30 | End: 2023-06-30

## 2023-06-30 RX ORDER — IBUPROFEN 200 MG
TABLET ORAL ONCE
OUTPATIENT
Start: 2023-06-30 | End: 2023-06-30

## 2023-06-30 RX ORDER — GENTAMICIN SULFATE 1 MG/G
OINTMENT TOPICAL ONCE
OUTPATIENT
Start: 2023-06-30 | End: 2023-06-30

## 2023-06-30 RX ORDER — GINSENG 100 MG
CAPSULE ORAL ONCE
OUTPATIENT
Start: 2023-06-30 | End: 2023-06-30

## 2023-06-30 RX ORDER — LIDOCAINE HYDROCHLORIDE 40 MG/ML
SOLUTION TOPICAL ONCE
OUTPATIENT
Start: 2023-06-30 | End: 2023-06-30

## 2023-07-03 NOTE — TELEPHONE ENCOUNTER
Please advise the patient he needs to be seen within 30 days after his care has started in order for him to be certified

## 2023-07-05 NOTE — TELEPHONE ENCOUNTER
Unfortunately it is correct but there are some exceptions that his insurance is no longer accepted by [facility], at the same time like stated he does need to be seen by ED physician and the face-to-face to be certified

## 2023-07-05 NOTE — TELEPHONE ENCOUNTER
Patient called back and he was questioning why he had to be seen again and I explained that for home care to be approved and continuing to be certified he needs to be seen so often. He was not wanting to schedule an appointment. Then he said we no longer take his insurance, which I was not aware of and do not believe is true, I will double check out list. He stated on national television they announced that [facility] ([facility] was no longer going to accept [employer]. I told him I will inform Dr. Elina Abraham and also double check with my  that his insurance is taken here. I made him aware that in order for him to continue home care he will need to be seen by a PCP whether it is us or someone else he chooses to switch to.

## 2023-07-06 ENCOUNTER — TELEPHONE (OUTPATIENT)
Dept: INTERNAL MEDICINE CLINIC | Age: 63
End: 2023-07-06

## 2023-07-06 NOTE — TELEPHONE ENCOUNTER
Notified Sanjuana Valdez with home health patient will need to be seen every 90 days for home health.

## 2023-07-13 ENCOUNTER — TELEPHONE (OUTPATIENT)
Dept: PHARMACY | Age: 63
End: 2023-07-13

## 2023-07-13 ENCOUNTER — TELEPHONE (OUTPATIENT)
Dept: INTERNAL MEDICINE CLINIC | Age: 63
End: 2023-07-13

## 2023-07-13 NOTE — TELEPHONE ENCOUNTER
Called to check status. He had been in the hospital.     Also, as of July 1st [facility] may not be accepting his [place]. So, wanted update on insurance and if we need to schedule an appt or if he has other means to mange his warfarin. Left message to please return our call. Marta Vick, PharmD, [address] [place] [address]  Anticoagulation  [phone]    For Pharmacy Admin Tracking Only    Intervention Detail:   Total # of Interventions Recommended:    Total # of Interventions Accepted:   Time Spent (min): 10

## 2023-07-13 NOTE — TELEPHONE ENCOUNTER
[place] [facility] 5/25/23-7/23/23 . Patient seen within 30 days of start of care on 5/22/23.   Top bin

## 2023-07-13 NOTE — TELEPHONE ENCOUNTER
Unfortunately patient was hospitalized after his initial certification, so his initial certification will  once he is hospitalized the order that has been sent is actually for post discharge from the second hospitalization and that cannot be signed unless the patient has been seen and certified for that care

## 2023-07-14 ENCOUNTER — TELEPHONE (OUTPATIENT)
Dept: PHARMACY | Age: 63
End: 2023-07-14

## 2023-07-14 NOTE — TELEPHONE ENCOUNTER
Outpatient Anticoagulation Clinic:      - Patient called today to let us know he has transferred all of his care to [facility] and will be seeing their Anticoagulation Clinic since we are no longer able to take his insurance.       Gildardo Pat, VilmaD, BCPS  7/14/2023  2:47 PM

## 2023-07-18 ENCOUNTER — TELEPHONE (OUTPATIENT)
Dept: INTERNAL MEDICINE CLINIC | Age: 63
End: 2023-07-18

## 2023-07-18 NOTE — TELEPHONE ENCOUNTER
Pavel Schmidt from [facility] is  calling about orders dated 6/19--6/27---6/28 and 6/30   ---asking to have them signed and sent back fax is [phone]---ADRI questions she can be reached at [phone].  thanks

## 2023-08-07 ENCOUNTER — TELEPHONE (OUTPATIENT)
Dept: INTERNAL MEDICINE CLINIC | Age: 63
End: 2023-08-07

## 2023-08-20 DIAGNOSIS — J43.2 CENTRILOBULAR EMPHYSEMA (HCC): ICD-10-CM

## 2023-08-20 DIAGNOSIS — J41.0 SIMPLE CHRONIC BRONCHITIS (HCC): ICD-10-CM

## 2023-08-21 RX ORDER — BENZONATATE 200 MG/1
CAPSULE ORAL
Qty: 30 CAPSULE | Refills: 4 | Status: SHIPPED | OUTPATIENT
Start: 2023-08-21

## 2023-08-28 RX ORDER — UBIQUINOL 100 MG
CAPSULE ORAL
Qty: 100 EACH | Refills: 0 | Status: SHIPPED | OUTPATIENT
Start: 2023-08-28 | End: 2023-09-27

## 2023-08-28 NOTE — TELEPHONE ENCOUNTER
Last OV: 5/22/2023  Next OV: Visit date not found    Next appointment due:(around 8/22/2023    Last fill:  Refills:

## 2023-09-15 RX ORDER — BLOOD SUGAR DIAGNOSTIC
STRIP MISCELLANEOUS
Qty: 100 STRIP | Refills: 0 | Status: SHIPPED | OUTPATIENT
Start: 2023-09-15

## 2023-09-15 NOTE — TELEPHONE ENCOUNTER
Last OV: 5/22/2023  Next OV: Visit date not found    Next appointment due:around 8/22/2023    Last fill:  Refills:

## 2023-09-18 ENCOUNTER — ANTI-COAG VISIT (OUTPATIENT)
Dept: PHARMACY | Age: 63
End: 2023-09-18

## 2023-09-18 PROBLEM — I73.9 PERIPHERAL ARTERY DISEASE (HCC): Status: RESOLVED | Noted: 2021-05-17 | Resolved: 2023-09-18

## 2023-10-30 ENCOUNTER — HOSPITAL ENCOUNTER (OUTPATIENT)
Age: 63
Setting detail: SPECIMEN
Discharge: HOME | End: 2023-10-30
Payer: COMMERCIAL

## 2023-10-30 LAB
ALBUMIN SERPL-MCNC: 3.8 G/DL (ref 3.4–5)
ALP SERPL-CCNC: 112 U/L (ref 40–129)
ALT SERPL-CCNC: 20 U/L (ref 10–40)
ANION GAP SERPL CALCULATED.3IONS-SCNC: 12 MMOL/L (ref 3–16)
AST SERPL-CCNC: 22 U/L (ref 15–37)
BASOPHILS # BLD: 0 K/UL (ref 0–0.2)
BASOPHILS NFR BLD: 1.4 %
BILIRUB DIRECT SERPL-MCNC: <0.2 MG/DL (ref 0–0.3)
BILIRUB INDIRECT SERPL-MCNC: NORMAL MG/DL (ref 0–1)
BILIRUB SERPL-MCNC: <0.2 MG/DL (ref 0–1)
BUN SERPL-MCNC: 13 MG/DL (ref 7–20)
CALCIUM SERPL-MCNC: 8.5 MG/DL (ref 8.3–10.6)
CHLORIDE SERPL-SCNC: 105 MMOL/L (ref 99–110)
CO2 SERPL-SCNC: 22 MMOL/L (ref 21–32)
CREAT SERPL-MCNC: 0.7 MG/DL (ref 0.8–1.3)
CRP SERPL-MCNC: 26.7 MG/L (ref 0–5.1)
DEPRECATED RDW RBC AUTO: 19.3 % (ref 12.4–15.4)
EOSINOPHIL # BLD: 0.1 K/UL (ref 0–0.6)
EOSINOPHIL NFR BLD: 4.3 %
ERYTHROCYTE [SEDIMENTATION RATE] IN BLOOD BY WESTERGREN METHOD: >130 MM/HR (ref 0–20)
GFR SERPLBLD CREATININE-BSD FMLA CKD-EPI: >60 ML/MIN/{1.73_M2}
GLUCOSE SERPL-MCNC: 140 MG/DL (ref 70–99)
HCT VFR BLD AUTO: 29.9 % (ref 40.5–52.5)
HGB BLD-MCNC: 9.5 G/DL (ref 13.5–17.5)
LYMPHOCYTES # BLD: 0.7 K/UL (ref 1–5.1)
LYMPHOCYTES NFR BLD: 27.3 %
MCH RBC QN AUTO: 27.6 PG (ref 26–34)
MCHC RBC AUTO-ENTMCNC: 31.7 G/DL (ref 31–36)
MCV RBC AUTO: 87 FL (ref 80–100)
MONOCYTES # BLD: 0.3 K/UL (ref 0–1.3)
MONOCYTES NFR BLD: 12.7 %
NEUTROPHILS # BLD: 1.4 K/UL (ref 1.7–7.7)
NEUTROPHILS NFR BLD: 54.3 %
PLATELET # BLD AUTO: 379 K/UL (ref 135–450)
PMV BLD AUTO: 7.3 FL (ref 5–10.5)
POTASSIUM SERPL-SCNC: 4.1 MMOL/L (ref 3.5–5.1)
PROT SERPL-MCNC: 7.2 G/DL (ref 6.4–8.2)
RBC # BLD AUTO: 3.44 M/UL (ref 4.2–5.9)
SODIUM SERPL-SCNC: 139 MMOL/L (ref 136–145)
WBC # BLD AUTO: 2.6 K/UL (ref 4–11)

## 2023-10-30 PROCEDURE — 85652 RBC SED RATE AUTOMATED: CPT

## 2023-10-30 PROCEDURE — 80076 HEPATIC FUNCTION PANEL: CPT

## 2023-10-30 PROCEDURE — 85025 COMPLETE CBC W/AUTO DIFF WBC: CPT

## 2023-10-30 PROCEDURE — 86140 C-REACTIVE PROTEIN: CPT

## 2023-10-30 PROCEDURE — 80048 BASIC METABOLIC PNL TOTAL CA: CPT

## 2023-10-30 PROCEDURE — 36415 COLL VENOUS BLD VENIPUNCTURE: CPT

## 2024-01-11 ENCOUNTER — TELEPHONE (OUTPATIENT)
Dept: INTERNAL MEDICINE CLINIC | Age: 64
End: 2024-01-11

## 2024-02-08 ENCOUNTER — TELEPHONE (OUTPATIENT)
Dept: INTERNAL MEDICINE CLINIC | Age: 64
End: 2024-02-08

## 2024-02-08 NOTE — TELEPHONE ENCOUNTER
Advised patient appointment needed [facility]. Pt stated forms were sent to the wrong office. Did not want to schedule appointment at this time

## 2024-02-20 ENCOUNTER — TELEPHONE (OUTPATIENT)
Dept: INTERNAL MEDICINE CLINIC | Age: 64
End: 2024-02-20

## 2024-06-04 NOTE — DISCHARGE INSTRUCTIONS
[address], [address]  Telephone: [phone]     Discharge Instructions     Important reminders:     **If you have any signs and symptoms of illness (Cough, fever, congestion, nausea, vomiting, diarrhea, etc.) please call the wound care center prior to your appointment. 1. Increase Protein intake for optimal wound healing  2. No added salt to reduce any swelling  3. If diabetic, maintain good glucose control  4. If you smoke, smoking prohibits wound healing, we ask that you refrain from smoking. 5. When taking antibiotics take the entire prescription as ordered. Do not stop taking until medication is all gone unless otherwise instructed. 6. Exercise as tolerated. 7. Keep weight off wounds and reposition every 2 hours if applicable. 8. If wound(s) is on your lower extremity, elevate legs to the level of the heart or above for 30 minutes 4-5 times a day and/or when sitting. Avoid standing for long periods of time. 9. Do not get wounds wet in bath or shower unless otherwise instructed by your physician. If your wound is on your foot or leg, you may purchase a cast bag. Please ask at the pharmacy. If Vascular testing is ordered, please call [address] [place] [address] ([phone]) to schedule. Vascular tests ordered by Wound Care Physicians may take up to 2 hours to complete. Please keep that in mind when scheduling. If Vascular testing is scheduled, please bring supplies to replace your dressing after testing is done. The vascular department does not stock supplies. Wound: Right and Left leg     With each dressing change, rinse wounds with 0.9% Saline. (May use wound wash or soft contact solution. Both can be purchased at a local drug store). If unable to obtain saline, may use a gentle soap and water. Dressing care: Right and left leg - Gentamicin cream Triad to wounds.  Lotrisone to lower legs, Gentle coflex calamine to both, Patient Assistance    Met with:                Additional notes:                             Compression Wraps  Location: Both legs  Type: Coflex calamine     Your doctor has ordered compression therapy for your wound. Compression bandages reduce the swelling, or edema, in your legs and prevent it from returning. The wound care staff will apply your compression wrap. It must be removed and re-applied at least weekly. As the swelling decreases, the boot no longer provides adequate compression and you need a new one. Once applied, you need to know how to take care of your compression wrap. The boot must stay dry. Do not get it wet in the shower or tub. You may do a partial bath, or you can cover the boot with a large plastic bag, secured at the top, so that no water can get in. Avoid standing in one place for long periods of time. If you must  one place, shift your weight and change positions often. If you have CHF, consult your doctor before following the next two recommendations for leg elevation. When sitting, elevate your legs on pillows, or put blocks under the foot of your bed. Your legs should be higher than your heart. If your boot becomes painful, or you notice an increase in swelling in your toes, numbness or tingling, or purple color to your toes, remove the wrap and call the [facility] [address]. If it is after hours, call your doctor for instructions or go to the nearest emergency room. Home Care Agency/Facility: [facility]     Your wound-care supplies will be provided by:  Please note, depending on your insurance coverage, you may have out-of-pocket expenses for these supplies. Someone from the company should call you to confirm your order and discuss those potential costs before they ship your products -- please anticipate that call. If your out-of-pocket cost could be substantial, Many companies have financial hardship programs for patients who qualify, so please ask about that if you might need a hand.  If you have any questions about your supplies or your potential out-of-pocket costs, or if you need to place an order for a refill of supplies (typically monthly), please call the company directly. Your  is Elisa Amaro     Follow up with Dr Gigi Hernandes in 1 weeks. Nurse visit on Monday        Jazmin Marshall 281: Should you experience any significant changes in your wound(s) or have questions about your wound care, please contact the [place] [address] at [phone] Monday  - Thursday 8:00 am - 4:00 pm and Friday 8:00 am - 1:00pm. If you need help with your wound outside these hours and cannot wait until we are again available, contact your PCP or go to the hospital emergency room. PLEASE NOTE: IF YOU ARE UNABLE TO OBTAIN WOUND SUPPLIES, CONTINUE TO USE THE SUPPLIES YOU HAVE AVAILABLE UNTIL YOU ARE ABLE TO REACH US. IT IS MOST IMPORTANT TO KEEP THE WOUND COVERED AT ALL TIMES. Patient Experience     Thank you for trusting us with your care. You may receive a survey from a company called [employer] asking for your feedback. We would appreciate it if you took a few minutes to share your experience. Your input is very valuable to us.

## 2024-08-23 NOTE — PROGRESS NOTES
BP much improved from last visit. Continue unchanged. Will update patient via [facility] with lab results.    Rochelle Weller PA-C     Multilayer Compression Wrap   Below the Knee    NAME:  Kirsty Eastman  YOB: 1960  MEDICAL RECORD NUMBER:  [id number]  DATE:  1/20/2023    Multilayer compression wrap: Removed old Multilayer wrap if indicated and wash leg with mild soap/water. Applied moisturizing agent to dry skin as needed. Applied primary and secondary dressing as ordered. Applied multilayered dressing below the knee to left lower leg. Instructed patient/caregiver not to remove dressing and to keep it clean and dry. Instructed patient/caregiver on complications to report to provider, such as pain, numbness in toes, heavy drainage, and slippage of dressing. Instructed patient on purpose of compression dressing and on activity and exercise recommendations.      Applied  2 layer wrap from toes to knee overlapping each time  Applied Triad, Gentamicin  Electronically signed by Deangelo Figueroa RN on 1/20/2023 at 11:53 AM

## 2025-01-02 NOTE — PLAN OF CARE
Discharge instructions given. Patient verbalized understanding. Return to [facility] in 1 week(s).   Appt with Dr Farhad Garzon Monday See below for virtual support group through [facility]:   [email or web address]  Hybrid: In-Person or Online via Zoom  Meeting Address:  [facility] Fuller  Latisha WOMACK [address] [address]    Zoom Link: Click here to register    Day: Wednesday  Time: 7:00 pm - 8:30 pm      Virtual PHP/IOP Referrals below    [facility]  Phone: [phone]  [facility] at [phone].    [facility]  Phone: [phone]  Monday-Sunday: 8am-7pm  Multiple locations as well as virtual programs    Virtual Evening IOP:    [facility] [facility] [place] - Virtual Programs  Phone: [phone]    Mood and Anxiety Disorders    PathMercyOne Cedar Falls Medical Center [facility]  Phone: [phone]    [place], [place], [place], and [place]. 

## 2025-01-28 NOTE — PROGRESS NOTES
Pt aware. She wants to do colonoscopy   [facility])   Vascular Surgery Followup    Referring Provider:  Jesse Santos MD     No chief complaint on file. History of Present Illness:  61-year-old male known to me with history of bilateral femoral to tibial artery bypass at outside hospital.  Underwent peripheral angiography in February showing distal tibial reconstitution near ulcer site. He represents today with continued lower extremity wound. He states that he continue to slowly improve however still present on both legs. No other changes    Past Medical History:   has a past medical history of Cancer ([facility] [address] Diabetes mellitus ([facility] [address] Fibromyalgia, History of DVT (deep vein thrombosis), Hyperlipidemia, Hypertension, and Type 2 diabetes mellitus with circulatory disorder, without long-term current use of insulin ([facility] [address] Surgical History:   has a past surgical history that includes Colonoscopy (2016); Appendectomy (2005); other surgical history (Right, 12/08/2020); femoral bypass (Left, 02/26/2020); Femoral-tibial Bypass Graft (Right, 12/11/2020); Foot Debridement (Left, 09/03/2020); Foot Debridement (Right, 2/26/2021); tracheostomy (N/A, 3/25/2022); and laryngoscopy (N/A, 3/25/2022). Social History:   reports that he quit smoking about 8 months ago. His smoking use included cigarettes. He started smoking about 44 years ago. He has a 10.00 pack-year smoking history. He has never used smokeless tobacco. He reports current alcohol use. He reports that he does not use drugs. Family History:  family history includes Cancer in his mother; Diabetes in his father; Lesleigh Keas in his mother; Stroke in his father. Home Medications:  Current Outpatient Medications   Medication Sig Dispense Refill    acetaminophen (TYLENOL) 500 MG tablet Take 1 tablet by mouth 4 times daily as needed for Pain 360 tablet 1    gentamicin (GARAMYCIN) 0.1 % cream Apply topically  daily.  30 g 1    budesonide (PULMICORT) 0.5 MG/2ML nebulizer suspension Take 2 mLs by nebulization 2 times daily 60 each 2    sennosides-docusate sodium (SENOKOT-S) 8.6-50 MG tablet Take 1 tablet by mouth 2 times daily 60 tablet 0    melatonin 3 MG TABS tablet Take 2 tablets by mouth nightly as needed (insomnia) 30 tablet 2    atorvastatin (LIPITOR) 80 MG tablet Take 1 tablet by mouth daily Cancel atorvastatin 20 mg a day 90 tablet 0    warfarin (COUMADIN) 5 MG tablet Take daily as directed by anti coag clinic to maintain INR 2-3 (Patient taking differently: Take 10 mg by mouth daily Except 7.5 mg on Thursday or as directed by anti coag clinic to maintain INR 2-3) 90 tablet 1    diphenhydrAMINE (BENADRYL ALLERGY) 25 MG tablet Take 1 tablet by mouth every 6 hours as needed for Itching 60 tablet 0    cyanocobalamin 250 MCG tablet Take 1 tablet by mouth daily 30 tablet 0    metFORMIN (GLUCOPHAGE) 500 MG tablet Take 1 tablet by mouth 2 times daily (with meals) 60 tablet 0    cetirizine (ZYRTEC) 10 MG tablet Take 1 tablet by mouth daily 30 tablet 0    lisinopril (PRINIVIL;ZESTRIL) 20 MG tablet Take 1 tablet by mouth daily 30 tablet 0    Arformoterol Tartrate (BROVANA) 15 MCG/2ML NEBU Take 1 ampule by nebulization 2 times daily 120 mL 5    BANOPHEN 25 MG capsule Take 1 tablet by mouth every 6 hours as needed for Itching      ondansetron (ZOFRAN-ODT) 4 MG disintegrating tablet Take 4 mg by mouth every 8 hours as needed for Nausea or Vomiting      Multiple Vitamins-Minerals (MULTIVITAMIN ADULT EXTRA C PO) 1 tablet by Nasogastric route daily      esomeprazole (NEXIUM) 20 MG delayed release capsule 20 mg by Nasogastric route every morning (before breakfast)      oxyCODONE HCl (OXY-IR) 10 MG immediate release tablet TAKE 1 TABLET BY MOUTH EVERY SIX HOURS AS NEEDED FOR PAIN FOR UP TO 7 DAYS      becaplermin (REGRANEX) 0.01 % gel Apply 1 Applicatorful topically daily      calcium-vitamin D (OSCAL-500) 500-200 MG-UNIT per tablet Take 1 tablet by mouth 2 times daily      ipratropium-albuterol (DUONEB) 0.5-2.5 (3) MG/3ML SOLN nebulizer solution Inhale 3 mLs into the lungs 4 times daily 360 mL 3    fluticasone (FLONASE) 50 MCG/ACT nasal spray 1 spray by Nasal route daily 9.9 g 5    Vitamins/Minerals TABS Take 1 tablet by mouth daily        No current facility-administered medications for this visit. Allergies:  Chantix [varenicline]     Review of Systems:   Constitutional: there has been no unanticipated weight loss. There's been no change in energy level, sleep pattern, or activity level. Eyes: No visual changes or diplopia. No scleral icterus. ENT: No Headaches, hearing loss or vertigo. No mouth sores or sore throat. Cardiovascular: Reviewed in HPI  Respiratory: No cough or wheezing, no sputum production. No hematemesis. Gastrointestinal: No abdominal pain, appetite loss, blood in stools. No change in bowel or bladder habits. Genitourinary: No dysuria, trouble voiding, or hematuria. Musculoskeletal:  No gait disturbance, weakness or joint complaints. Integumentary: No rash or pruritis. Neurological: No headache, diplopia, change in muscle strength, numbness or tingling. No change in gait, balance, coordination, mood, affect, memory, mentation, behavior. Psychiatric: No anxiety, no depression. Endocrine: No malaise, fatigue or temperature intolerance. No excessive thirst, fluid intake, or urination. No tremor. Hematologic/Lymphatic: No abnormal bruising or bleeding, blood clots or swollen lymph nodes. Allergic/Immunologic: No nasal congestion or hives. Physical Examination:    There were no vitals filed for this visit.        General appearance: alert, appears stated age, cooperative, and no distress  Bilateral lower extremity dressings intact and not removed    Assessment:     Patient Active Problem List   Diagnosis    Peripheral artery disease ([address]    Centrilobular emphysema (HCC)    COPD (chronic obstructive pulmonary disease) ([address]    Atherosclerosis of native arteries of right leg with ulceration of other part of foot ([facility]    Impotence    Acute deep vein thrombosis (DVT) of femoral vein of left lower extremity (HCC)    Colon polyp    Hyperlipidemia    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 9)    Tracheostomy dependent ([facility] [address] [facility]    Decubitus ulcer of heel, bilateral    DVT (deep venous thrombosis) ([facility])    Larynx cancer ([facility] [address]    S/P IVC filter    Tinea pedis of right foot       Plan:  62-year-old male with known severe multilevel occlusive disease of bilateral lower extremities with history of bilateral femoral and tibial bypasses that are occluded. I had a long discussion today with him regarding options. His angiogram from February would suggest distal tibial reconstitution near the areas of ulceration and likely given that he had vein use for his previous bypass does not have significant conduit to do a distal bypass and would need to be done with synthetic which increases his risk for infection. Because the wounds are stable to slightly improving my personal recommendation would be that no further intervention be done. He is in complete agreement with this. Should he reconsider he would need repeat lower extremity angiogram to ensure distal target. All questions answered    Thank you for allowing me to participate in the care of this individual.  Please do not hesitate to contact me with any questions. Boom Benson M.D., FACS.   9/14/2022  12:08 PM

## 2025-04-10 NOTE — PLAN OF CARE
Patient admitted for COPD exacerbation requiring Bipap. He is now alert and oriented with his wife at bedside. Both have been educated to treatments and plan of care at this time. He is now on 4 liters of oxygen per nasal cannula. Sarecycline Pregnancy And Lactation Text: This medication is Pregnancy Category D and not consider safe during pregnancy. It is also excreted in breast milk. Topical Clindamycin Pregnancy And Lactation Text: This medication is Pregnancy Category B and is considered safe during pregnancy. It is unknown if it is excreted in breast milk. Isotretinoin Counseling: Patient should get monthly blood tests, not donate blood, not drive at night if vision affected, not share medication, and not undergo elective surgery for 6 months after tx completed. Side effects reviewed, pt to contact office should one occur. Bactrim Pregnancy And Lactation Text: This medication is Pregnancy Category D and is known to cause fetal risk.  It is also excreted in breast milk. Spironolactone Pregnancy And Lactation Text: This medication can cause feminization of the male fetus and should be avoided during pregnancy. The active metabolite is also found in breast milk. Azithromycin Pregnancy And Lactation Text: This medication is considered safe during pregnancy and is also secreted in breast milk. Aklief counseling:  Patient advised to apply a pea-sized amount only at bedtime and wait 30 minutes after washing their face before applying.  If too drying, patient may add a non-comedogenic moisturizer.  The most commonly reported side effects including irritation, redness, scaling, dryness, stinging, burning, itching, and increased risk of sunburn.  The patient verbalized understanding of the proper use and possible adverse effects of retinoids.  All of the patient's questions and concerns were addressed. Azelaic Acid Counseling: Patient counseled that medicine may cause skin irritation and to avoid applying near the eyes.  In the event of skin irritation, the patient was advised to reduce the amount of the drug applied or use it less frequently.   The patient verbalized understanding of the proper use and possible adverse effects of azelaic acid.  All of the patient's questions and concerns were addressed. Include Pregnancy/Lactation Warning?: No Erythromycin Counseling:  I discussed with the patient the risks of erythromycin including but not limited to GI upset, allergic reaction, drug rash, diarrhea, increase in liver enzymes, and yeast infections. Doxycycline Counseling:  Patient counseled regarding possible photosensitivity and increased risk for sunburn.  Patient instructed to avoid sunlight, if possible.  When exposed to sunlight, patients should wear protective clothing, sunglasses, and sunscreen.  The patient was instructed to call the office immediately if the following severe adverse effects occur:  hearing changes, easy bruising/bleeding, severe headache, or vision changes.  The patient verbalized understanding of the proper use and possible adverse effects of doxycycline.  All of the patient's questions and concerns were addressed. Topical Retinoid Pregnancy And Lactation Text: This medication is Pregnancy Category C. It is unknown if this medication is excreted in breast milk. Tazorac Pregnancy And Lactation Text: This medication is not safe during pregnancy. It is unknown if this medication is excreted in breast milk. Isotretinoin Pregnancy And Lactation Text: This medication is Pregnancy Category X and is considered extremely dangerous during pregnancy. It is unknown if it is excreted in breast milk. Detail Level: Zone High Dose Vitamin A Pregnancy And Lactation Text: High dose vitamin A therapy is contraindicated during pregnancy and breast feeding. Tetracycline Counseling: Patient counseled regarding possible photosensitivity and increased risk for sunburn.  Patient instructed to avoid sunlight, if possible.  When exposed to sunlight, patients should wear protective clothing, sunglasses, and sunscreen.  The patient was instructed to call the office immediately if the following severe adverse effects occur:  hearing changes, easy bruising/bleeding, severe headache, or vision changes.  The patient verbalized understanding of the proper use and possible adverse effects of tetracycline.  All of the patient's questions and concerns were addressed. Patient understands to avoid pregnancy while on therapy due to potential birth defects. Winlevi Counseling:  I discussed with the patient the risks of topical clascoterone including but not limited to erythema, scaling, itching, and stinging. Patient voiced their understanding. Dapsone Counseling: I discussed with the patient the risks of dapsone including but not limited to hemolytic anemia, agranulocytosis, rashes, methemoglobinemia, kidney failure, peripheral neuropathy, headaches, GI upset, and liver toxicity.  Patients who start dapsone require monitoring including baseline LFTs and weekly CBCs for the first month, then every month thereafter.  The patient verbalized understanding of the proper use and possible adverse effects of dapsone.  All of the patient's questions and concerns were addressed. Birth Control Pills Counseling: Birth Control Pill Counseling: I discussed with the patient the potential side effects of OCPs including but not limited to increased risk of stroke, heart attack, thrombophlebitis, deep venous thrombosis, hepatic adenomas, breast changes, GI upset, headaches, and depression.  The patient verbalized understanding of the proper use and possible adverse effects of OCPs. All of the patient's questions and concerns were addressed. Azelaic Acid Pregnancy And Lactation Text: This medication is considered safe during pregnancy and breast feeding. Spironolactone Counseling: Patient advised regarding risks of diarrhea, abdominal pain, hyperkalemia, birth defects (for female patients), liver toxicity and renal toxicity. The patient may need blood work to monitor liver and kidney function and potassium levels while on therapy. The patient verbalized understanding of the proper use and possible adverse effects of spironolactone.  All of the patient's questions and concerns were addressed. Topical Sulfur Applications Counseling: Topical Sulfur Counseling: Patient counseled that this medication may cause skin irritation or allergic reactions.  In the event of skin irritation, the patient was advised to reduce the amount of the drug applied or use it less frequently.   The patient verbalized understanding of the proper use and possible adverse effects of topical sulfur application.  All of the patient's questions and concerns were addressed. Benzoyl Peroxide Pregnancy And Lactation Text: This medication is Pregnancy Category C. It is unknown if benzoyl peroxide is excreted in breast milk. Doxycycline Pregnancy And Lactation Text: This medication is Pregnancy Category D and not consider safe during pregnancy. It is also excreted in breast milk but is considered safe for shorter treatment courses. Erythromycin Pregnancy And Lactation Text: This medication is Pregnancy Category B and is considered safe during pregnancy. It is also excreted in breast milk. Topical Clindamycin Counseling: Patient counseled that this medication may cause skin irritation or allergic reactions.  In the event of skin irritation, the patient was advised to reduce the amount of the drug applied or use it less frequently.   The patient verbalized understanding of the proper use and possible adverse effects of clindamycin.  All of the patient's questions and concerns were addressed. Sarecycline Counseling: Patient advised regarding possible photosensitivity and discoloration of the teeth, skin, lips, tongue and gums.  Patient instructed to avoid sunlight, if possible.  When exposed to sunlight, patients should wear protective clothing, sunglasses, and sunscreen.  The patient was instructed to call the office immediately if the following severe adverse effects occur:  hearing changes, easy bruising/bleeding, severe headache, or vision changes.  The patient verbalized understanding of the proper use and possible adverse effects of sarecycline.  All of the patient's questions and concerns were addressed. Bactrim Counseling:  I discussed with the patient the risks of sulfa antibiotics including but not limited to GI upset, allergic reaction, drug rash, diarrhea, dizziness, photosensitivity, and yeast infections.  Rarely, more serious reactions can occur including but not limited to aplastic anemia, agranulocytosis, methemoglobinemia, blood dyscrasias, liver or kidney failure, lung infiltrates or desquamative/blistering drug rashes. Winlevi Pregnancy And Lactation Text: This medication is considered safe during pregnancy and breastfeeding. Azithromycin Counseling:  I discussed with the patient the risks of azithromycin including but not limited to GI upset, allergic reaction, drug rash, diarrhea, and yeast infections. Minocycline Counseling: Patient advised regarding possible photosensitivity and discoloration of the teeth, skin, lips, tongue and gums.  Patient instructed to avoid sunlight, if possible.  When exposed to sunlight, patients should wear protective clothing, sunglasses, and sunscreen.  The patient was instructed to call the office immediately if the following severe adverse effects occur:  hearing changes, easy bruising/bleeding, severe headache, or vision changes.  The patient verbalized understanding of the proper use and possible adverse effects of minocycline.  All of the patient's questions and concerns were addressed. Tazorac Counseling:  Patient advised that medication is irritating and drying.  Patient may need to apply sparingly and wash off after an hour before eventually leaving it on overnight.  The patient verbalized understanding of the proper use and possible adverse effects of tazorac.  All of the patient's questions and concerns were addressed. Aklief Pregnancy And Lactation Text: It is unknown if this medication is safe to use during pregnancy.  It is unknown if this medication is excreted in breast milk.  Breastfeeding women should use the topical cream on the smallest area of the skin for the shortest time needed while breastfeeding.  Do not apply to nipple and areola. Benzoyl Peroxide Counseling: Patient counseled that medicine may cause skin irritation and bleach clothing.  In the event of skin irritation, the patient was advised to reduce the amount of the drug applied or use it less frequently.   The patient verbalized understanding of the proper use and possible adverse effects of benzoyl peroxide.  All of the patient's questions and concerns were addressed. Birth Control Pills Pregnancy And Lactation Text: This medication should be avoided if pregnant and for the first 30 days post-partum. Topical Retinoid counseling:  Patient advised to apply a pea-sized amount only at bedtime and wait 30 minutes after washing their face before applying.  If too drying, patient may add a non-comedogenic moisturizer. The patient verbalized understanding of the proper use and possible adverse effects of retinoids.  All of the patient's questions and concerns were addressed. Dapsone Pregnancy And Lactation Text: This medication is Pregnancy Category C and is not considered safe during pregnancy or breast feeding. High Dose Vitamin A Counseling: Side effects reviewed, pt to contact office should one occur. Topical Sulfur Applications Pregnancy And Lactation Text: This medication is Pregnancy Category C and has an unknown safety profile during pregnancy. It is unknown if this topical medication is excreted in breast milk.

## 2025-04-15 NOTE — PLAN OF CARE
Problem: Chronic Conditions and Co-morbidities  Goal: Patient's chronic conditions and co-morbidity symptoms are monitored and maintained or improved  Outcome: Progressing     Problem: Discharge Planning  Goal: Discharge to home or other facility with appropriate resources  Outcome: Progressing     Problem: Pain  Goal: Verbalizes/displays adequate comfort level or baseline comfort level  Outcome: Progressing  Flowsheets (Taken 4/30/2023 [id number] by Pam Roe RN)  Verbalizes/displays adequate comfort level or baseline comfort level: Encourage patient to monitor pain and request assistance     Problem: Safety - Adult  Goal: Free from fall injury  Outcome: Progressing     Problem: ABCDS Injury Assessment  Goal: Absence of physical injury  Outcome: Progressing     Problem: Skin/Tissue Integrity  Goal: Absence of new skin breakdown  Description: 1. Monitor for areas of redness and/or skin breakdown  2. Assess vascular access sites hourly  3. Every 4-6 hours minimum:  Change oxygen saturation probe site  4. Every 4-6 hours:  If on nasal continuous positive airway pressure, respiratory therapy assess nares and determine need for appliance change or resting period.   Outcome: Progressing Expected Date Of Service: 02/11/2025 Bill For Surgical Tray: no Billing Type: Third-Party Bill

## (undated) DEVICE — COVER,MAYO STAND,XL,STERILE: Brand: MEDLINE

## (undated) DEVICE — GLOVE SURG SZ 75 L12IN FNGR THK94MIL STD WHT LTX FREE

## (undated) DEVICE — SPONGE,NEURO,0.5"X0.5",XR,STRL,10/PK: Brand: MEDLINE

## (undated) DEVICE — SPONGE SURG WHT KTNR DISECT RADPQ ST

## (undated) DEVICE — SUTURE PERMAHAND SZ 2-0 L18IN NONABSORBABLE BLK L26MM SH C012D

## (undated) DEVICE — ENDOSCOPIC KIT 6X3/16 FT COLON W/ 1.1 OZ 2 GWN W/O BRSH

## (undated) DEVICE — FIAPC® PROBE W/ FILTER 2200 A OD 2.3MM/6.9FR; L 2.2M/7.2FT: Brand: ERBE

## (undated) DEVICE — PRECISION THIN (9.0 X 0.38 X 31.0MM)

## (undated) DEVICE — 3M™ STERI-DRAPE™ ISOLATION BAG, 10 PER CARTON / 4 CARTONS PER CASE, 1003: Brand: 3M™ STERI-DRAPE™

## (undated) DEVICE — GOWN,REINFORCED,POLY,SIRUS,XLNG/XXLG: Brand: MEDLINE

## (undated) DEVICE — GLOVE SURG SZ 85 L12IN FNGR ORTHO 126MIL CRM LTX FREE

## (undated) DEVICE — PICO 7 10CM X 20CM: Brand: PICO™ 7

## (undated) DEVICE — Z DISCONTINUED BY MEDLINE USE 2716051 TUBE TRACH SZ 6 L74MM OD10.8MM ID6.4MM CUF W/ DISP INNR

## (undated) DEVICE — AGENT HEMSTAT 3GM OXIDIZED REGENERATED CELOS ABSRB FOR CONT (ORDER MULTIPLES OF 5EA)

## (undated) DEVICE — APPLICATOR PREP 26ML 0.7% IOD POVACRYLEX 74% ISO ALC ST

## (undated) DEVICE — MEDI-VAC YANKAUER SUCTION HANDLE W/BULBOUS TIP: Brand: CARDINAL HEALTH

## (undated) DEVICE — BW-412T DISP COMBO CLEANING BRUSH: Brand: SINGLE USE COMBINATION CLEANING BRUSH

## (undated) DEVICE — ZIMMER® STERILE DISPOSABLE TOURNIQUET CUFF WITH PLC, DUAL PORT, SINGLE BLADDER, 18 IN. (46 CM)

## (undated) DEVICE — CATHETER SUCTION OPN 10 FR CTRL VLV VLY NITRL GLV TRIFLO LF

## (undated) DEVICE — AIR/WATER CLEANING ADAPTER FOR OLYMPUS® GI ENDOSCOPE: Brand: BULLDOG®

## (undated) DEVICE — TAPE MED W1/8XL30IN WHT POLY

## (undated) DEVICE — SYRINGE MED 10ML LUERLOCK TIP W/O SFTY DISP

## (undated) DEVICE — HYPODERMIC SAFETY NEEDLE: Brand: MAGELLAN

## (undated) DEVICE — SOLUTION IV IRRIG 500ML 0.9% SODIUM CHL 2F7123

## (undated) DEVICE — CATHETER URETH 18FR BLLN 5CC SIL HYDRGEL 2 W F SPEC CARS M

## (undated) DEVICE — SUTURE NONABSORBABLE MONOFILAMENT 7-0 BV-1 1X24 IN PROLENE 8702H

## (undated) DEVICE — SPONGE LAP W18XL18IN WHT COT 4 PLY FLD STRUNG RADPQ DISP ST 2 PER PACK

## (undated) DEVICE — SPONGE,NEURO,0.5"X3",XR,STRL,LF,10/PK: Brand: MEDLINE

## (undated) DEVICE — Z DISCONTINUED USE 2272117 DRAPE SURG 3 QTR N INVASIVE 2 LAYR DISP

## (undated) DEVICE — MOUTHPIECE ENDOSCP L CTRL OPN AND SIDE PORTS DISP

## (undated) DEVICE — TOWEL,OR,DSP,ST,BLUE,STD,4/PK,20PK/CS: Brand: MEDLINE

## (undated) DEVICE — ZIMMER® STERILE DISPOSABLE TOURNIQUET CUFF WITH PLC, DUAL PORT, SINGLE BLADDER, 30 IN. (76 CM)

## (undated) DEVICE — APPLIER CLP L9.375IN APER 2.1MM CLS L3.8MM 20 SM TI CLP

## (undated) DEVICE — DRESSING PETRO W3XL8IN N ADH OIL EMUL GZ CURAD

## (undated) DEVICE — YANKAUER,BULB TIP,W/O VENT,RIGID,STERILE: Brand: MEDLINE

## (undated) DEVICE — SUTURE VCRL + SZ 2-0 L18IN ABSRB UD CT1 L36MM 1/2 CIR VCP839D

## (undated) DEVICE — SUTURE PERMAHAND SZ 2-0 L18IN NONABSORBABLE BLK L26MM FS 685G

## (undated) DEVICE — PACK EXTREMITY XR

## (undated) DEVICE — ELECTRODE PT RET AD L9FT HI MOIST COND ADH HYDRGEL CORDED

## (undated) DEVICE — GOWN,SIRUS,POLYRNF,BRTHSLV,XL,30/CS: Brand: MEDLINE

## (undated) DEVICE — TRAP SPEC RETRV CLR PLAS POLYP IN LN SUCT QUIK CTCH

## (undated) DEVICE — SHEET,DRAPE,40X58,STERILE: Brand: MEDLINE

## (undated) DEVICE — 3M™ COBAN™ NL STERILE NON-LATEX SELF-ADHERENT WRAP, 2084S, 4 IN X 5 YD (10 CM X 4,5 M), 18 ROLLS/CASE: Brand: 3M™ COBAN™

## (undated) DEVICE — SUTURE PROL SZ 6-0 L30IN NONABSORBABLE BLU L11MM BV 3/8 CIR 8776H

## (undated) DEVICE — TOTAL TRAY, 16FR 10ML SIL FOLEY, URN: Brand: MEDLINE

## (undated) DEVICE — Device

## (undated) DEVICE — YANKAUER OPEN TIP, NO VENT: Brand: ARGYLE

## (undated) DEVICE — LOOP VES W25MM THK1MM MAXI RED SIL FLD REPELLENT 100 PER

## (undated) DEVICE — SKIN MARKER REGULAR TIP WITH RULER CAP AND LABELS: Brand: DEVON

## (undated) DEVICE — GLOVE SURG SZ 8 L12IN FNGR THK94MIL STD WHT LTX FREE

## (undated) DEVICE — SOLUTION IRRIG 1000ML 0.9% SOD CHL USP POUR PLAS BTL

## (undated) DEVICE — SNARE COLD DIAMOND 15MM THIN

## (undated) DEVICE — SOLUTION IV 1000ML 0.9% SOD CHL PH 5 INJ USP VIAFLX PLAS

## (undated) DEVICE — E-Z CLEAN, NON-STICK, PTFE COATED, ELECTROSURGICAL BLADE ELECTRODE, MODIFIED EXTENDED INSULATION, 4 INCH (10.2 CM): Brand: MEGADYNE

## (undated) DEVICE — MAJOR VASCULAR: Brand: MEDLINE INDUSTRIES, INC.

## (undated) DEVICE — 3M™ IOBAN™ 2 ANTIMICROBIAL INCISE DRAPE 6650EZ: Brand: IOBAN™ 2

## (undated) DEVICE — SUTURE VCRL + SZ 3-0 L27IN ABSRB UD L26MM SH 1/2 CIR VCP416H

## (undated) DEVICE — SUTURE PROL SZ 5-0 L36IN NONABSORBABLE BLU L13MM C-1 3/8 8720H

## (undated) DEVICE — VALVE SUCTION AIR H2O SET ORCA POD + DISP

## (undated) DEVICE — GLOVE ORTHO 7 1/2   MSG9475

## (undated) DEVICE — SUTURE PERMAHAND SZ 4-0 L18IN NONABSORBABLE BLK SILK BRAID A183H

## (undated) DEVICE — INTENDED FOR TISSUE SEPARATION, AND OTHER PROCEDURES THAT REQUIRE A SHARP SURGICAL BLADE TO PUNCTURE OR CUT.: Brand: BARD-PARKER ® STAINLESS STEEL BLADES

## (undated) DEVICE — APPLIER LIG CLP M L11IN TI STR RNG HNDL FOR 20 CLP DISP

## (undated) DEVICE — STANDARD HYPODERMIC NEEDLE,POLYPROPYLENE HUB: Brand: MONOJECT

## (undated) DEVICE — GLOVE ORANGE PI 8 1/2   MSG9085

## (undated) DEVICE — CORD ES L12FT BPLR FRCP

## (undated) DEVICE — SHEET,DRAPE,53X77,STERILE: Brand: MEDLINE

## (undated) DEVICE — MERCY FAIRFIELD TURNOVER KIT: Brand: MEDLINE INDUSTRIES, INC.

## (undated) DEVICE — PAD,NON-ADHERENT,3X8,STERILE,LF,1/PK: Brand: MEDLINE

## (undated) DEVICE — 450 ML BOTTLE OF 0.05% CHLORHEXIDINE GLUCONATE IN 99.95% STERILE WATER FOR IRRIGATION, USP AND APPLICATOR.: Brand: IRRISEPT ANTIMICROBIAL WOUND LAVAGE

## (undated) DEVICE — TUBING, SUCTION, 1/4" X 10', STRAIGHT: Brand: MEDLINE

## (undated) DEVICE — FOGARTY - HYDRAGRIP SURGICAL - CLAMP INSERTS: Brand: FOGARTY SOFTJAW

## (undated) DEVICE — DRAPE ADOLESCENT  LAPAROTOMY

## (undated) DEVICE — PADDING CAST W4INXL4YD NONSTERILE COT RAYON MICROPLEATED

## (undated) DEVICE — GOWN,SURGICAL,AURORA,SLEEVE: Brand: MEDLINE

## (undated) DEVICE — SOLUTION IV IRRIG WATER 500ML POUR BRL ST 2F7113

## (undated) DEVICE — AGENT HEMSTAT W4XL4IN OXIDIZED REGENERATED CELOS ABSRB SFT

## (undated) DEVICE — STAPLER SKIN H3.9MM WIRE DIA0.58MM CRWN 6.9MM 35 STPL ROT

## (undated) DEVICE — SURGICAL SUCTION CONNECTING TUBE WITH MALE CONNECTOR AND SUCTION CLAMP, 2 FT. LONG (.6 M), 5 MM I.D.: Brand: CONMED

## (undated) DEVICE — MAJOR SET UP PK

## (undated) DEVICE — LOTION PREP REMV 5OZ IODO CLR TINC OF BENZ DURAPREP

## (undated) DEVICE — Z CONVERTED USE 2276073 ROLL BNDG L W4.5INXL4 1/8YD WHT COT 6 PLY CNFRM KERLIX

## (undated) DEVICE — PACK DRP UNIV W BK TBL MAYO STD BTM TOP SIDE UTIL CVR ZONE

## (undated) DEVICE — LOOP VES W1.3MM THK0.9MM MINI YEL SIL FLD REPELLENT

## (undated) DEVICE — MARKER,SKIN,WI/RULER AND LABELS: Brand: MEDLINE

## (undated) DEVICE — SUTURE NONABSORBABLE MONOFILAMENT 6-0 C-1 1X30 IN PROLENE 8706H

## (undated) DEVICE — 3M™ COBAN™ NL STERILE NON-LATEX SELF-ADHERENT WRAP, 2083S, 3 IN X 5 YD (7,5 CM X 4,5 M), 24 ROLLS/CASE: Brand: 3M™ COBAN™

## (undated) DEVICE — PICO 7 10CM X 30CM: Brand: PICO™ 7

## (undated) DEVICE — SYRINGE MED 30ML STD CLR PLAS LUERLOCK TIP N CTRL DISP

## (undated) DEVICE — SUTURE PROL SZ 7-0 L30IN NONABSORBABLE BLU L13MM C-1 3/8 8708H

## (undated) DEVICE — SUTURE ETHLN SZ 4-0 L18IN NONABSORBABLE BLK L19MM PS-2 3/8 1667H

## (undated) DEVICE — EXTENSION SET, MALE LUER LOCK ADAPTER

## (undated) DEVICE — SUTURE VCRL UD BR CT 3-0 18IN CT1 J838D

## (undated) DEVICE — SUTURE PERMAHAND SZ 2-0 L12X18IN NONABSORBABLE BLK SILK A185H

## (undated) DEVICE — STERILE FAB REINF SET IN SL LEN 49

## (undated) DEVICE — APPLIER CLP L9.38IN M LIG TI DISP STR RNG HNDL LIGACLP

## (undated) DEVICE — SYRINGE, LUER LOCK, 10ML: Brand: MEDLINE